# Patient Record
Sex: FEMALE | Race: WHITE | NOT HISPANIC OR LATINO | Employment: OTHER | URBAN - METROPOLITAN AREA
[De-identification: names, ages, dates, MRNs, and addresses within clinical notes are randomized per-mention and may not be internally consistent; named-entity substitution may affect disease eponyms.]

---

## 2017-02-06 ENCOUNTER — ALLSCRIPTS OFFICE VISIT (OUTPATIENT)
Dept: OTHER | Facility: OTHER | Age: 67
End: 2017-02-06

## 2017-02-06 DIAGNOSIS — D50.8 OTHER IRON DEFICIENCY ANEMIAS: ICD-10-CM

## 2017-02-09 ENCOUNTER — HOSPITAL ENCOUNTER (OUTPATIENT)
Dept: INFUSION CENTER | Facility: HOSPITAL | Age: 67
Discharge: HOME | End: 2017-02-09
Payer: MEDICARE

## 2017-02-13 RX ORDER — CYANOCOBALAMIN 1000 UG/ML
1000 INJECTION INTRAMUSCULAR; SUBCUTANEOUS ONCE
Status: COMPLETED | OUTPATIENT
Start: 2017-02-14 | End: 2017-02-14

## 2017-02-13 RX ORDER — SODIUM CHLORIDE 9 MG/ML
20 INJECTION, SOLUTION INTRAVENOUS ONCE
Status: COMPLETED | OUTPATIENT
Start: 2017-02-14 | End: 2017-02-14

## 2017-02-14 ENCOUNTER — HOSPITAL ENCOUNTER (OUTPATIENT)
Dept: INFUSION CENTER | Facility: HOSPITAL | Age: 67
Discharge: HOME | End: 2017-02-14
Payer: MEDICARE

## 2017-02-14 VITALS
SYSTOLIC BLOOD PRESSURE: 118 MMHG | TEMPERATURE: 98.1 F | DIASTOLIC BLOOD PRESSURE: 68 MMHG | WEIGHT: 256.17 LBS | HEIGHT: 65 IN | HEART RATE: 84 BPM | BODY MASS INDEX: 42.68 KG/M2 | RESPIRATION RATE: 20 BRPM

## 2017-02-14 PROCEDURE — 96365 THER/PROPH/DIAG IV INF INIT: CPT

## 2017-02-14 PROCEDURE — 96372 THER/PROPH/DIAG INJ SC/IM: CPT

## 2017-02-14 PROCEDURE — 96366 THER/PROPH/DIAG IV INF ADDON: CPT

## 2017-02-14 RX ORDER — WARFARIN SODIUM 7.5 MG/1
6 TABLET ORAL EVERY OTHER DAY
COMMUNITY
End: 2019-08-23

## 2017-02-14 RX ADMIN — SODIUM CHLORIDE 20 ML/HR: 0.9 INJECTION, SOLUTION INTRAVENOUS at 15:18

## 2017-02-14 RX ADMIN — CYANOCOBALAMIN 1000 MCG: 1000 INJECTION, SOLUTION INTRAMUSCULAR at 17:02

## 2017-02-14 RX ADMIN — IRON SUCROSE 300 MG: 20 INJECTION, SOLUTION INTRAVENOUS at 15:18

## 2017-02-14 NOTE — PLAN OF CARE
Problem: Potential for Falls  Goal: Patient will remain free of falls  INTERVENTIONS:  - Assess patient frequently for physical needs  - Identify cognitive and physical deficits and behaviors that affect risk of falls    - [place] fall precautions as indicated by assessment   - Educate patient/family on patient safety including physical limitations  - Instruct patient to call for assistance with activity based on assessment  - Modify environment to reduce risk of injury  - Consider OT/PT consult to assist with strengthening/mobility   Outcome: Progressing

## 2017-02-15 RX ORDER — CYANOCOBALAMIN 1000 UG/ML
1000 INJECTION INTRAMUSCULAR; SUBCUTANEOUS ONCE
Status: COMPLETED | OUTPATIENT
Start: 2017-02-16 | End: 2017-02-16

## 2017-02-15 RX ORDER — SODIUM CHLORIDE 9 MG/ML
20 INJECTION, SOLUTION INTRAVENOUS ONCE
Status: COMPLETED | OUTPATIENT
Start: 2017-02-16 | End: 2017-02-16

## 2017-02-16 ENCOUNTER — HOSPITAL ENCOUNTER (OUTPATIENT)
Dept: INFUSION CENTER | Facility: HOSPITAL | Age: 67
Discharge: HOME | End: 2017-02-16
Payer: MEDICARE

## 2017-02-16 VITALS
HEART RATE: 59 BPM | SYSTOLIC BLOOD PRESSURE: 143 MMHG | DIASTOLIC BLOOD PRESSURE: 63 MMHG | TEMPERATURE: 96.8 F | RESPIRATION RATE: 16 BRPM

## 2017-02-16 PROCEDURE — 96365 THER/PROPH/DIAG IV INF INIT: CPT

## 2017-02-16 PROCEDURE — 96366 THER/PROPH/DIAG IV INF ADDON: CPT

## 2017-02-16 PROCEDURE — 96372 THER/PROPH/DIAG INJ SC/IM: CPT

## 2017-02-16 RX ADMIN — SODIUM CHLORIDE 20 ML/HR: 0.9 INJECTION, SOLUTION INTRAVENOUS at 13:24

## 2017-02-16 RX ADMIN — CYANOCOBALAMIN 1000 MCG: 1000 INJECTION, SOLUTION INTRAMUSCULAR at 14:17

## 2017-02-16 RX ADMIN — IRON SUCROSE 300 MG: 20 INJECTION, SOLUTION INTRAVENOUS at 13:24

## 2017-02-16 NOTE — PLAN OF CARE
Problem: Potential for Falls  Goal: Patient will remain free of falls  INTERVENTIONS:  - Assess patient frequently for physical needs  - Identify cognitive and physical deficits and behaviors that affect risk of falls    - [place] fall precautions as indicated by assessment   - Educate patient/family on patient safety including physical limitations  - Instruct patient to call for assistance with activity based on assessment  - Modify environment to reduce risk of injury  - Consider OT/PT consult to assist with strengthening/mobility   Outcome: Progressing

## 2017-02-20 RX ORDER — SODIUM CHLORIDE 9 MG/ML
20 INJECTION, SOLUTION INTRAVENOUS ONCE
Status: COMPLETED | OUTPATIENT
Start: 2017-02-21 | End: 2017-02-21

## 2017-02-20 RX ORDER — CYANOCOBALAMIN 1000 UG/ML
1000 INJECTION INTRAMUSCULAR; SUBCUTANEOUS ONCE
Status: COMPLETED | OUTPATIENT
Start: 2017-02-21 | End: 2017-02-21

## 2017-02-21 ENCOUNTER — HOSPITAL ENCOUNTER (OUTPATIENT)
Dept: INFUSION CENTER | Facility: HOSPITAL | Age: 67
Discharge: HOME | End: 2017-02-21
Payer: MEDICARE

## 2017-02-21 VITALS
DIASTOLIC BLOOD PRESSURE: 64 MMHG | SYSTOLIC BLOOD PRESSURE: 132 MMHG | HEART RATE: 62 BPM | TEMPERATURE: 97 F | RESPIRATION RATE: 18 BRPM

## 2017-02-21 PROCEDURE — 96372 THER/PROPH/DIAG INJ SC/IM: CPT

## 2017-02-21 PROCEDURE — 96366 THER/PROPH/DIAG IV INF ADDON: CPT

## 2017-02-21 PROCEDURE — 96365 THER/PROPH/DIAG IV INF INIT: CPT

## 2017-02-21 RX ADMIN — IRON SUCROSE 300 MG: 20 INJECTION, SOLUTION INTRAVENOUS at 13:56

## 2017-02-21 RX ADMIN — CYANOCOBALAMIN 1000 MCG: 1000 INJECTION, SOLUTION INTRAMUSCULAR at 16:06

## 2017-02-21 RX ADMIN — SODIUM CHLORIDE 20 ML/HR: 0.9 INJECTION, SOLUTION INTRAVENOUS at 13:31

## 2017-02-21 NOTE — PLAN OF CARE
Problem: Potential for Falls  Goal: Patient will remain free of falls  INTERVENTIONS:  - Assess patient frequently for physical needs  - Identify cognitive and physical deficits and behaviors that affect risk of falls    - [place] fall precautions as indicated by assessment   - Educate patient/family on patient safety including physical limitations  - Instruct patient to call for assistance with activity based on assessment  - Modify environment to reduce risk of injury  - Consider OT/PT consult to assist with strengthening/mobility   Outcome: Progressing

## 2017-02-22 RX ORDER — SODIUM CHLORIDE 9 MG/ML
20 INJECTION, SOLUTION INTRAVENOUS ONCE
Status: COMPLETED | OUTPATIENT
Start: 2017-02-23 | End: 2017-02-23

## 2017-02-22 RX ORDER — CYANOCOBALAMIN 1000 UG/ML
1000 INJECTION INTRAMUSCULAR; SUBCUTANEOUS ONCE
Status: COMPLETED | OUTPATIENT
Start: 2017-02-23 | End: 2017-02-23

## 2017-02-23 ENCOUNTER — HOSPITAL ENCOUNTER (OUTPATIENT)
Dept: INFUSION CENTER | Facility: HOSPITAL | Age: 67
Discharge: HOME | End: 2017-02-23
Payer: MEDICARE

## 2017-02-23 VITALS
SYSTOLIC BLOOD PRESSURE: 155 MMHG | RESPIRATION RATE: 20 BRPM | TEMPERATURE: 98.3 F | DIASTOLIC BLOOD PRESSURE: 68 MMHG | HEART RATE: 86 BPM

## 2017-02-23 PROCEDURE — 96366 THER/PROPH/DIAG IV INF ADDON: CPT

## 2017-02-23 PROCEDURE — 96372 THER/PROPH/DIAG INJ SC/IM: CPT

## 2017-02-23 PROCEDURE — 96365 THER/PROPH/DIAG IV INF INIT: CPT

## 2017-02-23 RX ADMIN — SODIUM CHLORIDE 20 ML/HR: 0.9 INJECTION, SOLUTION INTRAVENOUS at 14:04

## 2017-02-23 RX ADMIN — IRON SUCROSE 300 MG: 20 INJECTION, SOLUTION INTRAVENOUS at 14:04

## 2017-02-23 RX ADMIN — CYANOCOBALAMIN 1000 MCG: 1000 INJECTION, SOLUTION INTRAMUSCULAR at 14:04

## 2017-02-23 NOTE — PLAN OF CARE
Problem: Potential for Falls  Goal: Patient will remain free of falls  INTERVENTIONS:  - Assess patient frequently for physical needs  - Identify cognitive and physical deficits and behaviors that affect risk of falls    - [place] fall precautions as indicated by assessment   - Educate patient/family on patient safety including physical limitations  - Instruct patient to call for assistance with activity based on assessment  - Modify environment to reduce risk of injury  - Consider OT/PT consult to assist with strengthening/mobility   Outcome: Progressing

## 2017-02-27 ENCOUNTER — HOSPITAL ENCOUNTER (OUTPATIENT)
Dept: INFUSION CENTER | Facility: HOSPITAL | Age: 67
Discharge: HOME | End: 2017-02-27
Payer: MEDICARE

## 2017-02-27 VITALS
RESPIRATION RATE: 20 BRPM | HEART RATE: 65 BPM | SYSTOLIC BLOOD PRESSURE: 140 MMHG | DIASTOLIC BLOOD PRESSURE: 70 MMHG | TEMPERATURE: 97.7 F

## 2017-02-27 PROCEDURE — 96365 THER/PROPH/DIAG IV INF INIT: CPT

## 2017-02-27 PROCEDURE — 96366 THER/PROPH/DIAG IV INF ADDON: CPT

## 2017-02-27 PROCEDURE — 96372 THER/PROPH/DIAG INJ SC/IM: CPT

## 2017-02-27 RX ORDER — SODIUM CHLORIDE 9 MG/ML
20 INJECTION, SOLUTION INTRAVENOUS ONCE
Status: COMPLETED | OUTPATIENT
Start: 2017-02-27 | End: 2017-02-27

## 2017-02-27 RX ORDER — CYANOCOBALAMIN 1000 UG/ML
1000 INJECTION INTRAMUSCULAR; SUBCUTANEOUS ONCE
Status: COMPLETED | OUTPATIENT
Start: 2017-02-27 | End: 2017-02-27

## 2017-02-27 RX ADMIN — CYANOCOBALAMIN 1000 MCG: 1000 INJECTION, SOLUTION INTRAMUSCULAR at 13:07

## 2017-02-27 RX ADMIN — SODIUM CHLORIDE 20 ML/HR: 0.9 INJECTION, SOLUTION INTRAVENOUS at 13:03

## 2017-02-27 RX ADMIN — IRON SUCROSE 300 MG: 20 INJECTION, SOLUTION INTRAVENOUS at 13:25

## 2017-02-27 NOTE — PLAN OF CARE
Problem: Potential for Falls  Goal: Patient will remain free of falls  INTERVENTIONS:  - Assess patient frequently for physical needs  - Identify cognitive and physical deficits and behaviors that affect risk of falls    - [place] fall precautions as indicated by assessment   - Educate patient/family on patient safety including physical limitations  - Instruct patient to call for assistance with activity based on assessment  - Modify environment to reduce risk of injury  - Consider OT/PT consult to assist with strengthening/mobility   Outcome: Progressing

## 2017-02-28 ENCOUNTER — HOSPITAL ENCOUNTER (OUTPATIENT)
Dept: INFUSION CENTER | Facility: HOSPITAL | Age: 67
Discharge: HOME | End: 2017-02-28
Payer: MEDICARE

## 2017-06-06 ENCOUNTER — ALLSCRIPTS OFFICE VISIT (OUTPATIENT)
Dept: OTHER | Facility: OTHER | Age: 67
End: 2017-06-06

## 2017-06-07 ENCOUNTER — HOSPITAL ENCOUNTER (OUTPATIENT)
Dept: CT IMAGING | Facility: HOSPITAL | Age: 67
Discharge: HOME | End: 2017-06-07
Attending: UROLOGY
Payer: MEDICARE

## 2017-06-07 DIAGNOSIS — C64.9 MALIGNANT NEOPLASM OF KIDNEY EXCLUDING RENAL PELVIS (HCC): ICD-10-CM

## 2017-06-07 PROCEDURE — 74178 CT ABD&PLV WO CNTR FLWD CNTR: CPT

## 2017-06-07 RX ADMIN — IOHEXOL 100 ML: 350 INJECTION, SOLUTION INTRAVENOUS at 15:13

## 2017-06-16 ENCOUNTER — ALLSCRIPTS OFFICE VISIT (OUTPATIENT)
Dept: OTHER | Facility: OTHER | Age: 67
End: 2017-06-16

## 2017-06-16 ENCOUNTER — TRANSCRIBE ORDERS (OUTPATIENT)
Dept: ADMINISTRATIVE | Facility: HOSPITAL | Age: 67
End: 2017-06-16

## 2017-06-16 DIAGNOSIS — C64.9 ADENOCARCINOMA, RENAL CELL, UNSPECIFIED LATERALITY (HCC): Primary | ICD-10-CM

## 2017-12-11 ENCOUNTER — HOSPITAL ENCOUNTER (OUTPATIENT)
Dept: CT IMAGING | Facility: HOSPITAL | Age: 67
Discharge: HOME | End: 2017-12-11
Payer: MEDICARE

## 2017-12-11 DIAGNOSIS — C64.9 MALIGNANT NEOPLASM OF KIDNEY EXCLUDING RENAL PELVIS (HCC): ICD-10-CM

## 2017-12-11 PROCEDURE — 74178 CT ABD&PLV WO CNTR FLWD CNTR: CPT

## 2017-12-11 RX ADMIN — IOHEXOL 100 ML: 350 INJECTION, SOLUTION INTRAVENOUS at 14:53

## 2017-12-16 DIAGNOSIS — C64.9 MALIGNANT NEOPLASM OF KIDNEY EXCLUDING RENAL PELVIS (HCC): ICD-10-CM

## 2018-01-13 VITALS
SYSTOLIC BLOOD PRESSURE: 150 MMHG | WEIGHT: 260.31 LBS | RESPIRATION RATE: 20 BRPM | BODY MASS INDEX: 43.37 KG/M2 | HEIGHT: 65 IN | HEART RATE: 78 BPM | DIASTOLIC BLOOD PRESSURE: 90 MMHG | TEMPERATURE: 97.1 F

## 2018-01-13 VITALS
DIASTOLIC BLOOD PRESSURE: 82 MMHG | SYSTOLIC BLOOD PRESSURE: 158 MMHG | WEIGHT: 263.38 LBS | RESPIRATION RATE: 16 BRPM | TEMPERATURE: 95.9 F | BODY MASS INDEX: 43.88 KG/M2 | OXYGEN SATURATION: 83 % | HEIGHT: 65 IN | HEART RATE: 70 BPM

## 2018-01-13 VITALS
HEIGHT: 65 IN | SYSTOLIC BLOOD PRESSURE: 134 MMHG | BODY MASS INDEX: 43.88 KG/M2 | DIASTOLIC BLOOD PRESSURE: 82 MMHG | HEART RATE: 84 BPM | WEIGHT: 263.38 LBS

## 2018-03-09 ENCOUNTER — TELEPHONE (OUTPATIENT)
Dept: UROLOGY | Facility: AMBULATORY SURGERY CENTER | Age: 68
End: 2018-03-09

## 2018-03-09 DIAGNOSIS — C64.1: Primary | ICD-10-CM

## 2018-03-09 NOTE — TELEPHONE ENCOUNTER
Patient is managed by Dr Yanira Park and has history of renal cell carcinoma  She is due for a f/u CT scan in June, and is calling to have orders placed for it so she can schedule it with central scheduling  Advised patient will confirm with provider first that CT Abdomen Pelvis with and without contrast should be ordered, and will call patient back to inform

## 2018-03-12 NOTE — TELEPHONE ENCOUNTER
Left message for patient stating order in chart for CT scan abd/pelvis with and without IV contrast, no oral contrast   Central scheduling phone number provided for patient  Call with any questions

## 2018-03-12 NOTE — TELEPHONE ENCOUNTER
Patient should have CT abd/pelvis with and without IV contrast, no oral contrast  Orders placed in EPIC

## 2018-03-13 DIAGNOSIS — C64.9 RENAL CELL CARCINOMA, UNSPECIFIED LATERALITY (HCC): Primary | ICD-10-CM

## 2018-03-13 NOTE — TELEPHONE ENCOUNTER
Ordered Patient Bun and [facility] and called and spoke to her, asked her to have done 5 days prior to ct scan   Sending out today 03/13/2018

## 2018-05-17 ENCOUNTER — APPOINTMENT (OUTPATIENT)
Dept: LAB | Facility: HOSPITAL | Age: 68
End: 2018-05-17
Attending: UROLOGY
Payer: MEDICARE

## 2018-05-17 DIAGNOSIS — C64.9 RENAL CELL CARCINOMA, UNSPECIFIED LATERALITY (HCC): ICD-10-CM

## 2018-05-17 DIAGNOSIS — C64.9 MALIGNANT NEOPLASM OF KIDNEY EXCLUDING RENAL PELVIS (HCC): ICD-10-CM

## 2018-05-17 LAB
25(OH)D3 SERPL-MCNC: 42.5 NG/ML (ref 30–100)
ALBUMIN SERPL BCP-MCNC: 3.2 G/DL (ref 3.5–5)
ALP SERPL-CCNC: 171 U/L (ref 46–116)
ALT SERPL W P-5'-P-CCNC: 22 U/L (ref 12–78)
ANION GAP SERPL CALCULATED.3IONS-SCNC: 4 MMOL/L (ref 4–13)
AST SERPL W P-5'-P-CCNC: 20 U/L (ref 5–45)
BASOPHILS # BLD AUTO: 0.02 THOUSANDS/ΜL (ref 0–0.1)
BASOPHILS NFR BLD AUTO: 0 % (ref 0–1)
BILIRUB SERPL-MCNC: 0.46 MG/DL (ref 0.2–1)
BUN SERPL-MCNC: 19 MG/DL (ref 5–25)
CALCIUM SERPL-MCNC: 8.8 MG/DL (ref 8.3–10.1)
CHLORIDE SERPL-SCNC: 107 MMOL/L (ref 100–108)
CHOLEST SERPL-MCNC: 185 MG/DL (ref 50–200)
CO2 SERPL-SCNC: 30 MMOL/L (ref 21–32)
CREAT SERPL-MCNC: 0.93 MG/DL (ref 0.6–1.3)
EOSINOPHIL # BLD AUTO: 0.06 THOUSAND/ΜL (ref 0–0.61)
EOSINOPHIL NFR BLD AUTO: 1 % (ref 0–6)
ERYTHROCYTE [DISTWIDTH] IN BLOOD BY AUTOMATED COUNT: 13.5 % (ref 11.6–15.1)
GFR SERPL CREATININE-BSD FRML MDRD: 63 ML/MIN/1.73SQ M
GLUCOSE P FAST SERPL-MCNC: 96 MG/DL (ref 65–99)
HCT VFR BLD AUTO: 47.2 % (ref 34.8–46.1)
HDLC SERPL-MCNC: 70 MG/DL (ref 40–60)
HGB BLD-MCNC: 14.7 G/DL (ref 11.5–15.4)
IMM GRANULOCYTES # BLD AUTO: 0.07 THOUSAND/UL (ref 0–0.2)
IMM GRANULOCYTES NFR BLD AUTO: 1 % (ref 0–2)
LDLC SERPL CALC-MCNC: 100 MG/DL (ref 0–100)
LYMPHOCYTES # BLD AUTO: 1.21 THOUSANDS/ΜL (ref 0.6–4.47)
LYMPHOCYTES NFR BLD AUTO: 21 % (ref 14–44)
MCH RBC QN AUTO: 30.1 PG (ref 26.8–34.3)
MCHC RBC AUTO-ENTMCNC: 31.1 G/DL (ref 31.4–37.4)
MCV RBC AUTO: 97 FL (ref 82–98)
MONOCYTES # BLD AUTO: 0.5 THOUSAND/ΜL (ref 0.17–1.22)
MONOCYTES NFR BLD AUTO: 9 % (ref 4–12)
NEUTROPHILS # BLD AUTO: 3.86 THOUSANDS/ΜL (ref 1.85–7.62)
NEUTS SEG NFR BLD AUTO: 68 % (ref 43–75)
NRBC BLD AUTO-RTO: 0 /100 WBCS
PLATELET # BLD AUTO: 257 THOUSANDS/UL (ref 149–390)
PMV BLD AUTO: 9.9 FL (ref 8.9–12.7)
POTASSIUM SERPL-SCNC: 4.3 MMOL/L (ref 3.5–5.3)
PROT SERPL-MCNC: 6.3 G/DL (ref 6.4–8.2)
RBC # BLD AUTO: 4.88 MILLION/UL (ref 3.81–5.12)
SODIUM SERPL-SCNC: 141 MMOL/L (ref 136–145)
TRIGL SERPL-MCNC: 76 MG/DL
WBC # BLD AUTO: 5.72 THOUSAND/UL (ref 4.31–10.16)

## 2018-05-17 PROCEDURE — 82306 VITAMIN D 25 HYDROXY: CPT

## 2018-05-17 PROCEDURE — 80061 LIPID PANEL: CPT

## 2018-05-17 PROCEDURE — 85025 COMPLETE CBC W/AUTO DIFF WBC: CPT

## 2018-05-17 PROCEDURE — 80053 COMPREHEN METABOLIC PANEL: CPT

## 2018-05-17 PROCEDURE — 36415 COLL VENOUS BLD VENIPUNCTURE: CPT

## 2018-05-24 ENCOUNTER — HOSPITAL ENCOUNTER (OUTPATIENT)
Dept: CT IMAGING | Facility: HOSPITAL | Age: 68
Discharge: HOME | End: 2018-05-24
Payer: MEDICARE

## 2018-05-24 DIAGNOSIS — C64.1: ICD-10-CM

## 2018-05-24 PROCEDURE — 74178 CT ABD&PLV WO CNTR FLWD CNTR: CPT

## 2018-05-24 RX ADMIN — IOHEXOL 100 ML: 350 INJECTION, SOLUTION INTRAVENOUS at 15:20

## 2018-05-31 ENCOUNTER — TELEPHONE (OUTPATIENT)
Dept: UROLOGY | Facility: AMBULATORY SURGERY CENTER | Age: 68
End: 2018-05-31

## 2018-05-31 NOTE — TELEPHONE ENCOUNTER
Please inform patient that no evidence of recurrence of RCC or metastases noted  Will review in detail at patient's next OV on 6/14/18

## 2018-05-31 NOTE — TELEPHONE ENCOUNTER
Called and informed patient of CT results, no evidence of RCC or metastasis  Advised will be discussed in detail at follow up appt on 6/14/18

## 2018-05-31 NOTE — TELEPHONE ENCOUNTER
Patient managed by Dr Renay Joseph and seen at the [place] office has an upcoming appt with Renay Joseph, [address] on 6/14/18, however is calling today for results of her recent CT scan  Patient has history of renal cancer and is too anxious to wait until her upcoming appt to learn of results  Please advise

## 2018-06-13 ENCOUNTER — HOSPITAL ENCOUNTER (EMERGENCY)
Facility: HOSPITAL | Age: 68
Discharge: LEFT BEFORE DISCHARGE | End: 2018-06-14
Attending: EMERGENCY MEDICINE | Admitting: EMERGENCY MEDICINE
Payer: MEDICARE
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
## 2018-06-14 VITALS
OXYGEN SATURATION: 96 % | WEIGHT: 252 LBS | HEART RATE: 74 BPM | DIASTOLIC BLOOD PRESSURE: 106 MMHG | BODY MASS INDEX: 41.93 KG/M2 | TEMPERATURE: 98.2 F | SYSTOLIC BLOOD PRESSURE: 193 MMHG | RESPIRATION RATE: 20 BRPM

## 2018-06-14 PROCEDURE — 99283 EMERGENCY DEPT VISIT LOW MDM: CPT

## 2018-06-16 NOTE — ED PROVIDER NOTES
History  Chief Complaint   Patient presents with   Dileep John     pt states "i fell twice today, i have arthritis in my knees and i have a hard time getting up, my legs are weak "     Patient is a 75 yr old female who presents for a fall  Patient says that her L knee gave out on her and she landed on her knees  Patient is on coumadin  She says she has a history of osteoarthritis  She denies lightheadedness, dizziness, or chest pain prior to the fall  She is here because she is concerned about being on coumadin  Prior to Admission Medications   Prescriptions Last Dose Informant Patient Reported? Taking? FLUoxetine (PROzac) 20 mg capsule   Yes No   Sig: Take 20 mg by mouth daily  LORazepam (ATIVAN) 1 mg tablet   Yes No   Sig: Take 1 mg by mouth daily at bedtime as needed for anxiety  diltiazem (CARDIZEM) 120 MG tablet   Yes No   Sig: Take 120 mg by mouth 2 (two) times a day  warfarin (COUMADIN) 5 mg tablet  Self Yes No   Sig: Take by mouth daily     warfarin (COUMADIN) 7 5 mg tablet   Yes No   Sig: Take 7 5 mg by mouth 2 (two) times a week Mondays and Thursdays      Facility-Administered Medications: None       Past Medical History:   Diagnosis Date    [facility] [address] [facility]     Arthritis     History of transfusion     35 years ago    mass right kidney     Renal cell adenocarcinoma ([facility] [address] [facility]     RIGHT       Past Surgical History:   Procedure Laterality Date    CHOLECYSTECTOMY      COLOSTOMY      CRYOABLATION Right     RT KIDNEY    GASTRIC BYPASS      HYSTERECTOMY      [place] OPEN RX DISTAL RADIUS FX, EXTRA-ARTICULAR Right 4/22/2016    Procedure: OPEN REDUCTION INTERNAL FIXATION RIGHT DISTAL RADIUS;  Surgeon: Jae Zuniga MD;  [facility];  Service: Orthopedics    REVISION COLOSTOMY      TUBAL LIGATION         History reviewed  No pertinent family history  I have reviewed and agree with the history as documented      Social History   Substance Use Topics    Smoking status: Never Smoker    Smokeless tobacco: Never Used    Alcohol use No        Review of Systems   Constitutional: Negative for chills, diaphoresis and fever  HENT: Negative for congestion, sinus pressure, sore throat and trouble swallowing  Eyes: Negative for pain, discharge and itching  Respiratory: Negative for cough, chest tightness, shortness of breath and wheezing  Cardiovascular: Negative for chest pain, palpitations and leg swelling  Gastrointestinal: Negative for abdominal distention, abdominal pain, blood in stool, diarrhea, nausea and vomiting  Endocrine: Negative for polyphagia and polyuria  Genitourinary: Negative for difficulty urinating, dysuria, flank pain, hematuria, pelvic pain and vaginal bleeding  Musculoskeletal: Positive for arthralgias (b/l knees)  Negative for back pain  Skin: Negative for color change and rash  Neurological: Negative for dizziness, syncope, weakness, light-headedness and headaches  Physical Exam  ED Triage Vitals [06/13/18 2158]   Temperature Pulse Respirations Blood Pressure SpO2   98 2 °F (36 8 °C) 85 18 156/92 97 %      Temp Source Heart Rate Source Patient Position - Orthostatic VS BP Location FiO2 (%)   Oral Monitor Sitting Right arm --      Pain Score       Worst Possible Pain           Orthostatic Vital Signs  Vitals:    06/13/18 2158 06/14/18 0005 06/14/18 0015   BP: 156/92 (!) 193/106 (!) 193/106   Pulse: 85 79 74   Patient Position - Orthostatic VS: Sitting Lying        Physical Exam   Constitutional: She is oriented to person, place, and time  She appears well-developed and well-nourished  No distress  HENT:   Head: Normocephalic and atraumatic  Eyes: Conjunctivae are normal  Pupils are equal, round, and reactive to light  Neck: Neck supple  Cardiovascular: Normal rate, regular rhythm and normal heart sounds  Exam reveals no gallop and no friction rub  No murmur heard    Pulmonary/Chest: Effort normal and breath sounds normal  No respiratory distress  Abdominal: Soft  She exhibits no distension  There is no tenderness  There is no guarding  Musculoskeletal: Normal range of motion  She exhibits edema (mild swelling to R knee w/ bruising) and tenderness (Mild, right knee)  She exhibits no deformity  Lymphadenopathy:     She has no cervical adenopathy  Neurological: She is alert and oriented to person, place, and time  No cranial nerve deficit or sensory deficit  She exhibits normal muscle tone  Skin: Skin is warm and dry  Psychiatric: She has a normal mood and affect  Nursing note and vitals reviewed  ED Medications  Medications - No data to display    Diagnostic Studies  Results Reviewed     None                 No orders to display         Procedures  Procedures      Phone Consults  ED Phone Contact    ED Course  ED Course as of Jun 16 0811   Thu Jun 14, 2018   0018 Temperature: 98 2 °F (36 8 °C)                               MDM  Number of Diagnoses or Management Options  Diagnosis management comments: 74 yo F who presents for knee pain after fall  Patient on coumadin  No lightheadedness, dizzines, sob or chest pain  Patient has minor knee pain  Minor bruising on L knee  Upon speaking with patient and daughters they say that they came because they were concerned about her being on coumadin  They say that the swelling has gone down and that they would like to leave  I informed them that I needed to speak with my attending before I could discharge them  CritCare Time    Disposition  Final diagnoses:   Accident due to mechanical fall without injury, initial encounter     Time reflects when diagnosis was documented in both MDM as applicable and the Disposition within this note     Time User Action Codes Description Comment    6/16/2018  8:11 AM Mesfin Watkins Add [Y86  XXXA] Accident due to mechanical fall without injury, initial encounter       ED Disposition     ED Disposition Condition Comment    Eloped        Follow-up Information    None         Discharge Medication List as of 6/14/2018 12:46 AM      CONTINUE these medications which have NOT CHANGED    Details   diltiazem (CARDIZEM) 120 MG tablet Take 120 mg by mouth 2 (two) times a day , Until Discontinued, Historical Med      FLUoxetine (PROzac) 20 mg capsule Take 20 mg by mouth daily  , Until Discontinued, Historical Med      LORazepam (ATIVAN) 1 mg tablet Take 1 mg by mouth daily at bedtime as needed for anxiety  , Until Discontinued, Historical Med      !! warfarin (COUMADIN) 5 mg tablet Take by mouth daily  , Until Discontinued, Historical Med      !! warfarin (COUMADIN) 7 5 mg tablet Take 7 5 mg by mouth 2 (two) times a week Mondays and Thursdays, Until Discontinued, Historical Med       !! - Potential duplicate medications found  Please discuss with provider  No discharge procedures on file  ED Provider  Attending physically available and evaluated [facility]  I managed the patient along with the ED Attending      Electronically Signed by         Michela Weiner DO  06/16/18 2396

## 2018-07-13 ENCOUNTER — OFFICE VISIT (OUTPATIENT)
Dept: UROLOGY | Facility: CLINIC | Age: 68
End: 2018-07-13
Payer: MEDICARE

## 2018-07-13 VITALS
HEIGHT: 66 IN | DIASTOLIC BLOOD PRESSURE: 84 MMHG | WEIGHT: 267.2 LBS | SYSTOLIC BLOOD PRESSURE: 160 MMHG | BODY MASS INDEX: 42.94 KG/M2 | HEART RATE: 74 BPM

## 2018-07-13 DIAGNOSIS — C64.1: Primary | ICD-10-CM

## 2018-07-13 DIAGNOSIS — N39.41 URGE INCONTINENCE OF URINE: ICD-10-CM

## 2018-07-13 PROCEDURE — 99213 OFFICE O/P EST LOW 20 MIN: CPT | Performed by: NURSE PRACTITIONER

## 2018-07-13 RX ORDER — OXYBUTYNIN CHLORIDE 10 MG/1
10 TABLET, EXTENDED RELEASE ORAL
Qty: 30 TABLET | Refills: 11 | Status: SHIPPED | OUTPATIENT
Start: 2018-07-13 | End: 2018-07-16

## 2018-07-13 RX ORDER — MELATONIN
50000 WEEKLY
COMMUNITY
End: 2019-08-23

## 2018-07-13 NOTE — PROGRESS NOTES
7/13/2018    Carlyle Garza  1950  [id number]        Assessment  Right RCC s/p right cryotherapy (5/11/2016)  Incontinence    Discussion  Melia Stovall is a 75 y o  female being managed by Dr Pathak  Her CT scan was reviewed and there was no evidence of disease recurrence  We discussed medical management for her urinary symptoms and she wishes to try an anticholinergic  She was prescribed Oxybutynin 10mg daily  Use and side effects reviewed  She was instructed if she does not notice any efficacy in 6 weeks  Pending she is doing well she will then return in 1 year with a renal ultrasound and BMP  She was instructed to call with any issues  All questions answered  History of Present Illness  75 y o  female with a history of right RCC s/p right cryotherapy (5/11/2016) presents today for 6 month follow up  She denies any gross hematuria  She does complain of urinary frequency, urgency and incontinence  She uses 1 to 2 sanitary pads per day  She does find this bothersome  She denies any changes in her overall health except for arthritis in her knees  Review of Systems  Review of Systems   Constitutional: Negative  HENT: Negative  Respiratory: Negative  Cardiovascular: Negative  Gastrointestinal: Negative  Genitourinary:        As per HPI   Musculoskeletal: Negative  Skin: Negative  Neurological: Negative  Hematological: Negative            Past Medical History  Past Medical History:   Diagnosis Date    [facility] [address]     Arthritis     History of transfusion     35 years ago    mass right kidney     Renal cell adenocarcinoma ([facility] [address] [facility]     RIGHT       Past Surgical History  Past Surgical History:   Procedure Laterality Date    CHOLECYSTECTOMY      COLOSTOMY      CRYOABLATION Right     RT KIDNEY    GASTRIC BYPASS      HYSTERECTOMY      [place] OPEN RX DISTAL RADIUS FX, EXTRA-ARTICULAR Right 4/22/2016    Procedure: OPEN REDUCTION INTERNAL FIXATION RIGHT DISTAL RADIUS;  Surgeon: Bekah Luis MD;  [facility];  Service: Orthopedics    REVISION COLOSTOMY      TUBAL LIGATION          Past Family History  History reviewed  No pertinent family history  Past Social history  Social History     Social History    Marital status:      Spouse name: N/A    Number of children: N/A    Years of education: N/A     Occupational History    Not on file  Social History Main Topics    Smoking status: Never Smoker    Smokeless tobacco: Never Used    Alcohol use No    Drug use: No    Sexual activity: Not on file     Other Topics Concern    Not on file     Social History Narrative    No narrative on file       Current Medications  Current Outpatient Prescriptions   Medication Sig Dispense Refill    cholecalciferol (VITAMIN D3) 1,000 units tablet Take 50,000 Units by mouth once a week      diltiazem (CARDIZEM) 120 MG tablet Take 120 mg by mouth 2 (two) times a day   FLUoxetine (PROzac) 20 mg capsule Take 20 mg by mouth daily   LORazepam (ATIVAN) 1 mg tablet Take 1 mg by mouth daily at bedtime as needed for anxiety   warfarin (COUMADIN) 5 mg tablet Take by mouth daily        warfarin (COUMADIN) 7 5 mg tablet Take 7 5 mg by mouth 2 (two) times a week Mondays and Thursdays       No current facility-administered medications for this visit  Allergies  No Known Allergies    Past Medical History, Social History, Family History, medications and allergies were reviewed and updated as appropriate  Vitals  Vitals:    07/13/18 1332   BP: 160/84   BP Location: Left arm   Patient Position: Sitting   Cuff Size: Adult   Pulse: 74   Weight: 121 kg (267 lb 3 2 oz)   Height: 5' 6" (1 676 m)       Physical Exam  Skin: warm, dry, intact  Pulmonary: Non-labored breathing  Abdomen: Soft, non-tender, non-distended  Musculoskeletal: AROM with no joint deformity or tenderness    Neurology: Alert and oriented          Results    Lab Results   Component Value Date    GLUCOSE 106 04/18/2016 CALCIUM 8 8 05/17/2018     05/17/2018    K 4 3 05/17/2018    CO2 30 05/17/2018     05/17/2018    BUN 19 05/17/2018    CREATININE 0 93 05/17/2018     Lab Results   Component Value Date    WBC 5 72 05/17/2018    HGB 14 7 05/17/2018    HCT 47 2 (H) 05/17/2018    MCV 97 05/17/2018     05/17/2018           FINDINGS:     ABDOMEN     RIGHT KIDNEY AND URETER:  Stable appearance of parenchymal scar at the site of cryoablation  Several additional cortical scars in the upper pole  No evidence of local recurrence of tumor  No new right renal mass  No right ureteral mass  No hydronephrosis or hydroureter  No urinary tract calculi  No perinephric collection      LEFT KIDNEY AND URETER:  Cortical scarring, particularly in the upper pole  Upper pole cortical cyst, unchanged  No suspicious left renal mass  No left ureteral mass  No hydronephrosis or hydroureter  No urinary tract calculi  No perinephric collection      URINARY BLADDER:  No bladder wall mass  No calculi         LOWER CHEST:  Lung bases clear  Small hiatal hernia  Distal esophagus unremarkable      LIVER/BILIARY TREE:  Liver unremarkable  Stable extrahepatic bile duct dilatation with a CBD diameter of 1 2 cm, probably physiologic in this postcholecystectomy patient      GALLBLADDER:  Postcholecystectomy      SPLEEN:  Unremarkable      PANCREAS:  Unremarkable      ADRENAL GLANDS:  Unremarkable      STOMACH AND BOWEL:  Prior gastric bypass surgery  Loops of small intestine within a right infraumbilical anterior abdominal wall hernia  Small bowel otherwise unremarkable  Colon unremarkable      ABDOMINOPELVIC CAVITY: No lymphadenopathy or mass  No ascites  No extraluminal gas      VESSELS:  Unremarkable for patient's age      PELVIS     REPRODUCTIVE ORGANS:  Post hysterectomy      APPENDIX: No findings to suggest appendicitis      ABDOMINAL WALL/INGUINAL REGIONS:  Chronic rectus abdominis diastases with abdominal wall eventration  Infraumbilical hernia to the right of midline, containing several loops of small intestine without evidence of incarceration, unchanged in appearance   since the earlier CTs      OSSEOUS STRUCTURES:  No acute fracture or destructive osseous lesion      IMPRESSION:     1  Stable appearance of the right kidney, post cryoablation, with no evidence of local recurrence of renal cell carcinoma  2   No evidence of metastases in the abdomen or pelvis  3   Stable right infraumbilical anterior abdominal wall hernia, containing a loop of small intestine without evidence of incarceration

## 2018-07-16 DIAGNOSIS — R39.15 URINARY URGENCY: Primary | ICD-10-CM

## 2018-07-16 RX ORDER — OXYBUTYNIN CHLORIDE 5 MG/1
5 TABLET ORAL 2 TIMES DAILY
Qty: 180 TABLET | Refills: 3 | Status: SHIPPED | OUTPATIENT
Start: 2018-07-16 | End: 2019-01-08

## 2018-07-16 NOTE — TELEPHONE ENCOUNTER
Patient called office to see if she could have the oxybutynin script resent for oxybutynin 5 mg and take 1 tab in the morning and 1 tab in the evening  Per patient, it will be cheaper this way, and would like it sent to [facility] mail order if OK

## 2018-07-19 ENCOUNTER — OFFICE VISIT (OUTPATIENT)
Dept: OBGYN CLINIC | Facility: HOSPITAL | Age: 68
End: 2018-07-19
Payer: MEDICARE

## 2018-07-19 ENCOUNTER — HOSPITAL ENCOUNTER (OUTPATIENT)
Dept: RADIOLOGY | Facility: HOSPITAL | Age: 68
Discharge: HOME | End: 2018-07-19
Attending: ORTHOPAEDIC SURGERY
Payer: MEDICARE

## 2018-07-19 VITALS
BODY MASS INDEX: 42.87 KG/M2 | HEART RATE: 65 BPM | HEIGHT: 66 IN | SYSTOLIC BLOOD PRESSURE: 154 MMHG | DIASTOLIC BLOOD PRESSURE: 87 MMHG | WEIGHT: 266.76 LBS

## 2018-07-19 DIAGNOSIS — M25.561 PAIN IN BOTH KNEES, UNSPECIFIED CHRONICITY: ICD-10-CM

## 2018-07-19 DIAGNOSIS — M25.561 PAIN IN BOTH KNEES, UNSPECIFIED CHRONICITY: Primary | ICD-10-CM

## 2018-07-19 DIAGNOSIS — M25.561 CHRONIC PAIN OF RIGHT KNEE: ICD-10-CM

## 2018-07-19 DIAGNOSIS — M25.562 CHRONIC PAIN OF LEFT KNEE: ICD-10-CM

## 2018-07-19 DIAGNOSIS — M25.562 PAIN IN BOTH KNEES, UNSPECIFIED CHRONICITY: Primary | ICD-10-CM

## 2018-07-19 DIAGNOSIS — G89.29 CHRONIC PAIN OF RIGHT KNEE: ICD-10-CM

## 2018-07-19 DIAGNOSIS — M25.562 PAIN IN BOTH KNEES, UNSPECIFIED CHRONICITY: ICD-10-CM

## 2018-07-19 DIAGNOSIS — M17.12 PRIMARY OSTEOARTHRITIS OF LEFT KNEE: ICD-10-CM

## 2018-07-19 DIAGNOSIS — M17.11 PRIMARY OSTEOARTHRITIS OF RIGHT KNEE: ICD-10-CM

## 2018-07-19 DIAGNOSIS — G89.29 CHRONIC PAIN OF LEFT KNEE: ICD-10-CM

## 2018-07-19 PROCEDURE — 20610 DRAIN/INJ JOINT/BURSA W/O US: CPT | Performed by: ORTHOPAEDIC SURGERY

## 2018-07-19 PROCEDURE — 99214 OFFICE O/P EST MOD 30 MIN: CPT | Performed by: ORTHOPAEDIC SURGERY

## 2018-07-19 PROCEDURE — 73562 X-RAY EXAM OF KNEE 3: CPT

## 2018-07-19 RX ORDER — BUPIVACAINE HYDROCHLORIDE 2.5 MG/ML
2 INJECTION, SOLUTION INFILTRATION; PERINEURAL
Status: COMPLETED | OUTPATIENT
Start: 2018-07-19 | End: 2018-07-19

## 2018-07-19 RX ORDER — LIDOCAINE HYDROCHLORIDE 10 MG/ML
2 INJECTION, SOLUTION INFILTRATION; PERINEURAL
Status: COMPLETED | OUTPATIENT
Start: 2018-07-19 | End: 2018-07-19

## 2018-07-19 RX ORDER — BETAMETHASONE SODIUM PHOSPHATE AND BETAMETHASONE ACETATE 3; 3 MG/ML; MG/ML
12 INJECTION, SUSPENSION INTRA-ARTICULAR; INTRALESIONAL; INTRAMUSCULAR; SOFT TISSUE
Status: COMPLETED | OUTPATIENT
Start: 2018-07-19 | End: 2018-07-19

## 2018-07-19 RX ADMIN — BUPIVACAINE HYDROCHLORIDE 2 ML: 2.5 INJECTION, SOLUTION INFILTRATION; PERINEURAL at 15:35

## 2018-07-19 RX ADMIN — LIDOCAINE HYDROCHLORIDE 2 ML: 10 INJECTION, SOLUTION INFILTRATION; PERINEURAL at 15:36

## 2018-07-19 RX ADMIN — BUPIVACAINE HYDROCHLORIDE 2 ML: 2.5 INJECTION, SOLUTION INFILTRATION; PERINEURAL at 15:36

## 2018-07-19 RX ADMIN — LIDOCAINE HYDROCHLORIDE 2 ML: 10 INJECTION, SOLUTION INFILTRATION; PERINEURAL at 15:35

## 2018-07-19 RX ADMIN — BETAMETHASONE SODIUM PHOSPHATE AND BETAMETHASONE ACETATE 12 MG: 3; 3 INJECTION, SUSPENSION INTRA-ARTICULAR; INTRALESIONAL; INTRAMUSCULAR; SOFT TISSUE at 15:35

## 2018-07-19 RX ADMIN — BETAMETHASONE SODIUM PHOSPHATE AND BETAMETHASONE ACETATE 12 MG: 3; 3 INJECTION, SUSPENSION INTRA-ARTICULAR; INTRALESIONAL; INTRAMUSCULAR; SOFT TISSUE at 15:36

## 2018-07-19 NOTE — PROGRESS NOTES
75 y o female presents for evaluation of chronic bilateral knee pain and dysfunction  She describes pain level both knee joints, the pain is made worse bearing weight, the pain increases with increased activities  When she gets in a deep knee bending posture, she can arise from that position  She has had treatments in the past that are consistent with injections, but no physical therapy  She denies back pain, she denies bilateral hip pain, she denies bilateral ankle pain  Review of Systems  Review of systems negative unless otherwise specified in HPI    Past Medical History  Past Medical History:   Diagnosis Date    [facility] [address]     Arthritis     History of transfusion     35 years ago    mass right kidney     Renal cell adenocarcinoma ([facility] [address] [facility]     RIGHT       Past Surgical History  Past Surgical History:   Procedure Laterality Date    CHOLECYSTECTOMY      COLOSTOMY      CRYOABLATION Right     RT KIDNEY    GASTRIC BYPASS      HYSTERECTOMY      [place] OPEN RX DISTAL RADIUS FX, EXTRA-ARTICULAR Right 4/22/2016    Procedure: OPEN REDUCTION INTERNAL FIXATION RIGHT DISTAL RADIUS;  Surgeon: Doron Cohen MD;  [facility];  Service: Orthopedics    REVISION COLOSTOMY      TUBAL LIGATION         Current Medications  Current Outpatient Prescriptions on File Prior to Visit   Medication Sig Dispense Refill    cholecalciferol (VITAMIN D3) 1,000 units tablet Take 50,000 Units by mouth once a week      diltiazem (CARDIZEM) 120 MG tablet Take 120 mg by mouth 2 (two) times a day   FLUoxetine (PROzac) 20 mg capsule Take 20 mg by mouth daily   LORazepam (ATIVAN) 1 mg tablet Take 1 mg by mouth daily at bedtime as needed for anxiety        oxybutynin (DITROPAN) 5 mg tablet Take 1 tablet (5 mg total) by mouth 2 (two) times a day 180 tablet 3    warfarin (COUMADIN) 5 mg tablet Take by mouth daily        warfarin (COUMADIN) 7 5 mg tablet Take 7 5 mg by mouth 2 (two) times a week Mondays and Thursdays No current facility-administered medications on file prior to visit  Recent Labs [facility])    0  Lab Value Date/Time   HCT 47 2 (H) 05/17/2018 0653   HGB 14 7 05/17/2018 0653   WBC 5 72 05/17/2018 0653   INR 1 02 05/11/2016 0742   GLUCOSE 106 04/18/2016 0955         Physical exam  · General: Awake, Alert, Oriented  · Eyes: Pupils equal, round and reactive to light  · Heart: regular rate and rhythm  · Lungs: No audible wheezing  · Abdomen: soft  Gait pattern has moderate antalgia  Breathing is nonlabored  Both hips move well  Both eyes have atrophy  Both knees are in varus  There are no effusions  There is bony enlargement tenderness medially laterally  There is crepitation flexion extension bilaterally  There is no palpable warmth the synovium bilaterally  Calf compartments are soft and supple  Toes are warm, sensate, mobile bilaterally    Imaging  I have personally reviewed x-rays of both knees and my interpretation is as follows:  Standing films of both knee show arthritic changes in both knees    Procedure  Injections corticosteroid to provided for bilateral knee joints today    There documented below    Large joint arthrocentesis  Date/Time: 7/19/2018 3:35 PM  Consent given by: patient  Supporting Documentation  Indications: pain   Procedure Details  Location: knee - R knee  Needle size: 22 G  Ultrasound guidance: no  Medications administered: 2 mL bupivacaine 0 25 %; 2 mL lidocaine 1 %; 12 mg betamethasone acetate-betamethasone sodium phosphate 6 (3-3) mg/mL    Patient tolerance: patient tolerated the procedure well with no immediate complications  Dressing:  Sterile dressing applied  Large joint arthrocentesis  Date/Time: 7/19/2018 3:36 PM  Consent given by: patient  Supporting Documentation  Indications: pain   Procedure Details  Location: knee - L knee  Needle size: 22 G  Medications administered: 2 mL bupivacaine 0 25 %; 2 mL lidocaine 1 %; 12 mg betamethasone acetate-betamethasone sodium phosphate 6 (3-3) mg/mL    Patient tolerance: patient tolerated the procedure well with no immediate complications  Dressing:  Sterile dressing applied          Assessment/Plan:   75 y  o female who has bilateral knee pain and dysfunction stemming from osteoarthritis  Injections corticosteroid followed by course of physical therapy would be beneficial   Injections are advised, except, administers outlined above  I would welcome the opportunity see this patient back in the office in 3 months time for follow-up I did discuss exercises the primary means to reduce her BMI, which would make her much more attractive surgical candidate

## 2018-07-23 NOTE — PROGRESS NOTES
PT Evaluation     Today's date: 2018  Patient name: Mirella Kirkpatrick  : 1950  MRN: [id number]  Referring provider: Cachorro Puentes MD  Dx:   Encounter Diagnosis     ICD-10-CM    1  Primary osteoarthritis of left knee M17 12 Ambulatory referral to Physical Therapy   2  Primary osteoarthritis of right knee M17 11 Ambulatory referral to Physical Therapy                  Assessment  Impairments: abnormal gait, activity intolerance, impaired balance, impaired physical strength, lacks appropriate home exercise program, pain with function, poor posture  and poor body mechanics  Functional limitations: unable to independently get out of chairs, unable to squat, unable to ambulate long distances  Assessment details: Mirella Kirkpatrick is a 75 y o  female complains of bilateral knee pain for the past 4 years  There is not a significant history of knee problems  The pain is in the medial and posterior knee  There is also pain in the back  The pain is sharp and throbbing and intermittent and rated at currently a 4 out of 10  The symptoms are now getting worse  Activities that aggravate the pain include  walking, standing and rising from a chair  There is significant weakness  The knee will lock  PT eval revealed the impairments listed above, which have lead to activity limitations in standing up and walking which have ultimately lead to participation restrictions in her social life due to requiring assistance for standing  HEP printout was administered and demonstrated to the patient, and she verbalized and demonstrated understanding  Pt would benefit from skilled outpatient PT to address strength, balance, muscular endurance, posture, and pain in order to maximize her level of function  Barriers to therapy: Obesity  Understanding of Dx/Px/POC: fair   Prognosis: fair    Goals  ST  Independent with HEP in 2 weeks  2  Pt will have a verbal report of improvement in symptoms >/= 25% in 3 weeks   3   Pt will improve functional strength to complete full MMT in BLE in 3 weeks       LT  Pt will improve FOTO score by >/= 6 points in 6 weeks   2  Pt will be able to complete usual housework and work with little to no difficulty in 8 weeks   3  Pt will have no difficulty lifting an object, like a bag of groceries from the floor in 8 weeks  4  Pt will be able to get into/out of a car with little to no difficulty in 8 weeks   5  Pt will be able to independently rise out of a chair with little to no difficulty in 8 weeks   6  Pt will improve functional strength to complete 5TSTS within standardized parameters in 8 weeks     Plan  Patient would benefit from: skilled physical therapy  Planned modality interventions: cryotherapy  Planned therapy interventions: neuromuscular re-education, manual therapy, patient education, postural training, strengthening, stretching, therapeutic exercise, home exercise program, flexibility, transfer training, balance, therapeutic activities and gait training  Frequency: 2x week  Duration in weeks: 10  Plan of Care beginning date: 2018  Treatment plan discussed with: patient  Plan details: Plan will consist of decreasing pain, strengthening of proximal hip/thigh musculature, improving balance, and flexibility         Subjective Evaluation    History of Present Illness  Mechanism of injury: Pt has had a history of 4 years of knee pain  She has injections every 3 months which does relieve her pain  She reports that she has fallen about 5-6 weeks ago because she has trouble getting off of low surfaces  The pain is intermittent  Her pain is brought on by rising from a chair, standing, walking  Her pain is relieved by rest, injections and reclining  She reports that her problems getting out of a chair have been contributing to her falling which have additionally lead to wrist pain secondary to additional weight bearing through wrists with attempting to stand   Pt states that she does not have a fear of falling  Pain  Current pain ratin  At best pain ratin  At worst pain rating: 10  Location: bilateral knees   Quality: throbbing and sharp  Relieving factors: ice  Aggravating factors: walking and standing  Progression: worsening    Social Support  Steps to enter house: yes  1  Stairs in house: no   Lives in: [place]  Lives with: sister  Employment status: working  Exercise history: none       Diagnostic Tests  X-ray: abnormal (Standing films of both knee show arthritic changes in both knees)  Treatments  Previous treatment: medication and injection treatment  Current treatment: injection treatment  Patient Goals  Patient goals for therapy: decreased pain, increased strength, improved balance and independence with ADLs/IADLs  Patient goal: be able to get out of chairs independently         Objective     Static Posture     Head  Forward  Shoulders  Rounded  Observations   Left Knee   Negative for abrasion, adhesive scar, edema and incision  Right Knee   Negative for abrasion, adhesive scar, edema and incision  Palpation   Left   No palpable tenderness to the distal biceps femoris, distal semimembranosus, distal semitendinosus, rectus femoris, vastus lateralis and vastus medialis  Right   No palpable tenderness to the rectus femoris, vastus lateralis and vastus medialis  Tenderness of the distal biceps femoris, distal semimembranosus and distal semitendinosus  Tenderness   Left Knee   No tenderness in the lateral patella, medial patella, popliteal fossa, quadriceps tendon, superior patella and tibial tubercle  Right Knee   Tenderness in the popliteal fossa  No tenderness in the lateral patella, medial patella, quadriceps tendon, superior patella and tibial tubercle       Active Range of Motion   Left Hip   Flexion: WFL  Extension: Left hip active extension: mod loss of motion      Right Hip   Flexion: WFL  Extension: Right hip active extension: mod loss of motion    Left Knee   Flexion: 138 degrees   Extension: 0 degrees     Right Knee   Flexion: 136 degrees   Extension: 0 degrees   Left Ankle/Foot   Dorsiflexion (kf): WFL  Plantar flexion: WFL  Inversion: WFL  Eversion: WFL  Great toe extension: WFL    Right Ankle/Foot   Dorsiflexion (kf): WFL  Plantar flexion: WFL  Inversion: WFL  Eversion: WFL  Great toe extension: WFL    Strength/Myotome Testing     Left Hip   Planes of Motion   Flexion: 4-  Extension: 3  Abduction: 2+    Right Hip   Planes of Motion   Flexion: 4-  Extension: 3  Abduction: 2+    Left Knee   Flexion: 4  Extension: 4+  Quadriceps contraction: fair    Right Knee   Flexion: 4  Extension: 4+  Quadriceps contraction: poor    Left Ankle/Foot   Dorsiflexion: 5  Plantar flexion: 5  Inversion: 5  Eversion: 5  Great toe extension: 5    Right Ankle/Foot   Dorsiflexion: 5  Plantar flexion: 5  Inversion: 5  Eversion: 5  Great toe extension: 5    Tests     Left Knee   Negative anterior drawer, bounce home, patellar apprehension, posterior drawer, valgus stress test at 0 degrees, valgus stress test at 30 degrees, varus stress test at 0 degrees and varus stress test at 30 degrees  Right Knee   Negative anterior drawer, bounce home, patellar apprehension, posterior drawer, valgus stress test at 0 degrees, valgus stress test at 30 degrees, varus stress test at 0 degrees and varus stress test at 30 degrees  Ambulation   Weight-Bearing Status   Weight-Bearing Status (Left): full weight bearing   Weight-Bearing Status (Right): full weight-bearing    Assistive device used: none    Ambulation: Level Surfaces   Ambulation without assistive device: independent    Ambulation: Stairs     Additional Stairs Ambulation Details  Not assessed patient states "I do not walk stairs"    Observational Gait   Gait: antalgic   Increased left stance time and right stance time   Decreased walking speed, stride length, left swing time, right swing time, left step length and right step length  Left foot contact pattern: foot flat  Right foot contact pattern: foot flat    Quality of Movement During Gait     Knee    Knee (Left): Positive valgus  Knee (Right): Positive valgus       General Comments     Knee Comments  5TSTS- completed on high/low mat set at 57 cm with use of B/L UE = 26 SECONDS           Precautions: Blood thinners, Afib    Daily Treatment Diary     Manuals 7/25                    Ham/quad stretch                                                                                       Exercise Diary                         LAQ R/L 2X10 6sec hold                      Glute Set x30                     Seated TB clams x20                     Standing B/L HR                      Standing ham curl                       Standing hip abd/ext                       Quad set                        STS high/low mat                       Clams R/L                                                                                                                                                                       Modalities                                                                                             X= performed

## 2018-07-24 ENCOUNTER — EVALUATION (OUTPATIENT)
Dept: PHYSICAL THERAPY | Facility: REHABILITATION | Age: 68
End: 2018-07-24
Payer: MEDICARE

## 2018-07-24 DIAGNOSIS — M17.11 PRIMARY OSTEOARTHRITIS OF RIGHT KNEE: ICD-10-CM

## 2018-07-24 DIAGNOSIS — M17.12 PRIMARY OSTEOARTHRITIS OF LEFT KNEE: ICD-10-CM

## 2018-07-24 PROCEDURE — 97112 NEUROMUSCULAR REEDUCATION: CPT | Performed by: PHYSICAL THERAPIST

## 2018-07-24 PROCEDURE — 97162 PT EVAL MOD COMPLEX 30 MIN: CPT | Performed by: PHYSICAL THERAPIST

## 2018-07-24 PROCEDURE — G8978 MOBILITY CURRENT STATUS: HCPCS | Performed by: PHYSICAL THERAPIST

## 2018-07-24 PROCEDURE — G8979 MOBILITY GOAL STATUS: HCPCS | Performed by: PHYSICAL THERAPIST

## 2018-07-31 ENCOUNTER — OFFICE VISIT (OUTPATIENT)
Dept: PHYSICAL THERAPY | Facility: REHABILITATION | Age: 68
End: 2018-07-31
Payer: MEDICARE

## 2018-07-31 DIAGNOSIS — M17.12 PRIMARY OSTEOARTHRITIS OF LEFT KNEE: Primary | ICD-10-CM

## 2018-07-31 DIAGNOSIS — M17.11 PRIMARY OSTEOARTHRITIS OF RIGHT KNEE: ICD-10-CM

## 2018-07-31 PROCEDURE — 97112 NEUROMUSCULAR REEDUCATION: CPT

## 2018-07-31 NOTE — PROGRESS NOTES
Daily Note     Today's date: 2018  Patient name: Ashley Lozano  : 1950  MRN: [id number]  Referring provider: Juarez Garcia MD  Dx:   Encounter Diagnosis     ICD-10-CM    1  Primary osteoarthritis of left knee M17 12    2  Primary osteoarthritis of right knee M17 11                   Subjective: Had some bruising from TB, she is on coumadin      Objective: See treatment diary below    Manuals                    Ham/quad stretch                                                                                       Exercise Diary                         LAQ R/L 2X10 6sec hold  x                    Glute Set x30  x                   Seated TB clams x20  gr 10"x10                   Standing B/L HR   20x                   Standing ham curl    10x                   Standing hip abd/ext    10x                   Quad set                        STS high/low mat    low mat w airex 20x                   Clams R/L                        step up    4"x10                                                                                                                                           Modalities                                                                                             X= performed      Assessment: Tolerated treatment well  Patient would benefit from continued PT   Pulling in B knees with stand abd & ext  Trial of KT tape R knee for support  Used TB higher on thigh & less reps, will see how she reacts  Plan: Continue per plan of care

## 2018-08-02 ENCOUNTER — OFFICE VISIT (OUTPATIENT)
Dept: PHYSICAL THERAPY | Facility: REHABILITATION | Age: 68
End: 2018-08-02
Payer: MEDICARE

## 2018-08-02 DIAGNOSIS — M17.11 PRIMARY OSTEOARTHRITIS OF RIGHT KNEE: ICD-10-CM

## 2018-08-02 DIAGNOSIS — M17.12 PRIMARY OSTEOARTHRITIS OF LEFT KNEE: Primary | ICD-10-CM

## 2018-08-02 PROCEDURE — 97110 THERAPEUTIC EXERCISES: CPT | Performed by: PHYSICAL THERAPIST

## 2018-08-02 PROCEDURE — 97112 NEUROMUSCULAR REEDUCATION: CPT | Performed by: PHYSICAL THERAPIST

## 2018-08-02 NOTE — PROGRESS NOTES
Daily Note     Today's date: 2018  Patient name: Carla Foley  : 1950  MRN: [id number]  Referring provider: Margy Suarez MD  Dx:   Encounter Diagnosis     ICD-10-CM    1  Primary osteoarthritis of left knee M17 12    2  Primary osteoarthritis of right knee M17 11                   Subjective: Pt reports that the tape that she had worked great and that she would like it again  She reports that she wasn't too sore from last session and she felt pretty good  Objective: See treatment diary below    Assessment: Pt trailed knee taping with rock tape on B/L LE's  She showed good progress with sit to stands with less UE assistance  She was able to progress her standing exercises with minimal complaint of pain and "pulling" in the knees  S/L clamshells were more successful than supine clamshells, at this time supine clamshells are to be d/c due to bruising with bands  Pt would benefit from skilled outpatient PT to address her strength, flexibility, pain and muscular endurance in order to maximize her level of function  Plan: Continue per plan of care     Precautions: Blood thinner, afib    Manuals -                 Ham/quad stretch    ham 3x30s R/L                                                                                   Exercise Diary                        Bike    x10min           LAQ R/L 2X10 6sec hold  x  x                  Glute Set x30  x  x                 Seated TB clams x20  gr 10"x10  hold                 Standing B/L HR   20x  x                 Standing ham curl    10x  x20                 Standing hip abd/ext    10x  x20                 Quad set                        STS high/low mat    low mat w airex 20x  x                 Clams R/L      x20                  step up    4"x10  x                                                                                                                                         Modalities                       Tape B/L knees                                                                    X= performed

## 2018-08-07 ENCOUNTER — OFFICE VISIT (OUTPATIENT)
Dept: PHYSICAL THERAPY | Facility: REHABILITATION | Age: 68
End: 2018-08-07
Payer: MEDICARE

## 2018-08-07 DIAGNOSIS — M17.11 PRIMARY OSTEOARTHRITIS OF RIGHT KNEE: ICD-10-CM

## 2018-08-07 DIAGNOSIS — M17.12 PRIMARY OSTEOARTHRITIS OF LEFT KNEE: Primary | ICD-10-CM

## 2018-08-07 PROCEDURE — G8978 MOBILITY CURRENT STATUS: HCPCS

## 2018-08-07 PROCEDURE — 97112 NEUROMUSCULAR REEDUCATION: CPT

## 2018-08-07 PROCEDURE — 97110 THERAPEUTIC EXERCISES: CPT

## 2018-08-07 PROCEDURE — G8979 MOBILITY GOAL STATUS: HCPCS

## 2018-08-07 NOTE — PROGRESS NOTES
Daily Note     Today's date: 2018  Patient name: Yaneth Dan  : 1950  MRN: [id number]  Referring provider: Viviana Jiang MD  Dx:   Encounter Diagnosis     ICD-10-CM    1  Primary osteoarthritis of left knee M17 12    2  Primary osteoarthritis of right knee M17 11                   Subjective: Pt reports knee pain is decreased      Objective: See treatment diary below    Assessment:  Able to do STS w/o airex now  Declined tape      Plan: Continue per plan of care     Precautions: Blood thinner, afib    Manuals   8-7               Ham/quad stretch    ham 3x30s R/L  x & quad                                                                                 Exercise Diary                        Bike    x10min x          LAQ R/L 2X10 6sec hold  x  x  30x                Glute Set x30  x  x  x               Seated TB clams x20  gr 10"x10  hold                 Standing B/L HR   20x  x  30x               Standing ham curl    10x  x20  x               Standing hip abd/ext    10x  x20  x               Quad set                        STS high/low mat    low mat w airex 20x  x  20x no airex               Clams R/L  SL      x20  20                step up    4"x10  x  2x10                side step        7' x 3 laps                                                                                                               Modalities                       Tape B/L knees      NT                                                                X= performed

## 2018-08-09 ENCOUNTER — APPOINTMENT (OUTPATIENT)
Dept: PHYSICAL THERAPY | Facility: REHABILITATION | Age: 68
End: 2018-08-09
Payer: MEDICARE

## 2018-08-13 NOTE — PROGRESS NOTES
PT Discharge     Today's date: 2018  Patient name: Anselmo Horta  : 1950  MRN: [id number]  Referring provider: Uriel Cerrato MD  Dx:   Encounter Diagnosis     ICD-10-CM    1  Primary osteoarthritis of left knee M17 12    2  Primary osteoarthritis of right knee M17 11        Start Time: 1355  Stop Time: 1435  Total time in clinic (min): 40 minutes    Pt has self d/c to home to continue exercises at home

## 2018-08-14 ENCOUNTER — APPOINTMENT (OUTPATIENT)
Dept: PHYSICAL THERAPY | Facility: REHABILITATION | Age: 68
End: 2018-08-14
Payer: MEDICARE

## 2018-08-16 ENCOUNTER — APPOINTMENT (OUTPATIENT)
Dept: PHYSICAL THERAPY | Facility: REHABILITATION | Age: 68
End: 2018-08-16
Payer: MEDICARE

## 2018-08-20 ENCOUNTER — APPOINTMENT (OUTPATIENT)
Dept: PHYSICAL THERAPY | Facility: REHABILITATION | Age: 68
End: 2018-08-20
Payer: MEDICARE

## 2018-08-21 ENCOUNTER — APPOINTMENT (OUTPATIENT)
Dept: PHYSICAL THERAPY | Facility: REHABILITATION | Age: 68
End: 2018-08-21
Payer: MEDICARE

## 2018-08-23 ENCOUNTER — APPOINTMENT (OUTPATIENT)
Dept: PHYSICAL THERAPY | Facility: REHABILITATION | Age: 68
End: 2018-08-23
Payer: MEDICARE

## 2018-08-28 ENCOUNTER — APPOINTMENT (OUTPATIENT)
Dept: PHYSICAL THERAPY | Facility: REHABILITATION | Age: 68
End: 2018-08-28
Payer: MEDICARE

## 2018-08-29 ENCOUNTER — HOSPITAL ENCOUNTER (EMERGENCY)
Facility: HOSPITAL | Age: 68
Discharge: HOME | End: 2018-08-29
Attending: EMERGENCY MEDICINE
Payer: MEDICARE

## 2018-08-29 ENCOUNTER — APPOINTMENT (EMERGENCY)
Dept: RADIOLOGY | Facility: HOSPITAL | Age: 68
End: 2018-08-29
Payer: MEDICARE

## 2018-08-29 VITALS
HEART RATE: 73 BPM | TEMPERATURE: 98.3 F | BODY MASS INDEX: 42.31 KG/M2 | SYSTOLIC BLOOD PRESSURE: 138 MMHG | RESPIRATION RATE: 20 BRPM | WEIGHT: 262 LBS | OXYGEN SATURATION: 95 % | DIASTOLIC BLOOD PRESSURE: 75 MMHG

## 2018-08-29 DIAGNOSIS — R55 POSTURAL DIZZINESS WITH PRESYNCOPE: ICD-10-CM

## 2018-08-29 DIAGNOSIS — I48.91 ATRIAL FIBRILLATION (HCC): Primary | ICD-10-CM

## 2018-08-29 DIAGNOSIS — R00.2 PALPITATIONS: ICD-10-CM

## 2018-08-29 DIAGNOSIS — R42 POSTURAL DIZZINESS WITH PRESYNCOPE: ICD-10-CM

## 2018-08-29 LAB
ALBUMIN SERPL BCP-MCNC: 3 G/DL (ref 3.5–5)
ALP SERPL-CCNC: 208 U/L (ref 46–116)
ALT SERPL W P-5'-P-CCNC: 19 U/L (ref 12–78)
ANION GAP SERPL CALCULATED.3IONS-SCNC: 6 MMOL/L (ref 4–13)
APTT PPP: 31 SECONDS (ref 24–36)
AST SERPL W P-5'-P-CCNC: 23 U/L (ref 5–45)
ATRIAL RATE: 220 BPM
BASOPHILS # BLD AUTO: 0.03 THOUSANDS/ΜL (ref 0–0.1)
BASOPHILS NFR BLD AUTO: 1 % (ref 0–1)
BILIRUB SERPL-MCNC: 0.36 MG/DL (ref 0.2–1)
BUN SERPL-MCNC: 18 MG/DL (ref 5–25)
CALCIUM SERPL-MCNC: 8.9 MG/DL (ref 8.3–10.1)
CHLORIDE SERPL-SCNC: 106 MMOL/L (ref 100–108)
CO2 SERPL-SCNC: 28 MMOL/L (ref 21–32)
CREAT SERPL-MCNC: 0.81 MG/DL (ref 0.6–1.3)
EOSINOPHIL # BLD AUTO: 0.02 THOUSAND/ΜL (ref 0–0.61)
EOSINOPHIL NFR BLD AUTO: 0 % (ref 0–6)
ERYTHROCYTE [DISTWIDTH] IN BLOOD BY AUTOMATED COUNT: 13.1 % (ref 11.6–15.1)
GFR SERPL CREATININE-BSD FRML MDRD: 75 ML/MIN/1.73SQ M
GLUCOSE SERPL-MCNC: 114 MG/DL (ref 65–140)
HCT VFR BLD AUTO: 46.7 % (ref 34.8–46.1)
HGB BLD-MCNC: 14.8 G/DL (ref 11.5–15.4)
IMM GRANULOCYTES # BLD AUTO: 0.05 THOUSAND/UL (ref 0–0.2)
IMM GRANULOCYTES NFR BLD AUTO: 1 % (ref 0–2)
INR PPP: 1.79 (ref 0.86–1.17)
LYMPHOCYTES # BLD AUTO: 0.96 THOUSANDS/ΜL (ref 0.6–4.47)
LYMPHOCYTES NFR BLD AUTO: 17 % (ref 14–44)
MCH RBC QN AUTO: 29.7 PG (ref 26.8–34.3)
MCHC RBC AUTO-ENTMCNC: 31.7 G/DL (ref 31.4–37.4)
MCV RBC AUTO: 94 FL (ref 82–98)
MONOCYTES # BLD AUTO: 0.41 THOUSAND/ΜL (ref 0.17–1.22)
MONOCYTES NFR BLD AUTO: 7 % (ref 4–12)
NEUTROPHILS # BLD AUTO: 4.12 THOUSANDS/ΜL (ref 1.85–7.62)
NEUTS SEG NFR BLD AUTO: 74 % (ref 43–75)
NRBC BLD AUTO-RTO: 0 /100 WBCS
PLATELET # BLD AUTO: 277 THOUSANDS/UL (ref 149–390)
PMV BLD AUTO: 9.7 FL (ref 8.9–12.7)
POTASSIUM SERPL-SCNC: 4.1 MMOL/L (ref 3.5–5.3)
PROT SERPL-MCNC: 6.4 G/DL (ref 6.4–8.2)
PROTHROMBIN TIME: 20.9 SECONDS (ref 11.8–14.2)
QRS AXIS: -56 DEGREES
QRSD INTERVAL: 76 MS
QT INTERVAL: 354 MS
QTC INTERVAL: 403 MS
RBC # BLD AUTO: 4.99 MILLION/UL (ref 3.81–5.12)
SODIUM SERPL-SCNC: 140 MMOL/L (ref 136–145)
T WAVE AXIS: 53 DEGREES
TROPONIN I SERPL-MCNC: <0.02 NG/ML
VENTRICULAR RATE: 78 BPM
WBC # BLD AUTO: 5.59 THOUSAND/UL (ref 4.31–10.16)

## 2018-08-29 PROCEDURE — 93010 ELECTROCARDIOGRAM REPORT: CPT | Performed by: INTERNAL MEDICINE

## 2018-08-29 PROCEDURE — 36415 COLL VENOUS BLD VENIPUNCTURE: CPT

## 2018-08-29 PROCEDURE — 93005 ELECTROCARDIOGRAM TRACING: CPT

## 2018-08-29 PROCEDURE — 80053 COMPREHEN METABOLIC PANEL: CPT | Performed by: EMERGENCY MEDICINE

## 2018-08-29 PROCEDURE — 84484 ASSAY OF TROPONIN QUANT: CPT | Performed by: EMERGENCY MEDICINE

## 2018-08-29 PROCEDURE — 96360 HYDRATION IV INFUSION INIT: CPT

## 2018-08-29 PROCEDURE — 85730 THROMBOPLASTIN TIME PARTIAL: CPT | Performed by: EMERGENCY MEDICINE

## 2018-08-29 PROCEDURE — 99284 EMERGENCY DEPT VISIT MOD MDM: CPT

## 2018-08-29 PROCEDURE — 85025 COMPLETE CBC W/AUTO DIFF WBC: CPT | Performed by: EMERGENCY MEDICINE

## 2018-08-29 PROCEDURE — 71046 X-RAY EXAM CHEST 2 VIEWS: CPT

## 2018-08-29 PROCEDURE — 85610 PROTHROMBIN TIME: CPT | Performed by: EMERGENCY MEDICINE

## 2018-08-29 RX ADMIN — SODIUM CHLORIDE 1000 ML: 0.9 INJECTION, SOLUTION INTRAVENOUS at 10:51

## 2018-08-29 NOTE — ED ATTENDING ATTESTATION
Emergency Department Note- Nieves Moya 75 y o  female MRN: [id number]    Unit/Bed#: ED 01 Encounter: [id number]    Nieves Moya is a 75 y o  female who presents with   Chief Complaint   Patient presents with    Syncope     Pt had near syncope this morning, felt dizzy and lightheaded  Pt has had episodes in the past d/t afib   denies CP/SOB at this time  Symptoms relieved         History of Present Illness   HPI:  Nieves Moya is a 75 y o  female who presents with generalized weakness and near syncope  Patient states this morning she went up to use the bathroom and after having a bowel movement stood up was walking to her bed when she felt like she might pass out and patient was able to get back to her bed where she sat down  Did not have a syncopal event or lose consciousness  She is on Coumadin for atrial fibrillation  No other new medications or recent illness or injury  Patient states he feels better now  She is currently asymptomatic  ROS is otherwise unremarkable      Historical Information   Past Medical History:   Diagnosis Date    A-fib ([facility] [address] )     Arthritis     History of transfusion     35 years ago    mass right kidney     Renal cell adenocarcinoma ([facility] [address] )     RIGHT     Past Surgical History:   Procedure Laterality Date    CHOLECYSTECTOMY      COLOSTOMY      CRYOABLATION Right     RT KIDNEY    GASTRIC BYPASS      HYSTERECTOMY      WI OPEN RX DISTAL RADIUS FX, EXTRA-ARTICULAR Right 4/22/2016    Procedure: OPEN REDUCTION INTERNAL FIXATION RIGHT DISTAL RADIUS;  Surgeon: Teresa Mcnulty MD;  [facility];  Service: Orthopedics    REVISION COLOSTOMY      TUBAL LIGATION       Social History   History   Alcohol Use No     History   Drug Use No     History   Smoking Status    Never Smoker   Smokeless Tobacco    Never Used       Family History:   Meds/Allergies     No Known Allergies    Objective   First Vitals:   Blood Pressure: 134/77 (08/29/18 0949)  Pulse: 99 (08/29/18 8109)  Temperature: 98 3 °F (36 8 °C) (18)  Temp Source: Oral (18)  Respirations: 20 (18)  Weight - Scale: 119 kg (262 lb) (18)  SpO2: 92 % (18)    Current Vitals:   Blood Pressure: 138/75 (18 1141)  Pulse: 73 (18 1141)  Temperature: 98 3 °F (36 8 °C) (18)  Temp Source: Oral (18)  Respirations: 20 (18 114)  Weight - Scale: 119 kg (262 lb) (18)  SpO2: 95 % (18 1141)      Intake/Output Summary (Last 24 hours) at 18 1224  Last data filed at 18 1210   Gross per 24 hour   Intake              300 ml   Output                0 ml   Net              300 ml       Invasive Devices     Peripheral Intravenous Line            Peripheral IV 18 Right Antecubital less than 1 day                      Medical Decision Makin  Labs reviewed, patient feels better after IV fluids  Patient ambulated in the emergency department without difficulty  Patient family comfortable with being discharged home at this time  Will call her physician regarding mildly subtherapeutic INR      Recent Results (from the past 36 hour(s))   ECG 12 lead    Collection Time: 18  9:54 AM   Result Value Ref Range    Ventricular Rate 78 BPM    Atrial Rate 220 BPM    MS Interval  ms    QRSD Interval 76 ms    QT Interval 354 ms    QTC Interval 403 ms    P Axis  degrees    QRS Axis -56 degrees    T Wave Axis 53 degrees   Comprehensive metabolic panel    Collection Time: 18 10:01 AM   Result Value Ref Range    Sodium 140 136 - 145 mmol/L    Potassium 4 1 3 5 - 5 3 mmol/L    Chloride 106 100 - 108 mmol/L    CO2 28 21 - 32 mmol/L    ANION GAP 6 4 - 13 mmol/L    BUN 18 5 - 25 mg/dL    Creatinine 0 81 0 60 - 1 30 mg/dL    Glucose 114 65 - 140 mg/dL    Calcium 8 9 8 3 - 10 1 mg/dL    AST 23 5 - 45 U/L    ALT 19 12 - 78 U/L    Alkaline Phosphatase 208 (H) 46 - 116 U/L    Total Protein 6 4 6 4 - 8 2 g/dL    Albumin 3 0 (L) 3 5 - 5 0 g/dL    Total Bilirubin 0 36 0 20 - 1 00 mg/dL    eGFR 75 ml/min/1 73sq m   CBC and differential    Collection Time: 08/29/18 10:01 AM   Result Value Ref Range    WBC 5 59 4 31 - 10 16 Thousand/uL    RBC 4 99 3 81 - 5 12 Million/uL    Hemoglobin 14 8 11 5 - 15 4 g/dL    Hematocrit 46 7 (H) 34 8 - 46 1 %    MCV 94 82 - 98 fL    MCH 29 7 26 8 - 34 3 pg    MCHC 31 7 31 4 - 37 4 g/dL    RDW 13 1 11 6 - 15 1 %    MPV 9 7 8 9 - 12 7 fL    Platelets 445 917 - 796 Thousands/uL    nRBC 0 /100 WBCs    Neutrophils Relative 74 43 - 75 %    Immat GRANS % 1 0 - 2 %    Lymphocytes Relative 17 14 - 44 %    Monocytes Relative 7 4 - 12 %    Eosinophils Relative 0 0 - 6 %    Basophils Relative 1 0 - 1 %    Neutrophils Absolute 4 12 1 85 - 7 62 Thousands/µL    Immature Grans Absolute 0 05 0 00 - 0 20 Thousand/uL    Lymphocytes Absolute 0 96 0 60 - 4 47 Thousands/µL    Monocytes Absolute 0 41 0 17 - 1 22 Thousand/µL    Eosinophils Absolute 0 02 0 00 - 0 61 Thousand/µL    Basophils Absolute 0 03 0 00 - 0 10 Thousands/µL   Troponin I    Collection Time: 08/29/18 10:01 AM   Result Value Ref Range    Troponin I <0 02 <=0 04 ng/mL   Protime-INR    Collection Time: 08/29/18 10:01 AM   Result Value Ref Range    Protime 20 9 (H) 11 8 - 14 2 seconds    INR 1 79 (H) 0 86 - 1 17   APTT    Collection Time: 08/29/18 10:01 AM   Result Value Ref Range    PTT 31 24 - 36 seconds     X-ray chest 2 views   Final Result      No acute cardiopulmonary disease  Workstation performed: [id number]               Portions of the record may have been created with voice recognition software  Occasional wrong word or "sound a like" substitutions may have occurred due to the inherent limitations of voice recognition software  Bruce King DO, saw and evaluated the patient   I have discussed the patient with the resident/non-physician practitioner and agree with the resident's/non-physician practitioner's findings, Plan of Care, and MDM as documented in the resident's/non-physician practitioner's note, except where noted  All available labs and Radiology studies were reviewed  At this point I agree with the current assessment done in the Emergency Department    I have conducted an independent evaluation of this patient a history and physical is as follows:      Critical Care Time  CritCare Time    Procedures

## 2018-08-29 NOTE — ED PROVIDER NOTES
History  Chief Complaint   Patient presents with    Syncope     Pt had near syncope this morning, felt dizzy and lightheaded  Pt has had episodes in the past d/t afib   denies CP/SOB at this time  Symptoms relieved     Patient is a 58-year-old female with a past medical history significant for atrial fibrillation on Coumadin, chronic anemia having required blood transfusions in the past who presents with episode of lightheadedness and presyncope  Patient reports that earlier this morning she went to use the bathroom, urinated and then and noted that when she got up she became lightheaded  Went to sit down for a while and had the urge to have a bowel movement  Ran over to the bathroom, had a normal bowel movement, but afterwards became extremely lightheaded and developed tunnel vision as well as palpitations and shortness of breath  Laid down on the bed  Daughter decided to bring patient in for evaluation  Family reports that the patient is not eating or drinking as much as baseline over the last week and has been more fatigued  Currently, aside from fatigue the patient reports that she is back to her baseline  Of note, the patient reports that previously when she had episodes of AFib with RVR, she has felt similar symptoms  Denies chest pain, dizziness, headache, nausea, vomiting, diarrhea, blood in the stools  Assessment and plan:  Lightheadedness, palpitations, episode of presyncope  Atrial fibrillation with rapid ventricular response versus vasovagal   Will check labs to evaluate for anemia versus electrolyte abnormalities  PT/INR as the patient takes Coumadin  EKG to assess for arrhythmia  Chest x-ray to rule out widened mediastinum/pneumonia  Normal saline for hydration  Reassess  Prior to Admission Medications   Prescriptions Last Dose Informant Patient Reported? Taking?    FLUoxetine (PROzac) 20 mg capsule 8/29/2018 at Unknown time  Yes Yes   Sig: Take 20 mg by mouth 2 (two) times a day     LORazepam (ATIVAN) 1 mg tablet   Yes Yes   Sig: Take 1 mg by mouth daily at bedtime as needed for anxiety  cholecalciferol (VITAMIN D3) 1,000 units tablet   Yes Yes   Sig: Take 50,000 Units by mouth once a week   diltiazem (CARDIZEM) 120 MG tablet 8/29/2018 at Unknown time  Yes Yes   Sig: Take 120 mg by mouth 2 (two) times a day  oxybutynin (DITROPAN) 5 mg tablet 8/29/2018 at Unknown time  No Yes   Sig: Take 1 tablet (5 mg total) by mouth 2 (two) times a day   warfarin (COUMADIN) 5 mg tablet  Self Yes Yes   Sig: Take 5 mg by mouth every other day     warfarin (COUMADIN) 7 5 mg tablet 8/28/2018 at Unknown time  Yes Yes   Sig: Take 6 mg by mouth every other day Mondays and Thursdays        Facility-Administered Medications: None       Past Medical History:   Diagnosis Date    [facility]fib [facility] [facility]     Arthritis     History of transfusion     35 years ago    mass right kidney     [facility]     RIGHT       Past Surgical History:   Procedure Laterality Date    CHOLECYSTECTOMY      COLOSTOMY      CRYOABLATION Right     RT KIDNEY    GASTRIC BYPASS      HYSTERECTOMY      OH OPEN RX DISTAL RADIUS FX, EXTRA-ARTICULAR Right 4/22/2016    Procedure: OPEN REDUCTION INTERNAL FIXATION RIGHT DISTAL RADIUS;  Surgeon: Yvette Ledezma MD;  Location: [facility];  Service: Orthopedics    REVISION COLOSTOMY      TUBAL LIGATION         History reviewed  No pertinent family history  I have reviewed and agree with the history as documented  Social History   Substance Use Topics    Smoking status: Never Smoker    Smokeless tobacco: Never Used    Alcohol use No        Review of Systems   Constitutional: Positive for fatigue  Negative for chills and fever  HENT: Negative for congestion and rhinorrhea  Eyes: Negative for photophobia and visual disturbance  Respiratory: Positive for shortness of breath  Negative for chest tightness  Cardiovascular: Positive for palpitations   Negative for chest pain and leg swelling  Gastrointestinal: Negative for abdominal pain, blood in stool, constipation, diarrhea, nausea and vomiting  Genitourinary: Negative for dysuria and hematuria  Musculoskeletal: Positive for neck pain (chronic)  Negative for back pain  Skin: Negative for pallor, rash and wound  Neurological: Positive for light-headedness  Negative for dizziness, syncope, weakness, numbness and headaches  Physical Exam  ED Triage Vitals [08/29/18 0949]   Temperature Pulse Respirations Blood Pressure SpO2   98 3 °F (36 8 °C) 99 20 134/77 92 %      Temp Source Heart Rate Source Patient Position - Orthostatic VS BP Location FiO2 (%)   Oral Monitor Sitting Left arm --      Pain Score       No Pain           Orthostatic Vital Signs  Vitals:    08/29/18 0949 08/29/18 1030 08/29/18 1141   BP: 134/77 153/86 138/75   Pulse: 99 72 73   Patient Position - Orthostatic VS: Sitting  Lying       Physical Exam   Constitutional: She is oriented to person, place, and time  She appears well-developed and well-nourished  No distress  HENT:   Head: Normocephalic and atraumatic  Right Ear: External ear normal    Left Ear: External ear normal    Nose: Nose normal    Mouth/Throat: Oropharynx is clear and moist    Eyes: Conjunctivae and EOM are normal  Pupils are equal, round, and reactive to light  Neck: Normal range of motion  Neck supple  Cardiovascular: Normal rate, normal heart sounds and intact distal pulses  Exam reveals no gallop and no friction rub  No murmur heard  Irregularly irregular   Pulmonary/Chest: Effort normal and breath sounds normal  No respiratory distress  She has no wheezes  She has no rales  She exhibits no tenderness  Abdominal: Soft  Bowel sounds are normal  She exhibits no distension  There is no tenderness  There is no rebound and no guarding  Musculoskeletal: Normal range of motion  She exhibits no edema     Neurological: She is alert and oriented to person, place, and time  She has normal strength and normal reflexes  No cranial nerve deficit or sensory deficit  She exhibits normal muscle tone  Coordination normal  GCS eye subscore is 4  GCS verbal subscore is 5  GCS motor subscore is 6  Skin: Skin is warm and dry  Capillary refill takes less than 2 seconds  No rash noted  She is not diaphoretic  No erythema  No pallor  Psychiatric: She has a normal mood and affect  Her behavior is normal    Nursing note and vitals reviewed  ED Medications  Medications   sodium chloride 0 9 % bolus 1,000 mL (0 mL Intravenous Stopped 8/29/18 1210)       Diagnostic Studies  Results Reviewed     Procedure Component Value Units Date/Time    Protime-INR [[id number]]  (Abnormal) Collected:  08/29/18 1001    Lab Status:  Final result Specimen:  Blood from Arm, Right Updated:  08/29/18 1045     Protime 20 9 (H) seconds      INR 1 79 (H)    APTT [[id number]]  (Normal) Collected:  08/29/18 1001    Lab Status:  Final result Specimen:  Blood from Arm, Right Updated:  08/29/18 1045     PTT 31 seconds     Comprehensive metabolic panel [[id number]]  (Abnormal) Collected:  08/29/18 1001    Lab Status:  Final result Specimen:  Blood from Arm, Right Updated:  08/29/18 1029     Sodium 140 mmol/L      Potassium 4 1 mmol/L      Chloride 106 mmol/L      CO2 28 mmol/L      ANION GAP 6 mmol/L      BUN 18 mg/dL      Creatinine 0 81 mg/dL      Glucose 114 mg/dL      Calcium 8 9 mg/dL      AST 23 U/L      ALT 19 U/L      Alkaline Phosphatase 208 (H) U/L      Total Protein 6 4 g/dL      Albumin 3 0 (L) g/dL      Total Bilirubin 0 36 mg/dL      eGFR 75 ml/min/1 73sq m     Narrative:         National Kidney Disease Education Program recommendations are as follows:  GFR calculation is accurate only with a steady state creatinine  Chronic Kidney disease less than 60 ml/min/1 73 sq  meters  Kidney failure less than 15 ml/min/1 73 sq  meters      Troponin I [[id number]]  (Normal) Collected:  08/29/18 1001    Lab Status:  Final result Specimen:  Blood from Arm, Right Updated:  08/29/18 1029     Troponin I <0 02 ng/mL     CBC and differential [[id number]]  (Abnormal) Collected:  08/29/18 1001    Lab Status:  Final result Specimen:  Blood from Arm, Right Updated:  08/29/18 1009     WBC 5 59 Thousand/uL      RBC 4 99 Million/uL      Hemoglobin 14 8 g/dL      Hematocrit 46 7 (H) %      MCV 94 fL      MCH 29 7 pg      MCHC 31 7 g/dL      RDW 13 1 %      MPV 9 7 fL      Platelets 038 Thousands/uL      nRBC 0 /100 WBCs      Neutrophils Relative 74 %      Immat GRANS % 1 %      Lymphocytes Relative 17 %      Monocytes Relative 7 %      Eosinophils Relative 0 %      Basophils Relative 1 %      Neutrophils Absolute 4 12 Thousands/µL      Immature Grans Absolute 0 05 Thousand/uL      Lymphocytes Absolute 0 96 Thousands/µL      Monocytes Absolute 0 41 Thousand/µL      Eosinophils Absolute 0 02 Thousand/µL      Basophils Absolute 0 03 Thousands/µL                  X-ray chest 2 views   Final Result by Lindy Barfield DO (08/29 1055)      No acute cardiopulmonary disease              Workstation performed: [id number]               Procedures  ECG 12 Lead Documentation  Date/Time: 8/29/2018 10:02 AM  Performed by: Sergio Carrasquillo by: Sagar Trejo     Indications / Diagnosis:  Presyncope  ECG reviewed by me, the ED Provider: yes    Patient location:  ED  Previous ECG:     Previous ECG:  Compared to current    Comparison ECG info:  05/11/2016    Similarity:  Changes noted  Interpretation:     Interpretation: non-specific    Rate:     ECG rate:  78    ECG rate assessment: normal    Rhythm:     Rhythm: atrial fibrillation    Ectopy:     Ectopy: none    QRS:     QRS axis:  Normal  Conduction:     Conduction: abnormal      Abnormal conduction: LAFB    ST segments:     ST segments:  Normal  T waves:     T waves: inverted      Inverted:  V2          Phone Consults  ED Phone Contact    ED Course  ED Course as of Aug 29 1217   Wed Aug 29, 2018   1133 Patient reassessed  Feels at her baseline  Awaiting completion of IVF  Will ambulate and if no difficulties- will discharge with PCP follow up    1215 Patient's IV infiltrated  Patient feels back to her baseline  Patient was able to ambulate throughout the department without any difficulties  Will follow up with her primary care provider in 3-5 days for reassessment  Given return precautions/discharge instructions to patient and her family who verbalized understanding  Identification of Seniors at Risk      Most Recent Value   (ISAR) Identification of Seniors at Risk   Before the illness or injury that brought you to the Emergency, did you need someone to help you on a regular basis? 0 Filed at: 08/29/2018 0951   In the last 24 hours, have you needed more help than usual?  0 Filed at: 08/29/2018 4063   Have you been hospitalized for one or more nights during the past 6 months? 0 Filed at: 08/29/2018 0951   In general, do you see well?  0 Filed at: 08/29/2018 0951   In general, do you have serious problems with your memory? 0 Filed at: 08/29/2018 8717   Do you take more than three different medications every day? 1 Filed at: 08/29/2018 0951   ISAR Score  1 Filed at: 08/29/2018 8916                          MDM  CritCare Time    Disposition  Final diagnoses:   Atrial fibrillation (HCC)   Palpitations   Postural dizziness with presyncope     Time reflects when diagnosis was documented in both MDM as applicable and the Disposition within this note     Time User Action Codes Description Comment    8/29/2018 12:16 PM Karla Latham [I48 91] Atrial fibrillation ([address] )     8/29/2018 12:16 PM Catalina Jaramillo Add [R00 2] Palpitations     8/29/2018 12:16 PM Catalina Brazil Add [R42,  R55] Postural dizziness with presyncope       ED Disposition     ED Disposition Condition Comment    Discharge  Mirella Kirkpatrick discharge to home/self care      Condition at discharge: Good        Follow-up Information Follow up With Specialties Details Why [address], DO Family Medicine Schedule an appointment as soon as possible for a visit in 3 days for re-evaluation [address]  [employer] [facility] [id number] [address] [facility] Emergency Department Emergency Medicine Go to for re-evaluation, As needed, If symptoms worsen [address] [facility] [address] [facility] ED, [address] [place] [address], [id number]          Patient's Medications   Discharge Prescriptions    No medications on file     No discharge procedures on file  ED Provider  Attending physically available and evaluated [facility]  I managed the patient along with the ED Attending      Electronically Signed by         Julisa Arango DO  08/29/18 5285

## 2018-08-29 NOTE — DISCHARGE INSTRUCTIONS
A-fib (Atrial Fibrillation)   WHAT YOU NEED TO KNOW:   A-fib may come and go, or it may be a long-term condition  A-fib can cause blood clots, stroke, or heart failure  These conditions may become life-threatening  It is important to treat and manage a-fib to help prevent a blood clot, stroke, or heart failure  DISCHARGE INSTRUCTIONS:   Call 911 for any of the following:   · You have any of the following signs of a heart attack:      ¨ Squeezing, pressure, or pain in your chest that lasts longer than 5 minutes or returns    ¨ Discomfort or pain in your back, neck, jaw, stomach, or arm     ¨ Trouble breathing    ¨ Nausea or vomiting    ¨ Lightheadedness or a sudden cold sweat, especially with chest pain or trouble breathing    · You have any of the following signs of a stroke:      ¨ Numbness or drooping on one side of your face     ¨ Weakness in an arm or leg    ¨ Confusion or difficulty speaking    ¨ Dizziness, a severe headache, or vision loss  Return to the emergency department if:  You have any of the following signs of a blood clot:  · You feel lightheaded, are short of breath, and have chest pain  · You cough up blood  · You have swelling, redness, pain, or warmth in your arm or leg  Contact your cardiologist or healthcare provider if:   · Your target heart rate is not in the range it should be  · You have new or worsening swelling in your legs, feet, ankles, or abdomen  · You are short of breath, even at rest      · You have questions or concerns about your condition or care  Medicines: You may need any of the following:  · Heart medicines  help control your heart rate and rhythm  You may need more than one medicine to treat your symptoms  · Blood thinners    help prevent blood clots  Examples of blood thinners include heparin and warfarin  Clots can cause strokes, heart attacks, and death   The following are general safety guidelines to follow while you are taking a blood thinner:    ¨ Watch for bleeding and bruising while you take blood thinners  Watch for bleeding from your gums or nose  Watch for blood in your urine and bowel movements  Use a soft washcloth on your skin, and a soft toothbrush to brush your teeth  This can keep your skin and gums from bleeding  If you shave, use an electric shaver  Do not play contact sports  ¨ Tell your dentist and other healthcare providers that you take anticoagulants  Wear a bracelet or necklace that says you take this medicine  ¨ Do not start or stop any medicines unless your healthcare provider tells you to  Many medicines cannot be used with blood thinners  ¨ Tell your healthcare provider right away if you forget to take the medicine, or if you take too much  ¨ Warfarin  is a blood thinner that you may need to take  The following are things you should be aware of if you take warfarin  § Foods and medicines can affect the amount of warfarin in your blood  Do not make major changes to your diet while you take warfarin  Warfarin works best when you eat about the same amount of vitamin K every day  Vitamin K is found in green leafy vegetables and certain other foods  Ask for more information about what to eat when you are taking warfarin  § You will need to see your healthcare provider for follow-up visits when you are on warfarin  You will need regular blood tests  These tests are used to decide how much medicine you need  · Antiplatelets , such as aspirin, help prevent blood clots  Take your antiplatelet medicine exactly as directed  These medicines make it more likely for you to bleed or bruise  If you are told to take aspirin, do not take acetaminophen or ibuprofen instead  · Take your medicine as directed  Contact your healthcare provider if you think your medicine is not helping or if you have side effects  Tell him or her if you are allergic to any medicine   Keep a list of the medicines, vitamins, and herbs you take  Include the amounts, and when and why you take them  Bring the list or the pill bottles to follow-up visits  Carry your medicine list with you in case of an emergency  Follow up with your cardiologist as directed: You will need regular blood tests and monitoring  Write down your questions so you remember to ask them during your visits  Manage A-fib:   · Know your target heart rate  Learn how to take your pulse and monitor your heart rate  · Manage other health conditions  This includes high blood pressure, sleep apnea, thyroid disease, diabetes, and other heart conditions  Take medicine as directed and follow your treatment plan  · Limit or do not drink alcohol  Alcohol can make a-fib hard to manage  Ask your healthcare provider if it is safe for you to drink alcohol  A drink of alcohol is 12 ounces of beer, 5 ounces of wine, or 1½ ounces of liquor  · Do not smoke  Nicotine and other chemicals in cigarettes and cigars can cause heart and lung damage  Ask your healthcare provider for information if you currently smoke and need help to quit  E-cigarettes or smokeless tobacco still contain nicotine  Talk to your healthcare provider before you use these products  · Eat heart-healthy foods  Heart healthy foods will help keep your cholesterol low  These include fruits, vegetables, whole-grain breads, low-fat dairy products, beans, lean meats, and fish  Replace butter and margarine with heart-healthy oils such as olive oil and canola oil  · Maintain a healthy weight  Ask your healthcare provider how much you should weigh  Ask him to help you create a weight loss plan if you are overweight  · Exercise for 30 minutes  most days of the week  Ask your healthcare provider about the best exercise plan for you  © 2017 [employer] [employer] Information is for End User's use only and may not be sold, redistributed or otherwise used for commercial purposes   All illustrations and images included in CareNotes® are the copyrighted property of [employer]  or [employer]  The above information is an  only  It is not intended as medical advice for individual conditions or treatments  Talk to your doctor, nurse or pharmacist before following any medical regimen to see if it is safe and effective for you  Heart Palpitations   WHAT YOU NEED TO KNOW:   Heart palpitations are feelings that your heart races, jumps, throbs, or flutters  You may feel extra beats, no beats for a short time, or skipped beats  You may have these feelings in your chest, throat, or neck  They may happen when you are sitting, standing, or lying  Heart palpitations may be frightening, but are usually not caused by a serious problem  DISCHARGE INSTRUCTIONS:   Call 911 or have someone else call for any of the following:   · You have any of the following signs of a heart attack:      ¨ Squeezing, pressure, or pain in your chest that lasts longer than 5 minutes or returns    ¨ Discomfort or pain in your back, neck, jaw, stomach, or arm     ¨ Trouble breathing    ¨ Nausea or vomiting    ¨ Lightheadedness or a sudden cold sweat, especially with chest pain or trouble breathing    · You have any of the following signs of a stroke:      ¨ Numbness or drooping on one side of your face     ¨ Weakness in an arm or leg    ¨ Confusion or difficulty speaking    ¨ Dizziness, a severe headache, or vision loss    · You faint or lose consciousness  Return to the emergency department if:   · Your palpitations happen more often or get more intense  Contact your healthcare provider if:   · You have new or worsening swelling in your feet or ankles  · You have questions or concerns about your condition or care  Follow up with your healthcare provider as directed: You may need to follow up with a cardiologist  Cesar Emerson may need tests to check for heart problems that cause palpitations   Write down your questions so you remember to ask them during your visits  Keep a record:  Write down when your palpitations start and stop, what you were doing when they started, and your symptoms  Keep track of what you ate or drank within a few hours of your palpitations  Include anything that seemed to help your symptoms, such as lying down or holding your breath  This record will help you and your healthcare provider learn what triggers your palpitations  Bring this record with you to your follow up visits  Help prevent heart palpitations:   · Manage stress and anxiety  Find ways to relax such as listening to music, meditating, or doing yoga  Exercise can also help decrease stress and anxiety  Talk to someone you trust about your stress or anxiety  You can also talk to a therapist      · Get plenty of sleep every night  Ask your healthcare provider how much sleep you need each night  · Do not drink caffeine or alcohol  Caffeine and alcohol can make your palpitations worse  Caffeine is found in soda, coffee, tea, chocolate, and drinks that increase your energy  · Do not smoke  Nicotine and other chemicals in cigarettes and cigars may damage your heart and blood vessels  Ask your healthcare provider for information if you currently smoke and need help to quit  E-cigarettes or smokeless tobacco still contain nicotine  Talk to your healthcare provider before you use these products  · Do not use illegal drugs  Talk to your healthcare provider if you use illegal drugs and want help to quit  © 2017 [address] [employer] Information is for End User's use only and may not be sold, redistributed or otherwise used for commercial purposes  All illustrations and images included in CareNotes® are the copyrighted property of [employer]  or [employer]  The above information is an  only  It is not intended as medical advice for individual conditions or treatments   Talk to your doctor, nurse or pharmacist before following any medical regimen to see if it is safe and effective for you  Syncope   WHAT YOU NEED TO KNOW:   Syncope is also called fainting or passing out  Syncope is a sudden, temporary loss of consciousness, followed by a fall from a standing or sitting position  Syncope ranges from not serious to a sign of a more serious condition that needs to be treated  You can control some health conditions that cause syncope  Your healthcare providers can help you create a plan to manage syncope and prevent episodes  DISCHARGE INSTRUCTIONS:   Seek care immediately if:   · You are bleeding because you hit your head when you fainted  · You suddenly have double vision, difficulty speaking, numbness, and cannot move your arms or legs  · You have chest pain and trouble breathing  · You vomit blood or material that looks like coffee grounds  · You see blood in your bowel movement  Contact your healthcare provider if:   · You have new or worsening symptoms  · You have another syncope episode  · You have a headache, fast heartbeat, or feel too dizzy to stand up  · You have questions or concerns about your condition or care  Follow up with your healthcare provider as directed:  Write down your questions so you remember to ask them during your visits  Manage syncope:   · Keep a record of your syncope episodes  Include your symptoms and your activity before and after the episode  The record can help your healthcare provider find the cause of your syncope and help you manage episodes  · Sit or lie down when needed  This includes when you feel dizzy, your throat is getting tight, and your vision changes  Raise your legs above the level of your heart  · Take slow, deep breaths if you start to breathe faster with anxiety or fear  This can help decrease dizziness and the feeling that you might faint  · Check your blood pressure often    This is important if you take medicine to lower your blood pressure  Check your blood pressure when you are lying down and when you are standing  Ask how often to check during the day  Keep a record of your blood pressure numbers  Your healthcare provider may use the record to help plan your treatment  Prevent a syncope episode:   · Move slowly and let yourself get used to one position before you move to another position  This is very important when you change from a lying or sitting position to a standing position  Take some deep breaths before you stand up from a lying position  Stand up slowly  Sudden movements may cause a fainting spell  Sit on the side of the bed or couch for a few minutes before you stand up  If you are on bedrest, try to be upright for about 2 hours each day, or as directed  Do not lock your legs if you are standing for a long period of time  Move your legs and bend your knees to keep blood flowing  · Follow your healthcare provider's recommendations  Your provider may  recommend that you drink more liquids to prevent dehydration  You may also need to have more salt to keep your blood pressure from dropping too low and causing syncope  Your provider will tell you how much liquid and sodium to have each day  · Watch for signs of low blood sugar  These include hunger, nervousness, sweating, and fast or fluttery heartbeats  Talk with your healthcare provider about ways to keep your blood sugar level steady  · Do not strain if you are constipated  You may faint if you strain to have a bowel movement  Walking is the best way to get your bowels moving  Eat foods high in fiber to make it easier to have a bowel movement  Good examples are high-fiber cereals, beans, vegetables, and whole-grain breads  Prune juice may help make bowel movements softer  · Be careful in hot weather  Heat can cause a syncope episode  Limit activity done outside on hot days   Physical activity in hot weather can lead to dehydration  This can cause an episode  © 2017 [address] [employer] Information is for End User's use only and may not be sold, redistributed or otherwise used for commercial purposes  All illustrations and images included in CareNotes® are the copyrighted property of [employer]  or [employer]  The above information is an  only  It is not intended as medical advice for individual conditions or treatments  Talk to your doctor, nurse or pharmacist before following any medical regimen to see if it is safe and effective for you

## 2018-08-30 ENCOUNTER — APPOINTMENT (OUTPATIENT)
Dept: PHYSICAL THERAPY | Facility: REHABILITATION | Age: 68
End: 2018-08-30
Payer: MEDICARE

## 2018-10-18 RX ORDER — WARFARIN SODIUM 1 MG/1
TABLET ORAL
COMMUNITY
Start: 2018-07-26

## 2018-10-23 ENCOUNTER — OFFICE VISIT (OUTPATIENT)
Dept: OBGYN CLINIC | Facility: HOSPITAL | Age: 68
End: 2018-10-23
Payer: MEDICARE

## 2018-10-23 VITALS
DIASTOLIC BLOOD PRESSURE: 76 MMHG | WEIGHT: 267.2 LBS | SYSTOLIC BLOOD PRESSURE: 114 MMHG | BODY MASS INDEX: 42.94 KG/M2 | HEART RATE: 91 BPM | HEIGHT: 66 IN

## 2018-10-23 DIAGNOSIS — M17.12 PRIMARY OSTEOARTHRITIS OF LEFT KNEE: Primary | ICD-10-CM

## 2018-10-23 DIAGNOSIS — M25.561 CHRONIC PAIN OF RIGHT KNEE: ICD-10-CM

## 2018-10-23 DIAGNOSIS — G89.29 CHRONIC PAIN OF LEFT KNEE: ICD-10-CM

## 2018-10-23 DIAGNOSIS — M25.562 CHRONIC PAIN OF LEFT KNEE: ICD-10-CM

## 2018-10-23 DIAGNOSIS — M17.11 PRIMARY OSTEOARTHRITIS OF RIGHT KNEE: ICD-10-CM

## 2018-10-23 DIAGNOSIS — G89.29 CHRONIC PAIN OF RIGHT KNEE: ICD-10-CM

## 2018-10-23 PROCEDURE — 99213 OFFICE O/P EST LOW 20 MIN: CPT | Performed by: ORTHOPAEDIC SURGERY

## 2018-10-23 PROCEDURE — 20610 DRAIN/INJ JOINT/BURSA W/O US: CPT | Performed by: ORTHOPAEDIC SURGERY

## 2018-10-23 RX ORDER — BETAMETHASONE SODIUM PHOSPHATE AND BETAMETHASONE ACETATE 3; 3 MG/ML; MG/ML
12 INJECTION, SUSPENSION INTRA-ARTICULAR; INTRALESIONAL; INTRAMUSCULAR; SOFT TISSUE
Status: COMPLETED | OUTPATIENT
Start: 2018-10-23 | End: 2018-10-23

## 2018-10-23 RX ORDER — LIDOCAINE HYDROCHLORIDE 10 MG/ML
2 INJECTION, SOLUTION INFILTRATION; PERINEURAL
Status: COMPLETED | OUTPATIENT
Start: 2018-10-23 | End: 2018-10-23

## 2018-10-23 RX ORDER — BUPIVACAINE HYDROCHLORIDE 2.5 MG/ML
2 INJECTION, SOLUTION INFILTRATION; PERINEURAL
Status: COMPLETED | OUTPATIENT
Start: 2018-10-23 | End: 2018-10-23

## 2018-10-23 RX ORDER — PREDNISONE 10 MG/1
TABLET ORAL
Refills: 0 | COMMUNITY
Start: 2018-10-03 | End: 2019-01-08

## 2018-10-23 RX ORDER — CYCLOBENZAPRINE HCL 10 MG
10 TABLET ORAL
Refills: 0 | COMMUNITY
Start: 2018-10-03 | End: 2019-01-08

## 2018-10-23 RX ADMIN — BUPIVACAINE HYDROCHLORIDE 2 ML: 2.5 INJECTION, SOLUTION INFILTRATION; PERINEURAL at 13:23

## 2018-10-23 RX ADMIN — LIDOCAINE HYDROCHLORIDE 2 ML: 10 INJECTION, SOLUTION INFILTRATION; PERINEURAL at 13:22

## 2018-10-23 RX ADMIN — BETAMETHASONE SODIUM PHOSPHATE AND BETAMETHASONE ACETATE 12 MG: 3; 3 INJECTION, SUSPENSION INTRA-ARTICULAR; INTRALESIONAL; INTRAMUSCULAR; SOFT TISSUE at 13:22

## 2018-10-23 RX ADMIN — BETAMETHASONE SODIUM PHOSPHATE AND BETAMETHASONE ACETATE 12 MG: 3; 3 INJECTION, SUSPENSION INTRA-ARTICULAR; INTRALESIONAL; INTRAMUSCULAR; SOFT TISSUE at 13:23

## 2018-10-23 RX ADMIN — BUPIVACAINE HYDROCHLORIDE 2 ML: 2.5 INJECTION, SOLUTION INFILTRATION; PERINEURAL at 13:22

## 2018-10-23 RX ADMIN — LIDOCAINE HYDROCHLORIDE 2 ML: 10 INJECTION, SOLUTION INFILTRATION; PERINEURAL at 13:23

## 2018-10-23 NOTE — PROGRESS NOTES
Assessment:  1  Primary osteoarthritis of left knee     2  Primary osteoarthritis of right knee  Large joint arthrocentesis   3  Chronic pain of right knee  Large joint arthrocentesis   4  Chronic pain of left knee         Plan:  B/L knee CS injections given today  Ice today  Activities as tolerated  Recommend weight loss for the patient       To do next visit:  Return in about 3 months (around 1/23/2019)  Scribe Attestation    I,:   Taryn Anaya am acting as a scribe while in the presence of the attending physician :        I,:   Stu Spears MD personally performed the services described in this documentation    as scribed in my presence :              Subjective:   Galen Rodriguez is a 75 y o  female who presents today for follow up of B/L knee pain  Patient is treating conservatively for B/L knee OA  Patient received B/L knee injections at the last appointment on 7/19/18  Patient states the injections lasted until a few weeks ago  Patient states she had 2 falls in the last few weeks however no significant increase in her pain since the falls  Review of systems negative unless otherwise specified in HPI    Past Medical History:   Diagnosis Date    [facility] [address] [facility]     Arthritis     History of transfusion     35 years ago    mass right kidney     Renal cell adenocarcinoma ([facility] [address] )     RIGHT       Past Surgical History:   Procedure Laterality Date    CHOLECYSTECTOMY      COLOSTOMY      CRYOABLATION Right     RT KIDNEY    GASTRIC BYPASS      HYSTERECTOMY      AR OPEN RX DISTAL RADIUS FX, EXTRA-ARTICULAR Right 4/22/2016    Procedure: OPEN REDUCTION INTERNAL FIXATION RIGHT DISTAL RADIUS;  Surgeon: Treva Kwong MD;  Location: [facility];  Service: Orthopedics    REVISION COLOSTOMY      TUBAL LIGATION         History reviewed  No pertinent family history  Social History     Occupational History    Not on file       Social History Main Topics    Smoking status: Never Smoker    Smokeless tobacco: Never Used    Alcohol use No    Drug use: No    Sexual activity: Not on file         Current Outpatient Prescriptions:     cholecalciferol (VITAMIN D3) 1,000 units tablet, Take 50,000 Units by mouth once a week, Disp: , Rfl:     cyclobenzaprine (FLEXERIL) 10 mg tablet, Take 10 mg by mouth daily at bedtime, Disp: , Rfl: 0    diltiazem (CARDIZEM) 120 MG tablet, Take 120 mg by mouth 2 (two) times a day , Disp: , Rfl:     FLUoxetine (PROzac) 20 mg capsule, Take 20 mg by mouth 2 (two) times a day  , Disp: , Rfl:     LORazepam (ATIVAN) 1 mg tablet, Take 1 mg by mouth daily at bedtime as needed for anxiety  , Disp: , Rfl:     oxybutynin (DITROPAN) 5 mg tablet, Take 1 tablet (5 mg total) by mouth 2 (two) times a day, Disp: 180 tablet, Rfl: 3    predniSONE 10 mg tablet, TAKE 2 TABLETS TWICE A DAY AFTER MEALS , Disp: , Rfl: 0    warfarin (COUMADIN) 1 mg tablet, , Disp: , Rfl:     warfarin (COUMADIN) 5 mg tablet, Take 5 mg by mouth every other day  , Disp: , Rfl:     warfarin (COUMADIN) 7 5 mg tablet, Take 6 mg by mouth every other day Mondays and Thursdays  , Disp: , Rfl:     No Known Allergies         Vitals:    10/23/18 1257   BP: 114/76   Pulse: 91       Objective:  Physical exam  · General: Awake, Alert, Oriented  · Eyes: Pupils equal, round and reactive to light  · Heart: regular rate and rhythm  · Lungs: No audible wheezing  · Abdomen: soft                      Right Knee Exam     Tenderness   The patient is experiencing tenderness in the lateral joint line, medial joint line and pes anserinus  Range of Motion   Extension: 0   Flexion: 120       Left Knee Exam     Tenderness   The patient is experiencing tenderness in the medial joint line, lateral joint line and pes anserinus  Range of Motion   Extension: 0   Flexion: 120         Both knees are in varus  There are no effusions  There is bony enlargement tenderness medially laterally  There is crepitation flexion extension bilaterally  There is no palpable warmth bilaterally  Calf compartments are soft and supple    Toes are warm, sensate, mobile bilaterally    Diagnostics, reviewed and taken today if performed as documented:    None performed        Procedures, if performed today:  Large joint arthrocentesis  Date/Time: 10/23/2018 1:22 PM  Consent given by: patient  Site marked: site marked  Timeout: Immediately prior to procedure a time out was called to verify the correct patient, procedure, equipment, support staff and site/side marked as required   Supporting Documentation  Indications: pain   Procedure Details  Location: knee - R knee  Needle size: 22 G  Ultrasound guidance: no  Approach: lateral  Medications administered: 2 mL bupivacaine 0 25 %; 2 mL lidocaine 1 %; 12 mg betamethasone acetate-betamethasone sodium phosphate 6 (3-3) mg/mL    Patient tolerance: patient tolerated the procedure well with no immediate complications  Dressing:  Sterile dressing applied  Large joint arthrocentesis  Date/Time: 10/23/2018 1:23 PM  Consent given by: patient  Site marked: site marked  Timeout: Immediately prior to procedure a time out was called to verify the correct patient, procedure, equipment, support staff and site/side marked as required   Supporting Documentation  Indications: pain   Procedure Details  Location: knee - L knee  Preparation: Patient was prepped and draped in the usual sterile fashion  Needle size: 22 G  Ultrasound guidance: no  Approach: lateral  Medications administered: 2 mL bupivacaine 0 25 %; 2 mL lidocaine 1 %; 12 mg betamethasone acetate-betamethasone sodium phosphate 6 (3-3) mg/mL    Patient tolerance: patient tolerated the procedure well with no immediate complications  Dressing:  Sterile dressing applied            Portions of the record may have been created with voice recognition software   Occasional wrong word or "sound a like" substitutions may have occurred due to the inherent limitations of voice recognition software   Read the chart carefully and recognize, using context, where substitutions have occurred

## 2019-01-02 ENCOUNTER — NURSE TRIAGE (OUTPATIENT)
Dept: PHYSICAL THERAPY | Facility: OTHER | Age: 69
End: 2019-01-02

## 2019-01-02 ENCOUNTER — TELEPHONE (OUTPATIENT)
Dept: NEUROLOGY | Facility: CLINIC | Age: 69
End: 2019-01-02

## 2019-01-02 DIAGNOSIS — R42 VERTIGO: Primary | ICD-10-CM

## 2019-01-02 DIAGNOSIS — M54.2 NECK PAIN ON RIGHT SIDE: ICD-10-CM

## 2019-01-02 DIAGNOSIS — G89.29 CHRONIC NECK PAIN: ICD-10-CM

## 2019-01-02 DIAGNOSIS — M54.2 CHRONIC NECK PAIN: ICD-10-CM

## 2019-01-02 NOTE — TELEPHONE ENCOUNTER
Reason for Disposition   Is this a chronic condition? Additional Information   Negative: Has the patient experienced major trauma? (fall from height, high speed collision, direct blow to spine) and is also experiencing nausea, light-headedness, or loss of consciousness? Patient reports that she has always shuffled her feet as she walks  Background - Initial Assessment  Clinical complaint: R sided neck pain that travels up in to head; recent episode of vertigo  Date of onset: End of July neck pain started; vertigo started in September  Mechanism of injury: Patient through sleeping by air conditioner    Previous Treatment - Previous Treatment  Previous evaluation: PCP and two chiropractors per patient  Current provider: Dr Ashley Andrade: No imaging per patient  Previous treatment: Chiropractic care per patient, OTC tylenol    Protocols used:  AMB COMPREHENSIVE SPINE PROGRAM PROTOCOL    RN provided patient an overview of the Comprehensive Spine Program including: triage assessment, physical therapy, and additional specialist referral options based on symptoms and chronicity  RN explained that Comprehensive Spine program is physical therapy based  Further explained that we schedule patients for a consultation with one of our advanced spine physical therapists for evaluation which may include treatment  Patient reports symptoms worsened in October after going on a cruise  Patient reports falling in August and was cleared by ED  Patient feels like there is "more going on than just muscle issues" and would like more in depth diagnostic evaluations  Discussed options for physical therapy for rehabilitation of current neck pain and prevention of further pain  Discussed physiatry referral for chronic symptoms  RN explained that physiatry involves treating a wide variety of medical conditions affecting the brain, spinal cord, nerves, bones, joints, ligaments, muscles, and tendons   The PA-c would conduct a full exam and if needed, order additional tests / place referrals  Discussed referral to neurology for continuous vertigo  Patient agreeable to all referrals

## 2019-01-04 NOTE — PROGRESS NOTES
Assessment/Plan:      Diagnoses and all orders for this visit:    Osteoarthritis of multiple joints, unspecified osteoarthritis type  -     XR spine cervical complete 6+ vw flex/ext/obl; Future    Chronic neck pain  -     Ambulatory referral to Physical Medicine Rehab  -     XR spine cervical complete 6+ vw flex/ext/obl; Future  -     C-reactive protein; Future  -     Sedimentation rate, automated; Future  -     LD,Blood; Future  -     CK; Future  -     Aldolase; Future    Neck pain on right side  -     Ambulatory referral to Physical Medicine Rehab  -     XR spine cervical complete 6+ vw flex/ext/obl; Future    Vertigo  -     Ambulatory referral to Physical Medicine Rehab    Myalgia  -     C-reactive protein; Future  -     Sedimentation rate, automated; Future  -     LD,Blood; Future  -     CK; Future  -     Aldolase; Future    Other orders  -     oxybutynin (DITROPAN) 5 mg tablet; Take 5 mg by mouth      Discussion: 69 yo right hand dominant female with complaints of chronic right sided neck pain  Sx multifactorial in nature but consistent of cervical arthritis with muscular strain  Will order XR of cervical spine and inflammatory lab work for further evaluation as she does have a hx of known osteopenia with fracture, as well as multijoint osteoarthritis  Encourage to start physical therapy as scheduled  Discussed going forward possibility of referral to pain management for facet block injections if no resolution or improvement after therapy  Discussed continuing Tylenol arthritis as needed for pain relief as well as heat/ice, and topical OTC pain relievers  Patient agrees with above plan and will follow up after completion of maneuvers above  Subjective:     Patient ID: Jaron Miller is a 70 y o  female  HPI Referral from comprehensive spine program for right sided neck pain  Additional dizziness complaints for which she is scheduled for neurology consultation at the end of the month   Was referred to physical therapy but has yet to complete evaluation  Previous treatments include chiropractic but denies imaging  Currently on anticoagulant therapy for hx of A Fib  ; without any implantable devices  Known bilateral knee osteoarthritis for which she has seen Dr Gabriella Vicente and undergoes cortisone injections every three months  Hx of gastric bypass and therefore avoids NSAIDs  Hx of kidney cancer, with radiation but no chemotherapy  July felt slept on neck wrong in July and saw PCP  Was placed on muscle relaxer and prednisone taper without relief  Saw DC in November through January with no improvement with adjustments or TENS stimulation  Denies any N/T  Some weakness in bilateral hands due to hx or 2 broken wrists with metal plates, but has not progressed since July  Describes as a constant soreness with occasional shooting pain into lesser occipital region  Denies any visual changes, memory changes, N/V  Denies any headache  Quick movements aggravate neck pain; relief if neck remains midline  Had to take off of work since Christmas due to neck pain  Works part time with computers  Rates on average as a 7/10 on pain scale  Trouble sleeping on right side  Currently using heating pad and 2 Tylenol arthritis which gets her through her day  Alee Brooks RN    1/2/19 2:25 PM   Note            Reason for Disposition   Is this a chronic condition? Additional Information   Negative: Has the patient experienced major trauma? (fall from height, high speed collision, direct blow to spine) and is also experiencing nausea, light-headedness, or loss of consciousness? Patient reports that she has always shuffled her feet as she walks      Background - Initial Assessment  Clinical complaint: R sided neck pain that travels up in to head; recent episode of vertigo  Date of onset: End of July neck pain started; vertigo started in September  Mechanism of injury: Patient through sleeping by air conditioner    Previous Treatment - Previous Treatment  Previous evaluation: PCP and two chiropractors per patient  Current provider: Dr Colin Jiang: No imaging per patient  Previous treatment: Chiropractic care per patient, OTC tylenol    Protocols used: [facility] PROTOCOL     RN provided patient an overview of the [facility] including: triage assessment, physical therapy, and additional specialist referral options based on symptoms and chronicity  RN explained that [facility] program is physical therapy based  Further explained that we schedule patients for a consultation with one of our advanced spine physical therapists for evaluation which may include treatment       Patient reports symptoms worsened in October after going on a cruise  Patient reports falling in August and was cleared by ED  Patient feels like there is "more going on than just muscle issues" and would like more in depth diagnostic evaluations      Discussed options for physical therapy for rehabilitation of current neck pain and prevention of further pain  Discussed physiatry referral for chronic symptoms  RN explained that physiatry involves treating a wide variety of medical conditions affecting the brain, spinal cord, nerves, bones, joints, ligaments, muscles, and tendons  The PA-c would conduct a full exam and if needed, order additional tests / place referrals  Discussed referral to neurology for continuous vertigo  Patient agreeable to all referrals          PAST MEDICAL HISTORY  Past Medical History:   Diagnosis Date    A-fib [facility])     Arthritis     History of transfusion     35 years ago    mass right kidney     Renal cell adenocarcinoma ([facility] [address] )     RIGHT     PAST SURGICAL HISTORY  Past Surgical History:   Procedure Laterality Date    CHOLECYSTECTOMY      COLOSTOMY      CRYOABLATION Right     RT KIDNEY    GASTRIC BYPASS      HYSTERECTOMY      ND OPEN RX DISTAL RADIUS FX, EXTRA-ARTICULAR Right 4/22/2016    Procedure: OPEN REDUCTION INTERNAL FIXATION RIGHT DISTAL RADIUS;  Surgeon: Rosas Haas MD;  Location: [facility];  Service: Orthopedics    REVISION COLOSTOMY      TUBAL LIGATION         FAMILY HISTORY  No family history on file  HOME MEDICATIONS  Current Outpatient Prescriptions   Medication Sig Dispense Refill    cholecalciferol (VITAMIN D3) 1,000 units tablet Take 50,000 Units by mouth once a week      diltiazem (CARDIZEM) 120 MG tablet Take 120 mg by mouth 2 (two) times a day   FLUoxetine (PROzac) 20 mg capsule Take 20 mg by mouth 2 (two) times a day        LORazepam (ATIVAN) 1 mg tablet Take 1 mg by mouth daily at bedtime as needed for anxiety   oxybutynin (DITROPAN) 5 mg tablet Take 5 mg by mouth      warfarin (COUMADIN) 1 mg tablet       warfarin (COUMADIN) 5 mg tablet Take 5 mg by mouth every other day        warfarin (COUMADIN) 7 5 mg tablet Take 6 mg by mouth every other day Mondays and Thursdays         No current facility-administered medications for this visit  ALLERGIES  Patient has no known allergies  SOCIAL HISTORY  History   Alcohol Use No     History   Drug Use No     History   Smoking Status    Never Smoker   Smokeless Tobacco    Never Used       Review of Systems   Constitutional: Negative for chills, diaphoresis, fatigue, fever and unexpected weight change  Eyes: Negative for photophobia and visual disturbance  Respiratory: Negative for chest tightness and shortness of breath  Cardiovascular: Negative for chest pain  Gastrointestinal: Negative  Negative for constipation and diarrhea  Endocrine: Negative for cold intolerance and heat intolerance  Genitourinary: Negative  Negative for difficulty urinating  Musculoskeletal: Positive for arthralgias (bilateral knee pain), myalgias, neck pain and neck stiffness  Negative for back pain  Skin: Negative for color change and rash     Neurological: Negative for weakness and numbness  Psychiatric/Behavioral: Positive for sleep disturbance (GILDARDO)  Negative for confusion and decreased concentration  Objective:  Vitals:    01/08/19 1306   BP: 140/88   Pulse: 68       Labs:   Ref Range & Units 5/17/18  6:53 AM   Vit D, 25-Hydroxy 30 0 - 100 0 ng/mL 42 5           Physical Exam   Constitutional: She is oriented to person, place, and time  She appears well-developed and well-nourished  No distress  Eyes: Pupils are equal, round, and reactive to light  Conjunctivae and EOM are normal    Neck: Neck supple  Muscular tenderness present  No spinous process tenderness present  No neck rigidity  Decreased range of motion present  No edema and no erythema present  Musculoskeletal:        Cervical back: She exhibits decreased range of motion and tenderness  She exhibits no bony tenderness, no swelling, no edema, no deformity, no pain and no spasm  Mild kyphosis     TTP right SMC insertion right and scalenes     Limited Extension and right lateral flexion with pain, all other directions with discomfort  Neurological: She is alert and oriented to person, place, and time  She has normal strength  She displays no atrophy  No cranial nerve deficit or sensory deficit  She exhibits normal muscle tone  Coordination and gait normal    Reflex Scores:       Tricep reflexes are 2+ on the right side and 2+ on the left side  Bicep reflexes are 2+ on the right side and 2+ on the left side  Brachioradialis reflexes are 2+ on the right side and 2+ on the left side  Patellar reflexes are 2+ on the right side and 2+ on the left side  Achilles reflexes are 2+ on the right side and 2+ on the left side  - Bhakta's Bilaterally   - Spurling's maneuver bilaterally     No root tension signs   Skin: Skin is warm and dry  No rash noted  She is not diaphoretic  Psychiatric: She has a normal mood and affect   Her behavior is normal  Judgment and thought content normal  Vitals reviewed

## 2019-01-08 ENCOUNTER — APPOINTMENT (OUTPATIENT)
Dept: LAB | Facility: CLINIC | Age: 69
End: 2019-01-08
Payer: MEDICARE

## 2019-01-08 ENCOUNTER — TRANSCRIBE ORDERS (OUTPATIENT)
Dept: LAB | Facility: CLINIC | Age: 69
End: 2019-01-08

## 2019-01-08 ENCOUNTER — APPOINTMENT (OUTPATIENT)
Dept: RADIOLOGY | Facility: CLINIC | Age: 69
End: 2019-01-08
Payer: MEDICARE

## 2019-01-08 ENCOUNTER — OFFICE VISIT (OUTPATIENT)
Dept: PAIN MEDICINE | Facility: CLINIC | Age: 69
End: 2019-01-08
Payer: MEDICARE

## 2019-01-08 VITALS
HEIGHT: 66 IN | BODY MASS INDEX: 41.62 KG/M2 | SYSTOLIC BLOOD PRESSURE: 140 MMHG | DIASTOLIC BLOOD PRESSURE: 88 MMHG | HEART RATE: 68 BPM | WEIGHT: 259 LBS

## 2019-01-08 DIAGNOSIS — M54.2 CHRONIC NECK PAIN: ICD-10-CM

## 2019-01-08 DIAGNOSIS — M79.10 MYALGIA: ICD-10-CM

## 2019-01-08 DIAGNOSIS — G89.29 CHRONIC NECK PAIN: ICD-10-CM

## 2019-01-08 DIAGNOSIS — M15.9 OSTEOARTHRITIS OF MULTIPLE JOINTS, UNSPECIFIED OSTEOARTHRITIS TYPE: Primary | ICD-10-CM

## 2019-01-08 DIAGNOSIS — M54.2 NECK PAIN ON RIGHT SIDE: ICD-10-CM

## 2019-01-08 DIAGNOSIS — M15.9 OSTEOARTHRITIS OF MULTIPLE JOINTS, UNSPECIFIED OSTEOARTHRITIS TYPE: ICD-10-CM

## 2019-01-08 DIAGNOSIS — R42 VERTIGO: ICD-10-CM

## 2019-01-08 LAB
CK SERPL-CCNC: 76 U/L (ref 26–192)
CRP SERPL QL: 12.5 MG/L
ERYTHROCYTE [SEDIMENTATION RATE] IN BLOOD: 15 MM/HOUR (ref 0–20)
LDH SERPL-CCNC: 217 U/L (ref 81–234)

## 2019-01-08 PROCEDURE — 99204 OFFICE O/P NEW MOD 45 MIN: CPT | Performed by: PHYSICIAN ASSISTANT

## 2019-01-08 PROCEDURE — 86140 C-REACTIVE PROTEIN: CPT

## 2019-01-08 PROCEDURE — 72052 X-RAY EXAM NECK SPINE 6/>VWS: CPT

## 2019-01-08 PROCEDURE — 82550 ASSAY OF CK (CPK): CPT

## 2019-01-08 PROCEDURE — 83615 LACTATE (LD) (LDH) ENZYME: CPT

## 2019-01-08 PROCEDURE — 82085 ASSAY OF ALDOLASE: CPT

## 2019-01-08 PROCEDURE — 36415 COLL VENOUS BLD VENIPUNCTURE: CPT

## 2019-01-08 PROCEDURE — 85652 RBC SED RATE AUTOMATED: CPT

## 2019-01-08 RX ORDER — OXYBUTYNIN CHLORIDE 5 MG/1
5 TABLET ORAL
COMMUNITY
End: 2019-07-19

## 2019-01-08 NOTE — PATIENT INSTRUCTIONS
1  XR cervical spine   2  Lab work, need not be fasting  3  Follow up with physical therapy as scheduled    Cervical Facet Block   WHAT YOU NEED TO KNOW:   A cervical facet block is a procedure to inject medicine at the facet joints in your cervical (neck) spine  Facet joints are found at the back of each vertebrae (bones)  DISCHARGE INSTRUCTIONS:   Medicines:   · Medicines , such as pain medicine and antibiotics, may be given  Prescription pain medicine decreases pain  Do not wait until the pain is severe before you take this medicine  Antibiotics fight or prevent an infection  · Take your medicine as directed  Contact your healthcare provider if you think your medicine is not helping or if you have side effects  Tell him or her if you are allergic to any medicine  Keep a list of the medicines, vitamins, and herbs you take  Include the amounts, and when and why you take them  Bring the list or the pill bottles to follow-up visits  Carry your medicine list with you in case of an emergency  Follow up with your healthcare provider as directed:  Write down your questions so you remember to ask them during your visits  Physical therapy:  A physical therapist teaches you exercises to help improve movement and strength, and to decrease pain  Contact your healthcare provider if:   · You have a fever  · You have chills, a cough, or feel weak and achy  · Your skin is itchy, swollen, or has a rash  · You have a headache that does not go away, even after you take medicine  · You have nausea or vomiting  · You have questions or concerns about your condition or care  Seek care immediately or call 911 if:   · Parts of your body are numb, tingly, cool to the touch, or look blue or pale  · You have pain in your neck, shoulder, or arm that does not go away or gets worse  · You suddenly have trouble breathing or chest pain    © 2017 [employer] Information is for End User's use only and may not be sold, redistributed or otherwise used for commercial purposes  All illustrations and images included in CareNotes® are the copyrighted property of A D A M , Inc  or Beny Gurrola  The above information is an  only  It is not intended as medical advice for individual conditions or treatments  Talk to your doctor, nurse or pharmacist before following any medical regimen to see if it is safe and effective for you

## 2019-01-09 LAB — ALDOLASE SERPL-CCNC: 4.6 U/L (ref 3.3–10.3)

## 2019-01-22 ENCOUNTER — TELEPHONE (OUTPATIENT)
Dept: OBGYN CLINIC | Facility: HOSPITAL | Age: 69
End: 2019-01-22

## 2019-01-22 NOTE — TELEPHONE ENCOUNTER
Caller: patient  Call back number: [phone]    Patient is asking for her x-ray results  She is having a constant pain in her right shoulder and neck  She states it is swollen and stiff  She is asking what to do for this   Please advise

## 2019-01-22 NOTE — TELEPHONE ENCOUNTER
XR showing only mild arthritic changes  I recommend she start physical therapy ordered by [facility]  I can additionally try short term muscle relaxer until she can start therapy

## 2019-01-24 ENCOUNTER — APPOINTMENT (EMERGENCY)
Dept: RADIOLOGY | Facility: HOSPITAL | Age: 69
End: 2019-01-24
Payer: COMMERCIAL

## 2019-01-24 ENCOUNTER — HOSPITAL ENCOUNTER (EMERGENCY)
Facility: HOSPITAL | Age: 69
Discharge: HOME | End: 2019-01-24
Payer: COMMERCIAL

## 2019-01-24 VITALS
TEMPERATURE: 97.3 F | WEIGHT: 252 LBS | HEART RATE: 116 BPM | DIASTOLIC BLOOD PRESSURE: 80 MMHG | RESPIRATION RATE: 16 BRPM | OXYGEN SATURATION: 99 % | SYSTOLIC BLOOD PRESSURE: 176 MMHG

## 2019-01-24 DIAGNOSIS — R07.89 CHEST WALL TENDERNESS: Primary | ICD-10-CM

## 2019-01-24 PROBLEM — I50.9 CHRONIC HEART FAILURE (HCC): Status: ACTIVE | Noted: 2019-01-24

## 2019-01-24 PROBLEM — I48.20 CHRONIC ATRIAL FIBRILLATION (HCC): Status: ACTIVE | Noted: 2019-01-24

## 2019-01-24 LAB
ABO GROUP BLD: NORMAL
ANION GAP SERPL CALCULATED.3IONS-SCNC: 6 MMOL/L (ref 4–13)
APTT PPP: 31 SECONDS (ref 26–38)
BASE EXCESS BLDA CALC-SCNC: 4 MMOL/L (ref -2–3)
BASOPHILS # BLD AUTO: 0.04 THOUSANDS/ΜL (ref 0–0.1)
BASOPHILS NFR BLD AUTO: 1 % (ref 0–1)
BLD GP AB SCN SERPL QL: NEGATIVE
BUN SERPL-MCNC: 23 MG/DL (ref 5–25)
CA-I BLD-SCNC: 1.08 MMOL/L (ref 1.12–1.32)
CALCIUM SERPL-MCNC: 8.8 MG/DL (ref 8.3–10.1)
CHLORIDE SERPL-SCNC: 106 MMOL/L (ref 100–108)
CO2 SERPL-SCNC: 26 MMOL/L (ref 21–32)
CREAT SERPL-MCNC: 0.88 MG/DL (ref 0.6–1.3)
EOSINOPHIL # BLD AUTO: 0.06 THOUSAND/ΜL (ref 0–0.61)
EOSINOPHIL NFR BLD AUTO: 1 % (ref 0–6)
ERYTHROCYTE [DISTWIDTH] IN BLOOD BY AUTOMATED COUNT: 13.9 % (ref 11.6–15.1)
GFR SERPL CREATININE-BSD FRML MDRD: 67 ML/MIN/1.73SQ M
GLUCOSE SERPL-MCNC: 112 MG/DL (ref 65–140)
GLUCOSE SERPL-MCNC: 116 MG/DL (ref 65–140)
HCO3 BLDA-SCNC: 27.6 MMOL/L (ref 24–30)
HCT VFR BLD AUTO: 42.4 % (ref 34.8–46.1)
HCT VFR BLD CALC: 41 % (ref 34.8–46.1)
HGB BLD-MCNC: 13.3 G/DL (ref 11.5–15.4)
HGB BLDA-MCNC: 13.9 G/DL (ref 11.5–15.4)
IMM GRANULOCYTES # BLD AUTO: 0.03 THOUSAND/UL (ref 0–0.2)
IMM GRANULOCYTES NFR BLD AUTO: 1 % (ref 0–2)
INR PPP: 1.64 (ref 0.86–1.17)
LYMPHOCYTES # BLD AUTO: 1.3 THOUSANDS/ΜL (ref 0.6–4.47)
LYMPHOCYTES NFR BLD AUTO: 25 % (ref 14–44)
MCH RBC QN AUTO: 29.1 PG (ref 26.8–34.3)
MCHC RBC AUTO-ENTMCNC: 31.4 G/DL (ref 31.4–37.4)
MCV RBC AUTO: 93 FL (ref 82–98)
MONOCYTES # BLD AUTO: 0.4 THOUSAND/ΜL (ref 0.17–1.22)
MONOCYTES NFR BLD AUTO: 8 % (ref 4–12)
NEUTROPHILS # BLD AUTO: 3.29 THOUSANDS/ΜL (ref 1.85–7.62)
NEUTS SEG NFR BLD AUTO: 64 % (ref 43–75)
NRBC BLD AUTO-RTO: 0 /100 WBCS
PCO2 BLD: 29 MMOL/L (ref 21–32)
PCO2 BLD: 36.1 MM HG (ref 42–50)
PH BLD: 7.49 [PH] (ref 7.3–7.4)
PLATELET # BLD AUTO: 259 THOUSANDS/UL (ref 149–390)
PMV BLD AUTO: 9.7 FL (ref 8.9–12.7)
PO2 BLD: 29 MM HG (ref 35–45)
POTASSIUM BLD-SCNC: 4.4 MMOL/L (ref 3.5–5.3)
POTASSIUM SERPL-SCNC: 4.3 MMOL/L (ref 3.5–5.3)
PROTHROMBIN TIME: 19.5 SECONDS (ref 11.8–14.2)
RBC # BLD AUTO: 4.57 MILLION/UL (ref 3.81–5.12)
RH BLD: POSITIVE
SAO2 % BLD FROM PO2: 61 % (ref 95–98)
SODIUM BLD-SCNC: 141 MMOL/L (ref 136–145)
SODIUM SERPL-SCNC: 138 MMOL/L (ref 136–145)
SPECIMEN EXPIRATION DATE: NORMAL
SPECIMEN SOURCE: ABNORMAL
WBC # BLD AUTO: 5.12 THOUSAND/UL (ref 4.31–10.16)

## 2019-01-24 PROCEDURE — 84132 ASSAY OF SERUM POTASSIUM: CPT

## 2019-01-24 PROCEDURE — 36415 COLL VENOUS BLD VENIPUNCTURE: CPT | Performed by: EMERGENCY MEDICINE

## 2019-01-24 PROCEDURE — 84295 ASSAY OF SERUM SODIUM: CPT

## 2019-01-24 PROCEDURE — 85610 PROTHROMBIN TIME: CPT | Performed by: EMERGENCY MEDICINE

## 2019-01-24 PROCEDURE — 99285 EMERGENCY DEPT VISIT HI MDM: CPT

## 2019-01-24 PROCEDURE — 86900 BLOOD TYPING SEROLOGIC ABO: CPT | Performed by: EMERGENCY MEDICINE

## 2019-01-24 PROCEDURE — 99284 EMERGENCY DEPT VISIT MOD MDM: CPT | Performed by: SURGERY

## 2019-01-24 PROCEDURE — 82947 ASSAY GLUCOSE BLOOD QUANT: CPT

## 2019-01-24 PROCEDURE — 85014 HEMATOCRIT: CPT

## 2019-01-24 PROCEDURE — 71260 CT THORAX DX C+: CPT

## 2019-01-24 PROCEDURE — 82330 ASSAY OF CALCIUM: CPT

## 2019-01-24 PROCEDURE — 85025 COMPLETE CBC W/AUTO DIFF WBC: CPT | Performed by: EMERGENCY MEDICINE

## 2019-01-24 PROCEDURE — 85730 THROMBOPLASTIN TIME PARTIAL: CPT | Performed by: EMERGENCY MEDICINE

## 2019-01-24 PROCEDURE — 70450 CT HEAD/BRAIN W/O DYE: CPT

## 2019-01-24 PROCEDURE — 82803 BLOOD GASES ANY COMBINATION: CPT

## 2019-01-24 PROCEDURE — 86901 BLOOD TYPING SEROLOGIC RH(D): CPT | Performed by: EMERGENCY MEDICINE

## 2019-01-24 PROCEDURE — 74177 CT ABD & PELVIS W/CONTRAST: CPT

## 2019-01-24 PROCEDURE — 80048 BASIC METABOLIC PNL TOTAL CA: CPT | Performed by: EMERGENCY MEDICINE

## 2019-01-24 PROCEDURE — 86850 RBC ANTIBODY SCREEN: CPT | Performed by: EMERGENCY MEDICINE

## 2019-01-24 PROCEDURE — 72125 CT NECK SPINE W/O DYE: CPT

## 2019-01-24 RX ORDER — METHOCARBAMOL 500 MG/1
500 TABLET, FILM COATED ORAL EVERY 8 HOURS
Status: DISCONTINUED | OUTPATIENT
Start: 2019-01-24 | End: 2019-01-24 | Stop reason: HOSPADM

## 2019-01-24 RX ORDER — OXYBUTYNIN CHLORIDE 5 MG/1
5 TABLET, EXTENDED RELEASE ORAL 2 TIMES DAILY
COMMUNITY
End: 2020-06-03 | Stop reason: DRUGHIGH

## 2019-01-24 RX ORDER — DILTIAZEM HYDROCHLORIDE 120 MG/1
120 TABLET, FILM COATED ORAL EVERY 6 HOURS SCHEDULED
COMMUNITY
End: 2019-03-06

## 2019-01-24 RX ORDER — DILTIAZEM HYDROCHLORIDE 60 MG/1
120 TABLET, FILM COATED ORAL ONCE
Status: DISCONTINUED | OUTPATIENT
Start: 2019-01-24 | End: 2019-01-24

## 2019-01-24 RX ORDER — FLUOXETINE HYDROCHLORIDE 20 MG/1
20 CAPSULE ORAL DAILY
COMMUNITY
End: 2019-03-06

## 2019-01-24 RX ORDER — METHOCARBAMOL 500 MG/1
500 TABLET, FILM COATED ORAL EVERY 8 HOURS
Qty: 30 TABLET | Refills: 0 | Status: SHIPPED | OUTPATIENT
Start: 2019-01-24 | End: 2019-08-23

## 2019-01-24 RX ORDER — LORAZEPAM 1 MG/1
0.5 TABLET ORAL EVERY 8 HOURS PRN
COMMUNITY
End: 2019-03-06 | Stop reason: SDUPTHER

## 2019-01-24 RX ORDER — METHOCARBAMOL 500 MG/1
TABLET, FILM COATED ORAL
Status: COMPLETED
Start: 2019-01-24 | End: 2019-01-24

## 2019-01-24 RX ADMIN — METHOCARBAMOL 500 MG: 500 TABLET, FILM COATED ORAL at 09:48

## 2019-01-24 RX ADMIN — IOHEXOL 100 ML: 350 INJECTION, SOLUTION INTRAVENOUS at 08:16
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
## 2019-01-24 NOTE — DISCHARGE INSTRUCTIONS
Rest for a few days  No work for a few days or driving  Can take Tylenol 2 tablets of 650 every 8 hours as needed, also Robaxin the muscle relaxant as prescribed
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
## 2019-01-24 NOTE — INCIDENTAL FINDINGS
The following findings require follow up:  Radiographic finding   Finding:  No evidence of acute traumatic injury in the chest, abdomen or pelvis  2   Enlarged main pulmonary artery suspicious for pulmonary arterial hypertension  Follow-up with nonemergent echocardiogram may be performed if clinically indicated  3   Diverticulosis coli  4   Partially calcified heterogeneous nodule in the left thyroid lobe for which outpatient thyroid ultrasound is recommended      Follow up required: Primary Care Physician   Follow up should be done within 2 weeks    Please notify the following clinician to assist with the follow up:   Dr Aydin Nolan
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
## 2019-01-24 NOTE — ED NOTES
Pt states she did not take her morning dose of 120 mg caridizem, Dr Harshad Jordan aware     Fernando Anderson, GINO  01/24/19 1948
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
## 2019-01-29 ENCOUNTER — OFFICE VISIT (OUTPATIENT)
Dept: OBGYN CLINIC | Facility: HOSPITAL | Age: 69
End: 2019-01-29
Payer: MEDICARE

## 2019-01-29 ENCOUNTER — TRANSCRIBE ORDERS (OUTPATIENT)
Dept: ADMINISTRATIVE | Facility: HOSPITAL | Age: 69
End: 2019-01-29

## 2019-01-29 VITALS
HEART RATE: 139 BPM | DIASTOLIC BLOOD PRESSURE: 81 MMHG | BODY MASS INDEX: 40.5 KG/M2 | WEIGHT: 252 LBS | SYSTOLIC BLOOD PRESSURE: 135 MMHG | HEIGHT: 66 IN

## 2019-01-29 DIAGNOSIS — M17.12 PRIMARY OSTEOARTHRITIS OF LEFT KNEE: Primary | ICD-10-CM

## 2019-01-29 DIAGNOSIS — G89.29 CHRONIC PAIN OF LEFT KNEE: ICD-10-CM

## 2019-01-29 DIAGNOSIS — M17.11 PRIMARY OSTEOARTHRITIS OF RIGHT KNEE: ICD-10-CM

## 2019-01-29 DIAGNOSIS — I48.0 PAROXYSMAL ATRIAL FIBRILLATION (HCC): ICD-10-CM

## 2019-01-29 DIAGNOSIS — E04.2 NONTOXIC MULTINODULAR GOITER: ICD-10-CM

## 2019-01-29 DIAGNOSIS — M25.561 CHRONIC PAIN OF RIGHT KNEE: ICD-10-CM

## 2019-01-29 DIAGNOSIS — E04.1 NONTOXIC UNINODULAR GOITER: Primary | ICD-10-CM

## 2019-01-29 DIAGNOSIS — G89.29 CHRONIC PAIN OF RIGHT KNEE: ICD-10-CM

## 2019-01-29 DIAGNOSIS — M25.562 CHRONIC PAIN OF LEFT KNEE: ICD-10-CM

## 2019-01-29 PROCEDURE — 20610 DRAIN/INJ JOINT/BURSA W/O US: CPT | Performed by: ORTHOPAEDIC SURGERY

## 2019-01-29 PROCEDURE — 99213 OFFICE O/P EST LOW 20 MIN: CPT | Performed by: ORTHOPAEDIC SURGERY

## 2019-01-29 RX ORDER — BETAMETHASONE SODIUM PHOSPHATE AND BETAMETHASONE ACETATE 3; 3 MG/ML; MG/ML
12 INJECTION, SUSPENSION INTRA-ARTICULAR; INTRALESIONAL; INTRAMUSCULAR; SOFT TISSUE
Status: COMPLETED | OUTPATIENT
Start: 2019-01-29 | End: 2019-01-29

## 2019-01-29 RX ORDER — LIDOCAINE HYDROCHLORIDE 20 MG/ML
2 INJECTION, SOLUTION EPIDURAL; INFILTRATION; INTRACAUDAL; PERINEURAL
Status: COMPLETED | OUTPATIENT
Start: 2019-01-29 | End: 2019-01-29

## 2019-01-29 RX ORDER — BUPIVACAINE HYDROCHLORIDE 2.5 MG/ML
2 INJECTION, SOLUTION INFILTRATION; PERINEURAL
Status: COMPLETED | OUTPATIENT
Start: 2019-01-29 | End: 2019-01-29

## 2019-01-29 RX ADMIN — BETAMETHASONE SODIUM PHOSPHATE AND BETAMETHASONE ACETATE 12 MG: 3; 3 INJECTION, SUSPENSION INTRA-ARTICULAR; INTRALESIONAL; INTRAMUSCULAR; SOFT TISSUE at 11:10

## 2019-01-29 RX ADMIN — BUPIVACAINE HYDROCHLORIDE 2 ML: 2.5 INJECTION, SOLUTION INFILTRATION; PERINEURAL at 11:10

## 2019-01-29 RX ADMIN — LIDOCAINE HYDROCHLORIDE 2 ML: 20 INJECTION, SOLUTION EPIDURAL; INFILTRATION; INTRACAUDAL; PERINEURAL at 11:10

## 2019-01-29 NOTE — PROGRESS NOTES
Assessment:  1  Primary osteoarthritis of left knee     2  Primary osteoarthritis of right knee     3  Chronic pain of right knee     4  Chronic pain of left knee         Plan:  The patient is here for bilateral knee pain  She had significant relief from last bilateral knee steroid injections about 3 months ago  She had return of pain one week ago with no injury  She is tender to the medial and lateral joint line in both knees  She is provided with bilateral knee steroid injection  She should follow up in 3 months  To do next visit:  No Follow-up on file  The above stated was discussed in layman's terms and the patient expressed understanding  All questions were answered to the patient's satisfaction  Scribe Attestation    I,:   Rj Harris am acting as a scribe while in the presence of the attending physician :        I,:   Israel Oliveira MD personally performed the services described in this documentation    as scribed in my presence :              Subjective:   Lambert Fall is a 70 y o  female who presents bilateral knee pain  She is s/p bilateral knee steroid injection, 10/23/18, with lasting benefit up until one week ago  Today she complains of bilateral generalized knee pain  Getting up from a soft surface aggravates  She can hear cracking  Resting and walking alleviates  She does use a cane for ambulation while out  She denies medications for this issue  She denies past surgery on her knees          Review of systems negative unless otherwise specified in HPI    Past Medical History:   Diagnosis Date    [facility]fib [facility] )     Arthritis     Atrial fibrillation ([facility] )     History of transfusion     35 years ago    Hypertension     mass right kidney     Renal cell adenocarcinoma ([facility] )     RIGHT       Past Surgical History:   Procedure Laterality Date    CHOLECYSTECTOMY      COLOSTOMY      CRYOABLATION Right     RT KIDNEY    GASTRIC BYPASS      HYSTERECTOMY      VT OPEN RX DISTAL RADIUS FX, EXTRA-ARTICULAR Right 4/22/2016    Procedure: OPEN REDUCTION INTERNAL FIXATION RIGHT DISTAL RADIUS;  Surgeon: Viridiana Srinivasan MD;  [facility];  Service: Orthopedics    REVISION COLOSTOMY      TUBAL LIGATION         No family history on file  Social History     Occupational History    Not on file  Social History Main Topics    Smoking status: Never Smoker    Smokeless tobacco: Never Used    Alcohol use No    Drug use: No    Sexual activity: Not on file         Current Outpatient Prescriptions:     cholecalciferol (VITAMIN D3) 1,000 units tablet, Take 50,000 Units by mouth once a week, Disp: , Rfl:     Cholecalciferol 1000 units tablet, Take 1,000 Units by mouth daily, Disp: , Rfl:     diltiazem (CARDIZEM) 120 MG tablet, Take 120 mg by mouth 2 (two) times a day , Disp: , Rfl:     diltiazem (CARDIZEM) 120 MG tablet, Take 120 mg by mouth every 6 (six) hours, Disp: , Rfl:     FLUoxetine (PROzac) 20 mg capsule, Take 20 mg by mouth 2 (two) times a day  , Disp: , Rfl:     FLUoxetine (PROzac) 20 mg capsule, Take 20 mg by mouth daily, Disp: , Rfl:     LORazepam (ATIVAN) 1 mg tablet, Take 1 mg by mouth daily at bedtime as needed for anxiety  , Disp: , Rfl:     LORazepam (ATIVAN) 1 mg tablet, Take 0 5 mg by mouth every 8 (eight) hours as needed for anxiety, Disp: , Rfl:     methocarbamol (ROBAXIN) 500 mg tablet, Take 1 tablet (500 mg total) by mouth every 8 (eight) hours for 20 doses, Disp: 30 tablet, Rfl: 0    oxybutynin (DITROPAN) 5 mg tablet, Take 5 mg by mouth, Disp: , Rfl:     oxybutynin (DITROPAN-XL) 5 mg 24 hr tablet, Take 5 mg by mouth daily, Disp: , Rfl:     warfarin (COUMADIN) 1 mg tablet, , Disp: , Rfl:     warfarin (COUMADIN) 5 mg tablet, Take 5 mg by mouth every other day  , Disp: , Rfl:     warfarin (COUMADIN) 7 5 mg tablet, Take 6 mg by mouth every other day Mondays and Thursdays  , Disp: , Rfl:     Warfarin Sodium (COUMADIN PO), Take by mouth Dose varies as per MD order, Disp: , Rfl:     No Known Allergies         Vitals:    01/29/19 1048   BP: 135/81   Pulse: (!) 139       Objective:  Physical exam  · General: Awake, Alert, Oriented  · Eyes: Pupils equal, round and reactive to light  · Heart: regular rate and rhythm  · Lungs: No audible wheezing  · Abdomen: soft                    Ortho Exam  Bilateral knees:  TTP medial and lateral JL  Full ROM  Sensation intact  Calf warm and supple      Diagnostics, reviewed and taken today if performed as documented:    None performed      The attending physician has personally reviewed the pertinent films in PACS and interpretation is as follows:      Procedures, if performed today:    Large joint arthrocentesis  Date/Time: 1/29/2019 11:10 AM  Consent given by: patient  Site marked: site marked  Supporting Documentation  Indications: pain   Procedure Details  Location: knee - R knee  Preparation: Patient was prepped and draped in the usual sterile fashion  Needle size: 22 G  Ultrasound guidance: no  Approach: anterolateral  Medications administered: 12 mg betamethasone acetate-betamethasone sodium phosphate 6 (3-3) mg/mL; 2 mL bupivacaine 0 25 %; 2 mL lidocaine (PF) 2 % (2ml 2% lidocaine)    Patient tolerance: patient tolerated the procedure well with no immediate complications  Dressing:  Sterile dressing applied  Large joint arthrocentesis  Date/Time: 1/29/2019 11:10 AM  Consent given by: patient  Site marked: site marked  Supporting Documentation  Indications: pain   Procedure Details  Location: knee - L knee  Preparation: Patient was prepped and draped in the usual sterile fashion  Needle size: 22 G  Ultrasound guidance: no  Approach: anterolateral  Medications administered: 12 mg betamethasone acetate-betamethasone sodium phosphate 6 (3-3) mg/mL; 2 mL bupivacaine 0 25 %; 2 mL lidocaine (PF) 2 % (2ml 2% lidocaine)    Patient tolerance: patient tolerated the procedure well with no immediate complications  Dressing:  Sterile dressing applied           Portions of the record may have been created with voice recognition software   Occasional wrong word or "sound a like" substitutions may have occurred due to the inherent limitations of voice recognition software   Read the chart carefully and recognize, using context, where substitutions have occurred

## 2019-02-06 ENCOUNTER — HOSPITAL ENCOUNTER (OUTPATIENT)
Dept: RADIOLOGY | Facility: HOSPITAL | Age: 69
Discharge: HOME | End: 2019-02-06
Attending: FAMILY MEDICINE
Payer: MEDICARE

## 2019-02-06 DIAGNOSIS — E04.2 NONTOXIC MULTINODULAR GOITER: ICD-10-CM

## 2019-02-06 PROCEDURE — 76536 US EXAM OF HEAD AND NECK: CPT

## 2019-02-21 ENCOUNTER — HOSPITAL ENCOUNTER (OUTPATIENT)
Dept: NON INVASIVE DIAGNOSTICS | Facility: CLINIC | Age: 69
Discharge: HOME | End: 2019-02-21
Payer: MEDICARE

## 2019-02-21 DIAGNOSIS — I48.0 PAROXYSMAL ATRIAL FIBRILLATION (HCC): ICD-10-CM

## 2019-02-21 PROCEDURE — 93306 TTE W/DOPPLER COMPLETE: CPT | Performed by: INTERNAL MEDICINE

## 2019-02-21 PROCEDURE — 93306 TTE W/DOPPLER COMPLETE: CPT

## 2019-02-21 NOTE — DISCHARGE INSTRUCTIONS
Fine Needle Aspiration Biopsy   WHAT YOU NEED TO KNOW:   You may be sore or have bruising or swelling for a few days  Do not breastfeed for 24 to 48 hours if you received contrast liquid  The contrast liquid may harm your baby  DISCHARGE INSTRUCTIONS:   Seek care immediately if:   · Blood soaks through your bandage  · Your bruise suddenly gets larger and feels hard  · You have severe pain where the biopsy was taken  Contact your healthcare provider if:   · You have a fever or chills  · Your biopsy site is red, swollen, or draining pus  · You have nausea or are vomiting  · Your skin is itchy, swollen, or you have a rash  · Your pain does not get better after you take pain medicine  · You have questions or concerns about your condition or care  Medicines: You may need any of the following:  · NSAIDs , such as ibuprofen, help decrease swelling, pain, and fever  This medicine is available with or without a doctor's order  NSAIDs can cause stomach bleeding or kidney problems in certain people  If you take blood thinner medicine, always ask your healthcare provider if NSAIDs are safe for you  Always read the medicine label and follow directions  · Acetaminophen  decreases pain and fever  It is available without a doctor's order  Ask how much to take and how often to take it  Follow directions  Read the labels of all other medicines you are using to see if they also contain acetaminophen, or ask your doctor or pharmacist  Acetaminophen can cause liver damage if not taken correctly  Do not use more than 4 grams (4,000 milligrams) total of acetaminophen in one day  · Prescription pain medicine  may be given  Ask your healthcare provider how to take this medicine safely  Some prescription pain medicines contain acetaminophen  Do not take other medicines that contain acetaminophen without talking to your healthcare provider  Too much acetaminophen may cause liver damage   Prescription pain medicine may cause constipation  Ask your healthcare provider how to prevent or treat constipation  · Take your medicine as directed  Contact your healthcare provider if you think your medicine is not helping or if you have side effects  Tell him or her if you are allergic to any medicine  Keep a list of the medicines, vitamins, and herbs you take  Include the amounts, and when and why you take them  Bring the list or the pill bottles to follow-up visits  Carry your medicine list with you in case of an emergency  Care for your biopsy site as directed: If you have a tight-fitting bandage, you can remove it in 24 to 48 hours, or as directed  Ask your healthcare provider when your biopsy site can get wet  Carefully wash around the site with soap and water  It is okay to let soap and water gently run over your biopsy site  Do not  scrub the site  Dry the area and put on new, clean bandages as directed  Change your bandages when they get wet or dirty  Check your biopsy site every day for signs of infection such as redness, swelling, or pus  Do not put powders or lotions on your biopsy site  Self-care:   · Apply ice  on your biopsy site for 15 to 20 minutes every hour or as directed  Use an ice pack, or put crushed ice in a plastic bag  Cover it with a towel before you apply it to your skin  Ice helps prevent tissue damage and decreases swelling and pain  · Rest  as directed  Do not lift anything heavier than 5 pounds, play sports, or exercise  These activities may cause bleeding  Short walks around the house are okay  Ask your healthcare provider when you can return to your normal activities  · Drink plenty of liquids  as directed  Liquids help flush contrast liquid from your body  Ask how much liquid to drink each day and which liquids are best for you  Follow up with your healthcare provider as directed:  Write down your questions so you remember to ask them during your visits     © 2017 [employer] [facility] is for End User's use only and may not be sold, redistributed or otherwise used for commercial purposes  All illustrations and images included in CareNotes® are the copyrighted property of [employer]  or [employer]  The above information is an  only  It is not intended as medical advice for individual conditions or treatments  Talk to your doctor, nurse or pharmacist before following any medical regimen to see if it is safe and effective for you  Thyroid Fine-Needle Aspiration Biopsy   WHAT YOU NEED TO KNOW:   Thyroid fine-needle aspiration biopsy is a procedure to remove tissue and fluid from a nodule (lump) in the thyroid gland  This test helps your healthcare provider diagnose thyroid cancer or identify thyroid nodules that are not cancer  Your healthcare provider uses the results to decide whether you need additional treatment  DISCHARGE INSTRUCTIONS:   Self-care:   · Place a cold compress or ice bag on your neck to ease swelling and discomfort  Ask your healthcare provider how long and how often to do this  Any swelling and bruising should go away after several days  · Ask your healthcare provider when you can return to your normal activities  Follow up with your endocrinologist as directed:  Write down your questions so you remember to ask them during your visits  Keep all appointments for other tests or procedures ordered for you  Contact your healthcare provider or endocrinologist if:   · You have discomfort or pain at the site of the biopsy  · You have a bruise or a purple, swollen lump at the site of the biopsy  · You have questions or concerns about your condition or care  Seek care immediately or call 911 if:   · You have sudden trouble breathing or shortness of breath  · You have more pain, swelling, and bruising in your neck along with a fast heart rate and pain when you speak or swallow       · You have sudden swelling at the biopsy site  If this happens, apply pressure and seek care immediately  · You have sudden, severe arm pain with weakness, numbness, or tingling in your arm or fingers  · You have a pulsing lump in your neck  © 2017 [address] [employer] Information is for End User's use only and may not be sold, redistributed or otherwise used for commercial purposes  All illustrations and images included in CareNotes® are the copyrighted property of [employer]  or [employer]  The above information is an  only  It is not intended as medical advice for individual conditions or treatments  Talk to your doctor, nurse or pharmacist before following any medical regimen to see if it is safe and effective for you

## 2019-02-22 ENCOUNTER — HOSPITAL ENCOUNTER (OUTPATIENT)
Dept: RADIOLOGY | Facility: HOSPITAL | Age: 69
Discharge: HOME | End: 2019-02-22
Attending: FAMILY MEDICINE

## 2019-02-27 ENCOUNTER — HOSPITAL ENCOUNTER (OUTPATIENT)
Dept: RADIOLOGY | Facility: HOSPITAL | Age: 69
Discharge: HOME | End: 2019-02-27
Attending: FAMILY MEDICINE
Payer: MEDICARE

## 2019-02-27 DIAGNOSIS — E04.1 NONTOXIC UNINODULAR GOITER: ICD-10-CM

## 2019-02-27 PROCEDURE — 88173 CYTOPATH EVAL FNA REPORT: CPT | Performed by: PATHOLOGY

## 2019-02-27 PROCEDURE — 10005 FNA BX W/US GDN 1ST LES: CPT

## 2019-02-27 RX ORDER — LIDOCAINE HYDROCHLORIDE 10 MG/ML
1 INJECTION, SOLUTION INFILTRATION; PERINEURAL ONCE
Status: COMPLETED | OUTPATIENT
Start: 2019-02-27 | End: 2019-02-27

## 2019-02-27 RX ADMIN — LIDOCAINE HYDROCHLORIDE 2 ML: 10 INJECTION, SOLUTION INFILTRATION; PERINEURAL at 10:31

## 2019-03-06 ENCOUNTER — LAB (OUTPATIENT)
Dept: LAB | Facility: CLINIC | Age: 69
End: 2019-03-06
Payer: MEDICARE

## 2019-03-06 ENCOUNTER — CONSULT (OUTPATIENT)
Dept: ENDOCRINOLOGY | Facility: CLINIC | Age: 69
End: 2019-03-06
Payer: MEDICARE

## 2019-03-06 VITALS
HEART RATE: 54 BPM | WEIGHT: 265 LBS | HEIGHT: 66 IN | BODY MASS INDEX: 42.59 KG/M2 | SYSTOLIC BLOOD PRESSURE: 136 MMHG | DIASTOLIC BLOOD PRESSURE: 74 MMHG

## 2019-03-06 DIAGNOSIS — E04.1 THYROID NODULE: Primary | ICD-10-CM

## 2019-03-06 DIAGNOSIS — E04.1 THYROID NODULE: ICD-10-CM

## 2019-03-06 LAB
T4 FREE SERPL-MCNC: 1.05 NG/DL (ref 0.76–1.46)
TSH SERPL DL<=0.05 MIU/L-ACNC: 1.28 UIU/ML (ref 0.36–3.74)

## 2019-03-06 PROCEDURE — 36415 COLL VENOUS BLD VENIPUNCTURE: CPT

## 2019-03-06 PROCEDURE — 99204 OFFICE O/P NEW MOD 45 MIN: CPT | Performed by: INTERNAL MEDICINE

## 2019-03-06 PROCEDURE — 84443 ASSAY THYROID STIM HORMONE: CPT

## 2019-03-06 PROCEDURE — 84439 ASSAY OF FREE THYROXINE: CPT

## 2019-03-06 NOTE — ASSESSMENT & PLAN NOTE
Thyroid ultrasound and biopsy report reviewed with the patient and her family-biopsy showed atypia of undetermined significance-10-30% risk of malignancy  For now I have advised repeat fine-needle aspiration biopsy in about 3 months-with option of Afirma- further management will depend on the results    I have also advised her to have thyroid function tests done
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
## 2019-03-11 ENCOUNTER — TELEPHONE (OUTPATIENT)
Dept: ENDOCRINOLOGY | Facility: CLINIC | Age: 69
End: 2019-03-11

## 2019-03-11 NOTE — TELEPHONE ENCOUNTER
----- Message from Mode Lee MD sent at 3/10/2019 10:58 PM EDT -----  Please call the patient regarding labs -thyroid function tests ok

## 2019-05-08 ENCOUNTER — OFFICE VISIT (OUTPATIENT)
Dept: OBGYN CLINIC | Facility: HOSPITAL | Age: 69
End: 2019-05-08
Payer: MEDICARE

## 2019-05-08 VITALS
HEART RATE: 92 BPM | HEIGHT: 66 IN | SYSTOLIC BLOOD PRESSURE: 148 MMHG | BODY MASS INDEX: 42.77 KG/M2 | DIASTOLIC BLOOD PRESSURE: 81 MMHG

## 2019-05-08 DIAGNOSIS — M54.2 CHRONIC NECK PAIN: ICD-10-CM

## 2019-05-08 DIAGNOSIS — M47.812 SPONDYLOSIS OF CERVICAL REGION WITHOUT MYELOPATHY OR RADICULOPATHY: Primary | ICD-10-CM

## 2019-05-08 DIAGNOSIS — G89.29 CHRONIC NECK PAIN: ICD-10-CM

## 2019-05-08 PROCEDURE — 99214 OFFICE O/P EST MOD 30 MIN: CPT | Performed by: ORTHOPAEDIC SURGERY

## 2019-05-13 ENCOUNTER — EVALUATION (OUTPATIENT)
Dept: PHYSICAL THERAPY | Facility: REHABILITATION | Age: 69
End: 2019-05-13
Payer: MEDICARE

## 2019-05-13 DIAGNOSIS — M47.812 SPONDYLOSIS OF CERVICAL REGION WITHOUT MYELOPATHY OR RADICULOPATHY: Primary | ICD-10-CM

## 2019-05-13 PROCEDURE — 97162 PT EVAL MOD COMPLEX 30 MIN: CPT | Performed by: PHYSICAL THERAPIST

## 2019-05-13 PROCEDURE — 97110 THERAPEUTIC EXERCISES: CPT | Performed by: PHYSICAL THERAPIST

## 2019-05-15 ENCOUNTER — OFFICE VISIT (OUTPATIENT)
Dept: PHYSICAL THERAPY | Facility: REHABILITATION | Age: 69
End: 2019-05-15
Payer: MEDICARE

## 2019-05-15 DIAGNOSIS — M47.812 SPONDYLOSIS OF CERVICAL REGION WITHOUT MYELOPATHY OR RADICULOPATHY: Primary | ICD-10-CM

## 2019-05-15 PROCEDURE — 97112 NEUROMUSCULAR REEDUCATION: CPT

## 2019-05-15 PROCEDURE — 97140 MANUAL THERAPY 1/> REGIONS: CPT

## 2019-05-16 ENCOUNTER — HOSPITAL ENCOUNTER (OUTPATIENT)
Dept: RADIOLOGY | Facility: HOSPITAL | Age: 69
Discharge: HOME | End: 2019-05-16
Attending: ORTHOPAEDIC SURGERY
Payer: MEDICARE

## 2019-05-16 DIAGNOSIS — G89.29 CHRONIC NECK PAIN: ICD-10-CM

## 2019-05-16 DIAGNOSIS — M54.2 CHRONIC NECK PAIN: ICD-10-CM

## 2019-05-16 PROCEDURE — 72141 MRI NECK SPINE W/O DYE: CPT

## 2019-05-17 RX ORDER — PREDNISONE 10 MG/1
TABLET ORAL
Refills: 0 | COMMUNITY
Start: 2019-04-23 | End: 2019-08-23

## 2019-05-17 RX ORDER — METHOCARBAMOL 750 MG/1
750 TABLET, FILM COATED ORAL 3 TIMES DAILY PRN
Refills: 2 | COMMUNITY
Start: 2019-04-01 | End: 2019-08-23

## 2019-05-20 ENCOUNTER — APPOINTMENT (OUTPATIENT)
Dept: PHYSICAL THERAPY | Facility: REHABILITATION | Age: 69
End: 2019-05-20
Payer: MEDICARE

## 2019-05-21 ENCOUNTER — OFFICE VISIT (OUTPATIENT)
Dept: OBGYN CLINIC | Facility: OTHER | Age: 69
End: 2019-05-21
Payer: MEDICARE

## 2019-05-21 VITALS
DIASTOLIC BLOOD PRESSURE: 87 MMHG | SYSTOLIC BLOOD PRESSURE: 157 MMHG | WEIGHT: 266.6 LBS | HEIGHT: 66 IN | HEART RATE: 114 BPM | BODY MASS INDEX: 42.85 KG/M2

## 2019-05-21 DIAGNOSIS — M47.812 ARTHROPATHY OF CERVICAL FACET JOINT: ICD-10-CM

## 2019-05-21 DIAGNOSIS — M47.812 SPONDYLOSIS OF CERVICAL REGION WITHOUT MYELOPATHY OR RADICULOPATHY: Primary | ICD-10-CM

## 2019-05-21 PROCEDURE — 99213 OFFICE O/P EST LOW 20 MIN: CPT | Performed by: ORTHOPAEDIC SURGERY

## 2019-05-22 ENCOUNTER — OFFICE VISIT (OUTPATIENT)
Dept: OBGYN CLINIC | Facility: HOSPITAL | Age: 69
End: 2019-05-22
Payer: MEDICARE

## 2019-05-22 VITALS
SYSTOLIC BLOOD PRESSURE: 152 MMHG | HEART RATE: 106 BPM | DIASTOLIC BLOOD PRESSURE: 93 MMHG | BODY MASS INDEX: 42.87 KG/M2 | HEIGHT: 66 IN | WEIGHT: 266.76 LBS

## 2019-05-22 DIAGNOSIS — G89.29 CHRONIC PAIN OF RIGHT KNEE: ICD-10-CM

## 2019-05-22 DIAGNOSIS — M25.561 CHRONIC PAIN OF RIGHT KNEE: ICD-10-CM

## 2019-05-22 DIAGNOSIS — G89.29 CHRONIC PAIN OF LEFT KNEE: ICD-10-CM

## 2019-05-22 DIAGNOSIS — M17.11 PRIMARY OSTEOARTHRITIS OF RIGHT KNEE: ICD-10-CM

## 2019-05-22 DIAGNOSIS — M17.12 PRIMARY OSTEOARTHRITIS OF LEFT KNEE: Primary | ICD-10-CM

## 2019-05-22 DIAGNOSIS — M25.562 CHRONIC PAIN OF LEFT KNEE: ICD-10-CM

## 2019-05-22 PROCEDURE — 99213 OFFICE O/P EST LOW 20 MIN: CPT | Performed by: ORTHOPAEDIC SURGERY

## 2019-05-22 PROCEDURE — 20610 DRAIN/INJ JOINT/BURSA W/O US: CPT | Performed by: ORTHOPAEDIC SURGERY

## 2019-05-22 RX ORDER — BETAMETHASONE SODIUM PHOSPHATE AND BETAMETHASONE ACETATE 3; 3 MG/ML; MG/ML
12 INJECTION, SUSPENSION INTRA-ARTICULAR; INTRALESIONAL; INTRAMUSCULAR; SOFT TISSUE
Status: COMPLETED | OUTPATIENT
Start: 2019-05-22 | End: 2019-05-22

## 2019-05-22 RX ORDER — BUPIVACAINE HYDROCHLORIDE 2.5 MG/ML
2 INJECTION, SOLUTION INFILTRATION; PERINEURAL
Status: COMPLETED | OUTPATIENT
Start: 2019-05-22 | End: 2019-05-22

## 2019-05-22 RX ORDER — LIDOCAINE HYDROCHLORIDE 20 MG/ML
2 INJECTION, SOLUTION EPIDURAL; INFILTRATION; INTRACAUDAL; PERINEURAL
Status: COMPLETED | OUTPATIENT
Start: 2019-05-22 | End: 2019-05-22

## 2019-05-22 RX ADMIN — LIDOCAINE HYDROCHLORIDE 2 ML: 20 INJECTION, SOLUTION EPIDURAL; INFILTRATION; INTRACAUDAL; PERINEURAL at 15:39

## 2019-05-22 RX ADMIN — LIDOCAINE HYDROCHLORIDE 2 ML: 20 INJECTION, SOLUTION EPIDURAL; INFILTRATION; INTRACAUDAL; PERINEURAL at 15:37

## 2019-05-22 RX ADMIN — BETAMETHASONE SODIUM PHOSPHATE AND BETAMETHASONE ACETATE 12 MG: 3; 3 INJECTION, SUSPENSION INTRA-ARTICULAR; INTRALESIONAL; INTRAMUSCULAR; SOFT TISSUE at 15:37

## 2019-05-22 RX ADMIN — BUPIVACAINE HYDROCHLORIDE 2 ML: 2.5 INJECTION, SOLUTION INFILTRATION; PERINEURAL at 15:39

## 2019-05-22 RX ADMIN — BUPIVACAINE HYDROCHLORIDE 2 ML: 2.5 INJECTION, SOLUTION INFILTRATION; PERINEURAL at 15:37

## 2019-05-22 RX ADMIN — BETAMETHASONE SODIUM PHOSPHATE AND BETAMETHASONE ACETATE 12 MG: 3; 3 INJECTION, SUSPENSION INTRA-ARTICULAR; INTRALESIONAL; INTRAMUSCULAR; SOFT TISSUE at 15:39

## 2019-05-23 ENCOUNTER — OFFICE VISIT (OUTPATIENT)
Dept: PHYSICAL THERAPY | Facility: REHABILITATION | Age: 69
End: 2019-05-23
Payer: MEDICARE

## 2019-05-23 DIAGNOSIS — M47.812 SPONDYLOSIS OF CERVICAL REGION WITHOUT MYELOPATHY OR RADICULOPATHY: Primary | ICD-10-CM

## 2019-05-23 PROCEDURE — 97140 MANUAL THERAPY 1/> REGIONS: CPT

## 2019-05-23 PROCEDURE — 97112 NEUROMUSCULAR REEDUCATION: CPT

## 2019-05-28 ENCOUNTER — OFFICE VISIT (OUTPATIENT)
Dept: PHYSICAL THERAPY | Facility: REHABILITATION | Age: 69
End: 2019-05-28
Payer: MEDICARE

## 2019-05-28 DIAGNOSIS — M47.812 SPONDYLOSIS OF CERVICAL REGION WITHOUT MYELOPATHY OR RADICULOPATHY: Primary | ICD-10-CM

## 2019-05-28 PROCEDURE — 97112 NEUROMUSCULAR REEDUCATION: CPT

## 2019-05-28 PROCEDURE — 97140 MANUAL THERAPY 1/> REGIONS: CPT

## 2019-05-29 ENCOUNTER — HOSPITAL ENCOUNTER (OUTPATIENT)
Dept: RADIOLOGY | Facility: HOSPITAL | Age: 69
Discharge: HOME | End: 2019-05-29
Attending: INTERNAL MEDICINE
Payer: MEDICARE

## 2019-05-29 DIAGNOSIS — E04.1 THYROID NODULE: ICD-10-CM

## 2019-05-29 PROCEDURE — 88173 CYTOPATH EVAL FNA REPORT: CPT | Performed by: PATHOLOGY

## 2019-05-29 PROCEDURE — 10005 FNA BX W/US GDN 1ST LES: CPT

## 2019-05-29 RX ORDER — LIDOCAINE HYDROCHLORIDE 10 MG/ML
2 INJECTION, SOLUTION INFILTRATION; PERINEURAL ONCE
Status: COMPLETED | OUTPATIENT
Start: 2019-05-29 | End: 2019-05-29

## 2019-05-29 RX ADMIN — LIDOCAINE HYDROCHLORIDE 2 ML: 10 INJECTION, SOLUTION INFILTRATION; PERINEURAL at 09:45

## 2019-05-30 ENCOUNTER — APPOINTMENT (OUTPATIENT)
Dept: PHYSICAL THERAPY | Facility: REHABILITATION | Age: 69
End: 2019-05-30
Payer: MEDICARE

## 2019-06-03 ENCOUNTER — APPOINTMENT (OUTPATIENT)
Dept: PHYSICAL THERAPY | Facility: REHABILITATION | Age: 69
End: 2019-06-03
Payer: MEDICARE

## 2019-06-06 ENCOUNTER — OFFICE VISIT (OUTPATIENT)
Dept: PHYSICAL THERAPY | Facility: REHABILITATION | Age: 69
End: 2019-06-06
Payer: MEDICARE

## 2019-06-06 ENCOUNTER — TELEPHONE (OUTPATIENT)
Dept: ENDOCRINOLOGY | Facility: CLINIC | Age: 69
End: 2019-06-06

## 2019-06-06 DIAGNOSIS — M47.812 SPONDYLOSIS OF CERVICAL REGION WITHOUT MYELOPATHY OR RADICULOPATHY: Primary | ICD-10-CM

## 2019-06-06 PROCEDURE — 97112 NEUROMUSCULAR REEDUCATION: CPT

## 2019-06-06 PROCEDURE — 97140 MANUAL THERAPY 1/> REGIONS: CPT

## 2019-06-10 ENCOUNTER — OFFICE VISIT (OUTPATIENT)
Dept: PHYSICAL THERAPY | Facility: REHABILITATION | Age: 69
End: 2019-06-10
Payer: MEDICARE

## 2019-06-10 DIAGNOSIS — M47.812 SPONDYLOSIS OF CERVICAL REGION WITHOUT MYELOPATHY OR RADICULOPATHY: Primary | ICD-10-CM

## 2019-06-10 PROCEDURE — 97112 NEUROMUSCULAR REEDUCATION: CPT | Performed by: PHYSICAL THERAPIST

## 2019-06-10 PROCEDURE — 97140 MANUAL THERAPY 1/> REGIONS: CPT | Performed by: PHYSICAL THERAPIST

## 2019-06-13 ENCOUNTER — OFFICE VISIT (OUTPATIENT)
Dept: PHYSICAL THERAPY | Facility: REHABILITATION | Age: 69
End: 2019-06-13
Payer: MEDICARE

## 2019-06-13 DIAGNOSIS — M47.812 SPONDYLOSIS OF CERVICAL REGION WITHOUT MYELOPATHY OR RADICULOPATHY: Primary | ICD-10-CM

## 2019-06-13 PROCEDURE — 97112 NEUROMUSCULAR REEDUCATION: CPT

## 2019-06-13 PROCEDURE — 97140 MANUAL THERAPY 1/> REGIONS: CPT

## 2019-06-17 ENCOUNTER — APPOINTMENT (OUTPATIENT)
Dept: PHYSICAL THERAPY | Facility: REHABILITATION | Age: 69
End: 2019-06-17
Payer: MEDICARE

## 2019-06-20 ENCOUNTER — OFFICE VISIT (OUTPATIENT)
Dept: PHYSICAL THERAPY | Facility: REHABILITATION | Age: 69
End: 2019-06-20
Payer: MEDICARE

## 2019-06-20 DIAGNOSIS — M47.812 SPONDYLOSIS OF CERVICAL REGION WITHOUT MYELOPATHY OR RADICULOPATHY: Primary | ICD-10-CM

## 2019-06-20 PROCEDURE — 97140 MANUAL THERAPY 1/> REGIONS: CPT

## 2019-06-20 PROCEDURE — 97112 NEUROMUSCULAR REEDUCATION: CPT

## 2019-06-20 PROCEDURE — 97110 THERAPEUTIC EXERCISES: CPT

## 2019-06-21 ENCOUNTER — OFFICE VISIT (OUTPATIENT)
Dept: ENDOCRINOLOGY | Facility: CLINIC | Age: 69
End: 2019-06-21
Payer: MEDICARE

## 2019-06-21 ENCOUNTER — TELEPHONE (OUTPATIENT)
Dept: ENDOCRINOLOGY | Facility: CLINIC | Age: 69
End: 2019-06-21

## 2019-06-21 VITALS
HEART RATE: 75 BPM | DIASTOLIC BLOOD PRESSURE: 80 MMHG | WEIGHT: 270 LBS | BODY MASS INDEX: 43.39 KG/M2 | SYSTOLIC BLOOD PRESSURE: 140 MMHG | HEIGHT: 66 IN

## 2019-06-21 DIAGNOSIS — E04.1 THYROID NODULE: Primary | ICD-10-CM

## 2019-06-21 PROCEDURE — 99214 OFFICE O/P EST MOD 30 MIN: CPT | Performed by: INTERNAL MEDICINE

## 2019-06-24 ENCOUNTER — APPOINTMENT (OUTPATIENT)
Dept: PHYSICAL THERAPY | Facility: REHABILITATION | Age: 69
End: 2019-06-24
Payer: MEDICARE

## 2019-06-27 ENCOUNTER — EVALUATION (OUTPATIENT)
Dept: PHYSICAL THERAPY | Facility: REHABILITATION | Age: 69
End: 2019-06-27
Payer: MEDICARE

## 2019-06-27 DIAGNOSIS — M47.812 SPONDYLOSIS OF CERVICAL REGION WITHOUT MYELOPATHY OR RADICULOPATHY: Primary | ICD-10-CM

## 2019-06-27 PROCEDURE — 97110 THERAPEUTIC EXERCISES: CPT | Performed by: PHYSICAL THERAPIST

## 2019-06-28 ENCOUNTER — CLINICAL SUPPORT (OUTPATIENT)
Dept: PAIN MEDICINE | Facility: CLINIC | Age: 69
End: 2019-06-28
Payer: MEDICARE

## 2019-06-28 VITALS
TEMPERATURE: 98.2 F | BODY MASS INDEX: 43.99 KG/M2 | WEIGHT: 264 LBS | SYSTOLIC BLOOD PRESSURE: 128 MMHG | HEART RATE: 84 BPM | DIASTOLIC BLOOD PRESSURE: 88 MMHG | HEIGHT: 65 IN

## 2019-06-28 DIAGNOSIS — M54.81 OCCIPITAL NEURALGIA OF RIGHT SIDE: Primary | ICD-10-CM

## 2019-06-28 DIAGNOSIS — M47.812 ARTHROPATHY OF CERVICAL FACET JOINT: ICD-10-CM

## 2019-06-28 DIAGNOSIS — M47.812 SPONDYLOSIS OF CERVICAL REGION WITHOUT MYELOPATHY OR RADICULOPATHY: ICD-10-CM

## 2019-06-28 PROCEDURE — 99214 OFFICE O/P EST MOD 30 MIN: CPT | Performed by: ANESTHESIOLOGY

## 2019-07-01 ENCOUNTER — TELEPHONE (OUTPATIENT)
Dept: PAIN MEDICINE | Facility: CLINIC | Age: 69
End: 2019-07-01

## 2019-07-01 ENCOUNTER — TELEPHONE (OUTPATIENT)
Dept: ENDOCRINOLOGY | Facility: CLINIC | Age: 69
End: 2019-07-01

## 2019-07-01 NOTE — TELEPHONE ENCOUNTER
Pt is calling to say her endocrinologist doctor approved the cervical thoracic ablation dr Gregg Floyd offered pt  Please call patient to schedule   Ph# [phone]

## 2019-07-01 NOTE — TELEPHONE ENCOUNTER
Patient is going to be having thyroid surgery soon but she's been experiencing a lot of pain in her neck so she had a MRI done and it was discovered that she has arthritis in her spine  Her pain specialist wants to do a cervical thoracic ablation on the right side( no where near her thyroid)  He told her that she won't even have to stop her coumidin and there are no steroids involved but she just wanted to double check with Dr Adriana Bennett to make sure it's ok to have this done  She is out of the office so I'm sending this to Dr Suzette Rico for review  She is leaving for work shortly, so it's ok to leave a message on her machine  Her number is [phone]  Thank you

## 2019-07-05 ENCOUNTER — TELEPHONE (OUTPATIENT)
Dept: RADIOLOGY | Facility: CLINIC | Age: 69
End: 2019-07-05

## 2019-07-05 DIAGNOSIS — Z79.01 CHRONIC ANTICOAGULATION: Primary | ICD-10-CM

## 2019-07-05 NOTE — TELEPHONE ENCOUNTER
Patient called, she said Dr Carleen Howe will get her in quickly  Scheduled her for 07/10/19 arrival 1:00pm  Will go for PT/INR Wednesday am - need order    Went over pre procedure instructions, NPO 1 hr prior, if sick or on abx needs to call to rs, wear loose, comf clothing like Tshirt- no jewelry, needs   Went over MBB instructions as well  Pt verbalized understanding

## 2019-07-05 NOTE — TELEPHONE ENCOUNTER
Please transfer call to John procedure  line if you have pt on phone  Called pt, LMOM for pt to cb to schedule- gave direct line and hours        ORDER IN DEPO   **will need PT/INR on Coumadin

## 2019-07-05 NOTE — TELEPHONE ENCOUNTER
I need a PT/INR order for patient, she is on Coumadin, her procedure (RT C2, C3, C4, C5 MBB) is on 07/10/19    Thank you

## 2019-07-08 ENCOUNTER — TELEPHONE (OUTPATIENT)
Dept: UROLOGY | Facility: MEDICAL CENTER | Age: 69
End: 2019-07-08

## 2019-07-08 DIAGNOSIS — C64.1 RENAL CELL CARCINOMA OF RIGHT KIDNEY (HCC): Primary | ICD-10-CM

## 2019-07-08 NOTE — TELEPHONE ENCOUNTER
Called pt, LMOM telling her order is in for PT INR which should be done the day before the procedure which is 07/10/19  Advised that if it's a  facility that does blood work, they will be able to pull the order up, otherwise told her to call me back if she needs it faxed or printed to be picked up  Gave my direct line/ hrs

## 2019-07-08 NOTE — TELEPHONE ENCOUNTER
[facility] patient, managed by Dr Nitish Ray  Right RCC s/p right cryotherapy 5/11/16   Patient last seen 7/13/18 with Nitish Ray and scheduled for one year follow up with renal ultrasound and bmp on 7/19/19  Ultrasound scheduled for 7/12/19  Called and spoke to patient, reviewed instructions for ultrasound, drink 24 ounces of water one hour prior and do not void prior to ultrasound  Reviewed with patient she is also to have BMP done prior to follow up  Updated order entered and patient will go tomorrow AM for BMP, advised to be fasting

## 2019-07-08 NOTE — TELEPHONE ENCOUNTER
Patient of Dr Franky Keith seen in [place] calling with questions regarding her upcoming Ultrasound      She can be reached at [phone]

## 2019-07-09 ENCOUNTER — APPOINTMENT (OUTPATIENT)
Dept: LAB | Facility: HOSPITAL | Age: 69
End: 2019-07-09
Payer: MEDICARE

## 2019-07-09 ENCOUNTER — CONSULT (OUTPATIENT)
Dept: SURGICAL ONCOLOGY | Facility: CLINIC | Age: 69
End: 2019-07-09
Payer: MEDICARE

## 2019-07-09 VITALS
RESPIRATION RATE: 16 BRPM | HEIGHT: 65 IN | SYSTOLIC BLOOD PRESSURE: 142 MMHG | HEART RATE: 89 BPM | TEMPERATURE: 98.6 F | BODY MASS INDEX: 43.99 KG/M2 | WEIGHT: 264 LBS | DIASTOLIC BLOOD PRESSURE: 80 MMHG

## 2019-07-09 DIAGNOSIS — C64.1: ICD-10-CM

## 2019-07-09 DIAGNOSIS — C64.1 RENAL CELL CARCINOMA OF RIGHT KIDNEY (HCC): ICD-10-CM

## 2019-07-09 DIAGNOSIS — E04.1 THYROID NODULE: Primary | ICD-10-CM

## 2019-07-09 DIAGNOSIS — Z79.01 CHRONIC ANTICOAGULATION: ICD-10-CM

## 2019-07-09 LAB
ANION GAP SERPL CALCULATED.3IONS-SCNC: 4 MMOL/L (ref 4–13)
BUN SERPL-MCNC: 19 MG/DL (ref 5–25)
CALCIUM SERPL-MCNC: 9 MG/DL (ref 8.3–10.1)
CHLORIDE SERPL-SCNC: 106 MMOL/L (ref 100–108)
CO2 SERPL-SCNC: 29 MMOL/L (ref 21–32)
CREAT SERPL-MCNC: 0.88 MG/DL (ref 0.6–1.3)
GFR SERPL CREATININE-BSD FRML MDRD: 67 ML/MIN/1.73SQ M
GLUCOSE P FAST SERPL-MCNC: 108 MG/DL (ref 65–99)
INR PPP: 1.47 (ref 0.84–1.19)
POTASSIUM SERPL-SCNC: 3.8 MMOL/L (ref 3.5–5.3)
PROTHROMBIN TIME: 17.4 SECONDS (ref 11.6–14.5)
SODIUM SERPL-SCNC: 139 MMOL/L (ref 136–145)

## 2019-07-09 PROCEDURE — 80048 BASIC METABOLIC PNL TOTAL CA: CPT

## 2019-07-09 PROCEDURE — 1124F ACP DISCUSS-NO DSCNMKR DOCD: CPT | Performed by: SURGERY

## 2019-07-09 PROCEDURE — 36415 COLL VENOUS BLD VENIPUNCTURE: CPT

## 2019-07-09 PROCEDURE — 85610 PROTHROMBIN TIME: CPT

## 2019-07-09 PROCEDURE — 99204 OFFICE O/P NEW MOD 45 MIN: CPT | Performed by: SURGERY

## 2019-07-09 NOTE — PATIENT INSTRUCTIONS
Pre-Surgery Instructions:   Medication Instructions    acetaminophen (TYLENOL) 325 mg tablet Stop taking 1 days prior to surgery    diltiazem (CARDIZEM) 120 MG tablet Take morning of surgery    FLUoxetine (PROzac) 20 mg capsule Stop taking 1 days prior to surgery    LORazepam (ATIVAN) 1 mg tablet Stop taking 1 days prior to surgery    oxybutynin (DITROPAN) 5 mg tablet Stop taking 1 days prior to surgery    Warfarin Sodium (COUMADIN PO) Verify with Dr Delfina Jin     Partial Thyroidectomy   Camilo Pool:   A partial thyroidectomy is the removal of part of your thyroid  Your thyroid is a gland in the front lower part of your neck  The thyroid makes hormones that regulate your metabolism, body temperature, and heart rate  Smaller glands called parathyroids regulate the level of calcium in your blood  You have 4 parathyroids, located on the sides of your thyroid gland  Your parathyroids will not be removed during this surgery  DISCHARGE INSTRUCTIONS:   Medicines: The following medicines may  be ordered by your healthcare provider:  · Pain medicine  can help take away or decrease pain  Do not wait until the pain is severe before you take your medicine  · Thyroid medicine  may be given if your thyroid hormone level is too low  · Take your medicine as directed  Contact your healthcare provider if you think your medicine is not helping or if you have side effects  Tell him or her if you are allergic to any medicine  Keep a list of the medicines, vitamins, and herbs you take  Include the amounts, and when and why you take them  Bring the list or the pill bottles to follow-up visits  Carry your medicine list with you in case of an emergency  Follow up with your endocrinologist or surgeon as directed: You will need to return to have your wound checked and stitches removed  You may also need blood tests to monitor your calcium, parathyroid, and thyroid hormone levels   Write down your questions so you remember to ask them during your visits  Self-care:   · Rest when you need to while you heal after surgery  Slowly start to do more each day  Return to your daily activities as directed  · Wound care:  Carefully wash your skin near the incision wound area with soap and water  Dry the area and put on new, clean bandages as directed  Change your bandages when they get wet or dirty  You can use a mild body lotion to improve the scar  · Swallowing and voice changes: You may have a sore throat, hoarse voice, or difficulty swallowing after surgery  These symptoms should go away after a few days  · Supplements:  Ask your endocrinologist if you need to take calcium or vitamin D  Ask how much to take and how often to take it  Contact your endocrinologist or surgeon if:   · You have a fever or chills  · You feel very tired and cold, gain weight for no reason, and your skin is very dry  · You vomit several times in a row  · Your incision is red, swollen, or draining pus  · You have new voice weakness or hoarseness, or it is getting worse  · You have questions or concerns about your condition or care  Seek care immediately or call 911 if:   · You have sudden tingling or muscle cramps in your face, arm, or leg  · You have muscle spasms in your legs and feet that do not go away  · Blood soaks through your bandage  · Your stitches come apart  · You have sudden swelling in your neck or difficulty swallowing  · You have trouble breathing  · You have a seizure  © 2017 [address] [employer] [employer] Information is for End User's use only and may not be sold, redistributed or otherwise used for commercial purposes  All illustrations and images included in CareNotes® are the copyrighted property of [employer] , As Seen on TV  or Beny Gurrola  The above information is an  only  It is not intended as medical advice for individual conditions or treatments   Talk to your doctor, nurse or pharmacist before following any medical regimen to see if it is safe and effective for you
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
## 2019-07-10 ENCOUNTER — HOSPITAL ENCOUNTER (OUTPATIENT)
Dept: RADIOLOGY | Facility: CLINIC | Age: 69
Discharge: HOME | End: 2019-07-10
Admitting: ANESTHESIOLOGY
Payer: MEDICARE

## 2019-07-10 VITALS
TEMPERATURE: 98.1 F | SYSTOLIC BLOOD PRESSURE: 140 MMHG | RESPIRATION RATE: 20 BRPM | OXYGEN SATURATION: 98 % | DIASTOLIC BLOOD PRESSURE: 85 MMHG | HEART RATE: 69 BPM

## 2019-07-10 DIAGNOSIS — M47.812 CERVICAL SPONDYLOSIS WITHOUT MYELOPATHY: ICD-10-CM

## 2019-07-10 PROCEDURE — 64492 INJ PARAVERT F JNT C/T 3 LEV: CPT | Performed by: ANESTHESIOLOGY

## 2019-07-10 PROCEDURE — 64490 INJ PARAVERT F JNT C/T 1 LEV: CPT | Performed by: ANESTHESIOLOGY

## 2019-07-10 PROCEDURE — 64491 INJ PARAVERT F JNT C/T 2 LEV: CPT | Performed by: ANESTHESIOLOGY

## 2019-07-10 RX ORDER — LIDOCAINE HYDROCHLORIDE 10 MG/ML
5 INJECTION, SOLUTION EPIDURAL; INFILTRATION; INTRACAUDAL; PERINEURAL ONCE
Status: COMPLETED | OUTPATIENT
Start: 2019-07-10 | End: 2019-07-10

## 2019-07-10 RX ORDER — LIDOCAINE HYDROCHLORIDE 10 MG/ML
5 INJECTION, SOLUTION EPIDURAL; INFILTRATION; INTRACAUDAL; PERINEURAL ONCE
Status: DISCONTINUED | OUTPATIENT
Start: 2019-07-10 | End: 2019-07-14 | Stop reason: HOSPADM

## 2019-07-10 RX ADMIN — LIDOCAINE HYDROCHLORIDE 2 ML: 20 INJECTION, SOLUTION EPIDURAL; INFILTRATION; INTRACAUDAL; PERINEURAL at 13:40

## 2019-07-10 RX ADMIN — LIDOCAINE HYDROCHLORIDE 5 ML: 10 INJECTION, SOLUTION EPIDURAL; INFILTRATION; INTRACAUDAL; PERINEURAL at 13:37

## 2019-07-10 NOTE — DISCHARGE INSTR - LAB

## 2019-07-10 NOTE — H&P
History of Present Illness: The patient is a 70 y o  female who presents with complaints of neck pain  Patient Active Problem List   Diagnosis    Closed fracture of right distal radius and ulna    Malignant tumor of right kidney parenchyma ([facility] [address]    Primary osteoarthritis of left knee    Primary osteoarthritis of right knee    Chronic pain of left knee    Myalgia    Chest wall tenderness    Chronic heart failure (HCC)    Chronic atrial fibrillation (HCC)    Thyroid nodule    Spondylosis of cervical region without myelopathy or radiculopathy    Arthropathy of cervical facet joint    Occipital neuralgia of right side       Past Medical History:   Diagnosis Date    A-fib ([facility] )     Arthritis     Atrial fibrillation ([facility] [facility]     History of transfusion     35 years ago    Hypertension     mass right kidney     Renal cell adenocarcinoma (HCC)     RIGHT       Past Surgical History:   Procedure Laterality Date    CHOLECYSTECTOMY      COLOSTOMY      CRYOABLATION Right     RT KIDNEY    GASTRIC BYPASS      HYSTERECTOMY      ND OPEN RX DISTAL RADIUS FX, EXTRA-ARTICULAR Right 4/22/2016    Procedure: OPEN REDUCTION INTERNAL FIXATION RIGHT DISTAL RADIUS;  Surgeon: Elisabeth Lockhart MD;  [facility];  Service: Orthopedics    REVISION COLOSTOMY      TUBAL LIGATION      US GUIDED THYROID BIOPSY  2/27/2019    US GUIDED THYROID BIOPSY  5/29/2019         Current Outpatient Medications:     acetaminophen (TYLENOL) 325 mg tablet, Take 975 mg by mouth every 6 (six) hours as needed, Disp: , Rfl:     cholecalciferol (VITAMIN D3) 1,000 units tablet, Take 50,000 Units by mouth once a week, Disp: , Rfl:     diltiazem (CARDIZEM) 120 MG tablet, Take 120 mg by mouth 2 (two) times a day , Disp: , Rfl:     FLUoxetine (PROzac) 20 mg capsule, Take 20 mg by mouth 2 (two) times a day  , Disp: , Rfl:     LORazepam (ATIVAN) 1 mg tablet, Take 1 mg by mouth daily at bedtime as needed for anxiety  , Disp: , Rfl:    methocarbamol (ROBAXIN) 500 mg tablet, Take 1 tablet (500 mg total) by mouth every 8 (eight) hours for 20 doses (Patient not taking: Reported on 6/21/2019), Disp: 30 tablet, Rfl: 0    methocarbamol (ROBAXIN) 750 mg tablet, Take 750 mg by mouth 3 (three) times a day as needed, Disp: , Rfl: 2    oxybutynin (DITROPAN) 5 mg tablet, Take 5 mg by mouth, Disp: , Rfl:     oxybutynin (DITROPAN-XL) 5 mg 24 hr tablet, Take 5 mg by mouth daily, Disp: , Rfl:     predniSONE 10 mg tablet, TAKE 2 TABLETS BY MOUTH TWICE A DAY, Disp: , Rfl: 0    warfarin (COUMADIN) 1 mg tablet, , Disp: , Rfl:     warfarin (COUMADIN) 5 mg tablet, Take 5 mg by mouth every other day  , Disp: , Rfl:     warfarin (COUMADIN) 7 5 mg tablet, Take 6 mg by mouth every other day Mondays and Thursdays  , Disp: , Rfl:     Warfarin Sodium (COUMADIN PO), Take by mouth Dose varies as per MD order, Disp: , Rfl:     No Known Allergies    Physical Exam:   Vitals:    07/10/19 1316   BP: 144/80   Pulse: 72   Resp: 20   Temp: 98 1 °F (36 7 °C)   SpO2: 96%     General: Awake, Alert, Oriented x 3, Mood and affect appropriate  Respiratory: Respirations even and unlabored  Cardiovascular: Peripheral pulses intact; no edema  Musculoskeletal Exam:  Right cervical paraspinals tender to palpation  ASA Score: 3    Patient/Chart Verification  Patient ID Verified: Verbal  ID Band Applied: No  Consents Confirmed: Procedural  H&P( within 30 days) Verified: To be obtained in the Pre-Procedure area  Interval H&P(within 24 hr) Complete (required for Outpatients and Surgery Admit only): To be obtained in the Pre-Procedure area  Allergies Reviewed: Yes  Anticoag/NSAID held?: No  Currently on antibiotics?: No    Assessment:   1   Cervical spondylosis without myelopathy        Plan: RT C2, C3, C4, C5 MBB

## 2019-07-11 ENCOUNTER — TELEPHONE (OUTPATIENT)
Dept: RADIOLOGY | Facility: CLINIC | Age: 69
End: 2019-07-11

## 2019-07-11 NOTE — TELEPHONE ENCOUNTER
The patient had a favorable result to right C2, C3, C4, C5 medial branch block 1  Please call the patient to schedule medial branch block 2

## 2019-07-12 ENCOUNTER — HOSPITAL ENCOUNTER (OUTPATIENT)
Dept: ULTRASOUND IMAGING | Facility: HOSPITAL | Age: 69
Discharge: HOME | End: 2019-07-12
Payer: MEDICARE

## 2019-07-12 DIAGNOSIS — C64.1: ICD-10-CM

## 2019-07-12 PROCEDURE — 76770 US EXAM ABDO BACK WALL COMP: CPT

## 2019-07-12 NOTE — TELEPHONE ENCOUNTER
Spoke with patient, requesting to schedule an injection as discussed w/Dr Willy Sol for next week, attempted to transfer call to procedure  (128pm),  unavailable       Please follow up with patient cb # [phone]

## 2019-07-15 NOTE — TELEPHONE ENCOUNTER
Called pt, scheduled RT C2, C3, C4, C5 MBB#1 on Tues 07/30/19 arr at 13:45  Went over pre procedure instructions, NPO 1 hr prior, if sick or on abx needs to call to rs, wear loose, comf clothing like Tshirt- no jewelry, needs   Pt verbalized understanding      *pt is on cancellation list

## 2019-07-16 NOTE — PROGRESS NOTES
There are no diagnoses linked to this encounter  Assessment and plan:       1  Renal cell carcinoma status post right cryotherapy (05/11/2016) - Dr Mayito Anderson  - Renal ultrasound remains benign, creatinine remains stable at 0 88  - Patient continues to feel well  2  Urinary frequency and urgency  - She continues to take oxybutynin 5 mg twice daily with significant symptom improvement  - Patient will continue this medication  She reports that she gets refills from her primary care provider  Blu Saavedra PA-C      Chief Complaint     Chief Complaint   Patient presents with    renal cell carcinoma         History of Present Illness     Luke Zaman is a 70 y o  female patient with a history of renal cell carcinoma status post right cryotherapy (5/11/2016) presenting today for 1 year follow-up  She denies any gross hematuria  She does continue to suffer from urinary frequency, urgency, and incontinence  This requires 1-2 sanitary pads per day  She is not overly bothered by this  She continues to take oxybutynin 5 mg b i d  She continues to have bilateral knee pain from osteoarthritis  CT scan from 1 year ago revealed no evidence of disease recurrence  Renal ultrasound completed 07/12/2019 was negative for any concerning findings      Basic metabolic panel was completed on 07/09/2019 with a creatinine of 0 88 and and eGFR of 67       Laboratory     Lab Results   Component Value Date    CREATININE 0 88 07/09/2019       No results found for: PSA    Recent Results (from the past 1 hour(s))   POCT urine dip    Collection Time: 07/19/19  1:25 PM   Result Value Ref Range    LEUKOCYTE ESTERASE,UA 2     NITRITE,UA POSITIVE     SL AMB POCT UROBILINOGEN -     POCT URINE PROTEIN TRACE      PH,UA 6 5     BLOOD,UA 3     SPECIFIC GRAVITY,UA 1 015     KETONES,UA -     BILIRUBIN,UA -     GLUCOSE, UA -      COLOR,UA YELLOW     CLARITY,UA CLOUDY    POCT Measure PVR    Collection Time: 07/19/19 1:30 PM   Result Value Ref Range    POST-VOID RESIDUAL VOLUME, ML POC 87 mL         Review of Systems     Review of Systems   Constitutional: Negative for chills and fever  Respiratory: Negative for shortness of breath  Cardiovascular: Negative for chest pain  Gastrointestinal: Negative for constipation, diarrhea, nausea and vomiting  Genitourinary: Negative for difficulty urinating, dysuria, enuresis, flank pain, frequency, hematuria and urgency  Allergies     No Known Allergies    Physical Exam     Physical Exam   Constitutional: She is oriented to person, place, and time  She appears well-developed and well-nourished  No distress  Obese   HENT:   Head: Normocephalic and atraumatic  Right Ear: External ear normal    Left Ear: External ear normal    Nose: Nose normal    Eyes: Right eye exhibits no discharge  Left eye exhibits no discharge  No scleral icterus  Cardiovascular: Normal rate and regular rhythm  Pulmonary/Chest: Effort normal    Genitourinary:   Genitourinary Comments: Negative for CVA tenderness bilaterally   Musculoskeletal:   Ambulates independently with difficulty   Neurological: She is alert and oriented to person, place, and time  Skin: Skin is warm and dry  She is not diaphoretic  Psychiatric: She has a normal mood and affect  Her behavior is normal  Judgment and thought content normal    Nursing note and vitals reviewed          Vital Signs     Vitals:    07/19/19 1321   BP: 130/88   BP Location: Left arm   Patient Position: Sitting   Cuff Size: Extra-Large   Pulse: 83   Weight: 122 kg (270 lb)   Height: 5' 5" (1 651 m)         Current Medications       Current Outpatient Medications:     acetaminophen (TYLENOL) 325 mg tablet, Take 975 mg by mouth every 6 (six) hours as needed, Disp: , Rfl:     cholecalciferol (VITAMIN D3) 1,000 units tablet, Take 50,000 Units by mouth once a week, Disp: , Rfl:     diltiazem (CARDIZEM) 120 MG tablet, Take 120 mg by mouth 2 (two) times a day , Disp: , Rfl:     FLUoxetine (PROzac) 20 mg capsule, Take 20 mg by mouth 2 (two) times a day  , Disp: , Rfl:     LORazepam (ATIVAN) 1 mg tablet, Take 1 mg by mouth daily at bedtime as needed for anxiety  , Disp: , Rfl:     oxybutynin (DITROPAN) 5 mg tablet, Take 5 mg by mouth, Disp: , Rfl:     oxybutynin (DITROPAN-XL) 5 mg 24 hr tablet, Take 5 mg by mouth daily, Disp: , Rfl:     warfarin (COUMADIN) 1 mg tablet, , Disp: , Rfl:     warfarin (COUMADIN) 5 mg tablet, Take 5 mg by mouth every other day  , Disp: , Rfl:     warfarin (COUMADIN) 7 5 mg tablet, Take 6 mg by mouth every other day Mondays and Thursdays  , Disp: , Rfl:     Warfarin Sodium (COUMADIN PO), Take by mouth Dose varies as per MD order, Disp: , Rfl:     methocarbamol (ROBAXIN) 500 mg tablet, Take 1 tablet (500 mg total) by mouth every 8 (eight) hours for 20 doses (Patient not taking: Reported on 6/21/2019), Disp: 30 tablet, Rfl: 0    methocarbamol (ROBAXIN) 750 mg tablet, Take 750 mg by mouth 3 (three) times a day as needed, Disp: , Rfl: 2    predniSONE 10 mg tablet, TAKE 2 TABLETS BY MOUTH TWICE A DAY, Disp: , Rfl: 0      Active Problems     Patient Active Problem List   Diagnosis    Closed fracture of right distal radius and ulna    Malignant tumor of right kidney parenchyma (HCC)    Primary osteoarthritis of left knee    Primary osteoarthritis of right knee    Chronic pain of left knee    Myalgia    Chest wall tenderness    Chronic heart failure (HCC)    Chronic atrial fibrillation (HCC)    Thyroid nodule    Cervical spondylosis without myelopathy    Arthropathy of cervical facet joint    Occipital neuralgia of right side         Past Medical History     Past Medical History:   Diagnosis Date    [facility]     Arthritis     [facility]     History of transfusion     35 years ago    Hypertension     mass right kidney     Renal cell adenocarcinoma ([facility] )     RIGHT         Surgical History Past Surgical History:   Procedure Laterality Date    CHOLECYSTECTOMY      COLOSTOMY      CRYOABLATION Right     RT KIDNEY    GASTRIC BYPASS      HYSTERECTOMY      MI OPEN RX DISTAL RADIUS FX, EXTRA-ARTICULAR Right 4/22/2016    Procedure: OPEN REDUCTION INTERNAL FIXATION RIGHT DISTAL RADIUS;  Surgeon: Leonardo Riggs MD;  [facility];  Service: Orthopedics    REVISION COLOSTOMY      TUBAL LIGATION      US GUIDED THYROID BIOPSY  2/27/2019    US GUIDED THYROID BIOPSY  5/29/2019         Family History     Family History   Problem Relation Age of Onset    Heart attack Mother     Lung cancer Father 76    Stroke Sister     Lung cancer Sister 64    Prostate cancer Brother 61         Social History     Social History       Radiology     RENAL ULTRASOUND     INDICATION:   C64 1: Malignant neoplasm of right kidney, except renal pelvis  History of right-sided renal cell carcinoma, status post cryoablation      COMPARISON: CT chest abdomen pelvis January 24, 2019     TECHNIQUE:   Ultrasound of the retroperitoneum was performed with a curvilinear transducer utilizing volumetric sweeps and still imaging techniques       FINDINGS:     KIDNEYS:  Symmetric and normal size  Right kidney:  9 4 x 5 0 cm  Left kidney:  10 3 x 4 5 cm      Right kidney  Normal echogenicity and contour  No suspicious masses detected  Stable midpole scarring  No hydronephrosis  No shadowing calculi  No perinephric fluid collections      Left kidney  Normal echogenicity and contour  No suspicious masses detected  Stable upper pole scarring  No hydronephrosis  No shadowing calculi  No perinephric fluid collections      URETERS:  Nonvisualized      BLADDER:   Normally distended  No focal thickening or mass lesions  Bilateral ureteral jets detected         IMPRESSION:  Stable sonographic appearance of the kidneys

## 2019-07-19 ENCOUNTER — OFFICE VISIT (OUTPATIENT)
Dept: UROLOGY | Facility: CLINIC | Age: 69
End: 2019-07-19
Payer: MEDICARE

## 2019-07-19 VITALS
SYSTOLIC BLOOD PRESSURE: 130 MMHG | WEIGHT: 270 LBS | HEART RATE: 83 BPM | BODY MASS INDEX: 44.98 KG/M2 | HEIGHT: 65 IN | DIASTOLIC BLOOD PRESSURE: 88 MMHG

## 2019-07-19 DIAGNOSIS — C64.1 RENAL CELL CARCINOMA OF RIGHT KIDNEY (HCC): Primary | ICD-10-CM

## 2019-07-19 LAB
POST-VOID RESIDUAL VOLUME, ML POC: 87 ML
SL AMB  POCT GLUCOSE, UA: ABNORMAL
SL AMB LEUKOCYTE ESTERASE,UA: 2
SL AMB POCT BILIRUBIN,UA: ABNORMAL
SL AMB POCT BLOOD,UA: 3
SL AMB POCT CLARITY,UA: ABNORMAL
SL AMB POCT COLOR,UA: YELLOW
SL AMB POCT KETONES,UA: ABNORMAL
SL AMB POCT NITRITE,UA: POSITIVE
SL AMB POCT PH,UA: 6.5
SL AMB POCT SPECIFIC GRAVITY,UA: 1.01
SL AMB POCT URINE PROTEIN: ABNORMAL
SL AMB POCT UROBILINOGEN: ABNORMAL

## 2019-07-19 PROCEDURE — 81002 URINALYSIS NONAUTO W/O SCOPE: CPT | Performed by: PHYSICIAN ASSISTANT

## 2019-07-19 PROCEDURE — 99213 OFFICE O/P EST LOW 20 MIN: CPT | Performed by: PHYSICIAN ASSISTANT

## 2019-07-19 PROCEDURE — 51798 US URINE CAPACITY MEASURE: CPT | Performed by: PHYSICIAN ASSISTANT

## 2019-07-30 ENCOUNTER — HOSPITAL ENCOUNTER (OUTPATIENT)
Dept: RADIOLOGY | Facility: CLINIC | Age: 69
Discharge: HOME | End: 2019-07-30
Admitting: ANESTHESIOLOGY
Payer: MEDICARE

## 2019-07-30 VITALS
TEMPERATURE: 98.5 F | OXYGEN SATURATION: 93 % | HEART RATE: 83 BPM | DIASTOLIC BLOOD PRESSURE: 73 MMHG | RESPIRATION RATE: 20 BRPM | SYSTOLIC BLOOD PRESSURE: 157 MMHG

## 2019-07-30 DIAGNOSIS — M47.812 CERVICAL SPONDYLOSIS WITHOUT MYELOPATHY: ICD-10-CM

## 2019-07-30 PROCEDURE — 64492 INJ PARAVERT F JNT C/T 3 LEV: CPT | Performed by: ANESTHESIOLOGY

## 2019-07-30 PROCEDURE — 64490 INJ PARAVERT F JNT C/T 1 LEV: CPT | Performed by: ANESTHESIOLOGY

## 2019-07-30 PROCEDURE — 64491 INJ PARAVERT F JNT C/T 2 LEV: CPT | Performed by: ANESTHESIOLOGY

## 2019-07-30 RX ORDER — LIDOCAINE HYDROCHLORIDE 10 MG/ML
5 INJECTION, SOLUTION EPIDURAL; INFILTRATION; INTRACAUDAL; PERINEURAL ONCE
Status: COMPLETED | OUTPATIENT
Start: 2019-07-30 | End: 2019-07-30

## 2019-07-30 RX ORDER — BUPIVACAINE HYDROCHLORIDE 5 MG/ML
30 INJECTION, SOLUTION EPIDURAL; INTRACAUDAL ONCE
Status: COMPLETED | OUTPATIENT
Start: 2019-07-30 | End: 2019-07-30

## 2019-07-30 RX ADMIN — LIDOCAINE HYDROCHLORIDE 5 ML: 10 INJECTION, SOLUTION EPIDURAL; INFILTRATION; INTRACAUDAL; PERINEURAL at 14:09

## 2019-07-30 RX ADMIN — BUPIVACAINE HYDROCHLORIDE 2 ML: 5 INJECTION, SOLUTION EPIDURAL; INTRACAUDAL at 14:12

## 2019-07-30 NOTE — H&P
History of Present Illness: The patient is a 70 y o  female who presents with complaints of right-sided neck pain  Patient Active Problem List   Diagnosis    Closed fracture of right distal radius and ulna    Malignant tumor of right [facility] parenchyma ([facility]    Primary osteoarthritis of left knee    Primary osteoarthritis of right knee    Chronic pain of left knee    Myalgia    Chest wall tenderness    Chronic heart failure (HCC)    Chronic atrial fibrillation (HCC)    Thyroid nodule    Cervical spondylosis without myelopathy    Arthropathy of cervical facet joint    Occipital neuralgia of right side       Past Medical History:   Diagnosis Date    A-fib ([facility]     Arthritis     Atrial fibrillation ([facility]     History of transfusion     35 years ago    Hypertension     mass right kidney     Renal cell adenocarcinoma (HCC)     RIGHT       Past Surgical History:   Procedure Laterality Date    CHOLECYSTECTOMY      COLOSTOMY      CRYOABLATION Right     RT KIDNEY    GASTRIC BYPASS      HYSTERECTOMY      [place] OPEN RX DISTAL RADIUS FX, EXTRA-ARTICULAR Right 4/22/2016    Procedure: OPEN REDUCTION INTERNAL FIXATION RIGHT DISTAL RADIUS;  Surgeon: Jean-Pierre Mcclain MD;  [facility];  Service: Orthopedics    REVISION COLOSTOMY      TUBAL LIGATION      US GUIDED THYROID BIOPSY  2/27/2019    US GUIDED THYROID BIOPSY  5/29/2019         Current Outpatient Medications:     acetaminophen (TYLENOL) 325 mg tablet, Take 975 mg by mouth every 6 (six) hours as needed, Disp: , Rfl:     cholecalciferol (VITAMIN D3) 1,000 units tablet, Take 50,000 Units by mouth once a week, Disp: , Rfl:     diltiazem (CARDIZEM) 120 MG tablet, Take 120 mg by mouth 2 (two) times a day , Disp: , Rfl:     FLUoxetine (PROzac) 20 mg capsule, Take 20 mg by mouth 2 (two) times a day  , Disp: , Rfl:     LORazepam (ATIVAN) 1 mg tablet, Take 1 mg by mouth daily at bedtime as needed for anxiety  , Disp: , Rfl:     methocarbamol (ROBAXIN) 500 mg tablet, Take 1 tablet (500 mg total) by mouth every 8 (eight) hours for 20 doses (Patient not taking: Reported on 6/21/2019), Disp: 30 tablet, Rfl: 0    methocarbamol (ROBAXIN) 750 mg tablet, Take 750 mg by mouth 3 (three) times a day as needed, Disp: , Rfl: 2    oxybutynin (DITROPAN-XL) 5 mg 24 hr tablet, Take 5 mg by mouth daily, Disp: , Rfl:     predniSONE 10 mg tablet, TAKE 2 TABLETS BY MOUTH TWICE A DAY, Disp: , Rfl: 0    warfarin (COUMADIN) 1 mg tablet, , Disp: , Rfl:     warfarin (COUMADIN) 5 mg tablet, Take 5 mg by mouth every other day  , Disp: , Rfl:     warfarin (COUMADIN) 7 5 mg tablet, Take 6 mg by mouth every other day Mondays and Thursdays  , Disp: , Rfl:     Warfarin Sodium (COUMADIN PO), Take by mouth Dose varies as per MD order, Disp: , Rfl:     No Known Allergies    Physical Exam:   Vitals:    07/30/19 1339   BP: 153/88   Pulse: 76   Resp: 20   Temp: 98 5 °F (36 9 °C)   SpO2: 92%     General: Awake, Alert, Oriented x 3, Mood and affect appropriate  Respiratory: Respirations even and unlabored  Cardiovascular: Peripheral pulses intact; no edema  Musculoskeletal Exam:  Right cervical paraspinals tender to palpation  ASA Score: 3    Patient/Chart Verification  Patient ID Verified: Verbal  ID Band Applied: No  Consents Confirmed: Procedural  H&P( within 30 days) Verified: To be obtained in the Pre-Procedure area  Interval H&P(within 24 hr) Complete (required for Outpatients and Surgery Admit only): To be obtained in the Pre-Procedure area  Allergies Reviewed: Yes  Anticoag/NSAID held?: No  Currently on antibiotics?: No  Pre-op Lab/Test Results Available: N/A  Pregnancy Lab Collected: N/A comment    Assessment:   1   Cervical spondylosis without myelopathy        Plan: RT C2, C3, C4, C5 MBB #2

## 2019-07-30 NOTE — DISCHARGE INSTRUCTIONS

## 2019-07-31 ENCOUNTER — OFFICE VISIT (OUTPATIENT)
Dept: LAB | Facility: HOSPITAL | Age: 69
End: 2019-07-31
Attending: SURGERY
Payer: MEDICARE

## 2019-07-31 ENCOUNTER — APPOINTMENT (OUTPATIENT)
Dept: LAB | Facility: HOSPITAL | Age: 69
End: 2019-07-31
Attending: SURGERY
Payer: MEDICARE

## 2019-07-31 ENCOUNTER — TRANSCRIBE ORDERS (OUTPATIENT)
Dept: RADIOLOGY | Facility: HOSPITAL | Age: 69
End: 2019-07-31

## 2019-07-31 ENCOUNTER — HOSPITAL ENCOUNTER (OUTPATIENT)
Dept: RADIOLOGY | Facility: HOSPITAL | Age: 69
Discharge: HOME | End: 2019-07-31
Attending: SURGERY
Payer: MEDICARE

## 2019-07-31 DIAGNOSIS — E04.1 THYROID NODULE: ICD-10-CM

## 2019-07-31 LAB
ALBUMIN SERPL BCP-MCNC: 3.2 G/DL (ref 3.5–5)
ALP SERPL-CCNC: 227 U/L (ref 46–116)
ALT SERPL W P-5'-P-CCNC: 18 U/L (ref 12–78)
ANION GAP SERPL CALCULATED.3IONS-SCNC: 5 MMOL/L (ref 4–13)
AST SERPL W P-5'-P-CCNC: 18 U/L (ref 5–45)
ATRIAL RATE: 101 BPM
BASOPHILS # BLD AUTO: 0.04 THOUSANDS/ΜL (ref 0–0.1)
BASOPHILS NFR BLD AUTO: 1 % (ref 0–1)
BILIRUB SERPL-MCNC: 0.44 MG/DL (ref 0.2–1)
BUN SERPL-MCNC: 15 MG/DL (ref 5–25)
CALCIUM SERPL-MCNC: 8.6 MG/DL (ref 8.3–10.1)
CHLORIDE SERPL-SCNC: 107 MMOL/L (ref 100–108)
CO2 SERPL-SCNC: 28 MMOL/L (ref 21–32)
CREAT SERPL-MCNC: 0.88 MG/DL (ref 0.6–1.3)
EOSINOPHIL # BLD AUTO: 0.06 THOUSAND/ΜL (ref 0–0.61)
EOSINOPHIL NFR BLD AUTO: 1 % (ref 0–6)
ERYTHROCYTE [DISTWIDTH] IN BLOOD BY AUTOMATED COUNT: 13.5 % (ref 11.6–15.1)
GFR SERPL CREATININE-BSD FRML MDRD: 67 ML/MIN/1.73SQ M
GLUCOSE P FAST SERPL-MCNC: 103 MG/DL (ref 65–99)
HCT VFR BLD AUTO: 42.9 % (ref 34.8–46.1)
HGB BLD-MCNC: 13.2 G/DL (ref 11.5–15.4)
IMM GRANULOCYTES # BLD AUTO: 0.05 THOUSAND/UL (ref 0–0.2)
IMM GRANULOCYTES NFR BLD AUTO: 1 % (ref 0–2)
LYMPHOCYTES # BLD AUTO: 1.09 THOUSANDS/ΜL (ref 0.6–4.47)
LYMPHOCYTES NFR BLD AUTO: 21 % (ref 14–44)
MCH RBC QN AUTO: 28.6 PG (ref 26.8–34.3)
MCHC RBC AUTO-ENTMCNC: 30.8 G/DL (ref 31.4–37.4)
MCV RBC AUTO: 93 FL (ref 82–98)
MONOCYTES # BLD AUTO: 0.42 THOUSAND/ΜL (ref 0.17–1.22)
MONOCYTES NFR BLD AUTO: 8 % (ref 4–12)
NEUTROPHILS # BLD AUTO: 3.43 THOUSANDS/ΜL (ref 1.85–7.62)
NEUTS SEG NFR BLD AUTO: 68 % (ref 43–75)
NRBC BLD AUTO-RTO: 0 /100 WBCS
PLATELET # BLD AUTO: 288 THOUSANDS/UL (ref 149–390)
PMV BLD AUTO: 9.7 FL (ref 8.9–12.7)
POTASSIUM SERPL-SCNC: 4 MMOL/L (ref 3.5–5.3)
PROT SERPL-MCNC: 6.5 G/DL (ref 6.4–8.2)
QRS AXIS: 94 DEGREES
QRSD INTERVAL: 70 MS
QT INTERVAL: 294 MS
QTC INTERVAL: 406 MS
RBC # BLD AUTO: 4.61 MILLION/UL (ref 3.81–5.12)
SODIUM SERPL-SCNC: 140 MMOL/L (ref 136–145)
T WAVE AXIS: 32 DEGREES
VENTRICULAR RATE: 115 BPM
WBC # BLD AUTO: 5.09 THOUSAND/UL (ref 4.31–10.16)

## 2019-07-31 PROCEDURE — 93010 ELECTROCARDIOGRAM REPORT: CPT | Performed by: INTERNAL MEDICINE

## 2019-07-31 PROCEDURE — 85025 COMPLETE CBC W/AUTO DIFF WBC: CPT

## 2019-07-31 PROCEDURE — 93005 ELECTROCARDIOGRAM TRACING: CPT

## 2019-07-31 PROCEDURE — 71046 X-RAY EXAM CHEST 2 VIEWS: CPT

## 2019-07-31 PROCEDURE — 36415 COLL VENOUS BLD VENIPUNCTURE: CPT

## 2019-07-31 PROCEDURE — 80053 COMPREHEN METABOLIC PANEL: CPT

## 2019-08-01 ENCOUNTER — TELEPHONE (OUTPATIENT)
Dept: RADIOLOGY | Facility: CLINIC | Age: 69
End: 2019-08-01

## 2019-08-01 DIAGNOSIS — Z92.29 HX OF LONG TERM USE OF BLOOD THINNERS: Primary | ICD-10-CM

## 2019-08-01 NOTE — TELEPHONE ENCOUNTER
Pt had RT C2, C3, C4, C5 MBB #2 on 07/30/19, calling to see if pain diary has been reviewed (dropped off on 07/31/19)  *saving appt spot on Tues 08/06/19

## 2019-08-02 NOTE — TELEPHONE ENCOUNTER
I do not see this patient's pain diary  How much relief did the patient received?   And how long the last?

## 2019-08-06 ENCOUNTER — HOSPITAL ENCOUNTER (OUTPATIENT)
Dept: RADIOLOGY | Facility: CLINIC | Age: 69
Discharge: HOME | End: 2019-08-06
Admitting: ANESTHESIOLOGY
Payer: MEDICARE

## 2019-08-06 ENCOUNTER — TELEPHONE (OUTPATIENT)
Dept: RADIOLOGY | Facility: CLINIC | Age: 69
End: 2019-08-06

## 2019-08-06 VITALS
DIASTOLIC BLOOD PRESSURE: 71 MMHG | HEART RATE: 61 BPM | RESPIRATION RATE: 20 BRPM | SYSTOLIC BLOOD PRESSURE: 148 MMHG | OXYGEN SATURATION: 98 %

## 2019-08-06 DIAGNOSIS — M47.812 CERVICAL SPONDYLOSIS WITHOUT MYELOPATHY: ICD-10-CM

## 2019-08-06 PROCEDURE — 64634 DESTROY C/TH FACET JNT ADDL: CPT | Performed by: ANESTHESIOLOGY

## 2019-08-06 PROCEDURE — 64633 DESTROY CERV/THOR FACET JNT: CPT | Performed by: ANESTHESIOLOGY

## 2019-08-06 RX ORDER — BUPIVACAINE HYDROCHLORIDE 5 MG/ML
30 INJECTION, SOLUTION EPIDURAL; INTRACAUDAL ONCE
Status: COMPLETED | OUTPATIENT
Start: 2019-08-06 | End: 2019-08-06

## 2019-08-06 RX ORDER — LIDOCAINE HYDROCHLORIDE 10 MG/ML
5 INJECTION, SOLUTION EPIDURAL; INFILTRATION; INTRACAUDAL; PERINEURAL ONCE
Status: COMPLETED | OUTPATIENT
Start: 2019-08-06 | End: 2019-08-06

## 2019-08-06 RX ADMIN — BUPIVACAINE HYDROCHLORIDE 4 ML: 5 INJECTION, SOLUTION EPIDURAL; INTRACAUDAL at 08:18

## 2019-08-06 RX ADMIN — LIDOCAINE HYDROCHLORIDE 5 ML: 10 INJECTION, SOLUTION EPIDURAL; INFILTRATION; INTRACAUDAL; PERINEURAL at 08:14

## 2019-08-06 RX ADMIN — LIDOCAINE HYDROCHLORIDE 4 ML: 20 INJECTION, SOLUTION EPIDURAL; INFILTRATION; INTRACAUDAL; PERINEURAL at 08:24

## 2019-08-06 NOTE — TELEPHONE ENCOUNTER
To be called on 8/7/19    S/P Right C2,C3,C4,C5 RFA with JW on 8/6/19  Patient is scheduled on 8/30/19 for office visit with Ofelia Parnell

## 2019-08-06 NOTE — H&P
History of Present Illness: The patient is a 70 y o  female who presents with complaints of neck pain  Patient Active Problem List   Diagnosis    Closed fracture of right distal radius and ulna    Malignant tumor of right [facility]yma [facility]    Primary osteoarthritis of left knee    Primary osteoarthritis of right knee    Chronic pain of left knee    Myalgia    Chest wall tenderness    Chronic heart failure (HCC)    Chronic atrial fibrillation (HCC)    Thyroid nodule    Cervical spondylosis without myelopathy    Arthropathy of cervical facet joint    Occipital neuralgia of right side       Past Medical History:   Diagnosis Date    [facility]     Arthritis     Atrial fibrillation ([facility]     History of transfusion     35 years ago    Hypertension     mass right kidney     Renal cell adenocarcinoma (HCC)     RIGHT       Past Surgical History:   Procedure Laterality Date    CHOLECYSTECTOMY      COLOSTOMY      CRYOABLATION Right     RT KIDNEY    GASTRIC BYPASS      HYSTERECTOMY      [place] OPEN RX DISTAL RADIUS FX, EXTRA-ARTICULAR Right 4/22/2016    Procedure: OPEN REDUCTION INTERNAL FIXATION RIGHT DISTAL RADIUS;  Surgeon: Erlin Blake MD;  [facility];  Service: Orthopedics    REVISION COLOSTOMY      TUBAL LIGATION      US GUIDED THYROID BIOPSY  2/27/2019    US GUIDED THYROID BIOPSY  5/29/2019         Current Outpatient Medications:     acetaminophen (TYLENOL) 325 mg tablet, Take 975 mg by mouth every 6 (six) hours as needed, Disp: , Rfl:     cholecalciferol (VITAMIN D3) 1,000 units tablet, Take 50,000 Units by mouth once a week, Disp: , Rfl:     diltiazem (CARDIZEM) 120 MG tablet, Take 120 mg by mouth 2 (two) times a day , Disp: , Rfl:     FLUoxetine (PROzac) 20 mg capsule, Take 20 mg by mouth 2 (two) times a day  , Disp: , Rfl:     LORazepam (ATIVAN) 1 mg tablet, Take 1 mg by mouth daily at bedtime as needed for anxiety  , Disp: , Rfl:     methocarbamol (ROBAXIN) 500 mg tablet, Take 1 tablet (500 mg total) by mouth every 8 (eight) hours for 20 doses (Patient not taking: Reported on 6/21/2019), Disp: 30 tablet, Rfl: 0    methocarbamol (ROBAXIN) 750 mg tablet, Take 750 mg by mouth 3 (three) times a day as needed, Disp: , Rfl: 2    oxybutynin (DITROPAN-XL) 5 mg 24 hr tablet, Take 5 mg by mouth daily, Disp: , Rfl:     predniSONE 10 mg tablet, TAKE 2 TABLETS BY MOUTH TWICE A DAY, Disp: , Rfl: 0    warfarin (COUMADIN) 1 mg tablet, , Disp: , Rfl:     warfarin (COUMADIN) 5 mg tablet, Take 5 mg by mouth every other day  , Disp: , Rfl:     warfarin (COUMADIN) 7 5 mg tablet, Take 6 mg by mouth every other day Mondays and Thursdays  , Disp: , Rfl:     Warfarin Sodium (COUMADIN PO), Take by mouth Dose varies as per MD order, Disp: , Rfl:     No Known Allergies    Physical Exam:   Vitals:    08/06/19 0757   BP: 141/77   Pulse: 70   Resp: 20   SpO2: 96%     General: Awake, Alert, Oriented x 3, Mood and affect appropriate  Respiratory: Respirations even and unlabored  Cardiovascular: Peripheral pulses intact; no edema  Musculoskeletal Exam:  Right cervical paraspinals tender to palpation  ASA Score: 3    Patient/Chart Verification  Patient ID Verified: Verbal  ID Band Applied: No  Consents Confirmed: Procedural  H&P( within 30 days) Verified: To be obtained in the Pre-Procedure area  Interval H&P(within 24 hr) Complete (required for Outpatients and Surgery Admit only): To be obtained in the Pre-Procedure area  Allergies Reviewed: Yes  Anticoag/NSAID held?: No(took coumadin last night)  Currently on antibiotics?: No  Pre-op Lab/Test Results Available: N/A  Pregnancy Lab Collected: N/A comment    Assessment:   1   Cervical spondylosis without myelopathy        Plan: RT C2, C3, C4, C5 RFA

## 2019-08-06 NOTE — DISCHARGE INSTR - LAB

## 2019-08-07 NOTE — TELEPHONE ENCOUNTER
S/w the patient and she stated she feels pretty good after her procedure from yesterday  The band aid's are off and the area is C/D/I  Reviewed the postop instructions and verbalized understanding

## 2019-08-15 ENCOUNTER — TELEPHONE (OUTPATIENT)
Dept: SURGICAL ONCOLOGY | Facility: CLINIC | Age: 69
End: 2019-08-15

## 2019-08-23 RX ORDER — ERGOCALCIFEROL 1.25 MG/1
50000 CAPSULE ORAL WEEKLY
COMMUNITY

## 2019-08-23 NOTE — PRE-PROCEDURE INSTRUCTIONS
Pre-Surgery Instructions:   Medication Instructions    acetaminophen (TYLENOL) 325 mg tablet Instructed patient per Anesthesia Guidelines   diltiazem (CARDIZEM) 120 MG tablet Instructed patient per Anesthesia Guidelines   enoxaparin (LOVENOX) 40 mg/0 4 mL Patient was instructed by Physician and understands   ergocalciferol (VITAMIN D2) 50,000 units Instructed patient per Anesthesia Guidelines   FLUoxetine (PROzac) 20 mg capsule Instructed patient per Anesthesia Guidelines   LORazepam (ATIVAN) 1 mg tablet Instructed patient per Anesthesia Guidelines   oxybutynin (DITROPAN-XL) 5 mg 24 hr tablet Instructed patient per Anesthesia Guidelines   warfarin (COUMADIN) 1 mg tablet Patient was instructed by Physician and understands   warfarin (COUMADIN) 5 mg tablet Patient was instructed by Physician and understands

## 2019-08-23 NOTE — PRE-PROCEDURE INSTRUCTIONS
Pre-Surgery Instructions:   Medication Instructions    acetaminophen (TYLENOL) 325 mg tablet Instructed patient per Anesthesia Guidelines   diltiazem (CARDIZEM) 120 MG tablet Instructed patient per Anesthesia Guidelines   enoxaparin (LOVENOX) 40 mg/0 4 mL Patient was instructed by Physician and understands   ergocalciferol (VITAMIN D2) 50,000 units Instructed patient per Anesthesia Guidelines   FLUoxetine (PROzac) 20 mg capsule Instructed patient per Anesthesia Guidelines   LORazepam (ATIVAN) 1 mg tablet Instructed patient per Anesthesia Guidelines   oxybutynin (DITROPAN-XL) 5 mg 24 hr tablet Instructed patient per Anesthesia Guidelines   warfarin (COUMADIN) 1 mg tablet Patient was instructed by Physician and understands   warfarin (COUMADIN) 5 mg tablet Patient was instructed by Physician and understands  Per pt last dose of coumadin was 8/21  Pt started daily injections of lovenox    Last lovenox injection is on 8/26 per pt

## 2019-08-26 ENCOUNTER — ANESTHESIA EVENT (OUTPATIENT)
Dept: PERIOP | Facility: HOSPITAL | Age: 69
End: 2019-08-26
Payer: MEDICARE

## 2019-08-26 NOTE — ANESTHESIA PREPROCEDURE EVALUATION
Review of Systems/Medical History  Patient summary reviewed        Cardiovascular  Exercise tolerance (METS): poor,  Hypertension , Dysrhythmias (hx AFib  diagnosed 3 weeks ago; now in NSR) , atrial fibrillation,   Comment: Afib, on coumadin    2/2019 - normal biventricular systolic function, mild MR,  Pulmonary  Sleep apnea (compliant with CPAP) CPAP,        GI/Hepatic    Bariatric surgery,             Endo/Other  History of thyroid disease ,   Comment: Morbid obesity    GYN       Hematology   Musculoskeletal    Comment: Cervical neck disease Arthritis     Neurology   Psychology   Psychiatric history (anxiety),              Physical Exam    Airway       Dental       Cardiovascular      Pulmonary  Breath sounds clear to auscultation,     Other Findings        Anesthesia Plan  ASA Score- 3     Anesthesia Type- general with ASA Monitors  Additional Monitors:   Airway Plan: ETT  Comment: General anesthesia, endotracheal tube; standard ASA monitors  Risks and benefits discussed with patient; patient consented and agrees to proceed  I saw and evaluated the patient  If seen with CRNA, we have discussed the anesthetic plan and I am in agreement that the plan is appropriate for the patient        Plan Factors-    Induction- intravenous  Postoperative Plan- Plan for postoperative opioid use  Planned trial extubation    Informed Consent-            Anesthesia History        History Comments History Comments     A-fib  Arthritis      mass right kidney  History of transfusion 35 years ago       Obstetric History      The patient has not been asked about pregnancy        Surgical History        GASTRIC BYPASS HYSTERECTOMY     TUBAL LIGATION CHOLECYSTECTOMY     COLOSTOMY REVISION COLOSTOMY     [place] OPEN RX DISTAL RADIUS FX, EXTRA-ARTICULAR        Substance History        Smoking Status: Never Smoker     Smokeless Tobacco Status: Never Used     Alcohol use: No     Drug use:  No       Problem List        Closed fracture of right distal radius and ulna         Anesthesia History        History Comments History Comments     A-fib  Arthritis      mass right kidney  History of transfusion 35 years ago       Obstetric History      The patient has not been asked about pregnancy        Surgical History        GASTRIC BYPASS HYSTERECTOMY     TUBAL LIGATION CHOLECYSTECTOMY     COLOSTOMY REVISION COLOSTOMY     [place] OPEN RX DISTAL RADIUS FX, EXTRA-ARTICULAR        Substance History        Smoking Status: Never Smoker     Smokeless Tobacco Status: Never Used     Alcohol use: No     Drug use:  No       Problem List        Closed fracture of right distal radius and ulna         ekg sr

## 2019-08-27 ENCOUNTER — HOSPITAL ENCOUNTER (OUTPATIENT)
Facility: HOSPITAL | Age: 69
Setting detail: OUTPATIENT SURGERY
Discharge: HOME | End: 2019-08-28
Attending: SURGERY | Admitting: SURGERY
Payer: MEDICARE

## 2019-08-27 ENCOUNTER — ANESTHESIA (OUTPATIENT)
Dept: PERIOP | Facility: HOSPITAL | Age: 69
End: 2019-08-27
Payer: MEDICARE

## 2019-08-27 DIAGNOSIS — E04.1 THYROID NODULE: ICD-10-CM

## 2019-08-27 DIAGNOSIS — I48.20 CHRONIC ATRIAL FIBRILLATION (HCC): Primary | ICD-10-CM

## 2019-08-27 LAB
APTT PPP: 26 SECONDS (ref 23–37)
INR PPP: 1.21 (ref 0.84–1.19)
PROTHROMBIN TIME: 14.9 SECONDS (ref 11.6–14.5)

## 2019-08-27 PROCEDURE — 88307 TISSUE EXAM BY PATHOLOGIST: CPT | Performed by: PATHOLOGY

## 2019-08-27 PROCEDURE — 88342 IMHCHEM/IMCYTCHM 1ST ANTB: CPT | Performed by: PATHOLOGY

## 2019-08-27 PROCEDURE — C1765 ADHESION BARRIER: HCPCS | Performed by: SURGERY

## 2019-08-27 PROCEDURE — 60220 PARTIAL REMOVAL OF THYROID: CPT | Performed by: SURGERY

## 2019-08-27 PROCEDURE — 85730 THROMBOPLASTIN TIME PARTIAL: CPT | Performed by: SURGERY

## 2019-08-27 PROCEDURE — 85610 PROTHROMBIN TIME: CPT | Performed by: SURGERY

## 2019-08-27 RX ORDER — MAGNESIUM HYDROXIDE 1200 MG/15ML
LIQUID ORAL AS NEEDED
Status: DISCONTINUED | OUTPATIENT
Start: 2019-08-27 | End: 2019-08-27 | Stop reason: HOSPADM

## 2019-08-27 RX ORDER — SODIUM CHLORIDE, SODIUM LACTATE, POTASSIUM CHLORIDE, CALCIUM CHLORIDE 600; 310; 30; 20 MG/100ML; MG/100ML; MG/100ML; MG/100ML
INJECTION, SOLUTION INTRAVENOUS CONTINUOUS PRN
Status: DISCONTINUED | OUTPATIENT
Start: 2019-08-27 | End: 2019-08-27 | Stop reason: SURG

## 2019-08-27 RX ORDER — GLYCOPYRROLATE 0.2 MG/ML
INJECTION INTRAMUSCULAR; INTRAVENOUS AS NEEDED
Status: DISCONTINUED | OUTPATIENT
Start: 2019-08-27 | End: 2019-08-27 | Stop reason: SURG

## 2019-08-27 RX ORDER — OXYCODONE HYDROCHLORIDE 5 MG/1
2.5 TABLET ORAL EVERY 4 HOURS PRN
Status: DISCONTINUED | OUTPATIENT
Start: 2019-08-27 | End: 2019-08-28 | Stop reason: HOSPADM

## 2019-08-27 RX ORDER — ACETAMINOPHEN 325 MG/1
650 TABLET ORAL EVERY 6 HOURS PRN
Status: DISCONTINUED | OUTPATIENT
Start: 2019-08-27 | End: 2019-08-28 | Stop reason: HOSPADM

## 2019-08-27 RX ORDER — FLUOXETINE HYDROCHLORIDE 20 MG/1
20 CAPSULE ORAL 2 TIMES DAILY
Status: DISCONTINUED | OUTPATIENT
Start: 2019-08-27 | End: 2019-08-28 | Stop reason: HOSPADM

## 2019-08-27 RX ORDER — NEOSTIGMINE METHYLSULFATE 1 MG/ML
INJECTION INTRAVENOUS AS NEEDED
Status: DISCONTINUED | OUTPATIENT
Start: 2019-08-27 | End: 2019-08-27 | Stop reason: SURG

## 2019-08-27 RX ORDER — DEXAMETHASONE SODIUM PHOSPHATE 10 MG/ML
INJECTION, SOLUTION INTRAMUSCULAR; INTRAVENOUS AS NEEDED
Status: DISCONTINUED | OUTPATIENT
Start: 2019-08-27 | End: 2019-08-27 | Stop reason: SURG

## 2019-08-27 RX ORDER — MIDAZOLAM HYDROCHLORIDE 1 MG/ML
INJECTION INTRAMUSCULAR; INTRAVENOUS AS NEEDED
Status: DISCONTINUED | OUTPATIENT
Start: 2019-08-27 | End: 2019-08-27 | Stop reason: SURG

## 2019-08-27 RX ORDER — ONDANSETRON 2 MG/ML
4 INJECTION INTRAMUSCULAR; INTRAVENOUS EVERY 6 HOURS PRN
Status: DISCONTINUED | OUTPATIENT
Start: 2019-08-27 | End: 2019-08-28 | Stop reason: HOSPADM

## 2019-08-27 RX ORDER — OXYCODONE HYDROCHLORIDE 5 MG/1
5 TABLET ORAL EVERY 4 HOURS PRN
Status: DISCONTINUED | OUTPATIENT
Start: 2019-08-27 | End: 2019-08-28 | Stop reason: HOSPADM

## 2019-08-27 RX ORDER — DILTIAZEM HYDROCHLORIDE 60 MG/1
120 TABLET, FILM COATED ORAL 2 TIMES DAILY
Status: DISCONTINUED | OUTPATIENT
Start: 2019-08-27 | End: 2019-08-28 | Stop reason: HOSPADM

## 2019-08-27 RX ORDER — LIDOCAINE HYDROCHLORIDE 10 MG/ML
INJECTION, SOLUTION INFILTRATION; PERINEURAL AS NEEDED
Status: DISCONTINUED | OUTPATIENT
Start: 2019-08-27 | End: 2019-08-27 | Stop reason: SURG

## 2019-08-27 RX ORDER — FENTANYL CITRATE/PF 50 MCG/ML
25 SYRINGE (ML) INJECTION
Status: DISCONTINUED | OUTPATIENT
Start: 2019-08-27 | End: 2019-08-27 | Stop reason: HOSPADM

## 2019-08-27 RX ORDER — BUPIVACAINE HYDROCHLORIDE 2.5 MG/ML
INJECTION, SOLUTION INFILTRATION; PERINEURAL AS NEEDED
Status: DISCONTINUED | OUTPATIENT
Start: 2019-08-27 | End: 2019-08-27 | Stop reason: HOSPADM

## 2019-08-27 RX ORDER — DIPHENHYDRAMINE HYDROCHLORIDE 50 MG/ML
12.5 INJECTION INTRAMUSCULAR; INTRAVENOUS ONCE AS NEEDED
Status: DISCONTINUED | OUTPATIENT
Start: 2019-08-27 | End: 2019-08-27 | Stop reason: HOSPADM

## 2019-08-27 RX ORDER — FENTANYL CITRATE 50 UG/ML
INJECTION, SOLUTION INTRAMUSCULAR; INTRAVENOUS AS NEEDED
Status: DISCONTINUED | OUTPATIENT
Start: 2019-08-27 | End: 2019-08-27 | Stop reason: SURG

## 2019-08-27 RX ORDER — PROPOFOL 10 MG/ML
INJECTION, EMULSION INTRAVENOUS AS NEEDED
Status: DISCONTINUED | OUTPATIENT
Start: 2019-08-27 | End: 2019-08-27 | Stop reason: SURG

## 2019-08-27 RX ORDER — ONDANSETRON 2 MG/ML
INJECTION INTRAMUSCULAR; INTRAVENOUS AS NEEDED
Status: DISCONTINUED | OUTPATIENT
Start: 2019-08-27 | End: 2019-08-27 | Stop reason: SURG

## 2019-08-27 RX ORDER — ONDANSETRON 2 MG/ML
4 INJECTION INTRAMUSCULAR; INTRAVENOUS ONCE AS NEEDED
Status: DISCONTINUED | OUTPATIENT
Start: 2019-08-27 | End: 2019-08-27 | Stop reason: HOSPADM

## 2019-08-27 RX ORDER — EPHEDRINE SULFATE 50 MG/ML
INJECTION INTRAVENOUS AS NEEDED
Status: DISCONTINUED | OUTPATIENT
Start: 2019-08-27 | End: 2019-08-27 | Stop reason: SURG

## 2019-08-27 RX ORDER — ROCURONIUM BROMIDE 10 MG/ML
INJECTION, SOLUTION INTRAVENOUS AS NEEDED
Status: DISCONTINUED | OUTPATIENT
Start: 2019-08-27 | End: 2019-08-27 | Stop reason: SURG

## 2019-08-27 RX ORDER — HYDROMORPHONE HCL/PF 1 MG/ML
0.5 SYRINGE (ML) INJECTION
Status: DISCONTINUED | OUTPATIENT
Start: 2019-08-27 | End: 2019-08-27 | Stop reason: HOSPADM

## 2019-08-27 RX ORDER — SODIUM CHLORIDE, SODIUM LACTATE, POTASSIUM CHLORIDE, CALCIUM CHLORIDE 600; 310; 30; 20 MG/100ML; MG/100ML; MG/100ML; MG/100ML
125 INJECTION, SOLUTION INTRAVENOUS CONTINUOUS
Status: DISCONTINUED | OUTPATIENT
Start: 2019-08-27 | End: 2019-08-27

## 2019-08-27 RX ORDER — METOCLOPRAMIDE HYDROCHLORIDE 5 MG/ML
10 INJECTION INTRAMUSCULAR; INTRAVENOUS ONCE AS NEEDED
Status: DISCONTINUED | OUTPATIENT
Start: 2019-08-27 | End: 2019-08-27 | Stop reason: HOSPADM

## 2019-08-27 RX ORDER — OXYBUTYNIN CHLORIDE 5 MG/1
5 TABLET, EXTENDED RELEASE ORAL 2 TIMES DAILY
Status: DISCONTINUED | OUTPATIENT
Start: 2019-08-27 | End: 2019-08-28 | Stop reason: HOSPADM

## 2019-08-27 RX ADMIN — FLUOXETINE 20 MG: 20 CAPSULE ORAL at 21:07

## 2019-08-27 RX ADMIN — FENTANYL CITRATE 25 MCG: 50 INJECTION, SOLUTION INTRAMUSCULAR; INTRAVENOUS at 10:28

## 2019-08-27 RX ADMIN — FLUOXETINE 20 MG: 20 CAPSULE ORAL at 12:41

## 2019-08-27 RX ADMIN — PROPOFOL 200 MG: 10 INJECTION, EMULSION INTRAVENOUS at 07:49

## 2019-08-27 RX ADMIN — EPHEDRINE SULFATE 10 MG: 50 INJECTION, SOLUTION INTRAVENOUS at 08:04

## 2019-08-27 RX ADMIN — SODIUM CHLORIDE, SODIUM LACTATE, POTASSIUM CHLORIDE, AND CALCIUM CHLORIDE: .6; .31; .03; .02 INJECTION, SOLUTION INTRAVENOUS at 09:28

## 2019-08-27 RX ADMIN — ONDANSETRON 4 MG: 2 INJECTION INTRAMUSCULAR; INTRAVENOUS at 09:28

## 2019-08-27 RX ADMIN — FENTANYL CITRATE 50 MCG: 50 INJECTION, SOLUTION INTRAMUSCULAR; INTRAVENOUS at 09:10

## 2019-08-27 RX ADMIN — OXYBUTYNIN CHLORIDE 5 MG: 5 TABLET, EXTENDED RELEASE ORAL at 21:06

## 2019-08-27 RX ADMIN — FENTANYL CITRATE 25 MCG: 50 INJECTION, SOLUTION INTRAMUSCULAR; INTRAVENOUS at 10:09

## 2019-08-27 RX ADMIN — GLYCOPYRROLATE 0.4 MG: 0.2 INJECTION, SOLUTION INTRAMUSCULAR; INTRAVENOUS at 09:31

## 2019-08-27 RX ADMIN — SODIUM CHLORIDE, SODIUM LACTATE, POTASSIUM CHLORIDE, AND CALCIUM CHLORIDE: .6; .31; .03; .02 INJECTION, SOLUTION INTRAVENOUS at 07:53

## 2019-08-27 RX ADMIN — FENTANYL CITRATE 50 MCG: 50 INJECTION, SOLUTION INTRAMUSCULAR; INTRAVENOUS at 07:49

## 2019-08-27 RX ADMIN — FENTANYL CITRATE 25 MCG: 50 INJECTION, SOLUTION INTRAMUSCULAR; INTRAVENOUS at 10:04

## 2019-08-27 RX ADMIN — SODIUM CHLORIDE, SODIUM LACTATE, POTASSIUM CHLORIDE, AND CALCIUM CHLORIDE: .6; .31; .03; .02 INJECTION, SOLUTION INTRAVENOUS at 07:37

## 2019-08-27 RX ADMIN — EPHEDRINE SULFATE 15 MG: 50 INJECTION, SOLUTION INTRAVENOUS at 08:10

## 2019-08-27 RX ADMIN — ENOXAPARIN SODIUM 40 MG: 40 INJECTION SUBCUTANEOUS at 13:48

## 2019-08-27 RX ADMIN — LIDOCAINE HYDROCHLORIDE 50 MG: 10 INJECTION, SOLUTION INFILTRATION; PERINEURAL at 07:49

## 2019-08-27 RX ADMIN — ROCURONIUM BROMIDE 50 MG: 50 INJECTION, SOLUTION INTRAVENOUS at 07:49

## 2019-08-27 RX ADMIN — NEOSTIGMINE METHYLSULFATE 3 MG: 1 INJECTION, SOLUTION INTRAVENOUS at 09:31

## 2019-08-27 RX ADMIN — DEXAMETHASONE SODIUM PHOSPHATE 10 MG: 10 INJECTION, SOLUTION INTRAMUSCULAR; INTRAVENOUS at 07:54

## 2019-08-27 RX ADMIN — EPHEDRINE SULFATE 10 MG: 50 INJECTION, SOLUTION INTRAVENOUS at 08:07

## 2019-08-27 RX ADMIN — MIDAZOLAM 2 MG: 1 INJECTION INTRAMUSCULAR; INTRAVENOUS at 07:38

## 2019-08-27 RX ADMIN — DILTIAZEM HYDROCHLORIDE 120 MG: 60 TABLET, FILM COATED ORAL at 21:06

## 2019-08-27 RX ADMIN — PHENYLEPHRINE HYDROCHLORIDE 100 MCG: 10 INJECTION INTRAVENOUS at 08:07

## 2019-08-27 RX ADMIN — PHENYLEPHRINE HYDROCHLORIDE 200 MCG: 10 INJECTION INTRAVENOUS at 08:10

## 2019-08-27 RX ADMIN — ACETAMINOPHEN 650 MG: 325 TABLET ORAL at 23:56

## 2019-08-27 RX ADMIN — OXYBUTYNIN CHLORIDE 5 MG: 5 TABLET, EXTENDED RELEASE ORAL at 12:41

## 2019-08-27 NOTE — PLAN OF CARE
Problem: Potential for Falls  Goal: Patient will remain free of falls  Description  INTERVENTIONS:  - Assess patient frequently for physical needs  -  Identify cognitive and physical deficits and behaviors that affect risk of falls    -  [place] fall precautions as indicated by assessment   - Educate patient/family on patient safety including physical limitations  - Instruct patient to call for assistance with activity based on assessment  - Modify environment to reduce risk of injury  - Consider OT/PT consult to assist with strengthening/mobility  Outcome: Progressing

## 2019-08-27 NOTE — ANESTHESIA POSTPROCEDURE EVALUATION
Post-Op Assessment Note    CV Status:  Stable  Pain Score: 0    Pain management: adequate     Mental Status:  Awake   Hydration Status:  Stable   PONV Controlled:  None   Airway Patency:  Patent   Post Op Vitals Reviewed: Yes      Staff: Anesthesiologist, CRNA           BP   142/77   Temp   97 9   Pulse  91   Resp   16   SpO2   99
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
## 2019-08-27 NOTE — RESPIRATORY THERAPY NOTE
RT Protocol Note  Nickie Evans 70 y o  female MRN: [id number]  [facility] En[facility]: [id number]    Assessment    Principal Problem:    Thyroid nodule      Home Pulmonary Medications:  na  Home Devices/Therapy: (P) BiPAP/CPAP    Past Medical History:   Diagnosis Date    A-fib ([facility] )     Arthritis     Atrial fibrillation ([facility] )     History of transfusion     35 years ago    Hypertension     mass right kidney     Renal cell adenocarcinoma ([place] [id number] )     RIGHT    Sleep apnea      Social History     Socioeconomic History    Marital status:       Spouse name: None    Number of children: None    Years of education: None    Highest education level: None   Occupational History    None   Social Needs    Financial resource strain: None    Food insecurity:     Worry: None     Inability: None    Transportation needs:     Medical: None     Non-medical: None   Tobacco Use    Smoking status: Never Smoker    Smokeless tobacco: Never Used   Substance and Sexual Activity    Alcohol use: Not Currently     Alcohol/week: 0 0 standard drinks     Frequency: Never     Drinks per session: Patient refused     Binge frequency: Never     Comment: 0    Drug use: No    Sexual activity: None   Lifestyle    Physical activity:     Days per week: None     Minutes per session: None    Stress: None   Relationships    Social connections:     Talks on phone: None     Gets together: None     Attends Nondenominational service: None     Active member of club or organization: None     Attends meetings of clubs or organizations: None     Relationship status: None    Intimate partner violence:     Fear of current or ex partner: None     Emotionally abused: None     Physically abused: None     Forced sexual activity: None   Other Topics Concern    None   Social History Narrative    ** Merged History Encounter **            Subjective         Objective    Physical Exam:   Assessment Type: (P) Assess only  General Appearance: (P) Alert, Awake  Respiratory Pattern: (P) Normal  Chest Assessment: (P) Chest expansion symmetrical  Bilateral Breath Sounds: (P) Clear  Cough: (P) None  O2 Device: (P) na    Vitals:  Blood pressure 145/78, pulse 82, temperature 98 1 °F (36 7 °C), temperature source Oral, resp  rate 18, height 5' 5" (1 651 m), weight 121 kg (266 lb 5 1 oz), SpO2 95 %  Imaging and other studies: I have personally reviewed pertinent reports        O2 Device: (P) na     Plan    Respiratory Plan: (P) No distress/Pulmonary history, Discontinue Protocol        Resp Comments: (P) per pt, no smoking hx, no resp meds at home, pmhx of GILDARDO, pt wears cpap at hs, breath sounds clear, no indication for udn/mdi therapy, cxr (7/31/19) shows clear lungs, d/c'd resp protocol

## 2019-08-27 NOTE — RESTORATIVE TECHNICIAN NOTE
Restorative Specialist Mobility Note       Activity: Ambulate in [place], Ambulate in room, Bathroom privileges, Chair, Dangle, Stand at bedside(Educated/encouraged pt to ambulate with assistance 3-4 x's/day   Pt callbell, phone/tray within reach )     Assistive Device: None    Stalin MAURICIO, Restorative Technician, [employer]

## 2019-08-27 NOTE — PROGRESS NOTES
Post OP Check - Reyna Goodman 1950, 70 y o  female MRN: [id number]    Unit/Bed#: [facility] 917-01 Encounter: [id number]    Primary Care Provider: Regine Allison DO   Date and time admitted to hospital: 8/27/2019  5:22 AM        * Thyroid nodule  Assessment & Plan  S/p L hemithyroidectomy    Regular diet  Lovenox - will restart coumadin tomorrow  Prn pain control  Ice to neck  OOB/ambulate            Subjective/Objective   Chief Complaint:     Subjective: DOUG  Feels well after surgery  Pain controlled  No changes in her voice  Objective:     Blood pressure 141/86, pulse 87, temperature 98 1 °F (36 7 °C), temperature source Oral, resp  rate 18, height 5' 5" (1 651 m), weight 121 kg (266 lb 5 1 oz), SpO2 95 %  ,Body mass index is 44 32 kg/m²  Intake/Output Summary (Last 24 hours) at 8/27/2019 1521  Last data filed at 8/27/2019 1429  Gross per 24 hour   Intake 2080 ml   Output 550 ml   Net 1530 ml       Invasive Devices     Peripheral Intravenous Line            Peripheral IV 08/27/19 Left Hand less than 1 day    Peripheral IV 08/27/19 Right Arm less than 1 day                Physical Exam: NAD  Atraumatic/normocephalic  AAOX3  Normal mood and affect  Normal respiratory effort  Soft, NT, ND  Skin warm/dry  Cap refil <2 sec  Neck incision c/d/i    Lab, Imaging and other studies:I have personally reviewed pertinent lab results    , CBC: No results found for: WBC, HGB, HCT, MCV, PLT, ADJUSTEDWBC, MCH, MCHC, RDW, MPV, NRBC, CMP: No results found for: SODIUM, K, CL, CO2, ANIONGAP, BUN, CREATININE, GLUCOSE, CALCIUM, AST, ALT, ALKPHOS, PROT, BILITOT, EGFR  VTE Pharmacologic Prophylaxis: Enoxaparin (Lovenox)  VTE Mechanical Prophylaxis: sequential compression device

## 2019-08-27 NOTE — ASSESSMENT & PLAN NOTE
S/p L hemithyroidectomy    Regular diet  Lovenox - will restart coumadin tomorrow  Prn pain control  Ice to neck  OOB/ambulate

## 2019-08-28 VITALS
HEART RATE: 77 BPM | DIASTOLIC BLOOD PRESSURE: 78 MMHG | BODY MASS INDEX: 44.47 KG/M2 | RESPIRATION RATE: 20 BRPM | HEIGHT: 65 IN | SYSTOLIC BLOOD PRESSURE: 165 MMHG | WEIGHT: 266.89 LBS | OXYGEN SATURATION: 96 % | TEMPERATURE: 97.9 F

## 2019-08-28 LAB
ANION GAP SERPL CALCULATED.3IONS-SCNC: 6 MMOL/L (ref 4–13)
BASOPHILS # BLD AUTO: 0.01 THOUSANDS/ΜL (ref 0–0.1)
BASOPHILS NFR BLD AUTO: 0 % (ref 0–1)
BUN SERPL-MCNC: 16 MG/DL (ref 5–25)
CALCIUM SERPL-MCNC: 8.6 MG/DL (ref 8.3–10.1)
CHLORIDE SERPL-SCNC: 107 MMOL/L (ref 100–108)
CO2 SERPL-SCNC: 29 MMOL/L (ref 21–32)
CREAT SERPL-MCNC: 0.77 MG/DL (ref 0.6–1.3)
EOSINOPHIL # BLD AUTO: 0 THOUSAND/ΜL (ref 0–0.61)
EOSINOPHIL NFR BLD AUTO: 0 % (ref 0–6)
ERYTHROCYTE [DISTWIDTH] IN BLOOD BY AUTOMATED COUNT: 14.1 % (ref 11.6–15.1)
GFR SERPL CREATININE-BSD FRML MDRD: 79 ML/MIN/1.73SQ M
GLUCOSE SERPL-MCNC: 134 MG/DL (ref 65–140)
HCT VFR BLD AUTO: 39.7 % (ref 34.8–46.1)
HGB BLD-MCNC: 12.3 G/DL (ref 11.5–15.4)
IMM GRANULOCYTES # BLD AUTO: 0.06 THOUSAND/UL (ref 0–0.2)
IMM GRANULOCYTES NFR BLD AUTO: 1 % (ref 0–2)
INR PPP: 1.07 (ref 0.84–1.19)
LYMPHOCYTES # BLD AUTO: 0.54 THOUSANDS/ΜL (ref 0.6–4.47)
LYMPHOCYTES NFR BLD AUTO: 5 % (ref 14–44)
MCH RBC QN AUTO: 28.7 PG (ref 26.8–34.3)
MCHC RBC AUTO-ENTMCNC: 31 G/DL (ref 31.4–37.4)
MCV RBC AUTO: 93 FL (ref 82–98)
MONOCYTES # BLD AUTO: 0.71 THOUSAND/ΜL (ref 0.17–1.22)
MONOCYTES NFR BLD AUTO: 7 % (ref 4–12)
NEUTROPHILS # BLD AUTO: 9.08 THOUSANDS/ΜL (ref 1.85–7.62)
NEUTS SEG NFR BLD AUTO: 87 % (ref 43–75)
NRBC BLD AUTO-RTO: 0 /100 WBCS
PLATELET # BLD AUTO: 252 THOUSANDS/UL (ref 149–390)
PMV BLD AUTO: 10 FL (ref 8.9–12.7)
POTASSIUM SERPL-SCNC: 4.3 MMOL/L (ref 3.5–5.3)
PROTHROMBIN TIME: 13.5 SECONDS (ref 11.6–14.5)
RBC # BLD AUTO: 4.29 MILLION/UL (ref 3.81–5.12)
SODIUM SERPL-SCNC: 142 MMOL/L (ref 136–145)
WBC # BLD AUTO: 10.4 THOUSAND/UL (ref 4.31–10.16)

## 2019-08-28 PROCEDURE — 99024 POSTOP FOLLOW-UP VISIT: CPT | Performed by: SURGERY

## 2019-08-28 PROCEDURE — 85025 COMPLETE CBC W/AUTO DIFF WBC: CPT | Performed by: STUDENT IN AN ORGANIZED HEALTH CARE EDUCATION/TRAINING PROGRAM

## 2019-08-28 PROCEDURE — NC001 PR NO CHARGE: Performed by: SURGERY

## 2019-08-28 PROCEDURE — 80048 BASIC METABOLIC PNL TOTAL CA: CPT | Performed by: STUDENT IN AN ORGANIZED HEALTH CARE EDUCATION/TRAINING PROGRAM

## 2019-08-28 PROCEDURE — 85610 PROTHROMBIN TIME: CPT | Performed by: STUDENT IN AN ORGANIZED HEALTH CARE EDUCATION/TRAINING PROGRAM

## 2019-08-28 RX ORDER — TRAMADOL HYDROCHLORIDE 50 MG/1
50 TABLET ORAL EVERY 6 HOURS PRN
Qty: 20 TABLET | Refills: 0 | Status: SHIPPED | OUTPATIENT
Start: 2019-08-28 | End: 2019-09-02

## 2019-08-28 RX ADMIN — FLUOXETINE 20 MG: 20 CAPSULE ORAL at 08:33

## 2019-08-28 RX ADMIN — ENOXAPARIN SODIUM 40 MG: 40 INJECTION SUBCUTANEOUS at 08:33

## 2019-08-28 RX ADMIN — DILTIAZEM HYDROCHLORIDE 120 MG: 60 TABLET, FILM COATED ORAL at 08:33

## 2019-08-28 RX ADMIN — OXYBUTYNIN CHLORIDE 5 MG: 5 TABLET, EXTENDED RELEASE ORAL at 08:33
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
## 2019-08-28 NOTE — ASSESSMENT & PLAN NOTE
S/p L hemithyroidectomy    Regular diet  Lovenox - will restart coumadin today  Prn pain control  OOB/ambulate  D/c home today

## 2019-08-28 NOTE — DISCHARGE INSTRUCTIONS
Ok to shower normally, no baths/swimming  Do not drive while on narcotic pain medication  Ok to restart your coumadin today and continue Lovenox until your INR is therapeutic   PT/INR Monday 9/3/19 results to family MD      Partial Thyroidectomy   WHAT YOU NEED TO KNOW:   A partial thyroidectomy is the removal of part of your thyroid  Your thyroid is a gland in the front lower part of your neck  The thyroid makes hormones that regulate your metabolism, body temperature, and heart rate  Smaller glands called parathyroids regulate the level of calcium in your blood  You have 4 parathyroids, located on the sides of your thyroid gland  Your parathyroids will not be removed during this surgery  DISCHARGE INSTRUCTIONS:   Medicines: The following medicines may  be ordered by your healthcare provider:  · Pain medicine  can help take away or decrease pain  Do not wait until the pain is severe before you take your medicine  · Thyroid medicine  may be given if your thyroid hormone level is too low  · Take your medicine as directed  Contact your healthcare provider if you think your medicine is not helping or if you have side effects  Tell him or her if you are allergic to any medicine  Keep a list of the medicines, vitamins, and herbs you take  Include the amounts, and when and why you take them  Bring the list or the pill bottles to follow-up visits  Carry your medicine list with you in case of an emergency  Follow up with your endocrinologist or surgeon as directed: You will need to return to have your wound checked and stitches removed  You may also need blood tests to monitor your calcium, parathyroid, and thyroid hormone levels  Write down your questions so you remember to ask them during your visits  Self-care:   · Rest when you need to while you heal after surgery  Slowly start to do more each day  Return to your daily activities as directed       · Wound care:  Carefully wash your skin near the incision wound area with soap and water  Dry the area and put on new, clean bandages as directed  Change your bandages when they get wet or dirty  You can use a mild body lotion to improve the scar  · Swallowing and voice changes: You may have a sore throat, hoarse voice, or difficulty swallowing after surgery  These symptoms should go away after a few days  · Supplements:  Ask your endocrinologist if you need to take calcium or vitamin D  Ask how much to take and how often to take it  Contact your endocrinologist or surgeon if:   · You have a fever or chills  · You feel very tired and cold, gain weight for no reason, and your skin is very dry  · You vomit several times in a row  · Your incision is red, swollen, or draining pus  · You have new voice weakness or hoarseness, or it is getting worse  · You have questions or concerns about your condition or care  Seek care immediately or call 911 if:   · You have sudden tingling or muscle cramps in your face, arm, or leg  · You have muscle spasms in your legs and feet that do not go away  · Blood soaks through your bandage  · Your stitches come apart  · You have sudden swelling in your neck or difficulty swallowing  · You have trouble breathing  · You have a seizure  © 2017 [employer]  Information is for End User's use only and may not be sold, redistributed or otherwise used for commercial purposes  All illustrations and images included in CareNotes® are the copyrighted property of [employer]  or [employer]rol[employer]  The above information is an  only  It is not intended as medical advice for individual conditions or treatments  Talk to your doctor, nurse or pharmacist before following any medical regimen to see if it is safe and effective for you

## 2019-08-28 NOTE — NURSING NOTE
D/c instructions reviewed  Pt received Tramadol prescription & work excuse  Pt already has Lovenox at home and knows how to use it  No questions at this time  Pt ambulating off unit with daughter for discharge

## 2019-08-28 NOTE — PROGRESS NOTES
Progress Note - Tiana Calabrese 1950, 70 y o  female MRN: [id number]    Unit/Bed#: [facility] 917-01 Encounter: [id number]    Primary Care Provider: Azeem Cooney DO   Date and time admitted to hospital: 8/27/2019  5:22 AM        * Thyroid nodule  Assessment & Plan  S/p L hemithyroidectomy    Regular diet  Lovenox - will restart coumadin today  Prn pain control  OOB/ambulate  D/c home today             Subjective/Objective   Chief Complaint:     Subjective: DOUG  Feels well, no N/V/F/C  Tolerating PO  No changes in her voice  Ambulating  Objective:     Blood pressure 155/89, pulse 75, temperature 98 1 °F (36 7 °C), temperature source Oral, resp  rate 20, height 5' 5" (1 651 m), weight 121 kg (266 lb 5 1 oz), SpO2 98 %  ,Body mass index is 44 32 kg/m²  Intake/Output Summary (Last 24 hours) at 8/28/2019 7982  Last data filed at 8/28/2019 0300  Gross per 24 hour   Intake 2690 ml   Output 2850 ml   Net -160 ml       Invasive Devices     Peripheral Intravenous Line            Peripheral IV 08/27/19 Left Hand less than 1 day    Peripheral IV 08/27/19 Right Arm less than 1 day                Physical Exam: NAD  Atraumatic/normocephalic  AAOX3  Normal mood and affect  Normal respiratory effort  Soft, NT, ND  Skin warm/dry  Cap refil <2 sec  Neck incision c/d/i    Lab, Imaging and other studies:I have personally reviewed pertinent lab results    , CBC: No results found for: WBC, HGB, HCT, MCV, PLT, ADJUSTEDWBC, MCH, MCHC, RDW, MPV, NRBC, CMP: No results found for: SODIUM, K, CL, CO2, ANIONGAP, BUN, CREATININE, GLUCOSE, CALCIUM, AST, ALT, ALKPHOS, PROT, BILITOT, EGFR  VTE Pharmacologic Prophylaxis: Enoxaparin (Lovenox)  VTE Mechanical Prophylaxis: sequential compression device
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
## 2019-08-28 NOTE — PLAN OF CARE
Problem: Potential for Falls  Goal: Patient will remain free of falls  Description  INTERVENTIONS:  - Assess patient frequently for physical needs  -  Identify cognitive and physical deficits and behaviors that affect risk of falls    -  [place] fall precautions as indicated by assessment   - Educate patient/family on patient safety including physical limitations  - Instruct patient to call for assistance with activity based on assessment  - Modify environment to reduce risk of injury  - Consider OT/PT consult to assist with strengthening/mobility  Outcome: Progressing     Problem: PAIN - ADULT  Goal: Verbalizes/displays adequate comfort level or baseline comfort level  Description  Interventions:  - Encourage patient to monitor pain and request assistance  - Assess pain using appropriate pain scale  - Administer analgesics based on type and severity of pain and evaluate response  - Implement non-pharmacological measures as appropriate and evaluate response  - Consider cultural and social influences on pain and pain management  - Notify physician/advanced practitioner if interventions unsuccessful or patient reports new pain  Outcome: Progressing     Problem: DISCHARGE PLANNING  Goal: Discharge to home or other facility with appropriate resources  Description  INTERVENTIONS:  - Identify barriers to discharge w/patient and caregiver  - Arrange for needed discharge resources and transportation as appropriate  - Identify discharge learning needs (meds, wound care, etc )  - Arrange for interpretive services to assist at discharge as needed  - Refer to Case Management Department for coordinating discharge planning if the patient needs post-hospital services based on physician/advanced practitioner order or complex needs related to functional status, cognitive ability, or social support system  Outcome: Progressing

## 2019-08-30 ENCOUNTER — CLINICAL SUPPORT (OUTPATIENT)
Dept: PAIN MEDICINE | Facility: CLINIC | Age: 69
End: 2019-08-30
Payer: MEDICARE

## 2019-08-30 VITALS
WEIGHT: 268 LBS | HEIGHT: 65 IN | HEART RATE: 87 BPM | BODY MASS INDEX: 44.65 KG/M2 | SYSTOLIC BLOOD PRESSURE: 153 MMHG | DIASTOLIC BLOOD PRESSURE: 89 MMHG

## 2019-08-30 DIAGNOSIS — M47.812 CERVICAL SPONDYLOSIS WITHOUT MYELOPATHY: Primary | ICD-10-CM

## 2019-08-30 PROCEDURE — 99213 OFFICE O/P EST LOW 20 MIN: CPT | Performed by: ANESTHESIOLOGY

## 2019-08-30 RX ORDER — OXYBUTYNIN CHLORIDE 5 MG/1
TABLET ORAL
COMMUNITY
Start: 2019-08-19 | End: 2019-08-30

## 2019-08-30 NOTE — PROGRESS NOTES
Assessment  1  Cervical spondylosis without myelopathy        Plan  21-year-old female returning for follow-up of chronic right-sided neck pain secondary to cervical spondylosis  She is status post right C2-3, C3-4, and C4-5 facet joint RFA with essentially 90% relief of her neck pain  She is quite satisfied with the amount of relief she is getting  She is taking Tylenol on a very sparing basis at this point  She is status post left lobe thyroidectomy completed August 27, 2019  The patient is doing quite well from this  I have explained to the patient that if the patient's neck pain returns we could always repeat the procedure every 6 months as needed  The patient verbalized understanding  At this point she will follow up on a p r n  Basis  My impressions and treatment recommendations were discussed in detail with the patient who verbalized understanding and had no further questions  Discharge instructions were provided  I personally saw and examined the patient and I agree with the above discussed plan of care  No orders of the defined types were placed in this encounter  No orders of the defined types were placed in this encounter  History of Present Illness    Norma Kumari is a 70 y o  female returning for follow-up of right-sided neck pain  She denies any symptoms radiating into her upper extremities  The patient is status post right C2-3, C3-4, and C4-5 facet joint RFA with approximately 90% relief of her right-sided neck pain  She does still have a little bit of stiffness, however this is quite tolerable  She is extremely satisfied with the results of RFA  The patient rates her pain as 0/10 and the pain is worse at night  The pain is occasional and described as shooting  The pain is worse with bending and twisting her neck  The pain is alleviated with relaxation  She does take Tylenol p r n  On a very sparing basis with mild to moderate relief      I have personally reviewed and/or updated the patient's past medical history, past surgical history, family history, social history, current medications, allergies, and vital signs today  Other than as stated above, the patient denies any interval changes in medications, medical condition, mental condition, symptoms, or allergies since the last office visit  Review of Systems   Constitutional: Negative for fever and unexpected weight change  HENT: Negative for trouble swallowing  Eyes: Negative for visual disturbance  Respiratory: Negative for shortness of breath and wheezing  Cardiovascular: Negative for chest pain and palpitations  Gastrointestinal: Negative for constipation, diarrhea, nausea and vomiting  Endocrine: Negative for cold intolerance, heat intolerance and polydipsia  Genitourinary: Negative for difficulty urinating and frequency  Musculoskeletal: Positive for gait problem and joint swelling  Negative for arthralgias and myalgias  Skin: Negative for rash  Neurological: Negative for dizziness, seizures, syncope, weakness and headaches  Hematological: Does not bruise/bleed easily  Psychiatric/Behavioral: Negative for dysphoric mood  All other systems reviewed and are negative        Patient Active Problem List   Diagnosis    Closed fracture of right distal radius and ulna    Malignant tumor of right kidney parenchyma (HCC)    Primary osteoarthritis of left knee    Primary osteoarthritis of right knee    Chronic pain of left knee    Myalgia    Chest wall tenderness    Chronic heart failure (HCC)    Chronic atrial fibrillation (HCC)    Thyroid nodule    Cervical spondylosis without myelopathy    Arthropathy of cervical facet joint    Occipital neuralgia of right side       Past Medical History:   Diagnosis Date    [facility]     Arthritis     [facility]     History of transfusion     35 years ago    Hypertension     mass right kidney     Renal cell adenocarcinoma ([address] )     RIGHT    Sleep apnea        Past Surgical History:   Procedure Laterality Date    CHOLECYSTECTOMY      COLOSTOMY      CRYOABLATION Right     RT KIDNEY    GASTRIC BYPASS      HYSTERECTOMY      NM OPEN RX DISTAL RADIUS FX, EXTRA-ARTICULAR Right 4/22/2016    Procedure: OPEN REDUCTION INTERNAL FIXATION RIGHT DISTAL RADIUS;  Surgeon: Laura Cook MD;  [facility];  Service: Orthopedics    REVISION COLOSTOMY      THYROID LOBECTOMY Left 8/27/2019    Procedure: LOBECTOMY THYROID, left;  Surgeon: Barbie Padilla MD;  Location[facility];  Service: Surgical Oncology    TUBAL LIGATION      US GUIDED THYROID BIOPSY  2/27/2019    US GUIDED THYROID BIOPSY  5/29/2019       Family History   Problem Relation Age of Onset    Heart attack Mother     Lung cancer Father 76    Stroke Sister     Lung cancer Sister 64    Prostate cancer Brother 61       Social History     Occupational History    Not on file   Tobacco Use    Smoking status: Never Smoker    Smokeless tobacco: Never Used   Substance and Sexual Activity    Alcohol use: Not Currently     Alcohol/week: 0 0 standard drinks     Frequency: Never     Drinks per session: Patient refused     Binge frequency: Never     Comment: 0    Drug use: No    Sexual activity: Not on file       Current Outpatient Medications on File Prior to Visit   Medication Sig    acetaminophen (TYLENOL) 325 mg tablet Take 975 mg by mouth every 6 (six) hours as needed    diltiazem (CARDIZEM) 120 MG tablet Take 120 mg by mouth 2 (two) times a day   enoxaparin (LOVENOX) 60 mg/0 6 mL Inject 0 6 mL (60 mg total) under the skin daily for 7 doses    ergocalciferol (VITAMIN D2) 50,000 units Take 50,000 Units by mouth once a week    FLUoxetine (PROzac) 20 mg capsule Take 20 mg by mouth 2 (two) times a day      LORazepam (ATIVAN) 1 mg tablet Take 1 mg by mouth daily at bedtime as needed for anxiety      oxybutynin (DITROPAN-XL) 5 mg 24 hr tablet Take 5 mg by mouth 2 (two) times a day     traMADol (ULTRAM) 50 mg tablet Take 1 tablet (50 mg total) by mouth every 6 (six) hours as needed for moderate pain for up to 5 days    warfarin (COUMADIN) 1 mg tablet Per pt sometimes she takes 6mg daily sometimes--per pt she does her own bw at home every 2 weeks   warfarin (COUMADIN) 5 mg tablet Take 5 mg by mouth daily     [DISCONTINUED] oxybutynin (DITROPAN) 5 mg tablet      No current facility-administered medications on file prior to visit  No Known Allergies    Physical Exam    /89   Pulse 87   Ht 5' 5" (1 651 m)   Wt 122 kg (268 lb)   BMI 44 60 kg/m²     Constitutional: obese  Eyes: anicteric  HEENT: grossly intact  Neck: supple, symmetric, trachea midline and no masses   Pulmonary:even and unlabored  Cardiovascular:No edema or pitting edema present  Skin:Normal without rashes or lesions and well hydrated  Healing thyroid lobectomy incision noted  No signs of drainage or bleeding  Psychiatric:Mood and affect appropriate  Neurologic:Cranial Nerves II-XII grossly intact  Musculoskeletal:normal gait  Minimal tenderness to palpation of the right cervical paraspinal musculature  Bilateral upper extremity strength 5/5 in all muscle groups  Sensation intact to light touch in C5 through T1 dermatomes bilaterally  Negative Spurling's bilaterally      Imaging  Imaging reviewed

## 2019-09-12 PROBLEM — E89.0 STATUS POST PARTIAL THYROIDECTOMY: Status: ACTIVE | Noted: 2019-09-12

## 2019-09-16 ENCOUNTER — OFFICE VISIT (OUTPATIENT)
Dept: SURGICAL ONCOLOGY | Facility: CLINIC | Age: 69
End: 2019-09-16

## 2019-09-16 VITALS
DIASTOLIC BLOOD PRESSURE: 78 MMHG | TEMPERATURE: 98.5 F | BODY MASS INDEX: 45.15 KG/M2 | WEIGHT: 271 LBS | HEIGHT: 65 IN | HEART RATE: 71 BPM | RESPIRATION RATE: 16 BRPM | SYSTOLIC BLOOD PRESSURE: 132 MMHG

## 2019-09-16 DIAGNOSIS — E89.0 STATUS POST PARTIAL THYROIDECTOMY: Primary | ICD-10-CM

## 2019-09-16 PROCEDURE — 99024 POSTOP FOLLOW-UP VISIT: CPT | Performed by: SURGERY

## 2019-09-16 NOTE — LETTER
September 16, 2019     [place] [address]    Patient: Nickie Evans   YOB: 1950   Date of Visit: 9/16/2019       Dear Dr Maurisio Fuentes:    Thank you for referring Shaina Crum to me for evaluation  Below are my notes for this consultation  If you have questions, please do not hesitate to call me  I look forward to following your patient along with you  Sincerely,        Diaz Myers MD        CC: MD Cassandra Bauer MD Juluis Sprinkle, DO Junior Curtis, CRNP Elroy Frank, MD  9/16/2019  8:27 AM  Sign at close encounter     Surgical Oncology Follow Up       [address] [facility] [address] [address]  [facility]  1950  [id number]  [facility]  [address] [address]  [phone]    Chief Complaint   Patient presents with    Post-op     Post-op left ethan-thyroidectomy  Assessment/Plan:    No problem-specific Assessment & Plan notes found for this encounter  Diagnoses and all orders for this visit:    Status post partial thyroidectomy  -     Thyroid Panel With TSH; Future        Advance Care Planning/Advance Directives:  Discussed disease status, cancer treatment plans and/or cancer treatment goals with the patient  No history exists  History of Present Illness:  Patient is a 65-year-old woman here for a postop check status post left hemithyroidectomy  She is doing well has no complaints to report  She did well after her surgery   -Interval History:  She feels a little sore towards the back of her neck and shoulders, but otherwise feels well  Review of Systems:  Review of Systems   Constitutional: Negative  HENT: Negative  Negative for sore throat, trouble swallowing and voice change      Musculoskeletal: Positive for arthralgias and myalgias  Patient Active Problem List   Diagnosis    Closed fracture of right distal radius and ulna    Malignant tumor of right kidney parenchyma (HCC)    Primary osteoarthritis of left knee    Primary osteoarthritis of right knee    Chronic pain of left knee    Myalgia    Chest wall tenderness    Chronic heart failure (HCC)    Chronic atrial fibrillation (HCC)    Thyroid nodule    Cervical spondylosis without myelopathy    Arthropathy of cervical facet joint    Occipital neuralgia of right side    Status post partial thyroidectomy     Past Medical History:   Diagnosis Date    [facility]fib [facility]     Arthritis     Atrial fibrillation ([facility]     History of transfusion     35 years ago    Hypertension     mass right kidney     Renal cell adenocarcinoma ([facility] )     RIGHT    Sleep apnea      Past Surgical History:   Procedure Laterality Date    CHOLECYSTECTOMY      COLOSTOMY      CRYOABLATION Right     RT KIDNEY    GASTRIC BYPASS      HYSTERECTOMY      CO OPEN RX DISTAL RADIUS FX, EXTRA-ARTICULAR Right 4/22/2016    Procedure: OPEN REDUCTION INTERNAL FIXATION RIGHT DISTAL RADIUS;  Surgeon: Vicente Villagran MD;  [facility];  Service: Orthopedics    REVISION COLOSTOMY      THYROID LOBECTOMY Left 8/27/2019    Procedure: LOBECTOMY THYROID, left;  Surgeon: Todd Ria MD;  [facility];  Service: Surgical Oncology    TUBAL LIGATION      US GUIDED THYROID BIOPSY  2/27/2019    US GUIDED THYROID BIOPSY  5/29/2019     Family History   Problem Relation Age of Onset    Heart attack Mother     Lung cancer Father 76    Stroke Sister     Lung cancer Sister 64    Prostate cancer Brother 61     Social History     Socioeconomic History    Marital status:       Spouse name: Not on file    Number of children: Not on file    Years of education: Not on file    Highest education level: Not on file   Occupational History    Not on file   Social Needs    Financial resource strain: Not on file    Food insecurity:     Worry: Not on file     Inability: Not on file    Transportation needs:     Medical: Not on file     Non-medical: Not on file   Tobacco Use    Smoking status: Never Smoker    Smokeless tobacco: Never Used   Substance and Sexual Activity    Alcohol use: Not Currently     Alcohol/week: 0 0 standard drinks     Frequency: Never     Drinks per session: Patient refused     Binge frequency: Never     Comment: 0    Drug use: No    Sexual activity: Not on file   Lifestyle    Physical activity:     Days per week: Not on file     Minutes per session: Not on file    Stress: Not on file   Relationships    Social connections:     Talks on phone: Not on file     Gets together: Not on file     Attends [employer] service: Not on file     Active member of club or organization: Not on file     Attends meetings of clubs or organizations: Not on file     Relationship status: Not on file    Intimate partner violence:     Fear of current or ex partner: Not on file     Emotionally abused: Not on file     Physically abused: Not on file     Forced sexual activity: Not on file   Other Topics Concern    Not on file   Social History Narrative    ** Merged History Encounter **            Current Outpatient Medications:     acetaminophen (TYLENOL) 325 mg tablet, Take 975 mg by mouth every 6 (six) hours as needed, Disp: , Rfl:     diltiazem (CARDIZEM) 120 MG tablet, Take 120 mg by mouth 2 (two) times a day , Disp: , Rfl:     ergocalciferol (VITAMIN D2) 50,000 units, Take 50,000 Units by mouth once a week, Disp: , Rfl:     LORazepam (ATIVAN) 1 mg tablet, Take 1 mg by mouth daily at bedtime as needed for anxiety  , Disp: , Rfl:     oxybutynin (DITROPAN-XL) 5 mg 24 hr tablet, Take 5 mg by mouth 2 (two) times a day , Disp: , Rfl:     warfarin (COUMADIN) 1 mg tablet, Per pt sometimes she takes 6mg daily sometimes--per pt she does her own bw at home every 2 weeks  , Disp: , Rfl:     warfarin (COUMADIN) 5 mg tablet, Take 5 mg by mouth daily , Disp: , Rfl:     enoxaparin (LOVENOX) 60 mg/0 6 mL, Inject 0 6 mL (60 mg total) under the skin daily for 7 doses, Disp: 7 Syringe, Rfl: 0    FLUoxetine (PROzac) 20 mg capsule, Take 20 mg by mouth 2 (two) times a day  , Disp: , Rfl:   No Known Allergies  Vitals:    09/16/19 0803   BP: 132/78   Pulse: 71   Resp: 16   Temp: 98 5 °F (36 9 °C)       Physical Exam   Neck:   Incision clean dry intact         Results:  Labs:  Case Report   Surgical Pathology Report                         Case: [id number]                                    Authorizing Provider: Alin Myrick MD        Collected:           08/27/2019 0914               Ordering Location:     [facility]      Received:            08/27/2019 [facility]                                      Hospital Operating Room                                                       Pathologist:           Alyson Culver MD                                                                  Specimen:    Thyroid, Left, left thyroid lobe                                                           Final Diagnosis   A  Left thyroid, lobectomy:  - 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  - Parathyroid tissue  - Prior biopsy site changes  - One lymph node, negative for malignancy (0/1)     Comment: Immunohistochemistry for PTH is performed on tissue block A11 to help in the assessment of this case          Electronically signed by Alyson Culver MD on 9/4/2019 at  3:48 PM         Imaging  No results found  I reviewed the above laboratory and imaging data  Discussion/Summary:  18-year-old woman with left thyroid nodule, status post left thyroid lobectomy  No evidence of malignancy  Plan on follow-up in 6 months to obtain TSH panel to assess function remaining gland  All questions answered  Copy of pathology report given to patient for her records

## 2019-09-16 NOTE — PROGRESS NOTES
Surgical Oncology Follow Up       [address]  [address] [facility] [address]  [facility] [address]  [phone]    Rayaji Nita  1950  [id number]  [address] [facility] CAN[facility] [address] [id number] [phone]    Chief Complaint   Patient presents with    Post-op     Post-op left ethan-thyroidectomy  Assessment/Plan:    No problem-specific Assessment & Plan notes found for this encounter  Diagnoses and all orders for this visit:    Status post partial thyroidectomy  -     Thyroid Panel With TSH; Future        Advance Care Planning/Advance Directives:  Discussed disease status, cancer treatment plans and/or cancer treatment goals with the patient  No history exists  History of Present Illness:  Patient is a 41-year-old woman here for a postop check status post left hemithyroidectomy  She is doing well has no complaints to report  She did well after her surgery   -Interval History:  She feels a little sore towards the back of her neck and shoulders, but otherwise feels well  Review of Systems:  Review of Systems   Constitutional: Negative  HENT: Negative  Negative for sore throat, trouble swallowing and voice change  Musculoskeletal: Positive for arthralgias and myalgias         Patient Active Problem List   Diagnosis    Closed fracture of right distal radius and ulna    Malignant tumor of right kidney parenchyma (HCC)    Primary osteoarthritis of left knee    Primary osteoarthritis of right knee    Chronic pain of left knee    Myalgia    Chest wall tenderness    Chronic heart failure (HCC)    Chronic atrial fibrillation (HCC)    Thyroid nodule    Cervical spondylosis without myelopathy    Arthropathy of cervical facet joint    Occipital neuralgia of right side    Status post partial thyroidectomy     Past Medical History: Diagnosis Date    A-fib [facility])     Arthritis     Atrial fibrillation ([address]     History of transfusion     35 years ago    Hypertension     mass right kidney     Renal cell adenocarcinoma (HCC)     RIGHT    Sleep apnea      Past Surgical History:   Procedure Laterality Date    CHOLECYSTECTOMY      COLOSTOMY      CRYOABLATION Right     RT KIDNEY    GASTRIC BYPASS      HYSTERECTOMY      TX OPEN RX DISTAL RADIUS FX, EXTRA-ARTICULAR Right 4/22/2016    Procedure: OPEN REDUCTION INTERNAL FIXATION RIGHT DISTAL RADIUS;  Surgeon: Gerson Meléndez MD;  [facility];  Service: Orthopedics    REVISION COLOSTOMY      THYROID LOBECTOMY Left 8/27/2019    Procedure: LOBECTOMY THYROID, left;  Surgeon: Lucy Moreno MD;  [facility];  Service: Surgical Oncology    TUBAL LIGATION      US GUIDED THYROID BIOPSY  2/27/2019    US GUIDED THYROID BIOPSY  5/29/2019     Family History   Problem Relation Age of Onset    Heart attack Mother     Lung cancer Father 76    Stroke Sister     Lung cancer Sister 64    Prostate cancer Brother 61     Social History     Socioeconomic History    Marital status:       Spouse name: Not on file    Number of children: Not on file    Years of education: Not on file    Highest education level: Not on file   Occupational History    Not on file   Social Needs    Financial resource strain: Not on file    Food insecurity:     Worry: Not on file     Inability: Not on file    Transportation needs:     Medical: Not on file     Non-medical: Not on file   Tobacco Use    Smoking status: Never Smoker    Smokeless tobacco: Never Used   Substance and Sexual Activity    Alcohol use: Not Currently     Alcohol/week: 0 0 standard drinks     Frequency: Never     Drinks per session: Patient refused     Binge frequency: Never     Comment: 0    Drug use: No    Sexual activity: Not on file   Lifestyle    Physical activity:     Days per week: Not on file     Minutes per session: Not on file    Stress: Not on file   Relationships    Social connections:     Talks on phone: Not on file     Gets together: Not on file     Attends Sabian[employer] service: Not on file     Active member of club or organization: Not on file     Attends meetings of clubs or organizations: Not on file     Relationship status: Not on file    Intimate partner violence:     Fear of current or ex partner: Not on file     Emotionally abused: Not on file     Physically abused: Not on file     Forced sexual activity: Not on file   Other Topics Concern    Not on file   Social History Narrative    ** Merged History Encounter **            Current Outpatient Medications:     acetaminophen (TYLENOL) 325 mg tablet, Take 975 mg by mouth every 6 (six) hours as needed, Disp: , Rfl:     diltiazem (CARDIZEM) 120 MG tablet, Take 120 mg by mouth 2 (two) times a day , Disp: , Rfl:     ergocalciferol (VITAMIN D2) 50,000 units, Take 50,000 Units by mouth once a week, Disp: , Rfl:     LORazepam (ATIVAN) 1 mg tablet, Take 1 mg by mouth daily at bedtime as needed for anxiety  , Disp: , Rfl:     oxybutynin (DITROPAN-XL) 5 mg 24 hr tablet, Take 5 mg by mouth 2 (two) times a day , Disp: , Rfl:     warfarin (COUMADIN) 1 mg tablet, Per pt sometimes she takes 6mg daily sometimes--per pt she does her own bw at home every 2 weeks  , Disp: , Rfl:     warfarin (COUMADIN) 5 mg tablet, Take 5 mg by mouth daily , Disp: , Rfl:     enoxaparin (LOVENOX) 60 mg/0 6 mL, Inject 0 6 mL (60 mg total) under the skin daily for 7 doses, Disp: 7 Syringe, Rfl: 0    FLUoxetine (PROzac) 20 mg capsule, Take 20 mg by mouth 2 (two) times a day  , Disp: , Rfl:   No Known Allergies  Vitals:    09/16/19 0803   BP: 132/78   Pulse: 71   Resp: 16   Temp: 98 5 °F (36 9 °C)       Physical Exam   Neck:   Incision clean dry intact         Results:  Labs:  Case Report   Surgical Pathology Report                         Case: [id number]                                 Authorizing Provider: Antonino Monreal MD        Collected:           08/27/2019 0914               Ordering Location:     [facility]      Received:            08/27/2019 1102                                      Hospital Operating Room                                                       Pathologist:           Melvi Holder MD                                                                  Specimen:    Thyroid, Left, left thyroid lobe                                                           Final Diagnosis   A  Left thyroid, lobectomy:  - 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  - Parathyroid tissue  - Prior biopsy site changes  - One lymph node, negative for malignancy (0/1)     Comment: Immunohistochemistry for PTH is performed on tissue block A11 to help in the assessment of this case          Electronically signed by Melvi Holder MD on 9/4/2019 at  3:48 PM         Imaging  No results found  I reviewed the above laboratory and imaging data  Discussion/Summary:  69-year-old woman with left thyroid nodule, status post left thyroid lobectomy  No evidence of malignancy  Plan on follow-up in 6 months to obtain TSH panel to assess function remaining gland  All questions answered  Copy of pathology report given to patient for her records

## 2019-09-22 ENCOUNTER — OFFICE VISIT (OUTPATIENT)
Dept: URGENT CARE | Age: 69
End: 2019-09-22
Payer: MEDICARE

## 2019-09-22 ENCOUNTER — TELEPHONE (OUTPATIENT)
Dept: URGENT CARE | Age: 69
End: 2019-09-22

## 2019-09-22 VITALS
DIASTOLIC BLOOD PRESSURE: 81 MMHG | TEMPERATURE: 97.4 F | HEART RATE: 84 BPM | SYSTOLIC BLOOD PRESSURE: 169 MMHG | OXYGEN SATURATION: 96 % | RESPIRATION RATE: 18 BRPM

## 2019-09-22 DIAGNOSIS — M54.2 NECK PAIN: Primary | ICD-10-CM

## 2019-09-22 PROCEDURE — G0463 HOSPITAL OUTPT CLINIC VISIT: HCPCS | Performed by: PHYSICIAN ASSISTANT

## 2019-09-22 PROCEDURE — 99203 OFFICE O/P NEW LOW 30 MIN: CPT | Performed by: PHYSICIAN ASSISTANT

## 2019-09-22 RX ORDER — METHOCARBAMOL 750 MG/1
750 TABLET, FILM COATED ORAL EVERY 6 HOURS PRN
Qty: 15 TABLET | Refills: 0 | Status: SHIPPED | OUTPATIENT
Start: 2019-09-22 | End: 2020-03-17

## 2019-09-22 RX ORDER — METAXALONE 800 MG/1
800 TABLET ORAL 3 TIMES DAILY
Qty: 20 TABLET | Refills: 0 | Status: SHIPPED | OUTPATIENT
Start: 2019-09-22 | End: 2020-03-17

## 2019-09-22 NOTE — TELEPHONE ENCOUNTER
Patient reports that insurance is not covering medication  Was asking for alternatives  Will switch medication from Skelaxin to Robaxin

## 2019-09-22 NOTE — PROGRESS NOTES
[facility]        NAME: Ashanti Castillo is a 70 y o  female  : 1950    MRN: [id number]  DATE: 2019  TIME: 1:00 PM    Assessment and Plan   Neck pain [M54 2]  1  Neck pain  metaxalone (SKELAXIN) 800 mg tablet         Patient Instructions     Take muscle relaxer's as directed  Continue use home medications as directed  Use Tylenol 1st line for pain control  If still having pain May use additional Advil  Follow up with PCP in 3-5 days  Proceed to  ER if symptoms worsen  Chief Complaint     Chief Complaint   Patient presents with    Neck Pain     neck pain and headache off and on for a week; recent surgery thinks it is caused by position of neck; History of Present Illness       67-year-old female presents with neck pain and intermittent headaches  Patient reports that ever since her surgery she has been having more issues with her neck  Patient does have a past medical history of some neck problems that she sees pain management for  However since surgery she has been having more neck discomfort and intermittent headaches  Denies any fevers or chills  No blurry vision double vision  Denies any ringing of the ears  No numbness or tingling into extremities  Neck Pain    This is a new problem  The current episode started 1 to 4 weeks ago  The problem occurs intermittently  The problem has been waxing and waning  Associated with: Laying an awkward position for a couple hours during surgery  Pain location: Posterior lateral neck  The quality of the pain is described as aching  The pain is moderate  The symptoms are aggravated by position  The pain is worse during the night  Stiffness is present in the morning  Pertinent negatives include no chest pain, fever, numbness, tingling, trouble swallowing, visual change or weakness  She has tried nothing for the symptoms  The treatment provided no relief  Review of Systems   Review of Systems   Constitutional: Negative  Negative for fever  HENT: Negative  Negative for trouble swallowing  Eyes: Negative  Respiratory: Negative  Cardiovascular: Negative  Negative for chest pain  Gastrointestinal: Negative  Musculoskeletal: Positive for neck pain  Skin: Negative  Neurological: Negative  Negative for tingling, weakness and numbness  Current Medications       Current Outpatient Medications:     acetaminophen (TYLENOL) 325 mg tablet, Take 975 mg by mouth every 6 (six) hours as needed, Disp: , Rfl:     diltiazem (CARDIZEM) 120 MG tablet, Take 120 mg by mouth 2 (two) times a day , Disp: , Rfl:     enoxaparin (LOVENOX) 60 mg/0 6 mL, Inject 0 6 mL (60 mg total) under the skin daily for 7 doses, Disp: 7 Syringe, Rfl: 0    ergocalciferol (VITAMIN D2) 50,000 units, Take 50,000 Units by mouth once a week, Disp: , Rfl:     FLUoxetine (PROzac) 20 mg capsule, Take 20 mg by mouth 2 (two) times a day  , Disp: , Rfl:     LORazepam (ATIVAN) 1 mg tablet, Take 1 mg by mouth daily at bedtime as needed for anxiety  , Disp: , Rfl:     metaxalone (SKELAXIN) 800 mg tablet, Take 1 tablet (800 mg total) by mouth 3 (three) times a day, Disp: 20 tablet, Rfl: 0    oxybutynin (DITROPAN-XL) 5 mg 24 hr tablet, Take 5 mg by mouth 2 (two) times a day , Disp: , Rfl:     warfarin (COUMADIN) 1 mg tablet, Per pt sometimes she takes 6mg daily sometimes--per pt she does her own bw at home every 2 weeks  , Disp: , Rfl:     warfarin (COUMADIN) 5 mg tablet, Take 5 mg by mouth daily , Disp: , Rfl:     Current Allergies     Allergies as of 09/22/2019    (No Known Allergies)            The following portions of the patient's history were reviewed and updated as appropriate: allergies, current medications, past family history, past medical history, past social history, past surgical history and problem list      Past Medical History:   Diagnosis Date    [facility] ([facility] )     Arthritis     Atrial fibrillation ([facility] )     History of transfusion 35 years ago    Hypertension     mass right kidney     Renal cell adenocarcinoma ([address] )     RIGHT    Sleep apnea        Past Surgical History:   Procedure Laterality Date    CHOLECYSTECTOMY      COLOSTOMY      CRYOABLATION Right     RT KIDNEY    GASTRIC BYPASS      HYSTERECTOMY      VA OPEN RX DISTAL RADIUS FX, EXTRA-ARTICULAR Right 4/22/2016    Procedure: OPEN REDUCTION INTERNAL FIXATION RIGHT DISTAL RADIUS;  Surgeon: Ariela Haywood MD;  [facility];  Service: Orthopedics    REVISION COLOSTOMY      THYROID LOBECTOMY Left 8/27/2019    Procedure: LOBECTOMY THYROID, left;  Surgeon: Bhaskar Cordero MD;  [facility];  Service: Surgical Oncology    TUBAL LIGATION      US GUIDED THYROID BIOPSY  2/27/2019    US GUIDED THYROID BIOPSY  5/29/2019       Family History   Problem Relation Age of Onset    Heart attack Mother     Lung cancer Father 76    Stroke Sister     Lung cancer Sister 64    Prostate cancer Brother 61         Medications have been verified  Objective   /81   Pulse 84   Temp (!) 97 4 °F (36 3 °C)   Resp 18   SpO2 96%        Physical Exam     Physical Exam   Constitutional: She is oriented to person, place, and time  She appears well-developed and well-nourished  No distress  HENT:   Head: Normocephalic and atraumatic  Right Ear: External ear normal    Left Ear: External ear normal    Nose: Nose normal    Mouth/Throat: Oropharynx is clear and moist  No oropharyngeal exudate  Eyes: Conjunctivae and EOM are normal  Right eye exhibits no discharge  Left eye exhibits no discharge  Neck: Trachea normal, normal range of motion and phonation normal  Neck supple  Muscular tenderness present  No spinous process tenderness present  No neck rigidity  No edema and normal range of motion present  No thyroid mass present  Cardiovascular: Normal rate, regular rhythm, normal heart sounds and intact distal pulses  No murmur heard    Pulmonary/Chest: Effort normal and breath sounds normal  No respiratory distress  She has no wheezes  She has no rales  Abdominal: Soft  Bowel sounds are normal  There is no tenderness  Musculoskeletal: Normal range of motion  Lymphadenopathy:     She has no cervical adenopathy  Neurological: She is alert and oriented to person, place, and time  Skin: Skin is warm and dry  Psychiatric: She has a normal mood and affect  Nursing note and vitals reviewed

## 2019-09-22 NOTE — PATIENT INSTRUCTIONS
Take muscle relaxer's as directed  Continue use home medications as directed  Use Tylenol 1st line for pain control  If still having pain May use additional Advil  Follow up with PCP in 3-5 days  Proceed to  ER if symptoms worsen  Neck Pain   AMBULATORY CARE:   Neck pain  may be sudden and increase quickly  You may instead feel pain build slowly over time  Neck pain may go away in a few days or weeks, or it may continue for months  The pain may come and go, or be worse with certain movements  The pain may be only in your neck, or it may move to your arms, back, or shoulders  You may also have pain that starts in another body area and moves to your neck  Some types of neck pain are permanent  Seek care immediately if:   · You have an injury that causes neck pain and shooting pain down your arms or legs  · Your neck pain suddenly becomes severe  · You have neck pain along with numbness, tingling, or weakness in your arms or legs  · You have a stiff neck, a headache, and a fever  Contact your healthcare provider if:   · You have new or worsening symptoms  · Your symptoms continue even after treatment  · You have questions or concerns about your condition or care  Treatment  may include any of the following, depending on what is causing your pain:  · Medicines  may be prescribed or recommended by your healthcare provider for pain  You may need medicine to treat nerve pain or to stop muscle spasms  Medicines may also be given to reduce inflammation  Your healthcare provider may inject medicine into a nerve to block pain  Over-the-counter NSAID medicine or acetaminophen may be recommended to help treat minor pain or inflammation  · Traction  is used to relieve pressure from nerves  Your head is gently pulled up and away from your neck  This stretches muscles and ligaments and makes more room for the spine   Your healthcare provider will tell you the kind of traction that will help your neck pain  Do not use traction devices at home unless directed by your healthcare provider  · Surgery  may be needed if the pain is severe or other treatments do not work  Surgery will not help every kind of neck pain  You may need surgery to stabilize a fractured bone or to remove a tumor  Surgery may also be used to widen a narrowed spinal column or to remove a disc from between neck bones  Manage or prevent neck pain:   · Rest your neck as directed  Do not make sudden movements, such as turning your head quickly  Your healthcare provider may recommend you wear a cervical collar for a short time  The collar will prevent you from moving your head  This will give your neck time to heal if an injury is causing your neck pain  Ask your healthcare provider when you can return to sports or other normal daily activities  · Apply heat as directed  Heat helps relieve pain and swelling  Use a heat wrap, or soak a small towel in warm water  Wring out the extra water  Apply the heat wrap or towel for 20 minutes every hour, or as directed  · Apply ice as directed  Ice helps relieve pain and swelling, and can help prevent tissue damage  Use an ice pack, or put ice in a bag  Cover the ice pack or back with a towel before you apply it to your neck  Apply the ice pack or ice for 15 minutes every hour, or as directed  Your healthcare provider can tell you how often to apply ice  · Do neck exercises as directed  Neck exercises help strengthen the muscles and increase range of motion  Your healthcare provider will tell you which exercises are right for you  He may give you instructions, or he may recommend that you work with a physical therapist  Your healthcare provider or therapist can make sure you are doing the exercises correctly  · Maintain good posture  Try to keep your head and shoulders lifted when you sit  If you work in front of a computer, make sure the monitor is at the right level   You should not need to look up down to see the screen  You should also not have to lean forward to be able to read what is on the screen  Make sure your keyboard, mouse, and other computer items are placed where you do not have to extend your shoulder to reach them  Get up often if you work in front of a computer or sit for long periods of time  Stretch or walk around to keep your neck muscles loose  Follow up with your healthcare provider as directed: Your healthcare provider may refer you to a specialist if your pain does not get better with treatment  Write down your questions so you remember to ask them during your visits  © 2017 [employer] Information is for End User's use only and may not be sold, redistributed or otherwise used for commercial purposes  All illustrations and images included in CareNotes® are the copyrighted property of [employer]  or [employer]  The above information is an  only  It is not intended as medical advice for individual conditions or treatments  Talk to your doctor, nurse or pharmacist before following any medical regimen to see if it is safe and effective for you

## 2019-10-21 ENCOUNTER — CLINICAL SUPPORT (OUTPATIENT)
Dept: PAIN MEDICINE | Facility: CLINIC | Age: 69
End: 2019-10-21
Payer: MEDICARE

## 2019-10-21 VITALS
HEIGHT: 65 IN | DIASTOLIC BLOOD PRESSURE: 80 MMHG | SYSTOLIC BLOOD PRESSURE: 153 MMHG | HEART RATE: 72 BPM | TEMPERATURE: 98.1 F | WEIGHT: 271 LBS | BODY MASS INDEX: 45.15 KG/M2

## 2019-10-21 DIAGNOSIS — M47.812 CERVICAL SPONDYLOSIS WITHOUT MYELOPATHY: Primary | ICD-10-CM

## 2019-10-21 DIAGNOSIS — M79.18 MYOFASCIAL PAIN: ICD-10-CM

## 2019-10-21 PROCEDURE — 99214 OFFICE O/P EST MOD 30 MIN: CPT | Performed by: ANESTHESIOLOGY

## 2019-10-21 NOTE — PROGRESS NOTES
Assessment  1  Cervical spondylosis without myelopathy    2  Myofascial pain        Plan  76-year-old female returning for follow-up of chronic right-sided neck pain secondary to cervical spondylosis and myofascial pain  The patient is status post right C2-3, C3-4, and C4-5 facet joint RFA August 6, 2019 with essentially 90% relief her for neck pain  Now the patient is experiencing some neck pain in the lower cervical region that radiates into the trapezius  She denies any radicular symptoms into her upper extremities or any recent trauma  The patient's pain seems to be mostly secondary to myofascial pain as she does have significant cervical paraspinal muscular spasm and trapezius spasm on physical exam   There may be a facet mediated component originating from the lower facet joints as well  The patient takes Tylenol p r n  With mild relief  She did has tried muscle relaxants in the past with minimal relief  She does find some relief with massage  1  I will schedule the patient for right-sided cervical paraspinal and trapezius trigger point injections to help with her myofascial pain  2  The patient will continue with Tylenol p r n  Should not exceed more than 3000 mg in 24 hours  3  The patient will continue with her home exercise program  4  I will follow up the patient pending results of trigger point injections      Complete risks and benefits including bleeding, infection, tissue reaction, nerve injury and allergic reaction were discussed  The approach was demonstrated using models and literature was provided  Verbal and written consent was obtained  My impressions and treatment recommendations were discussed in detail with the patient who verbalized understanding and had no further questions  Discharge instructions were provided  I personally saw and examined the patient and I agree with the above discussed plan of care  No orders of the defined types were placed in this encounter      No orders of the defined types were placed in this encounter  History of Present Illness    Angelique London is a 70 y o  female returning for follow-up of chronic right-sided neck pain secondary to cervical spondylosis and myofascial pain  The patient is status post right C2-3, C3-4, and C4-5 facet joint RFA August 6, 2019 with essentially 90% relief her for neck pain  Now the patient is experiencing some neck pain in the lower cervical region that radiates into the trapezius  She denies any radicular symptoms into her upper extremities or any recent trauma  She denies any bladder or bowel incontinence or balance issues  The patient takes Tylenol p r n  With mild relief  She did has tried muscle relaxants in the past with minimal relief  She does find some relief with massage  The patient rates her pain a 5/10 on the pain does not follow any particular pattern throughout the day  The pain is intermittent and described sharp  The pain is worse with bending and twisting her neck, coughing, and sneezing  The pain is alleviated with relaxation  I have personally reviewed and/or updated the patient's past medical history, past surgical history, family history, social history, current medications, allergies, and vital signs today  Other than as stated above, the patient denies any interval changes in medications, medical condition, mental condition, symptoms, or allergies since the last office visit  Review of Systems   Constitutional: Negative for fever and unexpected weight change  HENT: Negative for trouble swallowing  Eyes: Negative for visual disturbance  Respiratory: Negative for shortness of breath and wheezing  Cardiovascular: Negative for chest pain and palpitations  Gastrointestinal: Negative for constipation, diarrhea, nausea and vomiting  Endocrine: Negative for cold intolerance, heat intolerance and polydipsia     Genitourinary: Negative for difficulty urinating and frequency  Musculoskeletal: Negative for arthralgias, gait problem, joint swelling and myalgias  Decreased ROM, pain in extremity   Skin: Negative for rash  Neurological: Negative for dizziness, seizures, syncope, weakness and headaches  Hematological: Does not bruise/bleed easily  Psychiatric/Behavioral: Negative for dysphoric mood  All other systems reviewed and are negative  Patient Active Problem List   Diagnosis    Closed fracture of right distal radius and ulna    Malignant tumor of right kidney parenchyma (HCC)    Primary osteoarthritis of left knee    Primary osteoarthritis of right knee    Chronic pain of left knee    Myalgia    Chest wall tenderness    Chronic heart failure (HCC)    Chronic atrial fibrillation    Thyroid nodule    Cervical spondylosis without myelopathy    Arthropathy of cervical facet joint    Occipital neuralgia of right side    Status post partial thyroidectomy       Past Medical History:   Diagnosis Date    [facility]fib [facility] [address] [facility]     Arthritis     Atrial fibrillation ([facility] [address] [facility]     Gastric bypass status for obesity     History of transfusion     35 years ago    Hypertension     mass right kidney     Renal cell adenocarcinoma ([facility] [address] [facility]     RIGHT    Sleep apnea        Past Surgical History:   Procedure Laterality Date    CHOLECYSTECTOMY      COLOSTOMY      CRYOABLATION Right     RT KIDNEY    CYSTORRHAPHY      Bladder   Last assessed 4/6/2016     GASTRIC BYPASS      HERNIA REPAIR      Last assessed 4/6/2016     HYSTERECTOMY      [facility] OPEN RX DISTAL RADIUS FX, EXTRA-ARTICULAR Right 4/22/2016    Procedure: OPEN REDUCTION INTERNAL FIXATION RIGHT DISTAL RADIUS;  Surgeon: Kevin Bennett MD;  [facility];  Service: Orthopedics    REVISION COLOSTOMY      THYROID LOBECTOMY Left 8/27/2019    Procedure: LOBECTOMY THYROID, left;  Surgeon: Reji Campos MD;  [facility]: [facility];  Service: Surgical Oncology    TUBAL LIGATION      US GUIDED THYROID BIOPSY 2/27/2019    US GUIDED THYROID BIOPSY  5/29/2019       Family History   Problem Relation Age of Onset    Heart attack Mother     Lung cancer Father 76    Heart disease Father     Stroke Sister     Lung cancer Sister 64    Prostate cancer Brother 61       Social History     Occupational History    Not on file   Tobacco Use    Smoking status: Never Smoker    Smokeless tobacco: Never Used   Substance and Sexual Activity    Alcohol use: Not Currently     Alcohol/week: 0 0 standard drinks     Frequency: Never     Drinks per session: Patient refused     Binge frequency: Never     Comment: 0    Drug use: No    Sexual activity: Not on file       Current Outpatient Medications on File Prior to Visit   Medication Sig    acetaminophen (TYLENOL) 325 mg tablet Take 975 mg by mouth every 6 (six) hours as needed    diltiazem (CARDIZEM) 120 MG tablet Take 120 mg by mouth 2 (two) times a day   ergocalciferol (VITAMIN D2) 50,000 units Take 50,000 Units by mouth once a week    FLUoxetine (PROzac) 20 mg capsule Take 20 mg by mouth 2 (two) times a day      LORazepam (ATIVAN) 1 mg tablet Take 1 mg by mouth daily at bedtime as needed for anxiety   metaxalone (SKELAXIN) 800 mg tablet Take 1 tablet (800 mg total) by mouth 3 (three) times a day    methocarbamol (ROBAXIN) 750 mg tablet Take 1 tablet (750 mg total) by mouth every 6 (six) hours as needed for muscle spasms    oxybutynin (DITROPAN-XL) 5 mg 24 hr tablet Take 5 mg by mouth 2 (two) times a day     warfarin (COUMADIN) 1 mg tablet Per pt sometimes she takes 6mg daily sometimes--per pt she does her own bw at home every 2 weeks   warfarin (COUMADIN) 5 mg tablet Take 5 mg by mouth daily     enoxaparin (LOVENOX) 60 mg/0 6 mL Inject 0 6 mL (60 mg total) under the skin daily for 7 doses     No current facility-administered medications on file prior to visit          No Known Allergies    Physical Exam    /80   Pulse 72   Temp 98 1 °F (36 7 °C) (Oral) Ht 5' 5" (1 651 m)   Wt 123 kg (271 lb)   BMI 45 10 kg/m²     Constitutional: overweight  Eyes: anicteric  HEENT: grossly intact  Neck: supple, symmetric, trachea midline and no masses   Pulmonary:even and unlabored  Cardiovascular:No edema or pitting edema present  Skin:Normal without rashes or lesions and well hydrated  Psychiatric:Mood and affect appropriate  Neurologic:Cranial Nerves II-XII grossly intact  Musculoskeletal:normal gait  Right cervical paraspinals and trapezius tender to palpation ropy in texture  Full range of motion of cervical spine in all planes  Bilateral upper extremity strength 5/5 in all muscle groups  Sensation intact to light touch in C5 through T1 dermatomes bilaterally  Negative Spurling's bilaterally      Imaging  Imaging reviewed

## 2019-10-24 ENCOUNTER — CLINICAL SUPPORT (OUTPATIENT)
Dept: PAIN MEDICINE | Facility: CLINIC | Age: 69
End: 2019-10-24
Payer: MEDICARE

## 2019-10-24 VITALS
HEIGHT: 65 IN | SYSTOLIC BLOOD PRESSURE: 144 MMHG | DIASTOLIC BLOOD PRESSURE: 72 MMHG | TEMPERATURE: 97.7 F | HEART RATE: 64 BPM | BODY MASS INDEX: 45.15 KG/M2 | WEIGHT: 271 LBS

## 2019-10-24 DIAGNOSIS — M79.18 MYOFASCIAL PAIN SYNDROME: Primary | ICD-10-CM

## 2019-10-24 PROCEDURE — 20552 NJX 1/MLT TRIGGER POINT 1/2: CPT | Performed by: ANESTHESIOLOGY

## 2019-10-24 RX ORDER — METHYLPREDNISOLONE ACETATE 40 MG/ML
40 INJECTION, SUSPENSION INTRA-ARTICULAR; INTRALESIONAL; INTRAMUSCULAR; SOFT TISSUE ONCE
Status: COMPLETED | OUTPATIENT
Start: 2019-10-24 | End: 2019-10-24

## 2019-10-24 RX ORDER — BUPIVACAINE HYDROCHLORIDE 2.5 MG/ML
10 INJECTION, SOLUTION INFILTRATION; PERINEURAL ONCE
Status: COMPLETED | OUTPATIENT
Start: 2019-10-24 | End: 2019-10-24

## 2019-10-24 RX ADMIN — METHYLPREDNISOLONE ACETATE 40 MG: 40 INJECTION, SUSPENSION INTRA-ARTICULAR; INTRALESIONAL; INTRAMUSCULAR; SOFT TISSUE at 13:14

## 2019-10-24 RX ADMIN — BUPIVACAINE HYDROCHLORIDE 10 ML: 2.5 INJECTION, SOLUTION INFILTRATION; PERINEURAL at 13:15

## 2019-10-24 NOTE — PROGRESS NOTES
56-year-old female with myofascial pain presenting for trigger point injections into the right cervical paraspinal musculature and trapezius  After discussing the risks, benefits, and alternatives to the procedure, the patient expressed understanding and wished to proceed  Procedural pause conducted to verify:  correct patient identity, procedure to be performed and as applicable, correct side and site, correct patient position, and availability of implants, special equipment and special requirements  The appropriate hyper irritable musculoskeletal tender points were marked with a sterile pen, prepped with alcohol and sterilely draped  After appropriate reproduction of pain at each of the tender points marked, a total of 6 mL of 0 25% bupivacaine and 40 mg of Depo-Medrol was injected after negative aspiration of air, CSF, heme, or other bodily fluids with a 1 5 in 25-gauge needle  A total number of 2 muscle groups were injected  Vital signs were monitored before, during, and after the procedure and remained stable at all points  The patient was discharged with no apparent complications on their own power after an appropriate observation

## 2019-10-31 ENCOUNTER — TELEPHONE (OUTPATIENT)
Dept: PAIN MEDICINE | Facility: CLINIC | Age: 69
End: 2019-10-31

## 2019-10-31 DIAGNOSIS — M17.11 PRIMARY OSTEOARTHRITIS OF RIGHT KNEE: ICD-10-CM

## 2019-10-31 DIAGNOSIS — M17.12 PRIMARY OSTEOARTHRITIS OF LEFT KNEE: Primary | ICD-10-CM

## 2019-10-31 NOTE — TELEPHONE ENCOUNTER
**BTH pt    S/w pt, she said she had a TPI in her neck last week and today she woke up in severe pain on both sides of her neck without being able to move her neck  Pt said that she has been using heat and Tylenol  Pt said her muscles are tightened up severely  RN asked pt if she takes any muscle relaxers, pt said they do not work for this ever  Pt said her co-workers mentioned massage therapy or acupuncture  Told pt I will have to ask JW that, who will be back on Monday  Pt asked if on call doctor can send her in a steroid pack to hold her over?

## 2019-10-31 NOTE — TELEPHONE ENCOUNTER
Patient has evidence of osteopenia and steroid Dosepak would be contraindicated due to worsening of diffuse bone disease/demineralization     I will send diclofenac gel for Patient to rub on her

## 2019-10-31 NOTE — TELEPHONE ENCOUNTER
Spoke with PT she states that her Pian level is a 10 with no improvement  She would like to know what she should do next   Good call back number is [phone]

## 2019-11-04 NOTE — TELEPHONE ENCOUNTER
S/w pt, she would like to try massage therapy first  Pt would like to [facility] to [facility] in [address]

## 2019-11-19 ENCOUNTER — TELEPHONE (OUTPATIENT)
Dept: PAIN MEDICINE | Facility: CLINIC | Age: 69
End: 2019-11-19

## 2019-11-19 NOTE — TELEPHONE ENCOUNTER
Koko Mason [facility]  [phone]  Dr Shaista Godfrey is calling requesting a call back  She said that she received a script for massage therapy on 11/4/19  However she is missing notes on this patient please follow with her so that she can schedule that patient

## 2019-11-20 NOTE — TELEPHONE ENCOUNTER
Can you please find out what notes the therapist is referring to?   If she just needs our office notes, please fax over last 2 office notes

## 2019-11-20 NOTE — TELEPHONE ENCOUNTER
Attempted to reach [place] with [facility]  LMOM with CB#, OH provided       +++Please obtain fax # [phone]

## 2019-11-21 NOTE — TELEPHONE ENCOUNTER
Per SOPHIE, left detailed message on machine for Hyla Shown to call Alma Wood at [facility]  Phone number below provided  LMOM for Alma Wood informing her of the same

## 2019-11-21 NOTE — TELEPHONE ENCOUNTER
Gerson Burns needs to know if you can reach out to patient and have her call [facility] for an appt  She doesn't have the patient's information to reach out to her  Shilpa Tirado is stating that she does not need notes on the patient  She said that she needs to know her insurance information and her phone number   Please follow up with Shilpa Tirado at [facility]

## 2020-03-02 ENCOUNTER — TELEPHONE (OUTPATIENT)
Dept: SURGICAL ONCOLOGY | Facility: CLINIC | Age: 70
End: 2020-03-02

## 2020-03-02 NOTE — TELEPHONE ENCOUNTER
Left message for patient to have the thyroid panel labs done before f/u visit with Dr Sandy Sharif sched 3/9

## 2020-03-11 ENCOUNTER — APPOINTMENT (OUTPATIENT)
Dept: LAB | Facility: HOSPITAL | Age: 70
End: 2020-03-11
Attending: SURGERY
Payer: MEDICARE

## 2020-03-11 ENCOUNTER — TRANSCRIBE ORDERS (OUTPATIENT)
Dept: LAB | Facility: HOSPITAL | Age: 70
End: 2020-03-11

## 2020-03-11 DIAGNOSIS — I10 ESSENTIAL HYPERTENSION, MALIGNANT: ICD-10-CM

## 2020-03-11 DIAGNOSIS — Z98.84 BARIATRIC SURGERY STATUS: ICD-10-CM

## 2020-03-11 DIAGNOSIS — E55.9 VITAMIN D DEFICIENCY: ICD-10-CM

## 2020-03-11 DIAGNOSIS — Z98.84 BARIATRIC SURGERY STATUS: Primary | ICD-10-CM

## 2020-03-11 DIAGNOSIS — E89.0 STATUS POST PARTIAL THYROIDECTOMY: ICD-10-CM

## 2020-03-11 LAB
25(OH)D3 SERPL-MCNC: 51.1 NG/ML (ref 30–100)
ALBUMIN SERPL BCP-MCNC: 3.3 G/DL (ref 3.5–5)
ALP SERPL-CCNC: 220 U/L (ref 46–116)
ALT SERPL W P-5'-P-CCNC: 21 U/L (ref 12–78)
ANION GAP SERPL CALCULATED.3IONS-SCNC: 6 MMOL/L (ref 4–13)
AST SERPL W P-5'-P-CCNC: 22 U/L (ref 5–45)
BASOPHILS # BLD AUTO: 0.05 THOUSANDS/ΜL (ref 0–0.1)
BASOPHILS NFR BLD AUTO: 1 % (ref 0–1)
BILIRUB SERPL-MCNC: 0.5 MG/DL (ref 0.2–1)
BUN SERPL-MCNC: 21 MG/DL (ref 5–25)
CALCIUM SERPL-MCNC: 8.7 MG/DL (ref 8.3–10.1)
CHLORIDE SERPL-SCNC: 107 MMOL/L (ref 100–108)
CO2 SERPL-SCNC: 28 MMOL/L (ref 21–32)
CREAT SERPL-MCNC: 0.91 MG/DL (ref 0.6–1.3)
EOSINOPHIL # BLD AUTO: 0.08 THOUSAND/ΜL (ref 0–0.61)
EOSINOPHIL NFR BLD AUTO: 2 % (ref 0–6)
ERYTHROCYTE [DISTWIDTH] IN BLOOD BY AUTOMATED COUNT: 13.9 % (ref 11.6–15.1)
FERRITIN SERPL-MCNC: 15 NG/ML (ref 8–388)
GFR SERPL CREATININE-BSD FRML MDRD: 64 ML/MIN/1.73SQ M
GLUCOSE P FAST SERPL-MCNC: 102 MG/DL (ref 65–99)
HCT VFR BLD AUTO: 40.9 % (ref 34.8–46.1)
HGB BLD-MCNC: 12.8 G/DL (ref 11.5–15.4)
IMM GRANULOCYTES # BLD AUTO: 0.04 THOUSAND/UL (ref 0–0.2)
IMM GRANULOCYTES NFR BLD AUTO: 1 % (ref 0–2)
IRON SATN MFR SERPL: 23 %
IRON SERPL-MCNC: 87 UG/DL (ref 50–170)
LYMPHOCYTES # BLD AUTO: 1.03 THOUSANDS/ΜL (ref 0.6–4.47)
LYMPHOCYTES NFR BLD AUTO: 22 % (ref 14–44)
MCH RBC QN AUTO: 29.2 PG (ref 26.8–34.3)
MCHC RBC AUTO-ENTMCNC: 31.3 G/DL (ref 31.4–37.4)
MCV RBC AUTO: 93 FL (ref 82–98)
MONOCYTES # BLD AUTO: 0.4 THOUSAND/ΜL (ref 0.17–1.22)
MONOCYTES NFR BLD AUTO: 8 % (ref 4–12)
NEUTROPHILS # BLD AUTO: 3.17 THOUSANDS/ΜL (ref 1.85–7.62)
NEUTS SEG NFR BLD AUTO: 66 % (ref 43–75)
NRBC BLD AUTO-RTO: 0 /100 WBCS
PLATELET # BLD AUTO: 292 THOUSANDS/UL (ref 149–390)
PMV BLD AUTO: 10.2 FL (ref 8.9–12.7)
POTASSIUM SERPL-SCNC: 4 MMOL/L (ref 3.5–5.3)
PROT SERPL-MCNC: 6.7 G/DL (ref 6.4–8.2)
RBC # BLD AUTO: 4.39 MILLION/UL (ref 3.81–5.12)
SODIUM SERPL-SCNC: 141 MMOL/L (ref 136–145)
TIBC SERPL-MCNC: 376 UG/DL (ref 250–450)
TSH SERPL DL<=0.05 MIU/L-ACNC: 5.75 UIU/ML (ref 0.36–3.74)
WBC # BLD AUTO: 4.77 THOUSAND/UL (ref 4.31–10.16)

## 2020-03-11 PROCEDURE — 86800 THYROGLOBULIN ANTIBODY: CPT

## 2020-03-11 PROCEDURE — 36415 COLL VENOUS BLD VENIPUNCTURE: CPT

## 2020-03-11 PROCEDURE — 80053 COMPREHEN METABOLIC PANEL: CPT

## 2020-03-11 PROCEDURE — 83540 ASSAY OF IRON: CPT

## 2020-03-11 PROCEDURE — 82728 ASSAY OF FERRITIN: CPT

## 2020-03-11 PROCEDURE — 86376 MICROSOMAL ANTIBODY EACH: CPT

## 2020-03-11 PROCEDURE — 85025 COMPLETE CBC W/AUTO DIFF WBC: CPT

## 2020-03-11 PROCEDURE — 84443 ASSAY THYROID STIM HORMONE: CPT

## 2020-03-11 PROCEDURE — 83550 IRON BINDING TEST: CPT

## 2020-03-11 PROCEDURE — 82306 VITAMIN D 25 HYDROXY: CPT

## 2020-03-12 LAB
THYROGLOB AB SERPL-ACNC: 1.7 IU/ML (ref 0–0.9)
THYROPEROXIDASE AB SERPL-ACNC: 21 IU/ML (ref 0–34)

## 2020-03-16 ENCOUNTER — OFFICE VISIT (OUTPATIENT)
Dept: SURGICAL ONCOLOGY | Facility: CLINIC | Age: 70
End: 2020-03-16
Payer: MEDICARE

## 2020-03-16 VITALS
TEMPERATURE: 97.6 F | SYSTOLIC BLOOD PRESSURE: 140 MMHG | BODY MASS INDEX: 45.32 KG/M2 | WEIGHT: 272 LBS | DIASTOLIC BLOOD PRESSURE: 80 MMHG | RESPIRATION RATE: 16 BRPM | HEIGHT: 65 IN | HEART RATE: 75 BPM

## 2020-03-16 DIAGNOSIS — E89.0 STATUS POST PARTIAL THYROIDECTOMY: Primary | ICD-10-CM

## 2020-03-16 DIAGNOSIS — E04.1 THYROID NODULE: ICD-10-CM

## 2020-03-16 PROCEDURE — 99213 OFFICE O/P EST LOW 20 MIN: CPT | Performed by: SURGERY

## 2020-03-16 RX ORDER — LEVOTHYROXINE SODIUM 112 UG/1
112 TABLET ORAL DAILY
Qty: 30 TABLET | Refills: 3 | Status: SHIPPED | OUTPATIENT
Start: 2020-03-16 | End: 2020-03-17

## 2020-03-16 RX ORDER — GABAPENTIN 300 MG/1
600 CAPSULE ORAL
COMMUNITY
Start: 2020-02-19

## 2020-03-16 NOTE — LETTER
March 16, 2020     [address] [address]    Patient: Ally Parish   YOB: 1950   Date of Visit: 3/16/2020       Dear Dr Mariely Martinez:    Thank you for referring Alison Whitlock to me for evaluation  Below are my notes for this consultation  If you have questions, please do not hesitate to call me  I look forward to following your patient along with you  Sincerely,        Bonnie Denton MD        CC: MD Pastora Cueva MD Sharyn Fuse, MOLLY Locke MD  3/16/2020  8:31 AM  Sign at close encounter     Surgical Oncology Follow Up       [address] [facility] [address] [address]  60  [facility]  1950  [id number]  [address] [facility]  [address]  [phone]    Chief Complaint   Patient presents with    Follow-up     6 month follow up  Assessment/Plan:    No problem-specific Assessment & Plan notes found for this encounter  Diagnoses and all orders for this visit:    Status post partial thyroidectomy  -     Ambulatory referral to Endocrinology; Future  -     levothyroxine 112 mcg tablet; Take 1 tablet (112 mcg total) by mouth daily    Thyroid nodule    Other orders  -     gabapentin (NEURONTIN) 300 mg capsule; Take 600 mg by mouth daily at bedtime         Advance Care Planning/Advance Directives:  Discussed disease status, cancer treatment plans and/or cancer treatment goals with the patient  No history exists  History of Present Illness:  Patient is a 69-year-old woman here for 6 month follow-up status post hemithyroidectomy for turned out to be benign nodule  -Interval History:  Since surgery, she has felt somewhat fatigued and sluggish  She has also reported some degree of cold intolerance  Otherwise no constipation  She had blood work done in anticipation of today's visit  Review of Systems:  Review of Systems   Constitutional: Positive for fatigue  Cold intolerance   HENT: Negative  Eyes: Negative  Respiratory: Negative  Cardiovascular: Negative  Gastrointestinal: Negative  Endocrine: Positive for cold intolerance  Genitourinary: Negative  Musculoskeletal: Negative  Skin: Negative  Allergic/Immunologic: Negative  Neurological: Negative  Hematological: Negative  Psychiatric/Behavioral: Negative  Patient Active Problem List   Diagnosis    Closed fracture of right distal radius and ulna    Malignant tumor of right kidney parenchyma (HCC)    Primary osteoarthritis of left knee    Primary osteoarthritis of right knee    Chronic pain of left knee    Myalgia    Chest wall tenderness    Chronic heart failure (HCC)    Chronic atrial fibrillation    Thyroid nodule    Cervical spondylosis without myelopathy    Arthropathy of cervical facet joint    Occipital neuralgia of right side    Status post partial thyroidectomy    Myofascial pain syndrome     Past Medical History:   Diagnosis Date    A-fib ([facility] [address] [facility]     Arthritis     Atrial fibrillation (HCC)     Cervical spondylosis with myelopathy     Gastric bypass status for obesity     History of transfusion     35 years ago    Hypertension     mass right kidney     Renal cell adenocarcinoma ([facility] [address] )     RIGHT    Sleep apnea      Past Surgical History:   Procedure Laterality Date    CHOLECYSTECTOMY      COLOSTOMY      CRYOABLATION Right     RT KIDNEY    CYSTORRHAPHY      Bladder   Last assessed 4/6/2016     GASTRIC BYPASS      HERNIA REPAIR      Last assessed 4/6/2016     HYSTERECTOMY      RI OPEN RX DISTAL RADIUS FX, EXTRA-ARTICULAR Right 4/22/2016    Procedure: OPEN REDUCTION INTERNAL FIXATION RIGHT DISTAL RADIUS;  Surgeon: Airam Cerda MD;  [facility];  Service: Orthopedics  REVISION COLOSTOMY      THYROID LOBECTOMY Left 8/27/2019    Procedure: LOBECTOMY THYROID, left;  Surgeon: Hermes Lew MD;  [facility];  Service: Surgical Oncology    TUBAL LIGATION      US GUIDED THYROID BIOPSY  2/27/2019    US GUIDED THYROID BIOPSY  5/29/2019     Family History   Problem Relation Age of Onset    Heart attack Mother     Lung cancer Father 76    Heart disease Father     Stroke Sister     Lung cancer Sister 64    Prostate cancer Brother 61     Social History     Socioeconomic History    Marital status:       Spouse name: Not on file    Number of children: Not on file    Years of education: Not on file    Highest education level: Not on file   Occupational History    Not on file   Social Needs    Financial resource strain: Not on file    Food insecurity:     Worry: Not on file     Inability: Not on file    Transportation needs:     Medical: Not on file     Non-medical: Not on file   Tobacco Use    Smoking status: Never Smoker    Smokeless tobacco: Never Used   Substance and Sexual Activity    Alcohol use: Not Currently     Alcohol/week: 0 0 standard drinks     Frequency: Never     Drinks per session: Patient refused     Binge frequency: Never     Comment: 0    Drug use: No    Sexual activity: Not on file   Lifestyle    Physical activity:     Days per week: Not on file     Minutes per session: Not on file    Stress: Not on file   Relationships    Social connections:     Talks on phone: Not on file     Gets together: Not on file     Attends Zoroastrianism service: Not on file     Active member of club or organization: Not on file     Attends meetings of clubs or organizations: Not on file     Relationship status: Not on file    Intimate partner violence:     Fear of current or ex partner: Not on file     Emotionally abused: Not on file     Physically abused: Not on file     Forced sexual activity: Not on file   Other Topics Concern    Not on file   Social History Narrative    ** Merged History Encounter **            Current Outpatient Medications:     diltiazem (CARDIZEM) 120 MG tablet, Take 120 mg by mouth 2 (two) times a day , Disp: , Rfl:     ergocalciferol (VITAMIN D2) 50,000 units, Take 50,000 Units by mouth once a week, Disp: , Rfl:     FLUoxetine (PROzac) 20 mg capsule, Take 20 mg by mouth 2 (two) times a day  , Disp: , Rfl:     gabapentin (NEURONTIN) 300 mg capsule, Take 600 mg by mouth daily at bedtime , Disp: , Rfl:     LORazepam (ATIVAN) 1 mg tablet, Take 1 mg by mouth daily at bedtime as needed for anxiety  , Disp: , Rfl:     oxybutynin (DITROPAN-XL) 5 mg 24 hr tablet, Take 5 mg by mouth 2 (two) times a day , Disp: , Rfl:     warfarin (COUMADIN) 1 mg tablet, Per pt sometimes she takes 6mg daily sometimes--per pt she does her own bw at home every 2 weeks  , Disp: , Rfl:     warfarin (COUMADIN) 5 mg tablet, Take 5 mg by mouth daily , Disp: , Rfl:     acetaminophen (TYLENOL) 325 mg tablet, Take 975 mg by mouth every 6 (six) hours as needed, Disp: , Rfl:     diclofenac sodium (VOLTAREN) 1 %, Apply 2 g topically 4 (four) times a day (Patient not taking: Reported on 3/16/2020), Disp: 240 g, Rfl: 1    enoxaparin (LOVENOX) 60 mg/0 6 mL, Inject 0 6 mL (60 mg total) under the skin daily for 7 doses (Patient not taking: Reported on 3/16/2020), Disp: 7 Syringe, Rfl: 0    levothyroxine 112 mcg tablet, Take 1 tablet (112 mcg total) by mouth daily, Disp: 30 tablet, Rfl: 3    metaxalone (SKELAXIN) 800 mg tablet, Take 1 tablet (800 mg total) by mouth 3 (three) times a day (Patient not taking: Reported on 3/16/2020), Disp: 20 tablet, Rfl: 0    methocarbamol (ROBAXIN) 750 mg tablet, Take 1 tablet (750 mg total) by mouth every 6 (six) hours as needed for muscle spasms (Patient not taking: Reported on 3/16/2020), Disp: 15 tablet, Rfl: 0  No Known Allergies  Vitals:    03/16/20 0800   BP: 140/80   Pulse: 75   Resp: 16   Temp: 97 6 °F (36 4 °C)       Physical Exam Constitutional: She is oriented to person, place, and time  She appears well-developed and well-nourished  HENT:   Head: Normocephalic and atraumatic  Right Ear: External ear normal    Left Ear: External ear normal    Eyes: Pupils are equal, round, and reactive to light  EOM are normal    Neck: Normal range of motion  Neck supple  Cardiovascular: Normal rate and regular rhythm  Pulmonary/Chest: Effort normal and breath sounds normal    Abdominal: Soft  Bowel sounds are normal    Musculoskeletal: Normal range of motion  Neurological: She is alert and oriented to person, place, and time  Skin: Skin is warm and dry  Psychiatric: She has a normal mood and affect  Her behavior is normal  Thought content normal          Results:  Labs:  Lab Results   Component Value Date    [id number] 5 750 (H) 03/11/2020          Imaging  No results found  I reviewed the above laboratory and imaging data  Discussion/Summary:  Benign thyroid nodule, with what appears to be postop hypothyroidism  Will start on Synthroid, and will rerefer to endocrinology team for further management  Follow up on a p r n  Basis

## 2020-03-16 NOTE — LETTER
March 16, 2020     [place] [id number]    Patient: Pako Coffman   YOB: 1950   Date of Visit: 3/16/2020       Dear Dr Cathryn Cano Recipients: Thank you for referring Sohan Moses to me for evaluation  Below are my notes for this consultation  If you have questions, please do not hesitate to call me  I look forward to following your patient along with you  Sincerely,        Vargas Nunn MD        CC: MD Hugo West MD Librada Cumber, DO Lyndee Lust, CRNP Inocencio Nails, MD  3/16/2020  8:31 AM  Sign at close encounter     Surgical Oncology Follow Up       [address] [facility] Roxy Callaway  [address] [place] [phone]  [address] [facility]  1950  [id number]  [address] [facility] SURG[facility] [place] [id number] [address]  [phone]    Chief Complaint   Patient presents with    Follow-up     6 month follow up  Assessment/Plan:    No problem-specific Assessment & Plan notes found for this encounter  Diagnoses and all orders for this visit:    Status post partial thyroidectomy  -     Ambulatory referral to Endocrinology; Future  -     levothyroxine 112 mcg tablet; Take 1 tablet (112 mcg total) by mouth daily    Thyroid nodule    Other orders  -     gabapentin (NEURONTIN) 300 mg capsule; Take 600 mg by mouth daily at bedtime         Advance Care Planning/Advance Directives:  Discussed disease status, cancer treatment plans and/or cancer treatment goals with the patient  No history exists  History of Present Illness:  Patient is a 34-year-old woman here for 6 month follow-up status post hemithyroidectomy for turned out to be benign nodule  -Interval History:  Since surgery, she has felt somewhat fatigued and sluggish  She has also reported some degree of cold intolerance  Otherwise no constipation  She had blood work done in anticipation of today's visit  Review of Systems:  Review of Systems   Constitutional: Positive for fatigue  Cold intolerance   HENT: Negative  Eyes: Negative  Respiratory: Negative  Cardiovascular: Negative  Gastrointestinal: Negative  Endocrine: Positive for cold intolerance  Genitourinary: Negative  Musculoskeletal: Negative  Skin: Negative  Allergic/Immunologic: Negative  Neurological: Negative  Hematological: Negative  Psychiatric/Behavioral: Negative  Patient Active Problem List   Diagnosis    Closed fracture of right distal radius and ulna    Malignant tumor of right kidney parenchyma (HCC)    Primary osteoarthritis of left knee    Primary osteoarthritis of right knee    Chronic pain of left knee    Myalgia    Chest wall tenderness    Chronic heart failure (HCC)    Chronic atrial fibrillation    Thyroid nodule    Cervical spondylosis without myelopathy    Arthropathy of cervical facet joint    Occipital neuralgia of right side    Status post partial thyroidectomy    Myofascial pain syndrome     Past Medical History:   Diagnosis Date    [facility] [address] [facility]     Arthritis     Atrial fibrillation (HCC)     Cervical spondylosis with myelopathy     Gastric bypass status for obesity     History of transfusion     35 years ago    Hypertension     mass right kidney     Renal cell adenocarcinoma ([facility] [address] )     RIGHT    Sleep apnea      Past Surgical History:   Procedure Laterality Date    CHOLECYSTECTOMY      COLOSTOMY      CRYOABLATION Right     RT KIDNEY    CYSTORRHAPHY      Bladder   Last assessed 4/6/2016     GASTRIC BYPASS      HERNIA REPAIR      Last assessed 4/6/2016     HYSTERECTOMY      HI OPEN RX DISTAL RADIUS FX, EXTRA-ARTICULAR Right 4/22/2016    Procedure: OPEN REDUCTION INTERNAL FIXATION RIGHT DISTAL RADIUS;  Surgeon: Letty Burris MD;  [facility];  Service: Orthopedics  REVISION COLOSTOMY      THYROID LOBECTOMY Left 8/27/2019    Procedure: LOBECTOMY THYROID, left;  Surgeon: Gil Coto MD;  [facility];  Service: Surgical Oncology    TUBAL LIGATION      US GUIDED THYROID BIOPSY  2/27/2019    US GUIDED THYROID BIOPSY  5/29/2019     Family History   Problem Relation Age of Onset    Heart attack Mother     Lung cancer Father 76    Heart disease Father     Stroke Sister     Lung cancer Sister 64    Prostate cancer Brother 61     Social History     Socioeconomic History    Marital status:       Spouse name: Not on file    Number of children: Not on file    Years of education: Not on file    Highest education level: Not on file   Occupational History    Not on file   Social Needs    Financial resource strain: Not on file    Food insecurity:     Worry: Not on file     Inability: Not on file    Transportation needs:     Medical: Not on file     Non-medical: Not on file   Tobacco Use    Smoking status: Never Smoker    Smokeless tobacco: Never Used   Substance and Sexual Activity    Alcohol use: Not Currently     Alcohol/week: 0 0 standard drinks     Frequency: Never     Drinks per session: Patient refused     Binge frequency: Never     Comment: 0    Drug use: No    Sexual activity: Not on file   Lifestyle    Physical activity:     Days per week: Not on file     Minutes per session: Not on file    Stress: Not on file   Relationships    Social connections:     Talks on phone: Not on file     Gets together: Not on file     Attends Bahai service: Not on file     Active member of club or organization: Not on file     Attends meetings of clubs or organizations: Not on file     Relationship status: Not on file    Intimate partner violence:     Fear of current or ex partner: Not on file     Emotionally abused: Not on file     Physically abused: Not on file     Forced sexual activity: Not on file   Other Topics Concern    Not on file   Social History Narrative    ** Merged History Encounter **            Current Outpatient Medications:     diltiazem (CARDIZEM) 120 MG tablet, Take 120 mg by mouth 2 (two) times a day , Disp: , Rfl:     ergocalciferol (VITAMIN D2) 50,000 units, Take 50,000 Units by mouth once a week, Disp: , Rfl:     FLUoxetine (PROzac) 20 mg capsule, Take 20 mg by mouth 2 (two) times a day  , Disp: , Rfl:     gabapentin (NEURONTIN) 300 mg capsule, Take 600 mg by mouth daily at bedtime , Disp: , Rfl:     LORazepam (ATIVAN) 1 mg tablet, Take 1 mg by mouth daily at bedtime as needed for anxiety  , Disp: , Rfl:     oxybutynin (DITROPAN-XL) 5 mg 24 hr tablet, Take 5 mg by mouth 2 (two) times a day , Disp: , Rfl:     warfarin (COUMADIN) 1 mg tablet, Per pt sometimes she takes 6mg daily sometimes--per pt she does her own bw at home every 2 weeks  , Disp: , Rfl:     warfarin (COUMADIN) 5 mg tablet, Take 5 mg by mouth daily , Disp: , Rfl:     acetaminophen (TYLENOL) 325 mg tablet, Take 975 mg by mouth every 6 (six) hours as needed, Disp: , Rfl:     diclofenac sodium (VOLTAREN) 1 %, Apply 2 g topically 4 (four) times a day (Patient not taking: Reported on 3/16/2020), Disp: 240 g, Rfl: 1    enoxaparin (LOVENOX) 60 mg/0 6 mL, Inject 0 6 mL (60 mg total) under the skin daily for 7 doses (Patient not taking: Reported on 3/16/2020), Disp: 7 Syringe, Rfl: 0    levothyroxine 112 mcg tablet, Take 1 tablet (112 mcg total) by mouth daily, Disp: 30 tablet, Rfl: 3    metaxalone (SKELAXIN) 800 mg tablet, Take 1 tablet (800 mg total) by mouth 3 (three) times a day (Patient not taking: Reported on 3/16/2020), Disp: 20 tablet, Rfl: 0    methocarbamol (ROBAXIN) 750 mg tablet, Take 1 tablet (750 mg total) by mouth every 6 (six) hours as needed for muscle spasms (Patient not taking: Reported on 3/16/2020), Disp: 15 tablet, Rfl: 0  No Known Allergies  Vitals:    03/16/20 0800   BP: 140/80   Pulse: 75   Resp: 16   Temp: 97 6 °F (36 4 °C)       Physical Exam Constitutional: She is oriented to person, place, and time  She appears well-developed and well-nourished  HENT:   Head: Normocephalic and atraumatic  Right Ear: External ear normal    Left Ear: External ear normal    Eyes: Pupils are equal, round, and reactive to light  EOM are normal    Neck: Normal range of motion  Neck supple  Cardiovascular: Normal rate and regular rhythm  Pulmonary/Chest: Effort normal and breath sounds normal    Abdominal: Soft  Bowel sounds are normal    Musculoskeletal: Normal range of motion  Neurological: She is alert and oriented to person, place, and time  Skin: Skin is warm and dry  Psychiatric: She has a normal mood and affect  Her behavior is normal  Thought content normal          Results:  Labs:  Lab Results   Component Value Date    [id number] 5 750 (H) 03/11/2020          Imaging  No results found  I reviewed the above laboratory and imaging data  Discussion/Summary:  Benign thyroid nodule, with what appears to be postop hypothyroidism  Will start on Synthroid, and will rerefer to endocrinology team for further management  Follow up on a p r n  Basis

## 2020-03-16 NOTE — PROGRESS NOTES
Surgical Oncology Follow Up       [address] [facility] CANCER [facility] Teressa Askew  [address] [facility] Yoel Askew [place] [address]  [phone]    [facility] Number  [id number]  [id number]  [address] [facility] CANCER [facility] Teressa Askew  [address] [facility] [place] [id number] [phone]    Chief Complaint   Patient presents with    Follow-up     6 month follow up  Assessment/Plan:    No problem-specific Assessment & Plan notes found for this encounter  Diagnoses and all orders for this visit:    Status post partial thyroidectomy  -     Ambulatory referral to Endocrinology; Future  -     levothyroxine 112 mcg tablet; Take 1 tablet (112 mcg total) by mouth daily    Thyroid nodule    Other orders  -     gabapentin (NEURONTIN) 300 mg capsule; Take 600 mg by mouth daily at bedtime         Advance Care Planning/Advance Directives:  Discussed disease status, cancer treatment plans and/or cancer treatment goals with the patient  No history exists  History of Present Illness:  Patient is a 67-year-old woman here for 6 month follow-up status post hemithyroidectomy for turned out to be benign nodule  -Interval History:  Since surgery, she has felt somewhat fatigued and sluggish  She has also reported some degree of cold intolerance  Otherwise no constipation  She had blood work done in anticipation of today's visit  Review of Systems:  Review of Systems   Constitutional: Positive for fatigue  Cold intolerance   HENT: Negative  Eyes: Negative  Respiratory: Negative  Cardiovascular: Negative  Gastrointestinal: Negative  Endocrine: Positive for cold intolerance  Genitourinary: Negative  Musculoskeletal: Negative  Skin: Negative  Allergic/Immunologic: Negative  Neurological: Negative  Hematological: Negative  Psychiatric/Behavioral: Negative          Patient Active Problem List   Diagnosis    Closed fracture of right distal radius and ulna    Malignant tumor of right kidney parenchyma (HCC)    Primary osteoarthritis of left knee    Primary osteoarthritis of right knee    Chronic pain of left knee    Myalgia    Chest wall tenderness    Chronic heart failure (HCC)    Chronic atrial fibrillation    Thyroid nodule    Cervical spondylosis without myelopathy    Arthropathy of cervical facet joint    Occipital neuralgia of right side    Status post partial thyroidectomy    Myofascial pain syndrome     Past Medical History:   Diagnosis Date    A-fib [facility] [address]     Arthritis     Atrial fibrillation (HCC)     Cervical spondylosis with myelopathy     Gastric bypass status for obesity     History of transfusion     35 years ago    Hypertension     mass right kidney     Renal cell adenocarcinoma ([facility] [address] )     RIGHT    Sleep apnea      Past Surgical History:   Procedure Laterality Date    CHOLECYSTECTOMY      COLOSTOMY      CRYOABLATION Right     RT KIDNEY    CYSTORRHAPHY      Bladder  Last assessed 4/6/2016     GASTRIC BYPASS      HERNIA REPAIR      Last assessed 4/6/2016     HYSTERECTOMY      [place] OPEN RX DISTAL RADIUS FX, EXTRA-ARTICULAR Right 4/22/2016    Procedure: OPEN REDUCTION INTERNAL FIXATION RIGHT DISTAL RADIUS;  Surgeon: Abram Tucker MD;  [facility];  Service: Orthopedics    REVISION COLOSTOMY      THYROID LOBECTOMY Left 8/27/2019    Procedure: LOBECTOMY THYROID, left;  Surgeon: Judson Collazo MD;  [facility];  Service: Surgical Oncology    TUBAL LIGATION      US GUIDED THYROID BIOPSY  2/27/2019    US GUIDED THYROID BIOPSY  5/29/2019     Family History   Problem Relation Age of Onset    Heart attack Mother     Lung cancer Father 76    Heart disease Father     Stroke Sister     Lung cancer Sister 64    Prostate cancer Brother 61     Social History     Socioeconomic History    Marital status:       Spouse name: Not on file    Number of children: Not on file    Years of education: Not on file    Highest education level: Not on file   Occupational History    Not on file   Social Needs    Financial resource strain: Not on file    Food insecurity:     Worry: Not on file     Inability: Not on file    Transportation needs:     Medical: Not on file     Non-medical: Not on file   Tobacco Use    Smoking status: Never Smoker    Smokeless tobacco: Never Used   Substance and Sexual Activity    Alcohol use: Not Currently     Alcohol/week: 0 0 standard drinks     Frequency: Never     Drinks per session: Patient refused     Binge frequency: Never     Comment: 0    Drug use: No    Sexual activity: Not on file   Lifestyle    Physical activity:     Days per week: Not on file     Minutes per session: Not on file    Stress: Not on file   Relationships    Social connections:     Talks on phone: Not on file     Gets together: Not on file     Attends Scientologist service: Not on file     Active member of club or organization: Not on file     Attends meetings of clubs or organizations: Not on file     Relationship status: Not on file    Intimate partner violence:     Fear of current or ex partner: Not on file     Emotionally abused: Not on file     Physically abused: Not on file     Forced sexual activity: Not on file   Other Topics Concern    Not on file   Social History Narrative    ** Merged History Encounter **            Current Outpatient Medications:     diltiazem (CARDIZEM) 120 MG tablet, Take 120 mg by mouth 2 (two) times a day , Disp: , Rfl:     ergocalciferol (VITAMIN D2) 50,000 units, Take 50,000 Units by mouth once a week, Disp: , Rfl:     FLUoxetine (PROzac) 20 mg capsule, Take 20 mg by mouth 2 (two) times a day  , Disp: , Rfl:     gabapentin (NEURONTIN) 300 mg capsule, Take 600 mg by mouth daily at bedtime , Disp: , Rfl:     LORazepam (ATIVAN) 1 mg tablet, Take 1 mg by mouth daily at bedtime as needed for anxiety  , Disp: , Rfl:     oxybutynin (DITROPAN-XL) 5 mg 24 hr tablet, Take 5 mg by mouth 2 (two) times a day , Disp: , Rfl:     warfarin (COUMADIN) 1 mg tablet, Per pt sometimes she takes 6mg daily sometimes--per pt she does her own bw at home every 2 weeks  , Disp: , Rfl:     warfarin (COUMADIN) 5 mg tablet, Take 5 mg by mouth daily , Disp: , Rfl:     acetaminophen (TYLENOL) 325 mg tablet, Take 975 mg by mouth every 6 (six) hours as needed, Disp: , Rfl:     diclofenac sodium (VOLTAREN) 1 %, Apply 2 g topically 4 (four) times a day (Patient not taking: Reported on 3/16/2020), Disp: 240 g, Rfl: 1    enoxaparin (LOVENOX) 60 mg/0 6 mL, Inject 0 6 mL (60 mg total) under the skin daily for 7 doses (Patient not taking: Reported on 3/16/2020), Disp: 7 Syringe, Rfl: 0    levothyroxine 112 mcg tablet, Take 1 tablet (112 mcg total) by mouth daily, Disp: 30 tablet, Rfl: 3    metaxalone (SKELAXIN) 800 mg tablet, Take 1 tablet (800 mg total) by mouth 3 (three) times a day (Patient not taking: Reported on 3/16/2020), Disp: 20 tablet, Rfl: 0    methocarbamol (ROBAXIN) 750 mg tablet, Take 1 tablet (750 mg total) by mouth every 6 (six) hours as needed for muscle spasms (Patient not taking: Reported on 3/16/2020), Disp: 15 tablet, Rfl: 0  No Known Allergies  Vitals:    03/16/20 0800   BP: 140/80   Pulse: 75   Resp: 16   Temp: 97 6 °F (36 4 °C)       Physical Exam   Constitutional: She is oriented to person, place, and time  She appears well-developed and well-nourished  HENT:   Head: Normocephalic and atraumatic  Right Ear: External ear normal    Left Ear: External ear normal    Eyes: Pupils are equal, round, and reactive to light  EOM are normal    Neck: Normal range of motion  Neck supple  Cardiovascular: Normal rate and regular rhythm  Pulmonary/Chest: Effort normal and breath sounds normal    Abdominal: Soft  Bowel sounds are normal    Musculoskeletal: Normal range of motion  Neurological: She is alert and oriented to person, place, and time  Skin: Skin is warm and dry  Psychiatric: She has a normal mood and affect  Her behavior is normal  Thought content normal          Results:  Labs:  Lab Results   Component Value Date    BTX9FDOPMRMH 5 750 (H) 03/11/2020          Imaging  No results found  I reviewed the above laboratory and imaging data  Discussion/Summary:  Benign thyroid nodule, with what appears to be postop hypothyroidism  Will start on Synthroid, and will rerefer to endocrinology team for further management  Follow up on a p r n  Basis

## 2020-03-17 ENCOUNTER — OFFICE VISIT (OUTPATIENT)
Dept: ENDOCRINOLOGY | Facility: CLINIC | Age: 70
End: 2020-03-17
Payer: MEDICARE

## 2020-03-17 VITALS
HEIGHT: 65 IN | SYSTOLIC BLOOD PRESSURE: 140 MMHG | WEIGHT: 272.2 LBS | DIASTOLIC BLOOD PRESSURE: 80 MMHG | HEART RATE: 58 BPM | BODY MASS INDEX: 45.35 KG/M2

## 2020-03-17 DIAGNOSIS — R73.01 IMPAIRED FASTING GLUCOSE: ICD-10-CM

## 2020-03-17 DIAGNOSIS — R74.8 ELEVATED ALKALINE PHOSPHATASE LEVEL: ICD-10-CM

## 2020-03-17 DIAGNOSIS — E06.3 HASHIMOTO'S THYROIDITIS: Primary | ICD-10-CM

## 2020-03-17 DIAGNOSIS — R79.89 ELEVATED TSH: ICD-10-CM

## 2020-03-17 DIAGNOSIS — Z98.84 S/P BARIATRIC SURGERY: ICD-10-CM

## 2020-03-17 DIAGNOSIS — E28.319 EARLY MENOPAUSE OCCURRING IN PATIENT AGE YOUNGER THAN 45 YEARS: ICD-10-CM

## 2020-03-17 PROCEDURE — 99214 OFFICE O/P EST MOD 30 MIN: CPT | Performed by: INTERNAL MEDICINE

## 2020-03-17 NOTE — PROGRESS NOTES
Annie Roberts 78 y o  female MRN: [id number]    Encounter: [id number]      Assessment/Plan     Problem List Items Addressed This Visit        Endocrine    Hashimoto's thyroiditis - Primary     TSH is mildly elevated-she underwent left hemithyroidectomy for thyroid nodule last year  For now I am going to discontinue levothyroxine and repeat TSH and free T4 in 4 weeks  Symptoms are likely multifactorial and not related to mild thyroid dysfunction  She has been started on gabapentin in the recent past which could explain the worsening fatigue  If TSH is consistently elevated will consider low-dose levothyroxine         Relevant Orders    TSH, 3rd generation Lab Collect    T4, free Lab Collect    Impaired fasting glucose     Repeat fasting glucose as well as check hemoglobin A1c         Relevant Orders    Comprehensive metabolic panel Lab Collect    HEMOGLOBIN A1C W/ EAG ESTIMATION Lab Collect       Other    Early menopause occurring in patient age younger than 39 years    Relevant Orders    DXA bone density spine hip and pelvis    Elevated alkaline phosphatase level     Will check bone specific alkaline phosphatase-if elevated she may need workup for Paget's disease         Relevant Orders    Comprehensive metabolic panel Lab Collect    PTH, intact Lab Collect Lab Collect    Alkaline phosphatase, bone specific    Gamma GT    DXA bone density spine hip and pelvis    Phosphorus Lab Collect    Elevated TSH    Relevant Orders    TSH, 3rd generation Lab Collect    T4, free Lab Collect    DXA bone density spine hip and pelvis    S/P bariatric surgery     She has multiple risk factors for osteoporosis and fractures including premature menopause, bariatric surgery, prior history of fractures-will get a DEXA scan-also advised to take calcium  mg daily and continue vitamin-D supplementations           Relevant Orders    DXA bone density spine hip and pelvis        CC:   Elevated TSH    History of Present Illness HPI:  71-year-old female who underwent left hemithyroidectomy for a thyroid nodule last year-final pathology showed a benign nodule  She had recent labs done which showed an elevated TSH and thus she is here for follow-up  She was started on levothyroxine 112 mcg and has been on it for the past 2 days  C/o fatigue and cold intolerance for the past couple of months   Weight gain 2 lbs since last visit in June   No dysphagia   No palpitations   Has sleep apnea and uses CPAP  Has sleep apnea - uses CPAP    Started on Gabapentin 2 months back- for headaches -     History of A fib - on coumadin     She has never had a DEXA scan-has had  Ankle fracture - 15 years back requiring  surgery   Wrist fracture  Right - fell off curb - 12 years back  Left wrist fracture- 5 years back - fall at home    uses a cane at home - needs to use  chairs with arms to  be able to get out of chair - difficulty climbing stairs -complains of arthralgias an myalgias  Gastric bypass - 12 years back-  Lost 190 lbs - gained about a 100 back  hysterectomy total around age 26 for ovarian tumors ? ?-has never been on estrogen replacement        Review of Systems   Constitutional: Positive for fatigue  Eyes: Negative for visual disturbance  Respiratory: Negative for cough and shortness of breath  Cardiovascular: Negative for palpitations and leg swelling  Gastrointestinal: Negative for constipation, diarrhea, nausea and vomiting  Endocrine: Positive for cold intolerance  Musculoskeletal: Positive for arthralgias, myalgias and neck pain  Negative for gait problem  Skin: Negative for rash and wound  Psychiatric/Behavioral: Negative for confusion and sleep disturbance  The patient is not nervous/anxious  All other systems reviewed and are negative        Historical Information   Past Medical History:   Diagnosis Date    [facility]fib [facility] [address] [facility]     Arthritis     Atrial fibrillation (HCC)     Cervical spondylosis with myelopathy     Gastric bypass status for obesity     History of transfusion     35 years ago    Hypertension     mass right kidney     Renal cell adenocarcinoma ([address] )     RIGHT    Sleep apnea      Past Surgical History:   Procedure Laterality Date    CHOLECYSTECTOMY      COLOSTOMY      CRYOABLATION Right     RT KIDNEY    CYSTORRHAPHY      Bladder  Last assessed 4/6/2016     GASTRIC BYPASS      HERNIA REPAIR      Last assessed 4/6/2016     HYSTERECTOMY      [place] OPEN RX DISTAL RADIUS FX, EXTRA-ARTICULAR Right 4/22/2016    Procedure: OPEN REDUCTION INTERNAL FIXATION RIGHT DISTAL RADIUS;  Surgeon: Elton Jacob MD;  [facility];  Service: Orthopedics    REVISION COLOSTOMY      THYROID LOBECTOMY Left 8/27/2019    Procedure: LOBECTOMY THYROID, left;  Surgeon: Bautista Jiang MD;  [facility];  Service: Surgical Oncology    TUBAL LIGATION      US GUIDED THYROID BIOPSY  2/27/2019    US GUIDED THYROID BIOPSY  5/29/2019     Social History   Social History     Substance and Sexual Activity   Alcohol Use Not Currently    Alcohol/week: 0 0 standard drinks    Frequency: Never    Drinks per session: Patient refused    Binge frequency: Never    Comment: 0     Social History     Substance and Sexual Activity   Drug Use No     Social History     Tobacco Use   Smoking Status Never Smoker   Smokeless Tobacco Never Used     Family History:   Family History   Problem Relation Age of Onset    Heart attack Mother     Lung cancer Father 76    Heart disease Father     Stroke Sister     Lung cancer Sister 64    Prostate cancer Brother 61       Meds/Allergies   Current Outpatient Medications   Medication Sig Dispense Refill    acetaminophen (TYLENOL) 325 mg tablet Take 975 mg by mouth every 6 (six) hours as needed      diltiazem (CARDIZEM) 120 MG tablet Take 120 mg by mouth 2 (two) times a day        ergocalciferol (VITAMIN D2) 50,000 units Take 50,000 Units by mouth once a week      FLUoxetine (PROzac) 20 mg capsule Take 20 mg by mouth 2 (two) times a day        gabapentin (NEURONTIN) 300 mg capsule Take 600 mg by mouth daily at bedtime       LORazepam (ATIVAN) 1 mg tablet Take 1 mg by mouth daily at bedtime as needed for anxiety   oxybutynin (DITROPAN-XL) 5 mg 24 hr tablet Take 5 mg by mouth 2 (two) times a day       warfarin (COUMADIN) 1 mg tablet Per pt sometimes she takes 6mg daily sometimes--per pt she does her own bw at home every 2 weeks   warfarin (COUMADIN) 5 mg tablet Take 5 mg by mouth daily       diclofenac sodium (VOLTAREN) 1 % Apply 2 g topically 4 (four) times a day (Patient not taking: Reported on 3/16/2020) 240 g 1     No current facility-administered medications for this visit  No Known Allergies    Objective   Vitals: Blood pressure 140/80, pulse 58, height 5' 5" (1 651 m), weight 123 kg (272 lb 3 2 oz)  Physical Exam   Constitutional: She is oriented to person, place, and time  She appears well-developed and well-nourished  No distress  HENT:   Head: Normocephalic and atraumatic  Eyes: EOM are normal  No scleral icterus  Neck: Normal range of motion  Neck supple  No thyromegaly present  Cardiovascular: Normal rate, regular rhythm and normal heart sounds  No murmur heard  Pulmonary/Chest: Effort normal and breath sounds normal  No respiratory distress  She has no wheezes  She has no rales  Abdominal: Soft  Bowel sounds are normal  She exhibits no distension  There is no tenderness  There is no guarding  Musculoskeletal: Normal range of motion  She exhibits no edema or deformity  Lymphadenopathy:     She has no cervical adenopathy  Neurological: She is alert and oriented to person, place, and time  Skin: Skin is warm and dry  Psychiatric: She has a normal mood and affect  Her behavior is normal  Judgment and thought content normal    Vitals reviewed  The history was obtained from the review of the chart, patient      Lab Results:   Lab Results Component Value Date/Time    TSH 3RD GENERATON 5 750 (H) 03/11/2020 06:42 AM     Specimens  A Thyroid, Left, left thyroid lobe    Jareth Daley Final Diagnosis  A  Left thyroid, lobectomy:  - 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  - Parathyroid tissue  - Prior biopsy site changes  - One lymph node, negative for malignancy (0/1)  Comment: Immunohistochemistry for PTH is performed on tissue block A11 to help in the assessment of this case  Electronically signed by Sasha Mendoza MD on 9/4/2019 at [phone]  Imaging Studies:   Results for orders placed during the hospital encounter of 02/06/19   US thyroid    Impression Diffusely heterogeneous thyroid gland with a solitary left midpole nodule which meets current ACR criteria for recommending ultrasound guided biopsy:     The 2 1 x 2 1 x 1 5 cm left midpole nodule  (Image number [id number] / [id number] ) (CRITERIA: TR 5, Highly suspicious  FNA if > 1 cm  Reference: ACR Thyroid Imaging, Reporting and Data System (TI-RADS): White Paper of the [employer]  J AM Stu Radiol 0690;16:641-296  (additional recommendations based on American Thyroid Association 2015 guidelines )          SCRIBE ATTESTATION    I, Holli Peraza PA-C am acting as a scribe while in the presence of the attending physician  RON Galan  personally reviewed and interpreted the study in this report as scribed in my presence  Workstation performed: [id number]         I have personally reviewed pertinent reports  Portions of the record may have been created with voice recognition software  Occasional wrong word or "sound a like" substitutions may have occurred due to the inherent limitations of voice recognition software  Read the chart carefully and recognize, using context, where substitutions have occurred

## 2020-03-17 NOTE — ASSESSMENT & PLAN NOTE
TSH is mildly elevated-she underwent left hemithyroidectomy for thyroid nodule last year  For now I am going to discontinue levothyroxine and repeat TSH and free T4 in 4 weeks  Symptoms are likely multifactorial and not related to mild thyroid dysfunction  She has been started on gabapentin in the recent past which could explain the worsening fatigue    If TSH is consistently elevated will consider low-dose levothyroxine

## 2020-03-17 NOTE — ASSESSMENT & PLAN NOTE
She has multiple risk factors for osteoporosis and fractures including premature menopause, bariatric surgery, prior history of fractures-will get a DEXA scan-also advised to take calcium  mg daily and continue vitamin-D supplementations

## 2020-03-17 NOTE — LETTER
March 17, 2020     Abad Eastman[address] [address]    Patient: Deandra Craft   YOB: 1950   Date of Visit: 3/17/2020       Dear Dr Mary Jo Meredith:    Thank you for referring Charlyn Baumgarten to me for evaluation  Below are my notes for this consultation  If you have questions, please do not hesitate to call me  I look forward to following your patient along with you  Sincerely,        Felipe Negrete MD        CC: MD Felipe Nelson MD  3/17/2020  1:01 PM  Sign at close encounter   Deandra Craft 78 y o  female MRN: [id number]    Encounter: [id number]      Assessment/Plan     Problem List Items Addressed This Visit        Endocrine    Hashimoto's thyroiditis - Primary     TSH is mildly elevated-she underwent left hemithyroidectomy for thyroid nodule last year  For now I am going to discontinue levothyroxine and repeat TSH and free T4 in 4 weeks  Symptoms are likely multifactorial and not related to mild thyroid dysfunction  She has been started on gabapentin in the recent past which could explain the worsening fatigue    If TSH is consistently elevated will consider low-dose levothyroxine         Relevant Orders    TSH, 3rd generation Lab Collect    T4, free Lab Collect    Impaired fasting glucose     Repeat fasting glucose as well as check hemoglobin A1c         Relevant Orders    Comprehensive metabolic panel Lab Collect    HEMOGLOBIN A1C W/ EAG ESTIMATION Lab Collect       Other    Early menopause occurring in patient age younger than 39 years    Relevant Orders    DXA bone density spine hip and pelvis    Elevated alkaline phosphatase level     Will check bone specific alkaline phosphatase-if elevated she may need workup for Paget's disease         Relevant Orders    Comprehensive metabolic panel Lab Collect    PTH, intact Lab Collect Lab Collect    Alkaline phosphatase, bone specific    Gamma GT    DXA bone density spine hip and pelvis    Phosphorus Lab Collect    Elevated TSH    Relevant Orders    TSH, 3rd generation Lab Collect    T4, free Lab Collect    DXA bone density spine hip and pelvis    S/P bariatric surgery     She has multiple risk factors for osteoporosis and fractures including premature menopause, bariatric surgery, prior history of fractures-will get a DEXA scan-also advised to take calcium  mg daily and continue vitamin-D supplementations  Relevant Orders    DXA bone density spine hip and pelvis        CC:   Elevated TSH    History of Present Illness     HPI:  26-year-old female who underwent left hemithyroidectomy for a thyroid nodule last year-final pathology showed a benign nodule  She had recent labs done which showed an elevated TSH and thus she is here for follow-up  She was started on levothyroxine 112 mcg and has been on it for the past 2 days  C/o fatigue and cold intolerance for the past couple of months   Weight gain 2 lbs since last visit in June   No dysphagia   No palpitations   Has sleep apnea and uses CPAP  Has sleep apnea - uses CPAP    Started on Gabapentin 2 months back- for headaches -     History of A fib - on coumadin     She has never had a DEXA scan-has had  Ankle fracture - 15 years back requiring  surgery   Wrist fracture  Right - fell off curb - 12 years back  Left wrist fracture- 5 years back - fall at home    uses a cane at home - needs to use  chairs with arms to  be able to get out of chair - difficulty climbing stairs -complains of arthralgias an myalgias  Gastric bypass - 12 years back-  Lost 190 lbs - gained about a 100 back  hysterectomy total around age 26 for ovarian tumors ? ?-has never been on estrogen replacement        Review of Systems   Constitutional: Positive for fatigue  Eyes: Negative for visual disturbance  Respiratory: Negative for cough and shortness of breath  Cardiovascular: Negative for palpitations and leg swelling     Gastrointestinal: Negative for constipation, diarrhea, nausea and vomiting  Endocrine: Positive for cold intolerance  Musculoskeletal: Positive for arthralgias, myalgias and neck pain  Negative for gait problem  Skin: Negative for rash and wound  Psychiatric/Behavioral: Negative for confusion and sleep disturbance  The patient is not nervous/anxious  All other systems reviewed and are negative  Historical Information   Past Medical History:   Diagnosis Date    [facility]fib ([facility] )     Arthritis     Atrial fibrillation ([facility] )     Cervical spondylosis with myelopathy     Gastric bypass status for obesity     History of transfusion     35 years ago    Hypertension     mass right kidney     Renal cell adenocarcinoma ([facility] )     RIGHT    Sleep apnea      Past Surgical History:   Procedure Laterality Date    CHOLECYSTECTOMY      COLOSTOMY      CRYOABLATION Right     RT KIDNEY    CYSTORRHAPHY      Bladder   Last assessed 4/6/2016     GASTRIC BYPASS      HERNIA REPAIR      Last assessed 4/6/2016     HYSTERECTOMY      [place] OPEN RX DISTAL RADIUS FX, EXTRA-ARTICULAR Right 4/22/2016    Procedure: OPEN REDUCTION INTERNAL FIXATION RIGHT DISTAL RADIUS;  Surgeon: Fernando Nance MD;  [facility];  Service: Orthopedics    REVISION COLOSTOMY      THYROID LOBECTOMY Left 8/27/2019    Procedure: LOBECTOMY THYROID, left;  Surgeon: Gil Coto MD;  [facility];  Service: Surgical Oncology    TUBAL LIGATION      US GUIDED THYROID BIOPSY  2/27/2019    US GUIDED THYROID BIOPSY  5/29/2019     Social History   Social History     Substance and Sexual Activity   Alcohol Use Not Currently    Alcohol/week: 0 0 standard drinks    Frequency: Never    Drinks per session: Patient refused    Binge frequency: Never    Comment: 0     Social History     Substance and Sexual Activity   Drug Use No     Social History     Tobacco Use   Smoking Status Never Smoker   Smokeless Tobacco Never Used     Family History:   Family History   Problem Relation Age of Onset    Heart attack Mother     Lung cancer Father 76    Heart disease Father     Stroke Sister     Lung cancer Sister 64    Prostate cancer Brother 61       Meds/Allergies   Current Outpatient Medications   Medication Sig Dispense Refill    acetaminophen (TYLENOL) 325 mg tablet Take 975 mg by mouth every 6 (six) hours as needed      diltiazem (CARDIZEM) 120 MG tablet Take 120 mg by mouth 2 (two) times a day   ergocalciferol (VITAMIN D2) 50,000 units Take 50,000 Units by mouth once a week      FLUoxetine (PROzac) 20 mg capsule Take 20 mg by mouth 2 (two) times a day        gabapentin (NEURONTIN) 300 mg capsule Take 600 mg by mouth daily at bedtime       LORazepam (ATIVAN) 1 mg tablet Take 1 mg by mouth daily at bedtime as needed for anxiety   oxybutynin (DITROPAN-XL) 5 mg 24 hr tablet Take 5 mg by mouth 2 (two) times a day       warfarin (COUMADIN) 1 mg tablet Per pt sometimes she takes 6mg daily sometimes--per pt she does her own bw at home every 2 weeks   warfarin (COUMADIN) 5 mg tablet Take 5 mg by mouth daily       diclofenac sodium (VOLTAREN) 1 % Apply 2 g topically 4 (four) times a day (Patient not taking: Reported on 3/16/2020) 240 g 1     No current facility-administered medications for this visit  No Known Allergies    Objective   Vitals: Blood pressure 140/80, pulse 58, height 5' 5" (1 651 m), weight 123 kg (272 lb 3 2 oz)  Physical Exam   Constitutional: She is oriented to person, place, and time  She appears well-developed and well-nourished  No distress  HENT:   Head: Normocephalic and atraumatic  Eyes: EOM are normal  No scleral icterus  Neck: Normal range of motion  Neck supple  No thyromegaly present  Cardiovascular: Normal rate, regular rhythm and normal heart sounds  No murmur heard  Pulmonary/Chest: Effort normal and breath sounds normal  No respiratory distress  She has no wheezes  She has no rales  Abdominal: Soft   Bowel sounds are normal  She exhibits no distension  There is no tenderness  There is no guarding  Musculoskeletal: Normal range of motion  She exhibits no edema or deformity  Lymphadenopathy:     She has no cervical adenopathy  Neurological: She is alert and oriented to person, place, and time  Skin: Skin is warm and dry  Psychiatric: She has a normal mood and affect  Her behavior is normal  Judgment and thought content normal    Vitals reviewed  The history was obtained from the review of the chart, patient  Lab Results:   Lab Results   Component Value Date/Time    TSH 3RD GENERATON 5 750 (H) 03/11/2020 06:42 AM     Specimens  A Thyroid, Left, left thyroid lobe    Alexandro Coffey Final Diagnosis  A  Left thyroid, lobectomy:  - 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  - Parathyroid tissue  - Prior biopsy site changes  - One lymph node, negative for malignancy (0/1)  Comment: Immunohistochemistry for PTH is performed on tissue block A11 to help in the assessment of this case  Electronically signed by Mela Yost MD on 9/4/2019 at [phone]  Imaging Studies:   Results for orders placed during the hospital encounter of 02/06/19   US thyroid    Impression Diffusely heterogeneous thyroid gland with a solitary left midpole nodule which meets current ACR criteria for recommending ultrasound guided biopsy:     The 2 1 x 2 1 x 1 5 cm left midpole nodule  (Image number 10,[id number] / [id number] ) (CRITERIA: TR 5, Highly suspicious  FNA if > 1 cm  Reference: ACR Thyroid Imaging, Reporting and Data System (TI-RADS): White Paper of the Open CS  J AM Stu Radiol 7992;07:232-939  (additional recommendations based on American Thyroid Association 2015 guidelines )          SCRIBE ATTESTATION    I, Holli Peraza PA-C am acting as a scribe while in the presence of the attending physician  RON Cheema  personally reviewed and interpreted the study in this report as scribed in my presence       Workstation performed: [id number]         I have personally reviewed pertinent reports  Portions of the record may have been created with voice recognition software  Occasional wrong word or "sound a like" substitutions may have occurred due to the inherent limitations of voice recognition software  Read the chart carefully and recognize, using context, where substitutions have occurred

## 2020-05-29 ENCOUNTER — APPOINTMENT (OUTPATIENT)
Dept: LAB | Age: 70
End: 2020-05-29
Payer: MEDICARE

## 2020-05-29 ENCOUNTER — HOSPITAL ENCOUNTER (OUTPATIENT)
Dept: RADIOLOGY | Age: 70
Discharge: HOME | End: 2020-05-29
Payer: MEDICARE

## 2020-05-29 DIAGNOSIS — R73.01 IMPAIRED FASTING GLUCOSE: ICD-10-CM

## 2020-05-29 DIAGNOSIS — E28.319 EARLY MENOPAUSE OCCURRING IN PATIENT AGE YOUNGER THAN 45 YEARS: ICD-10-CM

## 2020-05-29 DIAGNOSIS — E06.3 HASHIMOTO'S THYROIDITIS: ICD-10-CM

## 2020-05-29 DIAGNOSIS — R74.8 ELEVATED ALKALINE PHOSPHATASE LEVEL: ICD-10-CM

## 2020-05-29 DIAGNOSIS — R79.89 ELEVATED TSH: ICD-10-CM

## 2020-05-29 DIAGNOSIS — Z98.84 S/P BARIATRIC SURGERY: ICD-10-CM

## 2020-05-29 LAB
ALBUMIN SERPL BCP-MCNC: 3.5 G/DL (ref 3.5–5)
ALP SERPL-CCNC: 199 U/L (ref 46–116)
ALT SERPL W P-5'-P-CCNC: 17 U/L (ref 12–78)
ANION GAP SERPL CALCULATED.3IONS-SCNC: 6 MMOL/L (ref 4–13)
AST SERPL W P-5'-P-CCNC: 21 U/L (ref 5–45)
BILIRUB SERPL-MCNC: 0.35 MG/DL (ref 0.2–1)
BUN SERPL-MCNC: 19 MG/DL (ref 5–25)
CALCIUM SERPL-MCNC: 8.9 MG/DL (ref 8.3–10.1)
CHLORIDE SERPL-SCNC: 108 MMOL/L (ref 100–108)
CO2 SERPL-SCNC: 27 MMOL/L (ref 21–32)
CREAT SERPL-MCNC: 0.97 MG/DL (ref 0.6–1.3)
GFR SERPL CREATININE-BSD FRML MDRD: 59 ML/MIN/1.73SQ M
GGT SERPL-CCNC: 17 U/L (ref 5–85)
GLUCOSE SERPL-MCNC: 98 MG/DL (ref 65–140)
PHOSPHATE SERPL-MCNC: 3.6 MG/DL (ref 2.3–4.1)
POTASSIUM SERPL-SCNC: 4.5 MMOL/L (ref 3.5–5.3)
PROT SERPL-MCNC: 6.9 G/DL (ref 6.4–8.2)
PTH-INTACT SERPL-MCNC: 109.2 PG/ML (ref 18.4–80.1)
SODIUM SERPL-SCNC: 141 MMOL/L (ref 136–145)
T4 FREE SERPL-MCNC: 0.91 NG/DL (ref 0.76–1.46)
TSH SERPL DL<=0.05 MIU/L-ACNC: 4.66 UIU/ML (ref 0.36–3.74)

## 2020-05-29 PROCEDURE — 80053 COMPREHEN METABOLIC PANEL: CPT

## 2020-05-29 PROCEDURE — 84100 ASSAY OF PHOSPHORUS: CPT

## 2020-05-29 PROCEDURE — 83970 ASSAY OF PARATHORMONE: CPT

## 2020-05-29 PROCEDURE — 36415 COLL VENOUS BLD VENIPUNCTURE: CPT

## 2020-05-29 PROCEDURE — 84439 ASSAY OF FREE THYROXINE: CPT

## 2020-05-29 PROCEDURE — 82977 ASSAY OF GGT: CPT

## 2020-05-29 PROCEDURE — 84080 ASSAY ALKALINE PHOSPHATASES: CPT

## 2020-05-29 PROCEDURE — 83036 HEMOGLOBIN GLYCOSYLATED A1C: CPT

## 2020-05-29 PROCEDURE — 77080 DXA BONE DENSITY AXIAL: CPT

## 2020-05-29 PROCEDURE — 84443 ASSAY THYROID STIM HORMONE: CPT

## 2020-05-30 LAB
EST. AVERAGE GLUCOSE BLD GHB EST-MCNC: 120 MG/DL
HBA1C MFR BLD: 5.8 %

## 2020-06-02 LAB — ALP BONE SERPL-MCNC: 48.2 UG/L

## 2020-06-03 ENCOUNTER — TELEPHONE (OUTPATIENT)
Dept: ENDOCRINOLOGY | Facility: CLINIC | Age: 70
End: 2020-06-03

## 2020-06-03 RX ORDER — OXYBUTYNIN CHLORIDE 5 MG/1
10 TABLET ORAL DAILY
COMMUNITY
Start: 2020-05-23 | End: 2020-12-30 | Stop reason: ALTCHOICE

## 2020-06-04 ENCOUNTER — OFFICE VISIT (OUTPATIENT)
Dept: ENDOCRINOLOGY | Facility: CLINIC | Age: 70
End: 2020-06-04
Payer: MEDICARE

## 2020-06-04 VITALS
SYSTOLIC BLOOD PRESSURE: 140 MMHG | WEIGHT: 281.4 LBS | DIASTOLIC BLOOD PRESSURE: 80 MMHG | TEMPERATURE: 96.5 F | HEART RATE: 75 BPM | BODY MASS INDEX: 46.88 KG/M2 | HEIGHT: 65 IN

## 2020-06-04 DIAGNOSIS — Z98.84 S/P BARIATRIC SURGERY: ICD-10-CM

## 2020-06-04 DIAGNOSIS — R73.01 IMPAIRED FASTING GLUCOSE: Primary | ICD-10-CM

## 2020-06-04 DIAGNOSIS — E21.3 HYPERPARATHYROIDISM (HCC): ICD-10-CM

## 2020-06-04 DIAGNOSIS — E04.1 THYROID NODULE: ICD-10-CM

## 2020-06-04 DIAGNOSIS — R74.8 ELEVATED ALKALINE PHOSPHATASE LEVEL: ICD-10-CM

## 2020-06-04 DIAGNOSIS — E89.0 STATUS POST PARTIAL THYROIDECTOMY: ICD-10-CM

## 2020-06-04 DIAGNOSIS — M81.0 OSTEOPOROSIS, UNSPECIFIED OSTEOPOROSIS TYPE, UNSPECIFIED PATHOLOGICAL FRACTURE PRESENCE: ICD-10-CM

## 2020-06-04 DIAGNOSIS — R79.89 ELEVATED TSH: ICD-10-CM

## 2020-06-04 PROCEDURE — 99214 OFFICE O/P EST MOD 30 MIN: CPT | Performed by: INTERNAL MEDICINE

## 2020-06-08 ENCOUNTER — TELEPHONE (OUTPATIENT)
Dept: ENDOCRINOLOGY | Facility: CLINIC | Age: 70
End: 2020-06-08

## 2020-06-25 ENCOUNTER — CLINICAL SUPPORT (OUTPATIENT)
Dept: ENDOCRINOLOGY | Facility: CLINIC | Age: 70
End: 2020-06-25
Payer: MEDICARE

## 2020-06-25 ENCOUNTER — TELEPHONE (OUTPATIENT)
Dept: ENDOCRINOLOGY | Facility: CLINIC | Age: 70
End: 2020-06-25

## 2020-06-25 DIAGNOSIS — M81.0 OSTEOPOROSIS, UNSPECIFIED OSTEOPOROSIS TYPE, UNSPECIFIED PATHOLOGICAL FRACTURE PRESENCE: ICD-10-CM

## 2020-06-25 DIAGNOSIS — M81.0 OSTEOPOROSIS, UNSPECIFIED OSTEOPOROSIS TYPE, UNSPECIFIED PATHOLOGICAL FRACTURE PRESENCE: Primary | ICD-10-CM

## 2020-06-25 PROCEDURE — 96372 THER/PROPH/DIAG INJ SC/IM: CPT | Performed by: INTERNAL MEDICINE

## 2020-07-13 ENCOUNTER — TELEPHONE (OUTPATIENT)
Dept: UROLOGY | Facility: AMBULATORY SURGERY CENTER | Age: 70
End: 2020-07-13

## 2020-07-13 NOTE — TELEPHONE ENCOUNTER
Patient managed by Beryle [place] is calling to say she will be getting her testing done but will be out of town for her appointment on 7/31/20  She will call to get her results over the phone and schedule at a later time if needed   ekaterina

## 2020-07-17 ENCOUNTER — HOSPITAL ENCOUNTER (OUTPATIENT)
Dept: CT IMAGING | Facility: HOSPITAL | Age: 70
Discharge: HOME | End: 2020-07-17
Payer: MEDICARE

## 2020-07-17 DIAGNOSIS — C64.1 RENAL CELL CARCINOMA OF RIGHT KIDNEY (HCC): ICD-10-CM

## 2020-07-17 PROCEDURE — 74178 CT ABD&PLV WO CNTR FLWD CNTR: CPT

## 2020-07-17 RX ADMIN — IOHEXOL 100 ML: 350 INJECTION, SOLUTION INTRAVENOUS at 07:21

## 2020-07-21 ENCOUNTER — TELEPHONE (OUTPATIENT)
Dept: UROLOGY | Facility: CLINIC | Age: 70
End: 2020-07-21

## 2020-07-21 NOTE — TELEPHONE ENCOUNTER
Result Notes for CT abdomen pelvis w wo contrast     Notes recorded by Gisella Robles PA-C on 7/21/2020 at 2:48 PM EDT  Patient does not currently have follow-up   Please help arrange to discuss imaging   No concerning findings  Appt scheduled for 7/23/20 with Sanjay Hernandez in [facility]  Offered appointment in August but unable to make due to having cataract surgery

## 2020-07-21 NOTE — TELEPHONE ENCOUNTER
Patient returned call  Confirmed appointment for 7/23  Would like a nurse to call her ASAP tomorrow       She can be reached at [phone]

## 2020-07-22 NOTE — TELEPHONE ENCOUNTER
Called and spoke to patient at this time  Patient wanting to know if there is something she should be worried about with her imaging  Advised per provider there was no concerning findings on the imaging      Patient happy to hear and confirmed appt for zackery

## 2020-07-23 ENCOUNTER — OFFICE VISIT (OUTPATIENT)
Dept: UROLOGY | Facility: CLINIC | Age: 70
End: 2020-07-23
Payer: MEDICARE

## 2020-07-23 VITALS
TEMPERATURE: 97.4 F | SYSTOLIC BLOOD PRESSURE: 142 MMHG | DIASTOLIC BLOOD PRESSURE: 72 MMHG | BODY MASS INDEX: 49.85 KG/M2 | HEIGHT: 63 IN | HEART RATE: 72 BPM

## 2020-07-23 DIAGNOSIS — C64.1 RENAL CELL CARCINOMA OF RIGHT KIDNEY (HCC): Primary | ICD-10-CM

## 2020-07-23 PROCEDURE — 99214 OFFICE O/P EST MOD 30 MIN: CPT | Performed by: PHYSICIAN ASSISTANT

## 2020-07-23 NOTE — PROGRESS NOTES
Assessment and plan:       1  Renal cell carcinoma status post right cryotherapy (05/11/2016) - Dr Katelin Kendall  - Renal ultrasound remains benign, creatinine remains stable at 0 88  - Patient continues to feel well      2  Urinary frequency and urgency  - She continues to take oxybutynin 5 mg twice daily with significant symptom improvement  -  We discussed that her urinary issues may actually be more from stress urinary incontinence and patient has been given a referral to pelvic floor physical therapy  - She will actually try discontinuing the oxybutynin and seeing if her symptoms get worse  If they do, she is welcome to restart this medication   - I also encouraged timed voiding throughout the afternoon  She will return in one year with CT of abdomen and pelvis and chest x-ray  That will ashutosh 5 years from her procedure and further follow up can be discussed  Vesta White PA-C      Chief Complaint     Renal cell carcinoma s/p ablation and urinary incontinence      History of Present Illness     Kaitlyn Schmidt is a 78 y o  female patient with a history of renal cell carcinoma status post right cryotherapy (5/11/2016) presenting today for 1 year follow-up      She denies any gross hematuria  She does continue to suffer from urinary frequency, urgency, and incontinence  she has been taking oxybutynin 5 mg b i d  She reports that her primary complaint at this time is urinary leakage with moving from sitting to standing after an extended period of time  She reports that she urinates every hour in the morning after drinking coffee but then only once or twice throughout the rest of the day  She also has significant leakage when getting up in the morning      She continues to have bilateral knee pain from osteoarthritis    Patient is not interested in arthroplasties      CT scan from from 7/17/2020 revealed no evidence of disease recurrence      Renal ultrasound completed 07/12/2019 was negative for any concerning findings      Basic metabolic panel was completed on 07/09/2020 with a creatinine of 0 90 and and eGFR of 65  Laboratory     Lab Results   Component Value Date    CREATININE 0 97 05/29/2020       No results found for: PSA    No results found for this or any previous visit (from the past 1 hour(s))  Review of Systems     Review of Systems   Constitutional: Negative for chills and fever  HENT: Negative  Eyes: Negative  Respiratory: Negative for cough and shortness of breath  Cardiovascular: Negative for chest pain  Gastrointestinal: Negative for constipation, diarrhea, nausea and vomiting  Genitourinary: Negative for difficulty urinating, dyspareunia, dysuria, enuresis, flank pain, frequency, hematuria and urgency  Musculoskeletal: Positive for arthralgias  Skin: Negative  Allergies     No Known Allergies    Physical Exam     Physical Exam   Constitutional: She is oriented to person, place, and time  She appears well-developed and well-nourished  No distress  HENT:   Head: Normocephalic and atraumatic  Right Ear: External ear normal    Left Ear: External ear normal    Nose: Nose normal    Eyes: Right eye exhibits no discharge  Left eye exhibits no discharge  No scleral icterus  Cardiovascular: Normal rate  Pulmonary/Chest: Effort normal    Musculoskeletal:   Ambulates with a cane  Neurological: She is alert and oriented to person, place, and time  Skin: Skin is warm and dry  She is not diaphoretic  Psychiatric: She has a normal mood and affect  Her behavior is normal  Judgment and thought content normal    Nursing note and vitals reviewed          Vital Signs     Vitals:    07/23/20 0821   BP: 142/72   Pulse: 72   Temp: (!) 97 4 °F (36 3 °C)   Height: 5' 3" (1 6 m)         Current Medications       Current Outpatient Medications:     acetaminophen (TYLENOL) 325 mg tablet, Take 975 mg by mouth every 6 (six) hours as needed, Disp: , Rfl:    diltiazem (CARDIZEM) 120 MG tablet, Take 120 mg by mouth 2 (two) times a day , Disp: , Rfl:     ergocalciferol (VITAMIN D2) 50,000 units, Take 50,000 Units by mouth once a week, Disp: , Rfl:     FLUoxetine (PROzac) 20 mg capsule, Take 20 mg by mouth 2 (two) times a day  , Disp: , Rfl:     gabapentin (NEURONTIN) 300 mg capsule, Take 600 mg by mouth daily at bedtime , Disp: , Rfl:     LORazepam (ATIVAN) 1 mg tablet, Take 1 mg by mouth daily at bedtime as needed for anxiety  , Disp: , Rfl:     oxybutynin (DITROPAN) 5 mg tablet, Take 10 mg by mouth daily, Disp: , Rfl:     warfarin (COUMADIN) 1 mg tablet, Per pt sometimes she takes 6mg daily sometimes--per pt she does her own bw at home every 2 weeks  , Disp: , Rfl:     warfarin (COUMADIN) 5 mg tablet, Take 5 mg by mouth daily , Disp: , Rfl:     diclofenac sodium (VOLTAREN) 1 %, Apply 2 g topically 4 (four) times a day (Patient not taking: Reported on 3/16/2020), Disp: 240 g, Rfl: 1    Current Facility-Administered Medications:     denosumab (PROLIA) subcutaneous injection 60 mg, 60 mg, Subcutaneous, Q6 Months, Ann-Marie Neville MD, 60 mg at 06/25/20 1009      Active Problems     Patient Active Problem List   Diagnosis    Closed fracture of right distal radius and ulna    [facility]ignant tumor of [facility] parenchyma [facility]    Primary osteoarthritis of left knee    Primary osteoarthritis of right knee    Chronic pain of left knee    Myalgia    Chest wall tenderness    Chronic heart failure (HCC)    Chronic atrial fibrillation (HCC)    Thyroid nodule    Cervical spondylosis without myelopathy    Arthropathy of cervical facet joint    Occipital neuralgia of right side    Status post partial thyroidectomy    Myofascial pain syndrome    Hashimoto's thyroiditis    Impaired fasting glucose    Elevated TSH    Elevated alkaline phosphatase level    Early menopause occurring in patient age younger than 39 years   Ori Arteaga S/P bariatric surgery    Osteoporosis    Hyperparathyroidism [facility])         Past Medical History     Past Medical History:   Diagnosis Date    [facility]fib [facility]     Arthritis     Atrial fibrillation ([facility]     Cervical spondylosis with myelopathy     Gastric bypass status for obesity     History of transfusion     35 years ago    Hypertension     mass right kidney     Renal cell adenocarcinoma ([facility]     RIGHT    Sleep apnea          Surgical History     Past Surgical History:   Procedure Laterality Date    CHOLECYSTECTOMY      COLOSTOMY      CRYOABLATION Right     RT KIDNEY    CYSTORRHAPHY      Bladder   Last assessed 4/6/2016     GASTRIC BYPASS      HERNIA REPAIR      Last assessed 4/6/2016     HYSTERECTOMY      [place] OPEN RX DISTAL RADIUS FX, EXTRA-ARTICULAR Right 4/22/2016    Procedure: OPEN REDUCTION INTERNAL FIXATION RIGHT DISTAL RADIUS;  Surgeon: Tanner Hess MD;  [facility];  Service: Orthopedics    REVISION COLOSTOMY      THYROID LOBECTOMY Left 8/27/2019    Procedure: LOBECTOMY THYROID, left;  Surgeon: Amira Caldera MD;  [facility];  Service: Surgical Oncology    TUBAL LIGATION      US GUIDED THYROID BIOPSY  2/27/2019    US GUIDED THYROID BIOPSY  5/29/2019         Family History     Family History   Problem Relation Age of Onset    Heart attack Mother     Lung cancer Father 76    Heart disease Father     Stroke Sister     Lung cancer Sister 64    Prostate cancer Brother 61         Social History     Social History       Radiology     CT ABDOMEN AND PELVIS WITH AND WITHOUT IV CONTRAST     INDICATION:   C64 1: Malignant neoplasm of right kidney, except renal pelvis      COMPARISON:  1/24/2019     TECHNIQUE: Initial CT of the abdomen and pelvis was performed without intravenous contrast   Subsequent dynamic CT evaluation of the abdomen and pelvis was performed after the administration of intravenous contrast in both nephrographic and delayed   phases after the administration of intravenous contrast   Axial, sagittal, and coronal 2D reformatted images were created from the source data and submitted for interpretation       Radiation dose length product (DLP) for this visit:  4019 mGy-cm   This examination, like all CT scans performed in the [facility], was performed utilizing techniques to minimize radiation dose exposure, including the use of iterative   reconstruction and automated exposure control      IV Contrast:  100 mL of iohexol (OMNIPAQUE)  Enteric Contrast:  Enteric contrast was not administered      FINDINGS:     ABDOMEN     RIGHT KIDNEY AND URETER:  No solid renal mass  No detectable urothelial mass  Right kidney has a lobulated contour with areas of cortical thinning which appears stable from prior study  No hydronephrosis or hydroureter  No urinary tract calculi  No perinephric collection      LEFT KIDNEY AND URETER:  No solid renal mass  No detectable urothelial mass  A partially exophytic cystic-appearing lesion is present at the upper pole measures 1 4 cm diameter and is stable from prior study  No hydronephrosis or hydroureter  No urinary tract calculi  No perinephric collection      URINARY BLADDER:  No bladder wall mass  No calculi         LOWER CHEST:  No clinically significant abnormality identified in the visualized lower chest      LIVER/BILIARY TREE:  Mild biliary prominence likely secondary to cholecystectomy  No masses are seen      GALLBLADDER:  Gallbladder is surgically absent      SPLEEN:  Unremarkable      PANCREAS:  Unremarkable      ADRENAL GLANDS:  Unremarkable      STOMACH AND BOWEL:  Patient is status post gastric bypass  No complications appreciated  No bowel obstruction  Minimal colonic diverticulosis      ABDOMINOPELVIC CAVITY:  No ascites  No free intraperitoneal air   No lymphadenopathy      VESSELS:  Unremarkable for patient's age      PELVIS     REPRODUCTIVE ORGANS:  Patient is status post hysterectomy      APPENDIX: No findings to suggest appendicitis      ABDOMINAL WALL/INGUINAL REGIONS:  There is anterior abdominal wall hernia near the midline in the lower abdomen  The neck of the hernia sac is 4 4 cm diameter  Bowel and fat are visible within the hernia sac  Inguinal regions intact      OSSEOUS STRUCTURES:  Osseous structures appear stable from prior exam      IMPRESSION:     Cystic-appearing lesion at the upper pole of the left kidney is stable  Areas of renal cortical scarring on the right are stable  No suspicious renal masses seen      No evidence of metastatic disease      Cholecystectomy and hysterectomy and gastric bypass are noted      Anterior abdominal wall hernia containing fat and bowel, but no evidence of obstruction of the bowel  Minimal colonic diverticulosis noted

## 2020-09-18 ENCOUNTER — TELEPHONE (OUTPATIENT)
Dept: PAIN MEDICINE | Facility: CLINIC | Age: 70
End: 2020-09-18

## 2020-12-29 ENCOUNTER — TELEPHONE (OUTPATIENT)
Dept: ENDOCRINOLOGY | Facility: CLINIC | Age: 70
End: 2020-12-29

## 2020-12-29 ENCOUNTER — LAB (OUTPATIENT)
Dept: LAB | Age: 70
End: 2020-12-29
Payer: MEDICARE

## 2020-12-29 DIAGNOSIS — R74.8 ELEVATED ALKALINE PHOSPHATASE LEVEL: ICD-10-CM

## 2020-12-29 DIAGNOSIS — R73.01 IMPAIRED FASTING GLUCOSE: ICD-10-CM

## 2020-12-29 DIAGNOSIS — E04.1 THYROID NODULE: ICD-10-CM

## 2020-12-29 DIAGNOSIS — E21.3 HYPERPARATHYROIDISM (HCC): ICD-10-CM

## 2020-12-29 DIAGNOSIS — M81.0 OSTEOPOROSIS, UNSPECIFIED OSTEOPOROSIS TYPE, UNSPECIFIED PATHOLOGICAL FRACTURE PRESENCE: ICD-10-CM

## 2020-12-29 LAB
25(OH)D3 SERPL-MCNC: 41.9 NG/ML (ref 30–100)
ALBUMIN SERPL BCP-MCNC: 3.3 G/DL (ref 3.5–5)
ALP SERPL-CCNC: 152 U/L (ref 46–116)
ALT SERPL W P-5'-P-CCNC: 22 U/L (ref 12–78)
ANION GAP SERPL CALCULATED.3IONS-SCNC: 5 MMOL/L (ref 4–13)
AST SERPL W P-5'-P-CCNC: 21 U/L (ref 5–45)
BILIRUB SERPL-MCNC: 0.24 MG/DL (ref 0.2–1)
BUN SERPL-MCNC: 15 MG/DL (ref 5–25)
CALCIUM ALBUM COR SERPL-MCNC: 9.7 MG/DL (ref 8.3–10.1)
CALCIUM SERPL-MCNC: 9.1 MG/DL (ref 8.3–10.1)
CHLORIDE SERPL-SCNC: 108 MMOL/L (ref 100–108)
CO2 SERPL-SCNC: 30 MMOL/L (ref 21–32)
CREAT SERPL-MCNC: 0.92 MG/DL (ref 0.6–1.3)
EST. AVERAGE GLUCOSE BLD GHB EST-MCNC: 120 MG/DL
GFR SERPL CREATININE-BSD FRML MDRD: 63 ML/MIN/1.73SQ M
GLUCOSE SERPL-MCNC: 53 MG/DL (ref 65–140)
HBA1C MFR BLD: 5.8 %
PHOSPHATE SERPL-MCNC: 2.7 MG/DL (ref 2.3–4.1)
POTASSIUM SERPL-SCNC: 4 MMOL/L (ref 3.5–5.3)
PROT SERPL-MCNC: 6.8 G/DL (ref 6.4–8.2)
PTH-INTACT SERPL-MCNC: 64.3 PG/ML (ref 18.4–80.1)
SODIUM SERPL-SCNC: 143 MMOL/L (ref 136–145)
T4 FREE SERPL-MCNC: 0.78 NG/DL (ref 0.76–1.46)
TSH SERPL DL<=0.05 MIU/L-ACNC: 4.11 UIU/ML (ref 0.36–3.74)

## 2020-12-29 PROCEDURE — 36415 COLL VENOUS BLD VENIPUNCTURE: CPT

## 2020-12-29 PROCEDURE — 83970 ASSAY OF PARATHORMONE: CPT

## 2020-12-29 PROCEDURE — 80053 COMPREHEN METABOLIC PANEL: CPT

## 2020-12-29 PROCEDURE — 84439 ASSAY OF FREE THYROXINE: CPT

## 2020-12-29 PROCEDURE — 84443 ASSAY THYROID STIM HORMONE: CPT

## 2020-12-29 PROCEDURE — 84100 ASSAY OF PHOSPHORUS: CPT

## 2020-12-29 PROCEDURE — 83036 HEMOGLOBIN GLYCOSYLATED A1C: CPT

## 2020-12-29 PROCEDURE — 82306 VITAMIN D 25 HYDROXY: CPT

## 2020-12-30 ENCOUNTER — TELEMEDICINE (OUTPATIENT)
Dept: ENDOCRINOLOGY | Facility: CLINIC | Age: 70
End: 2020-12-30
Payer: MEDICARE

## 2020-12-30 ENCOUNTER — TELEPHONE (OUTPATIENT)
Dept: ENDOCRINOLOGY | Facility: CLINIC | Age: 70
End: 2020-12-30

## 2020-12-30 DIAGNOSIS — E21.3 HYPERPARATHYROIDISM (HCC): ICD-10-CM

## 2020-12-30 DIAGNOSIS — E06.3 HASHIMOTO'S THYROIDITIS: ICD-10-CM

## 2020-12-30 DIAGNOSIS — M81.0 OSTEOPOROSIS, UNSPECIFIED OSTEOPOROSIS TYPE, UNSPECIFIED PATHOLOGICAL FRACTURE PRESENCE: ICD-10-CM

## 2020-12-30 DIAGNOSIS — R73.01 IMPAIRED FASTING GLUCOSE: Primary | ICD-10-CM

## 2020-12-30 DIAGNOSIS — E16.1 REACTIVE HYPOGLYCEMIA: ICD-10-CM

## 2020-12-30 PROCEDURE — 99213 OFFICE O/P EST LOW 20 MIN: CPT | Performed by: PHYSICIAN ASSISTANT

## 2020-12-31 PROBLEM — E04.1 THYROID NODULE: Status: RESOLVED | Noted: 2019-03-06 | Resolved: 2020-12-31

## 2021-01-04 ENCOUNTER — TELEPHONE (OUTPATIENT)
Dept: ENDOCRINOLOGY | Facility: CLINIC | Age: 71
End: 2021-01-04

## 2021-01-04 ENCOUNTER — CLINICAL SUPPORT (OUTPATIENT)
Dept: ENDOCRINOLOGY | Facility: CLINIC | Age: 71
End: 2021-01-04
Payer: MEDICARE

## 2021-01-04 DIAGNOSIS — M81.0 OSTEOPOROSIS, UNSPECIFIED OSTEOPOROSIS TYPE, UNSPECIFIED PATHOLOGICAL FRACTURE PRESENCE: ICD-10-CM

## 2021-01-04 PROCEDURE — 96372 THER/PROPH/DIAG INJ SC/IM: CPT

## 2021-01-04 NOTE — PROGRESS NOTES
Patient had Prolia SQ injection , given in right arm  Patient otlerated injection well, consent and ABN  signed and scan

## 2021-03-01 ENCOUNTER — TELEPHONE (OUTPATIENT)
Dept: OBGYN CLINIC | Facility: HOSPITAL | Age: 71
End: 2021-03-01

## 2021-03-01 NOTE — TELEPHONE ENCOUNTER
Dr Navarro    [phone]    Patient is requesting an order for a lift chair and a scooter  Please advise

## 2021-03-02 DIAGNOSIS — M17.10 PRIMARY OSTEOARTHRITIS OF KNEE, UNSPECIFIED LATERALITY: Primary | ICD-10-CM

## 2021-03-02 NOTE — TELEPHONE ENCOUNTER
This will need discussed with Mr Jorge Coon,       patient has limited  Agility  From her osteoarthritis,   However I need to review the product range, given her significant BMI  in the past I have successfully completed scooters,  For patient's that supply the ,  or company they desire  discussion will occur between Mr Sandie Muñiz and myself

## 2021-03-10 DIAGNOSIS — Z23 ENCOUNTER FOR IMMUNIZATION: ICD-10-CM

## 2021-04-26 NOTE — OP NOTE
OPERATIVE REPORT  PATIENT NAME: Norma Kumari    :  1950  MRN: [id number]  Pt Location: [facility] OR ROOM 06    SURGERY DATE: 2019    Surgeon(s) and Role:     * Dwain Deluca MD - Primary     * Shelby Martino MD - Assisting    Preop Diagnosis:  Thyroid nodule [E04 1]    Post-Op Diagnosis Codes: * Thyroid nodule [E04 1]    Procedure(s) (LRB):  LOBECTOMY THYROID, left (Left)    Specimen(s):  ID Type Source Tests Collected by Time Destination   1 : left thyroid lobe Tissue Thyroid, Left TISSUE EXAM Dwain Deluca MD 2019 1705        Estimated Blood Loss:   Minimal    Drains:  * No LDAs found *    Anesthesia Type:   General    Operative Indications:  Thyroid nodule [E04 1]  left    Operative Findings:  Calcified left thyroid nodule    Complications:   None    Procedure and Technique:  The patient was brought into the operating room and identified by a proper timeout  Following this she was intubated by the anesthesia team   She was then positioned with a shoulder roll behind the scapulae and the head in the sniffing position  The neck was then prepped and draped in the usual fashion  Following this, the skin at and around the planned cervical incision site was anesthetized with lidocaine  Next, a 4 cm incision was made 2 fingerbreadths above the sternal notch  Cautery was used to dissect through the dermis and subcutaneous fat  The platysma was transected with cautery  We then created flaps superiorly and inferiorly underneath the platysma  Gelpi retractors were then placed for exposure  We then proceeded to look for the strap muscles  We were able to visualize strap muscles and mobilized them off the anterolateral aspect of the left thyroid lobe  Using traction on the superior pole we were able to take down the superior pole vessels with Harmonic Scalpel  We rotated the gland anteromedially    We saw what appeared to be the parathyroid glands which were visualized and preserved by means of close capsular dissection using bipolar device  We visualized recurrent laryngeal nerve as we rolled the small lobe forward  The ligament of berry was then clamped and tied off with a 2-0 vicryl suture  The lobe was then freed from anterior surface of the trachea with cautery  The isthmus was clamped off, divided, and oversewn with a 2-0 vicryl suture in running fashion  The lobe was then sent to pathology for processing  Valsalva was applied in the operative field inspected for bleeding  None was seen  Surgicel then Seprafilm was placed in the operative field  Once we were sure of hemostasis, closure was performed using 3-0 Vicryl to close the strap muscles in the midline, followed by running 3-0 Vicryl to close the platysma, followed by a 4-0 vicryl to close the dermis in interrupted fashion, followed by 5-0 Monocryl placed in running subcuticular fashion to close the skin  Benzoin and steristrips were applied to the incision, followed by gauze and a blue towel  The patient tolerated the procedure well without any difficulties     I was present for the entire procedure    Patient Disposition:  PACU     SIGNATURE: Payal Peterson MD  DATE: August 27, 2019  TIME: 9:54 AM Coronary artery disease of native artery of native heart with stable angina pectoris Coronary artery disease of native artery of native heart with stable angina pectoris Coronary artery disease of native artery of native heart with stable angina pectoris Coronary artery disease of native artery of native heart with stable angina pectoris

## 2021-06-29 ENCOUNTER — APPOINTMENT (OUTPATIENT)
Dept: LAB | Age: 71
End: 2021-06-29
Payer: MEDICARE

## 2021-06-29 DIAGNOSIS — Z98.84 BARIATRIC SURGERY STATUS: ICD-10-CM

## 2021-06-29 DIAGNOSIS — E55.9 VITAMIN D DEFICIENCY, UNSPECIFIED: ICD-10-CM

## 2021-06-29 DIAGNOSIS — E06.3 HASHIMOTO'S THYROIDITIS: ICD-10-CM

## 2021-06-29 DIAGNOSIS — R73.01 IMPAIRED FASTING GLUCOSE: ICD-10-CM

## 2021-06-29 DIAGNOSIS — I10 ESSENTIAL HYPERTENSION, MALIGNANT: ICD-10-CM

## 2021-06-29 LAB
25(OH)D3 SERPL-MCNC: 56.4 NG/ML (ref 30–100)
ALBUMIN SERPL BCP-MCNC: 3.3 G/DL (ref 3.5–5)
ALP SERPL-CCNC: 146 U/L (ref 46–116)
ALT SERPL W P-5'-P-CCNC: 22 U/L (ref 12–78)
ANION GAP SERPL CALCULATED.3IONS-SCNC: 6 MMOL/L (ref 4–13)
AST SERPL W P-5'-P-CCNC: 24 U/L (ref 5–45)
BASOPHILS # BLD AUTO: 0.06 THOUSANDS/ΜL (ref 0–0.1)
BASOPHILS NFR BLD AUTO: 1 % (ref 0–1)
BILIRUB SERPL-MCNC: 0.5 MG/DL (ref 0.2–1)
BUN SERPL-MCNC: 22 MG/DL (ref 5–25)
CALCIUM ALBUM COR SERPL-MCNC: 9.6 MG/DL (ref 8.3–10.1)
CALCIUM SERPL-MCNC: 9 MG/DL (ref 8.3–10.1)
CHLORIDE SERPL-SCNC: 104 MMOL/L (ref 100–108)
CHOLEST SERPL-MCNC: 187 MG/DL (ref 50–200)
CO2 SERPL-SCNC: 30 MMOL/L (ref 21–32)
CREAT SERPL-MCNC: 1 MG/DL (ref 0.6–1.3)
EOSINOPHIL # BLD AUTO: 0.12 THOUSAND/ΜL (ref 0–0.61)
EOSINOPHIL NFR BLD AUTO: 2 % (ref 0–6)
ERYTHROCYTE [DISTWIDTH] IN BLOOD BY AUTOMATED COUNT: 15.6 % (ref 11.6–15.1)
EST. AVERAGE GLUCOSE BLD GHB EST-MCNC: 126 MG/DL
FOLATE SERPL-MCNC: >20 NG/ML (ref 3.1–17.5)
GFR SERPL CREATININE-BSD FRML MDRD: 57 ML/MIN/1.73SQ M
GLUCOSE P FAST SERPL-MCNC: 98 MG/DL (ref 65–99)
HBA1C MFR BLD: 6 %
HCT VFR BLD AUTO: 43 % (ref 34.8–46.1)
HDLC SERPL-MCNC: 67 MG/DL
HGB BLD-MCNC: 13.1 G/DL (ref 11.5–15.4)
IMM GRANULOCYTES # BLD AUTO: 0.05 THOUSAND/UL (ref 0–0.2)
IMM GRANULOCYTES NFR BLD AUTO: 1 % (ref 0–2)
IRON SERPL-MCNC: 47 UG/DL (ref 50–170)
LDLC SERPL CALC-MCNC: 100 MG/DL (ref 0–100)
LYMPHOCYTES # BLD AUTO: 1.39 THOUSANDS/ΜL (ref 0.6–4.47)
LYMPHOCYTES NFR BLD AUTO: 23 % (ref 14–44)
MCH RBC QN AUTO: 27.3 PG (ref 26.8–34.3)
MCHC RBC AUTO-ENTMCNC: 30.5 G/DL (ref 31.4–37.4)
MCV RBC AUTO: 90 FL (ref 82–98)
MONOCYTES # BLD AUTO: 0.42 THOUSAND/ΜL (ref 0.17–1.22)
MONOCYTES NFR BLD AUTO: 7 % (ref 4–12)
NEUTROPHILS # BLD AUTO: 4.04 THOUSANDS/ΜL (ref 1.85–7.62)
NEUTS SEG NFR BLD AUTO: 66 % (ref 43–75)
NONHDLC SERPL-MCNC: 120 MG/DL
NRBC BLD AUTO-RTO: 0 /100 WBCS
PLATELET # BLD AUTO: 330 THOUSANDS/UL (ref 149–390)
PMV BLD AUTO: 10.5 FL (ref 8.9–12.7)
POTASSIUM SERPL-SCNC: 3.8 MMOL/L (ref 3.5–5.3)
PROT SERPL-MCNC: 6.8 G/DL (ref 6.4–8.2)
RBC # BLD AUTO: 4.8 MILLION/UL (ref 3.81–5.12)
SODIUM SERPL-SCNC: 140 MMOL/L (ref 136–145)
T4 FREE SERPL-MCNC: 0.79 NG/DL (ref 0.76–1.46)
TRIGL SERPL-MCNC: 99 MG/DL
TSH SERPL DL<=0.05 MIU/L-ACNC: 5.49 UIU/ML (ref 0.36–3.74)
VIT B12 SERPL-MCNC: 168 PG/ML (ref 100–900)
WBC # BLD AUTO: 6.08 THOUSAND/UL (ref 4.31–10.16)

## 2021-06-29 PROCEDURE — 84443 ASSAY THYROID STIM HORMONE: CPT

## 2021-06-29 PROCEDURE — 85025 COMPLETE CBC W/AUTO DIFF WBC: CPT

## 2021-06-29 PROCEDURE — 80061 LIPID PANEL: CPT

## 2021-06-29 PROCEDURE — 82607 VITAMIN B-12: CPT

## 2021-06-29 PROCEDURE — 80053 COMPREHEN METABOLIC PANEL: CPT

## 2021-06-29 PROCEDURE — 82306 VITAMIN D 25 HYDROXY: CPT

## 2021-06-29 PROCEDURE — 83036 HEMOGLOBIN GLYCOSYLATED A1C: CPT

## 2021-06-29 PROCEDURE — 36415 COLL VENOUS BLD VENIPUNCTURE: CPT

## 2021-06-29 PROCEDURE — 82746 ASSAY OF FOLIC ACID SERUM: CPT

## 2021-06-29 PROCEDURE — 83540 ASSAY OF IRON: CPT

## 2021-06-29 PROCEDURE — 84439 ASSAY OF FREE THYROXINE: CPT

## 2021-07-06 ENCOUNTER — OFFICE VISIT (OUTPATIENT)
Dept: ENDOCRINOLOGY | Facility: CLINIC | Age: 71
End: 2021-07-06
Payer: MEDICARE

## 2021-07-06 VITALS
SYSTOLIC BLOOD PRESSURE: 126 MMHG | TEMPERATURE: 97.8 F | HEIGHT: 63 IN | DIASTOLIC BLOOD PRESSURE: 72 MMHG | BODY MASS INDEX: 51.03 KG/M2 | WEIGHT: 288 LBS | HEART RATE: 84 BPM

## 2021-07-06 DIAGNOSIS — R73.01 IMPAIRED FASTING GLUCOSE: ICD-10-CM

## 2021-07-06 DIAGNOSIS — E89.0 STATUS POST PARTIAL THYROIDECTOMY: ICD-10-CM

## 2021-07-06 DIAGNOSIS — R79.89 ELEVATED TSH: ICD-10-CM

## 2021-07-06 DIAGNOSIS — E55.9 VITAMIN D DEFICIENCY: ICD-10-CM

## 2021-07-06 DIAGNOSIS — E16.1 REACTIVE HYPOGLYCEMIA: ICD-10-CM

## 2021-07-06 DIAGNOSIS — M81.0 OSTEOPOROSIS, UNSPECIFIED OSTEOPOROSIS TYPE, UNSPECIFIED PATHOLOGICAL FRACTURE PRESENCE: Primary | ICD-10-CM

## 2021-07-06 PROCEDURE — 99214 OFFICE O/P EST MOD 30 MIN: CPT | Performed by: PHYSICIAN ASSISTANT

## 2021-07-06 PROCEDURE — 96372 THER/PROPH/DIAG INJ SC/IM: CPT | Performed by: PHYSICIAN ASSISTANT

## 2021-07-06 RX ORDER — HYDROCHLOROTHIAZIDE 25 MG/1
25 TABLET ORAL DAILY
COMMUNITY

## 2021-07-06 NOTE — PROGRESS NOTES
Patient Progress Note    CC: osteoporosis, reactive hypoglycemia, hemithyroidectomy   Patient is here with her daughter  Referring Provider  Andre Hall, Do  [address],  [address]     History of Present Illness:     Patient is a 69year old female here for follow-up of osteoporosis, reactive hypoglycemia, hemithyroidectomy  She is due today for a Prolia injection  She is taking vitamin D 62708 units weekly  Does not take calcium supplementation  She had an accidental fall a couple weeks ago  No recent fractures  DEXA scan done in May 2020 was consistent with osteoporosis  She does have a history of reactive hypoglycemia  Typically low BG occurs after high carb meals/foods  She has been making dietary changes which has led to infrequent episodes of hypoglycemia  A1C recently is 6 0%  She could improve on eating healthier  No routine exercise  She has a history of a thyroid nodules and underwent a left hemithyroidectomy  Per patient pathology was benign  She is not currently on thyroid medication  For the past 1-2 years, her TSH fluctuates between the 4-5 range   Her free T4 remains normal      Patient Active Problem List   Diagnosis    Closed fracture of right distal radius and ulna    Malignant tumor of right kidney parenchyma (HCC)    Primary osteoarthritis of left knee    Primary osteoarthritis of right knee    Chronic pain of left knee    Myalgia    Chest wall tenderness    Chronic heart failure (HCC)    Chronic atrial fibrillation (HCC)    Cervical spondylosis without myelopathy    Arthropathy of cervical facet joint    Occipital neuralgia of right side    Status post partial thyroidectomy    Myofascial pain syndrome    Hashimoto's thyroiditis    Impaired fasting glucose    Elevated TSH    Elevated alkaline phosphatase level    Early menopause occurring in patient age younger than 39 years   Nadya Gutierrez S/P bariatric surgery    Osteoporosis    [facility]parathyroidism ([facility] [address] [facility]    Reactive hypoglycemia     Past Medical History:   Diagnosis Date    A-fib ([facility] [address] [facility]     Arthritis     Atrial fibrillation ([facility] [address] [facility]     Cervical spondylosis with myelopathy     Gastric bypass status for obesity     History of transfusion     35 years ago    Hypertension     mass right kidney     Renal cell adenocarcinoma ([facility] [address] )     RIGHT    Sleep apnea       Past Surgical History:   Procedure Laterality Date    CHOLECYSTECTOMY      COLOSTOMY      CRYOABLATION Right     RT KIDNEY    CYSTORRHAPHY      Bladder  Last assessed 4/6/2016     GASTRIC BYPASS      HERNIA REPAIR      Last assessed 4/6/2016     HYSTERECTOMY      IA OPEN RX DISTAL RADIUS FX, EXTRA-ARTICULAR Right 4/22/2016    Procedure: OPEN REDUCTION INTERNAL FIXATION RIGHT DISTAL RADIUS;  Surgeon: Jenny Downey MD;  Location: [facility];  Service: Orthopedics    REVISION COLOSTOMY      THYROID LOBECTOMY Left 8/27/2019    Procedure: LOBECTOMY THYROID, left;  Surgeon: Viky Lepe MD;  Location: [facility];  Service: Surgical Oncology    TUBAL LIGATION      US GUIDED THYROID BIOPSY  2/27/2019    US GUIDED THYROID BIOPSY  5/29/2019      Family History   Problem Relation Age of Onset    Heart attack Mother     Lung cancer Father 76    Heart disease Father     Stroke Sister     Lung cancer Sister 64    Prostate cancer Brother 61     Social History     Tobacco Use    Smoking status: Never Smoker    Smokeless tobacco: Never Used   Substance Use Topics    Alcohol use: Not Currently     Alcohol/week: 0 0 standard drinks     Comment: 0     No Known Allergies  Current Outpatient Medications   Medication Sig Dispense Refill    acetaminophen (TYLENOL) 325 mg tablet Take 975 mg by mouth every 6 (six) hours as needed      diltiazem (CARDIZEM) 120 MG tablet Take 120 mg by mouth 2 (two) times a day        ergocalciferol (VITAMIN D2) 50,000 units Take 50,000 Units by mouth once a week      FLUoxetine (PROzac) 20 mg capsule Take 20 mg by mouth 2 (two) times a day        gabapentin (NEURONTIN) 300 mg capsule Take 600 mg by mouth daily at bedtime       hydrochlorothiazide (HYDRODIURIL) 25 mg tablet Take 25 mg by mouth daily      LORazepam (ATIVAN) 1 mg tablet Take 1 mg by mouth daily at bedtime as needed for anxiety   warfarin (COUMADIN) 1 mg tablet Per pt sometimes she takes 6mg daily sometimes--per pt she does her own bw at home every 2 weeks   warfarin (COUMADIN) 5 mg tablet Take 5 mg by mouth daily        Current Facility-Administered Medications   Medication Dose Route Frequency Provider Last Rate Last Admin    denosumab (PROLIA) subcutaneous injection 60 mg  60 mg Subcutaneous Q6 Months Patrice Ramirez MD   60 mg at 01/04/21 1157         Review of Systems   Constitutional: Negative for activity change, appetite change, fatigue and unexpected weight change  HENT: Negative for trouble swallowing  Eyes: Negative for visual disturbance  Respiratory: Negative for shortness of breath  Cardiovascular: Negative for chest pain and palpitations  Gastrointestinal: Negative for constipation and diarrhea  Endocrine: Negative for cold intolerance and heat intolerance  Musculoskeletal: Positive for arthralgias  Skin: Negative  Neurological: Negative for numbness  Psychiatric/Behavioral: Negative  Physical Exam:  Body mass index is 51 02 kg/m²  /72   Pulse 84   Temp 97 8 °F (36 6 °C) (Tympanic)   Ht 5' 3" (1 6 m)   Wt 131 kg (288 lb)   BMI 51 02 kg/m²    Wt Readings from Last 3 Encounters:   07/06/21 131 kg (288 lb)   06/04/20 128 kg (281 lb 6 4 oz)   03/17/20 123 kg (272 lb 3 2 oz)       Physical Exam  Vitals and nursing note reviewed  Constitutional:       Appearance: She is well-developed  HENT:      Head: Normocephalic  Eyes:      General: No scleral icterus  Pupils: Pupils are equal, round, and reactive to light  Neck:      Thyroid: No thyromegaly     Cardiovascular:      Rate and Rhythm: Normal rate and regular rhythm  Pulses:           Radial pulses are 2+ on the right side and 2+ on the left side  Heart sounds: No murmur heard  Pulmonary:      Effort: Pulmonary effort is normal  No respiratory distress  Breath sounds: Normal breath sounds  No wheezing  Musculoskeletal:      Cervical back: Neck supple  Skin:     General: Skin is warm and dry  Neurological:      Mental Status: She is alert  Patient's shoes and socks were not removed  Labs:   Component      Latest Ref Rng & Units 12/29/2020 6/29/2021   Sodium      136 - 145 mmol/L 143 140   Potassium      3 5 - 5 3 mmol/L 4 0 3 8   Chloride      100 - 108 mmol/L 108 104   CO2      21 - 32 mmol/L 30 30   Anion Gap      4 - 13 mmol/L 5 6   BUN      5 - 25 mg/dL 15 22   Creatinine      0 60 - 1 30 mg/dL 0 92 1 00   Glucose, Random      65 - 140 mg/dL 53 (L)    Calcium      8 3 - 10 1 mg/dL 9 1 9 0   CORRECTED CALCIUM      8 3 - 10 1 mg/dL 9 7 9 6   AST      5 - 45 U/L 21 24   ALT      12 - 78 U/L 22 22   Alkaline Phosphatase      46 - 116 U/L 152 (H) 146 (H)   Total Protein      6 4 - 8 2 g/dL 6 8 6 8   Albumin      3 5 - 5 0 g/dL 3 3 (L) 3 3 (L)   TOTAL BILIRUBIN      0 20 - 1 00 mg/dL 0 24 0 50   eGFR      ml/min/1 73sq m 63 57   GLUCOSE FASTING      65 - 99 mg/dL  98   Cholesterol      50 - 200 mg/dL  187   Triglycerides      <=150 mg/dL  99   HDL      >=40 mg/dL  67   LDL Calculated      0 - 100 mg/dL  100   Non-HDL Cholesterol      mg/dl  120   Hemoglobin A1C      Normal 3 8-5 6%; PreDiabetic 5 7-6 4%;  Diabetic >=6 5%; Glycemic control for adults with diabetes <7 0% % 5 8 (H) 6 0 (H)   eAG, EST AVG Glucose      mg/dl 120 126   TSH 3RD GENERATON      0 358 - 3 740 uIU/mL 4 110 (H) 5 490 (H)   Free T4      0 76 - 1 46 ng/dL 0 78 0 79   Vit D, 25-Hydroxy      30 0 - 100 0 ng/mL 41 9 56 4   PARATHYROID HORMONE      18 4 - 80 1 pg/mL 64 3    Phosphorus      2 3 - 4 1 mg/dL 2 7      Plan:     Diagnoses and all orders for this visit:    Osteoporosis  Continue with Prolia every 6 months  Continue vitamin D supplementation  Calcium level is normal at 9 6  Take precautions to avoid falls  Recommended weight bearing exercises as tolerated  -     denosumab (PROLIA) subcutaneous injection 60 mg  -     Vitamin D 25 hydroxy; Future  -     Comprehensive metabolic panel; Future    Reactive hypoglycemia / Impaired fasting glucose  Infrequent episodes of hypoglycemia with making dietary changes  Avoid high sugar foods  Limit carbs and add protein to all meals  A1C 6 0%  Recommended healthy diet and routine exercise / activity as tolerated  -     Hemoglobin A1C; Future  -     Comprehensive metabolic panel; Future    Elevated TSH / Status post partial thyroidectomy  TSH 5 490 and free T4 0 79  For the past 1-2 years the TSH fluctuates between 4-5 range  Not on levothyroxine  Will monitor labs for now  She has a history of atrial fibrillation  Will consider starting levothyroxine if TSH continues to trend up or free T4 also is low  -     T4, free; Future  -     TSH, 3rd generation; Future    Vitamin D deficiency  Vitamin D normal at 59 3  Continue current supplementation of vitamin D  -     Vitamin D 25 hydroxy; Future      Discussed with the patient and all questions fully answered  She will call me if any problems arise      Counseled patient on diagnostic results, prognosis, risk and benefit of treatment options, instruction for management, importance of treatment compliance, risk  factor reduction and impressions      Verna Jean-Baptiste PA-C

## 2021-07-23 ENCOUNTER — HOSPITAL ENCOUNTER (OUTPATIENT)
Dept: CT IMAGING | Facility: HOSPITAL | Age: 71
Discharge: HOME | End: 2021-07-23
Payer: MEDICARE

## 2021-07-23 ENCOUNTER — HOSPITAL ENCOUNTER (OUTPATIENT)
Dept: RADIOLOGY | Facility: HOSPITAL | Age: 71
Discharge: HOME | End: 2021-07-23
Payer: MEDICARE

## 2021-07-23 DIAGNOSIS — C64.1 RENAL CELL CARCINOMA OF RIGHT KIDNEY (HCC): ICD-10-CM

## 2021-07-23 PROCEDURE — 71046 X-RAY EXAM CHEST 2 VIEWS: CPT

## 2021-07-23 PROCEDURE — 74178 CT ABD&PLV WO CNTR FLWD CNTR: CPT

## 2021-07-23 RX ADMIN — IODIXANOL 100 ML: 320 INJECTION, SOLUTION INTRAVASCULAR at 09:18

## 2021-07-23 NOTE — NURSING NOTE
Patient assessed after extravasation of contrast into right antecubital space during CT scan  Swelling and slight redness present at the site  Patient denies any pain  No blistering, hardness, changes in sensation or increase/decrease temperature of skin at the extravasation site  Patient instructed to elevate and apply ice to the site for 20-60 minutes three times a day until swelling resolves  Patient verbalizes understanding  Radiology RN's name and contact number provided in case patient has any further questions, concerns or symptoms worsen  Radiology RN will follow up with patient

## 2021-08-03 ENCOUNTER — OFFICE VISIT (OUTPATIENT)
Dept: UROLOGY | Facility: CLINIC | Age: 71
End: 2021-08-03
Payer: MEDICARE

## 2021-08-03 VITALS
HEART RATE: 81 BPM | DIASTOLIC BLOOD PRESSURE: 70 MMHG | BODY MASS INDEX: 51.63 KG/M2 | HEIGHT: 63 IN | SYSTOLIC BLOOD PRESSURE: 132 MMHG | WEIGHT: 291.4 LBS

## 2021-08-03 DIAGNOSIS — N39.41 URGE INCONTINENCE OF URINE: Primary | ICD-10-CM

## 2021-08-03 DIAGNOSIS — C64.1 MALIGNANT NEOPLASM OF RIGHT KIDNEY (HCC): ICD-10-CM

## 2021-08-03 PROCEDURE — 99213 OFFICE O/P EST LOW 20 MIN: CPT | Performed by: PHYSICIAN ASSISTANT

## 2021-08-03 RX ORDER — OXYBUTYNIN CHLORIDE 5 MG/1
5 TABLET ORAL 3 TIMES DAILY
Qty: 180 TABLET | Refills: 3 | Status: SHIPPED | OUTPATIENT
Start: 2021-08-03 | End: 2021-11-22 | Stop reason: SINTOL

## 2021-08-03 NOTE — PROGRESS NOTES
UROLOGY PROGRESS NOTE   Patient Identifiers: Joyce Hernandez (MRN [id number])  Date of Service: 8/3/2021    Subjective:      69-year-old female history of renal cell carcinoma  She had right-sided cryotherapy in May of 2016 with Dr Joni Keith  Creatinine is 1 0  Follow-up CT scan showing involuted treatment scar with no evidence of recurrence  She also has a history of urinary frequency and urgency and has been on oxybutynin 5 mg twice a day with good results  She had briefly stopped oxybutynin but now will restart it  Reason for visit:  Renal cancer and voiding dysfunction follow-up    Objective:     VITALS:    Vitals:    08/03/21 0857   BP: 132/70   Pulse: 81           LABS:  Lab Results   Component Value Date    HGB 13 1 06/29/2021    HCT 43 0 06/29/2021    WBC 6 08 06/29/2021     06/29/2021   ]    Lab Results   Component Value Date    K 3 8 06/29/2021     06/29/2021    CO2 30 06/29/2021    BUN 22 06/29/2021    CREATININE 1 00 06/29/2021    CALCIUM 9 0 06/29/2021    GLUCOSE 116 01/24/2019   ]        INPATIENT MEDS:    Current Outpatient Medications:     acetaminophen (TYLENOL) 325 mg tablet, Take 975 mg by mouth every 6 (six) hours as needed, Disp: , Rfl:     diltiazem (CARDIZEM) 120 MG tablet, Take 120 mg by mouth 2 (two) times a day , Disp: , Rfl:     ergocalciferol (VITAMIN D2) 50,000 units, Take 50,000 Units by mouth once a week, Disp: , Rfl:     FLUoxetine (PROzac) 20 mg capsule, Take 20 mg by mouth 2 (two) times a day  , Disp: , Rfl:     gabapentin (NEURONTIN) 300 mg capsule, Take 600 mg by mouth daily at bedtime , Disp: , Rfl:     hydrochlorothiazide (HYDRODIURIL) 25 mg tablet, Take 25 mg by mouth daily, Disp: , Rfl:     LORazepam (ATIVAN) 1 mg tablet, Take 1 mg by mouth daily at bedtime as needed for anxiety  , Disp: , Rfl:     warfarin (COUMADIN) 1 mg tablet, Per pt sometimes she takes 6mg daily sometimes--per pt she does her own bw at home every 2 weeks  , Disp: , Rfl:    warfarin (COUMADIN) 5 mg tablet, Take 5 mg by mouth daily , Disp: , Rfl:     Current Facility-Administered Medications:     denosumab (PROLIA) subcutaneous injection 60 mg, 60 mg, Subcutaneous, Q6 Months, Jack Jett MD, 60 mg at 01/04/21 1157      Physical Exam:   /70 (BP Location: Left arm, Patient Position: Sitting, Cuff Size: Adult)   Pulse 81   Ht 5' 3" (1 6 m)   Wt 132 kg (291 lb 6 4 oz)   BMI 51 62 kg/m²   GEN: no acute distress    RESP: breathing comfortably with no accessory muscle use    ABD: soft, non-tender, non-distended   INCISION:    EXT: no significant peripheral edema     RADIOLOGY:   CT ABDOMEN AND PELVIS WITH AND WITHOUT IV CONTRAST   ADDENDUM:     Upon further review, with additional comparisons made to the CT studies 5/24/18, and 1/24/19, there has been steady consistent progressive involution of the scarring within the treatment bed  The area of enhancement described in the initial report most   likely represents adjacent residual renal parenchyma as opposed to treatment scar  No evidence of recurrence  If further evaluation is desired consider MRI  Assessment:    1  Renal cell carcinoma   2   Voiding dysfunction     Plan:   - recommend follow-up in 1 year with MRI prior to visit  - continue oxybutynin 5 mg b i d   -  -

## 2021-11-22 DIAGNOSIS — N39.41 URGE INCONTINENCE OF URINE: Primary | ICD-10-CM

## 2021-11-22 RX ORDER — TROSPIUM CHLORIDE 20 MG/1
20 TABLET, FILM COATED ORAL 2 TIMES DAILY
Qty: 60 TABLET | Refills: 2 | Status: SHIPPED | OUTPATIENT
Start: 2021-11-22 | End: 2021-12-03 | Stop reason: ALTCHOICE

## 2021-12-03 DIAGNOSIS — N39.41 URGE INCONTINENCE OF URINE: Primary | ICD-10-CM

## 2021-12-03 DIAGNOSIS — N39.41 URGE INCONTINENCE OF URINE: ICD-10-CM

## 2021-12-03 RX ORDER — SOLIFENACIN SUCCINATE 10 MG/1
10 TABLET, FILM COATED ORAL DAILY
Qty: 30 TABLET | Refills: 2 | Status: SHIPPED | OUTPATIENT
Start: 2021-12-03 | End: 2022-08-08

## 2021-12-03 RX ORDER — TROSPIUM CHLORIDE 20 MG/1
20 TABLET, FILM COATED ORAL 2 TIMES DAILY
Qty: 60 TABLET | Refills: 0 | Status: CANCELLED | OUTPATIENT
Start: 2021-12-03 | End: 2022-03-03

## 2022-01-06 ENCOUNTER — LAB (OUTPATIENT)
Dept: LAB | Facility: IMAGING CENTER | Age: 72
End: 2022-01-06
Payer: MEDICARE

## 2022-01-06 DIAGNOSIS — E55.9 VITAMIN D DEFICIENCY: ICD-10-CM

## 2022-01-06 DIAGNOSIS — R79.89 ELEVATED TSH: ICD-10-CM

## 2022-01-06 DIAGNOSIS — R73.01 IMPAIRED FASTING GLUCOSE: ICD-10-CM

## 2022-01-06 DIAGNOSIS — E89.0 STATUS POST PARTIAL THYROIDECTOMY: ICD-10-CM

## 2022-01-06 DIAGNOSIS — C64.1 MALIGNANT NEOPLASM OF RIGHT KIDNEY (HCC): ICD-10-CM

## 2022-01-06 DIAGNOSIS — M81.0 OSTEOPOROSIS, UNSPECIFIED OSTEOPOROSIS TYPE, UNSPECIFIED PATHOLOGICAL FRACTURE PRESENCE: ICD-10-CM

## 2022-01-06 DIAGNOSIS — E16.1 REACTIVE HYPOGLYCEMIA: ICD-10-CM

## 2022-01-06 LAB
25(OH)D3 SERPL-MCNC: 62.3 NG/ML (ref 30–100)
ALBUMIN SERPL BCP-MCNC: 3.5 G/DL (ref 3.5–5)
ALP SERPL-CCNC: 148 U/L (ref 46–116)
ALT SERPL W P-5'-P-CCNC: 22 U/L (ref 12–78)
ANION GAP SERPL CALCULATED.3IONS-SCNC: 6 MMOL/L (ref 4–13)
AST SERPL W P-5'-P-CCNC: 28 U/L (ref 5–45)
BILIRUB SERPL-MCNC: 1.16 MG/DL (ref 0.2–1)
BUN SERPL-MCNC: 27 MG/DL (ref 5–25)
CALCIUM SERPL-MCNC: 9.4 MG/DL (ref 8.3–10.1)
CHLORIDE SERPL-SCNC: 104 MMOL/L (ref 100–108)
CO2 SERPL-SCNC: 30 MMOL/L (ref 21–32)
CREAT SERPL-MCNC: 1.02 MG/DL (ref 0.6–1.3)
GFR SERPL CREATININE-BSD FRML MDRD: 55 ML/MIN/1.73SQ M
GLUCOSE P FAST SERPL-MCNC: 107 MG/DL (ref 65–99)
POTASSIUM SERPL-SCNC: 3.8 MMOL/L (ref 3.5–5.3)
PROT SERPL-MCNC: 7.4 G/DL (ref 6.4–8.2)
SODIUM SERPL-SCNC: 140 MMOL/L (ref 136–145)
T4 FREE SERPL-MCNC: 1.01 NG/DL (ref 0.76–1.46)
TSH SERPL DL<=0.05 MIU/L-ACNC: 4.89 UIU/ML (ref 0.36–3.74)

## 2022-01-06 PROCEDURE — 84439 ASSAY OF FREE THYROXINE: CPT

## 2022-01-06 PROCEDURE — 36415 COLL VENOUS BLD VENIPUNCTURE: CPT

## 2022-01-06 PROCEDURE — 83036 HEMOGLOBIN GLYCOSYLATED A1C: CPT

## 2022-01-06 PROCEDURE — 80053 COMPREHEN METABOLIC PANEL: CPT

## 2022-01-06 PROCEDURE — 82306 VITAMIN D 25 HYDROXY: CPT

## 2022-01-06 PROCEDURE — 84443 ASSAY THYROID STIM HORMONE: CPT

## 2022-01-07 ENCOUNTER — TELEPHONE (OUTPATIENT)
Dept: ENDOCRINOLOGY | Facility: CLINIC | Age: 72
End: 2022-01-07

## 2022-01-07 LAB
EST. AVERAGE GLUCOSE BLD GHB EST-MCNC: 131 MG/DL
HBA1C MFR BLD: 6.2 %

## 2022-01-10 ENCOUNTER — OFFICE VISIT (OUTPATIENT)
Dept: ENDOCRINOLOGY | Facility: CLINIC | Age: 72
End: 2022-01-10
Payer: MEDICARE

## 2022-01-10 VITALS
BODY MASS INDEX: 51.91 KG/M2 | SYSTOLIC BLOOD PRESSURE: 130 MMHG | HEIGHT: 63 IN | DIASTOLIC BLOOD PRESSURE: 90 MMHG | WEIGHT: 293 LBS | HEART RATE: 72 BPM | TEMPERATURE: 97 F

## 2022-01-10 DIAGNOSIS — R73.01 IMPAIRED FASTING GLUCOSE: ICD-10-CM

## 2022-01-10 DIAGNOSIS — E55.9 VITAMIN D DEFICIENCY: ICD-10-CM

## 2022-01-10 DIAGNOSIS — M81.0 OSTEOPOROSIS, UNSPECIFIED OSTEOPOROSIS TYPE, UNSPECIFIED PATHOLOGICAL FRACTURE PRESENCE: Primary | ICD-10-CM

## 2022-01-10 DIAGNOSIS — R79.89 ELEVATED TSH: ICD-10-CM

## 2022-01-10 DIAGNOSIS — E89.0 STATUS POST PARTIAL THYROIDECTOMY: ICD-10-CM

## 2022-01-10 DIAGNOSIS — E16.1 REACTIVE HYPOGLYCEMIA: ICD-10-CM

## 2022-01-10 PROCEDURE — 99214 OFFICE O/P EST MOD 30 MIN: CPT | Performed by: PHYSICIAN ASSISTANT

## 2022-01-10 PROCEDURE — 96372 THER/PROPH/DIAG INJ SC/IM: CPT | Performed by: PHYSICIAN ASSISTANT

## 2022-01-10 NOTE — PROGRESS NOTES
Patient Progress Note    CC: osteoporosis, reactive hypoglycemia, hemithyroidectomy   Patient is here with her daughter  Referring Provider  Hemal James, Do  [address] [place] [address],  [address]     History of Present Illness:     Patient is a 66year old female here for follow-up of osteoporosis, reactive hypoglycemia, hemithyroidectomy  She is due today for a Prolia injection  She is taking vitamin D 37738 units weekly  Does not take calcium supplementation  Denies recent fractures or falls  DEXA scan done in May 2020 was consistent with osteoporosis  She does not do weight bearing exercises  She does have a history of reactive hypoglycemia  Typically low BG occurs after high carb meals/foods  She has been making dietary changes which has led to infrequent episodes of hypoglycemia  A1C recently is 6 2%  She does try to eat healthy  No routine exercise  She has a history of a thyroid nodules and underwent a left hemithyroidectomy  Per patient pathology was benign  She is not currently on thyroid medication  For the past 1-2 years, her TSH fluctuates between the 4-5 range   Her free T4 remains normal       Patient Active Problem List   Diagnosis    Closed fracture of right distal radius and ulna    Malignant tumor of right kidney parenchyma (HCC)    Primary osteoarthritis of left knee    Primary osteoarthritis of right knee    Chronic pain of left knee    Myalgia    Chest wall tenderness    Chronic heart failure (HCC)    Chronic atrial fibrillation (HCC)    Cervical spondylosis without myelopathy    Arthropathy of cervical facet joint    Occipital neuralgia of right side    Status post partial thyroidectomy    Myofascial pain syndrome    Hashimoto's thyroiditis    Impaired fasting glucose    Elevated TSH    Elevated alkaline phosphatase level    Early menopause occurring in patient age younger than 39 years   Renate Casiano S/P bariatric surgery    Osteoporosis    [facility]    Reactive hypoglycemia    Vitamin D deficiency     Past Medical History:   Diagnosis Date    [facility]fib [facility]     Arthritis     Atrial fibrillation (HCC)     Cervical spondylosis with myelopathy     Gastric bypass status for obesity     History of transfusion     35 years ago    Hypertension     mass right kidney     Renal cell adenocarcinoma ([facility] )     RIGHT    Sleep apnea       Past Surgical History:   Procedure Laterality Date    CHOLECYSTECTOMY      COLOSTOMY      CRYOABLATION Right     RT KIDNEY    CYSTORRHAPHY      Bladder  Last assessed 4/6/2016     GASTRIC BYPASS      HERNIA REPAIR      Last assessed 4/6/2016     HYSTERECTOMY      AL OPEN RX DISTAL RADIUS FX, EXTRA-ARTICULAR Right 4/22/2016    Procedure: OPEN REDUCTION INTERNAL FIXATION RIGHT DISTAL RADIUS;  Surgeon: Nila Hirsch MD;  [facility];  Service: Orthopedics    REVISION COLOSTOMY      THYROID LOBECTOMY Left 8/27/2019    Procedure: LOBECTOMY THYROID, left;  Surgeon: Jose Amezquita MD;  [facility];  Service: Surgical Oncology    TUBAL LIGATION      US GUIDED THYROID BIOPSY  2/27/2019    US GUIDED THYROID BIOPSY  5/29/2019      Family History   Problem Relation Age of Onset    Heart attack Mother     Lung cancer Father 76    Heart disease Father     Stroke Sister     Lung cancer Sister 64    Prostate cancer Brother 61     Social History     Tobacco Use    Smoking status: Never Smoker    Smokeless tobacco: Never Used   Substance Use Topics    Alcohol use: Not Currently     Alcohol/week: 0 0 standard drinks     Comment: 0     No Known Allergies  Current Outpatient Medications   Medication Sig Dispense Refill    acetaminophen (TYLENOL) 325 mg tablet Take 975 mg by mouth every 6 (six) hours as needed      diltiazem (CARDIZEM) 120 MG tablet Take 120 mg by mouth 2 (two) times a day        ergocalciferol (VITAMIN D2) 50,000 units Take 50,000 Units by mouth once a week      FLUoxetine (PROzac) 20 mg capsule Take 20 mg by mouth 2 (two) times a day        gabapentin (NEURONTIN) 300 mg capsule Take 600 mg by mouth daily at bedtime       hydrochlorothiazide (HYDRODIURIL) 25 mg tablet Take 25 mg by mouth daily      LORazepam (ATIVAN) 1 mg tablet Take 1 mg by mouth daily at bedtime as needed for anxiety   warfarin (COUMADIN) 1 mg tablet Per pt sometimes she takes 6mg daily sometimes--per pt she does her own bw at home every 2 weeks   warfarin (COUMADIN) 5 mg tablet Take 5 mg by mouth daily       solifenacin (VESICARE) 10 MG tablet Take 1 tablet (10 mg total) by mouth daily   Start with 0 5 tablet and can titrate up to 1 tab daily if no side effects  30 tablet 2     Current Facility-Administered Medications   Medication Dose Route Frequency Provider Last Rate Last Admin    denosumab (PROLIA) subcutaneous injection 60 mg  60 mg Subcutaneous Q6 Months Manish Marcelino MD   60 mg at 01/04/21 1157    denosumab (PROLIA) subcutaneous injection 60 mg  60 mg Subcutaneous Once Verna Jean-Baptiste PA-C             Review of Systems   Constitutional: Positive for fatigue  Negative for activity change, appetite change and unexpected weight change  HENT: Negative for trouble swallowing  Eyes: Negative for visual disturbance  Respiratory: Negative for shortness of breath  Cardiovascular: Positive for palpitations (occasional, cardiologist is aware per patient)  Negative for chest pain  Gastrointestinal: Negative for constipation and diarrhea  Endocrine: Positive for cold intolerance  Negative for heat intolerance  Musculoskeletal: Positive for arthralgias (improved)  Skin: Negative  Neurological: Negative for tremors and numbness  Psychiatric/Behavioral: Negative  Physical Exam:  Body mass index is 52 26 kg/m²    /90   Pulse 72   Temp (!) 97 °F (36 1 °C) (Tympanic)   Ht 5' 3" (1 6 m)   Wt 134 kg (295 lb)   BMI 52 26 kg/m²    Wt Readings from Last 3 Encounters:   01/10/22 134 kg (295 lb) 08/03/21 132 kg (291 lb 6 4 oz)   07/06/21 131 kg (288 lb)       Physical Exam  Vitals and nursing note reviewed  Constitutional:       Appearance: She is well-developed  HENT:      Head: Normocephalic  Eyes:      General: No scleral icterus  Pupils: Pupils are equal, round, and reactive to light  Neck:      Thyroid: No thyromegaly  Cardiovascular:      Rate and Rhythm: Normal rate and regular rhythm  Pulses:           Radial pulses are 2+ on the right side and 2+ on the left side  Heart sounds: No murmur heard  Pulmonary:      Effort: Pulmonary effort is normal  No respiratory distress  Breath sounds: Normal breath sounds  No wheezing  Musculoskeletal:      Cervical back: Neck supple  Skin:     General: Skin is warm and dry  Neurological:      Mental Status: She is alert  Patient's shoes and socks were not removed  Labs:   Component      Latest Ref Rng & Units 6/29/2021 1/6/2022   Sodium      136 - 145 mmol/L 140 140   Potassium      3 5 - 5 3 mmol/L 3 8 3 8   Chloride      100 - 108 mmol/L 104 104   CO2      21 - 32 mmol/L 30 30   Anion Gap      4 - 13 mmol/L 6 6   BUN      5 - 25 mg/dL 22 27 (H)   Creatinine      0 60 - 1 30 mg/dL 1 00 1 02   GLUCOSE FASTING      65 - 99 mg/dL 98 107 (H)   Calcium      8 3 - 10 1 mg/dL 9 0 9 4   CORRECTED CALCIUM      8 3 - 10 1 mg/dL 9 6    AST      5 - 45 U/L 24 28   ALT      12 - 78 U/L 22 22   Alkaline Phosphatase      46 - 116 U/L 146 (H) 148 (H)   Total Protein      6 4 - 8 2 g/dL 6 8 7 4   Albumin      3 5 - 5 0 g/dL 3 3 (L) 3 5   TOTAL BILIRUBIN      0 20 - 1 00 mg/dL 0 50 1 16 (H)   eGFR      ml/min/1 73sq m 57 55   Hemoglobin A1C      Normal 3 8-5 6%; PreDiabetic 5 7-6 4%;  Diabetic >=6 5%; Glycemic control for adults with diabetes <7 0% % 6 0 (H) 6 2 (H)   eAG, EST AVG Glucose      mg/dl 126 131   Free T4      0 76 - 1 46 ng/dL 0 79 1 01   TSH 3RD GENERATON      0 358 - 3 740 uIU/mL 5 490 (H) 4 890 (H)   Vit D, 25-Hydroxy      30 0 - 100 0 ng/mL 56 4 62 3     Plan:    Diagnoses and all orders for this visit:    Osteoporosis, unspecified osteoporosis type, unspecified pathological fracture presence  Received Prolia injection today; continue every 6 months  Continue vitamin D supplementation  Take precautions to avoid falls  Recommended weight bearing exercises as tolerated  DEXA scan due May 2022  -     Basic metabolic panel; Future  -     Vitamin D 25 hydroxy; Future  -     DXA bone density spine hip and pelvis; Future  -     denosumab (PROLIA) subcutaneous injection 60 mg    Vitamin D deficiency  Vitamin D normal at 62 3  Continue current supplementation  -     Vitamin D 25 hydroxy; Future    Elevated TSH / Status post partial thyroidectomy  TSH fluctuates between 4-5  Most recent TSH 4 890 and free T4 normal at 1 01  Not on thyroid medication  Continue to monitor labs  -     T4, free; Future  -     TSH, 3rd generation; Future    Reactive hypoglycemia / Impaired fasting glucose  Infrequent episodes of hypoglycemia with making dietary changes  Avoid high sugar foods  Limit carbs and add protein to all meals  A1C 6 2%  Recommended healthy diet and routine exercise / activity as tolerated  -     Hemoglobin A1C; Future  -     Basic metabolic panel; Future         Discussed with the patient and all questions fully answered  She will call me if any problems arise      Counseled patient on diagnostic results, prognosis, risk and benefit of treatment options, instruction for management, importance of treatment compliance, risk  factor reduction and impressions      Verna Jean-Baptiste PA-C

## 2022-05-03 ENCOUNTER — OFFICE VISIT (OUTPATIENT)
Dept: CARDIOLOGY CLINIC | Facility: CLINIC | Age: 72
End: 2022-05-03
Payer: MEDICARE

## 2022-05-03 VITALS
BODY MASS INDEX: 51.91 KG/M2 | DIASTOLIC BLOOD PRESSURE: 74 MMHG | HEART RATE: 97 BPM | WEIGHT: 293 LBS | SYSTOLIC BLOOD PRESSURE: 128 MMHG | HEIGHT: 63 IN

## 2022-05-03 DIAGNOSIS — I34.0 NONRHEUMATIC MITRAL VALVE REGURGITATION: ICD-10-CM

## 2022-05-03 DIAGNOSIS — E66.01 MORBID OBESITY (HCC): ICD-10-CM

## 2022-05-03 DIAGNOSIS — I48.21 PERMANENT ATRIAL FIBRILLATION (HCC): Primary | ICD-10-CM

## 2022-05-03 PROCEDURE — 99204 OFFICE O/P NEW MOD 45 MIN: CPT | Performed by: INTERNAL MEDICINE

## 2022-05-03 PROCEDURE — 93000 ELECTROCARDIOGRAM COMPLETE: CPT | Performed by: INTERNAL MEDICINE

## 2022-05-03 RX ORDER — DILTIAZEM HYDROCHLORIDE 360 MG/1
360 CAPSULE, EXTENDED RELEASE ORAL DAILY
Qty: 90 CAPSULE | Refills: 3 | Status: SHIPPED | OUTPATIENT
Start: 2022-05-03

## 2022-05-03 NOTE — PROGRESS NOTES
[address] Cardiology  Office Consultation  Amanda Durand 66 y o  female MRN: [id number]        Chief Complaint    Chief Complaint   Patient presents with    Cardiac Valve Problem     switching from [facility] patient       Referring Provider: Self, Referral    Impression & Plan:    1  Permanent atrial fibrillation ([facility] )  She is in adequately rate controlled due to being on short-acting diltiazem every 12 hours, likely subtherapeutic between doses  We will switch her to diltiazem CD, which is listed as the same formulary tear as short-acting diltiazem on her current plan  Hopefully coverage is better, years later since last attempt, no cost flag on EMR today  Will prescribe diltiazem CD 3 160 mg daily, as she likely needed slightly increase in overall dosages well  Continue warfarin for stroke prophylaxis, managed by PCP  - diltiazem (CARDIZEM CD) 360 MG 24 hr capsule; Take 1 capsule (360 mg total) by mouth daily  Dispense: 90 capsule; Refill: 3    2  Nonrheumatic mitral valve regurgitation  No signs or symptoms of valvular congestive heart failure  No significant murmur on exam   Will monitor clinically  Can recheck echo in 1-2 years for routine surveillance  - POCT ECG    3  Morbid obesity ([facility] )        We will see Amanda Durand back in 12 months for routine follow-up  HPI: Amanda Durand is a 66y o  year old female with permanent atrial fibrillation, sleep apnea, mitral regurgitation, and morbid obesity who presents today to establish care with Cardiology  She is transferring her care from [facility], where she was previously seen by Dr Imani Greenwood  She states that she is switching over to consolidate all of her specialist care and into one network  Her last echocardiogram on 02/21/2019 showed normal LV size and function, LVEF 55%, mild concentric LV hypertrophy, normal RV size and function, mild left atrial dilation , mild mitral regurgitation      She has been on short-acting diltiazem 120 mg b i d  for rate control due to lack of coverage of the controlled release formulation in the past   She is on warfarin for stroke prophylaxis and does homeINR monitoring managed by her PCP  She does feel like her atrial fibrillation is poorly controlled several hours after her morning dose of short-acting diltiazem, and by mid day does feel like she needs an additional dose but has to wait until the evening before her evening dose  Review of Systems   Constitutional: Negative for activity change and fatigue  HENT: Negative for facial swelling  Eyes: Negative for visual disturbance  Respiratory: Negative for chest tightness and shortness of breath  Cardiovascular: Negative for chest pain, palpitations and leg swelling  Gastrointestinal: Negative for nausea and vomiting  Musculoskeletal: Negative for myalgias  Skin: Negative for pallor  Allergic/Immunologic: Negative for immunocompromised state  Neurological: Negative for dizziness, syncope and light-headedness  Psychiatric/Behavioral: Negative for agitation and confusion  The patient is not nervous/anxious  Past Medical History:   Diagnosis Date    [facility] ([facility] )     Arthritis     Atrial fibrillation ([facility] )     Cervical spondylosis with myelopathy     Gastric bypass status for obesity     History of transfusion     35 years ago    Hypertension     mass right kidney     [facility]     RIGHT    Sleep apnea      Past Surgical History:   Procedure Laterality Date    CHOLECYSTECTOMY      COLOSTOMY      CRYOABLATION Right     RT KIDNEY    CYSTORRHAPHY      Bladder   Last assessed 4/6/2016     GASTRIC BYPASS      HERNIA REPAIR      Last assessed 4/6/2016     HYSTERECTOMY      MA OPEN RX DISTAL RADIUS FX, EXTRA-ARTICULAR Right 4/22/2016    Procedure: OPEN REDUCTION INTERNAL FIXATION RIGHT DISTAL RADIUS;  Surgeon: Letty Burris MD;  [facility];  Service: Orthopedics  REVISION COLOSTOMY      THYROID LOBECTOMY Left 8/27/2019    Procedure: LOBECTOMY THYROID, left;  Surgeon: Lalo Jimenez MD;  [facility];  Service: Surgical Oncology    TUBAL LIGATION      US GUIDED THYROID BIOPSY  2/27/2019    US GUIDED THYROID BIOPSY  5/29/2019     Social History     Substance and Sexual Activity   Alcohol Use Not Currently    Alcohol/week: 0 0 standard drinks    Comment: 0     Social History     Substance and Sexual Activity   Drug Use No     Social History     Tobacco Use   Smoking Status Never Smoker   Smokeless Tobacco Never Used     Family History   Problem Relation Age of Onset    Heart attack Mother     Lung cancer Father 76    Heart disease Father     Stroke Sister     Lung cancer Sister 64    Prostate cancer Brother 61       Allergies:  No Known Allergies    Medications (as of START of this encounter): Outpatient Medications Prior to Visit   Medication Sig Dispense Refill    acetaminophen (TYLENOL) 325 mg tablet Take 975 mg by mouth every 6 (six) hours as needed      diltiazem (CARDIZEM) 120 MG tablet Take 120 mg by mouth 2 (two) times a day   ergocalciferol (VITAMIN D2) 50,000 units Take 50,000 Units by mouth once a week      FLUoxetine (PROzac) 20 mg capsule Take 20 mg by mouth 2 (two) times a day        gabapentin (NEURONTIN) 300 mg capsule Take 600 mg by mouth daily at bedtime       hydrochlorothiazide (HYDRODIURIL) 25 mg tablet Take 25 mg by mouth daily      LORazepam (ATIVAN) 1 mg tablet Take 1 mg by mouth daily at bedtime as needed for anxiety   solifenacin (VESICARE) 10 MG tablet Take 1 tablet (10 mg total) by mouth daily   Start with 0 5 tablet and can titrate up to 1 tab daily if no side effects  30 tablet 2    warfarin (COUMADIN) 1 mg tablet Per pt sometimes she takes 6mg daily sometimes--per pt she does her own bw at home every 2 weeks        warfarin (COUMADIN) 5 mg tablet Take 5 mg by mouth daily        Facility-Administered Medications Prior to Visit   Medication Dose Route Frequency Provider Last Rate Last Admin    denosumab (PROLIA) subcutaneous injection 60 mg  60 mg Subcutaneous Q6 Months Yuliya Tristan MD   60 mg at 01/04/21 1157          There were no vitals filed for this visit  Weight (last 2 days)     None          General: Leonard Paniagua is a morbidly obese elderly  female, in no acute distress, sitting comfortably  HEENT: moist mucous membranes, EOMI  Neck:  No JVD, supple, trachea midline   Cardiovascular: unremarkable S1/S2, irregularly irregular, normal rate no murmurs, rubs or gallops   Pulmonary: normal respiratory effort, CTAB   Abdomen: soft and nondistended  Extremities:  Trace lower extremity edema  Warm and well perfused extremities  Neuro: no focal motor deficits, AAOx3 (person, place, time)  Psych: Normal mood and affect, cooperative      Laboratory Studies:    Laboratory studies personally reviewed    Cardiac testing:     EKG reviewed personally:   EKG in the office today shows atrial fibrillation, ventricular rate 97 bpm, left axis deviation, normal QRS and QTC intervals  Abnormal study  Time Spent:  Total time (face-to-face and non-face-to-face) spent on today's visit was 40 minutes  This includes preparation for the visits (i e  reviewing test results), performance of a medically appropriate history and examination, and orders for medications, tests or other procedures  This time is exclusive of procedures performed and time spent teaching  Jossy Leon MD    This note was completed in part utilizing [employer] direct voice recognition software  Grammatical errors, random word insertion, spelling mistakes, occasional wrong word or "sound-alike" substitutions and incomplete sentences may be an occasional consequence of the system secondary to software limitations, ambient noise and hardware issues   At the time of dictation, efforts were made to edit, clarify and /or correct errors  Please read the chart carefully and recognize, using context, where substitutions have occurred  If you have any questions or concerns about the context, text or information contained within the body of this dictation, please contact myself, the provider, for further clarification

## 2022-06-16 ENCOUNTER — TELEPHONE (OUTPATIENT)
Dept: ENDOCRINOLOGY | Facility: CLINIC | Age: 72
End: 2022-06-16

## 2022-07-05 ENCOUNTER — HOSPITAL ENCOUNTER (OUTPATIENT)
Dept: RADIOLOGY | Age: 72
Discharge: HOME | End: 2022-07-05
Payer: MEDICARE

## 2022-07-05 DIAGNOSIS — M81.0 OSTEOPOROSIS, UNSPECIFIED OSTEOPOROSIS TYPE, UNSPECIFIED PATHOLOGICAL FRACTURE PRESENCE: ICD-10-CM

## 2022-07-05 PROCEDURE — 77080 DXA BONE DENSITY AXIAL: CPT

## 2022-07-21 ENCOUNTER — OFFICE VISIT (OUTPATIENT)
Dept: ENDOCRINOLOGY | Facility: CLINIC | Age: 72
End: 2022-07-21
Payer: MEDICARE

## 2022-07-21 DIAGNOSIS — I50.9 CHRONIC HEART FAILURE, UNSPECIFIED HEART FAILURE TYPE (HCC): ICD-10-CM

## 2022-07-21 DIAGNOSIS — M81.0 OSTEOPOROSIS, UNSPECIFIED OSTEOPOROSIS TYPE, UNSPECIFIED PATHOLOGICAL FRACTURE PRESENCE: Primary | ICD-10-CM

## 2022-07-21 DIAGNOSIS — N18.31 STAGE 3A CHRONIC KIDNEY DISEASE (HCC): ICD-10-CM

## 2022-07-21 PROCEDURE — 96372 THER/PROPH/DIAG INJ SC/IM: CPT | Performed by: INTERNAL MEDICINE

## 2022-07-21 PROCEDURE — 99211 OFF/OP EST MAY X REQ PHY/QHP: CPT | Performed by: INTERNAL MEDICINE

## 2022-08-03 ENCOUNTER — TELEPHONE (OUTPATIENT)
Dept: OTHER | Facility: OTHER | Age: 72
End: 2022-08-03

## 2022-08-03 DIAGNOSIS — C64.1: Primary | ICD-10-CM

## 2022-08-03 NOTE — TELEPHONE ENCOUNTER
PT  Called in requesting a call back to let her know if she needs to get blood work done before her up coming MRI appointment on 08/08

## 2022-08-03 NOTE — TELEPHONE ENCOUNTER
Spoke with patient and informed her that yes she will need BMP prior to getting MRI  Order placed in chart and patient agrees to go for lab tomorrow

## 2022-08-04 ENCOUNTER — APPOINTMENT (OUTPATIENT)
Dept: LAB | Facility: IMAGING CENTER | Age: 72
End: 2022-08-04
Payer: MEDICARE

## 2022-08-04 DIAGNOSIS — C64.1: ICD-10-CM

## 2022-08-04 LAB
ANION GAP SERPL CALCULATED.3IONS-SCNC: 6 MMOL/L (ref 4–13)
BUN SERPL-MCNC: 25 MG/DL (ref 5–25)
CALCIUM SERPL-MCNC: 8.8 MG/DL (ref 8.3–10.1)
CHLORIDE SERPL-SCNC: 103 MMOL/L (ref 96–108)
CO2 SERPL-SCNC: 30 MMOL/L (ref 21–32)
CREAT SERPL-MCNC: 1.01 MG/DL (ref 0.6–1.3)
GFR SERPL CREATININE-BSD FRML MDRD: 55 ML/MIN/1.73SQ M
GLUCOSE P FAST SERPL-MCNC: 119 MG/DL (ref 65–99)
POTASSIUM SERPL-SCNC: 3.7 MMOL/L (ref 3.5–5.3)
SODIUM SERPL-SCNC: 139 MMOL/L (ref 135–147)

## 2022-08-04 PROCEDURE — 80048 BASIC METABOLIC PNL TOTAL CA: CPT

## 2022-08-04 PROCEDURE — 36415 COLL VENOUS BLD VENIPUNCTURE: CPT

## 2022-08-08 ENCOUNTER — HOSPITAL ENCOUNTER (OUTPATIENT)
Dept: MRI IMAGING | Facility: HOSPITAL | Age: 72
Discharge: HOME | End: 2022-08-08
Payer: MEDICARE

## 2022-08-08 DIAGNOSIS — C64.1 MALIGNANT NEOPLASM OF RIGHT KIDNEY (HCC): ICD-10-CM

## 2022-08-08 PROCEDURE — 74183 MRI ABD W/O CNTR FLWD CNTR: CPT

## 2022-08-08 PROCEDURE — A9585 GADOBUTROL INJECTION: HCPCS | Performed by: PHYSICIAN ASSISTANT

## 2022-08-08 RX ORDER — OXYBUTYNIN CHLORIDE 5 MG/1
TABLET ORAL 2 TIMES DAILY
Status: ON HOLD | COMMUNITY
Start: 2022-05-28

## 2022-08-08 RX ORDER — AMOXICILLIN AND CLAVULANATE POTASSIUM 875; 125 MG/1; MG/1
1 TABLET, FILM COATED ORAL 2 TIMES DAILY
Status: ON HOLD | COMMUNITY
Start: 2022-06-22

## 2022-08-08 RX ADMIN — GADOBUTROL 13 ML: 604.72 INJECTION INTRAVENOUS at 11:38

## 2022-08-09 ENCOUNTER — TELEPHONE (OUTPATIENT)
Dept: OBGYN CLINIC | Facility: HOSPITAL | Age: 72
End: 2022-08-09

## 2022-08-09 ENCOUNTER — OFFICE VISIT (OUTPATIENT)
Dept: OBGYN CLINIC | Facility: HOSPITAL | Age: 72
End: 2022-08-09
Payer: MEDICARE

## 2022-08-09 ENCOUNTER — TELEPHONE (OUTPATIENT)
Dept: BARIATRICS | Facility: CLINIC | Age: 72
End: 2022-08-09

## 2022-08-09 VITALS
DIASTOLIC BLOOD PRESSURE: 67 MMHG | BODY MASS INDEX: 51.91 KG/M2 | HEIGHT: 63 IN | SYSTOLIC BLOOD PRESSURE: 121 MMHG | WEIGHT: 293 LBS | HEART RATE: 57 BPM

## 2022-08-09 DIAGNOSIS — M25.562 CHRONIC PAIN OF LEFT KNEE: Primary | ICD-10-CM

## 2022-08-09 DIAGNOSIS — R29.898 MUSCULAR DECONDITIONING: ICD-10-CM

## 2022-08-09 DIAGNOSIS — G89.29 CHRONIC PAIN OF RIGHT KNEE: ICD-10-CM

## 2022-08-09 DIAGNOSIS — M17.0 PRIMARY OSTEOARTHRITIS OF BOTH KNEES: ICD-10-CM

## 2022-08-09 DIAGNOSIS — M25.561 CHRONIC PAIN OF RIGHT KNEE: ICD-10-CM

## 2022-08-09 DIAGNOSIS — G89.29 CHRONIC PAIN OF LEFT KNEE: Primary | ICD-10-CM

## 2022-08-09 PROCEDURE — 99213 OFFICE O/P EST LOW 20 MIN: CPT | Performed by: ORTHOPAEDIC SURGERY

## 2022-08-09 NOTE — TELEPHONE ENCOUNTER
Pt called to make a [facility]WM appt  Pt has a hx of bariatric sx  Pt had the bypass performed in 2007, @ [facility]  Pt's PCP is Dr Melecio Subramanian, which is an independent provider  Pt will have 2 yrs worth of records faxed over to our office  Will give paperwork to ROZINA/  when records are received  Paperwork placed in cabinet under "L"

## 2022-08-09 NOTE — PROGRESS NOTES
Assessment:   Diagnosis ICD-10-CM Associated Orders   1  Chronic pain of left knee  M25 562 Ambulatory Referral to Physical Therapy    G89 29    2  Primary osteoarthritis of both knees  M17 0 Ambulatory Referral to Physical Therapy   3  Chronic pain of right knee  M25 561 Ambulatory Referral to Physical Therapy    G89 29    4  Muscular deconditioning  R29 898 Ambulatory Referral to Physical Therapy       Plan:  Diagnosis, treatment options and associated risks were discussed with the patient including no treatment, nonsurgical treatment and potential for surgical intervention  The patient was given the opportunity to ask questions regarding each  It was explained to the patient that based upon her elevated BMI, at this point in time, this is not a surgical problem  Treatment for the above diagnoses and associated symptoms of this nature is generally conservative including a physician directed physical therapy program, improved fitness/weight loss, anti-inflammatories, and potentially various injections  Patient was referred to physical therapy to work on general knee and hip strengthening  She was also educated on maintaining a healthy lifestyle with proper weight management  To do next visit:  Return in about 3 months (around 11/9/2022) for Recheck  The above stated was discussed in layman's terms and the patient expressed understanding  All questions were answered to the patient's satisfaction  Scribe Attestation    I,:  Enrrique Bautista am acting as a scribe while in the presence of the attending physician :       I,:  Indigo Reynolds MD personally performed the services described in this documentation    as scribed in my presence :             Subjective:   Bria  is a 66 y o  female who presents today for a repeat evaluation of Her bilateral knee due to chronic pain, known osteoarthritis   Patients presents today using a Walker for ambulatory assistance but uses a single-point cane intermittently  Patient states that her bilateral knee pain has improved but complains of pain with weight-bearing activities and stiffness after prolonged sedentary positions  Secondary she reports lack of mobility in her hip that is limiting her ability to perform ADL  She has trouble getting in and out of bed and expresses her interest in a hospital bed  With lack of mobility, she reports stiffness and pain in her lower back  She denies numbness, tingling, fevers, chills or shortness of breath  Review of systems negative unless otherwise specified in HPI  Review of Systems   Constitutional: Negative for chills and fever  HENT: Negative for ear pain and sore throat  Eyes: Negative for pain and visual disturbance  Respiratory: Negative for cough and shortness of breath  Cardiovascular: Negative for chest pain and palpitations  Gastrointestinal: Negative for abdominal pain and vomiting  Genitourinary: Negative for dysuria and hematuria  Musculoskeletal: Positive for back pain, gait problem, neck pain and neck stiffness  Negative for arthralgias  Skin: Negative for color change and rash  Neurological: Negative for seizures and syncope  All other systems reviewed and are negative  Past Medical History:   Diagnosis Date    [facility]     Arthritis     [facility]     Cervical spondylosis with myelopathy     Gastric bypass status for obesity     History of transfusion     35 years ago    Hypertension     mass right kidney     Renal cell adenocarcinoma ([facility]     RIGHT    Sleep apnea        Past Surgical History:   Procedure Laterality Date    CHOLECYSTECTOMY      COLOSTOMY      CRYOABLATION Right     RT KIDNEY    CYSTORRHAPHY      Bladder   Last assessed 4/6/2016     GASTRIC BYPASS      HERNIA REPAIR      Last assessed 4/6/2016     HYSTERECTOMY      UT OPEN RX DISTAL RADIUS FX, EXTRA-ARTICULAR Right 4/22/2016    Procedure: OPEN REDUCTION INTERNAL FIXATION RIGHT DISTAL RADIUS;  Surgeon: Nila Hirsch MD;  [facility];  Service: Orthopedics    REVISION COLOSTOMY      THYROID LOBECTOMY Left 8/27/2019    Procedure: LOBECTOMY THYROID, left;  Surgeon: Jose Amezquita MD;  [facility]: [facility];  Service: Surgical Oncology    TUBAL LIGATION      US GUIDED THYROID BIOPSY  2/27/2019    US GUIDED THYROID BIOPSY  5/29/2019       Family History   Problem Relation Age of Onset    Heart attack Mother     Lung cancer Father 76    Heart disease Father     Stroke Sister     Lung cancer Sister 64    Prostate cancer Brother 61       Social History     Occupational History    Not on file   Tobacco Use    Smoking status: Never Smoker    Smokeless tobacco: Never Used   Vaping Use    Vaping Use: Never used   Substance and Sexual Activity    Alcohol use: Not Currently     Alcohol/week: 0 0 standard drinks     Comment: 0    Drug use: No    Sexual activity: Not on file         Current Outpatient Medications:     acetaminophen (TYLENOL) 325 mg tablet, Take 975 mg by mouth every 6 (six) hours as needed, Disp: , Rfl:     amoxicillin-clavulanate (AUGMENTIN) 875-125 mg per tablet, Take 1 tablet by mouth 2 (two) times a day, Disp: , Rfl:     diltiazem (CARDIZEM CD) 360 MG 24 hr capsule, Take 1 capsule (360 mg total) by mouth daily, Disp: 90 capsule, Rfl: 3    ergocalciferol (VITAMIN D2) 50,000 units, Take 50,000 Units by mouth once a week Takes D3 , Disp: , Rfl:     FLUoxetine (PROzac) 20 mg capsule, Take 20 mg by mouth 2 (two) times a day  , Disp: , Rfl:     gabapentin (NEURONTIN) 300 mg capsule, Take 600 mg by mouth daily at bedtime , Disp: , Rfl:     hydrochlorothiazide (HYDRODIURIL) 25 mg tablet, Take 25 mg by mouth daily, Disp: , Rfl:     LORazepam (ATIVAN) 1 mg tablet, Take 1 mg by mouth daily at bedtime as needed for anxiety  , Disp: , Rfl:     oxybutynin (DITROPAN) 5 mg tablet, , Disp: , Rfl:     warfarin (COUMADIN) 1 mg tablet, Per pt sometimes she takes 6mg daily sometimes--per pt she does her own bw at home every 2 weeks  , Disp: , Rfl:     warfarin (COUMADIN) 5 mg tablet, Take 5 mg by mouth daily , Disp: , Rfl:     Current Facility-Administered Medications:     denosumab (PROLIA) subcutaneous injection 60 mg, 60 mg, Subcutaneous, Q6 Months, Calixto Puga MD, 60 mg at 01/04/21 1157    No Known Allergies       Vitals:    08/09/22 0846   BP: 121/67   Pulse: 57       Objective:                    Right Knee Exam     Tenderness   The patient is experiencing tenderness in the medial joint line  Range of Motion   Extension: 5   Flexion: 110     Tests   Varus: negative Valgus: negative    Other   Erythema: absent  Sensation: normal  Swelling: mild  Effusion: no effusion present    Comments:  Varus alignment with bony enlargement      Left Knee Exam     Tenderness   The patient is experiencing tenderness in the medial joint line  Range of Motion   Extension: 5   Flexion: 110     Tests   Varus: negative Valgus: negative    Other   Erythema: absent  Swelling: mild  Effusion: no effusion present    Comments:  Varus alignment with bony enlargement            Diagnostics, reviewed and taken today if performed as documented:    None performed      Procedures, if performed today:    None performed    Portions of the record may have been created with voice recognition software  Occasional wrong word or "sound a like" substitutions may have occurred due to the inherent limitations of voice recognition software  Read the chart carefully and recognize, using context, where substitutions have occurred

## 2022-08-10 ENCOUNTER — TELEPHONE (OUTPATIENT)
Dept: UROLOGY | Facility: AMBULATORY SURGERY CENTER | Age: 72
End: 2022-08-10

## 2022-08-10 ENCOUNTER — EVALUATION (OUTPATIENT)
Dept: PHYSICAL THERAPY | Facility: REHABILITATION | Age: 72
End: 2022-08-10
Payer: MEDICARE

## 2022-08-10 DIAGNOSIS — G89.29 CHRONIC PAIN OF RIGHT KNEE: ICD-10-CM

## 2022-08-10 DIAGNOSIS — G89.29 CHRONIC PAIN OF LEFT KNEE: ICD-10-CM

## 2022-08-10 DIAGNOSIS — M25.561 CHRONIC PAIN OF RIGHT KNEE: ICD-10-CM

## 2022-08-10 DIAGNOSIS — R29.898 MUSCULAR DECONDITIONING: ICD-10-CM

## 2022-08-10 DIAGNOSIS — M25.562 CHRONIC PAIN OF LEFT KNEE: ICD-10-CM

## 2022-08-10 DIAGNOSIS — M17.0 PRIMARY OSTEOARTHRITIS OF BOTH KNEES: ICD-10-CM

## 2022-08-10 PROCEDURE — 97162 PT EVAL MOD COMPLEX 30 MIN: CPT

## 2022-08-10 NOTE — PROGRESS NOTES
PT Evaluation     Today's date: 8/10/2022  Patient name: Staci Tomlinson  : 1950  MRN: [id number]  Referring provider: Edwar Gonzalez  Dx:   Encounter Diagnosis     ICD-10-CM    1  Chronic pain of left knee  M25 562 Ambulatory Referral to Physical Therapy    G89 29    2  Chronic pain of right knee  M25 561 Ambulatory Referral to Physical Therapy    G89 29    3  Muscular deconditioning  R29 898 Ambulatory Referral to Physical Therapy   4  Primary osteoarthritis of both knees  M17 0 Ambulatory Referral to Physical Therapy                  Assessment  Assessment details: Pt, Staci Tomlinson, is a 66 y o  female presenting to physical therapy on this date for an initial evaluation  Upon eval on this date, pt presented with decreased LE and core strength, impaired balance, fall risk based on her 5xSTS score and overall impaired tolerance to functional activities  At this time, pt would benefit from skilled OP PT to work on deficits listed above and improve overall functional mobility with decreased reports of pain and compensation patterns and decrease risk for falls   Thank you for this referral    Impairments: abnormal gait, abnormal or restricted ROM, abnormal movement, activity intolerance, impaired balance, impaired physical strength, lacks appropriate home exercise program, pain with function, weight-bearing intolerance, poor posture  and poor body mechanics    Goals  STG:   Pt will tolerate 30 minutes of exercise by week 2 to demonstrate improved tolerance to functional mobility    Pt will decrease 5xSTS score by 10 seconds by week 4 to demonstrate improved safety with transfers and mobility   Pt will be I with initial HEP to progress towards independence with exercise     LTG:   Pt will be able to complete STS with 1 UE support by d/c    Pt will decrease overall TUG score by additional 10 seconds to demonstrate improved safety with mobility and decreased risk for falls   Pt will improve LE strength to 4+/5 or greater by d/c for increased ease and safety with functional mobility and activities   Pt will be I with modified/updated HEP and demonstrate ability to complete with confidence and proper mechanics/form to safely be d/c from skilled OP PT and continue with exercises on their own     Plan  Patient would benefit from: PT eval and skilled physical therapy  Planned modality interventions: cryotherapy and thermotherapy: hydrocollator packs  Planned therapy interventions: abdominal trunk stabilization, balance/weight bearing training, balance, body mechanics training, functional ROM exercises, gait training, home exercise program, therapeutic exercise, therapeutic activities, stretching and strengthening  Frequency: 2x week  Duration in visits: 12  Duration in weeks: 6  Treatment plan discussed with: patient and family        Subjective Evaluation    History of Present Illness  Mechanism of injury: Pt reports that her knees do not hurt but her doctor sent her for core strengthening  She reports that she has trouble getting up from a chair or seat without using her arms  She reports that she also cannot get in and out of bed  Pt reports that her doctor told her to work on core, back, and hip strengthening  She reports that she permanently switched to using the rollator but when she is at home she does not use it  Pt reports that if she falls she is not getting up and we will have to "call the squad " to get her up  Pt reports that her last fall was May 7th where she fell in the middle of the night trying to get to the bathroom  Pt reports that she does not go down curbs and utilizes a ramp to get into her home and will need assistance with arm support if she does  Pt reports that toilets are also terrible for her to get off up from  Pt reports that her wrists are also weak from pushing up from chairs so often     Pain  Current pain ratin  Quality: dull ache  Aggravating factors: standing and walking    Social Support  Steps to enter house: yes  Stairs in house: no     Patient Goals  Patient goals for therapy: decreased pain, improved balance, increased motion, increased strength, independence with ADLs/IADLs and return to sport/leisure activities  Patient goal: get out of a chair easier         Objective     Strength/Myotome Testing     Left Hip   Planes of Motion   Flexion: 4-  Abduction: 4  Adduction: 4    Right Hip   Planes of Motion   Flexion: 4  Abduction: 4  Adduction: 4    Left Knee   Flexion: 4  Extension: 4    Right Knee   Flexion: 4  Extension: 4    Ambulation     Ambulation: Level Surfaces   Ambulation with assistive device: independent  Ambulation without assistive device: contact guard assist    Observational Gait   Gait: antalgic   Decreased walking speed and stride length  Quality of Movement During Gait   Trunk  Forward lean  Pelvis    Pelvis (Left): Positive Trendelenburg  Pelvis (Right): Positive Trendelenburg       Functional Assessment        Comments  5xSTS: BUE push off from chair, 57 2 seconds                Precautions: Chronic heart failure, permanent A-fib, OA b/l knees, myofascial pain syndrome, osteoporosis, reactive hypoglycemia, hashimoto's thyroiditis, hyperparathyroidism     8/10            FOTO X            IE/RE IE              Manuals                                                                 Exercises             LAQ  x5 ea            Standing HR  x10            Standing hip abd taps x10 ea            Standing march x5 ea            Side stepping              Step taps              STS from chair              Standing TB Rows             Seated dead bug              Rhomberg stance on foam EO                                                                                           Modalities

## 2022-08-10 NOTE — TELEPHONE ENCOUNTER
Discussed with GP, ok for virtual visit  Also MRI is not yet read, appt r/s to 8/18/22 as virtual  She was thankful for call back

## 2022-08-10 NOTE — TELEPHONE ENCOUNTER
Pt under care of Ron Priest called and is asking if her appt 8/12/22 could be changed to a VV     PT call [facility]-[phone]

## 2022-08-15 ENCOUNTER — OFFICE VISIT (OUTPATIENT)
Dept: PHYSICAL THERAPY | Facility: REHABILITATION | Age: 72
End: 2022-08-15
Payer: MEDICARE

## 2022-08-15 DIAGNOSIS — G89.29 CHRONIC PAIN OF RIGHT KNEE: ICD-10-CM

## 2022-08-15 DIAGNOSIS — M17.0 PRIMARY OSTEOARTHRITIS OF BOTH KNEES: ICD-10-CM

## 2022-08-15 DIAGNOSIS — R29.898 MUSCULAR DECONDITIONING: ICD-10-CM

## 2022-08-15 DIAGNOSIS — M25.562 CHRONIC PAIN OF LEFT KNEE: Primary | ICD-10-CM

## 2022-08-15 DIAGNOSIS — G89.29 CHRONIC PAIN OF LEFT KNEE: Primary | ICD-10-CM

## 2022-08-15 DIAGNOSIS — M25.561 CHRONIC PAIN OF RIGHT KNEE: ICD-10-CM

## 2022-08-15 PROCEDURE — 97112 NEUROMUSCULAR REEDUCATION: CPT

## 2022-08-15 PROCEDURE — 97110 THERAPEUTIC EXERCISES: CPT

## 2022-08-15 NOTE — PROGRESS NOTES
Daily Note     Today's date: 8/15/2022  Patient name: Almita Powell  : 1950  MRN: [id number]  Referring provider: Jake Baptiste  Dx:   Encounter Diagnosis     ICD-10-CM    1  Chronic pain of left knee  M25 562     G89 29    2  Chronic pain of right knee  M25 561     G89 29    3  Muscular deconditioning  R29 898    4  Primary osteoarthritis of both knees  M17 0                   Subjective: Pt reports that her exercises are going well for her  She reports that she did not bring her walker with her today because she wants to go without  Objective: See treatment diary below      Assessment: Pt tolerated treatment well  Pt was appropriately challenged throughout all exercises on this date with no reports of increased pain noted  Pt was provided with updated HEP to include side stepping  Pt was educated on potential for post-treatment soreness due to completing new exercises and importance of letting therapist know at next visit how she felt so that [address] can be adjusted as needed  Pt Patient demonstrated fatigue post treatment, exhibited good technique with therapeutic exercises and would benefit from continued PT  Plan: Continue per plan of care  Progress treatment as tolerated         Precautions: Chronic heart failure, permanent A-fib, OA b/l knees, myofascial pain syndrome, osteoporosis, reactive hypoglycemia, hashimoto's thyroiditis, hyperparathyroidism     8/10 8/15           FOTO X            IE/RE IE              Manuals                                                                 Exercises             LAQ  x5 ea 5"x10 ea           Standing HR  x10 x10           Standing hip abd taps x10 ea x10 ea           Standing march x5 ea x10 ea           Side stepping   BUE 8' x4 laps           Step taps              STS from chair   BUE arm chair x5           Standing TB Rows   Blue TB 5"x10           Seated dead bug              Rhomberg stance on foam EO  3x30"              Millie step overs yellow x3 laps                                                                            Modalities

## 2022-08-16 NOTE — TELEPHONE ENCOUNTER
Called and spoke with patient at this time  Advised we can r/s her VV  Relayed that her MRI is normal, and this will be relayed to her again at appt  She was relieved to hear this  Appt r/s for tomorrow

## 2022-08-16 NOTE — TELEPHONE ENCOUNTER
Patient called stating she has a conflict for the virtual call on 08/18/22 with Ren Trotter  She has another appointment which she cannot cancel at the same time  She wants to know when can she have another virtual visit with Ren Trotter   She is anxious to know results of MRI    Patient can be reached at [phone]

## 2022-08-17 ENCOUNTER — TELEMEDICINE (OUTPATIENT)
Dept: UROLOGY | Facility: CLINIC | Age: 72
End: 2022-08-17
Payer: MEDICARE

## 2022-08-17 ENCOUNTER — OFFICE VISIT (OUTPATIENT)
Dept: PHYSICAL THERAPY | Facility: REHABILITATION | Age: 72
End: 2022-08-17
Payer: MEDICARE

## 2022-08-17 DIAGNOSIS — M25.562 CHRONIC PAIN OF LEFT KNEE: Primary | ICD-10-CM

## 2022-08-17 DIAGNOSIS — R29.898 MUSCULAR DECONDITIONING: ICD-10-CM

## 2022-08-17 DIAGNOSIS — G89.29 CHRONIC PAIN OF LEFT KNEE: Primary | ICD-10-CM

## 2022-08-17 DIAGNOSIS — C64.1 CANCER OF RIGHT KIDNEY (HCC): Primary | ICD-10-CM

## 2022-08-17 DIAGNOSIS — M17.0 PRIMARY OSTEOARTHRITIS OF BOTH KNEES: ICD-10-CM

## 2022-08-17 DIAGNOSIS — M25.561 CHRONIC PAIN OF RIGHT KNEE: ICD-10-CM

## 2022-08-17 DIAGNOSIS — G89.29 CHRONIC PAIN OF RIGHT KNEE: ICD-10-CM

## 2022-08-17 PROCEDURE — 97110 THERAPEUTIC EXERCISES: CPT

## 2022-08-17 PROCEDURE — 99442 PR PHYS/QHP TELEPHONE EVALUATION 11-20 MIN: CPT | Performed by: PHYSICIAN ASSISTANT

## 2022-08-17 PROCEDURE — 97112 NEUROMUSCULAR REEDUCATION: CPT

## 2022-08-17 NOTE — PROGRESS NOTES
Virtual Brief Visit    Patient is located in the following state in which I hold an active license [place]    Assessment:    1  Renal cell carcinoma  2  Voiding dysfunction     Plan:  -he follow-up in 1 year with ultrasound prior to visit  -continue oxybutynin 5 mg b i d     39-year-old female with history of renal cell cancer  She had a right cryotherapy in May of 2016 with Dr Harpreet Norris  Her creatinine was 1 0  Follow-up MRI showed involuted treatment scar with no evidence of metastatic disease or recurrence  She uses oxybutynin 5 mg b i d  for voiding dysfunction  No new urologic complaints  Problem List Items Addressed This Visit    None     Visit Diagnoses     Cancer of right kidney [facility])    -  Primary    Relevant Orders    US bladder with post void residual          Recent Visits  No visits were found meeting these conditions  Showing recent visits within past 7 days and meeting all other requirements  Today's Visits  Date Type Provider Dept   08/17/22 Telemedicine Antonio Posadas PA-C [facility] today's visits and meeting all other requirements  Future Appointments  No visits were found meeting these conditions    Showing future appointments within next 150 days and meeting all other requirements         I spent 15 minutes directly with the patient during this visit

## 2022-08-17 NOTE — PROGRESS NOTES
Daily Note     Today's date: 2022  Patient name: George Anderson  : 1950  MRN: [id number]  Referring provider: Shawn Minaya  Dx:   Encounter Diagnosis     ICD-10-CM    1  Chronic pain of left knee  M25 562     G89 29    2  Chronic pain of right knee  M25 561     G89 29    3  Muscular deconditioning  R29 898    4  Primary osteoarthritis of both knees  M17 0                   Subjective: Pt reports that she was sore after her visit on Monday and took a pill that made her very groggy into yesterday so today she is starting to get back to her normal        Objective: See treatment diary below      Assessment: Pt tolerated treatment well  Pt demonstrated increased fatigue throughout exercises on this date but was able to complete all to her tolerance with appropriate rest breaks throughout  Pt tolerated standing row with free weights better on this date compared to bands at last visit  Patient demonstrated fatigue post treatment, exhibited good technique with therapeutic exercises and would benefit from continued PT  Plan: Continue per plan of care  Progress treatment as tolerated         Precautions: Chronic heart failure, permanent A-fib, OA b/l knees, myofascial pain syndrome, osteoporosis, reactive hypoglycemia, hashimoto's thyroiditis, hyperparathyroidism     8/10 8/15 8/17          FOTO X            IE/RE IE              Manuals                                                                 Exercises             LAQ  x5 ea 5"x10 ea 5"x10 ea          Standing HR  x10 x10 x10          Standing hip abd taps x10 ea x10 ea x10 ea          Standing march x5 ea x10 ea x10 ea          Side stepping   BUE 8' x4 laps BUE 8' x4 laps          Step taps              STS from chair   BUE arm chair x5 BUE arm chair x5          Standing TB Rows   Blue TB 5"x10 2# Row standing          Seated dead bug              Rhomberg stance on foam EO  3x30"  3x30" EO   2x30" EC 1UE            Millie step overs yellow x3 laps Millie step overs yellow x3 laps                                                                           Modalities

## 2022-08-22 ENCOUNTER — APPOINTMENT (OUTPATIENT)
Dept: PHYSICAL THERAPY | Facility: REHABILITATION | Age: 72
End: 2022-08-22
Payer: MEDICARE

## 2022-08-23 ENCOUNTER — ESTABLISHED COMPREHENSIVE EXAM (OUTPATIENT)
Dept: URBAN - METROPOLITAN AREA CLINIC 6 | Facility: CLINIC | Age: 72
End: 2022-08-23

## 2022-08-23 DIAGNOSIS — Z96.1: ICD-10-CM

## 2022-08-23 DIAGNOSIS — H43.393: ICD-10-CM

## 2022-08-23 DIAGNOSIS — H35.3131: ICD-10-CM

## 2022-08-23 DIAGNOSIS — D31.32: ICD-10-CM

## 2022-08-23 PROCEDURE — 92014 COMPRE OPH EXAM EST PT 1/>: CPT

## 2022-08-23 ASSESSMENT — VISUAL ACUITY
OD_SC: 20/25-2
OU_SC: J1
OS_SC: 20/40-2

## 2022-08-23 ASSESSMENT — TONOMETRY
OD_IOP_MMHG: 15
OS_IOP_MMHG: 14

## 2022-08-25 ENCOUNTER — OFFICE VISIT (OUTPATIENT)
Dept: PHYSICAL THERAPY | Facility: REHABILITATION | Age: 72
End: 2022-08-25
Payer: MEDICARE

## 2022-08-25 DIAGNOSIS — G89.29 CHRONIC PAIN OF RIGHT KNEE: ICD-10-CM

## 2022-08-25 DIAGNOSIS — G89.29 CHRONIC PAIN OF LEFT KNEE: Primary | ICD-10-CM

## 2022-08-25 DIAGNOSIS — R29.898 MUSCULAR DECONDITIONING: ICD-10-CM

## 2022-08-25 DIAGNOSIS — M25.561 CHRONIC PAIN OF RIGHT KNEE: ICD-10-CM

## 2022-08-25 DIAGNOSIS — M17.0 PRIMARY OSTEOARTHRITIS OF BOTH KNEES: ICD-10-CM

## 2022-08-25 DIAGNOSIS — M25.562 CHRONIC PAIN OF LEFT KNEE: Primary | ICD-10-CM

## 2022-08-25 PROCEDURE — 97535 SELF CARE MNGMENT TRAINING: CPT

## 2022-08-25 NOTE — PROGRESS NOTES
Daily Note     Today's date: 2022  Patient name: Jaron Miller  : 1950  MRN: [id number]  Referring provider: Margaux Minor  Dx:   Encounter Diagnosis     ICD-10-CM    1  Chronic pain of left knee  M25 562     G89 29    2  Chronic pain of right knee  M25 561     G89 29    3  Muscular deconditioning  R29 898    4  Primary osteoarthritis of both knees  M17 0                   Subjective:  Patient presents to PT reporting a poor response to her current exercise program   Patient states that she doesn't want to give up on therapy but she has pain walking out after her sessions and ongoing daily pain  Objective:   Provided education this visit regarding exercise and appropriate response as well as alternative options for this patient to participate in therapy to attain her functional goals  Discussed benefits of aquatic physical therapy  No exercises were performed  Assessment: Patient reported understanding of the education provided and is agreeable to transition to aquatic physical therapy if an order can be obtained from her physician  A request was sent to Dr Mitch Bhatia for aquatic PT and the patient was given contact information for the appropriate site  Plan:  Patient is discharged from land based PT at this time        Precautions: Chronic heart failure, permanent A-fib, OA b/l knees, myofascial pain syndrome, osteoporosis, reactive hypoglycemia, hashimoto's thyroiditis, hyperparathyroidism     8/10 8/15 8/17          FOTO X            IE/RE IE              Manuals                                                                 Exercises             LAQ  x5 ea 5"x10 ea 5"x10 ea          Standing HR  x10 x10 x10          Standing hip abd taps x10 ea x10 ea x10 ea          Standing march x5 ea x10 ea x10 ea          Side stepping   BUE 8' x4 laps BUE 8' x4 laps          Step taps              STS from chair   BUE arm chair x5 BUE arm chair x5          Standing TB Rows Blue TB 5"x10 2# Row standing          Seated dead bug              Rhomberg stance on foam EO  3x30"  3x30" EO   2x30" EC 1UE            Millie step overs yellow x3 laps Millie step overs yellow x3 laps                                                                           Modalities

## 2022-08-26 DIAGNOSIS — M25.50 ARTHRALGIA, UNSPECIFIED JOINT: Primary | ICD-10-CM

## 2022-08-29 ENCOUNTER — APPOINTMENT (OUTPATIENT)
Dept: PHYSICAL THERAPY | Facility: REHABILITATION | Age: 72
End: 2022-08-29
Payer: MEDICARE

## 2022-08-31 ENCOUNTER — APPOINTMENT (OUTPATIENT)
Dept: PHYSICAL THERAPY | Facility: REHABILITATION | Age: 72
End: 2022-08-31
Payer: MEDICARE

## 2022-09-06 ENCOUNTER — APPOINTMENT (OUTPATIENT)
Dept: PHYSICAL THERAPY | Facility: REHABILITATION | Age: 72
End: 2022-09-06
Payer: MEDICARE

## 2022-09-07 ENCOUNTER — EVALUATION (OUTPATIENT)
Dept: PHYSICAL THERAPY | Age: 72
End: 2022-09-07
Payer: MEDICARE

## 2022-09-07 DIAGNOSIS — M25.50 ARTHRALGIA, UNSPECIFIED JOINT: Primary | ICD-10-CM

## 2022-09-07 PROCEDURE — 97530 THERAPEUTIC ACTIVITIES: CPT | Performed by: PHYSICAL THERAPIST

## 2022-09-08 ENCOUNTER — TELEPHONE (OUTPATIENT)
Dept: OBGYN CLINIC | Facility: HOSPITAL | Age: 72
End: 2022-09-08

## 2022-09-08 ENCOUNTER — APPOINTMENT (OUTPATIENT)
Dept: PHYSICAL THERAPY | Facility: REHABILITATION | Age: 72
End: 2022-09-08
Payer: MEDICARE

## 2022-09-08 DIAGNOSIS — M25.50 ARTHRALGIA, UNSPECIFIED JOINT: Primary | ICD-10-CM

## 2022-09-08 DIAGNOSIS — R29.898 MUSCULAR DECONDITIONING: ICD-10-CM

## 2022-09-08 NOTE — PROGRESS NOTES
Patient unable to demonstrate adequate safety with navigating flight of three steps independently, which is required to access pool at this facility  In addition, patient's goals of improving ability to stand from low toilet, get out of her bed, and improve endurance to vacation with her family would not be adequately achieved with aquatic therapy  Rather, it was agreed upon by PT, patient, and her son to return to previous PT clinic that is closer to her home with a modified program  Facility director at that clinic was contacted and also agreed upon new plan of care  Billing for this session includes patient education regarding stair strategies/safety at home and gait training with RW

## 2022-09-08 NOTE — TELEPHONE ENCOUNTER
Dr Carrion [facility] # [phone]    Patient called she would like core strengthening exercises  added to the PT orders

## 2022-09-12 ENCOUNTER — APPOINTMENT (OUTPATIENT)
Dept: PHYSICAL THERAPY | Facility: REHABILITATION | Age: 72
End: 2022-09-12
Payer: MEDICARE

## 2022-09-14 ENCOUNTER — TELEPHONE (OUTPATIENT)
Dept: OBGYN CLINIC | Facility: HOSPITAL | Age: 72
End: 2022-09-14

## 2022-09-14 ENCOUNTER — APPOINTMENT (OUTPATIENT)
Dept: PHYSICAL THERAPY | Facility: REHABILITATION | Age: 72
End: 2022-09-14
Payer: MEDICARE

## 2022-09-14 NOTE — TELEPHONE ENCOUNTER
C/o weakness, ? Etiology  Hx of Cervical Spondylosis, (Dr Rony Cross)  Arthralgia  Has PT professionals, ( Why would PT be held , or stopped)    Recommend Patient be seen by PT personnel, or Physiatrist, or PCP, to establish cause / origin of this weakness  Additional physical aids "held" until this is done      JOSEPH

## 2022-09-14 NOTE — TELEPHONE ENCOUNTER
Dr Anay Odom,  Patient called she has a hard time getting out of a chair  She has no strength she can not lift her leg up and can not get in and out of bed  She is asking for something to get her out of chair, something for her toilet and a scooter  This morning she said they cancelled her therapy all week       # [phone]

## 2022-09-14 NOTE — TELEPHONE ENCOUNTER
Patient made aware of above information  She has Physical Therapy evaluation on Tuesday  Patient will discuss with Therapist what is going on and she will have Physical Therapist call office with findings and recommendations

## 2022-09-20 ENCOUNTER — EVALUATION (OUTPATIENT)
Dept: PHYSICAL THERAPY | Facility: REHABILITATION | Age: 72
End: 2022-09-20
Payer: MEDICARE

## 2022-09-20 DIAGNOSIS — M25.50 ARTHRALGIA, UNSPECIFIED JOINT: ICD-10-CM

## 2022-09-20 DIAGNOSIS — M25.561 CHRONIC PAIN OF RIGHT KNEE: ICD-10-CM

## 2022-09-20 DIAGNOSIS — G89.29 CHRONIC PAIN OF RIGHT KNEE: ICD-10-CM

## 2022-09-20 DIAGNOSIS — M25.562 CHRONIC PAIN OF LEFT KNEE: Primary | ICD-10-CM

## 2022-09-20 DIAGNOSIS — R29.898 MUSCULAR DECONDITIONING: ICD-10-CM

## 2022-09-20 DIAGNOSIS — M17.0 PRIMARY OSTEOARTHRITIS OF BOTH KNEES: ICD-10-CM

## 2022-09-20 DIAGNOSIS — G89.29 CHRONIC PAIN OF LEFT KNEE: Primary | ICD-10-CM

## 2022-09-20 PROCEDURE — 97535 SELF CARE MNGMENT TRAINING: CPT | Performed by: PHYSICAL THERAPIST

## 2022-09-20 PROCEDURE — 97164 PT RE-EVAL EST PLAN CARE: CPT | Performed by: PHYSICAL THERAPIST

## 2022-09-21 NOTE — PROGRESS NOTES
PT Evaluation     Today's date: 2022  Patient name: Bria   : 1950  MRN: [id number]  Referring provider: Davon Adler  Dx:   Encounter Diagnosis     ICD-10-CM    1  Arthralgia, unspecified joint  M25 50 Ambulatory Referral to Physical Therapy   2  Muscular deconditioning  R29 898 Ambulatory Referral to Physical Therapy                  Assessment  Assessment details: Pt, Bria , is a 66 y o  female presenting to physical therapy on this date for an initial evaluation  Upon eval on this date, pt presented with decreased LE and core strength, impaired balance, fall risk based on her 5xSTS score and overall impaired tolerance to functional activities  At this time, pt would benefit from skilled OP PT to work on deficits listed above and improve overall functional mobility with decreased reports of pain and compensation patterns and decrease risk for falls   Thank you for this referral    Impairments: abnormal gait, abnormal or restricted ROM, abnormal movement, activity intolerance, impaired balance, impaired physical strength, lacks appropriate home exercise program, pain with function, weight-bearing intolerance, poor posture  and poor body mechanics    Goals  STG:   Pt will tolerate 30 minutes of exercise by week 2 to demonstrate improved tolerance to functional mobility    Pt will decrease 5xSTS score by 10 seconds by week 4 to demonstrate improved safety with transfers and mobility   Pt will be I with initial HEP to progress towards independence with exercise     LTG:   Pt will be able to complete STS with 1 UE support by d/c    Pt will decrease overall TUG score by additional 10 seconds to demonstrate improved safety with mobility and decreased risk for falls   Pt will improve LE strength to 4+/5 or greater by d/c for increased ease and safety with functional mobility and activities   Pt will be I with modified/updated HEP and demonstrate ability to complete with confidence and proper mechanics/form to safely be d/c from skilled OP PT and continue with exercises on their own     Plan  Patient would benefit from: PT eval and skilled physical therapy  Planned modality interventions: cryotherapy and thermotherapy: hydrocollator packs  Planned therapy interventions: abdominal trunk stabilization, balance/weight bearing training, balance, body mechanics training, functional ROM exercises, gait training, home exercise program, therapeutic exercise, therapeutic activities, stretching and strengthening  Frequency: 2x week  Duration in visits: 12  Duration in weeks: 6  Treatment plan discussed with: patient and family        Subjective Evaluation    History of Present Illness  Mechanism of injury: Pt is a 66year old female presenting to PT with diagnoses of bilateral knee OA, bilateral chronic knee pain and physical deconditioning  Pt reports that her knees do not hurt but her doctor sent her for core strengthening  She reports that she has trouble getting up from a chair or seat without using her arms  She reports that she also cannot get in and out of bed  Pt reports that her doctor told her to work on core, back, and hip strengthening  She reports that she permanently switched to using the rollator but when she is at home she does not use it  Pt reports that if she falls she is not getting up and we will have to "call the squad " to get her up  Pt reports that her last fall was May 7th where she fell in the middle of the night trying to get to the bathroom  Pt reports that she does not go down curbs and utilizes a ramp to get into her home and will need assistance with arm support if she does  Pt reports that toilets are also terrible for her to get off up from  Pt reports that her wrists are also weak from pushing up from chairs so often     Pain  Current pain ratin  Quality: dull ache  Aggravating factors: standing and walking    Social Support  Steps to enter house: yes  Stairs in house: no     Patient Goals  Patient goals for therapy: decreased pain, improved balance, increased motion, increased strength, independence with ADLs/IADLs and return to sport/leisure activities  Patient goal: get out of a chair easier         Objective     Strength/Myotome Testing     Left Hip   Planes of Motion   Flexion: 4-  Abduction: 4  Adduction: 4    Right Hip   Planes of Motion   Flexion: 4  Abduction: 4  Adduction: 4    Left Knee   Flexion: 4  Extension: 4    Right Knee   Flexion: 4  Extension: 4    Ambulation     Ambulation: Level Surfaces   Ambulation with assistive device: independent  Ambulation without assistive device: contact guard assist    Observational Gait   Gait: antalgic   Decreased walking speed and stride length  Quality of Movement During Gait   Trunk  Forward lean  Pelvis    Pelvis (Left): Positive Trendelenburg  Pelvis (Right): Positive Trendelenburg       Functional Assessment        Comments  5xSTS: BUE push off from chair, 57 2 seconds                Precautions: Chronic heart failure, permanent A-fib, OA b/l knees, myofascial pain syndrome, osteoporosis, reactive hypoglycemia, hashimoto's thyroiditis, hyperparathyroidism     8/10            FOTO X            IE/RE IE              Manuals                                                                 Exercises             LAQ  x5 ea            Standing HR  x10            Standing hip abd taps x10 ea            Standing march x5 ea            Side stepping              Step taps              STS from chair              Standing TB Rows             Seated dead bug              Rhomberg stance on foam EO                                                                                           Modalities

## 2022-09-22 ENCOUNTER — OFFICE VISIT (OUTPATIENT)
Dept: PHYSICAL THERAPY | Facility: REHABILITATION | Age: 72
End: 2022-09-22
Payer: MEDICARE

## 2022-09-22 DIAGNOSIS — M25.50 ARTHRALGIA, UNSPECIFIED JOINT: Primary | ICD-10-CM

## 2022-09-22 DIAGNOSIS — G89.29 CHRONIC PAIN OF LEFT KNEE: ICD-10-CM

## 2022-09-22 DIAGNOSIS — M25.562 CHRONIC PAIN OF LEFT KNEE: ICD-10-CM

## 2022-09-22 DIAGNOSIS — M25.561 CHRONIC PAIN OF RIGHT KNEE: ICD-10-CM

## 2022-09-22 DIAGNOSIS — M17.0 PRIMARY OSTEOARTHRITIS OF BOTH KNEES: ICD-10-CM

## 2022-09-22 DIAGNOSIS — G89.29 CHRONIC PAIN OF RIGHT KNEE: ICD-10-CM

## 2022-09-22 DIAGNOSIS — R29.898 MUSCULAR DECONDITIONING: ICD-10-CM

## 2022-09-22 PROCEDURE — 97530 THERAPEUTIC ACTIVITIES: CPT | Performed by: PHYSICAL THERAPIST

## 2022-09-22 NOTE — PROGRESS NOTES
Daily Note     Today's date: 22  Patient name: Belinda Montanez  : 1950  MRN: [id number]  Referring provider: Stephani Nunez  Dx:   Encounter Diagnosis     ICD-10-CM    1  Arthralgia, unspecified joint  M25 50    2  Muscular deconditioning  R29 898    3  Chronic pain of left knee  M25 562     G89 29    4  Primary osteoarthritis of both knees  M17 0    5  Chronic pain of right knee  M25 561     G89 29                   Subjective: Pt reports she has been keeping a log of her water intake and daily walking, has been using her rollator outside and feels much more steady when using it  Objective: See treatment diary below  Assessment: Pt with good tolerance to initiation of therapeutic exercise program in sitting, with appropriate challenge and fatigue, no increase in knee pain or adverse response  Pt required rest breaks during and between each exercise series, as well as verbal cues for proper breathing techniques and posture  Pt provided education on normal response to strengthening exercises, with expected increased in fatigue with mild muscle soreness - pt and her daughter verbalized understanding to all education provided  Will continue to gradually progress her program as able  Plan: Continue per plan of care  Progress treatment as tolerated           Precautions: Chronic heart failure, permanent A-fib, OA b/l knees, myofascial pain syndrome, osteoporosis, reactive hypoglycemia, hashimoto's thyroiditis, hyperparathyroidism     Daily Treatment Diary      Assessment                     Alex/Reval  MD                     FOTO         **         **   Manuals                                                     Exercise Diary     Opp UE Press Down/LE Press Up Isometric with Ball    3"  1x5 ea                    Iso Hip ADD    3"  2x5                    TB Hip ABD + Shoulder ABD    Orange  3"  2x5                    TB Rows  Orange  3"  2x5            B Bicep Curls - Wrist Cuff Weights    3#  2x5                    Iso Hip Extension with Ball Under Foot    3#  1x5 ea                    TB Shoulder H  ABD    NV                                                                                                                                                                                                                                                                   Modalities

## 2022-09-23 ENCOUNTER — TELEPHONE (OUTPATIENT)
Dept: OBGYN CLINIC | Facility: HOSPITAL | Age: 72
End: 2022-09-23

## 2022-09-23 NOTE — TELEPHONE ENCOUNTER
Patient calls in today stating that her physical therapist recommends getting her bilateral knee cortisone injections  First available appointment was not until 11/8  Patient wants to know if she could see someone else for her cortisone injections  Please advise       cb # [phone]

## 2022-09-26 ENCOUNTER — OFFICE VISIT (OUTPATIENT)
Dept: PHYSICAL THERAPY | Facility: REHABILITATION | Age: 72
End: 2022-09-26
Payer: MEDICARE

## 2022-09-26 DIAGNOSIS — G89.29 CHRONIC PAIN OF LEFT KNEE: ICD-10-CM

## 2022-09-26 DIAGNOSIS — M25.50 ARTHRALGIA, UNSPECIFIED JOINT: Primary | ICD-10-CM

## 2022-09-26 DIAGNOSIS — R29.898 MUSCULAR DECONDITIONING: ICD-10-CM

## 2022-09-26 DIAGNOSIS — M25.562 CHRONIC PAIN OF LEFT KNEE: ICD-10-CM

## 2022-09-26 PROCEDURE — 97530 THERAPEUTIC ACTIVITIES: CPT

## 2022-09-26 NOTE — PROGRESS NOTES
Daily Note     Today's date: 2022  Patient name: Lieutenant Cordero  : 1950  MRN: [id number]  Referring provider: Magnolia Spencer  Dx:   Encounter Diagnosis     ICD-10-CM    1  Arthralgia, unspecified joint  M25 50    2  Muscular deconditioning  R29 898    3  Chronic pain of left knee  M25 562     G89 29                   Subjective:  Patient reports that she did well after her last visit  She has been walking several times including 3 walks outside from her house across her lawn to her garage  Objective: See treatment diary below      Assessment: Patient tolerated treatment well  Patient performed ex as noted with direct supervision  Provided cues to ensure good ex technique and assistance with counting reps  Patient was encouraged to complete ex within her tolerance  Patient responded well to treatment and would benefit from continued PT intervention to address deficits and attain set goals  Plan: Continue per plan of care          Precautions: Chronic heart failure, permanent A-fib, OA b/l knees, myofascial pain syndrome, osteoporosis, reactive hypoglycemia, hashimoto's thyroiditis, hyperparathyroidism     Daily Treatment Diary      Assessment                   Alex/Reval  MD                     FOTO         **         **   Manuals                                                     Exercise Diary     Opp UE Press Down/LE Press Up Isometric with Ball    3"  1x5 ea  3"  1x6 ea                  Iso Hip ADD    3"  2x5  3"  2x6                  TB Hip ABD + Shoulder ABD    Orange  3"  2x5  orange  3" 2x6                  TB Rows  Orange  3"  2x5 Orange  3" 1x6             B Bicep Curls - Wrist Cuff Weights    3#  2x5  3#  2x6                  Iso Hip Extension with Ball Under Foot    3#  1x5 ea  2x5 ea  0#                  TB Shoulder H  ABD    NV  orange  2x5 Modalities

## 2022-09-29 ENCOUNTER — OFFICE VISIT (OUTPATIENT)
Dept: PHYSICAL THERAPY | Facility: REHABILITATION | Age: 72
End: 2022-09-29
Payer: MEDICARE

## 2022-09-29 DIAGNOSIS — G89.29 CHRONIC PAIN OF RIGHT KNEE: ICD-10-CM

## 2022-09-29 DIAGNOSIS — M25.561 CHRONIC PAIN OF RIGHT KNEE: ICD-10-CM

## 2022-09-29 DIAGNOSIS — M25.50 ARTHRALGIA, UNSPECIFIED JOINT: Primary | ICD-10-CM

## 2022-09-29 DIAGNOSIS — M17.0 PRIMARY OSTEOARTHRITIS OF BOTH KNEES: ICD-10-CM

## 2022-09-29 DIAGNOSIS — M25.562 CHRONIC PAIN OF LEFT KNEE: ICD-10-CM

## 2022-09-29 DIAGNOSIS — R29.898 MUSCULAR DECONDITIONING: ICD-10-CM

## 2022-09-29 DIAGNOSIS — G89.29 CHRONIC PAIN OF LEFT KNEE: ICD-10-CM

## 2022-09-29 PROCEDURE — 97530 THERAPEUTIC ACTIVITIES: CPT

## 2022-09-29 NOTE — PROGRESS NOTES
Daily Note     Today's date: 2022  Patient name: Colette Norwood  : 1950  MRN: [id number]  Referring provider: Felisa Nelson  Dx:   Encounter Diagnosis     ICD-10-CM    1  Arthralgia, unspecified joint  M25 50    2  Muscular deconditioning  R29 898    3  Chronic pain of left knee  M25 562     G89 29    4  Primary osteoarthritis of both knees  M17 0    5  Chronic pain of right knee  M25 561     G89 29                   Subjective: Pt reports that she is feeling good  She reports that she has been drinking more water and going for short walks 5x/day  Objective: See treatment diary below      Assessment: Pt tolerated treatment well  Pt encouraged throughout to complete all exercises to her tolerance  Pt was also encouraged to continue drinking water and tracking her exercises and walks at home  Pt did well with all exercises and reported no pain or discomfort throughout  Patient demonstrated fatigue post treatment, exhibited good technique with therapeutic exercises and would benefit from continued PT  Plan: Continue per plan of care          Precautions: Chronic heart failure, permanent A-fib, OA b/l knees, myofascial pain syndrome, osteoporosis, reactive hypoglycemia, hashimoto's thyroiditis, hyperparathyroidism     Daily Treatment Diary      Assessment                 Alex/Reval  MD                     FOTO         **         **   Manuals                                                     Exercise Diary     Opp UE Press Down/LE Press Up Isometric with Ball    3"  1x5 ea  3"  1x6 ea  3" 1x6 ea                Iso Hip ADD    3"  2x5  3"  2x6  3" 2x8                TB Hip ABD + Shoulder ABD    Orange  3"  2x5  orange  3" 2x6  orange  3" 2x6                TB Rows  Orange  3"  2x5 Orange  3" 1x6   Orange 3' 2x6          B Bicep Curls - Wrist Cuff Weights    3#  2x5  3#  2x6  3" 2x7                Iso Hip Extension with Ball Under Foot    3#  1x5 ea  2x5 ea  0# 2x5 es                TB Shoulder H  ABD    NV  orange  2x5  orange 2x5                                                                                                                                                                                                                                                               Modalities

## 2022-10-03 ENCOUNTER — NURSE TRIAGE (OUTPATIENT)
Dept: OTHER | Facility: OTHER | Age: 72
End: 2022-10-03

## 2022-10-03 ENCOUNTER — OFFICE VISIT (OUTPATIENT)
Dept: PHYSICAL THERAPY | Facility: REHABILITATION | Age: 72
End: 2022-10-03
Payer: MEDICARE

## 2022-10-03 DIAGNOSIS — M25.50 ARTHRALGIA, UNSPECIFIED JOINT: Primary | ICD-10-CM

## 2022-10-03 DIAGNOSIS — M25.561 CHRONIC PAIN OF RIGHT KNEE: ICD-10-CM

## 2022-10-03 DIAGNOSIS — R29.898 MUSCULAR DECONDITIONING: ICD-10-CM

## 2022-10-03 DIAGNOSIS — G89.29 CHRONIC PAIN OF RIGHT KNEE: ICD-10-CM

## 2022-10-03 DIAGNOSIS — R32 URINARY INCONTINENCE, UNSPECIFIED TYPE: Primary | ICD-10-CM

## 2022-10-03 DIAGNOSIS — M25.562 CHRONIC PAIN OF LEFT KNEE: ICD-10-CM

## 2022-10-03 DIAGNOSIS — G89.29 CHRONIC PAIN OF LEFT KNEE: ICD-10-CM

## 2022-10-03 PROCEDURE — 97530 THERAPEUTIC ACTIVITIES: CPT

## 2022-10-03 PROCEDURE — 97116 GAIT TRAINING THERAPY: CPT

## 2022-10-03 NOTE — TELEPHONE ENCOUNTER
Patient's visit was virtual - would recommend either an RN visit for PVR versus a f/u in office visit for PVR assessment and medication alteration  Urine testing in the meantime

## 2022-10-03 NOTE — PROGRESS NOTES
Daily Note     Today's date: 10/3/2022  Patient name: Jaron Miller  : 1950  MRN: [id number]  Referring provider: Joaquin Guzmán  Dx:   Encounter Diagnosis     ICD-10-CM    1  Arthralgia, unspecified joint  M25 50    2  Muscular deconditioning  R29 898    3  Chronic pain of left knee  M25 562     G89 29    4  Chronic pain of right knee  M25 561     G89 29                   Subjective:   Patient reports that she is doing better  She notes increased ease getting up from chairs and getting into bed although she still jumps into bed and sleeps where she lands because she can't manage her R leg onto the bed normally  Objective: See treatment diary below      Assessment: Patient tolerated treatment well  Patient performed ex as noted with direct supervision throughout  Performed bed transfers sit to supine with use of assistance strap for elevating R leg  Patient was able to perform with cues only X2  Patient will consider purchasing a stretch out strap to assist with performing bed transfers  Progressed strengthening ex as noted with appropriate patient challenge  Patient would benefit from continued PT intervention to address deficits and attain set goals  Plan: Continue per plan of care          Precautions: Chronic heart failure, permanent A-fib, OA b/l knees, myofascial pain syndrome, osteoporosis, reactive hypoglycemia, hashimoto's thyroiditis, hyperparathyroidism     Daily Treatment Diary      Assessment  9/20  9/22  9/26  9/29  10/3             Alex/Reval  MD                     FOTO         **  *       **   Manuals                                                     Exercise Diary     Opp UE Press Down/LE Press Up Isometric with Ball    3"  1x5 ea  3"  1x6 ea  3" 1x6 ea  3" 1x8 ea              Iso Hip ADD    3"  2x5  3"  2x6  3" 2x8  3" 2x10              TB Hip ABD + Shoulder ABD    Orange  3"  2x5  orange  3" 2x6  orange  3" 2x6  green  5" 2x5              TB Rows Orange  3"  2x5 Orange  3" 1x6   Orange 3' 2x6 Green  5" 2x5         B Bicep Curls - Wrist Cuff Weights    3#  2x5  3#  2x6  3" 2x7  4#  2x5              Iso Hip Extension with Ball Under Foot    3#  1x5 ea  2x5 ea  0#  2x5 es  2x6 ea              TB Shoulder H  ABD    NV  orange  2x5  orange 2x5  orange  2x6                                                                                                                                                                                                                                                             Modalities

## 2022-10-03 NOTE — TELEPHONE ENCOUNTER
Reason for Disposition   Can't control passage of urine (i e , urinary incontinence) and new-onset (< 2 weeks) or worsening    Answer Assessment - Initial Assessment Questions  1  SYMPTOM: "What's the main symptom you're concerned about?" (e g , frequency, incontinence)      Incontinence   2  ONSET: "When did the incontinence start?"      2 weeks ago   3  PAIN: "Is there any pain?" If Yes, ask: "How bad is it?" (Scale: 1-10; mild, moderate, severe)      No   4  CAUSE: "What do you think is causing the symptoms?"     Chronic, patient stated that she developed incontinence at night time for the past 2 weeks  5  OTHER SYMPTOMS: "Do you have any other symptoms?" (e g , fever, flank pain, blood in urine, pain with urination)      No other symptoms  6   PREGNANCY: "Is there any chance you are pregnant?" "When was your last menstrual period?"      N/A    Protocols used: [facility]

## 2022-10-03 NOTE — TELEPHONE ENCOUNTER
Left message for patient informing her urine testing was placed in chart  Also requested patient call office as she needs to schedule an appt  Office number left in message

## 2022-10-03 NOTE — TELEPHONE ENCOUNTER
Patient had VV with Emma Cole in August, has not been seen in office in over a year  Per not from VV-    Assessment:     1  Renal cell carcinoma  2  Voiding dysfunction      Plan:  -he follow-up in 1 year with ultrasound prior to visit  -continue oxybutynin 5 mg b i d    69year-old female with history of renal cell cancer  She had a right cryotherapy in May of 2016 with Dr Duarte Arambula  Her creatinine was 1 0  Follow-up MRI showed involuted treatment scar with no evidence of metastatic disease or recurrence  She uses oxybutynin 5 mg b i d  for voiding dysfunction  No new urologic complaints

## 2022-10-03 NOTE — TELEPHONE ENCOUNTER
Regarding: Urinary accidents  ----- Message from Mignon Maldonado sent at 10/3/2022 11:00 AM EDT -----  "I have had accidents in the evenings for about two weeks  I don't know if my medication is helping me    I have been on Wellbutrin for years "

## 2022-10-05 NOTE — TELEPHONE ENCOUNTER
Urine testing not collected  Attempted to contact patient, no answer left detailed message for patient to return call to office to review recommendations and schedule an appt

## 2022-10-06 ENCOUNTER — APPOINTMENT (OUTPATIENT)
Dept: PHYSICAL THERAPY | Facility: REHABILITATION | Age: 72
End: 2022-10-06

## 2022-10-06 ENCOUNTER — TELEPHONE (OUTPATIENT)
Dept: OTHER | Facility: OTHER | Age: 72
End: 2022-10-06

## 2022-10-06 NOTE — TELEPHONE ENCOUNTER
SPOKE WITH PT AND SHE HAS A RIDE FOR HER US TOMORROW AND WILL HAVE UA DONE AT THE SAME TIME  WILL AWAIT RESULTS

## 2022-10-06 NOTE — TELEPHONE ENCOUNTER
Patient stated that she needs a urine culture and an ultrasound  Patient stated she does not drive and needs transportation  Please call back to arrange

## 2022-10-06 NOTE — TELEPHONE ENCOUNTER
Anh Mendoza 4 hours ago (8:04 AM)        I called and spoke to patient  She will schedule the US with CS and get the urine test done at the same place  I will watch to make sure US gets scheduled and then call her to schedule an OV with us for review  I can arrange for transportation after she has the [address] scheduled

## 2022-10-06 NOTE — TELEPHONE ENCOUNTER
I called and spoke to patient  She will schedule the US with CS and get the urine test done at the same place  I will watch to make sure US gets scheduled and then call her to schedule an OV with us for review  I can arrange for transportation after she has the [address] scheduled

## 2022-10-06 NOTE — TELEPHONE ENCOUNTER
I called and spoke to patient and she has an appointment scheduled for tomorrow at 1 PM for an [address]  She needs a urine culture too and would like to get it done the same day  She is wondering which she should do first tomorrow

## 2022-10-06 NOTE — TELEPHONE ENCOUNTER
Call from patient advising she is confused on scheduling the testing because she was under the impression all of this could be done on the same day  She is requesting call back today as she told me she has been putting messages in since Tuesday and has not heard from the office and would like some help

## 2022-10-07 ENCOUNTER — HOSPITAL ENCOUNTER (OUTPATIENT)
Dept: RADIOLOGY | Facility: IMAGING CENTER | Age: 72
End: 2022-10-07
Payer: MEDICARE

## 2022-10-07 ENCOUNTER — APPOINTMENT (OUTPATIENT)
Dept: LAB | Facility: IMAGING CENTER | Age: 72
End: 2022-10-07
Payer: MEDICARE

## 2022-10-07 DIAGNOSIS — R79.89 ELEVATED TSH: ICD-10-CM

## 2022-10-07 DIAGNOSIS — E55.9 VITAMIN D DEFICIENCY: ICD-10-CM

## 2022-10-07 DIAGNOSIS — C64.1 CANCER OF RIGHT KIDNEY (HCC): ICD-10-CM

## 2022-10-07 DIAGNOSIS — M81.0 OSTEOPOROSIS, UNSPECIFIED OSTEOPOROSIS TYPE, UNSPECIFIED PATHOLOGICAL FRACTURE PRESENCE: ICD-10-CM

## 2022-10-07 DIAGNOSIS — R73.01 IMPAIRED FASTING GLUCOSE: ICD-10-CM

## 2022-10-07 DIAGNOSIS — R32 URINARY INCONTINENCE, UNSPECIFIED TYPE: ICD-10-CM

## 2022-10-07 DIAGNOSIS — E16.1 REACTIVE HYPOGLYCEMIA: ICD-10-CM

## 2022-10-07 DIAGNOSIS — E89.0 STATUS POST PARTIAL THYROIDECTOMY: ICD-10-CM

## 2022-10-07 LAB
BACTERIA UR QL AUTO: ABNORMAL /HPF
BILIRUB UR QL STRIP: NEGATIVE
CLARITY UR: ABNORMAL
COLOR UR: YELLOW
GLUCOSE UR STRIP-MCNC: NEGATIVE MG/DL
HGB UR QL STRIP.AUTO: ABNORMAL
KETONES UR STRIP-MCNC: NEGATIVE MG/DL
LEUKOCYTE ESTERASE UR QL STRIP: ABNORMAL
NITRITE UR QL STRIP: POSITIVE
NON-SQ EPI CELLS URNS QL MICRO: ABNORMAL /HPF
PH UR STRIP.AUTO: 6 [PH]
PROT UR STRIP-MCNC: NEGATIVE MG/DL
RBC #/AREA URNS AUTO: ABNORMAL /HPF
SP GR UR STRIP.AUTO: 1.02 (ref 1–1.03)
UROBILINOGEN UR STRIP-ACNC: <2 MG/DL
WBC #/AREA URNS AUTO: ABNORMAL /HPF

## 2022-10-07 PROCEDURE — 87086 URINE CULTURE/COLONY COUNT: CPT

## 2022-10-07 PROCEDURE — 76770 US EXAM ABDO BACK WALL COMP: CPT

## 2022-10-07 PROCEDURE — 81001 URINALYSIS AUTO W/SCOPE: CPT

## 2022-10-07 PROCEDURE — 87077 CULTURE AEROBIC IDENTIFY: CPT

## 2022-10-07 PROCEDURE — 87186 SC STD MICRODIL/AGAR DIL: CPT

## 2022-10-09 LAB — BACTERIA UR CULT: ABNORMAL

## 2022-10-10 ENCOUNTER — APPOINTMENT (OUTPATIENT)
Dept: PHYSICAL THERAPY | Facility: REHABILITATION | Age: 72
End: 2022-10-10

## 2022-10-10 DIAGNOSIS — N39.41 URGE INCONTINENCE OF URINE: Primary | ICD-10-CM

## 2022-10-10 RX ORDER — AMOXICILLIN AND CLAVULANATE POTASSIUM 500; 125 MG/1; MG/1
1 TABLET, FILM COATED ORAL EVERY 12 HOURS SCHEDULED
Qty: 10 TABLET | Refills: 0 | Status: SHIPPED | OUTPATIENT
Start: 2022-10-10 | End: 2022-10-15

## 2022-10-10 NOTE — TELEPHONE ENCOUNTER
Aniket Salazar PA-C  [facility] Urology [facility] Clinical  Augmentin sent to pharmacy

## 2022-10-10 NOTE — TELEPHONE ENCOUNTER
Called and spoke with patient at this time  Reviewed urine culture positive and augmentin sent into pharmacy  Advised we would like to get an in person appt scheduled to review US results and perform PVR  This was scheduled for 10/20/22  She states she wants to check with her daughters first as one of them may want to accompany her, otherwise she will need transportation  She will send a [employer] message alerting us of whether she needs it arranged or not

## 2022-10-10 NOTE — TELEPHONE ENCOUNTER
Patient returned call along with her daughter on the line  Call transferred to [facility]  Appt r/s to 10/20 at 2pm, daughter can make this appt and patient will not need transportation

## 2022-10-13 ENCOUNTER — APPOINTMENT (OUTPATIENT)
Dept: PHYSICAL THERAPY | Facility: REHABILITATION | Age: 72
End: 2022-10-13

## 2022-10-17 ENCOUNTER — TELEPHONE (OUTPATIENT)
Dept: UROLOGY | Facility: AMBULATORY SURGERY CENTER | Age: 72
End: 2022-10-17

## 2022-10-17 ENCOUNTER — APPOINTMENT (OUTPATIENT)
Dept: PHYSICAL THERAPY | Facility: REHABILITATION | Age: 72
End: 2022-10-17

## 2022-10-17 DIAGNOSIS — C64.1 CANCER OF RIGHT KIDNEY (HCC): Primary | ICD-10-CM

## 2022-10-17 NOTE — TELEPHONE ENCOUNTER
Pt is under the care of garima     Pt was last seen on 08/03/21 and has upcoming appointment on 10/20/22    Pt went and had an US done as well as labs and she sates that she got a call from the lab stating that she did not have her labs done       Please review and call the patient at [phone]

## 2022-10-17 NOTE — TELEPHONE ENCOUNTER
Ultrasound shows complex cyst that requires ongoing surveillance with a repeat ultrasound in 6 months  Her upcoming appointment can be canceled and she can be rescheduled for 6 months with repeat ultrasound

## 2022-10-17 NOTE — TELEPHONE ENCOUNTER
Spoke with patient and relayed provider's message  She verbalized understanding and confirms appt to be canceled for 10/20/22  New US order will be placed in chart for patient and she will schedule in 6 months

## 2022-10-20 ENCOUNTER — APPOINTMENT (OUTPATIENT)
Dept: PHYSICAL THERAPY | Facility: REHABILITATION | Age: 72
End: 2022-10-20

## 2022-10-24 ENCOUNTER — APPOINTMENT (OUTPATIENT)
Dept: PHYSICAL THERAPY | Facility: REHABILITATION | Age: 72
End: 2022-10-24

## 2022-10-27 ENCOUNTER — APPOINTMENT (OUTPATIENT)
Dept: PHYSICAL THERAPY | Facility: REHABILITATION | Age: 72
End: 2022-10-27

## 2022-10-28 ENCOUNTER — TELEPHONE (OUTPATIENT)
Dept: UROLOGY | Facility: AMBULATORY SURGERY CENTER | Age: 72
End: 2022-10-28

## 2022-10-28 NOTE — TELEPHONE ENCOUNTER
Spoke to [place] regarding her questions and concerns of urinary incontinence follow up care  I explained to her that our office clinical team has reached out to her through phone and mychart communication to assist her needs  She was informed by me, that per Nino Pierre PA-C last note, that we are more than willing to see her sooner in the office to address her incontinence and check urine and PVR in office at that time  She is willing to go to [place] or [place] location for her appointments  She stated she will wait over the weekend and see how she feels  If she is not happy with the weekend urinary events she will contact our office to schedule an appointment  She was thankful for the call

## 2022-10-30 ENCOUNTER — NURSE TRIAGE (OUTPATIENT)
Dept: OTHER | Facility: OTHER | Age: 72
End: 2022-10-30

## 2022-10-30 ENCOUNTER — HOSPITAL ENCOUNTER (INPATIENT)
Facility: HOSPITAL | Age: 72
LOS: 1 days | Discharge: NURSING, REHAB OR LONG-TERM CARE | End: 2022-11-02
Attending: EMERGENCY MEDICINE | Admitting: INTERNAL MEDICINE

## 2022-10-30 ENCOUNTER — APPOINTMENT (OUTPATIENT)
Dept: RADIOLOGY | Facility: HOSPITAL | Age: 72
End: 2022-10-30

## 2022-10-30 ENCOUNTER — APPOINTMENT (EMERGENCY)
Dept: RADIOLOGY | Facility: HOSPITAL | Age: 72
End: 2022-10-30

## 2022-10-30 DIAGNOSIS — R50.9 FEBRILE: ICD-10-CM

## 2022-10-30 DIAGNOSIS — C64.1: ICD-10-CM

## 2022-10-30 DIAGNOSIS — Z16.12 UTI DUE TO EXTENDED-SPECTRUM BETA LACTAMASE (ESBL) PRODUCING ESCHERICHIA COLI: ICD-10-CM

## 2022-10-30 DIAGNOSIS — R06.09 DOE (DYSPNEA ON EXERTION): Primary | ICD-10-CM

## 2022-10-30 DIAGNOSIS — R32 INCONTINENCE: ICD-10-CM

## 2022-10-30 DIAGNOSIS — B96.29 UTI DUE TO EXTENDED-SPECTRUM BETA LACTAMASE (ESBL) PRODUCING ESCHERICHIA COLI: ICD-10-CM

## 2022-10-30 DIAGNOSIS — N39.0 UTI DUE TO EXTENDED-SPECTRUM BETA LACTAMASE (ESBL) PRODUCING ESCHERICHIA COLI: ICD-10-CM

## 2022-10-30 DIAGNOSIS — K59.00 CONSTIPATION: ICD-10-CM

## 2022-10-30 DIAGNOSIS — R09.02 HYPOXIA: ICD-10-CM

## 2022-10-30 DIAGNOSIS — R79.1 INR (INTERNATIONAL NORMAL RATIO) ABNORMAL: ICD-10-CM

## 2022-10-30 PROBLEM — G47.33 OSA (OBSTRUCTIVE SLEEP APNEA): Status: ACTIVE | Noted: 2022-10-30

## 2022-10-30 PROBLEM — R26.81 UNSTEADY GAIT: Status: ACTIVE | Noted: 2022-10-30

## 2022-10-30 PROBLEM — R26.2 AMBULATORY DYSFUNCTION: Status: RESOLVED | Noted: 2022-10-30 | Resolved: 2022-10-30

## 2022-10-30 PROBLEM — R65.10 SIRS (SYSTEMIC INFLAMMATORY RESPONSE SYNDROME) (HCC): Status: ACTIVE | Noted: 2022-10-30

## 2022-10-30 PROBLEM — R26.2 AMBULATORY DYSFUNCTION: Status: ACTIVE | Noted: 2022-10-30

## 2022-10-30 PROBLEM — N18.31 STAGE 3A CHRONIC KIDNEY DISEASE (HCC): Status: RESOLVED | Noted: 2022-07-21 | Resolved: 2022-10-30

## 2022-10-30 LAB
2HR DELTA HS TROPONIN: 1 NG/L
ALBUMIN SERPL BCP-MCNC: 2.5 G/DL (ref 3.5–5)
ALP SERPL-CCNC: 118 U/L (ref 46–116)
ALT SERPL W P-5'-P-CCNC: 21 U/L (ref 12–78)
ANION GAP SERPL CALCULATED.3IONS-SCNC: 6 MMOL/L (ref 4–13)
APTT PPP: 30 SECONDS (ref 23–37)
AST SERPL W P-5'-P-CCNC: 22 U/L (ref 5–45)
BASOPHILS # BLD AUTO: 0.02 THOUSANDS/ÂΜL (ref 0–0.1)
BASOPHILS NFR BLD AUTO: 0 % (ref 0–1)
BILIRUB SERPL-MCNC: 0.46 MG/DL (ref 0.2–1)
BUN SERPL-MCNC: 20 MG/DL (ref 5–25)
CALCIUM ALBUM COR SERPL-MCNC: 9.4 MG/DL (ref 8.3–10.1)
CALCIUM SERPL-MCNC: 8.2 MG/DL (ref 8.3–10.1)
CARDIAC TROPONIN I PNL SERPL HS: 10 NG/L
CARDIAC TROPONIN I PNL SERPL HS: 11 NG/L
CHLORIDE SERPL-SCNC: 102 MMOL/L (ref 96–108)
CO2 SERPL-SCNC: 30 MMOL/L (ref 21–32)
CREAT SERPL-MCNC: 0.91 MG/DL (ref 0.6–1.3)
EOSINOPHIL # BLD AUTO: 0.02 THOUSAND/ÂΜL (ref 0–0.61)
EOSINOPHIL NFR BLD AUTO: 0 % (ref 0–6)
ERYTHROCYTE [DISTWIDTH] IN BLOOD BY AUTOMATED COUNT: 16.1 % (ref 11.6–15.1)
EST. AVERAGE GLUCOSE BLD GHB EST-MCNC: 143 MG/DL
FLUAV RNA RESP QL NAA+PROBE: NEGATIVE
FLUBV RNA RESP QL NAA+PROBE: NEGATIVE
GFR SERPL CREATININE-BSD FRML MDRD: 63 ML/MIN/1.73SQ M
GLUCOSE SERPL-MCNC: 121 MG/DL (ref 65–140)
HBA1C MFR BLD: 6.6 %
HCT VFR BLD AUTO: 36.6 % (ref 34.8–46.1)
HGB BLD-MCNC: 11.6 G/DL (ref 11.5–15.4)
IMM GRANULOCYTES # BLD AUTO: 0.05 THOUSAND/UL (ref 0–0.2)
IMM GRANULOCYTES NFR BLD AUTO: 1 % (ref 0–2)
INR PPP: 1.43 (ref 0.84–1.19)
LYMPHOCYTES # BLD AUTO: 0.65 THOUSANDS/ÂΜL (ref 0.6–4.47)
LYMPHOCYTES NFR BLD AUTO: 9 % (ref 14–44)
MCH RBC QN AUTO: 25.9 PG (ref 26.8–34.3)
MCHC RBC AUTO-ENTMCNC: 31.7 G/DL (ref 31.4–37.4)
MCV RBC AUTO: 82 FL (ref 82–98)
MONOCYTES # BLD AUTO: 0.79 THOUSAND/ÂΜL (ref 0.17–1.22)
MONOCYTES NFR BLD AUTO: 11 % (ref 4–12)
NEUTROPHILS # BLD AUTO: 5.94 THOUSANDS/ÂΜL (ref 1.85–7.62)
NEUTS SEG NFR BLD AUTO: 79 % (ref 43–75)
NRBC BLD AUTO-RTO: 0 /100 WBCS
PLATELET # BLD AUTO: 270 THOUSANDS/UL (ref 149–390)
PMV BLD AUTO: 10 FL (ref 8.9–12.7)
POTASSIUM SERPL-SCNC: 3.3 MMOL/L (ref 3.5–5.3)
PROT SERPL-MCNC: 7 G/DL (ref 6.4–8.4)
PROTHROMBIN TIME: 17.7 SECONDS (ref 11.6–14.5)
RBC # BLD AUTO: 4.48 MILLION/UL (ref 3.81–5.12)
RSV RNA RESP QL NAA+PROBE: NEGATIVE
SARS-COV-2 RNA RESP QL NAA+PROBE: NEGATIVE
SODIUM SERPL-SCNC: 138 MMOL/L (ref 135–147)
TSH SERPL DL<=0.05 MIU/L-ACNC: 1.82 UIU/ML (ref 0.45–4.5)
WBC # BLD AUTO: 7.47 THOUSAND/UL (ref 4.31–10.16)

## 2022-10-30 RX ORDER — WARFARIN SODIUM 5 MG/1
5 TABLET ORAL
Status: DISCONTINUED | OUTPATIENT
Start: 2022-10-30 | End: 2022-10-30

## 2022-10-30 RX ORDER — OXYBUTYNIN CHLORIDE 5 MG/1
5 TABLET ORAL 3 TIMES DAILY
Status: DISCONTINUED | OUTPATIENT
Start: 2022-10-30 | End: 2022-11-02 | Stop reason: HOSPADM

## 2022-10-30 RX ORDER — POTASSIUM CHLORIDE 20 MEQ/1
40 TABLET, EXTENDED RELEASE ORAL ONCE
Status: COMPLETED | OUTPATIENT
Start: 2022-10-30 | End: 2022-10-30

## 2022-10-30 RX ORDER — GABAPENTIN 300 MG/1
600 CAPSULE ORAL
Status: DISCONTINUED | OUTPATIENT
Start: 2022-10-30 | End: 2022-11-02 | Stop reason: HOSPADM

## 2022-10-30 RX ORDER — ACETAMINOPHEN 325 MG/1
975 TABLET ORAL EVERY 6 HOURS PRN
Status: DISCONTINUED | OUTPATIENT
Start: 2022-10-30 | End: 2022-11-02 | Stop reason: HOSPADM

## 2022-10-30 RX ORDER — DILTIAZEM HYDROCHLORIDE 180 MG/1
360 CAPSULE, COATED, EXTENDED RELEASE ORAL DAILY
Status: DISCONTINUED | OUTPATIENT
Start: 2022-10-31 | End: 2022-11-02 | Stop reason: HOSPADM

## 2022-10-30 RX ORDER — HYDROCHLOROTHIAZIDE 25 MG/1
25 TABLET ORAL DAILY
Status: DISCONTINUED | OUTPATIENT
Start: 2022-10-31 | End: 2022-11-02 | Stop reason: HOSPADM

## 2022-10-30 RX ORDER — FLUOXETINE HYDROCHLORIDE 20 MG/1
20 CAPSULE ORAL 2 TIMES DAILY
Status: DISCONTINUED | OUTPATIENT
Start: 2022-10-30 | End: 2022-11-02 | Stop reason: HOSPADM

## 2022-10-30 RX ORDER — POLYETHYLENE GLYCOL 3350 17 G/17G
17 POWDER, FOR SOLUTION ORAL DAILY PRN
Status: DISCONTINUED | OUTPATIENT
Start: 2022-10-30 | End: 2022-11-02 | Stop reason: HOSPADM

## 2022-10-30 RX ORDER — HEPARIN SODIUM 1000 [USP'U]/ML
4000 INJECTION, SOLUTION INTRAVENOUS; SUBCUTANEOUS ONCE
Status: COMPLETED | OUTPATIENT
Start: 2022-10-30 | End: 2022-10-30

## 2022-10-30 RX ORDER — HEPARIN SODIUM 10000 [USP'U]/100ML
3-20 INJECTION, SOLUTION INTRAVENOUS
Status: DISCONTINUED | OUTPATIENT
Start: 2022-10-30 | End: 2022-11-02

## 2022-10-30 RX ORDER — HEPARIN SODIUM 1000 [USP'U]/ML
4000 INJECTION, SOLUTION INTRAVENOUS; SUBCUTANEOUS
Status: DISCONTINUED | OUTPATIENT
Start: 2022-10-30 | End: 2022-11-02

## 2022-10-30 RX ORDER — LORAZEPAM 1 MG/1
1 TABLET ORAL
Status: DISCONTINUED | OUTPATIENT
Start: 2022-10-30 | End: 2022-11-02 | Stop reason: HOSPADM

## 2022-10-30 RX ORDER — WARFARIN SODIUM 7.5 MG/1
7.5 TABLET ORAL
Status: DISCONTINUED | OUTPATIENT
Start: 2022-10-30 | End: 2022-11-02

## 2022-10-30 RX ORDER — LABETALOL HYDROCHLORIDE 5 MG/ML
10 INJECTION, SOLUTION INTRAVENOUS EVERY 6 HOURS PRN
Status: DISCONTINUED | OUTPATIENT
Start: 2022-10-30 | End: 2022-11-02 | Stop reason: HOSPADM

## 2022-10-30 RX ORDER — HEPARIN SODIUM 1000 [USP'U]/ML
2000 INJECTION, SOLUTION INTRAVENOUS; SUBCUTANEOUS
Status: DISCONTINUED | OUTPATIENT
Start: 2022-10-30 | End: 2022-11-02

## 2022-10-30 RX ADMIN — HEPARIN SODIUM 11.1 UNITS/KG/HR: 10000 INJECTION, SOLUTION INTRAVENOUS at 19:53

## 2022-10-30 RX ADMIN — GABAPENTIN 600 MG: 300 CAPSULE ORAL at 22:01

## 2022-10-30 RX ADMIN — HEPARIN SODIUM 4000 UNITS: 1000 INJECTION INTRAVENOUS; SUBCUTANEOUS at 19:54

## 2022-10-30 RX ADMIN — WARFARIN SODIUM 7.5 MG: 7.5 TABLET ORAL at 22:01

## 2022-10-30 RX ADMIN — SODIUM CHLORIDE 500 ML: 0.9 INJECTION, SOLUTION INTRAVENOUS at 13:51

## 2022-10-30 RX ADMIN — OXYBUTYNIN CHLORIDE 5 MG: 5 TABLET ORAL at 22:01

## 2022-10-30 RX ADMIN — IOHEXOL 85 ML: 350 INJECTION, SOLUTION INTRAVENOUS at 20:36

## 2022-10-30 RX ADMIN — FLUOXETINE 20 MG: 20 CAPSULE ORAL at 22:53

## 2022-10-30 RX ADMIN — POTASSIUM CHLORIDE 40 MEQ: 1500 TABLET, EXTENDED RELEASE ORAL at 16:12

## 2022-10-30 NOTE — ASSESSMENT & PLAN NOTE
Isolated elevation of alk-phos but normal T bili  Likely secondary to patient's bone disease osteoporosis    Plan:  Continue outpatient Prolia

## 2022-10-30 NOTE — TELEPHONE ENCOUNTER
Reason for Disposition  • [1] SEVERE pain (e g , excruciating, unable to do any normal activities) AND [2] not improved after 2 hours of pain medicine    Answer Assessment - Initial Assessment Questions  1  ONSET: "When did the pain start?"       2-3 days ago    2  LOCATION: "Where is the pain located?"       Bilateral legs    3  PAIN: "How bad is the pain?"    (Scale 1-10; or mild, moderate, severe)    -  MILD (1-3): doesn't interfere with normal activities     -  MODERATE (4-7): interferes with normal activities (e g , work or school) or awakens from sleep, limping     -  SEVERE (8-10): excruciating pain, unable to do any normal activities, unable to walk     8/10    4  WORK OR EXERCISE: "Has there been any recent work or exercise that involved this part of the body?"   Denies    5  CAUSE: "What do you think is causing the leg pain?"    Unknown    6  OTHER SYMPTOMS: "Do you have any other symptoms?" (e g , chest pain, back pain, breathing difficulty, swelling, rash, fever, numbness, weakness)   sob with movement and unsteady on feet- falling against wall    7   PREGNANCY: "Is there any chance you are pregnant?" "When was your last menstrual period?"  No    Protocols used: LEG PAIN-ADULT-

## 2022-10-30 NOTE — TELEPHONE ENCOUNTER
Regarding: Pain in Legs/Falling  ----- Message from Patience Lyons sent at 10/30/2022 11:11 AM EDT -----  "I am having terrible pains in my legs and I am very unbalanced   I am falling a lot and very wobbly "

## 2022-10-30 NOTE — ASSESSMENT & PLAN NOTE
History of permanent AFib on warfarin 5mg daily with subtherapeutic INR 1 43 on arrival  Not on DOAC because of price  Rate controlled with diltiazem at home    Plan:  · Warfarin increased to 7 5 and bridge with heparin drip on admission  · Most recent INR 1 3 on Warfarin 7 5; remains subtherapeutic  · Await repeat INR from this AM  · Consider increasing warfarin dose today, repeat INR tomorrow AM; goal INR 2-2 5  · Continue diltiazem 360mg daily  · Consider oral anticoagulant at discharge  · Patient was bridged with heparin with INR earlier today at 1 79, placed on warfarin regimen with daily INR checks for the next 7 days and to be bridged for the next 2 days on Lovenox

## 2022-10-30 NOTE — ASSESSMENT & PLAN NOTE
Recent A1c of 6 2  Blood sugars in goal for inpatient  Patient is not on home diabetes medication    Plan:    For history moderate blood sugars and follow up outpatient

## 2022-10-30 NOTE — ASSESSMENT & PLAN NOTE
Has history of incontinence managed on oxybutynin 5 mg  Recently treated for UTI with Augmentin 5 day course 2-3 weeks ago  Also has has history of RCC on the right treated cryotherapy about 6 years  Patient reported worsening incontinence for the past 2-3 weeks with the dysuria or frequency  Etiology likely due to persistent UTI secondary to inadequate treatment with Augmentin  Plan:  · Order UA and consider antibiotic therapy based on results  · UA significant for turbid urine, pyuria, bacteriuria  · Recently completed 5 day course of Augmentin to treat Klebsiella UTI   Suspect patient likely has persistent UTI though clinical presentation complicated by chronic urinary incontinence poorly managed by oxybutynin  · Continue empiric treatment with cefazolin 2g for three days; currently on day 2/3; will adjust antibiotic to pending urine culture/sensitivities  · Continue home oxybutynin  · Urology consulted for persistent incontinent, appreciate recs  · See plan above  · Consider a beta 3 agonist in conjunction with oxybutynin if incontinence does not improve

## 2022-10-30 NOTE — ED PROVIDER NOTES
History  Chief Complaint   Patient presents with   • Shortness of Breath     Pt c/o SOB      Pt is a 66yo F who presents for ambulatory dysfunction  Pt states that about 10 days ago she began to have generalized weakness  Patient states she typically walks with a walker outside the house but does not need anything around the house, however over the past 10 days she has become increasingly weak and has required assistance to ambulate around her house  She states today she attempted to stand up twice and was unable to do so and this is why she called EMS  Patient states that she has also been having shortness of breath with exertion over this time time frame  Patient states 2 days ago she had chills and subjective fevers, but states she has not had them since  Patient denies any chest pain but does report intermittent nonproductive cough  Patient states she has not been around anyone who has been sick and has no known COVID contacts  Patient does report that she has had nocturnal enuresis starting at the same time and last week had an ultrasound as well as a UA and was diagnosed with urinary tract infection  Patient reports she was given ampicillin and took it to completion but is no longer on it  Patient states last week she was also having swelling in her lower extremities, however began taking a “water pill” and had improvement  She states no swelling in her lower extremities now and has not been taking the water pill for approximately 5 days  Patient is currently on Coumadin and states her levels have all been within normal limits as she gets them checked every 2 weeks  Patient denies any recent dosage changes  Patient denies any blood in stool or urine  Patient states that she lives at home with her daughter  Prior to Admission Medications   Prescriptions Last Dose Informant Patient Reported? Taking?    FLUoxetine (PROzac) 20 mg capsule  Self Yes No   Sig: Take 20 mg by mouth 2 (two) times a day     LORazepam (ATIVAN) 1 mg tablet  Self Yes No   Sig: Take 1 mg by mouth daily at bedtime as needed for anxiety  acetaminophen (TYLENOL) 325 mg tablet  Self Yes No   Sig: Take 975 mg by mouth every 6 (six) hours as needed   amoxicillin-clavulanate (AUGMENTIN) 875-125 mg per tablet   Yes Yes   Sig: Take 1 tablet by mouth 2 (two) times a day   diltiazem (CARDIZEM CD) 360 MG 24 hr capsule   No No   Sig: Take 1 capsule (360 mg total) by mouth daily   ergocalciferol (VITAMIN D2) 50,000 units  Self Yes No   Sig: Take 50,000 Units by mouth once a week Takes D3    gabapentin (NEURONTIN) 300 mg capsule  Self Yes No   Sig: Take 600 mg by mouth daily at bedtime    hydrochlorothiazide (HYDRODIURIL) 25 mg tablet  Self Yes No   Sig: Take 25 mg by mouth daily   oxybutynin (DITROPAN) 5 mg tablet   Yes No   Si (two) times a day   warfarin (COUMADIN) 5 mg tablet  Self Yes No   Sig: Take 5 mg by mouth daily       Facility-Administered Medications Last Administration Doses Remaining   denosumab (PROLIA) subcutaneous injection 60 mg 2021 11:57 AM           Past Medical History:   Diagnosis Date   • A-fib (HCC)    • Arthritis    • Atrial fibrillation (HCC)    • Cervical spondylosis with myelopathy    • Gastric bypass status for obesity    • History of transfusion     35 years ago   • Hypertension    • mass right kidney    • Renal cell adenocarcinoma (HCC)     RIGHT   • Sleep apnea        Past Surgical History:   Procedure Laterality Date   • CHOLECYSTECTOMY     • COLOSTOMY     • CRYOABLATION Right     RT KIDNEY   • CYSTORRHAPHY      Bladder   Last assessed 2016    • GASTRIC BYPASS     • HERNIA REPAIR      Last assessed 2016    • HYSTERECTOMY     • [place] OPEN RX DISTAL RADIUS FX, EXTRA-ARTICULAR Right 2016    Procedure: OPEN REDUCTION INTERNAL FIXATION RIGHT DISTAL RADIUS;  Surgeon: Atul Gardner MD;  Location: [facility];  Service: Orthopedics   • REVISION COLOSTOMY     • THYROID LOBECTOMY Left 2019 Procedure: LOBECTOMY THYROID, left;  Surgeon: Gladys Avendano MD;  Location: [facility];  Service: Surgical Oncology   • TUBAL LIGATION     • US GUIDED THYROID BIOPSY  2/27/2019   • US GUIDED THYROID BIOPSY  5/29/2019       Family History   Problem Relation Age of Onset   • Heart attack Mother    • Lung cancer Father 76   • Heart disease Father    • Stroke Sister    • Lung cancer Sister 64   • Prostate cancer Brother 61     I have reviewed and agree with the history as documented  E-Cigarette/Vaping   • E-Cigarette Use Never User      E-Cigarette/Vaping Substances     Social History     Tobacco Use   • Smoking status: Never Smoker   • Smokeless tobacco: Never Used   Vaping Use   • Vaping Use: Never used   Substance Use Topics   • Alcohol use: Not Currently     Alcohol/week: 0 0 standard drinks     Comment: 0   • Drug use: No        Review of Systems   Constitutional: Positive for appetite change (decreased), chills, fatigue and fever (subjective)  Respiratory: Positive for cough (non-productive) and shortness of breath  Genitourinary: Positive for enuresis  Neurological: Positive for weakness (generalized)  Physical Exam  ED Triage Vitals   Temperature Pulse Respirations Blood Pressure SpO2   10/30/22 1304 10/30/22 1304 10/30/22 1304 10/30/22 1304 10/30/22 1304   100 4 °F (38 °C) 103 20 145/66 94 %      Temp Source Heart Rate Source Patient Position - Orthostatic VS BP Location FiO2 (%)   10/30/22 1304 10/30/22 1304 10/30/22 1304 10/30/22 1304 --   Oral Monitor Lying Left arm       Pain Score       10/30/22 1830       5             Orthostatic Vital Signs  Vitals:    10/30/22 2147 10/31/22 0246 10/31/22 0246 10/31/22 0719   BP: 146/87 136/82 136/82 130/76   Pulse: (!) 117 87 83 82   Patient Position - Orthostatic VS:           Physical Exam  Vitals reviewed  Constitutional:       General: She is not in acute distress  Appearance: She is well-developed   She is not toxic-appearing or diaphoretic  HENT:      Head: Normocephalic and atraumatic  Right Ear: External ear normal       Left Ear: External ear normal       Nose: Nose normal       Mouth/Throat:      Mouth: Mucous membranes are dry  Pharynx: Oropharynx is clear  Eyes:      Conjunctiva/sclera: Conjunctivae normal       Pupils: Pupils are equal, round, and reactive to light  Cardiovascular:      Rate and Rhythm: Normal rate and regular rhythm  Heart sounds: Normal heart sounds  No murmur heard  Pulmonary:      Effort: Pulmonary effort is normal  No respiratory distress  Breath sounds: Normal breath sounds  No wheezing or rhonchi  Abdominal:      General: There is no distension  Palpations: Abdomen is soft  Tenderness: There is no abdominal tenderness  There is no guarding or rebound  Musculoskeletal:         General: Normal range of motion  Cervical back: Normal range of motion and neck supple  Right lower leg: No edema  Left lower leg: No edema  Skin:     General: Skin is warm and dry  Capillary Refill: Capillary refill takes less than 2 seconds  Coloration: Skin is not pale  Findings: No erythema or rash  Neurological:      General: No focal deficit present  Mental Status: She is alert and oriented to person, place, and time  Psychiatric:         Speech: Speech normal          Behavior: Behavior is cooperative           ED Medications  Medications   acetaminophen (TYLENOL) tablet 975 mg (has no administration in time range)   diltiazem (CARDIZEM CD) 24 hr capsule 360 mg (360 mg Oral Given 10/31/22 0815)   FLUoxetine (PROzac) capsule 20 mg (20 mg Oral Given 10/31/22 0815)   gabapentin (NEURONTIN) capsule 600 mg (600 mg Oral Given 10/30/22 2201)   hydrochlorothiazide (HYDRODIURIL) tablet 25 mg (25 mg Oral Given 10/31/22 0815)   LORazepam (ATIVAN) tablet 1 mg (has no administration in time range)   oxybutynin (DITROPAN) tablet 5 mg (5 mg Oral Given 10/31/22 0815) polyethylene glycol (MIRALAX) packet 17 g (has no administration in time range)   labetalol (NORMODYNE) injection 10 mg (has no administration in time range)   heparin (porcine) 25,000 units in 0 45% NaCl 250 mL infusion (premix) (15 1 Units/kg/hr × 90 kg (Order-Specific) Intravenous Rate/Dose Change 10/31/22 0757)   heparin (porcine) injection 4,000 Units (has no administration in time range)   heparin (porcine) injection 2,000 Units (2,000 Units Intravenous Given 10/31/22 0814)   warfarin (COUMADIN) tablet 7 5 mg (7 5 mg Oral Given 10/30/22 2201)   potassium chloride (K-DUR,KLOR-CON) CR tablet 40 mEq (has no administration in time range)   sodium chloride 0 9 % bolus 500 mL (500 mL Intravenous New Bag 10/30/22 1351)   potassium chloride (K-DUR,KLOR-CON) CR tablet 40 mEq (40 mEq Oral Given 10/30/22 1612)   heparin (porcine) injection 4,000 Units (4,000 Units Intravenous Given 10/30/22 1954)   iohexol (OMNIPAQUE) 350 MG/ML injection (SINGLE-DOSE) 85 mL (85 mL Intravenous Given 10/30/22 2036)       Diagnostic Studies  Results Reviewed     Procedure Component Value Units Date/Time    Comprehensive metabolic panel [[id number]]  (Abnormal) Collected: 10/31/22 0728    Lab Status: Final result Specimen: Blood from Arm, Left Updated: 10/31/22 0805     Sodium 139 mmol/L      Potassium 3 1 mmol/L      Chloride 106 mmol/L      CO2 28 mmol/L      ANION GAP 5 mmol/L      BUN 18 mg/dL      Creatinine 0 84 mg/dL      Glucose 128 mg/dL      Calcium 7 9 mg/dL      Corrected Calcium 9 3 mg/dL      AST 22 U/L      ALT 21 U/L      Alkaline Phosphatase 106 U/L      Total Protein 6 3 g/dL      Albumin 2 3 g/dL      Total Bilirubin 0 47 mg/dL      eGFR 69 ml/min/1 73sq m     Narrative:      [place] guidelines for Chronic Kidney Disease (CKD):   •  Stage 1 with normal or high GFR (GFR > 90 mL/min/1 73 square meters)  •  Stage 2 Mild CKD (GFR = 60-89 mL/min/1 73 square meters)  •  Stage 3A Moderate CKD (GFR = 45-59 mL/min/1 73 square meters)  •  Stage 3B Moderate CKD (GFR = 30-44 mL/min/1 73 square meters)  •  Stage 4 Severe CKD (GFR = 15-29 mL/min/1 73 square meters)  •  Stage 5 End Stage CKD (GFR <15 mL/min/1 73 square meters)  Note: GFR calculation is accurate only with a steady state creatinine    Phosphorus [[id number]]  (Normal) Collected: 10/31/22 0728    Lab Status: Final result Specimen: Blood from Arm, Left Updated: 10/31/22 0805     Phosphorus 2 7 mg/dL     Magnesium [[id number]]  (Normal) Collected: 10/31/22 0728    Lab Status: Final result Specimen: Blood from Arm, Left Updated: 10/31/22 0805     Magnesium 2 2 mg/dL     Blood culture [[id number]] Collected: 10/31/22 0008    Lab Status: Preliminary result Specimen: Blood from Arm, Left Updated: 10/31/22 0803     Blood Culture Received in Microbiology Lab  Culture in Progress  Blood culture [[id number]] Collected: 10/31/22 0008    Lab Status: Preliminary result Specimen: Blood from Arm, Right Updated: 10/31/22 0803     Blood Culture Received in Microbiology Lab  Culture in Progress      Protime-INR [[id number]]  (Abnormal) Collected: 10/31/22 0537    Lab Status: Final result Specimen: Blood Updated: 10/31/22 0752     Protime 16 7 seconds      INR 1 33    CBC and differential [[id number]]  (Abnormal) Collected: 10/31/22 0728    Lab Status: Final result Specimen: Blood from Arm, Left Updated: 10/31/22 0740     WBC 5 71 Thousand/uL      RBC 4 38 Million/uL      Hemoglobin 11 2 g/dL      Hematocrit 36 2 %      MCV 83 fL      MCH 25 6 pg      MCHC 30 9 g/dL      RDW 16 2 %      MPV 9 8 fL      Platelets 843 Thousands/uL      nRBC 0 /100 WBCs      Neutrophils Relative 66 %      Immat GRANS % 1 %      Lymphocytes Relative 17 %      Monocytes Relative 15 %      Eosinophils Relative 1 %      Basophils Relative 0 %      Neutrophils Absolute 3 73 Thousands/µL      Immature Grans Absolute 0 06 Thousand/uL      Lymphocytes Absolute 0 99 Thousands/µL      Monocytes Absolute 0 84 Thousand/µL      Eosinophils Absolute 0 07 Thousand/µL      Basophils Absolute 0 02 Thousands/µL     HS Troponin I 4hr [[id number]]  (Normal) Collected: 10/31/22 0008    Lab Status: Final result Specimen: Blood from Arm, Left Updated: 10/31/22 0042     hs TnI 4hr 9 ng/L      Delta 4hr hsTnI -1 ng/L     APTT six (6) hours after Heparin bolus/drip initiation or dosing change [[id number]]  (Abnormal) Collected: 10/31/22 0008    Lab Status: Final result Specimen: Blood from Arm, Left Updated: 10/31/22 0036     PTT 43 seconds     NT-BNP PRO [[id number]]  (Abnormal) Collected: 10/31/22 0008    Lab Status: Final result Specimen: Blood from Arm, Left Updated: 10/31/22 0034     NT-proBNP 847 pg/mL     TSH, 3rd generation [[id number]]  (Normal) Collected: 10/30/22 1347    Lab Status: Final result Specimen: Blood from Arm, Left Updated: 10/30/22 2032     TSH 3RD GENERATON 1 820 uIU/mL     Narrative:      Patients undergoing fluorescein dye angiography may retain small amounts of fluorescein in the body for 48-72 hours post procedure  Samples containing fluorescein can produce falsely depressed TSH values  If the patient had this procedure,a specimen should be resubmitted post fluorescein clearance        Hemoglobin A1C [[id number]]  (Abnormal) Collected: 10/30/22 1347    Lab Status: Final result Specimen: Blood from Arm, Left Updated: 10/30/22 2009     Hemoglobin A1C 6 6 %       mg/dl     HS Troponin I 2hr [[id number]]  (Normal) Collected: 10/30/22 1756    Lab Status: Final result Specimen: Blood from Arm, Right Updated: 10/30/22 1830     hs TnI 2hr 11 ng/L      Delta 2hr hsTnI 1 ng/L     FLU/RSV/COVID - if FLU/RSV clinically relevant [[id number]]  (Normal) Collected: 10/30/22 1436    Lab Status: Final result Specimen: Nares from Nose Updated: 10/30/22 1604     SARS-CoV-2 Negative     INFLUENZA A PCR Negative     INFLUENZA B PCR Negative     RSV PCR Negative    Narrative:      FOR PEDIATRIC PATIENTS - copy/paste COVID Guidelines URL to browser: [email or web address]    SARS-CoV-2 assay is a Nucleic Acid Amplification assay intended for the  qualitative detection of nucleic acid from SARS-CoV-2 in nasopharyngeal  swabs  Results are for the presumptive identification of SARS-CoV-2 RNA  Positive results are indicative of infection with SARS-CoV-2, the virus  causing COVID-19, but do not rule out bacterial infection or co-infection  with other viruses  Laboratories within the [place] and its  territories are required to report all positive results to the appropriate  public health authorities  Negative results do not preclude SARS-CoV-2  infection and should not be used as the sole basis for treatment or other  patient management decisions  Negative results must be combined with  clinical observations, patient history, and epidemiological information  This test has not been FDA cleared or approved  This test has been authorized by FDA under an Emergency Use Authorization  (EUA)  This test is only authorized for the duration of time the  declaration that circumstances exist justifying the authorization of the  emergency use of an in vitro diagnostic tests for detection of SARS-CoV-2  virus and/or diagnosis of COVID-19 infection under section 564(b)(1) of  the Act, 21 U  S C  055QGZ-4(Q)(4), unless the authorization is terminated  or revoked sooner  The test has been validated but independent review by FDA  and CLIA is pending  Test performed using [employer] Gene[employer]pert: This RT-PCR assay targets N2,  a region unique to SARS-CoV-2  A conserved region in the E-gene was chosen  for pan-Sarbecovirus detection which includes SARS-CoV-2  According to CMS-2020-01-R, this platform meets the definition of high-throughput technology      HS Troponin 0hr (reflex protocol) [[id number]]  (Normal) Collected: 10/30/22 3100    Lab Status: Final result Specimen: Blood from Arm, Left Updated: 10/30/22 1438     hs TnI 0hr 10 ng/L     Protime-INR [[id number]]  (Abnormal) Collected: 10/30/22 1347    Lab Status: Final result Specimen: Blood from Arm, Left Updated: 10/30/22 1427     Protime 17 7 seconds      INR 1 43    APTT [[id number]]  (Normal) Collected: 10/30/22 1347    Lab Status: Final result Specimen: Blood from Arm, Left Updated: 10/30/22 1427     PTT 30 seconds     Comprehensive metabolic panel [[id number]]  (Abnormal) Collected: 10/30/22 1347    Lab Status: Final result Specimen: Blood from Arm, Left Updated: 10/30/22 1421     Sodium 138 mmol/L      Potassium 3 3 mmol/L      Chloride 102 mmol/L      CO2 30 mmol/L      ANION GAP 6 mmol/L      BUN 20 mg/dL      Creatinine 0 91 mg/dL      Glucose 121 mg/dL      Calcium 8 2 mg/dL      Corrected Calcium 9 4 mg/dL      AST 22 U/L      ALT 21 U/L      Alkaline Phosphatase 118 U/L      Total Protein 7 0 g/dL      Albumin 2 5 g/dL      Total Bilirubin 0 46 mg/dL      eGFR 63 ml/min/1 73sq m     Narrative:      [place] guidelines for Chronic Kidney Disease (CKD):   •  Stage 1 with normal or high GFR (GFR > 90 mL/min/1 73 square meters)  •  Stage 2 Mild CKD (GFR = 60-89 mL/min/1 73 square meters)  •  Stage 3A Moderate CKD (GFR = 45-59 mL/min/1 73 square meters)  •  Stage 3B Moderate CKD (GFR = 30-44 mL/min/1 73 square meters)  •  Stage 4 Severe CKD (GFR = 15-29 mL/min/1 73 square meters)  •  Stage 5 End Stage CKD (GFR <15 mL/min/1 73 square meters)  Note: GFR calculation is accurate only with a steady state creatinine    CBC and differential [[id number]]  (Abnormal) Collected: 10/30/22 1347    Lab Status: Final result Specimen: Blood from Arm, Left Updated: 10/30/22 1405     WBC 7 47 Thousand/uL      RBC 4 48 Million/uL      Hemoglobin 11 6 g/dL      Hematocrit 36 6 %      MCV 82 fL      MCH 25 9 pg      MCHC 31 7 g/dL      RDW 16 1 %      MPV 10 0 fL      Platelets 283 Thousands/uL      nRBC 0 /100 WBCs      Neutrophils Relative 79 %      Immat GRANS % 1 %      Lymphocytes Relative 9 %      Monocytes Relative 11 %      Eosinophils Relative 0 %      Basophils Relative 0 %      Neutrophils Absolute 5 94 Thousands/µL      Immature Grans Absolute 0 05 Thousand/uL      Lymphocytes Absolute 0 65 Thousands/µL      Monocytes Absolute 0 79 Thousand/µL      Eosinophils Absolute 0 02 Thousand/µL      Basophils Absolute 0 02 Thousands/µL                  VAS lower limb venous duplex study, complete bilateral   Final Result by Vita Barrett MD (10/31 1215)      CT head wo contrast   Final Result by Jae Villavicencio DO (10/30 2104)      No acute intracranial abnormality  Workstation performed: [id number]         CTA chest pe study   Final Result by Daryle Islam, MD (10/30 2217)      No emboli in the main, right, or left pulmonary arteries  While there are no definite segmental or subsegmental emboli, they can be missed due to suboptimal opacification of the pulmonary arteries  No acute pulmonary disease  Pulmonary artery enlargement which can be seen with pulmonary hypertension  Workstation performed: [id number]         XR chest 2 views   Final Result by Joseph Dhaliwal MD (10/31 0588)      No radiographic evidence of acute intrathoracic process  Workstation performed: [id number]               Procedures  Procedures      ED Course  ED Course as of 10/31/22 1245   Sun Oct 30, 2022   1325 SpO2(!): 87 %  Placed on 2L NC     1345 XR chest 2 views  Small effusion on wet read without obvious consolidation  1409 CBC and differential(!)  Reviewed and without actionable derangement  1409 Hemoglobin: 11 6  Decreased from prior but WNL  1439 POCT INR(!): 1 43  Subtherapeutic  1440 hs TnI 0hr: 10  Will require delta  1440 Potassium(!): 3 3  Will replete  1607 FLU/RSV/COVID - if FLU/RSV clinically relevant  Negative      80 Pt and daughter updated on results and continued plan for admission  Both agreeable  MDM  Number of Diagnoses or Management Options  KENDRICK (dyspnea on exertion)  Febrile  Hypoxia  Diagnosis management comments: Pt is a 64yo F who presents with generalized weakness and exertional shortness of breath  Exam pertinent for dry mucous membranes  Differential diagnosis to include but not limited to viral syndrome COVID versus other, anemia, ACS, UTI  Will plan for cardiac workup including troponin, EKG, and chest x-ray  Will get COVID swab  Will get UA to rule out UTI  Will give small bolus of fluids due to dry mucous membranes  Patient became hypoxic and was placed on 2L nasal cannula  Patient had improvement of sats  Workup largely unremarkable  See ED course for details  Due to weakness and ambulatory dysfunction, will still plan for admission  Patient and daughter agreeable to plan  Plan to admit pt to [facility]  Pt discussed with admitting team and admission orders placed  Pt admitted without incident              Amount and/or Complexity of Data Reviewed  Clinical lab tests: ordered and reviewed  Tests in the radiology section of CPT®: ordered and reviewed        Disposition  Final diagnoses:   KENDRICK (dyspnea on exertion)   Febrile   Hypoxia     Time reflects when diagnosis was documented in both MDM as applicable and the Disposition within this note     Time User Action Codes Description Comment    10/30/2022  1:25 PM Marilyn Jon Add [R06 09] KENDRICK (dyspnea on exertion)     10/30/2022  1:25 PM Gale Bark [R50 9] Febrile     10/30/2022  1:25 PM Gale Bark [R09 02] Hypoxia     10/30/2022  6:49 PM Miah Michel Add [C64 1] Malignant tumor of right kidney parenchyma ([facility] [address] )     10/31/2022 12:07 PM Elidia Arauz 13 Incontinence       ED Disposition     ED Disposition   Admit    Condition   Stable    Date/Time   Sun Oct 30, 2022  5:27 PM    Comment   Case was discussed with [facility] and the patient's admission status was agreed to be Admission Status: observation status to the service of Dr Razia Montes  Follow-up Information    None         Current Discharge Medication List      CONTINUE these medications which have NOT CHANGED    Details   amoxicillin-clavulanate (AUGMENTIN) 875-125 mg per tablet Take 1 tablet by mouth 2 (two) times a day      acetaminophen (TYLENOL) 325 mg tablet Take 975 mg by mouth every 6 (six) hours as needed      diltiazem (CARDIZEM CD) 360 MG 24 hr capsule Take 1 capsule (360 mg total) by mouth daily  Qty: 90 capsule, Refills: 3    Associated Diagnoses: Permanent atrial fibrillation (HCC)      ergocalciferol (VITAMIN D2) 50,000 units Take 50,000 Units by mouth once a week Takes D3       FLUoxetine (PROzac) 20 mg capsule Take 20 mg by mouth 2 (two) times a day        gabapentin (NEURONTIN) 300 mg capsule Take 600 mg by mouth daily at bedtime       hydrochlorothiazide (HYDRODIURIL) 25 mg tablet Take 25 mg by mouth daily      LORazepam (ATIVAN) 1 mg tablet Take 1 mg by mouth daily at bedtime as needed for anxiety  oxybutynin (DITROPAN) 5 mg tablet 2 (two) times a day      warfarin (COUMADIN) 5 mg tablet Take 5 mg by mouth daily            No discharge procedures on file  PDMP Review     None           ED Provider  Attending physically available and evaluated Boyd Hagen I managed the patient along with the ED Attending      Electronically Signed by         Erick Kayser, MD  10/31/22 [phone]

## 2022-10-30 NOTE — ASSESSMENT & PLAN NOTE
· Met SIRS criteria with fever 100 4 and denies any tachycardia 103  · Recently treated for UTI with Augmentin and finished 5 day course  · COVID/RSV/flu negative  · No signs or symptoms of upper respiratory infection besides shortness of breath     Plan:  · Order UA and consider antibiotic therapy based on results  · UA significant for turbid urine, pyuria, bacteriuria  · Recently completed 5 day course of Augmentin to treat Klebsiella UTI  Suspect patient likely has persistent UTI though clinical presentation complicated by chronic urinary incontinence poorly managed by oxybutynin  Blood cultures and urine cultures noted  Patient's urine cultures were positive for ESBL E coli with susceptibilities to Augmentin  Patient was subsequently placed on Augmentin PO regimen and cefazolin was discontinued because of resistance  As result, patient was placed on 7 day regimen of PO Augmentin  While Augmentin does cause changes in warfarin levels, it may be less likely to affected overall as compared to other antibiotics on the susceptibility list because of which patient was finally placed on Augmentin  Patient's family agreeable to this

## 2022-10-30 NOTE — Clinical Note
Case was discussed with [facility] and the patient's admission status was agreed to be Admission Status: observation status to the service of Dr Roro Ardon

## 2022-10-30 NOTE — ED ATTENDING ATTESTATION
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)    5  Incontinence           I, Ritika Osborn MD, saw and evaluated the patient  All available labs and X-rays were ordered by me or the resident and have been reviewed by myself  I discussed the patient with the resident / non-physician and agree with the resident's / non-physician practitioner's findings and plan as documented in the resident's / non-physician practicitioner's note, except where noted  At this point, I agree with the current assessment done in the ED  I was present during key portions of all procedures performed unless otherwise stated  Chief Complaint   Patient presents with   • Shortness of Breath     Pt c/o SOB      This is a 66 y o  female presenting for evaluation of dyspnea  For 1 5 weeks, increased fatigue, generalized weakness  Usually only had to use walker outside  Lately using it in the house and that's not enough  Tried to get up and couldn't walk so came in by EMS today  Increased SOBOE ? no CP   +cough (dry)  Subjective fever/chills  No sick contacts  Recent UTI (tx-ed with ampicillin) ? has incontinence while sleeping  +swelling of LE last week but that resolved after a water pill   +coumadin for AF  No falls/HS  PMH:   has a past medical history of A-fib ([address] ), Arthritis, Atrial fibrillation ([address] ), Cervical spondylosis with myelopathy, Gastric bypass status for obesity, History of transfusion, Hypertension, mass right kidney, Renal cell adenocarcinoma ([address] ), and Sleep apnea  PSH:   has a past surgical history that includes Gastric bypass; Hysterectomy; Tubal ligation; Cholecystectomy; Colostomy; Revision Colostomy; pr open rx distal radius fx, extra-articular (Right, 4/22/2016); Cryoablation (Right); US guided thyroid biopsy (2/27/2019); US guided thyroid biopsy (5/29/2019); Thyroid lobectomy (Left, 8/27/2019);  Hernia repair; and Cystorrhaphy  Social:  Social History     Substance and Sexual Activity   Alcohol Use Not Currently   • Alcohol/week: 0 0 standard drinks    Comment: 0     Social History     Tobacco Use   Smoking Status Never Smoker   Smokeless Tobacco Never Used     Social History     Substance and Sexual Activity   Drug Use No     PE:  Vitals:    10/31/22 0246 10/31/22 0719 10/31/22 0741 10/31/22 1230   BP: 136/82 130/76  130/76   Pulse: 83 82  82   Resp:  18     Temp: 98 1 °F (36 7 °C) 98 °F (36 7 °C)     TempSrc:  Oral     SpO2: 92% (!) 88%     Weight:   (!) 137 kg (302 lb 0 5 oz) (!) 137 kg (302 lb)   Height:   5' 3" (1 6 m) 5' 3" (1 6 m)   General: VSS, NAD, awake, alert  Well-nourished, well-developed  Appears stated age  Head: Normocephalic, atraumatic, nontender  Eyes: PERRL, EOM-I  No diplopia  No hyphema  No subconjunctival hemorrhages  Symmetrical lids  ENTAtraumatic external nose and ears  MMM  No stridor  Normal phonation  No drooling  Base of mouth is soft  No mastoid tenderness  Neck: Symmetric, trachea midline  No JVD  CV: Peripheral pulses +2 throughout  No chest wall tenderness  Lungs:   Unlabored   No retractions  No crepitus  No tachypnea  No paradoxical motion  Abd: +BS, soft, NT/ND    MSK:   FROM   No lower extremity edema  Back:   No CVAT  Skin: Dry, intact  Neuro: AAOx3, GCS 15, CN II-XII grossly intact  Motor grossly intact  Psychiatric/Behavioral: Appropriate mood and affect   Exam: deferred  A:  - Febrile  - Weakness  P:  - Cardiac workup  - Urine for infection  - CXR    - 13 point ROS was performed and all are normal unless stated in the history above  - Nursing note reviewed  Vitals reviewed  - Orders placed by myself and/or advanced practitioner / resident     - Previous chart was reviewed  - No language barrier    - History obtained from patient  - There are no limitations to the history obtained     - Critical care time: 33 minutes  - Critical care time was exclusive of seperately bilable procedures and treating other patients as well as teaching time     - Critical care was necessary to treat or prevent imminent or life-threatening deterioration of the following condition: hypoxia  - Critical care time was spent personally by me on the following activities as well as the above as per the ED course and rest of chart: blood draw for specimens, obtaining history from patient / surrogate, developement of a treatment plan, discussions with consultants, evaluation of patient's response to the treatment, examination of the patient, ordering/performing treatements and interventions, re-evaluation of the patient's condition, review of old charts, ordering/reviewing laboratory studies, ordering/reviewing of radiographic studies    Code Status: Level 1 - Full Code  Advance Directive and Living Will:      Power of :    POLST:      Medications   acetaminophen (TYLENOL) tablet 975 mg (has no administration in time range)   diltiazem (CARDIZEM CD) 24 hr capsule 360 mg (360 mg Oral Given 10/31/22 0815)   FLUoxetine (PROzac) capsule 20 mg (20 mg Oral Given 10/31/22 0815)   gabapentin (NEURONTIN) capsule 600 mg (600 mg Oral Given 10/30/22 2201)   hydrochlorothiazide (HYDRODIURIL) tablet 25 mg (25 mg Oral Given 10/31/22 0815)   LORazepam (ATIVAN) tablet 1 mg (has no administration in time range)   oxybutynin (DITROPAN) tablet 5 mg (5 mg Oral Given 10/31/22 0815)   polyethylene glycol (MIRALAX) packet 17 g (has no administration in time range)   labetalol (NORMODYNE) injection 10 mg (has no administration in time range)   heparin (porcine) 25,000 units in 0 45% NaCl 250 mL infusion (premix) (15 1 Units/kg/hr × 90 kg (Order-Specific) Intravenous Rate/Dose Change 10/31/22 0757)   heparin (porcine) injection 4,000 Units (has no administration in time range)   heparin (porcine) injection 2,000 Units (2,000 Units Intravenous Given 10/31/22 0814)   warfarin (COUMADIN) tablet 7 5 mg (7 5 mg Oral Given 10/30/22 2201)   potassium chloride (K-DUR,KLOR-CON) CR tablet 40 mEq (has no administration in time range)   sodium chloride 0 9 % bolus 500 mL (500 mL Intravenous New Bag 10/30/22 1351)   potassium chloride (K-DUR,KLOR-CON) CR tablet 40 mEq (40 mEq Oral Given 10/30/22 1612)   heparin (porcine) injection 4,000 Units (4,000 Units Intravenous Given 10/30/22 1954)   iohexol (OMNIPAQUE) 350 MG/ML injection (SINGLE-DOSE) 85 mL (85 mL Intravenous Given 10/30/22 2036)     VAS lower limb venous duplex study, complete bilateral   Final Result      CT head wo contrast   Final Result      No acute intracranial abnormality  Workstation performed: [id number]         CTA chest pe study   Final Result      No emboli in the main, right, or left pulmonary arteries  While there are no definite segmental or subsegmental emboli, they can be missed due to suboptimal opacification of the pulmonary arteries  No acute pulmonary disease  Pulmonary artery enlargement which can be seen with pulmonary hypertension  Workstation performed: [id number]         XR chest 2 views   Final Result      No radiographic evidence of acute intrathoracic process                    Workstation performed: [id number]           Orders Placed This Encounter   Procedures   • FLU/RSV/COVID - if FLU/RSV clinically relevant   • Blood culture   • Blood culture   • Urine culture   • XR chest 2 views   • CT head wo contrast   • CTA chest pe study   • CBC and differential   • Comprehensive metabolic panel   • Protime-INR   • APTT   • HS Troponin 0hr (reflex protocol)   • HS Troponin I 2hr   • HS Troponin I 4hr   • TSH, 3rd generation   • Comprehensive metabolic panel   • Magnesium   • Phosphorus   • CBC and differential   • Protime-INR   • Hemoglobin A1C   • NT-BNP PRO   • APTT six (6) hours after Heparin bolus/drip initiation or dosing change   • APTT   • UA w Reflex to Microscopic w Reflex to Culture   • APTT   • Urine Microscopic   • Basic metabolic panel   • Diet Regular; Regular House   • Insert peripheral IV   • Urine dip analyzer   • Nursing communication Continue IV as ordered     • Notify admitting physician   • Notify admitting physician on arrival   • 48 Hour Telemetry Monitoring   • Vital Signs per unit routine   • Up with assistance   • Daily weights   • I/O   • Insert peripheral IV   • Maintain IV access   • Apply SCD or Foot pumps   • Heparin: ACS (low) or Straight Infusion Protocol Administration Instructions   • Heparin: ACS (Low) or Straight  Infusion Protocol Notify Physician If:   • Heparin Infusion and Platelet Monitoring Per Protocol   • Level 1-Full Code: all life saving measures are indicated   • Inpatient consult to Case Management   • Inpatient consult to Urology   • OT eval and treat   • PT eval and treat   • Cpap   • ECG 12 lead   • ECG 12 lead   • Echo complete w/ contrast if indicated   • Place in Observation     Labs Reviewed   CBC AND DIFFERENTIAL - Abnormal       Result Value Ref Range Status    WBC 7 47  4 31 - 10 16 Thousand/uL Final    RBC 4 48  3 81 - 5 12 Million/uL Final    Hemoglobin 11 6  11 5 - 15 4 g/dL Final    Hematocrit 36 6  34 8 - 46 1 % Final    MCV 82  82 - 98 fL Final    MCH 25 9 (*) 26 8 - 34 3 pg Final    MCHC 31 7  31 4 - 37 4 g/dL Final    RDW 16 1 (*) 11 6 - 15 1 % Final    MPV 10 0  8 9 - 12 7 fL Final    Platelets 046  996 - 390 Thousands/uL Final    nRBC 0  /100 WBCs Final    Neutrophils Relative 79 (*) 43 - 75 % Final    Immat GRANS % 1  0 - 2 % Final    Lymphocytes Relative 9 (*) 14 - 44 % Final    Monocytes Relative 11  4 - 12 % Final    Eosinophils Relative 0  0 - 6 % Final    Basophils Relative 0  0 - 1 % Final    Neutrophils Absolute 5 94  1 85 - 7 62 Thousands/µL Final    Immature Grans Absolute 0 05  0 00 - 0 20 Thousand/uL Final    Lymphocytes Absolute 0 65  0 60 - 4 47 Thousands/µL Final    Monocytes Absolute 0 79  0 17 - 1 22 Thousand/µL Final Eosinophils Absolute 0 02  0 00 - 0 61 Thousand/µL Final    Basophils Absolute 0 02  0 00 - 0 10 Thousands/µL Final   COMPREHENSIVE METABOLIC PANEL - Abnormal    Sodium 138  135 - 147 mmol/L Final    Potassium 3 3 (*) 3 5 - 5 3 mmol/L Final    Chloride 102  96 - 108 mmol/L Final    CO2 30  21 - 32 mmol/L Final    ANION GAP 6  4 - 13 mmol/L Final    BUN 20  5 - 25 mg/dL Final    Creatinine 0 91  0 60 - 1 30 mg/dL Final    Comment: Standardized to IDMS reference method    Glucose 121  65 - 140 mg/dL Final    Comment: If the patient is fasting, the ADA then defines impaired fasting glucose as > 100 mg/dL and diabetes as > or equal to 123 mg/dL  Specimen collection should occur prior to Sulfasalazine administration due to the potential for falsely depressed results  Specimen collection should occur prior to Sulfapyridine administration due to the potential for falsely elevated results  Calcium 8 2 (*) 8 3 - 10 1 mg/dL Final    Corrected Calcium 9 4  8 3 - 10 1 mg/dL Final    AST 22  5 - 45 U/L Final    Comment: Specimen collection should occur prior to Sulfasalazine administration due to the potential for falsely depressed results  ALT 21  12 - 78 U/L Final    Comment: Specimen collection should occur prior to Sulfasalazine and/or Sulfapyridine administration due to the potential for falsely depressed results  Alkaline Phosphatase 118 (*) 46 - 116 U/L Final    Total Protein 7 0  6 4 - 8 4 g/dL Final    Albumin 2 5 (*) 3 5 - 5 0 g/dL Final    Total Bilirubin 0 46  0 20 - 1 00 mg/dL Final    Comment: Use of this assay is not recommended for patients undergoing treatment with eltrombopag due to the potential for falsely elevated results      eGFR 63  ml/min/1 73sq m Final    Narrative:     [place] guidelines for Chronic Kidney Disease (CKD):   •  Stage 1 with normal or high GFR (GFR > 90 mL/min/1 73 square meters)  •  Stage 2 Mild CKD (GFR = 60-89 mL/min/1 73 square meters)  •  Stage 3A Moderate CKD (GFR = 45-59 mL/min/1 73 square meters)  •  Stage 3B Moderate CKD (GFR = 30-44 mL/min/1 73 square meters)  •  Stage 4 Severe CKD (GFR = 15-29 mL/min/1 73 square meters)  •  Stage 5 End Stage CKD (GFR <15 mL/min/1 73 square meters)  Note: GFR calculation is accurate only with a steady state creatinine   PROTIME-INR - Abnormal    Protime 17 7 (*) 11 6 - 14 5 seconds Final    INR 1 43 (*) 0 84 - 1 19 Final   HEMOGLOBIN A1C - Abnormal    Hemoglobin A1C 6 6 (*) Normal 3 8-5 6%; PreDiabetic 5 7-6 4%; Diabetic >=6 5%; Glycemic control for adults with diabetes <7 0% % Final      mg/dl Final   COVID19, INFLUENZA A/B, RSV PCR, [facility] - Normal    SARS-CoV-2 Negative  Negative Final    Comment:      INFLUENZA A PCR Negative  Negative Final    Comment:      INFLUENZA B PCR Negative  Negative Final    Comment:      RSV PCR Negative  Negative Final    Comment:      Narrative:     FOR PEDIATRIC PATIENTS - copy/paste COVID Guidelines URL to browser: [email or web address]  [email or web address]    SARS-CoV-2 assay is a Nucleic Acid Amplification assay intended for the  qualitative detection of nucleic acid from SARS-CoV-2 in nasopharyngeal  swabs  Results are for the presumptive identification of SARS-CoV-2 RNA  Positive results are indicative of infection with SARS-CoV-2, the virus  causing COVID-19, but do not rule out bacterial infection or co-infection  with other viruses  Laboratories within the [place] and its  territories are required to report all positive results to the appropriate  public health authorities  Negative results do not preclude SARS-CoV-2  infection and should not be used as the sole basis for treatment or other  patient management decisions  Negative results must be combined with  clinical observations, patient history, and epidemiological information  This test has not been FDA cleared or approved      This test has been authorized by FDA under an Emergency Use Authorization  (EUA)  This test is only authorized for the duration of time the  declaration that circumstances exist justifying the authorization of the  emergency use of an in vitro diagnostic tests for detection of SARS-CoV-2  virus and/or diagnosis of COVID-19 infection under section 564(b)(1) of  the Act, 21 U  S C  458LWU-7(W)(7), unless the authorization is terminated  or revoked sooner  The test has been validated but independent review by FDA  and CLIA is pending  Test performed using [employer] Gene[employer]pert: This RT-PCR assay targets N2,  a region unique to SARS-CoV-2  A conserved region in the E-gene was chosen  for pan-Sarbecovirus detection which includes SARS-CoV-2  According to CMS-2020-01-R, this platform meets the definition of high-throughput technology  APTT - Normal    PTT 30  23 - 37 seconds Final    Comment: Therapeutic Heparin Range =  60-90 seconds   HS TROPONIN I 0HR - Normal    hs TnI 0hr 10  "Refer to ACS Flowchart"- see link ng/L Final    Comment:                                              Initial (time 0) result  If >=50 ng/L, Myocardial injury suggested ;  Type of myocardial injury and treatment strategy  to be determined  If 5-49 ng/L, a delta result at 2 hours and or 4 hours will be needed to further evaluate  If <4 ng/L, and chest pain has been >3 hours since onset, patient may qualify for discharge based on the HEART score in the ED  If <5 ng/L and <3hours since onset of chest pain, a delta result at 2 hours will be needed to further evaluate  HS Troponin 99th Percentile URL of a Health Population=12 ng/L with a 95% Confidence Interval of 8-18 ng/L  Second Troponin (time 2 hours)  If calculated delta >= 20 ng/L,  Myocardial injury suggested ; Type of myocardial injury and treatment strategy to be determined  If 5-49 ng/L and the calculated delta is 5-19 ng/L, consult medical service for evaluation    Continue evaluation for ischemia on ecg and other possible etiology and repeat hs troponin at 4 hours  If delta is <5 ng/L at 2 hours, consider discharge based on risk stratification via the HEART score (if in ED), or ALFONZO risk score in IP/Observation  HS Troponin 99th Percentile URL of a Health Population=12 ng/L with a 95% Confidence Interval of 8-18 ng/L    HS TROPONIN I 2HR - Normal    hs TnI 2hr 11  "Refer to ACS Flowchart"- see link ng/L Final    Comment:                                              Initial (time 0) result  If >=50 ng/L, Myocardial injury suggested ;  Type of myocardial injury and treatment strategy  to be determined  If 5-49 ng/L, a delta result at 2 hours and or 4 hours will be needed to further evaluate  If <4 ng/L, and chest pain has been >3 hours since onset, patient may qualify for discharge based on the HEART score in the ED  If <5 ng/L and <3hours since onset of chest pain, a delta result at 2 hours will be needed to further evaluate  HS Troponin 99th Percentile URL of a Health Population=12 ng/L with a 95% Confidence Interval of 8-18 ng/L  Second Troponin (time 2 hours)  If calculated delta >= 20 ng/L,  Myocardial injury suggested ; Type of myocardial injury and treatment strategy to be determined  If 5-49 ng/L and the calculated delta is 5-19 ng/L, consult medical service for evaluation  Continue evaluation for ischemia on ecg and other possible etiology and repeat hs troponin at 4 hours  If delta is <5 ng/L at 2 hours, consider discharge based on risk stratification via the HEART score (if in ED), or ALFONZO risk score in IP/Observation  HS Troponin 99th Percentile URL of a Health Population=12 ng/L with a 95% Confidence Interval of 8-18 ng/L      Delta 2hr hsTnI 1  <20 ng/L Final   TSH, 3RD GENERATION - Normal    TSH 3RD GENERATON 1 820  0 450 - 4 500 uIU/mL Final    Comment: The recommended reference ranges for TSH during pregnancy are as follows:   First trimester 0 1 to 2 5 uIU/mL   Second trimester  0 2 to 3 0 uIU/mL   Third trimester 0 3 to 3 0 uIU/m    Note: Normal ranges may not apply to patients who are transgender, non-binary, or whose legal sex, sex at birth, and gender identity differ  Adult TSH (3rd generation) reference range follows the recommended guidelines of the American Thy[employer] Association, January, 2020  Narrative:     Patients undergoing fluorescein dye angiography may retain small amounts of fluorescein in the body for 48-72 hours post procedure  Samples containing fluorescein can produce falsely depressed TSH values  If the patient had this procedure,a specimen should be resubmitted post fluorescein clearance  Time reflects when diagnosis was documented in both MDM as applicable and the Disposition within this note     Time User Action Codes Description Comment    10/30/2022  1:25 PM Kimber Limes [R06 09] KENDRICK (dyspnea on exertion)     10/30/2022  1:25 PM Johan Messina Add [R50 9] Febrile     10/30/2022  1:25 PM Johan Messina Add [R09 02] Hypoxia     10/30/2022  6:49 PM Kamryn Skinner Add [C64 1] Malignant tumor of right kidney parenchyma ([address] )     10/31/2022 12:07 PM Felix Gist Add [R32] Incontinence       ED Disposition     ED Disposition   Admit    Condition   Stable    Date/Time   Sun Oct 30, 2022  5:27 PM    Comment   Case was discussed with SOD and the patient's admission status was agreed to be Admission Status: observation status to the service of Dr Shonda Arzola             Follow-up Information    None       Current Discharge Medication List      CONTINUE these medications which have NOT CHANGED    Details   amoxicillin-clavulanate (AUGMENTIN) 875-125 mg per tablet Take 1 tablet by mouth 2 (two) times a day      acetaminophen (TYLENOL) 325 mg tablet Take 975 mg by mouth every 6 (six) hours as needed      diltiazem (CARDIZEM CD) 360 MG 24 hr capsule Take 1 capsule (360 mg total) by mouth daily  Qty: 90 capsule, Refills: 3    Associated Diagnoses: Permanent atrial fibrillation ([address] )      ergocalciferol (VITAMIN D2) 50,000 units Take 50,000 Units by mouth once a week Takes D3       FLUoxetine (PROzac) 20 mg capsule Take 20 mg by mouth 2 (two) times a day        gabapentin (NEURONTIN) 300 mg capsule Take 600 mg by mouth daily at bedtime       hydrochlorothiazide (HYDRODIURIL) 25 mg tablet Take 25 mg by mouth daily      LORazepam (ATIVAN) 1 mg tablet Take 1 mg by mouth daily at bedtime as needed for anxiety  oxybutynin (DITROPAN) 5 mg tablet 2 (two) times a day      warfarin (COUMADIN) 5 mg tablet Take 5 mg by mouth daily            No discharge procedures on file  Prior to Admission Medications   Prescriptions Last Dose Informant Patient Reported? Taking? FLUoxetine (PROzac) 20 mg capsule  Self Yes No   Sig: Take 20 mg by mouth 2 (two) times a day     LORazepam (ATIVAN) 1 mg tablet  Self Yes No   Sig: Take 1 mg by mouth daily at bedtime as needed for anxiety  acetaminophen (TYLENOL) 325 mg tablet  Self Yes No   Sig: Take 975 mg by mouth every 6 (six) hours as needed   amoxicillin-clavulanate (AUGMENTIN) 875-125 mg per tablet   Yes Yes   Sig: Take 1 tablet by mouth 2 (two) times a day   diltiazem (CARDIZEM CD) 360 MG 24 hr capsule   No No   Sig: Take 1 capsule (360 mg total) by mouth daily   ergocalciferol (VITAMIN D2) 50,000 units  Self Yes No   Sig: Take 50,000 Units by mouth once a week Takes D3    gabapentin (NEURONTIN) 300 mg capsule  Self Yes No   Sig: Take 600 mg by mouth daily at bedtime    hydrochlorothiazide (HYDRODIURIL) 25 mg tablet  Self Yes No   Sig: Take 25 mg by mouth daily   oxybutynin (DITROPAN) 5 mg tablet   Yes No   Si (two) times a day   warfarin (COUMADIN) 5 mg tablet  Self Yes No   Sig: Take 5 mg by mouth daily       Facility-Administered Medications Last Administration Doses Remaining   denosumab (PROLIA) subcutaneous injection 60 mg 2021 11:57 AM           Portions of the record may have been created with voice recognition software  Occasional wrong word or "sound a like" substitutions may have occurred due to the inherent limitations of voice recognition software  Read the chart carefully and recognize, using context, where substitutions have occurred      Electronically signed by:  Eber Dupree

## 2022-10-30 NOTE — PROGRESS NOTES
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)    Permanent atrial fibrillation (HCC)    Impaired fasting glucose    Elevated TSH    Elevated alkaline phosphatase level    Stage 3a chronic kidney disease (HCC)    Unsteady gait    Ambulatory dysfunction    SIRS (systemic inflammatory response syndrome) ([facility] [address] )    Incontinence    Patient is a 66 y o  F with a PMHx of renal cancer, Afib on warfarin and cardizem, osteoporosis, OA, HTN, and GILDARDO on CPAP that presented to the ED on 10/30/22 for KENDRICK, unsteady gait, and trouble ambulating  Patient states that has been happening for a few weeks now  Has had subtherapeutic INRs  No history of CVA in the past   Also just finished a course of Augmentin for UTI about 2-3 weeks ago  Patient deals with incontinence at baseline, just recently had her 1 year follow-up with Urology for renal cancer  Patient is largely sedentary at baseline  Denies any recent travel or surgical history  No sick contacts  Patient is not up-to-date with cancer screening  On admission, the patient was tachycardic and slightly febrile to 100 4  Physical exam remarkable for obesity, irregularly irregular heartbeat, decreased breath sounds at the right base, some left calf tenderness  Lab significant for potassium 3 3, glucose 121, alkaline phosphatase 118, albumin 2 5 with a gamma gap 4 5, troponin 10, INR 1 43, negative COVID/flu/RSV  Chest x-ray significant for right effusion without consolidation      Will initiate heparin drip given the patient's CHADS2 Vasc score of 3  Increase warfarin dosing to 7 5 mg daily  Price check Eliquis and Xarelto tomorrow  Will obtain CTA PE study protocol given the patient's persistent shortness of breath, recently uncontrolled tachycardia (per daughter's her heart rate is normally controlled on her current regimen), and sedentary lifestyle  Perc score of 3  Wells score around 9  Will also order CT head given unstable gait for weeks, history of cancer, and outdated cancer screenings  Will obtain a UA with reflex to culture given urinary symptoms, continue oxybutynin  Hold off on empiric antibiotics at this time, low threshold to start  Will also obtain blood cultures given the patient meeting SIRS criteria, but again will hold off on empiric antibiotics given the patient's clinical picture  Check thyroid function and A1c  Can consider SPEP and UPEP in the future  The patient is confirmed level 1 full code        Code Status: Prior  Admission Status: OBSERVATION  Disposition: Patient requires Med/Surg with Telemetry  Expected Length of Stay: <2 Cassie Lockhart 28, DO, [place] [address] 86  PGY-3, Internal Medicine  [facility]

## 2022-10-30 NOTE — ASSESSMENT & PLAN NOTE
Patient reports 1-2 weeks of unsteady gait secondary shortness of breath in the setting of advanced osteoarthritis of the knee   No focal neurological focal deficits on exam      Plan:  · Given patient's history of Afib and history of anticoagulation, unsteady gait may be secondary to CVA  · Order CT head for further evaluation   · Negative for acute intracranial pathology

## 2022-10-30 NOTE — ASSESSMENT & PLAN NOTE
Patient reports 1-2 weeks of shortness of breath in the setting of obesity and permanent AFib  Low clinical suspicion for pneumonia at this time  PE also possible given clinical presentation  PERC score 3  Wells score for PE 9    Plan:  · High sensitivity troponins  · 10/11/9 at 0h/2h/4h  · Obtain CXR  · Negative for active cardiopulmonary disease  · Order CTA to rule out PE  · No emboli in the main, right, or left pulmonary arteries  · While there are no definite segmental or subsegmental emboli, they can be missed due to suboptimal opacification of the pulmonary arteries  · Pulmonary artery enlargement which can be seen with pulmonary hypertension    · Obtain TTE to assess for pulmonary HTN  · TTE without any signs of pulmonary hypertension including right ventricular strain  · Improving, patient able to tolerate PT and OT

## 2022-10-31 ENCOUNTER — APPOINTMENT (OUTPATIENT)
Dept: PHYSICAL THERAPY | Facility: REHABILITATION | Age: 72
End: 2022-10-31

## 2022-10-31 ENCOUNTER — APPOINTMENT (OUTPATIENT)
Dept: NON INVASIVE DIAGNOSTICS | Facility: HOSPITAL | Age: 72
End: 2022-10-31

## 2022-10-31 PROBLEM — R79.89 ELEVATED TSH: Status: RESOLVED | Noted: 2020-03-17 | Resolved: 2022-10-31

## 2022-10-31 LAB
4HR DELTA HS TROPONIN: -1 NG/L
ALBUMIN SERPL BCP-MCNC: 2.3 G/DL (ref 3.5–5)
ALP SERPL-CCNC: 106 U/L (ref 46–116)
ALT SERPL W P-5'-P-CCNC: 21 U/L (ref 12–78)
ANION GAP SERPL CALCULATED.3IONS-SCNC: 5 MMOL/L (ref 4–13)
AORTIC ROOT: 3.5 CM
APICAL FOUR CHAMBER EJECTION FRACTION: 54 %
APTT PPP: 43 SECONDS (ref 23–37)
APTT PPP: 44 SECONDS (ref 23–37)
APTT PPP: 49 SECONDS (ref 23–37)
APTT PPP: 50 SECONDS (ref 23–37)
ASCENDING AORTA: 3.4 CM
AST SERPL W P-5'-P-CCNC: 22 U/L (ref 5–45)
ATRIAL RATE: 166 BPM
BACTERIA UR QL AUTO: ABNORMAL /HPF
BASOPHILS # BLD AUTO: 0.02 THOUSANDS/ÂΜL (ref 0–0.1)
BASOPHILS NFR BLD AUTO: 0 % (ref 0–1)
BILIRUB SERPL-MCNC: 0.47 MG/DL (ref 0.2–1)
BILIRUB UR QL STRIP: NEGATIVE
BUN SERPL-MCNC: 18 MG/DL (ref 5–25)
CALCIUM ALBUM COR SERPL-MCNC: 9.3 MG/DL (ref 8.3–10.1)
CALCIUM SERPL-MCNC: 7.9 MG/DL (ref 8.3–10.1)
CARDIAC TROPONIN I PNL SERPL HS: 9 NG/L
CHLORIDE SERPL-SCNC: 106 MMOL/L (ref 96–108)
CLARITY UR: ABNORMAL
CO2 SERPL-SCNC: 28 MMOL/L (ref 21–32)
COLOR UR: YELLOW
CREAT SERPL-MCNC: 0.84 MG/DL (ref 0.6–1.3)
E WAVE DECELERATION TIME: 191 MS
EOSINOPHIL # BLD AUTO: 0.07 THOUSAND/ÂΜL (ref 0–0.61)
EOSINOPHIL NFR BLD AUTO: 1 % (ref 0–6)
ERYTHROCYTE [DISTWIDTH] IN BLOOD BY AUTOMATED COUNT: 16.2 % (ref 11.6–15.1)
FRACTIONAL SHORTENING: 27 % (ref 28–44)
GFR SERPL CREATININE-BSD FRML MDRD: 69 ML/MIN/1.73SQ M
GLUCOSE SERPL-MCNC: 128 MG/DL (ref 65–140)
GLUCOSE UR STRIP-MCNC: NEGATIVE MG/DL
HCT VFR BLD AUTO: 36.2 % (ref 34.8–46.1)
HGB BLD-MCNC: 11.2 G/DL (ref 11.5–15.4)
HGB UR QL STRIP.AUTO: ABNORMAL
IMM GRANULOCYTES # BLD AUTO: 0.06 THOUSAND/UL (ref 0–0.2)
IMM GRANULOCYTES NFR BLD AUTO: 1 % (ref 0–2)
INR PPP: 1.33 (ref 0.84–1.19)
INTERVENTRICULAR SEPTUM IN DIASTOLE (PARASTERNAL SHORT AXIS VIEW): 1.3 CM
INTERVENTRICULAR SEPTUM: 1.3 CM (ref 0.6–1.1)
KETONES UR STRIP-MCNC: NEGATIVE MG/DL
LAAS-AP2: 30.8 CM2
LAAS-AP4: 26.4 CM2
LEFT ATRIUM SIZE: 4.8 CM
LEFT INTERNAL DIMENSION IN SYSTOLE: 3.2 CM (ref 2.1–4)
LEFT VENTRICULAR INTERNAL DIMENSION IN DIASTOLE: 4.4 CM (ref 3.5–6)
LEFT VENTRICULAR POSTERIOR WALL IN END DIASTOLE: 1.3 CM
LEFT VENTRICULAR STROKE VOLUME: 49 ML
LEUKOCYTE ESTERASE UR QL STRIP: ABNORMAL
LVSV (TEICH): 49 ML
LYMPHOCYTES # BLD AUTO: 0.99 THOUSANDS/ÂΜL (ref 0.6–4.47)
LYMPHOCYTES NFR BLD AUTO: 17 % (ref 14–44)
MAGNESIUM SERPL-MCNC: 2.2 MG/DL (ref 1.6–2.6)
MCH RBC QN AUTO: 25.6 PG (ref 26.8–34.3)
MCHC RBC AUTO-ENTMCNC: 30.9 G/DL (ref 31.4–37.4)
MCV RBC AUTO: 83 FL (ref 82–98)
MONOCYTES # BLD AUTO: 0.84 THOUSAND/ÂΜL (ref 0.17–1.22)
MONOCYTES NFR BLD AUTO: 15 % (ref 4–12)
MUCOUS THREADS UR QL AUTO: ABNORMAL
MV E'TISSUE VEL-SEP: 10 CM/S
MV PEAK E VEL: 96 CM/S
MV STENOSIS PRESSURE HALF TIME: 55 MS
MV VALVE AREA P 1/2 METHOD: 4 CM2
NEUTROPHILS # BLD AUTO: 3.73 THOUSANDS/ÂΜL (ref 1.85–7.62)
NEUTS SEG NFR BLD AUTO: 66 % (ref 43–75)
NITRITE UR QL STRIP: NEGATIVE
NON-SQ EPI CELLS URNS QL MICRO: ABNORMAL /HPF
NRBC BLD AUTO-RTO: 0 /100 WBCS
NT-PROBNP SERPL-MCNC: 847 PG/ML
PH UR STRIP.AUTO: 6.5 [PH]
PHOSPHATE SERPL-MCNC: 2.7 MG/DL (ref 2.3–4.1)
PLATELET # BLD AUTO: 263 THOUSANDS/UL (ref 149–390)
PMV BLD AUTO: 9.8 FL (ref 8.9–12.7)
POTASSIUM SERPL-SCNC: 3.1 MMOL/L (ref 3.5–5.3)
PROT SERPL-MCNC: 6.3 G/DL (ref 6.4–8.4)
PROT UR STRIP-MCNC: ABNORMAL MG/DL
PROTHROMBIN TIME: 16.7 SECONDS (ref 11.6–14.5)
QRS AXIS: -57 DEGREES
QRSD INTERVAL: 86 MS
QT INTERVAL: 346 MS
QTC INTERVAL: 420 MS
RA PRESSURE ESTIMATED: 8 MMHG
RBC # BLD AUTO: 4.38 MILLION/UL (ref 3.81–5.12)
RBC #/AREA URNS AUTO: ABNORMAL /HPF
RIGHT ATRIUM AREA SYSTOLE A4C: 19.8 CM2
RIGHT VENTRICLE ID DIMENSION: 4.1 CM
SL CV LEFT ATRIUM LENGTH A2C: 7 CM
SL CV LV EF: 55
SL CV PED ECHO LEFT VENTRICLE DIASTOLIC VOLUME (MOD BIPLANE) 2D: 90 ML
SL CV PED ECHO LEFT VENTRICLE SYSTOLIC VOLUME (MOD BIPLANE) 2D: 40 ML
SODIUM SERPL-SCNC: 139 MMOL/L (ref 135–147)
SP GR UR STRIP.AUTO: 1.03 (ref 1–1.03)
T WAVE AXIS: 48 DEGREES
UROBILINOGEN UR STRIP-ACNC: 3 MG/DL
VENTRICULAR RATE: 89 BPM
WBC # BLD AUTO: 5.71 THOUSAND/UL (ref 4.31–10.16)
WBC #/AREA URNS AUTO: ABNORMAL /HPF
WBC CLUMPS # UR AUTO: PRESENT /UL

## 2022-10-31 RX ORDER — CEFAZOLIN SODIUM 2 G/50ML
2000 SOLUTION INTRAVENOUS EVERY 8 HOURS
Status: DISCONTINUED | OUTPATIENT
Start: 2022-10-31 | End: 2022-11-02

## 2022-10-31 RX ORDER — POTASSIUM CHLORIDE 20 MEQ/1
40 TABLET, EXTENDED RELEASE ORAL 2 TIMES DAILY
Status: COMPLETED | OUTPATIENT
Start: 2022-10-31 | End: 2022-11-01

## 2022-10-31 RX ADMIN — OXYBUTYNIN CHLORIDE 5 MG: 5 TABLET ORAL at 08:15

## 2022-10-31 RX ADMIN — WARFARIN SODIUM 7.5 MG: 7.5 TABLET ORAL at 17:56

## 2022-10-31 RX ADMIN — POTASSIUM CHLORIDE 40 MEQ: 1500 TABLET, EXTENDED RELEASE ORAL at 17:55

## 2022-10-31 RX ADMIN — FLUOXETINE 20 MG: 20 CAPSULE ORAL at 08:15

## 2022-10-31 RX ADMIN — FLUOXETINE 20 MG: 20 CAPSULE ORAL at 17:56

## 2022-10-31 RX ADMIN — GABAPENTIN 600 MG: 300 CAPSULE ORAL at 21:47

## 2022-10-31 RX ADMIN — HEPARIN SODIUM 2000 UNITS: 1000 INJECTION INTRAVENOUS; SUBCUTANEOUS at 22:22

## 2022-10-31 RX ADMIN — HEPARIN SODIUM 13.1 UNITS/KG/HR: 10000 INJECTION, SOLUTION INTRAVENOUS at 00:43

## 2022-10-31 RX ADMIN — CEFAZOLIN SODIUM 2000 MG: 2 SOLUTION INTRAVENOUS at 17:56

## 2022-10-31 RX ADMIN — POTASSIUM CHLORIDE 40 MEQ: 1500 TABLET, EXTENDED RELEASE ORAL at 13:52

## 2022-10-31 RX ADMIN — OXYBUTYNIN CHLORIDE 5 MG: 5 TABLET ORAL at 21:47

## 2022-10-31 RX ADMIN — HEPARIN SODIUM 2000 UNITS: 1000 INJECTION INTRAVENOUS; SUBCUTANEOUS at 15:34

## 2022-10-31 RX ADMIN — HYDROCHLOROTHIAZIDE 25 MG: 25 TABLET ORAL at 08:15

## 2022-10-31 RX ADMIN — LORAZEPAM 1 MG: 1 TABLET ORAL at 21:49

## 2022-10-31 RX ADMIN — HEPARIN SODIUM 2000 UNITS: 1000 INJECTION INTRAVENOUS; SUBCUTANEOUS at 08:14

## 2022-10-31 RX ADMIN — HEPARIN SODIUM 2000 UNITS: 1000 INJECTION INTRAVENOUS; SUBCUTANEOUS at 00:42

## 2022-10-31 RX ADMIN — DILTIAZEM HYDROCHLORIDE 360 MG: 180 CAPSULE, COATED, EXTENDED RELEASE ORAL at 08:15

## 2022-10-31 RX ADMIN — OXYBUTYNIN CHLORIDE 5 MG: 5 TABLET ORAL at 15:34

## 2022-10-31 NOTE — PROGRESS NOTES
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)    Permanent atrial fibrillation (HCC)    Impaired fasting glucose    Elevated TSH    Elevated alkaline phosphatase level    Unsteady gait    SIRS (systemic inflammatory response syndrome) (HCC)    Incontinence    GILDARDO (obstructive sleep apnea)      GILDARDO (obstructive sleep apnea)  Assessment & Plan  History of GILDARDO compliant on CPAP    Plan: Will continue CPAP and patient    Incontinence  Assessment & Plan  Recently treated for UTI with Augmentin 5 day course 2-3 weeks ago  Patient has history of RCC on the right treated cryotherapy about 6 years,  Patient reported worsening incontinence for the past 2-3 weeks with the dysuria or frequency; patient is incontinent of baseline  Patient reports 2 days history of fevers and chills    Plan:   Will repeat UA and consider antibiotic therapy based on results  Continue home oxybutynin    SIRS (systemic inflammatory response syndrome) ([facility])  Assessment & Plan  Met sirs criteria with fever 100 4 and denies any tachycardia 103  Recently treated for UTI with Augmentin and finished 5 day course  COVID/RSV/flu negative  Patient does not report any symptoms of upper respiratory infection besides for shortness of breath    Plan:  Blood cultures times x2  Will hold off on antibiotics at this time given patient's clinical appearance and recent course of antibiotics  Low threshold to start antibiotic therapy    Unsteady gait  Assessment & Plan  Patient reports 1-2 weeks of unsteady gait secondary shortness of breath in the setting of advanced osteoarthritis of the knee  Patient does not report or appears to have any neurological focal deficits    Plan:  Given patient's history of Afib and history of anticoagulation unsteady gait may be secondary to CVA and will order CTH  Symptoms likely appears secondary to shortness of breath in the setting of advanced OA      Elevated alkaline phosphatase level  Assessment & Plan  Isolated elevation of alk-phos but normal T bili  Likely secondary to patient's bone disease osteoporosis    Plan:  Continue outpatient Prolia        Elevated TSH  Assessment & Plan  Previous TSH elevated with normal T4    Plan: Will repeat TSH and consider therapy if warranted    Impaired fasting glucose  Assessment & Plan  Recent A1c of 6 2  Blood sugars in goal for inpatient  Patient is not on home diabetes medication    Plan: For history moderate blood sugars and follow up outpatient    Permanent atrial fibrillation [facility])  Assessment & Plan  History of permanent AFib on warfarin 5mg daily with subtherapeutic INR 1 43  Not on Eliquis because of price  Rate controlled with diltiazem    Plan: Will increase warfarin to 7 5 and bridge with heparin drip  Will continue diltiazem 360mg daily  Will consider oral anticoagulant at discharge    Malignant tumor of right kidney parenchyma [facility])  Assessment & Plan  History of RCC on the right status post treatment cryotherapy about 6 years ago  Stable and continue monitoring  Patient follows up with Urology regularly    Plan:    follow-up with Urology outpatient    * Dyspnea on exertion  Assessment & Plan  Patient reports 1-2 weeks of shortness of breath in the setting of obesity and permanent AFib  Low clinical suspicion for pneumonia at this time  Perc score of 3  Wells score around 9    Plan:  Follow-up with results of chest x-ray  Will order CTA to rule out PE  Follow-up with genaro            Disposition: ***     SUBJECTIVE     Patient seen and examined   No acute events overnight  She states this morning she is feeling good with no acute complaints  She denies any shortness of breath but has not been ambulating much  She was able to get up to go to the bathroom yesterday evening with help but she states that when she was on the toilet she was not able to stand up and needed staff to lift her  This morning she said she was able to ambulate down the [place] and back without difficulty or SOB  She was on 2L of O2 in the ED but is currently on RA and has not had any difficulty breathing and was satting at 95%  She admits frequency but denies dysuria or urgency  Her main complaint this morning is that she wants to figure out what is causing her nocturnal incontinence  She states that over the weekend she has woken up and found that she was incontinent and did not even feel the urge to urinate while sleeping  She denies chest pain, cough, shortness of breath, fevers, chills, nausea vomiting, abdominal pain, lower extremity pain or swelling  OBJECTIVE     Vitals:    10/31/22 0246 10/31/22 0719 10/31/22 0741 10/31/22 1230   BP: 136/82 130/76  130/76   Pulse: 83 82  82   Resp:  18     Temp: 98 1 °F (36 7 °C) 98 °F (36 7 °C)     TempSrc:  Oral     SpO2: 92% (!) 88%     Weight:   (!) 137 kg (302 lb 0 5 oz) (!) 137 kg (302 lb)   Height:   5' 3" (1 6 m) 5' 3" (1 6 m)      Temperature:   Temp (24hrs), Av 4 °F (36 9 °C), Min:98 °F (36 7 °C), Max:99 3 °F (37 4 °C)    Temperature: 98 °F (36 7 °C)  Intake & Output:  I/O       10/29 0701  10/30 0700 10/30 0701  10/31 0700 10/31 0701  11/01 07    P  O    1180    I V  (mL/kg)  22     Total Intake(mL/kg)  22 1180 (8 6)    Net  +22 +1180               Weights:        Body mass index is 53 5 kg/m²  Weight (last 2 days)     Date/Time Weight    10/31/22 1230 137 (302)    10/31/22 0741 137 (302 03)        Physical Exam  Constitutional:       General: She is not in acute distress  Appearance: Normal appearance  She is obese   She is not toxic-appearing  HENT:      Head: Normocephalic and atraumatic  Nose: No congestion or rhinorrhea  Cardiovascular:      Rate and Rhythm: Normal rate  Rhythm irregular  Pulses: Normal pulses  Heart sounds: Normal heart sounds  No murmur heard  No friction rub  No gallop  Pulmonary:      Effort: Pulmonary effort is normal       Breath sounds: Normal breath sounds  No stridor  No wheezing, rhonchi or rales  Abdominal:      General: Abdomen is flat  Bowel sounds are normal  There is no distension  Palpations: Abdomen is soft  Tenderness: There is no abdominal tenderness  There is no guarding  Musculoskeletal:         General: No tenderness (denies tenderness to BL LE)  Right lower leg: No edema  Left lower leg: No edema  Skin:     General: Skin is warm and dry  Neurological:      General: No focal deficit present  Mental Status: She is alert  Psychiatric:         Mood and Affect: Mood normal          Behavior: Behavior normal        LABORATORY DATA     Labs: I have personally reviewed pertinent reports    Results from last 7 days   Lab Units 10/31/22  0728 10/30/22  1347   WBC Thousand/uL 5 71 7 47   HEMOGLOBIN g/dL 11 2* 11 6   HEMATOCRIT % 36 2 36 6   PLATELETS Thousands/uL 263 270   NEUTROS PCT % 66 79*   MONOS PCT % 15* 11      Results from last 7 days   Lab Units 10/31/22  0728 10/30/22  1347   POTASSIUM mmol/L 3 1* 3 3*   CHLORIDE mmol/L 106 102   CO2 mmol/L 28 30   BUN mg/dL 18 20   CREATININE mg/dL 0 84 0 91   CALCIUM mg/dL 7 9* 8 2*   ALK PHOS U/L 106 118*   ALT U/L 21 21   AST U/L 22 22     Results from last 7 days   Lab Units 10/31/22  0728   MAGNESIUM mg/dL 2 2     Results from last 7 days   Lab Units 10/31/22  0728   PHOSPHORUS mg/dL 2 7      Results from last 7 days   Lab Units 10/31/22  0728 10/31/22  0537 10/31/22  0008 10/30/22  1347   INR   --  1 33*  --  1 43*   PTT seconds 49*  --  43* 30               IMAGING & DIAGNOSTIC TESTING Radiology Results: I have personally reviewed pertinent reports  XR chest 2 views    Result Date: 10/31/2022  Impression: No radiographic evidence of acute intrathoracic process  Workstation performed: [id number]     CT head wo contrast    Result Date: 10/30/2022  Impression: No acute intracranial abnormality  Workstation performed: [id number]     CTA chest pe study    Result Date: 10/30/2022  Impression: No emboli in the main, right, or left pulmonary arteries  While there are no definite segmental or subsegmental emboli, they can be missed due to suboptimal opacification of the pulmonary arteries  No acute pulmonary disease  Pulmonary artery enlargement which can be seen with pulmonary hypertension  Workstation performed: [id number]     Other Diagnostic Testing: I have personally reviewed pertinent reports      ACTIVE MEDICATIONS     Current Facility-Administered Medications   Medication Dose Route Frequency   • acetaminophen (TYLENOL) tablet 975 mg  975 mg Oral Q6H PRN   • diltiazem (CARDIZEM CD) 24 hr capsule 360 mg  360 mg Oral Daily   • FLUoxetine (PROzac) capsule 20 mg  20 mg Oral BID   • gabapentin (NEURONTIN) capsule 600 mg  600 mg Oral HS   • heparin (porcine) 25,000 units in 0 45% NaCl 250 mL infusion (premix)  3-20 Units/kg/hr (Order-Specific) Intravenous Titrated   • heparin (porcine) injection 2,000 Units  2,000 Units Intravenous Q1H PRN   • heparin (porcine) injection 4,000 Units  4,000 Units Intravenous Q1H PRN   • hydrochlorothiazide (HYDRODIURIL) tablet 25 mg  25 mg Oral Daily   • labetalol (NORMODYNE) injection 10 mg  10 mg Intravenous Q6H PRN   • LORazepam (ATIVAN) tablet 1 mg  1 mg Oral HS PRN   • oxybutynin (DITROPAN) tablet 5 mg  5 mg Oral TID   • polyethylene glycol (MIRALAX) packet 17 g  17 g Oral Daily PRN   • potassium chloride (K-DUR,KLOR-CON) CR tablet 40 mEq  40 mEq Oral BID   • warfarin (COUMADIN) tablet 7 5 mg  7 5 mg Oral Daily (warfarin)       VTE Pharmacologic Prophylaxis: {Pharmacologic VTE Prophylaxis:798995256}  VTE Mechanical Prophylaxis: {Mechanical VTE Prophylaxis:53755}    Portions of the record may have been created with voice recognition software  Occasional wrong word or "sound a like" substitutions may have occurred due to the inherent limitations of voice recognition software    Read the chart carefully and recognize, using context, where substitutions have occurred   ==  Dylon Avila 79  Internal Medicine Residency PGY-***

## 2022-10-31 NOTE — ASSESSMENT & PLAN NOTE
History of RCC on the right status post treatment cryotherapy about 6 years ago  Stable and continue monitoring  Patient follows up with Urology regularly    Plan:    follow-up with Urology outpatient

## 2022-10-31 NOTE — PLAN OF CARE
Problem: MOBILITY - ADULT  Goal: Maintain or return to baseline ADL function  Description: INTERVENTIONS:  -  Assess patient's ability to carry out ADLs; assess patient's baseline for ADL function and identify physical deficits which impact ability to perform ADLs (bathing, care of mouth/teeth, toileting, grooming, dressing, etc )  - Assess/evaluate cause of self-care deficits   - Assess range of motion  - Assess patient's mobility; develop plan if impaired  - Assess patient's need for assistive devices and provide as appropriate  - Encourage maximum independence but intervene and supervise when necessary  - Involve family in performance of ADLs  - Assess for home care needs following discharge   - Consider OT consult to assist with ADL evaluation and planning for discharge  - Provide patient education as appropriate  Outcome: Progressing  Goal: Maintains/Returns to pre admission functional level  Description: INTERVENTIONS:  - Perform BMAT or MOVE assessment daily    - Set and communicate daily mobility goal to care team and patient/family/caregiver  - Collaborate with rehabilitation services on mobility goals if consulted  - Perform Range of Motion  times a day  - Reposition patient every  hours    - Dangle patient  times a day  - Stand patient times a day  - Ambulate patient  times a day  - Out of bed to chair  times a day   - Out of bed for meals times a day  - Out of bed for toileting  - Record patient progress and toleration of activity level   Outcome: Progressing     Problem: Potential for Falls  Goal: Patient will remain free of falls  Description: INTERVENTIONS:  - Educate patient/family on patient safety including physical limitations  - Instruct patient to call for assistance with activity   - Consult OT/PT to assist with strengthening/mobility   - Keep Call bell within reach  - Keep bed low and locked with side rails adjusted as appropriate  - Keep care items and personal belongings within reach  - Initiate and maintain comfort rounds  - Make Fall Risk Sign visible to staff  - Offer Toileting every  Hours, in advance of need  - Initiate/Maintainalarm  - Obtain necessary fall risk management equipment:  - Apply yellow socks and bracelet for high fall risk patients  - Consider moving patient to room near nurses station  Outcome: Progressing

## 2022-10-31 NOTE — OCCUPATIONAL THERAPY NOTE
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)    Permanent atrial fibrillation (HCC)    Impaired fasting glucose    Elevated TSH    Elevated alkaline phosphatase level    Unsteady gait    SIRS (systemic inflammatory response syndrome) (HCC)    Incontinence    GILDARDO (obstructive sleep apnea)    Past Medical History  Past Medical History:   Diagnosis Date    A-fib ([address] )     Arthritis     Atrial fibrillation (HCC)     Cervical spondylosis with myelopathy     Gastric bypass status for obesity     History of transfusion     35 years ago    Hypertension     mass right kidney     Renal cell adenocarcinoma (HCC)     RIGHT    Sleep apnea      Past Surgical History  Past Surgical History:   Procedure Laterality Date    CHOLECYSTECTOMY      COLOSTOMY      CRYOABLATION Right     RT KIDNEY    CYSTORRHAPHY      Bladder  Last assessed 4/6/2016     GASTRIC BYPASS      HERNIA REPAIR      Last assessed 4/6/2016     HYSTERECTOMY      DE OPEN RX DISTAL RADIUS FX, EXTRA-ARTICULAR Right 4/22/2016    Procedure: OPEN REDUCTION INTERNAL FIXATION RIGHT DISTAL RADIUS;  Surgeon: Alyse Shabazz MD;  Location: [facility];  Service: Orthopedics    REVISION COLOSTOMY      THYROID LOBECTOMY Left 8/27/2019    Procedure: LOBECTOMY THYROID, left;  Surgeon: Royal Leydi MD;  Location: [facility];  Service: Surgical Oncology    TUBAL LIGATION      US GUIDED THYROID BIOPSY  2/27/2019    US GUIDED THYROID BIOPSY  5/29/2019         10/31/22 0838   OT Last Visit   OT Visit Date 10/31/22   Note Type   Note type Evaluation   Pain Assessment   Pain Assessment Tool 0-10   Pain Score No Pain   Restrictions/Precautions   Weight Bearing Precautions Per Order No   Other Precautions Chair Alarm;Multiple lines;Telemetry; Fall Risk;Hard of hearing  (SCD)   Home Living   Type of Home House  ([facility])   Home Layout One level;Performs ADLs on one level; Able to live on main level with bedroom/bathroom; Ramped entrance   Bathroom Shower/Tub Walk-in shower   Bathroom Toilet Raised   Bathroom Equipment Grab bars in shower;Grab bars around toilet;Commode; Shower chair   Bathroom Accessibility Accessible   Home Equipment Walker  (pt reports use of walker in the community, no AE inside the home)   Prior Function   Level of [place] Independent with ADLs; Needs assistance with IADLS; Independent with functional mobility  (RW use for community distances)   Lives With Daughter  (Pt reports that her daughter is "handicapped")   Receives Help From Family  (as needed)   IADLs Family/Friend/Other provides transportation; Family/Friend/Other provides meals; Independent with medication management   Falls in the last 6 months 1 to 4  (1)   Vocational Retired   Lifestyle   Autonomy Pta, pt was independent w/ADLs + fnxl mobility, requiring assistance w/IADLs  Use of RW in the community, no AE use in the home  -   Reciprocal Relationships Pt has social supports including daughter  Service to Others Pt is retired     Intrinsic Gratification To maintain independence   Subjective   Subjective "when I get up, that is when I have trouble"   ADL   Where Assessed Edge of bed   Eating Assistance 7  Independent   Grooming Assistance 5  Supervision/Setup   UB Bathing Assistance 4  Minimal Assistance   LB Bathing Assistance 3  Moderate Assistance   [address] 4  Minimal [address] 3  Moderate [address]  1  Total Assistance  ([place])   Functional Assistance 3  Moderate Assistance   Bed Mobility   Rolling L 5  Supervision   Additional items Increased time required;Verbal cues   Supine to Sit 5  Supervision   Additional items Increased time required;Verbal cues   Additional Comments Pt was found supine in bed and left in bedside chair w/chair alarm intact + call bell in reach  Transfers   Sit to Stand 3  Moderate assistance   Additional items Assist x 1; Increased time required;Verbal cues   Stand to Sit 3  Moderate assistance   Additional items Assist x 1; Increased time required;Verbal cues   Additional Comments Pt required use of the RW for support during transitions  Pt demonstrates F safety awareness and insight into condition  Functional Mobility   Functional Mobility 4  Minimal assistance   Additional Comments MIN A x1 w/RW for fnxl mobility in hallway  Pt able to ambulate household distance, noted w/LOB requiring min A + redirection of RW to maintain steady when ambulating  Pt required min vc for safety and hand placement on RW  Additional items Rolling walker   Balance   Static Sitting Fair   Dynamic Sitting Fair -   Static Standing Poor   Dynamic Standing Poor +   Ambulatory Poor +   Activity Tolerance   Activity Tolerance Patient limited by fatigue   Medical Staff Made Aware DPT Christine Marcus, SPT Amada Savage OT Leopold Bell   Nurse Made Aware RN aware   RUE Assessment   RUE Assessment WFL   LUE Assessment   LUE Assessment WFL   Hand Function   Gross Motor Coordination Functional   Fine Motor Coordination Functional   Psychosocial   Psychosocial (WDL) WDL   Cognition   Overall Cognitive Status WFL   Arousal/Participation Alert; Responsive; Cooperative   Attention Attends with cues to redirect   Orientation Level Oriented X4   Memory Decreased recall of precautions   Following Commands Follows one step commands with increased time or repetition   Comments Pt is able to follow conversation and with redirection does attend to min vc for safety and hand placement w/RW during transitions + fnxl mobility  Pt reports that she feels unsafe and unable to care for self if she were to return home at this moment in time - pt reports needing help in the home  Pt demonstrates F safety awareness and insight into condition  Assessment   Limitation Decreased ADL status; Decreased UE ROM;Decreased UE strength;Decreased Safe judgement during ADL;Decreased endurance;Decreased self-care trans;Decreased high-level ADLs   Prognosis Fair   Assessment Pt is a 76-year-old female admitted to [facility] on 10/30/22 due to 2-3 weeks of shortness of breath, unsteady gait, and incontinence  Pt recently finished course of Augmentin to 3 weeks ago for UTI, however, reports urinary incontinence has been much more pronounced  Chest x-ray + CTA pending  OT eval orders active  Pta, pt was independent w/ADLs + fnxl mobility, requiring assistance w/IADLs  Pt is retired  Pt does not drive, reports her local son is able to drive her if needed  AE includes RW - pt reports no use of AE in the home, but use of RW for community distances  Pt lives in a ranch-level home w/daughter and reports the home is handicap accessible  Bathroom set-up includes walk-in shower + raised toilet  DME includes shower chair, commode, gb in shower + near toilet  Presently, pt is Independent for eating, S for grooming, MIN A for UB bathing/dressing, MOD A for LB bathing/dressing, Total A for toileting assistance, MOD A for functional assistance, S for bed mobility, MOD A x1 for sit>stand +stand>sit (w/increased time, min vc + RW), and MIN Ax1 for functional mobility (w/increased time, min vc + RW)  Current limitations include decreased ADL status, decreased UE ROM/strength, decreased safe judgment during ADL, decreased endurance, decreased self-care trans, and decreased high-level ADLs  These limitations can impact safe engagement in areas of occupation including ADLs, IADLs, community and functional mobility, and health and home maintenance  The patient's raw score on the AM-PAC Daily Activity inpatient short form is 16, standardized score is 35 96, less than 39 4  Patients at this level are likely to benefit from discharge to post-acute rehabilitation services   Please refer to the recommendation of the Occupational Therapist for safe discharge planning  Pt reports that she is worried about return to home as she deems that she is unable to care for self if d/c home  Pt reports daughter has been able to be with pt in the home (daughter is also handicapped) however, pt's daughter will be returning to work next week and pt will be alone for most of the day  Pt demonstrates F safety awareness and insight into condition  Recommendation at this time is STR vs home health rehabilitation pending pt progress + adequate familial support upon d/c  Presently, recommendations are for pt to continue w/rehab for 2-3x a week for 10-14 days to meet goals  Goals   Patient Goals To feel steady when walking   LTG Time Frame 10-14   Plan   Treatment Interventions ADL retraining;Functional transfer training;UE strengthening/ROM; Endurance training;Patient/family training;Equipment evaluation/education; Energy conservation; Activityengagement   Goal Expiration Date 11/14/22   OT Frequency 2-3x/wk   Recommendation   OT Discharge Recommendation Post acute rehabilitation services  (vs home health rehabilitation pending pt progress + adequate familial support upon d/c)   AM-PAC Daily Activity Inpatient   Lower Body Dressing 2   Bathing 2   Toileting 2   Upper Body Dressing 3   Grooming 3   Eating 4   Daily Activity Raw Score 16   Daily Activity Standardized Score (Calc for Raw Score >=11) 35 96   AM-PAC Applied Cognition Inpatient   Following a Speech/Presentation 4   Understanding Ordinary Conversation 4   Taking Medications 4   Remembering Where Things Are Placed or Put Away 4   Remembering List of 4-5 Errands 3   Taking Care of Complicated Tasks 3   Applied Cognition Raw Score 22   Applied Cognition Standardized Score 47 83   Modified [place] Scale   Modified [place] Scale 4   End of Consult   Education Provided Yes   Patient Position at End of Consult Bedside chair;Bed/Chair alarm activated; All needs within reach   Nurse Communication Nurse aware of consult     Goals   Pt will complete grooming tasks independently using appropriate DME as needed   Pt will complete UB ADLs  independently using appropriate DME as needed   Pt will complete LB ADLs  independently using appropriate DME as needed   Pt will complete toileting activity with independently using appropriate DME as needed   Pt will increase activity tolerance to 30 minutes using appropriate DME as needed   Pt will complete transfers with Mod I using appropriate DME as needed   Pt will complete fx mobility task with Mod I using appropriate DME as needed   Pt will complete +1MMT for UB ROM/strengthening using donte Jiménez, OTS

## 2022-10-31 NOTE — H&P
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)    Permanent atrial fibrillation (HCC)    Impaired fasting glucose    Elevated TSH    Elevated alkaline phosphatase level    Unsteady gait    Incontinence    GILDARDO (obstructive sleep apnea)      SIRS (systemic inflammatory response syndrome) (HCC)  Assessment & Plan  Met sirs criteria with fever 100 4 and denies any tachycardia 103  Recently treated for UTI with Augmentin and finished 5 day course  COVID/RSV/flu negative  Patient does not report any symptoms of upper respiratory infection besides for shortness of breath    Plan:  Blood cultures times x2  Will hold off on antibiotics at this time given patient's clinical appearance and recent course of antibiotics  Low threshold to start antibiotic therapy    GILDARDO (obstructive sleep apnea)  Assessment & Plan  History of GILDARDO compliant on CPAP    Plan: Will continue CPAP and patient    Incontinence  Assessment & Plan  Recently treated for UTI with Augmentin 5 day course 2-3 weeks ago  Patient has history of RCC on the right treated cryotherapy about 6 years,  Patient reported worsening incontinence for the past 2-3 weeks with the dysuria or frequency; patient is incontinent of baseline  Patient reports 2 days history of fevers and chills    Plan:   Will repeat UA and consider antibiotic therapy based on results  Continue home oxybutynin    Unsteady gait  Assessment & Plan  Patient reports 1-2 weeks of unsteady gait secondary shortness of breath in the setting of advanced osteoarthritis of the knee  Patient does not report or appears to have any neurological focal deficits    Plan:  Given patient's history of Afib and history of anticoagulation unsteady gait may be secondary to CVA and will order CTH  Symptoms likely appears secondary to shortness of breath in the setting of advanced OA      Elevated alkaline phosphatase level  Assessment & Plan  Isolated elevation of alk-phos but normal T bili  Likely secondary to patient's bone disease osteoporosis    Plan:  Continue outpatient Prolia        Elevated TSH  Assessment & Plan  Previous TSH elevated with normal T4    Plan: Will repeat TSH and consider therapy if warranted    Impaired fasting glucose  Assessment & Plan  Recent A1c of 6 2  Blood sugars in goal for inpatient  Patient is not on home diabetes medication    Plan: For history moderate blood sugars and follow up outpatient    Permanent atrial fibrillation [facility])  Assessment & Plan  History of permanent AFib on warfarin 5mg daily with subtherapeutic INR 1 43  Not on Eliquis because of price  Rate controlled with diltiazem    Plan: Will increase warfarin to 7 5 and bridge with heparin drip  Will continue diltiazem 360mg daily  Will consider oral anticoagulant at discharge    Malignant tumor of right kidney parenchyma [facility])  Assessment & Plan  History of RCC on the right status post treatment cryotherapy about 6 years ago  Stable and continue monitoring  Patient follows up with Urology regularly    Plan:    follow-up with Urology outpatient    * Dyspnea on exertion  Assessment & Plan  Patient reports 1-2 weeks of shortness of breath in the setting of obesity and permanent AFib  Low clinical suspicion for pneumonia at this time  Perc score of 3   Wells score around 9    Plan:  Follow-up with results of chest x-ray  Will order CTA to rule out PE  Follow-up with genaro          VTE Pharmacologic Prophylaxis: Heparin  VTE Mechanical Prophylaxis: sequential compression device    CHIEF COMPLAINT     Chief Complaint   Patient presents with   • Shortness of Breath     Pt c/o SOB       HISTORY OF PRESENT ILLNESS     The patient is a 26-year-old female past medical of RCC, AFib on warfarin diltiazem, osteoporosis, osteoarthritis, and GILDARDO on CPAP  Presenting for 2-3 weeks of shortness of breath, unsteady gait, and incontinence  He recently finished a course of Augmentin to 3 weeks ago for UTI  She has issues baseline urinary incontinence however recently has been much more pronounced and has been wearing pads  The patient takes oxybutynin for incontinence  She also has history of RCC treated with cryotherapy 6 years ago  Patient reports with 2 day history of fevers and chills but no cough or rhinorrhea  Patient denies any sick contacts or recent travel history  Patient also complained of unsteady gait in the setting of advanced osteoarthritis of the knees bilaterally  She states for the past 1-2 weeks she feels short of breath causing difficulty with ambulation  She states at baseline she has difficulty ambulating because of osteoarthritis has recently been feeling more unsteady    Patient also complaining of shortness of breath with no associated chest pain or chest tightness  She denies any cough or rhinorrhea at this time however reports fevers and chills for the past 2 days  She also states she has also felt palpitations and uncontrolled tachycardia which she attributes to her permanent Afib  Patient reports no history of smoking  Patient reports full code  He states having taken all her medications this morning  Patient was grossly sedentary lifestyle  Patient is not up-to-date with cancer screening    REVIEW OF SYSTEMS     Review of Systems   Constitutional: Positive for chills, fatigue and fever  Respiratory: Positive for shortness of breath  Negative for cough and chest tightness  Cardiovascular: Positive for palpitations   Negative for chest pain and leg swelling  Gastrointestinal: Negative for abdominal distention, abdominal pain, constipation, diarrhea and vomiting  Genitourinary: Negative for difficulty urinating and urgency  Incontinence   Neurological: Negative for dizziness, weakness and light-headedness  OBJECTIVE     Vitals:    10/30/22 1325 10/30/22 1500 10/30/22 1700 10/30/22 1830   BP:  140/69 152/80 142/65   BP Location:    Left arm   Pulse:  88 98 102   Resp:   20 20   Temp:       TempSrc:       SpO2: 94% 95% 94% 95%      Temperature:   Temp (24hrs), Av 4 °F (38 °C), Min:100 4 °F (38 °C), Max:100 4 °F (38 °C)    Temperature: 100 4 °F (38 °C)  Intake & Output:  I/O     None        Weights: There is no height or weight on file to calculate BMI  Weight (last 2 days)     None        Physical Exam  Constitutional:       General: She is not in acute distress  Appearance: Normal appearance  She is obese  She is not ill-appearing or toxic-appearing  HENT:      Head: Normocephalic and atraumatic  Nose: No rhinorrhea  Cardiovascular:      Rate and Rhythm: Tachycardia present  Rhythm irregular  Heart sounds: No murmur heard  No friction rub  No gallop  Pulmonary:      Effort: Pulmonary effort is normal  No respiratory distress  Breath sounds: No wheezing or rales  Abdominal:      General: Abdomen is flat  There is no distension  Tenderness: There is no abdominal tenderness  There is no guarding  Musculoskeletal:      Right lower leg: No edema  Left lower leg: No edema  Comments:  decreased flexion of hips bilaterally at baseline   Neurological:      Mental Status: She is alert and oriented to person, place, and time     Psychiatric:         Mood and Affect: Mood normal          Behavior: Behavior normal        PAST MEDICAL HISTORY     Past Medical History:   Diagnosis Date   • A-fib [facility])    • Arthritis    • Atrial fibrillation (HCC)    • Cervical spondylosis with myelopathy • Gastric bypass status for obesity    • History of transfusion     35 years ago   • Hypertension    • mass right kidney    • Renal cell adenocarcinoma (HCC)     RIGHT   • Sleep apnea      PAST SURGICAL HISTORY     Past Surgical History:   Procedure Laterality Date   • CHOLECYSTECTOMY     • COLOSTOMY     • CRYOABLATION Right     RT KIDNEY   • CYSTORRHAPHY      Bladder  Last assessed 4/6/2016    • GASTRIC BYPASS     • HERNIA REPAIR      Last assessed 4/6/2016    • HYSTERECTOMY     • PA OPEN RX DISTAL RADIUS FX, EXTRA-ARTICULAR Right 4/22/2016    Procedure: OPEN REDUCTION INTERNAL FIXATION RIGHT DISTAL RADIUS;  Surgeon: Elton Jacob MD;  Location: [facility];  Service: Orthopedics   • REVISION COLOSTOMY     • THYROID LOBECTOMY Left 8/27/2019    Procedure: LOBECTOMY THYROID, left;  Surgeon: Bautista Jiang MD;  Location: [facility];  Service: Surgical Oncology   • TUBAL LIGATION     • US GUIDED THYROID BIOPSY  2/27/2019   • US GUIDED THYROID BIOPSY  5/29/2019     SOCIAL & FAMILY HISTORY     Social History     Substance and Sexual Activity   Alcohol Use Not Currently   • Alcohol/week: 0 0 standard drinks    Comment: 0     Substance and Sexual Activity   Alcohol Use Not Currently   • Alcohol/week: 0 0 standard drinks    Comment: 0        Substance and Sexual Activity   Drug Use No     Social History     Tobacco Use   Smoking Status Never Smoker   Smokeless Tobacco Never Used     Family History   Problem Relation Age of Onset   • Heart attack Mother    • Lung cancer Father 76   • Heart disease Father    • Stroke Sister    • Lung cancer Sister 64   • Prostate cancer Brother 61     LABORATORY DATA     Labs: I have personally reviewed pertinent reports      Results from last 7 days   Lab Units 10/30/22  1347   WBC Thousand/uL 7 47   HEMOGLOBIN g/dL 11 6   HEMATOCRIT % 36 6   PLATELETS Thousands/uL 270   NEUTROS PCT % 79*   MONOS PCT % 11      Results from last 7 days   Lab Units 10/30/22  1347   POTASSIUM mmol/L 3 3* CHLORIDE mmol/L 102   CO2 mmol/L 30   BUN mg/dL 20   CREATININE mg/dL 0 91   CALCIUM mg/dL 8 2*   ALK PHOS U/L 118*   ALT U/L 21   AST U/L 22              Results from last 7 days   Lab Units 10/30/22  1347   INR  1 43*   PTT seconds 30             Micro:  Lab Results   Component Value Date    URINECX >100,000 cfu/ml Klebsiella pneumoniae (A) 10/07/2022    URINECX >100,000 cfu/ml Escherichia coli 04/06/2016    URINECX 80,000-89,000 cfu/ml Mixed Contaminants X3 04/06/2016     IMAGING & DIAGNOSTIC TESTS     Imaging: I have personally reviewed pertinent reports  No results found  EKG, Pathology, and Other Studies: I have personally reviewed pertinent reports  ALLERGIES   No Known Allergies  MEDICATIONS PRIOR TO ARRIVAL     Prior to Admission medications    Medication Sig Start Date End Date Taking? Authorizing Provider   acetaminophen (TYLENOL) 325 mg tablet Take 975 mg by mouth every 6 (six) hours as needed    Historical Provider, MD   amoxicillin-clavulanate (AUGMENTIN) 875-125 mg per tablet Take 1 tablet by mouth 2 (two) times a day 6/22/22   Historical Provider, MD   diltiazem (CARDIZEM CD) 360 MG 24 hr capsule Take 1 capsule (360 mg total) by mouth daily 5/3/22   Yelena Harden MD   ergocalciferol (VITAMIN D2) 50,000 units Take 50,000 Units by mouth once a week Takes D3     Historical Provider, MD   FLUoxetine (PROzac) 20 mg capsule Take 20 mg by mouth 2 (two) times a day      Historical Provider, MD   gabapentin (NEURONTIN) 300 mg capsule Take 600 mg by mouth daily at bedtime  2/19/20   Historical Provider, MD   hydrochlorothiazide (HYDRODIURIL) 25 mg tablet Take 25 mg by mouth daily    Historical Provider, MD   LORazepam (ATIVAN) 1 mg tablet Take 1 mg by mouth daily at bedtime as needed for anxiety      Historical Provider, MD   oxybutynin (DITROPAN) 5 mg tablet  5/28/22   Historical Provider, MD   warfarin (COUMADIN) 5 mg tablet Take 5 mg by mouth daily     Historical Provider, MD     MEDICATIONS ADMINISTERED IN LAST 24 HOURS     Medication Administration - last 24 hours from 10/29/2022 2026 to 10/30/2022 2026       Date/Time Order Dose Route Action Action by     10/30/2022 1351 sodium chloride 0 9 % bolus 500 mL 500 mL Intravenous New Bag Ashlyn Castro     10/30/2022 1612 potassium chloride (K-DUR,KLOR-CON) CR tablet 40 mEq 40 mEq Oral Given Tammy Yuan RN     10/30/2022 1924 heparin (ACS LOW)   Intravenous Not Given Jatinder Vera RN     10/30/2022 1954 heparin (porcine) injection 4,000 Units 4,000 Units Intravenous Given Genita Jay     10/30/2022 1953 heparin (porcine) 25,000 units in 0 45% NaCl 250 mL infusion (premix) 11 1 Units/kg/hr Intravenous New Bag Lorene Marques        CURRENT MEDICATIONS     Current Facility-Administered Medications   Medication Dose Route Frequency Provider Last Rate   • acetaminophen  975 mg Oral Q6H PRN Alberto Prudent, DO     • denosumab  60 mg Subcutaneous Q6 Months Flaca Rowe MD     • [START ON 10/31/2022] diltiazem  360 mg Oral Daily Alberto Prudent, DO     • FLUoxetine  20 mg Oral BID Alberto Prudent, DO     • gabapentin  600 mg Oral HS Alberto Prudent, DO     • heparin (porcine)  3-20 Units/kg/hr (Order-Specific) Intravenous Titrated Alberto Prudent, DO 11 1 Units/kg/hr (10/30/22 1953)   • heparin (porcine)  2,000 Units Intravenous Q1H PRN Alberto Prudent, DO     • heparin (porcine)  4,000 Units Intravenous Q1H PRN Alberto Prudent, DO     • [START ON 10/31/2022] hydrochlorothiazide  25 mg Oral Daily Alberto Prudent, DO     • labetalol  10 mg Intravenous Q6H PRN Alberto Prudent, DO     • LORazepam  1 mg Oral HS PRN Alberto Prudent, DO     • oxybutynin  5 mg Oral TID Alberto Prudent, DO     • polyethylene glycol  17 g Oral Daily PRN Alberto Prudent, DO     • warfarin  7 5 mg Oral Daily (warfarin) Radha Garcia,        heparin (porcine), 3-20 Units/kg/hr (Order-Specific), Last Rate: 11 1 Units/kg/hr (10/30/22 1953)      acetaminophen, 975 mg, Q6H PRN  heparin (porcine), 2,000 Units, Q1H PRN  heparin (porcine), 4,000 Units, Q1H PRN  labetalol, 10 mg, Q6H PRN  LORazepam, 1 mg, HS PRN  polyethylene glycol, 17 g, Daily PRN        Admission Time  I spent 45 minutes admitting the patient  This involved direct patient contact where I performed a full history and physical, reviewing previous records, and reviewing laboratory and other diagnostic studies  Portions of the record may have been created with voice recognition software  Occasional wrong word or "sound a like" substitutions may have occurred due to the inherent limitations of voice recognition software    Read the chart carefully and recognize, using context, where substitutions have occurred     ==  [address]  Internal Medicine Residency PGY-1

## 2022-10-31 NOTE — PROGRESS NOTES
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)    GILDARDO (obstructive sleep apnea)      * SIRS (systemic inflammatory response syndrome) ([facility])  Assessment & Plan  · Met SIRS criteria with fever 100 4 and denies any tachycardia 103  · Recently treated for UTI with Augmentin and finished 5 day course  · COVID/RSV/flu negative  · No signs or symptoms of upper respiratory infection besides shortness of breath     Plan:  · Order UA and consider antibiotic therapy based on results  · UA significant for turbid urine, pyuria, bacteriuria  · Recently completed 5 day course of Augmentin to treat Klebsiella UTI   Suspect patient likely has persistent UTI though clinical presentation complicated by chronic urinary incontinence poorly managed by oxybutynin  · Will empirically treat with cefazolin 2g for three days;  adjust antibiotic to pending urine culture/sensitivities  · Await blood cultures times x2      Incontinence  Assessment & Plan  Recently treated for UTI with Augmentin 5 day course 2-3 weeks ago  Patient has history of RCC on the right treated cryotherapy about 6 years,  Patient reported worsening incontinence for the past 2-3 weeks with the dysuria or frequency; patient is incontinent of baseline  Patient reports 2 days history of fevers and chills    Plan:  · Order UA and consider antibiotic therapy based on results  · UA significant for turbid urine, pyuria, bacteriuria  · Recently completed 5 day course of Augmentin to treat Klebsiella UTI  Suspect patient likely has persistent UTI though clinical presentation complicated by chronic urinary incontinence poorly managed by oxybutynin  · Cefazolin 2g for three days;  adjust antibiotic to pending urine culture/sensitivities  · Continue home oxybutynin  · Urology consulted for persistent incontinent, appreciate recs    Dyspnea on exertion  Assessment & Plan  Patient reports 1-2 weeks of shortness of breath in the setting of obesity and permanent AFib  Low clinical suspicion for pneumonia at this time  PE also possible given clinical presentation  PERC score 3  Wells score for PE 9    Plan:  · High sensitivity troponins  · 10/11/9 at 0h/2h/4h  · Obtain CXR  · Negative for active cardiopulmonary disease  · Order CTA to rule out PE  · No emboli in the main, right, or left pulmonary arteries  · While there are no definite segmental or subsegmental emboli, they can be missed due to suboptimal opacification of the pulmonary arteries  · Pulmonary artery enlargement which can be seen with pulmonary hypertension  · Obtain TTE to assess for pulmonary HTN          Permanent atrial fibrillation [facility])  Assessment & Plan  History of permanent AFib on warfarin 5mg daily with subtherapeutic INR 1 43  Not on DOAC because of price  Rate controlled with diltiazem at home    Plan:  · Warfarin increased to 7 5 and bridge with heparin drip on admission  · Continue Warfarin 7 5, repeat INR tomorrow AM; goal INR 2-2 5  · Continue diltiazem 360mg daily  · Consider oral anticoagulant at discharge    Unsteady gait  Assessment & Plan  Patient reports 1-2 weeks of unsteady gait secondary shortness of breath in the setting of advanced osteoarthritis of the knee   No focal neurological focal deficits on exam      Plan:  · Given patient's history of Afib and history of anticoagulation, unsteady gait may be secondary to CVA  · Order CT head for further evaluation   · Negative for acute intracranial pathology      Malignant tumor of right kidney parenchyma [facility])  Assessment & Plan  History of RCC on the right status post treatment cryotherapy about 6 years ago  Stable and continue monitoring  Patient follows up with Urology regularly    Plan:    follow-up with Urology outpatient    GILDARDO (obstructive sleep apnea)  Assessment & Plan  History of GILDARDO compliant on CPAP    Plan: Will continue CPAP inpatient    Elevated TSH-resolved as of 10/31/2022  Assessment & Plan  Previous TSH elevated with normal T4    Plan: Will repeat TSH and consider therapy if warranted      Disposition:  Requiring inpatient admission for further evaluation urinary incontinence as well as dyspnea    SUBJECTIVE     Patient seen and examined  No acute events overnight  Without any acute concerns  She denies any shortness of breath but has not been ambulating much  Able to ambulate to the bathroom yesterday evening but required assistance with lifting up self from toilet  Ambulated in the halls this a m  without difficulty or SOB  Was on 2L O2 in the ED but is currently on RA, satting at 95%  Admits to worsening of chronic urinary incontinence poorly managed on oxybutynin  Denies dysuria  Denies chest pain, cough, shortness of breath, fevers/chills  OBJECTIVE     Vitals:    10/31/22 0719 10/31/22 0741 10/31/22 1230 10/31/22 1532   BP: 130/76  130/76 130/76   Pulse: 82  82 104   Resp: 18      Temp: 98 °F (36 7 °C)   98 9 °F (37 2 °C)   TempSrc: Oral      SpO2: (!) 88%   91%   Weight:  (!) 137 kg (302 lb 0 5 oz) (!) 137 kg (302 lb)    Height:  5' 3" (1 6 m) 5' 3" (1 6 m)       Temperature:   Temp (24hrs), Av 5 °F (36 9 °C), Min:98 °F (36 7 °C), Max:99 3 °F (37 4 °C)    Temperature: 98 9 °F (37 2 °C)  Intake & Output:  I/O       10/29 0701  10/30 0700 10/30 0701  10/31 0700 10/31 0701  11/01 0700    P  O    1180    I V  (mL/kg)  22     Total Intake(mL/kg)  22 1180 (8 6)    Net  +22 +1180               Weights:        Body mass index is 53 5 kg/m²  Weight (last 2 days)     Date/Time Weight    10/31/22 1230 137 (302)    10/31/22 0741 137 (302 03)        Physical Exam  Constitutional:       General: She is not in acute distress  Appearance: Normal appearance  She is obese  She is not toxic-appearing  HENT:      Head: Normocephalic and atraumatic  Nose: No congestion or rhinorrhea  Cardiovascular:      Rate and Rhythm: Normal rate  Rhythm irregular  Pulses: Normal pulses  Heart sounds: Normal heart sounds  No murmur heard  No friction rub  No gallop  Pulmonary:      Effort: Pulmonary effort is normal       Breath sounds: Normal breath sounds  No stridor  No wheezing, rhonchi or rales  Abdominal:      General: Abdomen is flat  Bowel sounds are normal  There is no distension  Palpations: Abdomen is soft  Tenderness: There is no abdominal tenderness  There is no guarding  Musculoskeletal:         General: No tenderness (denies tenderness to BL LE)  Right lower leg: No edema  Left lower leg: No edema  Skin:     General: Skin is warm and dry  Neurological:      General: No focal deficit present  Mental Status: She is alert  Psychiatric:         Mood and Affect: Mood normal          Behavior: Behavior normal        LABORATORY DATA     Labs: I have personally reviewed pertinent reports    Results from last 7 days   Lab Units 10/31/22  0728 10/30/22  1347   WBC Thousand/uL 5 71 7 47   HEMOGLOBIN g/dL 11 2* 11 6   HEMATOCRIT % 36 2 36 6   PLATELETS Thousands/uL 263 270   NEUTROS PCT % 66 79*   MONOS PCT % 15* 11      Results from last 7 days   Lab Units 10/31/22  0728 10/30/22  1347   POTASSIUM mmol/L 3 1* 3 3*   CHLORIDE mmol/L 106 102   CO2 mmol/L 28 30   BUN mg/dL 18 20   CREATININE mg/dL 0 84 0 91   CALCIUM mg/dL 7 9* 8 2*   ALK PHOS U/L 106 118*   ALT U/L 21 21   AST U/L 22 22     Results from last 7 days   Lab Units 10/31/22  0728   MAGNESIUM mg/dL 2 2     Results from last 7 days   Lab Units 10/31/22  0728   PHOSPHORUS mg/dL 2 7      Results from last 7 days   Lab Units 10/31/22  1435 10/31/22  0728 10/31/22  0537 10/31/22  0008 10/30/22  1347   INR   --   --  1 33*  --  1 43*   PTT seconds 44* 49*  --  43* 30               IMAGING & DIAGNOSTIC TESTING     Radiology Results: I have personally reviewed pertinent reports  XR chest 2 views    Result Date: 10/31/2022  Impression: No radiographic evidence of acute intrathoracic process  Workstation performed: [id number]     CT head wo contrast    Result Date: 10/30/2022  Impression: No acute intracranial abnormality  Workstation performed: [id number]     CTA chest pe study    Result Date: 10/30/2022  Impression: No emboli in the main, right, or left pulmonary arteries  While there are no definite segmental or subsegmental emboli, they can be missed due to suboptimal opacification of the pulmonary arteries  No acute pulmonary disease  Pulmonary artery enlargement which can be seen with pulmonary hypertension  Workstation performed: [id number]     Other Diagnostic Testing: I have personally reviewed pertinent reports      ACTIVE MEDICATIONS     Current Facility-Administered Medications   Medication Dose Route Frequency   • acetaminophen (TYLENOL) tablet 975 mg  975 mg Oral Q6H PRN   • ceFAZolin (ANCEF) IVPB (premix in dextrose) 2,000 mg 50 mL  2,000 mg Intravenous Q8H   • diltiazem (CARDIZEM CD) 24 hr capsule 360 mg  360 mg Oral Daily   • FLUoxetine (PROzac) capsule 20 mg  20 mg Oral BID   • gabapentin (NEURONTIN) capsule 600 mg  600 mg Oral HS   • heparin (porcine) 25,000 units in 0 45% NaCl 250 mL infusion (premix)  3-20 Units/kg/hr (Order-Specific) Intravenous Titrated   • heparin (porcine) injection 2,000 Units  2,000 Units Intravenous Q1H PRN   • heparin (porcine) injection 4,000 Units  4,000 Units Intravenous Q1H PRN   • hydrochlorothiazide (HYDRODIURIL) tablet 25 mg  25 mg Oral Daily   • labetalol (NORMODYNE) injection 10 mg  10 mg Intravenous Q6H PRN   • LORazepam (ATIVAN) tablet 1 mg  1 mg Oral HS PRN   • oxybutynin (DITROPAN) tablet 5 mg  5 mg Oral TID   • polyethylene glycol (MIRALAX) packet 17 g  17 g Oral Daily PRN   • potassium chloride (K-DUR,KLOR-CON) CR tablet 40 mEq  40 mEq Oral BID   • warfarin (COUMADIN) tablet 7 5 mg  7 5 mg Oral Daily (warfarin)       VTE Pharmacologic Prophylaxis: Heparin  VTE Mechanical Prophylaxis: sequential compression device    Portions of the record may have been created with voice recognition software  Occasional wrong word or "sound a like" substitutions may have occurred due to the inherent limitations of voice recognition software    Read the chart carefully and recognize, using context, where substitutions have occurred   ==  Munir Beebe, [facility]  Internal Medicine Residency PGY-1

## 2022-10-31 NOTE — PLAN OF CARE
Problem: MOBILITY - ADULT  Goal: Maintain or return to baseline ADL function  Description: INTERVENTIONS:  -  Assess patient's ability to carry out ADLs; assess patient's baseline for ADL function and identify physical deficits which impact ability to perform ADLs (bathing, care of mouth/teeth, toileting, grooming, dressing, etc )  - Assess/evaluate cause of self-care deficits   - Assess range of motion  - Assess patient's mobility; develop plan if impaired  - Assess patient's need for assistive devices and provide as appropriate  - Encourage maximum independence but intervene and supervise when necessary  - Involve family in performance of ADLs  - Assess for home care needs following discharge   - Consider OT consult to assist with ADL evaluation and planning for discharge  - Provide patient education as appropriate  Outcome: Progressing     Problem: CARDIOVASCULAR - ADULT  Goal: Maintains optimal cardiac output and hemodynamic stability  Description: INTERVENTIONS:  - Monitor I/O, vital signs and rhythm  - Monitor for S/S and trends of decreased cardiac output  - Administer and titrate ordered vasoactive medications to optimize hemodynamic stability  - Assess quality of pulses, skin color and temperature  - Assess for signs of decreased coronary artery perfusion  - Instruct patient to report change in severity of symptoms  Outcome: Progressing  Goal: Absence of cardiac dysrhythmias or at baseline rhythm  Description: INTERVENTIONS:  - Continuous cardiac monitoring, vital signs, obtain 12 lead EKG if ordered  - Administer antiarrhythmic and heart rate control medications as ordered  - Monitor electrolytes and administer replacement therapy as ordered  Outcome: Progressing     Problem: RESPIRATORY - ADULT  Goal: Achieves optimal ventilation and oxygenation  Description: INTERVENTIONS:  - Assess for changes in respiratory status  - Assess for changes in mentation and behavior  - Position to facilitate oxygenation and minimize respiratory effort  - Oxygen administered by appropriate delivery if ordered  - Initiate smoking cessation education as indicated  - Encourage broncho-pulmonary hygiene including cough, deep breathe, Incentive Spirometry  - Assess the need for suctioning and aspirate as needed  - Assess and instruct to report SOB or any respiratory difficulty  - Respiratory Therapy support as indicated  Outcome: Progressing

## 2022-10-31 NOTE — CASE MANAGEMENT
Case Management Discharge Planning Note    Patient name Zoanne Number  Location [id number] MRN [id number]  : 1950 Date 10/31/2022       Current Admission Date: 10/30/2022  Current Admission Diagnosis:Dyspnea on exertion   Patient Active Problem List    Diagnosis Date Noted   • Unsteady gait 10/30/2022   • Dyspnea on exertion 10/30/2022   • SIRS (systemic inflammatory response syndrome) ([address] ) 10/30/2022   • Incontinence 10/30/2022   • GILDARDO (obstructive sleep apnea) 10/30/2022   • Vitamin D deficiency 2021   • Reactive hypoglycemia 2020   • Osteoporosis 2020   • Hyperparathyroidism ([address] ) 2020   • Hashimoto's thyroiditis 2020   • Impaired fasting glucose 2020   • Elevated TSH 2020   • Elevated alkaline phosphatase level 2020   • Early menopause occurring in patient age younger than 39 years 2020   • S/P bariatric surgery 2020   • Myofascial pain syndrome 10/21/2019   • Status post partial thyroidectomy 2019   • Cervical spondylosis without myelopathy 2019   • Arthropathy of cervical facet joint 2019   • Occipital neuralgia of right side 2019   • Chest wall tenderness 2019   • Chronic heart failure ([address] ) 2019   • Permanent atrial fibrillation ([facility] ) 2019   • Myalgia 2019   • Primary osteoarthritis of left knee 10/23/2018   • Primary osteoarthritis of right knee 10/23/2018   • Chronic pain of left knee 10/23/2018   • Malignant tumor of right kidney parenchyma ([facility] ) 2016   • Closed fracture of right distal radius and ulna 2016      LOS (days): 0  Geometric Mean LOS (GMLOS) (days):   Days to GMLOS:     OBJECTIVE:            Current admission status: Observation   Preferred Pharmacy:   [facility] [address] MARIAH REDDY [address] [place] [address] 32  [facility] 32  [address] [address]  Phone: [phone] Fax: [phone]    Primary Care Provider: Pierce Olivarez DO    Primary Insurance: MEDICARE  Secondary Insurance: AETNA    DISCHARGE DETAILS:                                          Other Referral/Resources/Interventions Provided:  Interventions: Short Term Rehab  Referral Comments: Patient recommended rehab by therapy  [place] STR referral placed via [address]

## 2022-10-31 NOTE — CONSULTS
Consult - Urology   Leni Pham 1950, 66 y o  female MRN: [id number]    Unit/Bed#: -01 Encounter: [id number]      Assessment & Plan  UROLOGY    1  Renal cell carcinoma  Treatment: cryoablation 2016  Current: no evidence of disease  Plan: f/u US 1 year    2  OAB with mixed stress, urge, and postural urinary incontinence  Treatment: oxybutynin 5mg bid for several years  Current: worsening incontinence and frequency x 1 month  Plan: Treat empirically for persistent UTI as below, if no change/improvement in baseline OAB/incontinence would suggest switching to alternate OAB medication, preferably B3 agonist in the future    3  Klebsiella UTI  Treatment: augmentin bid x 5 days (10/7/22- 10/12/22)  Current: suspect flare of above OAB/incontinence symptoms due to persistent UTI  Repeat UA today with large leukocytes, small blood, pyuria, nitrites now negative  Plan: Suspect persistent UTI- begin empiric antibiotics (ancef begun) and tailor to final culture/sensitivity sent today    Urology will sign off but remain available for any further inpatient needs  Please feel free to contact the provider currently covering the Urology [facility] role for this campus with questions or concerns  Subjective: admitted with shortness of breath general weakness and malaise, difficulty walking from toilet back to bed few days, improving  Known to urology for renal cell carcinoma and urinary incontinence, recent UTI, consulted for same  Reports nocturia incontinence general fatigue and malaise for one month getting worse  Felt OK for few days while on abx early October, never quite back to normal, but now worse just generally fatigued  Had chills at home over the weekend  No fevers  No hematuria or dysuria  Review of Systems   Constitutional: Positive for activity change, chills and fatigue  Negative for appetite change, fever and unexpected weight change  HENT: Negative      Respiratory: Positive for shortness of breath  Negative for cough  Cardiovascular: Positive for leg swelling  Negative for chest pain  Gastrointestinal: Negative for abdominal distention, abdominal pain, constipation, diarrhea, nausea and vomiting  Endocrine: Negative  Genitourinary: Positive for frequency and urgency  Negative for decreased urine volume, difficulty urinating, dysuria, flank pain, hematuria, pelvic pain and vaginal pain  Musculoskeletal: Positive for gait problem  Negative for back pain  Skin: Negative  Allergic/Immunologic: Negative  Hematological: Negative for adenopathy  Does not bruise/bleed easily  Objective:  Vitals: Blood pressure 130/76, pulse 104, temperature 98 9 °F (37 2 °C), resp  rate 18, height 5' 3" (1 6 m), weight (!) 137 kg (302 lb), SpO2 91 %  ,Body mass index is 53 5 kg/m²  Physical Exam  Vitals and nursing note reviewed  Constitutional:       General: She is not in acute distress  Appearance: Normal appearance  She is well-developed  She is obese  She is not ill-appearing or diaphoretic  HENT:      Head: Normocephalic and atraumatic  Cardiovascular:      Rate and Rhythm: Normal rate and regular rhythm  Pulmonary:      Effort: Pulmonary effort is normal       Breath sounds: Normal breath sounds  No wheezing or rhonchi  Abdominal:      General: Bowel sounds are normal       Palpations: Abdomen is soft  Tenderness: There is no abdominal tenderness  There is no right CVA tenderness or left CVA tenderness  Hernia: No hernia is present  Musculoskeletal:      Right lower leg: No edema  Left lower leg: No edema  Skin:     General: Skin is warm  Capillary Refill: Capillary refill takes less than 2 seconds  Coloration: Skin is not pale  Findings: No rash  Neurological:      Mental Status: She is alert and oriented to person, place, and time  Cranial Nerves: No cranial nerve deficit  Sensory: No sensory deficit     Psychiatric: Speech: Speech normal          Behavior: Behavior normal          Labs:  Recent Labs     10/30/22  1347 10/31/22  0728   WBC 7 47 5 71     Recent Labs     10/30/22  1347 10/31/22  0728   HGB 11 6 11 2*       Recent Labs     10/30/22  1347 10/31/22  0728   CREATININE 0 91 0 84       MICRO  Urine Culture >100,000 cfu/ml Klebsiella pneumoniae Abnormal     This organism has been edited  The previous result was Gram Negative Marc Enteric Like on 10/8/2022 at 1643 EDT  Susceptibility     Klebsiella pneumoniae     CHERRY     Amoxicillin + Clavulanate <=8/4 ug/ml Susceptible     Ampicillin ($$) >16 00 ug/ml Resistant     Ampicillin + Sulbactam ($) 8/4 ug/ml Susceptible     Aztreonam ($$$)  <=4 ug/ml Susceptible     Cefazolin ($) <=2 00 ug/ml Susceptible     Ciprofloxacin ($)  <=0 25 ug/ml Susceptible     Gentamicin ($$) <=2 ug/ml Susceptible     Levofloxacin ($) <=0 50 ug/ml Susceptible     Nitrofurantoin 64 ug/ml Intermediate     Tetracycline <=4 ug/ml Susceptible     Tobramycin ($) <=2 ug/ml Susceptible     Trimethoprim + Sulfamethoxazole ($$$) <=0 5/9 5 u  Susceptible                 History:  Social History     Socioeconomic History   • Marital status:       Spouse name: None   • Number of children: None   • Years of education: None   • Highest education level: None   Occupational History   • None   Tobacco Use   • Smoking status: Never Smoker   • Smokeless tobacco: Never Used   Vaping Use   • Vaping Use: Never used   Substance and Sexual Activity   • Alcohol use: Not Currently     Alcohol/week: 0 0 standard drinks     Comment: 0   • Drug use: No   • Sexual activity: None   Other Topics Concern   • None   Social History Narrative    ** Merged History Encounter **          Social Determinants of Health     Financial Resource Strain: Not on file   Food Insecurity: No Food Insecurity   • Worried About Running Out of Food in the Last Year: Never true   • Ran Out of Food in the Last Year: Never true Transportation Needs: No Transportation Needs   • Lack of Transportation (Medical): No   • Lack of Transportation (Non-Medical): No   Physical Activity: Not on file   Stress: Not on file   Social Connections: Not on file   Intimate Partner Violence: Not on file   Housing Stability: Low Risk    • Unable to Pay for Housing in the Last Year: No   • Number of Places Lived in the Last Year: 1   • Unstable Housing in the Last Year: No     Financial Resource Strain: Not on file   Food Insecurity: No Food Insecurity   • Worried About [facility] in the Last Year: Never true   • Ran Out of Food in the Last Year: Never true   Transportation Needs: No Transportation Needs   • Lack of Transportation (Medical): No   • Lack of Transportation (Non-Medical): No   Physical Activity: Not on file   Stress: Not on file   Social Connections: Not on file   Intimate Partner Violence: Not on file   Housing Stability: Low Risk    • Unable to Pay for Housing in the Last Year: No   • Number of Places Lived in the Last Year: 1   • Unstable Housing in the Last Year: No      Diagnosis Date   • A-fib [facility])    • Arthritis    • Atrial fibrillation ([facility] )    • Cervical spondylosis with myelopathy    • Gastric bypass status for obesity    • History of transfusion     35 years ago   • Hypertension    • mass right kidney    • Renal cell adenocarcinoma ([facility] )     RIGHT   • Sleep apnea      Past Surgical History:   Procedure Laterality Date   • CHOLECYSTECTOMY     • COLOSTOMY     • CRYOABLATION Right     RT KIDNEY   • CYSTORRHAPHY      Bladder   Last assessed 4/6/2016    • GASTRIC BYPASS     • HERNIA REPAIR      Last assessed 4/6/2016    • HYSTERECTOMY     • IN OPEN RX DISTAL RADIUS FX, EXTRA-ARTICULAR Right 4/22/2016    Procedure: OPEN REDUCTION INTERNAL FIXATION RIGHT DISTAL RADIUS;  Surgeon: Darrell Thacker MD;  Location: [facility];  Service: Orthopedics   • REVISION COLOSTOMY     • THYROID LOBECTOMY Left 8/27/2019    Procedure: Florecita Coburn THYROID, left;  Surgeon: Nat Bosch MD;  Location: [facility];  Service: Surgical Oncology   • TUBAL LIGATION     • US GUIDED THYROID BIOPSY  2/27/2019   • US GUIDED THYROID BIOPSY  5/29/2019     Family History   Problem Relation Age of Onset   • Heart attack Mother    • Lung cancer Father 76   • Heart disease Father    • Stroke Sister    • Lung cancer Sister 64   • Prostate cancer Brother 61       Shanel Counts  Date: 10/31/2022 Time: 5:00 PM

## 2022-10-31 NOTE — QUICK NOTE
Spoke to patient's daughter over the phone  Daughter was informed of the necessary updates in regards to patient's inpatient care and management  All questions and concerns were addressed appropriately       --------------------------------------------------------  Bibi Spence DO, PGY-1  Internal Medicine Resident  [address] [place] [address]

## 2022-10-31 NOTE — CASE MANAGEMENT
Case Management Assessment & Discharge Planning Note    Patient name Doyle Ohms  Location [id number] MRN [id number]  : 1950 Date 10/31/2022       Current Admission Date: 10/30/2022  Current Admission Diagnosis:Dyspnea on exertion   Patient Active Problem List    Diagnosis Date Noted   • Unsteady gait 10/30/2022   • Dyspnea on exertion 10/30/2022   • SIRS (systemic inflammatory response syndrome) ([address] ) 10/30/2022   • Incontinence 10/30/2022   • GILDARDO (obstructive sleep apnea) 10/30/2022   • Vitamin D deficiency 2021   • Reactive hypoglycemia 2020   • Osteoporosis 2020   • Hyperparathyroidism ([address] ) 2020   • Hashimoto's thyroiditis 2020   • Impaired fasting glucose 2020   • Elevated TSH 2020   • Elevated alkaline phosphatase level 2020   • Early menopause occurring in patient age younger than 39 years 2020   • S/P bariatric surgery 2020   • Myofascial pain syndrome 10/21/2019   • Status post partial thyroidectomy 2019   • Cervical spondylosis without myelopathy 2019   • Arthropathy of cervical facet joint 2019   • Occipital neuralgia of right side 2019   • Chest wall tenderness 2019   • Chronic heart failure ([address] ) 2019   • Permanent atrial fibrillation ([facility] ) 2019   • Myalgia 2019   • Primary osteoarthritis of left knee 10/23/2018   • Primary osteoarthritis of right knee 10/23/2018   • Chronic pain of left knee 10/23/2018   • Malignant tumor of right kidney parenchyma ([address] ) 2016   • Closed fracture of right distal radius and ulna 2016      LOS (days): 0  Geometric Mean LOS (GMLOS) (days):   Days to GMLOS:     OBJECTIVE:              Current admission status: Observation  Referral Reason: Other (Dispo planning)    Preferred Pharmacy:   [facility] [address] REDDY [address] [facility] [address]  [address]  [facility] [address]  Phone: [phone] Fax: [phone]    Primary Care Provider: Dinorah Barba DO    Primary Insurance: MEDICARE  Secondary Insurance: [employer]    ASSESSMENT:  Active Health Care Proxies    There are no active Health Care Proxies on file  Advance Directives  Does patient have a Health Care POA?: Yes  Does patient have Advance Directives?: Yes  Advance Directives: Power of  for health care  Primary Contact: Jesús Mitchell (son)              Patient Information  Admitted from[de-identified] Home  Mental Status: Alert  During Assessment patient was accompanied by: Daughter  Assessment information provided by[de-identified] Patient, Daughter  Primary Caregiver: Self  Support Systems: Children, Family members, Daughter, [address] [facility] of Residence: [address] [facility] do you live in?: [address] entry access options   Select all that apply : Ramp  Type of Current Residence: Ranch  In the last 12 months, was there a time when you were not able to pay the mortgage or rent on time?: No  In the last 12 months, how many places have you lived?: 1  In the last 12 months, was there a time when you did not have a steady place to sleep or slept in a shelter (including now)?: No  Homeless/housing insecurity resource given?: N/A  Living Arrangements: Lives w/ Daughter  Is patient a ?: No    Activities of Daily Living Prior to Admission  Functional Status: Independent  Completes ADLs independently?: Yes  Ambulates independently?: Yes  Does patient use assisted devices?: Yes  Assisted Devices (DME) used: Walker, Shower Chair, Other (Comment), Bedside Commode (lift chair, grab bars)  Does patient currently own DME?: Yes  What DME does the patient currently own?: Bedside Commode, Walker, Other (Comment), Shower Chair (lift chair, grab bars)  Does patient have a history of Outpatient Therapy (PT/OT)?: No  Does the patient have a history of Short-Term Rehab?: No  Does patient have a history of HHC?: Yes (Patient doesn't remember which agency was used in the past)  Does patient currently have [facility]?: No         Patient Information Continued  Income Source: Pension/USP  Does patient have prescription coverage?: Yes  Within the past 12 months, you worried that your food would run out before you got the money to buy more : Never true  Within the past 12 months, the food you bought just didn't last and you didn't have money to get more : Never true  Food insecurity resource given?: N/A  Does patient receive dialysis treatments?: No  Does patient have a history of substance abuse?: No  Does patient have a history of Mental Health Diagnosis?: No         Means of Transportation  Means of Transport to Appts[de-identified] Family transport  In the past 12 months, has lack of transportation kept you from medical appointments or from getting medications?: No  In the past 12 months, has lack of transportation kept you from meetings, work, or from getting things needed for daily living?: No  Was application for public transport provided?: N/A        DISCHARGE DETAILS:    Discharge planning discussed with[de-identified] Patient, daughter-in-law Julius Ryan  Freedom of Choice: Yes     CM contacted family/caregiver?: Yes  Were Treatment Team discharge recommendations reviewed with patient/caregiver?: No (therapy recs TBD)          Contacts  Patient Contacts: Nichole Burkett  Relationship to Patient[de-identified] Family  Contact Method: In Person  Reason/Outcome: Continuity of Care, Emergency Contact, Discharge Planning              Patient lives with her daughter Aditya Zelaya in a ranch home, ramp to enter  Patient is independent with Hailee [employer] assists with cooking/cleaning  Patient reports having RW that is too wide to use in the house, [facility], lift chair, handles for toilet, grab bars, and shower chair  Patient stated that she now is having difficulty standing from a seated positive  Patient stated her daughter, Aditya Zelaya, returns to the office on Tuesday so she will be home alone most of the day  Patient has no STR history   Patient has used [facility] in the past but does not remember which agency was used  Therapy recs are pending at this time, patient stated she is agreeable to STR if recommended  Patient requesting CM reach out to daughter-in-law Sridevi Anderson with accepting facilities so Sridevi Anderson can research facilities to make decision  If HHC is recommended, patient stated she would like a hosptial bed and other equipment to manage day to day tasks around the house  Patient not longer drives, family provide transportation  Patient is vaccinated for covid-19 x1 booster  CM reviewed d/c planning process including the following: identifying help at home, patient preference for d/c planning needs, Discharge Lounge, [facility] Meds to Bed program, availability of treatment team to discuss questions or concerns patient and/or family may have regarding understanding medications and recognizing signs and symptoms once discharged  CM also encouraged patient to follow up with all recommended appointments after discharge  Patient advised of importance for patient and family to participate in managing patient’s medical well being

## 2022-10-31 NOTE — TELEPHONE ENCOUNTER
Returned call to patient   She is currently admitted to [address] [place] [address] Drive  Advised patient that we will follow her admission and contact her with recommendations upon discharge  Patient thanked us for the fu call

## 2022-10-31 NOTE — PLAN OF CARE
Problem: PHYSICAL THERAPY ADULT  Goal: Performs mobility at highest level of function for planned discharge setting  See evaluation for individualized goals  Description: Treatment/Interventions: ADL retraining, Functional transfer training, LE strengthening/ROM, Elevations, Therapeutic exercise, Endurance training, Patient/family training, Equipment eval/education, Bed mobility, Gait training, Spoke to nursing  Equipment Recommended: Александр Puri       See flowsheet documentation for full assessment, interventions and recommendations  Note: Prognosis: Fair  Problem List: Decreased strength, Decreased endurance, Impaired balance, Decreased mobility, Obesity  Assessment: Pt is a 67 y/o female admitted to [facility] on 10/20/22 with a primary diagnosis of Dyspnea on exertion  Patient has a pmhx of A-fib, arthritis, cervical spondylosis with myelopathy, gastric status bypass for obesity, HTN, mass right kidney, and renal cell carcinoma all of which affect PT treatment  PT was consulted to evaluate pt's functional mobility and discharge needs  Upon evaluation, pt presents as supervision for bed mobility, ModAx1 for transfers, and Minx1 for ambulation  Pt presents with the following impairments: decreased strength, decreased endurance, impaired balance, decreased mobility, and obesity  The patient is considered a high complexity case d/t Ongoing medical management for primary dx, Increased reliance on more restrictive AD compared to baseline, Decreased activity tolerance compared to baseline, Fall risk, Increased assistance needed from caregiver at current time, Ongoing telemetry monitoring, Trending lab values, Diagnostic imaging pending, Continuous pulse oximetry monitoring   The patient's AM-PAC Basic Mobility Inpatient Short Form Raw Score is 16  A Raw score of less than or equal to 16 suggests the patient may benefit from discharge to rehab   Please also refer to the recommendation of the Physical Therapist for safe discharge  Patient would benefit from skilled PT services in order to address the aforementioned impairments  At the end of tx, the patient was left seated on bedside chair with chair alarm on, call bell and all other personal needs within functional reach  D/c recommendations rehab pending progress  Barriers to Discharge: Decreased caregiver support     PT Discharge Recommendation: Post acute rehabilitation services (Rehab pending progress)    See flowsheet documentation for full assessment

## 2022-10-31 NOTE — PHYSICAL THERAPY NOTE
Physical Therapy Evaluation  Patient Name: Vianey Gaytan    [id number] Date: 10/31/2022     Problem List  Principal Problem:    Dyspnea on exertion  Active Problems:    Malignant tumor of right kidney parenchyma (HCC)    Permanent atrial fibrillation (HCC)    Impaired fasting glucose    Elevated TSH    Elevated alkaline phosphatase level    Unsteady gait    SIRS (systemic inflammatory response syndrome) (HCC)    Incontinence    GILDARDO (obstructive sleep apnea)       Past Medical History  Past Medical History:   Diagnosis Date    A-fib ([address] )     Arthritis     Atrial fibrillation (HCC)     Cervical spondylosis with myelopathy     Gastric bypass status for obesity     History of transfusion     35 years ago    Hypertension     mass right kidney     Renal cell adenocarcinoma (HCC)     RIGHT    Sleep apnea         Past Surgical History  Past Surgical History:   Procedure Laterality Date    CHOLECYSTECTOMY      COLOSTOMY      CRYOABLATION Right     RT KIDNEY    CYSTORRHAPHY      Bladder  Last assessed 4/6/2016     GASTRIC BYPASS      HERNIA REPAIR      Last assessed 4/6/2016     HYSTERECTOMY      HI OPEN RX DISTAL RADIUS FX, EXTRA-ARTICULAR Right 4/22/2016    Procedure: OPEN REDUCTION INTERNAL FIXATION RIGHT DISTAL RADIUS;  Surgeon: Xiao Lemos MD;  [facility]: [facility];  Service: Orthopedics    REVISION COLOSTOMY      THYROID LOBECTOMY Left 8/27/2019    Procedure: LOBECTOMY THYROID, left;  Surgeon: Marta Alston MD;  Location: [facility];  Service: Surgical Oncology    TUBAL LIGATION      US GUIDED THYROID BIOPSY  2/27/2019    US GUIDED THYROID BIOPSY  5/29/2019         10/31/22 0839   PT Last Visit   PT Visit Date 10/31/22   Note Type   Note type Evaluation   Pain Assessment   Pain Assessment Tool 0-10   Pain Score No Pain   Restrictions/Precautions   Weight Bearing Precautions Per Order No   Other Precautions Chair Alarm; Bed Alarm;Telemetry;Multiple lines; Fall Risk   Home Living   Type of Home House  [facility] style home)   Home Layout One level;Performs ADLs on one level;Ramped entrance  (Pt reports home has handicapped setup)   Bathroom Shower/Tub Walk-in shower   Bathroom Toilet Raised   Bathroom Equipment Grab bars in shower;Commode;Grab bars around toilet; Shower chair   [address] Walker   Prior Function   Level of [place] Independent with functional mobility; Independent with ADLs   Lives With Daughter  (Pt reports living with daughter but daughter is handicapped and returning to work the following week)   [facility] From Family  (local children)   IADLs Independent with meal prep; Family/Friend/Other provides transportation   Falls in the last 6 months 1 to 4  (1 fall- pt states LOB upon getting up)   Vocational Retired   Comments Pt reports using walker around community but not around home   General   Family/Caregiver Present No   Cognition   Overall Cognitive Status WFL   Arousal/Participation Cooperative   Attention Attends with cues to redirect   Orientation Level Oriented X4   Memory Within functional limits   Following Commands Follows one step commands with increased time or repetition   Subjective   Subjective Pt pleasant to work with and agreeable to participate in PT   RLE Assessment   RLE Assessment X  (4-/5 overall functionally)   LLE Assessment   LLE Assessment X  (4/5 overall functionally)   Bed Mobility   Rolling R Unable to assess   Rolling L 5  Supervision   Additional items HOB elevated; Bedrails; Increased time required   Supine to Sit 5  Supervision   Additional items Increased time required;HOB elevated; Bedrails   Sit to Supine Unable to assess   Additional Comments Pt left on bedside chair with chair alarm on, call bell and all personal needs within functional reach   Transfers   Sit to Stand 3  Moderate assistance   Additional items Assist x 1; Increased time required;Verbal cues   Stand to Sit 3 Moderate assistance   Additional items Assist x 1; Increased time required;Verbal cues   Additional Comments at [facility]; vitals stable during and at end of ambulation   Ambulation/Elevation   Gait pattern Improper Weight shift;Narrow TACO; Forward Flexion;Decreased foot clearance; Inconsistent pam; Short stride; Step to;Excessively slow;Decreased heel strike;Decreased toe off   Gait Assistance 4  Minimal assist   Additional items Assist x 1   Assistive Device Rolling walker   Distance 30'x2   Balance   Static Sitting Fair   Dynamic Sitting Fair -   Static Standing Poor +   Dynamic Standing Poor   Ambulatory Poor   Endurance Deficit   Endurance Deficit Yes   Endurance Deficit Description Fatigue   Activity Tolerance   Activity Tolerance Patient limited by fatigue   Medical Staff Made Nandini Cason 57, OT; Janett Bhatt, DPT   Nurse Made Aware Yes, cleared to see   Assessment   Prognosis Fair   Problem List Decreased strength;Decreased endurance; Impaired balance;Decreased mobility;Obesity   Assessment Pt is a 67 y/o female admitted to [facility] on 10/20/22 with a primary diagnosis of Dyspnea on exertion  Patient has a pmhx of A-fib, arthritis, cervical spondylosis with myelopathy, gastric status bypass for obesity, HTN, mass right kidney, and renal cell carcinoma all of which affect PT treatment  PT was consulted to evaluate pt's functional mobility and discharge needs  Upon evaluation, pt presents as supervision for bed mobility, ModAx1 for transfers, and Minx1 for ambulation  Pt presents with the following impairments: decreased strength, decreased endurance, impaired balance, decreased mobility, and obesity   The patient is considered a high complexity case d/t Ongoing medical management for primary dx, Increased reliance on more restrictive AD compared to baseline, Decreased activity tolerance compared to baseline, Fall risk, Increased assistance needed from caregiver at current time, Ongoing telemetry monitoring, Trending lab values, Diagnostic imaging pending, Continuous pulse oximetry monitoring   The patient's AM-PAC Basic Mobility Inpatient Short Form Raw Score is 16  A Raw score of less than or equal to 16 suggests the patient may benefit from discharge to rehab  Please also refer to the recommendation of the Physical Therapist for safe discharge  Patient would benefit from skilled PT services in order to address the aforementioned impairments  At the end of tx, the patient was left seated on bedside chair with chair alarm on, call bell and all other personal needs within functional reach  D/c recommendations rehab pending progress  Barriers to Discharge Decreased caregiver support   Goals   Patient Goals To improve independence in mobility overall   STG Expiration Date 11/14/22   Short Term Goal #1 In 14 days, patient will be able to 1) independently perform bed mobility assistance in order to promote functional independence 2) perform transfers with S assistance in order to decrease caregiver burden 3) ambulate 100 ft with S assistance and LRAD in order to increase functional mobility 4) Pt will improve overall standing balance by 1 grade to improve safety in mobility   PT Treatment Day 0   Plan   Treatment/Interventions ADL retraining;Functional transfer training;LE strengthening/ROM; Elevations; Therapeutic exercise; Endurance training;Patient/family training;Equipment eval/education; Bed mobility;Gait training;Spoke to nursing   PT Frequency 3-5x/wk   Recommendation   PT Discharge Recommendation Post acute rehabilitation services  (Rehab pending progress)   Equipment Recommended [place] walker   AM-PAC Basic Mobility Inpatient   Turning in Bed Without Bedrails 3   Lying on Back to Sitting on Edge of Flat Bed 3   Moving Bed to Chair 3   Standing Up From Chair 2   Walk in Room 3   Climb 3-5 Stairs 2   Basic Mobility Inpatient Raw Score 16   Basic Mobility Standardized Score 38 32   Highest Level Of Mobility   JH-HLM Goal 5: Stand one or more mins   JH-HLM Achieved 7: Walk 25 feet or more   Modified [place] Scale   Modified [place] Scale 4   Brenda Franklin, SPT

## 2022-10-31 NOTE — PLAN OF CARE
Problem: OCCUPATIONAL THERAPY ADULT  Goal: Performs self-care activities at highest level of function for planned discharge setting  See evaluation for individualized goals  Description: Treatment Interventions: ADL retraining, Functional transfer training, UE strengthening/ROM, Endurance training, Patient/family training, Equipment evaluation/education, Energy conservation, Activityengagement     See flowsheet documentation for full assessment, interventions and recommendations  Note: Limitation: Decreased ADL status, Decreased UE ROM, Decreased UE strength, Decreased Safe judgement during ADL, Decreased endurance, Decreased self-care trans, Decreased high-level ADLs  Prognosis: Fair  Assessment: Pt is a 69-year-old female admitted to [facility] on 10/30/22 due to 2-3 weeks of shortness of breath, unsteady gait, and incontinence  Pt recently finished course of Augmentin to 3 weeks ago for UTI, however, reports urinary incontinence has been much more pronounced  Chest x-ray + CTA pending  OT eval orders active  Pta, pt was independent w/ADLs + fnxl mobility, requiring assistance w/IADLs  Pt is retired  Pt does not drive, reports her local son is able to drive her if needed  AE includes RW - pt reports no use of AE in the home, but use of RW for community distances  Pt lives in a ranch-level home w/daughter and reports the home is handicap accessible  Bathroom set-up includes walk-in shower + raised toilet  DME includes shower chair, commode, gb in shower + near toilet  Presently, pt is Independent for eating, S for grooming, MIN A for UB bathing/dressing, MOD A for LB bathing/dressing, Total A for toileting assistance, MOD A for functional assistance, S for bed mobility, MOD A x1 for sit>stand +stand>sit (w/increased time, min vc + RW), and MIN Ax1 for functional mobility (w/increased time, min vc + RW)   Current limitations include decreased ADL status, decreased UE ROM/strength, decreased safe judgment during ADL, decreased endurance, decreased self-care trans, and decreased high-level ADLs  These limitations can impact safe engagement in areas of occupation including ADLs, IADLs, community and functional mobility, and health and home maintenance  The patient's raw score on the AM-PAC Daily Activity inpatient short form is 16, standardized score is 35 96, less than 39 4  Patients at this level are likely to benefit from discharge to post-acute rehabilitation services  Please refer to the recommendation of the Occupational Therapist for safe discharge planning  Pt reports that she is worried about return to home as she deems that she is unable to care for self if d/c home  Pt reports daughter has been able to be with pt in the home (daughter is also handicapped) however, pt's daughter will be returning to work next week and pt will be alone for most of the day  Pt demonstrates F safety awareness and insight into condition  Recommendation at this time is STR vs home health rehabilitation pending pt progress + adequate familial support upon d/c  Presently, recommendations are for pt to continue w/rehab for 2-3x a week for 10-14 days to meet goals       OT Discharge Recommendation: Post acute rehabilitation services (vs home health rehabilitation pending pt progress + adequate familial support upon d/c)

## 2022-11-01 DIAGNOSIS — I48.21 PERMANENT ATRIAL FIBRILLATION (HCC): Primary | ICD-10-CM

## 2022-11-01 PROBLEM — E66.01 MORBID OBESITY WITH BMI OF 50.0-59.9, ADULT (HCC): Status: ACTIVE | Noted: 2022-11-01

## 2022-11-01 LAB
ANION GAP SERPL CALCULATED.3IONS-SCNC: 5 MMOL/L (ref 4–13)
APTT PPP: 75 SECONDS (ref 23–37)
APTT PPP: 80 SECONDS (ref 23–37)
BUN SERPL-MCNC: 19 MG/DL (ref 5–25)
CALCIUM SERPL-MCNC: 8.2 MG/DL (ref 8.3–10.1)
CHLORIDE SERPL-SCNC: 106 MMOL/L (ref 96–108)
CO2 SERPL-SCNC: 28 MMOL/L (ref 21–32)
CREAT SERPL-MCNC: 0.96 MG/DL (ref 0.6–1.3)
ERYTHROCYTE [DISTWIDTH] IN BLOOD BY AUTOMATED COUNT: 16.3 % (ref 11.6–15.1)
GFR SERPL CREATININE-BSD FRML MDRD: 59 ML/MIN/1.73SQ M
GLUCOSE P FAST SERPL-MCNC: 120 MG/DL (ref 65–99)
GLUCOSE SERPL-MCNC: 120 MG/DL (ref 65–140)
HCT VFR BLD AUTO: 36.2 % (ref 34.8–46.1)
HGB BLD-MCNC: 11.1 G/DL (ref 11.5–15.4)
INR PPP: 1.7 (ref 0.84–1.19)
MCH RBC QN AUTO: 25.4 PG (ref 26.8–34.3)
MCHC RBC AUTO-ENTMCNC: 30.7 G/DL (ref 31.4–37.4)
MCV RBC AUTO: 83 FL (ref 82–98)
PLATELET # BLD AUTO: 313 THOUSANDS/UL (ref 149–390)
PMV BLD AUTO: 10.1 FL (ref 8.9–12.7)
POTASSIUM SERPL-SCNC: 3.4 MMOL/L (ref 3.5–5.3)
PROTHROMBIN TIME: 20.2 SECONDS (ref 11.6–14.5)
RBC # BLD AUTO: 4.37 MILLION/UL (ref 3.81–5.12)
SODIUM SERPL-SCNC: 139 MMOL/L (ref 135–147)
WBC # BLD AUTO: 5.67 THOUSAND/UL (ref 4.31–10.16)

## 2022-11-01 RX ADMIN — OXYBUTYNIN CHLORIDE 5 MG: 5 TABLET ORAL at 08:15

## 2022-11-01 RX ADMIN — WARFARIN SODIUM 7.5 MG: 7.5 TABLET ORAL at 17:49

## 2022-11-01 RX ADMIN — CEFAZOLIN SODIUM 2000 MG: 2 SOLUTION INTRAVENOUS at 17:44

## 2022-11-01 RX ADMIN — CEFAZOLIN SODIUM 2000 MG: 2 SOLUTION INTRAVENOUS at 08:15

## 2022-11-01 RX ADMIN — OXYBUTYNIN CHLORIDE 5 MG: 5 TABLET ORAL at 17:49

## 2022-11-01 RX ADMIN — GABAPENTIN 600 MG: 300 CAPSULE ORAL at 21:33

## 2022-11-01 RX ADMIN — FLUOXETINE 20 MG: 20 CAPSULE ORAL at 17:50

## 2022-11-01 RX ADMIN — LORAZEPAM 1 MG: 1 TABLET ORAL at 21:34

## 2022-11-01 RX ADMIN — DILTIAZEM HYDROCHLORIDE 360 MG: 180 CAPSULE, COATED, EXTENDED RELEASE ORAL at 08:15

## 2022-11-01 RX ADMIN — CEFAZOLIN SODIUM 2000 MG: 2 SOLUTION INTRAVENOUS at 01:09

## 2022-11-01 RX ADMIN — OXYBUTYNIN CHLORIDE 5 MG: 5 TABLET ORAL at 21:33

## 2022-11-01 RX ADMIN — FLUOXETINE 20 MG: 20 CAPSULE ORAL at 08:15

## 2022-11-01 RX ADMIN — POTASSIUM CHLORIDE 40 MEQ: 1500 TABLET, EXTENDED RELEASE ORAL at 08:16

## 2022-11-01 RX ADMIN — HYDROCHLOROTHIAZIDE 25 MG: 25 TABLET ORAL at 08:15

## 2022-11-01 RX ADMIN — HEPARIN SODIUM 19.1 UNITS/KG/HR: 10000 INJECTION, SOLUTION INTRAVENOUS at 08:01

## 2022-11-01 NOTE — CASE MANAGEMENT
Case Management Progress Note    Patient name Manuel [place]  Location /-33 MRN [id number]  : 1950 Date 2022       LOS (days): 0  Geometric Mean LOS (GMLOS) (days): 2 70  Days to GMLOS:2 4        OBJECTIVE:        Current admission status: Inpatient  Preferred Pharmacy:   [address] [place] [address]  [address] [place] [address]  Phone: [phone] Fax: [phone]    Primary Care Provider: Laury Pa DO    Primary Insurance: MEDICARE  Secondary Insurance: [employer]    PROGRESS NOTE:  CM consulted for price check for eliquis and xarelto  CM called [address], was informed that eliquis is non-formulary so it will cost $598 80, and since xarelto was written for 2 tabs of 10 mg it is an "excessive amount" and not covered so it will cost about $1200  Dr Catarino Scott made aware

## 2022-11-01 NOTE — PROGRESS NOTES
INTERNAL MEDICINE RESIDENCY PROGRESS NOTE     Name: Boyd Hagen   Age & Sex: 66 y o  female   MRN: [id number]  Unit/Bed#: -01   Encounter: [id number]  Team: SOD Team C     PATIENT INFORMATION     Name: Boyd Hagen   Age & Sex: 66 y o  female   MRN: [id number]  Hospital Stay Days: 0    ASSESSMENT/PLAN     Principal Problem:    SIRS (systemic inflammatory response syndrome) ([facility] [address] )  Active Problems:    Incontinence    Dyspnea on exertion    Permanent atrial fibrillation (HCC)    Unsteady gait    Malignant tumor of right kidney parenchyma (HCC)    GILDARDO (obstructive sleep apnea)    Morbid obesity with BMI of 50 0-59 9, adult ([facility])      * SIRS (systemic inflammatory response syndrome) ([facility])  Assessment & Plan  · Met SIRS criteria with fever 100 4 and denies any tachycardia 103  · Recently treated for UTI with Augmentin and finished 5 day course  · COVID/RSV/flu negative  · No signs or symptoms of upper respiratory infection besides shortness of breath     Plan:  · Order UA and consider antibiotic therapy based on results  · UA significant for turbid urine, pyuria, bacteriuria  · Recently completed 5 day course of Augmentin to treat Klebsiella UTI   Suspect patient likely has persistent UTI though clinical presentation complicated by chronic urinary incontinence poorly managed by oxybutynin  · Continue empiric treatment with cefazolin 2g, on day 2/3; will adjust antibiotic to pending urine culture/sensitivities  · Plan for transitioning to p o  antibiotic if deemed clinically stable for discharge  · Await blood cultures times x2      Incontinence  Assessment & Plan  Has history of incontinence managed on oxybutynin 5 mg  Recently treated for UTI with Augmentin 5 day course 2-3 weeks ago  Also has has history of RCC on the right treated cryotherapy about 6 years  Patient reported worsening incontinence for the past 2-3 weeks with the dysuria or frequency  Etiology likely due to persistent UTI secondary to inadequate treatment with Augmentin  Plan:  · Order UA and consider antibiotic therapy based on results  · UA significant for turbid urine, pyuria, bacteriuria  · Recently completed 5 day course of Augmentin to treat Klebsiella UTI  Suspect patient likely has persistent UTI though clinical presentation complicated by chronic urinary incontinence poorly managed by oxybutynin  · Continue empiric treatment with cefazolin 2g for three days; currently on day 2/3; will adjust antibiotic to pending urine culture/sensitivities  · Continue home oxybutynin  · Urology consulted for persistent incontinent, appreciate recs  · Continue IV cefazolin to treat likely persistent UTI as noted above  · Consider a beta 3 agonist in conjunction with oxybutynin if incontinence does not improve    Dyspnea on exertion  Assessment & Plan  Patient reports 1-2 weeks of shortness of breath in the setting of obesity and permanent AFib  Low clinical suspicion for pneumonia at this time  PE also possible given clinical presentation  PERC score 3  Wells score for PE 9    Plan:  · High sensitivity troponins  · 10/11/9 at 0h/2h/4h  · Obtain CXR  · Negative for active cardiopulmonary disease  · Order CTA to rule out PE  · No emboli in the main, right, or left pulmonary arteries  · While there are no definite segmental or subsegmental emboli, they can be missed due to suboptimal opacification of the pulmonary arteries  · Pulmonary artery enlargement which can be seen with pulmonary hypertension    · Obtain TTE to assess for pulmonary HTN  · TTE without any signs of pulmonary hypertension including right ventricular strain          Permanent atrial fibrillation [facility])  Assessment & Plan  History of permanent AFib on warfarin 5mg daily with subtherapeutic INR 1 43 on arrival  Not on DOAC because of price  Rate controlled with diltiazem at home    Plan:  · Warfarin increased to 7 5 and bridge with heparin drip on admission  · Most recent INR 1 3 on Warfarin 7 5; remains subtherapeutic  · Await repeat INR from this AM  · Consider increasing warfarin dose today, repeat INR tomorrow AM; goal INR 2-2 5  · Continue diltiazem 360mg daily  · Consider oral anticoagulant at discharge    Unsteady gait  Assessment & Plan  Patient reports 1-2 weeks of unsteady gait secondary shortness of breath in the setting of advanced osteoarthritis of the knee  No focal neurological focal deficits on exam      Plan:  · Given patient's history of Afib and history of anticoagulation, unsteady gait may be secondary to CVA  · Order CT head for further evaluation   · Negative for acute intracranial pathology      Malignant tumor of right kidney parenchyma [facility])  Assessment & Plan  History of RCC on the right status post treatment cryotherapy about 6 years ago  Stable and continue monitoring  Patient follows up with Urology regularly    Plan:    follow-up with Urology outpatient    GILDARDO (obstructive sleep apnea)  Assessment & Plan  History of GILDARDO compliant on CPAP    Plan: Will continue CPAP inpatient    Morbid obesity with BMI of 50 0-59 9, adult [facility])  Assessment & Plan  Patient with morbid obesity, BMI at 48   - Consider outpatient discussion for lifestyle and weight loss modifications      Elevated TSH-resolved as of 10/31/2022  Assessment & Plan  Previous TSH elevated with normal T4    Plan: Will repeat TSH and consider therapy if warranted      Disposition:  Requiring IV antibiotic to treat UTI     SUBJECTIVE     Patient seen and examined  No acute events overnight  She currently has no acute complaints  She expressed concern that she will not be able to adequately take care of herself on discharge due to ambulatory difficulty  Patient inform that case management is pursuing adequate placement on discharge  She states that she has been able to ambulate with help from nursing staff to bathroom and to chair without using a walker and without experiencing shortness of breath   She continues to saturate well on room air  She has been urinating without difficulty and denies dysuria, urgency, or frequency  She denies chest pain, shortness of breath, cough, fevers, chills, abdominal pain, nausea, or vomiting  OBJECTIVE     Vitals:    22 0454 22 0540 22 0701 22 1130   BP:   120/72 138/65   BP Location:   Right arm    Pulse:   92 79   Resp:   18    Temp:   97 9 °F (36 6 °C) 97 7 °F (36 5 °C)   TempSrc:   Oral    SpO2:   91% 94%   Weight: (!) 137 kg (303 lb) (!) 137 kg (303 lb)     Height:          Temperature:   Temp (24hrs), Av 3 °F (36 8 °C), Min:97 7 °F (36 5 °C), Max:98 9 °F (37 2 °C)    Temperature: 97 7 °F (36 5 °C)  Intake & Output:  I/O       10/30 0701  10/31 0700 10/31 0701   0700  0701   0700    P  O   1360     I V  (mL/kg) 22      Total Intake(mL/kg) 22 1360 (9 9)     Urine (mL/kg/hr)  300 (0 1)     Total Output  300     Net +22 +1060            Unmeasured Urine Occurrence  1 x         Weights:        Body mass index is 53 67 kg/m²  Weight (last 2 days)     Date/Time Weight    22 0540 137 (303)    22 0454 137 (303)    10/31/22 1230 137 (302)    10/31/22 0741 137 (302 03)        Physical Exam  Vitals and nursing note reviewed  Constitutional:       General: She is not in acute distress  Appearance: Normal appearance  She is well-developed  She is obese  She is not toxic-appearing  HENT:      Head: Normocephalic and atraumatic  Eyes:      Conjunctiva/sclera: Conjunctivae normal    Cardiovascular:      Rate and Rhythm: Normal rate  Rhythm irregular  Heart sounds: Normal heart sounds  No murmur heard  No gallop  Pulmonary:      Effort: Pulmonary effort is normal  No respiratory distress  Breath sounds: Normal breath sounds  No wheezing or rales  Abdominal:      General: Abdomen is flat  Bowel sounds are normal       Palpations: Abdomen is soft  Tenderness: There is no abdominal tenderness   There is no guarding  Musculoskeletal:         General: No tenderness  Cervical back: Neck supple  Right lower leg: No edema  Left lower leg: No edema  Skin:     General: Skin is warm and dry  Neurological:      General: No focal deficit present  Mental Status: She is alert and oriented to person, place, and time  Motor: No weakness  Gait: Gait normal    Psychiatric:         Mood and Affect: Mood normal          Behavior: Behavior normal        LABORATORY DATA     Labs: I have personally reviewed pertinent reports  Results from last 7 days   Lab Units 11/01/22  1121 10/31/22  0728 10/30/22  1347   WBC Thousand/uL 5 67 5 71 7 47   HEMOGLOBIN g/dL 11 1* 11 2* 11 6   HEMATOCRIT % 36 2 36 2 36 6   PLATELETS Thousands/uL 313 263 270   NEUTROS PCT %  --  66 79*   MONOS PCT %  --  15* 11      Results from last 7 days   Lab Units 11/01/22  0453 10/31/22  0728 10/30/22  1347   POTASSIUM mmol/L 3 4* 3 1* 3 3*   CHLORIDE mmol/L 106 106 102   CO2 mmol/L 28 28 30   BUN mg/dL 19 18 20   CREATININE mg/dL 0 96 0 84 0 91   CALCIUM mg/dL 8 2* 7 9* 8 2*   ALK PHOS U/L  --  106 118*   ALT U/L  --  21 21   AST U/L  --  22 22     Results from last 7 days   Lab Units 10/31/22  0728   MAGNESIUM mg/dL 2 2     Results from last 7 days   Lab Units 10/31/22  0728   PHOSPHORUS mg/dL 2 7      Results from last 7 days   Lab Units 11/01/22  1121 11/01/22  0453 10/31/22  2138 10/31/22  0728 10/31/22  0537 10/31/22  0008 10/30/22  1347   INR   --   --   --   --  1 33*  --  1 43*   PTT seconds 80* 75* 50*   < >  --    < > 30    < > = values in this interval not displayed  IMAGING & DIAGNOSTIC TESTING     Radiology Results: I have personally reviewed pertinent reports  XR chest 2 views    Result Date: 10/31/2022  Impression: No radiographic evidence of acute intrathoracic process  Workstation performed: [id number]     CT head wo contrast    Result Date: 10/30/2022  Impression: No acute intracranial abnormality  Workstation performed: [id number]     CTA chest pe study    Result Date: 10/30/2022  Impression: No emboli in the main, right, or left pulmonary arteries  While there are no definite segmental or subsegmental emboli, they can be missed due to suboptimal opacification of the pulmonary arteries  No acute pulmonary disease  Pulmonary artery enlargement which can be seen with pulmonary hypertension  Workstation performed: [id number]     Other Diagnostic Testing: I have personally reviewed pertinent reports  ACTIVE MEDICATIONS     Current Facility-Administered Medications   Medication Dose Route Frequency   • acetaminophen (TYLENOL) tablet 975 mg  975 mg Oral Q6H PRN   • ceFAZolin (ANCEF) IVPB (premix in dextrose) 2,000 mg 50 mL  2,000 mg Intravenous Q8H   • diltiazem (CARDIZEM CD) 24 hr capsule 360 mg  360 mg Oral Daily   • FLUoxetine (PROzac) capsule 20 mg  20 mg Oral BID   • gabapentin (NEURONTIN) capsule 600 mg  600 mg Oral HS   • heparin (porcine) 25,000 units in 0 45% NaCl 250 mL infusion (premix)  3-20 Units/kg/hr (Order-Specific) Intravenous Titrated   • heparin (porcine) injection 2,000 Units  2,000 Units Intravenous Q1H PRN   • heparin (porcine) injection 4,000 Units  4,000 Units Intravenous Q1H PRN   • hydrochlorothiazide (HYDRODIURIL) tablet 25 mg  25 mg Oral Daily   • labetalol (NORMODYNE) injection 10 mg  10 mg Intravenous Q6H PRN   • LORazepam (ATIVAN) tablet 1 mg  1 mg Oral HS PRN   • oxybutynin (DITROPAN) tablet 5 mg  5 mg Oral TID   • polyethylene glycol (MIRALAX) packet 17 g  17 g Oral Daily PRN   • warfarin (COUMADIN) tablet 7 5 mg  7 5 mg Oral Daily (warfarin)       VTE Pharmacologic Prophylaxis: Heparin  VTE Mechanical Prophylaxis: sequential compression device    Portions of the record may have been created with voice recognition software  Occasional wrong word or "sound a like" substitutions may have occurred due to the inherent limitations of voice recognition software    Read the chart carefully and recognize, using context, where substitutions have occurred   ==  Munir Jorge, [facility]  Internal Medicine Residency PGY-1

## 2022-11-01 NOTE — CASE MANAGEMENT
Case Management Discharge Planning Note    Patient name Mayank Martin  Location [id number] MRN [id number]  : 1950 Date 2022       Current Admission Date: 10/30/2022  Current Admission Diagnosis:SIRS (systemic inflammatory response syndrome) [facility])   Patient Active Problem List    Diagnosis Date Noted   • Morbid obesity with BMI of 50 0-59 9, adult ([address] ) 2022   • Unsteady gait 10/30/2022   • Dyspnea on exertion 10/30/2022   • SIRS (systemic inflammatory response syndrome) ([address] ) 10/30/2022   • Incontinence 10/30/2022   • GILDARDO (obstructive sleep apnea) 10/30/2022   • Vitamin D deficiency 2021   • Reactive hypoglycemia 2020   • Osteoporosis 2020   • Hyperparathyroidism ([address] ) 2020   • Hashimoto's thyroiditis 2020   • Early menopause occurring in patient age younger than 39 years 2020   • S/P bariatric surgery 2020   • Myofascial pain syndrome 10/21/2019   • Status post partial thyroidectomy 2019   • Cervical spondylosis without myelopathy 2019   • Arthropathy of cervical facet joint 2019   • Occipital neuralgia of right side 2019   • Chest wall tenderness 2019   • Chronic heart failure ([address] ) 2019   • Permanent atrial fibrillation ([facility] [address] ) 2019   • Myalgia 2019   • Primary osteoarthritis of left knee 10/23/2018   • Primary osteoarthritis of right knee 10/23/2018   • Chronic pain of left knee 10/23/2018   • Malignant tumor of right kidney parenchyma ([address] ) 2016   • Closed fracture of right distal radius and ulna 2016      LOS (days): 0  Geometric Mean LOS (GMLOS) (days): 2 70  Days to GMLOS:2 5     OBJECTIVE:            Current admission status: Inpatient   Preferred Pharmacy:   [facility] [address] MAR[address] REDDY [address] [place] [address] 32  [facility] 32  [address] [address]  Phone: [phone] Fax: [phone]    Primary Care Provider: Adin Sacnhez DO    Primary Insurance: MEDICARE  Secondary Insurance: AETNA    DISCHARGE DETAILS:                                          Other Referral/Resources/Interventions Provided:  Referral Comments: Patient's daughter-in-law, Dwayne Chun, spoke with CM to inform that patient liked [address] and requested referral to be sent there  CM placed referral to [address] via [address]  Patient remains on heparin gtt, pending medical clearance

## 2022-11-01 NOTE — ASSESSMENT & PLAN NOTE
Patient with morbid obesity, BMI at 48   - Consider outpatient discussion for lifestyle and weight loss modifications

## 2022-11-01 NOTE — CASE MANAGEMENT
Case Management Discharge Planning Note    Patient name Zoanne Number  Location [id number] MRN [id number]  : 1950 Date 2022       Current Admission Date: 10/30/2022  Current Admission Diagnosis:SIRS (systemic inflammatory response syndrome) [facility])   Patient Active Problem List    Diagnosis Date Noted   • Morbid obesity with BMI of 50 0-59 9, adult ([address] ) 2022   • Unsteady gait 10/30/2022   • Dyspnea on exertion 10/30/2022   • SIRS (systemic inflammatory response syndrome) ([address] ) 10/30/2022   • Incontinence 10/30/2022   • GILDARDO (obstructive sleep apnea) 10/30/2022   • Vitamin D deficiency 2021   • Reactive hypoglycemia 2020   • Osteoporosis 2020   • Hyperparathyroidism ([address] ) 2020   • Hashimoto's thyroiditis 2020   • Early menopause occurring in patient age younger than 39 years 2020   • S/P bariatric surgery 2020   • Myofascial pain syndrome 10/21/2019   • Status post partial thyroidectomy 2019   • Cervical spondylosis without myelopathy 2019   • Arthropathy of cervical facet joint 2019   • Occipital neuralgia of right side 2019   • Chest wall tenderness 2019   • Chronic heart failure ([address] ) 2019   • Permanent atrial fibrillation ([facility] [address] ) 2019   • Myalgia 2019   • Primary osteoarthritis of left knee 10/23/2018   • Primary osteoarthritis of right knee 10/23/2018   • Chronic pain of left knee 10/23/2018   • Malignant tumor of right kidney parenchyma ([address] ) 2016   • Closed fracture of right distal radius and ulna 2016      LOS (days): 0  Geometric Mean LOS (GMLOS) (days): 2 70  Days to GMLOS:2 4     OBJECTIVE:  Risk of Unplanned Readmission Score: 17 09         Current admission status: Inpatient   Preferred Pharmacy:   [facility] [address] [place] [address] - [facility] [address]  [address]  [address] [address]  Phone: [phone] Fax: [phone]    Primary Care Provider: Pierce Olivarez, DO    Primary Insurance: MEDICARE  Secondary Insurance: AETNA    DISCHARGE DETAILS:    Discharge planning discussed with[de-identified] Patient and son Luc Holley of Choice: Yes     CM contacted family/caregiver?: Yes  Were Treatment Team discharge recommendations reviewed with patient/caregiver?: Yes  Did patient/caregiver verbalize understanding of patient care needs?: N/A- going to facility  Were patient/caregiver advised of the risks associated with not following Treatment Team discharge recommendations?: Yes    Contacts  Patient Contacts: George Shelton  Relationship to Patient[de-identified] Family  Contact Method: In [facility]         Is the patient interested in [address] at discharge?: No    DME Referral Provided  Referral made for DME?: No    Other Referral/Resources/Interventions Provided:  Interventions: Short Term Rehab  Referral Comments: [facility] able to accept  CM spoke with patient and son Val [place] at bedside and patient would like to reserve bed at [address]  Per Dr Ton Barba with SOD C, patient to be ready for discharge tomorrow, aware [address] accepted  CM requested [facility] transport via roundtrip as patient stated she is able to tolerate wheelchair transport and does not need BLS  CM received notification from [facility] that [address] able to pick patient up at 1300 on 11/2/2022  Patient, Dr Ton Barba with [facility], and Keara with [facility] made aware  Per Keara with [facility], covid test not needed  CM confirmed with patient that she will be bringing her CPAP from home CMN on folder, will be provided to tomorrow's RN and advise of transport time  Treatment Team Recommendation: Short Term Rehab  Discharge Destination Plan[de-identified] Short Term Rehab  Transport at Discharge : Wheelchair van  Dispatcher Contacted: Yes  Number/Name of Dispatcher: Sonali Silver by Alia and Unit #):  [facility]  ETA of Transport (Date): 11/02/22  ETA of Transport (Time): 1300     Transfer Mode: Stretcher        IMM Given (Date):: 11/01/22  IMM Given to[de-identified] Patient   IMM reviewed with patient, patient agrees with discharge determination              Accepting Facility Name, [facility] [address]   Receiving Facility/Agency Phone Number: [phone]  Facility/Agency Fax Number: [phone]  [facility] [address] [place] [address] Number: N/A

## 2022-11-02 VITALS
HEIGHT: 63 IN | BODY MASS INDEX: 51.91 KG/M2 | WEIGHT: 293 LBS | SYSTOLIC BLOOD PRESSURE: 126 MMHG | DIASTOLIC BLOOD PRESSURE: 78 MMHG | TEMPERATURE: 98 F | HEART RATE: 89 BPM | OXYGEN SATURATION: 94 % | RESPIRATION RATE: 18 BRPM

## 2022-11-02 DIAGNOSIS — I48.21 PERMANENT ATRIAL FIBRILLATION (HCC): Primary | ICD-10-CM

## 2022-11-02 LAB
ANION GAP SERPL CALCULATED.3IONS-SCNC: 3 MMOL/L (ref 4–13)
BUN SERPL-MCNC: 20 MG/DL (ref 5–25)
CALCIUM SERPL-MCNC: 8.3 MG/DL (ref 8.3–10.1)
CHLORIDE SERPL-SCNC: 108 MMOL/L (ref 96–108)
CO2 SERPL-SCNC: 29 MMOL/L (ref 21–32)
CREAT SERPL-MCNC: 1.01 MG/DL (ref 0.6–1.3)
ERYTHROCYTE [DISTWIDTH] IN BLOOD BY AUTOMATED COUNT: 16.3 % (ref 11.6–15.1)
GFR SERPL CREATININE-BSD FRML MDRD: 55 ML/MIN/1.73SQ M
GLUCOSE SERPL-MCNC: 116 MG/DL (ref 65–140)
HCT VFR BLD AUTO: 36.5 % (ref 34.8–46.1)
HGB BLD-MCNC: 11.2 G/DL (ref 11.5–15.4)
INR PPP: 1.79 (ref 0.84–1.19)
MCH RBC QN AUTO: 25.9 PG (ref 26.8–34.3)
MCHC RBC AUTO-ENTMCNC: 30.7 G/DL (ref 31.4–37.4)
MCV RBC AUTO: 84 FL (ref 82–98)
PLATELET # BLD AUTO: 310 THOUSANDS/UL (ref 149–390)
PMV BLD AUTO: 9.9 FL (ref 8.9–12.7)
POTASSIUM SERPL-SCNC: 3.6 MMOL/L (ref 3.5–5.3)
PROTHROMBIN TIME: 21 SECONDS (ref 11.6–14.5)
RBC # BLD AUTO: 4.33 MILLION/UL (ref 3.81–5.12)
SODIUM SERPL-SCNC: 140 MMOL/L (ref 135–147)
WBC # BLD AUTO: 6.28 THOUSAND/UL (ref 4.31–10.16)

## 2022-11-02 RX ORDER — WARFARIN SODIUM 7.5 MG/1
7.5 TABLET ORAL
Qty: 1 TABLET | Refills: 0
Start: 2022-11-03 | End: 2022-11-16 | Stop reason: CLARIF

## 2022-11-02 RX ORDER — WARFARIN SODIUM 7.5 MG/1
7.5 TABLET ORAL
Status: COMPLETED | OUTPATIENT
Start: 2022-11-02 | End: 2022-11-02

## 2022-11-02 RX ORDER — WARFARIN SODIUM 5 MG/1
TABLET ORAL
Refills: 0
Start: 2022-11-04 | End: 2022-11-16 | Stop reason: CLARIF

## 2022-11-02 RX ORDER — AMOXICILLIN AND CLAVULANATE POTASSIUM 875; 125 MG/1; MG/1
1 TABLET, FILM COATED ORAL EVERY 12 HOURS SCHEDULED
Qty: 13 TABLET | Refills: 0
Start: 2022-11-02 | End: 2022-11-09

## 2022-11-02 RX ORDER — AMOXICILLIN AND CLAVULANATE POTASSIUM 875; 125 MG/1; MG/1
1 TABLET, FILM COATED ORAL EVERY 12 HOURS SCHEDULED
Status: DISCONTINUED | OUTPATIENT
Start: 2022-11-02 | End: 2022-11-02 | Stop reason: HOSPADM

## 2022-11-02 RX ORDER — ENOXAPARIN SODIUM 300 MG/3ML
1 INJECTION INTRAVENOUS; SUBCUTANEOUS EVERY 12 HOURS
Qty: 5.6 ML | Refills: 0
Start: 2022-11-02 | End: 2022-11-16

## 2022-11-02 RX ORDER — POLYETHYLENE GLYCOL 3350 17 G/17G
17 POWDER, FOR SOLUTION ORAL DAILY
Qty: 255 G | Refills: 0
Start: 2022-11-02 | End: 2022-11-16

## 2022-11-02 RX ORDER — SULFAMETHOXAZOLE AND TRIMETHOPRIM 800; 160 MG/1; MG/1
1 TABLET ORAL EVERY 12 HOURS SCHEDULED
Qty: 6 TABLET | Refills: 0 | Status: CANCELLED
Start: 2022-11-02 | End: 2022-11-05

## 2022-11-02 RX ADMIN — AMOXICILLIN AND CLAVULANATE POTASSIUM 1 TABLET: 875; 125 TABLET, FILM COATED ORAL at 13:51

## 2022-11-02 RX ADMIN — CEFAZOLIN SODIUM 2000 MG: 2 SOLUTION INTRAVENOUS at 00:24

## 2022-11-02 RX ADMIN — CEFAZOLIN SODIUM 2000 MG: 2 SOLUTION INTRAVENOUS at 09:04

## 2022-11-02 RX ADMIN — HYDROCHLOROTHIAZIDE 25 MG: 25 TABLET ORAL at 09:03

## 2022-11-02 RX ADMIN — OXYBUTYNIN CHLORIDE 5 MG: 5 TABLET ORAL at 08:21

## 2022-11-02 RX ADMIN — HEPARIN SODIUM 19.1 UNITS/KG/HR: 10000 INJECTION, SOLUTION INTRAVENOUS at 01:20

## 2022-11-02 RX ADMIN — DILTIAZEM HYDROCHLORIDE 360 MG: 180 CAPSULE, COATED, EXTENDED RELEASE ORAL at 08:22

## 2022-11-02 RX ADMIN — FLUOXETINE 20 MG: 20 CAPSULE ORAL at 08:22

## 2022-11-02 RX ADMIN — WARFARIN SODIUM 7.5 MG: 2.5 TABLET ORAL at 13:51

## 2022-11-02 NOTE — QUICK NOTE
Spoke to patient's daughter Haley Wadsworth) over the phone  Daughter was informed of the necessary updates in regards to patient's inpatient care and management  All questions and concerns were addressed appropriately       --------------------------------------------------------------------  Yesenia Garay DO, PGY-1  Internal Medicine Resident   [address] [place] [address]

## 2022-11-02 NOTE — CASE MANAGEMENT
Case Management Discharge Planning Note    Patient name Boyd Guevara [address] [phone] MRN [id number]  : 1950 Date 2022       Current Admission Date: 10/30/2022  Current Admission Diagnosis:SIRS (systemic inflammatory response syndrome) [facility])   Patient Active Problem List    Diagnosis Date Noted   • Morbid obesity with BMI of 50 0-59 9, adult ([facility] [address] ) 2022   • Unsteady gait 10/30/2022   • Dyspnea on exertion 10/30/2022   • SIRS (systemic inflammatory response syndrome) ([address] ) 10/30/2022   • Incontinence 10/30/2022   • GILDARDO (obstructive sleep apnea) 10/30/2022   • Vitamin D deficiency 2021   • Reactive hypoglycemia 2020   • Osteoporosis 2020   • Hyperparathyroidism ([address] ) 2020   • Hashimoto's thyroiditis 2020   • Early menopause occurring in patient age younger than 39 years 2020   • S/P bariatric surgery 2020   • Myofascial pain syndrome 10/21/2019   • Status post partial thyroidectomy 2019   • Cervical spondylosis without myelopathy 2019   • Arthropathy of cervical facet joint 2019   • Occipital neuralgia of right side 2019   • Chest wall tenderness 2019   • Chronic heart failure ([facility] [address] ) 2019   • Permanent atrial fibrillation ([facility] [address] ) 2019   • Myalgia 2019   • Primary osteoarthritis of left knee 10/23/2018   • Primary osteoarthritis of right knee 10/23/2018   • Chronic pain of left knee 10/23/2018   • Malignant tumor of right kidney parenchyma ([facility] [address] ) 2016   • Closed fracture of right distal radius and ulna 2016      LOS (days): 1  Geometric Mean LOS (GMLOS) (days): 2 10  Days to GMLOS:0 9     OBJECTIVE:  Risk of Unplanned Readmission Score: 15 24         Current admission status: Inpatient   Preferred Pharmacy:   [facility] [address] [place], [place] - [address]  [address]  [facility] [address]  Phone: [phone] Fax: [phone]    Primary Care Provider: Lucas Larsen, DO    Primary Insurance: MEDICARE  Secondary Insurance: AETNA    DISCHARGE DETAILS:                                          Other Referral/Resources/Interventions Provided:  Referral Comments: Patient's urine culture came back positive for ESBL and MDRO  Keara with [address] aware and stated they can still accept patient today  Patient with transportation scheduled for 1pm today  [facility] resident Dr Haider Vera aware  CMN on transport folder, given to [employer] who is aware of transport time  Patient and daughter made aware that discharge is still set for 1pm today

## 2022-11-02 NOTE — PHYSICAL THERAPY NOTE
Physical Therapy Treatment Note       11/02/22 1035   PT Last Visit   PT Visit Date 11/02/22   Note Type   Note Type Treatment   Pain Assessment   Pain Assessment Tool 0-10   Pain Score No Pain   Restrictions/Precautions   Weight Bearing Precautions Per Order No   Other Precautions Pain;Telemetry;Multiple lines; Fall Risk   General   Chart Reviewed Yes   Family/Caregiver Present Yes   Cognition   Overall Cognitive Status WFL   Arousal/Participation Responsive   Attention Attends with cues to redirect   Orientation Level Oriented X4   Memory Unable to assess   Following Commands Follows one step commands without difficulty   Subjective   Subjective cooperative w session, wants to walk   Transfers   Sit to Stand 4  Minimal assistance   Additional items Assist x 1   Stand to Sit 4  Minimal assistance   Additional items Assist x 1   Additional Comments time spent for setup, repositioning to comfort   Ambulation/Elevation   Gait pattern   (slow, forward flexion, short step length)   Gait Assistance 4  Minimal assist   Additional items Assist x 1   Assistive Device Rolling walker   Distance 15'x1, then pt greeted by doctor and requested to return to room  sat in chait, then after doctor visit, ambulated addiitonal 90'x2 w/ 1-2 min standing rest   Balance   Static Sitting Good   Dynamic Sitting Fair -   Static Standing Fair -   Dynamic Standing Fair -   Ambulatory Poor +   Endurance Deficit   Endurance Deficit Yes   Endurance Deficit Description fatigue, weakness,   Activity Tolerance   Activity Tolerance Patient limited by fatigue;Treatment limited secondary to medical complications (Comment)   Nurse Made Aware yes   Assessment   Prognosis Fair   Problem List Decreased strength;Decreased endurance; Impaired balance;Decreased mobility;Pain   Assessment Pt seen for session for setup, transfers, gait training, repositioning  Pt cooperative and notes she continues to feel stronger    Needing a bit less assist w/ mobility tasks  states she only anticipates needed to staty at rehab a short time, then hopes to go home  Do note increased stability, independence but strength and endurance remain a bit decreased  continue to recommend rehab at d/c   Goals   Patient Goals to get stronger and go home   STG Expiration Date 11/14/22   PT Treatment Day 1   Plan   Treatment/Interventions Functional transfer training;LE strengthening/ROM; Therapeutic exercise; Endurance training;Patient/family training;Bed mobility;Gait training;Equipment eval/education;Elevations   Progress Progressing toward goals   PT Frequency 3-5x/wk   Recommendation   PT Discharge Recommendation Post acute rehabilitation services   AM-PAC Basic Mobility Inpatient   Turning in Bed Without Bedrails 3   Lying on Back to Sitting on Edge of Flat Bed 3   Moving Bed to Chair 3   Standing Up From Chair 3   Walk in Room 3   Climb 3-5 Stairs 3   Basic Mobility Inpatient Raw Score 18   Basic Mobility Standardized Score 41 05   Highest Level Of Mobility   JH-HLM Goal 6: Walk 10 steps or more   JH-HLM Achieved 7: Walk 25 feet or more   Rhona Frias PT, DPT CSRS

## 2022-11-02 NOTE — PLAN OF CARE
Problem: MOBILITY - ADULT  Goal: Maintain or return to baseline ADL function  Description: INTERVENTIONS:  -  Assess patient's ability to carry out ADLs; assess patient's baseline for ADL function and identify physical deficits which impact ability to perform ADLs (bathing, care of mouth/teeth, toileting, grooming, dressing, etc )  - Assess/evaluate cause of self-care deficits   - Assess range of motion  - Assess patient's mobility; develop plan if impaired  - Assess patient's need for assistive devices and provide as appropriate  - Encourage maximum independence but intervene and supervise when necessary  - Involve family in performance of ADLs  - Assess for home care needs following discharge   - Consider OT consult to assist with ADL evaluation and planning for discharge  - Provide patient education as appropriate  11/2/2022 1101 by Laura Acosta RN  Outcome: Progressing  11/2/2022 1059 by Laura Acosta RN  Outcome: Progressing  Goal: Maintains/Returns to pre admission functional level  Description: INTERVENTIONS:  - Perform BMAT or MOVE assessment daily    - Set and communicate daily mobility goal to care team and patient/family/caregiver  - Collaborate with rehabilitation services on mobility goals if consulted  - Perform Range of Motion  times a day  - Reposition patient every  hours    - Dangle patient  times a day  - Stand patient  times a day  - Ambulate patient  times a day  - Out of bed to chair  times a day   - Out of bed for meals  times a day  - Out of bed for toileting  - Record patient progress and toleration of activity level   11/2/2022 1101 by Laura Acosta RN  Outcome: Progressing  11/2/2022 1059 by Laura Acosta RN  Outcome: Progressing     Problem: Potential for Falls  Goal: Patient will remain free of falls  Description: INTERVENTIONS:  - Educate patient/family on patient safety including physical limitations  - Instruct patient to call for assistance with activity   - Consult OT/PT to assist with strengthening/mobility   - Keep Call bell within reach  - Keep bed low and locked with side rails adjusted as appropriate  - Keep care items and personal belongings within reach  - Initiate and maintain comfort rounds  - Make Fall Risk Sign visible to staff  - Offer Toileting every  Hours, in advance of need  - Initiate/Maintain alarm  - Obtain necessary fall risk management equipment:  - Apply yellow socks and bracelet for high fall risk patients  - Consider moving patient to room near nurses station  11/2/2022 1101 by Alannah Barone RN  Outcome: Progressing  11/2/2022 1059 by Alannah Barone RN  Outcome: Progressing     Problem: CARDIOVASCULAR - ADULT  Goal: Maintains optimal cardiac output and hemodynamic stability  Description: INTERVENTIONS:  - Monitor I/O, vital signs and rhythm  - Monitor for S/S and trends of decreased cardiac output  - Administer and titrate ordered vasoactive medications to optimize hemodynamic stability  - Assess quality of pulses, skin color and temperature  - Assess for signs of decreased coronary artery perfusion  - Instruct patient to report change in severity of symptoms  11/2/2022 1101 by Alannah Barone RN  Outcome: Progressing  11/2/2022 1059 by Alannah Barone RN  Outcome: Progressing  Goal: Absence of cardiac dysrhythmias or at baseline rhythm  Description: INTERVENTIONS:  - Continuous cardiac monitoring, vital signs, obtain 12 lead EKG if ordered  - Administer antiarrhythmic and heart rate control medications as ordered  - Monitor electrolytes and administer replacement therapy as ordered  11/2/2022 1101 by Alannah Barone RN  Outcome: Progressing  11/2/2022 1059 by Alannah Barone RN  Outcome: Progressing     Problem: RESPIRATORY - ADULT  Goal: Achieves optimal ventilation and oxygenation  Description: INTERVENTIONS:  - Assess for changes in respiratory status  - Assess for changes in mentation and behavior  - Position to facilitate oxygenation and minimize respiratory effort  - Oxygen administered by appropriate delivery if ordered  - Initiate smoking cessation education as indicated  - Encourage broncho-pulmonary hygiene including cough, deep breathe, Incentive Spirometry  - Assess the need for suctioning and aspirate as needed  - Assess and instruct to report SOB or any respiratory difficulty  - Respiratory Therapy support as indicated  11/2/2022 1101 by Gwyn Rodriguez RN  Outcome: Progressing  11/2/2022 1059 by Gwyn Rodriguez RN  Outcome: Progressing

## 2022-11-02 NOTE — PLAN OF CARE
Problem: MOBILITY - ADULT  Goal: Maintain or return to baseline ADL function  Description: INTERVENTIONS:  -  Assess patient's ability to carry out ADLs; assess patient's baseline for ADL function and identify physical deficits which impact ability to perform ADLs (bathing, care of mouth/teeth, toileting, grooming, dressing, etc )  - Assess/evaluate cause of self-care deficits   - Assess range of motion  - Assess patient's mobility; develop plan if impaired  - Assess patient's need for assistive devices and provide as appropriate  - Encourage maximum independence but intervene and supervise when necessary  - Involve family in performance of ADLs  - Assess for home care needs following discharge   - Consider OT consult to assist with ADL evaluation and planning for discharge  - Provide patient education as appropriate  Outcome: Progressing  Goal: Maintains/Returns to pre admission functional level  Description: INTERVENTIONS:  - Perform BMAT or MOVE assessment daily    - Set and communicate daily mobility goal to care team and patient/family/caregiver  - Collaborate with rehabilitation services on mobility goals if consulted  - Perform Range of Motion  times a day  - Reposition patient every  hours    - Dangle patient  times a day  - Stand patient  times a day  - Ambulate patient  times a day  - Out of bed to chair  times a day   - Out of bed for meals  times a day  - Out of bed for toileting  - Record patient progress and toleration of activity level   Outcome: Progressing     Problem: Potential for Falls  Goal: Patient will remain free of falls  Description: INTERVENTIONS:  - Educate patient/family on patient safety including physical limitations  - Instruct patient to call for assistance with activity   - Consult OT/PT to assist with strengthening/mobility   - Keep Call bell within reach  - Keep bed low and locked with side rails adjusted as appropriate  - Keep care items and personal belongings within reach  - Initiate and maintain comfort rounds  - Make Fall Risk Sign visible to staff  - Offer Toileting every  Hours, in advance of need  - Initiate/Maintain alarm  - Obtain necessary fall risk management equipment:  - Apply yellow socks and bracelet for high fall risk patients  - Consider moving patient to room near nurses station  Outcome: Progressing     Problem: CARDIOVASCULAR - ADULT  Goal: Maintains optimal cardiac output and hemodynamic stability  Description: INTERVENTIONS:  - Monitor I/O, vital signs and rhythm  - Monitor for S/S and trends of decreased cardiac output  - Administer and titrate ordered vasoactive medications to optimize hemodynamic stability  - Assess quality of pulses, skin color and temperature  - Assess for signs of decreased coronary artery perfusion  - Instruct patient to report change in severity of symptoms  Outcome: Progressing  Goal: Absence of cardiac dysrhythmias or at baseline rhythm  Description: INTERVENTIONS:  - Continuous cardiac monitoring, vital signs, obtain 12 lead EKG if ordered  - Administer antiarrhythmic and heart rate control medications as ordered  - Monitor electrolytes and administer replacement therapy as ordered  Outcome: Progressing     Problem: RESPIRATORY - ADULT  Goal: Achieves optimal ventilation and oxygenation  Description: INTERVENTIONS:  - Assess for changes in respiratory status  - Assess for changes in mentation and behavior  - Position to facilitate oxygenation and minimize respiratory effort  - Oxygen administered by appropriate delivery if ordered  - Initiate smoking cessation education as indicated  - Encourage broncho-pulmonary hygiene including cough, deep breathe, Incentive Spirometry  - Assess the need for suctioning and aspirate as needed  - Assess and instruct to report SOB or any respiratory difficulty  - Respiratory Therapy support as indicated  Outcome: Progressing None

## 2022-11-03 ENCOUNTER — NURSING HOME VISIT (OUTPATIENT)
Dept: GERIATRICS | Facility: OTHER | Age: 72
End: 2022-11-03

## 2022-11-03 DIAGNOSIS — R06.09 DYSPNEA ON EXERTION: ICD-10-CM

## 2022-11-03 DIAGNOSIS — G47.33 OSA (OBSTRUCTIVE SLEEP APNEA): ICD-10-CM

## 2022-11-03 DIAGNOSIS — E66.01 MORBID OBESITY WITH BMI OF 50.0-59.9, ADULT (HCC): ICD-10-CM

## 2022-11-03 DIAGNOSIS — I48.21 PERMANENT ATRIAL FIBRILLATION (HCC): ICD-10-CM

## 2022-11-03 DIAGNOSIS — R26.81 UNSTEADY GAIT: Primary | ICD-10-CM

## 2022-11-03 DIAGNOSIS — N39.46 MIXED STRESS AND URGE URINARY INCONTINENCE: ICD-10-CM

## 2022-11-03 LAB
BACTERIA UR CULT: ABNORMAL
BACTERIA UR CULT: ABNORMAL

## 2022-11-03 NOTE — PROGRESS NOTES
[facility]  [address], [address] [address]   [place] [facility] [address]  History and Physical    NAME: Solo Barnes  AGE: 66 y o  SEX: female [id number]    DATE OF ENCOUNTER: 11/4/2022    Code status:  CPR    Assessment and Plan     1  Unsteady gait  - PT/OT ordered  - Fall precautions in place    2  Dyspnea on exertion  - improved  - incentive spirometry encouraged    3  Mixed stress and urge urinary incontinence/UTI  - cont Oxybutynin as ordered  - scheduled voiding  - s/p antibiotics    4  Morbid obesity with BMI of 50 0-59 9, adult ([facility] [address] )  - nutrition follow up    5  GILDARDO (obstructive sleep apnea)  - cont with CPAP    6  Permanent atrial fibrillation (HCC)/HTN  - cont coumadin 5 mg alternating with 7 5 mg  - monitor PT/INR  - cont HCTZ 25 mg po qd  - cont Diltiazem 360 mg po qd      All medications and routine orders were reviewed and updated as needed  Plan discussed with: patient and staff    Chief Complaint     Seen for admission at [facility] [address]    History of Present Illness     José Miguel Skelton, a 67 y/o female with past medical history of HTN, GILDARDO, A Fib, Morbid obesity, malignant tumor of right kidney got admitted to the hospital with worsening shortness breath, unsteady gait and incontinence  PE was ruled out  Her Ucx was positive with ESBL E  Coli, treated with Augmentin based on susceptibility  Her coumadin dose got adjusted  Her overall condition improved, got discharged to  for subacute rehab  She was seen and examined at bedside, stable  Her children are at bedside  She is able to give good history  She lives at home with her daughter  She is independent of ADLs and daughter helps with IADLs  Says she is feeling well  Staff have no concerns at this time       HISTORY:  Past Medical History:   Diagnosis Date   • A-fib [facility])    • Arthritis    • Atrial fibrillation (HCC)    • Cervical spondylosis with myelopathy    • Gastric bypass status for obesity    • History of transfusion     35 years ago   • Hypertension    • mass right kidney    • Renal cell adenocarcinoma (HCC)     RIGHT   • Sleep apnea      Family History   Problem Relation Age of Onset   • Heart attack Mother    • Lung cancer Father 76   • Heart disease Father    • Stroke Sister    • Lung cancer Sister 64   • Prostate cancer Brother 61     Social History     Socioeconomic History   • Marital status:      Spouse name: None   • Number of children: None   • Years of education: None   • Highest education level: None   Occupational History   • None   Tobacco Use   • Smoking status: Never Smoker   • Smokeless tobacco: Never Used   Vaping Use   • Vaping Use: Never used   Substance and Sexual Activity   • Alcohol use: Not Currently     Alcohol/week: 0 0 standard drinks     Comment: 0   • Drug use: No   • Sexual activity: None   Other Topics Concern   • None   Social History Narrative    ** Merged History Encounter **          Social Determinants of Health     Financial Resource Strain: Not on file   Food Insecurity: No Food Insecurity   • Worried About Running Out of Food in the Last Year: Never true   • Ran Out of Food in the Last Year: Never true   Transportation Needs: No Transportation Needs   • Lack of Transportation (Medical): No   • Lack of Transportation (Non-Medical): No   Physical Activity: Not on file   Stress: Not on file   Social Connections: Not on file   Intimate Partner Violence: Not on file   Housing Stability: Low Risk    • Unable to Pay for Housing in the Last Year: No   • Number of Places Lived in the Last Year: 1   • Unstable Housing in the Last Year: No       Allergies:  No Known Allergies    Review of Systems     Review of Systems   Constitutional: Positive for activity change  Negative for fatigue and fever  HENT: Positive for hearing loss  Negative for dental problem and trouble swallowing  Eyes: Negative for photophobia and visual disturbance     Respiratory: Negative for cough and shortness of breath  Cardiovascular: Negative for chest pain, palpitations and leg swelling  Gastrointestinal: Negative for abdominal pain, constipation, diarrhea, nausea and vomiting  Genitourinary: Negative for difficulty urinating and dysuria  Musculoskeletal: Positive for gait problem  Negative for arthralgias  Neurological: Positive for weakness  Negative for dizziness and headaches  All other systems reviewed and are negative  As in HPI  Medications and orders     All medications reviewed and updated in Nursing Home EMR  Objective     Vitals: T: 97 9, P: 87, R: 16, BP: 142/87, 93% on RA, Wt: 288 lbs    Physical Exam  Vitals and nursing note reviewed  Constitutional:       General: She is not in acute distress  Appearance: She is well-developed  She is obese  She is not diaphoretic  HENT:      Head: Normocephalic and atraumatic  Nose: Nose normal       Mouth/Throat:      Mouth: Mucous membranes are moist       Pharynx: Oropharynx is clear  No oropharyngeal exudate  Eyes:      General: No scleral icterus  Right eye: No discharge  Left eye: No discharge  Extraocular Movements: Extraocular movements intact  Conjunctiva/sclera: Conjunctivae normal    Cardiovascular:      Rate and Rhythm: Normal rate and regular rhythm  Heart sounds: Normal heart sounds  No murmur heard  Pulmonary:      Effort: Pulmonary effort is normal  No respiratory distress  Breath sounds: Normal breath sounds  No wheezing  Chest:      Chest wall: No tenderness  Abdominal:      General: Bowel sounds are normal       Palpations: Abdomen is soft  Tenderness: There is no abdominal tenderness  There is no guarding or rebound  Musculoskeletal:         General: No tenderness or deformity  Cervical back: Normal range of motion and neck supple  Right lower leg: No edema  Left lower leg: No edema  Comments: Impaired ROM due to body habitus     Skin: General: Skin is warm and dry  Neurological:      Mental Status: She is alert and oriented to person, place, and time  Cranial Nerves: No cranial nerve deficit  Psychiatric:         Mood and Affect: Mood normal          Behavior: Behavior normal          Pertinent Laboratory/Diagnostic Studies: The following labs/studies were reviewed please see chart or hospital paperwork for details    Ref Range & Units 11/2/22 0421 11/1/22 1121 10/31/22 0728 10/30/22 1347 6/29/21 0716 3/11/20 0642 8/28/19 0527    WBC 4 31 - 10 16 Thousand/uL 6 28  5 67  5 71  7 47  6 08  4 77  10 40 High     RBC 3 81 - 5 12 Million/uL 4 33  4 37  4 38  4 48  4 80  4 39  4 29    Hemoglobin 11 5 - 15 4 g/dL 11 2 Low   11 1 Low   11 2 Low   11 6  13 1  12 8  12 3    Hematocrit 34 8 - 46 1 % 36 5  36 2  36 2  36 6  43 0  40 9  39 7    MCV 82 - 98 fL 84  83  83  82  90  93  93    MCH 26 8 - 34 3 pg 25 9 Low   25 4 Low   25 6 Low   25 9 Low   27 3  29 2  28 7    MCHC 31 4 - 37 4 g/dL 30 7 Low   30 7 Low   30 9 Low   31 7  30 5 Low   31 3 Low   31 0 Low     RDW 11 6 - 15 1 % 16 3 High   16 3 High   16 2 High   16 1 High   15 6 High   13 9  14 1    Platelets 195 - 469 Thousands/uL 310  313  263  270  330  292  252    MPV 8 9 - 12 7 fL 9 9  10 1  9 8          Ref Range & Units 11/2/22 0407 11/1/22 0453 10/31/22 0728 10/30/22 1347 8/4/22 0707 1/6/22 1107 6/29/21 0716    Sodium 135 - 147 mmol/L 140  139  139  138  139  140 R  140 R    Potassium 3 5 - 5 3 mmol/L 3 6  3 4 Low   3 1 Low   3 3 Low   3 7  3 8  3 8    Chloride 96 - 108 mmol/L 108  106  106  102  103  104 R  104 R    CO2 21 - 32 mmol/L 29  28  28  30  30  30  30    ANION GAP 4 - 13 mmol/L 3 Low   5  5  6  6  6  6    BUN 5 - 25 mg/dL 20  19  18  20  25  27 High   22    Creatinine 0 60 - 1 30 mg/dL 1 01  0 96 CM  0 84 CM  0 91 CM  1 01 CM  1 02 CM  1 00 CM    Comment: Standardized to IDMS reference method   Glucose 65 - 140 mg/dL 116  120 CM  128 CM  121 CM          Calcium 8 3 - 10 1 mg/dL 8 3  8 2 Low   7 9 Low   8 2 Low   8 8  9 4  9 0    eGFR ml/min/1 73sq m 55  59  69            - Counseling Documentation: patient was counseled regarding: risk factor reductions

## 2022-11-04 NOTE — TELEPHONE ENCOUNTER
Patient calling with questions for Roxane Lind in [place]  She is requesting to speak to her directly  She stated it will only take a couple minutes, that she wants to inform her of everything going on   She also is wanting to know if she wants her to wait to follow up until she is done with her new antibiotic    Patient requesting a call back from [facility] at [phone]

## 2022-11-05 LAB
BACTERIA BLD CULT: NORMAL
BACTERIA BLD CULT: NORMAL

## 2022-11-11 ENCOUNTER — TELEPHONE (OUTPATIENT)
Dept: OBGYN CLINIC | Facility: HOSPITAL | Age: 72
End: 2022-11-11

## 2022-11-11 NOTE — TELEPHONE ENCOUNTER
Caller: Ash Jo    Doctor: Chas Pink    Reason for call: patient is calling for urgently needed bilat knee steroid injections and would like to know if there is an alternate provider who could do the injections for her    Please advise    Call back#: [phone]

## 2022-11-16 ENCOUNTER — OFFICE VISIT (OUTPATIENT)
Dept: CARDIOLOGY CLINIC | Facility: CLINIC | Age: 72
End: 2022-11-16

## 2022-11-16 VITALS
WEIGHT: 293 LBS | BODY MASS INDEX: 51.91 KG/M2 | HEIGHT: 63 IN | DIASTOLIC BLOOD PRESSURE: 72 MMHG | HEART RATE: 89 BPM | SYSTOLIC BLOOD PRESSURE: 142 MMHG | OXYGEN SATURATION: 96 %

## 2022-11-16 DIAGNOSIS — Z99.89 OSA ON CPAP: ICD-10-CM

## 2022-11-16 DIAGNOSIS — E66.01 MORBID OBESITY (HCC): ICD-10-CM

## 2022-11-16 DIAGNOSIS — G47.33 OSA ON CPAP: ICD-10-CM

## 2022-11-16 DIAGNOSIS — I48.91 ATRIAL FIBRILLATION, UNSPECIFIED TYPE (HCC): Primary | ICD-10-CM

## 2022-11-16 NOTE — PROGRESS NOTES
Cardiology Follow Up    Mohsen Murphy  1950  [id number]  [address] [facility] [place]  [facility]  [place] [address] [phone]    1  Atrial fibrillation, unspecified type (HCC)  apixaban (Eliquis) 5 mg    DISCONTINUED: apixaban (Eliquis) 5 mg      2  GILDARDO on CPAP        3  Morbid obesity [facility])            Interval History:   Ms Paty Feliciano was admitted to [facility] on 10/30 - 11/02/22 with SIRS  She complained of a 2-3 week history of shortness of breath with exertion,   Unsteady gait and incontinence  She had recently finished a course of Augmentin for UTI  UA significant for turbid urine pyuria bacteriuria  She was treated with cefazolin 2 g for 3 days and continue oxybutynin  CTA  No PE pulmonary artery enlargement  10/31/22 TTE:  Left ventricle cavity size normal wall thickness mildly increased mild concentric hypertrophy  LVEF 55%  RV mildly dilated systolic function normal   Left atrium mildly dilated  Right atrium mildly dilated, trace MR, Trace TR  Gwyn Green was discharged home on Augmentin  Q12 hours x 13 doses  INR 1 70    11/02/22 INR 1 79  11/15/22 INR 2 0     Ms Paty Feliciano presents to our office to change her AC from Coumadin to Eliquis  Gwyn Green is accompanied by her daughter  She is enrolled in the PACE program and will be able to afford Eliquis  Gwyn Green   Denies chest pain palpitations lightheadedness dizziness dyspnea with minimal exertion  She is using a chair walker to ambulate    Gwyn Green is compliant with CPAP at night    Medical History   Primary Cardiologist Dr Teresita Spence fibrillation on Warfarin managed by her PCP   MV Regurgitation  Morbid Obesity   GILDARDO on  CPAP   OA maria dolores Knees   Denies Hx of tobacco use   Sedentary lifestyle     Patient Active Problem List   Diagnosis   • Closed fracture of right distal radius and ulna   • Malignant tumor of right kidney parenchyma ([address] )   • Primary osteoarthritis of left knee   • Primary osteoarthritis of right knee   • Chronic pain of left knee   • Myalgia   • Chest wall tenderness   • Chronic heart failure (HCC)   • Permanent atrial fibrillation (HCC)   • Cervical spondylosis without myelopathy   • Arthropathy of cervical facet joint   • Occipital neuralgia of right side   • Status post partial thyroidectomy   • Myofascial pain syndrome   • Hashimoto's thyroiditis   • Early menopause occurring in patient age younger than 39 years   • S/P bariatric surgery   • Osteoporosis   • Hyperparathyroidism ([address] )   • Reactive hypoglycemia   • Vitamin D deficiency   • Unsteady gait   • Dyspnea on exertion   • SIRS (systemic inflammatory response syndrome) (HCC)   • Incontinence   • GILDARDO (obstructive sleep apnea)   • Morbid obesity with BMI of 50 0-59 9, adult ([facility])     Past Medical History:   Diagnosis Date   • A-fib (HCC)    • Arthritis    • Atrial fibrillation (HCC)    • Cervical spondylosis with myelopathy    • Gastric bypass status for obesity    • History of transfusion     35 years ago   • Hypertension    • mass right kidney    • Renal cell adenocarcinoma (HCC)     RIGHT   • Sleep apnea      Social History     Socioeconomic History   • Marital status:       Spouse name: Not on file   • Number of children: Not on file   • Years of education: Not on file   • Highest education level: Not on file   Occupational History   • Not on file   Tobacco Use   • Smoking status: Never   • Smokeless tobacco: Never   Vaping Use   • Vaping Use: Never used   Substance and Sexual Activity   • Alcohol use: Not Currently     Alcohol/week: 0 0 standard drinks     Comment: 0   • Drug use: No   • Sexual activity: Not on file   Other Topics Concern   • Not on file   Social History Narrative    ** Merged History Encounter **          Social Determinants of Health     Financial Resource Strain: Not on file   Food Insecurity: No Food Insecurity   • Worried About Running Out of Food in the Last Year: Never true   • Ran Out of Food in the Last Year: Never true   Transportation Needs: No Transportation Needs   • Lack of Transportation (Medical): No   • Lack of Transportation (Non-Medical): No   Physical Activity: Not on file   Stress: Not on file   Social Connections: Not on file   Intimate Partner Violence: Not on file   Housing Stability: Low Risk    • Unable to Pay for Housing in the Last Year: No   • Number of Places Lived in the Last Year: 1   • Unstable Housing in the Last Year: No      Family History   Problem Relation Age of Onset   • Heart attack Mother    • Lung cancer Father 76   • Heart disease Father    • Stroke Sister    • Lung cancer Sister 64   • Prostate cancer Brother 61     Past Surgical History:   Procedure Laterality Date   • CHOLECYSTECTOMY     • COLOSTOMY     • CRYOABLATION Right     RT KIDNEY   • CYSTORRHAPHY      Bladder   Last assessed 4/6/2016    • GASTRIC BYPASS     • HERNIA REPAIR      Last assessed 4/6/2016    • HYSTERECTOMY     • MO OPEN RX DISTAL RADIUS FX, EXTRA-ARTICULAR Right 4/22/2016    Procedure: OPEN REDUCTION INTERNAL FIXATION RIGHT DISTAL RADIUS;  Surgeon: Andrew Ta MD;  Location: [facility];  Service: Orthopedics   • REVISION COLOSTOMY     • THYROID LOBECTOMY Left 8/27/2019    Procedure: LOBECTOMY THYROID, left;  Surgeon: Alok Parsons MD;  Location: [facility];  Service: Surgical Oncology   • TUBAL LIGATION     • US GUIDED THYROID BIOPSY  2/27/2019   • US GUIDED THYROID BIOPSY  5/29/2019       Current Outpatient Medications:   •  acetaminophen (TYLENOL) 325 mg tablet, Take 975 mg by mouth every 6 (six) hours as needed, Disp: , Rfl:   •  diltiazem (CARDIZEM CD) 360 MG 24 hr capsule, Take 1 capsule (360 mg total) by mouth daily, Disp: 90 capsule, Rfl: 3  •  enoxaparin (LOVENOX) 300 mg/3 mL, Inject 1 4 mL (140 mg total) under the skin every 12 (twelve) hours for 2 days, Disp: 5 6 mL, Rfl: 0  •  ergocalciferol (VITAMIN D2) 50,000 units, Take 50,000 Units by mouth once a week Takes D3 , Disp: , Rfl:   •  FLUoxetine (PROzac) 20 mg capsule, Take 20 mg by mouth 2 (two) times a day  , Disp: , Rfl:   •  gabapentin (NEURONTIN) 300 mg capsule, Take 600 mg by mouth daily at bedtime , Disp: , Rfl:   •  hydrochlorothiazide (HYDRODIURIL) 25 mg tablet, Take 25 mg by mouth daily, Disp: , Rfl:   •  LORazepam (ATIVAN) 1 mg tablet, Take 1 mg by mouth daily at bedtime as needed for anxiety  , Disp: , Rfl:   •  oxybutynin (DITROPAN) 5 mg tablet, 2 (two) times a day, Disp: , Rfl:   •  polyethylene glycol (MIRALAX) 17 g packet, Take 17 g by mouth daily for 15 days, Disp: 255 g, Rfl: 0  •  warfarin (COUMADIN) 5 mg tablet, 5mg on Monday, Wednesday, Friday, Sunday 7 5mg on Tuesday, Thursday, and Saturday Do not start before November 4, 2022 , Disp: , Rfl: 0  •  warfarin (Coumadin) 7 5 mg tablet, Take 1 tablet (7 5 mg total) by mouth daily for 1 day Do not start before November 3, 2022 , Disp: 1 tablet, Rfl: 0    Current Facility-Administered Medications:   •  denosumab (PROLIA) subcutaneous injection 60 mg, 60 mg, Subcutaneous, Q6 Months, Madhu Lane MD, 60 mg at 01/04/21 1157  No Known Allergies    Labs:  No results displayed because visit has over 200 results        Appointment on 10/07/2022   Component Date Value   • Urine Culture 10/07/2022 >100,000 cfu/ml Klebsiella pneumoniae (A)    Nurse Triage on 10/03/2022   Component Date Value   • Color, UA 10/07/2022 Yellow    • Clarity, UA 10/07/2022 Turbid    • Specific Gravity, UA 10/07/2022 1 019    • pH, UA 10/07/2022 6 0    • Leukocytes, UA 10/07/2022 Large (A)    • Nitrite, UA 10/07/2022 Positive (A)    • Protein, UA 10/07/2022 Negative    • Glucose, UA 10/07/2022 Negative    • Ketones, UA 10/07/2022 Negative    • Urobilinogen, UA 10/07/2022 <2 0    • Bilirubin, UA 10/07/2022 Negative    • Occult Blood, UA 10/07/2022 Trace (A)    • RBC, UA 10/07/2022 1-2    • WBC, UA 10/07/2022 Innumerable (A)    • Epithelial Cells 10/07/2022 Occasional    • Bacteria, UA 10/07/2022 Innumerable (A)      Imaging: XR chest 2 views    Result Date: 10/31/2022  Narrative: CHEST INDICATION:   SOB  COMPARISON:  Two-view chest 7/23/2021 EXAM PERFORMED/VIEWS:  XR CHEST PA & LATERAL FINDINGS: Normal cardiac silhouette  Aortic calcification is present  No airspace consolidation, pneumothorax, pulmonary edema, or pleural effusion  Osseous structures appear within normal limits for patient age  Impression: No radiographic evidence of acute intrathoracic process  Workstation performed: [id number]     CT head wo contrast    Result Date: 10/30/2022  Narrative: CT BRAIN - WITHOUT CONTRAST INDICATION:   Kidney cancer, monitor Weeks of unsteady gait, history of renal cancer  1/24/2019 COMPARISON:  None  TECHNIQUE:  CT examination of the brain was performed  In addition to axial images, sagittal and coronal 2D reformatted images were created and submitted for interpretation  Radiation dose length product (DLP) for this visit:  882 48 mGy-cm   This examination, like all CT scans performed in the [facility], was performed utilizing techniques to minimize radiation dose exposure, including the use of iterative  reconstruction and automated exposure control  IMAGE QUALITY:  Diagnostic  FINDINGS: PARENCHYMA:  No intracranial mass, mass effect or midline shift  No CT signs of acute infarction  No acute parenchymal hemorrhage  VENTRICLES AND EXTRA-AXIAL SPACES:  Normal for the patient's age  VISUALIZED ORBITS AND PARANASAL SINUSES:  Unremarkable  CALVARIUM AND EXTRACRANIAL SOFT TISSUES:  Normal      Impression: No acute intracranial abnormality  Workstation performed: [id number]     CTA chest pe study    Result Date: 10/30/2022  Narrative: CTA - CHEST WITH IV CONTRAST - PULMONARY ANGIOGRAM INDICATION:   Hypoxia, tachycardia, KENDRICK  History of right renal tumor with cryoablation  COMPARISON: CXR 10/30/2022 and chest CT 1/24/2019    Abdomen CT 1/17/2022  TECHNIQUE: CT angiogram timed for optimal opacification of the pulmonary arteries  Axial, sagittal, and coronal 2D reformats created from source data  Coronal 3D MIP postprocessing on the acquisition scanner  Radiation dose length product (DLP):  1816 7 mGy-cm   Radiation dose exposure minimized using iterative reconstruction and automated exposure control  IV Contrast:  85 mL of iohexol (OMNIPAQUE)  FINDINGS: PULMONARY ARTERIES:  No emboli in the main, right, and left pulmonary arteries  While there are no definite segmental or subsegmental emboli, they can be missed due to suboptimal opacification of the pulmonary arteries  LUNGS:  Lungs clear  AIRWAYS: No significant filling defects  PLEURA:  Unremarkable  HEART/GREAT VESSELS:  Normal heart size  Moderate pulmonary artery enlargement  Coronary artery calcification indicating atherosclerotic heart disease  Lipomatous hypertrophy of the interatrial septum  MEDIASTINUM AND POOJA:  Small hiatal hernia  Gastric bypass  Cholecystectomy  Bilateral renal cysts and scars  1 2 cm low-attenuation lesion in the spleen, increased from 3 mm in 2020, likely benign  CHEST WALL AND LOWER NECK: Left thyroid lobectomy  UPPER ABDOMEN:  Unremarkable  Lipid rich right adrenal adenoma  OSSEOUS STRUCTURES:  Moderate degenerative disease in the spine  Impression: No emboli in the main, right, or left pulmonary arteries  While there are no definite segmental or subsegmental emboli, they can be missed due to suboptimal opacification of the pulmonary arteries  No acute pulmonary disease  Pulmonary artery enlargement which can be seen with pulmonary hypertension  Workstation performed: [id number]     VAS lower limb venous duplex study, complete bilateral    Result Date: 10/31/2022  Narrative:  [facility] REPORT CLINICAL: Indications: Patient presents with bilateral lower extremity pain   Operative History: No Cardiovascular Surgeries Risk Factors The patient has no pertinent history  Height: 5'3"  Weight: 302 LBS   CONCLUSION: Impression: RIGHT LOWER LIMB: No evidence of acute or chronic deep vein thrombosis  No evidence of superficial thrombophlebitis noted  Doppler evaluation shows a normal response to augmentation maneuvers  Popliteal, posterior tibial and anterior tibial arterial Doppler waveforms are triphasic/biphasic  LEFT LOWER LIMB: No evidence of acute or chronic deep vein thrombosis  No evidence of superficial thrombophlebitis noted  Doppler evaluation shows a normal response to augmentation maneuvers  Popliteal, posterior tibial and anterior tibial arterial Doppler waveforms are triphasic/biphasic  Technical findings posted in [address] [place] [address] Rd  SIGNATURE: Electronically Signed by: Kina Wu MD, [address] on 2022-10-31 12:15:21 PM    Echo complete w/ contrast if indicated    Result Date: 10/31/2022  Narrative: •  Left Ventricle: Left ventricular cavity size is normal  Wall thickness is mildly increased  There is mild concentric hypertrophy  The left ventricular ejection fraction is 55%  Systolic function is normal  Wall motion is normal  •  Right Ventricle: Right ventricular cavity size is mildly dilated  Systolic function is normal  •  Left Atrium: The atrium is mildly dilated (35-41 mL/m2)  •  Right Atrium: The atrium is mildly dilated  Review of Systems:  Review of Systems   Musculoskeletal: Positive for arthralgias, gait problem and myalgias  All other systems reviewed and are negative  Physical Exam:  Physical Exam  Vitals reviewed  Constitutional:       Appearance: Normal appearance  Cardiovascular:      Rate and Rhythm: Normal rate  Rhythm irregular  Pulses: Normal pulses  Heart sounds: Normal heart sounds  Pulmonary:      Effort: Pulmonary effort is normal       Breath sounds: Normal breath sounds  Abdominal:      General: Bowel sounds are normal       Palpations: Abdomen is soft     Musculoskeletal:         General: Normal range of motion  Cervical back: Normal range of motion and neck supple  Right lower leg: No edema  Left lower leg: No edema  Skin:     General: Skin is warm and dry  Capillary Refill: Capillary refill takes less than 2 seconds  Neurological:      General: No focal deficit present  Mental Status: She is alert and oriented to person, place, and time  Psychiatric:         Mood and Affect: Mood normal          Behavior: Behavior normal          Discussion/Summary:  1  Persistent Atrial fibrillation [id number]= 3 ( age, female, CHF) on Warfarin managed by her PCP, was on Coumadin for affordability  Now enrolled in PACE program and would like to come off Coumadin and start Eliquis  Last INR on 11/15/22 2 0, took Coumadin 7 5mg last night  Instructed to stop Coumadin, INR on 11/19/22 if less than 2 0 start Eliquis 5mg BID, if INR continues 2 0 or greater continue to test until INR less than 2 0  I have reinforced monitoring for signs and symptoms of bleeding when to seek medical attention  Continue on diltiazem CD 3 60 mg daily  I have instructed Rob Lozano to contact her PCP to inform him of stopping Coumadin and starting Eliquis  30 day sample provided to Rob Lozano at this office visit  I have ordered a 90 day supply at [facility] DR MARIAH REDDY  2  GILDARDO compliant with CPAP at night   3   Morbid Obesity BMI 53 04

## 2022-11-17 ENCOUNTER — EVALUATION (OUTPATIENT)
Dept: PHYSICAL THERAPY | Facility: REHABILITATION | Age: 72
End: 2022-11-17

## 2022-11-17 DIAGNOSIS — G89.29 CHRONIC PAIN OF RIGHT KNEE: ICD-10-CM

## 2022-11-17 DIAGNOSIS — E66.01 MORBID OBESITY WITH BMI OF 50.0-59.9, ADULT (HCC): Primary | ICD-10-CM

## 2022-11-17 DIAGNOSIS — M25.562 CHRONIC PAIN OF LEFT KNEE: ICD-10-CM

## 2022-11-17 DIAGNOSIS — M25.561 CHRONIC PAIN OF RIGHT KNEE: ICD-10-CM

## 2022-11-17 DIAGNOSIS — R26.81 UNSTEADY GAIT: ICD-10-CM

## 2022-11-17 DIAGNOSIS — R06.09 DYSPNEA ON EXERTION: ICD-10-CM

## 2022-11-17 DIAGNOSIS — G89.29 CHRONIC PAIN OF LEFT KNEE: ICD-10-CM

## 2022-11-17 NOTE — PROGRESS NOTES
PT Evaluation     Today's date: 2022  Patient name: Selene Chan  : 1950  MRN: [id number]  Referring provider: Yohannes Varner DO  Dx:   Encounter Diagnosis     ICD-10-CM    1  Morbid obesity with BMI of 50 0-59 9, adult ([address] )  E66 01     Z68 43       2  Unsteady gait  R26 81       3  Dyspnea on exertion  R06 09       4  Chronic pain of left knee  M25 562     G89 29       5  Chronic pain of right knee  M25 561     G89 29                      Assessment  Assessment details: Pt is a pleasant 66 y o  female presenting to outpatient physical therapy with unsteady gait, dyspnea on exertion, chronic pain of left knee and chronic pain of right knee  Pt presents with pain, decreased range of motion, decreased strength, and decreased tolerance to activity  Pt is a good candidate for outpatient physical therapy and would benefit from skilled physical therapy to address limitations and to achieve goals   Thank you for this referral    Impairments: abnormal coordination, abnormal gait, abnormal or restricted ROM, abnormal movement, activity intolerance, impaired balance, impaired physical strength, lacks appropriate home exercise program, pain with function, weight-bearing intolerance, poor posture  and poor body mechanics  Understanding of Dx/Px/POC: good   Prognosis: good    Goals  STG:   Pt will tolerate 30 minutes of exercise by week 2 to demonstrate improved tolerance to functional mobility    Pt will decrease 5xSTS score by 10 seconds by week 4 to demonstrate improved safety with transfers and mobility   Pt will be I with initial HEP to progress towards independence with exercise     LTG:   Pt will be able to complete STS with 1 UE support by d/c    Pt will decrease overall TUG score by additional 10 seconds to demonstrate improved safety with mobility and decreased risk for falls   Pt will improve LE strength to 4+/5 or greater by d/c for increased ease and safety with functional mobility and activities Pt will be I with modified/updated HEP and demonstrate ability to complete with confidence and proper mechanics/form to safely be d/c from skilled OP PT and continue with exercises on their own     Plan  Patient would benefit from: PT eval and skilled physical therapy  Planned modality interventions: cryotherapy and thermotherapy: hydrocollator packs  Planned therapy interventions: abdominal trunk stabilization, balance/weight bearing training, balance, body mechanics training, functional ROM exercises, gait training, home exercise program, therapeutic exercise, therapeutic activities, stretching and strengthening  Frequency: 2x week  Duration in visits: 12  Duration in weeks: 6  Treatment plan discussed with: patient and family        Subjective Evaluation    History of Present Illness  Mechanism of injury: Pt is a 66year old female presenting to PT with diagnoses of bilateral knee OA, bilateral chronic knee pain and physical deconditioning  Pt was previously attending PT in August, however, developed a severe UTI and was hospitalized  Pt reports she was supposed to get acute inpatient rehab, however, she wasn't receiving care so her family removed her from the facility and brought her home  She was supposed to receive home health PT/OT services, however, she didn't receive a call back so then decided to return to [facility] to start  She reports she has been using a regular walker sometimes at home, using her rollator when outside  Pt reports she has not been as active or walking as much at home as she was prior to admission  She reports her knees have been bothering her a lot recently, is scheduled to see Dr Shilpa Young on Monday for bilateral CSIs  Pain Location: bilateral knees    Pain Type: aching, throbbing    Pain Intensity:  Current: 6  Best: 6  Worst: 8    Pt reports increased pain and/or difficulty with: sit-stand transfers, standing, walking      Pt reports decreased pain with: medication, ice, rest            Recurrent probem    Quality of life: fair    Social Support  Steps to enter house: yes  Stairs in house: no       Diagnostic Tests  X-ray: abnormal  Patient Goals  Patient goals for therapy: decreased pain, improved balance, increased motion, increased strength, independence with ADLs/IADLs and return to sport/leisure activities  Patient goal: "To get up from chair easier and be more active "        Objective     Active Range of Motion   Left Shoulder   Normal active range of motion    Right Shoulder   Normal active range of motion    Strength/Myotome Testing     Left Shoulder     Planes of Motion   Flexion: 3+   Abduction: 3+   External rotation at 0°: 3+   Internal rotation at 0°: 4     Isolated Muscles   Biceps: 3+   Triceps: 3+     Right Shoulder     Planes of Motion   Flexion: 3+   Abduction: 3+   External rotation at 0°: 3+   Internal rotation at 0°: 4     Isolated Muscles   Biceps: 3+   Triceps: 3+     Left Hip   Planes of Motion   Flexion: 4-  Extension: 3-  Abduction: 4  Adduction: 4    Right Hip   Planes of Motion   Flexion: 4  Extension: 3-  Abduction: 4  Adduction: 4    Left Knee   Flexion: 4-  Extension: 4-    Right Knee   Flexion: 4-  Extension: 4-    Ambulation     Ambulation: Level Surfaces   Ambulation with assistive device: independent  Ambulation without assistive device: contact guard assist    Observational Gait   Gait: antalgic   Decreased walking speed and stride length  Quality of Movement During Gait   Trunk  Forward lean  Pelvis    Pelvis (Left): Positive Trendelenburg  Pelvis (Right): Positive Trendelenburg       Functional Assessment        Comments                 Precautions: Chronic heart failure, permanent A-fib, OA b/l knees, myofascial pain syndrome, osteoporosis, reactive hypoglycemia, hashimoto's thyroiditis, hyperparathyroidism     Daily Treatment Diary      Assessment  11/17                 Alex/Reval  MD                 FOTO        *       **   Manuals Exercise Diary     Opp UE Press Down/LE Press Up Isometric with Ball                    Iso Hip ADD                    TB Hip ABD + Shoulder ABD                    TB Rows              B Bicep Curls - Wrist Cuff Weights                    Iso Hip Extension with Ball Under Foot                    TB Shoulder H  ABD                    Standing Hip ABD @ Railing                    Sidestepping @ Railing                                                                                                                                                                                   Modalities

## 2022-11-21 ENCOUNTER — OFFICE VISIT (OUTPATIENT)
Dept: PHYSICAL THERAPY | Facility: REHABILITATION | Age: 72
End: 2022-11-21

## 2022-11-21 ENCOUNTER — OFFICE VISIT (OUTPATIENT)
Dept: OBGYN CLINIC | Facility: CLINIC | Age: 72
End: 2022-11-21

## 2022-11-21 VITALS
HEIGHT: 63 IN | DIASTOLIC BLOOD PRESSURE: 72 MMHG | WEIGHT: 293 LBS | BODY MASS INDEX: 51.91 KG/M2 | SYSTOLIC BLOOD PRESSURE: 122 MMHG

## 2022-11-21 DIAGNOSIS — G89.29 CHRONIC PAIN OF RIGHT KNEE: ICD-10-CM

## 2022-11-21 DIAGNOSIS — M17.0 PRIMARY OSTEOARTHRITIS OF BOTH KNEES: Primary | ICD-10-CM

## 2022-11-21 DIAGNOSIS — E66.01 MORBID OBESITY WITH BMI OF 50.0-59.9, ADULT (HCC): ICD-10-CM

## 2022-11-21 DIAGNOSIS — G89.29 CHRONIC PAIN OF LEFT KNEE: ICD-10-CM

## 2022-11-21 DIAGNOSIS — M25.562 CHRONIC PAIN OF LEFT KNEE: ICD-10-CM

## 2022-11-21 DIAGNOSIS — R06.09 DYSPNEA ON EXERTION: ICD-10-CM

## 2022-11-21 DIAGNOSIS — R26.81 UNSTEADY GAIT: Primary | ICD-10-CM

## 2022-11-21 DIAGNOSIS — M25.561 CHRONIC PAIN OF RIGHT KNEE: ICD-10-CM

## 2022-11-21 RX ORDER — LIDOCAINE HYDROCHLORIDE 10 MG/ML
2 INJECTION, SOLUTION INFILTRATION; PERINEURAL
Status: COMPLETED | OUTPATIENT
Start: 2022-11-21 | End: 2022-11-21

## 2022-11-21 RX ORDER — METHYLPREDNISOLONE ACETATE 40 MG/ML
1 INJECTION, SUSPENSION INTRA-ARTICULAR; INTRALESIONAL; INTRAMUSCULAR; SOFT TISSUE
Status: COMPLETED | OUTPATIENT
Start: 2022-11-21 | End: 2022-11-21

## 2022-11-21 RX ADMIN — LIDOCAINE HYDROCHLORIDE 2 ML: 10 INJECTION, SOLUTION INFILTRATION; PERINEURAL at 13:42

## 2022-11-21 RX ADMIN — METHYLPREDNISOLONE ACETATE 1 ML: 40 INJECTION, SUSPENSION INTRA-ARTICULAR; INTRALESIONAL; INTRAMUSCULAR; SOFT TISSUE at 13:42

## 2022-11-21 NOTE — PROGRESS NOTES
Patient Name:  Elvira Gomes  MRN:  [id number]    Assessment & Plan     Bilateral knee DJD  1  Corticosteroid injection performed today into the bilateral knees  2  Activities as tolerated with modification to avoid pain  3  Continue Tylenol arthritis as needed  4  Advised corticosteroid injections may be performed every three months as needed  Patient may follow-up with me in three months for repeat corticosteroid injections at that time as indicated  Chief Complaint     Bilateral knee DJD    History of the Present Illness     Elvira Gomes is a 66 y o  female who reports to the office today for evaluation of her bilateral knees  She does have a known history of bilateral knee DJD  She sees Dr Milton Ramirez for this issue  She has had corticosteroid injections in the knees in the past which have provided adequate relief  She notes worsening pain over the past few weeks  She denies any injury or trauma  She notes generalized bilateral knee pain with associated swelling and stiffness  Pain is worse with prolonged standing and walking as well as transitioning from a seated to standing position  She is performing physical therapy for overall strengthening and conditioning with improvement  She takes Tylenol arthritis for pain with improvement as well  No numbness or tingling  No fevers or chills  Physical Exam     /72   Ht 5' 3" (1 6 m)   Wt 136 kg (299 lb)   BMI 52 97 kg/m²     Bilateral knees:  No gross deformity  Skin intact  No erythema ecchymosis or swelling  No effusion  No significant joint line test   Range of motion 0-120 bilaterally without significant pain  Stable to varus and valgus stress bilaterally  Stable Lachman test   Negative posterior drawer test   Negative Toni's test     Eyes: Anicteric sclerae  ENT: Trachea midline  Lungs: Normal respiratory effort  CV: Capillary refill is less than 2 seconds  Skin: Intact without erythema    Lymph: No palpable lymphadenopathy  Neuro: Sensation is grossly intact to light touch  Psych: Mood and affect are appropriate  Past Medical History:   Diagnosis Date   • A-fib [facility])    • Arthritis    • Atrial fibrillation (HCC)    • Cervical spondylosis with myelopathy    • Gastric bypass status for obesity    • History of transfusion     35 years ago   • Hypertension    • mass right kidney    • Renal cell adenocarcinoma (HCC)     RIGHT   • Sleep apnea        Past Surgical History:   Procedure Laterality Date   • CHOLECYSTECTOMY     • COLOSTOMY     • CRYOABLATION Right     RT KIDNEY   • CYSTORRHAPHY      Bladder   Last assessed 4/6/2016    • GASTRIC BYPASS     • HERNIA REPAIR      Last assessed 4/6/2016    • HYSTERECTOMY     • CT OPEN RX DISTAL RADIUS FX, EXTRA-ARTICULAR Right 4/22/2016    Procedure: OPEN REDUCTION INTERNAL FIXATION RIGHT DISTAL RADIUS;  Surgeon: Elton Jacob MD;  Location: [facility];  Service: Orthopedics   • REVISION COLOSTOMY     • THYROID LOBECTOMY Left 8/27/2019    Procedure: LOBECTOMY THYROID, left;  Surgeon: Flora Velazquez MD;  Location: [facility];  Service: Surgical Oncology   • TUBAL LIGATION     • US GUIDED THYROID BIOPSY  2/27/2019   • US GUIDED THYROID BIOPSY  5/29/2019       No Known Allergies    Current Outpatient Medications on File Prior to Visit   Medication Sig Dispense Refill   • acetaminophen (TYLENOL) 325 mg tablet Take 975 mg by mouth every 6 (six) hours as needed     • apixaban (Eliquis) 5 mg Take 1 tablet (5 mg total) by mouth 2 (two) times a day 180 tablet 3   • diltiazem (CARDIZEM CD) 360 MG 24 hr capsule Take 1 capsule (360 mg total) by mouth daily 90 capsule 3   • ergocalciferol (VITAMIN D2) 50,000 units Take 50,000 Units by mouth once a week Takes D3      • FLUoxetine (PROzac) 20 mg capsule Take 20 mg by mouth 2 (two) times a day       • gabapentin (NEURONTIN) 300 mg capsule Take 600 mg by mouth daily at bedtime      • hydrochlorothiazide (HYDRODIURIL) 25 mg tablet Take 25 mg by mouth daily     • LORazepam (ATIVAN) 1 mg tablet Take 1 mg by mouth daily at bedtime as needed for anxiety  • oxybutynin (DITROPAN) 5 mg tablet 2 (two) times a day       Current Facility-Administered Medications on File Prior to Visit   Medication Dose Route Frequency Provider Last Rate Last Admin   • denosumab (PROLIA) subcutaneous injection 60 mg  60 mg Subcutaneous Q6 Months Mercedez Aggarwal MD   60 mg at 01/04/21 1157       Social History     Tobacco Use   • Smoking status: Never   • Smokeless tobacco: Never   Vaping Use   • Vaping Use: Never used   Substance Use Topics   • Alcohol use: Not Currently     Alcohol/week: 0 0 standard drinks     Comment: 0   • Drug use: No       Family History   Problem Relation Age of Onset   • Heart attack Mother    • Lung cancer Father 76   • Heart disease Father    • Stroke Sister    • Lung cancer Sister 64   • Prostate cancer Brother 61       Review of Systems     As stated in the HPI  All other systems reviewed and are negative        Large joint arthrocentesis: bilateral knee  Procedure Details  Location: knee - bilateral knee  Needle size: 22 G  Ultrasound guidance: no  Approach: anterolateral    Medications (Right): 2 mL lidocaine 1 %; 1 mL methylPREDNISolone acetate 40 mg/mLMedications (Left): 2 mL lidocaine 1 %; 1 mL methylPREDNISolone acetate 40 mg/mL   Patient tolerance: patient tolerated the procedure well with no immediate complications  Dressing:  Sterile dressing applied

## 2022-11-21 NOTE — PROGRESS NOTES
Daily Note     Today's date: 2022  Patient name: Rosa Lomax  : 1950  MRN: [id number]  Referring provider: Susan Jessica DO  Dx:   Encounter Diagnosis     ICD-10-CM    1  Unsteady gait  R26 81       2  Morbid obesity with BMI of 50 0-59 9, adult ([facility] )  E66 01     Z68 43       3  Dyspnea on exertion  R06 09       4  Chronic pain of right knee  M25 561     G89 29       5  Chronic pain of left knee  M25 562     G89 29                      Subjective:  Hyla Shown reports that she is doing much better since she was in PT last        Objective: See treatment diary below      Assessment: Patient tolerated treatment well  Patient performed ex as noted with direct supervision  Patient was encouraged to perform ex within her tolerance in regard to intensity  Hyla Shown responded well to the exercises performed with minimal rest breaks needed and demonstrated improved ability to perform sit to stand transfers  Patient would benefit from continued PT intervention to address deficits and attain set goals  Plan: Continue per plan of care        Precautions: Chronic heart failure, permanent A-fib, OA b/l knees, myofascial pain syndrome, osteoporosis, reactive hypoglycemia, hashimoto's thyroiditis, hyperparathyroidism     Daily Treatment Diary      Assessment                  Alex/Reval  MD                 FOTO        *       **   Manuals                                                 Exercise Diary     Opp UE Press Down/LE Press Up Isometric with Ball   5"x10   b/l                 Iso Hip ADD   10"x10                 TB Hip ABD + Shoulder ABD   otb  5"x10                 TB Rows  otb 5"x10            B Bicep Curls - Wrist Cuff Weights   2#  2x10                 Iso Hip Extension with Ball Under Foot   10"x10                 TB Shoulder H  ABD   otb  5"x10                 Standing Hip ABD @ Railing   nv                 Sidestepping @ Railing   x3 laps Modalities

## 2022-11-23 ENCOUNTER — OFFICE VISIT (OUTPATIENT)
Dept: PHYSICAL THERAPY | Facility: REHABILITATION | Age: 72
End: 2022-11-23

## 2022-11-23 DIAGNOSIS — M25.562 CHRONIC PAIN OF LEFT KNEE: ICD-10-CM

## 2022-11-23 DIAGNOSIS — G89.29 CHRONIC PAIN OF LEFT KNEE: ICD-10-CM

## 2022-11-23 DIAGNOSIS — G89.29 CHRONIC PAIN OF RIGHT KNEE: ICD-10-CM

## 2022-11-23 DIAGNOSIS — R06.09 DYSPNEA ON EXERTION: ICD-10-CM

## 2022-11-23 DIAGNOSIS — M25.561 CHRONIC PAIN OF RIGHT KNEE: ICD-10-CM

## 2022-11-23 DIAGNOSIS — R26.81 UNSTEADY GAIT: Primary | ICD-10-CM

## 2022-11-23 DIAGNOSIS — E66.01 MORBID OBESITY WITH BMI OF 50.0-59.9, ADULT (HCC): ICD-10-CM

## 2022-11-23 NOTE — PROGRESS NOTES
Daily Note     Today's date: 2022  Patient name: Kassidy Reddy  : 1950  MRN: [id number]  Referring provider: Rebecca Julio DO  Dx:   Encounter Diagnosis     ICD-10-CM    1  Unsteady gait  R26 81       2  Morbid obesity with BMI of 50 0-59 9, adult ([facility] )  E66 01     Z68 43       3  Dyspnea on exertion  R06 09       4  Chronic pain of left knee  M25 562     G89 29       5  Chronic pain of right knee  M25 561     G89 29                      Subjective: Jill Clemens reports that she is doing well  She notes that she wasn't sore at all after her last visit  Objective: See treatment diary below      Assessment: Patient tolerated treatment well  Patient performed ex with progressions as noted with direct supervision  Provided cues as needed to ensure good ex technique and benefit  Patient requested to work on step ups secondary to needing to improve her function with navigating a step for an upcoming cruise and also for visiting family  Added step ups with low reps this visit with appropriate patient challenge  Provided education for exercise/activity progressions at home  Patient would benefit from continued PT intervention to address deficits and attain set goals  Plan: Continue per plan of care        Precautions: Chronic heart failure, permanent A-fib, OA b/l knees, myofascial pain syndrome, osteoporosis, reactive hypoglycemia, hashimoto's thyroiditis, hyperparathyroidism     Daily Treatment Diary      Assessment                 Alex/Reval  MD                 FOTO        *       **   Manuals                                                 Exercise Diary     Opp UE Press Down/LE Press Up Isometric with Ball   5"x10   b/l 5"x12 b/l                Iso Hip ADD   10"x10 10"x10                TB Hip ABD + Shoulder ABD   otb  5"x10 otb  5"x12                TB Rows  otb 5"x10 otb  5"x12           B Bicep Curls - Wrist Cuff Weights   2#  2x10 2#  2x10                Iso Hip Extension with Ball Under Foot   10"x10 10"x10                TB Shoulder H  ABD   otb  5"x10 otb  5"x12                Standing Hip ABD @ Railing   nv x5 b/l                Sidestepping @ Railing   x3 laps x3 laps                4 inch step up     x5 b/l                UBE    L1 2'/2'                                                                                                                                      Modalities

## 2022-11-28 ENCOUNTER — TELEPHONE (OUTPATIENT)
Dept: CARDIOLOGY CLINIC | Facility: CLINIC | Age: 72
End: 2022-11-28

## 2022-11-28 ENCOUNTER — OFFICE VISIT (OUTPATIENT)
Dept: PHYSICAL THERAPY | Facility: REHABILITATION | Age: 72
End: 2022-11-28

## 2022-11-28 DIAGNOSIS — E66.01 MORBID OBESITY WITH BMI OF 50.0-59.9, ADULT (HCC): ICD-10-CM

## 2022-11-28 DIAGNOSIS — G89.29 CHRONIC PAIN OF RIGHT KNEE: ICD-10-CM

## 2022-11-28 DIAGNOSIS — G89.29 CHRONIC PAIN OF LEFT KNEE: ICD-10-CM

## 2022-11-28 DIAGNOSIS — M25.562 CHRONIC PAIN OF LEFT KNEE: ICD-10-CM

## 2022-11-28 DIAGNOSIS — M25.561 CHRONIC PAIN OF RIGHT KNEE: ICD-10-CM

## 2022-11-28 DIAGNOSIS — R06.09 DYSPNEA ON EXERTION: ICD-10-CM

## 2022-11-28 DIAGNOSIS — R26.81 UNSTEADY GAIT: Primary | ICD-10-CM

## 2022-11-28 NOTE — TELEPHONE ENCOUNTER
P/c'd , checked INR because she is on Eliquis and is panic because it was in the 1 range  Advised that the [address] [place] [address] works differently so she does not have to check her INR anymore  Verbally understood

## 2022-11-28 NOTE — PROGRESS NOTES
Daily Note     Today's date: 2022  Patient name: Leonard Paniagua  : 1950  MRN: [id number]  Referring provider: Caryn Ley DO  Dx:   Encounter Diagnosis     ICD-10-CM    1  Unsteady gait  R26 81       2  Morbid obesity with BMI of 50 0-59 9, adult ([facility] )  E66 01     Z68 43       3  Dyspnea on exertion  R06 09       4  Chronic pain of left knee  M25 562     G89 29       5  Chronic pain of right knee  M25 561     G89 29                      Subjective: Pt reports that she went shopping with her sister this weekend who insisted that she didn't need her walker so she didn't take it with and it did not go well  Objective: See treatment diary below      Assessment: Pt tolerated treatment well  Pt was able to tolerate progression of exercises without issues on this date  Pt noted fatigue at the end of the session and stated that she felt like she did enough of a workout  Patient demonstrated fatigue post treatment, exhibited good technique with therapeutic exercises and would benefit from continued PT  Plan: Continue per plan of care  Progress treatment as tolerated         Precautions: Chronic heart failure, permanent A-fib, OA b/l knees, myofascial pain syndrome, osteoporosis, reactive hypoglycemia, hashimoto's thyroiditis, hyperparathyroidism     Daily Treatment Diary      Assessment                Alex/Reval  MD                 FOTO        *       **   Manuals                                                 Exercise Diary     Opp UE Press Down/LE Press Up Isometric with Ball   5"x10   b/l 5"x12 b/l 5"x12 b/l               Iso Hip ADD   10"x10 10"x10 10"x10               TB Hip ABD + Shoulder ABD   otb  5"x10 otb  5"x12 OTB 5"x15               TB Rows  otb 5"x10 otb  5"x12 OTB 5"x15          B Bicep Curls - Wrist Cuff Weights   2#  2x10 2#  2x10 2 5# 2x10               Iso Hip Extension with Ball Under Foot   10"x10 10"x10 10"x10               TB Shoulder H  ABD otb  5"x10 otb  5"x12 OTB 5"x15               Standing Hip ABD @ Railing   nv x5 b/l x10 b/l               Sidestepping @ Railing   x3 laps x3 laps x3 laps               4 inch step up     x5 b/l x5 b/l               UBE    L1 2'/2' Missed NV                                                                                                                                     Modalities

## 2022-12-01 ENCOUNTER — OFFICE VISIT (OUTPATIENT)
Dept: PHYSICAL THERAPY | Facility: REHABILITATION | Age: 72
End: 2022-12-01

## 2022-12-01 DIAGNOSIS — G89.29 CHRONIC PAIN OF RIGHT KNEE: ICD-10-CM

## 2022-12-01 DIAGNOSIS — E66.01 MORBID OBESITY WITH BMI OF 50.0-59.9, ADULT (HCC): ICD-10-CM

## 2022-12-01 DIAGNOSIS — G89.29 CHRONIC PAIN OF LEFT KNEE: ICD-10-CM

## 2022-12-01 DIAGNOSIS — M25.562 CHRONIC PAIN OF LEFT KNEE: ICD-10-CM

## 2022-12-01 DIAGNOSIS — R26.81 UNSTEADY GAIT: Primary | ICD-10-CM

## 2022-12-01 DIAGNOSIS — R06.09 DYSPNEA ON EXERTION: ICD-10-CM

## 2022-12-01 DIAGNOSIS — M25.561 CHRONIC PAIN OF RIGHT KNEE: ICD-10-CM

## 2022-12-05 ENCOUNTER — OFFICE VISIT (OUTPATIENT)
Dept: PHYSICAL THERAPY | Facility: REHABILITATION | Age: 72
End: 2022-12-05

## 2022-12-05 DIAGNOSIS — R26.81 UNSTEADY GAIT: Primary | ICD-10-CM

## 2022-12-05 DIAGNOSIS — G89.29 CHRONIC PAIN OF LEFT KNEE: ICD-10-CM

## 2022-12-05 DIAGNOSIS — M25.562 CHRONIC PAIN OF LEFT KNEE: ICD-10-CM

## 2022-12-05 DIAGNOSIS — G89.29 CHRONIC PAIN OF RIGHT KNEE: ICD-10-CM

## 2022-12-05 DIAGNOSIS — E66.01 MORBID OBESITY WITH BMI OF 50.0-59.9, ADULT (HCC): ICD-10-CM

## 2022-12-05 DIAGNOSIS — M25.561 CHRONIC PAIN OF RIGHT KNEE: ICD-10-CM

## 2022-12-05 DIAGNOSIS — R06.09 DYSPNEA ON EXERTION: ICD-10-CM

## 2022-12-05 NOTE — PROGRESS NOTES
Daily Note     Today's date: 2022  Patient name: Micheal Martin  : 1950  MRN: [id number]  Referring provider: Cesar Field DO  Dx:   Encounter Diagnosis     ICD-10-CM    1  Unsteady gait  R26 81       2  Morbid obesity with BMI of 50 0-59 9, adult ([facility] )  E66 01     Z68 43       3  Dyspnea on exertion  R06 09       4  Chronic pain of right knee  M25 561     G89 29       5  Chronic pain of left knee  M25 562     G89 29                      Subjective:  Kelvin Harmon reports that she is doing well  She continues to feel stronger and is getting around much better without an AD  She reports compliance with performing her HEP at home and with following her hydration and dietary goals  Objective: See treatment diary below      Assessment: Patient tolerated treatment well  Patient performed ex with progressions as noted  Patient demonstrates improving strength and endurance for activity as well as improved gait quality/steadiness  Patient would benefit from continued PT intervention to address deficits and attain set goals  Plan: Continue per plan of care        Precautions: Chronic heart failure, permanent A-fib, OA b/l knees, myofascial pain syndrome, osteoporosis, reactive hypoglycemia, hashimoto's thyroiditis, hyperparathyroidism     Daily Treatment Diary      Assessment             Alex/Reval  MD                 FOTO        *       **   Manuals                                                 Exercise Diary     Opp UE Press Down/LE Press Up Isometric with Ball   5"x10   b/l 5"x12 b/l 5"x12 b/l 5"x12 b/l  5"x15 b/l            Iso Hip ADD   10"x10 10"x10 10"x10 10"x15  10"x15            TB Hip ABD + Shoulder ABD   otb  5"x10 otb  5"x12 OTB 5"x15 OTB 5"x20  PTB  5"x15            TB Rows  otb 5"x10 otb  5"x12 OTB 5"x15 OTB 5"x20 PTB  5"x15        B Bicep Curls - Wrist Cuff Weights   2#  2x10 2#  2x10 2 5# 2x10 2 5# 2x10  2 5#  2x10            Iso Hip Extension with Ball Under Foot   10"x10 10"x10 10"x10 10"x10  10"x15            TB Shoulder H  ABD   otb  5"x10 otb  5"x12 OTB 5"x15 OTB 5"x20  PTB  5"x15            Standing Hip ABD @ Railing   nv x5 b/l x10 b/l x12 b/l  x15 b/l            Sidestepping @ Railing   x3 laps x3 laps x3 laps  x4 laps  x5 laps            4 inch step up     x5 b/l x5 b/l x5 b/l  2x5 b/l            UBE    L1 2'/2' Missed NV L1 2'/2' L1 3'/3'                                                                                                                                 Modalities

## 2022-12-08 ENCOUNTER — APPOINTMENT (OUTPATIENT)
Dept: PHYSICAL THERAPY | Facility: REHABILITATION | Age: 72
End: 2022-12-08

## 2022-12-12 ENCOUNTER — OFFICE VISIT (OUTPATIENT)
Dept: PHYSICAL THERAPY | Facility: REHABILITATION | Age: 72
End: 2022-12-12

## 2022-12-12 DIAGNOSIS — M25.561 CHRONIC PAIN OF RIGHT KNEE: ICD-10-CM

## 2022-12-12 DIAGNOSIS — R26.81 UNSTEADY GAIT: Primary | ICD-10-CM

## 2022-12-12 DIAGNOSIS — R06.09 DYSPNEA ON EXERTION: ICD-10-CM

## 2022-12-12 DIAGNOSIS — G89.29 CHRONIC PAIN OF RIGHT KNEE: ICD-10-CM

## 2022-12-12 DIAGNOSIS — M25.562 CHRONIC PAIN OF LEFT KNEE: ICD-10-CM

## 2022-12-12 DIAGNOSIS — G89.29 CHRONIC PAIN OF LEFT KNEE: ICD-10-CM

## 2022-12-12 DIAGNOSIS — E66.01 MORBID OBESITY WITH BMI OF 50.0-59.9, ADULT (HCC): ICD-10-CM

## 2022-12-12 NOTE — PROGRESS NOTES
Daily Note     Today's date: 2022  Patient name: Paulie Brown  : 1950  MRN: [id number]  Referring provider: To Ward DO  Dx:   Encounter Diagnosis     ICD-10-CM    1  Unsteady gait  R26 81       2  Morbid obesity with BMI of 50 0-59 9, adult ([facility] )  E66 01     Z68 43       3  Dyspnea on exertion  R06 09       4  Chronic pain of right knee  M25 561     G89 29       5  Chronic pain of left knee  M25 562     G89 29                      Subjective: Pt reports that she has been feeling good and wore better shoes to her appointment today  Objective: See treatment diary below      Assessment: Pt tolerated treatment well  Pt arrived to therapy on this date without her AD  Pt was able to complete all exercises without reports increased pain or issue  Pt was most challenged with standing based exercises on this date  Patient demonstrated fatigue post treatment, exhibited good technique with therapeutic exercises and would benefit from continued PT  Plan: Continue per plan of care  Progress treatment as tolerated         Precautions: Chronic heart failure, permanent A-fib, OA b/l knees, myofascial pain syndrome, osteoporosis, reactive hypoglycemia, hashimoto's thyroiditis, hyperparathyroidism     Daily Treatment Diary      Assessment           Alex/Reval  MD                 FOTO        *       **   Manuals                                                 Exercise Diary     Opp UE Press Down/LE Press Up Isometric with Ball   5"x10   b/l 5"x12 b/l 5"x12 b/l 5"x12 b/l  5"x15 b/l  5"x15 b/l          Iso Hip ADD   10"x10 10"x10 10"x10 10"x15  10"x15  10"x15          TB Hip ABD + Shoulder ABD   otb  5"x10 otb  5"x12 OTB 5"x15 OTB 5"x20  PTB  5"x15  PTB 5"x15          TB Rows  otb 5"x10 otb  5"x12 OTB 5"x15 OTB 5"x20 PTB  5"x15 PTB 5"x15       B Bicep Curls - Wrist Cuff Weights   2#  2x10 2#  2x10 2 5# 2x10 2 5# 2x10  2 5#  2x10  2 5#  2x10          Iso Hip Extension with Ball Under Foot   10"x10 10"x10 10"x10 10"x10  10"x15  10"x15          TB Shoulder H  ABD   otb  5"x10 otb  5"x12 OTB 5"x15 OTB 5"x20  PTB  5"x15 PTB 5"x15          Standing Hip ABD @ Railing   nv x5 b/l x10 b/l x12 b/l  x15 b/l  x20 b/l          Sidestepping @ Railing   x3 laps x3 laps x3 laps  x4 laps  x5 laps  x5 laps          4 inch step up     x5 b/l x5 b/l x5 b/l  2x5 b/l  x10 ea b/l          UBE    L1 2'/2' Missed NV L1 2'/2' L1 3'/3'  L1 3'/3'                                                                                                                               Modalities

## 2022-12-15 ENCOUNTER — APPOINTMENT (OUTPATIENT)
Dept: PHYSICAL THERAPY | Facility: REHABILITATION | Age: 72
End: 2022-12-15

## 2022-12-19 ENCOUNTER — TELEPHONE (OUTPATIENT)
Dept: OBGYN CLINIC | Facility: CLINIC | Age: 72
End: 2022-12-19

## 2022-12-19 ENCOUNTER — APPOINTMENT (OUTPATIENT)
Dept: PHYSICAL THERAPY | Facility: REHABILITATION | Age: 72
End: 2022-12-19

## 2022-12-19 NOTE — TELEPHONE ENCOUNTER
Caller: Self    Doctor: Pat Christopher    Reason for call: Patient states that today her knees gave out and she fell  Both of her knees feel very tight  She is having 3/10 while sitting  Please advise      Call back#: [phone]

## 2022-12-20 ENCOUNTER — TELEPHONE (OUTPATIENT)
Dept: OTHER | Facility: OTHER | Age: 72
End: 2022-12-20

## 2022-12-20 NOTE — TELEPHONE ENCOUNTER
Caller: Patient    Doctor: Fernando Cheng PA-c    Reason for call: Patient returned call    Relayed message

## 2022-12-20 NOTE — TELEPHONE ENCOUNTER
Patient made aware and verbalized understanding  Patient states that she is going on a cruise next week and she is wondering if she can get a shot?

## 2022-12-20 NOTE — TELEPHONE ENCOUNTER
Unfortunately it is too soon to undergo repeat corticosteroid injections at this time  She received corticosteroid injections on 11/21   3 months must elapse before undergoing repeat injections

## 2022-12-20 NOTE — TELEPHONE ENCOUNTER
Patient stated she had an appointment today and was supposed to be sent over an antibiotic incase her infection gets worse  Nothing seemed to have been sent over for her yet  She would like the medication sent to the [facility] on [address]

## 2022-12-20 NOTE — TELEPHONE ENCOUNTER
I recommend rest, ice, Tylenol  She can come in to see me if her pain gets worse or doesn't resolve    We can also send her to PT if she'd like to help strengthen the knees

## 2022-12-20 NOTE — TELEPHONE ENCOUNTER
Waiting for the results of today's urine culture so it is culture directed  Only supposed to start it if she becomes symptomatic

## 2022-12-21 ENCOUNTER — TELEPHONE (OUTPATIENT)
Dept: OTHER | Facility: OTHER | Age: 72
End: 2022-12-21

## 2022-12-21 NOTE — TELEPHONE ENCOUNTER
Patient stated that she was see in the office 12/20/2022  Patient stated she was advised to start taking denosumab (Donita Edwards), however medication was not at pharmacy  Patient would like it sent to [facility] [address] Please call back to address

## 2022-12-22 ENCOUNTER — TELEPHONE (OUTPATIENT)
Dept: UROLOGY | Facility: CLINIC | Age: 72
End: 2022-12-22

## 2022-12-22 ENCOUNTER — APPOINTMENT (OUTPATIENT)
Dept: PHYSICAL THERAPY | Facility: REHABILITATION | Age: 72
End: 2022-12-22

## 2022-12-22 NOTE — TELEPHONE ENCOUNTER
[facility] 1 hour ago (10:50 AM)     As previously stated, I am waiting for the final result of her culture to prescribe a culture directed antibiotic  This is only to be taken IF she demonstrates symptoms of infections  Note         Aquilino Sanford routed conversation to [facility] Provider 1 hour ago (10:22 AM)     Magdy Mcclellan 1 hour ago (10:22 AM)     LS  Patient called in stating that she was told by Dr Vinita Lerner that she would prescribe her a medication to have on hand in case she experiences another flare up  Patient says that she called her pharmacy and was told she does not have any new prescriptions available  Please contact patient and follow up

## 2022-12-22 NOTE — TELEPHONE ENCOUNTER
Patient is calling to review results of the urine culture  She is leaving on a cruise, and the doctor stated she would call in a medication before she goes

## 2022-12-22 NOTE — TELEPHONE ENCOUNTER
Patient called in stating that she was told by Dr Rachel Pastor that she would prescribe her a medication to have on hand in case she experiences another flare up  Patient says that she called her pharmacy and was told she does not have any new prescriptions available  Please contact patient and follow up

## 2022-12-22 NOTE — TELEPHONE ENCOUNTER
As previously stated, I am waiting for the final result of her culture to prescribe a culture directed antibiotic  This is only to be taken IF she demonstrates symptoms of infections

## 2022-12-23 DIAGNOSIS — N39.0 RECURRENT UTI: Primary | ICD-10-CM

## 2022-12-23 RX ORDER — SULFAMETHOXAZOLE AND TRIMETHOPRIM 800; 160 MG/1; MG/1
1 TABLET ORAL 2 TIMES DAILY
Qty: 10 TABLET | Refills: 0 | Status: SHIPPED | OUTPATIENT
Start: 2022-12-23 | End: 2022-12-28

## 2022-12-23 NOTE — TELEPHONE ENCOUNTER
Called patient - to inform her that she has a UTI and we sent in an Rx to the pharmacy for her  She will be on the antibiotic for 5 days  She is leaving on a 7 day cruise on 12/30/22 and would like additional antibiotic to take with her  Do you want her to repeat the culture before she leaves?   Please advise

## 2022-12-23 NOTE — TELEPHONE ENCOUNTER
Per patient's phone call, she received a message in red via [employer]  A prescription was sent to [facility] for her  She would like a call back to let her know what's going on  She would like to know why she received the message and why a script was sent over for her

## 2022-12-23 NOTE — TELEPHONE ENCOUNTER
Per patient's phone call, she wants to know why medication was sent to the pharmacy for her and she want to know what is it for

## 2022-12-26 ENCOUNTER — APPOINTMENT (OUTPATIENT)
Dept: PHYSICAL THERAPY | Facility: REHABILITATION | Age: 72
End: 2022-12-26

## 2022-12-27 ENCOUNTER — APPOINTMENT (OUTPATIENT)
Dept: PHYSICAL THERAPY | Facility: REHABILITATION | Age: 72
End: 2022-12-27

## 2022-12-27 NOTE — TELEPHONE ENCOUNTER
Spoke with pt and she is "feeling good, no burning, pain, or fevers " Told pt if she develops  any UTI symptoms to call our office back  Pt understands and is thankful for this call

## 2022-12-29 ENCOUNTER — APPOINTMENT (OUTPATIENT)
Dept: PHYSICAL THERAPY | Facility: REHABILITATION | Age: 72
End: 2022-12-29

## 2023-01-09 ENCOUNTER — OFFICE VISIT (OUTPATIENT)
Dept: PHYSICAL THERAPY | Facility: REHABILITATION | Age: 73
End: 2023-01-09

## 2023-01-09 ENCOUNTER — TELEPHONE (OUTPATIENT)
Dept: OBGYN CLINIC | Facility: HOSPITAL | Age: 73
End: 2023-01-09

## 2023-01-09 DIAGNOSIS — M25.561 CHRONIC PAIN OF RIGHT KNEE: ICD-10-CM

## 2023-01-09 DIAGNOSIS — R26.81 UNSTEADY GAIT: Primary | ICD-10-CM

## 2023-01-09 DIAGNOSIS — M17.0 PRIMARY OSTEOARTHRITIS OF BOTH KNEES: Primary | ICD-10-CM

## 2023-01-09 DIAGNOSIS — R06.09 DYSPNEA ON EXERTION: ICD-10-CM

## 2023-01-09 DIAGNOSIS — G89.29 CHRONIC PAIN OF LEFT KNEE: ICD-10-CM

## 2023-01-09 DIAGNOSIS — G89.29 CHRONIC PAIN OF RIGHT KNEE: ICD-10-CM

## 2023-01-09 DIAGNOSIS — E66.01 MORBID OBESITY WITH BMI OF 50.0-59.9, ADULT (HCC): ICD-10-CM

## 2023-01-09 DIAGNOSIS — M25.562 CHRONIC PAIN OF LEFT KNEE: ICD-10-CM

## 2023-01-09 NOTE — PROGRESS NOTES
Daily Note     Today's date: 2023  Patient name: Corinna Samuel  : 1950  MRN: [id number]  Referring provider: Liam Dick DO  Dx:   Encounter Diagnosis     ICD-10-CM    1  Unsteady gait  R26 81       2  Morbid obesity with BMI of 50 0-59 9, adult ([facility] [address] )  E66 01     Z68 43       3  Dyspnea on exertion  R06 09       4  Chronic pain of right knee  M25 561     G89 29       5  Chronic pain of left knee  M25 562     G89 29                      Subjective:   Patient reports that she has been awful  She reports that she went on a cruise and the water was rough so she was unable to walk  She ended up in a scooter the entire time and the shift on/off the scooter to the bed really hurt her knees  She returns to PT with the hope of getting back to where she was and improve her mobility overall  Objective: See treatment diary below      Assessment: Patient tolerated treatment well  Patient performed ex as noted within the patient's tolerance  Patient does demonstrate increased challenge with sit to stand transfers  Arpita overall tolerated ex performed well  Patient would benefit from continued PT intervention to address deficits and attain set goals  Patient would benefit from a PT re-evaluation to assess current status and establish new patient goals  Plan: Continue per plan of care         Precautions: Chronic heart failure, permanent A-fib, OA b/l knees, myofascial pain syndrome, osteoporosis, reactive hypoglycemia, hashimoto's thyroiditis, hyperparathyroidism     Daily Treatment Diary      Assessment         Alex/Reval  MD                 FOTO        *       **   Manuals                                                 Exercise Diary     Opp UE Press Down/LE Press Up Isometric with Ball   5"x10   b/l 5"x12 b/l 5"x12 b/l 5"x12 b/l  5"x15 b/l  5"x15 b/l  5"x15   b/l        Iso Hip ADD   10"x10 10"x10 10"x10 10"x15  10"x15  10"x15  5"x10        TB Hip ABD + Shoulder ABD   otb  5"x10 otb  5"x12 OTB 5"x15 OTB 5"x20  PTB  5"x15  PTB 5"x15  PTB  5"x20        TB Rows  otb 5"x10 otb  5"x12 OTB 5"x15 OTB 5"x20 PTB  5"x15 PTB 5"x15 PTB  5" 2x10      B Bicep Curls - Wrist Cuff Weights   2#  2x10 2#  2x10 2 5# 2x10 2 5# 2x10  2 5#  2x10  2 5#  2x10  2 5#  2x10        Iso Hip Extension with Ball Under Foot   10"x10 10"x10 10"x10 10"x10  10"x15  10"x15  5"x10        TB Shoulder H  ABD   otb  5"x10 otb  5"x12 OTB 5"x15 OTB 5"x20  PTB  5"x15 PTB 5"x15  PTB  5" x10        Standing Hip ABD @ Railing   nv x5 b/l x10 b/l x12 b/l  x15 b/l  x20 b/l  nv        Sidestepping @ Railing   x3 laps x3 laps x3 laps  x4 laps  x5 laps  x5 laps  x4 laps        4 inch step up     x5 b/l x5 b/l x5 b/l  2x5 b/l  x10 ea b/l  nv        UBE    L1 2'/2' Missed NV L1 2'/2' L1 3'/3'  L1 3'/3'  L1 3'/3'                                                                                                                             Modalities

## 2023-01-10 ENCOUNTER — TELEPHONE (OUTPATIENT)
Dept: ENDOCRINOLOGY | Facility: CLINIC | Age: 73
End: 2023-01-10

## 2023-01-12 ENCOUNTER — EVALUATION (OUTPATIENT)
Dept: PHYSICAL THERAPY | Facility: REHABILITATION | Age: 73
End: 2023-01-12

## 2023-01-12 DIAGNOSIS — E66.01 MORBID OBESITY WITH BMI OF 50.0-59.9, ADULT (HCC): ICD-10-CM

## 2023-01-12 DIAGNOSIS — G89.29 CHRONIC PAIN OF LEFT KNEE: ICD-10-CM

## 2023-01-12 DIAGNOSIS — M25.562 CHRONIC PAIN OF LEFT KNEE: ICD-10-CM

## 2023-01-12 DIAGNOSIS — G89.29 CHRONIC PAIN OF RIGHT KNEE: ICD-10-CM

## 2023-01-12 DIAGNOSIS — R26.81 UNSTEADY GAIT: Primary | ICD-10-CM

## 2023-01-12 DIAGNOSIS — M25.561 CHRONIC PAIN OF RIGHT KNEE: ICD-10-CM

## 2023-01-12 DIAGNOSIS — R06.09 DYSPNEA ON EXERTION: ICD-10-CM

## 2023-01-12 NOTE — PROGRESS NOTES
PT Re-Evaluation     Today's date: 2023  Patient name: Kaitlyn Schmidt  : 1950  MRN: [id number]  Referring provider: Lottie Tran DO  Dx:   Encounter Diagnosis     ICD-10-CM    1  Unsteady gait  R26 81       2  Morbid obesity with BMI of 50 0-59 9, adult ([facility] [address] )  E66 01     Z68 43       3  Dyspnea on exertion  R06 09       4  Chronic pain of right knee  M25 561     G89 29       5  Chronic pain of left knee  M25 562     G89 29                      Assessment  Assessment details: Pt is a pleasant 67 y o  female who has been attending physical therapy with unsteady gait, dyspnea on exertion, chronic pain of left knee and chronic pain of right knee  Pt reports overall decrease in pain and improvement in activity tolerance, as well as demonstrates improvement in B UE and LE strength, gait symmetry and functional mobility  Pt will benefit from continued skilled PT intervention in order to address her remaining limitations and to restore maximal function       Impairments: abnormal coordination, abnormal gait, abnormal or restricted ROM, abnormal movement, activity intolerance, impaired balance, impaired physical strength, lacks appropriate home exercise program, pain with function, weight-bearing intolerance, poor posture  and poor body mechanics  Understanding of Dx/Px/POC: good   Prognosis: good    Goals  STG:   Pt will tolerate 30 minutes of exercise by week 2 to demonstrate improved tolerance to functional mobility    Pt will decrease 5xSTS score by 10 seconds by week 4 to demonstrate improved safety with transfers and mobility   Pt will be I with initial HEP to progress towards independence with exercise     LTG:   Pt will be able to complete STS with 1 UE support by d/c    Pt will decrease overall TUG score by additional 10 seconds to demonstrate improved safety with mobility and decreased risk for falls   Pt will improve LE strength to 4+/5 or greater by d/c for increased ease and safety with functional mobility and activities   Pt will be I with modified/updated HEP and demonstrate ability to complete with confidence and proper mechanics/form to safely be d/c from skilled OP PT and continue with exercises on their own     Plan  Patient would benefit from: PT eval and skilled physical therapy  Planned modality interventions: cryotherapy and thermotherapy: hydrocollator packs  Planned therapy interventions: abdominal trunk stabilization, balance/weight bearing training, balance, body mechanics training, functional ROM exercises, gait training, home exercise program, therapeutic exercise, therapeutic activities, stretching and strengthening  Frequency: 2x week  Duration in visits: 12  Duration in weeks: 6  Treatment plan discussed with: patient and family        Subjective Evaluation    History of Present Illness  Mechanism of injury: 1/12/23 Re-Evaluation:    Pt presents to PT for re-evaluation after hiatus due to fall, illness and then family vacation  Pt was seen for 5 visits from 9/20 through 10/3, then 7 visits from 11/17 through 12/12  Pt resumed PT again on 1/9/23 and presents for re-evaluation today  Pt reports she overall feels much stronger and is able to be more active than she was when she first started PT  She is no longer using an assistive device  Pt is a 66year old female presenting to PT with diagnoses of bilateral knee OA, bilateral chronic knee pain and physical deconditioning  Pt was previously attending PT in August, however, developed a severe UTI and was hospitalized  Pt reports she was supposed to get acute inpatient rehab, however, she wasn't receiving care so her family removed her from the facility and brought her home  She was supposed to receive home health PT/OT services, however, she didn't receive a call back so then decided to return to OPPT to start  She reports she has been using a regular walker sometimes at home, using her rollator when outside   Pt reports she has not been as active or walking as much at home as she was prior to admission  She reports her knees have been bothering her a lot recently, is scheduled to see Dr Jane Jones on Monday for bilateral CSIs  Pain Location: bilateral knees    Pain Type: aching, throbbing    Pain Intensity:  Current: 6  Best: 6  Worst: 8    Pt reports increased pain and/or difficulty with: sit-stand transfers, standing, walking  Pt reports decreased pain with: medication, ice, rest            Recurrent probem    Quality of life: fair    Social Support  Steps to enter house: yes  Stairs in house: no       Diagnostic Tests  X-ray: abnormal  Patient Goals  Patient goals for therapy: decreased pain, improved balance, increased motion, increased strength, independence with ADLs/IADLs and return to sport/leisure activities  Patient goal: "To get up from chair easier and be more active "        Objective     Active Range of Motion   Left Shoulder   Normal active range of motion    Right Shoulder   Normal active range of motion    Strength/Myotome Testing     Left Shoulder     Planes of Motion   Flexion: 4-   Abduction: 4-   External rotation at 0°: 4-   Internal rotation at 0°: 4     Isolated Muscles   Biceps: 3+   Triceps: 3+     Right Shoulder     Planes of Motion   Flexion: 4-   Abduction: 4-   External rotation at 0°: 4-   Internal rotation at 0°: 4     Isolated Muscles   Biceps: 3+   Triceps: 3+     Left Hip   Planes of Motion   Flexion: 4  Extension: 3  Abduction: 4  Adduction: 4    Right Hip   Planes of Motion   Flexion: 4  Extension: 3  Abduction: 4  Adduction: 4    Left Knee   Flexion: 4  Extension: 4    Right Knee   Flexion: 4  Extension: 4    Ambulation     Ambulation: Level Surfaces   Ambulation with assistive device: independent  Ambulation without assistive device: contact guard assist    Observational Gait   Gait: antalgic   Decreased walking speed and stride length       Quality of Movement During Gait Trunk  Forward lean  Pelvis    Pelvis (Left): Positive Trendelenburg  Pelvis (Right): Positive Trendelenburg  Functional Assessment        Comments                 Precautions: Chronic heart failure, permanent A-fib, OA b/l knees, myofascial pain syndrome, osteoporosis, reactive hypoglycemia, hashimoto's thyroiditis, hyperparathyroidism        Daily Treatment Diary      Assessment  11/17 11/21 11/23 11/28 12/1 12/5 12/12 1/9 1/12     Eval/Reval  MD                     FOTO            *       **   Manuals                                                                           Exercise Diary     Opp UE Press Down/LE Press Up Isometric with Ball   5"x10   b/l 5"x12 b/l 5"x12 b/l 5"x12 b/l  5"x15 b/l  5"x15 b/l  5"x15   b/l  5"  2x10 ea      Iso Hip ADD   10"x10 10"x10 10"x10 10"x15  10"x15  10"x15  5"x10  5"  2x10      TB Hip ABD + Shoulder ABD   otb  5"x10 otb  5"x12 OTB 5"x15 OTB 5"x20  PTB  5"x15  PTB 5"x15  PTB  5"x20  PTB  5"x20      TB Rows   otb 5"x10 otb  5"x12 OTB 5"x15 OTB 5"x20 PTB  5"x15 PTB 5"x15 PTB  5" 2x10  PTB  5"x20      B Bicep Curls - Wrist Cuff Weights   2#  2x10 2#  2x10 2 5# 2x10 2 5# 2x10  2 5#  2x10  2 5#  2x10  2 5#  2x10  3#  2x10      Iso Hip Extension with Ball Under Foot   10"x10 10"x10 10"x10 10"x10  10"x15  10"x15  5"x10  5"  2x10      TB Shoulder H   ABD   otb  5"x10 otb  5"x12 OTB 5"x15 OTB 5"x20  PTB  5"x15 PTB 5"x15  PTB  5" x10  PTB  5"x20      Standing Hip ABD @ Railing   nv x5 b/l x10 b/l x12 b/l  x15 b/l  x20 b/l  nv  2x10 ea      Sidestepping @ Railing   x3 laps x3 laps x3 laps  x4 laps  x5 laps  x5 laps  x4 laps  x4 laps      4 inch step up      x5 b/l x5 b/l x5 b/l  2x5 b/l  x10 ea b/l  nv        UBE     L1 2'/2' Missed NV L1 2'/2' L1 3'/3'  L1 3'/3'  L1 3'/3'  L1  3'/3'                                                                                                                                                     Modalities

## 2023-01-16 ENCOUNTER — LAB (OUTPATIENT)
Dept: LAB | Age: 73
End: 2023-01-16

## 2023-01-16 ENCOUNTER — APPOINTMENT (OUTPATIENT)
Dept: PHYSICAL THERAPY | Facility: REHABILITATION | Age: 73
End: 2023-01-16

## 2023-01-16 DIAGNOSIS — R79.89 HYPOURICEMIA: ICD-10-CM

## 2023-01-16 DIAGNOSIS — R19.7 DIARRHEA, UNSPECIFIED TYPE: ICD-10-CM

## 2023-01-16 DIAGNOSIS — R79.1 INR (INTERNATIONAL NORMAL RATIO) ABNORMAL: ICD-10-CM

## 2023-01-16 LAB
25(OH)D3 SERPL-MCNC: 62.1 NG/ML (ref 30–100)
ANION GAP SERPL CALCULATED.3IONS-SCNC: 8 MMOL/L (ref 4–13)
BUN SERPL-MCNC: 20 MG/DL (ref 5–25)
CALCIUM SERPL-MCNC: 9.4 MG/DL (ref 8.3–10.1)
CHLORIDE SERPL-SCNC: 104 MMOL/L (ref 96–108)
CO2 SERPL-SCNC: 29 MMOL/L (ref 21–32)
CREAT SERPL-MCNC: 1.02 MG/DL (ref 0.6–1.3)
GFR SERPL CREATININE-BSD FRML MDRD: 54 ML/MIN/1.73SQ M
GLUCOSE P FAST SERPL-MCNC: 125 MG/DL (ref 65–99)
INR PPP: 1.17 (ref 0.84–1.19)
POTASSIUM SERPL-SCNC: 3.4 MMOL/L (ref 3.5–5.3)
PROTHROMBIN TIME: 15.1 SECONDS (ref 11.6–14.5)
SODIUM SERPL-SCNC: 141 MMOL/L (ref 135–147)
T4 FREE SERPL-MCNC: 1.04 NG/DL (ref 0.76–1.46)
TSH SERPL DL<=0.05 MIU/L-ACNC: 3.09 UIU/ML (ref 0.45–4.5)

## 2023-01-17 LAB
EST. AVERAGE GLUCOSE BLD GHB EST-MCNC: 128 MG/DL
HBA1C MFR BLD: 6.1 %

## 2023-01-19 ENCOUNTER — OFFICE VISIT (OUTPATIENT)
Dept: PHYSICAL THERAPY | Facility: REHABILITATION | Age: 73
End: 2023-01-19

## 2023-01-19 DIAGNOSIS — R06.09 DYSPNEA ON EXERTION: ICD-10-CM

## 2023-01-19 DIAGNOSIS — E66.01 MORBID OBESITY WITH BMI OF 50.0-59.9, ADULT (HCC): ICD-10-CM

## 2023-01-19 DIAGNOSIS — G89.29 CHRONIC PAIN OF LEFT KNEE: ICD-10-CM

## 2023-01-19 DIAGNOSIS — M25.561 CHRONIC PAIN OF RIGHT KNEE: ICD-10-CM

## 2023-01-19 DIAGNOSIS — R26.81 UNSTEADY GAIT: Primary | ICD-10-CM

## 2023-01-19 DIAGNOSIS — M25.562 CHRONIC PAIN OF LEFT KNEE: ICD-10-CM

## 2023-01-19 DIAGNOSIS — G89.29 CHRONIC PAIN OF RIGHT KNEE: ICD-10-CM

## 2023-01-19 NOTE — PROGRESS NOTES
Daily Note     Today's date: 2023  Patient name: Aislinn Zamudio  : 1950  MRN: [id number]  Referring provider: Estelle Wayne DO  Dx:   Encounter Diagnosis     ICD-10-CM    1  Unsteady gait  R26 81       2  Morbid obesity with BMI of 50 0-59 9, adult ([facility] [address] )  E66 01     Z68 43       3  Dyspnea on exertion  R06 09       4  Chronic pain of right knee  M25 561     G89 29       5  Chronic pain of left knee  M25 562     G89 29                      Subjective: Pt reports that her R arm is bothering her when she reaches out to the side but other than that she has no complaints  Objective: See treatment diary below      Assessment: Pt tolerated treatment well  Horizontal UE abduction was held on this date due to pt reports of RUE pain upon arrival, pt would benefit from re-incorporation of exercises NV if able  Pt would benefit from progress of overall exercises and program as able to promote increased tolerance to activities and functional mobility  Patient demonstrated fatigue post treatment, exhibited good technique with therapeutic exercises and would benefit from continued PT  Plan: Continue per plan of care  Progress treatment as tolerated         Precautions: Chronic heart failure, permanent A-fib, OA b/l knees, myofascial pain syndrome, osteoporosis, reactive hypoglycemia, hashimoto's thyroiditis, hyperparathyroidism        Daily Treatment Diary      Assessment     Alex/Reval  MD                     FOTO            *       **   Manuals                                                                           Exercise Diary     Opp UE Press Down/LE Press Up Isometric with Ball   5"x10   b/l 5"x12 b/l 5"x12 b/l 5"x12 b/l  5"x15 b/l  5"x15 b/l  5"x15   b/l  5"  2x10 ea  5" 2x10 ea    Iso Hip ADD   10"x10 10"x10 10"x10 10"x15  10"x15  10"x15  5"x10  5"  2x10  5" x25    TB Hip ABD + Shoulder ABD   otb  5"x10 otb  5"x12 OTB 5"x15 OTB 5"x20  PTB  5"x15  PTB 5"x15  PTB  5"x20  PTB  5"x20  Hold     TB Rows   otb 5"x10 otb  5"x12 OTB 5"x15 OTB 5"x20 PTB  5"x15 PTB 5"x15 PTB  5" 2x10  PTB  5"x20  PTB 5" x20    B Bicep Curls - Wrist Cuff Weights   2#  2x10 2#  2x10 2 5# 2x10 2 5# 2x10  2 5#  2x10  2 5#  2x10  2 5#  2x10  3#  2x10  3# 2x10    Iso Hip Extension with Ball Under Foot   10"x10 10"x10 10"x10 10"x10  10"x15  10"x15  5"x10  5"  2x10  5" x25    TB Shoulder H   ABD   otb  5"x10 otb  5"x12 OTB 5"x15 OTB 5"x20  PTB  5"x15 PTB 5"x15  PTB  5" x10  PTB  5"x20  Hold     Standing Hip ABD @ Railing   nv x5 b/l x10 b/l x12 b/l  x15 b/l  x20 b/l  nv  2x10 ea  x20 ea    Sidestepping @ Railing   x3 laps x3 laps x3 laps  x4 laps  x5 laps  x5 laps  x4 laps  x4 laps  x4 laps    4 inch step up      x5 b/l x5 b/l x5 b/l  2x5 b/l  x10 ea b/l  nv        UBE     L1 2'/2' Missed NV L1 2'/2' L1 3'/3'  L1 3'/3'  L1 3'/3'  L1  3'/3'  L1 3'/3'                                                                                                                                                   Modalities

## 2023-01-23 ENCOUNTER — OFFICE VISIT (OUTPATIENT)
Dept: PHYSICAL THERAPY | Facility: REHABILITATION | Age: 73
End: 2023-01-23

## 2023-01-23 DIAGNOSIS — G89.29 CHRONIC PAIN OF LEFT KNEE: ICD-10-CM

## 2023-01-23 DIAGNOSIS — R06.09 DYSPNEA ON EXERTION: ICD-10-CM

## 2023-01-23 DIAGNOSIS — E66.01 MORBID OBESITY WITH BMI OF 50.0-59.9, ADULT (HCC): ICD-10-CM

## 2023-01-23 DIAGNOSIS — M25.562 CHRONIC PAIN OF LEFT KNEE: ICD-10-CM

## 2023-01-23 DIAGNOSIS — G89.29 CHRONIC PAIN OF RIGHT KNEE: ICD-10-CM

## 2023-01-23 DIAGNOSIS — R26.81 UNSTEADY GAIT: Primary | ICD-10-CM

## 2023-01-23 DIAGNOSIS — M25.561 CHRONIC PAIN OF RIGHT KNEE: ICD-10-CM

## 2023-01-23 NOTE — PROGRESS NOTES
Daily Note     Today's date: 2023  Patient name: Himanshu Bedolla  : 1950  MRN: [id number]  Referring provider: Mari Esposito DO  Dx:   Encounter Diagnosis     ICD-10-CM    1  Unsteady gait  R26 81       2  Morbid obesity with BMI of 50 0-59 9, adult ([facility] [address] )  E66 01     Z68 43       3  Dyspnea on exertion  R06 09       4  Chronic pain of left knee  M25 562     G89 29       5  Chronic pain of right knee  M25 561     G89 29                      Subjective:  Elta Bound reports that she is doing ok, just a little unsteady  Objective: See treatment diary below      Assessment: Patient tolerated treatment well  Patient performed ex as noted  Reintroduced 4 inch step ups and trialed stationary bike  Patient did well with step ups however noted some pain after 2 minutes on the bike  Will hold bike for now  Patient demonstrated improved ease with transfers  Patient would benefit from continued PT intervention to address deficits and attain set goals  Plan: Continue per plan of care        Precautions: Chronic heart failure, permanent A-fib, OA b/l knees, myofascial pain syndrome, osteoporosis, reactive hypoglycemia, hashimoto's thyroiditis, hyperparathyroidism        Daily Treatment Diary      Assessment    Eval/Reval                      FOTO            *       **   Manuals                                                                           Exercise Diary     Opp UE Press Down/LE Press Up Isometric with Ball  5" 2x10  5"x12 b/l 5"x12 b/l 5"x12 b/l  5"x15 b/l  5"x15 b/l  5"x15   b/l  5"  2x10 ea  5" 2x10 ea    Iso Hip ADD  5"x25  10"x10 10"x10 10"x15  10"x15  10"x15  5"x10  5"  2x10  5" x25    TB Hip ABD + Shoulder ABD  hold  otb  5"x12 OTB 5"x15 OTB 5"x20  PTB  5"x15  PTB 5"x15  PTB  5"x20  PTB  5"x20  Hold     TB Rows  np  otb  5"x12 OTB 5"x15 OTB 5"x20 PTB  5"x15 PTB 5"x15 PTB  5" 2x10  PTB  5"x20  PTB 5" x20    B Bicep Curls - Wrist Cuff Weights 3#  3x10  2#  2x10 2 5# 2x10 2 5# 2x10  2 5#  2x10  2 5#  2x10  2 5#  2x10  3#  2x10  3# 2x10    Iso Hip Extension with Ball Under Foot  5"x25  10"x10 10"x10 10"x10  10"x15  10"x15  5"x10  5"  2x10  5" x25    TB Shoulder H   ABD  hold  otb  5"x12 OTB 5"x15 OTB 5"x20  PTB  5"x15 PTB 5"x15  PTB  5" x10  PTB  5"x20  Hold     Standing Hip ABD @ Railing  x20 ea  x5 b/l x10 b/l x12 b/l  x15 b/l  x20 b/l  nv  2x10 ea  x20 ea    Sidestepping @ Railing  x4 laps  x3 laps x3 laps  x4 laps  x5 laps  x5 laps  x4 laps  x4 laps  x4 laps    4 inch step up   4"  x10 b/l   x5 b/l x5 b/l x5 b/l  2x5 b/l  x10 ea b/l  nv        UBE  L1 3'/3'   L1 2'/2' Missed NV L1 2'/2' L1 3'/3'  L1 3'/3'  L1 3'/3'  L1  3'/3'  L1 3'/3'    trial bike  x2' hold                                                                                                                                           Modalities

## 2023-01-26 ENCOUNTER — OFFICE VISIT (OUTPATIENT)
Dept: PHYSICAL THERAPY | Facility: REHABILITATION | Age: 73
End: 2023-01-26

## 2023-01-26 ENCOUNTER — OFFICE VISIT (OUTPATIENT)
Dept: ENDOCRINOLOGY | Facility: CLINIC | Age: 73
End: 2023-01-26

## 2023-01-26 VITALS
HEART RATE: 64 BPM | BODY MASS INDEX: 51.56 KG/M2 | WEIGHT: 291 LBS | TEMPERATURE: 97 F | SYSTOLIC BLOOD PRESSURE: 140 MMHG | HEIGHT: 63 IN | DIASTOLIC BLOOD PRESSURE: 80 MMHG

## 2023-01-26 DIAGNOSIS — R73.01 IMPAIRED FASTING GLUCOSE: ICD-10-CM

## 2023-01-26 DIAGNOSIS — M25.561 CHRONIC PAIN OF RIGHT KNEE: ICD-10-CM

## 2023-01-26 DIAGNOSIS — E66.01 MORBID OBESITY WITH BMI OF 50.0-59.9, ADULT (HCC): ICD-10-CM

## 2023-01-26 DIAGNOSIS — R26.81 UNSTEADY GAIT: Primary | ICD-10-CM

## 2023-01-26 DIAGNOSIS — G89.29 CHRONIC PAIN OF LEFT KNEE: ICD-10-CM

## 2023-01-26 DIAGNOSIS — G89.29 CHRONIC PAIN OF RIGHT KNEE: ICD-10-CM

## 2023-01-26 DIAGNOSIS — M25.562 CHRONIC PAIN OF LEFT KNEE: ICD-10-CM

## 2023-01-26 DIAGNOSIS — E16.1 REACTIVE HYPOGLYCEMIA: ICD-10-CM

## 2023-01-26 DIAGNOSIS — M81.0 OSTEOPOROSIS, UNSPECIFIED OSTEOPOROSIS TYPE, UNSPECIFIED PATHOLOGICAL FRACTURE PRESENCE: Primary | ICD-10-CM

## 2023-01-26 DIAGNOSIS — R06.09 DYSPNEA ON EXERTION: ICD-10-CM

## 2023-01-26 DIAGNOSIS — E89.0 STATUS POST PARTIAL THYROIDECTOMY: ICD-10-CM

## 2023-01-26 DIAGNOSIS — E55.9 VITAMIN D DEFICIENCY: ICD-10-CM

## 2023-01-26 NOTE — PROGRESS NOTES
Patient Progress Note    CC: Osteoporosis, reactive hypoglycemia, hemithyroidectomy    Referring Provider  No referring provider defined for this encounter  History of Present Illness:     Patient is a 67year old female here for follow-up of osteoporosis, reactive hypoglycemia, hemithyroidectomy  She is due today for a Prolia injection  She is taking vitamin D 84962 units weekly  Does not take calcium supplementation  Denies recent fractures  A few weeks ago she did have a mechanical fall  DEXA scan done in July 2022 was consistent with osteoporosis based on the lumbar spine  There was significant increase in bone mineral density of the left total hip  She does do weight bearing exercises at PT twice week  She does have a history of reactive hypoglycemia  She tries to avoid sugary foods / junk foods  She has noted with dietary changes she rarely experiences hypoglycemic episodes  A1C recently was 6 1%  She does try to eat healthy  She has a history of a thyroid nodules and underwent a left hemithyroidectomy  Per patient pathology was benign  She is not currently on thyroid medication  For the past 1-2 years, her TSH fluctuates between the 4-5 range    Recent TSH normal at 3 090 and free T4 normal at 1 04      Patient Active Problem List   Diagnosis   • Closed fracture of right distal radius and ulna   • Malignant tumor of right kidney parenchyma (HCC)   • Primary osteoarthritis of left knee   • Primary osteoarthritis of right knee   • Chronic pain of left knee   • Myalgia   • Chest wall tenderness   • Chronic heart failure (HCC)   • Permanent atrial fibrillation (HCC)   • Cervical spondylosis without myelopathy   • Arthropathy of cervical facet joint   • Occipital neuralgia of right side   • Status post partial thyroidectomy   • Myofascial pain syndrome   • Hashimoto's thyroiditis   • Early menopause occurring in patient age younger than 39 years   • S/P bariatric surgery   • Osteoporosis   • Hyperparathyroidism ([facility] [address] )   • Reactive hypoglycemia   • Vitamin D deficiency   • Unsteady gait   • Dyspnea on exertion   • SIRS (systemic inflammatory response syndrome) (HCC)   • Incontinence   • GILDARDO (obstructive sleep apnea)   • Morbid obesity with BMI of 50 0-59 9, adult (HCC)     Past Medical History:   Diagnosis Date   • A-fib (HCC)    • Arthritis    • Atrial fibrillation (HCC)    • Cervical spondylosis with myelopathy    • Gastric bypass status for obesity    • History of transfusion     35 years ago   • Hypertension    • mass right kidney    • Renal cell adenocarcinoma (HCC)     RIGHT   • Sleep apnea       Past Surgical History:   Procedure Laterality Date   • CHOLECYSTECTOMY     • COLOSTOMY     • CRYOABLATION Right     RT KIDNEY   • CYSTORRHAPHY      Bladder   Last assessed 4/6/2016    • GASTRIC BYPASS     • HERNIA REPAIR      Last assessed 4/6/2016    • HYSTERECTOMY     • ID OPTX DSTL RADL X-ARTIC FX/EPIPHYSL SEP Right 4/22/2016    Procedure: OPEN REDUCTION INTERNAL FIXATION RIGHT DISTAL RADIUS;  Surgeon: Víctor Diaz MD;  Location: [facility];  Service: Orthopedics   • REVISION COLOSTOMY     • THYROID LOBECTOMY Left 8/27/2019    Procedure: LOBECTOMY THYROID, left;  Surgeon: Paul Mccurdy MD;  Location: [facility];  Service: Surgical Oncology   • TUBAL LIGATION     • US GUIDED THYROID BIOPSY  2/27/2019   • US GUIDED THYROID BIOPSY  5/29/2019      Family History   Problem Relation Age of Onset   • Heart attack Mother    • Lung cancer Father 76   • Heart disease Father    • Stroke Sister    • Lung cancer Sister 64   • Prostate cancer Brother 61     Social History     Tobacco Use   • Smoking status: Never   • Smokeless tobacco: Never   Substance Use Topics   • Alcohol use: Not Currently     Alcohol/week: 0 0 standard drinks     Comment: 0     No Known Allergies  Current Outpatient Medications   Medication Sig Dispense Refill   • acetaminophen (TYLENOL) 325 mg tablet Take 975 mg by mouth every 6 (six) hours as needed     • apixaban (Eliquis) 5 mg Take 1 tablet (5 mg total) by mouth 2 (two) times a day 180 tablet 3   • diltiazem (CARDIZEM CD) 360 MG 24 hr capsule Take 1 capsule (360 mg total) by mouth daily 90 capsule 3   • ergocalciferol (VITAMIN D2) 50,000 units Take 50,000 Units by mouth once a week Takes D3      • FLUoxetine (PROzac) 20 mg capsule Take 20 mg by mouth 2 (two) times a day       • gabapentin (NEURONTIN) 300 mg capsule Take 600 mg by mouth daily at bedtime      • hydrochlorothiazide (HYDRODIURIL) 25 mg tablet Take 25 mg by mouth daily     • LORazepam (ATIVAN) 1 mg tablet Take 1 mg by mouth daily at bedtime as needed for anxiety  • oxybutynin (DITROPAN) 5 mg tablet 2 (two) times a day       Current Facility-Administered Medications   Medication Dose Route Frequency Provider Last Rate Last Admin   • denosumab (PROLIA) subcutaneous injection 60 mg  60 mg Subcutaneous Q6 Months Bob Mcrae MD   60 mg at 01/04/21 1157   • denosumab (PROLIA) subcutaneous injection 60 mg  60 mg Subcutaneous Once Verna CHRISTEL Jean-Baptiste PA-C             Review of Systems   Constitutional: Negative for activity change, appetite change, fatigue and unexpected weight change  HENT: Negative for trouble swallowing  Eyes: Negative for visual disturbance  Respiratory: Negative for shortness of breath  Cardiovascular: Negative for chest pain and palpitations  Gastrointestinal: Negative for constipation and diarrhea  Endocrine: Negative for cold intolerance, heat intolerance, polydipsia and polyuria  Musculoskeletal: Positive for arthralgias (due OA)  Skin: Negative for wound  Neurological: Negative for tremors and numbness  Psychiatric/Behavioral: Negative  Physical Exam:  Body mass index is 51 55 kg/m²    /80   Pulse 64   Temp (!) 97 °F (36 1 °C) (Skin)   Ht 5' 3" (1 6 m)   Wt 132 kg (291 lb)   BMI 51 55 kg/m²    Wt Readings from Last 3 Encounters:   01/26/23 132 kg (291 lb)   12/20/22 136 kg (299 lb)   11/21/22 136 kg (299 lb)       Physical Exam  Vitals and nursing note reviewed  Constitutional:       Appearance: She is well-developed  HENT:      Head: Normocephalic  Eyes:      General: No scleral icterus  Pupils: Pupils are equal, round, and reactive to light  Neck:      Thyroid: No thyromegaly  Cardiovascular:      Rate and Rhythm: Normal rate and regular rhythm  Pulses:           Radial pulses are 2+ on the right side and 2+ on the left side  Heart sounds: No murmur heard  Pulmonary:      Effort: Pulmonary effort is normal  No respiratory distress  Breath sounds: Normal breath sounds  No wheezing  Musculoskeletal:      Cervical back: Neck supple  Skin:     General: Skin is warm and dry  Neurological:      Mental Status: She is alert  Patient's shoes and socks were not removed            Labs:   Lab Results   Component Value Date    HGBA1C 6 1 (H) 01/16/2023       Lab Results   Component Value Date    HDL 67 06/29/2021    TRIG 99 06/29/2021       Lab Results   Component Value Date    GLUCOSE 116 01/24/2019    CALCIUM 9 4 01/16/2023    K 3 4 (L) 01/16/2023    CO2 29 01/16/2023     01/16/2023    BUN 20 01/16/2023    CREATININE 1 02 01/16/2023        eGFR   Date Value Ref Range Status   01/16/2023 54 ml/min/1 73sq m Final       Lab Results   Component Value Date    ALT 21 10/31/2022    AST 22 10/31/2022    GGT 17 05/29/2020    ALKPHOS 106 10/31/2022       Lab Results   Component Value Date    ZFB5RPRUQYXE 3 090 01/16/2023         Plan:    Diagnoses and all orders for this visit:    Osteoporosis, unspecified osteoporosis type, unspecified pathological fracture presence  Received Prolia injection   No upcoming dental procedures  Continue Prolia every 6 months  Do weight bearing exercises as tolerated   Take precuations to avoid falls  DEXA scan due in July 2024 - recent DEXA from 2022 showed improvement in density  -     denosumab (PROLIA) subcutaneous injection 60 mg  -     Basic metabolic panel; Future  -     Vitamin D 25 hydroxy; Future    Vitamin D deficiency  Vitamin D remains stable at 62 1  Continue current supplementation  -     Vitamin D 25 hydroxy; Future    Status post partial thyroidectomy  TSH normal at 3 090 and free T4 normal at 1 04  Not on thyroid medication  Continue to monitor labs     Reactive hypoglycemia / Impaired fasting glucose  A1c improved to 6 1%  Continue with healthy diet and routine exercise as tolerated  Has not had a hypoglycemic episodes due to dietary changes  -     Basic metabolic panel; Future  -     Hemoglobin A1C; Future        Discussed with the patient and all questions fully answered  She will call me if any problems arise      Counseled patient on diagnostic results, prognosis, risk and benefit of treatment options, instruction for management, importance of treatment compliance, risk  factor reduction and impressions      Verna Jean-Baptiste PA-C

## 2023-01-26 NOTE — PROGRESS NOTES
Assessment/Plan:    Nora White came into the [facility]'s Endocrinology Office today 01/26/23 to receive prolia injection  Verbal consent obtained  Consent given by: patient    patient states patient has been medically healthy with no underlining concerns/complications  Nora White presents with no symptoms today  All insturctions were reviewed with the patient  If the patient should have any questions/concerns, advised patient to contacted [facility]  Subjective:     History provided by: patient    Patient ID: Nora White is a 67 y o  female      Objective:    Vitals:    01/26/23 1433   BP: 140/80   Pulse: 64   Temp: (!) 97 °F (36 1 °C)   TempSrc: Skin   Weight: 132 kg (291 lb)   Height: 5' 3" (1 6 m)       Patient tolerated the injection well without any complications  Injection site/s L arm  Medication was provided by us  Patient signed consent form yes   Patient signed Vandana Cyphers form yes (If no patient is not a medicare patient)  Patient waited 15 minutes after injection yes (This only applies to patient's receiving first time injection)         Last Visit: 1/10/2023  Next visit:Visit date not found

## 2023-01-26 NOTE — PROGRESS NOTES
Daily Note     Today's date: 2023  Patient name: Penny Arriola  : 1950  MRN: [id number]  Referring provider: Nataliya Messina DO  Dx:   Encounter Diagnosis     ICD-10-CM    1  Unsteady gait  R26 81       2  Morbid obesity with BMI of 50 0-59 9, adult ([facility] [address] )  E66 01     Z68 43       3  Dyspnea on exertion  R06 09       4  Chronic pain of left knee  M25 562     G89 29       5  Chronic pain of right knee  M25 561     G89 29                      Subjective:  Pt reports she is overall doing much better, "getting back on track"  Objective: See treatment diary below    Assessment: Pt with good tolerance to progression of therapeutic exercise program in sitting, with resumption of shoulder strengthening TE as listed, with appropriate challenge and fatigue, no c/o pain or adverse response  Will continue to gradually progress her program as able  Pt will benefit from continued skilled PT intervention in order to address her remaining limitations and to restore maximal function  Plan: Continue per plan of care        Precautions: Chronic heart failure, permanent A-fib, OA b/l knees, myofascial pain syndrome, osteoporosis, reactive hypoglycemia, hashimoto's thyroiditis, hyperparathyroidism        Daily Treatment Diary      Assessment    Eval/Reval                      FOTO            *       **   Manuals                                                                           Exercise Diary     Opp UE Press Down/LE Press Up Isometric with Ball  5" 2x10 ea 5"   2x10 ea 5"x12 b/l 5"x12 b/l 5"x12 b/l  5"x15 b/l  5"x15 b/l  5"x15   b/l  5"  2x10 ea  5" 2x10 ea    Iso Hip ADD  5"x25 5"x25 10"x10 10"x10 10"x15  10"x15  10"x15  5"x10  5"  2x10  5" x25    TB Hip ABD + Shoulder ABD  hold  PTB  5"  1x10 otb  5"x12 OTB 5"x15 OTB 5"x20  PTB  5"x15  PTB 5"x15  PTB  5"x20  PTB  5"x20  Hold     TB Rows  np PTB  5"  1x10 otb  5"x12 OTB 5"x15 OTB 5"x20 PTB  5"x15 PTB 5"x15 PTB  5" 2x10  PTB  5"x20  PTB 5" x20    B Bicep Curls - Wrist Cuff Weights 3#  3x10 3#  3x10 2#  2x10 2 5# 2x10 2 5# 2x10  2 5#  2x10  2 5#  2x10  2 5#  2x10  3#  2x10  3# 2x10    Iso Hip Extension with Ball Under Foot  5"x25 5"x25 ea 10"x10 10"x10 10"x10  10"x15  10"x15  5"x10  5"  2x10  5" x25    TB Shoulder H   ABD  hold PTB  5"  1x10 otb  5"x12 OTB 5"x15 OTB 5"x20  PTB  5"x15 PTB 5"x15  PTB  5" x10  PTB  5"x20  Hold     Standing Hip ABD @ Railing  x20 ea x20 ea x5 b/l x10 b/l x12 b/l  x15 b/l  x20 b/l  nv  2x10 ea  x20 ea    Sidestepping @ Railing  x4 laps x4 laps x3 laps x3 laps  x4 laps  x5 laps  x5 laps  x4 laps  x4 laps  x4 laps    4 inch step up   4"  x10 b/l  4"  x10 b/l x5 b/l x5 b/l x5 b/l  2x5 b/l  x10 ea b/l  nv        UBE  L1 3'/3'  L1 3'/3' L1 2'/2' Missed NV L1 2'/2' L1 3'/3'  L1 3'/3'  L1 3'/3'  L1  3'/3'  L1 3'/3'    trial bike  x2' hold                                                                                                                                           Modalities

## 2023-01-26 NOTE — PATIENT INSTRUCTIONS
Hypoglycemia instructions   Sharmila Richey  1/26/2023  [id number]    Low Blood Sugar    Steps to treat low blood sugar  1  Test blood sugar if you have symptoms of low blood sugar:   Low Blood Sugar Symptoms:  o Sweaty  o Dizzy  o Rapid heartbeat  o Shaky  o Bad mood  o Hungry      2  Treat blood sugar less than 70 with 15 grams of fast-acting carbohydrate:   Examples of 15 grams Fast-Acting Carbohydrate:  o 4 oz juice  o 4 oz regular soda  o 3-4 glucose tablets (chew)  o 3-4 hard candies (chew)          3  Wait 15 minutes and test your blood sugar again     4   If blood sugar is less than 100, repeat steps 2-3     5  When your blood sugar is 100 or more, eat a snack if it will be longer than one hour until your next meal  The snack should be 15 grams of carbohydrate and a protein:   Examples of snacks:  o ½ sandwich  o 6 crackers with cheese  o Piece of fruit with cheese or peanut butter  o 6 crackers with peanut butter

## 2023-01-28 DIAGNOSIS — R30.0 DYSURIA: Primary | ICD-10-CM

## 2023-01-28 RX ORDER — SULFAMETHOXAZOLE AND TRIMETHOPRIM 800; 160 MG/1; MG/1
1 TABLET ORAL EVERY 12 HOURS SCHEDULED
Qty: 14 TABLET | Refills: 0 | Status: SHIPPED | OUTPATIENT
Start: 2023-01-28 | End: 2023-02-04

## 2023-01-30 ENCOUNTER — OFFICE VISIT (OUTPATIENT)
Dept: PHYSICAL THERAPY | Facility: REHABILITATION | Age: 73
End: 2023-01-30

## 2023-01-30 DIAGNOSIS — G89.29 CHRONIC PAIN OF LEFT KNEE: ICD-10-CM

## 2023-01-30 DIAGNOSIS — M25.561 CHRONIC PAIN OF RIGHT KNEE: ICD-10-CM

## 2023-01-30 DIAGNOSIS — E66.01 MORBID OBESITY WITH BMI OF 50.0-59.9, ADULT (HCC): ICD-10-CM

## 2023-01-30 DIAGNOSIS — R26.81 UNSTEADY GAIT: Primary | ICD-10-CM

## 2023-01-30 DIAGNOSIS — R06.09 DYSPNEA ON EXERTION: ICD-10-CM

## 2023-01-30 DIAGNOSIS — M25.562 CHRONIC PAIN OF LEFT KNEE: ICD-10-CM

## 2023-01-30 DIAGNOSIS — G89.29 CHRONIC PAIN OF RIGHT KNEE: ICD-10-CM

## 2023-01-30 NOTE — PROGRESS NOTES
Daily Note     Today's date: 2023  Patient name: Joan Mckeon  : 1950  MRN: [id number]  Referring provider: Saravanan Vazquez DO  Dx:   Encounter Diagnosis     ICD-10-CM    1  Unsteady gait  R26 81       2  Morbid obesity with BMI of 50 0-59 9, adult ([facility] [address] )  E66 01     Z68 43       3  Dyspnea on exertion  R06 09       4  Chronic pain of right knee  M25 561     G89 29       5  Chronic pain of left knee  M25 562     G89 29                      Subjective:  Felicitas Oropeza reports that she is feeling pretty good  Objective: See treatment diary below      Assessment: Patient tolerated treatment well  Patient performed ex as noted with direct supervision  Patient was appropriately challenged with the exercises performed  Felicitas Oropeza demonstrated improved strength, control and endurance for step ups  Patient would benefit from continued PT intervention to address deficits and attain set goals  Plan: Continue per plan of care        Precautions: Chronic heart failure, permanent A-fib, OA b/l knees, myofascial pain syndrome, osteoporosis, reactive hypoglycemia, hashimoto's thyroiditis, hyperparathyroidism        Daily Treatment Diary      Assessment    Eval/Reval                      FOTO            *       **   Manuals                                                                           Exercise Diary     Opp UE Press Down/LE Press Up Isometric with Ball  5" 2x10 ea 5" 2x10 ea 5" 2x10 ea l 5"x12 b/l  5"x15 b/l  5"x15 b/l  5"x15   b/l  5"  2x10 ea  5" 2x10 ea    Iso Hip ADD  5"x25 5"x25 5" x25  10"x15  10"x15  10"x15  5"x10  5"  2x10  5" x25    TB Hip ABD + Shoulder ABD  hold  otb  5" 2x10  OTB 5"x20  PTB  5"x15  PTB 5"x15  PTB  5"x20  PTB  5"x20  Hold     TB Rows  np    OTB 5"x20 PTB  5"x15 PTB 5"x15 PTB  5" 2x10  PTB  5"x20  PTB 5" x20    B Bicep Curls - Wrist Cuff Weights 3#  3x10 3#  3x10 4#   2x10  2 5# 2x10  2 5#  2x10  2 5#  2x10  2 5#  2x10  3#  2x10  3# 2x10    Iso Hip Extension with Ball Under Foot  5"x25 5"x25 ea 5"x25 ea  10"x10  10"x15  10"x15  5"x10  5"  2x10  5" x25    TB Shoulder H   ABD  hold  otb  1x10  OTB 5"x20  PTB  5"x15 PTB 5"x15  PTB  5" x10  PTB  5"x20  Hold     Standing Hip ABD @ Railing  x20 ea  x20 ea  x12 b/l  x15 b/l  x20 b/l  nv  2x10 ea  x20 ea    Sidestepping @ Railing  x4 laps  x4 laps   x4 laps  x5 laps  x5 laps  x4 laps  x4 laps  x4 laps    4 inch step up   4"  x10 b/l   4"x12 b/l  x5 b/l  2x5 b/l  x10 ea b/l  nv        UBE  L1 3'/3'   L1 3'/3'  L1 2'/2' L1 3'/3'  L1 3'/3'  L1 3'/3'  L1  3'/3'  L1 3'/3'    trial bike  x2' hold  -                                                                                                                                         Modalities

## 2023-02-02 ENCOUNTER — APPOINTMENT (OUTPATIENT)
Dept: PHYSICAL THERAPY | Facility: REHABILITATION | Age: 73
End: 2023-02-02

## 2023-02-06 ENCOUNTER — OFFICE VISIT (OUTPATIENT)
Dept: PHYSICAL THERAPY | Facility: REHABILITATION | Age: 73
End: 2023-02-06

## 2023-02-06 DIAGNOSIS — E66.01 MORBID OBESITY WITH BMI OF 50.0-59.9, ADULT (HCC): ICD-10-CM

## 2023-02-06 DIAGNOSIS — M25.562 CHRONIC PAIN OF LEFT KNEE: ICD-10-CM

## 2023-02-06 DIAGNOSIS — R06.09 DYSPNEA ON EXERTION: ICD-10-CM

## 2023-02-06 DIAGNOSIS — R26.81 UNSTEADY GAIT: Primary | ICD-10-CM

## 2023-02-06 DIAGNOSIS — M25.561 CHRONIC PAIN OF RIGHT KNEE: ICD-10-CM

## 2023-02-06 DIAGNOSIS — G89.29 CHRONIC PAIN OF RIGHT KNEE: ICD-10-CM

## 2023-02-06 DIAGNOSIS — G89.29 CHRONIC PAIN OF LEFT KNEE: ICD-10-CM

## 2023-02-06 NOTE — PROGRESS NOTES
Daily Note     Today's date: 2023  Patient name: Renetta Kaufman  : 1950  MRN: [id number]  Referring provider: Melia Aviles DO  Dx:   Encounter Diagnosis     ICD-10-CM    1  Unsteady gait  R26 81       2  Morbid obesity with BMI of 50 0-59 9, adult ([facility] [address] )  E66 01     Z68 43       3  Dyspnea on exertion  R06 09       4  Chronic pain of left knee  M25 562     G89 29       5  Chronic pain of right knee  M25 561     G89 29                      Subjective:   Patient reports that she is doing well  She feels a little tired today for some reason though  Patient denies any recent falls  Objective: See treatment diary below      Assessment: Patient tolerated treatment well  Patient performed ex as noted with direct supervision throughout the session  Patient required short rest breaks between weight bearing exercises with a quick recovery noted  Hayden Love reported feeling less fatigued post treatment  Patient would benefit from continued PT intervention to address deficits and attain set goals  Plan: Continue per plan of care        Precautions: Chronic heart failure, permanent A-fib, OA b/l knees, myofascial pain syndrome, osteoporosis, reactive hypoglycemia, hashimoto's thyroiditis, hyperparathyroidism        Daily Treatment Diary      Assessment    Eval/Reval                      FOTO                  **   Manuals                                                                           Exercise Diary     Opp UE Press Down/LE Press Up Isometric with Ball  5" 2x10 ea 5" 2x10 ea 5" 2x10 ea   5"x25   ea  l  5"x15 b/l  5"x15   b/l  5"  2x10 ea  5" 2x10 ea    Iso Hip ADD  5"x25 5"x25 5" x25 5"x25    10"x15  5"x10  5"  2x10  5" x25    TB Hip ABD + Shoulder ABD  hold  otb  5" 2x10 otb  5"x25    PTB 5"x15  PTB  5"x20  PTB  5"x20  Hold     TB Rows  np   otb  5"  x10   PTB 5"x15 PTB  5" 2x10  PTB  5"x20  PTB 5" x20    B Bicep Curls - Wrist Cuff Weights 3#  3x10 3#  3x10 4#   2x10 4#  2x10    2 5#  2x10  2 5#  2x10  3#  2x10  3# 2x10    Iso Hip Extension with Ball Under Foot  5"x25 5"x25 ea 5"x25 ea 5"x25   ea    10"x15  5"x10  5"  2x10  5" x25    TB Shoulder H   ABD  hold  otb  1x10 otb  1x10  1x5   PTB 5"x15  PTB  5" x10  PTB  5"x20  Hold     Standing Hip ABD @ Railing  x20 ea  x20 ea x20 ea    x20 b/l  nv  2x10 ea  x20 ea    Sidestepping @ Railing  x4 laps  x4 laps x4 laps    x5 laps  x4 laps  x4 laps  x4 laps    4 inch step up   4"  x10 b/l   4"x12 b/l 4" x12 b/l    x10 ea b/l  nv        UBE  L1 3'/3'   L1 3'/3' L1 3'/3'    L1 3'/3'  L1 3'/3'  L1  3'/3'  L1 3'/3'    trial bike  x2' hold  -                                                                                                                                         Modalities

## 2023-02-09 ENCOUNTER — APPOINTMENT (OUTPATIENT)
Dept: PHYSICAL THERAPY | Facility: REHABILITATION | Age: 73
End: 2023-02-09

## 2023-02-13 ENCOUNTER — APPOINTMENT (OUTPATIENT)
Dept: PHYSICAL THERAPY | Facility: REHABILITATION | Age: 73
End: 2023-02-13

## 2023-02-16 ENCOUNTER — APPOINTMENT (OUTPATIENT)
Dept: PHYSICAL THERAPY | Facility: REHABILITATION | Age: 73
End: 2023-02-16

## 2023-02-20 ENCOUNTER — APPOINTMENT (OUTPATIENT)
Dept: PHYSICAL THERAPY | Facility: REHABILITATION | Age: 73
End: 2023-02-20

## 2023-02-23 ENCOUNTER — APPOINTMENT (OUTPATIENT)
Dept: PHYSICAL THERAPY | Facility: REHABILITATION | Age: 73
End: 2023-02-23

## 2023-02-27 ENCOUNTER — APPOINTMENT (OUTPATIENT)
Dept: PHYSICAL THERAPY | Facility: REHABILITATION | Age: 73
End: 2023-02-27

## 2023-03-01 ENCOUNTER — OFFICE VISIT (OUTPATIENT)
Dept: OBGYN CLINIC | Facility: CLINIC | Age: 73
End: 2023-03-01

## 2023-03-01 VITALS
BODY MASS INDEX: 51.56 KG/M2 | WEIGHT: 291 LBS | DIASTOLIC BLOOD PRESSURE: 76 MMHG | HEIGHT: 63 IN | SYSTOLIC BLOOD PRESSURE: 126 MMHG

## 2023-03-01 DIAGNOSIS — M17.0 PRIMARY OSTEOARTHRITIS OF BOTH KNEES: Primary | ICD-10-CM

## 2023-03-01 RX ORDER — METHYLPREDNISOLONE ACETATE 40 MG/ML
1 INJECTION, SUSPENSION INTRA-ARTICULAR; INTRALESIONAL; INTRAMUSCULAR; SOFT TISSUE
Status: COMPLETED | OUTPATIENT
Start: 2023-03-01 | End: 2023-03-01

## 2023-03-01 RX ORDER — LIDOCAINE HYDROCHLORIDE 10 MG/ML
2 INJECTION, SOLUTION INFILTRATION; PERINEURAL
Status: COMPLETED | OUTPATIENT
Start: 2023-03-01 | End: 2023-03-01

## 2023-03-01 RX ADMIN — LIDOCAINE HYDROCHLORIDE 2 ML: 10 INJECTION, SOLUTION INFILTRATION; PERINEURAL at 15:20

## 2023-03-01 RX ADMIN — METHYLPREDNISOLONE ACETATE 1 ML: 40 INJECTION, SUSPENSION INTRA-ARTICULAR; INTRALESIONAL; INTRAMUSCULAR; SOFT TISSUE at 15:20

## 2023-03-01 NOTE — PROGRESS NOTES
Patient Name:  Ronny Bryan  MRN:  [id number]    Assessment & Plan     Bilateral knee DJD  1  Corticosteroid injection performed today into the bilateral knees  2  Tylenol as needed for pain  Patient is unable to take NSAIDs due to taking Eliquis as well as a history of bariatric surgery  3  Activities as tolerated modification avoid pain  4  Follow-up in 3 months for repeat evaluation and repeat corticosteroid injections at that time  Chief Complaint     Bilateral knee pain    History of the Present Illness     Ronny Bryan is a 67 y o  female who returns to the office today for follow-up regarding her bilateral knee DJD  She was last seen on 11/21/2022  At that time she received intra-articular corticosteroid injections into the bilateral knees  She noted significant improvement after going injections but notes a return of her pain after recently being on a cruise which required a lot of walking  She notes generalized bilateral knee pain with associated swelling and stiffness  She states the pain limits her ability to ambulate long distances  She has been ambulating with a walker  She notes pain when transitioning from a seated to standing position and while taking her first couple steps  She denies any instability  No numbness or tingling  No fevers or chills  She does take Tylenol with relief  She has tried hyaluronic acid injections in the past without relief  Physical Exam     /76   Ht 5' 3" (1 6 m)   Wt 132 kg (291 lb)   BMI 51 55 kg/m²     Bilateral knees:  No gross deformity  Skin intact  No erythema ecchymosis or swelling  No effusion  No significant joint line test   Range of motion 0-110 bilaterally with pain  Stable to varus and valgus stress bilaterally  Stable Lachman test   Negative posterior drawer test   Negative Toni's test     Eyes: Anicteric sclerae  ENT: Trachea midline  Lungs: Normal respiratory effort    CV: Capillary refill is less than 2 seconds  Skin: Intact without erythema  Lymph: No palpable lymphadenopathy  Neuro: Sensation is grossly intact to light touch  Psych: Mood and affect are appropriate  Past Medical History:   Diagnosis Date   • A-fib [facility])    • Arthritis    • Atrial fibrillation (HCC)    • Cervical spondylosis with myelopathy    • Gastric bypass status for obesity    • History of transfusion     35 years ago   • Hypertension    • mass right kidney    • Renal cell adenocarcinoma (HCC)     RIGHT   • Sleep apnea        Past Surgical History:   Procedure Laterality Date   • CHOLECYSTECTOMY     • COLOSTOMY     • CRYOABLATION Right     RT KIDNEY   • CYSTORRHAPHY      Bladder   Last assessed 4/6/2016    • GASTRIC BYPASS     • HERNIA REPAIR      Last assessed 4/6/2016    • HYSTERECTOMY     • [place] OPTX DSTL RADL X-ARTIC FX/EPIPHYSL SEP Right 4/22/2016    Procedure: OPEN REDUCTION INTERNAL FIXATION RIGHT DISTAL RADIUS;  Surgeon: Swati Odonnell MD;  [facility];  Service: Orthopedics   • REVISION COLOSTOMY     • THYROID LOBECTOMY Left 8/27/2019    Procedure: LOBECTOMY THYROID, left;  Surgeon: Monica Hampton MD;  [facility];  Service: Surgical Oncology   • TUBAL LIGATION     • US GUIDED THYROID BIOPSY  2/27/2019   • US GUIDED THYROID BIOPSY  5/29/2019       No Known Allergies    Current Outpatient Medications on File Prior to Visit   Medication Sig Dispense Refill   • acetaminophen (TYLENOL) 325 mg tablet Take 975 mg by mouth every 6 (six) hours as needed     • apixaban (Eliquis) 5 mg Take 1 tablet (5 mg total) by mouth 2 (two) times a day 180 tablet 3   • diltiazem (CARDIZEM CD) 360 MG 24 hr capsule Take 1 capsule (360 mg total) by mouth daily 90 capsule 3   • ergocalciferol (VITAMIN D2) 50,000 units Take 50,000 Units by mouth once a week Takes D3      • FLUoxetine (PROzac) 20 mg capsule Take 20 mg by mouth 2 (two) times a day       • gabapentin (NEURONTIN) 300 mg capsule Take 600 mg by mouth daily at bedtime      • hydrochlorothiazide (HYDRODIURIL) 25 mg tablet Take 25 mg by mouth daily     • LORazepam (ATIVAN) 1 mg tablet Take 1 mg by mouth daily at bedtime as needed for anxiety  • oxybutynin (DITROPAN) 5 mg tablet 2 (two) times a day       Current Facility-Administered Medications on File Prior to Visit   Medication Dose Route Frequency Provider Last Rate Last Admin   • denosumab (PROLIA) subcutaneous injection 60 mg  60 mg Subcutaneous Q6 Months Nik Mccoy MD   60 mg at 01/04/21 1157       Social History     Tobacco Use   • Smoking status: Never   • Smokeless tobacco: Never   Vaping Use   • Vaping Use: Never used   Substance Use Topics   • Alcohol use: Not Currently     Alcohol/week: 0 0 standard drinks     Comment: 0   • Drug use: No       Family History   Problem Relation Age of Onset   • Heart attack Mother    • Lung cancer Father 76   • Heart disease Father    • Stroke Sister    • Lung cancer Sister 64   • Prostate cancer Brother 61       Review of Systems     As stated in the HPI  All other systems reviewed and are negative        Large joint arthrocentesis: bilateral knee  Procedure Details  Location: knee - bilateral knee  Needle size: 22 G  Ultrasound guidance: no  Approach: anterolateral    Medications (Right): 2 mL lidocaine 1 %; 1 mL methylPREDNISolone acetate 40 mg/mLMedications (Left): 2 mL lidocaine 1 %; 1 mL methylPREDNISolone acetate 40 mg/mL   Patient tolerance: patient tolerated the procedure well with no immediate complications  Dressing:  Sterile dressing applied

## 2023-04-11 DIAGNOSIS — N39.0 URINARY TRACT INFECTION WITHOUT HEMATURIA, SITE UNSPECIFIED: Primary | ICD-10-CM

## 2023-04-11 RX ORDER — SULFAMETHOXAZOLE AND TRIMETHOPRIM 800; 160 MG/1; MG/1
1 TABLET ORAL EVERY 12 HOURS SCHEDULED
Qty: 14 TABLET | Refills: 0 | Status: SHIPPED | OUTPATIENT
Start: 2023-04-11 | End: 2023-04-18

## 2023-05-31 ENCOUNTER — OFFICE VISIT (OUTPATIENT)
Dept: CARDIOLOGY CLINIC | Facility: CLINIC | Age: 73
End: 2023-05-31

## 2023-05-31 VITALS
HEIGHT: 63 IN | DIASTOLIC BLOOD PRESSURE: 62 MMHG | HEART RATE: 55 BPM | OXYGEN SATURATION: 96 % | BODY MASS INDEX: 51.91 KG/M2 | SYSTOLIC BLOOD PRESSURE: 116 MMHG | WEIGHT: 293 LBS

## 2023-05-31 DIAGNOSIS — I10 PRIMARY HYPERTENSION: ICD-10-CM

## 2023-05-31 DIAGNOSIS — N18.30 STAGE 3 CHRONIC KIDNEY DISEASE, UNSPECIFIED WHETHER STAGE 3A OR 3B CKD (HCC): ICD-10-CM

## 2023-05-31 DIAGNOSIS — I50.32 CHRONIC DIASTOLIC HEART FAILURE (HCC): ICD-10-CM

## 2023-05-31 DIAGNOSIS — I48.21 PERMANENT ATRIAL FIBRILLATION (HCC): Primary | ICD-10-CM

## 2023-05-31 DIAGNOSIS — I48.91 ATRIAL FIBRILLATION, UNSPECIFIED TYPE (HCC): ICD-10-CM

## 2023-05-31 RX ORDER — METOPROLOL SUCCINATE 25 MG/1
25 TABLET, EXTENDED RELEASE ORAL DAILY
Qty: 90 TABLET | Refills: 3 | Status: SHIPPED | OUTPATIENT
Start: 2023-05-31

## 2023-05-31 RX ORDER — METOPROLOL SUCCINATE 25 MG/1
25 TABLET, EXTENDED RELEASE ORAL DAILY
Qty: 90 TABLET | Refills: 3 | Status: SHIPPED | OUTPATIENT
Start: 2023-05-31 | End: 2023-05-31 | Stop reason: SDUPTHER

## 2023-05-31 RX ORDER — DILTIAZEM HYDROCHLORIDE 360 MG/1
360 CAPSULE, EXTENDED RELEASE ORAL DAILY
Qty: 90 CAPSULE | Refills: 3 | Status: SHIPPED | OUTPATIENT
Start: 2023-05-31 | End: 2023-05-31 | Stop reason: SDUPTHER

## 2023-05-31 RX ORDER — HYDROCHLOROTHIAZIDE 12.5 MG/1
12.5 TABLET ORAL DAILY
Qty: 90 TABLET | Refills: 3 | Status: SHIPPED | OUTPATIENT
Start: 2023-05-31

## 2023-05-31 RX ORDER — DILTIAZEM HYDROCHLORIDE 360 MG/1
360 CAPSULE, EXTENDED RELEASE ORAL DAILY
Qty: 90 CAPSULE | Refills: 3 | Status: SHIPPED | OUTPATIENT
Start: 2023-05-31

## 2023-05-31 RX ORDER — HYDROCHLOROTHIAZIDE 12.5 MG/1
12.5 TABLET ORAL DAILY
Qty: 90 TABLET | Refills: 3 | Status: SHIPPED | OUTPATIENT
Start: 2023-05-31 | End: 2023-05-31 | Stop reason: SDUPTHER

## 2023-05-31 NOTE — PROGRESS NOTES
"Cardiology Outpatient Follow-Up Note - Raiza Harris 67 y o  female MRN: [id number]      Assessment/Plan:    1  Permanent atrial fibrillation (HCC)  Her HR is a bit faster than pulse-ox suggests  By EKG today she is resting at 103  She is having symptoms of rapid atrial fibrillation  We will add metoprolol XL 25 mg daily to her rate control regimen  Continue diltiazem  mg daily  Continue Apixaban 5 mg BID for thromboembolic ppx  - POCT ECG  - diltiazem (CARDIZEM CD) 360 MG 24 hr capsule; Take 1 capsule (360 mg total) by mouth daily  Dispense: 90 capsule; Refill: 3    2  Primary hypertension  Well controlled today  We will change her HCTZ from 25 mg PRN for edema to 12 5 mg daily standing  Added metoprolol XL as above  - metoprolol succinate (TOPROL-XL) 25 mg 24 hr tablet; Take 1 tablet (25 mg total) by mouth daily  Dispense: 90 tablet; Refill: 3  - hydrochlorothiazide (HYDRODIURIL) 12 5 mg tablet; Take 1 tablet (12 5 mg total) by mouth daily  Dispense: 90 tablet; Refill: 3    3  Stage 3 chronic kidney disease, unspecified whether stage 3a or 3b CKD ([facility] [address] )      4  Chronic diastolic heart failure ([facility] [address] )  She does have occasional symptoms of fluid retention, primarily LE edema for which she takes HCTZ 25 mg intermittently  We will trial standing therapy of HCTZ 12 5 mg daily  - hydrochlorothiazide (HYDRODIURIL) 12 5 mg tablet; Take 1 tablet (12 5 mg total) by mouth daily  Dispense: 90 tablet; Refill: 3        We will see Raiza Harris back in 6 months for \ routine follow-up  Subjective:     HPI: Raiza Hraris is a 67y o  year old female with permanent atrial fibrillation, sleep apnea, mitral regurgitation, and morbid obesity  Presenting for follow-up  Switched to Eliquis last year  No issues  She does have some exertional dyspnea, particularly in the heat  \"I feel my Afib acts up\"  She feels short of breath making her bed and often cannot finish the entire task in one run   " "          Cardiac Testing:  echocardiogram on 02/21/2019 showed normal LV size and function, LVEF 55%, mild concentric LV hypertrophy, normal RV size and function, mild left atrial dilation , mild mitral regurgitation  EKGs, personally reviewed:  EKG in the office 5/31/23 shows atrial fibrillation, 103 bpm, low voltage, normal axis, otherwise normal intervals  Cannot rule out inferior MI  Poor anterior R wave progression  Abnormal study  Relevant Labs & Results: BMP 1/16/23 reviewed -- K 3 4, Cr 1 02  CBC 11/2/22 reviewed -- Hgb 11 2  HbA1c 10/30/22 -- 6 4%, 1/16/23 6 1%      ROS:  Review of Systems:  Review of Systems    Objective:     Vitals:   Vitals:    05/31/23 0745   BP: 116/62   BP Location: Other (Comment)   Patient Position: Sitting   Cuff Size: Large   Pulse: 55   SpO2: 96%   Weight: (!) 138 kg (305 lb 4 8 oz)   Height: 5' 3\" (1 6 m)    Body surface area is 2 31 meters squared  Wt Readings from Last 3 Encounters:   05/31/23 (!) 138 kg (305 lb 4 8 oz)   03/01/23 132 kg (291 lb)   01/26/23 132 kg (291 lb)       Physical Exam:  General: Luke Shelley is a morbidly obese, deconditioned elderly female, in no acute distress, sitting comfortably  HEENT: moist mucous membranes, EOMI  Neck:  No JVD, supple, trachea midline  Cardiovascular: unremarkable S1/S2, irregularly irregular, tachy, no murmurs  Pulmonary: normal respiratory effort, CTAB  Abdomen: soft and nondistended  Extremities: trace lower extremity edema  Warm and well perfused extremities  Neuro: no focal motor deficits, AAOx3 (person, place, time)  Psych: Normal mood and affect, cooperative      Medications (at the START of this encounter):   Outpatient Medications Prior to Visit   Medication Sig Dispense Refill   • acetaminophen (TYLENOL) 325 mg tablet Take 975 mg by mouth every 6 (six) hours as needed     • apixaban (Eliquis) 5 mg Take 1 tablet (5 mg total) by mouth 2 (two) times a day 180 tablet 3   • diltiazem (CARDIZEM CD) 360 MG " "24 hr capsule Take 1 capsule (360 mg total) by mouth daily 90 capsule 3   • ergocalciferol (VITAMIN D2) 50,000 units Take 50,000 Units by mouth once a week Takes D3      • FLUoxetine (PROzac) 20 mg capsule Take 20 mg by mouth 2 (two) times a day       • gabapentin (NEURONTIN) 300 mg capsule Take 600 mg by mouth daily at bedtime      • hydrochlorothiazide (HYDRODIURIL) 25 mg tablet Take 25 mg by mouth if needed     • LORazepam (ATIVAN) 1 mg tablet Take 1 mg by mouth daily at bedtime as needed for anxiety  • oxybutynin (DITROPAN) 5 mg tablet 2 (two) times a day     • Probiotic Product (PRO-BIOTIC BLEND PO) Take by mouth       Facility-Administered Medications Prior to Visit   Medication Dose Route Frequency Provider Last Rate Last Admin   • denosumab (PROLIA) subcutaneous injection 60 mg  60 mg Subcutaneous Q6 Months Broderick Smith MD   60 mg at 01/04/21 9669                  Time Spent:  Total time (face-to-face and non-face-to-face) spent on today's visit was 20 minutes  This includes preparation for the visits (i e  reviewing test results), performance of a medically appropriate history and examination, and orders for medications, tests or other procedures  This time is exclusive of procedures performed and time spent teaching  This note was completed in part utilizing [employer] [facility] [employer] voice recognition software  Grammatical errors, random word insertion, spelling mistakes, occasional wrong word or \"sound-alike\" substitutions and incomplete sentences may be an occasional consequence of the system secondary to software limitations, ambient noise and hardware issues  At the time of dictation, efforts were made to edit, clarify and /or correct errors  Please read the chart carefully and recognize, using context, where substitutions have occurred    If you have any questions or concerns about the context, text or information contained within the body of this dictation, please contact myself, the provider, " for further clarification

## 2023-06-07 ENCOUNTER — OFFICE VISIT (OUTPATIENT)
Dept: OBGYN CLINIC | Facility: CLINIC | Age: 73
End: 2023-06-07
Payer: MEDICARE

## 2023-06-07 ENCOUNTER — HOSPITAL ENCOUNTER (OUTPATIENT)
Dept: RADIOLOGY | Facility: IMAGING CENTER | Age: 73
Discharge: HOME | End: 2023-06-07
Payer: MEDICARE

## 2023-06-07 VITALS
WEIGHT: 293 LBS | BODY MASS INDEX: 51.91 KG/M2 | DIASTOLIC BLOOD PRESSURE: 74 MMHG | SYSTOLIC BLOOD PRESSURE: 110 MMHG | HEIGHT: 63 IN

## 2023-06-07 DIAGNOSIS — C64.1 CANCER OF RIGHT KIDNEY (HCC): ICD-10-CM

## 2023-06-07 DIAGNOSIS — M17.0 PRIMARY OSTEOARTHRITIS OF BOTH KNEES: Primary | ICD-10-CM

## 2023-06-07 PROCEDURE — 99213 OFFICE O/P EST LOW 20 MIN: CPT | Performed by: PHYSICIAN ASSISTANT

## 2023-06-07 PROCEDURE — 76770 US EXAM ABDO BACK WALL COMP: CPT

## 2023-06-07 PROCEDURE — 20610 DRAIN/INJ JOINT/BURSA W/O US: CPT | Performed by: PHYSICIAN ASSISTANT

## 2023-06-07 RX ORDER — LIDOCAINE HYDROCHLORIDE 10 MG/ML
2 INJECTION, SOLUTION INFILTRATION; PERINEURAL
Status: COMPLETED | OUTPATIENT
Start: 2023-06-07 | End: 2023-06-07

## 2023-06-07 RX ORDER — BUPIVACAINE HYDROCHLORIDE 2.5 MG/ML
2 INJECTION, SOLUTION INFILTRATION; PERINEURAL
Status: COMPLETED | OUTPATIENT
Start: 2023-06-07 | End: 2023-06-07

## 2023-06-07 RX ORDER — BETAMETHASONE SODIUM PHOSPHATE AND BETAMETHASONE ACETATE 3; 3 MG/ML; MG/ML
6 INJECTION, SUSPENSION INTRA-ARTICULAR; INTRALESIONAL; INTRAMUSCULAR; SOFT TISSUE
Status: COMPLETED | OUTPATIENT
Start: 2023-06-07 | End: 2023-06-07

## 2023-06-07 RX ADMIN — BUPIVACAINE HYDROCHLORIDE 2 ML: 2.5 INJECTION, SOLUTION INFILTRATION; PERINEURAL at 09:00

## 2023-06-07 RX ADMIN — LIDOCAINE HYDROCHLORIDE 2 ML: 10 INJECTION, SOLUTION INFILTRATION; PERINEURAL at 09:00

## 2023-06-07 RX ADMIN — BETAMETHASONE SODIUM PHOSPHATE AND BETAMETHASONE ACETATE 6 MG: 3; 3 INJECTION, SUSPENSION INTRA-ARTICULAR; INTRALESIONAL; INTRAMUSCULAR; SOFT TISSUE at 09:00

## 2023-06-07 NOTE — PROGRESS NOTES
"Patient Name:  Saintclair Gentleman  MRN:  [id number]    Assessment & Plan     Bilateral knee DJD  1  Corticosteroid injection performed today into the bilateral knees  2  Activities as tolerated modification avoid pain  3  Continue Tylenol as needed for pain  4  Patient may follow-up in 3 months for repeat evaluation and repeat injections at that time as indicated  Chief Complaint     Follow-up bilateral knee DJD  History of the Present Illness     Saintclair Gentleman is a 67 y o  female who returns to the office today for follow-up regarding her bilateral knees  She was last seen on 3/1/2023  At that time she received corticosteroid injections into the bilateral knees  She noted significant improvement for approximately 2 months and then noted a gradual return of her pain  She notes generalized pain in the bilateral knees with the right knee bothering her more than the left  She does note occasional swelling and stiffness  Pain is worse when transitioning from a seated to standing position and first thing in the morning  She denies any weakness or instability  She has been taking Tylenol with some improvement  She is unable to take NSAIDs due to history of gastric bypass and taking Eliquis  No numbness or tingling  No fevers or chills  She is interested in repeat corticosteroid injections today  Physical Exam     /74   Ht 5' 3\" (1 6 m)   Wt (!) 138 kg (305 lb)   BMI 54 03 kg/m²     Bilateral knees:  No gross deformity   Skin intact   No erythema ecchymosis or swelling   No effusion   No significant joint line test   Range of motion 0-110 bilaterally with pain   Stable to varus and valgus stress bilaterally   Stable Lachman test   Negative posterior drawer test   Negative Toni's test     Eyes: Anicteric sclerae  ENT: Trachea midline  Lungs: Normal respiratory effort  CV: Capillary refill is less than 2 seconds  Skin: Intact without erythema    Lymph: No palpable " lymphadenopathy  Neuro: Sensation is grossly intact to light touch  Psych: Mood and affect are appropriate  Past Medical History:   Diagnosis Date   • A-fib [facility])    • Arthritis    • Atrial fibrillation (HCC)    • Cervical spondylosis with myelopathy    • Gastric bypass status for obesity    • History of transfusion     35 years ago   • Hypertension    • mass right kidney    • Renal cell adenocarcinoma (HCC)     RIGHT   • Sleep apnea        Past Surgical History:   Procedure Laterality Date   • CHOLECYSTECTOMY     • COLOSTOMY     • CRYOABLATION Right     RT KIDNEY   • CYSTORRHAPHY      Bladder   Last assessed 4/6/2016    • GASTRIC BYPASS     • HERNIA REPAIR      Last assessed 4/6/2016    • HYSTERECTOMY     • [place] OPTX DSTL RADL X-ARTIC FX/EPIPHYSL SEP Right 4/22/2016    Procedure: OPEN REDUCTION INTERNAL FIXATION RIGHT DISTAL RADIUS;  Surgeon: Barry Haque MD;  [facility];  Service: Orthopedics   • REVISION COLOSTOMY     • THYROID LOBECTOMY Left 8/27/2019    Procedure: LOBECTOMY THYROID, left;  Surgeon: Carlitos Hsu MD;  [facility];  Service: Surgical Oncology   • TUBAL LIGATION     • US GUIDED THYROID BIOPSY  2/27/2019   • US GUIDED THYROID BIOPSY  5/29/2019       No Known Allergies    Current Outpatient Medications on File Prior to Visit   Medication Sig Dispense Refill   • acetaminophen (TYLENOL) 325 mg tablet Take 975 mg by mouth every 6 (six) hours as needed     • apixaban (Eliquis) 5 mg Take 1 tablet (5 mg total) by mouth 2 (two) times a day 60 tablet 11   • diltiazem (CARDIZEM CD) 360 MG 24 hr capsule Take 1 capsule (360 mg total) by mouth daily 90 capsule 3   • FLUoxetine (PROzac) 20 mg capsule Take 20 mg by mouth 2 (two) times a day       • gabapentin (NEURONTIN) 300 mg capsule Take 600 mg by mouth daily at bedtime      • hydrochlorothiazide (HYDRODIURIL) 12 5 mg tablet Take 1 tablet (12 5 mg total) by mouth daily 90 tablet 3   • LORazepam (ATIVAN) 1 mg tablet Take 1 mg by mouth daily at bedtime as needed for anxiety  • metoprolol succinate (TOPROL-XL) 25 mg 24 hr tablet Take 1 tablet (25 mg total) by mouth daily 90 tablet 3   • oxybutynin (DITROPAN) 5 mg tablet 2 (two) times a day     • Probiotic Product (PRO-BIOTIC BLEND PO) Take by mouth     • ergocalciferol (VITAMIN D2) 50,000 units Take 50,000 Units by mouth once a week Takes D3        Current Facility-Administered Medications on File Prior to Visit   Medication Dose Route Frequency Provider Last Rate Last Admin   • denosumab (PROLIA) subcutaneous injection 60 mg  60 mg Subcutaneous Q6 Months Kamaljit Parr MD   60 mg at 01/04/21 1157       Social History     Tobacco Use   • Smoking status: Never   • Smokeless tobacco: Never   Vaping Use   • Vaping Use: Never used   Substance Use Topics   • Alcohol use: Not Currently     Alcohol/week: 0 0 standard drinks of alcohol     Comment: 0   • Drug use: No       Family History   Problem Relation Age of Onset   • Heart attack Mother    • Lung cancer Father 76   • Heart disease Father    • Stroke Sister    • Lung cancer Sister 64   • Prostate cancer Brother 61       Review of Systems     As stated in the HPI  All other systems reviewed and are negative        Large joint arthrocentesis: bilateral knee  Procedure Details  Location: knee - bilateral knee  Needle size: 22 G  Ultrasound guidance: no  Approach: anterolateral    Medications (Right): 2 mL bupivacaine 0 25 %; 2 mL lidocaine 1 %; 6 mg betamethasone acetate-betamethasone sodium phosphate 6 (3-3) mg/mLMedications (Left): 2 mL bupivacaine 0 25 %; 2 mL lidocaine 1 %; 6 mg betamethasone acetate-betamethasone sodium phosphate 6 (3-3) mg/mL   Patient tolerance: patient tolerated the procedure well with no immediate complications  Dressing:  Sterile dressing applied

## 2023-06-08 ENCOUNTER — TELEPHONE (OUTPATIENT)
Dept: ENDOCRINOLOGY | Facility: CLINIC | Age: 73
End: 2023-06-08

## 2023-06-08 ENCOUNTER — TELEPHONE (OUTPATIENT)
Dept: OTHER | Facility: OTHER | Age: 73
End: 2023-06-08

## 2023-06-08 DIAGNOSIS — E16.1 REACTIVE HYPOGLYCEMIA: ICD-10-CM

## 2023-06-08 DIAGNOSIS — E21.3 HYPERPARATHYROIDISM (HCC): Primary | ICD-10-CM

## 2023-06-08 DIAGNOSIS — R73.01 IMPAIRED FASTING GLUCOSE: ICD-10-CM

## 2023-06-08 DIAGNOSIS — M81.0 OSTEOPOROSIS, UNSPECIFIED OSTEOPOROSIS TYPE, UNSPECIFIED PATHOLOGICAL FRACTURE PRESENCE: ICD-10-CM

## 2023-06-08 DIAGNOSIS — E55.9 VITAMIN D DEFICIENCY: ICD-10-CM

## 2023-06-08 NOTE — TELEPHONE ENCOUNTER
Pt wants the labs changed to 6/12 so she can get her labs drawn also for the cancer dr Shannon Blake we change her date?

## 2023-06-08 NOTE — TELEPHONE ENCOUNTER
Spoke with patient and informed her she only needed US prior to visit, no labs necessary    She verbalized understanding and was thankful for call

## 2023-06-18 ENCOUNTER — HOSPITAL ENCOUNTER (INPATIENT)
Facility: HOSPITAL | Age: 73
LOS: 16 days | Discharge: NURSING, REHAB OR LONG-TERM CARE | DRG: 853 | End: 2023-07-04
Attending: EMERGENCY MEDICINE | Admitting: SURGERY
Payer: MEDICARE

## 2023-06-18 ENCOUNTER — APPOINTMENT (INPATIENT)
Dept: RADIOLOGY | Facility: HOSPITAL | Age: 73
DRG: 853 | End: 2023-06-18
Payer: MEDICARE

## 2023-06-18 ENCOUNTER — APPOINTMENT (EMERGENCY)
Dept: RADIOLOGY | Facility: HOSPITAL | Age: 73
DRG: 853 | End: 2023-06-18
Payer: MEDICARE

## 2023-06-18 DIAGNOSIS — K85.00 IDIOPATHIC ACUTE PANCREATITIS WITHOUT INFECTION OR NECROSIS: ICD-10-CM

## 2023-06-18 DIAGNOSIS — K85.80 OTHER ACUTE PANCREATITIS, UNSPECIFIED COMPLICATION STATUS: ICD-10-CM

## 2023-06-18 DIAGNOSIS — R19.8 PERFORATED VISCUS: Primary | ICD-10-CM

## 2023-06-18 DIAGNOSIS — R10.9 ABDOMINAL PAIN: ICD-10-CM

## 2023-06-18 LAB
ALBUMIN SERPL BCP-MCNC: 3.5 G/DL (ref 3.5–5)
ALP SERPL-CCNC: 141 U/L (ref 46–116)
ALT SERPL W P-5'-P-CCNC: 28 U/L (ref 12–78)
ANION GAP SERPL CALCULATED.3IONS-SCNC: 5 MMOL/L (ref 4–13)
AST SERPL W P-5'-P-CCNC: 26 U/L (ref 5–45)
BASOPHILS # BLD AUTO: 0.08 THOUSANDS/ÂΜL (ref 0–0.1)
BASOPHILS NFR BLD AUTO: 0 % (ref 0–1)
BILIRUB SERPL-MCNC: 0.26 MG/DL (ref 0.2–1)
BUN SERPL-MCNC: 27 MG/DL (ref 5–25)
CALCIUM SERPL-MCNC: 9.1 MG/DL (ref 8.3–10.1)
CARDIAC TROPONIN I PNL SERPL HS: 7 NG/L
CHLORIDE SERPL-SCNC: 105 MMOL/L (ref 96–108)
CO2 SERPL-SCNC: 28 MMOL/L (ref 21–32)
CREAT SERPL-MCNC: 1.06 MG/DL (ref 0.6–1.3)
EOSINOPHIL # BLD AUTO: 0.1 THOUSAND/ÂΜL (ref 0–0.61)
EOSINOPHIL NFR BLD AUTO: 1 % (ref 0–6)
ERYTHROCYTE [DISTWIDTH] IN BLOOD BY AUTOMATED COUNT: 16.3 % (ref 11.6–15.1)
GFR SERPL CREATININE-BSD FRML MDRD: 52 ML/MIN/1.73SQ M
GLUCOSE SERPL-MCNC: 188 MG/DL (ref 65–140)
HCT VFR BLD AUTO: 39.8 % (ref 34.8–46.1)
HGB BLD-MCNC: 12.3 G/DL (ref 11.5–15.4)
IMM GRANULOCYTES # BLD AUTO: 0.23 THOUSAND/UL (ref 0–0.2)
IMM GRANULOCYTES NFR BLD AUTO: 1 % (ref 0–2)
LIPASE SERPL-CCNC: 1840 U/L (ref 73–393)
LYMPHOCYTES # BLD AUTO: 1.02 THOUSANDS/ÂΜL (ref 0.6–4.47)
LYMPHOCYTES NFR BLD AUTO: 6 % (ref 14–44)
MCH RBC QN AUTO: 25.9 PG (ref 26.8–34.3)
MCHC RBC AUTO-ENTMCNC: 30.9 G/DL (ref 31.4–37.4)
MCV RBC AUTO: 84 FL (ref 82–98)
MONOCYTES # BLD AUTO: 0.69 THOUSAND/ÂΜL (ref 0.17–1.22)
MONOCYTES NFR BLD AUTO: 4 % (ref 4–12)
NEUTROPHILS # BLD AUTO: 15.97 THOUSANDS/ÂΜL (ref 1.85–7.62)
NEUTS SEG NFR BLD AUTO: 88 % (ref 43–75)
NRBC BLD AUTO-RTO: 0 /100 WBCS
PLATELET # BLD AUTO: 360 THOUSANDS/UL (ref 149–390)
PMV BLD AUTO: 10.7 FL (ref 8.9–12.7)
POTASSIUM SERPL-SCNC: 3.8 MMOL/L (ref 3.5–5.3)
PROT SERPL-MCNC: 7.4 G/DL (ref 6.4–8.4)
RBC # BLD AUTO: 4.75 MILLION/UL (ref 3.81–5.12)
SODIUM SERPL-SCNC: 138 MMOL/L (ref 135–147)
WBC # BLD AUTO: 18.09 THOUSAND/UL (ref 4.31–10.16)

## 2023-06-18 PROCEDURE — 96375 TX/PRO/DX INJ NEW DRUG ADDON: CPT

## 2023-06-18 PROCEDURE — 74177 CT ABD & PELVIS W/CONTRAST: CPT

## 2023-06-18 PROCEDURE — 99285 EMERGENCY DEPT VISIT HI MDM: CPT

## 2023-06-18 PROCEDURE — 80053 COMPREHEN METABOLIC PANEL: CPT | Performed by: EMERGENCY MEDICINE

## 2023-06-18 PROCEDURE — G1004 CDSM NDSC: HCPCS

## 2023-06-18 PROCEDURE — 85025 COMPLETE CBC W/AUTO DIFF WBC: CPT | Performed by: EMERGENCY MEDICINE

## 2023-06-18 PROCEDURE — 96376 TX/PRO/DX INJ SAME DRUG ADON: CPT

## 2023-06-18 PROCEDURE — 83605 ASSAY OF LACTIC ACID: CPT

## 2023-06-18 PROCEDURE — 99285 EMERGENCY DEPT VISIT HI MDM: CPT | Performed by: EMERGENCY MEDICINE

## 2023-06-18 PROCEDURE — 83690 ASSAY OF LIPASE: CPT | Performed by: EMERGENCY MEDICINE

## 2023-06-18 PROCEDURE — 84484 ASSAY OF TROPONIN QUANT: CPT | Performed by: EMERGENCY MEDICINE

## 2023-06-18 PROCEDURE — 96374 THER/PROPH/DIAG INJ IV PUSH: CPT

## 2023-06-18 PROCEDURE — 93005 ELECTROCARDIOGRAM TRACING: CPT

## 2023-06-18 PROCEDURE — 71045 X-RAY EXAM CHEST 1 VIEW: CPT

## 2023-06-18 PROCEDURE — 71260 CT THORAX DX C+: CPT

## 2023-06-18 PROCEDURE — 36415 COLL VENOUS BLD VENIPUNCTURE: CPT | Performed by: EMERGENCY MEDICINE

## 2023-06-18 RX ORDER — HYDROMORPHONE HCL/PF 1 MG/ML
0.6 SYRINGE (ML) INJECTION ONCE
Status: COMPLETED | OUTPATIENT
Start: 2023-06-18 | End: 2023-06-18

## 2023-06-18 RX ORDER — METOCLOPRAMIDE HYDROCHLORIDE 5 MG/ML
10 INJECTION INTRAMUSCULAR; INTRAVENOUS ONCE
Status: COMPLETED | OUTPATIENT
Start: 2023-06-18 | End: 2023-06-18

## 2023-06-18 RX ORDER — PANTOPRAZOLE SODIUM 40 MG/10ML
40 INJECTION, POWDER, LYOPHILIZED, FOR SOLUTION INTRAVENOUS EVERY 12 HOURS SCHEDULED
Status: DISCONTINUED | OUTPATIENT
Start: 2023-06-18 | End: 2023-06-28

## 2023-06-18 RX ORDER — METRONIDAZOLE 500 MG/1
500 TABLET ORAL ONCE
Status: DISCONTINUED | OUTPATIENT
Start: 2023-06-18 | End: 2023-06-19

## 2023-06-18 RX ORDER — SODIUM CHLORIDE, SODIUM GLUCONATE, SODIUM ACETATE, POTASSIUM CHLORIDE, MAGNESIUM CHLORIDE, SODIUM PHOSPHATE, DIBASIC, AND POTASSIUM PHOSPHATE .53; .5; .37; .037; .03; .012; .00082 G/100ML; G/100ML; G/100ML; G/100ML; G/100ML; G/100ML; G/100ML
500 INJECTION, SOLUTION INTRAVENOUS ONCE
Status: COMPLETED | OUTPATIENT
Start: 2023-06-18 | End: 2023-06-19

## 2023-06-18 RX ORDER — HYDROMORPHONE HCL/PF 1 MG/ML
0.5 SYRINGE (ML) INJECTION ONCE
Status: COMPLETED | OUTPATIENT
Start: 2023-06-18 | End: 2023-06-18

## 2023-06-18 RX ADMIN — IOHEXOL 100 ML: 350 INJECTION, SOLUTION INTRAVENOUS at 22:28

## 2023-06-18 RX ADMIN — HYDROMORPHONE HYDROCHLORIDE 0.6 MG: 1 INJECTION, SOLUTION INTRAMUSCULAR; INTRAVENOUS; SUBCUTANEOUS at 21:08

## 2023-06-18 RX ADMIN — SODIUM CHLORIDE, SODIUM GLUCONATE, SODIUM ACETATE, POTASSIUM CHLORIDE, MAGNESIUM CHLORIDE, SODIUM PHOSPHATE, DIBASIC, AND POTASSIUM PHOSPHATE 500 ML: .53; .5; .37; .037; .03; .012; .00082 INJECTION, SOLUTION INTRAVENOUS at 23:43

## 2023-06-18 RX ADMIN — HYDROMORPHONE HYDROCHLORIDE 0.5 MG: 1 INJECTION, SOLUTION INTRAMUSCULAR; INTRAVENOUS; SUBCUTANEOUS at 20:36

## 2023-06-18 RX ADMIN — METOCLOPRAMIDE HYDROCHLORIDE 10 MG: 5 INJECTION INTRAMUSCULAR; INTRAVENOUS at 20:36

## 2023-06-18 RX ADMIN — IOHEXOL 50 ML: 240 INJECTION, SOLUTION INTRATHECAL; INTRAVASCULAR; INTRAVENOUS; ORAL at 21:11

## 2023-06-19 ENCOUNTER — APPOINTMENT (INPATIENT)
Dept: RADIOLOGY | Facility: HOSPITAL | Age: 73
DRG: 853 | End: 2023-06-19
Payer: MEDICARE

## 2023-06-19 ENCOUNTER — ANESTHESIA EVENT (INPATIENT)
Dept: PERIOP | Facility: HOSPITAL | Age: 73
End: 2023-06-19
Payer: MEDICARE

## 2023-06-19 ENCOUNTER — ANESTHESIA (INPATIENT)
Dept: PERIOP | Facility: HOSPITAL | Age: 73
End: 2023-06-19
Payer: MEDICARE

## 2023-06-19 ENCOUNTER — APPOINTMENT (INPATIENT)
Dept: NON INVASIVE DIAGNOSTICS | Facility: HOSPITAL | Age: 73
DRG: 853 | End: 2023-06-19
Payer: MEDICARE

## 2023-06-19 LAB
ABO GROUP BLD BPU: NORMAL
ABO GROUP BLD: NORMAL
ALBUMIN SERPL BCP-MCNC: 2.3 G/DL (ref 3.5–5)
ALBUMIN SERPL BCP-MCNC: 2.5 G/DL (ref 3.5–5)
ALP SERPL-CCNC: 61 U/L (ref 46–116)
ALP SERPL-CCNC: 95 U/L (ref 46–116)
ALT SERPL W P-5'-P-CCNC: 36 U/L (ref 12–78)
ALT SERPL W P-5'-P-CCNC: 58 U/L (ref 12–78)
ANION GAP SERPL CALCULATED.3IONS-SCNC: 12 MMOL/L (ref 4–13)
ANION GAP SERPL CALCULATED.3IONS-SCNC: 13 MMOL/L (ref 4–13)
ANION GAP SERPL CALCULATED.3IONS-SCNC: 13 MMOL/L (ref 4–13)
ANION GAP SERPL CALCULATED.3IONS-SCNC: 8 MMOL/L (ref 4–13)
AORTIC ROOT: 3.6 CM
AORTIC VALVE MEAN VELOCITY: 7 M/S
APICAL FOUR CHAMBER EJECTION FRACTION: 68 %
APTT PPP: 24 SECONDS (ref 23–37)
APTT PPP: 25 SECONDS (ref 23–37)
ARTERIAL PATENCY WRIST A: YES
ASCENDING AORTA: 3.3 CM
AST SERPL W P-5'-P-CCNC: 128 U/L (ref 5–45)
AST SERPL W P-5'-P-CCNC: 45 U/L (ref 5–45)
ATRIAL RATE: 122 BPM
ATRIAL RATE: 126 BPM
ATRIAL RATE: 132 BPM
ATRIAL RATE: 241 BPM
AV AREA BY CONTINUOUS VTI: 1.9 CM2
AV AREA PEAK VELOCITY: 2.1 CM2
AV LVOT MEAN GRADIENT: 2 MMHG
AV LVOT PEAK GRADIENT: 4 MMHG
AV MEAN GRADIENT: 2 MMHG
AV PEAK GRADIENT: 4 MMHG
AV VALVE AREA: 1.88 CM2
AV VELOCITY RATIO: 0.93
BACTERIA UR QL AUTO: ABNORMAL /HPF
BASE EX.OXY STD BLDV CALC-SCNC: 67.5 % (ref 60–80)
BASE EXCESS BLDA CALC-SCNC: -10.2 MMOL/L
BASE EXCESS BLDA CALC-SCNC: -12 MMOL/L
BASE EXCESS BLDA CALC-SCNC: -4 MMOL/L (ref -2–3)
BASE EXCESS BLDA CALC-SCNC: -5.8 MMOL/L
BASE EXCESS BLDA CALC-SCNC: -6 MMOL/L (ref -2–3)
BASE EXCESS BLDA CALC-SCNC: -7.5 MMOL/L
BASE EXCESS BLDA CALC-SCNC: -7.7 MMOL/L
BASE EXCESS BLDA CALC-SCNC: -8.9 MMOL/L
BASE EXCESS BLDV CALC-SCNC: -11.6 MMOL/L
BASOPHILS # BLD AUTO: 0.06 THOUSANDS/ÂΜL (ref 0–0.1)
BASOPHILS # BLD MANUAL: 0 THOUSAND/UL (ref 0–0.1)
BASOPHILS NFR BLD AUTO: 0 % (ref 0–1)
BASOPHILS NFR MAR MANUAL: 0 % (ref 0–1)
BILIRUB DIRECT SERPL-MCNC: 0.4 MG/DL (ref 0–0.2)
BILIRUB SERPL-MCNC: 0.67 MG/DL (ref 0.2–1)
BILIRUB SERPL-MCNC: 0.88 MG/DL (ref 0.2–1)
BILIRUB UR QL STRIP: NEGATIVE
BLD GP AB SCN SERPL QL: NEGATIVE
BODY TEMPERATURE: 96.6 DEGREES FEHRENHEIT
BPU ID: NORMAL
BUN SERPL-MCNC: 25 MG/DL (ref 5–25)
BUN SERPL-MCNC: 29 MG/DL (ref 5–25)
BUN SERPL-MCNC: 29 MG/DL (ref 5–25)
BUN SERPL-MCNC: 30 MG/DL (ref 5–25)
CA-I BLD-SCNC: 0.94 MMOL/L (ref 1.12–1.32)
CA-I BLD-SCNC: 0.99 MMOL/L (ref 1.12–1.32)
CA-I BLD-SCNC: 1.06 MMOL/L (ref 1.12–1.32)
CA-I BLD-SCNC: 1.14 MMOL/L (ref 1.12–1.32)
CALCIUM ALBUM COR SERPL-MCNC: 9.9 MG/DL (ref 8.3–10.1)
CALCIUM SERPL-MCNC: 7.5 MG/DL (ref 8.3–10.1)
CALCIUM SERPL-MCNC: 7.9 MG/DL (ref 8.3–10.1)
CALCIUM SERPL-MCNC: 7.9 MG/DL (ref 8.3–10.1)
CALCIUM SERPL-MCNC: 8.7 MG/DL (ref 8.3–10.1)
CFFFLEV: 695.3 MG/DL (ref 278–581)
CFFMA (FUNCTIONAL FIBRINOGEN MAX AMPLITUDE): 21.7 MM (ref 15–32)
CFFMA (FUNCTIONAL FIBRINOGEN MAX AMPLITUDE): 24.2 MM (ref 15–32)
CFFMA (FUNCTIONAL FIBRINOGEN MAX AMPLITUDE): 38.1 MM (ref 15–32)
CHLORIDE SERPL-SCNC: 112 MMOL/L (ref 96–108)
CHLORIDE SERPL-SCNC: 113 MMOL/L (ref 96–108)
CKA(ANGLE): 78.3 DEG (ref 63–78)
CKHR(HEPARINASE REACTION TIME): 5.2 MIN (ref 4.3–8.3)
CKK(CLOT KINETICS): 0.8 MIN (ref 0.8–2.1)
CKLY30: 0 % (ref 0–2.6)
CKLY30: 0 % (ref 0–2.6)
CKMA(MAX AMPLITUDE): 68.1 MM (ref 52–69)
CKR(REACTION TIME): 4.7 MIN (ref 4.6–9.1)
CKR(REACTION TIME): 5.2 MIN (ref 4.6–9.1)
CKR(REACTION TIME): 5.2 MIN (ref 4.6–9.1)
CLARITY UR: CLEAR
CO2 SERPL-SCNC: 16 MMOL/L (ref 21–32)
CO2 SERPL-SCNC: 16 MMOL/L (ref 21–32)
CO2 SERPL-SCNC: 17 MMOL/L (ref 21–32)
CO2 SERPL-SCNC: 19 MMOL/L (ref 21–32)
COLOR UR: YELLOW
CREAT SERPL-MCNC: 1.13 MG/DL (ref 0.6–1.3)
CREAT SERPL-MCNC: 1.24 MG/DL (ref 0.6–1.3)
CREAT SERPL-MCNC: 1.28 MG/DL (ref 0.6–1.3)
CREAT SERPL-MCNC: 1.37 MG/DL (ref 0.6–1.3)
CROSSMATCH: NORMAL
CRTMA(RAPIDTEG MAX AMPLITUDE): 63.9 MM (ref 52–70)
CRTMA(RAPIDTEG MAX AMPLITUDE): 65.7 MM (ref 52–70)
CRTMA(RAPIDTEG MAX AMPLITUDE): 68.9 MM (ref 52–70)
DOP CALC AO PEAK VEL: 1.04 M/S
DOP CALC AO VTI: 14.46 CM
DOP CALC LVOT AREA: 2.27 CM2
DOP CALC LVOT DIAMETER: 1.7 CM
DOP CALC LVOT PEAK VEL VTI: 12.01 CM
DOP CALC LVOT PEAK VEL: 0.97 M/S
DOP CALC LVOT STROKE INDEX: 11.9 ML/M2
DOP CALC LVOT STROKE VOLUME: 27.25
EOSINOPHIL # BLD AUTO: 0.4 THOUSAND/ÂΜL (ref 0–0.61)
EOSINOPHIL # BLD MANUAL: 0 THOUSAND/UL (ref 0–0.4)
EOSINOPHIL NFR BLD AUTO: 2 % (ref 0–6)
EOSINOPHIL NFR BLD MANUAL: 0 % (ref 0–6)
ERYTHROCYTE [DISTWIDTH] IN BLOOD BY AUTOMATED COUNT: 15.3 % (ref 11.6–15.1)
ERYTHROCYTE [DISTWIDTH] IN BLOOD BY AUTOMATED COUNT: 15.7 % (ref 11.6–15.1)
ERYTHROCYTE [DISTWIDTH] IN BLOOD BY AUTOMATED COUNT: 15.9 % (ref 11.6–15.1)
ERYTHROCYTE [DISTWIDTH] IN BLOOD BY AUTOMATED COUNT: 16 % (ref 11.6–15.1)
FRACTIONAL SHORTENING: 13 (ref 28–44)
GFR SERPL CREATININE-BSD FRML MDRD: 38 ML/MIN/1.73SQ M
GFR SERPL CREATININE-BSD FRML MDRD: 41 ML/MIN/1.73SQ M
GFR SERPL CREATININE-BSD FRML MDRD: 43 ML/MIN/1.73SQ M
GFR SERPL CREATININE-BSD FRML MDRD: 48 ML/MIN/1.73SQ M
GLUCOSE SERPL-MCNC: 163 MG/DL (ref 65–140)
GLUCOSE SERPL-MCNC: 170 MG/DL (ref 65–140)
GLUCOSE SERPL-MCNC: 183 MG/DL (ref 65–140)
GLUCOSE SERPL-MCNC: 205 MG/DL (ref 65–140)
GLUCOSE SERPL-MCNC: 217 MG/DL (ref 65–140)
GLUCOSE SERPL-MCNC: 218 MG/DL (ref 65–140)
GLUCOSE SERPL-MCNC: 221 MG/DL (ref 65–140)
GLUCOSE SERPL-MCNC: 233 MG/DL (ref 65–140)
GLUCOSE SERPL-MCNC: 246 MG/DL (ref 65–140)
GLUCOSE UR STRIP-MCNC: NEGATIVE MG/DL
HCO3 BLDA-SCNC: 15.4 MMOL/L (ref 22–28)
HCO3 BLDA-SCNC: 16.2 MMOL/L (ref 22–28)
HCO3 BLDA-SCNC: 17.2 MMOL/L (ref 22–28)
HCO3 BLDA-SCNC: 17.3 MMOL/L (ref 22–28)
HCO3 BLDA-SCNC: 17.4 MMOL/L (ref 22–28)
HCO3 BLDA-SCNC: 17.7 MMOL/L (ref 22–28)
HCO3 BLDA-SCNC: 20.7 MMOL/L (ref 22–28)
HCO3 BLDA-SCNC: 23 MMOL/L (ref 22–28)
HCO3 BLDV-SCNC: 17.2 MMOL/L (ref 24–30)
HCT VFR BLD AUTO: 36.4 % (ref 34.8–46.1)
HCT VFR BLD AUTO: 38.7 % (ref 34.8–46.1)
HCT VFR BLD AUTO: 40.6 % (ref 34.8–46.1)
HCT VFR BLD AUTO: 43.7 % (ref 34.8–46.1)
HCT VFR BLD CALC: 33 % (ref 34.8–46.1)
HCT VFR BLD CALC: 34 % (ref 34.8–46.1)
HFNC FLOW LPM: 50
HGB BLD-MCNC: 11.7 G/DL (ref 11.5–15.4)
HGB BLD-MCNC: 12.7 G/DL (ref 11.5–15.4)
HGB BLD-MCNC: 12.7 G/DL (ref 11.5–15.4)
HGB BLD-MCNC: 13.5 G/DL (ref 11.5–15.4)
HGB BLD-MCNC: 13.6 G/DL (ref 11.5–15.4)
HGB BLDA-MCNC: 11.2 G/DL (ref 11.5–15.4)
HGB BLDA-MCNC: 11.6 G/DL (ref 11.5–15.4)
HGB UR QL STRIP.AUTO: NEGATIVE
HOROWITZ INDEX BLDA+IHG-RTO: 40 MM[HG]
HOROWITZ INDEX BLDA+IHG-RTO: 60 MM[HG]
HYALINE CASTS #/AREA URNS LPF: ABNORMAL /LPF
IMM GRANULOCYTES # BLD AUTO: 0.07 THOUSAND/UL (ref 0–0.2)
IMM GRANULOCYTES NFR BLD AUTO: 0 % (ref 0–2)
INR PPP: 1.02 (ref 0.84–1.19)
INR PPP: 1.1 (ref 0.84–1.19)
INR PPP: 1.16 (ref 0.84–1.19)
INTERVENTRICULAR SEPTUM IN DIASTOLE (PARASTERNAL SHORT AXIS VIEW): 1.3 CM
INTERVENTRICULAR SEPTUM: 1.3 CM (ref 0.6–1.1)
KETONES UR STRIP-MCNC: NEGATIVE MG/DL
LAAS-AP2: 32.4 CM2
LAAS-AP4: 22.7 CM2
LACTATE SERPL-SCNC: 2.3 MMOL/L (ref 0.5–2)
LACTATE SERPL-SCNC: 6.7 MMOL/L (ref 0.5–2)
LACTATE SERPL-SCNC: 7 MMOL/L (ref 0.5–2)
LACTATE SERPL-SCNC: 7.6 MMOL/L (ref 0.5–2)
LACTATE SERPL-SCNC: 7.9 MMOL/L (ref 0.5–2)
LACTATE SERPL-SCNC: 9.3 MMOL/L (ref 0.5–2)
LEFT ATRIUM SIZE: 2.9 CM
LEFT INTERNAL DIMENSION IN SYSTOLE: 4 CM (ref 2.1–4)
LEFT VENTRICLE DIASTOLIC VOLUME (MOD BIPLANE): 70 ML
LEFT VENTRICLE SYSTOLIC VOLUME (MOD BIPLANE): 27 ML
LEFT VENTRICULAR INTERNAL DIMENSION IN DIASTOLE: 4.6 CM (ref 3.5–6)
LEFT VENTRICULAR POSTERIOR WALL IN END DIASTOLE: 1.3 CM
LEFT VENTRICULAR STROKE VOLUME: 24 ML
LEUKOCYTE ESTERASE UR QL STRIP: ABNORMAL
LIPASE SERPL-CCNC: 1791 U/L (ref 73–393)
LV EF: 61 %
LVSV (TEICH): 24 ML
LYMPHOCYTES # BLD AUTO: 0.64 THOUSANDS/ÂΜL (ref 0.6–4.47)
LYMPHOCYTES # BLD AUTO: 0.67 THOUSAND/UL (ref 0.6–4.47)
LYMPHOCYTES # BLD AUTO: 4 % (ref 14–44)
LYMPHOCYTES NFR BLD AUTO: 3 % (ref 14–44)
MAGNESIUM SERPL-MCNC: 1.6 MG/DL (ref 1.6–2.6)
MAGNESIUM SERPL-MCNC: 1.8 MG/DL (ref 1.6–2.6)
MCH RBC QN AUTO: 26.9 PG (ref 26.8–34.3)
MCH RBC QN AUTO: 27.1 PG (ref 26.8–34.3)
MCH RBC QN AUTO: 27.3 PG (ref 26.8–34.3)
MCH RBC QN AUTO: 28 PG (ref 26.8–34.3)
MCHC RBC AUTO-ENTMCNC: 31.1 G/DL (ref 31.4–37.4)
MCHC RBC AUTO-ENTMCNC: 31.3 G/DL (ref 31.4–37.4)
MCHC RBC AUTO-ENTMCNC: 32.1 G/DL (ref 31.4–37.4)
MCHC RBC AUTO-ENTMCNC: 32.8 G/DL (ref 31.4–37.4)
MCV RBC AUTO: 84 FL (ref 82–98)
MCV RBC AUTO: 85 FL (ref 82–98)
MCV RBC AUTO: 87 FL (ref 82–98)
MCV RBC AUTO: 87 FL (ref 82–98)
METAMYELOCYTES NFR BLD MANUAL: 1 % (ref 0–1)
MONOCYTES # BLD AUTO: 0.5 THOUSAND/UL (ref 0–1.22)
MONOCYTES # BLD AUTO: 0.96 THOUSAND/ÂΜL (ref 0.17–1.22)
MONOCYTES NFR BLD AUTO: 5 % (ref 4–12)
MONOCYTES NFR BLD: 3 % (ref 4–12)
MUCOUS THREADS UR QL AUTO: ABNORMAL
NEUTROPHILS # BLD AUTO: 18.91 THOUSANDS/ÂΜL (ref 1.85–7.62)
NEUTROPHILS # BLD MANUAL: 15.46 THOUSAND/UL (ref 1.85–7.62)
NEUTS BAND NFR BLD MANUAL: 10 % (ref 0–8)
NEUTS SEG NFR BLD AUTO: 82 % (ref 43–75)
NEUTS SEG NFR BLD AUTO: 90 % (ref 43–75)
NITRITE UR QL STRIP: NEGATIVE
NON VENT HFNC FIO2: 50
NON VENT TYPE HFNC: ABNORMAL
NON-SQ EPI CELLS URNS QL MICRO: ABNORMAL /HPF
NRBC BLD AUTO-RTO: 0 /100 WBCS
O2 CT BLDA-SCNC: 16.7 ML/DL (ref 16–23)
O2 CT BLDA-SCNC: 17.2 ML/DL (ref 16–23)
O2 CT BLDA-SCNC: 17.3 ML/DL (ref 16–23)
O2 CT BLDA-SCNC: 17.8 ML/DL (ref 16–23)
O2 CT BLDA-SCNC: 18.9 ML/DL (ref 16–23)
O2 CT BLDA-SCNC: 19.7 ML/DL (ref 16–23)
O2 CT BLDV-SCNC: 12.4 ML/DL
OXYHGB MFR BLDA: 96.1 % (ref 94–97)
OXYHGB MFR BLDA: 96.5 % (ref 94–97)
OXYHGB MFR BLDA: 96.7 % (ref 94–97)
OXYHGB MFR BLDA: 97.1 % (ref 94–97)
OXYHGB MFR BLDA: 97.2 % (ref 94–97)
OXYHGB MFR BLDA: 97.8 % (ref 94–97)
PCO2 BLD: 22 MMOL/L (ref 21–32)
PCO2 BLD: 24 MMOL/L (ref 21–32)
PCO2 BLD: 46.2 MM HG (ref 36–44)
PCO2 BLD: 50.2 MM HG (ref 36–44)
PCO2 BLDA: 27.5 MM HG (ref 36–44)
PCO2 BLDA: 28.1 MM HG (ref 36–44)
PCO2 BLDA: 33.8 MM HG (ref 36–44)
PCO2 BLDA: 40.6 MM HG (ref 36–44)
PCO2 BLDA: 40.8 MM HG (ref 36–44)
PCO2 BLDA: 43.9 MM HG (ref 36–44)
PCO2 BLDV: 50.2 MM HG (ref 42–50)
PCO2 TEMP ADJ BLDA: 41.8 MM HG (ref 36–44)
PEEP RESPIRATORY: 6 CM[H2O]
PEEP RESPIRATORY: 6 CM[H2O]
PH BLD: 7.23 [PH] (ref 7.35–7.45)
PH BLD: 7.26 [PH] (ref 7.35–7.45)
PH BLD: 7.27 [PH] (ref 7.35–7.45)
PH BLDA: 7.2 [PH] (ref 7.35–7.45)
PH BLDA: 7.21 [PH] (ref 7.35–7.45)
PH BLDA: 7.25 [PH] (ref 7.35–7.45)
PH BLDA: 7.33 [PH] (ref 7.35–7.45)
PH BLDA: 7.38 [PH] (ref 7.35–7.45)
PH BLDA: 7.42 [PH] (ref 7.35–7.45)
PH BLDV: 7.15 [PH] (ref 7.3–7.4)
PH UR STRIP.AUTO: 5 [PH]
PHOSPHATE SERPL-MCNC: 4.9 MG/DL (ref 2.3–4.1)
PHOSPHATE SERPL-MCNC: 5.3 MG/DL (ref 2.3–4.1)
PLATELET # BLD AUTO: 249 THOUSANDS/UL (ref 149–390)
PLATELET # BLD AUTO: 251 THOUSANDS/UL (ref 149–390)
PLATELET # BLD AUTO: 272 THOUSANDS/UL (ref 149–390)
PLATELET # BLD AUTO: 279 THOUSANDS/UL (ref 149–390)
PLATELET BLD QL SMEAR: ADEQUATE
PMV BLD AUTO: 10.4 FL (ref 8.9–12.7)
PMV BLD AUTO: 9.4 FL (ref 8.9–12.7)
PMV BLD AUTO: 9.8 FL (ref 8.9–12.7)
PMV BLD AUTO: 9.9 FL (ref 8.9–12.7)
PO2 BLD: 156.6 MM HG (ref 75–129)
PO2 BLD: 198 MM HG (ref 75–129)
PO2 BLD: 285 MM HG (ref 75–129)
PO2 BLDA: 103.6 MM HG (ref 75–129)
PO2 BLDA: 105 MM HG (ref 75–129)
PO2 BLDA: 106.2 MM HG (ref 75–129)
PO2 BLDA: 112.8 MM HG (ref 75–129)
PO2 BLDA: 125.4 MM HG (ref 75–129)
PO2 BLDA: 162.8 MM HG (ref 75–129)
PO2 BLDV: 39.2 MM HG (ref 35–45)
POIKILOCYTOSIS BLD QL SMEAR: PRESENT
POTASSIUM BLD-SCNC: 3.6 MMOL/L (ref 3.5–5.3)
POTASSIUM BLD-SCNC: 4.1 MMOL/L (ref 3.5–5.3)
POTASSIUM SERPL-SCNC: 3.8 MMOL/L (ref 3.5–5.3)
POTASSIUM SERPL-SCNC: 3.9 MMOL/L (ref 3.5–5.3)
POTASSIUM SERPL-SCNC: 4.3 MMOL/L (ref 3.5–5.3)
POTASSIUM SERPL-SCNC: 4.4 MMOL/L (ref 3.5–5.3)
PR INTERVAL: 0 MS
PROT SERPL-MCNC: 4.5 G/DL (ref 6.4–8.4)
PROT SERPL-MCNC: 5.1 G/DL (ref 6.4–8.4)
PROT UR STRIP-MCNC: ABNORMAL MG/DL
PROTHROMBIN TIME: 13.7 SECONDS (ref 11.6–14.5)
PROTHROMBIN TIME: 14.4 SECONDS (ref 11.6–14.5)
PROTHROMBIN TIME: 15 SECONDS (ref 11.6–14.5)
QRS AXIS: -16 DEGREES
QRS AXIS: 40 DEGREES
QRS AXIS: 43 DEGREES
QRS AXIS: 43 DEGREES
QRSD INTERVAL: 63 MS
QRSD INTERVAL: 79 MS
QRSD INTERVAL: 82 MS
QRSD INTERVAL: 83 MS
QT INTERVAL: 317 MS
QT INTERVAL: 334 MS
QT INTERVAL: 342 MS
QT INTERVAL: 346 MS
QTC INTERVAL: 390 MS
QTC INTERVAL: 435 MS
QTC INTERVAL: 488 MS
QTC INTERVAL: 493 MS
RA PRESSURE ESTIMATED: 3 MMHG
RBC # BLD AUTO: 4.32 MILLION/UL (ref 3.81–5.12)
RBC # BLD AUTO: 4.53 MILLION/UL (ref 3.81–5.12)
RBC # BLD AUTO: 4.65 MILLION/UL (ref 3.81–5.12)
RBC # BLD AUTO: 5.05 MILLION/UL (ref 3.81–5.12)
RBC #/AREA URNS AUTO: ABNORMAL /HPF
RBC MORPH BLD: PRESENT
RH BLD: POSITIVE
RIGHT ATRIAL 2D VOLUME: 72 ML
RIGHT ATRIUM AREA SYSTOLE A4C: 25.3 CM2
RIGHT VENTRICLE ID DIMENSION: 4.5 CM
RV PSP: 25 MMHG
SAO2 % BLD FROM PO2: 100 % (ref 60–85)
SAO2 % BLD FROM PO2: 100 % (ref 60–85)
SL CV LEFT ATRIUM LENGTH A2C: 7.3 CM
SL CV LV EF: 60
SL CV PED ECHO LEFT VENTRICLE DIASTOLIC VOLUME (MOD BIPLANE) 2D: 95 ML
SL CV PED ECHO LEFT VENTRICLE SYSTOLIC VOLUME (MOD BIPLANE) 2D: 72 ML
SODIUM BLD-SCNC: 138 MMOL/L (ref 136–145)
SODIUM BLD-SCNC: 139 MMOL/L (ref 136–145)
SODIUM SERPL-SCNC: 140 MMOL/L (ref 135–147)
SODIUM SERPL-SCNC: 141 MMOL/L (ref 135–147)
SODIUM SERPL-SCNC: 142 MMOL/L (ref 135–147)
SODIUM SERPL-SCNC: 142 MMOL/L (ref 135–147)
SP GR UR STRIP.AUTO: 1.04 (ref 1–1.03)
SPECIMEN EXPIRATION DATE: NORMAL
SPECIMEN SOURCE: ABNORMAL
T WAVE AXIS: 38 DEGREES
T WAVE AXIS: 67 DEGREES
T WAVE AXIS: 71 DEGREES
T WAVE AXIS: 72 DEGREES
TR MAX PG: 22 MMHG
TR PEAK VELOCITY: 2.3 M/S
TRICUSPID ANNULAR PLANE SYSTOLIC EXCURSION: 1.4 CM
TRICUSPID VALVE PEAK REGURGITATION VELOCITY: 2.33 M/S
UNIT DISPENSE STATUS: NORMAL
UNIT PRODUCT CODE: NORMAL
UNIT PRODUCT VOLUME: 280 ML
UNIT PRODUCT VOLUME: 280 ML
UNIT PRODUCT VOLUME: 350 ML
UNIT RH: NORMAL
UROBILINOGEN UR STRIP-ACNC: <2 MG/DL
VENT AC: 16
VENT AC: 26
VENT- AC: AC
VENT- AC: AC
VENTRICULAR RATE: 113 BPM
VENTRICULAR RATE: 122 BPM
VENTRICULAR RATE: 122 BPM
VENTRICULAR RATE: 82 BPM
VT SETTING VENT: 500 ML
VT SETTING VENT: 500 ML
WBC # BLD AUTO: 15.3 THOUSAND/UL (ref 4.31–10.16)
WBC # BLD AUTO: 16.8 THOUSAND/UL (ref 4.31–10.16)
WBC # BLD AUTO: 21.04 THOUSAND/UL (ref 4.31–10.16)
WBC # BLD AUTO: 23.01 THOUSAND/UL (ref 4.31–10.16)
WBC #/AREA URNS AUTO: ABNORMAL /HPF
WBC CLUMPS # UR AUTO: PRESENT /UL

## 2023-06-19 PROCEDURE — 30233K1 TRANSFUSION OF NONAUTOLOGOUS FROZEN PLASMA INTO PERIPHERAL VEIN, PERCUTANEOUS APPROACH: ICD-10-PCS | Performed by: SURGERY

## 2023-06-19 PROCEDURE — 83605 ASSAY OF LACTIC ACID: CPT | Performed by: STUDENT IN AN ORGANIZED HEALTH CARE EDUCATION/TRAINING PROGRAM

## 2023-06-19 PROCEDURE — 0DTJ0ZZ RESECTION OF APPENDIX, OPEN APPROACH: ICD-10-PCS | Performed by: SURGERY

## 2023-06-19 PROCEDURE — 85025 COMPLETE CBC W/AUTO DIFF WBC: CPT | Performed by: STUDENT IN AN ORGANIZED HEALTH CARE EDUCATION/TRAINING PROGRAM

## 2023-06-19 PROCEDURE — 85347 COAGULATION TIME ACTIVATED: CPT | Performed by: EMERGENCY MEDICINE

## 2023-06-19 PROCEDURE — 85384 FIBRINOGEN ACTIVITY: CPT

## 2023-06-19 PROCEDURE — 80076 HEPATIC FUNCTION PANEL: CPT | Performed by: STUDENT IN AN ORGANIZED HEALTH CARE EDUCATION/TRAINING PROGRAM

## 2023-06-19 PROCEDURE — 93306 TTE W/DOPPLER COMPLETE: CPT | Performed by: INTERNAL MEDICINE

## 2023-06-19 PROCEDURE — 93005 ELECTROCARDIOGRAM TRACING: CPT

## 2023-06-19 PROCEDURE — P9017 PLASMA 1 DONOR FRZ W/IN 8 HR: HCPCS

## 2023-06-19 PROCEDURE — 2W13X6Z COMPRESSION OF ABDOMINAL WALL USING PRESSURE DRESSING: ICD-10-PCS | Performed by: SURGERY

## 2023-06-19 PROCEDURE — 86850 RBC ANTIBODY SCREEN: CPT | Performed by: SURGERY

## 2023-06-19 PROCEDURE — 36620 INSERTION CATHETER ARTERY: CPT | Performed by: PHYSICIAN ASSISTANT

## 2023-06-19 PROCEDURE — 85018 HEMOGLOBIN: CPT

## 2023-06-19 PROCEDURE — 81001 URINALYSIS AUTO W/SCOPE: CPT | Performed by: NURSE PRACTITIONER

## 2023-06-19 PROCEDURE — 87086 URINE CULTURE/COLONY COUNT: CPT | Performed by: PHYSICIAN ASSISTANT

## 2023-06-19 PROCEDURE — 85397 CLOTTING FUNCT ACTIVITY: CPT | Performed by: EMERGENCY MEDICINE

## 2023-06-19 PROCEDURE — 82330 ASSAY OF CALCIUM: CPT | Performed by: STUDENT IN AN ORGANIZED HEALTH CARE EDUCATION/TRAINING PROGRAM

## 2023-06-19 PROCEDURE — 85027 COMPLETE CBC AUTOMATED: CPT | Performed by: SURGERY

## 2023-06-19 PROCEDURE — 86901 BLOOD TYPING SEROLOGIC RH(D): CPT | Performed by: SURGERY

## 2023-06-19 PROCEDURE — 82805 BLOOD GASES W/O2 SATURATION: CPT | Performed by: EMERGENCY MEDICINE

## 2023-06-19 PROCEDURE — 85384 FIBRINOGEN ACTIVITY: CPT | Performed by: EMERGENCY MEDICINE

## 2023-06-19 PROCEDURE — 36430 TRANSFUSION BLD/BLD COMPNT: CPT

## 2023-06-19 PROCEDURE — NC001 PR NO CHARGE: Performed by: SURGERY

## 2023-06-19 PROCEDURE — 85610 PROTHROMBIN TIME: CPT | Performed by: NURSE PRACTITIONER

## 2023-06-19 PROCEDURE — 80048 BASIC METABOLIC PNL TOTAL CA: CPT | Performed by: NURSE PRACTITIONER

## 2023-06-19 PROCEDURE — 93306 TTE W/DOPPLER COMPLETE: CPT

## 2023-06-19 PROCEDURE — 83735 ASSAY OF MAGNESIUM: CPT | Performed by: NURSE PRACTITIONER

## 2023-06-19 PROCEDURE — 83605 ASSAY OF LACTIC ACID: CPT | Performed by: NURSE PRACTITIONER

## 2023-06-19 PROCEDURE — 44120 REMOVAL OF SMALL INTESTINE: CPT | Performed by: SURGERY

## 2023-06-19 PROCEDURE — 82330 ASSAY OF CALCIUM: CPT

## 2023-06-19 PROCEDURE — 84100 ASSAY OF PHOSPHORUS: CPT | Performed by: NURSE PRACTITIONER

## 2023-06-19 PROCEDURE — 82805 BLOOD GASES W/O2 SATURATION: CPT | Performed by: STUDENT IN AN ORGANIZED HEALTH CARE EDUCATION/TRAINING PROGRAM

## 2023-06-19 PROCEDURE — 85576 BLOOD PLATELET AGGREGATION: CPT

## 2023-06-19 PROCEDURE — 99291 CRITICAL CARE FIRST HOUR: CPT | Performed by: STUDENT IN AN ORGANIZED HEALTH CARE EDUCATION/TRAINING PROGRAM

## 2023-06-19 PROCEDURE — 83735 ASSAY OF MAGNESIUM: CPT | Performed by: STUDENT IN AN ORGANIZED HEALTH CARE EDUCATION/TRAINING PROGRAM

## 2023-06-19 PROCEDURE — 82805 BLOOD GASES W/O2 SATURATION: CPT | Performed by: SURGERY

## 2023-06-19 PROCEDURE — 82948 REAGENT STRIP/BLOOD GLUCOSE: CPT

## 2023-06-19 PROCEDURE — C9113 INJ PANTOPRAZOLE SODIUM, VIA: HCPCS | Performed by: INTERNAL MEDICINE

## 2023-06-19 PROCEDURE — 85397 CLOTTING FUNCT ACTIVITY: CPT | Performed by: NURSE ANESTHETIST, CERTIFIED REGISTERED

## 2023-06-19 PROCEDURE — 88302 TISSUE EXAM BY PATHOLOGIST: CPT | Performed by: PATHOLOGY

## 2023-06-19 PROCEDURE — P9016 RBC LEUKOCYTES REDUCED: HCPCS

## 2023-06-19 PROCEDURE — 80053 COMPREHEN METABOLIC PANEL: CPT | Performed by: NURSE PRACTITIONER

## 2023-06-19 PROCEDURE — 71045 X-RAY EXAM CHEST 1 VIEW: CPT

## 2023-06-19 PROCEDURE — 84100 ASSAY OF PHOSPHORUS: CPT | Performed by: STUDENT IN AN ORGANIZED HEALTH CARE EDUCATION/TRAINING PROGRAM

## 2023-06-19 PROCEDURE — 85610 PROTHROMBIN TIME: CPT

## 2023-06-19 PROCEDURE — 03HY32Z INSERTION OF MONITORING DEVICE INTO UPPER ARTERY, PERCUTANEOUS APPROACH: ICD-10-PCS | Performed by: STUDENT IN AN ORGANIZED HEALTH CARE EDUCATION/TRAINING PROGRAM

## 2023-06-19 PROCEDURE — 83690 ASSAY OF LIPASE: CPT | Performed by: NURSE PRACTITIONER

## 2023-06-19 PROCEDURE — 85384 FIBRINOGEN ACTIVITY: CPT | Performed by: NURSE ANESTHETIST, CERTIFIED REGISTERED

## 2023-06-19 PROCEDURE — 30233N1 TRANSFUSION OF NONAUTOLOGOUS RED BLOOD CELLS INTO PERIPHERAL VEIN, PERCUTANEOUS APPROACH: ICD-10-PCS | Performed by: SURGERY

## 2023-06-19 PROCEDURE — 0DBV0ZZ EXCISION OF MESENTERY, OPEN APPROACH: ICD-10-PCS | Performed by: SURGERY

## 2023-06-19 PROCEDURE — 4A133B1 MONITORING OF ARTERIAL PRESSURE, PERIPHERAL, PERCUTANEOUS APPROACH: ICD-10-PCS | Performed by: SURGERY

## 2023-06-19 PROCEDURE — 93010 ELECTROCARDIOGRAM REPORT: CPT | Performed by: INTERNAL MEDICINE

## 2023-06-19 PROCEDURE — 85576 BLOOD PLATELET AGGREGATION: CPT | Performed by: NURSE ANESTHETIST, CERTIFIED REGISTERED

## 2023-06-19 PROCEDURE — 4A133J1 MONITORING OF ARTERIAL PULSE, PERIPHERAL, PERCUTANEOUS APPROACH: ICD-10-PCS | Performed by: SURGERY

## 2023-06-19 PROCEDURE — C9113 INJ PANTOPRAZOLE SODIUM, VIA: HCPCS | Performed by: STUDENT IN AN ORGANIZED HEALTH CARE EDUCATION/TRAINING PROGRAM

## 2023-06-19 PROCEDURE — 82803 BLOOD GASES ANY COMBINATION: CPT

## 2023-06-19 PROCEDURE — 82330 ASSAY OF CALCIUM: CPT | Performed by: NURSE PRACTITIONER

## 2023-06-19 PROCEDURE — 83605 ASSAY OF LACTIC ACID: CPT | Performed by: SURGERY

## 2023-06-19 PROCEDURE — 86920 COMPATIBILITY TEST SPIN: CPT

## 2023-06-19 PROCEDURE — 0DBB0ZZ EXCISION OF ILEUM, OPEN APPROACH: ICD-10-PCS | Performed by: SURGERY

## 2023-06-19 PROCEDURE — 82947 ASSAY GLUCOSE BLOOD QUANT: CPT

## 2023-06-19 PROCEDURE — 4A133J1 MONITORING OF ARTERIAL PULSE, PERIPHERAL, PERCUTANEOUS APPROACH: ICD-10-PCS | Performed by: STUDENT IN AN ORGANIZED HEALTH CARE EDUCATION/TRAINING PROGRAM

## 2023-06-19 PROCEDURE — 85397 CLOTTING FUNCT ACTIVITY: CPT

## 2023-06-19 PROCEDURE — 85576 BLOOD PLATELET AGGREGATION: CPT | Performed by: EMERGENCY MEDICINE

## 2023-06-19 PROCEDURE — 85610 PROTHROMBIN TIME: CPT | Performed by: NURSE ANESTHETIST, CERTIFIED REGISTERED

## 2023-06-19 PROCEDURE — 84295 ASSAY OF SERUM SODIUM: CPT

## 2023-06-19 PROCEDURE — 85027 COMPLETE CBC AUTOMATED: CPT | Performed by: PHYSICIAN ASSISTANT

## 2023-06-19 PROCEDURE — 97605 NEG PRS WND THER DME<=50SQCM: CPT | Performed by: SURGERY

## 2023-06-19 PROCEDURE — 85730 THROMBOPLASTIN TIME PARTIAL: CPT | Performed by: NURSE ANESTHETIST, CERTIFIED REGISTERED

## 2023-06-19 PROCEDURE — 94002 VENT MGMT INPAT INIT DAY: CPT

## 2023-06-19 PROCEDURE — 44955 APPENDECTOMY ADD-ON: CPT | Performed by: SURGERY

## 2023-06-19 PROCEDURE — 88307 TISSUE EXAM BY PATHOLOGIST: CPT | Performed by: PATHOLOGY

## 2023-06-19 PROCEDURE — 84132 ASSAY OF SERUM POTASSIUM: CPT

## 2023-06-19 PROCEDURE — 80048 BASIC METABOLIC PNL TOTAL CA: CPT | Performed by: PHYSICIAN ASSISTANT

## 2023-06-19 PROCEDURE — 82805 BLOOD GASES W/O2 SATURATION: CPT | Performed by: PHYSICIAN ASSISTANT

## 2023-06-19 PROCEDURE — 94760 N-INVAS EAR/PLS OXIMETRY 1: CPT

## 2023-06-19 PROCEDURE — 82805 BLOOD GASES W/O2 SATURATION: CPT

## 2023-06-19 PROCEDURE — 85007 BL SMEAR W/DIFF WBC COUNT: CPT | Performed by: SURGERY

## 2023-06-19 PROCEDURE — 80048 BASIC METABOLIC PNL TOTAL CA: CPT | Performed by: STUDENT IN AN ORGANIZED HEALTH CARE EDUCATION/TRAINING PROGRAM

## 2023-06-19 PROCEDURE — 0DN80ZZ RELEASE SMALL INTESTINE, OPEN APPROACH: ICD-10-PCS | Performed by: SURGERY

## 2023-06-19 PROCEDURE — 85730 THROMBOPLASTIN TIME PARTIAL: CPT

## 2023-06-19 PROCEDURE — 86900 BLOOD TYPING SEROLOGIC ABO: CPT | Performed by: SURGERY

## 2023-06-19 PROCEDURE — 85347 COAGULATION TIME ACTIVATED: CPT

## 2023-06-19 PROCEDURE — 85027 COMPLETE CBC AUTOMATED: CPT

## 2023-06-19 PROCEDURE — 4A133B1 MONITORING OF ARTERIAL PRESSURE, PERIPHERAL, PERCUTANEOUS APPROACH: ICD-10-PCS | Performed by: STUDENT IN AN ORGANIZED HEALTH CARE EDUCATION/TRAINING PROGRAM

## 2023-06-19 PROCEDURE — 03HY32Z INSERTION OF MONITORING DEVICE INTO UPPER ARTERY, PERCUTANEOUS APPROACH: ICD-10-PCS | Performed by: SURGERY

## 2023-06-19 PROCEDURE — 85014 HEMATOCRIT: CPT

## 2023-06-19 PROCEDURE — 87040 BLOOD CULTURE FOR BACTERIA: CPT

## 2023-06-19 PROCEDURE — 85347 COAGULATION TIME ACTIVATED: CPT | Performed by: NURSE ANESTHETIST, CERTIFIED REGISTERED

## 2023-06-19 RX ORDER — CEFAZOLIN SODIUM 2 G/50ML
2000 SOLUTION INTRAVENOUS
Status: COMPLETED | OUTPATIENT
Start: 2023-06-19 | End: 2023-06-19

## 2023-06-19 RX ORDER — SODIUM CHLORIDE, SODIUM GLUCONATE, SODIUM ACETATE, POTASSIUM CHLORIDE, MAGNESIUM CHLORIDE, SODIUM PHOSPHATE, DIBASIC, AND POTASSIUM PHOSPHATE .53; .5; .37; .037; .03; .012; .00082 G/100ML; G/100ML; G/100ML; G/100ML; G/100ML; G/100ML; G/100ML
1000 INJECTION, SOLUTION INTRAVENOUS ONCE
Status: COMPLETED | OUTPATIENT
Start: 2023-06-19 | End: 2023-06-19

## 2023-06-19 RX ORDER — PROPOFOL 10 MG/ML
5-50 INJECTION, EMULSION INTRAVENOUS
Status: DISCONTINUED | OUTPATIENT
Start: 2023-06-19 | End: 2023-06-20

## 2023-06-19 RX ORDER — ACETAMINOPHEN 325 MG/1
650 TABLET ORAL EVERY 6 HOURS PRN
Status: DISCONTINUED | OUTPATIENT
Start: 2023-06-19 | End: 2023-06-19

## 2023-06-19 RX ORDER — METRONIDAZOLE 500 MG/100ML
500 INJECTION, SOLUTION INTRAVENOUS
Status: DISCONTINUED | OUTPATIENT
Start: 2023-06-19 | End: 2023-06-19

## 2023-06-19 RX ORDER — SODIUM CHLORIDE 9 MG/ML
INJECTION, SOLUTION INTRAVENOUS CONTINUOUS PRN
Status: DISCONTINUED | OUTPATIENT
Start: 2023-06-19 | End: 2023-06-19

## 2023-06-19 RX ORDER — FENTANYL CITRATE 50 UG/ML
50 INJECTION, SOLUTION INTRAMUSCULAR; INTRAVENOUS
Status: DISCONTINUED | OUTPATIENT
Start: 2023-06-19 | End: 2023-06-22

## 2023-06-19 RX ORDER — FENTANYL CITRATE 50 UG/ML
INJECTION, SOLUTION INTRAMUSCULAR; INTRAVENOUS AS NEEDED
Status: DISCONTINUED | OUTPATIENT
Start: 2023-06-19 | End: 2023-06-19

## 2023-06-19 RX ORDER — CALCIUM CHLORIDE 100 MG/ML
INJECTION INTRAVENOUS; INTRAVENTRICULAR AS NEEDED
Status: DISCONTINUED | OUTPATIENT
Start: 2023-06-19 | End: 2023-06-19

## 2023-06-19 RX ORDER — GABAPENTIN 300 MG/1
600 CAPSULE ORAL
Status: DISCONTINUED | OUTPATIENT
Start: 2023-06-19 | End: 2023-06-19

## 2023-06-19 RX ORDER — PROPOFOL 10 MG/ML
INJECTION, EMULSION INTRAVENOUS AS NEEDED
Status: DISCONTINUED | OUTPATIENT
Start: 2023-06-19 | End: 2023-06-19

## 2023-06-19 RX ORDER — ALBUMIN, HUMAN INJ 5% 5 %
SOLUTION INTRAVENOUS CONTINUOUS PRN
Status: DISCONTINUED | OUTPATIENT
Start: 2023-06-19 | End: 2023-06-19

## 2023-06-19 RX ORDER — ONDANSETRON 2 MG/ML
4 INJECTION INTRAMUSCULAR; INTRAVENOUS EVERY 6 HOURS PRN
Status: DISCONTINUED | OUTPATIENT
Start: 2023-06-19 | End: 2023-07-04 | Stop reason: HOSPADM

## 2023-06-19 RX ORDER — CEFAZOLIN SODIUM 1 G/3ML
INJECTION, POWDER, FOR SOLUTION INTRAMUSCULAR; INTRAVENOUS AS NEEDED
Status: DISCONTINUED | OUTPATIENT
Start: 2023-06-19 | End: 2023-06-19

## 2023-06-19 RX ORDER — MIDAZOLAM HYDROCHLORIDE 2 MG/2ML
INJECTION, SOLUTION INTRAMUSCULAR; INTRAVENOUS AS NEEDED
Status: DISCONTINUED | OUTPATIENT
Start: 2023-06-19 | End: 2023-06-19

## 2023-06-19 RX ORDER — MAGNESIUM SULFATE HEPTAHYDRATE 40 MG/ML
2 INJECTION, SOLUTION INTRAVENOUS ONCE
Status: COMPLETED | OUTPATIENT
Start: 2023-06-19 | End: 2023-06-19

## 2023-06-19 RX ORDER — SUCCINYLCHOLINE/SOD CL,ISO/PF 100 MG/5ML
SYRINGE (ML) INTRAVENOUS AS NEEDED
Status: DISCONTINUED | OUTPATIENT
Start: 2023-06-19 | End: 2023-06-19

## 2023-06-19 RX ORDER — METRONIDAZOLE 500 MG/100ML
500 INJECTION, SOLUTION INTRAVENOUS EVERY 8 HOURS
Status: DISCONTINUED | OUTPATIENT
Start: 2023-06-19 | End: 2023-06-25

## 2023-06-19 RX ORDER — MAGNESIUM HYDROXIDE 1200 MG/15ML
LIQUID ORAL AS NEEDED
Status: DISCONTINUED | OUTPATIENT
Start: 2023-06-19 | End: 2023-06-19 | Stop reason: HOSPADM

## 2023-06-19 RX ORDER — ROCURONIUM BROMIDE 10 MG/ML
INJECTION, SOLUTION INTRAVENOUS AS NEEDED
Status: DISCONTINUED | OUTPATIENT
Start: 2023-06-19 | End: 2023-06-19

## 2023-06-19 RX ORDER — SODIUM CHLORIDE, SODIUM GLUCONATE, SODIUM ACETATE, POTASSIUM CHLORIDE, MAGNESIUM CHLORIDE, SODIUM PHOSPHATE, DIBASIC, AND POTASSIUM PHOSPHATE .53; .5; .37; .037; .03; .012; .00082 G/100ML; G/100ML; G/100ML; G/100ML; G/100ML; G/100ML; G/100ML
500 INJECTION, SOLUTION INTRAVENOUS ONCE
Status: COMPLETED | OUTPATIENT
Start: 2023-06-19 | End: 2023-06-19

## 2023-06-19 RX ORDER — LORAZEPAM 2 MG/ML
1 INJECTION INTRAMUSCULAR EVERY 4 HOURS PRN
Status: DISCONTINUED | OUTPATIENT
Start: 2023-06-19 | End: 2023-06-19

## 2023-06-19 RX ORDER — FLUDROCORTISONE ACETATE 0.1 MG/1
0.05 TABLET ORAL DAILY
Status: DISCONTINUED | OUTPATIENT
Start: 2023-06-19 | End: 2023-06-20

## 2023-06-19 RX ORDER — FLUOXETINE HYDROCHLORIDE 20 MG/1
20 CAPSULE ORAL 2 TIMES DAILY
Status: DISCONTINUED | OUTPATIENT
Start: 2023-06-19 | End: 2023-06-19

## 2023-06-19 RX ORDER — CHLORHEXIDINE GLUCONATE 0.12 MG/ML
15 RINSE ORAL EVERY 12 HOURS SCHEDULED
Status: DISCONTINUED | OUTPATIENT
Start: 2023-06-19 | End: 2023-07-04 | Stop reason: HOSPADM

## 2023-06-19 RX ORDER — METHYLPREDNISOLONE SODIUM SUCCINATE 40 MG/ML
20 INJECTION, POWDER, LYOPHILIZED, FOR SOLUTION INTRAMUSCULAR; INTRAVENOUS EVERY 12 HOURS SCHEDULED
Status: DISCONTINUED | OUTPATIENT
Start: 2023-06-19 | End: 2023-06-21

## 2023-06-19 RX ORDER — ONDANSETRON 2 MG/ML
INJECTION INTRAMUSCULAR; INTRAVENOUS AS NEEDED
Status: DISCONTINUED | OUTPATIENT
Start: 2023-06-19 | End: 2023-06-19

## 2023-06-19 RX ORDER — OXYBUTYNIN CHLORIDE 5 MG/1
5 TABLET ORAL 2 TIMES DAILY
Status: DISCONTINUED | OUTPATIENT
Start: 2023-06-19 | End: 2023-06-19

## 2023-06-19 RX ORDER — PROPOFOL 10 MG/ML
INJECTION, EMULSION INTRAVENOUS
Status: COMPLETED
Start: 2023-06-19 | End: 2023-06-19

## 2023-06-19 RX ORDER — METRONIDAZOLE 500 MG/100ML
500 INJECTION, SOLUTION INTRAVENOUS ONCE
Status: COMPLETED | OUTPATIENT
Start: 2023-06-19 | End: 2023-06-19

## 2023-06-19 RX ORDER — SODIUM CHLORIDE, SODIUM GLUCONATE, SODIUM ACETATE, POTASSIUM CHLORIDE, MAGNESIUM CHLORIDE, SODIUM PHOSPHATE, DIBASIC, AND POTASSIUM PHOSPHATE .53; .5; .37; .037; .03; .012; .00082 G/100ML; G/100ML; G/100ML; G/100ML; G/100ML; G/100ML; G/100ML
INJECTION, SOLUTION INTRAVENOUS AS NEEDED
Status: DISCONTINUED | OUTPATIENT
Start: 2023-06-19 | End: 2023-06-19

## 2023-06-19 RX ORDER — SODIUM CHLORIDE, SODIUM LACTATE, POTASSIUM CHLORIDE, CALCIUM CHLORIDE 600; 310; 30; 20 MG/100ML; MG/100ML; MG/100ML; MG/100ML
INJECTION, SOLUTION INTRAVENOUS CONTINUOUS PRN
Status: DISCONTINUED | OUTPATIENT
Start: 2023-06-19 | End: 2023-06-19

## 2023-06-19 RX ORDER — FENTANYL CITRATE-0.9 % NACL/PF 10 MCG/ML
75 PLASTIC BAG, INJECTION (ML) INTRAVENOUS CONTINUOUS
Status: DISCONTINUED | OUTPATIENT
Start: 2023-06-19 | End: 2023-06-22

## 2023-06-19 RX ORDER — SODIUM CHLORIDE, SODIUM GLUCONATE, SODIUM ACETATE, POTASSIUM CHLORIDE, MAGNESIUM CHLORIDE, SODIUM PHOSPHATE, DIBASIC, AND POTASSIUM PHOSPHATE .53; .5; .37; .037; .03; .012; .00082 G/100ML; G/100ML; G/100ML; G/100ML; G/100ML; G/100ML; G/100ML
75 INJECTION, SOLUTION INTRAVENOUS CONTINUOUS
Status: DISCONTINUED | OUTPATIENT
Start: 2023-06-19 | End: 2023-06-19

## 2023-06-19 RX ORDER — INSULIN LISPRO 100 [IU]/ML
2-12 INJECTION, SOLUTION INTRAVENOUS; SUBCUTANEOUS EVERY 6 HOURS SCHEDULED
Status: DISCONTINUED | OUTPATIENT
Start: 2023-06-19 | End: 2023-06-20

## 2023-06-19 RX ORDER — HYDROMORPHONE HCL/PF 1 MG/ML
0.5 SYRINGE (ML) INJECTION
Status: DISCONTINUED | OUTPATIENT
Start: 2023-06-19 | End: 2023-06-19

## 2023-06-19 RX ORDER — DILTIAZEM HYDROCHLORIDE 180 MG/1
360 CAPSULE, COATED, EXTENDED RELEASE ORAL DAILY
Status: DISCONTINUED | OUTPATIENT
Start: 2023-06-19 | End: 2023-06-19

## 2023-06-19 RX ORDER — METOPROLOL SUCCINATE 25 MG/1
25 TABLET, EXTENDED RELEASE ORAL DAILY
Status: DISCONTINUED | OUTPATIENT
Start: 2023-06-19 | End: 2023-06-19

## 2023-06-19 RX ORDER — SODIUM CHLORIDE, SODIUM GLUCONATE, SODIUM ACETATE, POTASSIUM CHLORIDE, MAGNESIUM CHLORIDE, SODIUM PHOSPHATE, DIBASIC, AND POTASSIUM PHOSPHATE .53; .5; .37; .037; .03; .012; .00082 G/100ML; G/100ML; G/100ML; G/100ML; G/100ML; G/100ML; G/100ML
125 INJECTION, SOLUTION INTRAVENOUS CONTINUOUS
Status: DISCONTINUED | OUTPATIENT
Start: 2023-06-19 | End: 2023-06-20

## 2023-06-19 RX ORDER — DEXAMETHASONE SODIUM PHOSPHATE 10 MG/ML
INJECTION, SOLUTION INTRAMUSCULAR; INTRAVENOUS AS NEEDED
Status: DISCONTINUED | OUTPATIENT
Start: 2023-06-19 | End: 2023-06-19

## 2023-06-19 RX ORDER — METOPROLOL TARTRATE 5 MG/5ML
5 INJECTION INTRAVENOUS EVERY 6 HOURS
Status: DISCONTINUED | OUTPATIENT
Start: 2023-06-19 | End: 2023-06-19

## 2023-06-19 RX ORDER — METRONIDAZOLE 500 MG/100ML
500 INJECTION, SOLUTION INTRAVENOUS EVERY 8 HOURS
Status: DISCONTINUED | OUTPATIENT
Start: 2023-06-19 | End: 2023-06-19

## 2023-06-19 RX ADMIN — PROPOFOL 10 MCG/KG/MIN: 10 INJECTION, EMULSION INTRAVENOUS at 14:53

## 2023-06-19 RX ADMIN — MAGNESIUM SULFATE HEPTAHYDRATE 2 G: 40 INJECTION, SOLUTION INTRAVENOUS at 08:05

## 2023-06-19 RX ADMIN — CEFAZOLIN SODIUM 2000 MG: 2 SOLUTION INTRAVENOUS at 07:09

## 2023-06-19 RX ADMIN — MIDAZOLAM 2 MG: 1 INJECTION INTRAMUSCULAR; INTRAVENOUS at 03:22

## 2023-06-19 RX ADMIN — ONDANSETRON 4 MG: 2 INJECTION INTRAMUSCULAR; INTRAVENOUS at 09:38

## 2023-06-19 RX ADMIN — PHENYLEPHRINE HYDROCHLORIDE 50 MCG/MIN: 10 INJECTION INTRAVENOUS at 01:03

## 2023-06-19 RX ADMIN — SODIUM BICARBONATE 100 MEQ: 84 INJECTION INTRAVENOUS at 14:55

## 2023-06-19 RX ADMIN — Medication 2000 UNITS: at 01:08

## 2023-06-19 RX ADMIN — MIDAZOLAM 2 MG: 1 INJECTION INTRAMUSCULAR; INTRAVENOUS at 00:28

## 2023-06-19 RX ADMIN — PHENYLEPHRINE HYDROCHLORIDE 140 MCG/MIN: 10 INJECTION INTRAVENOUS at 05:03

## 2023-06-19 RX ADMIN — PHENYLEPHRINE HYDROCHLORIDE 50 MCG/MIN: 10 INJECTION INTRAVENOUS at 09:49

## 2023-06-19 RX ADMIN — Medication 100 MCG/HR: at 23:50

## 2023-06-19 RX ADMIN — CEFTRIAXONE 1000 MG: 10 INJECTION, POWDER, FOR SOLUTION INTRAVENOUS at 00:04

## 2023-06-19 RX ADMIN — DEXAMETHASONE SODIUM PHOSPHATE 10 MG: 10 INJECTION, SOLUTION INTRAMUSCULAR; INTRAVENOUS at 09:38

## 2023-06-19 RX ADMIN — METHYLPREDNISOLONE SODIUM SUCCINATE 20 MG: 40 INJECTION, POWDER, FOR SOLUTION INTRAMUSCULAR; INTRAVENOUS at 20:06

## 2023-06-19 RX ADMIN — MAGNESIUM SULFATE HEPTAHYDRATE 2 G: 40 INJECTION, SOLUTION INTRAVENOUS at 15:47

## 2023-06-19 RX ADMIN — Medication 100 MCG/HR: at 14:20

## 2023-06-19 RX ADMIN — ALBUMIN (HUMAN): 12.5 INJECTION, SOLUTION INTRAVENOUS at 01:09

## 2023-06-19 RX ADMIN — ALBUMIN (HUMAN): 12.5 INJECTION, SOLUTION INTRAVENOUS at 10:04

## 2023-06-19 RX ADMIN — VASOPRESSIN 0.03 UNITS/MIN: 20 INJECTION, SOLUTION INTRAMUSCULAR; SUBCUTANEOUS at 09:11

## 2023-06-19 RX ADMIN — FENTANYL CITRATE 50 MCG: 50 INJECTION, SOLUTION INTRAMUSCULAR; INTRAVENOUS at 03:20

## 2023-06-19 RX ADMIN — NOREPINEPHRINE BITARTRATE 7 MCG/MIN: 1 INJECTION, SOLUTION, CONCENTRATE INTRAVENOUS at 05:03

## 2023-06-19 RX ADMIN — PROPOFOL 10 MCG/KG/MIN: 10 INJECTION, EMULSION INTRAVENOUS at 03:49

## 2023-06-19 RX ADMIN — METRONIDAZOLE 500 MG: 500 INJECTION, SOLUTION INTRAVENOUS at 15:54

## 2023-06-19 RX ADMIN — CEFEPIME 2000 MG: 2 INJECTION, POWDER, FOR SOLUTION INTRAVENOUS at 05:58

## 2023-06-19 RX ADMIN — INSULIN LISPRO 4 UNITS: 100 INJECTION, SOLUTION INTRAVENOUS; SUBCUTANEOUS at 14:57

## 2023-06-19 RX ADMIN — CALCIUM CHLORIDE 0.5 G: 100 INJECTION INTRAVENOUS; INTRAVENTRICULAR at 03:16

## 2023-06-19 RX ADMIN — CALCIUM CHLORIDE 1 G: 100 INJECTION, SOLUTION INTRAVENOUS at 16:09

## 2023-06-19 RX ADMIN — ROCURONIUM BROMIDE 20 MG: 10 INJECTION, SOLUTION INTRAVENOUS at 01:35

## 2023-06-19 RX ADMIN — SODIUM CHLORIDE, SODIUM GLUCONATE, SODIUM ACETATE, POTASSIUM CHLORIDE, MAGNESIUM CHLORIDE, SODIUM PHOSPHATE, DIBASIC, AND POTASSIUM PHOSPHATE 125 ML/HR: .53; .5; .37; .037; .03; .012; .00082 INJECTION, SOLUTION INTRAVENOUS at 23:59

## 2023-06-19 RX ADMIN — CHLORHEXIDINE GLUCONATE 15 ML: 1.2 SOLUTION ORAL at 14:48

## 2023-06-19 RX ADMIN — ALBUMIN (HUMAN): 12.5 INJECTION, SOLUTION INTRAVENOUS at 10:17

## 2023-06-19 RX ADMIN — CEFAZOLIN 3000 MG: 1 INJECTION, POWDER, FOR SOLUTION INTRAMUSCULAR; INTRAVENOUS at 00:56

## 2023-06-19 RX ADMIN — PROPOFOL 25 MCG/KG/MIN: 10 INJECTION, EMULSION INTRAVENOUS at 22:49

## 2023-06-19 RX ADMIN — NOREPINEPHRINE BITARTRATE 5 MCG/MIN: 1 INJECTION, SOLUTION, CONCENTRATE INTRAVENOUS at 04:06

## 2023-06-19 RX ADMIN — ALBUMIN (HUMAN): 12.5 INJECTION, SOLUTION INTRAVENOUS at 01:23

## 2023-06-19 RX ADMIN — SODIUM CHLORIDE, SODIUM GLUCONATE, SODIUM ACETATE, POTASSIUM CHLORIDE, MAGNESIUM CHLORIDE, SODIUM PHOSPHATE, DIBASIC, AND POTASSIUM PHOSPHATE 500 ML: .53; .5; .37; .037; .03; .012; .00082 INJECTION, SOLUTION INTRAVENOUS at 04:40

## 2023-06-19 RX ADMIN — Medication 100 MG: at 00:34

## 2023-06-19 RX ADMIN — PHENYLEPHRINE HYDROCHLORIDE 100 MCG/MIN: 10 INJECTION INTRAVENOUS at 04:06

## 2023-06-19 RX ADMIN — FENTANYL CITRATE 50 MCG: 50 INJECTION, SOLUTION INTRAMUSCULAR; INTRAVENOUS at 10:47

## 2023-06-19 RX ADMIN — PANTOPRAZOLE SODIUM 40 MG: 40 INJECTION, POWDER, FOR SOLUTION INTRAVENOUS at 20:06

## 2023-06-19 RX ADMIN — METRONIDAZOLE 500 MG: 500 INJECTION, SOLUTION INTRAVENOUS at 18:30

## 2023-06-19 RX ADMIN — CALCIUM CHLORIDE 0.5 G: 100 INJECTION INTRAVENOUS; INTRAVENTRICULAR at 02:35

## 2023-06-19 RX ADMIN — SODIUM CHLORIDE, SODIUM GLUCONATE, SODIUM ACETATE, POTASSIUM CHLORIDE, MAGNESIUM CHLORIDE, SODIUM PHOSPHATE, DIBASIC, AND POTASSIUM PHOSPHATE 1000 ML: .53; .5; .37; .037; .03; .012; .00082 INJECTION, SOLUTION INTRAVENOUS at 22:51

## 2023-06-19 RX ADMIN — SODIUM CHLORIDE: 0.9 INJECTION, SOLUTION INTRAVENOUS at 09:37

## 2023-06-19 RX ADMIN — SODIUM CHLORIDE, SODIUM GLUCONATE, SODIUM ACETATE, POTASSIUM CHLORIDE, MAGNESIUM CHLORIDE, SODIUM PHOSPHATE, DIBASIC, AND POTASSIUM PHOSPHATE 125 ML/HR: .53; .5; .37; .037; .03; .012; .00082 INJECTION, SOLUTION INTRAVENOUS at 08:36

## 2023-06-19 RX ADMIN — SODIUM CHLORIDE, SODIUM LACTATE, POTASSIUM CHLORIDE, AND CALCIUM CHLORIDE: .6; .31; .03; .02 INJECTION, SOLUTION INTRAVENOUS at 01:16

## 2023-06-19 RX ADMIN — Medication 100 MCG/HR: at 04:16

## 2023-06-19 RX ADMIN — SODIUM CHLORIDE, SODIUM GLUCONATE, SODIUM ACETATE, POTASSIUM CHLORIDE, MAGNESIUM CHLORIDE, SODIUM PHOSPHATE, DIBASIC, AND POTASSIUM PHOSPHATE 500 ML: .53; .5; .37; .037; .03; .012; .00082 INJECTION, SOLUTION INTRAVENOUS at 02:44

## 2023-06-19 RX ADMIN — INSULIN LISPRO 4 UNITS: 100 INJECTION, SOLUTION INTRAVENOUS; SUBCUTANEOUS at 18:29

## 2023-06-19 RX ADMIN — ROCURONIUM BROMIDE 50 MG: 10 INJECTION, SOLUTION INTRAVENOUS at 09:38

## 2023-06-19 RX ADMIN — VASOPRESSIN 0.04 UNITS/MIN: 20 INJECTION, SOLUTION INTRAMUSCULAR; SUBCUTANEOUS at 23:39

## 2023-06-19 RX ADMIN — SUGAMMADEX 286 MG: 100 INJECTION, SOLUTION INTRAVENOUS at 12:26

## 2023-06-19 RX ADMIN — CEFEPIME 2000 MG: 2 INJECTION, POWDER, FOR SOLUTION INTRAVENOUS at 16:35

## 2023-06-19 RX ADMIN — PROPOFOL 200 MG: 10 INJECTION, EMULSION INTRAVENOUS at 00:34

## 2023-06-19 RX ADMIN — ROCURONIUM BROMIDE 50 MG: 10 INJECTION, SOLUTION INTRAVENOUS at 00:44

## 2023-06-19 RX ADMIN — FENTANYL CITRATE 50 MCG: 50 INJECTION, SOLUTION INTRAMUSCULAR; INTRAVENOUS at 00:34

## 2023-06-19 RX ADMIN — FLUDROCORTISONE ACETATE 0.05 MG: 0.1 TABLET ORAL at 14:48

## 2023-06-19 RX ADMIN — METHYLPREDNISOLONE SODIUM SUCCINATE 20 MG: 40 INJECTION, POWDER, FOR SOLUTION INTRAMUSCULAR; INTRAVENOUS at 14:48

## 2023-06-19 RX ADMIN — NOREPINEPHRINE BITARTRATE 3 MCG/MIN: 1 INJECTION INTRAVENOUS at 02:27

## 2023-06-19 RX ADMIN — NOREPINEPHRINE BITARTRATE 20 MCG/MIN: 1 INJECTION, SOLUTION, CONCENTRATE INTRAVENOUS at 14:18

## 2023-06-19 RX ADMIN — PANTOPRAZOLE SODIUM 40 MG: 40 INJECTION, POWDER, FOR SOLUTION INTRAVENOUS at 14:48

## 2023-06-19 RX ADMIN — SODIUM CHLORIDE, SODIUM GLUCONATE, SODIUM ACETATE, POTASSIUM CHLORIDE, MAGNESIUM CHLORIDE, SODIUM PHOSPHATE, DIBASIC, AND POTASSIUM PHOSPHATE 1000 ML: .53; .5; .37; .037; .03; .012; .00082 INJECTION, SOLUTION INTRAVENOUS at 14:54

## 2023-06-19 RX ADMIN — ROCURONIUM BROMIDE 30 MG: 10 INJECTION, SOLUTION INTRAVENOUS at 02:42

## 2023-06-19 RX ADMIN — SODIUM CHLORIDE, SODIUM GLUCONATE, SODIUM ACETATE, POTASSIUM CHLORIDE, MAGNESIUM CHLORIDE, SODIUM PHOSPHATE, DIBASIC, AND POTASSIUM PHOSPHATE 500 ML: .53; .5; .37; .037; .03; .012; .00082 INJECTION, SOLUTION INTRAVENOUS at 02:30

## 2023-06-19 RX ADMIN — CALCIUM CHLORIDE 0.5 G: 100 INJECTION INTRAVENOUS; INTRAVENTRICULAR at 01:36

## 2023-06-19 RX ADMIN — CHLORHEXIDINE GLUCONATE 15 ML: 1.2 SOLUTION ORAL at 20:05

## 2023-06-19 RX ADMIN — VASOPRESSIN 0.03 UNITS/MIN: 20 INJECTION, SOLUTION INTRAMUSCULAR; SUBCUTANEOUS at 09:30

## 2023-06-19 RX ADMIN — CALCIUM CHLORIDE 0.25 G: 100 INJECTION INTRAVENOUS; INTRAVENTRICULAR at 01:59

## 2023-06-19 RX ADMIN — SODIUM CHLORIDE, SODIUM GLUCONATE, SODIUM ACETATE, POTASSIUM CHLORIDE, MAGNESIUM CHLORIDE, SODIUM PHOSPHATE, DIBASIC, AND POTASSIUM PHOSPHATE 1000 ML: .53; .5; .37; .037; .03; .012; .00082 INJECTION, SOLUTION INTRAVENOUS at 08:57

## 2023-06-19 RX ADMIN — SODIUM CHLORIDE, SODIUM GLUCONATE, SODIUM ACETATE, POTASSIUM CHLORIDE, MAGNESIUM CHLORIDE, SODIUM PHOSPHATE, DIBASIC, AND POTASSIUM PHOSPHATE 125 ML/HR: .53; .5; .37; .037; .03; .012; .00082 INJECTION, SOLUTION INTRAVENOUS at 03:48

## 2023-06-19 RX ADMIN — NOREPINEPHRINE BITARTRATE 18 MCG/MIN: 1 INJECTION, SOLUTION, CONCENTRATE INTRAVENOUS at 21:33

## 2023-06-19 RX ADMIN — VASOPRESSIN 0.04 UNITS/MIN: 20 INJECTION, SOLUTION INTRAMUSCULAR; SUBCUTANEOUS at 15:27

## 2023-06-19 RX ADMIN — SODIUM CHLORIDE, SODIUM LACTATE, POTASSIUM CHLORIDE, AND CALCIUM CHLORIDE: .6; .31; .03; .02 INJECTION, SOLUTION INTRAVENOUS at 00:19

## 2023-06-19 RX ADMIN — SODIUM CHLORIDE: 0.9 INJECTION, SOLUTION INTRAVENOUS at 01:00

## 2023-06-19 RX ADMIN — METRONIDAZOLE: 500 INJECTION, SOLUTION INTRAVENOUS at 00:57

## 2023-06-19 NOTE — ED NOTES
Pt not tolerating Oral contrast. Dr. Scottie Cole made aware.       Marco A Boykin, RN  06/18/23 7688

## 2023-06-19 NOTE — H&P
H&P: General Surgery  Jason Dickens 67 y.o. female MRN: [id number]  Unit/Bed#: ED 03 Encounter: [id number]        Assessment/Plan     Assessment:  Patient is a 67 y.o. female with pmhx of multiple  abdominal surgery and A-fib on Eliquis who presented with perforated viscus, for which general surgery was consulted. Plan for OR for exploratory laparotomy, possible open abdomen, possible bowel resection, possible ostomy    Afebrile, VSS  WBC: 18; Hb.3  CTAP showed on unofficial read bowel ischemia and free air    Plan:  OR for exploratory laparotomy  2 units on hold  IV fluids and antibiotics  N.p.o.  Risks benefits alternatives discussed with patient and family. And she would like to proceed. Hold Eliquis-last dose was this morning    History of Present Illness     HPI:  Jason Dickens is a 67 y.o. female with pmhx of A-fib on Eliquis hold, gastric bypass, HTN, right RCC status post cryoablation, cholecystectomy, hysterectomy, colostomy, left thyroid lobectomy (2019) who presents with less than 1 day of generalized abdominal pain. Patient states that the pain began around 5 PM yesterday and was generalized around her mid abdomen. The pain was unrelenting for about an hour when patient realized that she may need some assistance, but she did not want to bother her children. When her daughter called her several hours later, patient was still in a significant amount of pain. She reports nausea and dry heaves. She reports diaphoresis. She continues to have bowel function. Her last bowel movement was around 5 PM.  Patient has had a number of abdominal surgeries in the past including Sury-en-Y bypass 2 years ago, Tobin's procedure, open cholecystectomy. Her last dose of Eliquis was this morning. Has any issues with urination. Review of Systems   All other systems reviewed and are negative. Except as noted in above HPI.     Consults    Historical Information   Past Medical History:   Diagnosis Date   • A-fib [facility])    • Arthritis    • Atrial fibrillation (HCC)    • Cervical spondylosis with myelopathy    • Gastric bypass status for obesity    • History of transfusion     35 years ago   • Hypertension    • mass right kidney    • Renal cell adenocarcinoma (HCC)     RIGHT   • Sleep apnea      Past Surgical History:   Procedure Laterality Date   • CHOLECYSTECTOMY     • COLOSTOMY     • CRYOABLATION Right     RT KIDNEY   • CYSTORRHAPHY      Bladder. Last assessed 4/6/2016    • GASTRIC BYPASS     • HERNIA REPAIR      Last assessed 4/6/2016    • HYSTERECTOMY     • SC OPTX DSTL RADL X-ARTIC FX/EPIPHYSL SEP Right 4/22/2016    Procedure: OPEN REDUCTION INTERNAL FIXATION RIGHT DISTAL RADIUS;  Surgeon: Librado Suárez MD;  Location: [facility];  Service: Orthopedics   • REVISION COLOSTOMY     • THYROID LOBECTOMY Left 8/27/2019    Procedure: LOBECTOMY THYROID, left;  Surgeon: Lalitha Romero MD;  Location: [facility];  Service: Surgical Oncology   • TUBAL LIGATION     • US GUIDED THYROID BIOPSY  2/27/2019   • US GUIDED THYROID BIOPSY  5/29/2019     Social History   Social History     Substance and Sexual Activity   Alcohol Use Not Currently   • Alcohol/week: 0.0 standard drinks of alcohol    Comment: 0     Social History     Substance and Sexual Activity   Drug Use No     Social History     Tobacco Use   Smoking Status Never   Smokeless Tobacco Never     Family History:   Family History   Problem Relation Age of Onset   • Heart attack Mother    • Lung cancer Father 76   • Heart disease Father    • Stroke Sister    • Lung cancer Sister 64   • Prostate cancer Brother 61       Meds/Allergies   PTA meds:   Prior to Admission Medications   Prescriptions Last Dose Informant Patient Reported? Taking? FLUoxetine (PROzac) 20 mg capsule  Self Yes No   Sig: Take 20 mg by mouth 2 (two) times a day     LORazepam (ATIVAN) 1 mg tablet  Self Yes No   Sig: Take 1 mg by mouth daily at bedtime as needed for anxiety.    Probiotic Product (PRO-BIOTIC BLEND PO)  Self Yes No   Sig: Take by mouth   acetaminophen (TYLENOL) 325 mg tablet  Self Yes No   Sig: Take 975 mg by mouth every 6 (six) hours as needed   apixaban (Eliquis) 5 mg   No No   Sig: Take 1 tablet (5 mg total) by mouth 2 (two) times a day   diltiazem (CARDIZEM CD) 360 MG 24 hr capsule   No No   Sig: Take 1 capsule (360 mg total) by mouth daily   ergocalciferol (VITAMIN D2) 50,000 units  Self Yes No   Sig: Take 50,000 Units by mouth once a week Takes D3    gabapentin (NEURONTIN) 300 mg capsule  Self Yes No   Sig: Take 600 mg by mouth daily at bedtime    hydrochlorothiazide (HYDRODIURIL) 12.5 mg tablet   No No   Sig: Take 1 tablet (12.5 mg total) by mouth daily   metoprolol succinate (TOPROL-XL) 25 mg 24 hr tablet   No No   Sig: Take 1 tablet (25 mg total) by mouth daily   oxybutynin (DITROPAN) 5 mg tablet  Self Yes No   Si (two) times a day      Facility-Administered Medications Last Administration Doses Remaining   denosumab (PROLIA) subcutaneous injection 60 mg 2021 11:57 AM         No Known Allergies    Objective   First Vitals:   Blood Pressure: 160/83 (23)  Pulse: 85 (23)  Temperature: 97.6 °F (36.4 °C) (23)  Temp Source: Oral (23)  Respirations: 18 (23)  SpO2: 95 % (23)    Current Vitals:   Blood Pressure: 160/83 (23)  Pulse: 85 (23)  Temperature: 97.6 °F (36.4 °C) (23)  Temp Source: Oral (23)  Respirations: 18 (23)  SpO2: 95 % (23)    No intake or output data in the 24 hours ending 23 0006    Invasive Devices     Peripheral Intravenous Line  Duration           Peripheral IV 23 Right Antecubital <1 day                Physical Exam  Vitals reviewed. Constitutional:       General: She is not in acute distress. Appearance: She is not ill-appearing, toxic-appearing or diaphoretic. HENT:      Head: Normocephalic and atraumatic. Eyes:      Extraocular Movements: Extraocular movements intact. Cardiovascular:      Rate and Rhythm: Normal rate. Pulmonary:      Effort: Pulmonary effort is normal. No respiratory distress. Abdominal:      General: There is no distension. Palpations: Abdomen is soft. Tenderness: There is abdominal tenderness. There is no guarding or rebound. Hernia: A hernia is present. Comments: Multiple well healed scars over abdomen from previous surgeries. Musculoskeletal:      Right lower leg: No edema. Left lower leg: No edema. Skin:     General: Skin is warm and dry. Neurological:      Mental Status: She is alert and oriented to person, place, and time. Psychiatric:         Mood and Affect: Mood normal.         Behavior: Behavior normal.         Lab Results:   CBC:   Lab Results   Component Value Date    WBC 18.09 (H) 06/18/2023    HGB 12.3 06/18/2023    HCT 39.8 06/18/2023    MCV 84 06/18/2023     06/18/2023    RBC 4.75 06/18/2023    MCH 25.9 (L) 06/18/2023    MCHC 30.9 (L) 06/18/2023    RDW 16.3 (H) 06/18/2023    MPV 10.7 06/18/2023    NRBC 0 06/18/2023   , CMP:   Lab Results   Component Value Date    SODIUM 138 06/18/2023    K 3.8 06/18/2023     06/18/2023    CO2 28 06/18/2023    BUN 27 (H) 06/18/2023    CREATININE 1.06 06/18/2023    CALCIUM 9.1 06/18/2023    AST 26 06/18/2023    ALT 28 06/18/2023    ALKPHOS 141 (H) 06/18/2023    EGFR 52 06/18/2023   , Coagulation: No results found for: "PT", "INR", "APTT"  Imaging: I have personally reviewed pertinent reports. EKG, Pathology, and Other Studies: I have personally reviewed pertinent reports.       Code Status: Prior  Advance Directive and Living Will:      Power of :    POLST:

## 2023-06-19 NOTE — RESPIRATORY THERAPY NOTE
RT Ventilator Management Note  Tanya Lemons 67 y.o. female MRN: [id number]  Unit/Bed#: [facility] 04 Encounter: [id number]      Daily Screen    No data found in the last 10 encounters. Physical Exam:   Assessment Type: Assess only  General Appearance: Sedated  Respiratory Pattern: Normal  Chest Assessment: Chest expansion symmetrical  Bilateral Breath Sounds: Diminished  Cough: Unable to assess  Suction: ET Tube  O2 Device: vent      Resp Comments: Pt recieved from OR, stable placed on vent commercial tube allen placed. Pt tolerating vent well.

## 2023-06-19 NOTE — RESPIRATORY THERAPY NOTE
RT Ventilator Management Note  Aury Barrera 67 y.o. female MRN: [id number]  Unit/Bed#: [facility] 04 Encounter: [id number]      Daily Screen         6/19/2023  3101             Patient safety screen outcome[de-identified] Passed    Spont breathing trial % for 30 min: No              Physical Exam:   Assessment Type: (P) Assess only  General Appearance: (P) Sedated  Respiratory Pattern: (P) Assisted  Chest Assessment: (P) Chest expansion symmetrical  Bilateral Breath Sounds: (P) Clear, Diminished  Cough: (P) Unable to assess  Suction: (P) ET Tube  O2 Device: (P) vent      Resp Comments: (P) Pt tolerating vent mode well pt has clear bilateral breath sounds no complications noted

## 2023-06-19 NOTE — ASSESSMENT & PLAN NOTE
Continue metoprolol succinate 25 mg daily. Continue diltiazem CD3 160 mg daily. On apixaban 5 mg twice daily.

## 2023-06-19 NOTE — OP NOTE
OPERATIVE REPORT  PATIENT NAME: Tanya Lemons    :  1950  MRN: [id number]  Pt Location: [facility] OR ROOM 06    SURGERY DATE: 2023    Surgeon(s) and Role:     * Beata Valentine DO - Primary     * Sobeida Armstrong DO - Assisting    Preop Diagnosis:  Perforated viscus [R19.8]    Post-Op Diagnosis Codes:     * Perforated viscus [R19.8]    Procedure(s):  LAPAROTOMY EXPLORATORY. washout. vac change  LYSIS ADHESIONS  RESECTION SMALL BOWEL  APPENDECTOMY    Specimen(s):  ID Type Source Tests Collected by Time Destination   1 : small bowel ileum Tissue Other TISSUE EXAM Beata Valentine,  2023 1000    2 :  Tissue Appendix TISSUE EXAM Beata Valentine,  2023 1007        Estimated Blood Loss:   Minimal    Drains:  Urethral Catheter Latex 14 Fr. (Active)   Output (mL) 175 mL 23 0721   Number of days: 0       Anesthesia Type:   General    Operative Indications:  Perforated viscus [R19.8]  Rising vasopressor requirements concerning for additional areas of necrotic bowel vs hemorrhage. Operative Findings:  No evidence of hemorrhage. Dense adhesions requiring additional 1.5 hours of adhesiolysis to run bowel and identify surgical anatomy. BP limb staple line intact  J-J anastomosis resection staple line intact. Ileum resection staple line intact. Small portion of distal jejunum/ileum at the edge of prior resection which was questionably viable - this was resected ~ 5 cm. All 7 laparotomy pads were removed from the abdomen. Vasopressor requirements at conclusion of case: 20 levophed, 0.04 vasopressin. Abthera placed     EBL 50 cc    Complications:   None    Procedure and Technique:  The patient was brought to the operating room and identified via wristband. She was transferred to the operating room table and positioned supine. Patient presented from the ICU already intubated.  General anesthesia was induced successfully and the outer adhesive + two blue sponges of the abthera vac dressing were removed. The abdomen was prepped with betadine and draped in the usual sterile fashion. Pre-operative antibiotics were administered. A time out was performed confirming the patient, procedure, and site. All parties were in agreement. Attention was turned to the abdomen where the inner octopus vac dressing was removed. Minimal serosanguinous ascites was encountered. The bowel was examined and noted to be frozen. There were dense adhesions - interloop, small bowel to transverse colon, and small bowel to abdominal side wall making it difficult to delineate the surgical anatomy. We therefore proceeded with approximately 1.5 hours of adhesiolysis. This was performed sharply with [facility] scissors with occasional use of the Enseal device, taking care to preserve the bowel and avoid serosal injury. Multiple interloop adhesions were taken down sharply with [facility] scissors, further reducing the internal hernia. Dense adhesions were appreciated in the RLQ, including the appendix, and contributing to the internal hernia. The appendix was ligated with 3-0 silk suture and transected using the Enseal device. The mesoappendix was also transected with the Enseal device. There was a focus of surgicel in the RUQ transverse colon mesentery that was removed. A small bridging vein was present in this area and clipped. This area was hemostatic at time of evaluation. An additional sheet of surgicel was placed in this area. Seven total laps were removed from the abdomen throughout this process. The BP limb was traced proximally and posteriorly to evaluate its full course and noted to be viable with an intact staple line at the most distal point. The G-J anastomosis was visualized beneath colonic mesentery and noted to be viable. The Y limb was evaluated throughout its course and noted to be viable with an intact staple line which was located at approximately 50 cm distally.  The prior segmental ileal resection staple lines were evaluated and also noted to be intact. There was an area of distal jejunum/proximal ileum at the edge of the resection line that appeared dusky with disruption in the mesentery. A segmental resection was performed of this area using the JERMAINE 75 mm stapler. The mesentery was transected with the Enseal device. Approximately 90 cm of small bowel remains in the abdomen. At this point, the bowel was able to the completely and adequately evaluated. The cecum was slightly dusky, but viable. Given that the patient will remain with a temporary abdominal closure device and this area was not frankly necrotic, the decision was made to leave the cecum in place. The remainder of the colon was evaluated and noted to be viable. No additional areas of necrotic small bowel or large bowel were appreciated. Hemostasis was felt to be adequate. The abdomen was then irrigated with 1 L of warmed normal saline. An abthera vac dressing was placed with the inner octopus dressing, two blue sponges and then secured with vac tape. The wound vac was set to 125 mmHg continuous. The patient remained intubated and was transported to the MICU in guarded condition on levophed of 20 and vasopressin at 0.04. All instrument, needle, and sponge counts were correct at the end of the case. Radiofrequency detection was negative.     Dr. Carlos Alberto Raza was present for the entire procedure      Patient Disposition:  Critical Care Unit    This procedure was not performed to treat colon cancer through resection      SIGNATURE: Eliceo Mohamud DO  DATE: June 19, 2023  TIME: 11:34 AM

## 2023-06-19 NOTE — ASSESSMENT & PLAN NOTE
Continue Cardizem and metoprolol; also on hydrochlorothiazide which is currently on hold due to n.p.o. status. Patient needs fluids due to acute pancreatitis and currently NPO.

## 2023-06-19 NOTE — ANESTHESIA POSTPROCEDURE EVALUATION
Post-Op Assessment Note    CV Status:  Stable  Pain Score: 0    Pain management: adequate     Mental Status:  Somnolent   PONV Controlled:  None   Airway Patency:  Patent  Airway: intubated      Post Op Vitals Reviewed: Yes      Staff: CRNA         No notable events documented      BP   126/85   Temp   97 6   Pulse 110   Resp   14   SpO2 100 % (06/19/23 1224)

## 2023-06-19 NOTE — PROCEDURES
Arterial Line Insertion    Date/Time: 6/19/2023 5:41 PM    Performed by: Fay Solo PA-C  Authorized by: Fay Solo PA-C    Patient location:  ICU  Consent:     Consent obtained:  Emergent situation  Indications:     Indications: hemodynamic monitoring    Pre-procedure details:     Skin preparation:  Chlorhexidine  Procedure details:     Location / Tip of Catheter:  Radial    Laterality:  Right    Needle gauge:  20 G    Number of attempts:  2    Successful placement: no    Post-procedure details:     CMS:  Unchanged  Comments:      Attempted right radial arterial line x 2 under ultrasound guidance without success. Accessed artery once without brisk blood return. Attempted again but was unable to access artery.

## 2023-06-19 NOTE — RESPIRATORY THERAPY NOTE
RT Protocol Note  Dwayne Guo 67 y.o. female MRN: [id number]  Unit/Bed#: [facility] 04 Encounter: [id number]    Assessment    Principal Problem:    Idiopathic acute pancreatitis without infection or necrosis  Active Problems:    Permanent atrial fibrillation (HCC)    GILDARDO (obstructive sleep apnea)    Hypertension    Stage 3 chronic kidney disease, unspecified whether stage 3a or 3b CKD (HCC)      Home Pulmonary Medications:  N/A       Past Medical History:   Diagnosis Date    A-fib ([address])     Arthritis     Atrial fibrillation (HCC)     Cervical spondylosis with myelopathy     Gastric bypass status for obesity     History of transfusion     35 years ago    Hypertension     mass right kidney     Renal cell adenocarcinoma (HCC)     RIGHT    Sleep apnea      Social History     Socioeconomic History    Marital status:      Spouse name: None    Number of children: None    Years of education: None    Highest education level: None   Occupational History    None   Tobacco Use    Smoking status: Never    Smokeless tobacco: Never   Vaping Use    Vaping Use: Never used   Substance and Sexual Activity    Alcohol use: Not Currently     Alcohol/week: 0.0 standard drinks of alcohol     Comment: 0    Drug use: No    Sexual activity: None   Other Topics Concern    None   Social History Narrative    ** Merged History Encounter **          Social Determinants of Health     Financial Resource Strain: Not on file   Food Insecurity: No Food Insecurity (10/31/2022)    Hunger Vital Sign     Worried About Running Out of Food in the Last Year: Never true     Ran Out of Food in the Last Year: Never true   Transportation Needs: No Transportation Needs (10/31/2022)    PRAPARE - Transportation     Lack of Transportation (Medical): No     Lack of Transportation (Non-Medical):  No   Physical Activity: Not on file   Stress: Not on file   Social Connections: Not on file   Intimate Partner Violence: Not on file   Housing Stability: Low Risk (10/31/2022)    Housing Stability Vital Sign     Unable to Pay for Housing in the Last Year: No     Number of Places Lived in the Last Year: 1     Unstable Housing in the Last Year: No       Subjective         Objective    Physical Exam:   Assessment Type: Assess only  General Appearance: Sedated  Respiratory Pattern: Normal  Chest Assessment: Chest expansion symmetrical  Bilateral Breath Sounds: Diminished  Cough: Unable to assess  Suction: ET Tube  O2 Device: vent    Vitals:  Blood pressure 160/83, pulse (!) 126, temperature (!) 96.4 °F (35.8 °C), resp. rate 16, SpO2 100 %. Imaging and other studies: I have personally reviewed pertinent reports. O2 Device: vent     Plan    Respiratory Plan: (P) Vent/NIV/HFNC        Resp Comments: Pt recieved from OR, stable placed on vent commercial tube allen placed. Pt tolerating vent well.

## 2023-06-19 NOTE — ANESTHESIA POSTPROCEDURE EVALUATION
Post-Op Assessment Note    CV Status:  Stable       Mental Status:  Unresponsive   Hydration Status:  Unstable   Airway Patency:  Patent  Airway: intubated      Post Op Vitals Reviewed: Yes      Staff: Anesthesiologist, CRNA   Comments: remains intubated on levophed andr phenylephrine-report to critical care team-all questions answered        No notable events documented      BP   103/55   Temp      Pulse  110   Resp   14/14   SpO2  99

## 2023-06-19 NOTE — CONSULTS
[id number]  67 y.o. female MRN: [id number]  Unit/Bed#: MICU 04 Encounter: [id number]      -------------------------------------------------------------------------------------------------------------  Chief Complaint: Bowel resection     History of Present Illness   HX and PE limited by: Intubated and sedated   Aury Barrera is a 67 y.o. female with a history of afib on Eliquis and arlene-en-y bypass who presented with a perforated viscus. Patient had abdominal pain for 2 days when she presented tot he ED. CT showed a bowel obstruction with free air so she was taken for an emergent ex-lap where they found an internal hernia of ileum and necrosis of J-J anastomosis. The anastomosis was resected as well as a small amount of ileum. EBL 2L. She received 4u pRBCs, 2 u FFP, 4L crystalloid, and 500 of albumin. She was on 5 of Levo and 100 of Von. She remained intubated after the OR with abthera placed and plan to return to OR tomorrow. History obtained from chart review.   -------------------------------------------------------------------------------------------------------------  Assessment and Plan:    Neuro:   o Diagnosis: Analgesia/Sedation   - Tylenol 650mg Q6H PRN   - Fentanyl 100mcg/hr   - Dilaudid 0.5mg IV Q3H PRN   - Propofol 60      CV:   • Diagnosis: Afib   o Plan:   - Hold Eliquis in setting of surgery   - Hold home Cardizem and Toprol   • Diagnosis: Hypotension   o Plan:   - Likely 2/2 acute blood loss during surgery   - S/p 4u pRBCs, 2FFP, 4 crystalloid, 500 albumin in OR  - 1u pRBCs and 1u FFP ordered   - C/w Levophed and von to maintain MAP >65mmHg   - C/w crystalloid resuscitation   - Continue to monitor BP   - Maintain A-line for vital signs       Pulm:  o Diagnosis: Ventilated   - Settings: 500/16/6/60  - ABG 7.212/43.9/162.8/17.2  - Maintain ventilation due to OR tomorrow  - Maintain SpO2 >90%      GI:   o Diagnosis: Perforated viscus   - S/p bowel resection of J-J anastomosis and ileum (40 inches)  - Did not re-anastomose   - Plan to go back to OR tomorrow  - 7 lap pads left inside   - Continue to monitor output of Abthera vac   - Surgery is following   o Ulcer ppx: Protonix 40mg Q12H       :   · Bolanos catheter in place   · Monitor I's/O's     F/E/N:   • Fluids: Isolyte 125 mL/hr   • Electrolytes: Monitor and replace as needed   • Nutrition: NPO      Heme/Onc:   o Diagnosis: Acute blood loss   - EBL 2L   - S/p 5u pRBCs, 3 FFP  - Continue to trend Hgb   - Monitor Abthera vac output       Endo:   o No acute issues       ID:   o Diagnosis: Perforated viscus   - C/w Ancef, cefepime, flagyl   - Monitor WBC   - Monitor fever curve       MSK/Skin:   o Frequent turning/repositioning   o Monitor for signs of pressure wounds     Disposition: Admit to Critical Care   Code Status: Level 1 - Full Code  --------------------------------------------------------------------------------------------------------------  Review of Systems    Review of systems was unable to be performed secondary to intubated and sedated    Physical Exam  Vitals and nursing note reviewed. Constitutional:       Comments: Intubated and sedated   HENT:      Head: Normocephalic and atraumatic. Mouth/Throat:      Mouth: Mucous membranes are moist.   Eyes:      Conjunctiva/sclera: Conjunctivae normal.      Pupils: Pupils are equal, round, and reactive to light. Cardiovascular:      Rate and Rhythm: Tachycardia present. Rhythm irregular. Pulmonary:      Breath sounds: No wheezing. Comments: Ventilated. Bilateral breath sounds present. Abdominal:      Palpations: Abdomen is soft. Comments: Druscilla Homans in place. Draining serosanguinous fluid   Musculoskeletal:      Right lower leg: No edema. Left lower leg: No edema. Skin:     General: Skin is warm and dry. Capillary Refill: Capillary refill takes less than 2 seconds. Neurological:      Comments:  Follows commands, moves all 4 extremities --------------------------------------------------------------------------------------------------------------  Vitals:   Vitals:    06/19/23 0530 06/19/23 0545 06/19/23 0600 06/19/23 0602   BP:    132/62   BP Location:       Pulse: (!) 126 (!) 120  (!) 130   Resp: 20 16  16   Temp: (!) 96.8 °F (36 °C) (!) 97.2 °F (36.2 °C)  97.5 °F (36.4 °C)   TempSrc:       SpO2: 100% 100%     Weight:   (!) 143 kg (315 lb 11.2 oz)      Temp  Min: 81.3 °F (27.4 °C)  Max: 97.6 °F (36.4 °C)        Body mass index is 55.92 kg/m².   N/A    Laboratory and Diagnostics:  Results from last 7 days   Lab Units 06/19/23 0403 06/19/23 0314 06/19/23 0111 06/18/23 2031   WBC Thousand/uL 16.80*  --   --  18.09*   HEMOGLOBIN g/dL 12.7  --   --  12.3   I STAT HEMOGLOBIN g/dl  --  11.2* 11.6  --    HEMATOCRIT % 40.6  --   --  39.8   HEMATOCRIT, ISTAT %  --  33* 34*  --    PLATELETS Thousands/uL 272  --   --  360   NEUTROS PCT %  --   --   --  88*   BANDS PCT % 10*  --   --   --    MONOS PCT %  --   --   --  4   MONO PCT % 3*  --   --   --    EOS PCT % 0  --   --  1     Results from last 7 days   Lab Units 06/19/23 0405 06/19/23 0314 06/19/23 0111 06/18/23 2031   SODIUM mmol/L 140  --   --  138   POTASSIUM mmol/L 4.4  --   --  3.8   CHLORIDE mmol/L 113*  --   --  105   CO2 mmol/L 19*  --   --  28   CO2, I-STAT mmol/L  --  22 24  --    ANION GAP mmol/L 8  --   --  5   BUN mg/dL 25  --   --  27*   CREATININE mg/dL 1.13  --   --  1.06   CALCIUM mg/dL 8.7  --   --  9.1   GLUCOSE RANDOM mg/dL 163*  --   --  188*   ALT U/L 58  --   --  28   AST U/L 128*  --   --  26   ALK PHOS U/L 95  --   --  141*   ALBUMIN g/dL 2.5*  --   --  3.5   TOTAL BILIRUBIN mg/dL 0.67  --   --  0.26     Results from last 7 days   Lab Units 06/19/23  0405   MAGNESIUM mg/dL 1.6   PHOSPHORUS mg/dL 4.9*      Results from last 7 days   Lab Units 06/19/23  0405 06/19/23  0239   INR  1.16 1.02   PTT seconds  --  24          Results from last 7 days   Lab Units 06/19/23  0403 06/18/23  2341   LACTIC ACID mmol/L 7.0* 2.3*     ABG:  Results from last 7 days   Lab Units 06/19/23  0519   PH ART  7.212*   PCO2 ART mm Hg 43.9   PO2 ART mm Hg 162.8*   HCO3 ART mmol/L 17.2*   BASE EXC ART mmol/L -10.2   ABG SOURCE  Line, Arterial     VBG:  Results from last 7 days   Lab Units 06/19/23  0519   ABG SOURCE  Line, Arterial           Micro:        EKG:   Imaging: I have personally reviewed pertinent reports. Historical Information   Past Medical History:   Diagnosis Date   • A-fib [facility])    • Arthritis    • Atrial fibrillation (HCC)    • Cervical spondylosis with myelopathy    • Gastric bypass status for obesity    • History of transfusion     35 years ago   • Hypertension    • mass right kidney    • Renal cell adenocarcinoma (HCC)     RIGHT   • Sleep apnea      Past Surgical History:   Procedure Laterality Date   • CHOLECYSTECTOMY     • COLOSTOMY     • CRYOABLATION Right     RT KIDNEY   • CYSTORRHAPHY      Bladder.  Last assessed 4/6/2016    • GASTRIC BYPASS     • HERNIA REPAIR      Last assessed 4/6/2016    • HYSTERECTOMY     • OH OPTX DSTL RADL X-ARTIC FX/EPIPHYSL SEP Right 4/22/2016    Procedure: OPEN REDUCTION INTERNAL FIXATION RIGHT DISTAL RADIUS;  Surgeon: Bronwyn Lagunas MD;  Location: [facility];  Service: Orthopedics   • REVISION COLOSTOMY     • THYROID LOBECTOMY Left 8/27/2019    Procedure: LOBECTOMY THYROID, left;  Surgeon: Macario Story MD;  Location: [facility];  Service: Surgical Oncology   • TUBAL LIGATION     • US GUIDED THYROID BIOPSY  2/27/2019   • US GUIDED THYROID BIOPSY  5/29/2019     Social History   Social History     Substance and Sexual Activity   Alcohol Use Not Currently   • Alcohol/week: 0.0 standard drinks of alcohol    Comment: 0     Social History     Substance and Sexual Activity   Drug Use No     Social History     Tobacco Use   Smoking Status Never   Smokeless Tobacco Never     Exercise History:  Family History:   Family History   Problem Relation Age of Onset   • Heart attack Mother    • Lung cancer Father 76   • Heart disease Father    • Stroke Sister    • Lung cancer Sister 64   • Prostate cancer Brother 61     Family history unknown and I have reviewed this patient's family history and commented on sigificant items within the HPI      Medications:  Current Facility-Administered Medications   Medication Dose Route Frequency   • ceFAZolin (ANCEF) IVPB (premix in dextrose) 2,000 mg 50 mL  2,000 mg Intravenous On Call To OR   • cefepime (MAXIPIME) 2 g/50 mL dextrose IVPB  2,000 mg Intravenous Q12H   • chlorhexidine (PERIDEX) 0.12 % oral rinse 15 mL  15 mL Mouth/Throat Q12H [address]   • fentaNYL 1000 mcg in sodium chloride 0.9% 100mL infusion  100 mcg/hr Intravenous Continuous   • fentanyl citrate (PF) 100 MCG/2ML 50 mcg  50 mcg Intravenous Q1H PRN   • metroNIDAZOLE (FLAGYL) IVPB (premix) 500 mg 100 mL  500 mg Intravenous Q8H   • multi-electrolyte (PLASMALYTE-A/ISOLYTE-S PH 7.4) IV solution  125 mL/hr Intravenous Continuous   • norepinephrine (LEVOPHED) 4 mg (STANDARD CONCENTRATION) IV in sodium chloride 0.9% 250 mL  1-30 mcg/min Intravenous Titrated   • ondansetron (ZOFRAN) injection 4 mg  4 mg Intravenous Q6H PRN   • pantoprazole (PROTONIX) injection 40 mg  40 mg Intravenous Q12H [address]   • phenylephrine (MICHELL-SYNEPHRINE) 50 mg (STANDARD CONCENTRATION) in sodium chloride 0.9% 250 mL   mcg/min Intravenous Titrated   • propofol (DIPRIVAN) 1000 mg in 100 mL infusion (premix)  5-50 mcg/kg/min Intravenous Titrated     Home medications:  Prior to Admission Medications   Prescriptions Last Dose Informant Patient Reported? Taking? FLUoxetine (PROzac) 20 mg capsule  Self Yes No   Sig: Take 20 mg by mouth 2 (two) times a day     LORazepam (ATIVAN) 1 mg tablet  Self Yes No   Sig: Take 1 mg by mouth daily at bedtime as needed for anxiety.    Probiotic Product (PRO-BIOTIC BLEND PO)  Self Yes No   Sig: Take by mouth   acetaminophen (TYLENOL) 325 mg tablet  Self Yes No Sig: Take 975 mg by mouth every 6 (six) hours as needed   apixaban (Eliquis) 5 mg   No No   Sig: Take 1 tablet (5 mg total) by mouth 2 (two) times a day   diltiazem (CARDIZEM CD) 360 MG 24 hr capsule   No No   Sig: Take 1 capsule (360 mg total) by mouth daily   ergocalciferol (VITAMIN D2) 50,000 units  Self Yes No   Sig: Take 50,000 Units by mouth once a week Takes D3    gabapentin (NEURONTIN) 300 mg capsule  Self Yes No   Sig: Take 600 mg by mouth daily at bedtime    hydrochlorothiazide (HYDRODIURIL) 12.5 mg tablet   No No   Sig: Take 1 tablet (12.5 mg total) by mouth daily   metoprolol succinate (TOPROL-XL) 25 mg 24 hr tablet   No No   Sig: Take 1 tablet (25 mg total) by mouth daily   oxybutynin (DITROPAN) 5 mg tablet  Self Yes No   Si (two) times a day      Facility-Administered Medications Last Administration Doses Remaining   denosumab (PROLIA) subcutaneous injection 60 mg 2021 11:57 AM         Allergies:  No Known Allergies  ------------------------------------------------------------------------------------------------------------  Advance Directive and Living Will:      Power of :    POLST:    ------------------------------------------------------------------------------------------------------------  Anticipated Length of Stay is > 2 midnights    Care Time Delivered:   Upon my evaluation, this patient had a high probability of imminent or life-threatening deterioration due to bowel perforation, which required my direct attention, intervention, and personal management. I have personally provided 30 minutes (0400 to 0430) of critical care time, exclusive of procedures, teaching, family meetings, and any prior time recorded by providers other than myself. Maury Armendariz MD        Portions of the record may have been created with voice recognition software.   Occasional wrong word or "sound a like" substitutions may have occurred due to the inherent limitations of voice recognition software.   Read the chart carefully and recognize, using context, where substitutions have occurred

## 2023-06-19 NOTE — RESPIRATORY THERAPY NOTE
RT Ventilator Management Note  Lala Kaufman 67 y.o. female MRN: [id number]  Unit/Bed#: [facility] 04 Encounter: [id number]      Daily Screen         6/19/2023  8140             Patient safety screen outcome[de-identified] Passed (P)     Spont breathing trial % for 30 min: No (P)               Physical Exam:   Assessment Type: (P) Assess only  General Appearance: Sedated  Respiratory Pattern: (P) Normal  Chest Assessment: (P) Chest expansion symmetrical  Bilateral Breath Sounds: (P) Clear  Cough: Unable to assess  Suction: ET Tube  O2 Device: (P) vent      Resp Comments: (P) pt on cmv settings, fio2 titrated to 40% based on 0500 ABG. Pt going back to OR today, no wean attempted.

## 2023-06-19 NOTE — ANESTHESIA PROCEDURE NOTES
Arterial Line Insertion    Performed by: Mehrdad Strange CRNA  Authorized by: Flo Alvarez MD  Consent: The procedure was performed in an emergent situation  Patient understanding: patient states understanding of the procedure being performed  Patient consent: the patient's understanding of the procedure matches consent given  Procedure consent: procedure consent matches procedure scheduled  Relevant documents: relevant documents present and verified  Orientation:  Left  Location: radial artery  Sedation:  Patient sedated: geta      Procedure Details:  Nilo's test normal: yes  Needle gauge: 20  Seldinger technique: Seldinger technique used (us guided)  Number of attempts: 1    Post-procedure:  Post-procedure: dressing applied  Waveform: good waveform  Post-procedure CNS: normal

## 2023-06-19 NOTE — ED PROVIDER NOTES
History  Chief Complaint   Patient presents with   • Abdominal Pain     Pt c/o epigastric abd pain, some nausea and no vomiting. Sudden onset and radiates to the lower back      HPI  Patient is a 43-year-old female with history of chronic A-fib on Eliquis, bariatric surgery, renal tumor status post resection, hysterectomy, cholecystectomy, appendectomy, GERD, presenting with abdominal pain. Patient states that the pain started since 5 PM.  Pain is located in the epigastric region and prior to the pain patient ate some Swedish fish as well as some cookies. Patient states that the pain has been constant with no alleviation. Had 1 bowel movement that was normal-appearing with no blood and no mucus. Patient felt nauseous but had no episodes of emesis. Denies any fever, chills. No urinary symptoms. Patient denies any prior history of kidney stones. Patient denies any chest pain or shortness of breath. States that when the pain first started she felt very sweaty. Denies any radiating abdominal pain. Patient with no weakness or numbness. Did have similar abdominal pain when she ate junk food in the past.      Prior to Admission Medications   Prescriptions Last Dose Informant Patient Reported? Taking? FLUoxetine (PROzac) 20 mg capsule  Self Yes No   Sig: Take 20 mg by mouth 2 (two) times a day     LORazepam (ATIVAN) 1 mg tablet  Self Yes No   Sig: Take 1 mg by mouth daily at bedtime as needed for anxiety.    Probiotic Product (PRO-BIOTIC BLEND PO)  Self Yes No   Sig: Take by mouth   acetaminophen (TYLENOL) 325 mg tablet  Self Yes No   Sig: Take 975 mg by mouth every 6 (six) hours as needed   apixaban (Eliquis) 5 mg   No No   Sig: Take 1 tablet (5 mg total) by mouth 2 (two) times a day   diltiazem (CARDIZEM CD) 360 MG 24 hr capsule   No No   Sig: Take 1 capsule (360 mg total) by mouth daily   ergocalciferol (VITAMIN D2) 50,000 units  Self Yes No   Sig: Take 50,000 Units by mouth once a week Takes D3 gabapentin (NEURONTIN) 300 mg capsule  Self Yes No   Sig: Take 600 mg by mouth daily at bedtime    hydrochlorothiazide (HYDRODIURIL) 12.5 mg tablet   No No   Sig: Take 1 tablet (12.5 mg total) by mouth daily   metoprolol succinate (TOPROL-XL) 25 mg 24 hr tablet   No No   Sig: Take 1 tablet (25 mg total) by mouth daily   oxybutynin (DITROPAN) 5 mg tablet  Self Yes No   Si (two) times a day      Facility-Administered Medications Last Administration Doses Remaining   denosumab (PROLIA) subcutaneous injection 60 mg 2021 11:57 AM           Past Medical History:   Diagnosis Date   • A-fib [facility])    • Arthritis    • Atrial fibrillation ([address])    • Cervical spondylosis with myelopathy    • Gastric bypass status for obesity    • History of transfusion     35 years ago   • Hypertension    • mass right kidney    • Renal cell adenocarcinoma ([address])     RIGHT   • Sleep apnea        Past Surgical History:   Procedure Laterality Date   • ABDOMINAL ADHESION SURGERY N/A 2023    Procedure: LYSIS ADHESIONS;  Surgeon: Tessa Cao DO;  [facility];  Service: General   • APPENDECTOMY N/A 2023    Procedure: APPENDECTOMY;  Surgeon: Tessa Cao DO;  [facility];  Service: General   • CHOLECYSTECTOMY     • COLOSTOMY     • CRYOABLATION Right     RT KIDNEY   • CYSTORRHAPHY      Bladder.  Last assessed 2016    • EXPLORATORY LAPAROTOMY W/ BOWEL RESECTION N/A 2023    Procedure: LAPAROTOMY EXPLORATORY W/ BOWEL RESECTION;  Surgeon: Apple Perez MD;  [facility];  Service: General   • GASTRIC BYPASS     • HERNIA REPAIR      Last assessed 2016    • HYSTERECTOMY     • LAPAROTOMY N/A 2023    Procedure: LAPAROTOMY EXPLORATORY, washout, vac change;  Surgeon: Tessa Cao DO;  [facility];  Service: General   • TX OPTX DSTL RADL X-ARTIC FX/EPIPHYSL SEP Right 2016    Procedure: OPEN REDUCTION INTERNAL FIXATION RIGHT DISTAL RADIUS; Surgeon: Bronwyn Lagunas MD;  [facility];  Service: Orthopedics   • REVISION COLOSTOMY     • SMALL INTESTINE SURGERY N/A 6/19/2023    Procedure: RESECTION SMALL BOWEL;  Surgeon: Ann Pisano DO;  [facility];  Service: General   • THYROID LOBECTOMY Left 8/27/2019    Procedure: LOBECTOMY THYROID, left;  Surgeon: Macario Story MD;  [facility];  Service: Surgical Oncology   • TUBAL LIGATION     • US GUIDED THYROID BIOPSY  2/27/2019   • US GUIDED THYROID BIOPSY  5/29/2019       Family History   Problem Relation Age of Onset   • Heart attack Mother    • Lung cancer Father 76   • Heart disease Father    • Stroke Sister    • Lung cancer Sister 64   • Prostate cancer Brother 61     I have reviewed and agree with the history as documented. E-Cigarette/Vaping   • E-Cigarette Use Never User      E-Cigarette/Vaping Substances     Social History     Tobacco Use   • Smoking status: Never   • Smokeless tobacco: Never   Vaping Use   • Vaping Use: Never used   Substance Use Topics   • Alcohol use: Not Currently     Alcohol/week: 0.0 standard drinks of alcohol     Comment: 0   • Drug use: No        Review of Systems   Constitutional: Negative for chills, diaphoresis, fever and unexpected weight change. HENT: Negative for ear pain and sore throat. Eyes: Negative for visual disturbance. Respiratory: Negative for cough, chest tightness and shortness of breath. Cardiovascular: Negative for chest pain and leg swelling. Gastrointestinal: Positive for abdominal pain and nausea. Negative for abdominal distention, constipation, diarrhea and vomiting. Endocrine: Negative. Genitourinary: Negative for difficulty urinating and dysuria. Musculoskeletal: Negative. Skin: Negative. Allergic/Immunologic: Negative. Neurological: Negative. Hematological: Negative. Psychiatric/Behavioral: Negative. All other systems reviewed and are negative.       Physical Exam  ED Triage Vitals [06/18/23 2005]   Temperature Pulse Respirations Blood Pressure SpO2   97.6 °F (36.4 °C) 85 18 160/83 95 %      Temp Source Heart Rate Source Patient Position - Orthostatic VS BP Location FiO2 (%)   Oral Monitor Lying Left arm --      Pain Score       10 - Worst Possible Pain             Orthostatic Vital Signs  Vitals:    06/21/23 0300 06/21/23 0400 06/21/23 0500 06/21/23 0600   BP:       Pulse: 78 72 76 74   Patient Position - Orthostatic VS:           Physical Exam  Vitals and nursing note reviewed. Constitutional:       General: She is not in acute distress. Appearance: Normal appearance. She is obese. She is not ill-appearing. HENT:      Head: Normocephalic and atraumatic. Right Ear: External ear normal.      Left Ear: External ear normal.      Nose: Nose normal.      Mouth/Throat:      Mouth: Mucous membranes are moist.      Pharynx: Oropharynx is clear. Eyes:      General: No scleral icterus. Right eye: No discharge. Left eye: No discharge. Extraocular Movements: Extraocular movements intact. Conjunctiva/sclera: Conjunctivae normal.      Pupils: Pupils are equal, round, and reactive to light. Cardiovascular:      Rate and Rhythm: Normal rate and regular rhythm. Pulses: Normal pulses. Heart sounds: Normal heart sounds. Pulmonary:      Effort: Pulmonary effort is normal.      Breath sounds: Normal breath sounds. Abdominal:      General: Abdomen is flat. Bowel sounds are normal. There is no distension. Palpations: Abdomen is soft. Tenderness: There is abdominal tenderness in the epigastric area. There is no guarding or rebound. Musculoskeletal:         General: Normal range of motion. Cervical back: Normal range of motion and neck supple. Skin:     General: Skin is warm and dry. Capillary Refill: Capillary refill takes less than 2 seconds. Neurological:      General: No focal deficit present.       Mental Status: She is alert and oriented to person, place, and time. Mental status is at baseline. Psychiatric:         Mood and Affect: Mood normal.         Behavior: Behavior normal.         Thought Content:  Thought content normal.         Judgment: Judgment normal.         ED Medications  Medications   ondansetron (ZOFRAN) injection 4 mg (has no administration in time range)   pantoprazole (PROTONIX) injection 40 mg (40 mg Intravenous Given 6/21/23 0831)   cefepime (MAXIPIME) 2 g/50 mL dextrose IVPB (0 mg Intravenous Stopped 6/21/23 0600)   fentaNYL 1000 mcg in sodium chloride 0.9% 100mL infusion (75 mcg/hr Intravenous New Bag 6/21/23 1222)   chlorhexidine (PERIDEX) 0.12 % oral rinse 15 mL (15 mL Mouth/Throat Given 6/21/23 0832)   fentanyl citrate (PF) 100 MCG/2ML 50 mcg (100 mcg Intravenous Given 6/21/23 0945)   metroNIDAZOLE (FLAGYL) IVPB (premix) 500 mg 100 mL (500 mg Intravenous New Bag 6/21/23 0832)   insulin regular (HumuLIN R,NovoLIN R) 1 Units/mL in sodium chloride 0.9 % 100 mL infusion (1 Units/hr Intravenous New Bag 6/21/23 1001)   dexmedeTOMIDine (Precedex) 400 mcg in sodium chloride 0.9% 100 mL (0.7 mcg/kg/hr × 150 kg Intravenous New Bag 6/21/23 1302)   multi-electrolyte (PLASMALYTE-A/ISOLYTE-S PH 7.4) IV solution (125 mL/hr Intravenous New Bag 6/21/23 1054)   phenylephrine (MICHELL-SYNEPHRINE) 50 mg (STANDARD CONCENTRATION) in sodium chloride 0.9% 250 mL (25 mcg/min Intravenous New Bag 6/21/23 1128)   HYDROmorphone HCl (DILAUDID) injection 0.2 mg (has no administration in time range)   LORazepam (ATIVAN) injection 0.5 mg (0.5 mg Intravenous Given 6/21/23 1256)   albumin human (FLEXBUMIN) 5 % injection **ADS Override Pull** (has no administration in time range)   enoxaparin (LOVENOX) subcutaneous injection 60 mg (60 mg Subcutaneous Given 6/21/23 1448)   metoclopramide (REGLAN) injection 10 mg (10 mg Intravenous Given 6/18/23 2036)   HYDROmorphone (DILAUDID) injection 0.5 mg (0.5 mg Intravenous Given 6/18/23 2036)   iohexol (OMNIPAQUE) 240 MG/ML solution 50 mL (50 mL Intravenous Given 6/18/23 2111)   HYDROmorphone (DILAUDID) injection 0.6 mg (0.6 mg Intravenous Given 6/18/23 2108)   multi-electrolyte (ISOLYTE-S PH 7.4) bolus 500 mL (0 mL Intravenous Stopped 6/19/23 0700)   iohexol (OMNIPAQUE) 350 MG/ML injection (MULTI-DOSE) 100 mL (100 mL Intravenous Given 6/18/23 2228)   cefTRIAXone (ROCEPHIN) 1,000 mg in dextrose 5 % 50 mL IVPB (0 mg Intravenous Stopped 6/19/23 0034)   ceFAZolin (ANCEF) IVPB (premix in dextrose) 2,000 mg 50 mL (0 mg Intravenous Stopped 6/19/23 0745)   metroNIDAZOLE (FLAGYL) IVPB (premix) 500 mg 100 mL ( Intravenous Stopped 6/19/23 0112)   prothrombin complex concentrate (human) (Kcentra) 2,000 Units ( Intravenous MAR Unhold 6/19/23 0235)   multi-electrolyte (ISOLYTE-S PH 7.4) bolus 500 mL (0 mL Intravenous Stopped 6/19/23 0515)   magnesium sulfate 2 g/50 mL IVPB (premix) 2 g (0 g Intravenous Stopped 6/19/23 1345)   multi-electrolyte (ISOLYTE-S PH 7.4) bolus 1,000 mL (0 mL Intravenous Stopped 6/19/23 1345)   calcium chloride 1 g in sodium chloride 0.9 % 100 mL IVPB (0 g Intravenous Stopped 6/19/23 1710)   sodium bicarbonate 8.4 % injection 100 mEq ( Intravenous Canceled Entry 6/19/23 1633)   multi-electrolyte (ISOLYTE-S PH 7.4) bolus 1,000 mL (0 mL Intravenous Stopped 6/19/23 1608)   magnesium sulfate 2 g/50 mL IVPB (premix) 2 g (0 g Intravenous Stopped 6/19/23 1828)   multi-electrolyte (ISOLYTE-S PH 7.4) bolus 1,000 mL (0 mL Intravenous Stopped 6/19/23 2330)   calcium gluconate 2 g in sodium chloride 0.9% 100 mL (premix) (0 g Intravenous Stopped 6/20/23 0855)   potassium chloride 40 mEq IVPB (premix) (0 mEq Intravenous Stopped 6/20/23 2000)   amiodarone 150 mg/3 mL injection **ADS Override Pull** (  Given 6/21/23 0944)   phenylephrine HCl (VAZCULEP) 10 MG/ML solution **ADS Override Pull** (10 mg  Given 6/21/23 0943)   amiodarone 150 mg in dextrose 5 % 100 mL IV bolus (0 mg Intravenous Stopped 6/21/23 1010) magnesium sulfate 2 g/50 mL IVPB (premix) 2 g (2 g Intravenous New Bag 6/21/23 0952)   fentanyl citrate (PF) 100 MCG/2ML 100 mcg (100 mcg Intravenous Given 6/21/23 0938)   potassium chloride oral solution 10 mEq (10 mEq Oral Given 6/21/23 1123)   albumin human (FLEXBUMIN) 5 % injection 25 g (0 g Intravenous Stopped 6/21/23 1432)       Diagnostic Studies  Results Reviewed     Procedure Component Value Units Date/Time    Lactic acid, plasma (w/reflex if result > 2.0) [[id number]]  (Abnormal) Collected: 06/18/23 2341    Lab Status: Final result Specimen: Blood from Arm, Right Updated: 06/19/23 0126     LACTIC ACID 2.3 mmol/L     Narrative:      Result may be elevated if tourniquet was used during collection.     HS Troponin 0hr (reflex protocol) [[id number]]  (Normal) Collected: 06/18/23 2031    Lab Status: Final result Specimen: Blood from Arm, Right Updated: 06/18/23 2110     hs TnI 0hr 7 ng/L     Lipase [[id number]]  (Abnormal) Collected: 06/18/23 2031    Lab Status: Final result Specimen: Blood from Arm, Right Updated: 06/18/23 2102     Lipase 1,840 u/L     Comprehensive metabolic panel [[id number]]  (Abnormal) Collected: 06/18/23 2031    Lab Status: Final result Specimen: Blood from Arm, Right Updated: 06/18/23 2057     Sodium 138 mmol/L      Potassium 3.8 mmol/L      Chloride 105 mmol/L      CO2 28 mmol/L      ANION GAP 5 mmol/L      BUN 27 mg/dL      Creatinine 1.06 mg/dL      Glucose 188 mg/dL      Calcium 9.1 mg/dL      AST 26 U/L      ALT 28 U/L      Alkaline Phosphatase 141 U/L      Total Protein 7.4 g/dL      Albumin 3.5 g/dL      Total Bilirubin 0.26 mg/dL      eGFR 52 ml/min/1.73sq m     Narrative:      [place] guidelines for Chronic Kidney Disease (CKD):   •  Stage 1 with normal or high GFR (GFR > 90 mL/min/1.73 square meters)  •  Stage 2 Mild CKD (GFR = 60-89 mL/min/1.73 square meters)  •  Stage 3A Moderate CKD (GFR = 45-59 mL/min/1.73 square meters)  •  Stage 3B Moderate CKD (GFR = 30-44 mL/min/1.73 square meters)  •  Stage 4 Severe CKD (GFR = 15-29 mL/min/1.73 square meters)  •  Stage 5 End Stage CKD (GFR <15 mL/min/1.73 square meters)  Note: GFR calculation is accurate only with a steady state creatinine    CBC and differential [[id number]]  (Abnormal) Collected: 06/18/23 2031    Lab Status: Final result Specimen: Blood from Arm, Right Updated: 06/18/23 2039     WBC 18.09 Thousand/uL      RBC 4.75 Million/uL      Hemoglobin 12.3 g/dL      Hematocrit 39.8 %      MCV 84 fL      MCH 25.9 pg      MCHC 30.9 g/dL      RDW 16.3 %      MPV 10.7 fL      Platelets 092 Thousands/uL      nRBC 0 /100 WBCs      Neutrophils Relative 88 %      Immat GRANS % 1 %      Lymphocytes Relative 6 %      Monocytes Relative 4 %      Eosinophils Relative 1 %      Basophils Relative 0 %      Neutrophils Absolute 15.97 Thousands/µL      Immature Grans Absolute 0.23 Thousand/uL      Lymphocytes Absolute 1.02 Thousands/µL      Monocytes Absolute 0.69 Thousand/µL      Eosinophils Absolute 0.10 Thousand/µL      Basophils Absolute 0.08 Thousands/µL                  XR abdomen 1 view kub   Final Result by Pam William DO (06/20 1522)   Expected intraoperative/postoperative changes. No retained surgical instruments or surgical pad/pad markers. I personally discussed this study with [employer] on 6/20/2023 3:15 PM.               Workstation performed: [id number]         XR chest portable ICU   Final Result by Joyce Griffin MD (06/19 1636)   No acute cardiopulmonary findings      Support tubes and lines as above. .                  Workstation performed: [id number]         X-ray chest 1 view   Final Result by Noelle Yao MD (06/19 1200)      Endotracheal tube tip 5 cm above the nadeem                  Workstation performed: [id number]         CT chest abdomen pelvis w contrast   Final Result by Flor Galo DO (06/18 8506)      Large amount of pneumoperitoneum with portal venous gas. Findings represent ischemic bowel with perforation. The area of perforation is questionable within the loop of small bowel on image 157 of series 2. I personally discussed this study with Jamin Reddy on 6/18/2023 11:22 PM.                  Workstation performed: [id number]         XR chest 1 view portable   Final Result by Joel Hemphill MD (06/19 0902)      No active pulmonary disease on examination which is somewhat limited secondary to low lung volumes. Workstation performed: [id number]               Procedures  Procedures      ED Course               Identification of Seniors at Risk    [employer] Most Recent Value   (ISAR) Identification of Seniors at Risk    Before the illness or injury that brought you to the Emergency, did you need someone to help you on a regular basis? 0 Filed at: 06/18/2023 2006   In the last 24 hours, have you needed more help than usual? 0 Filed at: 06/18/2023 2006   Have you been hospitalized for one or more nights during the past 6 months? 0 Filed at: 06/18/2023 2006   In general, do you see well? 1 Filed at: 06/18/2023 2006   In general, do you have serious problems with your memory? 0 Filed at: 06/18/2023 2006   Do you take more than three different medications every day? 1 Filed at: 06/18/2023 2006   ISAR Score 2 Filed at: 06/18/2023 2006                              Medical Decision Making  67year old female with acute abdominal pain and nausea   Patient with prior bariatric surgery   Will obtain abdominal labs and CT AP to rule out gastritis, pancreatitis  Initial labs concerning for pancreatitis. Further fluids given   Analgesia and antiemetic given  CT AP showed signs of bowel ischemia with portal gas   Patient admitted to surgery for further operative management     Abdominal pain: acute illness or injury  Perforated viscus: acute illness or injury  Amount and/or Complexity of Data Reviewed  Labs: ordered.   Radiology: ordered. Risk  Prescription drug management. Decision regarding hospitalization.             Disposition  Final diagnoses:   Perforated viscus   Abdominal pain     Time reflects when diagnosis was documented in both MDM as applicable and the Disposition within this note     Time User Action Codes Description Comment    6/18/2023 10:02 PM Sanjuana Candelaria Add [K85.80] Other acute pancreatitis, unspecified complication status     4/11/3756 11:26 PM Chito Larsen Add [I71.30] Perforation of abdominal aorta due to abdominal aortic aneurysm (AAA) ([address])     6/18/2023 11:36 PM Derek Candelarioia Add [R19.8] Perforated viscus     6/19/2023  2:47 AM Trevor Pencil J Modify [R19.8] Perforated viscus     6/19/2023  5:13 PM Gabby Violeta Add [R10.9] Abdominal pain     6/19/2023  5:13 PM Gabby Violeta Modify [R19.8] Perforated viscus     6/19/2023  5:13 PM Gabby Violeta Remove [I71.30] Perforation of abdominal aorta due to abdominal aortic aneurysm (AAA) ([address])     6/20/2023  1:23 PM Nba Cuff Modify [R19.8] Perforated viscus       ED Disposition     ED Disposition   Admit    Condition   --    Date/Time   Mon Jun 19, 2023 12:06 AM    Comment   Level of Care: Critical Care [15]           Follow-up Information    None         Current Discharge Medication List      CONTINUE these medications which have NOT CHANGED    Details   acetaminophen (TYLENOL) 325 mg tablet Take 975 mg by mouth every 6 (six) hours as needed      apixaban (Eliquis) 5 mg Take 1 tablet (5 mg total) by mouth 2 (two) times a day  Qty: 60 tablet, Refills: 11    Associated Diagnoses: Permanent atrial fibrillation (HCC)      diltiazem (CARDIZEM CD) 360 MG 24 hr capsule Take 1 capsule (360 mg total) by mouth daily  Qty: 90 capsule, Refills: 3    Associated Diagnoses: Permanent atrial fibrillation (HCC)      ergocalciferol (VITAMIN D2) 50,000 units Take 50,000 Units by mouth once a week Takes D3       FLUoxetine (PROzac) 20 mg capsule Take 20 mg by mouth 2 (two) times a day        gabapentin (NEURONTIN) 300 mg capsule Take 600 mg by mouth daily at bedtime       hydrochlorothiazide (HYDRODIURIL) 12.5 mg tablet Take 1 tablet (12.5 mg total) by mouth daily  Qty: 90 tablet, Refills: 3    Associated Diagnoses: Primary hypertension; Chronic diastolic heart failure (HCC)      LORazepam (ATIVAN) 1 mg tablet Take 1 mg by mouth daily at bedtime as needed for anxiety. metoprolol succinate (TOPROL-XL) 25 mg 24 hr tablet Take 1 tablet (25 mg total) by mouth daily  Qty: 90 tablet, Refills: 3    Associated Diagnoses: Primary hypertension; Permanent atrial fibrillation (HCC)      oxybutynin (DITROPAN) 5 mg tablet 2 (two) times a day      Probiotic Product (PRO-BIOTIC BLEND PO) Take by mouth           No discharge procedures on file. PDMP Review     None           ED Provider  Attending physically available and evaluated Roxanne Blake. I managed the patient along with the ED Attending.     Electronically Signed by         Saul Zhang MD  06/21/23 6594

## 2023-06-19 NOTE — ANESTHESIA PREPROCEDURE EVALUATION
Procedure:  LAPAROTOMY EXPLORATORY, washout, vac change (Abdomen)    Relevant Problems   CARDIO   (+) Dyspnea on exertion   (+) Hypertension   (+) Permanent atrial fibrillation (HCC)      ENDO   (+) Hyperparathyroidism (HCC)      GI/HEPATIC   (+) Idiopathic acute pancreatitis without infection or necrosis   (+) Reactive hypoglycemia      /RENAL   (+) Malignant tumor of right kidney parenchyma (HCC)   (+) Stage 3 chronic kidney disease, unspecified whether stage 3a or 3b CKD (HCC)      MUSCULOSKELETAL   (+) Cervical spondylosis without myelopathy   (+) Myofascial pain syndrome   (+) Primary osteoarthritis of both knees   (+) Primary osteoarthritis of left knee   (+) Primary osteoarthritis of right knee      NEURO/PSYCH   (+) Myofascial pain syndrome      PULMONARY   (+) Dyspnea on exertion   (+) GILDARDO (obstructive sleep apnea)        Physical Exam    Airway  Comment: Intubated           Dental       Cardiovascular  Cardiovascular exam normal    Pulmonary  Pulmonary exam normal     Other Findings        Anesthesia Plan  ASA Score- 4     Anesthesia Type- general with ASA Monitors  Additional Monitors: arterial line  Airway Plan: ETT  Plan Factors-Exercise tolerance (METS): <4 METS  Chart reviewed  EKG reviewed  Patient summary reviewed  Induction- inhalational     Postoperative Plan- Plan for postoperative opioid use  Informed Consent- Anesthetic plan and risks discussed with patient  I personally reviewed this patient with the CRNA  Discussed and agreed on the Anesthesia Plan with the CRNA  Kristen Díaz

## 2023-06-19 NOTE — ED ATTENDING ATTESTATION
6/18/2023  I, Dinh Rivas MD, saw and evaluated the patient. I have discussed the patient with the resident/non-physician practitioner and agree with the resident's/non-physician practitioner's findings, Plan of Care, and MDM as documented in the resident's/non-physician practitioner's note, except where noted. All available labs and Radiology studies were reviewed. I was present for key portions of any procedure(s) performed by the resident/non-physician practitioner and I was immediately available to provide assistance. At this point I agree with the current assessment done in the Emergency Department. I have conducted an independent evaluation of this patient a history and physical is as follows:    Patient is a 76-year-old female with a past medical history remarkable for A-fib on Eliquis, bariatric surgery renal tumor status post resection hysterectomy cholecystectomy appendectomy gastroesophageal reflux disease who presents with acute abdominal pain. States pain started approximate 5 PM tonight after eating candy and cookies. Patient states pain has been constant with no relief. Patient had 1 normal bowel movement earlier with no blood or mucus in the present. Patient feels nauseous but denies any history of vomiting. Denies fevers or chills. Vital signs reviewed. Patient appears acutely ill uncomfortable secondary to pain localizes pain to the epigastrium. Heart tachycardic without murmurs, lungs clear to auscultation bilaterally. Abdomen soft with moderate point tenderness over epigastrium, Bowel sounds present. Extremities no edema    Impression: Epigastric abdominal pain nausea  Differential diagnosis: ACS, MI, arrhythmia, gastritis, peptic ulcer disease, pancreatitis, perforated viscus, diverticulitis, colitis. Given history of Sury-en-Y surgery of also considered possibility of internal hernia or complication related to gastric bypass surgery.     Plan to treat patient's pain with parenteral Dilaudid. IV antiemetics for nausea, IV fluids. Plan to check ECG, serial troponins, lipase, CMP, CBC,    We will obtain CT chest and pelvis with IV and oral contrast as per bariatric protocol. Patient reassessed still with pain. Additional Dilaudid ordered. ECG independently interpreted by me: Atrial fibrillation with rapid ventricular response rate of 113. Labs reviewed: CBC with differential reviewed with leukocytosis and shift. CMP remarkable for elevated BUN and elevated glucose. Elevated alk phos. Initial troponin 7 will delta. Lipase elevated at 1840 suggestive of possible pancreatitis. Given symptoms and presentation, concern for perforated viscus internal hernia or other acute abdominal process. Despite several attempts, patient was unable to tolerate oral contrast due to illness we will proceed with CT chest and pelvis with IV contrast.    Patient still with persistent abdominal pain after CT imaging call placed to radiology to expedite stat read on CAT scan. Anticipate patient will require surgical consult. Case was signed out to night team pending formal reading of of CT imaging. ED Course         Critical Care Time  CriticalCare Time    Date/Time: 6/18/2023 11:00 PM    Performed by: Radha Geiger MD  Authorized by: Radha Geiger MD    Critical care provider statement:     Critical care time (minutes):  32    Critical care time was exclusive of:  Separately billable procedures and treating other patients and teaching time    Critical care was necessary to treat or prevent imminent or life-threatening deterioration of the following conditions: Acute abdominal pain, perforated viscus.     Critical care was time spent personally by me on the following activities:  Obtaining history from patient or surrogate, development of treatment plan with patient or surrogate, evaluation of patient's response to treatment, examination of patient, ordering and performing treatments and interventions, ordering and review of laboratory studies, discussions with consultants, ordering and review of radiographic studies and re-evaluation of patient's condition    I assumed direction of critical care for this patient from another provider in my specialty: no

## 2023-06-19 NOTE — PROGRESS NOTES
Progress Note - General Surgery   Alex Guo 67 y.o. female MRN: [id number]  [facility]: [id number]    Assessment:  67 y.o. F with multiple prior abdominal surgeries including open arlene-en-y gastric bypass, presents with perforated ileum and necrotic jejunum, s/p exploratory laparotomy, resection of J-J anastomosis, segmental ileum resection and abthera placement. She remained on pressors postoperatively and has recently had increasing pressor requirements (Levo @ 11 now from 7 and laura at 25)    Acute blood loss anemia from intraoperative losses and has received a total of 5uPRBCs, 2 FFP. Postop oliguria (UO 200cc since 3 AM)    Lactic 6.7 from 7    Hgb 13.6 from 13.6 from 12.7    Plan:  Return to OR emergently to evaluate for bowel viability and hemorrhage. Remain intubated  Maintain NPO/NGT  Continue IVF resuscitation  Continue balanced resuscitation as needed  Continue to trend end points  Monitor urine output closely   Supportive care per ICU  Called both daughters and son. Son answered and provided with an update     Subjective/Objective     Subjective: Events as stated above. Patient is intubated and sedated. Objective:     Blood pressure 132/62, pulse (!) 118, temperature 97.9 °F (36.6 °C), resp. rate 16, weight (!) 143 kg (315 lb 11.2 oz), SpO2 100 %. ,Body mass index is 55.92 kg/m². Intake/Output Summary (Last 24 hours) at 6/19/2023 0729  Last data filed at 6/19/2023 0721  Gross per 24 hour   Intake 8207.41 ml   Output 3525 ml   Net 4682.41 ml       Invasive Devices     Peripheral Intravenous Line  Duration           Peripheral IV 06/18/23 Right Antecubital <1 day    Peripheral IV 06/19/23 Left Antecubital <1 day    Peripheral IV 06/19/23 Left;Proximal;Upper;Ventral (anterior) Arm <1 day    Peripheral IV 06/19/23 Right Hand <1 day          Drain  Duration           Urethral Catheter Latex 14 Fr. <1 day          Airway  Duration           ETT  Hi-Lo; Cuffed;Oral 7 mm <1 day                Physical Exam:   NAD, sedated  Normocephalic, atraumatic  Moist mucous membranes   NGT in place with minimal bilious drainage   ETT in place   Regular rate  Abd obese, soft, nondistended, abthera in place with thin sanguinous drainage   Bolanos in place with minimal clear yellow urine   No calf tenderness or peripheral edema  CN grossly intact   Skin is warm and dry      Lab, Imaging and other studies:  CBC:   Lab Results   Component Value Date    WBC 15.30 (H) 06/19/2023    HGB 13.6 06/19/2023    HCT 43.7 06/19/2023    MCV 87 06/19/2023     06/19/2023    RBC 5.05 06/19/2023    MCH 26.9 06/19/2023    MCHC 31.1 (L) 06/19/2023    RDW 15.7 (H) 06/19/2023    MPV 9.8 06/19/2023    NRBC 0 06/18/2023   , CMP:   Lab Results   Component Value Date    SODIUM 140 06/19/2023    K 4.4 06/19/2023     (H) 06/19/2023    CO2 19 (L) 06/19/2023    CO2 22 06/19/2023    BUN 25 06/19/2023    CREATININE 1.13 06/19/2023    GLUCOSE 170 (H) 06/19/2023    CALCIUM 8.7 06/19/2023     (H) 06/19/2023    ALT 58 06/19/2023    ALKPHOS 95 06/19/2023    EGFR 48 06/19/2023   , Coagulation:   Lab Results   Component Value Date    INR 1.16 06/19/2023     VTE Pharmacologic Prophylaxis: Sequential compression device (Venodyne)   VTE Mechanical Prophylaxis: sequential compression device

## 2023-06-19 NOTE — OP NOTE
OPERATIVE REPORT  PATIENT NAME: Jason Dickens    :  1950  MRN: [id number]  Pt Location: [facility] OR ROOM 07    SURGERY DATE: 2023    Surgeon(s) and Role:     * Leonor Del Real MD - Primary     * Chico Zuniga MD - Assisting     * Raenelle Oppenheim, MD - Assisting    Preop Diagnosis:  Perforated viscus [R19.8]    Post-Op Diagnosis Codes:     * Perforated viscus [R19.8]    Procedure(s):  LAPAROTOMY EXPLORATORY W/ BOWEL RESECTION    Specimen(s):  ID Type Source Tests Collected by Time Destination   1 : resection of nonviable small bowel  Tissue Small Bowel, NOS TISSUE EXAM Leonor Del Real MD 2023 [id number]        Estimated Blood Loss:   1800 mL    Drains:  Urethral Catheter Latex 14 Fr. (Active)   Number of days: 0       Anesthesia Type:   General    Operative Indications:  Perforated viscus [R19.8]    Operative Findings:  Internal hernia of ileum, causing closed loop obstruction and necrosis of the J-J anastomosis. Anastomosis resected. Small portion of ileum resected. Left in discontinuity. Injury to transverse mesenteric vein with > 1 L blood loss. Pt received 4 u prbc, 2 u ffp. 7 lap pads left inside. One superiorly at transverese mesentery root, 3 in left and 3 in right gutters. abthera vac placed. Complications:   None    Procedure and Technique:  Patient was identified in the preoperative holding area taken to the operating room. Upon arrival to the operating room identified verbally and via wristband laid supine on the operating table patient was then induced with general anesthesia airway was secured with endotracheal intubation per anesthesia colleagues. Bolanos catheter was placed, arterial line was placed, pressure points were padded, abdomen was prepped and draped in the regular sterile fashion, timeout was called and all were in agreement. Generous midline incision was made with a 10 blade scalpel.   Subcutaneous tissues were dissected bluntly and with Bovie electrocautery to the level of the fascia, and fascia was opened from xiphoid to pubis. There were dense amount of adhesions along the midline and multiple throughout the abdomen which were taken down sharply with Metzenbaum scissors and Bovie electrocautery. After approximately 1 hour of lysis of adhesions the retrocolic Sury-en-Y anatomy was appreciated. We started laterally along the paracolic gutter and freed adhesions from the terminal ileum to the JJ anastomosis. Upon lysis of adhesions freeing up the transverse colon, a transverse colon mesenteric vein had avulsed, and there was significant greater than 1 L blood loss. Hemostasis was achieved with 2-0 silk ties, and multiple medium size clips. Surgicel was placed in this transverse colon mesentery. Next we directed our attention towards the internal hernia. There was a loop of ileum which was internally herniated, causing closed-loop obstruction involving the JJ anastomosis. After reducing this loop of necrotic bowel there was significant amount of contamination and foodstuffs which spilled into the right paracolic gutter. Approximately 35 cm of ischemic and necrotic bowel including the JJ anastomosis was resected with JERMAINE 75 blue load staplers. Mesentery was transected with super jaw ligature. The herniated segment of ileum was approximately 5 cm and this was also transected with 2 JERMAINE 75 blue load staplers and mesentery was also transected with super jaw ligature. We then proceeded to run the small bowel from the terminal ileum to ligament of Treitz. Small enterotomy of the mid jejunum was repaired with 3 o silk Lembert pops. All remaining small bowel and colon was pink and viable however without peristalsis. At this point the patient was oozing from reactive surfaces. Had received 4 units of packed red blood cells 2 units of FFP, and decision was made to resuscitate the patient further in the ICU and leave the patient discontinuity.   Abdomen was washed with copious amounts of warm saline solution proximately 4 L. Tension was then redirected towards the transverse colon mesentery, Surgicel was in place and there is excellent hemostasis. 1 lap pad was placed at the transverse colon mesentery, and 3 lap pads in each paracolic gutter for hemostasis of reactive surfaces. 1 Octopus and 2 blue ABThera sponges were placed into the abdomen, and ABThera VAC tape in addition to [place] was used as a seal.  ABThera was then connected to suction and there was a good seal there is no leak, patient remained intubated and transferred to surgical intensive care unit in critical condition. At the end of the case all sponge and instrument counts were correct. Dr. Annia Manzanares was present for the entire procedure. I was present for the entire procedure.     Patient Disposition:  Critical Care Unit    This procedure was not performed to treat colon cancer through resection      SIGNATURE: Kp Moreno MD  DATE: June 19, 2023  TIME: 3:52 AM

## 2023-06-19 NOTE — ASSESSMENT & PLAN NOTE
Lab Results   Component Value Date    EGFR 52 06/18/2023    EGFR 54 01/16/2023    EGFR 55 11/02/2022    CREATININE 1.06 06/18/2023    CREATININE 1.02 01/16/2023    CREATININE 1.01 11/02/2022   Will continue to watch GFR.

## 2023-06-19 NOTE — ANESTHESIA PROCEDURE NOTES
Central Line Insertion    Performed by: Nathalia Brennan CRNA  Authorized by: Kiet Whitaker MD    Date/Time: 6/19/2023 11:55 AM  Catheter Type:  triple lumen  Consent: The procedure was performed in an emergent situation  Risks and benefits: risks, benefits and alternatives were discussed  Patient understanding: patient states understanding of the procedure being performed  Patient consent: the patient's understanding of the procedure matches consent given  Procedure consent: procedure consent matches procedure scheduled  Patient identity confirmed: arm band and hospital-assigned identification number  Indications: vascular access  Catheter size: 7 Fr  Patient position: Trendelenburg    Sedation:  Patient sedated: geta  Assessment: blood return through all ports and free fluid flow  Preparation: skin prepped with ChloraPrep  Skin prep agent dried: skin prep agent completely dried prior to procedure  Sterile barriers: all five maximum sterile barriers used - cap, mask, sterile gown, sterile gloves, and large sterile sheet  Hand hygiene: hand hygiene performed prior to central venous catheter insertion  sterile gel and probe cover used in ultrasound-guided central venous catheter insertionReason All Sterile Barriers Not Used:   All elements of maximal sterile barrier technique not followed for medical reasons  Vessel of Catheter Tip End: wire visualized under US  Number of attempts: 1  Successful placement: yes  Post-procedure: chlorhexidine patch applied and line sutured  Patient tolerance: patient tolerated the procedure well with no immediate complications

## 2023-06-19 NOTE — QUICK NOTE
Post-Op Check Note    S: Patient is s/p 2nd look ex lap, SBR, appendectomy, abthera placement. She remained intubated postoperatively and was transported to the ICU on levophed @ 20 and vaso. Postoperatively she remains on these pressors at this rate. She is intubated and sedated. O:   Vitals:    06/19/23 1224   BP:    Pulse:    Resp:    Temp:    SpO2: 100%       I/O last 3 completed shifts: In: 8207.4 [I.V.:5049.1; Blood:2540; IV Piggyback:618.3]  Out: 3350 [Urine:400; Drains:1150; Blood:1800]  I/O this shift:  In: 2610.8 [I.V.:2060.8; IV Piggyback:550]  Out: 175 [Urine:175]    PE:  NAD, sedated  Normocephalic, atraumatic  Moist mucous membranes   NGT in place   ETT in place   Regular rate  Abd soft, obese, abthera in place with serosanguinous drainage  Sweeney in place with clear yellow urine   No calf tenderness or peripheral edema  Skin is warm and dry        Lab Results   Component Value Date    WBC 21.04 (H) 06/19/2023    HGB 12.7 06/19/2023    HCT 38.7 06/19/2023    MCV 85 06/19/2023     06/19/2023     Lab Results   Component Value Date    GLUCOSE 170 (H) 06/19/2023    CALCIUM 8.7 06/19/2023    K 4.4 06/19/2023    CO2 19 (L) 06/19/2023     (H) 06/19/2023    BUN 25 06/19/2023    CREATININE 1.13 06/19/2023         A/P: 67 y.o.  F with multiple prior abdominal surgeries including open arlene-en-y gastric bypass, presents with perforated ileum and necrotic jejunum, s/p exploratory laparotomy, resection of J-J anastomosis, segmental ileum resection and abthera placement 6/19 now s/p second look ex lap, SBR, appendectomy, abthera placement 6/19    - ETT/vent management per ICU  - NPO/NGT  - IVF resuscitation  - trend endpoints   - maintain sweeney, monitor output   - continue stress dose steroids   - sedation/analgesia per ICU  - DVT ppx     Terie Oneida, DO

## 2023-06-20 ENCOUNTER — ANESTHESIA (INPATIENT)
Dept: PERIOP | Facility: HOSPITAL | Age: 73
End: 2023-06-20
Payer: MEDICARE

## 2023-06-20 ENCOUNTER — APPOINTMENT (INPATIENT)
Dept: RADIOLOGY | Facility: HOSPITAL | Age: 73
DRG: 853 | End: 2023-06-20
Payer: MEDICARE

## 2023-06-20 ENCOUNTER — ANESTHESIA EVENT (INPATIENT)
Dept: PERIOP | Facility: HOSPITAL | Age: 73
End: 2023-06-20
Payer: MEDICARE

## 2023-06-20 LAB
ABO GROUP BLD BPU: NORMAL
ALBUMIN SERPL BCP-MCNC: 2.6 G/DL (ref 3.5–5)
ALP SERPL-CCNC: 51 U/L (ref 46–116)
ALT SERPL W P-5'-P-CCNC: 19 U/L (ref 12–78)
ANION GAP SERPL CALCULATED.3IONS-SCNC: 10 MMOL/L
ANION GAP SERPL CALCULATED.3IONS-SCNC: 4 MMOL/L
ANION GAP SERPL CALCULATED.3IONS-SCNC: 7 MMOL/L
ANION GAP SERPL CALCULATED.3IONS-SCNC: 9 MMOL/L (ref 4–13)
AST SERPL W P-5'-P-CCNC: 25 U/L (ref 5–45)
ATRIAL RATE: 134 BPM
BACTERIA UR CULT: NORMAL
BASE EXCESS BLDA CALC-SCNC: -2.8 MMOL/L
BASE EXCESS BLDA CALC-SCNC: -3.6 MMOL/L
BASE EXCESS BLDA CALC-SCNC: -3.9 MMOL/L
BASE EXCESS BLDA CALC-SCNC: -5.7 MMOL/L
BASE EXCESS BLDA CALC-SCNC: -6 MMOL/L (ref -2–3)
BASOPHILS # BLD AUTO: 0.04 THOUSANDS/ÂΜL (ref 0–0.1)
BASOPHILS NFR BLD AUTO: 0 % (ref 0–1)
BILIRUB DIRECT SERPL-MCNC: 0.28 MG/DL (ref 0–0.2)
BILIRUB SERPL-MCNC: 0.73 MG/DL (ref 0.2–1)
BPU ID: NORMAL
BUN SERPL-MCNC: 33 MG/DL (ref 5–25)
BUN SERPL-MCNC: 34 MG/DL (ref 5–25)
BUN SERPL-MCNC: 34 MG/DL (ref 5–25)
BUN SERPL-MCNC: 35 MG/DL (ref 5–25)
CA-I BLD-SCNC: 0.94 MMOL/L (ref 1.12–1.32)
CA-I BLD-SCNC: 0.98 MMOL/L (ref 1.12–1.32)
CA-I BLD-SCNC: 1 MMOL/L (ref 1.12–1.32)
CA-I BLD-SCNC: 1 MMOL/L (ref 1.12–1.32)
CALCIUM SERPL-MCNC: 7.1 MG/DL (ref 8.3–10.1)
CALCIUM SERPL-MCNC: 7.3 MG/DL (ref 8.3–10.1)
CALCIUM SERPL-MCNC: 7.3 MG/DL (ref 8.3–10.1)
CALCIUM SERPL-MCNC: 7.5 MG/DL (ref 8.3–10.1)
CHLORIDE SERPL-SCNC: 112 MMOL/L (ref 96–108)
CHLORIDE SERPL-SCNC: 113 MMOL/L (ref 96–108)
CHLORIDE SERPL-SCNC: 114 MMOL/L (ref 96–108)
CHLORIDE SERPL-SCNC: 116 MMOL/L (ref 96–108)
CO2 SERPL-SCNC: 20 MMOL/L (ref 21–32)
CO2 SERPL-SCNC: 20 MMOL/L (ref 21–32)
CO2 SERPL-SCNC: 22 MMOL/L (ref 21–32)
CO2 SERPL-SCNC: 24 MMOL/L (ref 21–32)
CREAT SERPL-MCNC: 1.19 MG/DL (ref 0.6–1.3)
CREAT SERPL-MCNC: 1.29 MG/DL (ref 0.6–1.3)
CREAT SERPL-MCNC: 1.36 MG/DL (ref 0.6–1.3)
CREAT SERPL-MCNC: 1.64 MG/DL (ref 0.6–1.3)
CROSSMATCH: NORMAL
EOSINOPHIL # BLD AUTO: 0.23 THOUSAND/ÂΜL (ref 0–0.61)
EOSINOPHIL NFR BLD AUTO: 1 % (ref 0–6)
ERYTHROCYTE [DISTWIDTH] IN BLOOD BY AUTOMATED COUNT: 16.5 % (ref 11.6–15.1)
ERYTHROCYTE [DISTWIDTH] IN BLOOD BY AUTOMATED COUNT: 16.7 % (ref 11.6–15.1)
GFR SERPL CREATININE-BSD FRML MDRD: 30 ML/MIN/1.73SQ M
GFR SERPL CREATININE-BSD FRML MDRD: 38 ML/MIN/1.73SQ M
GFR SERPL CREATININE-BSD FRML MDRD: 41 ML/MIN/1.73SQ M
GFR SERPL CREATININE-BSD FRML MDRD: 45 ML/MIN/1.73SQ M
GLUCOSE SERPL-MCNC: 111 MG/DL (ref 65–140)
GLUCOSE SERPL-MCNC: 111 MG/DL (ref 65–140)
GLUCOSE SERPL-MCNC: 115 MG/DL (ref 65–140)
GLUCOSE SERPL-MCNC: 121 MG/DL (ref 65–140)
GLUCOSE SERPL-MCNC: 137 MG/DL (ref 65–140)
GLUCOSE SERPL-MCNC: 142 MG/DL (ref 65–140)
GLUCOSE SERPL-MCNC: 148 MG/DL (ref 65–140)
GLUCOSE SERPL-MCNC: 153 MG/DL (ref 65–140)
GLUCOSE SERPL-MCNC: 167 MG/DL (ref 65–140)
GLUCOSE SERPL-MCNC: 185 MG/DL (ref 65–140)
GLUCOSE SERPL-MCNC: 225 MG/DL (ref 65–140)
GLUCOSE SERPL-MCNC: 252 MG/DL (ref 65–140)
GLUCOSE SERPL-MCNC: 265 MG/DL (ref 65–140)
GLUCOSE SERPL-MCNC: 94 MG/DL (ref 65–140)
HCO3 BLDA-SCNC: 19.1 MMOL/L (ref 22–28)
HCO3 BLDA-SCNC: 19.2 MMOL/L (ref 22–28)
HCO3 BLDA-SCNC: 19.3 MMOL/L (ref 22–28)
HCO3 BLDA-SCNC: 19.7 MMOL/L (ref 22–28)
HCO3 BLDA-SCNC: 20.1 MMOL/L (ref 22–28)
HCO3 BLDA-SCNC: 20.7 MMOL/L (ref 22–28)
HCO3 BLDA-SCNC: 21.1 MMOL/L (ref 22–28)
HCT VFR BLD AUTO: 26.1 % (ref 34.8–46.1)
HCT VFR BLD AUTO: 28.4 % (ref 34.8–46.1)
HCT VFR BLD AUTO: 31.8 % (ref 34.8–46.1)
HCT VFR BLD CALC: 23 % (ref 34.8–46.1)
HCT VFR BLD CALC: 25 % (ref 34.8–46.1)
HCT VFR BLD CALC: 25 % (ref 34.8–46.1)
HGB BLD-MCNC: 10.7 G/DL (ref 11.5–15.4)
HGB BLD-MCNC: 8.5 G/DL (ref 11.5–15.4)
HGB BLD-MCNC: 9.3 G/DL (ref 11.5–15.4)
HGB BLDA-MCNC: 7.8 G/DL (ref 11.5–15.4)
HGB BLDA-MCNC: 8.5 G/DL (ref 11.5–15.4)
HGB BLDA-MCNC: 8.5 G/DL (ref 11.5–15.4)
HOROWITZ INDEX BLDA+IHG-RTO: 40 MM[HG]
HOROWITZ INDEX BLDA+IHG-RTO: 40 MM[HG]
IMM GRANULOCYTES # BLD AUTO: 0.11 THOUSAND/UL (ref 0–0.2)
IMM GRANULOCYTES NFR BLD AUTO: 1 % (ref 0–2)
LACTATE SERPL-SCNC: 1.7 MMOL/L (ref 0.5–2)
LACTATE SERPL-SCNC: 1.9 MMOL/L (ref 0.5–2)
LACTATE SERPL-SCNC: 2.6 MMOL/L (ref 0.5–2)
LACTATE SERPL-SCNC: 5.2 MMOL/L (ref 0.5–2)
LACTATE SERPL-SCNC: 5.7 MMOL/L (ref 0.5–2)
LACTATE SERPL-SCNC: 5.9 MMOL/L (ref 0.5–2)
LYMPHOCYTES # BLD AUTO: 0.72 THOUSANDS/ÂΜL (ref 0.6–4.47)
LYMPHOCYTES NFR BLD AUTO: 4 % (ref 14–44)
MAGNESIUM SERPL-MCNC: 2.6 MG/DL (ref 1.6–2.6)
MAGNESIUM SERPL-MCNC: 2.8 MG/DL (ref 1.6–2.6)
MCH RBC QN AUTO: 27.5 PG (ref 26.8–34.3)
MCH RBC QN AUTO: 27.6 PG (ref 26.8–34.3)
MCHC RBC AUTO-ENTMCNC: 32.7 G/DL (ref 31.4–37.4)
MCHC RBC AUTO-ENTMCNC: 33.6 G/DL (ref 31.4–37.4)
MCV RBC AUTO: 82 FL (ref 82–98)
MCV RBC AUTO: 84 FL (ref 82–98)
MONOCYTES # BLD AUTO: 0.9 THOUSAND/ÂΜL (ref 0.17–1.22)
MONOCYTES NFR BLD AUTO: 5 % (ref 4–12)
NEUTROPHILS # BLD AUTO: 16.13 THOUSANDS/ÂΜL (ref 1.85–7.62)
NEUTS SEG NFR BLD AUTO: 89 % (ref 43–75)
NRBC BLD AUTO-RTO: 0 /100 WBCS
O2 CT BLDA-SCNC: 12.3 ML/DL (ref 16–23)
O2 CT BLDA-SCNC: 13.7 ML/DL (ref 16–23)
O2 CT BLDA-SCNC: 14 ML/DL (ref 16–23)
O2 CT BLDA-SCNC: 15.8 ML/DL (ref 16–23)
OXYHGB MFR BLDA: 95.2 % (ref 94–97)
OXYHGB MFR BLDA: 95.5 % (ref 94–97)
OXYHGB MFR BLDA: 97 % (ref 94–97)
OXYHGB MFR BLDA: 97.2 % (ref 94–97)
PCO2 BLD: 20 MMOL/L (ref 21–32)
PCO2 BLD: 21 MMOL/L (ref 21–32)
PCO2 BLD: 21 MMOL/L (ref 21–32)
PCO2 BLD: 35.1 MM HG (ref 36–44)
PCO2 BLD: 37.4 MM HG (ref 36–44)
PCO2 BLD: 40.3 MM HG (ref 36–44)
PCO2 BLDA: 29.3 MM HG (ref 36–44)
PCO2 BLDA: 32.9 MM HG (ref 36–44)
PCO2 BLDA: 34.7 MM HG (ref 36–44)
PCO2 BLDA: 35.4 MM HG (ref 36–44)
PEEP RESPIRATORY: 6 CM[H2O]
PEEP RESPIRATORY: 8 CM[H2O]
PH BLD: 7.31 [PH] (ref 7.35–7.45)
PH BLD: 7.33 [PH] (ref 7.35–7.45)
PH BLD: 7.34 [PH] (ref 7.35–7.45)
PH BLDA: 7.35 [PH] (ref 7.35–7.45)
PH BLDA: 7.39 [PH] (ref 7.35–7.45)
PH BLDA: 7.42 [PH] (ref 7.35–7.45)
PH BLDA: 7.44 [PH] (ref 7.35–7.45)
PHOSPHATE SERPL-MCNC: 3.8 MG/DL (ref 2.3–4.1)
PHOSPHATE SERPL-MCNC: 4.2 MG/DL (ref 2.3–4.1)
PLATELET # BLD AUTO: 158 THOUSANDS/UL (ref 149–390)
PLATELET # BLD AUTO: 223 THOUSANDS/UL (ref 149–390)
PMV BLD AUTO: 10.3 FL (ref 8.9–12.7)
PMV BLD AUTO: 10.9 FL (ref 8.9–12.7)
PO2 BLD: 111 MM HG (ref 75–129)
PO2 BLD: 125 MM HG (ref 75–129)
PO2 BLD: 149 MM HG (ref 75–129)
PO2 BLDA: 106.2 MM HG (ref 75–129)
PO2 BLDA: 111.3 MM HG (ref 75–129)
PO2 BLDA: 83.2 MM HG (ref 75–129)
PO2 BLDA: 86.5 MM HG (ref 75–129)
POTASSIUM BLD-SCNC: 3.4 MMOL/L (ref 3.5–5.3)
POTASSIUM SERPL-SCNC: 3.5 MMOL/L (ref 3.5–5.3)
POTASSIUM SERPL-SCNC: 3.7 MMOL/L (ref 3.5–5.3)
POTASSIUM SERPL-SCNC: 3.8 MMOL/L (ref 3.5–5.3)
POTASSIUM SERPL-SCNC: 4 MMOL/L (ref 3.5–5.3)
PR INTERVAL: 0 MS
PROT SERPL-MCNC: 4.8 G/DL (ref 6.4–8.4)
QRS AXIS: -6 DEGREES
QRSD INTERVAL: 79 MS
QT INTERVAL: 346 MS
QTC INTERVAL: 517 MS
RBC # BLD AUTO: 3.38 MILLION/UL (ref 3.81–5.12)
RBC # BLD AUTO: 3.88 MILLION/UL (ref 3.81–5.12)
SAO2 % BLD FROM PO2: 98 % (ref 60–85)
SAO2 % BLD FROM PO2: 98 % (ref 60–85)
SAO2 % BLD FROM PO2: 99 % (ref 60–85)
SIMV VENT: ABNORMAL
SODIUM BLD-SCNC: 142 MMOL/L (ref 136–145)
SODIUM BLD-SCNC: 142 MMOL/L (ref 136–145)
SODIUM BLD-SCNC: 143 MMOL/L (ref 136–145)
SODIUM SERPL-SCNC: 142 MMOL/L (ref 135–147)
SODIUM SERPL-SCNC: 142 MMOL/L (ref 135–147)
SODIUM SERPL-SCNC: 143 MMOL/L (ref 135–147)
SODIUM SERPL-SCNC: 144 MMOL/L (ref 135–147)
SPECIMEN SOURCE: ABNORMAL
T WAVE AXIS: 84 DEGREES
UNIT DISPENSE STATUS: NORMAL
UNIT PRODUCT CODE: NORMAL
UNIT PRODUCT VOLUME: 280 ML
UNIT PRODUCT VOLUME: 314 ML
UNIT PRODUCT VOLUME: 350 ML
UNIT RH: NORMAL
VENT AC: 18
VENT AC: 22
VENT- AC: AC
VENT- AC: AC
VENTRICULAR RATE: 134 BPM
VT SETTING VENT: 420 ML
VT SETTING VENT: 500 ML
WBC # BLD AUTO: 11.25 THOUSAND/UL (ref 4.31–10.16)
WBC # BLD AUTO: 18.13 THOUSAND/UL (ref 4.31–10.16)

## 2023-06-20 PROCEDURE — 93010 ELECTROCARDIOGRAM REPORT: CPT | Performed by: INTERNAL MEDICINE

## 2023-06-20 PROCEDURE — 83735 ASSAY OF MAGNESIUM: CPT | Performed by: STUDENT IN AN ORGANIZED HEALTH CARE EDUCATION/TRAINING PROGRAM

## 2023-06-20 PROCEDURE — 99291 CRITICAL CARE FIRST HOUR: CPT | Performed by: STUDENT IN AN ORGANIZED HEALTH CARE EDUCATION/TRAINING PROGRAM

## 2023-06-20 PROCEDURE — 85014 HEMATOCRIT: CPT | Performed by: NURSE ANESTHETIST, CERTIFIED REGISTERED

## 2023-06-20 PROCEDURE — 85014 HEMATOCRIT: CPT

## 2023-06-20 PROCEDURE — 80048 BASIC METABOLIC PNL TOTAL CA: CPT | Performed by: NURSE PRACTITIONER

## 2023-06-20 PROCEDURE — 84100 ASSAY OF PHOSPHORUS: CPT

## 2023-06-20 PROCEDURE — 82330 ASSAY OF CALCIUM: CPT

## 2023-06-20 PROCEDURE — 82805 BLOOD GASES W/O2 SATURATION: CPT | Performed by: NURSE PRACTITIONER

## 2023-06-20 PROCEDURE — 94760 N-INVAS EAR/PLS OXIMETRY 1: CPT | Performed by: SOCIAL WORKER

## 2023-06-20 PROCEDURE — 0WUF0JZ SUPPLEMENT ABDOMINAL WALL WITH SYNTHETIC SUBSTITUTE, OPEN APPROACH: ICD-10-PCS | Performed by: SURGERY

## 2023-06-20 PROCEDURE — 88307 TISSUE EXAM BY PATHOLOGIST: CPT | Performed by: PATHOLOGY

## 2023-06-20 PROCEDURE — C9113 INJ PANTOPRAZOLE SODIUM, VIA: HCPCS | Performed by: STUDENT IN AN ORGANIZED HEALTH CARE EDUCATION/TRAINING PROGRAM

## 2023-06-20 PROCEDURE — 82803 BLOOD GASES ANY COMBINATION: CPT

## 2023-06-20 PROCEDURE — 93005 ELECTROCARDIOGRAM TRACING: CPT

## 2023-06-20 PROCEDURE — P9016 RBC LEUKOCYTES REDUCED: HCPCS

## 2023-06-20 PROCEDURE — 94760 N-INVAS EAR/PLS OXIMETRY 1: CPT

## 2023-06-20 PROCEDURE — 83605 ASSAY OF LACTIC ACID: CPT

## 2023-06-20 PROCEDURE — 84132 ASSAY OF SERUM POTASSIUM: CPT

## 2023-06-20 PROCEDURE — 84100 ASSAY OF PHOSPHORUS: CPT | Performed by: STUDENT IN AN ORGANIZED HEALTH CARE EDUCATION/TRAINING PROGRAM

## 2023-06-20 PROCEDURE — C1781 MESH (IMPLANTABLE): HCPCS | Performed by: SURGERY

## 2023-06-20 PROCEDURE — 85025 COMPLETE CBC W/AUTO DIFF WBC: CPT | Performed by: NURSE PRACTITIONER

## 2023-06-20 PROCEDURE — 83605 ASSAY OF LACTIC ACID: CPT | Performed by: NURSE PRACTITIONER

## 2023-06-20 PROCEDURE — 84295 ASSAY OF SERUM SODIUM: CPT

## 2023-06-20 PROCEDURE — 74018 RADEX ABDOMEN 1 VIEW: CPT

## 2023-06-20 PROCEDURE — 82805 BLOOD GASES W/O2 SATURATION: CPT

## 2023-06-20 PROCEDURE — 82947 ASSAY GLUCOSE BLOOD QUANT: CPT

## 2023-06-20 PROCEDURE — 15778 IMPL ABSRB MSH/PRSTH DLY CLS: CPT | Performed by: SURGERY

## 2023-06-20 PROCEDURE — 85027 COMPLETE CBC AUTOMATED: CPT

## 2023-06-20 PROCEDURE — 99024 POSTOP FOLLOW-UP VISIT: CPT | Performed by: SURGERY

## 2023-06-20 PROCEDURE — 97605 NEG PRS WND THER DME<=50SQCM: CPT | Performed by: SURGERY

## 2023-06-20 PROCEDURE — 82330 ASSAY OF CALCIUM: CPT | Performed by: STUDENT IN AN ORGANIZED HEALTH CARE EDUCATION/TRAINING PROGRAM

## 2023-06-20 PROCEDURE — 83605 ASSAY OF LACTIC ACID: CPT | Performed by: SURGERY

## 2023-06-20 PROCEDURE — 82948 REAGENT STRIP/BLOOD GLUCOSE: CPT

## 2023-06-20 PROCEDURE — 83735 ASSAY OF MAGNESIUM: CPT

## 2023-06-20 PROCEDURE — 94003 VENT MGMT INPAT SUBQ DAY: CPT

## 2023-06-20 PROCEDURE — 85018 HEMOGLOBIN: CPT | Performed by: NURSE ANESTHETIST, CERTIFIED REGISTERED

## 2023-06-20 PROCEDURE — 44120 REMOVAL OF SMALL INTESTINE: CPT | Performed by: SURGERY

## 2023-06-20 PROCEDURE — 80076 HEPATIC FUNCTION PANEL: CPT

## 2023-06-20 DEVICE — UNDYED KNITTED (POLYGLACTIN 910), SYNTHETIC ABSORBABLE MESH
Type: IMPLANTABLE DEVICE | Site: ABDOMEN | Status: FUNCTIONAL
Brand: VICRYL

## 2023-06-20 RX ORDER — ROCURONIUM BROMIDE 10 MG/ML
INJECTION, SOLUTION INTRAVENOUS AS NEEDED
Status: DISCONTINUED | OUTPATIENT
Start: 2023-06-20 | End: 2023-06-20

## 2023-06-20 RX ORDER — CALCIUM GLUCONATE 20 MG/ML
2 INJECTION, SOLUTION INTRAVENOUS ONCE
Status: COMPLETED | OUTPATIENT
Start: 2023-06-20 | End: 2023-06-20

## 2023-06-20 RX ORDER — POTASSIUM CHLORIDE 29.8 MG/ML
40 INJECTION INTRAVENOUS ONCE
Status: COMPLETED | OUTPATIENT
Start: 2023-06-20 | End: 2023-06-20

## 2023-06-20 RX ORDER — DEXMEDETOMIDINE HYDROCHLORIDE 4 UG/ML
.1-.7 INJECTION, SOLUTION INTRAVENOUS
Status: DISCONTINUED | OUTPATIENT
Start: 2023-06-20 | End: 2023-06-22

## 2023-06-20 RX ORDER — INSULIN LISPRO 100 [IU]/ML
4-20 INJECTION, SOLUTION INTRAVENOUS; SUBCUTANEOUS EVERY 6 HOURS SCHEDULED
Status: DISCONTINUED | OUTPATIENT
Start: 2023-06-20 | End: 2023-06-20

## 2023-06-20 RX ORDER — ALBUMIN, HUMAN INJ 5% 5 %
25 SOLUTION INTRAVENOUS AS NEEDED
Status: DISCONTINUED | OUTPATIENT
Start: 2023-06-20 | End: 2023-06-20

## 2023-06-20 RX ORDER — MIDAZOLAM HYDROCHLORIDE 2 MG/2ML
INJECTION, SOLUTION INTRAMUSCULAR; INTRAVENOUS AS NEEDED
Status: DISCONTINUED | OUTPATIENT
Start: 2023-06-20 | End: 2023-06-20

## 2023-06-20 RX ORDER — SODIUM CHLORIDE 9 MG/ML
INJECTION, SOLUTION INTRAVENOUS CONTINUOUS PRN
Status: DISCONTINUED | OUTPATIENT
Start: 2023-06-20 | End: 2023-06-20

## 2023-06-20 RX ORDER — ALBUMIN, HUMAN INJ 5% 5 %
SOLUTION INTRAVENOUS CONTINUOUS PRN
Status: DISCONTINUED | OUTPATIENT
Start: 2023-06-20 | End: 2023-06-20

## 2023-06-20 RX ORDER — HEPARIN SODIUM 5000 [USP'U]/ML
7500 INJECTION, SOLUTION INTRAVENOUS; SUBCUTANEOUS EVERY 8 HOURS SCHEDULED
Status: DISCONTINUED | OUTPATIENT
Start: 2023-06-20 | End: 2023-06-21

## 2023-06-20 RX ORDER — FENTANYL CITRATE 50 UG/ML
INJECTION, SOLUTION INTRAMUSCULAR; INTRAVENOUS AS NEEDED
Status: DISCONTINUED | OUTPATIENT
Start: 2023-06-20 | End: 2023-06-20

## 2023-06-20 RX ADMIN — HEPARIN SODIUM 7500 UNITS: 5000 INJECTION INTRAVENOUS; SUBCUTANEOUS at 23:15

## 2023-06-20 RX ADMIN — METRONIDAZOLE 500 MG: 500 INJECTION, SOLUTION INTRAVENOUS at 01:16

## 2023-06-20 RX ADMIN — VASOPRESSIN 0.04 UNITS/MIN: 20 INJECTION, SOLUTION INTRAMUSCULAR; SUBCUTANEOUS at 07:26

## 2023-06-20 RX ADMIN — CEFEPIME 2000 MG: 2 INJECTION, POWDER, FOR SOLUTION INTRAVENOUS at 04:08

## 2023-06-20 RX ADMIN — CEFEPIME 2000 MG: 2 INJECTION, POWDER, FOR SOLUTION INTRAVENOUS at 16:52

## 2023-06-20 RX ADMIN — ALBUMIN (HUMAN): 12.5 INJECTION, SOLUTION INTRAVENOUS at 12:45

## 2023-06-20 RX ADMIN — ROCURONIUM BROMIDE 70 MG: 10 INJECTION, SOLUTION INTRAVENOUS at 12:06

## 2023-06-20 RX ADMIN — CHLORHEXIDINE GLUCONATE 15 ML: 1.2 SOLUTION ORAL at 08:01

## 2023-06-20 RX ADMIN — METRONIDAZOLE 500 MG: 500 INJECTION, SOLUTION INTRAVENOUS at 09:46

## 2023-06-20 RX ADMIN — Medication 75 MCG/HR: at 09:56

## 2023-06-20 RX ADMIN — INSULIN LISPRO 8 UNITS: 100 INJECTION, SOLUTION INTRAVENOUS; SUBCUTANEOUS at 06:06

## 2023-06-20 RX ADMIN — Medication 0.5 MCG/KG/HR: at 23:31

## 2023-06-20 RX ADMIN — FLUDROCORTISONE ACETATE 0.05 MG: 0.1 TABLET ORAL at 08:01

## 2023-06-20 RX ADMIN — VASOPRESSIN 0.04 UNITS/MIN: 20 INJECTION, SOLUTION INTRAMUSCULAR; SUBCUTANEOUS at 16:48

## 2023-06-20 RX ADMIN — SUGAMMADEX 300 MG: 100 INJECTION, SOLUTION INTRAVENOUS at 15:39

## 2023-06-20 RX ADMIN — FENTANYL CITRATE 50 MCG: 50 INJECTION INTRAMUSCULAR; INTRAVENOUS at 03:22

## 2023-06-20 RX ADMIN — FENTANYL CITRATE 50 MCG: 50 INJECTION INTRAMUSCULAR; INTRAVENOUS at 20:38

## 2023-06-20 RX ADMIN — PROPOFOL 10 MCG/KG/MIN: 10 INJECTION, EMULSION INTRAVENOUS at 05:39

## 2023-06-20 RX ADMIN — PANTOPRAZOLE SODIUM 40 MG: 40 INJECTION, POWDER, FOR SOLUTION INTRAVENOUS at 23:15

## 2023-06-20 RX ADMIN — Medication 75 MCG/HR: at 23:01

## 2023-06-20 RX ADMIN — CALCIUM GLUCONATE 2 G: 20 INJECTION, SOLUTION INTRAVENOUS at 07:53

## 2023-06-20 RX ADMIN — SODIUM CHLORIDE 5 UNITS/HR: 9 INJECTION, SOLUTION INTRAVENOUS at 08:46

## 2023-06-20 RX ADMIN — SODIUM CHLORIDE: 0.9 INJECTION, SOLUTION INTRAVENOUS at 12:00

## 2023-06-20 RX ADMIN — FENTANYL CITRATE 100 MCG: 50 INJECTION, SOLUTION INTRAMUSCULAR; INTRAVENOUS at 12:18

## 2023-06-20 RX ADMIN — HEPARIN SODIUM 7500 UNITS: 5000 INJECTION INTRAVENOUS; SUBCUTANEOUS at 07:56

## 2023-06-20 RX ADMIN — ALBUMIN (HUMAN): 12.5 INJECTION, SOLUTION INTRAVENOUS at 13:14

## 2023-06-20 RX ADMIN — POTASSIUM CHLORIDE 40 MEQ: 29.8 INJECTION, SOLUTION INTRAVENOUS at 18:10

## 2023-06-20 RX ADMIN — SODIUM CHLORIDE, SODIUM GLUCONATE, SODIUM ACETATE, POTASSIUM CHLORIDE, MAGNESIUM CHLORIDE, SODIUM PHOSPHATE, DIBASIC, AND POTASSIUM PHOSPHATE 125 ML/HR: .53; .5; .37; .037; .03; .012; .00082 INJECTION, SOLUTION INTRAVENOUS at 16:32

## 2023-06-20 RX ADMIN — FENTANYL CITRATE 50 MCG: 50 INJECTION INTRAMUSCULAR; INTRAVENOUS at 22:58

## 2023-06-20 RX ADMIN — PANTOPRAZOLE SODIUM 40 MG: 40 INJECTION, POWDER, FOR SOLUTION INTRAVENOUS at 08:01

## 2023-06-20 RX ADMIN — ALBUMIN (HUMAN) 25 G: 12.5 INJECTION, SOLUTION INTRAVENOUS at 09:38

## 2023-06-20 RX ADMIN — Medication 0.3 MCG/KG/HR: at 16:48

## 2023-06-20 RX ADMIN — METRONIDAZOLE 500 MG: 500 INJECTION, SOLUTION INTRAVENOUS at 18:09

## 2023-06-20 RX ADMIN — Medication 0.3 MCG/KG/HR: at 10:01

## 2023-06-20 RX ADMIN — METHYLPREDNISOLONE SODIUM SUCCINATE 20 MG: 40 INJECTION, POWDER, FOR SOLUTION INTRAMUSCULAR; INTRAVENOUS at 23:15

## 2023-06-20 RX ADMIN — MIDAZOLAM 2 MG: 1 INJECTION INTRAMUSCULAR; INTRAVENOUS at 11:45

## 2023-06-20 RX ADMIN — FENTANYL CITRATE 50 MCG: 50 INJECTION INTRAMUSCULAR; INTRAVENOUS at 10:04

## 2023-06-20 RX ADMIN — INSULIN LISPRO 8 UNITS: 100 INJECTION, SOLUTION INTRAVENOUS; SUBCUTANEOUS at 01:10

## 2023-06-20 RX ADMIN — METHYLPREDNISOLONE SODIUM SUCCINATE 20 MG: 40 INJECTION, POWDER, FOR SOLUTION INTRAMUSCULAR; INTRAVENOUS at 08:01

## 2023-06-20 RX ADMIN — CHLORHEXIDINE GLUCONATE 15 ML: 1.2 SOLUTION ORAL at 23:15

## 2023-06-20 NOTE — MALNUTRITION/BMI
This medical record reflects one or more clinical indicators suggestive of morbid obesity. BMI Findings:  Adult BMI Classifications: Morbid Obesity 50-59.9        Body mass index is 58.62 kg/m². See Nutrition note dated 6/20/23 for additional details. Completed nutrition assessment is viewable in the nutrition documentation. Will follow clinical course for nutrition plan and provide recs prn.

## 2023-06-20 NOTE — QUICK NOTE
Post Operative Assessment    Subjective:  Patient examined in MICU after return from OR. Patient had bowel anastomosis, drain placement, and abdominal closure. She received 1U RBC and albumin intra-op, and reached levo 7, vaso . 04. Objective:  General: intubated and sedated  Heart: regular rate and rhythm  Lungs: clear to auscultation bilaterally  Abdomen: soft, abd binders in place. 3x LUCY drains with serosanguinous drainage  MSK: trace LE edema bilaterally  Skin: warm and dry    Vitals:    06/20/23 1607   BP:    Pulse:    Resp:    Temp:    SpO2: 99%     Levo 4  Vaso . 04    Fentanyl gtt  Precedex gtt  Insulin gtt    Plan:  Post operative labs  Trend abd exam  Monitor UOP  Continuous hemodynamic monitoring  Wean pressors  Keep intubated overnight, wean ventilator tomorrow  NPO

## 2023-06-20 NOTE — PROCEDURES
Arterial Line Insertion    Date/Time: 6/19/2023 9:05 PM    Performed by: Mauricio Romero MD  Authorized by: Mauricio Romero MD    Patient location:  ICU  Procedure performed by consultant: Libby Amezcua. Consent:     Consent obtained:  Emergent situation  Universal protocol:     Patient identity confirmed:  Arm band  Indications:     Indications: hemodynamic monitoring, multiple ABGs and frequent labs / infusion    Pre-procedure details:     Skin preparation:  Chlorhexidine  Procedure details:     Location / Tip of Catheter:  Femoral    Laterality:  Right    Needle gauge:  18 G    Placement technique:  Percutaneous    Number of attempts:  2    Successful placement: yes      Transducer: waveform confirmed    Post-procedure details:     Post-procedure:  Biopatch applied    Patient tolerance of procedure:   Tolerated well, no immediate complications

## 2023-06-20 NOTE — RESPIRATORY THERAPY NOTE
RT Ventilator Management Note  Katelynn Villa 67 y.o. female MRN: [id number]  [facility]: [id number]      Daily Screen         6/19/2023 0722 6/20/2023 0724          Patient safety screen outcome[de-identified] Passed Failed (P)       Not Ready for Weaning due to[de-identified] -- Underline problem not resolved (P)       Spont breathing trial % for 30 min: No --                Physical Exam:   Assessment Type: (P) Assess only  General Appearance: (P) Sedated  Respiratory Pattern: (P) Assisted  Chest Assessment: (P) Chest expansion symmetrical  Bilateral Breath Sounds: (P) Clear, Diminished  Cough: Unable to assess  Suction: ET Tube  O2 Device: vent      Resp Comments: (P) Pt scheduled for OR again today. SBT not started at this time.

## 2023-06-20 NOTE — PROGRESS NOTES
Progress Note - General Surgery   Vesta Su 67 y.o. female MRN: [id number]  Unit/Bed#: [facility] 04 Encounter: [id number]    Assessment:  67 y.o. F with multiple prior abdominal surgeries including open arlene-en-y gastric bypass, presents with perforated ileum and necrotic jejunum 2/2 internal hernia, s/p exploratory laparotomy, resection of J-J anastomosis, segmental ileum resection and abthera placement 6/19, followed by second loop laparotomy, YAMIL, SBR + appendectomy, abthera placement 6/19 for rising pressor requirements. Pressor requirements are down-trending ([email or web address] from peak 20, vasopressin 0.04)    Acute blood loss anemia from intraoperative losses and has received a total of 5uPRBCs, 2 FFP this admission (Hgb 11.7 from 12.7 - dilutional)    UO 1 L in last 24 hrs -> 120 cc since midnight for 24 cc per hour over the last 5 hours. Wound vac with 2.4 L thin serosanguinous drainage     Lactic 5.6 from 7.9       Plan:  Return to OR today for laparotomy and consideration to re-establish continuity if pressor requirements are acceptable. Remain intubated  Maintain NPO/NGT  Continue IVF resuscitation/stress dose steroids   Continue to trend end points  Continue IV abx - cefepime/flagyl   Monitor urine output closely   Supportive care per ICU  Recommend initiation of pharmacologic DVT ppx       Subjective/Objective     Subjective: No acute events overnight. Patient is intubated, sedated and resting comfortably in bed. Objective:     Blood pressure (!) 71/59, pulse (!) 130, temperature 98.6 °F (37 °C), resp. rate (!) 25, height 5' 3" (1.6 m), weight (!) 143 kg (315 lb), SpO2 99 %. ,Body mass index is 55.8 kg/m².       Intake/Output Summary (Last 24 hours) at 6/20/2023 0546  Last data filed at 6/20/2023 0400  Gross per 24 hour   Intake 06441.59 ml   Output 3765 ml   Net 6260.59 ml       Invasive Devices     Central Venous Catheter Line  Duration           CVC Central Lines 06/19/23 Triple <1 day Peripheral Intravenous Line  Duration           Peripheral IV 06/18/23 Right Antecubital 1 day    Peripheral IV 06/19/23 Left Antecubital 1 day    Peripheral IV 06/19/23 Right Hand 1 day    Peripheral IV 06/19/23 Left;Proximal;Upper;Ventral (anterior) Arm <1 day          Arterial Line  Duration           Arterial Line 06/19/23 Femoral <1 day          Drain  Duration           Urethral Catheter Latex 14 Fr. 1 day          Airway  Duration           ETT  Hi-Lo; Cuffed;Oral 7 mm 1 day                Physical Exam:   NAD, sedated  Normocephalic, atraumatic  Moist mucous membranes   NGT in place, clamped   ETT in place   Tachycardia   Abd obese, soft, nondistended, abthera in place with thin serosanguinous drainage   Bolanos in place with clear yellow urine   No calf tenderness or peripheral edema  Skin is warm and dry        Lab, Imaging and other studies:  CBC:   Lab Results   Component Value Date    WBC 23.01 (H) 06/19/2023    HGB 11.7 06/19/2023    HCT 36.4 06/19/2023    MCV 84 06/19/2023     06/19/2023    RBC 4.32 06/19/2023    MCH 27.1 06/19/2023    MCHC 32.1 06/19/2023    RDW 16.0 (H) 06/19/2023    MPV 10.4 06/19/2023    NRBC 0 06/19/2023   , CMP:   Lab Results   Component Value Date    SODIUM 142 06/20/2023    K 3.8 06/20/2023     (H) 06/20/2023    CO2 20 (L) 06/20/2023    BUN 33 (H) 06/20/2023    CREATININE 1.36 (H) 06/20/2023    CALCIUM 7.3 (L) 06/20/2023    AST 45 06/19/2023    ALT 36 06/19/2023    ALKPHOS 61 06/19/2023    EGFR 38 06/20/2023   , Coagulation:   Lab Results   Component Value Date    INR 1.10 06/19/2023     VTE Pharmacologic Prophylaxis: Sequential compression device (Venodyne)   VTE Mechanical Prophylaxis: sequential compression device

## 2023-06-20 NOTE — OP NOTE
OPERATIVE REPORT  PATIENT NAME: Roxanne Blake    :  1950  MRN: [id number]  Pt Location: BE OR ROOM 06    SURGERY DATE: 2023    Surgeon(s) and Role:     * Laura Valencia DO - Primary     * Amandeep Quinn DO - Assisting    Preop Diagnosis:  Perforated viscus [R19.8]    Post-Op Diagnosis Codes:     * Perforated viscus [R19.8]    Procedure(s):  LAPAROTOMY. REMOVAL OF WOUND VAC AND PACKING. SMALL BOWEL RESECTION . BOWEL ANASTOMOSIS X 3 . VICRYL MESH BRIDGING - APPLICATION WOUND VAC    Specimen(s):  ID Type Source Tests Collected by Time Destination   1 : portioin of ileum  Tissue Small Bowel, NOS TISSUE EXAM Laura Valencia DO 2023 1322        Estimated Blood Loss:   200 mL    Drains:  Closed/Suction Drain Lateral;Right RUQ Bulb 19 Fr. (Active)   Number of days: 0       Closed/Suction Drain Lateral;Left LUQ 19 Fr. (Active)   Number of days: 0       Urethral Catheter Latex 14 Fr. (Active)   Reasons to continue Urinary Catheter  Accurate I&O assessment in critically ill patients (48 hr. max) 23 0750   Goal for Removal Remove after 48 hrs of I/O monitoring 23 0750   Site Assessment Clean;Skin intact 23 0750   [place] Care Done 23 0900   Collection Container Standard drainage bag 23 0750   Securement Method Securing device (Describe) 23 0750   Output (mL) 60 mL 23 1000   Number of days: 1       Anesthesia Type:   General    Operative Indications:  Perforated viscus [R19.8]    Operative Findings:  Minimal serosanguinous ascites encountered. BP limb staple line intact. J-J anastomosis resection staple line intact. Ileum resection staple line intact. Small portion of distal jejunum/ileum at the edge of prior resection which was questionably viable - this was resected ~ 3 cm. Complications:   None    Procedure and Technique:  The patient was brought to the operating room intubated and identified via wristband.  She was transferred to the operating room table and positioned supine. General anesthesia was induced successfully. The patient's abthera wound vac adhesive dressing was removed which included two blue sponges. The abdomen was prepped and draped in the usual sterile fashion. Pre-operative antibiotics were administered. A time out was performed confirming the patient, procedure, and site. All parties were in agreement. Attention was turned to the abdomen where the octopus dressing was removed. Moderate amount of serosanguinous ascites was encountered upon entry. This was suctioned. The bowel was run from the G-J anastomosis to the Sury limb staple line, common channel staple line and the distal ileum staple line. All staple lines were intact. There was a small area of dusky bowel at the edge of the distal ileum resection staple line which was resected with JERMAINE 75 mm stapler. The mesentery was clamped, cut and ligated with 2-0 silk ties. Attention was then turned to the BP limb which was noted to be distended, but viable and with an intact staple line. We then proceeded with creation of three anastomoses. We started with the Sury limb to the common channel anastomosis. This was created in a side to side functional end to end fashion with a JERMAINE 75 mm stapler x two loads, taking care to not twist the mesentery with the creation. The staple line was imbricated with 3-0 silk suture in a lembert fashion. The mesenteric defect was closed with 2-0 vicryl suture in a running fashion. We then created our BP to Sury limb anastomosis using a JERMAINE 75 mm stapler for the common enterotomy, taking care to orient both limbs in proper positioning for a tension free anastomosis. The common enterotomy was then closed with 2-0 PDS suture in a running fashion with a canal stitch at each corner. This was imbricated with 3-0 silk suture in a lembert fashion. Attention was then turned to the distal jejunum and proximal ileum.  We created our jejunal-ileal anastomosis with a JERMAINE 75 mm stapler x 2 loads. The staple line was imbricated with 3-0 silk suture in a lembert fashion. The mesenteric defect was closed with 2-0 vicryl suture in a running fashion. The abdomen was then irrigated with 3 L warmed normal saline. The abdomen was explored and noted to be hemostatic with the bowel in proper orientation. A 19 Fr LUCY drain was placed in the RUQ and oriented adjacent to the BP to Sury limb anastomosis. An additional 19 Fr LUCY drain was placed in the LUQ adjacent to the common channel anastomosis tracking into the R paracolic gutter adjacent to the jejunal-ileal anastomosis. There was a large fascial defect, measuring 37 cm x 37 cm. The decision was made to place a bridging vicryl mesh, which was fastened to the surrounding fascia circumferentially with 1 PDS suture in a U-stitch fashion. A 10 Fr LUCY drain was then placed above the vicryl mesh at the most inferior portion of the inciion in the subcutaneous tissue. And abdominal Xray was obtained prior to closure to confirm that there were no retained laparotomy pads. The skin was then closed with 2-0 nylon suture in a horizontal mattress fashion. The patient remained intubated postoperatively and transported to the ICU having tolerated the procedure well. All instrument, needle, and sponge counts were correct at the end of the case. Radiofrequency detection was negative.     Dr. Andrew Dove was present for the entire procedure      Patient Disposition:  Critical Care Unit    This procedure was not performed to treat colon cancer through resection      SIGNATURE: Montserrat Carranza DO  DATE: June 20, 2023  TIME: 3:35 PM

## 2023-06-20 NOTE — PLAN OF CARE
Problem: SAFETY,RESTRAINT: NV/NON-SELF DESTRUCTIVE BEHAVIOR  Goal: Remains free of harm/injury (restraint for non violent/non self-detsructive behavior)  Description: INTERVENTIONS:  - Instruct patient/family regarding restraint use   - Assess and monitor physiologic and psychological status   - Provide interventions and comfort measures to meet assessed patient needs   - Identify and implement measures to help patient regain control  - Assess readiness for release of restraint   Outcome: Progressing  Goal: Returns to optimal restraint-free functioning  Description: INTERVENTIONS:  - Assess the patient's behavior and symptoms that indicate continued need for restraint  - Identify and implement measures to help patient regain control  - Assess readiness for release of restraint   Outcome: Progressing     Problem: Prexisting or High Potential for Compromised Skin Integrity  Goal: Skin integrity is maintained or improved  Description: INTERVENTIONS:  - Identify patients at risk for skin breakdown  - Assess and monitor skin integrity  - Assess and monitor nutrition and hydration status  - Monitor labs   - Assess for incontinence   - Turn and reposition patient  - Assist with mobility/ambulation  - Relieve pressure over bony prominences  - Avoid friction and shearing  - Provide appropriate hygiene as needed including keeping skin clean and dry  - Evaluate need for skin moisturizer/barrier cream  - Collaborate with interdisciplinary team   - Patient/family teaching  - Consider wound care consult   Outcome: Progressing

## 2023-06-20 NOTE — ANESTHESIA PREPROCEDURE EVALUATION
Procedure:  LAPAROTOMY EXPLORATORY possible small bowel resection, possible abdominal closure, possible ostomy (Abdomen)    Relevant Problems   CARDIO   (+) Dyspnea on exertion   (+) Hypertension   (+) Permanent atrial fibrillation (HCC)      ENDO   (+) Hyperparathyroidism (HCC)      GI/HEPATIC   (+) Idiopathic acute pancreatitis without infection or necrosis   (+) Reactive hypoglycemia      /RENAL   (+) Malignant tumor of right kidney parenchyma (HCC)   (+) Stage 3 chronic kidney disease, unspecified whether stage 3a or 3b CKD (HCC)      MUSCULOSKELETAL   (+) Cervical spondylosis without myelopathy   (+) Myofascial pain syndrome   (+) Primary osteoarthritis of both knees   (+) Primary osteoarthritis of left knee   (+) Primary osteoarthritis of right knee      NEURO/PSYCH   (+) Myofascial pain syndrome      PULMONARY   (+) Dyspnea on exertion   (+) GILDARDO (obstructive sleep apnea)        Physical Exam    Airway       Dental       Cardiovascular  Cardiovascular exam normal    Pulmonary  Pulmonary exam normal     Other Findings        Anesthesia Plan  ASA Score- 4     Anesthesia Type- general with ASA Monitors  Additional Monitors: arterial line  Airway Plan: ETT  Comment: Last exploratory lap 6/19; now for another exploration with possible closure  Still intubated  Lower pressor requirement (on Levo/Vaso)  Phone consent from daughter in chart  Plan Factors-    Chart reviewed  EKG reviewed  Imaging results reviewed  Existing labs reviewed  Patient summary reviewed  Induction- intravenous  Postoperative Plan-     Informed Consent- Anesthetic plan and risks discussed with daughter and healthcare power of   I personally reviewed this patient with the CRNA  Discussed and agreed on the Anesthesia Plan with the CRNA  Elie Rajput

## 2023-06-20 NOTE — ANESTHESIA POSTPROCEDURE EVALUATION
Post-Op Assessment Note    CV Status:  Stable  Pain Score: 0    Pain management: adequate     Mental Status:  Unresponsive   Hydration Status:  Stable   PONV Controlled:  None  Airway: intubated      Post Op Vitals Reviewed: Yes      Staff: Anesthesiologist, CRNA         No notable events documented      BP   110/59   Temp   96 1   Pulse 95 afib   Resp   vent settings per MICU   SpO2   99% - (60% FIO2)

## 2023-06-20 NOTE — PROGRESS NOTES
[address] [facility], [place]  Progress Note: Critical Care  Name: Cristela Carmichael 67 y.o. female I MRN: [id number]  Unit/Bed#: MICU 04 I Date of Admission: 6/18/2023   Date of Service: 6/20/2023 I Hospital Day: 2    Assessment/Plan   Neuro:   · Pain Edgar Meyers  · fent gtt, prn fentanyl  · Propofol gtt  · Anxiety/Depression  · Holding home prozac, ativan     CV:   · Septic Shock  · Vasopressor support  · Levo, vaso, stress dose steroids  · Trend endpoints  · Afib on eliquis  · Holding AC  · Holding home cardizem,metoprolol  · HTN  · Holding HCTZ, metoprolol cardizem    Pulm:  · Post Op vent dependence  · Vent AC// 22/60%/6  · Follow up ABGs    GI:   · Internal Hernia with Closed Loop Obstruction c/b J-J anastomosis site necrosis, hemoperitoneum and portal venous gas  · S/p ex lap w/ SBR [place] Traore Kaycee Bring ileum resection) abthera 6/19   · S/p ex lap w/ SBR (distal jejunem/ileum resection), appendectomy abthera 6/19  · Monitor Abthera output  · Return to OR if worsening hemodynamics  · GERD  · PPI    :   · Trend I/Os  · Monitor renal function    F/E/N:   · Isolyte 125  · Replete PRN K>4.0, Phos>3.0, Mg>2.0  · NPO    Heme/Onc:   · ABLA  · S/p 5 u prbc 3 FFP on 6/19  · Monitor hgb    Endo:   · On florinef and methylprednisone  · Hyperglycemia- SSIP algorithm 5    ID:   · Septic shock  · On cefepime/flagyl  · Follow up cultures-pending    MSK/Skin:   · Frequent turning , repositioning    Disposition: Critical care    ICU Core Measures     Vented Patient  VAP Bundle  VAP bundle ordered     A: Assess, Prevent, and Manage Pain · Has pain been assessed? Yes  · Need for changes to pain regimen? No   B: Both Spontaneous Awakening Trials (SATs) and Spontaneous Breathing Trials (SBTs) · Plan to perform spontaneous awakening trial today? Yes   · Plan to perform spontaneous breathing trial today? Yes   · Obvious barriers to extubation?  Yes   C: Choice of Sedation · RASS Goal: -2 Light Sedation or 0 Alert and Calm  · Need for changes to sedation or analgesia regimen? No   D: Delirium · CAM-ICU: Negative   E: Early Mobility  · Plan for early mobility? Yes   F: Family Engagement · Plan for family engagement today? Yes       Antibiotic Review: Awaiting culture results. Review of Invasive Devices: [place] Plan: Continue for accurate I/O monitoring for 48 hours  Central access plan: Medications requiring central line  [place] Plan: Keep arterial line for hemodynamic monitoring, frequent ABGs and frequent labs    Prophylaxis:  VTE    Stress Ulcer  covered bypantoprazole (PROTONIX) injection 40 mg [[id number]]          Subjective   HPI/24hr events: OR x2 yesteday. Janette placed. Hemodynamics improved. Pressors decreased. ROS: intubated     Objective                            Vitals I/O      Most Recent Min/Max in 24hrs   Temp 98.6 °F (37 °C) Temp  Min: 81.3 °F (27.4 °C)  Max: 99.3 °F (37.4 °C)   Pulse (!) 122 Pulse  Min: 104  Max: 130   Resp (!) 25 Resp  Min: 15  Max: 26   BP 94/53 BP  Min: 93/65  Max: 138/70   O2 Sat 98 % SpO2  Min: 79 %  Max: 100 %      Intake/Output Summary (Last 24 hours) at 6/20/2023 0205  Last data filed at 6/20/2023 0000  Gross per 24 hour   Intake 26099.33 ml   Output 5845 ml   Net 6827.33 ml         Diet NPO     Invasive Monitoring Physical exam   Arterial Line  [place] /56  Arterial Line BP  Min: 74/66  Max: 154/62   MAP 72 mmHg  Arterial Line MAP (mmHg)  Min: 58 mmHg  Max: 108 mmHg         Diagnostic Studies      EKG: reviewed  Imaging:  I have personally reviewed pertinent reports.        Medications:  Scheduled PRN   cefepime, 2,000 mg, Q12H  chlorhexidine, 15 mL, Q12H MONCHO  fludrocortisone, 0.05 mg, Daily  insulin lispro, 4-20 Units, Q6H MONCHO  methylPREDNISolone sodium succinate, 20 mg, Q12H MONCHO  metroNIDAZOLE, 500 mg, Q8H  pantoprazole, 40 mg, Q12H MONCHO      fentanyl citrate (PF), 50 mcg, Q1H PRN  ondansetron, 4 mg, Q6H PRN       Continuous    fentaNYL, 100 mcg/hr, Last Rate: 100 mcg/hr (06/19/23 2350)  multi-electrolyte, 125 mL/hr, Last Rate: 125 mL/hr (06/19/23 2359)  norepinephrine, 1-30 mcg/min, Last Rate: 4 mcg/min (06/19/23 2333)  propofol, 5-50 mcg/kg/min, Last Rate: 10 mcg/kg/min (06/19/23 2318)  vasopressin (PITRESSIN) 20 Units in sodium chloride 0.9 % 100 mL infusion, 0.04 Units/min, Last Rate: 0.04 Units/min (06/19/23 2339)         Labs:    CBC    Recent Labs     06/19/23  0403 06/19/23  0710 06/19/23  1403 06/19/23  1808   WBC 16.80*   < > 21.04* 23.01*   HGB 12.7   < > 12.7 11.7   HCT 40.6   < > 38.7 36.4      < > 251 249   BANDSPCT 10*  --   --   --     < > = values in this interval not displayed. BMP    Recent Labs     06/19/23  1808 06/19/23  2152   SODIUM 142 142   K 3.9 3.8   * 112*   CO2 16* 17*   AGAP 13 13   BUN 30* 29*   CREATININE 1.28 1.37*   CALCIUM 7.9* 7.9*       Coags    Recent Labs     06/19/23  0239 06/19/23  0405 06/19/23  0807   INR 1.02 1.16 1.10   PTT 24  --  25        Additional Electrolytes  Recent Labs     06/19/23  0405 06/19/23  1403   MG 1.6 1.8   PHOS 4.9* 5.3*   CAIONIZED 1.14 0.99*          Blood Gas    Recent Labs     06/19/23  2100   PHART 7.419   CEM7FLG 27.5*   PO2ART 112.8   PVM8KSH 17.4*   BEART -5.8   SOURCE Femoral, Right     Recent Labs     06/19/23  1407 06/19/23  1641 06/19/23  2100   PHVEN 7.152*  --   --    [id number] 50.2*  --   --    PO2VEN 39.2  --   --    DIT3FUL 17.2*  --   --    BEVEN -11.6  --   --    SOURCE  --    < > Femoral, Right    < > = values in this interval not displayed.     LFTs  Recent Labs     06/19/23  0405 06/19/23  1916   ALT 58 36   * 45   ALKPHOS 95 61   ALB 2.5* 2.3*   TBILI 0.67 0.88       Infectious  No recent results  Glucose  Recent Labs     06/19/23  0405 06/19/23  1403 06/19/23  1808 06/19/23  2152   GLUC 163* 217* 246* 233*               Critical Care Time Delivered: Upon my evaluation, this patient had a high probability of imminent or life-threatening deterioration due to necrotic bowel multiple OR trips, which required my direct attention, intervention, and personal management. I have personally provided 30 minutes of critical care time, exclusive of procedures, teaching, family meetings, and any prior time recorded by providers other than myself.      Sohail Ham MD

## 2023-06-21 LAB
ABO GROUP BLD BPU: NORMAL
ANION GAP SERPL CALCULATED.3IONS-SCNC: 3 MMOL/L
ATRIAL RATE: 130 BPM
BASE EXCESS BLDA CALC-SCNC: -1 MMOL/L
BPU ID: NORMAL
BUN SERPL-MCNC: 34 MG/DL (ref 5–25)
CALCIUM SERPL-MCNC: 7.2 MG/DL (ref 8.3–10.1)
CHLORIDE SERPL-SCNC: 117 MMOL/L (ref 96–108)
CO2 SERPL-SCNC: 24 MMOL/L (ref 21–32)
CREAT SERPL-MCNC: 1.04 MG/DL (ref 0.6–1.3)
CROSSMATCH: NORMAL
ERYTHROCYTE [DISTWIDTH] IN BLOOD BY AUTOMATED COUNT: 16.9 % (ref 11.6–15.1)
GFR SERPL CREATININE-BSD FRML MDRD: 53 ML/MIN/1.73SQ M
GLUCOSE SERPL-MCNC: 132 MG/DL (ref 65–140)
GLUCOSE SERPL-MCNC: 134 MG/DL (ref 65–140)
GLUCOSE SERPL-MCNC: 140 MG/DL (ref 65–140)
GLUCOSE SERPL-MCNC: 146 MG/DL (ref 65–140)
GLUCOSE SERPL-MCNC: 150 MG/DL (ref 65–140)
GLUCOSE SERPL-MCNC: 153 MG/DL (ref 65–140)
GLUCOSE SERPL-MCNC: 154 MG/DL (ref 65–140)
GLUCOSE SERPL-MCNC: 156 MG/DL (ref 65–140)
GLUCOSE SERPL-MCNC: 158 MG/DL (ref 65–140)
GLUCOSE SERPL-MCNC: 162 MG/DL (ref 65–140)
GLUCOSE SERPL-MCNC: 164 MG/DL (ref 65–140)
GLUCOSE SERPL-MCNC: 166 MG/DL (ref 65–140)
GLUCOSE SERPL-MCNC: 167 MG/DL (ref 65–140)
HCO3 BLDA-SCNC: 23.6 MMOL/L (ref 22–28)
HCT VFR BLD AUTO: 26.1 % (ref 34.8–46.1)
HGB BLD-MCNC: 8.6 G/DL (ref 11.5–15.4)
HOROWITZ INDEX BLDA+IHG-RTO: 40 MM[HG]
MAGNESIUM SERPL-MCNC: 2.6 MG/DL (ref 1.6–2.6)
MCH RBC QN AUTO: 27.6 PG (ref 26.8–34.3)
MCHC RBC AUTO-ENTMCNC: 33 G/DL (ref 31.4–37.4)
MCV RBC AUTO: 84 FL (ref 82–98)
O2 CT BLDA-SCNC: 12.6 ML/DL (ref 16–23)
OXYHGB MFR BLDA: 95.9 % (ref 94–97)
PCO2 BLDA: 38.9 MM HG (ref 36–44)
PEEP RESPIRATORY: 6 CM[H2O]
PH BLDA: 7.4 [PH] (ref 7.35–7.45)
PHOSPHATE SERPL-MCNC: 2.6 MG/DL (ref 2.3–4.1)
PLATELET # BLD AUTO: 133 THOUSANDS/UL (ref 149–390)
PMV BLD AUTO: 11 FL (ref 8.9–12.7)
PO2 BLDA: 91.7 MM HG (ref 75–129)
POTASSIUM SERPL-SCNC: 3.9 MMOL/L (ref 3.5–5.3)
PR INTERVAL: 495 MS
QRS AXIS: 4 DEGREES
QRSD INTERVAL: 75 MS
QT INTERVAL: 283 MS
QTC INTERVAL: 417 MS
RBC # BLD AUTO: 3.12 MILLION/UL (ref 3.81–5.12)
SODIUM SERPL-SCNC: 144 MMOL/L (ref 135–147)
SPECIMEN SOURCE: ABNORMAL
T WAVE AXIS: 45 DEGREES
UNIT DISPENSE STATUS: NORMAL
UNIT PRODUCT CODE: NORMAL
UNIT PRODUCT VOLUME: 350 ML
UNIT RH: NORMAL
VENT AC: 14
VENT- AC: AC
VENTRICULAR RATE: 130 BPM
VT SETTING VENT: 420 ML
WBC # BLD AUTO: 8.99 THOUSAND/UL (ref 4.31–10.16)

## 2023-06-21 PROCEDURE — 83735 ASSAY OF MAGNESIUM: CPT | Performed by: NURSE PRACTITIONER

## 2023-06-21 PROCEDURE — 93010 ELECTROCARDIOGRAM REPORT: CPT | Performed by: INTERNAL MEDICINE

## 2023-06-21 PROCEDURE — 82948 REAGENT STRIP/BLOOD GLUCOSE: CPT

## 2023-06-21 PROCEDURE — 80048 BASIC METABOLIC PNL TOTAL CA: CPT | Performed by: NURSE PRACTITIONER

## 2023-06-21 PROCEDURE — 93005 ELECTROCARDIOGRAM TRACING: CPT

## 2023-06-21 PROCEDURE — 94003 VENT MGMT INPAT SUBQ DAY: CPT

## 2023-06-21 PROCEDURE — 99024 POSTOP FOLLOW-UP VISIT: CPT | Performed by: SURGERY

## 2023-06-21 PROCEDURE — C9113 INJ PANTOPRAZOLE SODIUM, VIA: HCPCS | Performed by: STUDENT IN AN ORGANIZED HEALTH CARE EDUCATION/TRAINING PROGRAM

## 2023-06-21 PROCEDURE — 94760 N-INVAS EAR/PLS OXIMETRY 1: CPT

## 2023-06-21 PROCEDURE — 85027 COMPLETE CBC AUTOMATED: CPT | Performed by: NURSE PRACTITIONER

## 2023-06-21 PROCEDURE — 82805 BLOOD GASES W/O2 SATURATION: CPT | Performed by: NURSE PRACTITIONER

## 2023-06-21 PROCEDURE — 84100 ASSAY OF PHOSPHORUS: CPT | Performed by: NURSE PRACTITIONER

## 2023-06-21 RX ORDER — SODIUM CHLORIDE, SODIUM GLUCONATE, SODIUM ACETATE, POTASSIUM CHLORIDE, MAGNESIUM CHLORIDE, SODIUM PHOSPHATE, DIBASIC, AND POTASSIUM PHOSPHATE .53; .5; .37; .037; .03; .012; .00082 G/100ML; G/100ML; G/100ML; G/100ML; G/100ML; G/100ML; G/100ML
125 INJECTION, SOLUTION INTRAVENOUS CONTINUOUS
Status: DISCONTINUED | OUTPATIENT
Start: 2023-06-21 | End: 2023-06-22

## 2023-06-21 RX ORDER — ALBUMIN, HUMAN INJ 5% 5 %
25 SOLUTION INTRAVENOUS ONCE
Status: COMPLETED | OUTPATIENT
Start: 2023-06-21 | End: 2023-06-21

## 2023-06-21 RX ORDER — LORAZEPAM 2 MG/ML
0.5 INJECTION INTRAMUSCULAR EVERY 4 HOURS PRN
Status: DISCONTINUED | OUTPATIENT
Start: 2023-06-21 | End: 2023-06-26

## 2023-06-21 RX ORDER — LORAZEPAM 2 MG/ML
1 INJECTION INTRAMUSCULAR EVERY 4 HOURS PRN
Status: DISCONTINUED | OUTPATIENT
Start: 2023-06-21 | End: 2023-06-21

## 2023-06-21 RX ORDER — MAGNESIUM SULFATE HEPTAHYDRATE 40 MG/ML
2 INJECTION, SOLUTION INTRAVENOUS ONCE
Status: COMPLETED | OUTPATIENT
Start: 2023-06-21 | End: 2023-06-21

## 2023-06-21 RX ORDER — AMIODARONE HYDROCHLORIDE 50 MG/ML
INJECTION, SOLUTION INTRAVENOUS
Status: COMPLETED
Start: 2023-06-21 | End: 2023-06-21

## 2023-06-21 RX ORDER — ALBUMIN, HUMAN INJ 5% 5 %
SOLUTION INTRAVENOUS
Status: DISPENSED
Start: 2023-06-21 | End: 2023-06-22

## 2023-06-21 RX ORDER — FENTANYL CITRATE 50 UG/ML
100 INJECTION, SOLUTION INTRAMUSCULAR; INTRAVENOUS ONCE
Status: COMPLETED | OUTPATIENT
Start: 2023-06-21 | End: 2023-06-21

## 2023-06-21 RX ORDER — HYDROMORPHONE HCL IN WATER/PF 6 MG/30 ML
0.2 PATIENT CONTROLLED ANALGESIA SYRINGE INTRAVENOUS EVERY 4 HOURS PRN
Status: DISCONTINUED | OUTPATIENT
Start: 2023-06-21 | End: 2023-06-22

## 2023-06-21 RX ORDER — POTASSIUM CHLORIDE 750 MG/1
10 TABLET, EXTENDED RELEASE ORAL ONCE
Status: DISCONTINUED | OUTPATIENT
Start: 2023-06-21 | End: 2023-06-21

## 2023-06-21 RX ORDER — ENOXAPARIN SODIUM 100 MG/ML
60 INJECTION SUBCUTANEOUS EVERY 12 HOURS SCHEDULED
Status: DISCONTINUED | OUTPATIENT
Start: 2023-06-21 | End: 2023-06-24 | Stop reason: SDUPTHER

## 2023-06-21 RX ORDER — POTASSIUM CHLORIDE 20MEQ/15ML
10 LIQUID (ML) ORAL ONCE
Status: COMPLETED | OUTPATIENT
Start: 2023-06-21 | End: 2023-06-21

## 2023-06-21 RX ORDER — ENOXAPARIN SODIUM 100 MG/ML
60 INJECTION SUBCUTANEOUS EVERY 12 HOURS SCHEDULED
Status: DISCONTINUED | OUTPATIENT
Start: 2023-06-21 | End: 2023-06-21

## 2023-06-21 RX ORDER — PHENYLEPHRINE HYDROCHLORIDE 10 MG/ML
INJECTION INTRAVENOUS
Status: COMPLETED
Start: 2023-06-21 | End: 2023-06-21

## 2023-06-21 RX ADMIN — PANTOPRAZOLE SODIUM 40 MG: 40 INJECTION, POWDER, FOR SOLUTION INTRAVENOUS at 20:04

## 2023-06-21 RX ADMIN — ALBUMIN (HUMAN) 25 G: 12.5 INJECTION, SOLUTION INTRAVENOUS at 12:59

## 2023-06-21 RX ADMIN — METRONIDAZOLE 500 MG: 500 INJECTION, SOLUTION INTRAVENOUS at 17:30

## 2023-06-21 RX ADMIN — METHYLPREDNISOLONE SODIUM SUCCINATE 20 MG: 40 INJECTION, POWDER, FOR SOLUTION INTRAMUSCULAR; INTRAVENOUS at 08:31

## 2023-06-21 RX ADMIN — PHENYLEPHRINE HYDROCHLORIDE 10 MG: 10 INJECTION INTRAVENOUS at 09:43

## 2023-06-21 RX ADMIN — VASOPRESSIN 0.04 UNITS/MIN: 20 INJECTION, SOLUTION INTRAMUSCULAR; SUBCUTANEOUS at 00:59

## 2023-06-21 RX ADMIN — LORAZEPAM 0.5 MG: 2 INJECTION INTRAMUSCULAR; INTRAVENOUS at 12:56

## 2023-06-21 RX ADMIN — LORAZEPAM 0.5 MG: 2 INJECTION INTRAMUSCULAR; INTRAVENOUS at 23:01

## 2023-06-21 RX ADMIN — Medication 1 MCG/KG/HR: at 09:56

## 2023-06-21 RX ADMIN — VASOPRESSIN 0.04 UNITS/MIN: 20 INJECTION, SOLUTION INTRAMUSCULAR; SUBCUTANEOUS at 09:56

## 2023-06-21 RX ADMIN — CEFEPIME 2000 MG: 2 INJECTION, POWDER, FOR SOLUTION INTRAVENOUS at 16:29

## 2023-06-21 RX ADMIN — FENTANYL CITRATE 50 MCG: 50 INJECTION INTRAMUSCULAR; INTRAVENOUS at 04:42

## 2023-06-21 RX ADMIN — Medication 0.7 MCG/KG/HR: at 20:58

## 2023-06-21 RX ADMIN — Medication 0.7 MCG/KG/HR: at 03:51

## 2023-06-21 RX ADMIN — CEFEPIME 2000 MG: 2 INJECTION, POWDER, FOR SOLUTION INTRAVENOUS at 04:04

## 2023-06-21 RX ADMIN — SODIUM CHLORIDE 1 UNITS/HR: 9 INJECTION, SOLUTION INTRAVENOUS at 10:01

## 2023-06-21 RX ADMIN — CHLORHEXIDINE GLUCONATE 15 ML: 1.2 SOLUTION ORAL at 20:04

## 2023-06-21 RX ADMIN — Medication 0.7 MCG/KG/HR: at 17:03

## 2023-06-21 RX ADMIN — Medication 0.7 MCG/KG/HR: at 13:02

## 2023-06-21 RX ADMIN — PANTOPRAZOLE SODIUM 40 MG: 40 INJECTION, POWDER, FOR SOLUTION INTRAVENOUS at 08:31

## 2023-06-21 RX ADMIN — PHENYLEPHRINE HYDROCHLORIDE 25 MCG/MIN: 10 INJECTION INTRAVENOUS at 11:28

## 2023-06-21 RX ADMIN — Medication 75 MCG/HR: at 12:22

## 2023-06-21 RX ADMIN — FENTANYL CITRATE 100 MCG: 50 INJECTION, SOLUTION INTRAMUSCULAR; INTRAVENOUS at 09:38

## 2023-06-21 RX ADMIN — FENTANYL CITRATE 100 MCG: 50 INJECTION INTRAMUSCULAR; INTRAVENOUS at 09:45

## 2023-06-21 RX ADMIN — FENTANYL CITRATE 50 MCG: 50 INJECTION INTRAMUSCULAR; INTRAVENOUS at 00:19

## 2023-06-21 RX ADMIN — SODIUM CHLORIDE, SODIUM GLUCONATE, SODIUM ACETATE, POTASSIUM CHLORIDE, MAGNESIUM CHLORIDE, SODIUM PHOSPHATE, DIBASIC, AND POTASSIUM PHOSPHATE 125 ML/HR: .53; .5; .37; .037; .03; .012; .00082 INJECTION, SOLUTION INTRAVENOUS at 18:44

## 2023-06-21 RX ADMIN — METRONIDAZOLE 500 MG: 500 INJECTION, SOLUTION INTRAVENOUS at 08:32

## 2023-06-21 RX ADMIN — METRONIDAZOLE 500 MG: 500 INJECTION, SOLUTION INTRAVENOUS at 00:16

## 2023-06-21 RX ADMIN — MAGNESIUM SULFATE HEPTAHYDRATE 2 G: 40 INJECTION, SOLUTION INTRAVENOUS at 09:52

## 2023-06-21 RX ADMIN — FENTANYL CITRATE 50 MCG: 50 INJECTION INTRAMUSCULAR; INTRAVENOUS at 00:15

## 2023-06-21 RX ADMIN — POTASSIUM CHLORIDE 10 MEQ: 1.5 SOLUTION ORAL at 11:23

## 2023-06-21 RX ADMIN — HEPARIN SODIUM 7500 UNITS: 5000 INJECTION INTRAVENOUS; SUBCUTANEOUS at 05:07

## 2023-06-21 RX ADMIN — AMIODARONE HYDROCHLORIDE: 50 INJECTION, SOLUTION INTRAVENOUS at 09:44

## 2023-06-21 RX ADMIN — DEXTROSE MONOHYDRATE 150 MG: 5 INJECTION INTRAVENOUS at 09:45

## 2023-06-21 RX ADMIN — ENOXAPARIN SODIUM 60 MG: 60 INJECTION SUBCUTANEOUS at 20:04

## 2023-06-21 RX ADMIN — SODIUM CHLORIDE, SODIUM GLUCONATE, SODIUM ACETATE, POTASSIUM CHLORIDE, MAGNESIUM CHLORIDE, SODIUM PHOSPHATE, DIBASIC, AND POTASSIUM PHOSPHATE 125 ML/HR: .53; .5; .37; .037; .03; .012; .00082 INJECTION, SOLUTION INTRAVENOUS at 10:54

## 2023-06-21 RX ADMIN — ENOXAPARIN SODIUM 60 MG: 60 INJECTION SUBCUTANEOUS at 14:48

## 2023-06-21 RX ADMIN — CHLORHEXIDINE GLUCONATE 15 ML: 1.2 SOLUTION ORAL at 08:32

## 2023-06-21 NOTE — RESPIRATORY THERAPY NOTE
RT Ventilator Management Note  Metta Slight 67 y.o. female MRN: [id number]  Unit/Bed#: [facility] 14 Encounter: [id number]      Daily Screen         6/19/2023 0722 6/20/2023  0724          Patient safety screen outcome[de-identified] Passed Failed      Not Ready for Weaning due to[de-identified] -- Underline problem not resolved      Spont breathing trial % for 30 min: No --                Physical Exam:   Assessment Type: (P) Assess only  General Appearance: (P) Sedated  Respiratory Pattern: (P) Assisted  Chest Assessment: (P) Chest expansion symmetrical  Bilateral Breath Sounds: (P) Clear, Diminished  Suction: (P) ET Tube  O2 Device: (P) vent      Resp Comments: (P) pt remained on s-cmv settings through the night with minimal changes per order, pt had no distress and maintained sat above 92. Will continue to monitor. 06/21/23 0316   Respiratory Assessment   Assessment Type Assess only   General Appearance Sedated   Respiratory Pattern Assisted   Chest Assessment Chest expansion symmetrical   Bilateral Breath Sounds Clear;Diminished   Suction ET Tube   Resp Comments pt remained on s-cmv settings through the night with minimal changes per order, pt had no distress and maintained sat above 92. Will continue to monitor.    O2 Device vent   Vent Information   Vent ID [id number]   Vent type [place] C3   [place] C3/G5 Vent Mode (S)CMV   $ Pulse Oximetry Spot Check Charge Completed   SpO2 96 %   (S)CMV Settings   Resp Rate (BPM) 14 BPM   VT (mL) 420 mL   FIO2 (%) 40 %   PEEP (cmH2O) 6 cmH2O   I:E Ratio 1/4.4   Insp Time (%) 0.8 %   Flow Trigger (LPM) 5   Humidification Heater   Heater Temperature (Set) 95 °F (35 °C)   (S)CMV Actuals   Resp Rate (BPM) 14 BPM   VT (mL) 438   MV 6   MAP (cmH2O) 9.2 cmH2O   Peak Pressure (cmH2O) 28 cmH2O   I:E Ratio (Obs) 1/4.4   Insp Resistance 24   Heater Temperature (Obs) 95 °F (35 °C)   Static Compliance (mL/cmH20) 47 mL/cmH2O   (S)CMV Alarms   High Peak Pressure (cmH2O) 45   Low Pressure (cmH2O) 5 cm H2O High Resp Rate (BPM) 35 BPM   Low Resp Rate (BPM) 8 BPM   High MV (L/min) 20 L/min   Low MV (L/min) 4 L/min   High VT (mL) 1000 mL   Low VT (mL) 400 mL   Maintenance   Alarm (pink) cable attached No   Resuscitation bag with peep valve at bedside Yes   Water bag changed No   Circuit changed No   IHI Ventilator Associated Pneumonia Bundle   Head of Bed Elevated HOB 30   ETT  Hi-Lo; Cuffed;Oral 7 mm   Placement Date/Time: 06/19/23 0035   Mask Ventilation: Mask ventilation not attempted (0)  Preoxygenated: Yes  Technique: Direct laryngoscopy;Rapid sequence  Type: Hi-Lo; Cuffed;Oral  Tube Size: 7 mm  Laryngoscope: Mac  Blade Size: 3  Location: Oral  G. ..    Secured at (cm) 25   Measured from Lips   Secured by Commercial tube allen   Site Condition Dry   HI-LO Suction  Intermittent suction   HI-LO Secretions Scant   HI-LO Intervention Patent

## 2023-06-21 NOTE — PROGRESS NOTES
Daily Progress Note - Critical Care   Eleuterio Lucas 67 y.o. female MRN: [id number]  Unit/Bed#: [facility] 14 Encounter: [id number]        ----------------------------------------------------------------------------------------  HPI/24hr events: Pt is a 66 yo female with relevant PMH s/p multiple abdominal surgeries with [place] en [place] anatomy who presented with an internal small bowel hernia. Pt taken for ex-lap with small bowel resection, blood loss requiring transfusion. S/p 2 second second-look surgeries with small SBRs and closure.    ---------------------------------------------------------------------------------------  SUBJECTIVE  Pt sedated. Resting comfortably. ---------------------------------------------------------------------------------------  Assessment and Plan:    Neuro:   • Pain /Sedation  ? Fentanyl drip  ? Precedex drip  • Anxiety/Depression  ? Holding home prozac, ativan      CV:   • Septic Shock  ? Vasopressor support  - Vaso, stress dose steroids  - Trend endpoints   • Afib on eliquis  ? Heparin  ? Holding home cardizem,metoprolol, eliquis  • HTN  ? Holding HCTZ, metoprolol cardizem     Pulm:  • Post Op vent dependence  ? Vent VC/RR 14//40%/PEEP 6     GI:   • Internal Hernia with Closed Loop Obstruction c/b J-J anastomosis site necrosis, hemoperitoneum and portal venous gas  ? S/p ex lap w/ SBR Jimenez Valentine Pepper ileum resection) abthera 6/19   ? S/p ex lap w/ SBR (distal jejunem/ileum resection), appendectomy abthera 6/19  ? S/p ex lap w/ SBR 6/20, closure  • GERD   ? PPI     :   • Trend I/Os  • Renal function improving per labs  • Low urine output, .3 cc/kg/hrs     F/E/N:   • Off IVF  • Replete electrolytes as needed  • NPO     Heme/Onc:   • ABLA  ? S/p 5 u prbc 3 FFP on 6/19  ? Monitor hgb     Endo:   • On methylprednisone  • Hyperglycemia- continuous insulin drip, 1 unit/hr currently     ID:   • Septic shock  ? On cefepime/flagyl  ? BC x2 neg at 24 hours  ?  Urine culture negative     MSK/Skin: • Frequent turning , repositioning    Patient appropriate for transfer out of the ICU today?: No  Disposition: Continue Critical Care   Code Status: Level 1 - Full Code  ---------------------------------------------------------------------------------------  ICU CORE MEASURES    Prophylaxis   VTE Pharmacologic Prophylaxis: Heparin  VTE Mechanical Prophylaxis: sequential compression device  Stress Ulcer Prophylaxis: Pantoprazole IV     Invasive Devices Review  Invasive Devices     Central Venous Catheter Line  Duration           CVC Central Lines 23 Triple 1 day          Peripheral Intravenous Line  Duration           Peripheral IV 23 Right Hand 2 days    Peripheral IV 23 Right;Ventral (anterior) Forearm <1 day          Arterial Line  Duration           Arterial Line 23 Femoral 1 day          Drain  Duration           Urethral Catheter Latex 14 Fr. 2 days    Closed/Suction Drain Lateral;Left LUQ 19 Fr. <1 day    Closed/Suction Drain Lateral;Right RUQ Bulb 19 Fr. <1 day    Closed/Suction Drain Left LLQ Bulb 10 Fr. <1 day          Airway  Duration           ETT  Hi-Lo; Cuffed;Oral 7 mm 2 days              ---------------------------------------------------------------------------------------  OBJECTIVE    Vitals   Vitals:    23 0316 23 0400 23 0500 23 0600   BP:       BP Location:       Pulse:  72 76 74   Resp:   13   Temp:  99 °F (37.2 °C) 99 °F (37.2 °C) 99.3 °F (37.4 °C)   TempSrc:       SpO2: 96% 95% 95% 96%   Weight:       Height:         Temp (24hrs), Av.2 °F (36.8 °C), Min:96.1 °F (35.6 °C), Max:99.3 °F (37.4 °C)  Current: Temperature: 99.3 °F (37.4 °C)  HR: 74  BP: 132/54  RR: 13  SpO2: 96%    Respiratory:  SpO2: SpO2: 96 %       Invasive/non-invasive ventilation settings   Respiratory    Lab Data (Last 4 hours)       0420            pH, Arterial       7.401             pCO2, Arterial       38.9             pO2, Arterial       91.7             HCO3, Arterial       23.6             Base Excess, Arterial       -1.0                  O2/Vent Data     None                Physical Exam  Constitutional:       Appearance: She is obese. Comments: Sedated   HENT:      Head: Normocephalic and atraumatic. Right Ear: External ear normal.      Left Ear: External ear normal.   Eyes:      General: No scleral icterus. Right eye: No discharge. Left eye: No discharge. Cardiovascular:      Rate and Rhythm: Normal rate. Heart sounds: Normal heart sounds. Pulmonary:      Breath sounds: Normal breath sounds. Comments: ET tube in place, mechanically ventilated  Abdominal:      General: Abdomen is flat. There is no distension. Palpations: Abdomen is soft. Tenderness: There is no abdominal tenderness. There is no guarding or rebound. Comments: Incision covered under clean dressing without strikethrough. Drain x3 producing serosanguinous output. Skin:     General: Skin is warm and dry. Coloration: Skin is not jaundiced.              Laboratory and Diagnostics:  Results from last 7 days   Lab Units 06/21/23  0426 06/20/23  1641 06/20/23  1531 06/20/23  1345 06/20/23  1329 06/20/23  1228 06/20/23  0609 06/19/23  1808 06/19/23  1403 06/19/23  0807 06/19/23  0710 06/19/23  0403 06/19/23  0111 06/18/23  2031   WBC Thousand/uL 8.99 11.25*  --   --   --   --  18.13* 23.01* 21.04* 15.30*  --  16.80*  --  18.09*   HEMOGLOBIN g/dL 8.6* 9.3*  --  8.5*  --   --  10.7* 11.7 12.7 13.6   < > 12.7  --  12.3   I STAT HEMOGLOBIN g/dl  --   --  8.5*  --  7.8* 8.5*  --   --   --   --   --   --    < >  --    HEMATOCRIT % 26.1* 28.4*  --  26.1*  --   --  31.8* 36.4 38.7 43.7  --  40.6  --  39.8   HEMATOCRIT, ISTAT %  --   --  25*  --  23* 25*  --   --   --   --   --   --    < >  --    PLATELETS Thousands/uL 133* 158  --   --   --   --  223 249 251 279  --  272  --  360   NEUTROS PCT %  --   --   --   --   --   --  89*  --  90*  --   --   --   --  88* BANDS PCT %  --   --   --   --   --   --   --   --   --   --   --  10*  --   --    MONOS PCT %  --   --   --   --   --   --  5  --  5  --   --   --   --  4   MONO PCT %  --   --   --   --   --   --   --   --   --   --   --  3*  --   --    EOS PCT %  --   --   --   --   --   --  1  --  2  --   --  0  --  1    < > = values in this interval not displayed.      Results from last 7 days   Lab Units 06/21/23  0430 06/20/23  2305 06/20/23  1641 06/20/23  1531 06/20/23  1329 06/20/23  1228 06/20/23  1018 06/20/23  0402 06/19/23  2152 06/19/23  1916 06/19/23  1808 06/19/23  1403 06/19/23  0405 06/19/23  0111 06/18/23  2031   SODIUM mmol/L 144 144 143  --   --   --  142 142 142  --  142   < > 140  --  138   POTASSIUM mmol/L 3.9 4.0 3.5  --   --   --  3.7 3.8 3.8  --  3.9   < > 4.4  --  3.8   CHLORIDE mmol/L 117* 116* 114*  --   --   --  112* 113* 112*  --  113*   < > 113*  --  105   CO2 mmol/L 24 24 22  --   --   --  20* 20* 17*  --  16*   < > 19*  --  28   CO2, I-STAT mmol/L  --   --   --  21 21 20*  --   --   --   --   --   --   --    < >  --    ANION GAP mmol/L 3 4 7  --   --   --  10 9 13  --  13   < > 8  --  5   BUN mg/dL 34* 34* 35*  --   --   --  34* 33* 29*  --  30*   < > 25  --  27*   CREATININE mg/dL 1.04 1.19 1.29  --   --   --  1.64* 1.36* 1.37*  --  1.28   < > 1.13  --  1.06   CALCIUM mg/dL 7.2* 7.1* 7.3*  --   --   --  7.5* 7.3* 7.9*  --  7.9*   < > 8.7  --  9.1   GLUCOSE RANDOM mg/dL 154* 153* 121  --   --   --  185* 252* 233*  --  246*   < > 163*  --  188*   ALT U/L  --   --  19  --   --   --   --   --   --  36  --   --  58  --  28   AST U/L  --   --  25  --   --   --   --   --   --  45  --   --  128*  --  26   ALK PHOS U/L  --   --  51  --   --   --   --   --   --  61  --   --  95  --  141*   ALBUMIN g/dL  --   --  2.6*  --   --   --   --   --   --  2.3*  --   --  2.5*  --  3.5   TOTAL BILIRUBIN mg/dL  --   --  0.73  --   --   --   --   --   --  0.88  --   --  0.67  --  0.26    < > = values in this interval not displayed. Results from last 7 days   Lab Units 06/21/23  0426 06/20/23  1641 06/20/23  0649 06/19/23  1403 06/19/23  0405   MAGNESIUM mg/dL 2.6 2.8* 2.6 1.8 1.6   PHOSPHORUS mg/dL 2.6 3.8 4.2* 5.3* 4.9*      Results from last 7 days   Lab Units 06/19/23  0807 06/19/23  0405 06/19/23  0239   INR  1.10 1.16 1.02   PTT seconds 25  --  24          Results from last 7 days   Lab Units 06/20/23  2305 06/20/23  2003 06/20/23  1641 06/20/23  0908 06/20/23  0700 06/20/23  0106 06/19/23  2152   LACTIC ACID mmol/L 1.9 1.7 2.6* 5.7* 5.2* 5.9* 7.9*     ABG:  Results from last 7 days   Lab Units 06/21/23  0420   PH ART  7.401   PCO2 ART mm Hg 38.9   PO2 ART mm Hg 91.7   HCO3 ART mmol/L 23.6   BASE EXC ART mmol/L -1.0   ABG SOURCE  Line, Arterial     VBG:  Results from last 7 days   Lab Units 06/21/23  0420 06/19/23  1641 06/19/23  1407   PH SHAKIRA   --   --  7.152*   PCO2 SHAKIRA mm Hg  --   --  50.2*   PO2 SHAKIRA mm Hg  --   --  39.2   HCO3 SHAKIRA mmol/L  --   --  17.2*   BASE EXC SHAKIRA mmol/L  --   --  -11.6   ABG SOURCE  Line, Arterial   < >  --     < > = values in this interval not displayed. Micro  Results from last 7 days   Lab Units 06/19/23  1353 06/19/23  0405   BLOOD CULTURE  No Growth at 24 hrs. No Growth at 24 hrs.  --    URINE CULTURE   --  No Growth <1000 cfu/mL       EKG: Afib with RVR  Imaging:  I have personally reviewed pertinent reports. Intake and Output  I/O       06/19 0701  06/20 0700 06/20 0701 06/21 0700    I.V. (mL/kg) 7505.5 (50) 5328 (35.5)    Blood  350    NG/GT  0    IV Piggyback 1181.7 1550    Total Intake(mL/kg) 8687.2 (57.9) 7228 (48.2)    Urine (mL/kg/hr) 1075 (0.3) 1160 (0.3)    Drains 2900 1475    Blood  200    Total Output 3975 2835    Net +4712.2 +4393              UOP: .3 ml/hr     Height and Weights   Height: 5' 3" (160 cm)  IBW (Ideal Body Weight): 52.4 kg  Body mass index is 58.62 kg/m².   Weight (last 2 days)     Date/Time Weight    06/20/23 0600 150 (330.91)    06/19/23 0815 143 (315)    06/19/23 0600 143 (315.7)            Nutrition       Diet Orders   (From admission, onward)             Start     Ordered    06/19/23 0342  Diet NPO  (Order Panel)  Diet effective midnight        References:    Adult Nutrition Support Algorithm    RD Therapeutic Diet Order Protocol   Question Answer Comment   Diet Type NPO    RD to adjust diet per protocol?  Yes        06/19/23 0341                Active Medications  Scheduled Meds:  Current Facility-Administered Medications   Medication Dose Route Frequency Provider Last Rate   • cefepime  2,000 mg Intravenous Q12H Charanjit Cardona DO Stopped (06/21/23 0600)   • chlorhexidine  15 mL Mouth/Throat Q12H [address] [facility], DO     • dexmedetomidine  0.1-0.7 mcg/kg/hr Intravenous Titrated Marika Schilling MD 0.7 mcg/kg/hr (06/21/23 0351)   • fentaNYL  75 mcg/hr Intravenous Continuous Marika Schilling MD 75 mcg/hr (06/20/23 2301)   • fentanyl citrate (PF)  50 mcg Intravenous Q1H PRN Francia Clemens DO     • heparin (porcine)  7,500 Units Subcutaneous [facility] MOLLY Esparza     • insulin regular (HumuLIN R,NovoLIN R) 1 Units/mL in sodium chloride 0.9 % 100 mL infusion  0.3-21 Units/hr Intravenous Titrated Tyrone Lion PA-C 1 Units/hr (06/20/23 2302)   • methylPREDNISolone sodium succinate  20 mg Intravenous Q12H [address] [facility], DO     • metroNIDAZOLE  500 mg Intravenous Q8H Charanjit Cardona DO Stopped (06/21/23 0200)   • norepinephrine  1-30 mcg/min Intravenous Titrated Francia Clemens DO Stopped (06/21/23 0243)   • ondansetron  4 mg Intravenous Q6H PRN Francia Clemens DO     • pantoprazole  40 mg Intravenous Q12H [address] [facility], DO     • vasopressin (PITRESSIN) 20 Units in sodium chloride 0.9 % 100 mL infusion  0.04 Units/min Intravenous Continuous Charanjit Cardona DO 0.04 Units/min (06/21/23 0434)     Continuous Infusions:  dexmedetomidine, 0.1-0.7 mcg/kg/hr, Last Rate: 0.7 mcg/kg/hr (06/21/23 6407)  fentaNYL, 75 mcg/hr, Last Rate: 75 mcg/hr (06/20/23 2301)  insulin regular (HumuLIN R,NovoLIN R) 1 Units/mL in sodium chloride 0.9 % 100 mL infusion, 0.3-21 Units/hr, Last Rate: 1 Units/hr (06/20/23 2302)  norepinephrine, 1-30 mcg/min, Last Rate: Stopped (06/21/23 0243)  vasopressin (PITRESSIN) 20 Units in sodium chloride 0.9 % 100 mL infusion, 0.04 Units/min, Last Rate: 0.04 Units/min (06/21/23 0434)      PRN Meds:   fentanyl citrate (PF), 50 mcg, Q1H PRN  ondansetron, 4 mg, Q6H PRN        Allergies   No Known Allergies      Silvia Pedersen      Portions of the record may have been created with voice recognition software. Occasional wrong word or "sound a like" substitutions may have occurred due to the inherent limitations of voice recognition software.   Read the chart carefully and recognize, using context, where substitutions have occurred

## 2023-06-21 NOTE — RESPIRATORY THERAPY NOTE
RT Ventilator Management Note  Katelynn Villa 67 y.o. female MRN: [id number]  Unit/Bed#: [facility] 14 Encounter: [id number]      Daily Screen         6/20/2023 0724 6/21/2023  0717          Patient safety screen outcome[de-identified] Failed Passed (P)       Not Ready for Weaning due to[de-identified] Underline problem not resolved --      Spont breathing trial % for 30 min: -- Yes (P)       RSBI: -- 27 (P)                 Physical Exam:   Assessment Type: (P) Assess only  General Appearance: (P) Sedated  Respiratory Pattern: (P) Assisted  Chest Assessment: (P) Chest expansion symmetrical  Bilateral Breath Sounds: (P) Clear, Diminished  Suction: ET Tube  O2 Device: (P) vent      Resp Comments: (P) Pt placed on 6/6 40%, vss stable, rsbi 27.

## 2023-06-21 NOTE — ANESTHESIA PREPROCEDURE EVALUATION
Procedure:  LAPAROTOMY EXPLORATORY W/ BOWEL RESECTION (Abdomen)    Relevant Problems   CARDIO   (+) Dyspnea on exertion   (+) Hypertension   (+) Permanent atrial fibrillation (HCC)      ENDO   (+) Hyperparathyroidism (HCC)      GI/HEPATIC   (+) Idiopathic acute pancreatitis without infection or necrosis   (+) Reactive hypoglycemia      /RENAL   (+) Malignant tumor of right kidney parenchyma (HCC)   (+) Stage 3 chronic kidney disease, unspecified whether stage 3a or 3b CKD (HCC)      MUSCULOSKELETAL   (+) Cervical spondylosis without myelopathy   (+) Myofascial pain syndrome   (+) Primary osteoarthritis of both knees   (+) Primary osteoarthritis of left knee   (+) Primary osteoarthritis of right knee      NEURO/PSYCH   (+) Myofascial pain syndrome      PULMONARY   (+) Dyspnea on exertion   (+) GILDARDO (obstructive sleep apnea)        Physical Exam    Airway    Mallampati score: III  TM Distance: >3 FB  Neck ROM: full     Dental   No notable dental hx     Cardiovascular      Pulmonary      Other Findings        Anesthesia Plan  ASA Score- 5 Emergent    Anesthesia Type- general with ASA Monitors  Additional Monitors: arterial line and central venous line  Airway Plan: ETT  Plan Factors-    Chart reviewed  Existing labs reviewed  Patient summary reviewed  Induction- intravenous and rapid sequence induction  Postoperative Plan-     Informed Consent- Anesthetic plan and risks discussed with patient  I personally reviewed this patient with the CRNA  Discussed and agreed on the Anesthesia Plan with the CRNA  Vijay Anderson

## 2023-06-21 NOTE — PROGRESS NOTES
Progress Note - General Surgery   Dwayne Guo 67 y.o. female MRN: [id number]  Unit/Bed#: [facility] 14 Encounter: [id number]    Assessment:  67 y.o. F with multiple prior abdominal surgeries including open arlene-en-y gastric bypass, presents with perforated ileum and necrotic jejunum 2/2 internal hernia, s/p exploratory laparotomy, resection of J-J anastomosis, segmental ileum resection and abthera placement 6/19, followed by second loop laparotomy, YAMIL, SBR + appendectomy, abthera placement 6/19 for rising pressor requirements. Now s/p takeback ex lap, SBR, creation of anastomosis x 3 with abdominal closure 6/20    Acute blood loss anemia from intraoperative losses and received a total of 5uPRBCs, 2 FFP 6/19. Hgb today 8.6 from 9.3     Levophed weaned overnight. Vasopressin remains on    Plan:  Wean to extubate   Maintain NPO/NGT on extubation - NGT to low continuous suction   Continue IVF resuscitation/stress dose steroids   Continue to trend end points  Continue IV abx - cefepime/flagyl    Maintain abdominal binder x 2  Maintain LUCY drains, monitor output   Maintain sweeney, monitor output   Supportive care per ICU  DVT ppx       Subjective/Objective     Subjective: No acute events overnight. She is intubated, sedated and resting comfortably in bed. Objective:     Blood pressure 90/76, pulse 74, temperature 99.3 °F (37.4 °C), resp. rate 13, height 5' 3" (1.6 m), weight (!) 150 kg (330 lb 14.6 oz), SpO2 96 %. ,Body mass index is 58.62 kg/m².       Intake/Output Summary (Last 24 hours) at 6/21/2023 0658  Last data filed at 6/21/2023 0600  Gross per 24 hour   Intake 7277.98 ml   Output 2835 ml   Net 4442.98 ml       Invasive Devices     Central Venous Catheter Line  Duration           CVC Central Lines 06/19/23 Triple 1 day          Peripheral Intravenous Line  Duration           Peripheral IV 06/19/23 Right Hand 2 days    Peripheral IV 06/21/23 Right;Ventral (anterior) Forearm <1 day          Arterial Line  Duration Arterial Line 06/19/23 Femoral 1 day          Drain  Duration           Urethral Catheter Latex 14 Fr. 2 days    Closed/Suction Drain Lateral;Left LUQ 19 Fr. <1 day    Closed/Suction Drain Lateral;Right RUQ Bulb 19 Fr. <1 day    Closed/Suction Drain Left LLQ Bulb 10 Fr. <1 day          Airway  Duration           ETT  Hi-Lo; Cuffed;Oral 7 mm 2 days                Physical Exam:   NAD, sedated  Normocephalic, atraumatic  Moist mucous membranes   NGT in place with bilious drainage   ETT in place  Regular rate  Abd obese, soft, nondistended, Midline incision with clean and dry dressings in place, LUCY drains with serosanguinous drainage  Bolanos in place with clear yellow urine   No calf tenderness or peripheral edema  Skin is warm and dry          Lab, Imaging and other studies:  CBC:   Lab Results   Component Value Date    WBC 8.99 06/21/2023    HGB 8.6 (L) 06/21/2023    HCT 26.1 (L) 06/21/2023    MCV 84 06/21/2023     (L) 06/21/2023    RBC 3.12 (L) 06/21/2023    MCH 27.6 06/21/2023    MCHC 33.0 06/21/2023    RDW 16.9 (H) 06/21/2023    MPV 11.0 06/21/2023   , CMP:   Lab Results   Component Value Date    SODIUM 144 06/21/2023    K 3.9 06/21/2023     (H) 06/21/2023    CO2 24 06/21/2023    CO2 21 06/20/2023    BUN 34 (H) 06/21/2023    CREATININE 1.04 06/21/2023    GLUCOSE 94 06/20/2023    CALCIUM 7.2 (L) 06/21/2023    AST 25 06/20/2023    ALT 19 06/20/2023    ALKPHOS 51 06/20/2023    EGFR 53 06/21/2023   , Coagulation:   No results found for: "PT", "INR", "APTT"  VTE Pharmacologic Prophylaxis: [facility]  VTE Mechanical Prophylaxis: sequential compression device

## 2023-06-21 NOTE — ANESTHESIA POSTPROCEDURE EVALUATION
Post-Op Assessment Note        Airway: intubated       Reason for prolonged intubation > 24 hours:  Open abdomenReason for prolonged intubation > 48 hours:  Open abdomen      No notable events documented      BP      Temp      Pulse     Resp      SpO2

## 2023-06-21 NOTE — PLAN OF CARE
Problem: Prexisting or High Potential for Compromised Skin Integrity  Goal: Skin integrity is maintained or improved  Description: INTERVENTIONS:  - Identify patients at risk for skin breakdown  - Assess and monitor skin integrity  - Assess and monitor nutrition and hydration status  - Monitor labs   - Assess for incontinence   - Turn and reposition patient  - Assist with mobility/ambulation  - Relieve pressure over bony prominences  - Avoid friction and shearing  - Provide appropriate hygiene as needed including keeping skin clean and dry  - Evaluate need for skin moisturizer/barrier cream  - Collaborate with interdisciplinary team   - Patient/family teaching  - Consider wound care consult   Outcome: Progressing    Problem: MOBILITY - ADULT  Goal: Maintain or return to baseline ADL function  Description: INTERVENTIONS:  -  Assess patient's ability to carry out ADLs; assess patient's baseline for ADL function and identify physical deficits which impact ability to perform ADLs (bathing, care of mouth/teeth, toileting, grooming, dressing, etc.)  - Assess/evaluate cause of self-care deficits   - Assess range of motion  - Assess patient's mobility; develop plan if impaired  - Assess patient's need for assistive devices and provide as appropriate  - Encourage maximum independence but intervene and supervise when necessary  - Involve family in performance of ADLs  - Assess for home care needs following discharge   - Consider OT consult to assist with ADL evaluation and planning for discharge  - Provide patient education as appropriate  Outcome: Progressing  Goal: Maintains/Returns to pre admission functional level  Description: INTERVENTIONS:  - Perform BMAT or MOVE assessment daily.   - Set and communicate daily mobility goal to care team and patient/family/caregiver.    - Collaborate with rehabilitation services on mobility goals if consulted  - Out of bed for toileting  - Record patient progress and toleration of activity level   Outcome: Progressing    Problem: Nutrition/Hydration-ADULT  Goal: Nutrient/Hydration intake appropriate for improving, restoring or maintaining nutritional needs  Description: Monitor and assess patient's nutrition/hydration status for malnutrition. Collaborate with interdisciplinary team and initiate plan and interventions as ordered. Monitor patient's weight and dietary intake as ordered or per policy. Utilize nutrition screening tool and intervene as necessary. Determine patient's food preferences and provide high-protein, high-caloric foods as appropriate.      INTERVENTIONS:  - Monitor oral intake, urinary output, labs, and treatment plans  - Assess nutrition and hydration status and recommend course of action  - Evaluate amount of meals eaten  - Assist patient with eating if necessary   - Allow adequate time for meals  - Recommend/ encourage appropriate diets, oral nutritional supplements, and vitamin/mineral supplements  - Order, calculate, and assess calorie counts as needed  - Recommend, monitor, and adjust tube feedings and TPN/PPN based on assessed needs  - Assess need for intravenous fluids  - Provide specific nutrition/hydration education as appropriate  - Include patient/family/caregiver in decisions related to nutrition  Outcome: Progressing

## 2023-06-22 ENCOUNTER — APPOINTMENT (INPATIENT)
Dept: RADIOLOGY | Facility: HOSPITAL | Age: 73
DRG: 853 | End: 2023-06-22
Payer: MEDICARE

## 2023-06-22 LAB
ALBUMIN SERPL BCP-MCNC: 2.3 G/DL (ref 3.5–5)
ALP SERPL-CCNC: 55 U/L (ref 46–116)
ALT SERPL W P-5'-P-CCNC: 16 U/L (ref 12–78)
ANION GAP SERPL CALCULATED.3IONS-SCNC: 0 MMOL/L
ANION GAP SERPL CALCULATED.3IONS-SCNC: 5 MMOL/L
AST SERPL W P-5'-P-CCNC: 22 U/L (ref 5–45)
ATRIAL RATE: 138 BPM
ATRIAL RATE: 141 BPM
BILIRUB DIRECT SERPL-MCNC: 0.14 MG/DL (ref 0–0.2)
BILIRUB SERPL-MCNC: 0.51 MG/DL (ref 0.2–1)
BUN SERPL-MCNC: 20 MG/DL (ref 5–25)
BUN SERPL-MCNC: 24 MG/DL (ref 5–25)
CA-I BLD-SCNC: 0.94 MMOL/L (ref 1.12–1.32)
CALCIUM SERPL-MCNC: 7 MG/DL (ref 8.3–10.1)
CALCIUM SERPL-MCNC: 7.5 MG/DL (ref 8.3–10.1)
CHLORIDE SERPL-SCNC: 114 MMOL/L (ref 96–108)
CHLORIDE SERPL-SCNC: 118 MMOL/L (ref 96–108)
CO2 SERPL-SCNC: 25 MMOL/L (ref 21–32)
CO2 SERPL-SCNC: 26 MMOL/L (ref 21–32)
CREAT SERPL-MCNC: 0.76 MG/DL (ref 0.6–1.3)
CREAT SERPL-MCNC: 0.79 MG/DL (ref 0.6–1.3)
ERYTHROCYTE [DISTWIDTH] IN BLOOD BY AUTOMATED COUNT: 16.8 % (ref 11.6–15.1)
GFR SERPL CREATININE-BSD FRML MDRD: 74 ML/MIN/1.73SQ M
GFR SERPL CREATININE-BSD FRML MDRD: 78 ML/MIN/1.73SQ M
GLUCOSE SERPL-MCNC: 105 MG/DL (ref 65–140)
GLUCOSE SERPL-MCNC: 109 MG/DL (ref 65–140)
GLUCOSE SERPL-MCNC: 112 MG/DL (ref 65–140)
GLUCOSE SERPL-MCNC: 122 MG/DL (ref 65–140)
GLUCOSE SERPL-MCNC: 123 MG/DL (ref 65–140)
GLUCOSE SERPL-MCNC: 130 MG/DL (ref 65–140)
GLUCOSE SERPL-MCNC: 132 MG/DL (ref 65–140)
HCT VFR BLD AUTO: 23.9 % (ref 34.8–46.1)
HGB BLD-MCNC: 7.6 G/DL (ref 11.5–15.4)
MAGNESIUM SERPL-MCNC: 2.6 MG/DL (ref 1.6–2.6)
MAGNESIUM SERPL-MCNC: 3 MG/DL (ref 1.6–2.6)
MCH RBC QN AUTO: 27.3 PG (ref 26.8–34.3)
MCHC RBC AUTO-ENTMCNC: 31.8 G/DL (ref 31.4–37.4)
MCV RBC AUTO: 86 FL (ref 82–98)
PHOSPHATE SERPL-MCNC: 1.5 MG/DL (ref 2.3–4.1)
PLATELET # BLD AUTO: 116 THOUSANDS/UL (ref 149–390)
PMV BLD AUTO: 10.6 FL (ref 8.9–12.7)
POTASSIUM SERPL-SCNC: 3.6 MMOL/L (ref 3.5–5.3)
POTASSIUM SERPL-SCNC: 3.6 MMOL/L (ref 3.5–5.3)
PR INTERVAL: 0 MS
PR INTERVAL: 0 MS
PROT SERPL-MCNC: 4.9 G/DL (ref 6.4–8.4)
QRS AXIS: -6 DEGREES
QRS AXIS: 12 DEGREES
QRSD INTERVAL: 71 MS
QRSD INTERVAL: 75 MS
QT INTERVAL: 279 MS
QT INTERVAL: 304 MS
QTC INTERVAL: 428 MS
QTC INTERVAL: 435 MS
RBC # BLD AUTO: 2.78 MILLION/UL (ref 3.81–5.12)
SODIUM SERPL-SCNC: 144 MMOL/L (ref 135–147)
SODIUM SERPL-SCNC: 144 MMOL/L (ref 135–147)
T WAVE AXIS: -45 DEGREES
T WAVE AXIS: 123 DEGREES
TRIGL SERPL-MCNC: 92 MG/DL
VENTRICULAR RATE: 123 BPM
VENTRICULAR RATE: 141 BPM
WBC # BLD AUTO: 8.29 THOUSAND/UL (ref 4.31–10.16)

## 2023-06-22 PROCEDURE — 94003 VENT MGMT INPAT SUBQ DAY: CPT

## 2023-06-22 PROCEDURE — 94760 N-INVAS EAR/PLS OXIMETRY 1: CPT

## 2023-06-22 PROCEDURE — 85027 COMPLETE CBC AUTOMATED: CPT

## 2023-06-22 PROCEDURE — 84100 ASSAY OF PHOSPHORUS: CPT

## 2023-06-22 PROCEDURE — 84478 ASSAY OF TRIGLYCERIDES: CPT

## 2023-06-22 PROCEDURE — 82948 REAGENT STRIP/BLOOD GLUCOSE: CPT

## 2023-06-22 PROCEDURE — 80048 BASIC METABOLIC PNL TOTAL CA: CPT

## 2023-06-22 PROCEDURE — 93010 ELECTROCARDIOGRAM REPORT: CPT | Performed by: INTERNAL MEDICINE

## 2023-06-22 PROCEDURE — 99024 POSTOP FOLLOW-UP VISIT: CPT | Performed by: SURGERY

## 2023-06-22 PROCEDURE — 82330 ASSAY OF CALCIUM: CPT

## 2023-06-22 PROCEDURE — 80048 BASIC METABOLIC PNL TOTAL CA: CPT | Performed by: EMERGENCY MEDICINE

## 2023-06-22 PROCEDURE — C9113 INJ PANTOPRAZOLE SODIUM, VIA: HCPCS | Performed by: STUDENT IN AN ORGANIZED HEALTH CARE EDUCATION/TRAINING PROGRAM

## 2023-06-22 PROCEDURE — 71045 X-RAY EXAM CHEST 1 VIEW: CPT

## 2023-06-22 PROCEDURE — 83735 ASSAY OF MAGNESIUM: CPT

## 2023-06-22 PROCEDURE — 88307 TISSUE EXAM BY PATHOLOGIST: CPT | Performed by: PATHOLOGY

## 2023-06-22 PROCEDURE — 83735 ASSAY OF MAGNESIUM: CPT | Performed by: EMERGENCY MEDICINE

## 2023-06-22 PROCEDURE — 93005 ELECTROCARDIOGRAM TRACING: CPT

## 2023-06-22 PROCEDURE — 80076 HEPATIC FUNCTION PANEL: CPT

## 2023-06-22 RX ORDER — INSULIN LISPRO 100 [IU]/ML
1-5 INJECTION, SOLUTION INTRAVENOUS; SUBCUTANEOUS EVERY 6 HOURS SCHEDULED
Status: DISCONTINUED | OUTPATIENT
Start: 2023-06-22 | End: 2023-06-22

## 2023-06-22 RX ORDER — MAGNESIUM SULFATE HEPTAHYDRATE 40 MG/ML
2 INJECTION, SOLUTION INTRAVENOUS ONCE
Status: COMPLETED | OUTPATIENT
Start: 2023-06-22 | End: 2023-06-22

## 2023-06-22 RX ORDER — INSULIN LISPRO 100 [IU]/ML
4-20 INJECTION, SOLUTION INTRAVENOUS; SUBCUTANEOUS EVERY 6 HOURS SCHEDULED
Status: DISCONTINUED | OUTPATIENT
Start: 2023-06-22 | End: 2023-06-29

## 2023-06-22 RX ORDER — POTASSIUM CHLORIDE 29.8 MG/ML
40 INJECTION INTRAVENOUS ONCE
Status: COMPLETED | OUTPATIENT
Start: 2023-06-22 | End: 2023-06-22

## 2023-06-22 RX ORDER — CALCIUM GLUCONATE 20 MG/ML
2 INJECTION, SOLUTION INTRAVENOUS ONCE
Status: COMPLETED | OUTPATIENT
Start: 2023-06-22 | End: 2023-06-22

## 2023-06-22 RX ORDER — METOPROLOL TARTRATE 5 MG/5ML
5 INJECTION INTRAVENOUS EVERY 6 HOURS
Status: DISCONTINUED | OUTPATIENT
Start: 2023-06-22 | End: 2023-06-26

## 2023-06-22 RX ORDER — ACETYLCYSTEINE 200 MG/ML
3 SOLUTION ORAL; RESPIRATORY (INHALATION) 2 TIMES DAILY PRN
Status: DISCONTINUED | OUTPATIENT
Start: 2023-06-22 | End: 2023-06-23

## 2023-06-22 RX ORDER — HYDROMORPHONE HCL/PF 1 MG/ML
0.3 SYRINGE (ML) INJECTION
Status: DISCONTINUED | OUTPATIENT
Start: 2023-06-22 | End: 2023-06-25

## 2023-06-22 RX ORDER — DEXMEDETOMIDINE HYDROCHLORIDE 4 UG/ML
.1-.7 INJECTION, SOLUTION INTRAVENOUS
Status: DISCONTINUED | OUTPATIENT
Start: 2023-06-22 | End: 2023-06-24

## 2023-06-22 RX ORDER — FUROSEMIDE 10 MG/ML
40 INJECTION INTRAMUSCULAR; INTRAVENOUS ONCE
Status: COMPLETED | OUTPATIENT
Start: 2023-06-22 | End: 2023-06-22

## 2023-06-22 RX ADMIN — Medication 0.7 MCG/KG/HR: at 05:14

## 2023-06-22 RX ADMIN — DEXMEDETOMIDINE HYDROCHLORIDE 0.2 MCG/KG/HR: 400 INJECTION INTRAVENOUS at 20:38

## 2023-06-22 RX ADMIN — AMIODARONE HYDROCHLORIDE 1 MG/MIN: 50 INJECTION, SOLUTION INTRAVENOUS at 21:08

## 2023-06-22 RX ADMIN — CEFEPIME 2000 MG: 2 INJECTION, POWDER, FOR SOLUTION INTRAVENOUS at 04:01

## 2023-06-22 RX ADMIN — MAGNESIUM SULFATE HEPTAHYDRATE 2 G: 40 INJECTION, SOLUTION INTRAVENOUS at 16:21

## 2023-06-22 RX ADMIN — PANTOPRAZOLE SODIUM 40 MG: 40 INJECTION, POWDER, FOR SOLUTION INTRAVENOUS at 09:00

## 2023-06-22 RX ADMIN — Medication 75 MCG/HR: at 00:45

## 2023-06-22 RX ADMIN — ENOXAPARIN SODIUM 60 MG: 60 INJECTION SUBCUTANEOUS at 09:02

## 2023-06-22 RX ADMIN — AMIODARONE HYDROCHLORIDE 150 MG: 50 INJECTION, SOLUTION INTRAVENOUS at 06:09

## 2023-06-22 RX ADMIN — METRONIDAZOLE 500 MG: 500 INJECTION, SOLUTION INTRAVENOUS at 00:09

## 2023-06-22 RX ADMIN — AMIODARONE HYDROCHLORIDE 150 MG: 50 INJECTION, SOLUTION INTRAVENOUS at 20:45

## 2023-06-22 RX ADMIN — PANTOPRAZOLE SODIUM 40 MG: 40 INJECTION, POWDER, FOR SOLUTION INTRAVENOUS at 20:05

## 2023-06-22 RX ADMIN — METOROPROLOL TARTRATE 5 MG: 5 INJECTION, SOLUTION INTRAVENOUS at 12:58

## 2023-06-22 RX ADMIN — Medication 0.7 MCG/KG/HR: at 01:46

## 2023-06-22 RX ADMIN — SODIUM CHLORIDE, SODIUM GLUCONATE, SODIUM ACETATE, POTASSIUM CHLORIDE, MAGNESIUM CHLORIDE, SODIUM PHOSPHATE, DIBASIC, AND POTASSIUM PHOSPHATE 125 ML/HR: .53; .5; .37; .037; .03; .012; .00082 INJECTION, SOLUTION INTRAVENOUS at 02:46

## 2023-06-22 RX ADMIN — Medication 0.7 MCG/KG/HR: at 09:41

## 2023-06-22 RX ADMIN — CHLORHEXIDINE GLUCONATE 15 ML: 1.2 SOLUTION ORAL at 09:02

## 2023-06-22 RX ADMIN — LORAZEPAM 0.5 MG: 2 INJECTION INTRAMUSCULAR; INTRAVENOUS at 03:59

## 2023-06-22 RX ADMIN — POTASSIUM PHOSPHATE, MONOBASIC AND POTASSIUM PHOSPHATE, DIBASIC 30 MMOL: 224; 236 INJECTION, SOLUTION, CONCENTRATE INTRAVENOUS at 06:11

## 2023-06-22 RX ADMIN — FUROSEMIDE 40 MG: 10 INJECTION, SOLUTION INTRAMUSCULAR; INTRAVENOUS at 13:00

## 2023-06-22 RX ADMIN — CEFEPIME 2000 MG: 2 INJECTION, POWDER, FOR SOLUTION INTRAVENOUS at 16:56

## 2023-06-22 RX ADMIN — LORAZEPAM 0.5 MG: 2 INJECTION INTRAMUSCULAR; INTRAVENOUS at 20:03

## 2023-06-22 RX ADMIN — CALCIUM GLUCONATE 2 G: 20 INJECTION, SOLUTION INTRAVENOUS at 05:14

## 2023-06-22 RX ADMIN — POTASSIUM CHLORIDE 40 MEQ: 29.8 INJECTION, SOLUTION INTRAVENOUS at 18:58

## 2023-06-22 RX ADMIN — METOROPROLOL TARTRATE 5 MG: 5 INJECTION, SOLUTION INTRAVENOUS at 16:18

## 2023-06-22 RX ADMIN — METRONIDAZOLE 500 MG: 500 INJECTION, SOLUTION INTRAVENOUS at 09:00

## 2023-06-22 RX ADMIN — ENOXAPARIN SODIUM 60 MG: 60 INJECTION SUBCUTANEOUS at 20:05

## 2023-06-22 RX ADMIN — METRONIDAZOLE 500 MG: 500 INJECTION, SOLUTION INTRAVENOUS at 18:30

## 2023-06-22 RX ADMIN — LORAZEPAM 0.5 MG: 2 INJECTION INTRAMUSCULAR; INTRAVENOUS at 11:00

## 2023-06-22 NOTE — PROGRESS NOTES
Nutrition Recommendations:    1.) Recommend to recheck phosphorus level s/p repletion prior to starting TPN      2.) Agree with standard TPN bag for day 1 (500mL 30% dextrose, 333mL 15% amino acids, 100mL 20% lipids). 3.) If electrolytes WNL and blood sugars <180, on day 2, increase to 875mL 15% amino acids (131g, 524 kcals) and continue 500mL 30% dextrose (150g, 510 kcals) and 100mL 20% lipids (20g, 200 kcals) for a total of 1234 kcals and 1475mL total volume from macronutrients. 4.) Above recommendations utilize [facility] for a vented pt with BMI >30. If pt is able to be extubated and continues on TPN, will update recommendations accordingly.

## 2023-06-22 NOTE — PROGRESS NOTES
Daily Progress Note - Critical Care   Joan Mckoy 67 y.o. female MRN: [id number]  Unit/Bed#: [facility] 14 Encounter: [id number]        ----------------------------------------------------------------------------------------  HPI/24hr events: Pt is a 66 yo female with relevant PMH s/p multiple abdominal surgeries with S[place] en [employer] anatomy who presented with an internal small bowel hernia. Pt taken for ex-lap with small bowel resection, blood loss requiring transfusion. S/p 2 second second-look surgeries with small SBRs and closure. O/N, two episodes of afib with RVR and hypotension at approximately 10pm and 4am. Appear to be in response to agitation. Given ativan during both occurrences. amiodarone. Pressures briefly required laura.  ---------------------------------------------------------------------------------------  SUBJECTIVE  Pt sedated, RASS 0. Resting comfortably. Has not had BM per nursing. Unable to be obtained due to patient factors. ---------------------------------------------------------------------------------------  Assessment and Plan:    Neuro:   • Pain /Sedation  ? Currently Precedex and Fentanyl gtt prn for RASS goal 0 to -1  ? Fentanyl 75, Precedex . 7 this AM  ? D/C Fentalyl gtt  ? Initiate Dilaudid . 3 q 3 PRN  ? Multimodal analgesia  ? Delirium precautions  ? Ativan . 5 mg prn  • Anxiety/Depression  ? Holding home prozac     CV:   • Hypotension  ? Phenylephrine if pressor support is needed  ? MAP goal >65  ? Continuous cardiac monitoring   • Afib on eliquis  ? Heparin  ? Holding home cardizem, metoprolol, eliquis  • HTN  ? Holding HCTZ, metoprolol cardizem  ? Initiate metoprolol 5 mg IV q 6 hrs     Pulm:  • Post Op vent dependence  ? Vent VC/RR 14//40%/PEEP 6  ? Ween to extubation  ? Maintain O2 sat >92%     GI:   • Internal Hernia with Closed Loop Obstruction c/b J-J anastomosis site necrosis, hemoperitoneum and portal venous gas  ?  S/p ex lap w/ SBR Tika Atkinson ileum resection) odin 6/19   ? S/p ex lap w/ SBR (distal jejunem/ileum resection), appendectomy abthera 6/19  ? S/p ex lap w/ SBR 6/20, closure  • GERD   ? PPI and Famotidine     :   • Trend I/Os  • Renal function improving per labs  • Low urine output, .4 cc/kg/hrs  • Trial Lasix 40     F/E/N:   • Isolyte 125 cc/hr  • Replete electrolytes as needed  • Give Phos and Ca today  • NPO     Heme/Onc:   • ABLA  ? S/p 5 u prbc 3 FFP on 6/19  ? Monitor hgb  ? 7.6 (8.6) (10.7)     Endo:   • Hyperglycemia- d/c insulin drip, initiate ISS  • Maintain -180     ID:   • Sepsis  ? On cefepime/flagyl day 4  ? BC x2 neg at 48 hours  ? Urine culture negative     MSK/Skin:   • Frequent turning , repositioning    Patient appropriate for transfer out of the ICU today?: No  Disposition: Continue Critical Care   Code Status: Level 1 - Full Code  ---------------------------------------------------------------------------------------  ICU CORE MEASURES    Prophylaxis   VTE Pharmacologic Prophylaxis: Enoxaparin (Lovenox)  VTE Mechanical Prophylaxis: sequential compression device  Stress Ulcer Prophylaxis: Pantoprazole IV       Invasive Devices Review  Invasive Devices     Central Venous Catheter Line  Duration           CVC Central Lines 06/19/23 Triple 2 days          Peripheral Intravenous Line  Duration           Peripheral IV 06/19/23 Right Hand 3 days    Peripheral IV 06/21/23 Right;Ventral (anterior) Forearm 1 day          Arterial Line  Duration           Arterial Line 06/19/23 Femoral 2 days          Drain  Duration           Urethral Catheter Latex 14 Fr. 3 days    Closed/Suction Drain Lateral;Left LUQ 19 Fr. 1 day    Closed/Suction Drain Lateral;Right RUQ Bulb 19 Fr. 1 day    Closed/Suction Drain Left LLQ Bulb 10 Fr. 1 day          Airway  Duration           ETT  Hi-Lo; Cuffed;Oral 7 mm 3 days              ---------------------------------------------------------------------------------------  OBJECTIVE    Vitals   Vitals:    06/22/23 0100 23 0200 23 0300 23 0400   BP:       BP Location:       Pulse: 70 72  (!) 134   Resp: 13 13  15   Temp: 99.3 °F (37.4 °C) 99 °F (37.2 °C)  99 °F (37.2 °C)   TempSrc:       SpO2: 98% 98% 96% 98%   Weight:       Height:         Temp (24hrs), Av.4 °F (37.4 °C), Min:99 °F (37.2 °C), Max:99.7 °F (37.6 °C)  Current: Temperature: 99 °F (37.2 °C)  HR: 134  BP: 108/56  RR: 15  SpO2: 98%      Invasive/non-invasive ventilation settings   Respiratory    Lab Data (Last 4 hours)    None         O2/Vent Data (Last 4 hours)    None                Physical Exam  Constitutional:       Appearance: She is obese. Comments: Sedated   HENT:      Head: Normocephalic and atraumatic. Right Ear: External ear normal.      Left Ear: External ear normal.   Eyes:      General: No scleral icterus. Right eye: No discharge. Left eye: No discharge. Cardiovascular:      Rate and Rhythm: Normal rate. Heart sounds: Normal heart sounds. Pulmonary:      Breath sounds: Normal breath sounds. Comments: ET tube in place, mechanically ventilated  Abdominal:      General: Abdomen is flat. There is no distension. Palpations: Abdomen is soft. Tenderness: There is no abdominal tenderness. There is no guarding or rebound. Comments: Incision covered under clean dressing without strikethrough. Drain x3 producing serosanguinous output. Skin:     General: Skin is warm and dry. Coloration: Skin is not jaundiced.          Laboratory and Diagnostics:  Results from last 7 days   Lab Units 23  0435 23  0426 23  1641 23  1531 23  1345 23  1329 23  1228 23  0609 23  1808 23  1403 23  0807 23  0710 23  0403 23  0111 23  2031   WBC Thousand/uL 8.29 8.99 11.25*  --   --   --   --  18.13* 23.01* 21.04* 15.30*  --  16.80*  --  18.09*   HEMOGLOBIN g/dL 7.6* 8.6* 9.3*  --  8.5*  --   --  10.7* 11.7 12.7 13.6   < > 12. 7  --  12.3   I STAT HEMOGLOBIN g/dl  --   --   --  8.5*  --  7.8* 8.5*  --   --   --   --   --   --    < >  --    HEMATOCRIT % 23.9* 26.1* 28.4*  --  26.1*  --   --  31.8* 36.4 38.7 43.7  --  40.6  --  39.8   HEMATOCRIT, ISTAT %  --   --   --  25*  --  23* 25*  --   --   --   --   --   --    < >  --    PLATELETS Thousands/uL 116* 133* 158  --   --   --   --  223 249 251 279  --  272  --  360   NEUTROS PCT %  --   --   --   --   --   --   --  89*  --  90*  --   --   --   --  88*   BANDS PCT %  --   --   --   --   --   --   --   --   --   --   --   --  10*  --   --    MONOS PCT %  --   --   --   --   --   --   --  5  --  5  --   --   --   --  4   MONO PCT %  --   --   --   --   --   --   --   --   --   --   --   --  3*  --   --    EOS PCT %  --   --   --   --   --   --   --  1  --  2  --   --  0  --  1    < > = values in this interval not displayed.      Results from last 7 days   Lab Units 06/22/23  0435 06/21/23  0430 06/20/23  2305 06/20/23  1641 06/20/23  1531 06/20/23  1329 06/20/23  1228 06/20/23  1018 06/20/23  0402 06/19/23  2152 06/19/23  1916 06/19/23  1403 06/19/23  0405 06/19/23  0111 06/18/23  2031   SODIUM mmol/L 144 144 144 143  --   --   --  142 142 142  --    < > 140  --  138   POTASSIUM mmol/L 3.6 3.9 4.0 3.5  --   --   --  3.7 3.8 3.8  --    < > 4.4  --  3.8   CHLORIDE mmol/L 118* 117* 116* 114*  --   --   --  112* 113* 112*  --    < > 113*  --  105   CO2 mmol/L 26 24 24 22  --   --   --  20* 20* 17*  --    < > 19*  --  28   CO2, I-STAT mmol/L  --   --   --   --  21 21 20*  --   --   --   --   --   --    < >  --    ANION GAP mmol/L 0 3 4 7  --   --   --  10 9 13  --    < > 8  --  5   BUN mg/dL 24 34* 34* 35*  --   --   --  34* 33* 29*  --    < > 25  --  27*   CREATININE mg/dL 0.79 1.04 1.19 1.29  --   --   --  1.64* 1.36* 1.37*  --    < > 1.13  --  1.06   CALCIUM mg/dL 7.0* 7.2* 7.1* 7.3*  --   --   --  7.5* 7.3* 7.9*  --    < > 8.7  --  9.1   GLUCOSE RANDOM mg/dL 130 154* 153* 121  --   --   -- 185* 252* 233*  --    < > 163*  --  188*   ALT U/L  --   --   --  19  --   --   --   --   --   --  36  --  58  --  28   AST U/L  --   --   --  25  --   --   --   --   --   --  45  --  128*  --  26   ALK PHOS U/L  --   --   --  51  --   --   --   --   --   --  61  --  95  --  141*   ALBUMIN g/dL  --   --   --  2.6*  --   --   --   --   --   --  2.3*  --  2.5*  --  3.5   TOTAL BILIRUBIN mg/dL  --   --   --  0.73  --   --   --   --   --   --  0.88  --  0.67  --  0.26    < > = values in this interval not displayed. Results from last 7 days   Lab Units 06/22/23  0435 06/21/23  0426 06/20/23  1641 06/20/23  0649 06/19/23  1403 06/19/23  0405   MAGNESIUM mg/dL 3.0* 2.6 2.8* 2.6 1.8 1.6   PHOSPHORUS mg/dL 1.5* 2.6 3.8 4.2* 5.3* 4.9*      Results from last 7 days   Lab Units 06/19/23  0807 06/19/23  0405 06/19/23  0239   INR  1.10 1.16 1.02   PTT seconds 25  --  24          Results from last 7 days   Lab Units 06/20/23  2305 06/20/23 2003 06/20/23  1641 06/20/23  0908 06/20/23  0700 06/20/23  0106 06/19/23  2152   LACTIC ACID mmol/L 1.9 1.7 2.6* 5.7* 5.2* 5.9* 7.9*     ABG:  Results from last 7 days   Lab Units 06/21/23  0420   PH ART  7.401   PCO2 ART mm Hg 38.9   PO2 ART mm Hg 91.7   HCO3 ART mmol/L 23.6   BASE EXC ART mmol/L -1.0   ABG SOURCE  Line, Arterial     VBG:  Results from last 7 days   Lab Units 06/21/23  0420 06/19/23  1641 06/19/23  1407   PH SHAKIRA   --   --  7.152*   PCO2 SHAKIRA mm Hg  --   --  50.2*   PO2 SHAKIRA mm Hg  --   --  39.2   HCO3 SHAKIRA mmol/L  --   --  17.2*   BASE EXC SHAKIRA mmol/L  --   --  -11.6   ABG SOURCE  Line, Arterial   < >  --     < > = values in this interval not displayed. Micro  Results from last 7 days   Lab Units 06/19/23  1353 06/19/23  0405   BLOOD CULTURE  No Growth at 48 hrs. No Growth at 48 hrs. --    URINE CULTURE   --  No Growth <1000 cfu/mL       EKG: Afib with RVR 6/22  Imaging: I have personally reviewed pertinent reports.       Intake and Output  I/O       06/20 0701  06/21 0700 06/21 0701  06/22 0700    I.V. (mL/kg) 5328 (35.5) 2957.3 (19.7)    Blood 350     NG/GT 0 0    IV Piggyback 1550 1011.3    Total Intake(mL/kg) 7228 (48.2) 3968.6 (26.5)    Urine (mL/kg/hr) 1160 (0.3) 1310 (0.4)    Drains 1475 780    Blood 200     Total Output 2835 2090    Net +4393 +1878.6              UOP: .4 ml/hr     Height and Weights   Height: 5' 3" (160 cm)  IBW (Ideal Body Weight): 52.4 kg  Body mass index is 58.62 kg/m². Weight (last 2 days)     Date/Time Weight    06/20/23 0600 150 (330.91)            Nutrition       Diet Orders   (From admission, onward)             Start     Ordered    06/19/23 0342  Diet NPO  (Order Panel)  Diet effective midnight        References:    Adult Nutrition Support Algorithm    RD Therapeutic Diet Order Protocol   Question Answer Comment   Diet Type NPO    RD to adjust diet per protocol?  Yes        06/19/23 0341                Active Medications  Scheduled Meds:  Current Facility-Administered Medications   Medication Dose Route Frequency Provider Last Rate   • amiodarone 150 mg in dextrose 5 % 100 mL IV bolus  150 mg Intravenous Once Noemi Boyer MD     • calcium gluconate  2 g Intravenous Once Noemi Boyer MD 2 g (06/22/23 0514)   • cefepime  2,000 mg Intravenous Q12H Katie Cardona DO 2,000 mg (06/22/23 0401)   • chlorhexidine  15 mL Mouth/Throat Q12H [address]  Shannon Prosper William, DO     • dexmedetomidine  0.1-0.7 mcg/kg/hr Intravenous Titrated Anjali Casas MD 0.7 mcg/kg/hr (06/22/23 0514)   • enoxaparin  60 mg Subcutaneous Q12H [address] MOLLY Gabriel     • fentaNYL  75 mcg/hr Intravenous Continuous Anjali Casas MD 75 mcg/hr (06/22/23 0401)   • fentanyl citrate (PF)  50 mcg Intravenous Q1H PRN Rosalinda Romero DO     • HYDROmorphone  0.2 mg Intravenous Q4H PRN MOLYL Lerma     • insulin regular (HumuLIN R,NovoLIN R) 1 Units/mL in sodium chloride 0.9 % 100 mL infusion  0.3-21 Units/hr Intravenous Titrated Tejas Hernandez PA-C 0.5 Units/hr (06/22/23 0401)   • LORazepam  0.5 mg Intravenous Q4H PRN Karen Nguyen PA-C     • metroNIDAZOLE  500 mg Intravenous Q8H Marino Cardona  mL/hr at 06/22/23 0401   • multi-electrolyte  125 mL/hr Intravenous Continuous Andry Garcia  mL/hr (06/22/23 0401)   • ondansetron  4 mg Intravenous Q6H PRN Renee Moreno DO     • pantoprazole  40 mg Intravenous Q12H [address] Shannon Jay Sylvie, DO     • phenylephine   mcg/min Intravenous Titrated MOLLY Acosta Stopped (06/21/23 1744)     Continuous Infusions:  dexmedetomidine, 0.1-0.7 mcg/kg/hr, Last Rate: 0.7 mcg/kg/hr (06/22/23 0514)  fentaNYL, 75 mcg/hr, Last Rate: 75 mcg/hr (06/22/23 0401)  insulin regular (HumuLIN R,NovoLIN R) 1 Units/mL in sodium chloride 0.9 % 100 mL infusion, 0.3-21 Units/hr, Last Rate: 0.5 Units/hr (06/22/23 0401)  multi-electrolyte, 125 mL/hr, Last Rate: 125 mL/hr (06/22/23 0401)  phenylephine,  mcg/min, Last Rate: Stopped (06/21/23 1744)      PRN Meds:   fentanyl citrate (PF), 50 mcg, Q1H PRN  HYDROmorphone, 0.2 mg, Q4H PRN  LORazepam, 0.5 mg, Q4H PRN  ondansetron, 4 mg, Q6H PRN        Allergies   No Known Allergies  ---------------------------------------------------------------------------------------  Care Time Delivered:   Upon my evaluation, this patient had a high probability of imminent or life-threatening deterioration due to potential for CV/Pulm collapse, which required my direct attention, intervention, and personal management. I have personally provided 30 minutes of critical care time, exclusive of procedures, teaching, family meetings, and any prior time recorded by providers other than myself. Ender Finn      Portions of the record may have been created with voice recognition software. Occasional wrong word or "sound a like" substitutions may have occurred due to the inherent limitations of voice recognition software.   Read the chart carefully and recognize, using context, where substitutions have occurred

## 2023-06-22 NOTE — RESPIRATORY THERAPY NOTE
RT Ventilator Management Note  Lala Kaufman 67 y.o. female MRN: [id number]  [facility] [id number]      Daily Screen         6/20/2023  0724 6/21/2023  0717          Patient safety screen outcome[de-identified] Failed Passed      Not Ready for Weaning due to[de-identified] Underline problem not resolved --      Spont breathing trial % for 30 min: -- Yes      RSBI: -- 27                Physical Exam:   Assessment Type: Assess only  General Appearance: Sedated  Respiratory Pattern: Assisted  Chest Assessment: Chest expansion symmetrical  Bilateral Breath Sounds: Clear, Diminished  Suction: ET Tube  O2 Device: vent      Resp Comments: pt remained on listed settings through the night with no changes. Pt sat remained above 96% through the night. Alarms are active and working. water bag was changed Will contionue to monitor. 06/22/23 0300   Respiratory Assessment   Assessment Type Assess only   General Appearance Sedated   Respiratory Pattern Assisted   Chest Assessment Chest expansion symmetrical   Bilateral Breath Sounds Clear;Diminished   Suction ET Tube   Resp Comments pt remained on listed settings through the night with no changes. Pt sat remained above 96% through the night. Alarms are active and working. water bag was changed Will contionue to monitor.    O2 Device vent   Vent Information   Vent ID [id number]   Vent type Mario G5   Mario C3/G5 Vent Mode (S)CMV   $ Vent Daily Charge-Subsequent Yes   $ Pulse Oximetry Spot Check Charge Completed   SpO2 96 %   (S)CMV Settings   Resp Rate (BPM) 14 BPM   VT (mL) 420 mL   FIO2 (%) 40 %   PEEP (cmH2O) 6 cmH2O   Insp Time (%) 0.8 %   Flow Trigger (LPM) 5   Humidification Heater   Heater Temperature (Set) 95 °F (35 °C)   (S)CMV Actuals   Resp Rate (BPM) 31 BPM   VT (mL) 519   MV 13.2   MAP (cmH2O) 8.7 cmH2O   Peak Pressure (cmH2O) 19 cmH2O   I:E Ratio (Obs) 1/1.7   Insp Resistance 17   Heater Temperature (Obs) 95 °F (35 °C)   (S)CMV Alarms   High Peak Pressure (cmH2O) 40   Low Pressure (cmH2O) 5 cm H2O   High Resp Rate (BPM) 35 BPM   Low Resp Rate (BPM) 8 BPM   High MV (L/min) 18 L/min   Low MV (L/min) 4 L/min   High VT (mL) 1000 mL   Low VT (mL) 350 mL   Apnea Time (s) 20 S   Maintenance   Alarm (pink) cable attached No   Resuscitation bag with peep valve at bedside Yes   Water bag changed Yes   IHI Ventilator Associated Pneumonia Bundle   Head of Bed Elevated HOB 30   ETT  Hi-Lo; Cuffed;Oral 7 mm   Placement Date/Time: 06/19/23 0035   Mask Ventilation: Mask ventilation not attempted (0)  Preoxygenated: Yes  Technique: Direct laryngoscopy;Rapid sequence  Type: Hi-Lo; Cuffed;Oral  Tube Size: 7 mm  Laryngoscope: Mac  Blade Size: 3  Location: Oral  G. ..    Secured at (cm) 24   Measured from Lips   Secured Location Right   Secured by Commercial tube allen   Site Condition Dry   Cuff Pressure (cm H2O)   (green)   HI-LO Suction  Intermittent suction   HI-LO Secretions Scant

## 2023-06-22 NOTE — PROGRESS NOTES
Progress Note - General Surgery   Chester Posadas 67 y.o. female MRN: [id number]  Unit/Bed#: [facility] 14 Encounter: [id number]    Assessment:  67 y.o. F with multiple prior abdominal surgeries including open arlene-en-y gastric bypass, presents with perforated ileum and necrotic jejunum 2/2 internal hernia, s/p exploratory laparotomy, resection of J-J anastomosis, segmental ileum resection and abthera placement 6/19, followed by second loop laparotomy, YAMIL, SBR + appendectomy, abthera placement 6/19 for rising pressor requirements. Now s/p takeback ex lap, SBR, creation of anastomosis x 3 with abdominal closure 6/20    Acute blood loss anemia from intraoperative losses and received a total of 5uPRBCs, 2 FFP 6/19. Hgb today 7.6 from 8.6 however this appears dilutional at this time. Patient now in Afib, RVR overnight requiring initiation of amiodarone     UO 1.3 L   LUQ LUCY 55 cc serosanguinous drainage  RUQ LUCY 75 cc serosanguinous drainage  LLQ LUCY 650 cc serosanguinous drainage     Plan:  Wean to extubate per ICU  Maintain NPO/NGT on extubation - NGT to low continuous suction   Continue IVFs  Continue IV abx - cefepime/flagyl    Maintain abdominal binder x 2  Maintain LUCY drains, monitor output   Maintain sweeney, monitor output   Amiodarone for Afib with RVR   Sedation/analgesia per ICU  Therapeutic Lovenox       Subjective/Objective     Subjective: No acute events overnight. She is intubated, sedated and resting comfortably in bed. Objective:     Blood pressure 90/76, pulse 70, temperature 99 °F (37.2 °C), resp. rate 13, height 5' 3" (1.6 m), weight (!) 156 kg (343 lb 7.6 oz), SpO2 99 %. ,Body mass index is 60.84 kg/m².       Intake/Output Summary (Last 24 hours) at 6/22/2023 0629  Last data filed at 6/22/2023 2827  Gross per 24 hour   Intake 4468.71 ml   Output 2090 ml   Net 2378.71 ml       Invasive Devices     Central Venous Catheter Line  Duration           CVC Central Lines 06/19/23 Triple 2 days          Peripheral Intravenous Line  Duration           Peripheral IV 06/19/23 Right Hand 3 days    Peripheral IV 06/21/23 Right;Ventral (anterior) Forearm 1 day          Arterial Line  Duration           Arterial Line 06/19/23 Femoral 2 days          Drain  Duration           Urethral Catheter Latex 14 Fr. 3 days    Closed/Suction Drain Lateral;Left LUQ 19 Fr. 1 day    Closed/Suction Drain Lateral;Right RUQ Bulb 19 Fr. 1 day    Closed/Suction Drain Left LLQ Bulb 10 Fr. 1 day          Airway  Duration           ETT  Hi-Lo; Cuffed;Oral 7 mm 3 days                Physical Exam:   NAD, sedated  Normocephalic, atraumatic  Moist mucous membranes   ETT in place with bilious drainage   Tachycardia   Abd soft, nondistended, LUCY drains x 3 with serosanguinous drainage   Bolanos in place with clear yellow urine   No calf tenderness or peripheral edema  Skin is warm and dry          Lab, Imaging and other studies:  CBC:   Lab Results   Component Value Date    WBC 8.29 06/22/2023    HGB 7.6 (L) 06/22/2023    HCT 23.9 (L) 06/22/2023    MCV 86 06/22/2023     (L) 06/22/2023    RBC 2.78 (L) 06/22/2023    MCH 27.3 06/22/2023    MCHC 31.8 06/22/2023    RDW 16.8 (H) 06/22/2023    MPV 10.6 06/22/2023   , CMP:   Lab Results   Component Value Date    SODIUM 144 06/22/2023    K 3.6 06/22/2023     (H) 06/22/2023    CO2 26 06/22/2023    BUN 24 06/22/2023    CREATININE 0.79 06/22/2023    CALCIUM 7.0 (L) 06/22/2023    EGFR 74 06/22/2023   , Coagulation:   No results found for: "PT", "INR", "APTT"  VTE Pharmacologic Prophylaxis: Lovenox   VTE Mechanical Prophylaxis: sequential compression device

## 2023-06-22 NOTE — RESPIRATORY THERAPY NOTE
RT Ventilator Management Note  Altagracia Lamas 67 y.o. female MRN: [id number]  Unit/Bed#: [facility] 14 Encounter: [id number]      Daily Screen         6/21/2023  0717 6/22/2023  0842          Patient safety screen outcome[de-identified] Passed Passed (P)       Spont breathing trial % for 30 min: Yes --      RSBI: 27 --                Physical Exam:   Assessment Type: (P) Assess only  General Appearance: (P) Drowsy, Awake  Respiratory Pattern: (P) Assisted, Spontaneous  Chest Assessment: (P) Chest expansion symmetrical  Bilateral Breath Sounds: (P) Clear, Diminished (SCATTERED COARSE)  Suction: ET Tube  O2 Device: g5      Resp Comments: (P) Pt plaed on PS settings at this time. Pt slightly drowsy, no acute resp distress noted, SpO2 WNL. Will cont to monitor per resp protocol.

## 2023-06-23 LAB
ANION GAP SERPL CALCULATED.3IONS-SCNC: 2 MMOL/L
ANION GAP SERPL CALCULATED.3IONS-SCNC: 2 MMOL/L
ATRIAL RATE: 148 BPM
ATRIAL RATE: 161 BPM
BUN SERPL-MCNC: 15 MG/DL (ref 5–25)
BUN SERPL-MCNC: 19 MG/DL (ref 5–25)
CA-I BLD-SCNC: 0.98 MMOL/L (ref 1.12–1.32)
CALCIUM SERPL-MCNC: 7.3 MG/DL (ref 8.3–10.1)
CALCIUM SERPL-MCNC: 7.6 MG/DL (ref 8.3–10.1)
CHLORIDE SERPL-SCNC: 113 MMOL/L (ref 96–108)
CHLORIDE SERPL-SCNC: 116 MMOL/L (ref 96–108)
CO2 SERPL-SCNC: 27 MMOL/L (ref 21–32)
CO2 SERPL-SCNC: 30 MMOL/L (ref 21–32)
CREAT SERPL-MCNC: 0.72 MG/DL (ref 0.6–1.3)
CREAT SERPL-MCNC: 0.74 MG/DL (ref 0.6–1.3)
ERYTHROCYTE [DISTWIDTH] IN BLOOD BY AUTOMATED COUNT: 16.6 % (ref 11.6–15.1)
GFR SERPL CREATININE-BSD FRML MDRD: 80 ML/MIN/1.73SQ M
GFR SERPL CREATININE-BSD FRML MDRD: 83 ML/MIN/1.73SQ M
GLUCOSE SERPL-MCNC: 110 MG/DL (ref 65–140)
GLUCOSE SERPL-MCNC: 121 MG/DL (ref 65–140)
GLUCOSE SERPL-MCNC: 162 MG/DL (ref 65–140)
GLUCOSE SERPL-MCNC: 162 MG/DL (ref 65–140)
GLUCOSE SERPL-MCNC: 98 MG/DL (ref 65–140)
HCT VFR BLD AUTO: 25.5 % (ref 34.8–46.1)
HGB BLD-MCNC: 8.1 G/DL (ref 11.5–15.4)
MAGNESIUM SERPL-MCNC: 2.1 MG/DL (ref 1.6–2.6)
MAGNESIUM SERPL-MCNC: 2.6 MG/DL (ref 1.6–2.6)
MCH RBC QN AUTO: 27.5 PG (ref 26.8–34.3)
MCHC RBC AUTO-ENTMCNC: 31.8 G/DL (ref 31.4–37.4)
MCV RBC AUTO: 86 FL (ref 82–98)
PHOSPHATE SERPL-MCNC: 1.6 MG/DL (ref 2.3–4.1)
PHOSPHATE SERPL-MCNC: 2.5 MG/DL (ref 2.3–4.1)
PLATELET # BLD AUTO: 148 THOUSANDS/UL (ref 149–390)
PMV BLD AUTO: 10.3 FL (ref 8.9–12.7)
POTASSIUM SERPL-SCNC: 3.5 MMOL/L (ref 3.5–5.3)
POTASSIUM SERPL-SCNC: 3.5 MMOL/L (ref 3.5–5.3)
PR INTERVAL: 0 MS
PR INTERVAL: 0 MS
QRS AXIS: 18 DEGREES
QRS AXIS: 25 DEGREES
QRSD INTERVAL: 67 MS
QRSD INTERVAL: 71 MS
QT INTERVAL: 267 MS
QT INTERVAL: 283 MS
QTC INTERVAL: 413 MS
QTC INTERVAL: 422 MS
RBC # BLD AUTO: 2.95 MILLION/UL (ref 3.81–5.12)
SODIUM SERPL-SCNC: 145 MMOL/L (ref 135–147)
SODIUM SERPL-SCNC: 145 MMOL/L (ref 135–147)
T WAVE AXIS: 61 DEGREES
T WAVE AXIS: 64 DEGREES
VENTRICULAR RATE: 128 BPM
VENTRICULAR RATE: 150 BPM
WBC # BLD AUTO: 9.37 THOUSAND/UL (ref 4.31–10.16)

## 2023-06-23 PROCEDURE — 82948 REAGENT STRIP/BLOOD GLUCOSE: CPT

## 2023-06-23 PROCEDURE — 88307 TISSUE EXAM BY PATHOLOGIST: CPT | Performed by: PATHOLOGY

## 2023-06-23 PROCEDURE — C9113 INJ PANTOPRAZOLE SODIUM, VIA: HCPCS | Performed by: STUDENT IN AN ORGANIZED HEALTH CARE EDUCATION/TRAINING PROGRAM

## 2023-06-23 PROCEDURE — 36569 INSJ PICC 5 YR+ W/O IMAGING: CPT

## 2023-06-23 PROCEDURE — 80048 BASIC METABOLIC PNL TOTAL CA: CPT

## 2023-06-23 PROCEDURE — 93010 ELECTROCARDIOGRAM REPORT: CPT | Performed by: INTERNAL MEDICINE

## 2023-06-23 PROCEDURE — 99024 POSTOP FOLLOW-UP VISIT: CPT | Performed by: SURGERY

## 2023-06-23 PROCEDURE — 84100 ASSAY OF PHOSPHORUS: CPT

## 2023-06-23 PROCEDURE — 83735 ASSAY OF MAGNESIUM: CPT | Performed by: SURGERY

## 2023-06-23 PROCEDURE — 88302 TISSUE EXAM BY PATHOLOGIST: CPT | Performed by: PATHOLOGY

## 2023-06-23 PROCEDURE — 80048 BASIC METABOLIC PNL TOTAL CA: CPT | Performed by: PHYSICIAN ASSISTANT

## 2023-06-23 PROCEDURE — 84100 ASSAY OF PHOSPHORUS: CPT | Performed by: PHYSICIAN ASSISTANT

## 2023-06-23 PROCEDURE — C1751 CATH, INF, PER/CENT/MIDLINE: HCPCS

## 2023-06-23 PROCEDURE — 82330 ASSAY OF CALCIUM: CPT

## 2023-06-23 PROCEDURE — 85027 COMPLETE CBC AUTOMATED: CPT

## 2023-06-23 PROCEDURE — 83735 ASSAY OF MAGNESIUM: CPT

## 2023-06-23 PROCEDURE — 94760 N-INVAS EAR/PLS OXIMETRY 1: CPT

## 2023-06-23 RX ORDER — CALCIUM GLUCONATE 20 MG/ML
2 INJECTION, SOLUTION INTRAVENOUS ONCE
Status: COMPLETED | OUTPATIENT
Start: 2023-06-23 | End: 2023-06-23

## 2023-06-23 RX ORDER — FUROSEMIDE 10 MG/ML
20 INJECTION INTRAMUSCULAR; INTRAVENOUS ONCE
Status: COMPLETED | OUTPATIENT
Start: 2023-06-23 | End: 2023-06-23

## 2023-06-23 RX ORDER — ALBUMIN (HUMAN) 12.5 G/50ML
25 SOLUTION INTRAVENOUS ONCE
Status: COMPLETED | OUTPATIENT
Start: 2023-06-23 | End: 2023-06-23

## 2023-06-23 RX ADMIN — ENOXAPARIN SODIUM 60 MG: 60 INJECTION SUBCUTANEOUS at 21:16

## 2023-06-23 RX ADMIN — DEXMEDETOMIDINE HYDROCHLORIDE 0.2 MCG/KG/HR: 400 INJECTION INTRAVENOUS at 18:01

## 2023-06-23 RX ADMIN — METOROPROLOL TARTRATE 5 MG: 5 INJECTION, SOLUTION INTRAVENOUS at 21:45

## 2023-06-23 RX ADMIN — PANTOPRAZOLE SODIUM 40 MG: 40 INJECTION, POWDER, FOR SOLUTION INTRAVENOUS at 08:05

## 2023-06-23 RX ADMIN — CHLORHEXIDINE GLUCONATE 15 ML: 1.2 SOLUTION ORAL at 21:16

## 2023-06-23 RX ADMIN — LORAZEPAM 0.5 MG: 2 INJECTION INTRAMUSCULAR; INTRAVENOUS at 02:58

## 2023-06-23 RX ADMIN — METRONIDAZOLE 500 MG: 500 INJECTION, SOLUTION INTRAVENOUS at 01:04

## 2023-06-23 RX ADMIN — HYDROMORPHONE HYDROCHLORIDE 0.3 MG: 1 INJECTION, SOLUTION INTRAMUSCULAR; INTRAVENOUS; SUBCUTANEOUS at 02:53

## 2023-06-23 RX ADMIN — AMIODARONE HYDROCHLORIDE 0.5 MG/MIN: 50 INJECTION, SOLUTION INTRAVENOUS at 21:20

## 2023-06-23 RX ADMIN — Medication: at 21:28

## 2023-06-23 RX ADMIN — METRONIDAZOLE 500 MG: 500 INJECTION, SOLUTION INTRAVENOUS at 17:27

## 2023-06-23 RX ADMIN — AMIODARONE HYDROCHLORIDE 0.5 MG/MIN: 50 INJECTION, SOLUTION INTRAVENOUS at 03:03

## 2023-06-23 RX ADMIN — PANTOPRAZOLE SODIUM 40 MG: 40 INJECTION, POWDER, FOR SOLUTION INTRAVENOUS at 21:16

## 2023-06-23 RX ADMIN — INSULIN LISPRO 4 UNITS: 100 INJECTION, SOLUTION INTRAVENOUS; SUBCUTANEOUS at 18:02

## 2023-06-23 RX ADMIN — METOROPROLOL TARTRATE 5 MG: 5 INJECTION, SOLUTION INTRAVENOUS at 16:05

## 2023-06-23 RX ADMIN — CALCIUM GLUCONATE 2 G: 20 INJECTION, SOLUTION INTRAVENOUS at 11:55

## 2023-06-23 RX ADMIN — Medication: at 10:38

## 2023-06-23 RX ADMIN — DEXMEDETOMIDINE HYDROCHLORIDE 0.2 MCG/KG/HR: 400 INJECTION INTRAVENOUS at 04:08

## 2023-06-23 RX ADMIN — METRONIDAZOLE 500 MG: 500 INJECTION, SOLUTION INTRAVENOUS at 08:05

## 2023-06-23 RX ADMIN — FUROSEMIDE 20 MG: 10 INJECTION, SOLUTION INTRAMUSCULAR; INTRAVENOUS at 11:55

## 2023-06-23 RX ADMIN — CEFEPIME 2000 MG: 2 INJECTION, POWDER, FOR SOLUTION INTRAVENOUS at 16:05

## 2023-06-23 RX ADMIN — CEFEPIME 2000 MG: 2 INJECTION, POWDER, FOR SOLUTION INTRAVENOUS at 04:08

## 2023-06-23 RX ADMIN — ALBUMIN (HUMAN) 25 G: 0.25 INJECTION, SOLUTION INTRAVENOUS at 11:55

## 2023-06-23 RX ADMIN — CHLORHEXIDINE GLUCONATE 15 ML: 1.2 SOLUTION ORAL at 08:05

## 2023-06-23 RX ADMIN — ENOXAPARIN SODIUM 60 MG: 60 INJECTION SUBCUTANEOUS at 08:05

## 2023-06-23 RX ADMIN — POTASSIUM PHOSPHATE, MONOBASIC AND POTASSIUM PHOSPHATE, DIBASIC 30 MMOL: 224; 236 INJECTION, SOLUTION, CONCENTRATE INTRAVENOUS at 13:03

## 2023-06-23 NOTE — PROGRESS NOTES
Nutrition Follow-Up - updated TPN recommendations:    1.) Recommend to recheck phosphorus level s/p repletion prior to starting TPN     2.) Agree with standard TPN bag for day 1 (500mL 30% dextrose, 333mL 15% amino acids, 100mL 20% lipids). 3.) If electrolytes WNL and blood sugars <180, on day 2, increase to 700mL 15% amino acids (105g, 420 kcals), 600mL 30% dextrose (180g, 612 kcals) and 150mL 20% lipids (30g, 300 kcals). 4.) If electrolytes WNL and blood sugars <180, on day 3, increase to 750mL 30% dextrose (225g, 765kcals), 200mL 20% lipids (40g, 400 kcals), and continue 700mL 15% amino acids (105g, 420 kcals) for a total of 1585 kcals and 1650mL volume from macronutrients.

## 2023-06-23 NOTE — PROGRESS NOTES
Daily Progress Note - Critical Care   Eleuterio Lucas 67 y.o. female MRN: [id number]  Unit/Bed#: MICU 14 Encounter: [id number]        ----------------------------------------------------------------------------------------  HPI/24hr events: Pt is a 68 yo female with relevant PMH s/p multiple abdominal surgeries with [place] en [place] anatomy who presented with an internal small bowel hernia. Pt taken for ex-lap with small bowel resection, blood loss requiring transfusion. S/p 2 second second-look surgeries with small SBRs and closure. Extubated 6/22. Overnight, had a similar episode of afib with RVR and agitation. Amiodarone bolus was transitioned to amiodarone gtt and ativan PRN was administered. Pt persistently delirious and agitated, Precedex gtt initiated with improvement.  ---------------------------------------------------------------------------------------  SUBJECTIVE  Pt is awake and alert, but groggy and mildly confused. Reports that she has no pain. Denies all symptoms. Review of systems was reviewed and negative unless stated above in HPI/24-hour events   ---------------------------------------------------------------------------------------  Assessment and Plan:    Neuro:   • Pain /Sedation  ? Currently Precedex . 2, RASS goal 0  ? Dilaudid . 3 q 3 PRN  ? Multimodal analgesia  ? Delirium precautions  ? Ativan . 5 mg prn  • Anxiety/Depression  ? Holding home prozac     CV:   • Episodic Hypotension  ? Phenylephrine if pressor support is needed  ? MAP goal >65  ? Continuous cardiac monitoring   • Afib with RVR  ? Heparin  ? Amiodarone gtt @ 20  ? Metoprolol IV 5 mg q 6  ? Consider initiating digoxin if rate persistently >130  ? Holding home cardizem, eliquis  • HTN  ? Holding HCTZ, cardizem     Pulm:  • No active issues  ? Extubated 6/22  ? Currently on 2L NC, ween as tolerated  ?  Maintain O2 sat >92%     GI:   • Internal Hernia with Closed Loop Obstruction c/b J-J anastomosis site necrosis, hemoperitoneum and portal venous gas  ? S/p ex lap w/ SBR Fairy Beau Buitrago Nixon ileum resection) abthera 6/19   ? S/p ex lap w/ SBR (distal jejunem/ileum resection), appendectomy abthera 6/19  ? S/p ex lap w/ SBR 6/20, closure  ? Abdominal exam benign  • GERD   ? PPI     :   • Trend I/Os  • Renal function improving per labs  • Lasix 40 one-time dose 6/22  • UOP . 6 cc/kg/hrs     F/E/N:   • Replete electrolytes as needed  • Replete with Phos & Ca today  • Initiate TPN following PICC insertion  • NPO     Heme/Onc:   • ABLA  ? S/p 5 u prbc 3 FFP on 6/19  ? Monitor hgb  - 8.1 (7.6) (8.6) (10.7)     Endo:   • ISS Algorithm 5  ? Maintain -180     ID:   • Sepsis  ? On cefepime/flagyl day 5, d/c for tomorrow  ? BC x2 neg at 72 hours  ?  Urine culture negative     MSK/Skin:   • Frequent turning , repositioning    Patient appropriate for transfer out of the ICU today?: No  Disposition: Continue Critical Care   Code Status: Level 1 - Full Code  ---------------------------------------------------------------------------------------  ICU CORE MEASURES    Prophylaxis   VTE Pharmacologic Prophylaxis: Enoxaparin (Lovenox)  VTE Mechanical Prophylaxis: sequential compression device  Stress Ulcer Prophylaxis: Pantoprazole IV     Invasive Devices Review  Invasive Devices     Central Venous Catheter Line  Duration           CVC Central Lines 06/19/23 Triple 3 days          Peripheral Intravenous Line  Duration           Peripheral IV 06/19/23 Right Hand 4 days    Peripheral IV 06/21/23 Right;Ventral (anterior) Forearm 2 days          Arterial Line  Duration           Arterial Line 06/19/23 Femoral 3 days          Drain  Duration           Urethral Catheter Latex 14 Fr. 4 days    Closed/Suction Drain Lateral;Left LUQ 19 Fr. 2 days    Closed/Suction Drain Lateral;Right RUQ Bulb 19 Fr. 2 days    Closed/Suction Drain Left LLQ Bulb 10 Fr. 2 days    NG/OG/Enteral Tube Nasogastric 12 Fr Right nare 1 day ---------------------------------------------------------------------------------------  OBJECTIVE    Vitals   Vitals:    23 0200 23 0300 23 0400 23 0500   BP:       BP Location:       Pulse: (!) 118 (!) 132 94 (!) 114   Resp:  18 16   Temp:  98.2 °F (36.8 °C)     TempSrc:       SpO2: 96% 97% 95% 95%   Weight:       Height:         Temp (24hrs), Av °F (37.2 °C), Min:98.2 °F (36.8 °C), Max:99.4 °F (37.4 °C)  Current: Temperature: 98.2 °F (36.8 °C)  HR: 114  BP: 104/52  RR: 16  SpO2: 95%    Respiratory:  SpO2: SpO2: 95 %  Nasal Cannula O2 Flow Rate (L/min): 2 L/min    Invasive/non-invasive ventilation settings   Respiratory    Lab Data (Last 4 hours)    None         O2/Vent Data (Last 4 hours)    None                Physical Exam  Constitutional:       Appearance: She is obese. Comments: Sedated   HENT:      Head: Normocephalic and atraumatic. Right Ear: External ear normal.      Left Ear: External ear normal.   Eyes:      General: No scleral icterus. Right eye: No discharge. Left eye: No discharge. Cardiovascular:      Rate and Rhythm: Normal rate. Heart sounds: Normal heart sounds. Pulmonary:      Breath sounds: Normal breath sounds. Abdominal:      General: Abdomen is flat. There is no distension. Palpations: Abdomen is soft. Tenderness: There is no abdominal tenderness. There is no guarding or rebound. Comments: Incision covered under clean gauze without strikethrough. C/D/I, no surrounding erythema or induration. Drain x3 producing serosanguinous output. Skin:     General: Skin is warm and dry. Coloration: Skin is not jaundiced.                Laboratory and Diagnostics:  Results from last 7 days   Lab Units 23  0422 23  0435 23  0426 23  1641 23  1531 23  1345 23  1329 23  1228 23  0609 23  1808 23  1403 23  0710 23  0403 23  0111 06/18/23  2031   WBC Thousand/uL 9.37 8.29 8.99 11.25*  --   --   --   --  18.13* 23.01* 21.04*   < > 16.80*  --  18.09*   HEMOGLOBIN g/dL 8.1* 7.6* 8.6* 9.3*  --  8.5*  --   --  10.7* 11.7 12.7   < > 12.7  --  12.3   I STAT HEMOGLOBIN g/dl  --   --   --   --  8.5*  --  7.8*   < >  --   --   --   --   --    < >  --    HEMATOCRIT % 25.5* 23.9* 26.1* 28.4*  --  26.1*  --   --  31.8* 36.4 38.7   < > 40.6  --  39.8   HEMATOCRIT, ISTAT %  --   --   --   --  25*  --  23*   < >  --   --   --   --   --    < >  --    PLATELETS Thousands/uL 148* 116* 133* 158  --   --   --   --  223 249 251   < > 272  --  360   NEUTROS PCT %  --   --   --   --   --   --   --   --  89*  --  90*  --   --   --  88*   BANDS PCT %  --   --   --   --   --   --   --   --   --   --   --   --  10*  --   --    MONOS PCT %  --   --   --   --   --   --   --   --  5  --  5  --   --   --  4   MONO PCT %  --   --   --   --   --   --   --   --   --   --   --   --  3*  --   --    EOS PCT %  --   --   --   --   --   --   --   --  1  --  2  --  0  --  1    < > = values in this interval not displayed.      Results from last 7 days   Lab Units 06/23/23  0422 06/22/23  1616 06/22/23  1324 06/22/23  0435 06/21/23  0430 06/20/23  2305 06/20/23  1641 06/20/23  1531 06/20/23  1228 06/20/23  1018 06/19/23  2152 06/19/23  1916 06/19/23  1403 06/19/23  0405 06/19/23  0111 06/18/23 2031   SODIUM mmol/L 145 144  --  144 144 144 143  --   --  142   < >  --    < > 140  --  138   POTASSIUM mmol/L 3.5 3.6  --  3.6 3.9 4.0 3.5  --   --  3.7   < >  --    < > 4.4  --  3.8   CHLORIDE mmol/L 116* 114*  --  118* 117* 116* 114*  --   --  112*   < >  --    < > 113*  --  105   CO2 mmol/L 27 25  --  26 24 24 22  --   --  20*   < >  --    < > 19*  --  28   CO2, I-STAT mmol/L  --   --   --   --   --   --   --  21   < >  --   --   --   --   --    < >  --    ANION GAP mmol/L 2 5  --  0 3 4 7  --   --  10   < >  --    < > 8  --  5   BUN mg/dL 19 20  --  24 34* 34* 35*  --   --  34*   < >  -- < > 25  --  27*   CREATININE mg/dL 0.74 0.76  --  0.79 1.04 1.19 1.29  --   --  1.64*   < >  --    < > 1.13  --  1.06   CALCIUM mg/dL 7.3* 7.5*  --  7.0* 7.2* 7.1* 7.3*  --   --  7.5*   < >  --    < > 8.7  --  9.1   GLUCOSE RANDOM mg/dL 110 112  --  130 154* 153* 121  --   --  185*   < >  --    < > 163*  --  188*   ALT U/L  --   --  16  --   --   --  19  --   --   --   --  36  --  58  --  28   AST U/L  --   --  22  --   --   --  25  --   --   --   --  45  --  128*  --  26   ALK PHOS U/L  --   --  55  --   --   --  51  --   --   --   --  61  --  95  --  141*   ALBUMIN g/dL  --   --  2.3*  --   --   --  2.6*  --   --   --   --  2.3*  --  2.5*  --  3.5   TOTAL BILIRUBIN mg/dL  --   --  0.51  --   --   --  0.73  --   --   --   --  0.88  --  0.67  --  0.26    < > = values in this interval not displayed.      Results from last 7 days   Lab Units 06/23/23  0422 06/22/23  1616 06/22/23  0435 06/21/23  0426 06/20/23  1641 06/20/23  0649 06/19/23  1403 06/19/23  0405   MAGNESIUM mg/dL 2.6 2.6 3.0* 2.6 2.8* 2.6 1.8 1.6   PHOSPHORUS mg/dL 1.6*  --  1.5* 2.6 3.8 4.2* 5.3* 4.9*      Results from last 7 days   Lab Units 06/19/23  0807 06/19/23  0405 06/19/23  0239   INR  1.10 1.16 1.02   PTT seconds 25  --  24          Results from last 7 days   Lab Units 06/20/23  2305 06/20/23 2003 06/20/23  1641 06/20/23  0908 06/20/23  0700 06/20/23  0106 06/19/23  2152   LACTIC ACID mmol/L 1.9 1.7 2.6* 5.7* 5.2* 5.9* 7.9*     ABG:  Results from last 7 days   Lab Units 06/21/23  0420   PH ART  7.401   PCO2 ART mm Hg 38.9   PO2 ART mm Hg 91.7   HCO3 ART mmol/L 23.6   BASE EXC ART mmol/L -1.0   ABG SOURCE  Line, Arterial     VBG:  Results from last 7 days   Lab Units 06/21/23  0420 06/19/23  1641 06/19/23  1407   PH SHAKIRA   --   --  7.152*   PCO2 SHAKIRA mm Hg  --   --  50.2*   PO2 SHAKIRA mm Hg  --   --  39.2   HCO3 SHAKIRA mmol/L  --   --  17.2*   BASE EXC SHAKIRA mmol/L  --   --  -11.6   ABG SOURCE  Line, Arterial   < >  --     < > = values in this interval not displayed. Micro  Results from last 7 days   Lab Units 06/19/23  1353 06/19/23  0405   BLOOD CULTURE  No Growth at 72 hrs. No Growth at 72 hrs.  --    URINE CULTURE   --  No Growth <1000 cfu/mL       EKG: Afib with RVR 6/22  Imaging: I have personally reviewed pertinent reports. Questionable mild pulmonary venous congestion. Left retrocardiac opacity may represent volume loss, infiltrate and/or effusion. CXR 6/22    Intake and Output  I/O       06/21 0701  06/22 0700 06/22 0701  06/23 0700    I.V. (mL/kg) 3302.5 (21.2) 1393.9 (8.9)    NG/GT 0     IV Piggyback 1296.3 851.3    Total Intake(mL/kg) 4598.7 (29.5) 2245.2 (14.4)    Urine (mL/kg/hr) 1460 (0.4) 4130 (1.1)    Drains 900 740    Total Output 2360 4870    Net +2238.7 -2624.8              UOP: 1.1 ml/hr     Height and Weights   Height: 5' 3" (160 cm)  IBW (Ideal Body Weight): 52.4 kg  Body mass index is 60.84 kg/m². Weight (last 2 days)     Date/Time Weight    06/22/23 0600 156 (343.48)            Nutrition       Diet Orders   (From admission, onward)             Start     Ordered    06/19/23 0342  Diet NPO  (Order Panel)  Diet effective midnight        References:    Adult Nutrition Support Algorithm    RD Therapeutic Diet Order Protocol   Question Answer Comment   Diet Type NPO    RD to adjust diet per protocol?  Yes        06/19/23 0341                  Active Medications  Scheduled Meds:  Current Facility-Administered Medications   Medication Dose Route Frequency Provider Last Rate   • acetylcysteine  3 mL Nebulization BID PRN Corrinne Rising, CRNP     • Adult 3-in-1 TPN (standard base / standard electrolytes)   Intravenous Continuous TPN Gina Verdugo MD Stopped (06/22/23 2109)   • amiodarone (CORDARONE) 900 mg in dextrose 5 % 500 mL infusion  0.5 mg/min Intravenous Continuous Billy ALESSANDRA Khan-RAUL 0.5 mg/min (06/23/23 0501)   • cefepime  2,000 mg Intravenous Q12H Mary Cardona  mL/hr at 06/23/23 0505   • chlorhexidine  15 mL Mouth/Throat Q12H [facility] Roslyn Artie Brunner, DO     • dexmedetomidine  0.1-0.7 mcg/kg/hr Intravenous Titrated Denae Kelley PA-C 0.2 mcg/kg/hr (06/23/23 0501)   • enoxaparin  60 mg Subcutaneous Q12H [facility] MOLLY Lerma     • HYDROmorphone  0.3 mg Intravenous Q3H PRN Arturo Mejia MD     • insulin lispro  4-20 Units Subcutaneous Q6H [facility] Clive Jaimes PA-C     • LORazepam  0.5 mg Intravenous Q4H PRN Clive Jaimes PA-C     • metoprolol  5 mg Intravenous Q6H MOLLY Lerma     • metroNIDAZOLE  500 mg Intravenous Q8H Shannon Cardona  mL/hr at 06/23/23 0501   • ondansetron  4 mg Intravenous Q6H PRN Tanner Perry DO     • pantoprazole  40 mg Intravenous Q12H [facility] Shannon Cardona DO       Continuous Infusions:  Adult 3-in-1 TPN (standard base / standard electrolytes), , Last Rate: Stopped (06/22/23 2109)  amiodarone (CORDARONE) 900 mg in dextrose 5 % 500 mL infusion, 0.5 mg/min, Last Rate: 0.5 mg/min (06/23/23 0501)  dexmedetomidine, 0.1-0.7 mcg/kg/hr, Last Rate: 0.2 mcg/kg/hr (06/23/23 0501)      PRN Meds:   acetylcysteine, 3 mL, BID PRN  HYDROmorphone, 0.3 mg, Q3H PRN  LORazepam, 0.5 mg, Q4H PRN  ondansetron, 4 mg, Q6H PRN        Allergies   No Known Allergies  ---------------------------------------------------------------------------------------  Care Time Delivered:   Upon my evaluation, this patient had a high probability of imminent or life-threatening deterioration due to hemodynamic instability, which required my direct attention, intervention, and personal management. I have personally provided 30 minutes of critical care time, exclusive of procedures, teaching, family meetings, and any prior time recorded by providers other than myself. Jenniferrachel Linder      Portions of the record may have been created with voice recognition software.   Occasional wrong word or "sound a like" substitutions may have occurred due to the inherent limitations of voice recognition software.   Read the chart carefully and recognize, using context, where substitutions have occurred

## 2023-06-23 NOTE — PROCEDURES
Insert Complex Venous Access Line    Date/Time: 6/23/2023 10:08 AM    Performed by: Ab Schwartz RN  Authorized by: Gina Verdugo MD    Patient location:  Bedside  Other Assisting Provider: Yes (comment) Hollis Gan RN)    Consent:     Consent obtained:  Written (provider obtained)    Consent given by:  Healthcare agent (Daughter)    Risks discussed:  Arterial puncture, bleeding, infection, incorrect placement, nerve damage and pneumothorax (Provider and [facility] RN discussed)    Alternatives discussed:  No treatment (Provider discussed)  Universal protocol:     Procedure explained and questions answered to patient or proxy's satisfaction: yes      Relevant documents present and verified: yes      Test results available and properly labeled: yes      Radiology Images displayed and confirmed. If images not available, report reviewed: yes      Required blood products, implants, devices, and special equipment available: yes      Site/side marked: yes      Immediately prior to procedure, a time out was called: yes      Patient identity confirmed:  Verbally with patient, arm band, provided demographic data and hospital-assigned identification number  Pre-procedure details:     Hand hygiene: Hand hygiene performed prior to insertion      Sterile barrier technique: All elements of maximal sterile technique followed      Skin preparation:  ChloraPrep    Skin preparation agent: Skin preparation agent completely dried prior to procedure    Indications:     PICC line indications: total parenteral nutrition    Sedation:     Sedation type: None. Anesthesia (see MAR for exact dosages):      Anesthesia method:  Local infiltration    Local anesthetic:  Lidocaine 1% w/o epi (3 mL administered)  Procedure details:     Location:  Basilic    Vessel type: vein      Laterality:  Left    Site selection rationale:  Largest, most patent vessel    Approach: percutaneous technique used      Patient position:  Flat    Procedural supplies:  Double lumen    Catheter size:  5 Fr    Landmarks identified: yes      Ultrasound guidance: yes      Ultrasound image availability:  Still images obtained (33% vessel occupancy)    Sterile ultrasound techniques: Sterile gel and sterile probe covers were used      Number of attempts:  1    Successful placement: yes      Vessel of catheter tip end:  Sherlock 3CG confirmed (OK to use. Sherlock 3CG confirmed placement. Results saved to chart.)    Total catheter length (cm):  48    Catheter out on skin (cm):  0    Max flow rate:  999 mL/hr    Arm circumference:  39.5  Post-procedure details:     Post-procedure:  Dressing applied    Assessment:  Blood return through all ports and free fluid flow    Patient tolerance of procedure:   Tolerated well, no immediate complications

## 2023-06-23 NOTE — PROGRESS NOTES
Progress Note - General Surgery   Cristela Carmichael 67 y.o. female MRN: [id number]  [id number]    Assessment:  67 y.o. F with multiple prior abdominal surgeries including open arlene-en-y gastric bypass, presents with perforated ileum and necrotic jejunum 2/2 internal hernia, s/p exploratory laparotomy, resection of J-J anastomosis, segmental ileum resection and abthera placement 6/19, followed by second loop laparotomy, YAMIL, SBR + appendectomy, abthera placement 6/19 for rising pressor requirements. Now s/p takeback ex lap, SBR, creation of anastomosis x 3 with abdominal closure 6/20    Acute blood loss anemia from intraoperative losses and received a total of 5uPRBCs, 2 FFP 6/19. Hgb stable at 8.1 today. UO 4.2 L   LUQ LUCY 25 cc serosanguinous drainage  RUQ LUCY 75 cc serosanguinous drainage  LLQ LUCY 715 cc serosanguinous drainage     Plan:  NPO/NGT  PICC placement today, initiate TPN today   D/c sweeney  Maintain LUCY drains, monitor output  Pulmonary toilet   Maintain abdominal binder  DVT ppx   Holding Eliquis (on for Afib), consider heparin gtt given hgb stability  Continue abx through 6/24 - Cefepime/flagyl         Subjective/Objective     Subjective: No acute events overnight. She is denying pain this morning. No fevers or chills     Objective:     Blood pressure 106/68, pulse (!) 124, temperature 98.2 °F (36.8 °C), resp. rate 17, height 5' 3" (1.6 m), weight (!) 156 kg (343 lb 7.6 oz), SpO2 96 %. ,Body mass index is 60.84 kg/m².       Intake/Output Summary (Last 24 hours) at 6/23/2023 0835  Last data filed at 6/23/2023 0810  Gross per 24 hour   Intake 2028.86 ml   Output 5135 ml   Net -3106.14 ml       Invasive Devices     Central Venous Catheter Line  Duration           CVC Central Lines 06/19/23 Triple 3 days          Peripheral Intravenous Line  Duration           Peripheral IV 06/19/23 Right Hand 4 days    Peripheral IV 06/21/23 Right;Ventral (anterior) Forearm 2 days Arterial Line  Duration           Arterial Line 06/19/23 Femoral 3 days          Drain  Duration           Urethral Catheter Latex 14 Fr. 4 days    Closed/Suction Drain Lateral;Left LUQ 19 Fr. 2 days    Closed/Suction Drain Lateral;Right RUQ Bulb 19 Fr. 2 days    Closed/Suction Drain Left LLQ Bulb 10 Fr. 2 days    NG/OG/Enteral Tube Nasogastric 12 Fr Right nare 1 day                Physical Exam:   NAD, alert and oriented x1  Normocephalic, atraumatic  Moist mucous membranes   NGT in place with bilious drainage  Norm resp effort on 2 L NC  Regular rate  Abd soft, nondistended, nontender, midline incision with serosanguinous drainage  Abdominal LUCY drains x 3 with serosanguinous drainage   Bolanos in place with clear yellow urine   1+ peripheral edema   Slight R sided facial droop  Skin is warm and dry          Lab, Imaging and other studies:  CBC:   Lab Results   Component Value Date    WBC 9.37 06/23/2023    HGB 8.1 (L) 06/23/2023    HCT 25.5 (L) 06/23/2023    MCV 86 06/23/2023     (L) 06/23/2023    RBC 2.95 (L) 06/23/2023    MCH 27.5 06/23/2023    MCHC 31.8 06/23/2023    RDW 16.6 (H) 06/23/2023    MPV 10.3 06/23/2023   , CMP:   Lab Results   Component Value Date    SODIUM 145 06/23/2023    K 3.5 06/23/2023     (H) 06/23/2023    CO2 27 06/23/2023    BUN 19 06/23/2023    CREATININE 0.74 06/23/2023    CALCIUM 7.3 (L) 06/23/2023    AST 22 06/22/2023    ALT 16 06/22/2023    ALKPHOS 55 06/22/2023    EGFR 80 06/23/2023   , Coagulation:   No results found for: "PT", "INR", "APTT"  VTE Pharmacologic Prophylaxis: Lovenox   VTE Mechanical Prophylaxis: sequential compression device

## 2023-06-23 NOTE — RESPIRATORY THERAPY NOTE
RT Protocol Note  Myriam Edwards 67 y.o. female MRN: [id number]  Unit/Bed#: MICU 14 Encounter: [id number]    Assessment    Principal Problem:    Idiopathic acute pancreatitis without infection or necrosis  Active Problems:    Permanent atrial fibrillation (HCC)    GILDARDO (obstructive sleep apnea)    Hypertension    Stage 3 chronic kidney disease, unspecified whether stage 3a or 3b CKD (HCC)      Home Pulmonary Medications:         Past Medical History:   Diagnosis Date    A-fib ([address])     Arthritis     Atrial fibrillation (HCC)     Cervical spondylosis with myelopathy     Gastric bypass status for obesity     History of transfusion     35 years ago    Hypertension     mass right kidney     Renal cell adenocarcinoma (HCC)     RIGHT    Sleep apnea      Social History     Socioeconomic History    Marital status:      Spouse name: None    Number of children: None    Years of education: None    Highest education level: None   Occupational History    None   Tobacco Use    Smoking status: Never    Smokeless tobacco: Never   Vaping Use    Vaping Use: Never used   Substance and Sexual Activity    Alcohol use: Not Currently     Alcohol/week: 0.0 standard drinks of alcohol     Comment: 0    Drug use: No    Sexual activity: None   Other Topics Concern    None   Social History Narrative    ** Merged History Encounter **          Social Determinants of Health     Financial Resource Strain: Not on file   Food Insecurity: No Food Insecurity (6/20/2023)    Hunger Vital Sign     Worried About Running Out of Food in the Last Year: Never true     Ran Out of Food in the Last Year: Never true   Transportation Needs: No Transportation Needs (6/20/2023)    PRAPARE - Transportation     Lack of Transportation (Medical): No     Lack of Transportation (Non-Medical):  No   Physical Activity: Not on file   Stress: Not on file   Social Connections: Not on file   Intimate Partner Violence: Not on file   Housing Stability: Low Risk  (6/20/2023) Housing Stability Vital Sign     Unable to Pay for Housing in the Last Year: No     Number of Places Lived in the Last Year: 1     Unstable Housing in the Last Year: No       Subjective         Objective    Physical Exam:   Assessment Type: Assess only  General Appearance: Awake  Respiratory Pattern: Normal  Chest Assessment: Chest expansion symmetrical  Bilateral Breath Sounds: Clear, Diminished    Vitals:  Blood pressure (!) 82/67, pulse (!) 128, temperature 98.2 °F (36.8 °C), resp. rate 14, height 5' 3" (1.6 m), weight (!) 156 kg (343 lb 7.6 oz), SpO2 96 %. Results from last 7 days   Lab Units 06/21/23  0420 06/19/23  0807 06/19/23  0519   PH ART  7.401   < > 7.212*   PCO2 ART mm Hg 38.9   < > 43.9   PO2 ART mm Hg 91.7   < > 162.8*   HCO3 ART mmol/L 23.6   < > 17.2*   BASE EXC ART mmol/L -1.0   < > -10.2   O2 CONTENT ART mL/dL 12.6*   < > 18.9   O2 HGB, ARTERIAL % 95.9   < > 97.8*   ABG SOURCE  Line, Arterial   < > Line, Arterial   JESSIE TEST   --   --  Yes    < > = values in this interval not displayed. Imaging and other studies: I have personally reviewed pertinent reports. O2 Device: g5     Plan    Respiratory Plan: Discontinue Protocol        Resp Comments: Pt in no acute resp distress. Pt extubated 8/99, no resp complications throughout night. SpO2 WNL on 2l NC. BS clear and decreased, no indication for mucomyst at this time. Resp protocol will be D/C.

## 2023-06-24 ENCOUNTER — APPOINTMENT (INPATIENT)
Dept: RADIOLOGY | Facility: HOSPITAL | Age: 73
DRG: 853 | End: 2023-06-24
Payer: MEDICARE

## 2023-06-24 LAB
ANION GAP SERPL CALCULATED.3IONS-SCNC: 2 MMOL/L
ANION GAP SERPL CALCULATED.3IONS-SCNC: 3 MMOL/L
APTT PPP: 31 SECONDS (ref 23–37)
APTT PPP: 44 SECONDS (ref 23–37)
BACTERIA BLD CULT: NORMAL
BACTERIA BLD CULT: NORMAL
BUN SERPL-MCNC: 15 MG/DL (ref 5–25)
BUN SERPL-MCNC: 16 MG/DL (ref 5–25)
CA-I BLD-SCNC: 0.99 MMOL/L (ref 1.12–1.32)
CALCIUM SERPL-MCNC: 7.7 MG/DL (ref 8.3–10.1)
CALCIUM SERPL-MCNC: 8 MG/DL (ref 8.3–10.1)
CHLORIDE SERPL-SCNC: 109 MMOL/L (ref 96–108)
CHLORIDE SERPL-SCNC: 112 MMOL/L (ref 96–108)
CO2 SERPL-SCNC: 30 MMOL/L (ref 21–32)
CO2 SERPL-SCNC: 32 MMOL/L (ref 21–32)
CREAT SERPL-MCNC: 0.7 MG/DL (ref 0.6–1.3)
CREAT SERPL-MCNC: 0.72 MG/DL (ref 0.6–1.3)
ERYTHROCYTE [DISTWIDTH] IN BLOOD BY AUTOMATED COUNT: 16.4 % (ref 11.6–15.1)
GFR SERPL CREATININE-BSD FRML MDRD: 83 ML/MIN/1.73SQ M
GFR SERPL CREATININE-BSD FRML MDRD: 86 ML/MIN/1.73SQ M
GLUCOSE SERPL-MCNC: 167 MG/DL (ref 65–140)
GLUCOSE SERPL-MCNC: 176 MG/DL (ref 65–140)
GLUCOSE SERPL-MCNC: 178 MG/DL (ref 65–140)
GLUCOSE SERPL-MCNC: 209 MG/DL (ref 65–140)
HCT VFR BLD AUTO: 26.7 % (ref 34.8–46.1)
HGB BLD-MCNC: 8.7 G/DL (ref 11.5–15.4)
INR PPP: 1.19 (ref 0.84–1.19)
MAGNESIUM SERPL-MCNC: 2 MG/DL (ref 1.6–2.6)
MCH RBC QN AUTO: 27.9 PG (ref 26.8–34.3)
MCHC RBC AUTO-ENTMCNC: 32.6 G/DL (ref 31.4–37.4)
MCV RBC AUTO: 86 FL (ref 82–98)
PHOSPHATE SERPL-MCNC: 1.7 MG/DL (ref 2.3–4.1)
PLATELET # BLD AUTO: 180 THOUSANDS/UL (ref 149–390)
PMV BLD AUTO: 10.8 FL (ref 8.9–12.7)
POTASSIUM SERPL-SCNC: 3.2 MMOL/L (ref 3.5–5.3)
POTASSIUM SERPL-SCNC: 3.4 MMOL/L (ref 3.5–5.3)
PROTHROMBIN TIME: 15.3 SECONDS (ref 11.6–14.5)
RBC # BLD AUTO: 3.12 MILLION/UL (ref 3.81–5.12)
SODIUM SERPL-SCNC: 144 MMOL/L (ref 135–147)
SODIUM SERPL-SCNC: 144 MMOL/L (ref 135–147)
WBC # BLD AUTO: 9.18 THOUSAND/UL (ref 4.31–10.16)

## 2023-06-24 PROCEDURE — 99291 CRITICAL CARE FIRST HOUR: CPT | Performed by: EMERGENCY MEDICINE

## 2023-06-24 PROCEDURE — 82948 REAGENT STRIP/BLOOD GLUCOSE: CPT

## 2023-06-24 PROCEDURE — 94002 VENT MGMT INPAT INIT DAY: CPT

## 2023-06-24 PROCEDURE — 94760 N-INVAS EAR/PLS OXIMETRY 1: CPT

## 2023-06-24 PROCEDURE — 83735 ASSAY OF MAGNESIUM: CPT | Performed by: PHYSICIAN ASSISTANT

## 2023-06-24 PROCEDURE — 82330 ASSAY OF CALCIUM: CPT | Performed by: PHYSICIAN ASSISTANT

## 2023-06-24 PROCEDURE — 80048 BASIC METABOLIC PNL TOTAL CA: CPT | Performed by: PHYSICIAN ASSISTANT

## 2023-06-24 PROCEDURE — 84100 ASSAY OF PHOSPHORUS: CPT | Performed by: PHYSICIAN ASSISTANT

## 2023-06-24 PROCEDURE — C9113 INJ PANTOPRAZOLE SODIUM, VIA: HCPCS | Performed by: STUDENT IN AN ORGANIZED HEALTH CARE EDUCATION/TRAINING PROGRAM

## 2023-06-24 PROCEDURE — 99024 POSTOP FOLLOW-UP VISIT: CPT | Performed by: SURGERY

## 2023-06-24 PROCEDURE — 85730 THROMBOPLASTIN TIME PARTIAL: CPT | Performed by: EMERGENCY MEDICINE

## 2023-06-24 PROCEDURE — 85610 PROTHROMBIN TIME: CPT | Performed by: PHYSICIAN ASSISTANT

## 2023-06-24 PROCEDURE — 85027 COMPLETE CBC AUTOMATED: CPT | Performed by: PHYSICIAN ASSISTANT

## 2023-06-24 PROCEDURE — 85730 THROMBOPLASTIN TIME PARTIAL: CPT | Performed by: PHYSICIAN ASSISTANT

## 2023-06-24 PROCEDURE — 74018 RADEX ABDOMEN 1 VIEW: CPT

## 2023-06-24 RX ORDER — POTASSIUM CHLORIDE 29.8 MG/ML
40 INJECTION INTRAVENOUS ONCE
Status: COMPLETED | OUTPATIENT
Start: 2023-06-24 | End: 2023-06-24

## 2023-06-24 RX ORDER — HEPARIN SODIUM 10000 [USP'U]/100ML
3-20 INJECTION, SOLUTION INTRAVENOUS
Status: DISCONTINUED | OUTPATIENT
Start: 2023-06-24 | End: 2023-06-26

## 2023-06-24 RX ORDER — FUROSEMIDE 10 MG/ML
20 INJECTION INTRAMUSCULAR; INTRAVENOUS ONCE
Status: COMPLETED | OUTPATIENT
Start: 2023-06-24 | End: 2023-06-24

## 2023-06-24 RX ORDER — CALCIUM GLUCONATE 20 MG/ML
2 INJECTION, SOLUTION INTRAVENOUS ONCE
Status: COMPLETED | OUTPATIENT
Start: 2023-06-24 | End: 2023-06-24

## 2023-06-24 RX ORDER — MAGNESIUM SULFATE HEPTAHYDRATE 40 MG/ML
2 INJECTION, SOLUTION INTRAVENOUS ONCE
Status: COMPLETED | OUTPATIENT
Start: 2023-06-24 | End: 2023-06-24

## 2023-06-24 RX ADMIN — CHLORHEXIDINE GLUCONATE 15 ML: 1.2 SOLUTION ORAL at 09:30

## 2023-06-24 RX ADMIN — HYDROMORPHONE HYDROCHLORIDE 0.3 MG: 1 INJECTION, SOLUTION INTRAMUSCULAR; INTRAVENOUS; SUBCUTANEOUS at 07:37

## 2023-06-24 RX ADMIN — METRONIDAZOLE 500 MG: 500 INJECTION, SOLUTION INTRAVENOUS at 01:26

## 2023-06-24 RX ADMIN — ENOXAPARIN SODIUM 60 MG: 60 INJECTION SUBCUTANEOUS at 09:30

## 2023-06-24 RX ADMIN — METRONIDAZOLE 500 MG: 500 INJECTION, SOLUTION INTRAVENOUS at 09:44

## 2023-06-24 RX ADMIN — ONDANSETRON 4 MG: 2 INJECTION INTRAMUSCULAR; INTRAVENOUS at 17:48

## 2023-06-24 RX ADMIN — POTASSIUM PHOSPHATE, MONOBASIC AND POTASSIUM PHOSPHATE, DIBASIC 30 MMOL: 224; 236 INJECTION, SOLUTION, CONCENTRATE INTRAVENOUS at 09:32

## 2023-06-24 RX ADMIN — INSULIN LISPRO 4 UNITS: 100 INJECTION, SOLUTION INTRAVENOUS; SUBCUTANEOUS at 06:59

## 2023-06-24 RX ADMIN — CEFEPIME 2000 MG: 2 INJECTION, POWDER, FOR SOLUTION INTRAVENOUS at 14:50

## 2023-06-24 RX ADMIN — DEXMEDETOMIDINE HYDROCHLORIDE 0.1 MCG/KG/HR: 400 INJECTION INTRAVENOUS at 04:02

## 2023-06-24 RX ADMIN — FUROSEMIDE 20 MG: 10 INJECTION, SOLUTION INTRAMUSCULAR; INTRAVENOUS at 11:03

## 2023-06-24 RX ADMIN — PANTOPRAZOLE SODIUM 40 MG: 40 INJECTION, POWDER, FOR SOLUTION INTRAVENOUS at 09:30

## 2023-06-24 RX ADMIN — HEPARIN SODIUM 11.1 UNITS/KG/HR: 10000 INJECTION, SOLUTION INTRAVENOUS at 11:27

## 2023-06-24 RX ADMIN — CEFEPIME 2000 MG: 2 INJECTION, POWDER, FOR SOLUTION INTRAVENOUS at 03:14

## 2023-06-24 RX ADMIN — INSULIN LISPRO 4 UNITS: 100 INJECTION, SOLUTION INTRAVENOUS; SUBCUTANEOUS at 18:25

## 2023-06-24 RX ADMIN — PANTOPRAZOLE SODIUM 40 MG: 40 INJECTION, POWDER, FOR SOLUTION INTRAVENOUS at 20:57

## 2023-06-24 RX ADMIN — POTASSIUM CHLORIDE 40 MEQ: 29.8 INJECTION, SOLUTION INTRAVENOUS at 14:51

## 2023-06-24 RX ADMIN — METOROPROLOL TARTRATE 5 MG: 5 INJECTION, SOLUTION INTRAVENOUS at 14:33

## 2023-06-24 RX ADMIN — METRONIDAZOLE 500 MG: 500 INJECTION, SOLUTION INTRAVENOUS at 16:35

## 2023-06-24 RX ADMIN — CHLORHEXIDINE GLUCONATE 15 ML: 1.2 SOLUTION ORAL at 21:01

## 2023-06-24 RX ADMIN — METOROPROLOL TARTRATE 5 MG: 5 INJECTION, SOLUTION INTRAVENOUS at 21:45

## 2023-06-24 RX ADMIN — CALCIUM GLUCONATE 2 G: 20 INJECTION, SOLUTION INTRAVENOUS at 07:37

## 2023-06-24 RX ADMIN — Medication: at 20:57

## 2023-06-24 RX ADMIN — MAGNESIUM SULFATE HEPTAHYDRATE 2 G: 40 INJECTION, SOLUTION INTRAVENOUS at 07:37

## 2023-06-24 RX ADMIN — LORAZEPAM 0.5 MG: 2 INJECTION INTRAMUSCULAR; INTRAVENOUS at 20:57

## 2023-06-24 RX ADMIN — METOROPROLOL TARTRATE 5 MG: 5 INJECTION, SOLUTION INTRAVENOUS at 04:02

## 2023-06-24 RX ADMIN — INSULIN LISPRO 4 UNITS: 100 INJECTION, SOLUTION INTRAVENOUS; SUBCUTANEOUS at 12:28

## 2023-06-24 NOTE — PROGRESS NOTES
Progress Note - General Surgery   Michell Gandhi 67 y.o. female MRN: [id number]  Unit/Bed#: [facility] 14 Encounter: [id number]    Assessment:  67 y.o. F with multiple prior abdominal surgeries including open arlene-en-y gastric bypass, presents with perforated ileum and necrotic jejunum 2/2 internal hernia, s/p exploratory laparotomy, resection of J-J anastomosis, segmental ileum resection and abthera placement , followed by second loop laparotomy, YAMIL, SBR + appendectomy, abthera placement  for rising pressor requirements. Now s/p takeback ex lap, SBR, creation of anastomosis x 3 with abdominal closure     Acute blood loss anemia from intraoperative losses and received a total of 5uPRBCs, 2 FFP . Hgb stable at 8.7 today from 8.1    N cc bilious  UOP: 3.6 L   LUQ LUCY 180 from 25 cc serosanguinous drainage  RUQ LUCY 40 from 75 cc serosanguinous drainage  LLQ LUCY  405 from required 715 cc serosanguinous drainage     Plan:  NPO/NGT  PICC placement today, initiate TPN today   Maintain LUCY drains x3, monitor output  Pulmonary toilet   Maintain abdominal binder  Delirium precautions  DVT ppx   Holding Eliquis (on for Afib), consider heparin gtt given hgb stability  Continue abx through  - Cefepime/flagyl         Subjective/Objective     Subjective: Denies complaints. Pulled NG out overnight which was replaced in good position  Objective:     Blood pressure 133/69, pulse (!) 108, temperature 98.2 °F (36.8 °C), temperature source Oral, resp. rate (!) 23, height 5' 3" (1.6 m), weight (!) 156 kg (343 lb 7.6 oz), SpO2 91 %. ,Body mass index is 60.84 kg/m².       Intake/Output Summary (Last 24 hours) at 2023 0716  Last data filed at 2023 0701  Gross per 24 hour   Intake 2403.83 ml   Output 5070 ml   Net -2666.17 ml       Invasive Devices     Peripherally Inserted Central Catheter Line  Duration           PICC Line 13/24/15 Left Basilic <1 day          Central Venous Catheter Line  Duration           CVC Central Lines 06/19/23 Triple 4 days          Peripheral Intravenous Line  Duration           Peripheral IV 06/21/23 Right;Ventral (anterior) Forearm 3 days          Arterial Line  Duration           Arterial Line 06/19/23 Femoral 4 days          Drain  Duration           Urethral Catheter Latex 14 Fr. 5 days    Closed/Suction Drain Lateral;Left LUQ 19 Fr. 3 days    Closed/Suction Drain Lateral;Right RUQ Bulb 19 Fr. 3 days    Closed/Suction Drain Left LLQ Bulb 10 Fr. 3 days    NG/OG/Enteral Tube Nasogastric 12 Fr Right nare 2 days                Physical Exam  Constitutional:       General: She is not in acute distress. Appearance: She is well-developed. She is not ill-appearing, toxic-appearing or diaphoretic. HENT:      Head: Normocephalic and atraumatic. Eyes:      Extraocular Movements: Extraocular movements intact. Cardiovascular:      Rate and Rhythm: Normal rate. Pulmonary:      Effort: Pulmonary effort is normal. No respiratory distress. Abdominal:      General: There is no distension. Palpations: Abdomen is soft. Tenderness: There is no abdominal tenderness. There is no guarding or rebound. Comments: Incisions clean, dry and intact  LUCY drains x3   Skin:     General: Skin is warm and dry. Neurological:      Mental Status: She is alert and oriented to person, place, and time.    Psychiatric:         Mood and Affect: Mood normal.         Behavior: Behavior normal.                 Lab, Imaging and other studies:  CBC:   Lab Results   Component Value Date    WBC 9.18 06/24/2023    HGB 8.7 (L) 06/24/2023    HCT 26.7 (L) 06/24/2023    MCV 86 06/24/2023     06/24/2023    RBC 3.12 (L) 06/24/2023    MCH 27.9 06/24/2023    MCHC 32.6 06/24/2023    RDW 16.4 (H) 06/24/2023    MPV 10.8 06/24/2023   , CMP:   Lab Results   Component Value Date    SODIUM 144 06/24/2023    K 3.2 (L) 06/24/2023     (H) 06/24/2023    CO2 30 06/24/2023    BUN 16 06/24/2023    CREATININE 0.70 06/24/2023 CALCIUM 7.7 (L) 06/24/2023    EGFR 86 06/24/2023   , Coagulation:   No results found for: "PT", "INR", "APTT"  VTE Pharmacologic Prophylaxis: Lovenox   VTE Mechanical Prophylaxis: sequential compression device

## 2023-06-24 NOTE — NURSING NOTE
Pt removed NGT with wrists restrained. NGT replaced and confirmed by auscultation. Wrist restraints replaced. Pt educated.

## 2023-06-24 NOTE — PROGRESS NOTES
[address] [place] [address]  Progress Note: Critical Care  Name: Karlos Stnaton 67 y.o. female I MRN: [id number]  Unit/Bed#: [facility] 15 I Date of Admission: 6/18/2023   Date of Service: 6/24/2023 I Hospital Day: 6    Assessment/Plan   Neuro:   · Acute encephalopathy   · Precedex 0.2 mcg/kg/hr - titrate to goal RASS 0  · Anxiety  · Ativan 0.5 mg IV q4h PRN (0 doses/24h)  · Holding home prozac  · Acute pain  · Dilaudid 0.3 mg IV q3h PRN (1 dose/24h)      CV:   · Diagnosis: Shock - resolved  · Plan: Goal MAP > 65 mmHg  · Afib with RVR  · Metoprolol 5 mg IV q6h  · Amiodarone 0.5 mg/min  · Start heparin drip for AC  · Consider digoxin if rates not well controlled      Pulm:  · Diagnosis: Acute hypoxic pulmonary insufficiency  · Plan: Currently on room air  · IS, OOB  · Diuresis as below  · Maintain SpO2 > 92%      GI:   · Diagnosis: Perforated ileum and necrotic jejunum 2/2 internal hernia s/p exploratory laparotomy, resection of J-J anastomosis, segmental ileum resection and abthera placement 6/19, followed by second loop laparotomy, YAMIL, SBR + appendectomy, abthera placement 6/19 for rising pressor requirements.  Now s/p takeback ex lap, SBR, creation of anastomosis x 3 with abdominal closure 6/20  · General surgery primary  · Monitor abdominal drain outputs  · Monitor NGT output  · GERD  · Protonix 40 mg IV q12h      :   · Dose lasix 20 mg IV for net negative fluid balance  · Consider discontinuing sweeney this afternoon       F/E/N:   · Diagnosis: Hypokalemia, hypophosphetemia  · Plan: Replete electrolytes  · NPO/NGT/TPN      Heme/Onc:   · Diagnosis: Acute blood loss anemia   · Plan: Hgb stable  · Trend daily  · Lovenox 60 mg q12h      Endo:   · Diagnosis: Hyperglycemia  · SSI coverage algorithm 5 with q6h accuchecks       ID:   · Diagnosis: Intra-abdominal sepsis  · Cefepime/Flagyl, D#6  · 6/19 blood cultures: NGTD  · Trend WBC/fever curve      MSK/Skin:   · Frequent turning/repositioning   · OOB to chair  · Consult PT/OT    D/C TLC/arterial line      Disposition: Critical care    ICU Core Measures     A: Assess, Prevent, and Manage Pain · Has pain been assessed? Yes  · Need for changes to pain regimen? No   B: Both SAT/SAT  · N/A   C: Choice of Sedation · RASS Goal: 0 Alert and Calm  · Need for changes to sedation or analgesia regimen? No   D: Delirium · CAM-ICU: Positive   E: Early Mobility  · Plan for early mobility? Yes   F: Family Engagement · Plan for family engagement today? Yes       Antibiotic Review: Antibiotics to be discontinued today    Review of Invasive Devices: Bolanos Plan: Bolanos to be removed. Order has been placed  Central access plan: TPN  Janette Plan: Discontinue arterial line    Prophylaxis:  VTE VTE covered by:  enoxaparin, Subcutaneous, 60 mg at 06/23/23 2116       Stress Ulcer  covered bypantoprazole (PROTONIX) injection 40 mg [[id number]]          Subjective   HPI/24hr events:   · Received lasix 20 mg IV  · Remains in afib but rates better controlled  · Remains on low dose precedex    Review of Systems   Respiratory: Negative for cough and shortness of breath. Cardiovascular: Negative for chest pain. Gastrointestinal: Negative for abdominal pain and nausea.           Objective                            Vitals I/O      Most Recent Min/Max in 24hrs   Temp 98.2 °F (36.8 °C) Temp  Min: 98 °F (36.7 °C)  Max: 98.5 °F (36.9 °C)   Pulse (!) 108 Pulse  Min: 78  Max: 132   Resp (!) 23 Resp  Min: 10  Max: 25   /69 BP  Min: 82/67  Max: 133/69   O2 Sat 91 % SpO2  Min: 91 %  Max: 98 %      Intake/Output Summary (Last 24 hours) at 6/24/2023 4075  Last data filed at 6/24/2023 0600  Gross per 24 hour   Intake 2403.83 ml   Output 4625 ml   Net -2221.17 ml         Diet NPO  Adult 3-in-1 TPN (standard base / standard electrolytes)  Adult 3-in-1 TPN (standard base / standard electrolytes)     Invasive Monitoring Physical exam   Arterial Line  Logan /52  Arterial Line BP  Min: 92/36  Max: 160/74   MAP 74 mmHg  Arterial Line MAP (mmHg)  Min: 52 mmHg  Max: 106 mmHg    Physical Exam  Constitutional:       General: She is not in acute distress. Appearance: She is obese. HENT:      Nose:      Comments: NGT     Mouth/Throat:      Mouth: Mucous membranes are dry. Cardiovascular:      Rate and Rhythm: Tachycardia present. Rhythm regularly irregular. Pulses:           Radial pulses are 2+ on the right side and 2+ on the left side. Dorsalis pedis pulses are 2+ on the right side and 2+ on the left side. Pulmonary:      Effort: No respiratory distress. Breath sounds: No wheezing, rhonchi or rales. Abdominal:      General: Bowel sounds are decreased. There is no distension. Palpations: Abdomen is soft. Tenderness: There is no abdominal tenderness. Musculoskeletal:      Cervical back: Neck supple. Right lower le+ Pitting Edema present. Left lower le+ Pitting Edema present. Skin:     General: Skin is warm and dry. Capillary Refill: Capillary refill takes 2 to 3 seconds. Neurological:      General: No focal deficit present. Comments: Oriented to person, place and situation, confused on year              Diagnostic Studies      EKG:  Afib  Imaging: PATRIA I have personally reviewed pertinent films in PACS     Medications:  Scheduled PRN   cefepime, 2,000 mg, Q12H  chlorhexidine, 15 mL, Q12H MONCHO  enoxaparin, 60 mg, Q12H [facility]  insulin lispro, 4-20 Units, Q6H MONCHO  metoprolol, 5 mg, Q6H  metroNIDAZOLE, 500 mg, Q8H  pantoprazole, 40 mg, Q12H MONCHO      HYDROmorphone, 0.3 mg, Q3H PRN  LORazepam, 0.5 mg, Q4H PRN  ondansetron, 4 mg, Q6H PRN       Continuous    Adult 3-in-1 TPN (standard base / standard electrolytes), , Last Rate: Stopped (23)  Adult 3-in-1 TPN (standard base / standard electrolytes), , Last Rate: 42 mL/hr at 23 0600  amiodarone (CORDARONE) 900 mg in dextrose 5 % 500 mL infusion, 0.5 mg/min, Last Rate: 0.5 mg/min (23 0600)  dexmedetomidine, 0.1-0.7 mcg/kg/hr, Last Rate: 0.2 mcg/kg/hr (06/24/23 0600)         Labs:    CBC    Recent Labs     06/23/23 0422 06/24/23  0515   WBC 9.37 9.18   HGB 8.1* 8.7*   HCT 25.5* 26.7*   * 180     BMP    Recent Labs     06/23/23 2035 06/24/23  0515   SODIUM 145 144   K 3.5 3.2*   * 112*   CO2 30 30   AGAP 2 2   BUN 15 16   CREATININE 0.72 0.70   CALCIUM 7.6* 7.7*       Coags    No recent results     Additional Electrolytes  Recent Labs     06/23/23 0422 06/23/23 2035 06/24/23  0515   MG 2.6 2.1 2.0   PHOS 1.6* 2.5 1.7*   CAIONIZED 0.98*  --  0.99*          Blood Gas    No recent results  No recent results LFTs  Recent Labs     06/22/23  1324   ALT 16   AST 22   ALKPHOS 55   ALB 2.3*   TBILI 0.51       Infectious  No recent results  Glucose  Recent Labs     06/22/23  1616 06/23/23  0422 06/23/23 2035 06/24/23  0515   GLUC 112 110 162* 209*                   Kendall Blackwell PA-C

## 2023-06-25 LAB
ANION GAP SERPL CALCULATED.3IONS-SCNC: 2 MMOL/L
APTT PPP: 46 SECONDS (ref 23–37)
APTT PPP: 46 SECONDS (ref 23–37)
APTT PPP: 48 SECONDS (ref 23–37)
BUN SERPL-MCNC: 22 MG/DL (ref 5–25)
CA-I BLD-SCNC: 1.02 MMOL/L (ref 1.12–1.32)
CALCIUM SERPL-MCNC: 7.7 MG/DL (ref 8.3–10.1)
CHLORIDE SERPL-SCNC: 111 MMOL/L (ref 96–108)
CO2 SERPL-SCNC: 31 MMOL/L (ref 21–32)
CREAT SERPL-MCNC: 0.63 MG/DL (ref 0.6–1.3)
ERYTHROCYTE [DISTWIDTH] IN BLOOD BY AUTOMATED COUNT: 16.4 % (ref 11.6–15.1)
GFR SERPL CREATININE-BSD FRML MDRD: 89 ML/MIN/1.73SQ M
GLUCOSE SERPL-MCNC: 189 MG/DL (ref 65–140)
GLUCOSE SERPL-MCNC: 191 MG/DL (ref 65–140)
GLUCOSE SERPL-MCNC: 193 MG/DL (ref 65–140)
GLUCOSE SERPL-MCNC: 224 MG/DL (ref 65–140)
GLUCOSE SERPL-MCNC: 226 MG/DL (ref 65–140)
GLUCOSE SERPL-MCNC: 230 MG/DL (ref 65–140)
HCT VFR BLD AUTO: 29 % (ref 34.8–46.1)
HGB BLD-MCNC: 8.6 G/DL (ref 11.5–15.4)
MAGNESIUM SERPL-MCNC: 2.1 MG/DL (ref 1.6–2.6)
MCH RBC QN AUTO: 26.6 PG (ref 26.8–34.3)
MCHC RBC AUTO-ENTMCNC: 29.7 G/DL (ref 31.4–37.4)
MCV RBC AUTO: 90 FL (ref 82–98)
PHOSPHATE SERPL-MCNC: 2 MG/DL (ref 2.3–4.1)
PLATELET # BLD AUTO: 213 THOUSANDS/UL (ref 149–390)
PMV BLD AUTO: 11 FL (ref 8.9–12.7)
POTASSIUM SERPL-SCNC: 3.5 MMOL/L (ref 3.5–5.3)
RBC # BLD AUTO: 3.23 MILLION/UL (ref 3.81–5.12)
SODIUM SERPL-SCNC: 144 MMOL/L (ref 135–147)
WBC # BLD AUTO: 10.49 THOUSAND/UL (ref 4.31–10.16)

## 2023-06-25 PROCEDURE — 85730 THROMBOPLASTIN TIME PARTIAL: CPT

## 2023-06-25 PROCEDURE — C9113 INJ PANTOPRAZOLE SODIUM, VIA: HCPCS | Performed by: STUDENT IN AN ORGANIZED HEALTH CARE EDUCATION/TRAINING PROGRAM

## 2023-06-25 PROCEDURE — 85730 THROMBOPLASTIN TIME PARTIAL: CPT | Performed by: PHYSICIAN ASSISTANT

## 2023-06-25 PROCEDURE — 84100 ASSAY OF PHOSPHORUS: CPT | Performed by: PHYSICIAN ASSISTANT

## 2023-06-25 PROCEDURE — 83735 ASSAY OF MAGNESIUM: CPT | Performed by: PHYSICIAN ASSISTANT

## 2023-06-25 PROCEDURE — 99024 POSTOP FOLLOW-UP VISIT: CPT | Performed by: SURGERY

## 2023-06-25 PROCEDURE — 82330 ASSAY OF CALCIUM: CPT | Performed by: PHYSICIAN ASSISTANT

## 2023-06-25 PROCEDURE — 85027 COMPLETE CBC AUTOMATED: CPT | Performed by: PHYSICIAN ASSISTANT

## 2023-06-25 PROCEDURE — 99291 CRITICAL CARE FIRST HOUR: CPT | Performed by: EMERGENCY MEDICINE

## 2023-06-25 PROCEDURE — 80048 BASIC METABOLIC PNL TOTAL CA: CPT | Performed by: PHYSICIAN ASSISTANT

## 2023-06-25 PROCEDURE — 82948 REAGENT STRIP/BLOOD GLUCOSE: CPT

## 2023-06-25 RX ORDER — CALCIUM GLUCONATE 20 MG/ML
2 INJECTION, SOLUTION INTRAVENOUS ONCE
Status: COMPLETED | OUTPATIENT
Start: 2023-06-25 | End: 2023-06-25

## 2023-06-25 RX ORDER — FUROSEMIDE 10 MG/ML
20 INJECTION INTRAMUSCULAR; INTRAVENOUS ONCE
Status: COMPLETED | OUTPATIENT
Start: 2023-06-25 | End: 2023-06-25

## 2023-06-25 RX ORDER — HYDROMORPHONE HCL/PF 1 MG/ML
0.3 SYRINGE (ML) INJECTION
Status: DISCONTINUED | OUTPATIENT
Start: 2023-06-25 | End: 2023-06-27

## 2023-06-25 RX ORDER — ACETAMINOPHEN 325 MG/1
650 TABLET ORAL EVERY 6 HOURS PRN
Status: DISCONTINUED | OUTPATIENT
Start: 2023-06-25 | End: 2023-07-04 | Stop reason: HOSPADM

## 2023-06-25 RX ORDER — OXYCODONE HYDROCHLORIDE 5 MG/1
5 TABLET ORAL EVERY 4 HOURS PRN
Status: DISCONTINUED | OUTPATIENT
Start: 2023-06-25 | End: 2023-06-27

## 2023-06-25 RX ADMIN — CHLORHEXIDINE GLUCONATE 15 ML: 1.2 SOLUTION ORAL at 08:26

## 2023-06-25 RX ADMIN — OXYCODONE HYDROCHLORIDE 5 MG: 5 TABLET ORAL at 17:40

## 2023-06-25 RX ADMIN — Medication: at 20:37

## 2023-06-25 RX ADMIN — METOROPROLOL TARTRATE 5 MG: 5 INJECTION, SOLUTION INTRAVENOUS at 17:40

## 2023-06-25 RX ADMIN — HEPARIN SODIUM 15.1 UNITS/KG/HR: 10000 INJECTION, SOLUTION INTRAVENOUS at 09:03

## 2023-06-25 RX ADMIN — METRONIDAZOLE 500 MG: 500 INJECTION, SOLUTION INTRAVENOUS at 02:00

## 2023-06-25 RX ADMIN — INSULIN LISPRO 8 UNITS: 100 INJECTION, SOLUTION INTRAVENOUS; SUBCUTANEOUS at 01:00

## 2023-06-25 RX ADMIN — POTASSIUM PHOSPHATE, MONOBASIC AND POTASSIUM PHOSPHATE, DIBASIC 30 MMOL: 224; 236 INJECTION, SOLUTION, CONCENTRATE INTRAVENOUS at 08:26

## 2023-06-25 RX ADMIN — INSULIN LISPRO 4 UNITS: 100 INJECTION, SOLUTION INTRAVENOUS; SUBCUTANEOUS at 12:27

## 2023-06-25 RX ADMIN — PANTOPRAZOLE SODIUM 40 MG: 40 INJECTION, POWDER, FOR SOLUTION INTRAVENOUS at 20:37

## 2023-06-25 RX ADMIN — AMIODARONE HYDROCHLORIDE 0.5 MG/MIN: 50 INJECTION, SOLUTION INTRAVENOUS at 06:02

## 2023-06-25 RX ADMIN — METOROPROLOL TARTRATE 5 MG: 5 INJECTION, SOLUTION INTRAVENOUS at 10:32

## 2023-06-25 RX ADMIN — FUROSEMIDE 20 MG: 10 INJECTION, SOLUTION INTRAMUSCULAR; INTRAVENOUS at 10:32

## 2023-06-25 RX ADMIN — METOROPROLOL TARTRATE 5 MG: 5 INJECTION, SOLUTION INTRAVENOUS at 22:10

## 2023-06-25 RX ADMIN — CALCIUM GLUCONATE 2 G: 20 INJECTION, SOLUTION INTRAVENOUS at 08:25

## 2023-06-25 RX ADMIN — METOROPROLOL TARTRATE 5 MG: 5 INJECTION, SOLUTION INTRAVENOUS at 03:50

## 2023-06-25 RX ADMIN — HEPARIN SODIUM 17.1 UNITS/KG/HR: 10000 INJECTION, SOLUTION INTRAVENOUS at 19:00

## 2023-06-25 RX ADMIN — CEFEPIME 2000 MG: 2 INJECTION, POWDER, FOR SOLUTION INTRAVENOUS at 03:50

## 2023-06-25 RX ADMIN — CHLORHEXIDINE GLUCONATE 15 ML: 1.2 SOLUTION ORAL at 20:37

## 2023-06-25 RX ADMIN — LORAZEPAM 0.5 MG: 2 INJECTION INTRAMUSCULAR; INTRAVENOUS at 22:08

## 2023-06-25 RX ADMIN — INSULIN LISPRO 8 UNITS: 100 INJECTION, SOLUTION INTRAVENOUS; SUBCUTANEOUS at 05:48

## 2023-06-25 RX ADMIN — PANTOPRAZOLE SODIUM 40 MG: 40 INJECTION, POWDER, FOR SOLUTION INTRAVENOUS at 08:26

## 2023-06-25 NOTE — CASE MANAGEMENT
Case Management Discharge Planning Note    Patient name Tanya Guevara MICU 14/[facility]U 14 MRN [id number]  : 1950 Date 2023       Current Admission Date: 2023  Current Admission Diagnosis:Idiopathic acute pancreatitis without infection or necrosis   Patient Active Problem List    Diagnosis Date Noted   • Idiopathic acute pancreatitis without infection or necrosis 2023   • Primary osteoarthritis of both knees 2023   • Stage 3 chronic kidney disease, unspecified whether stage 3a or 3b CKD ([address]) 2023   • Hypertension    • Morbid obesity with BMI of 50.0-59.9, adult ([address]) 2022   • Unsteady gait 10/30/2022   • Dyspnea on exertion 10/30/2022   • SIRS (systemic inflammatory response syndrome) ([address]) 10/30/2022   • Incontinence 10/30/2022   • GILDARDO (obstructive sleep apnea) 10/30/2022   • Vitamin D deficiency 2021   • Reactive hypoglycemia 2020   • Osteoporosis 2020   • Hyperparathyroidism ([address]) 2020   • Hashimoto's thyroiditis 2020   • Early menopause occurring in patient age younger than 39 years 2020   • S/P bariatric surgery 2020   • Myofascial pain syndrome 10/21/2019   • Status post partial thyroidectomy 2019   • Cervical spondylosis without myelopathy 2019   • Arthropathy of cervical facet joint 2019   • Occipital neuralgia of right side 2019   • Chest wall tenderness 2019   • Chronic heart failure ([address]) 2019   • Permanent atrial fibrillation ([address]) 2019   • Myalgia 2019   • Primary osteoarthritis of left knee 10/23/2018   • Primary osteoarthritis of right knee 10/23/2018   • Chronic pain of left knee 10/23/2018   • Malignant tumor of right kidney parenchyma ([address]) 2016   • Closed fracture of right distal radius and ulna 2016      LOS (days): 7  Geometric Mean LOS (GMLOS) (days): 9.60  Days to GMLOS:2.9     OBJECTIVE:  Risk of Unplanned Readmission Score: 20.96 Current admission status: Inpatient   Preferred Pharmacy:   [facility] [address] MARIAH REDDY [address], [address]  [place] [address] [address] [address]  Phone: [phone] Fax: [phone]    [address] [facility], [address] [place] [address]  Phone: [phone] Fax: [phone]    Primary Care Provider: Virgilio Gan DO    Primary Insurance: MEDICARE  Secondary Insurance: [employer]t DETAILS:    Pt continues to have three LUCY drains. Pt's NGT to be removed and pt to resume TPN. Likely will need therapy recs.  CM will continue to follow

## 2023-06-25 NOTE — PROGRESS NOTES
Progress Note - General Surgery   Chester Posadas 67 y.o. female MRN: [id number]  [facility] Encounter: [id number]    Assessment:  67 y.o. F with multiple prior abdominal surgeries including open arlene-en-y gastric bypass, presents with perforated ileum and necrotic jejunum 2/2 internal hernia, s/p exploratory laparotomy, resection of J-J anastomosis, segmental ileum resection and abthera placement , followed by second loop laparotomy, YAMIL, SBR + appendectomy, abthera placement  for rising pressor requirements. Now s/p takeback ex lap, SBR, creation of anastomosis x 3 with abdominal closure . Acute blood loss anemia from intraoperative losses and received a total of 5uPRBCs, 2 FFP . Hgb stable. N cc bilious  UOP:3.4 L   LUQ LUCY 200 paul342 cc serosanguinous drainage  RUQ LUCY 310 from 40 cc serosanguinous drainage  LLQ LUCY  90 from 405  cc serosanguinous drainage     Plan:  NPO/NGT-dc NGT  PICC placement, reorder TPN  Up to chair today  PT/OT  Maintain LUCY drains x3, monitor output  Pulmonary toilet   Maintain abdominal binder  Delirium precautions  DVT ppx   Holding Eliquis (on for Afib), consider heparin gtt given hgb stability  Completed abx course yesterday        Subjective/Objective     Subjective: Pain controlled. Denies n/v. Having not bowel function-passing flatus. Objective:     Blood pressure 140/74, pulse (!) 108, temperature 98.4 °F (36.9 °C), temperature source Oral, resp. rate 17, height 5' 3" (1.6 m), weight (!) 161 kg (355 lb 2.6 oz), SpO2 96 %. ,Body mass index is 62.91 kg/m².       Intake/Output Summary (Last 24 hours) at 2023  Last data filed at 2023  Gross per 24 hour   Intake 2798.95 ml   Output 4240 ml   Net -1441.05 ml       Invasive Devices     Peripherally Inserted Central Catheter Line  Duration           PICC Line  Left Basilic 1 day          Central Venous Catheter Line  Duration           CVC Central Lines 23 Triple 5 days          Peripheral Intravenous Line  Duration           Peripheral IV 06/21/23 Right;Ventral (anterior) Forearm 3 days          Drain  Duration           Urethral Catheter Latex 14 Fr. 5 days    Closed/Suction Drain Lateral;Left LUQ 19 Fr. 4 days    Closed/Suction Drain Lateral;Right RUQ Bulb 19 Fr. 4 days    Closed/Suction Drain Left LLQ Bulb 10 Fr. 4 days    NG/OG/Enteral Tube Nasogastric 12 Fr Right nare 2 days                Physical Exam  Constitutional:       General: She is not in acute distress. Appearance: She is well-developed. She is not ill-appearing, toxic-appearing or diaphoretic. HENT:      Head: Normocephalic and atraumatic. Nose:      Comments: NGT in place  Eyes:      Extraocular Movements: Extraocular movements intact. Cardiovascular:      Rate and Rhythm: Normal rate. Pulmonary:      Effort: Pulmonary effort is normal. No respiratory distress. Abdominal:      General: There is no distension. Palpations: Abdomen is soft. Tenderness: There is no abdominal tenderness. There is no guarding or rebound. Comments: Incisions clean, dry and intact  LUCY drains x3  Binder in place   Skin:     General: Skin is warm and dry. Neurological:      Mental Status: She is alert and oriented to person, place, and time.    Psychiatric:         Mood and Affect: Mood normal.         Behavior: Behavior normal.                 Lab, Imaging and other studies:  CBC:   Lab Results   Component Value Date    WBC 9.18 06/24/2023    HGB 8.7 (L) 06/24/2023    HCT 26.7 (L) 06/24/2023    MCV 86 06/24/2023     06/24/2023    RBC 3.12 (L) 06/24/2023    MCH 27.9 06/24/2023    MCHC 32.6 06/24/2023    RDW 16.4 (H) 06/24/2023    MPV 10.8 06/24/2023   , CMP:   Lab Results   Component Value Date    SODIUM 144 06/24/2023    K 3.4 (L) 06/24/2023     (H) 06/24/2023    CO2 32 06/24/2023    BUN 15 06/24/2023    CREATININE 0.72 06/24/2023    CALCIUM 8.0 (L) 06/24/2023    EGFR 83 06/24/2023   , Coagulation:   Lab Results   Component Value Date    INR 1.19 06/24/2023     VTE Pharmacologic Prophylaxis: Lovenox   VTE Mechanical Prophylaxis: sequential compression device

## 2023-06-25 NOTE — PROGRESS NOTES
Daily Progress Note - Critical Care   Shandra Mcclendon 67 y.o. female MRN: [id number]  Unit/Bed#: MICU 14 Encounter: [id number]        ----------------------------------------------------------------------------------------  HPI/24hr events:   72F PMH afib on Eliquis, Sury en Y, presented with perforated viscous, s/p multiple bowel resections and anastomosis. Overnight patient refused CPAP. Otherwise no acute events overnight.    ---------------------------------------------------------------------------------------  SUBJECTIVE  This morning patient reports feeling well. She states her breathing has felt fine on room air. She denies abdominal pain. Review of Systems  Review of systems was reviewed and negative unless stated above in HPI/24-hour events   ---------------------------------------------------------------------------------------  Assessment and Plan:    Neuro:   • Delirium   ? Off sedation  ? Improvement in mental status during day, mild confusion at night  ? Frequent re-orientation  ? Maintain sleep wake cycle  ? CAM-ICU  • Anxiety  ? Ativan 0.5 mg IV q4h PRN  ? Holding home prozac  ? Takes Ativan qHS  • Acute pain  ? Dilaudid 0.3 mg IV q3h PRN        CV:   • Diagnosis: Septic Shock - resolved  ? Plan:   ? Off pressors  ? Goal MAP > 65 mmHg  ? Continuous hemodynamic monitoring  • Afib with RVR  ? Metoprolol 5 mg IV q6h  ? Amiodarone gtt 0.5 mg/min  ? Still in afib but with better rate control  ? Heparin drip for AC  ? Consider digoxin if rates not well controlled        Pulm:  • Diagnosis: Acute hypoxic pulmonary insufficiency - improved  ? Plan:   ? Room air  ? IS, OOB  ? Diuresis as below  ? Maintain SpO2 > 92%  ? CPAP qHS for likely GILDARDO.  Has refused at night due to discomfort.        GI:   • Diagnosis: Perforated ileum and necrotic jejunum 2/2 internal hernia s/p exploratory laparotomy, resection of J-J anastomosis, segmental ileum resection and abthera placement 6/19, followed by second loop laparotomy, YAMIL, SBR + appendectomy, abthera placement 6/19 for rising pressor requirements. Now s/p takeback ex lap, SBR, creation of anastomosis x 3 with abdominal closure 6/20  ? General surgery recs  ? Monitor abdominal drain outputs  ? Monitor NGT output  ? NPO, TPN  ? Completed course of cefepime/flagyl  • Diagnosis: GERD  ? Protonix 40 mg IV q12h        :   • Diagnosis: Peripheral edema  • Lasix 20 mg IV daily for net negative fluid balance  • Monitor I/O, trend Cr/BUN  • Consider discontinuing sweeney         F/E/N:   ? F: None  ? E: trend and replete lytes as needed  ? N: NPO, TPN        Heme/Onc:   • Diagnosis: Acute blood loss anemia   ? Plan:   ? Trend daily Hgb, stable  • DVT ppx: Lovenox 60 mg q12h        Endo:   • Diagnosis: Hyperglycemia  ? SSI coverage algorithm 5 with q6h accuchecks         ID:   • Diagnosis: Intra-abdominal sepsis  ? Cefepime/Flagyl completed  ? 6/19 blood cultures: NGTD  ? Trend WBC/fever curve        MSK/Skin:   • Frequent turning/repositioning   • OOB to chair  • Consult PT/OT    Patient appropriate for transfer out of the ICU today?: No  Disposition: Continue Stepdown Level 1 level of care   Code Status: Level 1 - Full Code  ---------------------------------------------------------------------------------------  ICU CORE MEASURES    Prophylaxis   VTE Pharmacologic Prophylaxis: Enoxaparin (Lovenox)  VTE Mechanical Prophylaxis: sequential compression device  Stress Ulcer Prophylaxis: Pantoprazole IV     ABCDE Protocol (if indicated)  Plan to perform spontaneous awakening trial today? Not applicable  Plan to perform spontaneous breathing trial today? Not applicable  Obvious barriers to extubation?  Not applicable  CAM-ICU: Negative    Invasive Devices Review  Invasive Devices     Peripherally Inserted Central Catheter Line  Duration           PICC Line 89/82/44 Left Basilic 1 day          Central Venous Catheter Line  Duration           CVC Central Lines 06/19/23 Triple 5 days Peripheral Intravenous Line  Duration           Peripheral IV 23 Right;Ventral (anterior) Forearm 4 days          Drain  Duration           Urethral Catheter Latex 14 Fr. 6 days    Closed/Suction Drain Lateral;Left LUQ 19 Fr. 4 days    Closed/Suction Drain Lateral;Right RUQ Bulb 19 Fr. 4 days    Closed/Suction Drain Left LLQ Bulb 10 Fr. 4 days    NG/OG/Enteral Tube Nasogastric 12 Fr Right nare 3 days              Can any invasive devices be discontinued today? Yes  ---------------------------------------------------------------------------------------  OBJECTIVE    Vitals   Vitals:    23 0445 23 0545 23 0550 23 0551   BP: 146/89 116/65     Pulse: 96 94     Resp: 21 12     Temp: 98.6 °F (37 °C)      TempSrc: Oral      SpO2: 96% 96%     Weight:   (!) 146 kg (321 lb 14 oz) (!) 146 kg (321 lb 14 oz)   Height:         Temp (24hrs), Av.5 °F (36.9 °C), Min:98.1 °F (36.7 °C), Max:98.8 °F (37.1 °C)  Current: Temperature: 98.6 °F (37 °C)  HR: 96  BP: 116/65  RR: 15  SpO2: 95%    Respiratory:  SpO2: SpO2: 96 %, SpO2 Activity: SpO2 Activity: At Rest, SpO2 Device: O2 Device: None (Room air)  Nasal Cannula O2 Flow Rate (L/min): 2 L/min    Invasive/non-invasive ventilation settings   Respiratory    Lab Data (Last 4 hours)    None         O2/Vent Data (Last 4 hours)    None                Physical Exam  Vitals and nursing note reviewed. Constitutional:       General: She is not in acute distress. HENT:      Head: Normocephalic. Eyes:      Pupils: Pupils are equal, round, and reactive to light. Cardiovascular:      Rate and Rhythm: Normal rate. Rhythm irregular. Heart sounds: No murmur heard. Pulmonary:      Effort: No respiratory distress. Breath sounds: No wheezing or rhonchi. Abdominal:      General: There is no distension. Palpations: Abdomen is soft. Tenderness: There is no abdominal tenderness. There is no guarding or rebound.       Comments: LUCY serosanguinous   Musculoskeletal:      Right lower leg: Edema present. Left lower leg: Edema present. Skin:     General: Skin is warm and dry. Neurological:      Mental Status: She is alert. Laboratory and Diagnostics:  Results from last 7 days   Lab Units 06/25/23  0550 06/24/23  0515 06/23/23  0422 06/22/23  0435 06/21/23  0426 06/20/23  1641 06/20/23  1531 06/20/23  1228 06/20/23  0609 06/19/23  1808 06/19/23  1403 06/19/23  0710 06/19/23  0403 06/19/23  0111 06/18/23  2031   WBC Thousand/uL 10.49* 9.18 9.37 8.29 8.99 11.25*  --   --  18.13*   < > 21.04*   < > 16.80*  --  18.09*   HEMOGLOBIN g/dL 8.6* 8.7* 8.1* 7.6* 8.6* 9.3*  --    < > 10.7*   < > 12.7   < > 12.7  --  12.3   I STAT HEMOGLOBIN g/dl  --   --   --   --   --   --  8.5*   < >  --   --   --   --   --    < >  --    HEMATOCRIT % 29.0* 26.7* 25.5* 23.9* 26.1* 28.4*  --    < > 31.8*   < > 38.7   < > 40.6  --  39.8   HEMATOCRIT, ISTAT %  --   --   --   --   --   --  25*   < >  --   --   --   --   --    < >  --    PLATELETS Thousands/uL 213 180 148* 116* 133* 158  --   --  223   < > 251   < > 272  --  360   NEUTROS PCT %  --   --   --   --   --   --   --   --  89*  --  90*  --   --   --  88*   BANDS PCT %  --   --   --   --   --   --   --   --   --   --   --   --  10*  --   --    MONOS PCT %  --   --   --   --   --   --   --   --  5  --  5  --   --   --  4   MONO PCT %  --   --   --   --   --   --   --   --   --   --   --   --  3*  --   --    EOS PCT %  --   --   --   --   --   --   --   --  1  --  2  --  0  --  1    < > = values in this interval not displayed.      Results from last 7 days   Lab Units 06/25/23  0550 06/24/23  1413 06/24/23  0515 06/23/23  2035 06/23/23  0422 06/22/23  1616 06/22/23  1324 06/22/23  0435 06/20/23  2305 06/20/23  1641 06/19/23  2152 06/19/23  1916 06/19/23  1403 06/19/23  0405 06/19/23  0111 06/18/23  2031   SODIUM mmol/L 144 144 144 145 145 144  --  144   < > 143   < >  --    < > 140  --  138 POTASSIUM mmol/L 3.5 3.4* 3.2* 3.5 3.5 3.6  --  3.6   < > 3.5   < >  --    < > 4.4  --  3.8   CHLORIDE mmol/L 111* 109* 112* 113* 116* 114*  --  118*   < > 114*   < >  --    < > 113*  --  105   CO2 mmol/L 31 32 30 30 27 25  --  26   < > 22   < >  --    < > 19*  --  28   CO2, I-STAT   --   --   --   --   --   --   --   --   --   --    < >  --   --   --    < >  --    ANION GAP mmol/L 2 3 2 2 2 5  --  0   < > 7   < >  --    < > 8  --  5   BUN mg/dL 22 15 16 15 19 20  --  24   < > 35*   < >  --    < > 25  --  27*   CREATININE mg/dL 0.63 0.72 0.70 0.72 0.74 0.76  --  0.79   < > 1.29   < >  --    < > 1.13  --  1.06   CALCIUM mg/dL 7.7* 8.0* 7.7* 7.6* 7.3* 7.5*  --  7.0*   < > 7.3*   < >  --    < > 8.7  --  9.1   GLUCOSE RANDOM mg/dL 193* 167* 209* 162* 110 112  --  130   < > 121   < >  --    < > 163*  --  188*   ALT U/L  --   --   --   --   --   --  16  --   --  19  --  36  --  58  --  28   AST U/L  --   --   --   --   --   --  22  --   --  25  --  45  --  128*  --  26   ALK PHOS U/L  --   --   --   --   --   --  55  --   --  51  --  61  --  95  --  141*   ALBUMIN g/dL  --   --   --   --   --   --  2.3*  --   --  2.6*  --  2.3*  --  2.5*  --  3.5   TOTAL BILIRUBIN mg/dL  --   --   --   --   --   --  0.51  --   --  0.73  --  0.88  --  0.67  --  0.26    < > = values in this interval not displayed.      Results from last 7 days   Lab Units 06/25/23  0550 06/24/23  0515 06/23/23  2035 06/23/23  0422 06/22/23  1616 06/22/23  0435 06/21/23  0426 06/20/23  1641   MAGNESIUM mg/dL 2.1 2.0 2.1 2.6 2.6 3.0* 2.6 2.8*   PHOSPHORUS mg/dL 2.0* 1.7* 2.5 1.6*  --  1.5* 2.6 3.8      Results from last 7 days   Lab Units 06/25/23  0354 06/24/23  1740 06/24/23  1102 06/19/23  0807 06/19/23  0405 06/19/23  0239   INR   --   --  1.19 1.10 1.16 1.02   PTT seconds 46* 44* 31 25  --  24          Results from last 7 days   Lab Units 06/20/23  2305 06/20/23 2003 06/20/23  1641 06/20/23  0908 06/20/23  0700 06/20/23  0106 06/19/23  2152   LACTIC ACID mmol/L 1.9 1.7 2.6* 5.7* 5.2* 5.9* 7.9*     ABG:  Results from last 7 days   Lab Units 06/21/23  0420   PH ART  7.401   PCO2 ART mm Hg 38.9   PO2 ART mm Hg 91.7   HCO3 ART mmol/L 23.6   BASE EXC ART mmol/L -1.0   ABG SOURCE  Line, Arterial     VBG:  Results from last 7 days   Lab Units 06/21/23  0420 06/19/23  1641 06/19/23  1407   PH SHAKIRA   --   --  7.152*   PCO2 SHAKIRA mm Hg  --   --  50.2*   PO2 SHAKIRA mm Hg  --   --  39.2   HCO3 SHAKIRA mmol/L  --   --  17.2*   BASE EXC SHAKIRA mmol/L  --   --  -11.6   ABG SOURCE  Line, Arterial   < >  --     < > = values in this interval not displayed. Micro  Results from last 7 days   Lab Units 06/19/23  1353 06/19/23  0405   BLOOD CULTURE  No Growth After 5 Days. No Growth After 5 Days. --    URINE CULTURE   --  No Growth <1000 cfu/mL       Tele: afib, rates 90s-100s  Imaging:  I have personally reviewed pertinent reports. Intake and Output  I/O       06/23 0701  06/24 0700 06/24 0701 06/25 0700    I.V. (mL/kg) 677.8 (4.2) 530.1 (3.3)    IV Piggyback 863.3 650    .7 694.3    Total Intake(mL/kg) 2353.8 (14.6) 1874.4 (11.6)    Urine (mL/kg/hr) 3600 (0.9) 2950 (0.8)    Emesis/NG output 450 150    Drains 820 520    Total Output 4870 3620    Net -2516.2 -1745.6              UOP: 0.98 ml/kg/hr     Height and Weights   Height: 5' 3" (160 cm)  IBW (Ideal Body Weight): 52.4 kg  Body mass index is 57.02 kg/m². Weight (last 2 days)     Date/Time Weight    06/25/23 0551 146 (321.87)    06/25/23 0550 146 (321.87)    06/24/23 0600 161 (355.16)    06/23/23 0600 156 (343.48)            Nutrition       Diet Orders   (From admission, onward)             Start     Ordered    06/19/23 0342  Diet NPO  (Order Panel)  Diet effective midnight        References:    Adult Nutrition Support Algorithm    RD Therapeutic Diet Order Protocol   Question Answer Comment   Diet Type NPO    RD to adjust diet per protocol?  Yes        06/19/23 0341                    Active Medications  Scheduled Meds:  Current Facility-Administered Medications   Medication Dose Route Frequency Provider Last Rate   • Adult 3-in-1 TPN (custom base / custom electrolytes)   Intravenous Continuous TPN Maritza Huynh PA-C 63.9 mL/hr at 06/25/23 0600   • amiodarone (CORDARONE) 900 mg in dextrose 5 % 500 mL infusion  0.5 mg/min Intravenous Continuous Alondra Dejesus PA-C 0.5 mg/min (06/25/23 0602)   • cefepime  2,000 mg Intravenous Q12H Sobeida Armstrong DO Stopped (06/25/23 0420)   • chlorhexidine  15 mL Mouth/Throat Q12H [facility] Roslyn Merlyn Holstein      • heparin (porcine)  3-20 Units/kg/hr (Order-Specific) Intravenous Titrated Beata Valentine DO 15.1 Units/kg/hr (06/25/23 0600)   • HYDROmorphone  0.3 mg Intravenous Q3H PRN Martín Coker MD     • insulin lispro  4-20 Units Subcutaneous Q6H [facility] Marko Valentine PA-C     • LORazepam  0.5 mg Intravenous Q4H PRN Marko Valentine PA-C     • metoprolol  5 mg Intravenous Q6H MOLLY Lerma     • metroNIDAZOLE  500 mg Intravenous Q8H Nick Cardona DO Stopped (06/25/23 0230)   • ondansetron  4 mg Intravenous Q6H PRN Sobeida Armstrong DO     • pantoprazole  40 mg Intravenous Q12H [facility] Shannon Cardona DO       Continuous Infusions:  Adult 3-in-1 TPN (custom base / custom electrolytes), , Last Rate: 63.9 mL/hr at 06/25/23 0600  amiodarone (CORDARONE) 900 mg in dextrose 5 % 500 mL infusion, 0.5 mg/min, Last Rate: 0.5 mg/min (06/25/23 0602)  heparin (porcine), 3-20 Units/kg/hr (Order-Specific), Last Rate: 15.1 Units/kg/hr (06/25/23 0600)      PRN Meds:   HYDROmorphone, 0.3 mg, Q3H PRN  LORazepam, 0.5 mg, Q4H PRN  ondansetron, 4 mg, Q6H PRN        Allergies   No Known Allergies  ---------------------------------------------------------------------------------------  Advance Directive and Living Will:      Power of :    POLST:    ---------------------------------------------------------------------------------------  Care Time Delivered: Upon my evaluation, this patient had a high probability of imminent or life-threatening deterioration due to sepsis, which required my direct attention, intervention, and personal management. I have personally provided 30 minutes of critical care time, exclusive of procedures, teaching, family meetings, and any prior time recorded by providers other than myself. Gustavo Rock MD      Portions of the record may have been created with voice recognition software. Occasional wrong word or "sound a like" substitutions may have occurred due to the inherent limitations of voice recognition software.   Read the chart carefully and recognize, using context, where substitutions have occurred

## 2023-06-26 LAB
ANION GAP SERPL CALCULATED.3IONS-SCNC: 1 MMOL/L
APTT PPP: 45 SECONDS (ref 23–37)
BUN SERPL-MCNC: 23 MG/DL (ref 5–25)
CA-I BLD-SCNC: 1.03 MMOL/L (ref 1.12–1.32)
CALCIUM SERPL-MCNC: 7.7 MG/DL (ref 8.3–10.1)
CHLORIDE SERPL-SCNC: 108 MMOL/L (ref 96–108)
CO2 SERPL-SCNC: 30 MMOL/L (ref 21–32)
CREAT SERPL-MCNC: 0.66 MG/DL (ref 0.6–1.3)
ERYTHROCYTE [DISTWIDTH] IN BLOOD BY AUTOMATED COUNT: 16.9 % (ref 11.6–15.1)
GFR SERPL CREATININE-BSD FRML MDRD: 87 ML/MIN/1.73SQ M
GLUCOSE SERPL-MCNC: 149 MG/DL (ref 65–140)
GLUCOSE SERPL-MCNC: 167 MG/DL (ref 65–140)
GLUCOSE SERPL-MCNC: 174 MG/DL (ref 65–140)
GLUCOSE SERPL-MCNC: 176 MG/DL (ref 65–140)
GLUCOSE SERPL-MCNC: 193 MG/DL (ref 65–140)
GLUCOSE SERPL-MCNC: 202 MG/DL (ref 65–140)
HCT VFR BLD AUTO: 27.2 % (ref 34.8–46.1)
HGB BLD-MCNC: 8.6 G/DL (ref 11.5–15.4)
MAGNESIUM SERPL-MCNC: 2 MG/DL (ref 1.6–2.6)
MCH RBC QN AUTO: 27.9 PG (ref 26.8–34.3)
MCHC RBC AUTO-ENTMCNC: 31.6 G/DL (ref 31.4–37.4)
MCV RBC AUTO: 88 FL (ref 82–98)
PHOSPHATE SERPL-MCNC: 2.6 MG/DL (ref 2.3–4.1)
PLATELET # BLD AUTO: 254 THOUSANDS/UL (ref 149–390)
PMV BLD AUTO: 10.7 FL (ref 8.9–12.7)
POTASSIUM SERPL-SCNC: 3.8 MMOL/L (ref 3.5–5.3)
RBC # BLD AUTO: 3.08 MILLION/UL (ref 3.81–5.12)
SODIUM SERPL-SCNC: 139 MMOL/L (ref 135–147)
WBC # BLD AUTO: 10.64 THOUSAND/UL (ref 4.31–10.16)

## 2023-06-26 PROCEDURE — 82948 REAGENT STRIP/BLOOD GLUCOSE: CPT

## 2023-06-26 PROCEDURE — 85730 THROMBOPLASTIN TIME PARTIAL: CPT | Performed by: SURGERY

## 2023-06-26 PROCEDURE — C9113 INJ PANTOPRAZOLE SODIUM, VIA: HCPCS | Performed by: PHYSICIAN ASSISTANT

## 2023-06-26 PROCEDURE — 99232 SBSQ HOSP IP/OBS MODERATE 35: CPT | Performed by: SURGERY

## 2023-06-26 PROCEDURE — 99024 POSTOP FOLLOW-UP VISIT: CPT | Performed by: SURGERY

## 2023-06-26 PROCEDURE — 84100 ASSAY OF PHOSPHORUS: CPT | Performed by: PHYSICIAN ASSISTANT

## 2023-06-26 PROCEDURE — 85027 COMPLETE CBC AUTOMATED: CPT | Performed by: PHYSICIAN ASSISTANT

## 2023-06-26 PROCEDURE — 97167 OT EVAL HIGH COMPLEX 60 MIN: CPT

## 2023-06-26 PROCEDURE — 80048 BASIC METABOLIC PNL TOTAL CA: CPT | Performed by: PHYSICIAN ASSISTANT

## 2023-06-26 PROCEDURE — 82330 ASSAY OF CALCIUM: CPT | Performed by: PHYSICIAN ASSISTANT

## 2023-06-26 PROCEDURE — 97163 PT EVAL HIGH COMPLEX 45 MIN: CPT

## 2023-06-26 PROCEDURE — 83735 ASSAY OF MAGNESIUM: CPT | Performed by: PHYSICIAN ASSISTANT

## 2023-06-26 PROCEDURE — C9113 INJ PANTOPRAZOLE SODIUM, VIA: HCPCS | Performed by: STUDENT IN AN ORGANIZED HEALTH CARE EDUCATION/TRAINING PROGRAM

## 2023-06-26 RX ORDER — POTASSIUM CHLORIDE 14.9 MG/ML
20 INJECTION INTRAVENOUS ONCE
Status: COMPLETED | OUTPATIENT
Start: 2023-06-26 | End: 2023-06-26

## 2023-06-26 RX ORDER — LORAZEPAM 1 MG/1
1 TABLET ORAL
Status: DISCONTINUED | OUTPATIENT
Start: 2023-06-26 | End: 2023-07-04 | Stop reason: HOSPADM

## 2023-06-26 RX ORDER — AMIODARONE HYDROCHLORIDE 200 MG/1
200 TABLET ORAL
Status: DISCONTINUED | OUTPATIENT
Start: 2023-07-08 | End: 2023-07-04 | Stop reason: HOSPADM

## 2023-06-26 RX ORDER — INSULIN GLARGINE 100 [IU]/ML
10 INJECTION, SOLUTION SUBCUTANEOUS
Status: DISCONTINUED | OUTPATIENT
Start: 2023-06-26 | End: 2023-06-27

## 2023-06-26 RX ORDER — METOPROLOL TARTRATE 5 MG/5ML
5 INJECTION INTRAVENOUS ONCE
Status: COMPLETED | OUTPATIENT
Start: 2023-06-26 | End: 2023-06-26

## 2023-06-26 RX ORDER — AMIODARONE HYDROCHLORIDE 200 MG/1
200 TABLET ORAL
Status: DISCONTINUED | OUTPATIENT
Start: 2023-06-26 | End: 2023-06-26

## 2023-06-26 RX ORDER — MAGNESIUM SULFATE HEPTAHYDRATE 40 MG/ML
2 INJECTION, SOLUTION INTRAVENOUS ONCE
Status: COMPLETED | OUTPATIENT
Start: 2023-06-26 | End: 2023-06-26

## 2023-06-26 RX ORDER — AMIODARONE HYDROCHLORIDE 200 MG/1
200 TABLET ORAL
Status: DISCONTINUED | OUTPATIENT
Start: 2023-06-26 | End: 2023-07-04 | Stop reason: HOSPADM

## 2023-06-26 RX ORDER — FLUOXETINE HYDROCHLORIDE 20 MG/1
20 CAPSULE ORAL 2 TIMES DAILY
Status: DISCONTINUED | OUTPATIENT
Start: 2023-06-26 | End: 2023-07-04 | Stop reason: HOSPADM

## 2023-06-26 RX ADMIN — INSULIN GLARGINE 10 UNITS: 100 INJECTION, SOLUTION SUBCUTANEOUS at 21:08

## 2023-06-26 RX ADMIN — CHLORHEXIDINE GLUCONATE 15 ML: 1.2 SOLUTION ORAL at 08:40

## 2023-06-26 RX ADMIN — FLUOXETINE 20 MG: 20 CAPSULE ORAL at 21:06

## 2023-06-26 RX ADMIN — AMIODARONE HYDROCHLORIDE 200 MG: 200 TABLET ORAL at 17:26

## 2023-06-26 RX ADMIN — LORAZEPAM 1 MG: 1 TABLET ORAL at 21:06

## 2023-06-26 RX ADMIN — DILTIAZEM HYDROCHLORIDE 30 MG: 30 TABLET, FILM COATED ORAL at 15:05

## 2023-06-26 RX ADMIN — PANTOPRAZOLE SODIUM 40 MG: 40 INJECTION, POWDER, FOR SOLUTION INTRAVENOUS at 21:06

## 2023-06-26 RX ADMIN — Medication 2.5 MG: at 17:26

## 2023-06-26 RX ADMIN — Medication 12.5 MG: at 21:06

## 2023-06-26 RX ADMIN — INSULIN LISPRO 4 UNITS: 100 INJECTION, SOLUTION INTRAVENOUS; SUBCUTANEOUS at 08:38

## 2023-06-26 RX ADMIN — INSULIN LISPRO 4 UNITS: 100 INJECTION, SOLUTION INTRAVENOUS; SUBCUTANEOUS at 00:00

## 2023-06-26 RX ADMIN — CHLORHEXIDINE GLUCONATE 15 ML: 1.2 SOLUTION ORAL at 21:08

## 2023-06-26 RX ADMIN — ALTEPLASE 2 MG: 2.2 INJECTION, POWDER, LYOPHILIZED, FOR SOLUTION INTRAVENOUS at 11:55

## 2023-06-26 RX ADMIN — Medication: at 21:13

## 2023-06-26 RX ADMIN — POTASSIUM CHLORIDE 20 MEQ: 14.9 INJECTION, SOLUTION INTRAVENOUS at 20:10

## 2023-06-26 RX ADMIN — METOROPROLOL TARTRATE 5 MG: 5 INJECTION, SOLUTION INTRAVENOUS at 20:04

## 2023-06-26 RX ADMIN — DILTIAZEM HYDROCHLORIDE 30 MG: 30 TABLET, FILM COATED ORAL at 21:07

## 2023-06-26 RX ADMIN — OXYCODONE HYDROCHLORIDE 5 MG: 5 TABLET ORAL at 10:08

## 2023-06-26 RX ADMIN — APIXABAN 5 MG: 5 TABLET, FILM COATED ORAL at 17:26

## 2023-06-26 RX ADMIN — PANTOPRAZOLE SODIUM 40 MG: 40 INJECTION, POWDER, FOR SOLUTION INTRAVENOUS at 08:38

## 2023-06-26 RX ADMIN — AMIODARONE HYDROCHLORIDE 200 MG: 200 TABLET ORAL at 11:56

## 2023-06-26 RX ADMIN — INSULIN LISPRO 4 UNITS: 100 INJECTION, SOLUTION INTRAVENOUS; SUBCUTANEOUS at 17:26

## 2023-06-26 RX ADMIN — METOROPROLOL TARTRATE 5 MG: 5 INJECTION, SOLUTION INTRAVENOUS at 06:00

## 2023-06-26 RX ADMIN — FLUOXETINE 20 MG: 20 CAPSULE ORAL at 15:05

## 2023-06-26 RX ADMIN — APIXABAN 5 MG: 5 TABLET, FILM COATED ORAL at 10:09

## 2023-06-26 RX ADMIN — MAGNESIUM SULFATE HEPTAHYDRATE 2 G: 40 INJECTION, SOLUTION INTRAVENOUS at 20:11

## 2023-06-26 RX ADMIN — INSULIN LISPRO 4 UNITS: 100 INJECTION, SOLUTION INTRAVENOUS; SUBCUTANEOUS at 15:09

## 2023-06-26 NOTE — PROGRESS NOTES
Progress Note - General Surgery   Chester Posadas 67 y.o. female MRN: [id number]  Unit/Bed#: [facility] 14 Encounter: [id number]    Assessment:  67 y.o. F with multiple prior abdominal surgeries including open arlene-en-y gastric bypass, presents with perforated ileum and necrotic jejunum 2/2 internal hernia, s/p exploratory laparotomy, resection of J-J anastomosis, segmental ileum resection and abthera placement 6/19, followed by second loop laparotomy, YAMIL, SBR + appendectomy, abthera placement 6/19 for rising pressor requirements. Now s/p takeback ex lap, SBR, creation of anastomosis x 3 with abdominal closure 6/20. VS: Afebrile, HR80s, normotensive, 99% on room air   UOP: 1.845L   R LUCY: 50cc verging on serous   LUQ LUCY: 180cc light ss  LLQ LCUY 130cc light ss    AM Labs: pending       Plan:  -CLD, advance as tolerated  -Nutritional support, PICC/ TPN Daily  -maintain surgical drains, record output  -abdominal binder, recommend replacing   -OOB as able  -PT/OT  -heparin Gtt, consider oral AC resumption   -Amiodarone Gtt, transition to PO as able  -ICU care appreciated, update to stepdown level of care     Subjective/Objective     Subjective: two bowel movements overnight, afebrile, tolerating clear liquids     Objective:     Blood pressure 148/69, pulse 86, temperature 98.3 °F (36.8 °C), resp. rate 16, height 5' 3" (1.6 m), weight (!) 146 kg (321 lb 14 oz), SpO2 99 %. ,Body mass index is 57.02 kg/m².       Intake/Output Summary (Last 24 hours) at 6/26/2023 3908  Last data filed at 6/26/2023 0513  Gross per 24 hour   Intake 2195.64 ml   Output 2205 ml   Net -9.36 ml       Invasive Devices     Peripherally Inserted Central Catheter Line  Duration           PICC Line 63/81/83 Left Basilic 2 days          Peripheral Intravenous Line  Duration           Peripheral IV 06/25/23 Proximal;Right;Ventral (anterior) Forearm <1 day          Drain  Duration           Closed/Suction Drain Lateral;Left LUQ 19 Fr. 5 days    Closed/Suction Drain Lateral;Right RUQ Bulb 19 Fr. 5 days    Closed/Suction Drain Left LLQ Bulb 10 Fr. 5 days                Physical Exam:     General: NAD, morbidly obese  HEENT: normocephalic, atraumatic, MMM  Cardiovascular: Regular rate  Respiratory: no distress, room air   Abdomen: soft, minimally tender near incision and drain sites, non-distended, incision c/d/i   Extremities: No clubbing, no cyanosis  Neuro: AAOx3  Skin: warm,d ry       Lab, Imaging and other studies:pending   VTE Pharmacologic Prophylaxis: Heparin  VTE Mechanical Prophylaxis: sequential compression device

## 2023-06-26 NOTE — OCCUPATIONAL THERAPY NOTE
Occupational Therapy Evaluation     Patient Name: Chester Posadas  [id number] Date: 6/26/2023  Problem List  Principal Problem:    Idiopathic acute pancreatitis without infection or necrosis  Active Problems:    Permanent atrial fibrillation (HCC)    GILDARDO (obstructive sleep apnea)    Hypertension    Stage 3 chronic kidney disease, unspecified whether stage 3a or 3b CKD ([address])    Past Medical History  Past Medical History:   Diagnosis Date   • A-fib [facility])    • Arthritis    • Atrial fibrillation ([address])    • Cervical spondylosis with myelopathy    • Gastric bypass status for obesity    • History of transfusion     35 years ago   • Hypertension    • mass right kidney    • Renal cell adenocarcinoma ([address])     RIGHT   • Sleep apnea      Past Surgical History  Past Surgical History:   Procedure Laterality Date   • ABDOMINAL ADHESION SURGERY N/A 6/19/2023    Procedure: LYSIS ADHESIONS;  Surgeon: Blank Hassan DO;  [facility];  Service: General   • APPENDECTOMY N/A 6/19/2023    Procedure: APPENDECTOMY;  Surgeon: Blank Hassan DO;  [facility];  Service: General   • CHOLECYSTECTOMY     • COLOSTOMY     • CRYOABLATION Right     RT KIDNEY   • CYSTORRHAPHY      Bladder.  Last assessed 4/6/2016    • EXPLORATORY LAPAROTOMY W/ BOWEL RESECTION N/A 6/19/2023    Procedure: LAPAROTOMY EXPLORATORY W/ BOWEL RESECTION;  Surgeon: Payal Alfonso MD;  [facility];  Service: General   • GASTRIC BYPASS     • HERNIA REPAIR      Last assessed 4/6/2016    • HYSTERECTOMY     • LAPAROTOMY N/A 6/19/2023    Procedure: LAPAROTOMY EXPLORATORY, washout, vac change;  Surgeon: Blank Hassan DO;  [facility];  Service: General   • LAPAROTOMY N/A 6/20/2023    Procedure: LAPAROTOMY, REMOVAL OF WOUND VAC AND PACKING, SMALL BOWEL RESECTION , BOWEL ANASTOMOSIS X 3 , VICRYL MESH BRIDGING -;  Surgeon: Blank Hassan DO;  [facility];  Service: General   • VA OPTX DSTL RADL To f/u with Dr Katherine Sanon to discuss potential injections    Given worsening pain X-ARTIC FX/EPIPHYSL SEP Right 4/22/2016    Procedure: OPEN REDUCTION INTERNAL FIXATION RIGHT DISTAL RADIUS;  Surgeon: Darrel Resendiz MD;  [facility];  Service: Orthopedics   • REVISION COLOSTOMY     • SMALL INTESTINE SURGERY N/A 6/19/2023    Procedure: RESECTION SMALL BOWEL;  Surgeon: Mami Crowley DO;  [facility];  Service: General   • THYROID LOBECTOMY Left 8/27/2019    Procedure: LOBECTOMY THYROID, left;  Surgeon: Mendy Cooney MD;  [facility];  Service: Surgical Oncology   • TUBAL LIGATION     • US GUIDED THYROID BIOPSY  2/27/2019   • US GUIDED THYROID BIOPSY  5/29/2019 06/26/23 1153   OT Last Visit   OT Visit Date 06/26/23   Note Type   Note type Evaluation   Pain Assessment   Pain Assessment Tool 0-10   Pain Score 6   Pain Location/Orientation Orientation: Bilateral;Location: Abdomen   Hospital Pain Intervention(s) Ambulation/increased activity; Emotional support; Rest   Restrictions/Precautions   Weight Bearing Precautions Per Order No   Other Precautions Telemetry;Multiple lines; Fall Risk;Pain;Cognitive; Chair Alarm; Bed Alarm;Contact/isolation  (+2 LUCY Drains, +TNK, Abdominal binder )   Home Living   Type of Home House   Home Layout Ranch style home;Ramped entrance  (ramped entrance from back of home)   Bathroom Shower/Tub Tub/shower unit   [employer] Raised   [employer] Grab bars in shower; Shower chair;Grab bars around toilet   [address]; Wheelchair-manual   Prior Function   Level of [place] Independent with ADLs; Needs assistance with IADLS   Lives With Daughter   Receives Help From Family  (son, daughter)   IADLs Family/Friend/Other provides transportation; Family/Friend/Other provides meals; Family/Friend/Other provides medication management   Vocational Retired   Lifestyle   Autonomy I with ADL's, assistance IADL's, +RW with functional mobility with longer distances, (-) drives   Reciprocal Relationships son, daughter (works from home)   Service to Others retired   Intrinsic Gratification listening to "[employer] TV   General   Family/Caregiver Present No   ADL   Eating Assistance 1  Total Assistance  (+TNK)   Grooming Assistance 3  Moderate Assistance   UB Bathing Assistance 2  Maximal Assistance   LB Bathing Assistance 1  Total Assistance   UB Dressing Assistance 2  Maximal Assistance   LB Dressing Assistance 1  Total Assistance   Toileting Assistance  1  Total Assistance   Toileting Deficit (incontinent of bowel upon arrival)   Bed Mobility   Sit to Supine 2  Maximal assistance   Additional items Assist x 2;LE management; Increased time required;HOB elevated   Additional Comments pt remained in supine with all needs met and daughter arriving for a visit   Transfers   Sit to Stand 2  Maximal assistance   Additional items Assist x 2   Stand to Sit 2  Maximal assistance   Additional items Assist x 2   Additional Comments +B/l HHA   Functional Mobility   Functional Mobility 2  Maximal assistance   Additional Comments max a x 2 +2 steps from chair>bed with limited activity tolerance   Additional items (B/l HHA)   Balance   Static Sitting Fair -   Dynamic Sitting Poor +   Static Standing Poor   Dynamic Standing Poor -   Ambulatory Poor -   Activity Tolerance   Activity Tolerance Patient limited by fatigue   Medical Staff Made Aware PT CRYSTAL oh due to the patient's co-morbidities, clinically unstable presentation, and present impairments which are a regression from the patient's baseline.     Nurse Made Aware RN cleared pt for therapy   RUE Assessment   RUE Assessment X  (strength 2-/5 shoulder, 2/5 elbow, at least 3+/5  able to grasp therapist arm with transfers-generalized weakness throughout)   LUE Assessment   LUE Assessment X  (Strength -shoulder 2-/5, elbow 2/5,  at least 3+/5 generalized weakness throughout)   Hand Function   Gross Motor Coordination Functional  (able to grasp water cup with assistance to bring to mouth 2* to weakness)   Fine Motor Coordination (continue to assess)   Cognition   Overall Cognitive Status Impaired   Arousal/Participation Alert; Responsive; Cooperative   Attention Attends with cues to redirect   Orientation Level Oriented to person;Disoriented to situation;Disoriented to time;Disoriented to place   Memory Decreased recall of precautions;Decreased recall of recent events;Decreased short term memory   Following Commands Follows one step commands with increased time or repetition   Comments pt pleasant and coopertive, disoriented, slow processing, minimally verbally (pt appeared fatigued from sitting in bedside chair this am) able to provide social history, unable to report current medical events. Assessment   Limitation Decreased ADL status; Decreased Safe judgement during ADL;Decreased cognition;Decreased endurance;Decreased self-care trans;Decreased high-level ADLs   Prognosis Fair   Assessment Pt is a 67 y.o. female who was admitted to [address] [place] [address] on 6/18/2023 with abdominal pain, bowel perforation, and now here with  Idiopathic acute pancreatitis without infection or necrosis multiple prior abdominal surgeries including open arlene-en-y gastric bypass, presents with perforated ileum and necrotic jejunum 2/2 internal hernia, s/p exploratory laparotomy, resection of J-J anastomosis, segmental ileum resection and abthera placement 6/19, followed by second loop laparotomy, YAMIL, SBR + appendectomy, abthera placement 6/19 for rising pressor requirements. Now s/p takeback ex lap, SBR, creation of anastomosis x 3 with abdominal closure 6/20. Sepsis/spetic shock, prerenal azotmeia.  Pt's problem list also includes PMH of  has a past medical history of A-fib ([address]), Arthritis, Atrial fibrillation ([address]), Cervical spondylosis with myelopathy, Gastric bypass status for obesity, History of transfusion, Hypertension, mass right kidney, Renal cell adenocarcinoma [facility]), and Sleep apnea. . At baseline pt was completing I with ADL's, assistance from daughter with IADL's, +RW with functional mobility. Pt lives with daughter in a [address] with ramped entrance . Currently pt requires max/total assist for overall ADLS and max a x 2 with B/l HHA for functional mobility/transfers. Pt currently presents with impairments in the following categories -difficulty performing ADLS and difficulty performing IADLS  activity tolerance, endurance, standing balance/tolerance, sitting balance/tolerance, UE strength, UE ROM, GMC, memory, insight, safety , judgement , attention , sequencing  and task termination . These impairments, as well as pt's fatigue, pain, decreased caregiver support and risk for falls  limit pt's ability to safely engage in all baseline areas of occupation, includingeating, grooming, bathing, dressing, toileting, functional mobility/transfers, community mobility, laundry , house maintenance, medication management, meal prep, cleaning, social participation  and leisure activities    The patient's raw score on the -PAC Daily Activity Inpatient Short Form is 8. A raw score of less than 19 suggests the patient may benefit from discharge to post-acute rehabilitation services. Please refer to the recommendation of the Occupational Therapist for safe discharge planning. From OT standpoint, recommend STR upon D/C. OT will continue to follow to address the below stated goals. Goals   Patient Goals get back into bed (received seated in chair)   LT Time Frame 10-14   Long Term Goal #1 see goals below   Plan   Treatment Interventions ADL retraining;Functional transfer training;UE strengthening/ROM; Endurance training;Cognitive reorientation;Patient/family training;Equipment evaluation/education; Compensatory technique education;Continued evaluation; Energy conservation; Activityengagement   Goal Expiration Date 07/10/23   OT Frequency 2-3x/wk   Recommendation   OT Discharge Recommendation Post acute rehabilitation services   Equipment Recommended   (TBD)   AM-PAC Daily Activity Inpatient   Lower Body Dressing 1   Bathing 1   Toileting 1   Upper Body Dressing 2   Grooming 2   Eating 1   Daily Activity Raw Score 8   Turning Head Towards Sound 4   Follow Simple Instructions 3   Low Function Daily Activity Raw Score 15   Low Function Daily Activity Standardized Score  26.28   AM-PAC Applied Cognition Inpatient   Following a Speech/Presentation 2   Understanding Ordinary Conversation 3   Taking Medications 2   Remembering Where Things Are Placed or Put Away 2   Remembering List of 4-5 Errands 2   Taking Care of Complicated Tasks 1   Applied Cognition Raw Score 12   Applied Cognition Standardized Score 28.82       Occupational Therapy Goals:    *Mod I with bed mobility to engage in functional tasks. *Mod I Adl's after setup with use of AE PRN  *Mod I toileting and clothing management   *Mod I functional mobility and transfers to/from all surfaces with Fair + dynamic balance and safety for participation in dynamic adls and iadl tasks   *Demonstrate good carryover with safe use of RW during functional tasks   *Assess DME needs   *Increase activity tolerance to 25-30 minutes for participation in adls and enjoyable activities  *Pt to participate in further cognitive testing with good attention and participation to assist with safe d/c recommendations  *Demonstrate good carryover of pt/family education and training with good tolerance for increased safety and independence with ADL's/ADl's. *Pt will improve sitting balance to 8-10 minutes with functional tasks to increase I with toileting/transfers.   *Patient will demonstrate 100% carryover of energy conservation techniques t/o functional I/ADL/leisure tasks w/o cues s/p skilled education to increase endurance during functional tasks  *Pt will  increased attention and follow 100% simple one step verbal commands and be A/Ox4 consistently t/o use of external environmental cues w/ mod I  *Pt will participate in fine/gross motor coordination/strenthening/dexterity exercises to Good in order to increase participation in functional activities. *Pt will improve B/L UE ROM 1/2 MMT via AROM/AAROM/PROM in all planes as tolerated in order to participate in functional activities.   Althea Olmedo MOT, OTR/L

## 2023-06-26 NOTE — PROGRESS NOTES
Daily Progress Note - Critical Care   Joan Mckoy 67 y.o. female MRN: [id number]  Unit/Bed#: MICU 14 Encounter: [id number]        ----------------------------------------------------------------------------------------  HPI/24hr events: Pt is a 68 yo female with relevant PMH s/p multiple abdominal surgeries with [place] anatomy who presented with an internal small bowel hernia. Pt taken for ex-lap with small bowel resection, blood loss requiring transfusion. S/p 2 second second-look surgeries with small SBRs and closure. Extubated 6/22. No acute events overnight.  ---------------------------------------------------------------------------------------  SUBJECTIVE  Pt reports she is feeling well. Pain is 6/10, unchanged from yesterday. Urinating without difficulty. Has not ambulated since surgery, but is working with PT. Pt reports that she began having copious, loose stools yesterday. Has not had liquids by mouth yet despite having clear liquid diet available, will try today. Review of systems was reviewed and negative unless stated above in HPI/24-hour events   ---------------------------------------------------------------------------------------  Assessment and Plan:    Neuro:   • Pain /Sedation  ? Dilaudid Oxycodone PRN  ? Multimodal analgesia  ? Delirium precautions  ? Transition Ativan IV to Ativan PO home dose PRN  • Anxiety/Depression  ? Restart home Prozac     CV:   • Afib with RVR  ? D/C heparin gtt, initiate Eliquis  ? D/C amiodarone gtt, initiate Amiodarone  TID loading dose x11 days, 200 daily after  ? D/C Metoprolol IV, initiate metoprolol PO home dose 12.5 mg  ? Initiate home dose Cardizem 30 q 8  • HTN  ? Holding HCTZ     Pulm:  • No active issues  ? Extubated 6/22  ? Off NC O2  ? Maintain O2 sat >92%     GI:   • Internal Hernia with Closed Loop Obstruction c/b J-J anastomosis site necrosis, hemoperitoneum and portal venous gas  ?  S/p ex lap w/ SBR Tika Atkinson ileum resection) odin    ? S/p ex lap w/ SBR (distal jejunem/ileum resection), appendectomy abthera   ? S/p ex lap w/ SBR , closure  ? Abdominal exam benign  • GERD   ? PPI     :   • Trend I/Os  • ASTRID resolved  • UOP . 5 cc/kg/hrs     F/E/N:   • Replete electrolytes as needed  • Clear liquid diet  • TPN nutrition     Heme/Onc:   • ABLA  ? S/p 5 u prbc 3 FFP on   ? Monitor hgb     Endo:   • ISS Algorithm 5  • Initiate basal lantus 10 u PM  • BMP Glu 193 (193) (209)  ? Maintain -180     ID:   • No active issues  ? Antibiotics completed   ? BC x2 neg  ?  Urine culture negative     MSK/Skin:   • Frequent turning , repositioning    Patient appropriate for transfer out of the ICU today?: YES  Disposition: Transfer to Stepdown Level 2  Code Status: Level 1 - Full Code  ---------------------------------------------------------------------------------------  ICU CORE MEASURES    Prophylaxis   VTE Pharmacologic Prophylaxis: Heparin Drip  VTE Mechanical Prophylaxis: sequential compression device  Stress Ulcer Prophylaxis: Pantoprazole IV       Invasive Devices Review  Invasive Devices     Peripherally Inserted Central Catheter Line  Duration           PICC Line 97/81/88 Left Basilic 2 days          Peripheral Intravenous Line  Duration           Peripheral IV 23 Proximal;Right;Ventral (anterior) Forearm <1 day          Drain  Duration           Closed/Suction Drain Lateral;Left LUQ 19 Fr. 5 days    Closed/Suction Drain Lateral;Right RUQ Bulb 19 Fr. 5 days    Closed/Suction Drain Left LLQ Bulb 10 Fr. 5 days              ---------------------------------------------------------------------------------------  OBJECTIVE    Vitals   Vitals:    23 1845 23 2200 23 0200   BP: 107/67 131/62 (!) 149/131 148/69   Pulse: 82 94 (!) 112 86   Resp:  16 16   Temp: 98.3 °F (36.8 °C)      TempSrc:       SpO2: 96% 97% 98% 99%   Weight:       Height:         Temp (24hrs), Av.5 °F (36.9 °C), Min:98.3 °F (36.8 °C), Max:98.6 °F (37 °C)  Current: Temperature: 98.3 °F (36.8 °C)  HR: 86  BP: 148/69  RR: 16  SpO2: 99%    Respiratory:  Nasal Cannula O2 Flow Rate (L/min): 2 L/min    Invasive/non-invasive ventilation settings   Respiratory    Lab Data (Last 4 hours)    None         O2/Vent Data (Last 4 hours)    None                Physical Exam  Constitutional:       Appearance: She is obese. HENT:      Head: Normocephalic and atraumatic. Right Ear: External ear normal.      Left Ear: External ear normal.   Eyes:      General: No scleral icterus. Right eye: No discharge. Left eye: No discharge. Cardiovascular:      Rate and Rhythm: Normal rate. Heart sounds: Normal heart sounds. Pulmonary:      Effort: Pulmonary effort is normal.      Breath sounds: Normal breath sounds. Abdominal:      General: Abdomen is flat. There is no distension. Palpations: Abdomen is soft. Tenderness: There is no abdominal tenderness. There is no guarding or rebound. Comments: Incision covered under clean gauze without strikethrough. C/D/I, no surrounding erythema or induration. Drain x3 producing serosanguinous output. Skin:     General: Skin is warm and dry. Coloration: Skin is not jaundiced. Neurological:      Mental Status: She is oriented to person, place, and time.    Psychiatric:         Mood and Affect: Mood normal.         Behavior: Behavior normal.           Laboratory and Diagnostics:  Results from last 7 days   Lab Units 06/25/23  0550 06/24/23  0515 06/23/23  0422 06/22/23  0435 06/21/23  0426 06/20/23  1641 06/20/23  1531 06/20/23  1228 06/20/23  0609 06/19/23  1808 06/19/23  1403   WBC Thousand/uL 10.49* 9.18 9.37 8.29 8.99 11.25*  --   --  18.13*   < > 21.04*   HEMOGLOBIN g/dL 8.6* 8.7* 8.1* 7.6* 8.6* 9.3*  --    < > 10.7*   < > 12.7   I STAT HEMOGLOBIN g/dl  --   --   --   --   --   --  8.5*   < >  --   --   --    HEMATOCRIT % 29.0* 26.7* 25.5* 23.9* 26.1* 28.4* --    < > 31.8*   < > 38.7   HEMATOCRIT, ISTAT %  --   --   --   --   --   --  25*   < >  --   --   --    PLATELETS Thousands/uL 213 180 148* 116* 133* 158  --   --  223   < > 251   NEUTROS PCT %  --   --   --   --   --   --   --   --  89*  --  90*   MONOS PCT %  --   --   --   --   --   --   --   --  5  --  5   EOS PCT %  --   --   --   --   --   --   --   --  1  --  2    < > = values in this interval not displayed. Results from last 7 days   Lab Units 06/25/23  0550 06/24/23  1413 06/24/23  0515 06/23/23  2035 06/23/23  0422 06/22/23  1616 06/22/23  1324 06/22/23  0435 06/20/23  2305 06/20/23  1641 06/19/23  2152 06/19/23  1916   SODIUM mmol/L 144 144 144 145 145 144  --  144   < > 143   < >  --    POTASSIUM mmol/L 3.5 3.4* 3.2* 3.5 3.5 3.6  --  3.6   < > 3.5   < >  --    CHLORIDE mmol/L 111* 109* 112* 113* 116* 114*  --  118*   < > 114*   < >  --    CO2 mmol/L 31 32 30 30 27 25  --  26   < > 22   < >  --    CO2, I-STAT   --   --   --   --   --   --   --   --   --   --    < >  --    ANION GAP mmol/L 2 3 2 2 2 5  --  0   < > 7   < >  --    BUN mg/dL 22 15 16 15 19 20  --  24   < > 35*   < >  --    CREATININE mg/dL 0.63 0.72 0.70 0.72 0.74 0.76  --  0.79   < > 1.29   < >  --    CALCIUM mg/dL 7.7* 8.0* 7.7* 7.6* 7.3* 7.5*  --  7.0*   < > 7.3*   < >  --    GLUCOSE RANDOM mg/dL 193* 167* 209* 162* 110 112  --  130   < > 121   < >  --    ALT U/L  --   --   --   --   --   --  16  --   --  19  --  36   AST U/L  --   --   --   --   --   --  22  --   --  25  --  45   ALK PHOS U/L  --   --   --   --   --   --  55  --   --  51  --  61   ALBUMIN g/dL  --   --   --   --   --   --  2.3*  --   --  2.6*  --  2.3*   TOTAL BILIRUBIN mg/dL  --   --   --   --   --   --  0.51  --   --  0.73  --  0.88    < > = values in this interval not displayed.      Results from last 7 days   Lab Units 06/25/23  0550 06/24/23  0515 06/23/23  2035 06/23/23  0422 06/22/23  1616 06/22/23  0435 06/21/23  0426 06/20/23  1641   MAGNESIUM mg/dL 2.1 2.0 2.1 2.6 2.6 3.0* 2.6 2.8*   PHOSPHORUS mg/dL 2.0* 1.7* 2.5 1.6*  --  1.5* 2.6 3.8      Results from last 7 days   Lab Units 06/25/23  1950 06/25/23  1152 06/25/23  0354 06/24/23  1740 06/24/23  1102 06/19/23  0807   INR   --   --   --   --  1.19 1.10   PTT seconds 46* 48* 46* 44* 31 25          Results from last 7 days   Lab Units 06/20/23  2305 06/20/23 2003 06/20/23  1641 06/20/23  0908 06/20/23  0700 06/20/23  0106 06/19/23  2152   LACTIC ACID mmol/L 1.9 1.7 2.6* 5.7* 5.2* 5.9* 7.9*     ABG:  Results from last 7 days   Lab Units 06/21/23  0420   PH ART  7.401   PCO2 ART mm Hg 38.9   PO2 ART mm Hg 91.7   HCO3 ART mmol/L 23.6   BASE EXC ART mmol/L -1.0   ABG SOURCE  Line, Arterial     VBG:  Results from last 7 days   Lab Units 06/21/23  0420 06/19/23  1641 06/19/23  1407   PH SHAKIRA   --   --  7.152*   PCO2 SHAKIRA mm Hg  --   --  50.2*   PO2 SHAKIRA mm Hg  --   --  39.2   HCO3 SHAKIRA mmol/L  --   --  17.2*   BASE EXC SHAKIRA mmol/L  --   --  -11.6   ABG SOURCE  Line, Arterial   < >  --     < > = values in this interval not displayed. Micro  Results from last 7 days   Lab Units 06/19/23  1353   BLOOD CULTURE  No Growth After 5 Days. No Growth After 5 Days. Imaging:  I have personally reviewed pertinent reports. Intake and Output  I/O       06/24 0701 06/25 0700 06/25 0701 06/26 0700    I.V. (mL/kg) 759.9 (5.2) 774.7 (5.3)    IV Piggyback 800 250    TPN 1205.5 1171    Total Intake(mL/kg) 2765.4 (18.9) 2195.6 (15)    Urine (mL/kg/hr) 3425 (1) 1845 (0.5)    Emesis/NG output 150 0    Drains 600 360    Total Output 4175 2205    Net -1409.6 -9.4              UOP: .5 ml/kg/hr     Height and Weights   Height: 5' 3" (160 cm)  IBW (Ideal Body Weight): 52.4 kg  Body mass index is 57.02 kg/m².   Weight (last 2 days)     Date/Time Weight    06/25/23 0551 146 (321.87)    06/25/23 0550 146 (321.87)    06/24/23 0600 161 (355.16)            Nutrition       Diet Orders   (From admission, onward)             Start     Ordered 06/25/23 1014  Diet Clear Liquid  (Order Panel)  Diet effective midnight        References:    Adult Nutrition Support Algorithm    RD Therapeutic Diet Order Protocol   Question Answer Comment   Diet Type Clear Liquid    RD to adjust diet per protocol?  Yes        06/25/23 1013                Active Medications  Scheduled Meds:  Current Facility-Administered Medications   Medication Dose Route Frequency Provider Last Rate   • acetaminophen  650 mg Oral Q6H PRN Smiley Betancourt PA-C     • Adult 3-in-1 TPN (custom base / custom electrolytes)   Intravenous Continuous TPN AARON PratidaC 73 mL/hr at 06/26/23 9160   • amiodarone (CORDARONE) 900 mg in dextrose 5 % 500 mL infusion  0.5 mg/min Intravenous Continuous Karilyn Castleman, PA-C 0.5 mg/min (06/26/23 0513)   • chlorhexidine  15 mL Mouth/Throat Q12H [address] [facility] Julian, DO     • heparin (porcine)  3-20 Units/kg/hr (Order-Specific) Intravenous Titrated Constance Jones DO 19.1 Units/kg/hr (06/26/23 0513)   • HYDROmorphone  0.3 mg Intravenous Q3H PRN AARON PartidaC     • insulin lispro  4-20 Units Subcutaneous Q6H [address] [facility] Wendy Poole PA-C     • LORazepam  0.5 mg Intravenous Q4H PRN Sue Spicer PA-C     • metoprolol  5 mg Intravenous Q6H MOLLY Lerma     • ondansetron  4 mg Intravenous Q6H PRN Ritika Molina DO     • oxyCODONE  2.5 mg Oral Q4H PRN AARON PartidaC      Or   • oxyCODONE  5 mg Oral Q4H PRN Smiley Betancourt PA-C     • pantoprazole  40 mg Intravenous Q12H [address] [facility] DO Brendan       Continuous Infusions:  Adult 3-in-1 TPN (custom base / custom electrolytes), , Last Rate: 73 mL/hr at 06/26/23 0513  amiodarone (CORDARONE) 900 mg in dextrose 5 % 500 mL infusion, 0.5 mg/min, Last Rate: 0.5 mg/min (06/26/23 0513)  heparin (porcine), 3-20 Units/kg/hr (Order-Specific), Last Rate: 19.1 Units/kg/hr (06/26/23 0513)      PRN Meds:   acetaminophen, 650 mg, Q6H PRN  HYDROmorphone, 0.3 mg, Q3H PRN  LORazepam, 0.5 mg, Q4H PRN  ondansetron, 4 mg, Q6H PRN  oxyCODONE, 2.5 mg, Q4H PRN   Or  oxyCODONE, 5 mg, Q4H PRN        Allergies   No Known Allergies  ---------------------------------------------------------------------------------------  Care Time Delivered:   Upon my evaluation, this patient had a high probability of imminent or life-threatening deterioration due to CV deterioration, which required my direct attention, intervention, and personal management. I have personally provided 30 minutes of critical care time, exclusive of procedures, teaching, family meetings, and any prior time recorded by providers other than myself. Usha Santamaria      Portions of the record may have been created with voice recognition software. Occasional wrong word or "sound a like" substitutions may have occurred due to the inherent limitations of voice recognition software.   Read the chart carefully and recognize, using context, where substitutions have occurred

## 2023-06-26 NOTE — PHYSICAL THERAPY NOTE
Physical Therapy Evaluation    Patient Name: Mike HARRIS Date: 6/26/2023     Problem List  Principal Problem:    Idiopathic acute pancreatitis without infection or necrosis  Active Problems:    Permanent atrial fibrillation (HCC)    GILDARDO (obstructive sleep apnea)    Hypertension    Stage 3 chronic kidney disease, unspecified whether stage 3a or 3b CKD ([address])       Past Medical History  Past Medical History:   Diagnosis Date    A-fib ([address])     Arthritis     Atrial fibrillation (HCC)     Cervical spondylosis with myelopathy     Gastric bypass status for obesity     History of transfusion     35 years ago    Hypertension     mass right kidney     Renal cell adenocarcinoma ([address])     RIGHT    Sleep apnea         Past Surgical History  Past Surgical History:   Procedure Laterality Date    ABDOMINAL ADHESION SURGERY N/A 6/19/2023    Procedure: LYSIS ADHESIONS;  Surgeon: Carlos Burk DO;  [facility];  Service: General    APPENDECTOMY N/A 6/19/2023    Procedure: APPENDECTOMY;  Surgeon: Carlos Burk DO;  [facility];  Service: General    CHOLECYSTECTOMY      COLOSTOMY      CRYOABLATION Right     RT KIDNEY    CYSTORRHAPHY      Bladder.  Last assessed 4/6/2016     EXPLORATORY LAPAROTOMY W/ BOWEL RESECTION N/A 6/19/2023    Procedure: LAPAROTOMY EXPLORATORY W/ BOWEL RESECTION;  Surgeon: Jean Parr MD;  [facility];  Service: General    GASTRIC BYPASS      HERNIA REPAIR      Last assessed 4/6/2016     HYSTERECTOMY      LAPAROTOMY N/A 6/19/2023    Procedure: LAPAROTOMY EXPLORATORY, washout, vac change;  Surgeon: Carlos Burk DO;  [facility];  Service: General    LAPAROTOMY N/A 6/20/2023    Procedure: LAPAROTOMY, REMOVAL OF WOUND VAC AND PACKING, SMALL BOWEL RESECTION , BOWEL ANASTOMOSIS X 3 , VICRYL MESH BRIDGING -;  Surgeon: Carlos Burk DO;  [facility]; Service: General    MD OPTX DSTL RADL X-ARTIC FX/EPIPHYSL SEP Right 4/22/2016    Procedure: OPEN REDUCTION INTERNAL FIXATION RIGHT DISTAL RADIUS;  Surgeon: Yasir Navarrete MD;  [facility];  Service: Orthopedics    REVISION COLOSTOMY      SMALL INTESTINE SURGERY N/A 6/19/2023    Procedure: RESECTION SMALL BOWEL;  Surgeon: Akilah Oh DO;  [facility];  Service: General    THYROID LOBECTOMY Left 8/27/2019    Procedure: LOBECTOMY THYROID, left;  Surgeon: Fredi Morse MD;  [facility];  Service: Surgical Oncology    TUBAL LIGATION      US GUIDED THYROID BIOPSY  2/27/2019    US GUIDED THYROID BIOPSY  5/29/2019 06/26/23 1226   PT Last Visit   PT Visit Date 06/26/23   Note Type   Note type Evaluation   Pain Assessment   Pain Assessment Tool 0-10   Pain Score 6   Pain Location/Orientation Location: Abdomen   Hospital Pain Intervention(s) Repositioned; Ambulation/increased activity   Restrictions/Precautions   Weight Bearing Precautions Per Order No   Other Precautions Cognitive; Chair Alarm; Bed Alarm;Multiple lines;Telemetry; Fall Risk  (bariatric, LUCY drain x3, incontinent, TPN)   Home Living   Type of [address] [place] [address] Dr One level;Ramped entrance   Bathroom Shower/Tub Tub/shower unit   [address] bars in shower; Shower chair;Grab bars around toilet   [place]; Wheelchair-manual   Additional Comments RW use PTA   Prior Function   Level of [place] Independent with ADLs; Independent with functional mobility; Needs assistance with IADLS   Lives With Daughter  [facility])   [address] Help From Family   IADLs Family/Friend/Other provides transportation; Family/Friend/Other provides meals; Family/Friend/Other provides medication management   General   Family/Caregiver Present Yes  (daughter at conclusion)   Cognition   Overall Cognitive Status Impaired   Arousal/Participation Cooperative   Attention Attends with cues to redirect Orientation Level Oriented to person;Disoriented to place; Disoriented to time;Disoriented to situation   Memory Decreased recall of recent events   Following Commands Follows one step commands with increased time or repetition   Subjective   Subjective agreeable   RLE Assessment   RLE Assessment   (3-/5 functionally)   LLE Assessment   LLE Assessment   (3-/5 functionally)   Bed Mobility   Supine to Sit Unable to assess   Sit to Supine 2  Maximal assistance   Additional items Assist x 2; Increased time required;Verbal cues;LE management   Additional Comments presented seated in chair with alarm upon entry   Transfers   Sit to Stand 2  Maximal assistance   Additional items Assist x 2; Increased time required;Verbal cues   Stand to Sit 2  Maximal assistance   Additional items Assist x 2; Increased time required;Verbal cues   Additional Comments b/l HHA   Ambulation/Elevation   Gait pattern Improper Weight shift;Decreased foot clearance; Forward Flexion; Wide TACO; Short stride; Step to;Excessively slow   Gait Assistance 2  Maximal assist   Additional items Assist x 2;Verbal cues   Assistive Device   (b/l HHA)   Distance 2' to chair   Ambulation/Elevation Additional Comments VC For step sequencing   Balance   Static Sitting Fair -   Dynamic Sitting Poor +   Static Standing Poor -   Dynamic Standing Poor -   Ambulatory Poor -   Endurance Deficit   Endurance Deficit Yes   Endurance Deficit Description gross fatigue, weakness   Activity Tolerance   Activity Tolerance Patient limited by fatigue   Medical Staff Made Aware co-tx with MELBA CRUZ due to medical complexity; CRYSTAL Brito   Nurse Made Aware yes-cleared to mobilize   Assessment   Prognosis Fair   Problem List Decreased strength;Decreased endurance; Impaired balance;Decreased mobility; Decreased coordination; Impaired judgement;Decreased cognition;Decreased safety awareness; Obesity;Pain   Assessment Pt is a 67 y.o. female admitted to \[facility]\"" on 6/20/2023 with primary dx of idiopathic acute pancreatitis without infection or necrosis. Pt "with multiple prior abdominal surgeries including open arlene-en-y gastric bypass, presents with perforated ileum and necrotic jejunum 2/2 internal hernia, s/p exploratory laparotomy, resection of J-J anastomosis, segmental ileum resection and abthera placement 6/19, followed by second loop laparotomy, YAMIL, SBR + appendectomy, abthera placement 6/19 for rising pressor requirements. Now s/p takeback ex lap, SBR, creation of anastomosis x 3 with abdominal closure 6/20". Pt has the following comorbidities which affect their treatment:  has a past medical history of A-fib ([address]), Arthritis, Atrial fibrillation ([address]), Cervical spondylosis with myelopathy, Gastric bypass status for obesity, History of transfusion, Hypertension, mass right kidney, Renal cell adenocarcinoma ([address]), and Sleep apnea. Pt has a high complexity clinical presentation due to Ongoing medical management for primary dx, Increased reliance on more restrictive AD compared to baseline, Decreased activity tolerance compared to baseline, Fall risk, Increased assistance needed from caregiver at current time, Ongoing telemetry monitoring, Cog status, Trending lab values, Diagnostic imaging pending, Continuous pulse oximetry monitoring , LUCY drain in place at current time, s/p surgical intervention , and PMH. PT was consulted to evaluate pt's functional mobility and discharge needs. Upon evaluation, patient required maxAx2 for all mobility as outlined above. Pt's functional impairments include: impaired strength, balance, endurance, activity tolerance, and mobility. At conclusion of eval, pt remained supine in bed with bed alarm donned, phone, call bell, and all other personal needs within reach. Pt would benefit from skilled PT to address their functional mobility limitations. The patient's AM-PAC Basic Mobility Inpatient Short Form Raw Score is 6.  A Raw score of less than or equal to 16 suggests the patient may benefit from discharge to post-acute rehabilitation services. Please also refer to the recommendation of the Physical Therapist for safe discharge planning. D/C recommendations are rehab. Barriers to Discharge Decreased caregiver support; Inaccessible home environment   Goals   Patient Goals to lay down   STG Expiration Date 07/10/23   Short Term Goal #1 In 14 days, pt will: 1) perform bed mobility with minAx1 to promote functional independence and decrease caregiver burden. 2) perform transfers to<>from all surfaces with minAx1 to promote functional independence and decrease caregiver burden. 3) ambulate 48' with minAx1 and least restrictive device to promote safe return to home. 4) improve balance grades by 1/2 to promote safety with functional mobility. 5) improve strength grades by 1/2 to promote safety with functional mobility. PT Treatment Day 0   Plan   Treatment/Interventions LE strengthening/ROM; Functional transfer training; Therapeutic exercise; Endurance training;Cognitive reorientation;Patient/family training;Equipment eval/education; Bed mobility;Gait training;Spoke to nursing;Spoke to case management;OT   PT Frequency 3-5x/wk   Recommendation   PT Discharge Recommendation Post acute rehabilitation services   AM-PAC Basic Mobility Inpatient   Turning in Flat Bed Without Bedrails 1   Lying on Back to Sitting on Edge of Flat Bed Without Bedrails 1   Moving Bed to Chair 1   Standing Up From Chair Using Arms 1   Walk in Room 1   Climb 3-5 Stairs With Railing 1   Basic Mobility Inpatient Raw Score 6   Turning Head Towards Sound 4   Follow Simple Instructions 3   Low Function Basic Mobility Raw Score  13   Low Function Basic Mobility Standardized Score  20.14   Highest Level Of Mobility   JH-HLM Goal 2: Bed activities/Dependent transfer   JH-HLM Achieved 4: Move to chair/commode   Modified [place] Scale   Modified [place] Scale 4   Maximus Serna, PT, DPT

## 2023-06-26 NOTE — PLAN OF CARE
Problem: OCCUPATIONAL THERAPY ADULT  Goal: Performs self-care activities at highest level of function for planned discharge setting. See evaluation for individualized goals. Description: Treatment Interventions: ADL retraining, Functional transfer training, UE strengthening/ROM, Endurance training, Cognitive reorientation, Patient/family training, Equipment evaluation/education, Compensatory technique education, Continued evaluation, Energy conservation, Activityengagement  Equipment Recommended:  (TBD)       See flowsheet documentation for full assessment, interventions and recommendations. Note: Limitation: Decreased ADL status, Decreased Safe judgement during ADL, Decreased cognition, Decreased endurance, Decreased self-care trans, Decreased high-level ADLs  Prognosis: Fair  Assessment: Pt is a 67 y.o. female who was admitted to [address] [place] [address] on 6/18/2023 with abdominal pain, bowel perforation, and now here with  Idiopathic acute pancreatitis without infection or necrosis multiple prior abdominal surgeries including open arlene-en-y gastric bypass, presents with perforated ileum and necrotic jejunum 2/2 internal hernia, s/p exploratory laparotomy, resection of J-J anastomosis, segmental ileum resection and abthera placement 6/19, followed by second loop laparotomy, YAMIL, SBR + appendectomy, abthera placement 6/19 for rising pressor requirements. Now s/p takeback ex lap, SBR, creation of anastomosis x 3 with abdominal closure 6/20. Sepsis/spetic shock, prerenal azotmeia. Pt's problem list also includes PMH of  has a past medical history of A-fib ([address]), Arthritis, Atrial fibrillation ([address]), Cervical spondylosis with myelopathy, Gastric bypass status for obesity, History of transfusion, Hypertension, mass right kidney, Renal cell adenocarcinoma ([address]), and Sleep apnea. . At baseline pt was completing I with ADL's, assistance from daughter with IADL's, +RW with functional mobility.  Pt lives with daughter in a [facility] with ramped entrance . Currently pt requires max/total assist for overall ADLS and max a x 2 with B/l HHA for functional mobility/transfers. Pt currently presents with impairments in the following categories -difficulty performing ADLS and difficulty performing IADLS  activity tolerance, endurance, standing balance/tolerance, sitting balance/tolerance, UE strength, UE ROM, GMC, memory, insight, safety , judgement , attention , sequencing  and task termination . These impairments, as well as pt's fatigue, pain, decreased caregiver support and risk for falls  limit pt's ability to safely engage in all baseline areas of occupation, includingeating, grooming, bathing, dressing, toileting, functional mobility/transfers, community mobility, laundry , house maintenance, medication management, meal prep, cleaning, social participation  and leisure activities    The patient's raw score on the -PAC Daily Activity Inpatient Short Form is 8. A raw score of less than 19 suggests the patient may benefit from discharge to post-acute rehabilitation services. Please refer to the recommendation of the Occupational Therapist for safe discharge planning. From OT standpoint, recommend STR upon D/C. OT will continue to follow to address the below stated goals.      OT Discharge Recommendation: Post acute rehabilitation services

## 2023-06-26 NOTE — NUTRITION
Nutrition Follow-Up:       06/26/23 1228   Recommendations/Interventions   Recommendations to Provider Continue TPN as ordered for now, meeting 100% of estimated needs, until PO intake improves. Continue to monitor electrolytes and blood sugars.

## 2023-06-26 NOTE — PLAN OF CARE
Problem: PHYSICAL THERAPY ADULT  Goal: Performs mobility at highest level of function for planned discharge setting. See evaluation for individualized goals. Description: Treatment/Interventions: LE strengthening/ROM, Functional transfer training, Therapeutic exercise, Endurance training, Cognitive reorientation, Patient/family training, Equipment eval/education, Bed mobility, Gait training, Spoke to nursing, Spoke to case management, OT          See flowsheet documentation for full assessment, interventions and recommendations. Note: Prognosis: Fair  Problem List: Decreased strength, Decreased endurance, Impaired balance, Decreased mobility, Decreased coordination, Impaired judgement, Decreased cognition, Decreased safety awareness, Obesity, Pain  Assessment: Pt is a 67 y.o. female admitted to [facility] on 6/20/2023 with primary dx of idiopathic acute pancreatitis without infection or necrosis. Pt "with multiple prior abdominal surgeries including open arlene-en-y gastric bypass, presents with perforated ileum and necrotic jejunum 2/2 internal hernia, s/p exploratory laparotomy, resection of J-J anastomosis, segmental ileum resection and abthera placement 6/19, followed by second loop laparotomy, YAMIL, SBR + appendectomy, abthera placement 6/19 for rising pressor requirements. Now s/p takeback ex lap, SBR, creation of anastomosis x 3 with abdominal closure 6/20". Pt has the following comorbidities which affect their treatment:  has a past medical history of A-fib ([address]), Arthritis, Atrial fibrillation ([address]), Cervical spondylosis with myelopathy, Gastric bypass status for obesity, History of transfusion, Hypertension, mass right kidney, Renal cell adenocarcinoma ([address]), and Sleep apnea.  Pt has a high complexity clinical presentation due to Ongoing medical management for primary dx, Increased reliance on more restrictive AD compared to baseline, Decreased activity tolerance compared to baseline, Fall risk, Increased assistance needed from caregiver at current time, Ongoing telemetry monitoring, Cog status, Trending lab values, Diagnostic imaging pending, Continuous pulse oximetry monitoring , LUCY drain in place at current time, s/p surgical intervention , and PMH. PT was consulted to evaluate pt's functional mobility and discharge needs. Upon evaluation, patient required maxAx2 for all mobility as outlined above. Pt's functional impairments include: impaired strength, balance, endurance, activity tolerance, and mobility. At conclusion of eval, pt remained supine in bed with bed alarm donned, phone, call bell, and all other personal needs within reach. Pt would benefit from skilled PT to address their functional mobility limitations. The patient's AM-PAC Basic Mobility Inpatient Short Form Raw Score is 6. A Raw score of less than or equal to 16 suggests the patient may benefit from discharge to post-acute rehabilitation services. Please also refer to the recommendation of the Physical Therapist for safe discharge planning. D/C recommendations are rehab. Barriers to Discharge: Decreased caregiver support, Inaccessible home environment     PT Discharge Recommendation: Post acute rehabilitation services    See flowsheet documentation for full assessment.

## 2023-06-27 LAB
ANION GAP SERPL CALCULATED.3IONS-SCNC: 2 MMOL/L
ATRIAL RATE: 81 BPM
BUN SERPL-MCNC: 24 MG/DL (ref 5–25)
CA-I BLD-SCNC: 1.03 MMOL/L (ref 1.12–1.32)
CALCIUM SERPL-MCNC: 7.7 MG/DL (ref 8.3–10.1)
CHLORIDE SERPL-SCNC: 107 MMOL/L (ref 96–108)
CO2 SERPL-SCNC: 28 MMOL/L (ref 21–32)
CREAT SERPL-MCNC: 0.57 MG/DL (ref 0.6–1.3)
ERYTHROCYTE [DISTWIDTH] IN BLOOD BY AUTOMATED COUNT: 17.1 % (ref 11.6–15.1)
GFR SERPL CREATININE-BSD FRML MDRD: 92 ML/MIN/1.73SQ M
GLUCOSE SERPL-MCNC: 133 MG/DL (ref 65–140)
GLUCOSE SERPL-MCNC: 143 MG/DL (ref 65–140)
GLUCOSE SERPL-MCNC: 193 MG/DL (ref 65–140)
GLUCOSE SERPL-MCNC: 194 MG/DL (ref 65–140)
GLUCOSE SERPL-MCNC: 200 MG/DL (ref 65–140)
GLUCOSE SERPL-MCNC: 201 MG/DL (ref 65–140)
GLUCOSE SERPL-MCNC: 205 MG/DL (ref 65–140)
GLUCOSE SERPL-MCNC: 239 MG/DL (ref 65–140)
HCT VFR BLD AUTO: 26 % (ref 34.8–46.1)
HGB BLD-MCNC: 8.1 G/DL (ref 11.5–15.4)
INR PPP: 1.23 (ref 0.84–1.19)
MAGNESIUM SERPL-MCNC: 2.1 MG/DL (ref 1.6–2.6)
MCH RBC QN AUTO: 27.2 PG (ref 26.8–34.3)
MCHC RBC AUTO-ENTMCNC: 31.2 G/DL (ref 31.4–37.4)
MCV RBC AUTO: 87 FL (ref 82–98)
PHOSPHATE SERPL-MCNC: 2.5 MG/DL (ref 2.3–4.1)
PLATELET # BLD AUTO: 264 THOUSANDS/UL (ref 149–390)
PMV BLD AUTO: 10.3 FL (ref 8.9–12.7)
POTASSIUM SERPL-SCNC: 4.1 MMOL/L (ref 3.5–5.3)
PR INTERVAL: 0 MS
PROTHROMBIN TIME: 15.7 SECONDS (ref 11.6–14.5)
QRS AXIS: 1 DEGREES
QRSD INTERVAL: 79 MS
QT INTERVAL: 342 MS
QTC INTERVAL: 397 MS
RBC # BLD AUTO: 2.98 MILLION/UL (ref 3.81–5.12)
SODIUM SERPL-SCNC: 137 MMOL/L (ref 135–147)
T WAVE AXIS: 38 DEGREES
VENTRICULAR RATE: 81 BPM
WBC # BLD AUTO: 10.3 THOUSAND/UL (ref 4.31–10.16)

## 2023-06-27 PROCEDURE — 97530 THERAPEUTIC ACTIVITIES: CPT

## 2023-06-27 PROCEDURE — 82948 REAGENT STRIP/BLOOD GLUCOSE: CPT

## 2023-06-27 PROCEDURE — 82330 ASSAY OF CALCIUM: CPT

## 2023-06-27 PROCEDURE — 99024 POSTOP FOLLOW-UP VISIT: CPT | Performed by: SURGERY

## 2023-06-27 PROCEDURE — 93005 ELECTROCARDIOGRAM TRACING: CPT

## 2023-06-27 PROCEDURE — 97535 SELF CARE MNGMENT TRAINING: CPT

## 2023-06-27 PROCEDURE — 99291 CRITICAL CARE FIRST HOUR: CPT | Performed by: SURGERY

## 2023-06-27 PROCEDURE — 83735 ASSAY OF MAGNESIUM: CPT

## 2023-06-27 PROCEDURE — 80048 BASIC METABOLIC PNL TOTAL CA: CPT

## 2023-06-27 PROCEDURE — 97110 THERAPEUTIC EXERCISES: CPT

## 2023-06-27 PROCEDURE — 84100 ASSAY OF PHOSPHORUS: CPT

## 2023-06-27 PROCEDURE — 85027 COMPLETE CBC AUTOMATED: CPT

## 2023-06-27 PROCEDURE — C9113 INJ PANTOPRAZOLE SODIUM, VIA: HCPCS | Performed by: PHYSICIAN ASSISTANT

## 2023-06-27 PROCEDURE — 93010 ELECTROCARDIOGRAM REPORT: CPT | Performed by: INTERNAL MEDICINE

## 2023-06-27 PROCEDURE — 85610 PROTHROMBIN TIME: CPT

## 2023-06-27 RX ORDER — DILTIAZEM HYDROCHLORIDE 60 MG/1
60 TABLET, FILM COATED ORAL EVERY 8 HOURS SCHEDULED
Status: DISCONTINUED | OUTPATIENT
Start: 2023-06-27 | End: 2023-06-28

## 2023-06-27 RX ORDER — CALCIUM GLUCONATE 20 MG/ML
2 INJECTION, SOLUTION INTRAVENOUS ONCE
Status: COMPLETED | OUTPATIENT
Start: 2023-06-27 | End: 2023-06-27

## 2023-06-27 RX ORDER — LANOLIN ALCOHOL/MO/W.PET/CERES
6 CREAM (GRAM) TOPICAL
Status: DISCONTINUED | OUTPATIENT
Start: 2023-06-27 | End: 2023-07-04 | Stop reason: HOSPADM

## 2023-06-27 RX ORDER — INSULIN GLARGINE 100 [IU]/ML
15 INJECTION, SOLUTION SUBCUTANEOUS
Status: DISCONTINUED | OUTPATIENT
Start: 2023-06-27 | End: 2023-07-04 | Stop reason: HOSPADM

## 2023-06-27 RX ORDER — QUETIAPINE FUMARATE 25 MG/1
12.5 TABLET, FILM COATED ORAL
Status: DISCONTINUED | OUTPATIENT
Start: 2023-06-27 | End: 2023-07-04 | Stop reason: HOSPADM

## 2023-06-27 RX ADMIN — Medication: at 21:20

## 2023-06-27 RX ADMIN — ACETAMINOPHEN 650 MG: 325 TABLET ORAL at 08:58

## 2023-06-27 RX ADMIN — MELATONIN TAB 3 MG 6 MG: 3 TAB at 21:22

## 2023-06-27 RX ADMIN — INSULIN LISPRO 8 UNITS: 100 INJECTION, SOLUTION INTRAVENOUS; SUBCUTANEOUS at 12:05

## 2023-06-27 RX ADMIN — QUETIAPINE FUMARATE 12.5 MG: 25 TABLET ORAL at 21:22

## 2023-06-27 RX ADMIN — APIXABAN 5 MG: 5 TABLET, FILM COATED ORAL at 18:32

## 2023-06-27 RX ADMIN — AMIODARONE HYDROCHLORIDE 200 MG: 200 TABLET ORAL at 08:58

## 2023-06-27 RX ADMIN — POTASSIUM & SODIUM PHOSPHATES POWDER PACK 280-160-250 MG 1 PACKET: 280-160-250 PACK at 15:24

## 2023-06-27 RX ADMIN — ACETAMINOPHEN 650 MG: 325 TABLET ORAL at 18:32

## 2023-06-27 RX ADMIN — CHLORHEXIDINE GLUCONATE 15 ML: 1.2 SOLUTION ORAL at 21:25

## 2023-06-27 RX ADMIN — INSULIN LISPRO 8 UNITS: 100 INJECTION, SOLUTION INTRAVENOUS; SUBCUTANEOUS at 18:46

## 2023-06-27 RX ADMIN — Medication 2.5 MG: at 18:32

## 2023-06-27 RX ADMIN — AMIODARONE HYDROCHLORIDE 200 MG: 200 TABLET ORAL at 12:05

## 2023-06-27 RX ADMIN — INSULIN LISPRO 4 UNITS: 100 INJECTION, SOLUTION INTRAVENOUS; SUBCUTANEOUS at 05:45

## 2023-06-27 RX ADMIN — DILTIAZEM HYDROCHLORIDE 30 MG: 30 TABLET, FILM COATED ORAL at 05:42

## 2023-06-27 RX ADMIN — INSULIN GLARGINE 15 UNITS: 100 INJECTION, SOLUTION SUBCUTANEOUS at 21:25

## 2023-06-27 RX ADMIN — CHLORHEXIDINE GLUCONATE 15 ML: 1.2 SOLUTION ORAL at 08:57

## 2023-06-27 RX ADMIN — FLUOXETINE 20 MG: 20 CAPSULE ORAL at 08:58

## 2023-06-27 RX ADMIN — FLUOXETINE 20 MG: 20 CAPSULE ORAL at 21:22

## 2023-06-27 RX ADMIN — LORAZEPAM 1 MG: 1 TABLET ORAL at 21:22

## 2023-06-27 RX ADMIN — Medication 12.5 MG: at 08:58

## 2023-06-27 RX ADMIN — APIXABAN 5 MG: 5 TABLET, FILM COATED ORAL at 08:58

## 2023-06-27 RX ADMIN — DILTIAZEM HYDROCHLORIDE 60 MG: 60 TABLET ORAL at 15:23

## 2023-06-27 RX ADMIN — PANTOPRAZOLE SODIUM 40 MG: 40 INJECTION, POWDER, FOR SOLUTION INTRAVENOUS at 08:57

## 2023-06-27 RX ADMIN — POTASSIUM & SODIUM PHOSPHATES POWDER PACK 280-160-250 MG 1 PACKET: 280-160-250 PACK at 15:52

## 2023-06-27 RX ADMIN — DILTIAZEM HYDROCHLORIDE 60 MG: 60 TABLET ORAL at 21:22

## 2023-06-27 RX ADMIN — PANTOPRAZOLE SODIUM 40 MG: 40 INJECTION, POWDER, FOR SOLUTION INTRAVENOUS at 21:20

## 2023-06-27 RX ADMIN — CALCIUM GLUCONATE 2 G: 20 INJECTION, SOLUTION INTRAVENOUS at 12:04

## 2023-06-27 RX ADMIN — INSULIN LISPRO 4 UNITS: 100 INJECTION, SOLUTION INTRAVENOUS; SUBCUTANEOUS at 00:08

## 2023-06-27 RX ADMIN — AMIODARONE HYDROCHLORIDE 200 MG: 200 TABLET ORAL at 15:24

## 2023-06-27 NOTE — OCCUPATIONAL THERAPY NOTE
Occupational Therapy Progress Note     Patient Name: Katelynn Villa  SMDXA'C Date: 6/27/2023  Problem List  Principal Problem:    Idiopathic acute pancreatitis without infection or necrosis  Active Problems:    Permanent atrial fibrillation (HCC)    GILDARDO (obstructive sleep apnea)    Hypertension    Stage 3 chronic kidney disease, unspecified whether stage 3a or 3b CKD ([address] )          06/27/23 0920   OT Last Visit   OT Visit Date 06/27/23   Note Type   Note Type Treatment   Pain Assessment   Pain Assessment Tool 0-10   Pain Score No Pain   Restrictions/Precautions   Weight Bearing Precautions Per Order No   Other Precautions Cognitive; Chair Alarm; Bed Alarm   ADL   Grooming Assistance 3  Moderate Assistance   Grooming Deficit Increased time to complete;Brushing hair   Grooming Comments able to wash face, assist to comb hair d/t decreased UE AROM   LB Dressing Assistance 1  Total Assistance   LB Dressing Deficit Don/doff R sock; Don/doff L sock   Toileting Assistance  2  Maximal Assistance   Toileting Deficit Steadying; Increased time to complete;Perineal hygiene   Toileting Comments found incontinent of urine, MAX A for davonte hygiene with B/L HHA   Bed Mobility   Supine to Sit 2  Maximal assistance   Additional items Assist x 2; Increased time required;Verbal cues;LE management   Transfers   Sit to Stand 2  Maximal assistance   Additional items Assist x 2; Increased time required;Verbal cues   Stand to Sit 2  Maximal assistance   Additional items Assist x 2; Increased time required;Verbal cues   Additional Comments B/L HHA   Functional Mobility   Functional Mobility 2  Maximal assistance   Additional Comments Ax2, bed to bedside recliner, B/L HHA   Therapeutic Exercise - ROM   UE-ROM Yes   ROM- Right Upper Extremities   R Shoulder AAROM; Flexion   R Elbow AROM;Elbow flexion;Elbow extension   R Hand   (gross grasp with squeeze ball)   RUE ROM Comment 10 reps   ROM - Left Upper Extremities    L Shoulder AROM; Flexion   L Elbow AROM;Elbow flexion;Elbow extension   L Hand   (gross grasp with squeeze ball)   LUE ROM Comment 10 reps   Cognition   Overall Cognitive Status Impaired   Arousal/Participation Alert; Cooperative   Attention Attends with cues to redirect   Orientation Level Oriented to person;Oriented to place; Disoriented to time;Disoriented to situation   Memory Decreased recall of recent events   Following Commands Follows one step commands with increased time or repetition   Comments pleasant and cooperative, difficulty remembering details from the morning/night before. Pt able to recall some, but not all, names of her children - pt's dtr present to confirm   Activity Tolerance   Activity Tolerance Patient limited by fatigue   Medical Staff Made Aware PT, RN   Assessment   Assessment Patient participated in Skilled OT session this date with interventions consisting of UE ROM, cognitive re-orientation, ADL re-training, functional transfers. Patient agreeable to OT treatment session, upon arrival patient was found supine in bed. Pt oriented to self and place, disoriented to time. Poor recall of events earlier in the morning/last night. Pt participated in UE AROM/AAROM and grasp strength. Continues to be limited by decreased sitting/standing balance, ROM/strength, cognition. Patient continues to be functioning below baseline level, occupational performance remains limited secondary to factors listed above and increased risk for falls and injury. From OT standpoint, recommendation at time of d/c would be POST-ACUTE [facility] SERVICES. Patient to benefit from continued Occupational Therapy treatment while in the hospital to address deficits as defined above and maximize level of functional independence with ADLs and functional mobility. Plan   Treatment Interventions ADL retraining;Functional transfer training;UE strengthening/ROM; Cognitive reorientation;Patient/family training; Activityengagement   Goal Expiration Date 07/10/23 OT Treatment Day 1   OT Frequency 2-3x/wk   Recommendation   OT Discharge Recommendation Post acute rehabilitation services   [facility] Daily Activity Inpatient   Lower Body Dressing 1   Bathing 2   Toileting 1   Upper Body Dressing 2   Grooming 2   Eating 3   Daily Activity Raw Score 11   Daily Activity Standardized Score (Calc for Raw Score >=11) 29.04   AM-[facility] Applied Cognition Inpatient   Following a Speech/Presentation 2   Understanding Ordinary Conversation 3   Taking Medications 2   Remembering Where Things Are Placed or Put Away 2   Remembering List of 4-5 Errands 2   Taking Care of Complicated Tasks 1   Applied Cognition Raw Score 12   Applied Cognition Standardized Score 28.82          The patient's raw score on the AM-PAC Daily Activity Inpatient Short Form is 11. A raw score of less than 19 suggests the patient may benefit from discharge to post-acute rehabilitation services. Please refer to the recommendation of the Occupational Therapist for safe discharge planning.             Elda Mckinney, OTR/L

## 2023-06-27 NOTE — PROGRESS NOTES
Progress Note - General Surgery   Karlos Stanton 67 y.o. female MRN: [id number]  Unit/Bed#: [facility] 205-01 Encounter: [id number]    Assessment:  67 y.o. F with multiple prior abdominal surgeries including open arlene-en-y gastric bypass, presents with perforated ileum and necrotic jejunum 2/2 internal hernia, s/p exploratory laparotomy, resection of J-J anastomosis, segmental ileum resection and abthera placement 6/19, followed by second loop laparotomy, YAMIL, SBR + appendectomy, abthera placement 6/19 for rising pressor requirements. Now s/p takeback ex lap, SBR, creation of anastomosis x 3 with abdominal closure 6/20    Acute blood loss anemia from intraoperative losses and received a total of 5uPRBCs, 2 FFP 6/19. Hgb has remained stable since.  cc   LUQ LUCY 400 cc serosanguinous drainage  RUQ LUCY 60cc serosanguinous drainage  LLQ LUCY 250 cc serosanguinous drainage     Plan:  Continue bariatrics diet  Consider 1/2 TPN today given suppressed appetite  Maintain LUCY drains, monitor output  Pulmonary toilet   Maintain abdominal binder  Completed course of abx  Continue po amiodarone and eliquis   PT/OT  OOB/Ambulate  DVT ppx        Subjective/Objective     Subjective: No acute events overnight. Patient complains of abdominal soreness. States her appetite has not been great    Objective:     Blood pressure 115/75, pulse 92, temperature 98.3 °F (36.8 °C), temperature source Oral, resp. rate 16, height 5' 3" (1.6 m), weight (!) 146 kg (321 lb 14 oz), SpO2 96 %. ,Body mass index is 57.02 kg/m².       Intake/Output Summary (Last 24 hours) at 6/27/2023 0531  Last data filed at 6/27/2023 0240  Gross per 24 hour   Intake 1887.14 ml   Output 1393 ml   Net 494.14 ml       Invasive Devices     Peripherally Inserted Central Catheter Line  Duration           PICC Line 84/28/23 Left Basilic 3 days          Peripheral Intravenous Line  Duration           Peripheral IV 06/25/23 Proximal;Right;Ventral (anterior) Forearm 1 day Drain  Duration           Closed/Suction Drain Lateral;Left LUQ 19 Fr. 6 days    Closed/Suction Drain Lateral;Right RUQ Bulb 19 Fr. 6 days    Closed/Suction Drain Left LLQ Bulb 10 Fr. 6 days                Physical Exam:   NAD, alert and oriented x3  Normocephalic, atraumatic  Moist mucous membranes   Norm resp effort on room air   Regular rate  Abd soft, nondistended, appropriately tender, incision c/d/i.   RUQ LUCY with mostly serous drainage, LUQ LUCY/LLQ LUCY with serosanguinous drainage  + peripheral edema  CN grossly intact   Skin is warm and dry           Lab, Imaging and other studies:  CBC:   Lab Results   Component Value Date    WBC 10.64 (H) 06/26/2023    HGB 8.6 (L) 06/26/2023    HCT 27.2 (L) 06/26/2023    MCV 88 06/26/2023     06/26/2023    RBC 3.08 (L) 06/26/2023    MCH 27.9 06/26/2023    MCHC 31.6 06/26/2023    RDW 16.9 (H) 06/26/2023    MPV 10.7 06/26/2023   , CMP:   Lab Results   Component Value Date    SODIUM 139 06/26/2023    K 3.8 06/26/2023     06/26/2023    CO2 30 06/26/2023    BUN 23 06/26/2023    CREATININE 0.66 06/26/2023    CALCIUM 7.7 (L) 06/26/2023    EGFR 87 06/26/2023   , Coagulation:   No results found for: "PT", "INR", "APTT"  VTE Pharmacologic Prophylaxis: Lovenox   VTE Mechanical Prophylaxis: sequential compression device

## 2023-06-27 NOTE — PLAN OF CARE
Problem: SAFETY,RESTRAINT: NV/NON-SELF DESTRUCTIVE BEHAVIOR  Goal: Remains free of harm/injury (restraint for non violent/non self-detsructive behavior)  Description: INTERVENTIONS:  - Instruct patient/family regarding restraint use   - Assess and monitor physiologic and psychological status   - Provide interventions and comfort measures to meet assessed patient needs   - Identify and implement measures to help patient regain control  - Assess readiness for release of restraint   Outcome: Progressing  Goal: Returns to optimal restraint-free functioning  Description: INTERVENTIONS:  - Assess the patient's behavior and symptoms that indicate continued need for restraint  - Identify and implement measures to help patient regain control  - Assess readiness for release of restraint   Outcome: Progressing     Problem: Prexisting or High Potential for Compromised Skin Integrity  Goal: Skin integrity is maintained or improved  Description: INTERVENTIONS:  - Identify patients at risk for skin breakdown  - Assess and monitor skin integrity  - Assess and monitor nutrition and hydration status  - Monitor labs   - Assess for incontinence   - Turn and reposition patient  - Assist with mobility/ambulation  - Relieve pressure over bony prominences  - Avoid friction and shearing  - Provide appropriate hygiene as needed including keeping skin clean and dry  - Evaluate need for skin moisturizer/barrier cream  - Collaborate with interdisciplinary team   - Patient/family teaching  - Consider wound care consult   Outcome: Progressing     Problem: MOBILITY - ADULT  Goal: Maintain or return to baseline ADL function  Description: INTERVENTIONS:  -  Assess patient's ability to carry out ADLs; assess patient's baseline for ADL function and identify physical deficits which impact ability to perform ADLs (bathing, care of mouth/teeth, toileting, grooming, dressing, etc.)  - Assess/evaluate cause of self-care deficits   - Assess range of motion  - Assess patient's mobility; develop plan if impaired  - Assess patient's need for assistive devices and provide as appropriate  - Encourage maximum independence but intervene and supervise when necessary  - Involve family in performance of ADLs  - Assess for home care needs following discharge   - Consider OT consult to assist with ADL evaluation and planning for discharge  - Provide patient education as appropriate  Outcome: Progressing  Goal: Maintains/Returns to pre admission functional level  Description: INTERVENTIONS:  - Perform BMAT or MOVE assessment daily.   - Set and communicate daily mobility goal to care team and patient/family/caregiver. - Collaborate with rehabilitation services on mobility goals if consulted  - Perform Range of Motion  times a day. - Reposition patient every  hours.   - Dangle patient  times a day  - Stand patient  times a day  - Ambulate patient  times a day  - Out of bed to chair  times a day   - Out of bed for meals 3 times a day  - Out of bed for toileting  - Record patient progress and toleration of activity level   Outcome: Progressing

## 2023-06-27 NOTE — CASE MANAGEMENT
Case Management Discharge Planning Note    Patient name Chester Posadas  Location [facility] 205/[facility] 780-76 MRN [id number]  : 1950 Date 2023       Current Admission Date: 2023  Current Admission Diagnosis:Idiopathic acute pancreatitis without infection or necrosis   Patient Active Problem List    Diagnosis Date Noted   • Idiopathic acute pancreatitis without infection or necrosis 2023   • Primary osteoarthritis of both knees 2023   • Stage 3 chronic kidney disease, unspecified whether stage 3a or 3b CKD ([address]) 2023   • Hypertension    • Morbid obesity with BMI of 50.0-59.9, adult ([address]) 2022   • Unsteady gait 10/30/2022   • Dyspnea on exertion 10/30/2022   • SIRS (systemic inflammatory response syndrome) ([address]) 10/30/2022   • Incontinence 10/30/2022   • GILDARDO (obstructive sleep apnea) 10/30/2022   • Vitamin D deficiency 2021   • Reactive hypoglycemia 2020   • Osteoporosis 2020   • Hyperparathyroidism ([address]) 2020   • Hashimoto's thyroiditis 2020   • Early menopause occurring in patient age younger than 39 years 2020   • S/P bariatric surgery 2020   • Myofascial pain syndrome 10/21/2019   • Status post partial thyroidectomy 2019   • Cervical spondylosis without myelopathy 2019   • Arthropathy of cervical facet joint 2019   • Occipital neuralgia of right side 2019   • Chest wall tenderness 2019   • Chronic heart failure ([address]) 2019   • Permanent atrial fibrillation ([address]) 2019   • Myalgia 2019   • Primary osteoarthritis of left knee 10/23/2018   • Primary osteoarthritis of right knee 10/23/2018   • Chronic pain of left knee 10/23/2018   • Malignant tumor of right kidney parenchyma ([address]) 2016   • Closed fracture of right distal radius and ulna 2016      LOS (days): 9  Geometric Mean LOS (GMLOS) (days): 9.60  Days to GMLOS:1     OBJECTIVE:  Risk of Unplanned Readmission Score: 28.61 Current admission status: Inpatient   Preferred Pharmacy:   [facility] [address] MARIAH REDDY [address], [address]  [facility]  [address] [address]  Phone: [phone] Fax: [phone]    [facility], [address] [facility]  [address] [facility] [address]  Phone: [phone] Fax: [phone]    Primary Care Provider: Feng Elias DO    Primary Insurance: MEDICARE  Secondary Insurance: [employer]ET[employer]    DISCHARGE DETAILS:            Additional Comments: Met with patient, son [facility] and granddaughter at bedside. Discussed current recommendation for STR and different facilities in the area. Trevor asked if we could continue this conversation tomorrow when his sister (patient's daughter) is here as well. [facility] stated he knows they definitely do not wants referrals to [facility] or [address]. Provided CM contact information, they will call CM tomorrow when visiting to continue conversation.

## 2023-06-27 NOTE — PHYSICAL THERAPY NOTE
Physical Therapy Treatment Note       06/27/23 0945   PT Last Visit   PT Visit Date 06/27/23   Note Type   Note Type Treatment   Pain Assessment   Pain Assessment Tool 0-10   Pain Score No Pain   Restrictions/Precautions   Weight Bearing Precautions Per Order No   Other Precautions Multiple lines;Telemetry; Fall Risk;Pain;Cognitive; Chair Alarm; Bed Alarm   General   Chart Reviewed Yes   Family/Caregiver Present Yes  (dtr)   Cognition   Overall Cognitive Status Impaired   Arousal/Participation Lethargic   Attention Attends with cues to redirect   Orientation Level Oriented to person;Oriented to place   Memory Unable to assess   Following Commands Follows one step commands with increased time or repetition   Subjective   Subjective some confusion, generalized fatigue. states she feels tired. cooperative w/ encouragement. Bed Mobility   Supine to Sit 2  Maximal assistance   Additional items Assist x 2; Increased time required; Impulsive;Verbal cues   Additional Comments time spent for setup.  sat EOB x several minutes prior to initial transfers, as well as to reposiiton to comfort in chair post session   Transfers   Sit to Stand 3  Moderate assistance   Additional items Assist x 2;Verbal cues; Increased time required; Impulsive   Stand to Sit 3  Moderate assistance   Additional items Assist x 2; Increased time required; Impulsive;Verbal cues   Additional Comments stood at EOB x several minutes to assist w/ cleaning pt prior to transfer to chair   Ambulation/Elevation   Gait pattern   (slow, ataxia, short step length, shuffling)   Gait Assistance 3  Moderate assist   Additional items Assist x 2   Assistive Device   (B HHA- start w/ olivia RW)   Distance 3-4'x1 from bed to chair.   time spent to reposition post session   Balance   Static Sitting Fair   Dynamic Sitting Poor +   Static Standing Poor   Dynamic Standing Poor   Ambulatory Poor -   Endurance Deficit   Endurance Deficit Yes   Endurance Deficit Description fatigue, weakness, pain, cog   Activity Tolerance   Activity Tolerance Patient limited by fatigue;Patient limited by pain;Treatment limited secondary to medical complications (Comment)   Nurse Made Aware yes   Assessment   Prognosis Fair   Problem List Decreased strength;Decreased endurance; Impaired balance;Decreased mobility;Pain;Decreased cognition;Decreased safety awareness; Impaired judgement   Assessment Pt seen for session for setup, bed mob, time spent EOB, transfers/standing trial, gait training, reposiitoning. Pt confused but cooperative. Needs cues for hand placement safety. Needed a bit more assists w/ bed mob, but improved standing tolerance/WBing.  continue to recommend rehab at d/c   Goals   Patient Goals to rest   STG Expiration Date 07/10/23   PT Treatment Day 1   Plan   Treatment/Interventions Functional transfer training;LE strengthening/ROM; Therapeutic exercise; Endurance training;Patient/family training;Bed mobility;Gait training;Equipment eval/education   Progress Progressing toward goals   PT Frequency 3-5x/wk   Recommendation   PT Discharge Recommendation Post acute rehabilitation services   AM-PAC Basic Mobility Inpatient   Turning in Flat Bed Without Bedrails 2   Lying on Back to Sitting on Edge of Flat Bed Without Bedrails 1   Moving Bed to Chair 1   Standing Up From Chair Using Arms 1   Walk in Room 1   Climb 3-5 Stairs With Railing 1   Basic Mobility Inpatient Raw Score 7   Highest Level Of Mobility   JH-HLM Goal 2: Bed activities/Dependent transfer   JH-HLM Achieved 4: Move to chair/commode     Eric Epley PT, DPT CSRS

## 2023-06-27 NOTE — PROGRESS NOTES
Daily Progress Note - Critical Care   Vilma Delgado 67 y.o. female MRN: [id number]  Unit/Bed#: ICCU 205-01 Encounter: [id number]        ----------------------------------------------------------------------------------------  HPI/24hr events: Pt is a 66 yo female with relevant PMH s/p multiple abdominal surgeries with S[place] en [place] anatomy who presented with an internal small bowel hernia. Pt taken for ex-lap with small bowel resection, blood loss requiring transfusion. S/p 2 second second-look surgeries with small SBRs and closure. Extubated 6/22.     Overnight, pt had an episode of afib with RVR with HR in the 130s - 140s. Was not related to patient movement or clear anxiety triggers like previous episodes. Also, pt did not become hypotensive as in previous episodes. Given 5 mg metoprolol IV followed by regular med reg with resolution.  ---------------------------------------------------------------------------------------  SUBJECTIVE  Pt reports that she feels well today. 0/10 pain from 6/10 yesterday. Reports that she has been having diarrhea for the last 3 days; per nursing she had one small-volume loose stool overnight. Pt reports that she feels stiff and wants to walk. Difficult to redirect away from wanting to walk. Urinating without difficulty. Review of systems was reviewed and negative unless stated above in HPI/24-hour events   ---------------------------------------------------------------------------------------  Assessment and Plan:    Neuro:   • Pain /Sedation  ? Dilaudid Oxycodone PRN  ? Multimodal analgesia  ? Delirium precautions  ? Ativan 1 mg PO home dose PM  • Anxiety/Depression  ? Fluoxetine 20 mg BID home dose     CV:   • Afib with RVR  ? Eliquis 5 mg BID, INR 1.23, increase dose to goal INR 2-3  ? Amiodarone  TID loading dose x11 days (thru 7/6) 200 daily after  ? Metoprolol 12.5 mg q 12 PO home dose  ? Cardizem 30 mg q 8 home dose  • HTN  ?  Holding HCTZ     Pulm:  • No active issues  ? Extubated 6/22  ? Off NC O2  ? Maintain O2 sat >92%     GI:   • Internal Hernia with Closed Loop Obstruction c/b J-J anastomosis site necrosis, hemoperitoneum and portal venous gas  ? S/p ex lap w/ SBR Lonza Flies Trudee Daisy ileum resection) abthera 6/19   ? S/p ex lap w/ SBR (distal jejunem/ileum resection), appendectomy abthera 6/19  ? S/p ex lap w/ SBR 6/20, closure  ? Abdominal exam benign  ? Drain x3 760 ml serosang in 24 hrs  • GERD   ? PPI     :   • Trend I/Os  • ASTRID resolved  • UOP .2 cc/kg/hrs  • Encourage adequate oral fluid intake  • Initiate maintenance IVF if no improvement in UOP     F/E/N:   • Replete electrolytes as needed   • Give Ca today, ionized Ca 1.03  • Bariatric Diet  • D/C TPN     Heme/Onc:   • ABLA  ? S/p 5 u prbc 3 FFP on 6/19  ? Monitor hgb     Endo:   • Basal Lantus 10u PM, increase to 20u daily  • ISS Algorithm 5  ? BMP Glu 194 (193)  ? Goal BG < 140-180     ID:   • No active issues  ? Antibiotics completed 6/24  ? BC x2 neg  ? Urine culture negative    MSK/Skin:   ? PT/OT  ?  UOB as tolerated    Patient appropriate for transfer out of the ICU today?: YES  Disposition: Continue Stepdown Level 2 level of care   Code Status: Level 1 - Full Code  ---------------------------------------------------------------------------------------  ICU CORE MEASURES    Prophylaxis   VTE Pharmacologic Prophylaxis: Apixaban (Eliquis)  VTE Mechanical Prophylaxis: sequential compression device  Stress Ulcer Prophylaxis: Pantoprazole IV       Invasive Devices Review  Invasive Devices     Peripherally Inserted Central Catheter Line  Duration           PICC Line 00/38/76 Left Basilic 3 days          Peripheral Intravenous Line  Duration           Peripheral IV 06/25/23 Proximal;Right;Ventral (anterior) Forearm 1 day          Drain  Duration           Closed/Suction Drain Lateral;Left LUQ 19 Fr. 6 days    Closed/Suction Drain Lateral;Right RUQ Bulb 19 Fr. 6 days    Closed/Suction Drain Left LLQ Bulb 10 Fr. 6 days              ---------------------------------------------------------------------------------------  OBJECTIVE    Vitals   Vitals:    23 2255 23 2300 23 0240 23 0542   BP: 112/50  115/75 117/71   Pulse: 78 76 92    Resp:       Temp:  97.8 °F (36.6 °C) 98.3 °F (36.8 °C)    TempSrc:  Oral Oral    SpO2: 97% 96% 96%    Weight:       Height:         Temp (24hrs), Av °F (36.7 °C), Min:97.8 °F (36.6 °C), Max:98.3 °F (36.8 °C)  Current: Temperature: 98.3 °F (36.8 °C)  HR: 92  BP: 117/71  RR: 16  SpO2: 96%    Respiratory:  SpO2 Activity: At Rest    Invasive/non-invasive ventilation settings   Respiratory    Lab Data (Last 4 hours)    None         O2/Vent Data (Last 4 hours)    None                Physical Exam  Constitutional:       Appearance: She is obese. HENT:      Head: Normocephalic and atraumatic. Right Ear: External ear normal.      Left Ear: External ear normal.   Eyes:      General: No scleral icterus. Right eye: No discharge. Left eye: No discharge. Cardiovascular:      Rate and Rhythm: Normal rate. Heart sounds: Normal heart sounds. Pulmonary:      Effort: Pulmonary effort is normal.      Breath sounds: Normal breath sounds. Abdominal:      General: Abdomen is flat. There is no distension. Palpations: Abdomen is soft. Tenderness: There is no abdominal tenderness. There is no guarding or rebound. Comments: Incision covered under clean gauze without strikethrough. C/D/I, no surrounding erythema or induration. Drain x3 producing serosanguinous output. Skin:     General: Skin is warm and dry. Coloration: Skin is not jaundiced. Neurological:      Mental Status: She is oriented to person, place, and time.       Comments: Some confusion, asked to send her daughter-in-law into her room when the daughter-in-law isn't at the hospital   Psychiatric:         Mood and Affect: Mood normal.         Behavior: Behavior normal. Laboratory and Diagnostics:  Results from last 7 days   Lab Units 06/27/23  0550 06/26/23  1401 06/25/23  0550 06/24/23  0515 06/23/23  0422 06/22/23  0435 06/21/23  0426 06/20/23  1228 06/20/23  0609   WBC Thousand/uL 10.30* 10.64* 10.49* 9.18 9.37 8.29 8.99   < > 18.13*   HEMOGLOBIN g/dL 8.1* 8.6* 8.6* 8.7* 8.1* 7.6* 8.6*   < > 10.7*   I STAT HEMOGLOBIN   --   --   --   --   --   --   --    < >  --    HEMATOCRIT % 26.0* 27.2* 29.0* 26.7* 25.5* 23.9* 26.1*   < > 31.8*   HEMATOCRIT, ISTAT   --   --   --   --   --   --   --    < >  --    PLATELETS Thousands/uL 264 254 213 180 148* 116* 133*   < > 223   NEUTROS PCT %  --   --   --   --   --   --   --   --  89*   MONOS PCT %  --   --   --   --   --   --   --   --  5   EOS PCT %  --   --   --   --   --   --   --   --  1    < > = values in this interval not displayed. Results from last 7 days   Lab Units 06/26/23  0624 06/25/23  0550 06/24/23  1413 06/24/23  0515 06/23/23  2035 06/23/23  0422 06/22/23  1616 06/22/23  1324 06/20/23  2305 06/20/23  1641   SODIUM mmol/L 139 144 144 144 145 145 144  --    < > 143   POTASSIUM mmol/L 3.8 3.5 3.4* 3.2* 3.5 3.5 3.6  --    < > 3.5   CHLORIDE mmol/L 108 111* 109* 112* 113* 116* 114*  --    < > 114*   CO2 mmol/L 30 31 32 30 30 27 25  --    < > 22   ANION GAP mmol/L 1 2 3 2 2 2 5  --    < > 7   BUN mg/dL 23 22 15 16 15 19 20  --    < > 35*   CREATININE mg/dL 0.66 0.63 0.72 0.70 0.72 0.74 0.76  --    < > 1.29   CALCIUM mg/dL 7.7* 7.7* 8.0* 7.7* 7.6* 7.3* 7.5*  --    < > 7.3*   GLUCOSE RANDOM mg/dL 193* 193* 167* 209* 162* 110 112  --    < > 121   ALT U/L  --   --   --   --   --   --   --  16  --  19   AST U/L  --   --   --   --   --   --   --  22  --  25   ALK PHOS U/L  --   --   --   --   --   --   --  55  --  51   ALBUMIN g/dL  --   --   --   --   --   --   --  2.3*  --  2.6*   TOTAL BILIRUBIN mg/dL  --   --   --   --   --   --   --  0.51  --  0.73    < > = values in this interval not displayed.      Results from last 7 days   Lab Units 06/26/23  0624 06/25/23  0550 06/24/23  0515 06/23/23  2035 06/23/23  0422 06/22/23  1616 06/22/23  0435 06/21/23  0426   MAGNESIUM mg/dL 2.0 2.1 2.0 2.1 2.6 2.6 3.0* 2.6   PHOSPHORUS mg/dL 2.6 2.0* 1.7* 2.5 1.6*  --  1.5* 2.6      Results from last 7 days   Lab Units 06/26/23  0624 06/25/23  1950 06/25/23  1152 06/25/23  0354 06/24/23  1740 06/24/23  1102   INR   --   --   --   --   --  1.19   PTT seconds 45* 46* 48* 46* 44* 31          Results from last 7 days   Lab Units 06/20/23  2305 06/20/23 2003 06/20/23  1641 06/20/23  0908 06/20/23  0700   LACTIC ACID mmol/L 1.9 1.7 2.6* 5.7* 5.2*     ABG:  Results from last 7 days   Lab Units 06/21/23  0420   PH ART  7.401   PCO2 ART mm Hg 38.9   PO2 ART mm Hg 91.7   HCO3 ART mmol/L 23.6   BASE EXC ART mmol/L -1.0   ABG SOURCE  Line, Arterial     VBG:  Results from last 7 days   Lab Units 06/21/23  0420   ABG SOURCE  Line, Arterial           Micro        Imaging: I have personally reviewed pertinent reports. Intake and Output  I/O       06/25 0701 06/26 0700 06/26 0701 06/27 0700    P. O.  120    I.V. (mL/kg) 774.7 (5.3) 173 (1.2)    IV Piggyback 250 150    TPN 1171 1444.2    Total Intake(mL/kg) 2195.6 (15) 1887.1 (12.9)    Urine (mL/kg/hr) 1995 (0.6) 703 (0.2)    Emesis/NG output 0     Drains 360 690    Total Output 2355 1393    Net -159.4 +494.1              UOP: .2 ml/kg/hr, per nursing there have been multiple episodes of untracked urine    Height and Weights   Height: 5' 3" (160 cm)  IBW (Ideal Body Weight): 52.4 kg  Body mass index is 57.02 kg/m².   Weight (last 2 days)     Date/Time Weight    06/25/23 0551 146 (321.87)    06/25/23 0550 146 (321.87)            Nutrition       Diet Orders   (From admission, onward)             Start     Ordered    06/26/23 1626  Dietary nutrition supplements  Once        Question Answer Comment   Select Supplement: Kaykay Christiansen    Frequency Breakfast        06/26/23 1626    06/26/23 1626  Dietary nutrition supplements  Once        Question Answer Comment   Select Supplement: Magic Cup [place]    Frequency Dinner        06/26/23 1626    06/26/23 1507  Dietary nutrition supplements  Once        Question Answer Comment   Select Supplement: Ensure Max - Vanilla    Frequency Breakfast, Dinner        06/26/23 1507    06/26/23 1223  Diet Bariatric; Bariatric Maintenance; No Carbonation  (Order Panel)  Diet effective now        References:    Adult Nutrition Support Algorithm    RD Therapeutic Diet Order Protocol   Question Answer Comment   Diet Type Bariatric    Bariatric Bariatric Maintenance    Other Restriction(s): No Carbonation    Special Instructions Small portions    RD to adjust diet per protocol?  Yes        06/26/23 1222                Active Medications  Scheduled Meds:  Current Facility-Administered Medications   Medication Dose Route Frequency Provider Last Rate   • acetaminophen  650 mg Oral Q6H PRN Awilda Galeazzi, PA-C     • Adult 3-in-1 TPN (custom base / custom electrolytes)   Intravenous Continuous TPN Darci Anne PA-C 73 mL/hr at 06/26/23 2113   • amiodarone  200 mg Oral TID With Meals Darci Anne PA-C      Followed by   • [START ON 7/8/2023] amiodarone  200 mg Oral Daily With Breakfast Darci Anne PA-C     • apixaban  5 mg Oral BID Darci Anne PA-C     • chlorhexidine  15 mL Mouth/Throat Q12H [address] Awilda Galeazzi, PA-C     • diltiazem  30 mg Oral [place], [place]     • FLUoxetine  20 mg Oral BID Robert Rivera MD     • HYDROmorphone  0.3 mg Intravenous Q3H PRN Awilda Galeazzi, PA-C     • insulin glargine  10 Units Subcutaneous HS Darci Anne PA-C     • insulin lispro  4-20 Units Subcutaneous Q6H [address] Awilda Galeazzi, PA-C     • LORazepam  1 mg Oral HS Robert Rivera MD     • metoprolol tartrate  12.5 mg Oral Q12H [address] Robert Rivera MD     • ondansetron  4 mg Intravenous Q6H PRN Awilda Galeazzi, PA-C     • oxyCODONE  2.5 mg Oral Q4H PRN Dario Camargo Joshua Bautista PA-C      Or   • oxyCODONE  5 mg Oral Q4H PRN Kathy Jones PA-C     • pantoprazole  40 mg Intravenous Q12H [address] Kathy Jones PA-C       Continuous Infusions:  Adult 3-in-1 TPN (custom base / custom electrolytes), , Last Rate: 73 mL/hr at 06/26/23 2113      PRN Meds:   acetaminophen, 650 mg, Q6H PRN  HYDROmorphone, 0.3 mg, Q3H PRN  ondansetron, 4 mg, Q6H PRN  oxyCODONE, 2.5 mg, Q4H PRN   Or  oxyCODONE, 5 mg, Q4H PRN        Allergies   No Known Allergies  ---------------------------------------------------------------------------------------  Care Time Delivered:   Upon my evaluation, this patient had a high probability of imminent or life-threatening deterioration due to CV deterioration, which required my direct attention, intervention, and personal management. I have personally provided 30 minutes of critical care time, exclusive of procedures, teaching, family meetings, and any prior time recorded by providers other than myself. Mary Ann Cao      Portions of the record may have been created with voice recognition software. Occasional wrong word or "sound a like" substitutions may have occurred due to the inherent limitations of voice recognition software.   Read the chart carefully and recognize, using context, where substitutions have occurred

## 2023-06-27 NOTE — PLAN OF CARE
Problem: OCCUPATIONAL THERAPY ADULT  Goal: Performs self-care activities at highest level of function for planned discharge setting. See evaluation for individualized goals. Description: Treatment Interventions: ADL retraining, Functional transfer training, UE strengthening/ROM, Endurance training, Cognitive reorientation, Patient/family training, Equipment evaluation/education, Compensatory technique education, Continued evaluation, Energy conservation, Activityengagement  Equipment Recommended:  (TBD)       See flowsheet documentation for full assessment, interventions and recommendations. Outcome: Progressing  Note: Limitation: Decreased ADL status, Decreased Safe judgement during ADL, Decreased cognition, Decreased endurance, Decreased self-care trans, Decreased high-level ADLs  Prognosis: Fair  Assessment: Patient participated in Skilled OT session this date with interventions consisting of UE ROM, cognitive re-orientation, ADL re-training, functional transfers. Patient agreeable to OT treatment session, upon arrival patient was found supine in bed. Pt oriented to self and place, disoriented to time. Poor recall of events earlier in the morning/last night. Pt participated in UE AROM/AAROM and grasp strength. Continues to be limited by decreased sitting/standing balance, ROM/strength, cognition. Patient continues to be functioning below baseline level, occupational performance remains limited secondary to factors listed above and increased risk for falls and injury. From OT standpoint, recommendation at time of d/c would be POST-ACUTE [facility] SERVICES. Patient to benefit from continued Occupational Therapy treatment while in the hospital to address deficits as defined above and maximize level of functional independence with ADLs and functional mobility.      OT Discharge Recommendation: Post acute rehabilitation services

## 2023-06-27 NOTE — PLAN OF CARE
Problem: PHYSICAL THERAPY ADULT  Goal: Performs mobility at highest level of function for planned discharge setting. See evaluation for individualized goals. Description: Treatment/Interventions: LE strengthening/ROM, Functional transfer training, Therapeutic exercise, Endurance training, Cognitive reorientation, Patient/family training, Equipment eval/education, Bed mobility, Gait training, Spoke to nursing, Spoke to case management, OT          See flowsheet documentation for full assessment, interventions and recommendations. Outcome: Progressing  Note: Prognosis: Fair  Problem List: Decreased strength, Decreased endurance, Impaired balance, Decreased mobility, Pain, Decreased cognition, Decreased safety awareness, Impaired judgement  Assessment: Pt seen for session for setup, bed mob, time spent EOB, transfers/standing trial, gait training, reposiitoning. Pt confused but cooperative. Needs cues for hand placement safety. Needed a bit more assists w/ bed mob, but improved standing tolerance/WBing.  continue to recommend rehab at d/c  Barriers to Discharge: Decreased caregiver support, Inaccessible home environment     PT Discharge Recommendation: Post acute rehabilitation services    See flowsheet documentation for full assessment.

## 2023-06-28 LAB
ANION GAP SERPL CALCULATED.3IONS-SCNC: 3 MMOL/L
BUN SERPL-MCNC: 21 MG/DL (ref 5–25)
CA-I BLD-SCNC: 1.05 MMOL/L (ref 1.12–1.32)
CALCIUM SERPL-MCNC: 7.9 MG/DL (ref 8.3–10.1)
CHLORIDE SERPL-SCNC: 109 MMOL/L (ref 96–108)
CO2 SERPL-SCNC: 26 MMOL/L (ref 21–32)
CREAT SERPL-MCNC: 0.59 MG/DL (ref 0.6–1.3)
ERYTHROCYTE [DISTWIDTH] IN BLOOD BY AUTOMATED COUNT: 17.2 % (ref 11.6–15.1)
GFR SERPL CREATININE-BSD FRML MDRD: 91 ML/MIN/1.73SQ M
GLUCOSE SERPL-MCNC: 135 MG/DL (ref 65–140)
GLUCOSE SERPL-MCNC: 136 MG/DL (ref 65–140)
GLUCOSE SERPL-MCNC: 157 MG/DL (ref 65–140)
GLUCOSE SERPL-MCNC: 166 MG/DL (ref 65–140)
GLUCOSE SERPL-MCNC: 196 MG/DL (ref 65–140)
HCT VFR BLD AUTO: 26.2 % (ref 34.8–46.1)
HGB BLD-MCNC: 8 G/DL (ref 11.5–15.4)
MAGNESIUM SERPL-MCNC: 2 MG/DL (ref 1.6–2.6)
MCH RBC QN AUTO: 26.8 PG (ref 26.8–34.3)
MCHC RBC AUTO-ENTMCNC: 30.5 G/DL (ref 31.4–37.4)
MCV RBC AUTO: 88 FL (ref 82–98)
PHOSPHATE SERPL-MCNC: 3.4 MG/DL (ref 2.3–4.1)
PLATELET # BLD AUTO: 315 THOUSANDS/UL (ref 149–390)
PMV BLD AUTO: 10.2 FL (ref 8.9–12.7)
POTASSIUM SERPL-SCNC: 4.2 MMOL/L (ref 3.5–5.3)
RBC # BLD AUTO: 2.99 MILLION/UL (ref 3.81–5.12)
SODIUM SERPL-SCNC: 138 MMOL/L (ref 135–147)
WBC # BLD AUTO: 11.34 THOUSAND/UL (ref 4.31–10.16)

## 2023-06-28 PROCEDURE — 82948 REAGENT STRIP/BLOOD GLUCOSE: CPT

## 2023-06-28 PROCEDURE — 84100 ASSAY OF PHOSPHORUS: CPT | Performed by: PHYSICIAN ASSISTANT

## 2023-06-28 PROCEDURE — 83735 ASSAY OF MAGNESIUM: CPT | Performed by: PHYSICIAN ASSISTANT

## 2023-06-28 PROCEDURE — C9113 INJ PANTOPRAZOLE SODIUM, VIA: HCPCS | Performed by: PHYSICIAN ASSISTANT

## 2023-06-28 PROCEDURE — 99291 CRITICAL CARE FIRST HOUR: CPT | Performed by: SURGERY

## 2023-06-28 PROCEDURE — 85027 COMPLETE CBC AUTOMATED: CPT | Performed by: PHYSICIAN ASSISTANT

## 2023-06-28 PROCEDURE — 99024 POSTOP FOLLOW-UP VISIT: CPT | Performed by: SURGERY

## 2023-06-28 PROCEDURE — 82330 ASSAY OF CALCIUM: CPT | Performed by: PHYSICIAN ASSISTANT

## 2023-06-28 PROCEDURE — 80048 BASIC METABOLIC PNL TOTAL CA: CPT | Performed by: PHYSICIAN ASSISTANT

## 2023-06-28 RX ORDER — CALCIUM GLUCONATE 20 MG/ML
2 INJECTION, SOLUTION INTRAVENOUS ONCE
Status: COMPLETED | OUTPATIENT
Start: 2023-06-28 | End: 2023-06-28

## 2023-06-28 RX ORDER — PANTOPRAZOLE SODIUM 40 MG/1
40 TABLET, DELAYED RELEASE ORAL
Status: DISCONTINUED | OUTPATIENT
Start: 2023-06-28 | End: 2023-07-04 | Stop reason: HOSPADM

## 2023-06-28 RX ADMIN — AMIODARONE HYDROCHLORIDE 200 MG: 200 TABLET ORAL at 08:26

## 2023-06-28 RX ADMIN — APIXABAN 5 MG: 5 TABLET, FILM COATED ORAL at 17:08

## 2023-06-28 RX ADMIN — APIXABAN 5 MG: 5 TABLET, FILM COATED ORAL at 08:26

## 2023-06-28 RX ADMIN — Medication 12.5 MG: at 21:24

## 2023-06-28 RX ADMIN — ACETAMINOPHEN 650 MG: 325 TABLET ORAL at 12:35

## 2023-06-28 RX ADMIN — CHLORHEXIDINE GLUCONATE 15 ML: 1.2 SOLUTION ORAL at 21:25

## 2023-06-28 RX ADMIN — FLUOXETINE 20 MG: 20 CAPSULE ORAL at 21:24

## 2023-06-28 RX ADMIN — PANTOPRAZOLE SODIUM 40 MG: 40 INJECTION, POWDER, FOR SOLUTION INTRAVENOUS at 08:25

## 2023-06-28 RX ADMIN — Medication 2.5 MG: at 20:23

## 2023-06-28 RX ADMIN — INSULIN LISPRO 4 UNITS: 100 INJECTION, SOLUTION INTRAVENOUS; SUBCUTANEOUS at 07:01

## 2023-06-28 RX ADMIN — MELATONIN TAB 3 MG 6 MG: 3 TAB at 21:24

## 2023-06-28 RX ADMIN — AMIODARONE HYDROCHLORIDE 200 MG: 200 TABLET ORAL at 12:22

## 2023-06-28 RX ADMIN — INSULIN LISPRO 4 UNITS: 100 INJECTION, SOLUTION INTRAVENOUS; SUBCUTANEOUS at 12:23

## 2023-06-28 RX ADMIN — DILTIAZEM HYDROCHLORIDE 60 MG: 60 TABLET ORAL at 06:52

## 2023-06-28 RX ADMIN — DILTIAZEM HYDROCHLORIDE 90 MG: 60 TABLET ORAL at 17:06

## 2023-06-28 RX ADMIN — FLUOXETINE 20 MG: 20 CAPSULE ORAL at 08:26

## 2023-06-28 RX ADMIN — INSULIN GLARGINE 15 UNITS: 100 INJECTION, SOLUTION SUBCUTANEOUS at 23:51

## 2023-06-28 RX ADMIN — CALCIUM GLUCONATE 2 G: 20 INJECTION, SOLUTION INTRAVENOUS at 12:21

## 2023-06-28 RX ADMIN — Medication 12.5 MG: at 08:25

## 2023-06-28 RX ADMIN — AMIODARONE HYDROCHLORIDE 200 MG: 200 TABLET ORAL at 17:05

## 2023-06-28 RX ADMIN — QUETIAPINE FUMARATE 12.5 MG: 25 TABLET ORAL at 21:24

## 2023-06-28 RX ADMIN — LORAZEPAM 1 MG: 1 TABLET ORAL at 21:25

## 2023-06-28 RX ADMIN — DILTIAZEM HYDROCHLORIDE 90 MG: 60 TABLET ORAL at 23:51

## 2023-06-28 RX ADMIN — CHLORHEXIDINE GLUCONATE 15 ML: 1.2 SOLUTION ORAL at 08:25

## 2023-06-28 NOTE — CASE MANAGEMENT
Case Management Discharge Planning Note    Patient name Chester Posadas  Location [facility] 471-77 MRN [id number]  : 1950 Date 2023       Current Admission Date: 2023  Current Admission Diagnosis:Idiopathic acute pancreatitis without infection or necrosis   Patient Active Problem List    Diagnosis Date Noted   • Idiopathic acute pancreatitis without infection or necrosis 2023   • Primary osteoarthritis of both knees 2023   • Stage 3 chronic kidney disease, unspecified whether stage 3a or 3b CKD ([address]) 2023   • Hypertension    • Morbid obesity with BMI of 50.0-59.9, adult ([address]) 2022   • Unsteady gait 10/30/2022   • Dyspnea on exertion 10/30/2022   • SIRS (systemic inflammatory response syndrome) ([address]) 10/30/2022   • Incontinence 10/30/2022   • GILDARDO (obstructive sleep apnea) 10/30/2022   • Vitamin D deficiency 2021   • Reactive hypoglycemia 2020   • Osteoporosis 2020   • Hyperparathyroidism ([address]) 2020   • Hashimoto's thyroiditis 2020   • Early menopause occurring in patient age younger than 39 years 2020   • S/P bariatric surgery 2020   • Myofascial pain syndrome 10/21/2019   • Status post partial thyroidectomy 2019   • Cervical spondylosis without myelopathy 2019   • Arthropathy of cervical facet joint 2019   • Occipital neuralgia of right side 2019   • Chest wall tenderness 2019   • Chronic heart failure ([address]) 2019   • Permanent atrial fibrillation ([address]) 2019   • Myalgia 2019   • Primary osteoarthritis of left knee 10/23/2018   • Primary osteoarthritis of right knee 10/23/2018   • Chronic pain of left knee 10/23/2018   • Malignant tumor of right kidney parenchyma ([address]) 2016   • Closed fracture of right distal radius and ulna 2016      LOS (days): 10  Geometric Mean LOS (GMLOS) (days): 9.60  Days to GMLOS:-0.1     OBJECTIVE:  Risk of Unplanned Readmission Score: 28.45 Current admission status: Inpatient   Preferred Pharmacy:   [facility] [address] [facility], [address]  [facility]  [address] [address]  Phone: [phone] Fax: [phone]    [address] [facility], [address] [facility]  [address] [facility] [address]  Phone: [phone] Fax: [phone]    Primary Care Provider: Mateo Reynolds DO    Primary Insurance: MEDICARE  Secondary Insurance: Raúl Hoyos DETAILS:            Treatment Team Recommendation: Short Term Rehab  Discharge Destination Plan[de-identified] Short Term Rehab  Transport at Discharge : BLS Ambulance            Additional Comments: Met with son Naima Parikh and daughter Colleen Gallagher at bedside (patient sleeping) to discuss discharge planning. Provided them with list of local facilities (20 mile radius of [facility]), they intend to visit facilities and do research prior to [facility] making referrals. They do not want [facility] or [address]. Family asked about taking patient home with home health/PT/OT, discussed with them that while it ultimately their choice it will require their family's ability to meet all of patient's needs in the home. Assigned CM to follow.

## 2023-06-28 NOTE — QUICK NOTE
Evaluated patient at bedside, LUCY drain left lower quadrant with 25 cc serosanguineous output. Removed at bedside without complications, patient tolerated well. Replaced 4 x 4 gauze with paper tape over old drain site. Nursing may change with saturation.     Sudhir Castorena

## 2023-06-28 NOTE — PROGRESS NOTES
Pastoral Care Progress Note    2023  Patient: Karlos Stanton : 1950  Admission Date & Time: 2023  MRN: [id number] [facility]: [id number]      [facility] initiated visit to pt. Pt was asleep at time of encounter, family present bedside. CH explained spiritual care service. Family declined at this time. Family shared about the pt's extended family and their care and love for her. CH provided empathetic listening and support. Chaplains remain available. Chaplaincy Interventions Utilized:   Empowerment: Encouraged focus on present and Provided chaplaincy education    Relationship Building: Cultivated a relationship of care and support and Listened empathically      Chaplaincy Outcomes Achieved:   Identified meaningful connections    Spiritual Coping Strategies Utilized:   Connectedness     23 1500   Clinical Encounter Type   Visited With Patient and family together   Routine Visit Introduction

## 2023-06-28 NOTE — PROGRESS NOTES
Progress Note - General Surgery   Etelvina Hoang 67 y.o. female MRN: [id number]  Unit/Bed#: [facility] 205-01 Encounter: [id number]    Assessment:  67 y.o. F with multiple prior abdominal surgeries including open arlene-en-y gastric bypass, presents with perforated ileum and necrotic jejunum 2/2 internal hernia, s/p exploratory laparotomy, resection of J-J anastomosis, segmental ileum resection and abthera placement 6/19, followed by second loop laparotomy, YAMIL, SBR + appendectomy, abthera placement 6/19 for rising pressor requirements. Now s/p takeback ex lap, SBR, creation of anastomosis x 3 with abdominal closure 6/20    Acute blood loss anemia from intraoperative losses and received a total of 5uPRBCs, 2 FFP 6/19. Hgb has remained stable since.  cc + 1 unmeasured   LUQ LUCY 165 cc serous drainage  RUQ LUCY 35 cc serous drainage  LLQ LUCY 130 cc serous drainage     Plan:  Continue bariatrics diet + Glucerna supplements   D/c TPN today with hopes this will help her appetite  Maintain LUQ and RUQ drains. D/c LLQ drain today   Pulmonary toilet   Maintain abdominal binder  Completed course of abx  Continue po amiodarone and eliquis   PT/OT  OOB/Ambulate  DVT ppx        Subjective/Objective     Subjective: No acute events overnight. Patient denies any complaints this morning. She has not been eating much 2/2 suppressed appetite. Objective:     Blood pressure 150/71, pulse 84, temperature 97.6 °F (36.4 °C), temperature source Axillary, resp. rate 16, height 5' 3" (1.6 m), weight (!) 144 kg (316 lb 6.4 oz), SpO2 97 %. ,Body mass index is 56.05 kg/m².       Intake/Output Summary (Last 24 hours) at 6/28/2023 0625  Last data filed at 6/27/2023 2122  Gross per 24 hour   Intake 1739.22 ml   Output 730 ml   Net 1009.22 ml       Invasive Devices     Peripherally Inserted Central Catheter Line  Duration           PICC Line 11/28/98 Left Basilic 4 days          Drain  Duration           Closed/Suction Drain Lateral;Left LUQ 19 Fr. 7 days    Closed/Suction Drain Lateral;Right RUQ Bulb 19 Fr. 7 days    Closed/Suction Drain Left LLQ Bulb 10 Fr. 7 days                Physical Exam:   NAD, alert and oriented x2  Normocephalic, atraumatic  Moist mucous membranes   Norm resp effort on room air   Regular rate  Abd obese soft, nondistended, mild tenderness, incisions Is c/d/i. RUQ/LUQ/LLQ LUCY drains with serous drainage   + peripheral edema   CN grossly intact   Skin is warm and dry      Lab, Imaging and other studies:  CBC:   No results found for: "WBC", "HGB", "HCT", "MCV", "PLT", "ADJUSTEDWBC", "RBC", "MCH", "MCHC", "RDW", "MPV", "NRBC", CMP:   No results found for: "SODIUM", "K", "CL", "CO2", "ANIONGAP", "BUN", "CREATININE", "GLUCOSE", "CALCIUM", "AST", "ALT", "ALKPHOS", "PROT", "BILITOT", "EGFR", Coagulation:   No results found for: "PT", "INR", "APTT"  VTE Pharmacologic Prophylaxis: Lovenox   VTE Mechanical Prophylaxis: sequential compression device

## 2023-06-28 NOTE — PLAN OF CARE
Problem: Prexisting or High Potential for Compromised Skin Integrity  Goal: Skin integrity is maintained or improved  Description: INTERVENTIONS:  - Identify patients at risk for skin breakdown  - Assess and monitor skin integrity  - Assess and monitor nutrition and hydration status  - Monitor labs   - Assess for incontinence   - Turn and reposition patient  - Assist with mobility/ambulation  - Relieve pressure over bony prominences  - Avoid friction and shearing  - Provide appropriate hygiene as needed including keeping skin clean and dry  - Evaluate need for skin moisturizer/barrier cream  - Collaborate with interdisciplinary team   - Patient/family teaching  - Consider wound care consult   Outcome: Progressing     Problem: Nutrition/Hydration-ADULT  Goal: Nutrient/Hydration intake appropriate for improving, restoring or maintaining nutritional needs  Description: Monitor and assess patient's nutrition/hydration status for malnutrition. Collaborate with interdisciplinary team and initiate plan and interventions as ordered. Monitor patient's weight and dietary intake as ordered or per policy. Utilize nutrition screening tool and intervene as necessary. Determine patient's food preferences and provide high-protein, high-caloric foods as appropriate.      INTERVENTIONS:  - Monitor oral intake, urinary output, labs, and treatment plans  - Assess nutrition and hydration status and recommend course of action  - Evaluate amount of meals eaten  - Assist patient with eating if necessary   - Allow adequate time for meals  - Recommend/ encourage appropriate diets, oral nutritional supplements, and vitamin/mineral supplements  - Order, calculate, and assess calorie counts as needed  - Recommend, monitor, and adjust tube feedings and TPN/PPN based on assessed needs  - Assess need for intravenous fluids  - Provide specific nutrition/hydration education as appropriate  - Include patient/family/caregiver in decisions related to nutrition  Outcome: Progressing

## 2023-06-29 LAB
ANION GAP SERPL CALCULATED.3IONS-SCNC: 4 MMOL/L
BUN SERPL-MCNC: 20 MG/DL (ref 5–25)
CA-I BLD-SCNC: 1.06 MMOL/L (ref 1.12–1.32)
CALCIUM SERPL-MCNC: 8.1 MG/DL (ref 8.3–10.1)
CHLORIDE SERPL-SCNC: 106 MMOL/L (ref 96–108)
CO2 SERPL-SCNC: 28 MMOL/L (ref 21–32)
CREAT SERPL-MCNC: 0.75 MG/DL (ref 0.6–1.3)
ERYTHROCYTE [DISTWIDTH] IN BLOOD BY AUTOMATED COUNT: 17.3 % (ref 11.6–15.1)
GFR SERPL CREATININE-BSD FRML MDRD: 79 ML/MIN/1.73SQ M
GLUCOSE SERPL-MCNC: 123 MG/DL (ref 65–140)
GLUCOSE SERPL-MCNC: 136 MG/DL (ref 65–140)
GLUCOSE SERPL-MCNC: 138 MG/DL (ref 65–140)
GLUCOSE SERPL-MCNC: 144 MG/DL (ref 65–140)
GLUCOSE SERPL-MCNC: 163 MG/DL (ref 65–140)
HCT VFR BLD AUTO: 27.6 % (ref 34.8–46.1)
HGB BLD-MCNC: 8.3 G/DL (ref 11.5–15.4)
MCH RBC QN AUTO: 26.7 PG (ref 26.8–34.3)
MCHC RBC AUTO-ENTMCNC: 30.1 G/DL (ref 31.4–37.4)
MCV RBC AUTO: 89 FL (ref 82–98)
PLATELET # BLD AUTO: 399 THOUSANDS/UL (ref 149–390)
PMV BLD AUTO: 10.3 FL (ref 8.9–12.7)
POTASSIUM SERPL-SCNC: 4.1 MMOL/L (ref 3.5–5.3)
RBC # BLD AUTO: 3.11 MILLION/UL (ref 3.81–5.12)
SODIUM SERPL-SCNC: 138 MMOL/L (ref 135–147)
WBC # BLD AUTO: 13.63 THOUSAND/UL (ref 4.31–10.16)

## 2023-06-29 PROCEDURE — 80048 BASIC METABOLIC PNL TOTAL CA: CPT | Performed by: SURGERY

## 2023-06-29 PROCEDURE — 82948 REAGENT STRIP/BLOOD GLUCOSE: CPT

## 2023-06-29 PROCEDURE — 97110 THERAPEUTIC EXERCISES: CPT

## 2023-06-29 PROCEDURE — 97535 SELF CARE MNGMENT TRAINING: CPT

## 2023-06-29 PROCEDURE — 85027 COMPLETE CBC AUTOMATED: CPT | Performed by: SURGERY

## 2023-06-29 PROCEDURE — 97530 THERAPEUTIC ACTIVITIES: CPT

## 2023-06-29 PROCEDURE — 99024 POSTOP FOLLOW-UP VISIT: CPT | Performed by: SURGERY

## 2023-06-29 PROCEDURE — 82330 ASSAY OF CALCIUM: CPT | Performed by: SURGERY

## 2023-06-29 RX ORDER — INSULIN LISPRO 100 [IU]/ML
4-20 INJECTION, SOLUTION INTRAVENOUS; SUBCUTANEOUS 4 TIMES DAILY
Status: DISCONTINUED | OUTPATIENT
Start: 2023-06-30 | End: 2023-07-01

## 2023-06-29 RX ORDER — SODIUM CHLORIDE, SODIUM GLUCONATE, SODIUM ACETATE, POTASSIUM CHLORIDE, MAGNESIUM CHLORIDE, SODIUM PHOSPHATE, DIBASIC, AND POTASSIUM PHOSPHATE .53; .5; .37; .037; .03; .012; .00082 G/100ML; G/100ML; G/100ML; G/100ML; G/100ML; G/100ML; G/100ML
500 INJECTION, SOLUTION INTRAVENOUS ONCE
Status: COMPLETED | OUTPATIENT
Start: 2023-06-29 | End: 2023-06-29

## 2023-06-29 RX ADMIN — QUETIAPINE FUMARATE 12.5 MG: 25 TABLET ORAL at 21:23

## 2023-06-29 RX ADMIN — Medication 12.5 MG: at 21:23

## 2023-06-29 RX ADMIN — ACETAMINOPHEN 650 MG: 325 TABLET ORAL at 08:16

## 2023-06-29 RX ADMIN — AMIODARONE HYDROCHLORIDE 200 MG: 200 TABLET ORAL at 17:51

## 2023-06-29 RX ADMIN — APIXABAN 5 MG: 5 TABLET, FILM COATED ORAL at 08:14

## 2023-06-29 RX ADMIN — APIXABAN 5 MG: 5 TABLET, FILM COATED ORAL at 17:51

## 2023-06-29 RX ADMIN — FLUOXETINE 20 MG: 20 CAPSULE ORAL at 21:23

## 2023-06-29 RX ADMIN — CHLORHEXIDINE GLUCONATE 15 ML: 1.2 SOLUTION ORAL at 08:14

## 2023-06-29 RX ADMIN — INSULIN GLARGINE 15 UNITS: 100 INJECTION, SOLUTION SUBCUTANEOUS at 21:35

## 2023-06-29 RX ADMIN — DILTIAZEM HYDROCHLORIDE 90 MG: 60 TABLET ORAL at 14:16

## 2023-06-29 RX ADMIN — DILTIAZEM HYDROCHLORIDE 90 MG: 60 TABLET ORAL at 06:18

## 2023-06-29 RX ADMIN — AMIODARONE HYDROCHLORIDE 200 MG: 200 TABLET ORAL at 08:14

## 2023-06-29 RX ADMIN — CHLORHEXIDINE GLUCONATE 15 ML: 1.2 SOLUTION ORAL at 21:36

## 2023-06-29 RX ADMIN — PANTOPRAZOLE SODIUM 40 MG: 40 TABLET, DELAYED RELEASE ORAL at 06:18

## 2023-06-29 RX ADMIN — Medication 12.5 MG: at 08:14

## 2023-06-29 RX ADMIN — DILTIAZEM HYDROCHLORIDE 90 MG: 60 TABLET ORAL at 21:26

## 2023-06-29 RX ADMIN — SODIUM CHLORIDE, SODIUM GLUCONATE, SODIUM ACETATE, POTASSIUM CHLORIDE, MAGNESIUM CHLORIDE, SODIUM PHOSPHATE, DIBASIC, AND POTASSIUM PHOSPHATE 500 ML: .53; .5; .37; .037; .03; .012; .00082 INJECTION, SOLUTION INTRAVENOUS at 10:49

## 2023-06-29 RX ADMIN — MELATONIN TAB 3 MG 6 MG: 3 TAB at 21:23

## 2023-06-29 RX ADMIN — FLUOXETINE 20 MG: 20 CAPSULE ORAL at 08:14

## 2023-06-29 RX ADMIN — AMIODARONE HYDROCHLORIDE 200 MG: 200 TABLET ORAL at 12:40

## 2023-06-29 RX ADMIN — LORAZEPAM 1 MG: 1 TABLET ORAL at 21:23

## 2023-06-29 NOTE — PLAN OF CARE
Problem: SAFETY,RESTRAINT: NV/NON-SELF DESTRUCTIVE BEHAVIOR  Goal: Remains free of harm/injury (restraint for non violent/non self-detsructive behavior)  Description: INTERVENTIONS:  - Instruct patient/family regarding restraint use   - Assess and monitor physiologic and psychological status   - Provide interventions and comfort measures to meet assessed patient needs   - Identify and implement measures to help patient regain control  - Assess readiness for release of restraint   Outcome: Progressing  Goal: Returns to optimal restraint-free functioning  Description: INTERVENTIONS:  - Assess the patient's behavior and symptoms that indicate continued need for restraint  - Identify and implement measures to help patient regain control  - Assess readiness for release of restraint   Outcome: Progressing     Problem: Prexisting or High Potential for Compromised Skin Integrity  Goal: Skin integrity is maintained or improved  Description: INTERVENTIONS:  - Identify patients at risk for skin breakdown  - Assess and monitor skin integrity  - Assess and monitor nutrition and hydration status  - Monitor labs   - Assess for incontinence   - Turn and reposition patient  - Assist with mobility/ambulation  - Relieve pressure over bony prominences  - Avoid friction and shearing  - Provide appropriate hygiene as needed including keeping skin clean and dry  - Evaluate need for skin moisturizer/barrier cream  - Collaborate with interdisciplinary team   - Patient/family teaching  - Consider wound care consult   Outcome: Progressing     Problem: MOBILITY - ADULT  Goal: Maintain or return to baseline ADL function  Description: INTERVENTIONS:  -  Assess patient's ability to carry out ADLs; assess patient's baseline for ADL function and identify physical deficits which impact ability to perform ADLs (bathing, care of mouth/teeth, toileting, grooming, dressing, etc.)  - Assess/evaluate cause of self-care deficits   - Assess range of motion  - Assess patient's mobility; develop plan if impaired  - Assess patient's need for assistive devices and provide as appropriate  - Encourage maximum independence but intervene and supervise when necessary  - Involve family in performance of ADLs  - Assess for home care needs following discharge   - Consider OT consult to assist with ADL evaluation and planning for discharge  - Provide patient education as appropriate  Outcome: Progressing  Goal: Maintains/Returns to pre admission functional level  Description: INTERVENTIONS:  - Perform BMAT or MOVE assessment daily.   - Set and communicate daily mobility goal to care team and patient/family/caregiver. - Collaborate with rehabilitation services on mobility goals if consulted  - Out of bed for toileting  - Record patient progress and toleration of activity level   Outcome: Progressing     Problem: Nutrition/Hydration-ADULT  Goal: Nutrient/Hydration intake appropriate for improving, restoring or maintaining nutritional needs  Description: Monitor and assess patient's nutrition/hydration status for malnutrition. Collaborate with interdisciplinary team and initiate plan and interventions as ordered. Monitor patient's weight and dietary intake as ordered or per policy. Utilize nutrition screening tool and intervene as necessary. Determine patient's food preferences and provide high-protein, high-caloric foods as appropriate.      INTERVENTIONS:  - Monitor oral intake, urinary output, labs, and treatment plans  - Assess nutrition and hydration status and recommend course of action  - Evaluate amount of meals eaten  - Assist patient with eating if necessary   - Allow adequate time for meals  - Recommend/ encourage appropriate diets, oral nutritional supplements, and vitamin/mineral supplements  - Order, calculate, and assess calorie counts as needed  - Recommend, monitor, and adjust tube feedings and TPN/PPN based on assessed needs  - Assess need for intravenous fluids  - Provide specific nutrition/hydration education as appropriate  - Include patient/family/caregiver in decisions related to nutrition  Outcome: Progressing

## 2023-06-29 NOTE — NUTRITION
06/29/23 1827   Recommendations/Interventions   Summary Patient's appetite remains poor on current diet, 25% meal completions noted. Patient reports that she is consuming glucerna supplements. Calorie count posted 6/29-6/30. Encouraged patient to consume nutrient dense, high protein food choices and supplements in between meals to optimize calorie and protein intake. Interventions/Recommendations Continue current diet order;Supplement adjust   Intervention Comments Glucerna increased to BID and Magic cup once daily to continue. Recommendations to Provider Will f/u with calorie count results and recommendations. Monitor electrolytes.

## 2023-06-29 NOTE — CASE MANAGEMENT
Case Management Discharge Planning Note    Patient name [facility]  Location [facility] 930/[facility] 930-01 MRN [id number]  : 1950 Date 2023       Current Admission Date: 2023  Current Admission Diagnosis:Idiopathic acute pancreatitis without infection or necrosis   Patient Active Problem List    Diagnosis Date Noted   • Idiopathic acute pancreatitis without infection or necrosis 2023   • Primary osteoarthritis of both knees 2023   • Stage 3 chronic kidney disease, unspecified whether stage 3a or 3b CKD ([address]) 2023   • Hypertension    • Morbid obesity with BMI of 50.0-59.9, adult ([address]) 2022   • Unsteady gait 10/30/2022   • Dyspnea on exertion 10/30/2022   • SIRS (systemic inflammatory response syndrome) ([address]) 10/30/2022   • Incontinence 10/30/2022   • GILDARDO (obstructive sleep apnea) 10/30/2022   • Vitamin D deficiency 2021   • Reactive hypoglycemia 2020   • Osteoporosis 2020   • Hyperparathyroidism ([address]) 2020   • Hashimoto's thyroiditis 2020   • Early menopause occurring in patient age younger than 39 years 2020   • S/P bariatric surgery 2020   • Myofascial pain syndrome 10/21/2019   • Status post partial thyroidectomy 2019   • Cervical spondylosis without myelopathy 2019   • Arthropathy of cervical facet joint 2019   • Occipital neuralgia of right side 2019   • Chest wall tenderness 2019   • Chronic heart failure ([address]) 2019   • Permanent atrial fibrillation ([address]) 2019   • Myalgia 2019   • Primary osteoarthritis of left knee 10/23/2018   • Primary osteoarthritis of right knee 10/23/2018   • Chronic pain of left knee 10/23/2018   • Malignant tumor of right kidney parenchyma ([address]) 2016   • Closed fracture of right distal radius and ulna 2016      LOS (days): 11  Geometric Mean LOS (GMLOS) (days): 9.60  Days to GMLOS:-1     OBJECTIVE:  Risk of Unplanned Readmission Score: 28.3 Current admission status: Inpatient   Preferred Pharmacy:   [facility] DR MARIAH REDDY [address], [address]  [place] [address] [address] [address]  Phone: [phone] Fax: [phone]    [facility], [address] [place] [address] [address] [place] [address]  Phone: [phone] Fax: [phone]    Primary Care Provider: Rama Christine DO    Primary Insurance: MEDICARE  Secondary Insurance: Darya Montalvo    DISCHARGE DETAILS:       Additional Comments: Per provider, pt not yet medically clear for discharge. Per previous note, pt's son and daughter given a list of local facilities to look into/tour. CM to follow.

## 2023-06-29 NOTE — PHYSICAL THERAPY NOTE
PHYSICAL THERAPY NOTE          Patient Name: Altagracia Lamas  FFTOQ'Y Date: 6/29/2023 06/29/23 1100   PT Last Visit   PT Visit Date 06/29/23   Note Type   Note Type Treatment   Pain Assessment   Pain Assessment Tool 0-10   Pain Score No Pain   Restrictions/Precautions   Weight Bearing Precautions Per Order No   Other Precautions Cognitive; Chair Alarm; Bed Alarm;Multiple lines; Fall Risk  (LUCY drain x2)   General   Chart Reviewed Yes   Response to Previous Treatment Patient unable to report, no changes reported from family or staff   Family/Caregiver Present Yes   Cognition   Overall Cognitive Status Impaired   Arousal/Participation Responsive; Cooperative   Attention Attends with cues to redirect   Orientation Level Oriented to person;Oriented to place; Disoriented to time   Memory Decreased recall of recent events;Decreased short term memory   Following Commands Follows one step commands with increased time or repetition   Comments pt pleasant and cooperative, decreased attention noted. Increased time + repitition of commands required throughout session   Bed Mobility   Supine to Sit Unable to assess   Sit to Supine 2  Maximal assistance   Additional items Assist x 2; Increased time required;Verbal cues;LE management   Additional Comments pt sitting OOB in recliner upon arrival. Pt returned to supine in bed with all needs wihtin reach   Transfers   Sit to Stand 3  Moderate assistance   Additional items Assist x 2; Increased time required;Verbal cues   Stand to Sit 3  Moderate assistance   Additional items Assist x 2; Increased time required;Verbal cues   Toilet transfer 3  Moderate assistance   Additional items Assist x 2; Increased time required;Verbal cues; Commode   Additional Comments transfers with RW  (performed 3x STS throughout session)   Ambulation/Elevation   Gait pattern Excessively slow; Short stride; Foward flexed;Decreased foot clearance; Improper Weight shift; Shuffling   Gait Assistance 3  Moderate assist   Additional items Assist x 2; Tactile cues; Verbal cues   Assistive Device Rolling walker   Distance 4ft, 5ft   Balance   Static Sitting Fair   Dynamic Sitting Fair -   Static Standing Poor   Dynamic Standing Poor   Ambulatory Poor   Endurance Deficit   Endurance Deficit Yes   Endurance Deficit Description generalized weakness, fatigue, deconditioning   Activity Tolerance   Activity Tolerance Patient limited by fatigue   Medical Staff Made Aware ANTHONY Willis; co-session completed this date 2* increased medical complexity and multiple co-morbidities   Nurse Made Aware RN cleared pt to participate in therapy session   Exercises   Knee AROM Long Arc Quad Sitting;Bilateral;5 reps;AROM  (x2 sets)   Ankle Pumps Sitting;Bilateral;5 reps;AROM  (x2 sets)   Marching Sitting;Bilateral;5 reps;AROM  (x2 sets)   Balance training  sitting unsupported with occaional CGA to prevent posterior lean   Assessment   Prognosis Fair   Problem List Decreased strength;Decreased endurance; Impaired balance;Decreased mobility;Pain;Decreased cognition;Decreased safety awareness; Impaired judgement   Assessment Pt seen for PT treatment session this date. Therapy session focused on bed mobility, functional transfers, gait training, therex and endurance training in order to improve overall mobility and independence. Pt requires assist of 2 for all mobility performed this date. Pt able to perform multiple STS this date with use of RW as well as increase ambulation distance by 4-5' compared to previous session. Continues to be limited by body habitus as well as generalized weakness + deconditioning and abdominal pain. Pt requires multiple and extended rest breaks throughout session. Pt making slow progress toward goals. Pt was left supine in bed at the end of PT session with all needs in reach.  Pt would benefit from continued PT services while in hospital to address remaining limitations. PT to continue to follow pt and recommends rehab. The patient's AM-PAC Basic Mobility Inpatient Short Form Raw Score is 7. A Raw score of less than or equal to 16 suggests the patient may benefit from discharge to post-acute rehabilitation services. Please also refer to the recommendation of the Physical Therapist for safe discharge planning. Barriers to Discharge Decreased caregiver support; Inaccessible home environment   Goals   Patient Goals to lay down   STG Expiration Date 07/10/23   PT Treatment Day 2   Plan   Treatment/Interventions Functional transfer training;LE strengthening/ROM; Endurance training;Patient/family training;Equipment eval/education; Bed mobility;Gait training;Spoke to nursing;Spoke to case management;OT   Progress Slow progress, decreased activity tolerance   PT Frequency 3-5x/wk   Recommendation   PT Discharge Recommendation Post acute rehabilitation services   AM-PAC Basic Mobility Inpatient   Turning in Flat Bed Without Bedrails 2   Lying on Back to Sitting on Edge of Flat Bed Without Bedrails 1   Moving Bed to Chair 1   Standing Up From Chair Using Arms 1   Walk in Room 1   Climb 3-5 Stairs With Railing 1   Basic Mobility Inpatient Raw Score 7   Turning Head Towards Sound 4   Follow Simple Instructions 3   Low Function Basic Mobility Raw Score  14   Low Function Basic Mobility Standardized Score  22.01   Highest Level Of Mobility   JH-HLM Goal 2: Bed activities/Dependent transfer   JH-HLM Achieved 5: Stand (1 or more minutes)   Education   Education Provided Mobility training;Assistive device   Patient Reinforcement needed     Louis Love, PT, DPT

## 2023-06-29 NOTE — PLAN OF CARE
Problem: OCCUPATIONAL THERAPY ADULT  Goal: Performs self-care activities at highest level of function for planned discharge setting. See evaluation for individualized goals. Description: Treatment Interventions: ADL retraining, Functional transfer training, UE strengthening/ROM, Endurance training, Cognitive reorientation, Patient/family training, Equipment evaluation/education, Compensatory technique education, Continued evaluation, Energy conservation, Activityengagement  Equipment Recommended:  (TBD)       See flowsheet documentation for full assessment, interventions and recommendations. Outcome: Progressing  Note: Limitation: Decreased ADL status, Decreased Safe judgement during ADL, Decreased cognition, Decreased endurance, Decreased self-care trans, Decreased high-level ADLs  Prognosis: Fair  Assessment: Patient participated in Skilled OT session this date with interventions consisting of ADL re training with the use of correct body mechnaics, Energy Conservation techniques, safety awareness and fall prevention techniques,  therapeutic activities to: increase activity tolerance, increase dynamic sit/ stand balance during functional activity  and increase OOB/ sitting tolerance . Upon arrival patient was found seated OOB to Chair. Pt demonstrated the following tasks: MOD A x 2 STS, fnxl mobility with RW, BSC transfer, MAX A x 2 sit to supine. Pt requires MIN A for grooming tasks, MAX A for LBD and toileting. Patient continues to be functioning below baseline level, occupational performance remains limited secondary to factors listed above and increased risk for falls and injury. From OT standpoint, recommendation at time of d/c would be STR. Patient to benefit from continued Occupational Therapy treatment while in the hospital to address deficits as defined above and maximize level of functional independence with ADLs and functional mobility. Pt was left after session with all current needs met.    The patient's raw score on the AM-PAC Daily Activity Inpatient Short Form is 14. A raw score of less than 19 suggests the patient may benefit from discharge to post-acute rehabilitation services. Please refer to the recommendation of the Occupational Therapist for safe discharge planning.      OT Discharge Recommendation: Post acute rehabilitation services

## 2023-06-29 NOTE — PLAN OF CARE
Problem: PHYSICAL THERAPY ADULT  Goal: Performs mobility at highest level of function for planned discharge setting. See evaluation for individualized goals. Description: Treatment/Interventions: LE strengthening/ROM, Functional transfer training, Therapeutic exercise, Endurance training, Cognitive reorientation, Patient/family training, Equipment eval/education, Bed mobility, Gait training, Spoke to nursing, Spoke to case management, OT          See flowsheet documentation for full assessment, interventions and recommendations. Outcome: Progressing  Note: Prognosis: Fair  Problem List: Decreased strength, Decreased endurance, Impaired balance, Decreased mobility, Pain, Decreased cognition, Decreased safety awareness, Impaired judgement  Assessment: Pt seen for PT treatment session this date. Therapy session focused on bed mobility, functional transfers, gait training, therex and endurance training in order to improve overall mobility and independence. Pt requires assist of 2 for all mobility performed this date. Pt able to perform multiple STS this date with use of RW as well as increase ambulation distance by 4-5' compared to previous session. Continues to be limited by body habitus as well as generalized weakness + deconditioning and abdominal pain. Pt requires multiple and extended rest breaks throughout session. Pt making slow progress toward goals. Pt was left supine in bed at the end of PT session with all needs in reach. Pt would benefit from continued PT services while in hospital to address remaining limitations. PT to continue to follow pt and recommends rehab. The patient's AM-PAC Basic Mobility Inpatient Short Form Raw Score is 7. A Raw score of less than or equal to 16 suggests the patient may benefit from discharge to post-acute rehabilitation services. Please also refer to the recommendation of the Physical Therapist for safe discharge planning.   Barriers to Discharge: Decreased caregiver support, Inaccessible home environment     PT Discharge Recommendation: Post acute rehabilitation services    See flowsheet documentation for full assessment.

## 2023-06-29 NOTE — OCCUPATIONAL THERAPY NOTE
Occupational Therapy Progress Note     Patient Name: Maritza ABBASI Date: 6/29/2023  Problem List  Principal Problem:    Idiopathic acute pancreatitis without infection or necrosis  Active Problems:    Permanent atrial fibrillation (HCC)    GILDARDO (obstructive sleep apnea)    Hypertension    Stage 3 chronic kidney disease, unspecified whether stage 3a or 3b CKD ([address])          06/29/23 1059   OT Last Visit   OT Visit Date 06/29/23   Note Type   Note Type Treatment   Pain Assessment   Pain Assessment Tool 0-10   Pain Score No Pain   Restrictions/Precautions   Weight Bearing Precautions Per Order No   Other Precautions Cognitive; Chair Alarm; Bed Alarm;Multiple lines; Fall Risk;Pain  (LUCY x 2, abdominal binder)   Lifestyle   Autonomy I with ADL's, assistance IADL's, +RW with functional mobility, (-) drives   Reciprocal Relationships son, daughter   Service to Others retired   Intrinsic Gratification listening to music   ADL   Grooming Assistance 4  Minimal Assistance   Grooming Deficit Brushing hair   Grooming Comments + washing hair with shampoo cap   LB Dressing Assistance 2  Maximal Assistance   LB Dressing Deficit Don/doff R sock; Don/doff L sock   Toileting Assistance  2  Maximal Assistance   Toileting Deficit Perineal hygiene   Bed Mobility   Supine to Sit Unable to assess   Sit to Supine 2  Maximal assistance   Additional items Assist x 2;Bedrails; Increased time required;LE management   Transfers   Sit to Stand 3  Moderate assistance   Additional items Assist x 2; Increased time required;Verbal cues   Stand to Sit 3  Moderate assistance   Additional items Assist x 2; Increased time required;Verbal cues   Toilet transfer 3  Moderate assistance   Additional items Assist x 2; Increased time required;Commode   Additional Comments transfers with RW   Functional Mobility   Functional Mobility 3  Moderate assistance   Additional Comments Ax2   Additional items Rolling walker   Cognition   Overall Cognitive Status Impaired   Arousal/Participation Responsive; Cooperative   Attention Attends with cues to redirect   Orientation Level Oriented to person;Oriented to place; Disoriented to time  (states date is June 26, 1998)   Memory Decreased recall of recent events;Decreased short term memory   Following Commands Follows one step commands with increased time or repetition   Comments Pt pleasant and cooperative during session. Does c/o increased fatigue. Pt noted to have decreased attention and processing, requires frequent cueing and repetition of commands   Activity Tolerance   Activity Tolerance Patient limited by fatigue   Medical Staff Made Aware PT Marshal Nelson RN   Assessment   Assessment Patient participated in Skilled OT session this date with interventions consisting of ADL re training with the use of correct body mechnaics, Energy Conservation techniques, safety awareness and fall prevention techniques,  therapeutic activities to: increase activity tolerance, increase dynamic sit/ stand balance during functional activity  and increase OOB/ sitting tolerance . Upon arrival patient was found seated OOB to Chair. Pt demonstrated the following tasks: MOD A x 2 STS, fnxl mobility with RW, BSC transfer, MAX A x 2 sit to supine. Pt requires MIN A for grooming tasks, MAX A for LBD and toileting. Patient continues to be functioning below baseline level, occupational performance remains limited secondary to factors listed above and increased risk for falls and injury. From OT standpoint, recommendation at time of d/c would be STR. Patient to benefit from continued Occupational Therapy treatment while in the hospital to address deficits as defined above and maximize level of functional independence with ADLs and functional mobility. Pt was left after session with all current needs met. The patient's raw score on the AM-PAC Daily Activity Inpatient Short Form is 14.  A raw score of less than 19 suggests the patient may benefit from discharge to post-acute rehabilitation services. Please refer to the recommendation of the Occupational Therapist for safe discharge planning. Plan   Treatment Interventions ADL retraining;Functional transfer training;UE strengthening/ROM; Endurance training;Cognitive reorientation;Equipment evaluation/education;Patient/family training;Neuromuscular reeducation; Compensatory technique education;Continued evaluation; Energy conservation; Activityengagement   Goal Expiration Date 07/10/23   OT Treatment Day 2   OT Frequency 2-3x/wk   Recommendation   OT Discharge Recommendation Post acute rehabilitation services   AM-PAC Daily Activity Inpatient   Lower Body Dressing 2   Bathing 2   Toileting 2   Upper Body Dressing 2   Grooming 3   Eating 3   Daily Activity Raw Score 14   Daily Activity Standardized Score (Calc for Raw Score >=11) 33.39   AM-PAC Applied Cognition Inpatient   Following a Speech/Presentation 2   Understanding Ordinary Conversation 3   Taking Medications 3   Remembering Where Things Are Placed or Put Away 2   Remembering List of 4-5 Errands 2   Taking Care of Complicated Tasks 1   Applied Cognition Raw Score 13   Applied Cognition Standardized Score 30.46     Camilo El MS, OTR/L

## 2023-06-29 NOTE — PLAN OF CARE
Problem: SAFETY,RESTRAINT: NV/NON-SELF DESTRUCTIVE BEHAVIOR  Goal: Remains free of harm/injury (restraint for non violent/non self-detsructive behavior)  Description: INTERVENTIONS:  - Instruct patient/family regarding restraint use   - Assess and monitor physiologic and psychological status   - Provide interventions and comfort measures to meet assessed patient needs   - Identify and implement measures to help patient regain control  - Assess readiness for release of restraint   Outcome: Progressing  Goal: Returns to optimal restraint-free functioning  Description: INTERVENTIONS:  - Assess the patient's behavior and symptoms that indicate continued need for restraint  - Identify and implement measures to help patient regain control  - Assess readiness for release of restraint   Outcome: Progressing     Problem: Prexisting or High Potential for Compromised Skin Integrity  Goal: Skin integrity is maintained or improved  Description: INTERVENTIONS:  - Identify patients at risk for skin breakdown  - Assess and monitor skin integrity  - Assess and monitor nutrition and hydration status  - Monitor labs   - Assess for incontinence   - Turn and reposition patient  - Assist with mobility/ambulation  - Relieve pressure over bony prominences  - Avoid friction and shearing  - Provide appropriate hygiene as needed including keeping skin clean and dry  - Evaluate need for skin moisturizer/barrier cream  - Collaborate with interdisciplinary team   - Patient/family teaching  - Consider wound care consult   Outcome: Progressing     Problem: MOBILITY - ADULT  Goal: Maintain or return to baseline ADL function  Description: INTERVENTIONS:  -  Assess patient's ability to carry out ADLs; assess patient's baseline for ADL function and identify physical deficits which impact ability to perform ADLs (bathing, care of mouth/teeth, toileting, grooming, dressing, etc.)  - Assess/evaluate cause of self-care deficits   - Assess range of motion  - Assess patient's mobility; develop plan if impaired  - Assess patient's need for assistive devices and provide as appropriate  - Encourage maximum independence but intervene and supervise when necessary  - Involve family in performance of ADLs  - Assess for home care needs following discharge   - Consider OT consult to assist with ADL evaluation and planning for discharge  - Provide patient education as appropriate  Outcome: Progressing  Goal: Maintains/Returns to pre admission functional level  Description: INTERVENTIONS:  - Perform BMAT or MOVE assessment daily.   - Set and communicate daily mobility goal to care team and patient/family/caregiver. - Collaborate with rehabilitation services on mobility goals if consulted  - Perform Range of Motion  times a day. - Reposition patient every  hours. - Dangle patient  times a day  - Stand patient  times a day  - Ambulate patient  times a day  - Out of bed to chair  times a day   - Out of bed for meals times a day  - Out of bed for toileting  - Record patient progress and toleration of activity level   Outcome: Progressing     Problem: Nutrition/Hydration-ADULT  Goal: Nutrient/Hydration intake appropriate for improving, restoring or maintaining nutritional needs  Description: Monitor and assess patient's nutrition/hydration status for malnutrition. Collaborate with interdisciplinary team and initiate plan and interventions as ordered. Monitor patient's weight and dietary intake as ordered or per policy. Utilize nutrition screening tool and intervene as necessary. Determine patient's food preferences and provide high-protein, high-caloric foods as appropriate.      INTERVENTIONS:  - Monitor oral intake, urinary output, labs, and treatment plans  - Assess nutrition and hydration status and recommend course of action  - Evaluate amount of meals eaten  - Assist patient with eating if necessary   - Allow adequate time for meals  - Recommend/ encourage appropriate diets, oral nutritional supplements, and vitamin/mineral supplements  - Order, calculate, and assess calorie counts as needed  - Recommend, monitor, and adjust tube feedings and TPN/PPN based on assessed needs  - Assess need for intravenous fluids  - Provide specific nutrition/hydration education as appropriate  - Include patient/family/caregiver in decisions related to nutrition  Outcome: Progressing

## 2023-06-29 NOTE — PROGRESS NOTES
Progress Note - General Surgery   Pearle Roots 67 y.o. female MRN: [id number]  Unit/Bed#: [facility] 930-01 Encounter: [id number]    Assessment:  67 y.o. F with multiple prior abdominal surgeries including open arlene-en-y gastric bypass, presents with perforated ileum and necrotic jejunum 2/2 internal hernia, s/p exploratory laparotomy, resection of J-J anastomosis, segmental ileum resection and abthera placement 6/19, followed by second loop laparotomy, YAMIL, SBR + appendectomy, abthera placement 6/19 for rising pressor requirements. Now s/p takeback ex lap, SBR, creation of anastomosis x 3 with abdominal closure 6/20    Acute blood loss anemia from intraoperative losses and received a total of 5uPRBCs, 2 FFP 6/19. Hgb has remained stable since. UO 1.4L + 7 unmeasured   LUQ LUCY 150 cc serous drainage  RUQ LUCY 25 cc serous drainage      Plan:  Continue post gastrectomy diet + Glucerna supplements   Maintain LUQ and RUQ drains. Pulmonary toilet   Maintain abdominal binder  Continue po amiodarone and eliquis   PT/OT  OOB/Ambulate  DVT ppx        Subjective/Objective     Subjective: No acute events overnight. Wanting to get out of bed into chair this morning. Denies having pain. Says she has not been passing gas or having BMs. Tolerating diet, but eating very little. Objective:     Blood pressure 112/56, pulse 74, temperature 98 °F (36.7 °C), temperature source Oral, resp. rate 18, height 5' 3" (1.6 m), weight (!) 144 kg (316 lb 6.4 oz), SpO2 94 %. ,Body mass index is 56.05 kg/m².       Intake/Output Summary (Last 24 hours) at 6/29/2023 1527  Last data filed at 6/29/2023 0500  Gross per 24 hour   Intake --   Output 1665 ml   Net -1665 ml       Invasive Devices     Peripherally Inserted Central Catheter Line  Duration           PICC Line 32/92/48 Left Basilic 5 days          Drain  Duration           Closed/Suction Drain Lateral;Left LUQ 19 Fr. 8 days    Closed/Suction Drain Lateral;Right RUQ Bulb 19 Fr. 8 days External Urinary Catheter <1 day                Physical Exam:  General: No acute distress  Neuro: alert and oriented  HEENT: moist mucous membranes  CV: Well perfused, regular rate and rhythm  Lungs: Normal work of breathing, no increased respiratory effort  Abdomen: Soft, non-tender, non-distended. Incision clean, dry and intact.  LUCY drains with SS output  Extremities: No edema, clubbing or cyanosis  Skin: Warm, dry        Lab, Imaging and other studies:  CBC:   Lab Results   Component Value Date    WBC 11.34 (H) 06/28/2023    HGB 8.0 (L) 06/28/2023    HCT 26.2 (L) 06/28/2023    MCV 88 06/28/2023     06/28/2023    RBC 2.99 (L) 06/28/2023    MCH 26.8 06/28/2023    MCHC 30.5 (L) 06/28/2023    RDW 17.2 (H) 06/28/2023    MPV 10.2 06/28/2023   , CMP:   Lab Results   Component Value Date    SODIUM 138 06/28/2023    K 4.2 06/28/2023     (H) 06/28/2023    CO2 26 06/28/2023    BUN 21 06/28/2023    CREATININE 0.59 (L) 06/28/2023    CALCIUM 7.9 (L) 06/28/2023    EGFR 91 06/28/2023   , Coagulation:   No results found for: "PT", "INR", "APTT"  VTE Pharmacologic Prophylaxis: Lovenox   VTE Mechanical Prophylaxis: sequential compression device

## 2023-06-30 LAB
ANION GAP SERPL CALCULATED.3IONS-SCNC: 5 MMOL/L
BASOPHILS # BLD MANUAL: 0 THOUSAND/UL (ref 0–0.1)
BASOPHILS NFR MAR MANUAL: 0 % (ref 0–1)
BUN SERPL-MCNC: 17 MG/DL (ref 5–25)
CALCIUM SERPL-MCNC: 7.9 MG/DL (ref 8.3–10.1)
CHLORIDE SERPL-SCNC: 106 MMOL/L (ref 96–108)
CO2 SERPL-SCNC: 27 MMOL/L (ref 21–32)
CREAT SERPL-MCNC: 0.76 MG/DL (ref 0.6–1.3)
EOSINOPHIL # BLD MANUAL: 0 THOUSAND/UL (ref 0–0.4)
EOSINOPHIL NFR BLD MANUAL: 0 % (ref 0–6)
ERYTHROCYTE [DISTWIDTH] IN BLOOD BY AUTOMATED COUNT: 17.3 % (ref 11.6–15.1)
GFR SERPL CREATININE-BSD FRML MDRD: 78 ML/MIN/1.73SQ M
GLUCOSE SERPL-MCNC: 113 MG/DL (ref 65–140)
GLUCOSE SERPL-MCNC: 128 MG/DL (ref 65–140)
GLUCOSE SERPL-MCNC: 172 MG/DL (ref 65–140)
GLUCOSE SERPL-MCNC: 91 MG/DL (ref 65–140)
GLUCOSE SERPL-MCNC: 92 MG/DL (ref 65–140)
HCT VFR BLD AUTO: 27.6 % (ref 34.8–46.1)
HGB BLD-MCNC: 8.3 G/DL (ref 11.5–15.4)
LYMPHOCYTES # BLD AUTO: 0.36 THOUSAND/UL (ref 0.6–4.47)
LYMPHOCYTES # BLD AUTO: 3 % (ref 14–44)
MAGNESIUM SERPL-MCNC: 1.9 MG/DL (ref 1.6–2.6)
MCH RBC QN AUTO: 26.9 PG (ref 26.8–34.3)
MCHC RBC AUTO-ENTMCNC: 30.1 G/DL (ref 31.4–37.4)
MCV RBC AUTO: 90 FL (ref 82–98)
MONOCYTES # BLD AUTO: 0.73 THOUSAND/UL (ref 0–1.22)
MONOCYTES NFR BLD: 6 % (ref 4–12)
NEUTROPHILS # BLD MANUAL: 10.94 THOUSAND/UL (ref 1.85–7.62)
NEUTS BAND NFR BLD MANUAL: 1 % (ref 0–8)
NEUTS SEG NFR BLD AUTO: 89 % (ref 43–75)
PHOSPHATE SERPL-MCNC: 3.2 MG/DL (ref 2.3–4.1)
PLATELET # BLD AUTO: 496 THOUSANDS/UL (ref 149–390)
PLATELET BLD QL SMEAR: ABNORMAL
PMV BLD AUTO: 10.1 FL (ref 8.9–12.7)
POLYCHROMASIA BLD QL SMEAR: PRESENT
POTASSIUM SERPL-SCNC: 4.2 MMOL/L (ref 3.5–5.3)
RBC # BLD AUTO: 3.08 MILLION/UL (ref 3.81–5.12)
RBC MORPH BLD: PRESENT
SODIUM SERPL-SCNC: 138 MMOL/L (ref 135–147)
VARIANT LYMPHS # BLD AUTO: 1 %
WBC # BLD AUTO: 12.16 THOUSAND/UL (ref 4.31–10.16)

## 2023-06-30 PROCEDURE — 97530 THERAPEUTIC ACTIVITIES: CPT

## 2023-06-30 PROCEDURE — 85007 BL SMEAR W/DIFF WBC COUNT: CPT | Performed by: SURGERY

## 2023-06-30 PROCEDURE — 99024 POSTOP FOLLOW-UP VISIT: CPT | Performed by: SURGERY

## 2023-06-30 PROCEDURE — 82948 REAGENT STRIP/BLOOD GLUCOSE: CPT

## 2023-06-30 PROCEDURE — 84100 ASSAY OF PHOSPHORUS: CPT | Performed by: SURGERY

## 2023-06-30 PROCEDURE — 83735 ASSAY OF MAGNESIUM: CPT | Performed by: SURGERY

## 2023-06-30 PROCEDURE — 80048 BASIC METABOLIC PNL TOTAL CA: CPT | Performed by: SURGERY

## 2023-06-30 PROCEDURE — 85027 COMPLETE CBC AUTOMATED: CPT | Performed by: SURGERY

## 2023-06-30 RX ADMIN — MELATONIN TAB 3 MG 6 MG: 3 TAB at 21:13

## 2023-06-30 RX ADMIN — INSULIN LISPRO 4 UNITS: 100 INJECTION, SOLUTION INTRAVENOUS; SUBCUTANEOUS at 11:34

## 2023-06-30 RX ADMIN — QUETIAPINE FUMARATE 12.5 MG: 25 TABLET ORAL at 21:13

## 2023-06-30 RX ADMIN — AMIODARONE HYDROCHLORIDE 200 MG: 200 TABLET ORAL at 08:42

## 2023-06-30 RX ADMIN — PANTOPRAZOLE SODIUM 40 MG: 40 TABLET, DELAYED RELEASE ORAL at 06:29

## 2023-06-30 RX ADMIN — AMIODARONE HYDROCHLORIDE 200 MG: 200 TABLET ORAL at 17:33

## 2023-06-30 RX ADMIN — CHLORHEXIDINE GLUCONATE 15 ML: 1.2 SOLUTION ORAL at 21:13

## 2023-06-30 RX ADMIN — APIXABAN 5 MG: 5 TABLET, FILM COATED ORAL at 08:43

## 2023-06-30 RX ADMIN — LORAZEPAM 1 MG: 1 TABLET ORAL at 21:13

## 2023-06-30 RX ADMIN — AMIODARONE HYDROCHLORIDE 200 MG: 200 TABLET ORAL at 11:33

## 2023-06-30 RX ADMIN — FLUOXETINE 20 MG: 20 CAPSULE ORAL at 21:13

## 2023-06-30 RX ADMIN — DILTIAZEM HYDROCHLORIDE 90 MG: 60 TABLET ORAL at 06:27

## 2023-06-30 RX ADMIN — Medication 12.5 MG: at 21:12

## 2023-06-30 RX ADMIN — CHLORHEXIDINE GLUCONATE 15 ML: 1.2 SOLUTION ORAL at 08:42

## 2023-06-30 RX ADMIN — Medication 12.5 MG: at 08:43

## 2023-06-30 RX ADMIN — INSULIN GLARGINE 15 UNITS: 100 INJECTION, SOLUTION SUBCUTANEOUS at 21:45

## 2023-06-30 RX ADMIN — DILTIAZEM HYDROCHLORIDE 90 MG: 60 TABLET ORAL at 14:46

## 2023-06-30 RX ADMIN — FLUOXETINE 20 MG: 20 CAPSULE ORAL at 08:42

## 2023-06-30 RX ADMIN — DILTIAZEM HYDROCHLORIDE 90 MG: 60 TABLET ORAL at 21:14

## 2023-06-30 RX ADMIN — APIXABAN 5 MG: 5 TABLET, FILM COATED ORAL at 17:33

## 2023-06-30 NOTE — RESTORATIVE TECHNICIAN NOTE
Restorative Technician Note      Patient Name: Celine Yu     Restorative Tech Visit Date: 06/30/23  Note Type: Mobility  Patient Position Upon Consult: Bedside chair  Activity Performed: Stood  Assistive Device: Roller walker  Patient Position at [employer] of Consult: Bedside chair;  All needs within reach; Bed/Chair alarm activated        [employer]

## 2023-06-30 NOTE — CASE MANAGEMENT
Case Management Discharge Planning Note    Patient name Dwayne Guo  Location [facility] 930/[facility] 930-01 MRN [id number]  : 1950 Date 2023       Current Admission Date: 2023  Current Admission Diagnosis:Idiopathic acute pancreatitis without infection or necrosis   Patient Active Problem List    Diagnosis Date Noted   • Idiopathic acute pancreatitis without infection or necrosis 2023   • Primary osteoarthritis of both knees 2023   • Stage 3 chronic kidney disease, unspecified whether stage 3a or 3b CKD ([address]) 2023   • Hypertension    • Morbid obesity with BMI of 50.0-59.9, adult ([address]) 2022   • Unsteady gait 10/30/2022   • Dyspnea on exertion 10/30/2022   • SIRS (systemic inflammatory response syndrome) ([address]) 10/30/2022   • Incontinence 10/30/2022   • GILDARDO (obstructive sleep apnea) 10/30/2022   • Vitamin D deficiency 2021   • Reactive hypoglycemia 2020   • Osteoporosis 2020   • Hyperparathyroidism ([address]) 2020   • Hashimoto's thyroiditis 2020   • Early menopause occurring in patient age younger than 39 years 2020   • S/P bariatric surgery 2020   • Myofascial pain syndrome 10/21/2019   • Status post partial thyroidectomy 2019   • Cervical spondylosis without myelopathy 2019   • Arthropathy of cervical facet joint 2019   • Occipital neuralgia of right side 2019   • Chest wall tenderness 2019   • Chronic heart failure ([address]) 2019   • Permanent atrial fibrillation ([address]) 2019   • Myalgia 2019   • Primary osteoarthritis of left knee 10/23/2018   • Primary osteoarthritis of right knee 10/23/2018   • Chronic pain of left knee 10/23/2018   • Malignant tumor of right kidney parenchyma ([address]) 2016   • Closed fracture of right distal radius and ulna 2016      LOS (days): 12  Geometric Mean LOS (GMLOS) (days): 9.60  Days to GMLOS:-2     OBJECTIVE:  Risk of Unplanned Readmission Score: 25.5 Current admission status: Inpatient   Preferred Pharmacy:   [facility] [address] [address]  Phone: [phone] Fax: [phone]    [address] [place] [address], [address] [facility]  [address] [facility] [address]  Phone: [phone] Fax: [phone]    Primary Care Provider: Fatou Lama DO    Primary Insurance: MEDICARE  Secondary Insurance: Ame Bright DETAILS:        Additional Comments: CM met with pt, pt's daughter and son to review preferred facilities for STR referral. Pt's son and daughter agreeable to CM sending a referral to [facility], [facility], [address] [facility]. Per provider, pt possibly clear over the weekend. CM to follow.

## 2023-06-30 NOTE — QUICK NOTE
LUCY drain removal Note:  RUQ LUCY removed at bedside today. Patient tolerated well with no immediate complications. LUCY drain was taken off suction suture line was cut.  LUCY drain was removed at that time. Patient tolerated well with no immediate complications. Patient has only 1 remaining drain in the left upper quadrant.

## 2023-06-30 NOTE — RESTORATIVE TECHNICIAN NOTE
Restorative Technician Note      Patient Name: Nat Palmer     Restorative Tech Visit Date: 06/30/23  Note Type: Mobility  Patient Position Upon Consult: Bedside chair  Activity Performed: Stood  Assistive Device: Roller walker; Other (Comment) (Ax2 to stand)  Patient Position at End of Consult: Bedside chair;  All needs within [facility]

## 2023-06-30 NOTE — PLAN OF CARE
Problem: Prexisting or High Potential for Compromised Skin Integrity  Goal: Skin integrity is maintained or improved  Description: INTERVENTIONS:  - Identify patients at risk for skin breakdown  - Assess and monitor skin integrity  - Assess and monitor nutrition and hydration status  - Monitor labs   - Assess for incontinence   - Turn and reposition patient  - Assist with mobility/ambulation  - Relieve pressure over bony prominences  - Avoid friction and shearing  - Provide appropriate hygiene as needed including keeping skin clean and dry  - Evaluate need for skin moisturizer/barrier cream  - Collaborate with interdisciplinary team   - Patient/family teaching  - Consider wound care consult   Outcome: Progressing     Problem: MOBILITY - ADULT  Goal: Maintain or return to baseline ADL function  Description: INTERVENTIONS:  -  Assess patient's ability to carry out ADLs; assess patient's baseline for ADL function and identify physical deficits which impact ability to perform ADLs (bathing, care of mouth/teeth, toileting, grooming, dressing, etc.)  - Assess/evaluate cause of self-care deficits   - Assess range of motion  - Assess patient's mobility; develop plan if impaired  - Assess patient's need for assistive devices and provide as appropriate  - Encourage maximum independence but intervene and supervise when necessary  - Involve family in performance of ADLs  - Assess for home care needs following discharge   - Consider OT consult to assist with ADL evaluation and planning for discharge  - Provide patient education as appropriate  Outcome: Progressing  Goal: Maintains/Returns to pre admission functional level  Description: INTERVENTIONS:  - Perform BMAT or MOVE assessment daily.   - Set and communicate daily mobility goal to care team and patient/family/caregiver. - Collaborate with rehabilitation services on mobility goals if consulted  - Perform Range of Motion  times a day.   - Reposition patient every hours.  - Dangle patient  times a day  - Stand patient  times a day  - Ambulate patient times a day  - Out of bed to chair  times a day   - Out of bed for meals  times a day  - Out of bed for toileting  - Record patient progress and toleration of activity level   Outcome: Progressing

## 2023-06-30 NOTE — PLAN OF CARE
Problem: PHYSICAL THERAPY ADULT  Goal: Performs mobility at highest level of function for planned discharge setting. See evaluation for individualized goals. Description: Treatment/Interventions: LE strengthening/ROM, Functional transfer training, Therapeutic exercise, Endurance training, Cognitive reorientation, Patient/family training, Equipment eval/education, Bed mobility, Gait training, Spoke to nursing, Spoke to case management, OT          See flowsheet documentation for full assessment, interventions and recommendations. Outcome: Not Progressing  Note: Prognosis: Fair  Problem List: Decreased strength, Decreased endurance, Impaired balance, Decreased mobility, Pain, Decreased cognition, Decreased safety awareness, Impaired judgement  Assessment: Patient was received seated in bedside recliner . Patient was agreeable to therapy services today. PT session focused on functional mobility today in order to improve functional mobility and independence. Functional mobility was performed as described above. Pt required increased assist today to complete STS transfer. Pt was cleaned in standing then was able to take several steps to bed where she quickly sat down. Pt was educated about the safety/benefits of slow transfers to sitting vs fast transfers. Pt reports understanding. Pt was then led through several exercises in sitting then was assisted down to supine. Pt deferred further functional mobility today secondary to fatigue. Patient will benefit from continued PT services while in hospital in order to address remaining limitations. The patients AM-PAC Basic Mobility Inpatient Short From Raw Score is 7 . A Raw score of 7 suggests that the patient may benefit from discharge to post acute rehab. PT recommendation at this time is for post acute rehab. Please also refer to the recommendation of the Physical Therapist for safe discharge planning.   Barriers to Discharge: Decreased caregiver support, Inaccessible home environment     PT Discharge Recommendation: Post acute rehabilitation services    See flowsheet documentation for full assessment.

## 2023-06-30 NOTE — RESTORATIVE TECHNICIAN NOTE
Restorative Technician Note      Patient Name: Lalito Salazar     Restorative Tech Visit Date: 06/30/23  Note Type: Mobility  Patient Position Upon Consult: Supine  Activity Performed: Ambulated  Assistive Device: Roller walker  Patient Position at End of Consult: Bedside chair;  All needs within reach; Bed/Chair alarm activated      Juan Diego Emerson

## 2023-06-30 NOTE — PROGRESS NOTES
Calorie count sheets are hung in room but not completed, family provided specifics on what PT ate yesterday and today, 6/29 B: skipped L: 50% of turkey, 100% of mashed potatoes D: 100% of ice cream, 100% of lemon ice, provides estimated 500cal, 16g pro, today for B ate banana and glucerna shake provides 330cal, 11g pro, didn't want to eat L, will continue to monitor calorie count. Obtained food preferences for tonights dinner and tomorrow's meals and entered in [address], added additional glucerna shake will recieve TID, continue with magic cup for D. Family allowed to bring in food to help maximize pro, qi intake.

## 2023-06-30 NOTE — PHYSICAL THERAPY NOTE
PHYSICAL THERAPY NOTE          Patient Name: Elizabeth Mcgovern  ERTYQ'O Date: 6/30/2023 06/30/23 1348   PT Last Visit   PT Visit Date 06/30/23   Note Type   Note Type Treatment   Pain Assessment   Pain Assessment Tool 0-10   Pain Score No Pain   Restrictions/Precautions   Weight Bearing Precautions Per Order No   Other Precautions Cognitive; Chair Alarm; Bed Alarm; Fall Risk   General   Chart Reviewed Yes   Response to Previous Treatment Patient with no complaints from previous session. Family/Caregiver Present Yes  (daughter)   Cognition   Overall Cognitive Status Impaired   Arousal/Participation Responsive; Cooperative   Attention Attends with cues to redirect   Orientation Level Oriented to person;Oriented to place; Disoriented to situation;Disoriented to time   Memory Decreased recall of recent events;Decreased short term memory   Following Commands Follows one step commands with increased time or repetition   Subjective   Subjective pt pleasant and cooperative throughout therapy session. pt received seated in bedside recliner   Bed Mobility   Sit to Supine 2  Maximal assistance   Additional items Assist x 2; Increased time required;LE management;Verbal cues   Transfers   Sit to Stand 2  Maximal assistance   Additional items Assist x 2; Increased time required;Verbal cues   Stand to Sit 2  Maximal assistance   Additional items Assist x 2; Increased time required;Verbal cues   Additional Comments transfers w RW   Ambulation/Elevation   Gait pattern Excessively slow; Short stride; Step to; Foward flexed; Steppage; Wide TACO; Improper Weight shift   Gait Assistance 3  Moderate assist   Additional items Assist x 2;Verbal cues; Tactile cues   Assistive Device Rolling walker   Distance 2' to bed from chair   Stair Management Assistance Not tested   Balance   Static Sitting Fair   Dynamic Sitting Fair -   Static Standing Poor   Dynamic Standing Poor   Ambulatory Poor   Endurance Deficit   Endurance Deficit Yes   Endurance Deficit Description generalized weakness, decreased exercise tolerance   Activity Tolerance   Activity Tolerance Patient limited by fatigue   Medical Staff Made Aware Restorative   Nurse Made Aware RN cleared and updated   Exercises   Knee AROM Long Arc Quad Sitting;10 reps;AROM; Bilateral   Ankle Pumps Supine;10 reps;AROM; Bilateral   Assessment   Prognosis Fair   Problem List Decreased strength;Decreased endurance; Impaired balance;Decreased mobility;Pain;Decreased cognition;Decreased safety awareness; Impaired judgement   Assessment Patient was received seated in bedside recliner . Patient was agreeable to therapy services today. PT session focused on functional mobility today in order to improve functional mobility and independence. Functional mobility was performed as described above. Pt required increased assist today to complete STS transfer. Pt was cleaned in standing then was able to take several steps to bed where she quickly sat down. Pt was educated about the safety/benefits of slow transfers to sitting vs fast transfers. Pt reports understanding. Pt was then led through several exercises in sitting then was assisted down to supine. Pt deferred further functional mobility today secondary to fatigue. Patient will benefit from continued PT services while in hospital in order to address remaining limitations. The patients AM-PAC Basic Mobility Inpatient Short From Raw Score is 7 . A Raw score of 7 suggests that the patient may benefit from discharge to post acute rehab. PT recommendation at this time is for post acute rehab. Please also refer to the recommendation of the Physical Therapist for safe discharge planning. Barriers to Discharge Decreased caregiver support; Inaccessible home environment   Goals   Patient Goals to take a nap   PT Treatment Day 3   Plan   Treatment/Interventions ADL retraining;Functional transfer training;LE strengthening/ROM; Elevations; Therapeutic exercise; Endurance training;Bed mobility;Gait training   Progress Slow progress, decreased activity tolerance   PT Frequency 3-5x/wk   Recommendation   PT Discharge Recommendation Post acute rehabilitation services   AM-PAC Basic Mobility Inpatient   Turning in Flat Bed Without Bedrails 2   Lying on Back to Sitting on Edge of Flat Bed Without Bedrails 1   Moving Bed to Chair 1   Standing Up From Chair Using Arms 1   Walk in Room 1   Climb 3-5 Stairs With Railing 1   Basic Mobility Inpatient Raw Score 7   Highest Level Of Mobility   JH-HLM Goal 2: Bed activities/Dependent transfer   JH-HLM Achieved 4: Move to chair/commode   Education   Education Provided Mobility training;Assistive device   Patient Reinforcement needed   End of Consult   Patient Position at End of Consult Supine; All needs within reach       Jordon Becker PT, DPT

## 2023-06-30 NOTE — PROGRESS NOTES
Progress Note - General Surgery  : UBALDO Red Surgery Resident on [facility] 67 y.o. female MRN: [id number]  Unit/Bed#: [facility] 930-01 Encounter: [id number]      Assessment:  67 y.o. female with hx RnY GB who presented with bowel ischemia/perforation from internal hernia, required SBR and multiple operations culminating in anastomosis x3 (in continuity) / abdominal closure 6/20      Plan:  Post -gastrectomy diet + GLucerna  Monitor LUCY drains x2, possibly remove one today  PT/OT, pulm toilet  Await return of bowel function  Amiodarone/Eliquis for AF  Insulin        Subjective: No acute complaints, tolerating PO, + BM      Objective:     Physical Exam:  GEN: NAD   Ab: Soft, NT/ND, CDI, LUCY drains both SS  Lung: Normal effort   CV: RRR   Extrem: No CCE   Neuro: A+Ox3       I/O       06/28 0701  06/29 0700 06/29 0701  06/30 0700    Urine (mL/kg/hr) 1350 (0.4) 1400 (0.4)    Emesis/NG output  0    Drains 215 40    Stool  0    Total Output 1565 1440    Net -1565 -1440          Unmeasured Urine Occurrence 7 x 1 x    Unmeasured Stool Occurrence  3 x    Unmeasured Emesis Occurrence  0 x          Lab, Imaging and other studies: I have personally reviewed pertinent reports.   , CBC with diff:   Lab Results   Component Value Date    WBC 13.63 (H) 06/29/2023    HGB 8.3 (L) 06/29/2023    HCT 27.6 (L) 06/29/2023    MCV 89 06/29/2023     (H) 06/29/2023    RBC 3.11 (L) 06/29/2023    MCH 26.7 (L) 06/29/2023    MCHC 30.1 (L) 06/29/2023    RDW 17.3 (H) 06/29/2023    MPV 10.3 06/29/2023   , BMP/CMP:   Lab Results   Component Value Date    SODIUM 138 06/29/2023    K 4.1 06/29/2023     06/29/2023    CO2 28 06/29/2023    BUN 20 06/29/2023    CREATININE 0.75 06/29/2023    CALCIUM 8.1 (L) 06/29/2023    EGFR 79 06/29/2023         VTE Pharmacologic Prophylaxis: Cynthia Lundberg MD  6/30/2023 1:45 AM

## 2023-07-01 LAB
ANION GAP SERPL CALCULATED.3IONS-SCNC: 5 MMOL/L
BUN SERPL-MCNC: 19 MG/DL (ref 5–25)
CALCIUM SERPL-MCNC: 7.7 MG/DL (ref 8.3–10.1)
CHLORIDE SERPL-SCNC: 108 MMOL/L (ref 96–108)
CO2 SERPL-SCNC: 27 MMOL/L (ref 21–32)
CREAT SERPL-MCNC: 0.76 MG/DL (ref 0.6–1.3)
ERYTHROCYTE [DISTWIDTH] IN BLOOD BY AUTOMATED COUNT: 17.3 % (ref 11.6–15.1)
GFR SERPL CREATININE-BSD FRML MDRD: 78 ML/MIN/1.73SQ M
GLUCOSE SERPL-MCNC: 110 MG/DL (ref 65–140)
GLUCOSE SERPL-MCNC: 128 MG/DL (ref 65–140)
GLUCOSE SERPL-MCNC: 133 MG/DL (ref 65–140)
GLUCOSE SERPL-MCNC: 140 MG/DL (ref 65–140)
GLUCOSE SERPL-MCNC: 167 MG/DL (ref 65–140)
GLUCOSE SERPL-MCNC: 95 MG/DL (ref 65–140)
GLUCOSE SERPL-MCNC: 96 MG/DL (ref 65–140)
HCT VFR BLD AUTO: 25.6 % (ref 34.8–46.1)
HGB BLD-MCNC: 7.7 G/DL (ref 11.5–15.4)
MAGNESIUM SERPL-MCNC: 1.9 MG/DL (ref 1.6–2.6)
MCH RBC QN AUTO: 26.9 PG (ref 26.8–34.3)
MCHC RBC AUTO-ENTMCNC: 30.1 G/DL (ref 31.4–37.4)
MCV RBC AUTO: 90 FL (ref 82–98)
NRBC BLD AUTO-RTO: 0 /100 WBCS
PHOSPHATE SERPL-MCNC: 3.3 MG/DL (ref 2.3–4.1)
PLATELET # BLD AUTO: 515 THOUSANDS/UL (ref 149–390)
PMV BLD AUTO: 10.1 FL (ref 8.9–12.7)
POTASSIUM SERPL-SCNC: 3.7 MMOL/L (ref 3.5–5.3)
RBC # BLD AUTO: 2.86 MILLION/UL (ref 3.81–5.12)
SODIUM SERPL-SCNC: 140 MMOL/L (ref 135–147)
WBC # BLD AUTO: 10.27 THOUSAND/UL (ref 4.31–10.16)

## 2023-07-01 PROCEDURE — 84100 ASSAY OF PHOSPHORUS: CPT

## 2023-07-01 PROCEDURE — 99024 POSTOP FOLLOW-UP VISIT: CPT | Performed by: SURGERY

## 2023-07-01 PROCEDURE — 85027 COMPLETE CBC AUTOMATED: CPT

## 2023-07-01 PROCEDURE — 83735 ASSAY OF MAGNESIUM: CPT

## 2023-07-01 PROCEDURE — 82948 REAGENT STRIP/BLOOD GLUCOSE: CPT

## 2023-07-01 PROCEDURE — 80048 BASIC METABOLIC PNL TOTAL CA: CPT

## 2023-07-01 RX ORDER — INSULIN LISPRO 100 [IU]/ML
4-20 INJECTION, SOLUTION INTRAVENOUS; SUBCUTANEOUS
Status: DISCONTINUED | OUTPATIENT
Start: 2023-07-01 | End: 2023-07-04 | Stop reason: HOSPADM

## 2023-07-01 RX ADMIN — APIXABAN 5 MG: 5 TABLET, FILM COATED ORAL at 16:40

## 2023-07-01 RX ADMIN — DILTIAZEM HYDROCHLORIDE 90 MG: 60 TABLET ORAL at 05:57

## 2023-07-01 RX ADMIN — DILTIAZEM HYDROCHLORIDE 90 MG: 60 TABLET ORAL at 22:47

## 2023-07-01 RX ADMIN — CHLORHEXIDINE GLUCONATE 15 ML: 1.2 SOLUTION ORAL at 08:44

## 2023-07-01 RX ADMIN — CHLORHEXIDINE GLUCONATE 15 ML: 1.2 SOLUTION ORAL at 22:52

## 2023-07-01 RX ADMIN — Medication 12.5 MG: at 22:52

## 2023-07-01 RX ADMIN — QUETIAPINE FUMARATE 12.5 MG: 25 TABLET ORAL at 22:44

## 2023-07-01 RX ADMIN — INSULIN LISPRO 4 UNITS: 100 INJECTION, SOLUTION INTRAVENOUS; SUBCUTANEOUS at 08:45

## 2023-07-01 RX ADMIN — APIXABAN 5 MG: 5 TABLET, FILM COATED ORAL at 08:44

## 2023-07-01 RX ADMIN — Medication 12.5 MG: at 08:44

## 2023-07-01 RX ADMIN — Medication 2.5 MG: at 13:22

## 2023-07-01 RX ADMIN — LORAZEPAM 1 MG: 1 TABLET ORAL at 22:44

## 2023-07-01 RX ADMIN — MELATONIN TAB 3 MG 6 MG: 3 TAB at 22:44

## 2023-07-01 RX ADMIN — Medication 2.5 MG: at 22:44

## 2023-07-01 RX ADMIN — INSULIN GLARGINE 15 UNITS: 100 INJECTION, SOLUTION SUBCUTANEOUS at 22:45

## 2023-07-01 RX ADMIN — FLUOXETINE 20 MG: 20 CAPSULE ORAL at 08:44

## 2023-07-01 RX ADMIN — PANTOPRAZOLE SODIUM 40 MG: 40 TABLET, DELAYED RELEASE ORAL at 06:01

## 2023-07-01 RX ADMIN — FLUOXETINE 20 MG: 20 CAPSULE ORAL at 22:44

## 2023-07-01 RX ADMIN — DILTIAZEM HYDROCHLORIDE 90 MG: 60 TABLET ORAL at 13:24

## 2023-07-01 RX ADMIN — AMIODARONE HYDROCHLORIDE 200 MG: 200 TABLET ORAL at 16:40

## 2023-07-01 RX ADMIN — AMIODARONE HYDROCHLORIDE 200 MG: 200 TABLET ORAL at 08:43

## 2023-07-01 RX ADMIN — AMIODARONE HYDROCHLORIDE 200 MG: 200 TABLET ORAL at 12:29

## 2023-07-01 NOTE — PROGRESS NOTES
Progress Note - General Surgery  : UBALDO Red Surgery Resident on Alberta Thao 67 y.o. female MRN: [id number]  Unit/Bed#: [facility] 930-01 Encounter: [id number]      Assessment:  67 y.o. female with hx RnY GB who presented with bowel ischemia/perforation from internal hernia, required SBR and multiple operations culminating in anastomosis x3 (in continuity) / abdominal closure 6/20      G SC: 65 cc serosanguineous    WBC 10.2 from 12.6  Hemoglobin: 7.7 from 8.3    Plan:  Post -gastrectomy diet + Glucerna  Follow-up calorie count  Number quadrant LUCY; monitor output and character  PT/OT, pulm toilet  Await return of bowel function  Amiodarone/Eliquis for AF  Insulin        Subjective: No acute complaints, tolerating PO with improving intake, + bowel function, voiding with pure wick in place likely inaccurate and likely inaccurate measurements      Objective:     Physical Exam:  GEN: NAD   Ab: Soft, NT/ND, mild serous drainage from the inferior aspect of midline incision, GBS above  Lung: Normal effort   CV: A-fib, nontachycardic  Extrem: No CCE   Neuro: A+Ox3       I/O       06/28 0701  06/29 0700 06/29 0701 06/30 0700    Urine (mL/kg/hr) 1350 (0.4) 1400 (0.4)    Emesis/NG output  0    Drains 215 40    Stool  0    Total Output 1565 1440    Net -1565 -1440          Unmeasured Urine Occurrence 7 x 1 x    Unmeasured Stool Occurrence  3 x    Unmeasured Emesis Occurrence  0 x          Lab, Imaging and other studies: I have personally reviewed pertinent reports.   , CBC with diff:   Lab Results   Component Value Date    WBC 10.27 (H) 07/01/2023    HGB 7.7 (L) 07/01/2023    HCT 25.6 (L) 07/01/2023    MCV 90 07/01/2023     (H) 07/01/2023    RBC 2.86 (L) 07/01/2023    MCH 26.9 07/01/2023    MCHC 30.1 (L) 07/01/2023    RDW 17.3 (H) 07/01/2023    MPV 10.1 07/01/2023   , BMP/CMP:   Lab Results   Component Value Date    SODIUM 140 07/01/2023    K 3.7 07/01/2023     07/01/2023    CO2 27 07/01/2023    BUN 19 07/01/2023    CREATININE 0.76 07/01/2023    CALCIUM 7.7 (L) 07/01/2023    EGFR 78 07/01/2023         VTE Pharmacologic Prophylaxis: Cynthia Lira MD  7/1/2023 7:14 AM

## 2023-07-01 NOTE — PLAN OF CARE
Problem: Prexisting or High Potential for Compromised Skin Integrity  Goal: Skin integrity is maintained or improved  Description: INTERVENTIONS:  - Identify patients at risk for skin breakdown  - Assess and monitor skin integrity  - Assess and monitor nutrition and hydration status  - Monitor labs   - Assess for incontinence   - Turn and reposition patient  - Assist with mobility/ambulation  - Relieve pressure over bony prominences  - Avoid friction and shearing  - Provide appropriate hygiene as needed including keeping skin clean and dry  - Evaluate need for skin moisturizer/barrier cream  - Collaborate with interdisciplinary team   - Patient/family teaching  - Consider wound care consult   Outcome: Progressing     Problem: MOBILITY - ADULT  Goal: Maintain or return to baseline ADL function  Description: INTERVENTIONS:  -  Assess patient's ability to carry out ADLs; assess patient's baseline for ADL function and identify physical deficits which impact ability to perform ADLs (bathing, care of mouth/teeth, toileting, grooming, dressing, etc.)  - Assess/evaluate cause of self-care deficits   - Assess range of motion  - Assess patient's mobility; develop plan if impaired  - Assess patient's need for assistive devices and provide as appropriate  - Encourage maximum independence but intervene and supervise when necessary  - Involve family in performance of ADLs  - Assess for home care needs following discharge   - Consider OT consult to assist with ADL evaluation and planning for discharge  - Provide patient education as appropriate  Outcome: Progressing  Goal: Maintains/Returns to pre admission functional level    Problem: MOBILITY - ADULT  Goal: Maintain or return to baseline ADL function  Description: INTERVENTIONS:  -  Assess patient's ability to carry out ADLs; assess patient's baseline for ADL function and identify physical deficits which impact ability to perform ADLs (bathing, care of mouth/teeth, toileting, grooming, dressing, etc.)  - Assess/evaluate cause of self-care deficits   - Assess range of motion  - Assess patient's mobility; develop plan if impaired  - Assess patient's need for assistive devices and provide as appropriate  - Encourage maximum independence but intervene and supervise when necessary  - Involve family in performance of ADLs  - Assess for home care needs following discharge   - Consider OT consult to assist with ADL evaluation and planning for discharge  - Provide patient education as appropriate  Outcome: Progressing  Goal: Maintains/Returns to pre admission functional level  Description: INTERVENTIONS:  - Perform BMAT or MOVE assessment daily.   - Set and communicate daily mobility goal to care team and patient/family/caregiver.    - Collaborate with rehabilitation services on mobility goals if consulted  - Out of bed for toileting  - Record patient progress and toleration of activity level   Outcome: Progressing

## 2023-07-02 LAB
ANION GAP SERPL CALCULATED.3IONS-SCNC: 4 MMOL/L
BUN SERPL-MCNC: 20 MG/DL (ref 5–25)
CALCIUM SERPL-MCNC: 7.6 MG/DL (ref 8.3–10.1)
CHLORIDE SERPL-SCNC: 109 MMOL/L (ref 96–108)
CO2 SERPL-SCNC: 26 MMOL/L (ref 21–32)
CREAT SERPL-MCNC: 0.78 MG/DL (ref 0.6–1.3)
ERYTHROCYTE [DISTWIDTH] IN BLOOD BY AUTOMATED COUNT: 17.2 % (ref 11.6–15.1)
GFR SERPL CREATININE-BSD FRML MDRD: 75 ML/MIN/1.73SQ M
GLUCOSE SERPL-MCNC: 100 MG/DL (ref 65–140)
GLUCOSE SERPL-MCNC: 100 MG/DL (ref 65–140)
GLUCOSE SERPL-MCNC: 153 MG/DL (ref 65–140)
GLUCOSE SERPL-MCNC: 73 MG/DL (ref 65–140)
GLUCOSE SERPL-MCNC: 75 MG/DL (ref 65–140)
GLUCOSE SERPL-MCNC: 82 MG/DL (ref 65–140)
GLUCOSE SERPL-MCNC: 86 MG/DL (ref 65–140)
HCT VFR BLD AUTO: 25.3 % (ref 34.8–46.1)
HGB BLD-MCNC: 7.5 G/DL (ref 11.5–15.4)
MCH RBC QN AUTO: 26.6 PG (ref 26.8–34.3)
MCHC RBC AUTO-ENTMCNC: 29.6 G/DL (ref 31.4–37.4)
MCV RBC AUTO: 90 FL (ref 82–98)
PLATELET # BLD AUTO: 557 THOUSANDS/UL (ref 149–390)
PMV BLD AUTO: 9.9 FL (ref 8.9–12.7)
POTASSIUM SERPL-SCNC: 3.6 MMOL/L (ref 3.5–5.3)
RBC # BLD AUTO: 2.82 MILLION/UL (ref 3.81–5.12)
SODIUM SERPL-SCNC: 139 MMOL/L (ref 135–147)
WBC # BLD AUTO: 9.04 THOUSAND/UL (ref 4.31–10.16)

## 2023-07-02 PROCEDURE — 85027 COMPLETE CBC AUTOMATED: CPT

## 2023-07-02 PROCEDURE — 99024 POSTOP FOLLOW-UP VISIT: CPT | Performed by: SURGERY

## 2023-07-02 PROCEDURE — 80048 BASIC METABOLIC PNL TOTAL CA: CPT

## 2023-07-02 PROCEDURE — 82948 REAGENT STRIP/BLOOD GLUCOSE: CPT

## 2023-07-02 RX ORDER — POTASSIUM CHLORIDE 20MEQ/15ML
20 LIQUID (ML) ORAL ONCE
Status: COMPLETED | OUTPATIENT
Start: 2023-07-02 | End: 2023-07-02

## 2023-07-02 RX ADMIN — FLUOXETINE 20 MG: 20 CAPSULE ORAL at 08:19

## 2023-07-02 RX ADMIN — QUETIAPINE FUMARATE 12.5 MG: 25 TABLET ORAL at 21:08

## 2023-07-02 RX ADMIN — APIXABAN 5 MG: 5 TABLET, FILM COATED ORAL at 08:19

## 2023-07-02 RX ADMIN — DILTIAZEM HYDROCHLORIDE 90 MG: 60 TABLET ORAL at 14:12

## 2023-07-02 RX ADMIN — Medication 12.5 MG: at 08:19

## 2023-07-02 RX ADMIN — LORAZEPAM 1 MG: 1 TABLET ORAL at 21:08

## 2023-07-02 RX ADMIN — APIXABAN 5 MG: 5 TABLET, FILM COATED ORAL at 17:00

## 2023-07-02 RX ADMIN — INSULIN LISPRO 4 UNITS: 100 INJECTION, SOLUTION INTRAVENOUS; SUBCUTANEOUS at 11:35

## 2023-07-02 RX ADMIN — DILTIAZEM HYDROCHLORIDE 90 MG: 60 TABLET ORAL at 05:00

## 2023-07-02 RX ADMIN — AMIODARONE HYDROCHLORIDE 200 MG: 200 TABLET ORAL at 08:19

## 2023-07-02 RX ADMIN — AMIODARONE HYDROCHLORIDE 200 MG: 200 TABLET ORAL at 17:00

## 2023-07-02 RX ADMIN — INSULIN GLARGINE 15 UNITS: 100 INJECTION, SOLUTION SUBCUTANEOUS at 21:08

## 2023-07-02 RX ADMIN — ACETAMINOPHEN 650 MG: 325 TABLET ORAL at 21:07

## 2023-07-02 RX ADMIN — ACETAMINOPHEN 650 MG: 325 TABLET ORAL at 10:48

## 2023-07-02 RX ADMIN — FLUOXETINE 20 MG: 20 CAPSULE ORAL at 21:08

## 2023-07-02 RX ADMIN — AMIODARONE HYDROCHLORIDE 200 MG: 200 TABLET ORAL at 11:34

## 2023-07-02 RX ADMIN — DILTIAZEM HYDROCHLORIDE 90 MG: 60 TABLET ORAL at 21:04

## 2023-07-02 RX ADMIN — MELATONIN TAB 3 MG 6 MG: 3 TAB at 21:08

## 2023-07-02 RX ADMIN — PANTOPRAZOLE SODIUM 40 MG: 40 TABLET, DELAYED RELEASE ORAL at 05:03

## 2023-07-02 RX ADMIN — CHLORHEXIDINE GLUCONATE 15 ML: 1.2 SOLUTION ORAL at 08:25

## 2023-07-02 RX ADMIN — Medication 12.5 MG: at 21:07

## 2023-07-02 RX ADMIN — CHLORHEXIDINE GLUCONATE 15 ML: 1.2 SOLUTION ORAL at 21:07

## 2023-07-02 RX ADMIN — POTASSIUM CHLORIDE 20 MEQ: 1.5 SOLUTION ORAL at 12:38

## 2023-07-02 NOTE — PROGRESS NOTES
Progress Note - General Surgery  : UBALDO Red Surgery Resident on Soniya Po 67 y.o. female MRN: [id number]  Unit/Bed#: [facility] 930-01 Encounter: [id number]      Assessment:  67 y.o. female with hx RnY GB who presented with bowel ischemia/perforation from internal hernia, required SBR and multiple operations culminating in anastomosis x3 (in continuity) / abdominal closure 6/20    UOP 5 unmeasured voids  G MA: 70 cc serosanguineous    WBC 9 from 10.2  Hemoglobin: 7.5 from 7.7 from 8.3    Plan:  Post -gastrectomy diet + Glucerna  Follow-up calorie count  Left upper quadrant LUCY; monitor output and character  PT/OT, pulm toilet  Amiodarone/Eliquis for AF  Insulin  Case management for dispo planning        Subjective: No acute complaints, p.o. intake continues to improve, + bowel function, voiding with pure wick in place likely inaccurate and likely inaccurate measurements      Objective:     Physical Exam:  GEN: NAD   Ab: Soft, NT/ND, LUCY drain in place as above, anterior aspect of midline incision with mild serous drainage   Lung: Normal effort   CV: A-fib, nontachycardic  Extrem: No CCE   Neuro: A+Ox3       I/O       06/28 0701  06/29 0700 06/29 0701 06/30 0700    Urine (mL/kg/hr) 1350 (0.4) 1400 (0.4)    Emesis/NG output  0    Drains 215 40    Stool  0    Total Output 1565 1440    Net -1565 -1440          Unmeasured Urine Occurrence 7 x 1 x    Unmeasured Stool Occurrence  3 x    Unmeasured Emesis Occurrence  0 x          Lab, Imaging and other studies: I have personally reviewed pertinent reports.   , CBC with diff:   Lab Results   Component Value Date    WBC 9.04 07/02/2023    HGB 7.5 (L) 07/02/2023    HCT 25.3 (L) 07/02/2023    MCV 90 07/02/2023     (H) 07/02/2023    RBC 2.82 (L) 07/02/2023    MCH 26.6 (L) 07/02/2023    MCHC 29.6 (L) 07/02/2023    RDW 17.2 (H) 07/02/2023    MPV 9.9 07/02/2023   , BMP/CMP:   Lab Results   Component Value Date    SODIUM 139 07/02/2023    K 3.6 07/02/2023     (H) 07/02/2023    CO2 26 07/02/2023    BUN 20 07/02/2023    CREATININE 0.78 07/02/2023    CALCIUM 7.6 (L) 07/02/2023    EGFR 75 07/02/2023         VTE Pharmacologic Prophylaxis: Cynthia Yañez MD  7/2/2023 7:19 AM

## 2023-07-02 NOTE — PLAN OF CARE
Problem: MOBILITY - ADULT  Goal: Maintain or return to baseline ADL function  Description: INTERVENTIONS:  -  Assess patient's ability to carry out ADLs; assess patient's baseline for ADL function and identify physical deficits which impact ability to perform ADLs (bathing, care of mouth/teeth, toileting, grooming, dressing, etc.)  - Assess/evaluate cause of self-care deficits   - Assess range of motion  - Assess patient's mobility; develop plan if impaired  - Assess patient's need for assistive devices and provide as appropriate  - Encourage maximum independence but intervene and supervise when necessary  - Involve family in performance of ADLs  - Assess for home care needs following discharge   - Consider OT consult to assist with ADL evaluation and planning for discharge  - Provide patient education as appropriate  Outcome: Progressing  Goal: Maintains/Returns to pre admission functional level  Description: INTERVENTIONS:  - Perform BMAT or MOVE assessment daily.   - Set and communicate daily mobility goal to care team and patient/family/caregiver.    - Collaborate with rehabilitation services on mobility goals if consulted  Problem: Prexisting or High Potential for Compromised Skin Integrity  Goal: Skin integrity is maintained or improved  Description: INTERVENTIONS:  - Identify patients at risk for skin breakdown  - Assess and monitor skin integrity  - Assess and monitor nutrition and hydration status  - Monitor labs   - Assess for incontinence   - Turn and reposition patient  - Assist with mobility/ambulation  - Relieve pressure over bony prominences  - Avoid friction and shearing  - Provide appropriate hygiene as needed including keeping skin clean and dry  - Evaluate need for skin moisturizer/barrier cream  - Collaborate with interdisciplinary team   - Patient/family teaching  - Consider wound care consult   Outcome: Progressing     - Out of bed for toileting  - Record patient progress and toleration of activity level   Outcome: Progressing

## 2023-07-03 LAB
ANION GAP SERPL CALCULATED.3IONS-SCNC: 4 MMOL/L
BUN SERPL-MCNC: 19 MG/DL (ref 5–25)
CALCIUM SERPL-MCNC: 7.7 MG/DL (ref 8.3–10.1)
CHLORIDE SERPL-SCNC: 107 MMOL/L (ref 96–108)
CO2 SERPL-SCNC: 26 MMOL/L (ref 21–32)
CREAT SERPL-MCNC: 0.81 MG/DL (ref 0.6–1.3)
FLUAV RNA RESP QL NAA+PROBE: NEGATIVE
FLUBV RNA RESP QL NAA+PROBE: NEGATIVE
GFR SERPL CREATININE-BSD FRML MDRD: 72 ML/MIN/1.73SQ M
GLUCOSE SERPL-MCNC: 101 MG/DL (ref 65–140)
GLUCOSE SERPL-MCNC: 113 MG/DL (ref 65–140)
GLUCOSE SERPL-MCNC: 131 MG/DL (ref 65–140)
GLUCOSE SERPL-MCNC: 69 MG/DL (ref 65–140)
GLUCOSE SERPL-MCNC: 70 MG/DL (ref 65–140)
GLUCOSE SERPL-MCNC: 73 MG/DL (ref 65–140)
GLUCOSE SERPL-MCNC: 73 MG/DL (ref 65–140)
GLUCOSE SERPL-MCNC: 87 MG/DL (ref 65–140)
POTASSIUM SERPL-SCNC: 3.5 MMOL/L (ref 3.5–5.3)
RSV RNA RESP QL NAA+PROBE: NEGATIVE
SARS-COV-2 RNA RESP QL NAA+PROBE: NEGATIVE
SODIUM SERPL-SCNC: 137 MMOL/L (ref 135–147)

## 2023-07-03 PROCEDURE — 82948 REAGENT STRIP/BLOOD GLUCOSE: CPT

## 2023-07-03 PROCEDURE — 0241U HB NFCT DS VIR RESP RNA 4 TRGT: CPT

## 2023-07-03 PROCEDURE — 99024 POSTOP FOLLOW-UP VISIT: CPT | Performed by: SURGERY

## 2023-07-03 PROCEDURE — 80048 BASIC METABOLIC PNL TOTAL CA: CPT | Performed by: STUDENT IN AN ORGANIZED HEALTH CARE EDUCATION/TRAINING PROGRAM

## 2023-07-03 PROCEDURE — 97530 THERAPEUTIC ACTIVITIES: CPT

## 2023-07-03 PROCEDURE — 97110 THERAPEUTIC EXERCISES: CPT

## 2023-07-03 RX ORDER — POTASSIUM CHLORIDE 20MEQ/15ML
40 LIQUID (ML) ORAL ONCE
Status: COMPLETED | OUTPATIENT
Start: 2023-07-03 | End: 2023-07-03

## 2023-07-03 RX ADMIN — MELATONIN TAB 3 MG 6 MG: 3 TAB at 21:40

## 2023-07-03 RX ADMIN — FLUOXETINE 20 MG: 20 CAPSULE ORAL at 21:41

## 2023-07-03 RX ADMIN — Medication 12.5 MG: at 09:44

## 2023-07-03 RX ADMIN — SODIUM CHLORIDE, SODIUM LACTATE, POTASSIUM CHLORIDE, AND CALCIUM CHLORIDE 500 ML: .6; .31; .03; .02 INJECTION, SOLUTION INTRAVENOUS at 21:35

## 2023-07-03 RX ADMIN — APIXABAN 5 MG: 5 TABLET, FILM COATED ORAL at 09:45

## 2023-07-03 RX ADMIN — AMIODARONE HYDROCHLORIDE 200 MG: 200 TABLET ORAL at 17:08

## 2023-07-03 RX ADMIN — ACETAMINOPHEN 650 MG: 325 TABLET ORAL at 17:10

## 2023-07-03 RX ADMIN — CHLORHEXIDINE GLUCONATE 15 ML: 1.2 SOLUTION ORAL at 09:45

## 2023-07-03 RX ADMIN — PANTOPRAZOLE SODIUM 40 MG: 40 TABLET, DELAYED RELEASE ORAL at 05:50

## 2023-07-03 RX ADMIN — AMIODARONE HYDROCHLORIDE 200 MG: 200 TABLET ORAL at 12:22

## 2023-07-03 RX ADMIN — CHLORHEXIDINE GLUCONATE 15 ML: 1.2 SOLUTION ORAL at 21:42

## 2023-07-03 RX ADMIN — DILTIAZEM HYDROCHLORIDE 90 MG: 60 TABLET ORAL at 05:50

## 2023-07-03 RX ADMIN — AMIODARONE HYDROCHLORIDE 200 MG: 200 TABLET ORAL at 09:44

## 2023-07-03 RX ADMIN — QUETIAPINE FUMARATE 12.5 MG: 25 TABLET ORAL at 21:41

## 2023-07-03 RX ADMIN — APIXABAN 5 MG: 5 TABLET, FILM COATED ORAL at 17:09

## 2023-07-03 RX ADMIN — FLUOXETINE 20 MG: 20 CAPSULE ORAL at 09:44

## 2023-07-03 RX ADMIN — LORAZEPAM 1 MG: 1 TABLET ORAL at 21:41

## 2023-07-03 RX ADMIN — ACETAMINOPHEN 650 MG: 325 TABLET ORAL at 10:42

## 2023-07-03 RX ADMIN — POTASSIUM CHLORIDE 40 MEQ: 1.5 SOLUTION ORAL at 09:46

## 2023-07-03 RX ADMIN — DILTIAZEM HYDROCHLORIDE 90 MG: 60 TABLET ORAL at 14:51

## 2023-07-03 NOTE — PLAN OF CARE
Problem: Nutrition/Hydration-ADULT  Goal: Nutrient/Hydration intake appropriate for improving, restoring or maintaining nutritional needs  Description: Monitor and assess patient's nutrition/hydration status for malnutrition. Collaborate with interdisciplinary team and initiate plan and interventions as ordered. Monitor patient's weight and dietary intake as ordered or per policy. Utilize nutrition screening tool and intervene as necessary. Determine patient's food preferences and provide high-protein, high-caloric foods as appropriate.      INTERVENTIONS:  - Monitor oral intake, urinary output, labs, and treatment plans  - Assess nutrition and hydration status and recommend course of action  - Evaluate amount of meals eaten  - Assist patient with eating if necessary   - Allow adequate time for meals  - Recommend/ encourage appropriate diets, oral nutritional supplements, and vitamin/mineral supplements  - Order, calculate, and assess calorie counts as needed  - Recommend, monitor, and adjust tube feedings and TPN/PPN based on assessed needs  - Assess need for intravenous fluids  - Provide specific nutrition/hydration education as appropriate  - Include patient/family/caregiver in decisions related to nutrition  Note: Very slow progress

## 2023-07-03 NOTE — PLAN OF CARE
Problem: MOBILITY - ADULT  Goal: Maintain or return to baseline ADL function  Description: INTERVENTIONS:  -  Assess patient's ability to carry out ADLs; assess patient's baseline for ADL function and identify physical deficits which impact ability to perform ADLs (bathing, care of mouth/teeth, toileting, grooming, dressing, etc.)  - Assess/evaluate cause of self-care deficits   - Assess range of motion  - Assess patient's mobility; develop plan if impaired  - Assess patient's need for assistive devices and provide as appropriate  - Encourage maximum independence but intervene and supervise when necessary  - Involve family in performance of ADLs  - Assess for home care needs following discharge   - Consider OT consult to assist with ADL evaluation and planning for discharge  - Provide patient education as appropriate  Outcome: Progressing  Goal: Maintains/Returns to pre admission functional level  Description: INTERVENTIONS:  - Perform BMAT or MOVE assessment daily.   - Set and communicate daily mobility goal to care team and patient/family/caregiver. - Collaborate with rehabilitation services on mobility goals if consulted  - Out of bed for toileting  - Record patient progress and toleration of activity level   Outcome: Progressing     Problem: Nutrition/Hydration-ADULT  Goal: Nutrient/Hydration intake appropriate for improving, restoring or maintaining nutritional needs  Description: Monitor and assess patient's nutrition/hydration status for malnutrition. Collaborate with interdisciplinary team and initiate plan and interventions as ordered. Monitor patient's weight and dietary intake as ordered or per policy. Utilize nutrition screening tool and intervene as necessary. Determine patient's food preferences and provide high-protein, high-caloric foods as appropriate.      INTERVENTIONS:  - Monitor oral intake, urinary output, labs, and treatment plans  - Assess nutrition and hydration status and recommend course of action  - Evaluate amount of meals eaten  - Assist patient with eating if necessary   - Allow adequate time for meals  - Recommend/ encourage appropriate diets, oral nutritional supplements, and vitamin/mineral supplements  - Order, calculate, and assess calorie counts as needed  - Recommend, monitor, and adjust tube feedings and TPN/PPN based on assessed needs  - Assess need for intravenous fluids  - Provide specific nutrition/hydration education as appropriate  - Include patient/family/caregiver in decisions related to nutrition  Outcome: Progressing     Problem: INFECTION - ADULT  Goal: Absence or prevention of progression during hospitalization  Description: INTERVENTIONS:  - Assess and monitor for signs and symptoms of infection  - Monitor lab/diagnostic results  - Monitor all insertion sites, i.e. indwelling lines, tubes, and drains  - Monitor endotracheal if appropriate and nasal secretions for changes in amount and color  - [place] appropriate cooling/warming therapies per order  - Administer medications as ordered  - Instruct and encourage patient and family to use good hand hygiene technique  - Identify and instruct in appropriate isolation precautions for identified infection/condition  Outcome: Progressing

## 2023-07-03 NOTE — PROGRESS NOTES
-- Patient: Shandra Mcclendon  -- MRN: [id number]  -- Aidin Request ID: [id number]  -- Level of care reserved: [address]  -- Partner Reserved: [address] [facility], [address] [place] [address] [phone]  -- Clinical needs requested: occupational therapy, physical therapy  -- Geography searched: [place] miles around [facility]  -- Start of Service:  -- Request sent: 11:15am EDT on 6/30/2023 by Ariela Bedolla  -- Partner reserved: 10:21am EDT on 7/3/2023 by Ariela Bedolla  -- Choice list shared: 10:20am EDT on 7/3/2023 by Ariela Bedolla

## 2023-07-03 NOTE — CASE MANAGEMENT
Case Management Discharge Planning Note    Patient name Karlos Stnaton  Location [facility] 930/[facility] 930-01 MRN [id number]  : 1950 Date 7/3/2023       Current Admission Date: 2023  Current Admission Diagnosis:Idiopathic acute pancreatitis without infection or necrosis   Patient Active Problem List    Diagnosis Date Noted   • Idiopathic acute pancreatitis without infection or necrosis 2023   • Primary osteoarthritis of both knees 2023   • Stage 3 chronic kidney disease, unspecified whether stage 3a or 3b CKD ([address]) 2023   • Hypertension    • Morbid obesity with BMI of 50.0-59.9, adult ([address]) 2022   • Unsteady gait 10/30/2022   • Dyspnea on exertion 10/30/2022   • SIRS (systemic inflammatory response syndrome) ([address]) 10/30/2022   • Incontinence 10/30/2022   • GILDARDO (obstructive sleep apnea) 10/30/2022   • Vitamin D deficiency 2021   • Reactive hypoglycemia 2020   • Osteoporosis 2020   • Hyperparathyroidism ([address]) 2020   • Hashimoto's thyroiditis 2020   • Early menopause occurring in patient age younger than 39 years 2020   • S/P bariatric surgery 2020   • Myofascial pain syndrome 10/21/2019   • Status post partial thyroidectomy 2019   • Cervical spondylosis without myelopathy 2019   • Arthropathy of cervical facet joint 2019   • Occipital neuralgia of right side 2019   • Chest wall tenderness 2019   • Chronic heart failure ([address]) 2019   • Permanent atrial fibrillation ([address]) 2019   • Myalgia 2019   • Primary osteoarthritis of left knee 10/23/2018   • Primary osteoarthritis of right knee 10/23/2018   • Chronic pain of left knee 10/23/2018   • Malignant tumor of right kidney parenchyma ([address]) 2016   • Closed fracture of right distal radius and ulna 2016      LOS (days): 15  Geometric Mean LOS (GMLOS) (days): 9.60  Days to GMLOS:-5.1     OBJECTIVE:  Risk of Unplanned Readmission Score: 26.81 Current admission status: Inpatient   Preferred Pharmacy:   [facility] [address] MARIAH REDDY [address] [facility]  [address] [address]  Phone: [phone] Fax: [phone]    [address] [facility]  [address]  [facility] [address]  Phone: [phone] Fax: [phone]    Primary Care Provider: Feng Elias DO    Primary Insurance: MEDICARE  Secondary Insurance: Urbano Diez    DISCHARGE DETAILS:    Discharge planning discussed with[de-identified] Patient's son, Marlen Kang of Choice: Yes     CM contacted family/caregiver?: Yes  Were Treatment Team discharge recommendations reviewed with patient/caregiver?: Yes  Did patient/caregiver verbalize understanding of patient care needs?: Yes  Were patient/caregiver advised of the risks associated with not following Treatment Team discharge recommendations?: Yes    Contacts  Patient Contacts: Jeffry Lopez  Relationship to Patient[de-identified] Family  Contact Method: Phone  Phone Number: [phone]  Reason/Outcome: Continuity of Care, Emergency Contact, Discharge Planning, Referral    Requested [facility]         Is the patient interested in [facility] at discharge?: No    DME Referral Provided  Referral made for DME?: No    Other Referral/Resources/Interventions Provided:  Interventions: Short Term Rehab  Referral Comments: Baltazar BLOUNT         Treatment Team Recommendation: Short Term Rehab  Discharge Destination Plan[de-identified] Short Term Rehab           IMM Given (Date):: 07/03/23 (3:22pm)  IMM Given to[de-identified] Family  Family notified[de-identified] Jeffry Lopez - pt's son via phone       Additional Comments: Per provider, pt is medically clear for discharge and will need to follow-up OP for drain at 9:45am. CM messaged with [facility] via [employer] to see if facility can accommodate or if family would. [facility] reported that family can transport depending on the safety, but that that will be determined once pt is on the unit. CM notified provider of this and called pt's son regarding plan. Pt's son understanding of this and agreeable. CM requested transport for tomorrow at 11:00am - will follow for confirmation. COVID test ordered and in process.       Accepting Facility Name, [facility] : [place] [address]  Receiving Facility/Agency Phone Number: [phone]  Facility/Agency Fax Number: [phone]

## 2023-07-03 NOTE — PHYSICAL THERAPY NOTE
PHYSICAL THERAPY NOTE          Patient Name: Artem Almaraz  TZYRP'C Date: 7/3/2023       07/03/23 1400   PT Last Visit   PT Visit Date 07/03/23   Note Type   Note Type Treatment   Pain Assessment   Pain Assessment Tool 0-10   Pain Score No Pain   Restrictions/Precautions   Weight Bearing Precautions Per Order No   Other Precautions Chair Alarm; Bed Alarm; Fall Risk   General   Chart Reviewed Yes   Response to Previous Treatment Patient with no complaints from previous session. Family/Caregiver Present Yes  (daughter)   Cognition   Overall Cognitive Status Impaired   Arousal/Participation Responsive; Cooperative   Attention Attends with cues to redirect   Orientation Level Disoriented to situation   Memory Decreased recall of recent events;Decreased short term memory   Following Commands Follows one step commands with increased time or repetition   Subjective   Subjective pt pleasant and cooperative throughout therapy session. pt received supine in bed   Bed Mobility   Supine to Sit 2  Maximal assistance   Additional items Assist x 2; Increased time required   Sit to Supine 2  Maximal assistance   Additional items Assist x 2; Increased time required   Transfers   Sit to Stand Unable to assess   Ambulation/Elevation   Gait pattern Not tested   Balance   Static Sitting Fair   Dynamic Sitting Fair   Endurance Deficit   Endurance Deficit Yes   Endurance Deficit Description generalized weakness, decreased exercise tolerance   Activity Tolerance   Activity Tolerance Patient limited by fatigue   Medical Staff Made Aware Restorative   Nurse Made Aware RN cleared and updated   Exercises   Glute Sets Supine;10 reps;AROM; Bilateral   Hip Abduction Supine;10 reps;AROM; Bilateral   Hip Adduction Supine;10 reps;AROM; Bilateral   Ankle Pumps Supine;10 reps;AROM; Bilateral   Assessment   Prognosis Fair   Problem List Decreased strength;Decreased endurance; Impaired balance;Decreased mobility;Pain;Decreased cognition;Decreased safety awareness; Impaired judgement   Assessment Patient was received supine in bed . Patient was agreeable to therapy services today. PT session focused on transfer and therapeutic exercise today in order to improve functional mobility and independence. Functional mobility was performed as described above. Pt was only agreeable to long sit in bed. Pt deferred further mobility due to fatigue. Pt was then led through several therapeutic exercises. Pt denied pain with movements today. Pt was educated about HEP and how to safely complete exercises. Pt was provided print out of exercises to complete throughout the day. Pt repots understanding. Patient will benefit from continued PT services while in hospital in order to address remaining limitations. The patients AM-PAC Basic Mobility Inpatient Short From Raw Score is 7 . A Raw score of 7 suggests that the patient may benefit from discharge to post acute rehab. PT recommendation at this time is for post acute rehab. Please also refer to the recommendation of the Physical Therapist for safe discharge planning. Barriers to Discharge Decreased caregiver support; Inaccessible home environment   Goals   Patient Goals to lay down   PT Treatment Day 4   Plan   Treatment/Interventions ADL retraining;Functional transfer training;LE strengthening/ROM; Elevations; Therapeutic exercise; Endurance training;Bed mobility;Gait training   Progress Slow progress, decreased activity tolerance   PT Frequency 3-5x/wk   Recommendation   PT Discharge Recommendation Post acute rehabilitation services   AM-PAC Basic Mobility Inpatient   Turning in Flat Bed Without Bedrails 2   Lying on Back to Sitting on Edge of Flat Bed Without Bedrails 1   Moving Bed to Chair 1   Standing Up From Chair Using Arms 1   Walk in Room 1   Climb 3-5 Stairs With Railing 1   Basic Mobility Inpatient Raw Score 7   Highest Level Of Mobility FABRICE Goal 2: Bed activities/Dependent transfer   [address] Achieved 2: Bed activities/Dependent transfer   Education   Education Provided Mobility training;Assistive device   Patient Reinforcement needed   End of Consult   Patient Position at End of Consult Supine; All needs within reach     Rafal De La Rosa PT, DPT

## 2023-07-03 NOTE — PROGRESS NOTES
Progress Note - General Surgery  : UBALDO Red Surgery Resident on Kumar Kemp 67 y.o. female MRN: [id number]  Unit/Bed#: [facility] 930-01 Encounter: [id number]      Assessment:  67 y.o. female with hx RnY GB who presented with bowel ischemia/perforation from internal hernia, required SBR and multiple operations culminating in anastomosis x3 (in continuity) / abdominal closure 6/20        Plan:  Post -gastrectomy diet + Glucerna  Encourage PO intake  Left upper quadrant LUCY; monitor output and character  Amiodarone/Eliquis for AF  Continue local wound care to midline  Dispo planning likely rehab today        Subjective:   No acute events overnight doing well. Denies any nausea or vomiting. Tolerating diet w/o issue      Objective:     Physical Exam:  General: NAD  HENT: NCAT MMM  Neck: supple, no JVD  CV: nl rate  Lungs: nl wob. No resp distress  ABD: Soft, nontender, nondistended. LUCY w/ serous output. Incision is intact, serous drainage from lower aspect of wound  Extrem: No CCE  Neuro: AAOx3        I/O       06/28 0701  06/29 0700 06/29 0701  06/30 0700    Urine (mL/kg/hr) 1350 (0.4) 1400 (0.4)    Emesis/NG output  0    Drains 215 40    Stool  0    Total Output 1565 1440    Net -1565 -1440          Unmeasured Urine Occurrence 7 x 1 x    Unmeasured Stool Occurrence  3 x    Unmeasured Emesis Occurrence  0 x          Lab, Imaging and other studies: I have personally reviewed pertinent reports.   , CBC with diff:   No results found for: "WBC", "HGB", "HCT", "MCV", "PLT", "ADJUSTEDWBC", "RBC", "MCH", "MCHC", "RDW", "MPV", "NRBC", BMP/CMP:   Lab Results   Component Value Date    SODIUM 137 07/03/2023    K 3.5 07/03/2023     07/03/2023    CO2 26 07/03/2023    BUN 19 07/03/2023    CREATININE 0.81 07/03/2023    CALCIUM 7.7 (L) 07/03/2023    EGFR 72 07/03/2023         VTE Pharmacologic Prophylaxis: Adrianaquleoncio Briseno,   7/3/2023 1:46 PM

## 2023-07-03 NOTE — CASE MANAGEMENT
Case Management Discharge Planning Note    Patient name Roxanne Blake  Location [facility] 930/[facility] 930-01 MRN [id number]  : 1950 Date 7/3/2023       Current Admission Date: 2023  Current Admission Diagnosis:Idiopathic acute pancreatitis without infection or necrosis   Patient Active Problem List    Diagnosis Date Noted   • Idiopathic acute pancreatitis without infection or necrosis 2023   • Primary osteoarthritis of both knees 2023   • Stage 3 chronic kidney disease, unspecified whether stage 3a or 3b CKD ([address]) 2023   • Hypertension    • Morbid obesity with BMI of 50.0-59.9, adult ([address]) 2022   • Unsteady gait 10/30/2022   • Dyspnea on exertion 10/30/2022   • SIRS (systemic inflammatory response syndrome) ([address]) 10/30/2022   • Incontinence 10/30/2022   • GILDARDO (obstructive sleep apnea) 10/30/2022   • Vitamin D deficiency 2021   • Reactive hypoglycemia 2020   • Osteoporosis 2020   • Hyperparathyroidism ([address]) 2020   • Hashimoto's thyroiditis 2020   • Early menopause occurring in patient age younger than 39 years 2020   • S/P bariatric surgery 2020   • Myofascial pain syndrome 10/21/2019   • Status post partial thyroidectomy 2019   • Cervical spondylosis without myelopathy 2019   • Arthropathy of cervical facet joint 2019   • Occipital neuralgia of right side 2019   • Chest wall tenderness 2019   • Chronic heart failure ([address]) 2019   • Permanent atrial fibrillation ([address]) 2019   • Myalgia 2019   • Primary osteoarthritis of left knee 10/23/2018   • Primary osteoarthritis of right knee 10/23/2018   • Chronic pain of left knee 10/23/2018   • Malignant tumor of right kidney parenchyma ([address]) 2016   • Closed fracture of right distal radius and ulna 2016      LOS (days): 15  Geometric Mean LOS (GMLOS) (days): 9.60  Days to GMLOS:-5.1     OBJECTIVE:  Risk of Unplanned Readmission Score: 26.81 Current admission status: Inpatient   Preferred Pharmacy:   [facility] [address] MARIAH REDDY [address] [facility] [address]  Phone: [phone] Fax: [phone]    [address] [facility], [address] [facility]  [address] [facility] [address]  Phone: [phone] Fax: [phone]    Primary Care Provider: Kassy Joyner DO    Primary Insurance: MEDICARE  Secondary Insurance: Kayla Lama DETAILS:        Transport at Discharge : [facility]  Dispatcher Contacted: Yes  Number/Name of Dispatcher: [id number]  Transported by [employer] and Unit #): SLETS  ETA of Transport (Date): 07/04/23  ETA of Transport (Time): 1100          Additional Comments: Transport confirmed for 11:00am on 7/4. CM notified provider, nursing, facility & pt's son.

## 2023-07-03 NOTE — PROGRESS NOTES
7/2 calorie count: 729kcal/ 39g protein (3 meals; 1/2 serving Fleecs supplement, from home); meets ~50% minimum energy and protein needs; estimated minimum needs 1570kcal/ 78g protein; calorie count will conclude after today; will follow with results on 7/4; family bringing in food to maximize PO kcal and protein;  Rx for post-gastrectomy diet glucerna TID and magic cup once daily

## 2023-07-03 NOTE — PLAN OF CARE
Problem: Prexisting or High Potential for Compromised Skin Integrity  Goal: Skin integrity is maintained or improved  Description: INTERVENTIONS:  - Identify patients at risk for skin breakdown  - Assess and monitor skin integrity  - Assess and monitor nutrition and hydration status  - Monitor labs   - Assess for incontinence   - Turn and reposition patient  - Assist with mobility/ambulation  - Relieve pressure over bony prominences  - Avoid friction and shearing  - Provide appropriate hygiene as needed including keeping skin clean and dry  - Evaluate need for skin moisturizer/barrier cream  - Collaborate with interdisciplinary team   - Patient/family teaching  - Consider wound care consult   Outcome: Progressing     Problem: MOBILITY - ADULT  Goal: Maintain or return to baseline ADL function  Description: INTERVENTIONS:  -  Assess patient's ability to carry out ADLs; assess patient's baseline for ADL function and identify physical deficits which impact ability to perform ADLs (bathing, care of mouth/teeth, toileting, grooming, dressing, etc.)  - Assess/evaluate cause of self-care deficits   - Assess range of motion  - Assess patient's mobility; develop plan if impaired  - Assess patient's need for assistive devices and provide as appropriate  - Encourage maximum independence but intervene and supervise when necessary  - Involve family in performance of ADLs  - Assess for home care needs following discharge   - Consider OT consult to assist with ADL evaluation and planning for discharge  - Provide patient education as appropriate  Outcome: Progressing  Goal: Maintains/Returns to pre admission functional level  Description: INTERVENTIONS:  - Perform BMAT or MOVE assessment daily.   - Set and communicate daily mobility goal to care team and patient/family/caregiver.    - Collaborate with rehabilitation services on mobility goals if consulted  -OOB to chair  Outcome: Progressing     Problem: Nutrition/Hydration-ADULT  Goal: Nutrient/Hydration intake appropriate for improving, restoring or maintaining nutritional needs  Description: Monitor and assess patient's nutrition/hydration status for malnutrition. Collaborate with interdisciplinary team and initiate plan and interventions as ordered. Monitor patient's weight and dietary intake as ordered or per policy. Utilize nutrition screening tool and intervene as necessary. Determine patient's food preferences and provide high-protein, high-caloric foods as appropriate.      INTERVENTIONS:  - Monitor oral intake, urinary output, labs, and treatment plans  - Assess nutrition and hydration status and recommend course of action  - Evaluate amount of meals eaten  - Assist patient with eating if necessary   - Allow adequate time for meals  - Recommend/ encourage appropriate diets, oral nutritional supplements, and vitamin/mineral supplements  - Order, calculate, and assess calorie counts as needed  - Recommend, monitor, and adjust tube feedings and TPN/PPN based on assessed needs  - Assess need for intravenous fluids  - Provide specific nutrition/hydration education as appropriate  - Include patient/family/caregiver in decisions related to nutrition  Outcome: Progressing     Problem: METABOLIC, FLUID AND ELECTROLYTES - ADULT  Goal: Electrolytes maintained within normal limits  Description: INTERVENTIONS:  - Monitor labs and assess patient for signs and symptoms of electrolyte imbalances  - Administer electrolyte replacement as ordered  - Monitor response to electrolyte replacements, including repeat lab results as appropriate  - Instruct patient on fluid and nutrition as appropriate  Outcome: Progressing  Goal: Fluid balance maintained  Description: INTERVENTIONS:  - Monitor labs   - Monitor I/O and WT  - Instruct patient on fluid and nutrition as appropriate  - Assess for signs & symptoms of volume excess or deficit  Outcome: Progressing  Goal: Glucose maintained within target range  Description: INTERVENTIONS:  - Monitor Blood Glucose as ordered  - Assess for signs and symptoms of hyperglycemia and hypoglycemia  - Administer ordered medications to maintain glucose within target range  - Assess nutritional intake and initiate nutrition service referral as needed  Outcome: Progressing     Problem: PAIN - ADULT  Goal: Verbalizes/displays adequate comfort level or baseline comfort level  Description: Interventions:  - Encourage patient to monitor pain and request assistance  - Assess pain using appropriate pain scale  - Administer analgesics based on type and severity of pain and evaluate response  - Implement non-pharmacological measures as appropriate and evaluate response  - Consider cultural and social influences on pain and pain management  - Notify physician/advanced practitioner if interventions unsuccessful or patient reports new pain  Outcome: Progressing     Problem: INFECTION - ADULT  Goal: Absence or prevention of progression during hospitalization  Description: INTERVENTIONS:  - Assess and monitor for signs and symptoms of infection  - Monitor lab/diagnostic results  - Monitor all insertion sites, i.e. indwelling lines, tubes, and drains  - Monitor endotracheal if appropriate and nasal secretions for changes in amount and color  - [place] appropriate cooling/warming therapies per order  - Administer medications as ordered  - Instruct and encourage patient and family to use good hand hygiene technique  - Identify and instruct in appropriate isolation precautions for identified infection/condition  Outcome: Progressing  Goal: Absence of fever/infection during neutropenic period  Description: INTERVENTIONS:  - Monitor WBC    Outcome: Progressing     Problem: SAFETY ADULT  Goal: Maintain or return to baseline ADL function  Description: INTERVENTIONS:  -  Assess patient's ability to carry out ADLs; assess patient's baseline for ADL function and identify physical deficits which impact ability to perform ADLs (bathing, care of mouth/teeth, toileting, grooming, dressing, etc.)  - Assess/evaluate cause of self-care deficits   - Assess range of motion  - Assess patient's mobility; develop plan if impaired  - Assess patient's need for assistive devices and provide as appropriate  - Encourage maximum independence but intervene and supervise when necessary  - Involve family in performance of ADLs  - Assess for home care needs following discharge   - Consider OT consult to assist with ADL evaluation and planning for discharge  - Provide patient education as appropriate  Outcome: Progressing  Goal: Maintains/Returns to pre admission functional level  Description: INTERVENTIONS:  - Perform BMAT or MOVE assessment daily.   - Set and communicate daily mobility goal to care team and patient/family/caregiver.    - Collaborate with rehabilitation services on mobility goals if consulted  -OOB to chair  Outcome: Progressing  Goal: Patient will remain free of falls  Description: INTERVENTIONS:  - Educate patient/family on patient safety including physical limitations  - Instruct patient to call for assistance with activity   - Consult OT/PT to assist with strengthening/mobility   - Keep Call bell within reach  - Keep bed low and locked with side rails adjusted as appropriate  - Keep care items and personal belongings within reach  - Initiate and maintain comfort rounds  - Make Fall Risk Sign visible to staff  - Offer Toileting every 2 Hours, in advance of need  - Initiate/Maintain alarm  - Obtain necessary fall risk management equipment:   - Apply yellow socks and bracelet for high fall risk patients  - Consider moving patient to room near nurses station  Outcome: Progressing     Problem: DISCHARGE PLANNING  Goal: Discharge to home or other facility with appropriate resources  Description: INTERVENTIONS:  - Identify barriers to discharge w/patient and caregiver  - Arrange for needed discharge resources and transportation as appropriate  - Identify discharge learning needs (meds, wound care, etc.)  - Arrange for interpretive services to assist at discharge as needed  - Refer to Case Management Department for coordinating discharge planning if the patient needs post-hospital services based on physician/advanced practitioner order or complex needs related to functional status, cognitive ability, or social support system  Outcome: Progressing     Problem: Knowledge Deficit  Goal: Patient/family/caregiver demonstrates understanding of disease process, treatment plan, medications, and discharge instructions  Description: Complete learning assessment and assess knowledge base.   Interventions:  - Provide teaching at level of understanding  - Provide teaching via preferred learning methods  Outcome: Progressing

## 2023-07-04 VITALS
TEMPERATURE: 98.5 F | HEART RATE: 107 BPM | WEIGHT: 293 LBS | SYSTOLIC BLOOD PRESSURE: 116 MMHG | BODY MASS INDEX: 51.91 KG/M2 | DIASTOLIC BLOOD PRESSURE: 83 MMHG | RESPIRATION RATE: 18 BRPM | OXYGEN SATURATION: 99 % | HEIGHT: 63 IN

## 2023-07-04 LAB
ANION GAP SERPL CALCULATED.3IONS-SCNC: 3 MMOL/L
BASOPHILS # BLD AUTO: 0.06 THOUSANDS/ÂΜL (ref 0–0.1)
BASOPHILS NFR BLD AUTO: 1 % (ref 0–1)
BUN SERPL-MCNC: 18 MG/DL (ref 5–25)
CALCIUM SERPL-MCNC: 7.5 MG/DL (ref 8.3–10.1)
CHLORIDE SERPL-SCNC: 107 MMOL/L (ref 96–108)
CO2 SERPL-SCNC: 27 MMOL/L (ref 21–32)
CREAT SERPL-MCNC: 0.7 MG/DL (ref 0.6–1.3)
EOSINOPHIL # BLD AUTO: 0.08 THOUSAND/ÂΜL (ref 0–0.61)
EOSINOPHIL NFR BLD AUTO: 1 % (ref 0–6)
ERYTHROCYTE [DISTWIDTH] IN BLOOD BY AUTOMATED COUNT: 17.2 % (ref 11.6–15.1)
GFR SERPL CREATININE-BSD FRML MDRD: 86 ML/MIN/1.73SQ M
GLUCOSE SERPL-MCNC: 82 MG/DL (ref 65–140)
GLUCOSE SERPL-MCNC: 84 MG/DL (ref 65–140)
HCT VFR BLD AUTO: 26 % (ref 34.8–46.1)
HGB BLD-MCNC: 8 G/DL (ref 11.5–15.4)
IMM GRANULOCYTES # BLD AUTO: 0.2 THOUSAND/UL (ref 0–0.2)
IMM GRANULOCYTES NFR BLD AUTO: 2 % (ref 0–2)
LYMPHOCYTES # BLD AUTO: 1.01 THOUSANDS/ÂΜL (ref 0.6–4.47)
LYMPHOCYTES NFR BLD AUTO: 9 % (ref 14–44)
MCH RBC QN AUTO: 27.3 PG (ref 26.8–34.3)
MCHC RBC AUTO-ENTMCNC: 30.8 G/DL (ref 31.4–37.4)
MCV RBC AUTO: 89 FL (ref 82–98)
MONOCYTES # BLD AUTO: 0.92 THOUSAND/ÂΜL (ref 0.17–1.22)
MONOCYTES NFR BLD AUTO: 8 % (ref 4–12)
NEUTROPHILS # BLD AUTO: 8.71 THOUSANDS/ÂΜL (ref 1.85–7.62)
NEUTS SEG NFR BLD AUTO: 79 % (ref 43–75)
NRBC BLD AUTO-RTO: 0 /100 WBCS
PLATELET # BLD AUTO: 574 THOUSANDS/UL (ref 149–390)
PMV BLD AUTO: 9.3 FL (ref 8.9–12.7)
POTASSIUM SERPL-SCNC: 3.7 MMOL/L (ref 3.5–5.3)
RBC # BLD AUTO: 2.93 MILLION/UL (ref 3.81–5.12)
SODIUM SERPL-SCNC: 137 MMOL/L (ref 135–147)
WBC # BLD AUTO: 10.98 THOUSAND/UL (ref 4.31–10.16)

## 2023-07-04 PROCEDURE — 80048 BASIC METABOLIC PNL TOTAL CA: CPT | Performed by: SURGERY

## 2023-07-04 PROCEDURE — 82948 REAGENT STRIP/BLOOD GLUCOSE: CPT

## 2023-07-04 PROCEDURE — 99024 POSTOP FOLLOW-UP VISIT: CPT | Performed by: SURGERY

## 2023-07-04 PROCEDURE — 85025 COMPLETE CBC W/AUTO DIFF WBC: CPT | Performed by: SURGERY

## 2023-07-04 RX ORDER — LOPERAMIDE HYDROCHLORIDE 2 MG/1
2 CAPSULE ORAL 2 TIMES DAILY
Qty: 30 CAPSULE | Refills: 0 | Status: ON HOLD
Start: 2023-07-04

## 2023-07-04 RX ORDER — QUETIAPINE FUMARATE 25 MG/1
12.5 TABLET, FILM COATED ORAL
Refills: 0 | Status: ON HOLD
Start: 2023-07-04

## 2023-07-04 RX ORDER — LORAZEPAM 1 MG/1
1 TABLET ORAL
Qty: 7 TABLET | Refills: 0 | Status: SHIPPED | OUTPATIENT
Start: 2023-07-04 | End: 2023-07-04

## 2023-07-04 RX ORDER — LOPERAMIDE HYDROCHLORIDE 2 MG/1
2 CAPSULE ORAL 2 TIMES DAILY
Status: DISCONTINUED | OUTPATIENT
Start: 2023-07-04 | End: 2023-07-04 | Stop reason: HOSPADM

## 2023-07-04 RX ORDER — ACETAMINOPHEN 325 MG/1
650 TABLET ORAL EVERY 6 HOURS PRN
Refills: 0 | Status: ON HOLD
Start: 2023-07-04

## 2023-07-04 RX ORDER — AMIODARONE HYDROCHLORIDE 200 MG/1
200 TABLET ORAL
Qty: 10 TABLET | Refills: 0 | Status: ON HOLD
Start: 2023-07-04 | End: 2023-07-08

## 2023-07-04 RX ORDER — LORAZEPAM 1 MG/1
1 TABLET ORAL
Qty: 7 TABLET | Refills: 0 | Status: SHIPPED | OUTPATIENT
Start: 2023-07-04 | End: 2023-07-11

## 2023-07-04 RX ORDER — LANOLIN ALCOHOL/MO/W.PET/CERES
6 CREAM (GRAM) TOPICAL
Refills: 0 | Status: ON HOLD
Start: 2023-07-04

## 2023-07-04 RX ORDER — AMIODARONE HYDROCHLORIDE 200 MG/1
200 TABLET ORAL
Qty: 11 TABLET | Refills: 0 | Status: ON HOLD
Start: 2023-07-08 | End: 2023-07-19

## 2023-07-04 RX ADMIN — AMIODARONE HYDROCHLORIDE 200 MG: 200 TABLET ORAL at 09:07

## 2023-07-04 RX ADMIN — DILTIAZEM HYDROCHLORIDE 90 MG: 60 TABLET ORAL at 05:40

## 2023-07-04 RX ADMIN — PANTOPRAZOLE SODIUM 40 MG: 40 TABLET, DELAYED RELEASE ORAL at 05:40

## 2023-07-04 RX ADMIN — CHLORHEXIDINE GLUCONATE 15 ML: 1.2 SOLUTION ORAL at 09:07

## 2023-07-04 RX ADMIN — FLUOXETINE 20 MG: 20 CAPSULE ORAL at 09:07

## 2023-07-04 RX ADMIN — Medication 12.5 MG: at 09:07

## 2023-07-04 RX ADMIN — APIXABAN 5 MG: 5 TABLET, FILM COATED ORAL at 09:07

## 2023-07-04 RX ADMIN — LOPERAMIDE HYDROCHLORIDE 2 MG: 2 CAPSULE ORAL at 09:07

## 2023-07-04 RX ADMIN — ACETAMINOPHEN 650 MG: 325 TABLET ORAL at 09:07

## 2023-07-04 NOTE — PROGRESS NOTES
Requested paper script/escript to [facility] in [facility] for prescribed ativan and oxycodone from Selvin Chun on general surgery team.

## 2023-07-04 NOTE — CASE MANAGEMENT
Case Management Discharge Planning Note    Patient name Shandra Mcclendon  Location [facility] 930/[facility] 930-01 MRN [id number]  : 1950 Date 2023       Current Admission Date: 2023  Current Admission Diagnosis:Idiopathic acute pancreatitis without infection or necrosis   Patient Active Problem List    Diagnosis Date Noted   • Idiopathic acute pancreatitis without infection or necrosis 2023   • Primary osteoarthritis of both knees 2023   • Stage 3 chronic kidney disease, unspecified whether stage 3a or 3b CKD ([address]) 2023   • Hypertension    • Morbid obesity with BMI of 50.0-59.9, adult ([address]) 2022   • Unsteady gait 10/30/2022   • Dyspnea on exertion 10/30/2022   • SIRS (systemic inflammatory response syndrome) ([address]) 10/30/2022   • Incontinence 10/30/2022   • GILDARDO (obstructive sleep apnea) 10/30/2022   • Vitamin D deficiency 2021   • Reactive hypoglycemia 2020   • Osteoporosis 2020   • Hyperparathyroidism ([address]) 2020   • Hashimoto's thyroiditis 2020   • Early menopause occurring in patient age younger than 39 years 2020   • S/P bariatric surgery 2020   • Myofascial pain syndrome 10/21/2019   • Status post partial thyroidectomy 2019   • Cervical spondylosis without myelopathy 2019   • Arthropathy of cervical facet joint 2019   • Occipital neuralgia of right side 2019   • Chest wall tenderness 2019   • Chronic heart failure ([address]) 2019   • Permanent atrial fibrillation ([address]) 2019   • Myalgia 2019   • Primary osteoarthritis of left knee 10/23/2018   • Primary osteoarthritis of right knee 10/23/2018   • Chronic pain of left knee 10/23/2018   • Malignant tumor of right kidney parenchyma ([address]) 2016   • Closed fracture of right distal radius and ulna 2016      LOS (days): 16  Geometric Mean LOS (GMLOS) (days): 9.60  Days to GMLOS:-5.8     OBJECTIVE:  Risk of Unplanned Readmission Score: 25.25 Current admission status: Inpatient   Preferred Pharmacy:   [facility] [address] MARIAH REDDY [address] [address]  Phone: [phone] Fax: [phone]    [address] [facility], [address] [facility]  [address] [facility] [address]  Phone: [phone] Fax: [phone]    Primary Care Provider: Chepe White DO    Primary Insurance: MEDICARE  Secondary Insurance: [employer] DETAILS:          Additional Comments: Pt to discharge today, CM notified facility via Aidin of negative COVID test. Pt to be transported to [address] [place] [address] at 11:00ak, via [facility].

## 2023-07-04 NOTE — INCIDENTAL FINDINGS
The following findings require follow up:  Radiographic finding   Finding: small hypodensity in the spleen of unclear etiology   Follow up required: PCP   Follow up should be done within 1 month(s)    Please notify the following clinician to assist with the follow up:   Dr. Tyrell Pineda

## 2023-07-04 NOTE — DISCHARGE SUMMARY
Discharge Summary    Angel Parson 67 y.o. female MRN: [id number]    Unit/Bed#: [facility] 930-01 Encounter: [id number]    Physical exam:  General: NAD  HENT: NCAT MMM  Neck: supple, no JVD  CV: nl rate  Lungs: nl wob. No resp distress  ABD: Soft, nontender, nondistended. Dressing with mild serous drainage. LUCY with serous output  Extrem: No CCE  Neuro: AAOx3      Admission Date: 6/18/2023     Discharge Date:/7/4/2023    Attending: Dr. Zia Osuna    Consultants: Critical care    Admitting Diagnosis: Abdominal pain [R10.9]  Perforated viscus [R19.8]  Other acute pancreatitis, unspecified complication status [S14.84]  Perforation of abdominal aorta due to abdominal aortic aneurysm (AAA) ([address]) [I71.30]    Principle Diagnosis: Perforated viscus    Secondary Diagnosis:  Past Medical History:   Diagnosis Date   • A-fib [facility])    • Arthritis    • Atrial fibrillation ([address])    • Cervical spondylosis with myelopathy    • Gastric bypass status for obesity    • History of transfusion     35 years ago   • Hypertension    • mass right kidney    • Renal cell adenocarcinoma ([address])     RIGHT   • Sleep apnea      Past Surgical History:   Procedure Laterality Date   • ABDOMINAL ADHESION SURGERY N/A 6/19/2023    Procedure: LYSIS ADHESIONS;  Surgeon: Richie Gonzalez DO;  [facility];  Service: General   • APPENDECTOMY N/A 6/19/2023    Procedure: APPENDECTOMY;  Surgeon: Richie Gonzalez DO;  [facility];  Service: General   • CHOLECYSTECTOMY     • COLOSTOMY     • CRYOABLATION Right     RT KIDNEY   • CYSTORRHAPHY      Bladder.  Last assessed 4/6/2016    • EXPLORATORY LAPAROTOMY W/ BOWEL RESECTION N/A 6/19/2023    Procedure: LAPAROTOMY EXPLORATORY W/ BOWEL RESECTION;  Surgeon: Cely Martinez MD;  [facility];  Service: General   • GASTRIC BYPASS     • HERNIA REPAIR      Last assessed 4/6/2016    • HYSTERECTOMY     • LAPAROTOMY N/A 6/19/2023    Procedure: LAPAROTOMY EXPLORATORY, washout, vac change; Surgeon: Randal Wheeler DO;  [facility];  Service: General   • LAPAROTOMY N/A 6/20/2023    Procedure: LAPAROTOMY, REMOVAL OF WOUND VAC AND PACKING, SMALL BOWEL RESECTION , BOWEL ANASTOMOSIS X 3 , VICRYL MESH BRIDGING -;  Surgeon: Randal Wheeler DO;  [facility];  Service: General   • SC OPTX DSTL RADL X-ARTIC FX/EPIPHYSL SEP Right 4/22/2016    Procedure: OPEN REDUCTION INTERNAL FIXATION RIGHT DISTAL RADIUS;  Surgeon: Christa Da Silva MD;  [facility];  Service: Orthopedics   • REVISION COLOSTOMY     • SMALL INTESTINE SURGERY N/A 6/19/2023    Procedure: RESECTION SMALL BOWEL;  Surgeon: Randal Wheeler DO;  [facility];  Service: General   • THYROID LOBECTOMY Left 8/27/2019    Procedure: LOBECTOMY THYROID, left;  Surgeon: Bronwyn Pizano MD;  [facility];  Service: Surgical Oncology   • TUBAL LIGATION     • US GUIDED THYROID BIOPSY  2/27/2019   • US GUIDED THYROID BIOPSY  5/29/2019        Procedures Performed: Procedure(s):  LAPAROTOMY, REMOVAL OF WOUND VAC AND PACKING, SMALL BOWEL RESECTION , BOWEL ANASTOMOSIS X 3 , VICRYL MESH BRIDGING -    Imaging:No results found. Discharge Medications:  See after visit summary for reconciled discharge medications provided to patient and family. Brief HPI (per H/P): Per Dr. Dat Acharya, "Jason Dickens is a 67 y.o. female with pmhx of A-fib on Eliquis hold, gastric bypass, HTN, right RCC status post cryoablation, cholecystectomy, hysterectomy, colostomy, left thyroid lobectomy (8/2019) who presents with less than 1 day of generalized abdominal pain. Patient states that the pain began around 5 PM yesterday and was generalized around her mid abdomen. The pain was unrelenting for about an hour when patient realized that she may need some assistance, but she did not want to bother her children. When her daughter called her several hours later, patient was still in a significant amount of pain.   She reports nausea and dry heaves. She reports diaphoresis. She continues to have bowel function. Her last bowel movement was around 5 PM.  Patient has had a number of abdominal surgeries in the past including Arlene-en-Y bypass 2 years ago, Tobin's procedure, open cholecystectomy. Her last dose of Eliquis was this morning. Has any issues with urination"    Hospital Course: Sergey Lorenzo is a 67 y.o. female who presented 6/18/2023 for above procedure. Intraoperative findings included the following from operative report: Please see operative notes for details on operations. In short, patient presented with perforated viscus she underwent 6/19 ex lap, resection arlene en y anastomosis, SBR, left in discontinuity, abthera vac- Dr. Tania Mccray  6/19 2nd look ex lap, SBR, appendectony, abthera placement - Dr. Debra Mejia   6/20 ex lap, SBR, creation of anastomosis x 3, abdominal closure - Dr. Debra Mejia    Please see daily progress notes for complete details of hospitlization    Patient was discharged on HD16. On the day of discharge, the patient was voiding spontaneously, having bowel movements, and pain was well controlled. Patient was seen by PT/OT and was recommended to go to rehab. Discussed with patient the importance of nutrition and staying hydrated given her diarrhea. Patient will begin taking imodium on discharge. She understood all instructions for discharge. She was also given the names and numbers of the providers as well as instructions for follow up appointments. She will follow up in the office in 1 week for drain removal and clinic visit. Condition at Discharge: good     Provisions for Follow-Up Care:  See after visit summary for information related to follow-up care and any pertinent home health orders. Disposition: See After Visit Summary for discharge disposition information.     Discharge instructions/Information to patient and family:   See after visit summary for information provided to patient and family. Planned Readmission: No    Discharge Statement   I spent 20 minutes discharging the patient. This time was spent on the day of discharge. I had direct contact with the patient on the day of discharge. Additional documentation is required if more than 30 minutes were spent on discharge.

## 2023-07-04 NOTE — DISCHARGE INSTR - AVS FIRST PAGE
Surgery Discharge Instructions    Please follow-up as instructed. Please follow up with appointment on 7/10 @ 10:45    Activity:  - No lifting greater than 20 pounds or strenuous physical activity or exercise for at least 2 weeks. - Walking and normal light activities are encouraged. - Normal daily activities including climbing steps are okay. - No driving until no longer using pain medications. Diet:    - Continue a postgastrectomy diet until seen in follow up. You may need to eat foods that are soft and easy to digest for up to 8 weeks after your surgery. Examples of soft foods are applesauce, bananas, cooked cereal, cottage cheese, eggs, pudding, and yogurt. Eat small meals often. As you heal, you may be able to increase the variety and amount of food you eat. You may need additional iron, folate, and vitamin B12. Ask for more information about food and dietary supplements. Wound Care:  - Your incisions were closed with staples  - Do NOT pick staples. These will be removed in the office  - You may shower and let soapy water run over your incisions, then gently dab dry. Do NOT scrub. No tub baths or swimming until cleared by your surgeon.  - Wash incision gently with soap and water and pat dry. - Do not apply any creams or ointments unless instructed to do so by your surgeon.  - Azucena Blanchard may apply ice as needed (no longer than 20 minutes an hour) for the first 48 hours. - Bruising is not unusual and will go away with a little time. You may apply a warm, moist compress that may help the bruising resolve quicker. - You may remove the dressings the day after surgery (unless otherwise instructed). Leave any skin tapes (steri-strips) on the incision(s) in place until they fall off on their own. Any new dressings are optional.    Medications:    - You should continue your current medication regimen after discharge unless otherwise instructed. Please refer to your discharge medication list for further details.   - Please take the pain medications as directed. - You may become constipated, especially if taking pain medications. You may take any over the counter stool softeners or laxatives as needed. Examples: Milk of Magnesia, Colace, Senna. Additional Instructions:  - If you have any questions or concerns after discharge please call the office.  - Call office or return to ER if fever greater than 101, chills, persistent nausea/vomiting, worsening/uncontrollable pain, and/or increasing redness or purulent/foul smelling drainage from incision(s).

## 2023-07-05 ENCOUNTER — NURSING HOME VISIT (OUTPATIENT)
Dept: GERIATRICS | Facility: OTHER | Age: 73
End: 2023-07-05
Payer: MEDICARE

## 2023-07-05 VITALS
RESPIRATION RATE: 19 BRPM | HEART RATE: 78 BPM | SYSTOLIC BLOOD PRESSURE: 125 MMHG | OXYGEN SATURATION: 93 % | TEMPERATURE: 97.8 F | DIASTOLIC BLOOD PRESSURE: 73 MMHG

## 2023-07-05 DIAGNOSIS — F41.9 ANXIETY: ICD-10-CM

## 2023-07-05 DIAGNOSIS — G93.40 ACUTE ENCEPHALOPATHY: ICD-10-CM

## 2023-07-05 DIAGNOSIS — K85.00 IDIOPATHIC ACUTE PANCREATITIS WITHOUT INFECTION OR NECROSIS: ICD-10-CM

## 2023-07-05 DIAGNOSIS — N18.30 STAGE 3 CHRONIC KIDNEY DISEASE, UNSPECIFIED WHETHER STAGE 3A OR 3B CKD (HCC): ICD-10-CM

## 2023-07-05 DIAGNOSIS — E66.01 MORBID OBESITY WITH BMI OF 50.0-59.9, ADULT (HCC): ICD-10-CM

## 2023-07-05 DIAGNOSIS — K63.1 BOWEL PERFORATION (HCC): Primary | ICD-10-CM

## 2023-07-05 DIAGNOSIS — E55.9 VITAMIN D DEFICIENCY: ICD-10-CM

## 2023-07-05 DIAGNOSIS — I10 PRIMARY HYPERTENSION: ICD-10-CM

## 2023-07-05 DIAGNOSIS — I48.21 PERMANENT ATRIAL FIBRILLATION (HCC): ICD-10-CM

## 2023-07-05 DIAGNOSIS — G47.33 OSA (OBSTRUCTIVE SLEEP APNEA): ICD-10-CM

## 2023-07-05 DIAGNOSIS — R19.7 DIARRHEA, UNSPECIFIED TYPE: ICD-10-CM

## 2023-07-05 PROCEDURE — 99305 1ST NF CARE MODERATE MDM 35: CPT | Performed by: FAMILY MEDICINE

## 2023-07-05 NOTE — ASSESSMENT & PLAN NOTE
Lab Results   Component Value Date    EGFR 86 07/04/2023    EGFR 72 07/03/2023    EGFR 75 07/02/2023    CREATININE 0.70 07/04/2023    CREATININE 0.81 07/03/2023    CREATININE 0.78 07/02/2023   Cr stable   · Continue to monitor CMP

## 2023-07-05 NOTE — ASSESSMENT & PLAN NOTE
Not symptomatic at the time of encounter  · Continue metoprolol 25mg daily   · Continue Diltiazem 160mg daily   · Continue amiodarone  · Continue Eliquis for anticoagulation 5mg BID   · Follow-up with cardiology as needed

## 2023-07-05 NOTE — ASSESSMENT & PLAN NOTE
Patient had an episode of diarrhea yesterday. Consider C. Diff   · Discontinue imodium and start Banana Flakes  As needed for diarrhea  · Encourage PO hydration  · Obtain CBC, CMP, lipase labs

## 2023-07-05 NOTE — PROGRESS NOTES
[facility]    History and Physical  POS: 32    Records Reviewed include: Hospital records      Chief Complaint/ Reason for Admission: bowel perforation     History of Present Illness: Patient is a 67year old  female that has a history of atrial fibrillation, gastric bypass, hypertension, sleep apnea, CKD and presents after a laparotomy on 6/19 due to bowel perforation. She underwent a small bowel resection and bowel anastomosis on 6/20. Patient complained of abdominal pain prior to hospitalization. Denied current abdominal pain. She is currently on tylenol for pain control. History is limited by confusion due to delirium while in the ICU. Additional history obtained from the family member. Appetite has been improving and notes a bowel movement yesterday. Chills noted but at baseline. Denies headache, blurred vision, dizziness, SOB, cough, chest pain, palpitations, dysuria, leg weakness, and sleep disturbances. Patient lives at home in a ranch house with her daughter and are tiffanie to complete her ADLs independently. She uses a walker occasionally and have noted multiple falls in the past year. She is oriented to person, place and situation and disoriented to time. Patient is currently at [facility] in the transitional care unit for short term rehabilitation. PT OT as tolerated and the goal is to return home.              Allergies  No Known Allergies    Past Medical History  Past Medical History:   Diagnosis Date   • A-fib [facility])    • Arthritis    • Atrial fibrillation ([address])    • Cervical spondylosis with myelopathy    • Gastric bypass status for obesity    • History of transfusion     35 years ago   • Hypertension    • mass right kidney    • Renal cell adenocarcinoma ([address])     RIGHT   • Sleep apnea         Past Surgical History:   Procedure Laterality Date   • ABDOMINAL ADHESION SURGERY N/A 6/19/2023    Procedure: LYSIS ADHESIONS;  Surgeon: Shelley Lara DO;  [facility]; Service: General   • APPENDECTOMY N/A 6/19/2023    Procedure: APPENDECTOMY;  Surgeon: Kashif Reyes DO;  [facility];  Service: General   • CHOLECYSTECTOMY     • COLOSTOMY     • CRYOABLATION Right     RT KIDNEY   • CYSTORRHAPHY      Bladder.  Last assessed 4/6/2016    • EXPLORATORY LAPAROTOMY W/ BOWEL RESECTION N/A 6/19/2023    Procedure: LAPAROTOMY EXPLORATORY W/ BOWEL RESECTION;  Surgeon: Marcell Stanley MD;  [facility];  Service: General   • GASTRIC BYPASS     • HERNIA REPAIR      Last assessed 4/6/2016    • HYSTERECTOMY     • LAPAROTOMY N/A 6/19/2023    Procedure: LAPAROTOMY EXPLORATORY, washout, vac change;  Surgeon: Kashif Reyes DO;  [facility];  Service: General   • LAPAROTOMY N/A 6/20/2023    Procedure: LAPAROTOMY, REMOVAL OF WOUND VAC AND PACKING, SMALL BOWEL RESECTION , BOWEL ANASTOMOSIS X 3 , VICRYL MESH BRIDGING -;  Surgeon: Kashif Reyes DO;  [facility];  Service: General   • CO OPTX DSTL RADL X-ARTIC FX/EPIPHYSL SEP Right 4/22/2016    Procedure: OPEN REDUCTION INTERNAL FIXATION RIGHT DISTAL RADIUS;  Surgeon: Crista Spence MD;  [facility];  Service: Orthopedics   • REVISION COLOSTOMY     • SMALL INTESTINE SURGERY N/A 6/19/2023    Procedure: RESECTION SMALL BOWEL;  Surgeon: Kashif Reyes DO;  [facility];  Service: General   • THYROID LOBECTOMY Left 8/27/2019    Procedure: LOBECTOMY THYROID, left;  Surgeon: Christiano Zhou MD;  [facility];  Service: Surgical Oncology   • TUBAL LIGATION     • US GUIDED THYROID BIOPSY  2/27/2019   • US GUIDED THYROID BIOPSY  5/29/2019       Family History  Family History   Problem Relation Age of Onset   • Heart attack Mother    • Lung cancer Father 76   • Heart disease Father    • Stroke Sister    • Lung cancer Sister 64   • Prostate cancer Brother 61       Social History  Social History     Tobacco Use   Smoking Status Never   Smokeless Tobacco Never Social History     Substance and Sexual Activity   Alcohol Use Not Currently   • Alcohol/week: 0.0 standard drinks of alcohol    Comment: 0      Social History     Substance and Sexual Activity   Drug Use No        Lives: Home, with other family member:daughter  Social Support: Yes  Fall in the past 12 months: Yes  Use of assistance Device: Walker    Physical Exam    Vital Signs    Vitals:    07/05/23 1044   BP: 125/73   Pulse: 78   Resp: 19   Temp: 97.8 °F (36.6 °C)   SpO2: 93%           Constitutional: Frail appearing patient       Physical Exam  Constitutional:       Appearance: She is obese. HENT:      Head: Normocephalic and atraumatic. Right Ear: External ear normal.      Left Ear: External ear normal.      Nose: Nose normal.      Mouth/Throat:      Mouth: Mucous membranes are moist.   Eyes:      Pupils: Pupils are equal, round, and reactive to light. Cardiovascular:      Rate and Rhythm: Normal rate and regular rhythm. Pulses: Normal pulses. Heart sounds: Normal heart sounds. Pulmonary:      Effort: Pulmonary effort is normal. No respiratory distress. Breath sounds: Normal breath sounds. No wheezing. Abdominal:      Palpations: Abdomen is soft. Tenderness: There is no abdominal tenderness. Comments: LUCY drain tube on left side    Musculoskeletal:      Cervical back: Normal range of motion. Right lower leg: Edema present. Left lower leg: Edema present. Skin:     General: Skin is warm. Findings: Bruising present. Neurological:      Mental Status: She is alert. She is disoriented. Psychiatric:         Mood and Affect: Mood normal.         Behavior: Behavior normal.         Review of Systems:  Review of Systems   Constitutional: Positive for chills. Negative for appetite change, fatigue and fever. HENT: Negative for ear pain and sinus pain. Eyes: Negative for pain. Respiratory: Negative for cough, chest tightness and shortness of breath. Cardiovascular: Positive for leg swelling. Negative for chest pain and palpitations. Gastrointestinal: Negative for abdominal pain, constipation, nausea and vomiting. Genitourinary: Negative for difficulty urinating, dysuria and hematuria. Musculoskeletal: Positive for gait problem. Neurological: Negative for dizziness, light-headedness and headaches. Hematological: Bruises/bleeds easily. Psychiatric/Behavioral: Positive for confusion. Negative for behavioral problems and sleep disturbance. List of Current Medications:    Medication reviewed. All orders signed. Complete list is in the paper chart. Allergies  No Known Allergies    Labs/Diagnostics (reviewed by this provider): I personally reviewed lab results and imaging studies. Full reports are in the paper chart. Assessment/Plan:    Bowel perforation [facility])  Patient had a exploratory laparotomy on 6/19 and a small bowel resection and anastomosis on 6/20. Patient currently has a LUCY drain tube on the left side with no noted pain. · Continue tylenol scheduled.  Monitor and manage pain as needed   · PT OT as tolerated   · Continue wound local care  · Out of bed as tolerated  · Encourage use of assisted devices with ambulation   · Encourage incentive spirometry   · Continue Eliquis for anticoagulation  · Encourage PO hydration and incentive spirometry  ·  follow up with surgery as outpatient  ·  monitor Cbc CMP    Permanent atrial fibrillation (HCC)  Not symptomatic at the time of encounter  · Continue metoprolol 25mg daily   · Continue Diltiazem 160mg daily   · Continue amiodarone  · Continue Eliquis for anticoagulation 5mg BID   · Follow-up with cardiology as needed    Hypertension  Blood pressure currently at 125/73  · Continue Cardizem, metoprolol and HCTZ  · Monitor and manage as needed     GILDARDO (obstructive sleep apnea)  Patient has a history of GILDARDO   · Continue CPAP device     Stage 3 chronic kidney disease, unspecified whether stage 3a or 3b CKD ([address] )  Lab Results   Component Value Date    EGFR 86 07/04/2023    EGFR 72 07/03/2023    EGFR 75 07/02/2023    CREATININE 0.70 07/04/2023    CREATININE 0.81 07/03/2023    CREATININE 0.78 07/02/2023   Cr stable   · Continue to monitor CMP    Vitamin D deficiency  Continue Vitamine D supplementation    Morbid obesity with BMI of 50.0-59.9, adult ([address] [facility])  BMI currently at 53.58  · Encourage lifestyle and weight loss modifications    Acute encephalopathy  Patient had a history of delirium while in the ICU. She is currently oriented to person, place and situation and disoriented to time at the time of encounter   · She is stable with her confusion and no noted behavioral problems   · Continue to monitor for worsening symptoms   · Redirect and reorient as needed   · Encourage family and friends socialization  · Minimize sleep disturbances and maintain sleep wake cycles   · PT OT as tolerated   · Encourage food intake and PO hydration    Anxiety  Patient has history of anxiety. · Continue Ativan, Prozac and quetiapine   · Follow up with psychiatrist outpatient     Idiopathic acute pancreatitis without infection or necrosis  Patient noted to have acute pancreatitis while in hospital. No current symptoms at the time of encounter  · Hold loperamide due to c diff suspicion     Diarrhea  Patient had an episode of diarrhea yesterday. Consider C. Diff   · Discontinue imodium and start Banana Flakes  As needed for diarrhea  · Encourage PO hydration  · Obtain CBC, CMP, lipase labs        Pain: No  Rehab Potential:Good  Patient Informed of Medical Condition: Yes   Patient is Capable of Understanding Their Right: Yes   Discharge Plan: Home  Vaccination:   Immunization History   Administered Date(s) Administered   • INFLUENZA 10/14/2004, 12/21/2005, 10/04/2006, 10/18/2012, 02/01/2017, 11/13/2017, 10/03/2018   • Influenza Split High Dose Preservative Free IM 02/01/2017   • Pneumococcal Conjugate 13-Valent 02/01/2017 Advanced Directives: yes: no   Code status:Full Code  PCP: Zeferino Bates MD  Geriatric Medicine  0/6/99305:25 PM

## 2023-07-05 NOTE — ASSESSMENT & PLAN NOTE
Blood pressure currently at 125/73  · Continue Cardizem, metoprolol and HCTZ  · Monitor and manage as needed

## 2023-07-05 NOTE — ASSESSMENT & PLAN NOTE
Patient had a exploratory laparotomy on 6/19 and a small bowel resection and anastomosis on 6/20. Patient currently has a LUCY drain tube on the left side with no noted pain. · Continue tylenol scheduled.  Monitor and manage pain as needed   · PT OT as tolerated   · Continue wound local care  · Out of bed as tolerated  · Encourage use of assisted devices with ambulation   · Encourage incentive spirometry   · Continue Eliquis for anticoagulation  · Encourage PO hydration and incentive spirometry  ·  follow up with surgery as outpatient  ·  monitor Cbc CMP

## 2023-07-05 NOTE — ASSESSMENT & PLAN NOTE
Patient noted to have acute pancreatitis while in hospital. No current symptoms at the time of encounter  · Hold loperamide due to c diff suspicion

## 2023-07-05 NOTE — ASSESSMENT & PLAN NOTE
Patient had a history of delirium while in the ICU.  She is currently oriented to person, place and situation and disoriented to time at the time of encounter   · She is stable with her confusion and no noted behavioral problems   · Continue to monitor for worsening symptoms   · Redirect and reorient as needed   · Encourage family and friends socialization  · Minimize sleep disturbances and maintain sleep wake cycles   · PT OT as tolerated   · Encourage food intake and PO hydration

## 2023-07-06 ENCOUNTER — NURSING HOME VISIT (OUTPATIENT)
Dept: GERIATRICS | Facility: OTHER | Age: 73
End: 2023-07-06
Payer: MEDICARE

## 2023-07-06 DIAGNOSIS — I10 PRIMARY HYPERTENSION: ICD-10-CM

## 2023-07-06 DIAGNOSIS — E55.9 VITAMIN D DEFICIENCY: ICD-10-CM

## 2023-07-06 DIAGNOSIS — Z98.84 S/P BARIATRIC SURGERY: ICD-10-CM

## 2023-07-06 DIAGNOSIS — F41.9 ANXIETY: ICD-10-CM

## 2023-07-06 DIAGNOSIS — I50.32 CHRONIC DIASTOLIC HEART FAILURE (HCC): ICD-10-CM

## 2023-07-06 DIAGNOSIS — G47.33 OSA (OBSTRUCTIVE SLEEP APNEA): ICD-10-CM

## 2023-07-06 DIAGNOSIS — M81.8 OTHER OSTEOPOROSIS, UNSPECIFIED PATHOLOGICAL FRACTURE PRESENCE: ICD-10-CM

## 2023-07-06 DIAGNOSIS — M17.0 PRIMARY OSTEOARTHRITIS OF BOTH KNEES: ICD-10-CM

## 2023-07-06 DIAGNOSIS — K63.1 BOWEL PERFORATION (HCC): Primary | ICD-10-CM

## 2023-07-06 DIAGNOSIS — R19.7 DIARRHEA, UNSPECIFIED TYPE: ICD-10-CM

## 2023-07-06 DIAGNOSIS — I48.21 PERMANENT ATRIAL FIBRILLATION (HCC): ICD-10-CM

## 2023-07-06 PROCEDURE — 99310 SBSQ NF CARE HIGH MDM 45: CPT

## 2023-07-07 ENCOUNTER — TELEPHONE (OUTPATIENT)
Dept: CARDIOLOGY CLINIC | Facility: CLINIC | Age: 73
End: 2023-07-07

## 2023-07-07 VITALS
SYSTOLIC BLOOD PRESSURE: 124 MMHG | HEART RATE: 79 BPM | WEIGHT: 293 LBS | BODY MASS INDEX: 53.85 KG/M2 | RESPIRATION RATE: 21 BRPM | OXYGEN SATURATION: 97 % | DIASTOLIC BLOOD PRESSURE: 48 MMHG | TEMPERATURE: 97 F

## 2023-07-07 NOTE — ASSESSMENT & PLAN NOTE
· History of arlene-en-y gastric bypass  · Continue vitamin supplementation   · Fall precautions  · Continue PT/OT  · Encourage load bearing exercises to build bone density

## 2023-07-07 NOTE — ASSESSMENT & PLAN NOTE
· Continue HCTZ 12.5 mg po daily  · Recently changed by her cardiologist on 05/31  · Start metoprolol succinate 25 mg po Q12 hours  · Continue diltiazem 360 mg po daily  · Monitor I/O's  · Daily weights  · BP's currently controlled  · 07//54  · 07//58  · 07//59  · 07//62  · Monitor vitals closely  · Avoid hypotension  · Encourage increased po hydration

## 2023-07-07 NOTE — ASSESSMENT & PLAN NOTE
· Patient with history of arlene-en-y gastric bypass  · Continue vitamin d 42071 units every Saturday

## 2023-07-07 NOTE — ASSESSMENT & PLAN NOTE
· History of arlene-en-y gastric bypass   · Recent hospital admission with resection of arlene-en-y anastomosis   · Dietary nutrition consult  · Encourage small frequent meals throughout the day  · Supplemental drinks and magic cup

## 2023-07-07 NOTE — ASSESSMENT & PLAN NOTE
· While hospitalized patient with atrial fibrillation with RVR  · Medications adjusted  · Was started on amiodarone 200 mg po TID for 10 doses, transition to amiodarone 200 mg po daily  · Continue metoprolol succinate 25 mg po   · Discontinue metoprolol tartrate 12.5 mg po BID  · Start metoprolol succinate 25 mg po Q12 hours  · Continue eliquis 5 mg po BID for anticoagulation  · Continue diltiazem 360 mg po daily  · Follow up with cardiology

## 2023-07-07 NOTE — ASSESSMENT & PLAN NOTE
· Patient's mood is stable at time of assessment  · Explained that while in the hospital some of her medications were adjusted  · Continue lorazepam 1 mg po daily  · Continue fluoxetine 20 mg po BID  · Patient and family requesting seroquel be changed to prn  · Continue 24/7 supportive care  · Monitor mood

## 2023-07-07 NOTE — ASSESSMENT & PLAN NOTE
Wt Readings from Last 3 Encounters:   07/07/23 (!) 138 kg (304 lb)   07/04/23 (!) 137 kg (302 lb 7.5 oz)   06/07/23 (!) 138 kg (305 lb)     · Continue HCTZ 12.5 mg po daily  · Daily weights

## 2023-07-07 NOTE — PROGRESS NOTES
[address]  [phone]    NAME: Rosa Carpenter  AGE: 67 y.o.  SEX: female    Progress Note    Location: [facility]  POS: 32 (SNF)      Chief complaint / Reason for visit:  Follow-up visit    Assessment/Plan:    Vitamin D deficiency  · Patient with history of arlene-en-y gastric bypass  · Continue vitamin d 82934 units every Saturday     S/P bariatric surgery  · History of arlene-en-y gastric bypass   · Recent hospital admission with resection of arlene-en-y anastomosis   · Dietary nutrition consult  · Encourage small frequent meals throughout the day  · Supplemental drinks and magic cup    Diarrhea  · Patient with diarrhea while in hospital was started on immodium 2 mg po daily  · Bowel movements slowly moving back to normal  · Change immodium 2 mg to prn   · Monitor bowel movements closely  · Follow up with general surgery 07/10    Anxiety  · Patient's mood is stable at time of assessment  · Explained that while in the hospital some of her medications were adjusted  · Continue lorazepam 1 mg po daily  · Continue fluoxetine 20 mg po BID  · Patient and family requesting seroquel be changed to prn  · Continue 24/7 supportive care  · Monitor mood     Primary osteoarthritis of both knees  · Patient states she has bone on bone in her bilateral knees  · Continue PT/OT as tolerated  · Continue pain control  · Suggested biofreeze application to bilateral knees, patient stating she is ok for right now, will let nurses know if she wants it  · Currently denies pain to knees    Osteoporosis  · History of arlene-en-y gastric bypass  · Continue vitamin supplementation   · Fall precautions  · Continue PT/OT  · Encourage load bearing exercises to build bone density    Permanent atrial fibrillation ([address])  · While hospitalized patient with atrial fibrillation with RVR  · Medications adjusted  · Was started on amiodarone 200 mg po TID for 10 doses, transition to amiodarone 200 mg po daily  · Continue metoprolol succinate 25 mg po   · Discontinue metoprolol tartrate 12.5 mg po BID  · Start metoprolol succinate 25 mg po Q12 hours  · Continue eliquis 5 mg po BID for anticoagulation  · Continue diltiazem 360 mg po daily  · Follow up with cardiology     Hypertension  · Continue HCTZ 12.5 mg po daily  · Recently changed by her cardiologist on 05/31  · Start metoprolol succinate 25 mg po Q12 hours  · Continue diltiazem 360 mg po daily  · Monitor I/O's  · Daily weights  · BP's currently controlled  · 07//54  · 07//58  · 07//59  · 07//62  · Monitor vitals closely  · Avoid hypotension  · Encourage increased po hydration    Chronic heart failure ([address])  Wt Readings from Last 3 Encounters:   07/07/23 (!) 138 kg (304 lb)   07/04/23 (!) 137 kg (302 lb 7.5 oz)   06/07/23 (!) 138 kg (305 lb)     · Continue HCTZ 12.5 mg po daily  · Daily weights      GILDARDO (obstructive sleep apnea)  · Patient to trial using her CPAP tonight  · Respiratory adjusted mask to fit properly    Bowel perforation ([address])  · Recent hospital admission from 06/18-07/04  · Presented to ED with abdominal pain  · Found to have perforated ileum and necrotic jejunum  · 06/19-underwent ex lap, resection of arlene-en-y anastomosis, small bowel resection, and appendectomy  · LUCY drain tube still to left side  · Follow up with general surgery on 07/10  · Continue pain control  · Monitor I/O's  · Continue PT/OT  · OOB as tolerated  · Encourage incentive spirometer  · Repeat blood work scheduled for 07/10      This is a 67 y.o. female seen today at [address] [place] [address]. Medical history includes, not limited to, osteoporosis, atrial fibrillation, CKD stage 3, sleep apnea, heart failure, essential hypertension, bilateral osteoarthritis of knees, and anxiety. Patient with recent hospital admission from 06/18-07/04. Patient presented to ED with abdominal pain. Found to have a perforated ileum and necrotic jejunum. On 06/19 she underwent an exploratory laparotomy, resection of arlene-en-y anastomosis, small bowel resection, and appendectomy. Patient seen today at [address] [place] [address]. Upon entering patient's room she is resting comfortably in bed. Alert and oriented x 3, able to answer all questions appropriately. Her daughter is present during assessment. She currently denies pain. States she has had a rough time sleeping at night. Is willing to trial her CPAP at night. Family and patient have concerns of her medications, mainly her heart rate and blood pressure medications. Able to provide clarification. Patient states she had one loose bowel movement. Spoke of available medication. Discussed current diet, she states she has been trying to eat small meals. Her family has been bringing her snacks, food and drinks. Encouraged less sugary drinks and to make sure she increases her protein. Explained she should treat this as her gastric bypass all over again, to eat small frequent meals throughout the day. To avoid straws and drinks with carbonation. States she has been able to work with therapy but becomes tied quickly. Explained to her that it will take time to regain some strength due to her lengthy hospital stay. Labs, orders and vitals reviewed in [facility], necessary changes made. Patient to have repeat blood work on Monday. Follow up appointment with general surgery scheduled for Monday 07/10. Nursing and prior provider notes reviewed on this visit. Discussed visit with PCP and nursing staff/ supervisor. Review of Systems   Constitutional: Positive for activity change and appetite change. Negative for fatigue, fever and unexpected weight change. HENT: Negative for congestion. Eyes: Negative for pain, discharge and visual disturbance. Respiratory: Negative for cough and shortness of breath. Cardiovascular: Negative for chest pain and palpitations.    Gastrointestinal: Positive for diarrhea (1 episode today). Negative for abdominal pain, constipation, nausea and vomiting. Genitourinary: Negative for difficulty urinating, dysuria, hematuria and urgency. Musculoskeletal: Positive for arthralgias and gait problem. Skin: Positive for wound. Negative for color change. Neurological: Negative for syncope and weakness. Psychiatric/Behavioral: Negative for confusion and sleep disturbance. All other systems reviewed and are negative. ALLERGY: Reviewed, unchanged  No Known Allergies    HISTORY:  Medical Hx: Reviewed, unchanged  Family Hx: Reviewed, unchanged  Soc Hx: Reviewed,  unchanged      Physical Exam  Vitals reviewed. Constitutional:       General: She is not in acute distress. Appearance: Normal appearance. She is not ill-appearing. HENT:      Head: Normocephalic. Right Ear: Tympanic membrane normal.      Left Ear: Tympanic membrane normal.      Nose: Nose normal. No congestion. Mouth/Throat:      Mouth: Mucous membranes are moist.      Pharynx: Oropharynx is clear. Eyes:      General:         Right eye: No discharge. Left eye: No discharge. Extraocular Movements: Extraocular movements intact. Conjunctiva/sclera: Conjunctivae normal.      Pupils: Pupils are equal, round, and reactive to light. Cardiovascular:      Rate and Rhythm: Normal rate. Rhythm irregular. Pulses: Normal pulses. Pulmonary:      Effort: Pulmonary effort is normal. No respiratory distress. Breath sounds: Normal breath sounds. Chest:      Chest wall: No tenderness. Abdominal:      General: Bowel sounds are normal.      Palpations: Abdomen is soft. Tenderness: There is abdominal tenderness. Musculoskeletal:         General: No swelling. Normal range of motion. Cervical back: Normal range of motion. Right lower leg: No edema. Left lower leg: No edema. Skin:     General: Skin is warm and dry.    Neurological:      Mental Status: She is alert and oriented to person, place, and time. Mental status is at baseline. Motor: No weakness. Psychiatric:         Mood and Affect: Mood normal.         Behavior: Behavior normal.         Thought Content: Thought content normal.         Judgment: Judgment normal.            Laboratory results / Imaging reviewed: Hard copy/ies in medical chart:    Vitals:    07/07/23 0925   BP: (!) 124/48   Pulse: 79   Resp: 21   Temp: (!) 97 °F (36.1 °C)   SpO2: 97%       Current Medications: All medications reviewed and updated in Nursing Home Chart    Please note: This note was completed in part utilizing a voice-recognition software may have been used in the preparation of this document. Grammatical errors, random word insertion, spelling mistakes, and incomplete sentences may be an occasional consequence of the system secondary to software limitations, ambient noise and hardware issues. Occasional wrong word or "sound-alike" substitutions may have occurred due to the inherent limitations of voice recognition software. At the time of dictation, efforts were made to edit, clarify and/or correct errors. Interpretation should be guided by context. Please read the chart carefully and recognize, using context, where substitutions have occurred. If you have any questions or concerns about the context, text or information contained within the body of this dictation, please contact myself, the provider, for further clarification.       MOLLY Cali  7/7/2023

## 2023-07-07 NOTE — TELEPHONE ENCOUNTER
Son in law called is concerned about Norma Curry currently in the [address] facility. He is advising that he concerned. Advised I can reach out and speak to the nurse and see what is going on with her. Called and spoke to [employer] at facility/Rehab, advised that Dr Corey Pedraza is overseeing her care, and has advised stable. Called Son in  back and reassured him they do have np/doctors overseeing her care. Advised if he has any other quesitons to reach out and speak to [place] [facility] at the facility and she can go over medications or care at the facility.      Verbally understood

## 2023-07-07 NOTE — ASSESSMENT & PLAN NOTE
· Patient states she has bone on bone in her bilateral knees  · Continue PT/OT as tolerated  · Continue pain control  · Suggested biofreeze application to bilateral knees, patient stating she is ok for right now, will let nurses know if she wants it  · Currently denies pain to knees

## 2023-07-07 NOTE — ASSESSMENT & PLAN NOTE
· Patient with diarrhea while in hospital was started on immodium 2 mg po daily  · Bowel movements slowly moving back to normal  · Change immodium 2 mg to prn   · Monitor bowel movements closely  · Follow up with general surgery 07/10

## 2023-07-07 NOTE — PLAN OF CARE
Problem: PHYSICAL THERAPY ADULT  Goal: Performs mobility at highest level of function for planned discharge setting  See evaluation for individualized goals  Description: Treatment/Interventions: ADL retraining, Functional transfer training, LE strengthening/ROM, Elevations, Therapeutic exercise, Endurance training, Patient/family training, Equipment eval/education, Bed mobility, Gait training, Spoke to nursing  Equipment Recommended: Cleavon Gone       See flowsheet documentation for full assessment, interventions and recommendations  Outcome: Progressing  Note: Prognosis: Fair  Problem List: Decreased strength, Decreased endurance, Impaired balance, Decreased mobility, Pain  Assessment: Pt seen for session for setup, transfers, gait training, repositioning  Pt cooperative and notes she continues to feel stronger  Needing a bit less assist w/ mobility tasks  states she only anticipates needed to staty at rehab a short time, then hopes to go home  Do note increased stability, independence but strength and endurance remain a bit decreased  continue to recommend rehab at d/c  Barriers to Discharge: Decreased caregiver support     PT Discharge Recommendation: Post acute rehabilitation services    See flowsheet documentation for full assessment  Unable to care for self

## 2023-07-07 NOTE — ASSESSMENT & PLAN NOTE
· Recent hospital admission from 06/18-07/04  · Presented to ED with abdominal pain  · Found to have perforated ileum and necrotic jejunum  · 06/19-underwent ex lap, resection of arlene-en-y anastomosis, small bowel resection, and appendectomy  · LUCY drain tube still to left side  · Follow up with general surgery on 07/10  · Continue pain control  · Monitor I/O's  · Continue PT/OT  · OOB as tolerated  · Encourage incentive spirometer  · Repeat blood work scheduled for 07/10

## 2023-07-10 ENCOUNTER — NURSING HOME VISIT (OUTPATIENT)
Dept: GERIATRICS | Facility: OTHER | Age: 73
End: 2023-07-10
Payer: MEDICARE

## 2023-07-10 ENCOUNTER — OFFICE VISIT (OUTPATIENT)
Dept: SURGERY | Facility: CLINIC | Age: 73
End: 2023-07-10

## 2023-07-10 VITALS — TEMPERATURE: 97.3 F

## 2023-07-10 DIAGNOSIS — R19.7 DIARRHEA, UNSPECIFIED TYPE: ICD-10-CM

## 2023-07-10 DIAGNOSIS — K63.1 BOWEL PERFORATION (HCC): ICD-10-CM

## 2023-07-10 DIAGNOSIS — D64.9 ANEMIA, UNSPECIFIED TYPE: ICD-10-CM

## 2023-07-10 DIAGNOSIS — E87.6 HYPOKALEMIA: ICD-10-CM

## 2023-07-10 DIAGNOSIS — G47.33 OSA (OBSTRUCTIVE SLEEP APNEA): ICD-10-CM

## 2023-07-10 DIAGNOSIS — Z98.84 S/P BARIATRIC SURGERY: ICD-10-CM

## 2023-07-10 DIAGNOSIS — I48.21 PERMANENT ATRIAL FIBRILLATION (HCC): ICD-10-CM

## 2023-07-10 DIAGNOSIS — K63.1 BOWEL PERFORATION (HCC): Primary | ICD-10-CM

## 2023-07-10 DIAGNOSIS — I10 PRIMARY HYPERTENSION: ICD-10-CM

## 2023-07-10 DIAGNOSIS — R19.7 DIARRHEA: Primary | ICD-10-CM

## 2023-07-10 DIAGNOSIS — F41.9 ANXIETY: ICD-10-CM

## 2023-07-10 PROCEDURE — 99024 POSTOP FOLLOW-UP VISIT: CPT | Performed by: SURGERY

## 2023-07-10 PROCEDURE — 99309 SBSQ NF CARE MODERATE MDM 30: CPT | Performed by: FAMILY MEDICINE

## 2023-07-10 RX ORDER — POTASSIUM CHLORIDE 1125 MG/1
15 TABLET, EXTENDED RELEASE ORAL DAILY
Qty: 7 TABLET | Refills: 0 | Status: ON HOLD
Start: 2023-07-10 | End: 2023-07-17

## 2023-07-10 NOTE — ASSESSMENT & PLAN NOTE
• Recent hospital admission from 06/18-07/04  • Found to have perforated ileum and necrotic jejunum  • 06/19-underwent ex lap, resection of arlene-en-y anastomosis, small bowel resection, and appendectomy  • LUCY drain tube still to left side  • AMB GEN/SURG 7/10: sutures removed. Dressing changed.      • Continue pain control  • Monitor I/O's  • Continue PT/OT  • OOB as tolerated  • Encourage incentive spirometer  • Repeat blood work scheduled for 07/13

## 2023-07-10 NOTE — ASSESSMENT & PLAN NOTE
Roxanne Blake is a 67 y.o. female who presented 6/18/2023 for perforated viscus. She underwent 6/19 ex lap, resection arlene en y anastomosis, SBR, left in discontinuity, abthera vac- Dr. Beata Cano  6/19 2nd look ex lap, SBR, appendectony, abthera placement - Dr. Anastasia Luu   6/20 ex lap, SBR, creation of anastomosis x 3, abdominal closure - Dr. Anastasia Luu. On the day of discharge, the patient was voiding spontaneously, having bowel movements, and pain was well controlled. Pt currently in rehab, being seen by PT/OT.   Plan  - Patient's sutures were removed, and dressings were changed  - Patient was prescribed Metamucil, potassium to aid with diarrheal symptoms  - Patient's incision was packed with sterile gauze and redressed  - Patient was educated on the importance of good nutrition and maintaining adequate hydration  - Patient was educated on return precautions and understood to report to the hospital in case of new fevers, chills, evisceration of the incision site  - Patient will currently continue on melatonin for her sleep  - Blood pressure, Ativan management as per primary care

## 2023-07-10 NOTE — ASSESSMENT & PLAN NOTE
Anemia of chronic disease vs iron deficiency anemia   Or mixed type? ?     Check Iron panel  CBC   F/u accordingly

## 2023-07-10 NOTE — ASSESSMENT & PLAN NOTE
Mostly due to recent Surgery  Refer to A and P for bowel perforation    Less likely infectious given no SIRS , or abx use.     -increase fiber intake  -metamucil   -imodium PRN   -monitor I/Os

## 2023-07-10 NOTE — ASSESSMENT & PLAN NOTE
Patient's mood is stable at time of assessment  Patient with concerns regarding anxiety/insomnia despite melatonin use.      Continue 24/7 supportive care  Monitor mood    pending Attending evaluation for further advice

## 2023-07-10 NOTE — PROGRESS NOTES
[address]  [phone]  [address] [place] [address] Service: nursing home place of service: POS [address] A Coverage      NAME: Nat Palmer  AGE: 67 y.o. SEX: female [phone]    DATE OF ENCOUNTER: 7/10/2023    Assessment and Plan     1. Bowel perforation ([address])  Assessment & Plan:  • Recent hospital admission from 06/18-07/04  • Found to have perforated ileum and necrotic jejunum  • 06/19-underwent ex lap, resection of arlene-en-y anastomosis, small bowel resection, and appendectomy  • LUCY drain tube still to left side  • AMB GEN/SURG 7/10: sutures removed. Dressing changed. • Continue pain control  • Monitor I/O's  • Continue PT/OT  • OOB as tolerated  • Encourage incentive spirometer  • Repeat blood work scheduled for 07/13        2. Anemia, unspecified type  Assessment & Plan:  Anemia of chronic disease vs iron deficiency anemia   Or mixed type? ? Check Iron panel  CBC   F/u accordingly     Orders:  -     Iron Panel (Includes Ferritin, Iron Sat%, Iron, and TIBC); Future; Expected date: 07/13/2023  -     CBC and Platelet; Future; Expected date: 07/13/2023  -     Comprehensive metabolic panel; Future; Expected date: 07/13/2023    3. Hypokalemia  Assessment & Plan:  Serum K : 3.3     - Kcl 20 meq x1   -f/u with labs    Orders:  -     Iron Panel (Includes Ferritin, Iron Sat%, Iron, and TIBC); Future; Expected date: 07/13/2023  -     CBC and Platelet; Future; Expected date: 07/13/2023  -     Comprehensive metabolic panel; Future; Expected date: 07/13/2023    4. S/P bariatric surgery  Assessment & Plan:  History of arlene-en-y gastric bypass   Recent hospital admission with resection of arlene-en-y anastomosis   Dietary nutrition consult  Encourage small frequent meals throughout the day  Supplemental drinks and magic cup        5.  Anxiety  Assessment & Plan:  Patient's mood is stable at time of assessment  Patient with concerns regarding anxiety/insomnia despite melatonin use. Continue 24/7 supportive care  Monitor mood    pending Attending evaluation for further advice      6. Permanent atrial fibrillation [facility])  Assessment & Plan:  • While hospitalized patient with atrial fibrillation with RVR  • Medications adjusted  ? Was started on amiodarone 200 mg po TID for 10 doses, transition to amiodarone 200 mg po daily  • Continue metoprolol succinate 25 mg po   ? Discontinue metoprolol tartrate 12.5 mg po BID  ? Start metoprolol succinate 25 mg po Q12 hours  • Continue eliquis 5 mg po BID for anticoagulation  • Continue diltiazem 360 mg po daily  • Follow up with cardiology       7. Primary hypertension  Assessment & Plan:  • Continue HCTZ 12.5 mg po daily  ? Recently changed by her cardiologist on 05/31  • Start metoprolol succinate 25 mg po Q12 hours  • Continue diltiazem 360 mg po daily  • Monitor I/O's  • Daily weights  • Borderline Hypotension noted during SNF stay. Encourage follow up with cardiology office       8. GILDARDO (obstructive sleep apnea)  Assessment & Plan:  • Patient to trial using her CPAP tonight  • Respiratory adjusted mask to fit properly      9. Diarrhea, unspecified type  Assessment & Plan:  Mostly due to recent Surgery  Refer to A and P for bowel perforation    Less likely infectious given no SIRS , or abx use.     -increase fiber intake  -metamucil   -imodium PRN   -monitor I/Os             Chief Complaint     Follow up No chief complaint on file. diarrhea and insomnia    History of Present Illness     Roxanne Blake is a 67 y.o. female who was seen today for follow up. She is complaining of insomnia. Patient reports that for the past 5-6 years  She has been taking lorazepam 3 mg hs for insomnia. The patient describes that at bedtime she becomes anxious/nervous, worries about next day plans, no SI/HI . Remarkable improvement and resolution of symptoms with medication use.    Since hospitalization the patient reports that its taking her 1-2 hrs to sleep and once asleep she doesn't wake up until morning time. ( 4-5hrs). She reports being anxious/nervous near bedtime. Worrying about next day activities and children. Seroquel was trialed during hospitalization with mild improvement. Melatonin was trialed with mild improvement as well. Also, patient reports diarrhea consistent of loose/watery stools, 2-3 BM in the past 24 hrs, non-explosive, no associated abdominal pain, blood in stool or mucous. Diarrhea was first noted few days ago. Same since onset. 3-5 times per day. Patient denies any history of similar problem. No associated fever or chills. No chest pain or tightness, SOB or cough, dizziness or light headedness, N/V  No active urinary symptoms  Tolerating oral diet. The following portions of the patient's history were reviewed and updated as appropriate: allergies, current medications, past family history, past medical history, past social history, past surgical history and problem list.    Review of Systems     Review of Systems   Constitutional: Negative for chills, fatigue and fever. HENT: Negative. Negative for hearing loss. Eyes: Negative. Negative for visual disturbance. Respiratory: Negative for cough, shortness of breath and wheezing. Cardiovascular: Negative for chest pain and palpitations. Gastrointestinal: Positive for diarrhea. Negative for abdominal pain, blood in stool, constipation, nausea and vomiting. Endocrine: Negative. Genitourinary: Negative for difficulty urinating and dysuria. Musculoskeletal: Negative for arthralgias and myalgias. Skin: Negative. Allergic/Immunologic: Negative. Neurological: Negative for dizziness, seizures, syncope, light-headedness and headaches. Hematological: Negative for adenopathy. Psychiatric/Behavioral: Negative.         Active Problem List     Patient Active Problem List   Diagnosis   • Closed fracture of right distal radius and ulna   • Malignant tumor of right kidney parenchyma (HCC)   • Primary osteoarthritis of left knee   • Primary osteoarthritis of right knee   • Chronic pain of left knee   • Myalgia   • Chest wall tenderness   • Chronic heart failure (HCC)   • Permanent atrial fibrillation (HCC)   • Cervical spondylosis without myelopathy   • Arthropathy of cervical facet joint   • Occipital neuralgia of right side   • Status post partial thyroidectomy   • Myofascial pain syndrome   • Hashimoto's thyroiditis   • Early menopause occurring in patient age younger than 39 years   • S/P bariatric surgery   • Osteoporosis   • Hyperparathyroidism ([address])   • Reactive hypoglycemia   • Vitamin D deficiency   • Unsteady gait   • Dyspnea on exertion   • SIRS (systemic inflammatory response syndrome) ([facility])   • Incontinence   • GILDARDO (obstructive sleep apnea)   • Morbid obesity with BMI of 50.0-59.9, adult ([facility])   • Hypertension   • Stage 3 chronic kidney disease, unspecified whether stage 3a or 3b CKD ([facility])   • Primary osteoarthritis of both knees   • Idiopathic acute pancreatitis without infection or necrosis   • Bowel perforation ([facility])   • Acute encephalopathy   • Anxiety   • Diarrhea   • Hypokalemia   • Anemia       Objective     Vital Signs:     Blood pressure 106/53 Heart Rate: 71 Respiratory Rate 17   Temperature 97 Oxygen Saturation 99 Weight 292    Physical Exam  Vitals and nursing note reviewed. Constitutional:       General: She is not in acute distress. Appearance: Normal appearance. She is not ill-appearing or diaphoretic. HENT:      Head: Normocephalic and atraumatic. Eyes:      Conjunctiva/sclera: Conjunctivae normal.   Cardiovascular:      Rate and Rhythm: Normal rate and regular rhythm. Heart sounds: Normal heart sounds. No murmur heard. Pulmonary:      Effort: Pulmonary effort is normal. No respiratory distress. Breath sounds: Normal breath sounds. No wheezing, rhonchi or rales.    Abdominal: General: There is no distension. Palpations: Abdomen is soft. There is no mass. Tenderness: There is no abdominal tenderness. There is no guarding or rebound. Comments: Drain noted at bedside. Null fluid ( care per surgery office visit today)   Dressing noted . Dry/clean. No signs of active drainage from the incision area. Per surgery note. Incision healing as planned. Sutures removed today. Musculoskeletal:      Cervical back: Normal range of motion and neck supple. No rigidity. Right lower leg: No edema. Left lower leg: No edema. Lymphadenopathy:      Cervical: No cervical adenopathy. Neurological:      Mental Status: She is alert and oriented to person, place, and time. Mental status is at baseline. Psychiatric:         Mood and Affect: Mood normal.         Behavior: Behavior normal.         Thought Content: Thought content normal.         Judgment: Judgment normal.         Pertinent Laboratory/Diagnostic Studies:  Laboratory and Imaging studies reviewed. Full report in the paper chart. Current Medications   Medications reviewed and updated in facility chart.     Name: Mike Duvall  : 1950  MRN: [id number]        Cheyenne Velasquez MD  Geriatric Medicine  7/10/2023 2:51 PM

## 2023-07-10 NOTE — ASSESSMENT & PLAN NOTE
History of arlene-en-y gastric bypass   Recent hospital admission with resection of arlene-en-y anastomosis   Dietary nutrition consult  Encourage small frequent meals throughout the day  Supplemental drinks and magic cup

## 2023-07-10 NOTE — PROGRESS NOTES
Office Visit - General Surgery  Cristela Carmichael MRN: [id number]  Encounter: [id number]    Assessment and Plan  Problem List Items Addressed This Visit        Digestive    Bowel perforation [facility])     Cristela Carmichael is a 67 y.o. female who presented 6/18/2023 for perforated viscus. She underwent 6/19 ex lap, resection arlene en y anastomosis, SBR, left in discontinuity, abthera vac- Dr. Grayson Ames  6/19 2nd look ex lap, SBR, appendectony, abthera placement - Dr. Melly Roblero   6/20 ex lap, SBR, creation of anastomosis x 3, abdominal closure - Dr. Melly Roblero. On the day of discharge, the patient was voiding spontaneously, having bowel movements, and pain was well controlled. Pt currently in rehab, being seen by PT/OT. Plan  - Patient's sutures were removed, and dressings were changed  - Patient was prescribed Metamucil, potassium to aid with diarrheal symptoms  - Patient's incision was packed with sterile gauze and redressed  - Patient was educated on the importance of good nutrition and maintaining adequate hydration  - Patient was educated on return precautions and understood to report to the hospital in case of new fevers, chills, evisceration of the incision site  - Patient will currently continue on melatonin for her sleep  - Blood pressure, Ativan management as per primary care             Other    Diarrhea - Primary    Relevant Medications    potassium chloride SA (KLOR-CON M15) 15 MEQ tablet    psyllium (METAMUCIL SMOOTH TEXTURE) 28 % packet       Chief Complaint:  Cristela Carmichael is a 67 y.o. female who presents for Post-op (P/o perforated bowel.)    Subjective  Ms. Khadijah Flores presents approximately 3 weeks postop from a perforated bowel repair. She denies having any pain. She is currently at [facility]. Her main complaint today is that she has not been able to sleep for the last few days and that is causing her weakness/fatigue. She is unable to participate fully in the PT/OT.   She is usually prescribed out of Ativan 4 times a day from her PCP, the rehab is not giving her Ativan and giving her melatonin instead. The melatonin is not working for her to sleep. She has had 2 episodes of diarrhea yesterday and 2 episodes today. She is able to tolerate regular diet, and reports taking sips of water every 15 minutes. She is currently on Imodium, reports it is minimally helpful. No fevers chills or nausea. Past Medical History:   Diagnosis Date   • A-fib [facility])    • Arthritis    • Atrial fibrillation ([address])    • Cervical spondylosis with myelopathy    • Gastric bypass status for obesity    • History of transfusion     35 years ago   • Hypertension    • mass right kidney    • Renal cell adenocarcinoma ([address])     RIGHT   • Sleep apnea        Past Surgical History:   Procedure Laterality Date   • ABDOMINAL ADHESION SURGERY N/A 6/19/2023    Procedure: LYSIS ADHESIONS;  Surgeon: Opal Goldman DO;  [facility];  Service: General   • APPENDECTOMY N/A 6/19/2023    Procedure: APPENDECTOMY;  Surgeon: Opal Goldman DO;  [facility];  Service: General   • CHOLECYSTECTOMY     • COLOSTOMY     • CRYOABLATION Right     RT KIDNEY   • CYSTORRHAPHY      Bladder.  Last assessed 4/6/2016    • EXPLORATORY LAPAROTOMY W/ BOWEL RESECTION N/A 6/19/2023    Procedure: LAPAROTOMY EXPLORATORY W/ BOWEL RESECTION;  Surgeon: Aamir Hsu MD;  [facility];  Service: General   • GASTRIC BYPASS     • HERNIA REPAIR      Last assessed 4/6/2016    • HYSTERECTOMY     • LAPAROTOMY N/A 6/19/2023    Procedure: LAPAROTOMY EXPLORATORY, washout, vac change;  Surgeon: Opal Goldman DO;  [facility];  Service: General   • LAPAROTOMY N/A 6/20/2023    Procedure: LAPAROTOMY, REMOVAL OF WOUND VAC AND PACKING, SMALL BOWEL RESECTION , BOWEL ANASTOMOSIS X 3 , VICRYL MESH BRIDGING -;  Surgeon: Opal Goldman DO;  [facility];  Service: General   • HI OPTX DSTL RADL X-ARTIC FX/EPIPHYSL SEP Right 4/22/2016    Procedure: OPEN REDUCTION INTERNAL FIXATION RIGHT DISTAL RADIUS;  Surgeon: Francine Bowden MD;  [facility];  Service: Orthopedics   • REVISION COLOSTOMY     • SMALL INTESTINE SURGERY N/A 6/19/2023    Procedure: RESECTION SMALL BOWEL;  Surgeon: Alexia Mohamud DO;  [facility];  Service: General   • THYROID LOBECTOMY Left 8/27/2019    Procedure: LOBECTOMY THYROID, left;  Surgeon: Shashank Muller MD;  [facility];  Service: Surgical Oncology   • TUBAL LIGATION     • US GUIDED THYROID BIOPSY  2/27/2019   • US GUIDED THYROID BIOPSY  5/29/2019       Family History   Problem Relation Age of Onset   • Heart attack Mother    • Lung cancer Father 76   • Heart disease Father    • Stroke Sister    • Lung cancer Sister 64   • Prostate cancer Brother 61       Social History     Tobacco Use   • Smoking status: Never   • Smokeless tobacco: Never   Vaping Use   • Vaping Use: Never used   Substance Use Topics   • Alcohol use: Not Currently     Alcohol/week: 0.0 standard drinks of alcohol     Comment: 0   • Drug use: No        Medications  No current facility-administered medications on file prior to visit.      Current Outpatient Medications on File Prior to Visit   Medication Sig Dispense Refill   • acetaminophen (TYLENOL) 325 mg tablet Take 975 mg by mouth every 6 (six) hours as needed for mild pain or moderate pain     • acetaminophen (TYLENOL) 325 mg tablet Take 2 tablets (650 mg total) by mouth every 6 (six) hours as needed for mild pain or fever  0   • amiodarone 200 mg tablet Take 1 tablet (200 mg total) by mouth daily with breakfast for 11 doses Do not start before July 8, 2023. 11 tablet 0   • apixaban (Eliquis) 5 mg Take 1 tablet (5 mg total) by mouth 2 (two) times a day 60 tablet 11   • diltiazem (CARDIZEM CD) 360 MG 24 hr capsule Take 1 capsule (360 mg total) by mouth daily 90 capsule 3   • ergocalciferol (VITAMIN D2) 50,000 units Take 50,000 Units by mouth once a week Takes D3      • FLUoxetine (PROzac) 20 mg capsule Take 20 mg by mouth 2 (two) times a day       • gabapentin (NEURONTIN) 300 mg capsule Take 600 mg by mouth daily at bedtime      • hydrochlorothiazide (HYDRODIURIL) 12.5 mg tablet Take 1 tablet (12.5 mg total) by mouth daily 90 tablet 3   • loperamide (IMODIUM) 2 mg capsule Take 1 capsule (2 mg total) by mouth 2 (two) times a day (Patient taking differently: Take 2 mg by mouth as needed for diarrhea) 30 capsule 0   • LORazepam (ATIVAN) 1 mg tablet Take 1 mg by mouth daily as needed for anxiety     • LORazepam (ATIVAN) 1 mg tablet Take 1 tablet (1 mg total) by mouth daily at bedtime for 7 days 7 tablet 0   • melatonin 3 mg Take 2 tablets (6 mg total) by mouth daily at bedtime  0   • metoprolol succinate (TOPROL-XL) 25 mg 24 hr tablet Take 1 tablet (25 mg total) by mouth daily (Patient taking differently: Take 25 mg by mouth every 12 (twelve) hours) 90 tablet 3   • Probiotic Product (PRO-BIOTIC BLEND PO) Take by mouth     • QUEtiapine (SEROquel) 25 mg tablet Take 0.5 tablets (12.5 mg total) by mouth daily at bedtime (Patient taking differently: Take 12.5 mg by mouth daily at bedtime as needed)  0   • amiodarone 200 mg tablet Take 1 tablet (200 mg total) by mouth 3 (three) times a day with meals for 10 doses 10 tablet 0       Allergies  No Known Allergies    Review of Systems   All other systems reviewed and are negative. Objective  Vitals:    07/10/23 1055   Temp: (!) 97.3 °F (36.3 °C)       Physical Exam  Constitutional:       Appearance: Normal appearance. HENT:      Head: Normocephalic and atraumatic. Cardiovascular:      Rate and Rhythm: Normal rate and regular rhythm. Abdominal:      General: A surgical scar is present. Bowel sounds are normal. There is no distension. Palpations: Abdomen is soft. Tenderness: There is no abdominal tenderness. There is no guarding. Comments: Midline incision is present.   Proximal part of incision is clean dry intact. Distal part of the incision has 2 openings approximately 9 cm in depth. Neurological:      Mental Status: She is alert.

## 2023-07-10 NOTE — ASSESSMENT & PLAN NOTE
• While hospitalized patient with atrial fibrillation with RVR  • Medications adjusted  ? Was started on amiodarone 200 mg po TID for 10 doses, transition to amiodarone 200 mg po daily  • Continue metoprolol succinate 25 mg po   ? Discontinue metoprolol tartrate 12.5 mg po BID  ?  Start metoprolol succinate 25 mg po Q12 hours  • Continue eliquis 5 mg po BID for anticoagulation  • Continue diltiazem 360 mg po daily  • Follow up with cardiology

## 2023-07-10 NOTE — ASSESSMENT & PLAN NOTE
• Continue HCTZ 12.5 mg po daily  ? Recently changed by her cardiologist on 05/31  • Start metoprolol succinate 25 mg po Q12 hours  • Continue diltiazem 360 mg po daily  • Monitor I/O's  • Daily weights  • Borderline Hypotension noted during SNF stay.  Encourage follow up with cardiology office

## 2023-07-13 ENCOUNTER — NURSING HOME VISIT (OUTPATIENT)
Dept: GERIATRICS | Facility: OTHER | Age: 73
End: 2023-07-13
Payer: MEDICARE

## 2023-07-13 DIAGNOSIS — N18.30 STAGE 3 CHRONIC KIDNEY DISEASE, UNSPECIFIED WHETHER STAGE 3A OR 3B CKD (HCC): ICD-10-CM

## 2023-07-13 DIAGNOSIS — D50.8 OTHER IRON DEFICIENCY ANEMIA: ICD-10-CM

## 2023-07-13 DIAGNOSIS — Z98.84 S/P BARIATRIC SURGERY: ICD-10-CM

## 2023-07-13 DIAGNOSIS — I95.9 HYPOTENSION, UNSPECIFIED HYPOTENSION TYPE: ICD-10-CM

## 2023-07-13 DIAGNOSIS — E87.6 HYPOKALEMIA: ICD-10-CM

## 2023-07-13 DIAGNOSIS — I48.21 PERMANENT ATRIAL FIBRILLATION (HCC): ICD-10-CM

## 2023-07-13 DIAGNOSIS — R19.7 DIARRHEA, UNSPECIFIED TYPE: ICD-10-CM

## 2023-07-13 DIAGNOSIS — I10 PRIMARY HYPERTENSION: ICD-10-CM

## 2023-07-13 DIAGNOSIS — S31.109S OPEN WOUND OF ABDOMINAL WALL, SEQUELA: ICD-10-CM

## 2023-07-13 DIAGNOSIS — K63.1 BOWEL PERFORATION (HCC): Primary | ICD-10-CM

## 2023-07-13 PROBLEM — S31.109A OPEN ABDOMINAL WALL WOUND: Status: ACTIVE | Noted: 2023-07-13

## 2023-07-13 PROBLEM — D50.9 IRON DEFICIENCY ANEMIA: Status: ACTIVE | Noted: 2023-07-13

## 2023-07-13 PROCEDURE — 99309 SBSQ NF CARE MODERATE MDM 30: CPT | Performed by: FAMILY MEDICINE

## 2023-07-13 NOTE — ASSESSMENT & PLAN NOTE
In short, patient presented with perforated viscus she underwent 6/19 ex lap, resection arlene en y anastomosis, SBR, left in discontinuity, abthera vac- Dr. Grayson Ames  6/19 2nd look ex lap, SBR, appendectony, abthera placement - Dr. Melly Roblero   6/20 ex lap, SBR, creation of anastomosis x 3, abdominal closure - Dr. Melly Roblero  7/10 s/p suture removal per GEN SURG     -active bowel sounds, passing flatus/stool . Last BM : <24hrs ago. Formed. Diarrhea resolved. -encourage oral hydration  -fiber intake  -metamucil   -wound care per nursing   -general surgery recommendations regarding woundcare and drain care are highly appreciated.  Pending.     -monitor I/Os

## 2023-07-13 NOTE — PROGRESS NOTES
[address]  [phone]  [address] [place] [address] Service: nursing home place of service: POS [address] A Coverage      NAME: Vesta Su  AGE: 67 y.o. SEX: female [phone]    DATE OF ENCOUNTER: 7/13/2023    Assessment and Plan     Problem List Items Addressed This Visit        Digestive    Bowel perforation ([address]) - Primary     In short, patient presented with perforated viscus she underwent 6/19 ex lap, resection arlene en y anastomosis, SBR, left in discontinuity, abthera vac- Dr. Nirav Villarreal  6/19 2nd look ex lap, SBR, appendectony, abthera placement - Dr. Marizol Garcia   6/20 ex lap, SBR, creation of anastomosis x 3, abdominal closure - Dr. Marizol Garcia  7/10 s/p suture removal per GEN SURG     -active bowel sounds, passing flatus/stool . Last BM : <24hrs ago. Formed. Diarrhea resolved. -encourage oral hydration  -fiber intake  -metamucil   -wound care per nursing   -general surgery recommendations regarding woundcare and drain care are highly appreciated. Pending.     -monitor I/Os               Cardiovascular and Mediastinum    Permanent atrial fibrillation (HCC)     Continue cardizem 360 mg Q24  Continue amiodarone 200 mg   Decrease metoprolol XR 25 >>12.5 OD   Continue to hold HCTZ 12.5   Continue Eliquis     Monitor I/Os   Vitals per unit            Hypertension     Continue cardizem 360 mg Q24  Decrease metoprolol XR 25 >>12.5 OD   Continue to hold HCTZ 12.5     Monitor I/Os   Vitals per unit          Hypotension     Borderline hypotension.      Continue cardizem 360 mg Q24  Decrease metoprolol XR 25 >>12.5 OD   Continue to hold HCTZ 12.5     Monitor I/Os   Vitals per unit               Genitourinary    Stage 3 chronic kidney disease, unspecified whether stage 3a or 3b CKD (HCC)     Lab Results   Component Value Date    EGFR 73 07/13/2023    EGFR 71 07/10/2023    EGFR 83 07/07/2023    CREATININE 0.80 07/13/2023    CREATININE 0.82 07/10/2023    CREATININE 0.72 07/07/2023   stable. Not in ASTRID             Other    S/P bariatric surgery     History of arlene-en-y gastric bypass   Recent hospital admission with resection of arlene-en-y anastomosis   Dietary nutrition consult  Encourage small frequent meals throughout the day  Supplemental drinks and magic cup         Diarrhea     Mostly due to recent surgery and hospitalization     Disturbed gut microbiota     · Continue metamucil  · Increase fiber intake   · Monitor I/Os  · Diarrhea resolved 7/14/23          Hypokalemia     Mild. · kcl 20 BID   · Encourage oral hydration   · F/u repeat BMP          Open abdominal wall wound     S/p lap . Per surgery for bowel perforation   Drain in place    · Surgery with recs from 7/10/23 for wound care and dressing change  · No active signs of infection today  · Pending further recs from surgery team regarding drain and wound care          Iron deficiency anemia     TIBC: 155  Iron: 21       -replete iron per protocol   -blood transfusion per protocol                 Chief Complaint     Follow up No chief complaint on file. History of Present Illness     Etelvina Hoang is a 67 y.o. female who was seen today for follow up. She doesn't have any active complaints. Tolerating oral diet. Denies any abdominal pain. Diarrhea resolved. stooling and voiding accordingly  Formed stool . No blood/mucous ID. No N/V . No active urinary symptoms   No fever or chills     No chest pain or tightness, SOB or cough, dizziness or light headedness,   Tolerating oral diet. Nursing; no concerns overnight.      The following portions of the patient's history were reviewed and updated as appropriate: allergies, current medications, past family history, past medical history, past social history, past surgical history and problem list.    Review of Systems     Review of Systems    Active Problem List     Patient Active Problem List   Diagnosis   • Closed fracture of right distal radius and ulna   • Malignant tumor of right kidney parenchyma (HCC)   • Primary osteoarthritis of left knee   • Primary osteoarthritis of right knee   • Chronic pain of left knee   • Myalgia   • Chest wall tenderness   • Chronic heart failure (HCC)   • Permanent atrial fibrillation (HCC)   • Cervical spondylosis without myelopathy   • Arthropathy of cervical facet joint   • Occipital neuralgia of right side   • Status post partial thyroidectomy   • Myofascial pain syndrome   • Hashimoto's thyroiditis   • Early menopause occurring in patient age younger than 39 years   • S/P bariatric surgery   • Osteoporosis   • Hyperparathyroidism ([address])   • Reactive hypoglycemia   • Vitamin D deficiency   • Unsteady gait   • Dyspnea on exertion   • SIRS (systemic inflammatory response syndrome) (HCC)   • Incontinence   • GILDARDO (obstructive sleep apnea)   • Morbid obesity with BMI of 50.0-59.9, adult ([facility])   • Hypertension   • Stage 3 chronic kidney disease, unspecified whether stage 3a or 3b CKD ([facility])   • Primary osteoarthritis of both knees   • Idiopathic acute pancreatitis without infection or necrosis   • Bowel perforation ([facility])   • Acute encephalopathy   • Anxiety   • Diarrhea   • Hypokalemia   • Anemia   • Open abdominal wall wound   • Iron deficiency anemia   • Hypotension       Objective     Vital Signs:     Blood pressure 90/56 Heart Rate: 70 Respiratory Rate 20   Temperature 97 Oxygen Saturation 97 Weight 300    Physical Exam  Vitals and nursing note reviewed. Constitutional:       General: She is not in acute distress. Appearance: Normal appearance. She is not ill-appearing or diaphoretic. HENT:      Head: Normocephalic and atraumatic. Eyes:      Conjunctiva/sclera: Conjunctivae normal.   Cardiovascular:      Rate and Rhythm: Normal rate and regular rhythm. Heart sounds: Normal heart sounds. No murmur heard. Pulmonary:      Effort: Pulmonary effort is normal. No respiratory distress. Breath sounds: Normal breath sounds. No wheezing, rhonchi or rales. Abdominal:      General: There is no distension. Palpations: Abdomen is soft. There is no mass. Tenderness: There is no abdominal tenderness. There is no guarding or rebound. Comments: Open incisional surgical wounds noted on the periumbilical area as marked. Foul odor. Dressing removed. Soaked in serous fluid. No active drainage noted at bedside. No induration or erythema. Wound is open visible drain line noted. Drain at bedside with less than 5 cc ( serous fluid)    Musculoskeletal:      Cervical back: Normal range of motion and neck supple. No rigidity. Right lower leg: No edema. Left lower leg: No edema. Lymphadenopathy:      Cervical: No cervical adenopathy. Skin:     Capillary Refill: Capillary refill takes less than 2 seconds. Neurological:      General: No focal deficit present. Mental Status: She is alert and oriented to person, place, and time. Mental status is at baseline. Psychiatric:         Mood and Affect: Mood normal.         Behavior: Behavior normal.         Thought Content: Thought content normal.         Judgment: Judgment normal.         Pertinent Laboratory/Diagnostic Studies:  Laboratory and Imaging studies reviewed. Full report in the paper chart. Current Medications   Medications reviewed and updated in facility chart.     Name: Valley Sep  : 1950  MRN: [id number]        Danie Dejesus MD  Geriatric Medicine  2023 2:12 PM

## 2023-07-13 NOTE — ASSESSMENT & PLAN NOTE
Borderline hypotension.      Continue cardizem 360 mg Q24  Decrease metoprolol XR 25 >>12.5 OD   Continue to hold HCTZ 12.5     Monitor I/Os   Vitals per unit

## 2023-07-13 NOTE — ASSESSMENT & PLAN NOTE
Lab Results   Component Value Date    EGFR 73 07/13/2023    EGFR 71 07/10/2023    EGFR 83 07/07/2023    CREATININE 0.80 07/13/2023    CREATININE 0.82 07/10/2023    CREATININE 0.72 07/07/2023   stable.    Not in ASTRID

## 2023-07-13 NOTE — ASSESSMENT & PLAN NOTE
Continue cardizem 360 mg Q24  Continue amiodarone 200 mg   Decrease metoprolol XR 25 >>12.5 OD   Continue to hold HCTZ 12.5   Continue Eliquis     Monitor I/Os   Vitals per unit

## 2023-07-13 NOTE — ASSESSMENT & PLAN NOTE
Continue cardizem 360 mg Q24  Decrease metoprolol XR 25 >>12.5 OD   Continue to hold HCTZ 12.5     Monitor I/Os   Vitals per unit

## 2023-07-13 NOTE — ASSESSMENT & PLAN NOTE
S/p lap .  Per surgery for bowel perforation   Drain in place    · Surgery with recs from 7/10/23 for wound care and dressing change  · No active signs of infection today  · Pending further recs from surgery team regarding drain and wound care

## 2023-07-13 NOTE — PROGRESS NOTES
General Surgery    Received TT notification from [facility] regarding Ms. Lemus's dressing change. Noted to have increased in size since visit on Monday (after sutures removed) and drain is now visible within wound. Images reviewed; patient has bridging vicryl mesh with a superficial drain on top, followed by skin. From the office visit on Monday. The skin edges were noted to have begun dehiscing and was not adherent to the mesh / subcutaneous tissue below. As such, larger dehiscence is not unexpected. Would continue local wound care with BID Wet to dry packing with kerlex, covered by ABD pad; change qdaily and PRN. Maintain abdominal binder  Have patient follow-up in 1 week (instead of 2 weeks) for repeat wound check - I.e. Monday - so that the superficial LUCY drain can be removed.     Porsha Aguiar MD

## 2023-07-13 NOTE — ASSESSMENT & PLAN NOTE
Mostly due to recent surgery and hospitalization     Disturbed gut microbiota     · Continue metamucil  · Increase fiber intake   · Monitor I/Os  · Diarrhea resolved 7/14/23

## 2023-07-17 ENCOUNTER — NURSING HOME VISIT (OUTPATIENT)
Dept: GERIATRICS | Facility: OTHER | Age: 73
End: 2023-07-17
Payer: MEDICARE

## 2023-07-17 DIAGNOSIS — N18.30 STAGE 3 CHRONIC KIDNEY DISEASE, UNSPECIFIED WHETHER STAGE 3A OR 3B CKD (HCC): ICD-10-CM

## 2023-07-17 DIAGNOSIS — K63.1 BOWEL PERFORATION (HCC): Primary | ICD-10-CM

## 2023-07-17 DIAGNOSIS — R26.2 AMBULATORY DYSFUNCTION: ICD-10-CM

## 2023-07-17 DIAGNOSIS — I48.21 PERMANENT ATRIAL FIBRILLATION (HCC): ICD-10-CM

## 2023-07-17 PROCEDURE — 99309 SBSQ NF CARE MODERATE MDM 30: CPT | Performed by: NURSE PRACTITIONER

## 2023-07-17 NOTE — ASSESSMENT & PLAN NOTE
HR Goal < 100/min  BP range (7/4-17/2023) = 90/50 to 137/70  HR range (7/4-17/2023) = 61 to 105/min  Continue the following meds:  * Amiodarone 200mg daily  * Diltiazem ER 360mg daily  * Metoprolol tartrat 12.5mg BID  * Eliquis 5mg BID  * KCl 20mEQ BID  D/C Hctz  Followed by Cardiology office as out-patient.

## 2023-07-17 NOTE — ASSESSMENT & PLAN NOTE
S/P Expl. Lap (6/20/2023)  Surgical incision scabbed and no signs of infection  = Anurag Rinaldi drain still in place: empty  Patient reported minimal pain on gently palpation  Patient reported regular bowel movements and baseline appetite.   Per nursing, has chronic looses stools  = uses banatrol plus TID  Continue Probiotic daily  Slightly elevated WBC today (7/17/2023): 10.39   Post hospital GI office isit on 7/18/2023

## 2023-07-17 NOTE — PROGRESS NOTES
[facility]  [address] [facility], [address] [place] [address], [facility] [address]  [phone]    NAME: Joan Mckoy  AGE: 67 y.o. SEX: female    Progress Note    Location: [address] [place] [address]  POS: 31 (SNF)    Assessment/Plan:    Bowel perforation ([facility])  S/P Expl. Lap (6/20/2023)  Surgical incision scabbed and no signs of infection  = Izora Nurse drain still in place: empty  Patient reported minimal pain on gently palpation  Patient reported regular bowel movements and baseline appetite. Per nursing, has chronic looses stools  = uses banatrol plus TID  Continue Probiotic daily  Slightly elevated WBC today (7/17/2023): 10.39   Post hospital GI office isit on 7/18/2023    Permanent atrial fibrillation (HCC)  HR Goal < 100/min  BP range (7/4-17/2023) = 90/50 to 137/70  HR range (7/4-17/2023) = 61 to 105/min  Continue the following meds:  * Amiodarone 200mg daily  * Diltiazem ER 360mg daily  * Metoprolol tartrat 12.5mg BID  * Eliquis 5mg BID  * KCl 20mEQ BID  D/C Hctz  Followed by Cardiology office as out-patient. Stage 3 chronic kidney disease, unspecified whether stage 3a or 3b CKD ([facility])  Lab Results   Component Value Date    EGFR 81 07/17/2023    EGFR 73 07/13/2023    EGFR 71 07/10/2023    CREATININE 0.73 07/17/2023    CREATININE 0.80 07/13/2023    CREATININE 0.82 07/10/2023   Renal function WNL (7/17/2023): Crea: 0.73/ BUN: 14/ eGFR: 81  Continue to avoid hypotensive episodes and use of nephrotoxic medications  BMP 7/21/2023    Ambulatory dysfunction  Continue PT/OT as scheduled      Chief complaint / Reason for visit: Follow- visit/ Acute Visit    History of Present Illness: This is a 74-year-old female patient currently admitted at [address] [place] [address] (7/4/2023 to present) following discharge from acute care hospitalization ([address] [place] [address] Avenue: 6/18/2023 to present) with Dx of Idiopathic acute pancreatitis without infection or necrosis - S/P Expl. Lap Washout/ Small bowel Resection and anastomosis x3 vicryl mesh bridging (6/20/2023).     Patient is seen and examined today to follow-up acute on chronic medical conditions mentioned above and Permanent Atrial Fibrillation, CKD3, Ambulatory dysfunction. Patient is out of bed with this visit sitting in chair in room -alert, cooperative, calm, pleasant and not in distress. Patient is verbally engaged with clear coherent speech -oriented to name/birthday/current date and place. Patient acknowledged feeling well on this visit, " I'm good" -denies any acute medical concerns during ROS assessment including pain. Per nursing, no acute medical concerns with this visit. Nursing and prior provider notes reviewed on this visit. Discussed visit with PCP and nursing staff/ supervisor. Review of Systems:  Per history of present illness, all other systems reviewed and negative. HISTORY:  Medical Hx: Reviewed, unchanged  Family Hx: Reviewed, unchanged  Soc Hx: Reviewed,  unchanged    ALLERGY: Reviewed, unchanged. No Known Allergies     PHYSICAL EXAM:  Vital Signs: T96.7F -P94 -R16 BP: 116/58 ApO2: 93% RA  Weight: 294.6 lbs (7/16/2023) <= 304.0 lbs (7/4/2023)    General: NAD. Well appearing. No acute distress. With Obesity  Head: Atraumatic. Normocephalic. Ear: Slightly [place]  Eye Exam: anicteric sclera, no discharge, PERRLA, No injection  Oral Exam: moist mucous membranes, no buccaloropharyngeal erythema, palatine tonsils WNL. Neck Exam: no anterior cervical lymphadenopathy noted, neck supple. Trachea midline, no carotid bruit, no masses  Cardiovascular: regular rate, regular rhythm, no murmurs, rubs, or gallops. S1 and S2  Pulmonary: no wheeze, no rhonchi, no rales. No chest tenderness. Normal chest wall expansion  Abdominal: soft, non-tender, nondistended, bowel sounds audible x 4 quadrants. No palpable hepatosplenomegaly, no tympany. Well approximated surgical incision - scabbed with fausto santiago still in place: empty "since yesterday"  : Non distended bladder.    Extremities and skin: Trace B/L LE edema, no rashes. Intact skin  Neurological: alert, cooperative and responsive, Oriented x 3. No tics, normal sensation to pressure and light touch.  moving all 4 extremities symmetrically    Laboratory results / Imaging reviewed: Hard copy/ies in medical chart:    * CMP (7/17/2023): Latest Reference Range & Units 07/17/23 04:30   Sodium 135 - 147 mmol/L 138   Potassium 3.5 - 5.3 mmol/L 4.6   Chloride 96 - 108 mmol/L 106   CO2 21 - 32 mmol/L 24   Anion Gap mmol/L 8   BUN 5 - 25 mg/dL 14   Creatinine 0.60 - 1.30 mg/dL 0.73   Glucose, Random 65 - 140 mg/dL 78   Calcium 8.4 - 10.2 mg/dL 7.6 (L)   CORRECTED CALCIUM 8.3 - 10.1 mg/dL 9.0   AST 13 - 39 U/L 14   ALT 7 - 52 U/L 8   Alkaline Phosphatase 34 - 104 U/L 114 (H)   Total Protein 6.4 - 8.4 g/dL 4.3 (L)   Albumin 3.5 - 5.0 g/dL 2.3 (L)   TOTAL BILIRUBIN 0.20 - 1.00 mg/dL 0.38   eGFR ml/min/1.73sq m 81       * CBC wo diff (7/17/2023): Latest Reference Range & Units 07/17/23 04:30   WBC 4.31 - 10.16 Thousand/uL 10.39 (H)   Red Blood Cell Count 3.81 - 5.12 Million/uL 3.05 (L)   Hemoglobin 11.5 - 15.4 g/dL 8.2 (L)   HCT 34.8 - 46.1 % 27.8 (L)   MCV 82 - 98 fL 91   MCH 26.8 - 34.3 pg 26.9   MCHC 31.4 - 37.4 g/dL 29.5 (L)   RDW 11.6 - 15.1 % 18.8 (H)   Platelet Count 293 - 390 Thousands/uL 320   MPV 8.9 - 12.7 fL 9.4       Current Medications: All medications reviewed and updated in Nursing Home Chart    Please note: This note was completed in part utilizing a voice-recognition software may have been used in the preparation of this document. Grammatical errors, random word insertion, spelling mistakes, and incomplete sentences may be an occasional consequence of the system secondary to software limitations, ambient noise and hardware issues. Occasional wrong word or "sound-alike" substitutions may have occurred due to the inherent limitations of voice recognition software. At the time of dictation, efforts were made to edit, clarify and/or correct errors.  Interpretation should be guided by context. Please read the chart carefully and recognize, using context, where substitutions have occurred. If you have any questions or concerns about the context, text or information contained within the body of this dictation, please contact myself, the provider, for further clarification.       MOLLY Quintanilla  7/17/2023

## 2023-07-17 NOTE — ASSESSMENT & PLAN NOTE
Lab Results   Component Value Date    EGFR 81 07/17/2023    EGFR 73 07/13/2023    EGFR 71 07/10/2023    CREATININE 0.73 07/17/2023    CREATININE 0.80 07/13/2023    CREATININE 0.82 07/10/2023   Renal function WNL (7/17/2023): Crea: 0.73/ BUN: 14/ eGFR: 81  Continue to avoid hypotensive episodes and use of nephrotoxic medications  BMP 7/21/2023

## 2023-07-18 ENCOUNTER — ANESTHESIA (INPATIENT)
Dept: PERIOP | Facility: HOSPITAL | Age: 73
DRG: 901 | End: 2023-07-18
Payer: MEDICARE

## 2023-07-18 ENCOUNTER — OFFICE VISIT (OUTPATIENT)
Dept: SURGERY | Facility: CLINIC | Age: 73
End: 2023-07-18

## 2023-07-18 ENCOUNTER — HOSPITAL ENCOUNTER (INPATIENT)
Facility: HOSPITAL | Age: 73
LOS: 6 days | DRG: 901 | End: 2023-07-24
Attending: SURGERY | Admitting: SURGERY
Payer: MEDICARE

## 2023-07-18 ENCOUNTER — ANESTHESIA EVENT (INPATIENT)
Dept: PERIOP | Facility: HOSPITAL | Age: 73
DRG: 901 | End: 2023-07-18
Payer: MEDICARE

## 2023-07-18 VITALS — TEMPERATURE: 97.8 F | HEIGHT: 63 IN | BODY MASS INDEX: 53.85 KG/M2

## 2023-07-18 DIAGNOSIS — T81.30XA WOUND DEHISCENCE: Primary | ICD-10-CM

## 2023-07-18 DIAGNOSIS — S31.109S OPEN WOUND OF ABDOMINAL WALL, SEQUELA: Primary | ICD-10-CM

## 2023-07-18 DIAGNOSIS — S31.109D OPEN WOUND OF ABDOMINAL WALL, SUBSEQUENT ENCOUNTER: ICD-10-CM

## 2023-07-18 LAB
ALBUMIN SERPL BCP-MCNC: 1.8 G/DL (ref 3.5–5)
ALP SERPL-CCNC: 154 U/L (ref 46–116)
ALT SERPL W P-5'-P-CCNC: 15 U/L (ref 12–78)
ANION GAP SERPL CALCULATED.3IONS-SCNC: 5 MMOL/L
AST SERPL W P-5'-P-CCNC: 18 U/L (ref 5–45)
BASOPHILS # BLD MANUAL: 0.12 THOUSAND/UL (ref 0–0.1)
BASOPHILS NFR MAR MANUAL: 1 % (ref 0–1)
BILIRUB SERPL-MCNC: 0.39 MG/DL (ref 0.2–1)
BUN SERPL-MCNC: 13 MG/DL (ref 5–25)
CALCIUM ALBUM COR SERPL-MCNC: 10 MG/DL (ref 8.3–10.1)
CALCIUM SERPL-MCNC: 8.2 MG/DL (ref 8.3–10.1)
CHLORIDE SERPL-SCNC: 109 MMOL/L (ref 96–108)
CO2 SERPL-SCNC: 25 MMOL/L (ref 21–32)
CREAT SERPL-MCNC: 0.83 MG/DL (ref 0.6–1.3)
EOSINOPHIL # BLD MANUAL: 0 THOUSAND/UL (ref 0–0.4)
EOSINOPHIL NFR BLD MANUAL: 0 % (ref 0–6)
ERYTHROCYTE [DISTWIDTH] IN BLOOD BY AUTOMATED COUNT: 18.6 % (ref 11.6–15.1)
GFR SERPL CREATININE-BSD FRML MDRD: 70 ML/MIN/1.73SQ M
GLUCOSE SERPL-MCNC: 104 MG/DL (ref 65–140)
HCT VFR BLD AUTO: 29.6 % (ref 34.8–46.1)
HGB BLD-MCNC: 9 G/DL (ref 11.5–15.4)
INR PPP: 1.39 (ref 0.84–1.19)
IRON SERPL-MCNC: 18 UG/DL (ref 50–170)
LYMPHOCYTES # BLD AUTO: 0.62 THOUSAND/UL (ref 0.6–4.47)
LYMPHOCYTES # BLD AUTO: 4 % (ref 14–44)
MCH RBC QN AUTO: 26.9 PG (ref 26.8–34.3)
MCHC RBC AUTO-ENTMCNC: 30.4 G/DL (ref 31.4–37.4)
MCV RBC AUTO: 88 FL (ref 82–98)
METAMYELOCYTES NFR BLD MANUAL: 2 % (ref 0–1)
MONOCYTES # BLD AUTO: 0.37 THOUSAND/UL (ref 0–1.22)
MONOCYTES NFR BLD: 3 % (ref 4–12)
NEUTROPHILS # BLD MANUAL: 11.1 THOUSAND/UL (ref 1.85–7.62)
NEUTS BAND NFR BLD MANUAL: 3 % (ref 0–8)
NEUTS SEG NFR BLD AUTO: 86 % (ref 43–75)
PLATELET # BLD AUTO: 378 THOUSANDS/UL (ref 149–390)
PLATELET BLD QL SMEAR: ADEQUATE
PMV BLD AUTO: 9.2 FL (ref 8.9–12.7)
POIKILOCYTOSIS BLD QL SMEAR: PRESENT
POLYCHROMASIA BLD QL SMEAR: PRESENT
POTASSIUM SERPL-SCNC: 4.3 MMOL/L (ref 3.5–5.3)
PROT SERPL-MCNC: 5.4 G/DL (ref 6.4–8.4)
PROTHROMBIN TIME: 17.3 SECONDS (ref 11.6–14.5)
RBC # BLD AUTO: 3.35 MILLION/UL (ref 3.81–5.12)
RBC MORPH BLD: PRESENT
SODIUM SERPL-SCNC: 139 MMOL/L (ref 135–147)
VARIANT LYMPHS # BLD AUTO: 1 %
WBC # BLD AUTO: 12.47 THOUSAND/UL (ref 4.31–10.16)

## 2023-07-18 PROCEDURE — 85027 COMPLETE CBC AUTOMATED: CPT

## 2023-07-18 PROCEDURE — 99284 EMERGENCY DEPT VISIT MOD MDM: CPT

## 2023-07-18 PROCEDURE — 85610 PROTHROMBIN TIME: CPT

## 2023-07-18 PROCEDURE — 99024 POSTOP FOLLOW-UP VISIT: CPT | Performed by: SURGERY

## 2023-07-18 PROCEDURE — PBNCHG PB NO CHARGE PLACEHOLDER: Performed by: SURGERY

## 2023-07-18 PROCEDURE — 11005 DBRDMT SKIN ABDOMINAL WALL: CPT | Performed by: SURGERY

## 2023-07-18 PROCEDURE — 36415 COLL VENOUS BLD VENIPUNCTURE: CPT

## 2023-07-18 PROCEDURE — 87040 BLOOD CULTURE FOR BACTERIA: CPT

## 2023-07-18 PROCEDURE — 80053 COMPREHEN METABOLIC PANEL: CPT

## 2023-07-18 PROCEDURE — 0JB80ZZ EXCISION OF ABDOMEN SUBCUTANEOUS TISSUE AND FASCIA, OPEN APPROACH: ICD-10-PCS | Performed by: SURGERY

## 2023-07-18 PROCEDURE — 85007 BL SMEAR W/DIFF WBC COUNT: CPT

## 2023-07-18 PROCEDURE — 87154 CUL TYP ID BLD PTHGN 6+ TRGT: CPT

## 2023-07-18 PROCEDURE — 83540 ASSAY OF IRON: CPT

## 2023-07-18 RX ORDER — ONDANSETRON 2 MG/ML
4 INJECTION INTRAMUSCULAR; INTRAVENOUS ONCE AS NEEDED
Status: DISCONTINUED | OUTPATIENT
Start: 2023-07-18 | End: 2023-07-19

## 2023-07-18 RX ORDER — LANOLIN ALCOHOL/MO/W.PET/CERES
6 CREAM (GRAM) TOPICAL
Status: DISCONTINUED | OUTPATIENT
Start: 2023-07-18 | End: 2023-07-24 | Stop reason: HOSPADM

## 2023-07-18 RX ORDER — FENTANYL CITRATE 50 UG/ML
INJECTION, SOLUTION INTRAMUSCULAR; INTRAVENOUS AS NEEDED
Status: DISCONTINUED | OUTPATIENT
Start: 2023-07-18 | End: 2023-07-18

## 2023-07-18 RX ORDER — DEXTROSE MONOHYDRATE, SODIUM CHLORIDE, AND POTASSIUM CHLORIDE 50; 1.49; 4.5 G/1000ML; G/1000ML; G/1000ML
75 INJECTION, SOLUTION INTRAVENOUS CONTINUOUS
Status: DISCONTINUED | OUTPATIENT
Start: 2023-07-18 | End: 2023-07-19

## 2023-07-18 RX ORDER — CEFAZOLIN SODIUM 1 G/3ML
INJECTION, POWDER, FOR SOLUTION INTRAMUSCULAR; INTRAVENOUS AS NEEDED
Status: DISCONTINUED | OUTPATIENT
Start: 2023-07-18 | End: 2023-07-18

## 2023-07-18 RX ORDER — ONDANSETRON 2 MG/ML
INJECTION INTRAMUSCULAR; INTRAVENOUS AS NEEDED
Status: DISCONTINUED | OUTPATIENT
Start: 2023-07-18 | End: 2023-07-18

## 2023-07-18 RX ORDER — METRONIDAZOLE 500 MG/100ML
500 INJECTION, SOLUTION INTRAVENOUS EVERY 8 HOURS
Status: DISCONTINUED | OUTPATIENT
Start: 2023-07-18 | End: 2023-07-19

## 2023-07-18 RX ORDER — ACETAMINOPHEN 325 MG/1
975 TABLET ORAL EVERY 6 HOURS PRN
Status: DISCONTINUED | OUTPATIENT
Start: 2023-07-18 | End: 2023-07-22

## 2023-07-18 RX ORDER — FENTANYL CITRATE/PF 50 MCG/ML
50 SYRINGE (ML) INJECTION
Status: DISCONTINUED | OUTPATIENT
Start: 2023-07-18 | End: 2023-07-19 | Stop reason: HOSPADM

## 2023-07-18 RX ORDER — ONDANSETRON 2 MG/ML
4 INJECTION INTRAMUSCULAR; INTRAVENOUS EVERY 6 HOURS PRN
Status: DISCONTINUED | OUTPATIENT
Start: 2023-07-18 | End: 2023-07-24 | Stop reason: HOSPADM

## 2023-07-18 RX ORDER — SODIUM CHLORIDE, SODIUM LACTATE, POTASSIUM CHLORIDE, CALCIUM CHLORIDE 600; 310; 30; 20 MG/100ML; MG/100ML; MG/100ML; MG/100ML
INJECTION, SOLUTION INTRAVENOUS CONTINUOUS PRN
Status: DISCONTINUED | OUTPATIENT
Start: 2023-07-18 | End: 2023-07-18

## 2023-07-18 RX ORDER — ALBUMIN, HUMAN INJ 5% 5 %
SOLUTION INTRAVENOUS CONTINUOUS PRN
Status: DISCONTINUED | OUTPATIENT
Start: 2023-07-18 | End: 2023-07-18

## 2023-07-18 RX ORDER — DEXAMETHASONE SODIUM PHOSPHATE 10 MG/ML
INJECTION, SOLUTION INTRAMUSCULAR; INTRAVENOUS AS NEEDED
Status: DISCONTINUED | OUTPATIENT
Start: 2023-07-18 | End: 2023-07-18

## 2023-07-18 RX ORDER — CEFAZOLIN SODIUM 2 G/50ML
2000 SOLUTION INTRAVENOUS EVERY 8 HOURS
Status: DISCONTINUED | OUTPATIENT
Start: 2023-07-18 | End: 2023-07-18

## 2023-07-18 RX ORDER — ERGOCALCIFEROL 1.25 MG/1
50000 CAPSULE ORAL WEEKLY
Status: DISCONTINUED | OUTPATIENT
Start: 2023-07-19 | End: 2023-07-24 | Stop reason: HOSPADM

## 2023-07-18 RX ORDER — PROPOFOL 10 MG/ML
INJECTION, EMULSION INTRAVENOUS AS NEEDED
Status: DISCONTINUED | OUTPATIENT
Start: 2023-07-18 | End: 2023-07-18

## 2023-07-18 RX ORDER — DEXAMETHASONE SODIUM PHOSPHATE 4 MG/ML
INJECTION, SOLUTION INTRA-ARTICULAR; INTRALESIONAL; INTRAMUSCULAR; INTRAVENOUS; SOFT TISSUE AS NEEDED
Status: DISCONTINUED | OUTPATIENT
Start: 2023-07-18 | End: 2023-07-18

## 2023-07-18 RX ORDER — MAGNESIUM HYDROXIDE 1200 MG/15ML
LIQUID ORAL AS NEEDED
Status: DISCONTINUED | OUTPATIENT
Start: 2023-07-18 | End: 2023-07-18 | Stop reason: HOSPADM

## 2023-07-18 RX ORDER — LIDOCAINE HYDROCHLORIDE 10 MG/ML
INJECTION, SOLUTION EPIDURAL; INFILTRATION; INTRACAUDAL; PERINEURAL AS NEEDED
Status: DISCONTINUED | OUTPATIENT
Start: 2023-07-18 | End: 2023-07-18

## 2023-07-18 RX ORDER — HEPARIN SODIUM 5000 [USP'U]/ML
5000 INJECTION, SOLUTION INTRAVENOUS; SUBCUTANEOUS EVERY 8 HOURS SCHEDULED
Status: DISCONTINUED | OUTPATIENT
Start: 2023-07-18 | End: 2023-07-18

## 2023-07-18 RX ORDER — ROCURONIUM BROMIDE 10 MG/ML
INJECTION, SOLUTION INTRAVENOUS AS NEEDED
Status: DISCONTINUED | OUTPATIENT
Start: 2023-07-18 | End: 2023-07-18

## 2023-07-18 RX ORDER — DILTIAZEM HYDROCHLORIDE 180 MG/1
360 CAPSULE, COATED, EXTENDED RELEASE ORAL DAILY
Status: DISCONTINUED | OUTPATIENT
Start: 2023-07-19 | End: 2023-07-24 | Stop reason: HOSPADM

## 2023-07-18 RX ORDER — EPHEDRINE SULFATE 50 MG/ML
INJECTION INTRAVENOUS AS NEEDED
Status: DISCONTINUED | OUTPATIENT
Start: 2023-07-18 | End: 2023-07-18

## 2023-07-18 RX ORDER — FLUOXETINE HYDROCHLORIDE 20 MG/1
20 CAPSULE ORAL 2 TIMES DAILY
Status: DISCONTINUED | OUTPATIENT
Start: 2023-07-18 | End: 2023-07-24 | Stop reason: HOSPADM

## 2023-07-18 RX ORDER — PROMETHAZINE HYDROCHLORIDE 25 MG/ML
25 INJECTION, SOLUTION INTRAMUSCULAR; INTRAVENOUS ONCE AS NEEDED
Status: DISCONTINUED | OUTPATIENT
Start: 2023-07-18 | End: 2023-07-19

## 2023-07-18 RX ORDER — HYDROMORPHONE HCL/PF 1 MG/ML
0.4 SYRINGE (ML) INJECTION
Status: DISCONTINUED | OUTPATIENT
Start: 2023-07-18 | End: 2023-07-19 | Stop reason: HOSPADM

## 2023-07-18 RX ORDER — METOPROLOL SUCCINATE 25 MG/1
25 TABLET, EXTENDED RELEASE ORAL DAILY
Status: DISCONTINUED | OUTPATIENT
Start: 2023-07-19 | End: 2023-07-24 | Stop reason: HOSPADM

## 2023-07-18 RX ORDER — AMIODARONE HYDROCHLORIDE 200 MG/1
200 TABLET ORAL
Status: DISCONTINUED | OUTPATIENT
Start: 2023-07-19 | End: 2023-07-24 | Stop reason: HOSPADM

## 2023-07-18 RX ORDER — HEPARIN SODIUM 5000 [USP'U]/ML
5000 INJECTION, SOLUTION INTRAVENOUS; SUBCUTANEOUS EVERY 8 HOURS SCHEDULED
Status: DISCONTINUED | OUTPATIENT
Start: 2023-07-18 | End: 2023-07-20

## 2023-07-18 RX ADMIN — LIDOCAINE HYDROCHLORIDE 50 MG: 10 INJECTION, SOLUTION EPIDURAL; INFILTRATION; INTRACAUDAL; PERINEURAL at 17:32

## 2023-07-18 RX ADMIN — DEXTROSE 3000 MG: 50 INJECTION, SOLUTION INTRAVENOUS at 22:08

## 2023-07-18 RX ADMIN — PROPOFOL 160 MG: 10 INJECTION, EMULSION INTRAVENOUS at 17:32

## 2023-07-18 RX ADMIN — CEFAZOLIN 1000 MG: 1 INJECTION, POWDER, FOR SOLUTION INTRAMUSCULAR; INTRAVENOUS at 17:53

## 2023-07-18 RX ADMIN — ROCURONIUM BROMIDE 50 MG: 10 INJECTION, SOLUTION INTRAVENOUS at 17:32

## 2023-07-18 RX ADMIN — MELATONIN 6 MG: at 22:07

## 2023-07-18 RX ADMIN — DEXAMETHASONE SODIUM PHOSPHATE 4 MG: 10 INJECTION, SOLUTION INTRAMUSCULAR; INTRAVENOUS at 17:54

## 2023-07-18 RX ADMIN — SODIUM CHLORIDE, SODIUM LACTATE, POTASSIUM CHLORIDE, AND CALCIUM CHLORIDE: .6; .31; .03; .02 INJECTION, SOLUTION INTRAVENOUS at 17:24

## 2023-07-18 RX ADMIN — SUGAMMADEX 200 MG: 100 INJECTION, SOLUTION INTRAVENOUS at 18:39

## 2023-07-18 RX ADMIN — FENTANYL CITRATE 50 MCG: 50 INJECTION, SOLUTION INTRAMUSCULAR; INTRAVENOUS at 18:40

## 2023-07-18 RX ADMIN — EPHEDRINE SULFATE 5 MG: 50 INJECTION INTRAVENOUS at 18:07

## 2023-07-18 RX ADMIN — METRONIDAZOLE 500 MG: 500 INJECTION, SOLUTION INTRAVENOUS at 15:35

## 2023-07-18 RX ADMIN — ONDANSETRON 4 MG: 2 INJECTION INTRAMUSCULAR; INTRAVENOUS at 17:55

## 2023-07-18 RX ADMIN — FENTANYL CITRATE 50 MCG: 50 INJECTION, SOLUTION INTRAMUSCULAR; INTRAVENOUS at 17:32

## 2023-07-18 RX ADMIN — SODIUM CHLORIDE, SODIUM LACTATE, POTASSIUM CHLORIDE, AND CALCIUM CHLORIDE: .6; .31; .03; .02 INJECTION, SOLUTION INTRAVENOUS at 18:48

## 2023-07-18 RX ADMIN — DEXTROSE, SODIUM CHLORIDE, AND POTASSIUM CHLORIDE 125 ML/HR: 5; .45; .15 INJECTION INTRAVENOUS at 16:32

## 2023-07-18 RX ADMIN — METRONIDAZOLE 500 MG: 500 INJECTION, SOLUTION INTRAVENOUS at 23:00

## 2023-07-18 RX ADMIN — CEFAZOLIN SODIUM 2000 MG: 2 SOLUTION INTRAVENOUS at 14:48

## 2023-07-18 RX ADMIN — HEPARIN SODIUM 5000 UNITS: 5000 INJECTION INTRAVENOUS; SUBCUTANEOUS at 15:35

## 2023-07-18 RX ADMIN — EPHEDRINE SULFATE 5 MG: 50 INJECTION INTRAVENOUS at 18:02

## 2023-07-18 RX ADMIN — ALBUMIN (HUMAN): 12.5 INJECTION, SOLUTION INTRAVENOUS at 18:19

## 2023-07-18 RX ADMIN — EPHEDRINE SULFATE 5 MG: 50 INJECTION INTRAVENOUS at 18:20

## 2023-07-18 RX ADMIN — HEPARIN SODIUM 5000 UNITS: 5000 INJECTION INTRAVENOUS; SUBCUTANEOUS at 22:07

## 2023-07-18 NOTE — ANESTHESIA POSTPROCEDURE EVALUATION
Post-Op Assessment Note    CV Status:  Stable  Pain Score: 0    Pain management: adequate     Mental Status:  Alert and awake   Hydration Status:  Euvolemic   PONV Controlled:  Controlled   Airway Patency:  Patent      Post Op Vitals Reviewed: Yes      Staff: CRNA   Comments: Pt awake, alert, able to maintain own airway, VSS, recovered in OR d/t PACU hold and transported back to inpt room 634, report given to receiving RN Bessy        No notable events documented.     /55 (07/18/23 1917)    Temp      Pulse 71 (07/18/23 1917)   Resp 16 (07/18/23 1917)    SpO2 100 % (07/18/23 1917)

## 2023-07-18 NOTE — ED NOTES
Nurse attempted IV but was unsuccessful, resident attempting to put ultrasound IV in     [place] GINO Michel  07/18/23 [id number]

## 2023-07-18 NOTE — ANESTHESIA PREPROCEDURE EVALUATION
Procedure:  DEBRIDEMENT WOUND [facility] OUT) (Abdomen)  APPLICATION VAC DRESSING ABDOMEN/TRUNK (Abdomen)    Relevant Problems   CARDIO   (+) Dyspnea on exertion   (+) Hypertension   (+) Permanent atrial fibrillation (HCC)      ENDO   (+) Hyperparathyroidism (HCC)      GI/HEPATIC   (+) Idiopathic acute pancreatitis without infection or necrosis   (+) Reactive hypoglycemia      /RENAL   (+) Malignant tumor of right kidney parenchyma (HCC)   (+) Stage 3 chronic kidney disease, unspecified whether stage 3a or 3b CKD (HCC)      HEMATOLOGY   (+) Anemia   (+) Iron deficiency anemia      MUSCULOSKELETAL   (+) Cervical spondylosis without myelopathy   (+) Myofascial pain syndrome   (+) Primary osteoarthritis of both knees   (+) Primary osteoarthritis of left knee   (+) Primary osteoarthritis of right knee      NEURO/PSYCH   (+) Anxiety   (+) Myofascial pain syndrome      PULMONARY   (+) Dyspnea on exertion   (+) GILDARDO (obstructive sleep apnea)        Physical Exam    Airway    Mallampati score: I  TM Distance: >3 FB  Neck ROM: full     Dental   upper dentures and lower dentures,     Cardiovascular      Pulmonary      Other Findings        Anesthesia Plan  ASA Score- 3     Anesthesia Type- general with ASA Monitors. Additional Monitors:   Airway Plan: ETT. Plan Factors-Exercise tolerance (METS): >4 METS. Chart reviewed. Existing labs reviewed. Patient summary reviewed. Induction- intravenous. Postoperative Plan- Plan for postoperative opioid use. Informed Consent- Anesthetic plan and risks discussed with patient. I personally reviewed this patient with the CRNA. Discussed and agreed on the Anesthesia Plan with the CRNA. Dougie Knott

## 2023-07-18 NOTE — H&P
General Surgery  History and Physical  Lalito Salazar 67 y.o. female MRN: [id number]  Unit/Bed#: ED 27 Encounter: [id number]    Assessment:  76yo F with midline wound dehiscence. Was recently admitted 6/19-7/4 with bowel perforation and internal hernia (hx arlene-en-y gastric bypass)  S/p ex lap, resection J-J anastomosis, segmental ileum resection, abthera 6/19  S/p ex lap, washout, abthera change 6/19  S/p ex lap, small bowel resection, bowel anastomosis x3, vicryl bridging mesh 6/20    AVSS  Labs pending    Plan:  - admit to general surgery service  - OR today for washout, wound exploration, debridement, possible wound vac placement  - start rocephin/flagyl  - f/u labs  - PT/OT  - further recs to follow pending operative findings      History of Present Illness   HPI:  Lalito Salazar is a 67 y.o. female who presents with a wound dehiscence. She was sent from clinic today because her midline wound has two areas of dehiscence. Her children at bedside have noticed a noticeable change in the size of the dehiscence in the past few days. She other wise has been doing well and progressing with therapy in rehab. This midline wound is from a recent hospitalization where she was admitted for a bowel perforation and internal hernia. She had multiple abdominal surgeries with eventual closure of the wall with vicryl bridging mesh. PMHx afib on eliquis, cervical spondylosis with myelopathy, HTN, right renal cell carcinoma s/p cryoablation, sleep apnea, s/p cholecystectomy, s/p hysterectomy, s/p left thyroid lobectomy 2019     Review of Systems   Constitutional: Negative for appetite change and fever. HENT: Negative. Respiratory: Negative. Cardiovascular: Negative. Gastrointestinal: Negative for abdominal pain, nausea and vomiting. Genitourinary: Negative. Musculoskeletal: Negative. Skin: Positive for wound. Neurological: Negative. Psychiatric/Behavioral: Negative.         Historical Information   Past Medical History:   Diagnosis Date   • A-fib [facility])    • Arthritis    • Atrial fibrillation ([address])    • Cervical spondylosis with myelopathy    • Gastric bypass status for obesity    • History of transfusion     35 years ago   • Hypertension    • mass right kidney    • Renal cell adenocarcinoma ([address])     RIGHT   • Sleep apnea      Past Surgical History:   Procedure Laterality Date   • ABDOMINAL ADHESION SURGERY N/A 6/19/2023    Procedure: LYSIS ADHESIONS;  Surgeon: Mendel Patrick, DO;  [facility];  Service: General   • APPENDECTOMY N/A 6/19/2023    Procedure: APPENDECTOMY;  Surgeon: Mendel Patrick, DO;  [facility];  Service: General   • CHOLECYSTECTOMY     • COLOSTOMY     • CRYOABLATION Right     RT KIDNEY   • CYSTORRHAPHY      Bladder.  Last assessed 4/6/2016    • EXPLORATORY LAPAROTOMY W/ BOWEL RESECTION N/A 6/19/2023    Procedure: LAPAROTOMY EXPLORATORY W/ BOWEL RESECTION;  Surgeon: Hira Gordon MD;  [facility];  Service: General   • GASTRIC BYPASS     • HERNIA REPAIR      Last assessed 4/6/2016    • HYSTERECTOMY     • LAPAROTOMY N/A 6/19/2023    Procedure: LAPAROTOMY EXPLORATORY, washout, vac change;  Surgeon: Mendel Patrick, DO;  [facility];  Service: General   • LAPAROTOMY N/A 6/20/2023    Procedure: LAPAROTOMY, REMOVAL OF WOUND VAC AND PACKING, SMALL BOWEL RESECTION , BOWEL ANASTOMOSIS X 3 , VICRYL MESH BRIDGING -;  Surgeon: Mendel Patrick, DO;  [facility];  Service: General   • PA OPTX DSTL RADL X-ARTIC FX/EPIPHYSL SEP Right 4/22/2016    Procedure: OPEN REDUCTION INTERNAL FIXATION RIGHT DISTAL RADIUS;  Surgeon: Hannah Black MD;  [facility];  Service: Orthopedics   • REVISION COLOSTOMY     • SMALL INTESTINE SURGERY N/A 6/19/2023    Procedure: RESECTION SMALL BOWEL;  Surgeon: Mendel Patrick, DO;  [facility];  Service: General   • THYROID LOBECTOMY Left 8/27/2019    Procedure: LOBECTOMY THYROID, left;  Surgeon: Lindsey Chang MD;  [facility];  Service: Surgical Oncology   • TUBAL LIGATION     • US GUIDED THYROID BIOPSY  2/27/2019   • US GUIDED THYROID BIOPSY  5/29/2019     Social History   Social History     Substance and Sexual Activity   Alcohol Use Not Currently   • Alcohol/week: 0.0 standard drinks of alcohol    Comment: 0     Social History     Substance and Sexual Activity   Drug Use No     Social History     Tobacco Use   Smoking Status Never   Smokeless Tobacco Never     Family History: non-contributory    Meds/Allergies   all medications and allergies reviewed  No Known Allergies    Objective   First Vitals:   Blood Pressure: 145/60 (07/18/23 1235)  Pulse: 71 (07/18/23 1235)  Temperature: 98.7 °F (37.1 °C) (07/18/23 1235)  Temp Source: Oral (07/18/23 1235)  Respirations: 20 (07/18/23 1235)  SpO2: 97 % (07/18/23 1235)    Current Vitals:   Blood Pressure: 145/60 (07/18/23 1235)  Pulse: 71 (07/18/23 1235)  Temperature: 98.7 °F (37.1 °C) (07/18/23 1235)  Temp Source: Oral (07/18/23 1235)  Respirations: 20 (07/18/23 1235)  SpO2: 97 % (07/18/23 1235)    No intake or output data in the 24 hours ending 07/18/23 1325    Invasive Devices     Drain  Duration           Closed/Suction Drain Lateral;Left LUQ 19 Fr. 27 days                Physical Exam:  General: No acute distress  Neuro: alert and oriented  HEENT: moist mucous membranes  CV: Well perfused, regular rate and rhythm  Lungs: Normal work of breathing, no increased respiratory effort  Abdomen: Soft, non-tender, non-distended. Two areas of dehiscence, fascia not intact but with bridging mesh seen underneath. No evidence of evisceration at this time. No active drainage, no purulent or feculent drainage.   Extremities: No edema, clubbing or cyanosis  Skin: Warm, dry    Lab Results: CBC: No results found for: "WBC", "HGB", "HCT", "MCV", "PLT", "ADJUSTEDWBC", "RBC", "MCH", "MCHC", "RDW", "MPV", "NRBC", CMP: No results found for: "SODIUM", "K", "CL", "CO2", "ANIONGAP", "BUN", "CREATININE", "GLUCOSE", "CALCIUM", "AST", "ALT", "ALKPHOS", "PROT", "BILITOT", "EGFR"  Imaging: I have personally reviewed pertinent films in PACS  EKG, Pathology, and Other Studies: I have personally reviewed pertinent films in PACS    Code Status: Prior  Advance Directive and Living Will:      Power of :    POLST:      Counseling / Coordination of Care  Total floor / unit time spent today 20 minutes. This involved direct patient contact where I performed a full history and physical, reviewed previous records, and reviewed laboratory and other diagnostic studies. Greater than 50% of total time was spent with the patient and / or family counseling and / or coordination of care.     Soniya Brar MD  General Surgery PGY2  7/18/2023

## 2023-07-18 NOTE — ED PROCEDURE NOTE
Procedure  US Guided Peripheral IV    Date/Time: 7/18/2023 2:46 PM    Performed by: Faby Albarado MD  Authorized by: Faby Albarado MD    Patient location:  ED  Performed by:  Resident  Other Assisting Provider: Yes (comment) (Dr. Arnold Villeda)    Indications:     Indications: difficulty obtaining IV access      Image availability:  Images available in PACS  Procedure details:     Patient evaluated for contraindications to access (i.e. fistula, thrombosis, etc): Yes      Standard clean technique used for ultrasound access: Yes      Location:  Right arm    Catheter size:  18 gauge    Number of attempts:  1    Successful placement: yes    Post-procedure details:     Post-procedure:  Dressing applied    Assessment: free fluid flow and no signs of infiltration      Post-procedure complications: none      Patient tolerance of procedure:   Tolerated well, no immediate complications                     Faby Albarado MD  07/18/23 1644

## 2023-07-18 NOTE — OP NOTE
OPERATIVE REPORT  PATIENT NAME: Chester Posadas    :  1950  MRN: [id number]  Pt Location: [facility] OR ROOM 08    SURGERY DATE: 2023    Surgeon(s) and Role:     * Randi Harris DO - Primary     * Mary Luther MD - Assisting     * Kelechi Quiros MD - Assisting    Preop Diagnosis:  Open wound of abdominal wall, sequela [S31.109S]    Post-Op Diagnosis Codes:     * Open wound of abdominal wall, sequela [S31.109S]    Procedure(s):  DEBRIDEMENT WOUND ([address] [place] [address] OUT)    Specimen(s):  * No specimens in log *    Estimated Blood Loss:   Minimal    Drains:  Closed/Suction Drain Lateral;Left LUQ 19 Fr. (Active)   Site Description Healing;Scabbed 23   Dressing Status Open to air 23 2100   Drainage Appearance Serous 23   Status To bulb suction 23 2100   Intake (mL) 0 mL 23 0359   Output (mL) 40 mL 23 0359   Number of days: 28       [REMOVED] Closed/Suction Drain Lateral;Right RUQ Bulb 19 Fr. (Removed)   Site Description Unable to view 23   Dressing Status Clean;Dry; Intact 23   Drainage Appearance Serous 23   Status To bulb suction 23   Intake (mL) 0 mL 23 0800   Output (mL) 0 mL 23   Number of days: 10       [REMOVED] Closed/Suction Drain Left LLQ Bulb 10 Fr. (Removed)   Site Description Reddened 23 1800   Dressing Status Clean;Dry; Intact 23 1800   Drainage Appearance Serosanguineous 23 1800   Status To bulb suction 23 1800   Intake (mL) 0 mL 23 0800   Output (mL) 40 mL 23 1201   Number of days: 8       [REMOVED] NG/OG/Enteral Tube Nasogastric 12 Fr Right nare (Removed)   Placement Reverification Aspiration 23 0815   Site Assessment Clean;Dry; Intact 23 0815   Status Suction-low intermittent 23 0815   Drainage Appearance Bile 23 0815   Output (mL) 0 mL 23 0815   Number of days: 3       [REMOVED] Urethral Catheter Latex 14 Fr.  (Removed)   Reasons to continue Urinary Catheter  Accurate I&O assessment in critically ill patients (48 hr. max) 06/24/23 2000   Goal for Removal Voiding trial when ambulation improves 06/24/23 2000   Site Assessment Clean;Skin intact 06/25/23 0815   Bolanos Care Done 06/25/23 0800   Collection Container Standard drainage bag 06/25/23 0815   Securement Method Securing device (Describe) 06/25/23 0815   Output (mL) 130 mL 06/25/23 1800   Number of days: 6       [REMOVED] External Urinary Catheter (Removed)   Collection Container Canister and suction tubing (For Female) 07/04/23 0359   Suction Pressure (mmHg) 80 mmHg 07/03/23 0603   Interventions Removed and skin assessed; Pericare performed;Device changed;Suction canister changed 07/04/23 0359   Output (mL) 50 mL 07/04/23 0501   Number of days: 6       Anesthesia Type:   General    Operative Indications:  Open wound of abdominal wall, sequela [S31.109S]    Operative Findings:  -Two midline necrotic wounds 5x5cm with skin bridge.   -One kerlix and one adaptic removed. -15x6cm of non viable skin including two midline wounds, sharply debrided to healthy bleeding skin edges. 90 cm squared. -15x6x6 cm of necrotic subcutaneous tissue sharply debrided to healthy bleeding tissue. 540 cm squared.   -total of 630 cm squared tissue debridement.   -midline wound now measures 15x6x6.   -exposed healthy bowel at wound base   -packed with 5 radilabelel kerlix tied together. Complications:   None    Procedure and Technique:  Patient with a history of Sury-en-Y gastric bypass complicated by internal hernia and necrotic bowel status post small bowel resection, and eventual reconstruction closure of abdomen with Vicryl mesh. She presents to the office with necrotic discharge from midline abdominal wound. Decision was made to take the patient to the operating room for wound exploration and debridement.     Patient was identified in the preoperative holding area via wristband and verbally taken to the operating room in stable condition. Upon arrival to the operating room she was again identified verbally and via wristband. Laid supine on the operating table, both arms were extended pressure points were padded patient was induced with general anesthesia airway was secured with endotracheal intubation per anesthesia colleagues. Abdomen was prepped and draped in the regular sterile fashion timeout was called and all were in agreement. 1 Kerlix and 1 piece of Adaptic were removed from the wound. There were 2 midline wounds approximately 5 x 5 cm in size with necrotic material at the base of these wounds. Decision was made to excise both of these wounds to healthy bleeding skin edges. Approximately 15 x 6 cm of skin was sharply excised with 15 blade scalpel to healthy bleeding skin edges. 90 cm of skin debrided. Subcutaneous tissues were necrotic along the wound margins, this was also debrided sharply with Metzenbaum scissors measuring 15 x 6 x 6 cm of subcutaneous tissue was debrided. Since 540 cm² of necrotic subcutaneous tissue debrided. Wound now measures 15 x 11 x 6 cm. Wound base with healthy loops of frozen bowel. Wound was then washed with copious amounts of saline solution using the Pulsavac . Hemostasis was achieved with direct pressure, and minimal use of Bovie electrocautery along the skin edges. Next abdomen was packed with 5 radiolabeled Kerlix which were tied together. At the end of the case sponge and instrument counts were performed and all were correct. Wound was then dressed with 4 x 4's and trauma ABD pad. Covered with paper tape. Patient was awoken from anesthesia, extubated, taken back to her hospital room in stable condition. Laine Meyer was present for the entire procedure. I was present for the entire procedure.     Patient Disposition:  PACU         SIGNATURE: Duyen Harkins MD  DATE: July 18, 2023  TIME: 7:14 PM

## 2023-07-19 ENCOUNTER — ANESTHESIA EVENT (INPATIENT)
Dept: PERIOP | Facility: HOSPITAL | Age: 73
DRG: 901 | End: 2023-07-19
Payer: MEDICARE

## 2023-07-19 ENCOUNTER — ANESTHESIA (INPATIENT)
Dept: PERIOP | Facility: HOSPITAL | Age: 73
DRG: 901 | End: 2023-07-19
Payer: MEDICARE

## 2023-07-19 LAB
ALBUMIN SERPL BCP-MCNC: 1.7 G/DL (ref 3.5–5)
ALP SERPL-CCNC: 132 U/L (ref 46–116)
ALT SERPL W P-5'-P-CCNC: 12 U/L (ref 12–78)
ANION GAP SERPL CALCULATED.3IONS-SCNC: 5 MMOL/L
AST SERPL W P-5'-P-CCNC: 13 U/L (ref 5–45)
BASOPHILS # BLD AUTO: 0.02 THOUSANDS/ÂΜL (ref 0–0.1)
BASOPHILS NFR BLD AUTO: 0 % (ref 0–1)
BILIRUB SERPL-MCNC: 0.29 MG/DL (ref 0.2–1)
BUN SERPL-MCNC: 11 MG/DL (ref 5–25)
CALCIUM ALBUM COR SERPL-MCNC: 9.8 MG/DL (ref 8.3–10.1)
CALCIUM SERPL-MCNC: 8 MG/DL (ref 8.3–10.1)
CHLORIDE SERPL-SCNC: 111 MMOL/L (ref 96–108)
CO2 SERPL-SCNC: 23 MMOL/L (ref 21–32)
CREAT SERPL-MCNC: 0.69 MG/DL (ref 0.6–1.3)
EOSINOPHIL # BLD AUTO: 0.01 THOUSAND/ÂΜL (ref 0–0.61)
EOSINOPHIL NFR BLD AUTO: 0 % (ref 0–6)
ERYTHROCYTE [DISTWIDTH] IN BLOOD BY AUTOMATED COUNT: 18.3 % (ref 11.6–15.1)
GFR SERPL CREATININE-BSD FRML MDRD: 86 ML/MIN/1.73SQ M
GLUCOSE SERPL-MCNC: 163 MG/DL (ref 65–140)
HCT VFR BLD AUTO: 27.4 % (ref 34.8–46.1)
HGB BLD-MCNC: 8.3 G/DL (ref 11.5–15.4)
IMM GRANULOCYTES # BLD AUTO: 0.2 THOUSAND/UL (ref 0–0.2)
IMM GRANULOCYTES NFR BLD AUTO: 2 % (ref 0–2)
LYMPHOCYTES # BLD AUTO: 0.53 THOUSANDS/ÂΜL (ref 0.6–4.47)
LYMPHOCYTES NFR BLD AUTO: 6 % (ref 14–44)
MCH RBC QN AUTO: 26.9 PG (ref 26.8–34.3)
MCHC RBC AUTO-ENTMCNC: 30.3 G/DL (ref 31.4–37.4)
MCV RBC AUTO: 89 FL (ref 82–98)
MONOCYTES # BLD AUTO: 0.39 THOUSAND/ÂΜL (ref 0.17–1.22)
MONOCYTES NFR BLD AUTO: 5 % (ref 4–12)
NEUTROPHILS # BLD AUTO: 7.38 THOUSANDS/ÂΜL (ref 1.85–7.62)
NEUTS SEG NFR BLD AUTO: 87 % (ref 43–75)
NRBC BLD AUTO-RTO: 0 /100 WBCS
PLATELET # BLD AUTO: 308 THOUSANDS/UL (ref 149–390)
PMV BLD AUTO: 8.9 FL (ref 8.9–12.7)
POTASSIUM SERPL-SCNC: 4.4 MMOL/L (ref 3.5–5.3)
PROT SERPL-MCNC: 5.1 G/DL (ref 6.4–8.4)
RBC # BLD AUTO: 3.09 MILLION/UL (ref 3.81–5.12)
SODIUM SERPL-SCNC: 139 MMOL/L (ref 135–147)
WBC # BLD AUTO: 8.53 THOUSAND/UL (ref 4.31–10.16)

## 2023-07-19 PROCEDURE — 99024 POSTOP FOLLOW-UP VISIT: CPT | Performed by: STUDENT IN AN ORGANIZED HEALTH CARE EDUCATION/TRAINING PROGRAM

## 2023-07-19 PROCEDURE — 80053 COMPREHEN METABOLIC PANEL: CPT

## 2023-07-19 PROCEDURE — 97605 NEG PRS WND THER DME<=50SQCM: CPT | Performed by: STUDENT IN AN ORGANIZED HEALTH CARE EDUCATION/TRAINING PROGRAM

## 2023-07-19 PROCEDURE — 99024 POSTOP FOLLOW-UP VISIT: CPT | Performed by: NURSE PRACTITIONER

## 2023-07-19 PROCEDURE — 85025 COMPLETE CBC W/AUTO DIFF WBC: CPT

## 2023-07-19 RX ORDER — FENTANYL CITRATE/PF 50 MCG/ML
25 SYRINGE (ML) INJECTION
Status: DISCONTINUED | OUTPATIENT
Start: 2023-07-19 | End: 2023-07-19 | Stop reason: HOSPADM

## 2023-07-19 RX ORDER — SODIUM CHLORIDE, SODIUM LACTATE, POTASSIUM CHLORIDE, CALCIUM CHLORIDE 600; 310; 30; 20 MG/100ML; MG/100ML; MG/100ML; MG/100ML
INJECTION, SOLUTION INTRAVENOUS CONTINUOUS PRN
Status: DISCONTINUED | OUTPATIENT
Start: 2023-07-19 | End: 2023-07-19

## 2023-07-19 RX ORDER — PROPOFOL 10 MG/ML
INJECTION, EMULSION INTRAVENOUS AS NEEDED
Status: DISCONTINUED | OUTPATIENT
Start: 2023-07-19 | End: 2023-07-19

## 2023-07-19 RX ORDER — DIPHENHYDRAMINE HYDROCHLORIDE 50 MG/ML
12.5 INJECTION INTRAMUSCULAR; INTRAVENOUS ONCE AS NEEDED
Status: DISCONTINUED | OUTPATIENT
Start: 2023-07-19 | End: 2023-07-19 | Stop reason: HOSPADM

## 2023-07-19 RX ORDER — DEXAMETHASONE SODIUM PHOSPHATE 10 MG/ML
INJECTION, SOLUTION INTRAMUSCULAR; INTRAVENOUS AS NEEDED
Status: DISCONTINUED | OUTPATIENT
Start: 2023-07-19 | End: 2023-07-19

## 2023-07-19 RX ORDER — ALBUMIN, HUMAN INJ 5% 5 %
25 SOLUTION INTRAVENOUS ONCE
Status: COMPLETED | OUTPATIENT
Start: 2023-07-19 | End: 2023-07-19

## 2023-07-19 RX ORDER — HYDROMORPHONE HCL/PF 1 MG/ML
0.5 SYRINGE (ML) INJECTION
Status: DISCONTINUED | OUTPATIENT
Start: 2023-07-19 | End: 2023-07-19 | Stop reason: HOSPADM

## 2023-07-19 RX ORDER — SODIUM HYPOCHLORITE 2.5 MG/ML
1 SOLUTION TOPICAL DAILY
Status: DISCONTINUED | OUTPATIENT
Start: 2023-07-19 | End: 2023-07-19

## 2023-07-19 RX ORDER — SODIUM CHLORIDE, SODIUM LACTATE, POTASSIUM CHLORIDE, CALCIUM CHLORIDE 600; 310; 30; 20 MG/100ML; MG/100ML; MG/100ML; MG/100ML
125 INJECTION, SOLUTION INTRAVENOUS CONTINUOUS
Status: DISCONTINUED | OUTPATIENT
Start: 2023-07-19 | End: 2023-07-23

## 2023-07-19 RX ORDER — FENTANYL CITRATE 50 UG/ML
INJECTION, SOLUTION INTRAMUSCULAR; INTRAVENOUS AS NEEDED
Status: DISCONTINUED | OUTPATIENT
Start: 2023-07-19 | End: 2023-07-19

## 2023-07-19 RX ORDER — ONDANSETRON 2 MG/ML
4 INJECTION INTRAMUSCULAR; INTRAVENOUS ONCE AS NEEDED
Status: DISCONTINUED | OUTPATIENT
Start: 2023-07-19 | End: 2023-07-19 | Stop reason: HOSPADM

## 2023-07-19 RX ORDER — MAGNESIUM HYDROXIDE 1200 MG/15ML
LIQUID ORAL AS NEEDED
Status: DISCONTINUED | OUTPATIENT
Start: 2023-07-19 | End: 2023-07-19 | Stop reason: HOSPADM

## 2023-07-19 RX ORDER — ROCURONIUM BROMIDE 10 MG/ML
INJECTION, SOLUTION INTRAVENOUS AS NEEDED
Status: DISCONTINUED | OUTPATIENT
Start: 2023-07-19 | End: 2023-07-19

## 2023-07-19 RX ORDER — EPHEDRINE SULFATE 50 MG/ML
INJECTION INTRAVENOUS AS NEEDED
Status: DISCONTINUED | OUTPATIENT
Start: 2023-07-19 | End: 2023-07-19

## 2023-07-19 RX ORDER — FENTANYL CITRATE/PF 50 MCG/ML
50 SYRINGE (ML) INJECTION
Status: DISCONTINUED | OUTPATIENT
Start: 2023-07-19 | End: 2023-07-19 | Stop reason: HOSPADM

## 2023-07-19 RX ORDER — ONDANSETRON 2 MG/ML
INJECTION INTRAMUSCULAR; INTRAVENOUS AS NEEDED
Status: DISCONTINUED | OUTPATIENT
Start: 2023-07-19 | End: 2023-07-19

## 2023-07-19 RX ORDER — LIDOCAINE HYDROCHLORIDE 10 MG/ML
INJECTION, SOLUTION EPIDURAL; INFILTRATION; INTRACAUDAL; PERINEURAL AS NEEDED
Status: DISCONTINUED | OUTPATIENT
Start: 2023-07-19 | End: 2023-07-19

## 2023-07-19 RX ADMIN — DILTIAZEM HYDROCHLORIDE 360 MG: 180 CAPSULE, COATED, EXTENDED RELEASE ORAL at 09:01

## 2023-07-19 RX ADMIN — LIDOCAINE HYDROCHLORIDE 50 MG: 10 INJECTION, SOLUTION EPIDURAL; INFILTRATION; INTRACAUDAL; PERINEURAL at 12:59

## 2023-07-19 RX ADMIN — EPHEDRINE SULFATE 20 MG: 50 INJECTION INTRAVENOUS at 13:11

## 2023-07-19 RX ADMIN — SUGAMMADEX 300 MG: 100 INJECTION, SOLUTION INTRAVENOUS at 13:49

## 2023-07-19 RX ADMIN — ONDANSETRON 4 MG: 2 INJECTION INTRAMUSCULAR; INTRAVENOUS at 13:21

## 2023-07-19 RX ADMIN — FENTANYL CITRATE 100 MCG: 50 INJECTION, SOLUTION INTRAMUSCULAR; INTRAVENOUS at 12:59

## 2023-07-19 RX ADMIN — ALBUMIN (HUMAN) 25 G: 12.5 INJECTION, SOLUTION INTRAVENOUS at 22:15

## 2023-07-19 RX ADMIN — ACETAMINOPHEN 975 MG: 325 TABLET, FILM COATED ORAL at 09:01

## 2023-07-19 RX ADMIN — SODIUM CHLORIDE, SODIUM LACTATE, POTASSIUM CHLORIDE, AND CALCIUM CHLORIDE: .6; .31; .03; .02 INJECTION, SOLUTION INTRAVENOUS at 12:52

## 2023-07-19 RX ADMIN — ROCURONIUM BROMIDE 40 MG: 10 INJECTION, SOLUTION INTRAVENOUS at 13:00

## 2023-07-19 RX ADMIN — HEPARIN SODIUM 5000 UNITS: 5000 INJECTION INTRAVENOUS; SUBCUTANEOUS at 22:10

## 2023-07-19 RX ADMIN — PROPOFOL 150 MG: 10 INJECTION, EMULSION INTRAVENOUS at 13:00

## 2023-07-19 RX ADMIN — METOPROLOL SUCCINATE 25 MG: 25 TABLET, FILM COATED, EXTENDED RELEASE ORAL at 09:01

## 2023-07-19 RX ADMIN — FENTANYL CITRATE 50 MCG: 50 INJECTION, SOLUTION INTRAMUSCULAR; INTRAVENOUS at 13:53

## 2023-07-19 RX ADMIN — EPHEDRINE SULFATE 15 MG: 50 INJECTION INTRAVENOUS at 13:09

## 2023-07-19 RX ADMIN — DEXAMETHASONE SODIUM PHOSPHATE 10 MG: 10 INJECTION, SOLUTION INTRAMUSCULAR; INTRAVENOUS at 13:00

## 2023-07-19 RX ADMIN — DEXTROSE 3000 MG: 50 INJECTION, SOLUTION INTRAVENOUS at 05:49

## 2023-07-19 RX ADMIN — EPHEDRINE SULFATE 10 MG: 50 INJECTION INTRAVENOUS at 13:06

## 2023-07-19 RX ADMIN — SODIUM CHLORIDE, SODIUM LACTATE, POTASSIUM CHLORIDE, AND CALCIUM CHLORIDE: .6; .31; .03; .02 INJECTION, SOLUTION INTRAVENOUS at 13:52

## 2023-07-19 RX ADMIN — ERGOCALCIFEROL 50000 UNITS: 1.25 CAPSULE ORAL at 09:04

## 2023-07-19 RX ADMIN — AMIODARONE HYDROCHLORIDE 200 MG: 200 TABLET ORAL at 08:51

## 2023-07-19 RX ADMIN — FENTANYL CITRATE 50 MCG: 50 INJECTION, SOLUTION INTRAMUSCULAR; INTRAVENOUS at 13:32

## 2023-07-19 RX ADMIN — Medication 1 PACKET: at 09:01

## 2023-07-19 RX ADMIN — FLUOXETINE 20 MG: 20 CAPSULE ORAL at 09:01

## 2023-07-19 RX ADMIN — PHENYLEPHRINE HYDROCHLORIDE 30 MCG/MIN: 10 INJECTION INTRAVENOUS at 13:11

## 2023-07-19 RX ADMIN — Medication 3000 MG: at 13:05

## 2023-07-19 RX ADMIN — MELATONIN 6 MG: at 22:10

## 2023-07-19 RX ADMIN — METRONIDAZOLE 500 MG: 500 INJECTION, SOLUTION INTRAVENOUS at 06:53

## 2023-07-19 RX ADMIN — HEPARIN SODIUM 5000 UNITS: 5000 INJECTION INTRAVENOUS; SUBCUTANEOUS at 05:49

## 2023-07-19 RX ADMIN — FLUOXETINE 20 MG: 20 CAPSULE ORAL at 22:10

## 2023-07-19 NOTE — ANESTHESIA POSTPROCEDURE EVALUATION
Post-Op Assessment Note    CV Status:  Stable  Pain Score: 0    Pain management: adequate     Mental Status:  Awake   Hydration Status:  Stable   PONV Controlled:  None   Airway Patency:  Patent      Post Op Vitals Reviewed: Yes      Staff: Anesthesiologist, CRNA         No notable events documented.     BP   116/58   Temp   98.9   Pulse  88   Resp   12   SpO2   100

## 2023-07-19 NOTE — PROGRESS NOTES
Progress Note -general surgery  : Red surgery Resident on [facility] 67 y.o. female MRN: [id number]  Unit/Bed#: [facility] 634-01 Encounter: [id number]    Assessment:  67 y.o. female recently admitted and underwent small bowel resection, represented on 7/18 with lower abdominal wall incision dehiscence. Taken for exploration and washout on 7/18. Morbid obesity excess calories as evidenced by recent gastric bypass surgery at BMI of 53.85, treated with daily weights, I's/O's and restricted p.o. diet when medically appropriate. Plan:  Continue n.p.o. for now  Will reevaluate wound this morning and consider return to the operating room for further exploration, possible wound VAC placement  Continue Rocephin Flagyl  Follow-up on a.m. labs  PT OT  Pain control as needed  Encourage out of bed      Subjective/Objective     Subjective: No acute events overnight. Patient was seen and examined at bedside. Patient expressed no acute concerns for the team this morning. She reports that her pain is well controlled. She denies any nausea vomiting fevers chills and shortness of breath at this time. Patient is aware of the plan for evaluation of the wound today and possible return to the operating room. Objective:     Physical Exam:  GEN: NAD  HEENT: MMM  CV: Diffuse regular rate  Lung: Normal effort  Ab: Soft, ND/NT, lower abdominal midline dressed without drainage on bandage  Extrem: No lower extremity edema bilaterally  Neuro: A+Ox3    Vitals: Temp:  [97.7 °F (36.5 °C)-98.7 °F (37.1 °C)] 98 °F (36.7 °C)  HR:  [60-71] 63  Resp:  [16-20] 18  BP: (104-145)/(51-60) 104/54  There is no height or weight on file to calculate BMI. I/O         07/17 0701  07/18 0700 07/18 0701  07/19 0700    I.V. (mL/kg)  1000 (7.2)    IV Piggyback  250    Total Intake(mL/kg)  1250 (9.1)    Net  +1250                    Lab, Imaging and other studies: I have personally reviewed pertinent reports.   , CBC with diff:   Lab Results   Component Value Date    WBC 12.47 (H) 07/18/2023    HGB 9.0 (L) 07/18/2023    HCT 29.6 (L) 07/18/2023    MCV 88 07/18/2023     07/18/2023    RBC 3.35 (L) 07/18/2023    MCH 26.9 07/18/2023    MCHC 30.4 (L) 07/18/2023    RDW 18.6 (H) 07/18/2023    MPV 9.2 07/18/2023   , BMP/CMP:   Lab Results   Component Value Date    SODIUM 139 07/18/2023    K 4.3 07/18/2023     (H) 07/18/2023    CO2 25 07/18/2023    BUN 13 07/18/2023    CREATININE 0.83 07/18/2023    CALCIUM 8.2 (L) 07/18/2023    AST 18 07/18/2023    ALT 15 07/18/2023    ALKPHOS 154 (H) 07/18/2023    EGFR 70 07/18/2023     VTE Pharmacologic Prophylaxis: Heparin  VTE Mechanical Prophylaxis: sequential compression device    UpdateTime 0630    Peña Wang MD  7/19/2023 5:22 AM

## 2023-07-19 NOTE — OCCUPATIONAL THERAPY NOTE
Occupational Therapy CX        Patient Name: Lalito Salazar  [id number] Date: 7/19/2023 07/19/23 1423   OT Last Visit   OT Visit Date 07/19/23   Note Type   Note type Cancelled Session   Cancel Reasons Patient to operating room   Additional Comments PT OFF THE FLOOR IN OR FOR DEBRIDEMENT AND CLOSURE OF ABDOMEN. WILL FOLLOW POST-OP AS APPROPRIATE.

## 2023-07-19 NOTE — CASE MANAGEMENT
Case Management Assessment & Discharge Planning Note    Patient name Celine Yu  Location [facility] 634/[facility] 968-68 MRN [id number]  : 1950 Date 2023       Current Admission Date: 2023  Current Admission Diagnosis:Abdominal pain, Open wound of abdominal wall, sequela   Patient Active Problem List    Diagnosis Date Noted   • Ambulatory dysfunction 2023   • Open abdominal wall wound 2023   • Iron deficiency anemia 2023   • Hypotension 2023   • Hypokalemia 07/10/2023   • Anemia 07/10/2023   • Bowel perforation ([address]) 2023   • Acute encephalopathy 2023   • Anxiety 2023   • Diarrhea 2023   • Idiopathic acute pancreatitis without infection or necrosis 2023   • Primary osteoarthritis of both knees 2023   • Stage 3 chronic kidney disease, unspecified whether stage 3a or 3b CKD ([address]) 2023   • Hypertension    • Morbid obesity with BMI of 50.0-59.9, adult ([address]) 2022   • Unsteady gait 10/30/2022   • Dyspnea on exertion 10/30/2022   • SIRS (systemic inflammatory response syndrome) ([address]) 10/30/2022   • Incontinence 10/30/2022   • GILDARDO (obstructive sleep apnea) 10/30/2022   • Vitamin D deficiency 2021   • Reactive hypoglycemia 2020   • Osteoporosis 2020   • Hyperparathyroidism ([address]) 2020   • Hashimoto's thyroiditis 2020   • Early menopause occurring in patient age younger than 39 years 2020   • S/P bariatric surgery 2020   • Myofascial pain syndrome 10/21/2019   • Status post partial thyroidectomy 2019   • Cervical spondylosis without myelopathy 2019   • Arthropathy of cervical facet joint 2019   • Occipital neuralgia of right side 2019   • Chest wall tenderness 2019   • Chronic heart failure ([address]) 2019   • Permanent atrial fibrillation ([address]) 2019   • Myalgia 2019   • Primary osteoarthritis of left knee 10/23/2018   • Primary osteoarthritis of right knee 10/23/2018   • Chronic pain of left knee 10/23/2018   • Malignant tumor of right kidney parenchyma ([address]) 05/11/2016   • Closed fracture of right distal radius and ulna 04/22/2016      LOS (days): 1  Geometric Mean LOS (GMLOS) (days): 2.10  Days to GMLOS:1     OBJECTIVE:    Risk of Unplanned Readmission Score: 36.96         Current admission status: Inpatient       Preferred Pharmacy:   [facility] DR MARIAH REDDY [address], [address]  [facility] [address]  Phone: [phone] Fax: [phone]    [address] [facility], [address] [facility]  [address] [place] [address]  Phone: [phone] Fax: [phone]    [facility], [address] [place] [address]  Phone: [phone] Fax: [phone]    Primary Care Provider: Alexia Balbuena DO    Primary Insurance: MEDICARE  Secondary Insurance: Ronnie Pelayo    ASSESSMENT:  [address]   Primary Phone: [phone] (Mobile)               Advance Directives  Does patient have a [address]?: Yes  Does patient have Advance Directives?: Yes  Advance Directives: Power of  for health care, Living will  Primary Contact: Bobo Nova, daughter [phone]    Readmission Root Cause  30 Day Readmission: Yes  Who directed you to return to the hospital?: Other (comment) ([facility])  Did you understand whom to contact if you had questions or problems?: Yes  Did you get your prescriptions before you left the hospital?: Yes  Were you able to get your prescriptions filled when you left the hospital?: Yes  Did you take your medications as prescribed?: Yes  Were you able to get to your follow-up appointments?: Yes  During previous admission, was a post-acute recommendation made?: Yes  What post-acute resources were offered?: STR  Patient was readmitted due to: wound infection  Action Plan: awaiting medical stability, therapy recs    Patient Information  Admitted from[de-identified] Facility (was at [address] [place] [address] for STR)  Mental Status: Alert  During Assessment patient was accompanied by: Daughter, Son  Assessment information provided by[de-identified] Patient, Son, Daughter  Primary Caregiver: Self  Support Systems: Children, Family members, Daughter, Crissy Benavidez of Residence: [facility] [address] [facility] do you live in?: [address] entry access options.  Select all that apply.: Ramp  Type of Current Residence: Ranch  In the last 12 months, was there a time when you were not able to pay the mortgage or rent on time?: No  In the last 12 months, how many places have you lived?: 1  In the last 12 months, was there a time when you did not have a steady place to sleep or slept in a shelter (including now)?: No  Homeless/housing insecurity resource given?: N/A  Living Arrangements: Lives w/ Daughter  Is patient a ?: No    Activities of Daily Living Prior to Admission  Functional Status: Independent  Completes ADLs independently?: Yes  Ambulates independently?: Yes  Does patient use assisted devices?: Yes  Assisted Devices (DME) used: Lisa Slicker  Does patient currently own DME?: Yes  What DME does the patient currently own?: Lisa Slicker, Other (Comment), Shower Chair (Lift chair, high rise toilet)  Does patient have a history of Outpatient Therapy (PT/OT)?: No  Does the patient have a history of Short-Term Rehab?: Yes ([facility], [facility], [facility])  Does patient have a history of HHC?: No  Does patient currently have [address] [facility]?: No    Patient Information Continued  Income Source: Pension/custodial  Does patient have prescription coverage?: Yes  Within the past 12 months, you worried that your food would run out before you got the money to buy more.: Never true  Within the past 12 months, the food you bought just didn't last and you didn't have money to get more.: Never true  Food insecurity resource given?: N/A  Does patient receive dialysis treatments?: No  Does patient have a history of substance abuse?: No  Does patient have a history of Mental Health Diagnosis?: No    Means of Transportation  Means of Transport to Appts[de-identified] Family transport  In the past 12 months, has lack of transportation kept you from medical appointments or from getting medications?: No  In the past 12 months, has lack of transportation kept you from meetings, work, or from getting things needed for daily living?: No  Was application for public transport provided?: N/A        DISCHARGE DETAILS:    Discharge planning discussed with[de-identified] Patient  Freedom of Choice: Yes  Comments - Freedom of Choice: Discussed FOC  CM contacted family/caregiver?: Yes    Contacts  Patient Contacts: Sebas Lipoma  Relationship to Patient[de-identified] Family  Contact Method: Phone  Phone Number: [phone]  Reason/Outcome: Continuity of Care, Emergency Contact, Discharge Planning    Other Referral/Resources/Interventions Provided:  Referral Comments: Patient likely rec for STR, family and patient would like referrals to [facility], entered in [address] [place] [address] reviewed d/c planning process including the following: identifying help at home, patient preference for d/c planning needs, Discharge Lounge, [facility] Meds to Bed program, availability of treatment team to discuss questions or concerns patient and/or family may have regarding understanding medications and recognizing signs and symptoms once discharged. CM also encouraged patient to follow up with all recommended appointments after discharge. Patient advised of importance for patient and family to participate in managing patient’s medical well being. Information obtained from patient and children at bedside. [facility]. Lives with eldest daughter in a ranch style home, ramp access. Was IADLS prior to previous admission. Has ramp, walker (which she uses outside of home), shower chair and high rise toilet. Vaccinated for [facility].

## 2023-07-19 NOTE — ARC ADMISSION
Referral received for ARC. Pending review with [facility] physician.

## 2023-07-19 NOTE — OP NOTE
OPERATIVE REPORT  PATIENT NAME: Tanya Somers    :  1950  MRN: [id number]  Pt Location: [facility] OR ROOM 04    SURGERY DATE: 2023    Surgeon(s) and Role:      * Babita Garcia DO - Primary     * Apolinar Campbell MD - Assisting    Preop Diagnosis:  Open wound of abdominal wall, sequela [S31.109S]    Post-Op Diagnosis Codes:     * Open wound of abdominal wall, sequela [S31.109S]    Procedure(s):  ABDOMINAL WOUND ([address] OUT); VAC APPLICATION  PARTIAL CLOSURE WOUND ABDOMINAL/TRUNK    Specimen(s):  * No specimens in log *    Estimated Blood Loss:   Minimal    Drains:  External Urinary Catheter (Active)   Output (mL) 300 mL 23 0801   Number of days: 1       [REMOVED] Closed/Suction Drain Lateral;Right RUQ Bulb 19 Fr. (Removed)   Site Description Unable to view 23   Dressing Status Clean;Dry; Intact 23   Drainage Appearance Serous 23   Status To bulb suction 23   Intake (mL) 0 mL 23 0800   Output (mL) 0 mL 23   Number of days: 10       [REMOVED] Closed/Suction Drain Lateral;Left LUQ 19 Fr. (Removed)   Site Description Healing;Scabbed 23 2100   Dressing Status Open to air 23 2100   Drainage Appearance Serous 23 2100   Status To bulb suction 23 2100   Intake (mL) 0 mL 23 0359   Output (mL) 40 mL 23 0359   Number of days: 29       [REMOVED] Closed/Suction Drain Left LLQ Bulb 10 Fr. (Removed)   Site Description Reddened 23 1800   Dressing Status Clean;Dry; Intact 23 1800   Drainage Appearance Serosanguineous 23 1800   Status To bulb suction 23 1800   Intake (mL) 0 mL 23 0800   Output (mL) 40 mL 23 1201   Number of days: 8       [REMOVED] NG/OG/Enteral Tube Nasogastric 12 Fr Right nare (Removed)   Placement Reverification Aspiration 23 0815   Site Assessment Clean;Dry; Intact 23 0815   Status Suction-low intermittent 23 0815   Drainage Appearance Bile 23 0815 Output (mL) 0 mL 06/25/23 0815   Number of days: 3       [REMOVED] Urethral Catheter Latex 14 Fr. (Removed)   Reasons to continue Urinary Catheter  Accurate I&O assessment in critically ill patients (48 hr. max) 06/24/23 2000   Goal for Removal Voiding trial when ambulation improves 06/24/23 2000   Site Assessment Clean;Skin intact 06/25/23 0815   Bolanos Care Done 06/25/23 0800   Collection Container Standard drainage bag 06/25/23 0815   Securement Method Securing device (Describe) 06/25/23 0815   Output (mL) 130 mL 06/25/23 1800   Number of days: 6       [REMOVED] External Urinary Catheter (Removed)   Collection Container Canister and suction tubing (For Female) 07/04/23 0359   Suction Pressure (mmHg) 80 mmHg 07/03/23 0603   Interventions Removed and skin assessed; Pericare performed;Device changed;Suction canister changed 07/04/23 0359   Output (mL) 50 mL 07/04/23 0501   Number of days: 6       Anesthesia Type:   General    Operative Indications:  Open wound of abdominal wall, sequela [S31.109S]    Operative Findings:  -Removed 5 radiolabeled Kerlix tied together  -15 x 6 x 6 cm lower abdominal midline wound with healthy appearing tissue, 540 cm², no further debridement required  -Exposed healthy bowel at wound base  -Wound partially closed with six 2-0 Vicryl sutures  -Final wound dimensions 4 x 5 x 4 cm  -Wound VAC placed with 1 black sponge and 1 white sponge, sent to suction at 75    Complications:   None    Procedure and Technique:  The patient was identified in the preoperative holding area via wristband and verbally and taken to the operating room in stable fashion. Upon arrival to the operating room she was again identified verbally and via wristband. Patient was placed on the operating room table in a supine position, both arms were extended and pressure points were padded. Patient was induced with general anesthesia and the airway was secured with an endotracheal tube.   5 radiolabeled Curlex dressings tied together were removed and the abdomen was prepped. The edges of the wound were examined, and it was determined that no further debridement was required at this time. The decision was made to proceed with a partial closure of the wound. The superior aspect of the wound was closed along the midline utilizing vertical mattress sutures. In total, 6 sutures were placed using 2-0 Vicryl. The sutures were covered by a 4 x 4 dressing on the midline. After partial closure, the remaining wound measured 4 x 5 x 4cm. Next, one white sponge was placed over the bowel at the base of the wound and one black sponge was placed in the wound. Finally the wound VAC dressing was placed and held pressure. Patient was awoken from anesthesia, extubated, and taken to the PACU in stable condition. Dr. Louisa Drew was present for the entire procedure.        Patient Disposition:  PACU         SIGNATURE: Halie Andrade MD  DATE: July 19, 2023  TIME: 3:42 PM

## 2023-07-19 NOTE — ANESTHESIA PREPROCEDURE EVALUATION
Procedure:  DEBRIDEMENT WOUND [facility] OUT) (Abdomen)  CLOSURE WOUND ABDOMINAL/TRUNK (Abdomen)    Relevant Problems   CARDIO   (+) Dyspnea on exertion   (+) Hypertension   (+) Permanent atrial fibrillation (HCC)      ENDO   (+) Hyperparathyroidism (HCC)      GI/HEPATIC   (+) Idiopathic acute pancreatitis without infection or necrosis   (+) Reactive hypoglycemia      /RENAL   (+) Malignant tumor of right kidney parenchyma (HCC)   (+) Stage 3 chronic kidney disease, unspecified whether stage 3a or 3b CKD (HCC)      HEMATOLOGY   (+) Anemia   (+) Iron deficiency anemia      MUSCULOSKELETAL   (+) Cervical spondylosis without myelopathy   (+) Myofascial pain syndrome   (+) Primary osteoarthritis of both knees   (+) Primary osteoarthritis of left knee   (+) Primary osteoarthritis of right knee      NEURO/PSYCH   (+) Anxiety   (+) Myofascial pain syndrome      PULMONARY   (+) Dyspnea on exertion   (+) GILDARDO (obstructive sleep apnea)     Hemoglobin    8.3 Low            •  Left Ventricle: Left ventricular cavity size is normal. Wall thickness    is mildly increased. The left ventricular ejection fraction is 60%. Systolic function is normal. Wall motion is normal.   •  Left Atrium: The atrium is mildly dilated. •  Right Atrium: The atrium is mildly dilated. •  Aortic Valve: There is aortic valve sclerosis. •  Mitral Valve: There is mild annular calcification. •  Tricuspid Valve: There is mild regurgitation. The estimated right   ventricular systolic pressure is 90.26 mmHg. Physical Exam    Airway    Mallampati score: II  TM Distance: >3 FB  Neck ROM: full     Dental       Cardiovascular      Pulmonary      Other Findings        Anesthesia Plan  ASA Score- 3     Anesthesia Type- general with ASA Monitors. Additional Monitors:   Airway Plan: ETT. Plan Factors-    Chart reviewed. Induction- intravenous.     Postoperative Plan-     Informed Consent- Anesthetic plan and risks discussed with patient. I personally reviewed this patient with the CRNA. Discussed and agreed on the Anesthesia Plan with the CRNA. Palak Chang

## 2023-07-19 NOTE — QUICK NOTE
[facility]        Pt Name: Erick Yañez  MRN: [id number]  [place] 1997  Date of evaluation: 3/15/2021  Provider: Yadiel Shah PA-C  PCP: Clementine Reddy MD     I have seen and evaluated this patient with my supervising physician Maribel Hurst, [address] [place] [address]       Chief Complaint   Patient presents with    Abdominal Cramping     pt with lower abdominal cramping and back cramping with increased d/c. pt states she just found out she was pregnant on saturday. HISTORY OF PRESENT ILLNESS   (Location, Timing/Onset, Context/Setting, Quality, Duration, Modifying Factors, Severity, Associated Signs and Symptoms)  Note limiting factors. Erick Yañez is a 24 y.o. female patient presents emergency department for evaluation of lower abdominal cramping and lower back pain that started today. Patient states she found on Saturday she is pregnant. Patient is G5, P2 Ab2. Patient states the one of the miscarriages was spontaneous in the first trimester and the other one was an elective . Patient states that her 2 living children were born at term without complication. Denies any history of preeclampsia or diabetes. Patient's last menstrual period was . Patient has nausea without vomiting. No breast tenderness. Patient states she feels slightly lightheaded but has not had any syncope. She states the pain is crampy in nature. She denies any dysuria. Patient states she has slight increase of her normal vaginal discharge. Denies any pain, burning, rash, lumps bumps or lesions. Denies any change in color or smell of the discharge. Nursing Notes were all reviewed and agreed with or any disagreements were addressed in the HPI. REVIEW OF SYSTEMS    (2-9 systems for level 4, 10 or more for level 5)     Review of Systems   Constitutional: Negative for fatigue and fever.    Eyes: Negative for visual Post Op Check:    Patients pain is well controlled. They denied any nausea, chest pain, or shortness of breath. No new complaints    Temp:  [97.7 °F (36.5 °C)-98.7 °F (37.1 °C)] 97.7 °F (36.5 °C)  HR:  [62-71] 63  Resp:  [16-20] 18  BP: (116-145)/(53-60) 119/54      Physical Exam:  General: No acute distress, alert and oriented  CV: Well perfused, regular rate  Lungs: Normal work of breathing, no increased respiratory effort  Abdomen: Soft, appropriately tender, non-distended.  Clean, dry dressing in place  Extremities: No clubbing or cyanosis  Skin: Warm, dry      Plan:  Diet Clear Liquid  Diet NPO  Continue to monitor  Plan for bedside dressing change tomorrow  Pain and nausea control PRN    Ry Ortiz MD  General Surgery   07/18/23 disturbance. Respiratory: Negative for shortness of breath. Cardiovascular: Negative for chest pain. Gastrointestinal: Positive for abdominal pain. Negative for nausea and vomiting. Musculoskeletal: Positive for back pain. Positives and Pertinent negatives as per HPI. Except as noted above in the ROS, all other systems were reviewed and negative. PAST MEDICAL HISTORY   History reviewed. No pertinent past medical history. SURGICAL HISTORY     Past Surgical History:   Procedure Laterality Date    TONSILLECTOMY           CURRENTMEDICATIONS       Previous Medications    No medications on file         ALLERGIES     Patient has no known allergies. FAMILYHISTORY     History reviewed. No pertinent family history. SOCIAL HISTORY       Social History     Tobacco Use    Smoking status: Never Smoker    Smokeless tobacco: Never Used   Substance Use Topics    Alcohol use: No    Drug use: No       SCREENINGS             PHYSICAL EXAM    (up to 7 for level 4, 8 or more for level 5)     ED Triage Vitals   BP Temp Temp src Pulse Resp SpO2 Height Weight   -- -- -- -- -- -- -- --       Physical Exam  Vitals signs and nursing note reviewed. Constitutional:       General: She is not in acute distress. Appearance: Normal appearance. She is well-developed. She is not ill-appearing, toxic-appearing or diaphoretic. HENT:      Head: Normocephalic and atraumatic. Nose: Nose normal.      Mouth/Throat:      Mouth: Mucous membranes are moist.      Pharynx: Oropharynx is clear. Eyes:      General:         Right eye: No discharge. Left eye: No discharge. Neck:      Musculoskeletal: Normal range of motion and neck supple. Cardiovascular:      Rate and Rhythm: Normal rate and regular rhythm. Heart sounds: Normal heart sounds. No murmur. No gallop. Pulmonary:      Effort: Pulmonary effort is normal. No respiratory distress. Breath sounds: Normal breath sounds.  No wheezing or rales. Abdominal:      General: Abdomen is flat. Bowel sounds are normal.      Palpations: Abdomen is soft. Tenderness: There is abdominal tenderness in the suprapubic area. There is no right CVA tenderness, left CVA tenderness, guarding or rebound. Musculoskeletal: Normal range of motion. Skin:     General: Skin is warm and dry. Capillary Refill: Capillary refill takes less than 2 seconds. Coloration: Skin is not jaundiced or pale. Findings: No rash. Neurological:      General: No focal deficit present. Mental Status: She is alert and oriented to person, place, and time.    Psychiatric:         Behavior: Behavior normal.         DIAGNOSTIC RESULTS   LABS:    Labs Reviewed   CBC WITH AUTO DIFFERENTIAL - Abnormal; Notable for the following components:       Result Value    RBC 3.93 (*)     All other components within normal limits    Narrative:     Performed at:  [facility] [address], [place], [address]   Phone [phone]   [address] [place] [address] TO  FOR LOW K - Abnormal; Notable for the following components:    CREATININE 0.5 (*)     All other components within normal limits    Narrative:     Performed at:  [facility] [address], [place], [address]   Phone [phone]   URINE RT REFLEX TO CULTURE - Abnormal; Notable for the following components:    Clarity, UA CLOUDY (*)     Blood, Urine TRACE (*)     Leukocyte Esterase, Urine LARGE (*)     All other components within normal limits    Narrative:     Performed at:  [facility] [address] [place] [address] [address]   Phone [phone]   MICROSCOPIC URINALYSIS - Abnormal; Notable for the following components:    Bacteria, UA 1+ (*)     WBC, UA 21 (*)     All other components within normal limits    Narrative:     Performed at:  [facility] [address] [facility] leukocytosis on laboratory evaluation. Kidney function is normal.  Urinalysis shows evidence of cystitis. Patient given first dose of oral Keflex here in the emergency department. Patient does not have any bleeding. Patient's beta-hCG is 578. Ultrasound shows no confirmed IUP however patient does have normal flow in both ovaries. Patient will be treated for cystitis with antibiotics and was given a prescription to continue Keflex for the next 7 days. Patient also given an order to have repeat beta-hCG blood draw in 48 hours to sure this is continuing to rise appropriately. Patient encouraged to follow-up with her OB/GYN or our OB/GYN clinic for reevaluation this week. Patient is amenable to this plan will be discharged home. Patient encouraged to return for any worsening symptoms such as bleeding, syncope, fever or worsening abdominal pain. Patient is amenable to this and will be discharged home. I see nothing that would suggest an acute abdomen at this time. Based on history, physical exam, risk factors, and tests my suspicion for bowel obstruction, incarcerated hernia, acute pancreatitis, intra-abdominal abscess, perforated viscus, diverticulitis, cholecystitis, appendicitis, PID, ovarian torsion, ectopic pregnancy and tubo-ovarian abscess is very low. There is no evidence of peritonitis, sepsis or toxicity at this time. I feel the patient can be managed as an outpatient with follow-up with her family doctor in 24-48 hours. Instructions have been given for the patient to return to the ED for worsening of the pain, high fevers, intractable vomiting, or bleeding. FINAL IMPRESSION      1. Pregnancy confirmed by positive blood test    2.  Urinary tract infection in female          DISPOSITION/PLAN   DISPOSITION        PATIENT REFERREDTO:  [address]  [address]  Schedule an appointment as soon as possible for a visit for re-evaluation      DISCHARGE MEDICATIONS:  New Prescriptions    CEPHALEXIN (KEFLEX) 500 MG CAPSULE    Take 1 capsule by mouth 2 times daily for 7 days       DISCONTINUED MEDICATIONS:  Discontinued Medications    NAPROXEN (NAPROSYN) 500 MG TABLET    Take 1 tablet by mouth 2 times daily for 20 doses.               (Please note that portions of this note were completed with a voice recognition program.  Efforts were made to edit the dictations but occasionally words are mis-transcribed.)    Kellen Scott PA-C (electronically signed)            Kellen Scott PA-C  03/15/21 [id number]

## 2023-07-19 NOTE — PROGRESS NOTES
Assessment/Plan   Assessment & Plan    Abdominal wound    Subjective   Subjective   " I am suppose to have lots of protein for wound healing."  Temp:  [97.7 °F (36.5 °C)-98.9 °F (37.2 °C)] 98.3 °F (36.8 °C)  HR:  [60-84] 66  Resp:  [12-20] 17  BP: ()/(51-60) 116/58  I/O last 3 completed shifts: In: 1250 [I.V.:1000; IV Piggyback:250]  Out: -   I/O this shift:  In: 1000 [I.V.:1000]  Out: 300 [Urine:300]    Objective   Objective  Active Problems: There are no active Hospital Problems. Awake and alert, family at bedside  PAtient very hungry and asking for a diet  RRR< no complaints of chest pain  Lungs CTA bilaterally, no shortness of breath  Regular diet was ordered, asked also for nutrition consult for protein ideas as requested by family. Abdominal dressing intact, vac functioning  Pulses palpable bilterally  Moving all extremities  Pain meds reviewed.

## 2023-07-20 LAB
ALBUMIN SERPL BCP-MCNC: 2.2 G/DL (ref 3.5–5)
ALP SERPL-CCNC: 117 U/L (ref 46–116)
ALT SERPL W P-5'-P-CCNC: 7 U/L (ref 12–78)
ANION GAP SERPL CALCULATED.3IONS-SCNC: 3 MMOL/L
AST SERPL W P-5'-P-CCNC: 12 U/L (ref 5–45)
BASOPHILS # BLD AUTO: 0.01 THOUSANDS/ÂΜL (ref 0–0.1)
BASOPHILS NFR BLD AUTO: 0 % (ref 0–1)
BILIRUB SERPL-MCNC: 0.28 MG/DL (ref 0.2–1)
BUN SERPL-MCNC: 12 MG/DL (ref 5–25)
CALCIUM ALBUM COR SERPL-MCNC: 9.6 MG/DL (ref 8.3–10.1)
CALCIUM SERPL-MCNC: 8.2 MG/DL (ref 8.3–10.1)
CHLORIDE SERPL-SCNC: 111 MMOL/L (ref 96–108)
CO2 SERPL-SCNC: 25 MMOL/L (ref 21–32)
CREAT SERPL-MCNC: 0.78 MG/DL (ref 0.6–1.3)
EOSINOPHIL # BLD AUTO: 0.01 THOUSAND/ÂΜL (ref 0–0.61)
EOSINOPHIL NFR BLD AUTO: 0 % (ref 0–6)
ERYTHROCYTE [DISTWIDTH] IN BLOOD BY AUTOMATED COUNT: 18.5 % (ref 11.6–15.1)
GFR SERPL CREATININE-BSD FRML MDRD: 75 ML/MIN/1.73SQ M
GLUCOSE SERPL-MCNC: 158 MG/DL (ref 65–140)
GLUCOSE SERPL-MCNC: 176 MG/DL (ref 65–140)
HCT VFR BLD AUTO: 26 % (ref 34.8–46.1)
HGB BLD-MCNC: 7.8 G/DL (ref 11.5–15.4)
IMM GRANULOCYTES # BLD AUTO: 0.22 THOUSAND/UL (ref 0–0.2)
IMM GRANULOCYTES NFR BLD AUTO: 2 % (ref 0–2)
LYMPHOCYTES # BLD AUTO: 0.6 THOUSANDS/ÂΜL (ref 0.6–4.47)
LYMPHOCYTES NFR BLD AUTO: 5 % (ref 14–44)
MCH RBC QN AUTO: 26.7 PG (ref 26.8–34.3)
MCHC RBC AUTO-ENTMCNC: 30 G/DL (ref 31.4–37.4)
MCV RBC AUTO: 89 FL (ref 82–98)
MONOCYTES # BLD AUTO: 0.48 THOUSAND/ÂΜL (ref 0.17–1.22)
MONOCYTES NFR BLD AUTO: 4 % (ref 4–12)
NEUTROPHILS # BLD AUTO: 9.69 THOUSANDS/ÂΜL (ref 1.85–7.62)
NEUTS SEG NFR BLD AUTO: 89 % (ref 43–75)
NRBC BLD AUTO-RTO: 0 /100 WBCS
PLATELET # BLD AUTO: 310 THOUSANDS/UL (ref 149–390)
PMV BLD AUTO: 9.4 FL (ref 8.9–12.7)
POTASSIUM SERPL-SCNC: 4.1 MMOL/L (ref 3.5–5.3)
PROT SERPL-MCNC: 5.5 G/DL (ref 6.4–8.4)
RBC # BLD AUTO: 2.92 MILLION/UL (ref 3.81–5.12)
SODIUM SERPL-SCNC: 139 MMOL/L (ref 135–147)
WBC # BLD AUTO: 11.01 THOUSAND/UL (ref 4.31–10.16)

## 2023-07-20 PROCEDURE — 82948 REAGENT STRIP/BLOOD GLUCOSE: CPT

## 2023-07-20 PROCEDURE — 80053 COMPREHEN METABOLIC PANEL: CPT | Performed by: SURGERY

## 2023-07-20 PROCEDURE — 99024 POSTOP FOLLOW-UP VISIT: CPT | Performed by: STUDENT IN AN ORGANIZED HEALTH CARE EDUCATION/TRAINING PROGRAM

## 2023-07-20 PROCEDURE — 85025 COMPLETE CBC W/AUTO DIFF WBC: CPT | Performed by: SURGERY

## 2023-07-20 PROCEDURE — 97163 PT EVAL HIGH COMPLEX 45 MIN: CPT

## 2023-07-20 PROCEDURE — 97167 OT EVAL HIGH COMPLEX 60 MIN: CPT

## 2023-07-20 RX ADMIN — ACETAMINOPHEN 975 MG: 325 TABLET, FILM COATED ORAL at 23:46

## 2023-07-20 RX ADMIN — HEPARIN SODIUM 5000 UNITS: 5000 INJECTION INTRAVENOUS; SUBCUTANEOUS at 05:25

## 2023-07-20 RX ADMIN — DILTIAZEM HYDROCHLORIDE 360 MG: 180 CAPSULE, COATED, EXTENDED RELEASE ORAL at 08:44

## 2023-07-20 RX ADMIN — ACETAMINOPHEN 975 MG: 325 TABLET, FILM COATED ORAL at 01:19

## 2023-07-20 RX ADMIN — ACETAMINOPHEN 975 MG: 325 TABLET, FILM COATED ORAL at 11:16

## 2023-07-20 RX ADMIN — APIXABAN 5 MG: 5 TABLET, FILM COATED ORAL at 08:45

## 2023-07-20 RX ADMIN — APIXABAN 5 MG: 5 TABLET, FILM COATED ORAL at 17:23

## 2023-07-20 RX ADMIN — METOPROLOL SUCCINATE 25 MG: 25 TABLET, FILM COATED, EXTENDED RELEASE ORAL at 08:45

## 2023-07-20 RX ADMIN — ACETAMINOPHEN 975 MG: 325 TABLET, FILM COATED ORAL at 17:27

## 2023-07-20 RX ADMIN — AMIODARONE HYDROCHLORIDE 200 MG: 200 TABLET ORAL at 08:47

## 2023-07-20 RX ADMIN — FLUOXETINE 20 MG: 20 CAPSULE ORAL at 17:23

## 2023-07-20 RX ADMIN — FLUOXETINE 20 MG: 20 CAPSULE ORAL at 08:45

## 2023-07-20 RX ADMIN — MELATONIN 6 MG: at 21:39

## 2023-07-20 NOTE — PLAN OF CARE
Problem: PHYSICAL THERAPY ADULT  Goal: Performs mobility at highest level of function for planned discharge setting. See evaluation for individualized goals. Description: Treatment/Interventions: Functional transfer training, LE strengthening/ROM, Therapeutic exercise, Endurance training, Patient/family training, Equipment eval/education, Bed mobility, Gait training, Compensatory technique education, Spoke to nursing, OT, Spoke to case management          See flowsheet documentation for full assessment, interventions and recommendations. Note: Prognosis: Good  Problem List: Decreased strength, Decreased endurance, Impaired balance, Decreased mobility, Pain, Obesity  Assessment: Pt is 67 y.o. female seen for a high complexity PT evaluation s/p admit to [address] [place] [address] on 7/18/2023 from [address] w/ midline wound dehiscence. Admitted 6/19-7/4 with bowel perforation and internal hernia (hx arlene-en-y gastric bypass)  S/p ex lap, resection J-J anastomosis, segmental ileum resection, abthera 6/19  S/p ex lap, washout, abthera change 6/19  S/p ex lap, small bowel resection, bowel anastomosis x3, vicryl bridging mesh 6/20  S/p debridement wound, washout 7/18   S/p abdominal wound, wash out, vac application partial closure wound abdominal trunk 7/19  Please see above for other active problem list / PMH. PT now consulted to assess functional mobility and needs for safe d/c planning. Pt admitted from 2006 [address]; however at baseline pt lives w/ dtr in a [address] w/ 1 NILS + was [place] w/ RW. Currently pt requires S for bed skills; ModAx1 for functional transfers; MinAx1 for ambulation w/ RW. Pt presents functioning below baseline and w/ overall mobility deficits 2* to: decreased LE strength; generalized weakness/deconditioning; decreased endurance; impaired balance; gait deviations; mild SOB/KENDRICK; pain; fatigue; bed/chair alarms; multiple lines. These impairments place pt at risk for falls.  Pt will continue to benefit from skilled PT interventions to address stated impairments; to maximize functional potential; for ongoing pt/ family training; and DME needs. PT is currently recommending rehab. PT Discharge Recommendation: Post acute rehabilitation services    See flowsheet documentation for full assessment.

## 2023-07-20 NOTE — OCCUPATIONAL THERAPY NOTE
Occupational Therapy Evaluation     Patient Name: Rosa GOMES Date: 7/20/2023  Problem List  Active Problems: There are no active Hospital Problems. Past Medical History  Past Medical History:   Diagnosis Date    A-fib [facility])     Arthritis     Atrial fibrillation (HCC)     Cervical spondylosis with myelopathy     Gastric bypass status for obesity     History of transfusion     35 years ago    Hypertension     mass right kidney     Renal cell adenocarcinoma ([address])     RIGHT    Sleep apnea      Past Surgical History  Past Surgical History:   Procedure Laterality Date    ABDOMINAL ADHESION SURGERY N/A 6/19/2023    Procedure: LYSIS ADHESIONS;  Surgeon: Shelley Lara DO;  [facility];  Service: General    ABDOMINAL WALL SURGERY N/A 7/19/2023    Procedure: PARTIAL CLOSURE WOUND ABDOMINAL/TRUNK;  Surgeon: Trinidad Mcmillan DO;  [facility];  Service: General    APPENDECTOMY N/A 6/19/2023    Procedure: APPENDECTOMY;  Surgeon: Shelley Lara DO;  [facility];  Service: General    CHOLECYSTECTOMY      COLOSTOMY      CRYOABLATION Right     RT KIDNEY    CYSTORRHAPHY      Bladder.  Last assessed 4/6/2016     EXPLORATORY LAPAROTOMY W/ BOWEL RESECTION N/A 6/19/2023    Procedure: LAPAROTOMY EXPLORATORY W/ BOWEL RESECTION;  Surgeon: Trell Villaseñor MD;  [facility];  Service: General    GASTRIC BYPASS      HERNIA REPAIR      Last assessed 4/6/2016     HYSTERECTOMY      LAPAROTOMY N/A 6/19/2023    Procedure: LAPAROTOMY EXPLORATORY, washout, vac change;  Surgeon: Shelley Lara DO;  [facility];  Service: General    LAPAROTOMY N/A 6/20/2023    Procedure: LAPAROTOMY, REMOVAL OF WOUND VAC AND PACKING, SMALL BOWEL RESECTION , BOWEL ANASTOMOSIS X 3 , VICRYL MESH BRIDGING -;  Surgeon: Shelley Lara DO;  [facility];  Service: General    IA OPTX DSTL RADL X-ARTIC FX/EPIPHYSL SEP Right 4/22/2016    Procedure: OPEN REDUCTION INTERNAL FIXATION RIGHT DISTAL RADIUS;  Surgeon: Rosario Espino MD;  [facility];  Service: Orthopedics    REVISION COLOSTOMY      SMALL INTESTINE SURGERY N/A 6/19/2023    Procedure: RESECTION SMALL BOWEL;  Surgeon: Keara Simpson DO;  [facility];  Service: General    THYROID LOBECTOMY Left 8/27/2019    Procedure: LOBECTOMY THYROID, left;  Surgeon: Roxanne Gomes MD;  [facility];  Service: Surgical Oncology    TUBAL LIGATION      US GUIDED THYROID BIOPSY  2/27/2019    US GUIDED THYROID BIOPSY  5/29/2019    WOUND DEBRIDEMENT N/A 7/18/2023    Procedure: DEBRIDEMENT WOUND [facility] OUT); Surgeon: Rachele Grady DO;  [facility];  Service: Trauma    WOUND DEBRIDEMENT N/A 7/19/2023    Procedure: ABDOMINAL WOUND ([address] [place] [address] OUT); VAC APPLICATION;  Surgeon: Matias Chambers DO;  [facility];  Service: General           07/20/23 0905   OT Last Visit   OT Visit Date 07/20/23   Note Type   Note type Evaluation   Pain Assessment   Pain Assessment Tool 0-10   Pain Score 6   Pain Location/Orientation Orientation: Lower; Location: Back   Hospital Pain Intervention(s) Repositioned; Ambulation/increased activity   Restrictions/Precautions   Weight Bearing Precautions Per Order No   Other Precautions Chair Alarm; Bed Alarm;Multiple lines; Fall Risk;Pain  (wound vac)   Home Living   Type of [address] [place] [address] One level;Ramped entrance   Bathroom Shower/Tub Tub/shower unit   Bathroom Toilet Raised   Bathroom Equipment Grab bars in shower; Shower chair;Grab bars around toilet   Crissy Luther  (at baseline pt reports primarily just using RW in community. However, pt admitted from [facility] where she has been using the RW at all times)   Additional Comments PTA, pt was at [address] [place] [address] [facility]   Prior Function   Level of [place] Independent with ADLs; Independent with IADLS   Lives With Daughter   Receives Help From Family   IADLs Family/Friend/Other provides transportation; Independent with meal prep;Independent with medication management   Falls in the last 6 months 1 to 4  (1 per pt report)   Vocational Retired   Comments PTA, pt was at [employer]. Above is baseline   Lifestyle   Autonomy PTA, pt was at [employer] where she required A for ADLs/IADLs and RW for mobility. At baseline, pt reports being I with ADLs, IADLs and RW for in the community, (-)    Reciprocal Relationships Family - dtr whom she lives with [facility] [place]   Service to Others Retired   Intr[employer] Gratification Music, [address], watching GMA, reading   Subjective   Subjective "Good, I need to get up!"   ADL   Where Assessed Edge of bed   Eating Assistance [address] 5  Supervision/Setup   LB Bathing Assistance 3  Moderate Assistance   [address] 5  Supervision/Setup   LB [address] 3  Moderate [address]  3  Moderate Assistance   Bed Mobility   Supine to Sit 5  Supervision   Additional items HOB elevated; Bedrails; Increased time required   Sit to Supine Unable to assess   Transfers   Sit to Stand 3  Moderate assistance   Additional items Assist x 1; Increased time required   Stand to Sit 3  Moderate assistance   Additional items Assist x 1; Increased time required   Additional Comments transfers with RW   Functional Mobility   Functional Mobility 4  Minimal assistance   Additional Comments Ax1   Additional items Rolling walker   Balance   Static Sitting Fair +   Dynamic Sitting Fair   Static Standing Fair -   Dynamic Standing Poor +   Ambulatory Poor +   Activity Tolerance   Activity Tolerance Patient limited by fatigue   Medical Staff Made Aware PT Carlos Burks CM   Nurse Made Aware RN clearance for session   RUE Assessment   RUE Assessment WFL  (slightly decreased sh ROM (~90*) but WFL)   LUE Assessment   LUE Assessment WFL   Cognition   Overall Cognitive Status WFL   Arousal/Participation Responsive; Cooperative   Attention Within functional limits Orientation Level Oriented X4   Memory Unable to assess   Following Commands Follows all commands and directions without difficulty   Comments Pt pleasant and cooperative t/o session. Pt very motivated to improve and excited re therapy   Assessment   Limitation Decreased ADL status; Decreased UE strength;Decreased endurance;Decreased self-care trans;Decreased high-level ADLs   Prognosis Good   Assessment Pt is a 67 y.o. female admitted to [facility] on 7/18/2023 w/ open wound of abdomen. Pt now s/p wash out, vac application with partial closure. Pt recently with ex lap, resection, anastomosis, bridging mesh. has a past medical history of A-fib Arthritis, Cervical spondylosis with myelopathy, Gastric bypass status for obesity, Hypertension, mass right kidney, Renal cell adenocarcinoma, and Sleep apnea. Pt with active OT orders and ambulate  orders. Pt seen as a co-evaluation with PT due to the patient's co-morbidities, clinically unstable presentation/clinical complexity, and present impairments. As per pt report, pta, pt was at [facility]. At baseline, pt resides with her dtr in a 1STH, 0STE. Pt was I w/  ADLS and IADLS, (-) drove. Upon evaluation, pt currently requires MOD A with RW for transfers and MIN A for mobility. Pt currently requires I eating, S grooming, S UB ADLs, MOD A LB ADLs, and MOD A toileting. Pt is limited at this time 2*: endurance, activity tolerance, functional mobility, balance, functional standing tolerance, decreased I w/ ADLS/IADLS and strength. The following Occupational Performance Areas to address include: grooming, bathing/shower, toilet hygiene, dressing, health maintenance, functional mobility, community mobility and clothing management. Pt to benefit from immediate acute skilled OT to address above deficits, improve overall functional independence, maximize fnxl mobility and reduce caregiver burden. From OT standpoint, recommendation at time of d/c would be STR.   Pt was left after session with all current needs met. The patient's raw score on the AM-PAC Daily Activity Inpatient Short Form is 16. A raw score of less than 19 suggests the patient may benefit from discharge to post-acute rehabilitation services. Please refer to the recommendation of the Occupational Therapist for safe discharge planning. Goals   Patient Goals to regain her function   LTG Time Frame 10-14   Plan   Treatment Interventions ADL retraining;Functional transfer training;UE strengthening/ROM; Endurance training;Patient/family training;Equipment evaluation/education; Compensatory technique education;Continued evaluation; Activityengagement; Energy conservation   Goal Expiration Date 08/03/23   OT Frequency 3-5x/wk   Recommendation   OT Discharge Recommendation Post acute rehabilitation services   [facility] Daily Activity Inpatient   Lower Body Dressing 2   Bathing 2   Toileting 2   Upper Body Dressing 3   Grooming 3   Eating 4   Daily Activity Raw Score 16   Daily Activity Standardized Score (Calc for Raw Score >=11) 35.96   AM-PAC Applied Cognition Inpatient   Following a Speech/Presentation 4   Understanding Ordinary Conversation 4   Taking Medications 4   Remembering Where Things Are Placed or Put Away 4   Remembering List of 4-5 Errands 3   Taking Care of Complicated Tasks 3   Applied Cognition Raw Score 22   Applied Cognition Standardized Score 47.83     GOALS    - Pt will complete UB dressing/self care/bathing w/ mod I using adaptive device and DME as needed    - Pt will complete LB dressing/self care/bathing w/ mod I using adaptive device and DME as needed    - Pt will complete toileting w/ mod I w/ G hygiene/thoroughness using DME as needed    - Pt will improve functional transfers to Mod I on/off all surfaces using DME as needed w/ G balance/safety     - Pt will improve functional mobility during ADL/IADL/leisure tasks to Mod I using DME as needed w/ G balance/safety     - Pt will demonstrate G carryover of pt/caregiver education and training as appropriate.     - Pt will demonstrate 100% carryover of energy conservation techniques t/o functional I/ADL/leisure tasks w/o cues s/p skilled education    - Pt will engage in ongoing cognitive assessment w/ G participation to assist w/ safe d/c planning/recommendations    - Pt will increase static and dynamic standing/sitting balance to F+  using AD/DME as needed to increase safety and I during fnxl transfers and ADLs    - Pt will maintain upright sitting position for at least 20 min during dynamic fnxl activity with F+  balance and endurance to improve ADL performance and independence, as well as reduce risk of falls     - Pt will participate in simulated IADL management task with DME as needed to increase independence to  w/ G safety and endurance    Saintclair Peters, MS, OTR/L

## 2023-07-20 NOTE — WOUND OSTOMY CARE
Progress Note - Wound   Nat Palmer 67 y.o. female MRN: [id number]  Unit/Bed#: [facility] 634-01 Encounter: [id number]        Assessment:   Patient seen today for wound care follow up assessment. Patient is mod/max assist to stand to  for assessment. Patient is continent of bowel and bladder. Patient is independent with meals. B/L posterior thigh wounds have now resolved. Wound care will sign off at this time. Skin care plans:  1-Hydraguard to bilateral sacrum, buttock and heels BID and PRN  2-Elevate heels to offload pressure. 3-Ehob cushion in chair when out of bed. 4-Moisturize skin daily with skin nourishing cream.  5-Turn/reposition q2h or when medically stable for pressure re-distribution on skin.                Holly MAURICION, RN, [employer]

## 2023-07-20 NOTE — PROGRESS NOTES
General Surgery  Progress Note   Roxanne Blake 67 y.o. female MRN: [id number]  Unit/Bed#: [facility] 634-01 Encounter: [id number]    Assessment:  76yo F with midline wound dehiscence. Admitted - with bowel perforation and internal hernia (hx arlene-en-y gastric bypass)  S/p ex lap, resection J-J anastomosis, segmental ileum resection, abthera   S/p ex lap, washout, abthera change   S/p ex lap, small bowel resection, bowel anastomosis x3, vicryl bridging mesh   S/p debridement wound, washout    S/p abdominal wound, wash out, vac application partial closure wound abdominal trunk     AVSS  UOP: 600  Vac-10      Plan:  -Follow-up on a.m. labs  -PT OT  -Pain/ nausea control PRN  -OOB/ ambulation  -Incentive Spirometry      Subjective/Objective     Subjective:   Patient seen and examined at bedside, in no acute distress. No acute events overnight. Patient reports back pain for laying bed for the last few days but denies any other pain. She reports 1 bowel movement yesterday that was loose but has resolved. She reports that the Ensure she was given has made her pass a lot of gas, she contributes it to the high sugar content and lack of protein in it. She denies nausea or vomiting, fevers or chills. Passing flatus and having bowel movements. Objective:   Vitals:Blood pressure 114/51, pulse 67, temperature 97.5 °F (36.4 °C), resp. rate 17, SpO2 94 %. Temp (24hrs), Av.2 °F (36.8 °C), Min:97.5 °F (36.4 °C), Max:98.9 °F (37.2 °C)      I/O        0701   0700  0701   0700    P. O.  0    I.V. (mL/kg) 1000 (7.2) 1000 (7.2)    IV Piggyback 250     Total Intake(mL/kg) 1250 (9.1) 1000 (7.2)    Urine (mL/kg/hr)  300 (0.1)    Drains  10    Stool  0    Total Output  310    Net +1250 +690          Unmeasured Stool Occurrence  0 x          Invasive Devices     Peripheral Intravenous Line  Duration           Peripheral IV 23 Proximal;Right;Ventral (anterior) Forearm 1 day          Drain Duration           External Urinary Catheter 1 day                Physical Exam:  General: No acute distress  Neuro: Awake, Alert and Oriented x 3  HEENT:  Normocephalic, atraumatic, moist mucous membranes  CV: Regular rate and rhythm  Lungs: Normal work of breathing, no increased respiratory effort  Abdomen: Soft, non-distended, non-tender, lower abdominal midline dressed without drainage on bandage  Extremities: No edema, clubbing or cyanosis  Skin: Warm, dry    Lab Results:   BMP/CMP:   Lab Results   Component Value Date    SODIUM 139 07/19/2023    K 4.4 07/19/2023     (H) 07/19/2023    CO2 23 07/19/2023    BUN 11 07/19/2023    CREATININE 0.69 07/19/2023    CALCIUM 8.0 (L) 07/19/2023    AST 13 07/19/2023    ALT 12 07/19/2023    ALKPHOS 132 (H) 07/19/2023    EGFR 86 07/19/2023    and CBC:   Lab Results   Component Value Date    WBC 8.53 07/19/2023    HGB 8.3 (L) 07/19/2023    HCT 27.4 (L) 07/19/2023    MCV 89 07/19/2023     07/19/2023    RBC 3.09 (L) 07/19/2023    MCH 26.9 07/19/2023    MCHC 30.3 (L) 07/19/2023    RDW 18.3 (H) 07/19/2023    MPV 8.9 07/19/2023    NRBC 0 07/19/2023     VTE Pharmacologic Prophylaxis: Heparin  VTE Mechanical Prophylaxis: sequential compression device    Saba Garcia MD  7/20/2023  5:34 AM

## 2023-07-20 NOTE — PLAN OF CARE
Problem: MOBILITY - ADULT  Goal: Maintains/Returns to pre admission functional level  Description: INTERVENTIONS:  - Perform BMAT or MOVE assessment daily.   - Set and communicate daily mobility goal to care team and patient/family/caregiver.    - Collaborate with rehabilitation services on mobility goals if consulted    - Record patient progress and toleration of activity level   Outcome: Progressing     Problem: Prexisting or High Potential for Compromised Skin Integrity  Goal: Skin integrity is maintained or improved  Description: INTERVENTIONS:  - Identify patients at risk for skin breakdown  - Assess and monitor skin integrity  - Assess and monitor nutrition and hydration status  - Monitor labs   - Assess for incontinence   - Turn and reposition patient  - Assist with mobility/ambulation  - Relieve pressure over bony prominences  - Avoid friction and shearing  - Provide appropriate hygiene as needed including keeping skin clean and dry  - Evaluate need for skin moisturizer/barrier cream  - Collaborate with interdisciplinary team   - Patient/family teaching  - Consider wound care consult   Outcome: Progressing

## 2023-07-20 NOTE — PHYSICAL THERAPY NOTE
Physical Therapy Evaluation    Patient's Name: Tia Whittaker    Admitting Diagnosis  Abdominal pain [R10.9]  Open wound of abdominal wall, sequela [S31.109S]    Problem List  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)    Primary osteoarthritis of left knee    Primary osteoarthritis of right knee    Chronic pain of left knee    Myalgia    Chest wall tenderness    Chronic heart failure (HCC)    Permanent atrial fibrillation (HCC)    Cervical spondylosis without myelopathy    Arthropathy of cervical facet joint    Occipital neuralgia of right side    Status post partial thyroidectomy    Myofascial pain syndrome    Hashimoto's thyroiditis    Early menopause occurring in patient age younger than 39 years    S/P bariatric surgery    Osteoporosis    Hyperparathyroidism ([address])    Reactive hypoglycemia    Vitamin D deficiency    Unsteady gait    Dyspnea on exertion    SIRS (systemic inflammatory response syndrome) (HCC)    Incontinence    GILDARDO (obstructive sleep apnea)    Morbid obesity with BMI of 50.0-59.9, adult ([address])    Hypertension    Stage 3 chronic kidney disease, unspecified whether stage 3a or 3b CKD (HCC)    Primary osteoarthritis of both knees    Idiopathic acute pancreatitis without infection or necrosis    Bowel perforation (HCC)    Acute encephalopathy    Anxiety    Diarrhea    Hypokalemia    Anemia    Open abdominal wall wound    Iron deficiency anemia    Hypotension    Ambulatory dysfunction       Past Medical History  Past Medical History:   Diagnosis Date    A-fib ([address])     Arthritis     Atrial fibrillation (HCC)     Cervical spondylosis with myelopathy     Gastric bypass status for obesity     History of transfusion     35 years ago    Hypertension     mass right kidney     Renal cell adenocarcinoma (HCC)     RIGHT    Sleep apnea        Past Surgical History  Past Surgical History:   Procedure Laterality Date    ABDOMINAL ADHESION SURGERY N/A 6/19/2023    Procedure: LYSIS ADHESIONS;  Surgeon: Ck Rodrigues DO;  [facility];  Service: General    ABDOMINAL WALL SURGERY N/A 7/19/2023    Procedure: PARTIAL CLOSURE WOUND ABDOMINAL/TRUNK;  Surgeon: Román Ching DO;  [facility];  Service: General    APPENDECTOMY N/A 6/19/2023    Procedure: APPENDECTOMY;  Surgeon: Ck Rodrigues DO;  [facility];  Service: General    CHOLECYSTECTOMY      COLOSTOMY      CRYOABLATION Right     RT KIDNEY    CYSTORRHAPHY      Bladder.  Last assessed 4/6/2016     EXPLORATORY LAPAROTOMY W/ BOWEL RESECTION N/A 6/19/2023    Procedure: LAPAROTOMY EXPLORATORY W/ BOWEL RESECTION;  Surgeon: Tang Marquez MD;  [facility];  Service: General    GASTRIC BYPASS      HERNIA REPAIR      Last assessed 4/6/2016     HYSTERECTOMY      LAPAROTOMY N/A 6/19/2023    Procedure: LAPAROTOMY EXPLORATORY, washout, vac change;  Surgeon: Ck Rodrigues DO;  [facility];  Service: General    LAPAROTOMY N/A 6/20/2023    Procedure: LAPAROTOMY, REMOVAL OF WOUND VAC AND PACKING, SMALL BOWEL RESECTION , BOWEL ANASTOMOSIS X 3 , VICRYL MESH BRIDGING -;  Surgeon: Ck Rodrigues DO;  [facility];  Service: General    [place] OPTX DSTL RADL X-ARTIC FX/EPIPHYSL SEP Right 4/22/2016    Procedure: OPEN REDUCTION INTERNAL FIXATION RIGHT DISTAL RADIUS;  Surgeon: Sandy Monroe MD;  [facility];  Service: Orthopedics    REVISION COLOSTOMY      SMALL INTESTINE SURGERY N/A 6/19/2023    Procedure: RESECTION SMALL BOWEL;  Surgeon: Ck Rodrigues DO;  [facility];  Service: General    THYROID LOBECTOMY Left 8/27/2019    Procedure: LOBECTOMY THYROID, left;  Surgeon: Gerry Rico MD;  [facility];  Service: Surgical Oncology    TUBAL LIGATION      US GUIDED THYROID BIOPSY  2/27/2019    US GUIDED THYROID BIOPSY  5/29/2019    WOUND DEBRIDEMENT N/A 7/18/2023    Procedure: DEBRIDEMENT WOUND [facility] OUT); Surgeon: Lucio Rees DO;  [facility];  Service: Trauma    WOUND DEBRIDEMENT N/A 7/19/2023    Procedure: ABDOMINAL WOUND ([address]); VAC APPLICATION;  Surgeon: Kevin Millan DO;  [facility];  Service: General        07/20/23 0906   PT Last Visit   PT Visit Date 07/20/23   Note Type   Note type Evaluation   Pain Assessment   Pain Assessment Tool 0-10   Pain Score 6   Pain Location/Orientation Orientation: Lower; Location: Back   Hospital Pain Intervention(s) Repositioned; Ambulation/increased activity   Restrictions/Precautions   Weight Bearing Precautions Per Order No   Other Precautions Chair Alarm; Bed Alarm;Multiple lines; Fall Risk;Pain  (wound vac abdomen)   Home Living   Type of [address] [place] [address] One level;Ramped entrance   Bathroom Shower/Tub Tub/shower unit   Bathroom Toilet Raised   Bathroom Equipment Grab bars in shower; Shower chair;Grab bars around toilet   [place]  (lift chair)   Prior Function   Level of [place] Independent with ADLs; Independent with functional mobility; Needs assistance with IADLS  (I ambulation w/o AD for household mobility, Ambar w/ RW for community mobility)   Lives With Daughter  (dtr works from home)   [address] Help From Family   IADLs Family/Friend/Other provides transportation; Family/Friend/Other provides meals; Family/Friend/Other provides medication management   Vocational Retired   General   Family/Caregiver Present No   Cognition   Arousal/Participation Alert   Orientation Level Oriented X4   Comments pleasant + cooperative   Subjective   Subjective Pt agreeable to mobilize. RLE Assessment   RLE Assessment   (grossly 3+/5)   LLE Assessment   LLE Assessment   (grossly 3+/5)   Coordination   Movements are Fluid and Coordinated 1   Bed Mobility   Supine to Sit 5  Supervision   Additional items HOB elevated; Increased time required;Verbal cues; Bedrails  (increased time + effort) Sit to Supine Unable to assess   Additional Comments Pt greeted in supine. Transfers   Sit to Stand 3  Moderate assistance   Additional items Assist x 1; Increased time required;Verbal cues  (elevated bed)   Stand to Sit 3  Moderate assistance   Additional items Assist x 1; Increased time required;Verbal cues;Armrests   Additional Comments RW   Ambulation/Elevation   Gait pattern Excessively slow; Short stride;Decreased foot clearance; Improper Weight shift; Foward flexed   Gait Assistance 4  Minimal assist   Additional items Assist x 1;Verbal cues; Tactile cues  (+ 2nd for chair follow)   Assistive Device Rolling walker   Distance 30'   Balance   Static Sitting Fair +   Dynamic Sitting Fair   Static Standing Fair -   Dynamic Standing Poor +   Ambulatory Poor +  (RW)   Endurance Deficit   Endurance Deficit Yes   Endurance Deficit Description weakness, fatigue, decreased ambulation distance   Activity Tolerance   Activity Tolerance Patient limited by fatigue   Medical Staff Made Aware OT Angel Garcia CM   Nurse Made Aware yes - cleared for therapy   Assessment   Prognosis Good   Problem List Decreased strength;Decreased endurance; Impaired balance;Decreased mobility;Pain;Obesity   Assessment Pt is 67 y.o. female seen for a high complexity PT evaluation s/p admit to [facility] on 7/18/2023 from [address] [place] [address],[address] w/ midline wound dehiscence. Admitted 6/19-7/4 with bowel perforation and internal hernia (hx arlene-en-y gastric bypass)    S/p ex lap, resection J-J anastomosis, segmental ileum resection, abthera 6/19    S/p ex lap, washout, abthera change 6/19    S/p ex lap, small bowel resection, bowel anastomosis x3, vicryl bridging mesh 6/20    S/p debridement wound, washout 7/18   S/p abdominal wound, wash out, vac application partial closure wound abdominal trunk 7/19    Please see above for other active problem list / PMH. PT now consulted to assess functional mobility and needs for safe d/c planning.  Pt admitted from [facility]; however at baseline pt lives w/ dtr in a [address] w/ 1 NILS + was Ambar w/ RW. Currently pt requires S for bed skills; ModAx1 for functional transfers; MinAx1 for ambulation w/ RW. Pt presents functioning below baseline and w/ overall mobility deficits 2* to: decreased LE strength; generalized weakness/deconditioning; decreased endurance; impaired balance; gait deviations; mild SOB/KENDRICK; pain; fatigue; bed/chair alarms; multiple lines. These impairments place pt at risk for falls. Pt will continue to benefit from skilled PT interventions to address stated impairments; to maximize functional potential; for ongoing pt/ family training; and DME needs. PT is currently recommending rehab. Goals   Patient Goals to walk, get out of the bed   STG Expiration Date 08/03/23   Short Term Goal #1 In 14 days pt will complete: 1) Bed mobility skills with Ambar to facilitate safe return to previous living environment. 2) Functional transfers with Ambar to facilitate safe return to previous living environment. 3) Ambulation with ' w/ Ambar without LOB for safe ambulation in home/community environment. 4) Improve balance scores by 1 grade to decrease fall risk. 5) Improve LE strength grades by 1 to increase independence w/ all functional mobility, transfers and gait. 6) PT for ongoing pt and family education; DME needs and D/C planning to promote highest level of function in least restrictive environment. PT Treatment Day 0   Plan   Treatment/Interventions Functional transfer training;LE strengthening/ROM; Therapeutic exercise; Endurance training;Patient/family training;Equipment eval/education; Bed mobility;Gait training; Compensatory technique education;Spoke to nursing;OT;Spoke to case management   PT Frequency 3-5x/wk   Recommendation   PT Discharge Recommendation Post acute rehabilitation services   AM-PAC Basic Mobility Inpatient   Turning in Flat Bed Without Bedrails 3   Lying on Back to Sitting on Edge of Flat Bed Without Bedrails 3   Moving Bed to Chair 3   Standing Up From Chair Using Arms 2   Walk in Room 3   Climb 3-5 Stairs With Railing 1   Basic Mobility Inpatient Raw Score 15   Basic Mobility Standardized Score 36.97   Highest Level Of Mobility   -HLM Goal 4: Move to chair/commode   JH-HLM Achieved 7: Walk 25 feet or more   End of Consult   Patient Position at End of Consult Bedside chair;Bed/Chair alarm activated; All needs within reach  (on waffle cushion, all lines in tact, BLE elevated)     [place] Rosa, PT, DPT

## 2023-07-20 NOTE — PLAN OF CARE
Problem: OCCUPATIONAL THERAPY ADULT  Goal: Performs self-care activities at highest level of function for planned discharge setting. See evaluation for individualized goals. Description: Treatment Interventions: ADL retraining, Functional transfer training, UE strengthening/ROM, Endurance training, Patient/family training, Equipment evaluation/education, Compensatory technique education, Continued evaluation, Activityengagement, Energy conservation          See flowsheet documentation for full assessment, interventions and recommendations. Note: Limitation: Decreased ADL status, Decreased UE strength, Decreased endurance, Decreased self-care trans, Decreased high-level ADLs  Prognosis: Good  Assessment: Pt is a 67 y.o. female admitted to [facility] on 7/18/2023 w/ open wound of abdomen. Pt now s/p wash out, vac application with partial closure. Pt recently with ex lap, resection, anastomosis, bridging mesh. has a past medical history of A-fib Arthritis, Cervical spondylosis with myelopathy, Gastric bypass status for obesity, Hypertension, mass right kidney, Renal cell adenocarcinoma, and Sleep apnea. Pt with active OT orders and ambulate  orders. Pt seen as a co-evaluation with PT due to the patient's co-morbidities, clinically unstable presentation/clinical complexity, and present impairments. As per pt report, pta, pt was at [facility]. At baseline, pt resides with her dtr in a 1STH, 0STE. Pt was I w/  ADLS and IADLS, (-) drove. Upon evaluation, pt currently requires MOD A with RW for transfers and MIN A for mobility. Pt currently requires I eating, S grooming, S UB ADLs, MOD A LB ADLs, and MOD A toileting. Pt is limited at this time 2*: endurance, activity tolerance, functional mobility, balance, functional standing tolerance, decreased I w/ ADLS/IADLS and strength. The following Occupational Performance Areas to address include: grooming, bathing/shower, toilet hygiene, dressing, health maintenance, functional mobility, community mobility and clothing management. Pt to benefit from immediate acute skilled OT to address above deficits, improve overall functional independence, maximize fnxl mobility and reduce caregiver burden. From OT standpoint, recommendation at time of d/c would be STR. Pt was left after session with all current needs met. The patient's raw score on the AM-PAC Daily Activity Inpatient Short Form is 16. A raw score of less than 19 suggests the patient may benefit from discharge to post-acute rehabilitation services. Please refer to the recommendation of the Occupational Therapist for safe discharge planning.      OT Discharge Recommendation: Post acute rehabilitation services

## 2023-07-21 LAB
ERYTHROCYTE [DISTWIDTH] IN BLOOD BY AUTOMATED COUNT: 18.9 % (ref 11.6–15.1)
HCT VFR BLD AUTO: 27.9 % (ref 34.8–46.1)
HGB BLD-MCNC: 8.3 G/DL (ref 11.5–15.4)
MCH RBC QN AUTO: 26.7 PG (ref 26.8–34.3)
MCHC RBC AUTO-ENTMCNC: 29.7 G/DL (ref 31.4–37.4)
MCV RBC AUTO: 90 FL (ref 82–98)
PLATELET # BLD AUTO: 361 THOUSANDS/UL (ref 149–390)
PMV BLD AUTO: 9.5 FL (ref 8.9–12.7)
RBC # BLD AUTO: 3.11 MILLION/UL (ref 3.81–5.12)
WBC # BLD AUTO: 9.93 THOUSAND/UL (ref 4.31–10.16)

## 2023-07-21 PROCEDURE — 85027 COMPLETE CBC AUTOMATED: CPT

## 2023-07-21 PROCEDURE — 97605 NEG PRS WND THER DME<=50SQCM: CPT | Performed by: STUDENT IN AN ORGANIZED HEALTH CARE EDUCATION/TRAINING PROGRAM

## 2023-07-21 PROCEDURE — 87040 BLOOD CULTURE FOR BACTERIA: CPT

## 2023-07-21 PROCEDURE — 99024 POSTOP FOLLOW-UP VISIT: CPT | Performed by: STUDENT IN AN ORGANIZED HEALTH CARE EDUCATION/TRAINING PROGRAM

## 2023-07-21 RX ADMIN — APIXABAN 5 MG: 5 TABLET, FILM COATED ORAL at 17:45

## 2023-07-21 RX ADMIN — DILTIAZEM HYDROCHLORIDE 360 MG: 180 CAPSULE, COATED, EXTENDED RELEASE ORAL at 09:21

## 2023-07-21 RX ADMIN — METOPROLOL SUCCINATE 25 MG: 25 TABLET, FILM COATED, EXTENDED RELEASE ORAL at 09:21

## 2023-07-21 RX ADMIN — APIXABAN 5 MG: 5 TABLET, FILM COATED ORAL at 09:21

## 2023-07-21 RX ADMIN — FLUOXETINE 20 MG: 20 CAPSULE ORAL at 09:21

## 2023-07-21 RX ADMIN — MELATONIN 6 MG: at 21:51

## 2023-07-21 RX ADMIN — ACETAMINOPHEN 975 MG: 325 TABLET, FILM COATED ORAL at 16:21

## 2023-07-21 RX ADMIN — FLUOXETINE 20 MG: 20 CAPSULE ORAL at 17:45

## 2023-07-21 RX ADMIN — AMIODARONE HYDROCHLORIDE 200 MG: 200 TABLET ORAL at 09:21

## 2023-07-21 NOTE — PHYSICAL THERAPY NOTE
Physical Therapy Cancellation Note       07/21/23 1234   Note Type   Note Type Cancelled Session   Cancel Reasons Medical status  (pt planned to have bedside wound vac change)     Discussed role of therapy, d/c plan & goal to maintain mobility during present hospital stay with pt & daughter. Pt has been walking in room with staff assist this AM, but wished to defer PT in anticipation of procedure. PT will continue to follow and treat when appropriate.       Rdo Prieto, PTA

## 2023-07-21 NOTE — PROCEDURES
Surgery  Aury Barrera 67 y.o. female MRN: [id number]  Unit/Bed#: [facility] 634-01 Encounter: [id number]    TUSHAR SOTO  Procedure Note    Date: 7/21  Time: 12pm    Location of wound:   Abd        Sponges removed:  1 Black Sponges  1 White Sponges    Dimensions of wound: 7 cm x 5 cm x 5 cm    Description of wound:           Sponges placed:  1 Black Sponges  1 White Sponges    VAC settings:  75 mmHg  Continuous    Pt tolerated procedure well  VAC sticker placed to wound dressing, per protocol      Elham Zarate DO  7/21/2023

## 2023-07-21 NOTE — PROGRESS NOTES
PHYSICAL MEDICINE AND REHABILITATION   PREADMISSION ASSESSMENT     Projected [facility] Diagnoses:  {ARC Rehab Diagnosis:14032}  Etiologic: ***  Date of Onset: ***   Date of surgery: ***    PATIENT INFORMATION  Name: Gillian Day Phone #: [phone] (home)   Address: [place] [address]  YOB: 1950 Age: 67 y.o. SS#   Marital Status:   Ethnicity: ***  Employment Status: {DESC; EMPLOYMENT STATUS:39267}  Extended Emergency Contact Information  Primary Emergency Contact: [facility] of [address] [facility] Phone: [phone]  Relation: Daughter  Secondary Emergency Contact: Gene Hopson[address] [place]i [place]ay of [address] [address] Phone: [phone]  Mobile Phone: [phone]  Relation: Daughter  Advance Directive: {advanced directive:11058}    INSURANCE/COVERAGE:     Primary Payor: MEDICARE / Plan: MEDICARE A AND B / Product Type: Medicare A & B Fee for Service /   Secondary Payer:<NONE>   Payer Contact:  Payer Contact:   Contact Phone:  Contact Phone:     Authorization #:   Coverage Dates:  LCD:   MEDICARE #: ***  Medicare Days: ***  Medical Record #: [id number]    REFERRAL SOURCE:   Referring provider: Randy Banks MD  Referring facility: [address] [place] [address] ***  Room: [address] [place] [address]  PCP: Dipesh Boyce DO PCP phone number: [phone]    MEDICAL INFORMATION  HPI: ***      Past Medical History:   Past Surgical History:    Allergies:     Past Medical History:   Diagnosis Date   • A-fib [facility])    • Arthritis    • Atrial fibrillation (HCC)    • Cervical spondylosis with myelopathy    • Gastric bypass status for obesity    • History of transfusion     35 years ago   • Hypertension    • mass right kidney    • Renal cell adenocarcinoma (HCC)     RIGHT   • Sleep apnea     Past Surgical History:   Procedure Laterality Date   • ABDOMINAL ADHESION SURGERY N/A 6/19/2023    Procedure: LYSIS ADHESIONS;  Surgeon: Alvin Chen DO;  [facility];  Service: General   • ABDOMINAL WALL SURGERY N/A 7/19/2023    Procedure: PARTIAL CLOSURE WOUND ABDOMINAL/TRUNK;  Surgeon: Martina Ulrich DO;  [facility];  Service: General   • APPENDECTOMY N/A 6/19/2023    Procedure: APPENDECTOMY;  Surgeon: Alvin Chen DO;  [facility];  Service: General   • CHOLECYSTECTOMY     • COLOSTOMY     • CRYOABLATION Right     RT KIDNEY   • CYSTORRHAPHY      Bladder. Last assessed 4/6/2016    • EXPLORATORY LAPAROTOMY W/ BOWEL RESECTION N/A 6/19/2023    Procedure: LAPAROTOMY EXPLORATORY W/ BOWEL RESECTION;  Surgeon: Yasir Marinelli MD;  [facility];  Service: General   • GASTRIC BYPASS     • HERNIA REPAIR      Last assessed 4/6/2016    • HYSTERECTOMY     • LAPAROTOMY N/A 6/19/2023    Procedure: LAPAROTOMY EXPLORATORY, washout, vac change;  Surgeon: Alvin Chen DO;  [facility];  Service: General   • LAPAROTOMY N/A 6/20/2023    Procedure: LAPAROTOMY, REMOVAL OF WOUND VAC AND PACKING, SMALL BOWEL RESECTION , BOWEL ANASTOMOSIS X 3 , VICRYL MESH BRIDGING -;  Surgeon: Alvin Chen DO;  [facility];  Service: General   • MD OPTX DSTL RADL X-ARTIC FX/EPIPHYSL SEP Right 4/22/2016    Procedure: OPEN REDUCTION INTERNAL FIXATION RIGHT DISTAL RADIUS;  Surgeon: Jhony White MD;  [facility];  Service: Orthopedics   • REVISION COLOSTOMY     • SMALL INTESTINE SURGERY N/A 6/19/2023    Procedure: RESECTION SMALL BOWEL;  Surgeon: Alvin Chen DO;  [facility];  Service: General   • THYROID LOBECTOMY Left 8/27/2019    Procedure: LOBECTOMY THYROID, left;  Surgeon: Reed Flores MD;  [facility];  Service: Surgical Oncology   • TUBAL LIGATION     • US GUIDED THYROID BIOPSY  2/27/2019   • US GUIDED THYROID BIOPSY  5/29/2019   • WOUND DEBRIDEMENT N/A 7/18/2023    Procedure: DEBRIDEMENT WOUND [facility] OUT);   Surgeon: Ilene Johnson DO;  [facility];  Service: Trauma   • WOUND DEBRIDEMENT N/A 7/19/2023    Procedure: ABDOMINAL WOUND ([address]); VAC APPLICATION;  Surgeon: Nelly Dee DO;  [facility];  Service: General     No Known Allergies      Medical/functional conditions requiring inpatient rehabilitation: ***    Risk for medical/clinical complications: ***    Comorbidities/Surgeries in the last 100 days: {ARC Comorbidities:65094}    CURRENT VITAL SIGNS:   Temp:  [97.8 °F (36.6 °C)-98.3 °F (36.8 °C)] 97.8 °F (36.6 °C)  HR:  [53-65] 58  Resp:  [16-20] 16  BP: (105-123)/(47-62) 113/56   Intake/Output Summary (Last 24 hours) at 7/21/2023 1317  Last data filed at 7/21/2023 0504  Gross per 24 hour   Intake --   Output 70 ml   Net -70 ml        LABORATORY RESULTS:      Lab Results   Component Value Date    HGB 8.3 (L) 07/21/2023    HCT 27.9 (L) 07/21/2023    WBC 9.93 07/21/2023     Lab Results   Component Value Date    BUN 12 07/20/2023    K 4.1 07/20/2023     (H) 07/20/2023    GLUCOSE 94 06/20/2023    CREATININE 0.78 07/20/2023     Lab Results   Component Value Date    PROTIME 17.3 (H) 07/18/2023    INR 1.39 (H) 07/18/2023        DIAGNOSTIC STUDIES:  No results found.     PRECAUTIONS/SPECIAL NEEDS:  {ARC PRE Precautions:88546}    MEDICATIONS:     Current Facility-Administered Medications:   •  acetaminophen (TYLENOL) tablet 975 mg, 975 mg, Oral, Q6H PRN, Meridee Juan Pablo Allsbrook, DO, 975 mg at 07/20/23 2346  •  amiodarone tablet 200 mg, 200 mg, Oral, Daily With Breakfast, Meridee Juan Pablo Allsbrook, DO, 200 mg at 07/21/23 8303  •  apixaban (ELIQUIS) tablet 5 mg, 5 mg, Oral, BID, Chayito Griffith MD, 5 mg at 07/21/23 1416  •  diltiazem (CARDIZEM CD) 24 hr capsule 360 mg, 360 mg, Oral, Daily, Donovane Juan Pablo MerrittsOntario, DO, 360 mg at 07/21/23 2208  •  ergocalciferol (VITAMIN D2) capsule 50,000 Units, 50,000 Units, Oral, Weekly, Montez Toth DO, 50,000 Units at 07/19/23 4411  •  FLUoxetine (PROzac) capsule 20 mg, 20 mg, Oral, BID, Renford Deter Allsbrook, DO, 20 mg at 23 4461  •  lactated ringers infusion, 125 mL/hr, Intravenous, Continuous, Alexus Andrade CRNA, Stopped at 23 1802  •  melatonin tablet 6 mg, 6 mg, Oral, HS, Renford Deter Allsbrook, DO, 6 mg at 23 2139  •  metoprolol succinate (TOPROL-XL) 24 hr tablet 25 mg, 25 mg, Oral, Daily, Renford Deter Allsbrook, DO, 25 mg at 23 2656  •  ondansetron (ZOFRAN) injection 4 mg, 4 mg, Intravenous, Q6H PRN, Melvi Jallohcal, DO  •  psyllium (METAMUCIL) 1 packet, 1 packet, Oral, Daily, [place] Patricia, DO, 1 packet at 23 0901    SKIN INTEGRITY:   {Exam; skin:13543::"no rashes","no erythema","no peripheral edema"}    PRIOR LEVEL OF FUNCTION:  She lives in [facility] {[facility] Home Type:90313}  Nat Palmer is {ARC Marital Status:90360} and {places; lives with:5711::"lives with their family"}. {ARC PRE PRIOR FUNCTION:28284}    FALLS IN THE LAST 6 MONTHS: ***    HOME ENVIRONMENT:  The living area: {Rehab home environment / accessibility:11299}  There are {STEPS:17776} to enter the home. The patient {SUKHDEV WILL_WILL NOT:27297} have 24 hour {[facility] Supervision/physical assistance:94678::"supervision"} available upon discharge.     PREVIOUS DME:  Equipment in home (previous DME): {ARC Home Equipment:49792}    FUNCTIONAL STATUS:***  Physical Therapy Occupational Therapy Speech Therapy          CARE SCORES:  Self Care:  Eating: {[facility] Pre Admission Screenin}  Oral hygiene: {ARC Pre Admission Screenin}  Toilet hygiene: {ARC Pre Admission Screenin}  Shower/bathing self: {ARC Pre Admission Screenin}  Upper body dressing: {ARC Pre Admission Screenin}  Lower body dressing: {ARC Pre Admission Screenin}  Putting on/taking off footwear: {ARC Pre Admission Screenin}  Transfers:  Roll left and right: {ARC Pre Admission Screenin}  Sit to lying: {ARC Pre Admission Screenin}  Lying to sitting on side of bed: {ARC Pre Admission Screenin}  Sit to stand: {ARC Pre Admission Screenin}  Chair/bed to chair transfer: {ARC Pre Admission Screenin}  Toilet transfer: {ARC Pre Admission Screenin}  Mobility:  Walk 10 ft: {ARC Pre Admission Screenin}  Walk 50 ft with two turns: {ARC Pre Admission Screenin}  Walk 150ft: {ARC Pre Admission Screenin}    CURRENT GAP IN FUNCTION  Prior to Admission: {Functional Status:55671:::1}    Expected functional outcomes:  It is expected that with skilled acute rehabilitation services the patient will progress to {Assistance levels:72875} for self care and {Assistance levels:43087} for mobility     Estimated length of stay: {ARC Length Of HLJP:76478}    Anticipated Post-Discharge Disposition/Treatment  Disposition: {ARC Disposition:56865}  Outpatient Services: {[facility] Post-Discharge KIMNK:24468}    BARRIERS TO DISCHARGE  {ARC Barrier:19807}    INTERVENTIONS FOR DISCHARGE  {ARC Intervention:50221}    REQUIRED THERAPY:  {ARC Required Therapy:18180}    REQUIRED FUNCTIONAL AND MEDICAL MANAGEMENT FOR INPATIENT REHABILITATION:  {ARC Current Medical Problems:69462}    RECOMMENDED LEVEL OF CARE: ***

## 2023-07-21 NOTE — ARC ADMISSION
Admissions team received referral on patient for possible ARC placement. Upon review of patient’s case with [address] [place] [address] physician, patient is deemed ARC appropriate at this time pending medical readiness / bed availability. Patient must be medically stable and ready for 3 hrs of therapy daily prior to admission. CM made aware.    Thank you,   [facility]

## 2023-07-21 NOTE — PROGRESS NOTES
General Surgery  Progress Note   Angel Parson 67 y.o. female MRN: [id number]  Unit/Bed#: [facility] 634-01 Encounter: [id number]    Assessment:  78yo F with midline wound dehiscence. Admitted - with bowel perforation and internal hernia (hx arlene-en-y gastric bypass)  S/p ex lap, resection J-J anastomosis, segmental ileum resection, abthera   S/p ex lap, washout, abthera change   S/p ex lap, small bowel resection, bowel anastomosis x3, vicryl bridging mesh   S/p debridement wound, washout    S/p abdominal wound, wash out, vac application partial closure wound abdominal trunk       Plan:  A.m. bedside wound VAC change  Follow-up a.m. labs  If febrile repeat blood cultures  Normal diet as tolerated  Encourage ambulation/out of bed  Pain control as needed  Antiemetic as needed      Subjective/Objective     Subjective:   No acute events overnight. Patient seen and examined at bedside. Patient expressed no acute concerns for the team this morning. Patient is aware of the plan for bedside VAC change today. Patient reports minimal appetite yesterday but is tolerating diet without nausea or vomiting. She also denies fever chills shortness of breath. Objective:   Vitals:Blood pressure 116/52, pulse (!) 53, temperature 97.8 °F (36.6 °C), resp. rate 20, height 5' 3" (1.6 m), SpO2 93 %. Temp (24hrs), Av °F (36.7 °C), Min:97.6 °F (36.4 °C), Max:98.3 °F (36.8 °C)      I/O        0701   0700  0701   0700    P. O.  0    I.V. (mL/kg) 1000 (7.2) 1000 (7.2)    IV Piggyback 250     Total Intake(mL/kg) 1250 (9.1) 1000 (7.2)    Urine (mL/kg/hr)  300 (0.1)    Drains  10    Stool  0    Total Output  310    Net +1250 +690          Unmeasured Stool Occurrence  0 x          Invasive Devices     Peripheral Intravenous Line  Duration           Peripheral IV 23 Proximal;Right;Ventral (anterior) Forearm 2 days          Drain  Duration           External Urinary Catheter 2 days Physical Exam:  General: NAD  Neuro: AAOx3  HEENT: Normocephalic atraumatic  CV: Well-perfused regular rate  Lungs: Normal work of breathing on room air  Abdomen: Abdomen is soft nontender nondistended, with lower midline dressing and inferior midline wound VAC, functioning well, holding suction, with sanguinous output.   Extremities: No edema, clubbing or cyanosis  Skin: Warm and dry    Lab Results:   BMP/CMP:   Lab Results   Component Value Date    SODIUM 139 07/20/2023    K 4.1 07/20/2023     (H) 07/20/2023    CO2 25 07/20/2023    BUN 12 07/20/2023    CREATININE 0.78 07/20/2023    CALCIUM 8.2 (L) 07/20/2023    AST 12 07/20/2023    ALT 7 (L) 07/20/2023    ALKPHOS 117 (H) 07/20/2023    EGFR 75 07/20/2023    and CBC:   Lab Results   Component Value Date    WBC 11.01 (H) 07/20/2023    HGB 7.8 (L) 07/20/2023    HCT 26.0 (L) 07/20/2023    MCV 89 07/20/2023     07/20/2023    RBC 2.92 (L) 07/20/2023    MCH 26.7 (L) 07/20/2023    MCHC 30.0 (L) 07/20/2023    RDW 18.5 (H) 07/20/2023    MPV 9.4 07/20/2023    NRBC 0 07/20/2023     VTE Pharmacologic Prophylaxis: Heparin  VTE Mechanical Prophylaxis: sequential compression device    Nir Tyson MD  7/21/2023  6:18 AM

## 2023-07-21 NOTE — PLAN OF CARE
Problem: Prexisting or High Potential for Compromised Skin Integrity  Goal: Skin integrity is maintained or improved  Description: INTERVENTIONS:  - Identify patients at risk for skin breakdown  - Assess and monitor skin integrity  - Assess and monitor nutrition and hydration status  - Monitor labs   - Assess for incontinence   - Turn and reposition patient  - Assist with mobility/ambulation  - Relieve pressure over bony prominences  - Avoid friction and shearing  - Provide appropriate hygiene as needed including keeping skin clean and dry  - Evaluate need for skin moisturizer/barrier cream  - Collaborate with interdisciplinary team   - Patient/family teaching  - Consider wound care consult   7/21/2023 1638 by Cal Currie RN  Outcome: Progressing  7/21/2023 1638 by Cal Currie RN  Outcome: Progressing     Problem: MOBILITY - ADULT  Goal: Maintains/Returns to pre admission functional level  Description: INTERVENTIONS:  - Perform BMAT or MOVE assessment daily.   - Set and communicate daily mobility goal to care team and patient/family/caregiver.    - Collaborate with rehabilitation services on mobility goals if consulted    - Out of bed for toileting  - Record patient progress and toleration of activity level   Outcome: Progressing

## 2023-07-21 NOTE — PROGRESS NOTES
-- Patient: Lala Kaufman  -- MRN: [id number]  -- Aidin Request ID: [id number]  -- Level of care reserved: Inpatient [address] [place] [address]  -- Partner Reserved: [facility] ([place]), TERRELLASHOK, [address] [facility] [phone]  -- Clinical needs requested:  -- Geography searched: [place] miles around [facility]  -- Start of Service:  -- Request sent: 4:06pm EDT on 7/19/2023 by Meagan Cordoba  -- Partner reserved: 11:57am EDT on 7/21/2023 by Halina Shannon  -- Choice list shared:

## 2023-07-22 LAB
ALBUMIN SERPL BCP-MCNC: 2.2 G/DL (ref 3.5–5)
ALP SERPL-CCNC: 132 U/L (ref 46–116)
ALT SERPL W P-5'-P-CCNC: 15 U/L (ref 12–78)
ANION GAP SERPL CALCULATED.3IONS-SCNC: 4 MMOL/L
ANISOCYTOSIS BLD QL SMEAR: PRESENT
AST SERPL W P-5'-P-CCNC: 58 U/L (ref 5–45)
BACTERIA BLD CULT: ABNORMAL
BASOPHILS # BLD MANUAL: 0 THOUSAND/UL (ref 0–0.1)
BASOPHILS NFR MAR MANUAL: 0 % (ref 0–1)
BILIRUB SERPL-MCNC: 0.39 MG/DL (ref 0.2–1)
BUN SERPL-MCNC: 15 MG/DL (ref 5–25)
CALCIUM ALBUM COR SERPL-MCNC: 9.8 MG/DL (ref 8.3–10.1)
CALCIUM SERPL-MCNC: 8.4 MG/DL (ref 8.3–10.1)
CHLORIDE SERPL-SCNC: 108 MMOL/L (ref 96–108)
CO2 SERPL-SCNC: 26 MMOL/L (ref 21–32)
CREAT SERPL-MCNC: 0.8 MG/DL (ref 0.6–1.3)
EOSINOPHIL # BLD MANUAL: 0 THOUSAND/UL (ref 0–0.4)
EOSINOPHIL NFR BLD MANUAL: 0 % (ref 0–6)
ERYTHROCYTE [DISTWIDTH] IN BLOOD BY AUTOMATED COUNT: 18.6 % (ref 11.6–15.1)
GFR SERPL CREATININE-BSD FRML MDRD: 73 ML/MIN/1.73SQ M
GLUCOSE SERPL-MCNC: 100 MG/DL (ref 65–140)
GRAM STN SPEC: ABNORMAL
HCT VFR BLD AUTO: 29.9 % (ref 34.8–46.1)
HGB BLD-MCNC: 8.9 G/DL (ref 11.5–15.4)
HYPERCHROMIA BLD QL SMEAR: PRESENT
LYMPHOCYTES # BLD AUTO: 0.86 THOUSAND/UL (ref 0.6–4.47)
LYMPHOCYTES # BLD AUTO: 7 % (ref 14–44)
MCH RBC QN AUTO: 26.5 PG (ref 26.8–34.3)
MCHC RBC AUTO-ENTMCNC: 29.8 G/DL (ref 31.4–37.4)
MCV RBC AUTO: 89 FL (ref 82–98)
MECA+MECC ISLT/SPM QL: DETECTED
MONOCYTES # BLD AUTO: 0.97 THOUSAND/UL (ref 0–1.22)
MONOCYTES NFR BLD: 9 % (ref 4–12)
NEUTROPHILS # BLD MANUAL: 8.94 THOUSAND/UL (ref 1.85–7.62)
NEUTS BAND NFR BLD MANUAL: 2 % (ref 0–8)
NEUTS SEG NFR BLD AUTO: 81 % (ref 43–75)
PLATELET # BLD AUTO: 392 THOUSANDS/UL (ref 149–390)
PLATELET BLD QL SMEAR: ADEQUATE
PMV BLD AUTO: 9.3 FL (ref 8.9–12.7)
POLYCHROMASIA BLD QL SMEAR: PRESENT
POTASSIUM SERPL-SCNC: 5 MMOL/L (ref 3.5–5.3)
PROT SERPL-MCNC: 6.1 G/DL (ref 6.4–8.4)
RBC # BLD AUTO: 3.36 MILLION/UL (ref 3.81–5.12)
RBC MORPH BLD: PRESENT
S EPIDERMIDIS DNA BLD POS QL NAA+NON-PRB: DETECTED
SODIUM SERPL-SCNC: 138 MMOL/L (ref 135–147)
VARIANT LYMPHS # BLD AUTO: 1 %
WBC # BLD AUTO: 10.77 THOUSAND/UL (ref 4.31–10.16)

## 2023-07-22 PROCEDURE — 85027 COMPLETE CBC AUTOMATED: CPT

## 2023-07-22 PROCEDURE — 80053 COMPREHEN METABOLIC PANEL: CPT

## 2023-07-22 PROCEDURE — 85007 BL SMEAR W/DIFF WBC COUNT: CPT

## 2023-07-22 PROCEDURE — 99024 POSTOP FOLLOW-UP VISIT: CPT | Performed by: SURGERY

## 2023-07-22 RX ORDER — ACETAMINOPHEN 325 MG/1
975 TABLET ORAL EVERY 8 HOURS PRN
Status: DISCONTINUED | OUTPATIENT
Start: 2023-07-22 | End: 2023-07-24 | Stop reason: HOSPADM

## 2023-07-22 RX ORDER — KETOROLAC TROMETHAMINE 30 MG/ML
15 INJECTION, SOLUTION INTRAMUSCULAR; INTRAVENOUS EVERY 6 HOURS PRN
Status: ACTIVE | OUTPATIENT
Start: 2023-07-22 | End: 2023-07-24

## 2023-07-22 RX ADMIN — ACETAMINOPHEN 975 MG: 325 TABLET, FILM COATED ORAL at 08:40

## 2023-07-22 RX ADMIN — AMIODARONE HYDROCHLORIDE 200 MG: 200 TABLET ORAL at 08:40

## 2023-07-22 RX ADMIN — FLUOXETINE 20 MG: 20 CAPSULE ORAL at 18:25

## 2023-07-22 RX ADMIN — DILTIAZEM HYDROCHLORIDE 360 MG: 180 CAPSULE, COATED, EXTENDED RELEASE ORAL at 08:41

## 2023-07-22 RX ADMIN — APIXABAN 5 MG: 5 TABLET, FILM COATED ORAL at 08:40

## 2023-07-22 RX ADMIN — MELATONIN 6 MG: at 22:50

## 2023-07-22 RX ADMIN — ACETAMINOPHEN 975 MG: 325 TABLET, FILM COATED ORAL at 22:50

## 2023-07-22 RX ADMIN — APIXABAN 5 MG: 5 TABLET, FILM COATED ORAL at 18:25

## 2023-07-22 RX ADMIN — FLUOXETINE 20 MG: 20 CAPSULE ORAL at 08:39

## 2023-07-22 NOTE — PLAN OF CARE
Problem: Prexisting or High Potential for Compromised Skin Integrity  Goal: Skin integrity is maintained or improved  Description: INTERVENTIONS:  - Identify patients at risk for skin breakdown  - Assess and monitor skin integrity  - Assess and monitor nutrition and hydration status  - Monitor labs   - Assess for incontinence   - Turn and reposition patient  - Assist with mobility/ambulation  - Relieve pressure over bony prominences  - Avoid friction and shearing  - Provide appropriate hygiene as needed including keeping skin clean and dry  - Evaluate need for skin moisturizer/barrier cream  - Collaborate with interdisciplinary team   - Patient/family teaching  - Consider wound care consult   Outcome: Progressing     Problem: MOBILITY - ADULT  Goal: Maintain or return to baseline ADL function  Description: INTERVENTIONS:  -  Assess patient's ability to carry out ADLs; assess patient's baseline for ADL function and identify physical deficits which impact ability to perform ADLs (bathing, care of mouth/teeth, toileting, grooming, dressing, etc.)  - Assess/evaluate cause of self-care deficits   - Assess range of motion  - Assess patient's mobility; develop plan if impaired  - Assess patient's need for assistive devices and provide as appropriate  - Encourage maximum independence but intervene and supervise when necessary  - Involve family in performance of ADLs  - Assess for home care needs following discharge   - Consider OT consult to assist with ADL evaluation and planning for discharge  - Provide patient education as appropriate  Outcome: Progressing  Goal: Maintains/Returns to pre admission functional level  Description: INTERVENTIONS:  - Perform BMAT or MOVE assessment daily.   - Set and communicate daily mobility goal to care team and patient/family/caregiver. - Collaborate with rehabilitation services on mobility goals if consulted  - Perform Range of Motion *3 times a day.   - Reposition patient every **2* hours.  - Dangle patient **3* times a day  - Stand patient *3** times a day  - Ambulate patient **3* times a day  - Out of bed to chair *3** times a day   - Out of bed for meals **3* times a day  - Out of bed for toileting  - Record patient progress and toleration of activity level   Outcome: Progressing     Problem: Nutrition/Hydration-ADULT  Goal: Nutrient/Hydration intake appropriate for improving, restoring or maintaining nutritional needs  Description: Monitor and assess patient's nutrition/hydration status for malnutrition. Collaborate with interdisciplinary team and initiate plan and interventions as ordered. Monitor patient's weight and dietary intake as ordered or per policy. Utilize nutrition screening tool and intervene as necessary. Determine patient's food preferences and provide high-protein, high-caloric foods as appropriate.      INTERVENTIONS:  - Monitor oral intake, urinary output, labs, and treatment plans  - Assess nutrition and hydration status and recommend course of action  - Evaluate amount of meals eaten  - Assist patient with eating if necessary   - Allow adequate time for meals  - Recommend/ encourage appropriate diets, oral nutritional supplements, and vitamin/mineral supplements  - Order, calculate, and assess calorie counts as needed  - Recommend, monitor, and adjust tube feedings and TPN/PPN based on assessed needs  - Assess need for intravenous fluids  - Provide specific nutrition/hydration education as appropriate  - Include patient/family/caregiver in decisions related to nutrition  Outcome: Progressing     Problem: INFECTION - ADULT  Goal: Absence or prevention of progression during hospitalization  Description: INTERVENTIONS:  - Assess and monitor for signs and symptoms of infection  - Monitor lab/diagnostic results  - Monitor all insertion sites, i.e. indwelling lines, tubes, and drains  - Monitor endotracheal if appropriate and nasal secretions for changes in amount and color  - [place] appropriate cooling/warming therapies per order  - Administer medications as ordered  - Instruct and encourage patient and family to use good hand hygiene technique  - Identify and instruct in appropriate isolation precautions for identified infection/condition  Outcome: Progressing  Goal: Absence of fever/infection during neutropenic period  Description: INTERVENTIONS:  - Monitor WBC    Outcome: Progressing     Problem: GASTROINTESTINAL - ADULT  Goal: Minimal or absence of nausea and/or vomiting  Description: INTERVENTIONS:  - Administer IV fluids if ordered to ensure adequate hydration  - Maintain NPO status until nausea and vomiting are resolved  - Nasogastric tube if ordered  - Administer ordered antiemetic medications as needed  - Provide nonpharmacologic comfort measures as appropriate  - Advance diet as tolerated, if ordered  - Consider nutrition services referral to assist patient with adequate nutrition and appropriate food choices  Outcome: Progressing  Goal: Maintains or returns to baseline bowel function  Description: INTERVENTIONS:  - Assess bowel function  - Encourage oral fluids to ensure adequate hydration  - Administer IV fluids if ordered to ensure adequate hydration  - Administer ordered medications as needed  - Encourage mobilization and activity  - Consider nutritional services referral to assist patient with adequate nutrition and appropriate food choices  Outcome: Progressing  Goal: Maintains adequate nutritional intake  Description: INTERVENTIONS:  - Monitor percentage of each meal consumed  - Identify factors contributing to decreased intake, treat as appropriate  - Assist with meals as needed  - Monitor I&O, weight, and lab values if indicated  - Obtain nutrition services referral as needed  Outcome: Progressing  Goal: Oral mucous membranes remain intact  Description: INTERVENTIONS  - Assess oral mucosa and hygiene practices  - Implement preventative oral hygiene regimen  - Implement oral medicated treatments as ordered  - Initiate Nutrition services referral as needed  Outcome: Progressing

## 2023-07-22 NOTE — PROGRESS NOTES
General Surgery  Progress Note   Vilma Delgado 67 y.o. female MRN: [id number]  Unit/Bed#: [facility] 634-01 Encounter: [id number]    Assessment:  76yo F with midline wound dehiscence. Admitted - with bowel perforation and internal hernia (hx arlene-en-y gastric bypass)  S/p ex lap, resection J-J anastomosis, segmental ileum resection, abthera   S/p ex lap, washout, abthera change   S/p ex lap, small bowel resection, bowel anastomosis x3, vicryl bridging mesh   S/p debridement wound, washout    S/p abdominal wound, wash out, vac application partial closure wound abdominal trunk          Plan:  -A.m. bedside wound VAC change  -Follow-up a.m. labs  -If febrile repeat blood cultures  -Normal diet as tolerated  -Encourage ambulation/out of bed  -Pain control as needed  -Antiemetic as needed  -CM: pending SLB ARC placement    Subjective/Objective   Patient seen and examined at bedside, in no acute distress. No acute events overnight. Patient's pain is well controlled. She denies nausea or vomiting. Passing flatus and having bowel movements. Objective:   Vitals:Blood pressure 113/70, pulse 55, temperature 97.7 °F (36.5 °C), resp. rate 16, height 5' 3" (1.6 m), SpO2 96 %. Temp (24hrs), Av.8 °F (36.6 °C), Min:97.7 °F (36.5 °C), Max:97.8 °F (36.6 °C)      I/O        0701   0700  0701   0700    P. O. 125     Total Intake(mL/kg) 125 (0.9)     Urine (mL/kg/hr) 350 (0.1) 0 (0)    Drains 70 50    Stool  0    Total Output 420 50    Net -295 -50          Unmeasured Urine Occurrence 2 x 2 x    Unmeasured Stool Occurrence  0 x          Invasive Devices     Peripheral Intravenous Line  Duration           Peripheral IV 23 Proximal;Right;Ventral (anterior) Forearm 3 days          Drain  Duration           External Urinary Catheter 3 days              Physical Exam:   General: No acute distress  Neuro: Awake, Alert and Oriented x 3  HEENT:  Normocephalic, atraumatic, moist mucous membranes  CV: Regular rate and rhythm  Lungs: Normal work of breathing, no increased respiratory effort  Abdomen: Soft nontender nondistended, with lower midline dressing and inferior midline wound VAC, functioning well, holding suction, with sanguinous output.   Extremities: No edema, clubbing or cyanosis  Skin: Warm, dry    Lab Results:     Lab Results   Component Value Date    WBC 9.93 07/21/2023    HGB 8.3 (L) 07/21/2023    HCT 27.9 (L) 07/21/2023    MCV 90 07/21/2023     07/21/2023    RBC 3.11 (L) 07/21/2023    MCH 26.7 (L) 07/21/2023    MCHC 29.7 (L) 07/21/2023    RDW 18.9 (H) 07/21/2023    MPV 9.5 07/21/2023     VTE Pharmacologic Prophylaxis: Heparin  VTE Mechanical Prophylaxis: sequential compression device    Roberto Odonnell MD  7/22/2023  12:28 AM

## 2023-07-23 LAB — BACTERIA BLD CULT: NORMAL

## 2023-07-23 PROCEDURE — 97535 SELF CARE MNGMENT TRAINING: CPT

## 2023-07-23 PROCEDURE — 99024 POSTOP FOLLOW-UP VISIT: CPT | Performed by: SURGERY

## 2023-07-23 RX ADMIN — APIXABAN 5 MG: 5 TABLET, FILM COATED ORAL at 08:17

## 2023-07-23 RX ADMIN — FLUOXETINE 20 MG: 20 CAPSULE ORAL at 17:23

## 2023-07-23 RX ADMIN — METOPROLOL SUCCINATE 25 MG: 25 TABLET, FILM COATED, EXTENDED RELEASE ORAL at 08:17

## 2023-07-23 RX ADMIN — MELATONIN 6 MG: at 21:25

## 2023-07-23 RX ADMIN — FLUOXETINE 20 MG: 20 CAPSULE ORAL at 08:17

## 2023-07-23 RX ADMIN — APIXABAN 5 MG: 5 TABLET, FILM COATED ORAL at 17:23

## 2023-07-23 RX ADMIN — DILTIAZEM HYDROCHLORIDE 360 MG: 180 CAPSULE, COATED, EXTENDED RELEASE ORAL at 08:17

## 2023-07-23 RX ADMIN — AMIODARONE HYDROCHLORIDE 200 MG: 200 TABLET ORAL at 08:17

## 2023-07-23 NOTE — PLAN OF CARE
Problem: OCCUPATIONAL THERAPY ADULT  Goal: Performs self-care activities at highest level of function for planned discharge setting. See evaluation for individualized goals. Description: Treatment Interventions: ADL retraining, Functional transfer training, UE strengthening/ROM, Endurance training, Patient/family training, Equipment evaluation/education, Compensatory technique education, Continued evaluation, Activityengagement, Energy conservation          See flowsheet documentation for full assessment, interventions and recommendations. Outcome: Progressing  Note: Limitation: Decreased ADL status, Decreased UE strength, Decreased endurance, Decreased self-care trans, Decreased high-level ADLs  Prognosis: Good  Assessment: pt participated in am ot session and was seen focusing on adls seated in chair after set up. pts dtr was present this session and observed pt using dressing stick. pt stood with mod asst x 1 c rw for back / buttocks cleaning. she required min asst ub bathing / drying and mod asst lb c tool. pt demosntrated f+ activity tolerence and f balance c rw static. pt is motivated pleasant and cooperative. pts dtr anthony protein shake for pt.      OT Discharge Recommendation: Post acute rehabilitation services        April A Storm

## 2023-07-23 NOTE — PROGRESS NOTES
General Surgery  Progress Note   Karlos Stanton 67 y.o. female MRN: [id number]  Unit/Bed#: [facility] 634-01 Encounter: [id number]    Assessment:  76yo F with midline wound dehiscence. Admitted - with bowel perforation and internal hernia (hx arlene-en-y gastric bypass) s/p multiple operations now presents necrosis of abd wound  -s/p debridement/washout on   -s/p partial closure and wound vac placement on     AVSS     Plan:  -Plan for wound vac change tomorrow  -Diet as tolerated  -Pain/nausea control PRN  -Diet as tolerated   -Continue eliquis  -PT/OT  -Dispo planning, f/u w/ CM    Subjective/Objective   No acute events overnight. Doing well. Tolerating diet. No fevers/chills. No nausea or vomiting    Objective:   Vitals:Blood pressure 114/57, pulse 67, temperature 98.3 °F (36.8 °C), resp. rate 18, height 5' 3" (1.6 m), SpO2 95 %. Temp (24hrs), Av.2 °F (36.8 °C), Min:98 °F (36.7 °C), Max:98.3 °F (36.8 °C)      I/O        0701   0700  0701   0700    P. O. 125     Total Intake(mL/kg) 125 (0.9)     Urine (mL/kg/hr) 350 (0.1) 0 (0)    Drains 70 50    Stool  0    Total Output 420 50    Net -295 -50          Unmeasured Urine Occurrence 2 x 2 x    Unmeasured Stool Occurrence  0 x          Invasive Devices     Peripheral Intravenous Line  Duration           Peripheral IV 23 Left Antecubital 1 day          Drain  Duration           External Urinary Catheter 4 days              Physical Exam:   General: NAD, obese  HENT: NCAT MMM  Neck: supple, no JVD  CV: nl rate  Lungs: nl wob.  No resp distress  ABD: Soft, nontender, nondistended, wound vac in place with minimal output, good seal, no leak  Extrem: No CCE  Neuro: AAOx3      Lab Results:     No results found for: "WBC", "HGB", "HCT", "MCV", "PLT", "ADJUSTEDWBC", "RBC", "MCH", "MCHC", "RDW", "MPV", "NRBC"  VTE Pharmacologic Prophylaxis: Heparin  VTE Mechanical Prophylaxis: sequential compression device    Rossy Medeiros, DO  7/23/2023  7:59 AM

## 2023-07-23 NOTE — PLAN OF CARE
Problem: Prexisting or High Potential for Compromised Skin Integrity  Goal: Skin integrity is maintained or improved  Description: INTERVENTIONS:  - Identify patients at risk for skin breakdown  - Assess and monitor skin integrity  - Assess and monitor nutrition and hydration status  - Monitor labs   - Assess for incontinence   - Turn and reposition patient  - Assist with mobility/ambulation  - Relieve pressure over bony prominences  - Avoid friction and shearing  - Provide appropriate hygiene as needed including keeping skin clean and dry  - Evaluate need for skin moisturizer/barrier cream  - Collaborate with interdisciplinary team   - Patient/family teaching  - Consider wound care consult   Outcome: Progressing     Problem: MOBILITY - ADULT  Goal: Maintain or return to baseline ADL function  Description: INTERVENTIONS:  -  Assess patient's ability to carry out ADLs; assess patient's baseline for ADL function and identify physical deficits which impact ability to perform ADLs (bathing, care of mouth/teeth, toileting, grooming, dressing, etc.)  - Assess/evaluate cause of self-care deficits   - Assess range of motion  - Assess patient's mobility; develop plan if impaired  - Assess patient's need for assistive devices and provide as appropriate  - Encourage maximum independence but intervene and supervise when necessary  - Involve family in performance of ADLs  - Assess for home care needs following discharge   - Consider OT consult to assist with ADL evaluation and planning for discharge  - Provide patient education as appropriate  Outcome: Progressing  Goal: Maintains/Returns to pre admission functional level  Description: INTERVENTIONS:  - Perform BMAT or MOVE assessment daily.   - Set and communicate daily mobility goal to care team and patient/family/caregiver. - Collaborate with rehabilitation services on mobility goals if consulted  - Perform Range of Motion 3 times a day.   - Reposition patient every 2 hours.  - Dangle patient 3 times a day  - Stand patient 3 times a day  - Ambulate patient 3 times a day  - Out of bed to chair 3 times a day   - Out of bed for meals 3 times a day  - Out of bed for toileting  - Record patient progress and toleration of activity level   Outcome: Progressing     Problem: Nutrition/Hydration-ADULT  Goal: Nutrient/Hydration intake appropriate for improving, restoring or maintaining nutritional needs  Description: Monitor and assess patient's nutrition/hydration status for malnutrition. Collaborate with interdisciplinary team and initiate plan and interventions as ordered. Monitor patient's weight and dietary intake as ordered or per policy. Utilize nutrition screening tool and intervene as necessary. Determine patient's food preferences and provide high-protein, high-caloric foods as appropriate.      INTERVENTIONS:  - Monitor oral intake, urinary output, labs, and treatment plans  - Assess nutrition and hydration status and recommend course of action  - Evaluate amount of meals eaten  - Assist patient with eating if necessary   - Allow adequate time for meals  - Recommend/ encourage appropriate diets, oral nutritional supplements, and vitamin/mineral supplements  - Order, calculate, and assess calorie counts as needed  - Recommend, monitor, and adjust tube feedings and TPN/PPN based on assessed needs  - Assess need for intravenous fluids  - Provide specific nutrition/hydration education as appropriate  - Include patient/family/caregiver in decisions related to nutrition  Outcome: Progressing     Problem: INFECTION - ADULT  Goal: Absence or prevention of progression during hospitalization  Description: INTERVENTIONS:  - Assess and monitor for signs and symptoms of infection  - Monitor lab/diagnostic results  - Monitor all insertion sites, i.e. indwelling lines, tubes, and drains  - Monitor endotracheal if appropriate and nasal secretions for changes in amount and color  - [place] appropriate cooling/warming therapies per order  - Administer medications as ordered  - Instruct and encourage patient and family to use good hand hygiene technique  - Identify and instruct in appropriate isolation precautions for identified infection/condition  Outcome: Progressing  Goal: Absence of fever/infection during neutropenic period  Description: INTERVENTIONS:  - Monitor WBC    Outcome: Progressing     Problem: GASTROINTESTINAL - ADULT  Goal: Minimal or absence of nausea and/or vomiting  Description: INTERVENTIONS:  - Administer IV fluids if ordered to ensure adequate hydration  - Maintain NPO status until nausea and vomiting are resolved  - Nasogastric tube if ordered  - Administer ordered antiemetic medications as needed  - Provide nonpharmacologic comfort measures as appropriate  - Advance diet as tolerated, if ordered  - Consider nutrition services referral to assist patient with adequate nutrition and appropriate food choices  Outcome: Progressing  Goal: Maintains or returns to baseline bowel function  Description: INTERVENTIONS:  - Assess bowel function  - Encourage oral fluids to ensure adequate hydration  - Administer IV fluids if ordered to ensure adequate hydration  - Administer ordered medications as needed  - Encourage mobilization and activity  - Consider nutritional services referral to assist patient with adequate nutrition and appropriate food choices  Outcome: Progressing  Goal: Maintains adequate nutritional intake  Description: INTERVENTIONS:  - Monitor percentage of each meal consumed  - Identify factors contributing to decreased intake, treat as appropriate  - Assist with meals as needed  - Monitor I&O, weight, and lab values if indicated  - Obtain nutrition services referral as needed  Outcome: Progressing  Goal: Oral mucous membranes remain intact  Description: INTERVENTIONS  - Assess oral mucosa and hygiene practices  - Implement preventative oral hygiene regimen  - Implement oral medicated treatments as ordered  - Initiate Nutrition services referral as needed  Outcome: Progressing

## 2023-07-23 NOTE — OCCUPATIONAL THERAPY NOTE
Occupational Therapy Treatment Note:      07/23/23 1013   OT Last Visit   OT Visit Date 07/23/23   Note Type   Note Type Treatment   Pain Assessment   Pain Assessment Tool 0-10   Pain Score 3   Restrictions/Precautions   Other Precautions Contact/isolation; Fall Risk   ADL   Eating Assistance 5  Supervision/Setup   Grooming Assistance 5  Supervision/Setup   UB Bathing Assistance 4  Minimal Assistance   UB Bathing Comments asst to wash and dry skin folds in sitting in chair position   LB Bathing Assistance 2  Maximal Assistance   LB Bathing Comments asst for knees to b feet and for buttocks / davonte in stance   UB Dressing Assistance 5  Supervision/Setup   UB Dressing Comments in sitting pt donned long sleeved night gown with increased time to pull down in back. pt did not require asst p set up   LB Dressing Assistance 3  Moderate Assistance   LB Dressing Deficit Use of adaptive equipment   LB Dressing Comments asst for clothing management / socks. pt used dressingstick to doff socks   Toileting Assistance  2  Maximal Assistance   Functional Standing Tolerance   Time f static balance c rw during adls   Transfers   Sit to Stand 3  Moderate assistance   Additional items Assist x 1  (rw)   Stand to Sit 4  Minimal assistance   Additional items Assist x 1   Cognition   Overall Cognitive Status WFL   Activity Tolerance   Activity Tolerance Patient tolerated treatment well   Assessment   Assessment pt participated in am ot session and was seen focusing on adls seated in chair after set up. pts dtr was present this session and observed pt using dressing stick. pt stood with mod asst x 1 c rw for back / buttocks cleaning. she required min asst ub bathing / drying and mod asst lb c tool. pt demosntrated f+ activity tolerence and f balance c rw static. pt is motivated pleasant and cooperative. pts dtr anthony protein shake for pt. Plan   Treatment Interventions ADL retraining;Functional transfer training; Endurance training;Patient/family training;Equipment evaluation/education;UE strengthening/ROM; Energy conservation; Activityengagement   Goal Expiration Date 08/03/23   OT Treatment Day 3   OT Frequency 3-5x/wk   Recommendation   OT Discharge Recommendation Post acute rehabilitation services   AM-PAC Daily Activity Inpatient   Lower Body Dressing 2   Bathing 2   Toileting 2   Upper Body Dressing 3   Grooming 3   Eating 4   Daily Activity Raw Score 16   Daily Activity Standardized Score (Calc for Raw Score >=11) 35.96   AM-PAC Applied Cognition Inpatient   Following a Speech/Presentation 4   Understanding Ordinary Conversation 4   Taking Medications 4   Remembering Where Things Are Placed or Put Away 4   Remembering List of 4-5 Errands 4   Taking Care of Complicated Tasks 4   Applied Cognition Raw Score 24   Applied Cognition Standardized Score 62.21     April A Storm

## 2023-07-23 NOTE — PHYSICAL THERAPY NOTE
Physical Therapy Cancellation Note  The patient was attempted earlier, but fatigued from just working with occupational therapy. Later the patient just returned to bed. Will re-attempt tomorrow.     Kayley Wright, PTA

## 2023-07-24 ENCOUNTER — HOSPITAL ENCOUNTER (INPATIENT)
Facility: HOSPITAL | Age: 73
LOS: 12 days | Discharge: HOME HEALTH | DRG: 939 | End: 2023-08-05
Payer: MEDICARE

## 2023-07-24 VITALS
BODY MASS INDEX: 51.91 KG/M2 | OXYGEN SATURATION: 95 % | WEIGHT: 293 LBS | RESPIRATION RATE: 16 BRPM | TEMPERATURE: 98.5 F | SYSTOLIC BLOOD PRESSURE: 127 MMHG | DIASTOLIC BLOOD PRESSURE: 67 MMHG | HEART RATE: 78 BPM | HEIGHT: 63 IN

## 2023-07-24 DIAGNOSIS — Z98.84 S/P BARIATRIC SURGERY: ICD-10-CM

## 2023-07-24 DIAGNOSIS — U07.1 COVID: ICD-10-CM

## 2023-07-24 DIAGNOSIS — F41.9 ANXIETY: ICD-10-CM

## 2023-07-24 DIAGNOSIS — I10 PRIMARY HYPERTENSION: Primary | ICD-10-CM

## 2023-07-24 DIAGNOSIS — R26.2 AMBULATORY DYSFUNCTION: ICD-10-CM

## 2023-07-24 DIAGNOSIS — K62.5 BLOOD PER RECTUM: ICD-10-CM

## 2023-07-24 DIAGNOSIS — F39 MOOD DISORDER (HCC): ICD-10-CM

## 2023-07-24 DIAGNOSIS — I95.9 HYPOTENSION, UNSPECIFIED HYPOTENSION TYPE: ICD-10-CM

## 2023-07-24 DIAGNOSIS — Z46.89 ENCOUNTER FOR MANAGEMENT OF WOUND VAC: ICD-10-CM

## 2023-07-24 DIAGNOSIS — K62.5 RECTAL BLEEDING: ICD-10-CM

## 2023-07-24 DIAGNOSIS — D50.9 IRON DEFICIENCY ANEMIA, UNSPECIFIED IRON DEFICIENCY ANEMIA TYPE: ICD-10-CM

## 2023-07-24 DIAGNOSIS — S31.109S OPEN WOUND OF ABDOMINAL WALL, SEQUELA: ICD-10-CM

## 2023-07-24 DIAGNOSIS — R19.7 DIARRHEA, UNSPECIFIED TYPE: ICD-10-CM

## 2023-07-24 DIAGNOSIS — E87.6 HYPOKALEMIA: ICD-10-CM

## 2023-07-24 DIAGNOSIS — R26.81 UNSTEADY GAIT: ICD-10-CM

## 2023-07-24 DIAGNOSIS — D50.8 OTHER IRON DEFICIENCY ANEMIA: ICD-10-CM

## 2023-07-24 DIAGNOSIS — D64.9 ANEMIA, UNSPECIFIED TYPE: ICD-10-CM

## 2023-07-24 DIAGNOSIS — E06.3 HASHIMOTO'S THYROIDITIS: ICD-10-CM

## 2023-07-24 DIAGNOSIS — I48.21 PERMANENT ATRIAL FIBRILLATION (HCC): ICD-10-CM

## 2023-07-24 LAB
ALBUMIN SERPL BCP-MCNC: 1.9 G/DL (ref 3.5–5)
ALP SERPL-CCNC: 115 U/L (ref 46–116)
ALT SERPL W P-5'-P-CCNC: 16 U/L (ref 12–78)
ANION GAP SERPL CALCULATED.3IONS-SCNC: 5 MMOL/L
AST SERPL W P-5'-P-CCNC: 21 U/L (ref 5–45)
BILIRUB SERPL-MCNC: 0.27 MG/DL (ref 0.2–1)
BNP SERPL-MCNC: 212 PG/ML (ref 0–100)
BUN SERPL-MCNC: 15 MG/DL (ref 5–25)
CALCIUM ALBUM COR SERPL-MCNC: 9.8 MG/DL (ref 8.3–10.1)
CALCIUM SERPL-MCNC: 8.1 MG/DL (ref 8.3–10.1)
CARDIAC TROPONIN I PNL SERPL HS: 9 NG/L
CHLORIDE SERPL-SCNC: 109 MMOL/L (ref 96–108)
CK SERPL-CCNC: 21 U/L (ref 26–192)
CO2 SERPL-SCNC: 27 MMOL/L (ref 21–32)
CREAT SERPL-MCNC: 0.65 MG/DL (ref 0.6–1.3)
CRP SERPL QL: 29.6 MG/L
ERYTHROCYTE [DISTWIDTH] IN BLOOD BY AUTOMATED COUNT: 18.4 % (ref 11.6–15.1)
GFR SERPL CREATININE-BSD FRML MDRD: 88 ML/MIN/1.73SQ M
GLUCOSE SERPL-MCNC: 106 MG/DL (ref 65–140)
HCT VFR BLD AUTO: 27.2 % (ref 34.8–46.1)
HGB BLD-MCNC: 8.1 G/DL (ref 11.5–15.4)
MCH RBC QN AUTO: 26.2 PG (ref 26.8–34.3)
MCHC RBC AUTO-ENTMCNC: 29.8 G/DL (ref 31.4–37.4)
MCV RBC AUTO: 88 FL (ref 82–98)
PLATELET # BLD AUTO: 340 THOUSANDS/UL (ref 149–390)
PMV BLD AUTO: 9.2 FL (ref 8.9–12.7)
POTASSIUM SERPL-SCNC: 3.4 MMOL/L (ref 3.5–5.3)
PROT SERPL-MCNC: 5 G/DL (ref 6.4–8.4)
RBC # BLD AUTO: 3.09 MILLION/UL (ref 3.81–5.12)
SARS-COV-2 RNA RESP QL NAA+PROBE: POSITIVE
SODIUM SERPL-SCNC: 141 MMOL/L (ref 135–147)
WBC # BLD AUTO: 9.16 THOUSAND/UL (ref 4.31–10.16)

## 2023-07-24 PROCEDURE — 86140 C-REACTIVE PROTEIN: CPT | Performed by: PHYSICIAN ASSISTANT

## 2023-07-24 PROCEDURE — 99024 POSTOP FOLLOW-UP VISIT: CPT | Performed by: PHYSICIAN ASSISTANT

## 2023-07-24 PROCEDURE — NC001 PR NO CHARGE: Performed by: SURGERY

## 2023-07-24 PROCEDURE — 83880 ASSAY OF NATRIURETIC PEPTIDE: CPT | Performed by: PHYSICIAN ASSISTANT

## 2023-07-24 PROCEDURE — NC001 PR NO CHARGE

## 2023-07-24 PROCEDURE — 87635 SARS-COV-2 COVID-19 AMP PRB: CPT | Performed by: PHYSICIAN ASSISTANT

## 2023-07-24 PROCEDURE — 84484 ASSAY OF TROPONIN QUANT: CPT | Performed by: PHYSICIAN ASSISTANT

## 2023-07-24 PROCEDURE — 97605 NEG PRS WND THER DME<=50SQCM: CPT | Performed by: PHYSICIAN ASSISTANT

## 2023-07-24 PROCEDURE — 80053 COMPREHEN METABOLIC PANEL: CPT

## 2023-07-24 PROCEDURE — 99024 POSTOP FOLLOW-UP VISIT: CPT | Performed by: SURGERY

## 2023-07-24 PROCEDURE — 85027 COMPLETE CBC AUTOMATED: CPT

## 2023-07-24 PROCEDURE — 97530 THERAPEUTIC ACTIVITIES: CPT

## 2023-07-24 PROCEDURE — 82550 ASSAY OF CK (CPK): CPT | Performed by: PHYSICIAN ASSISTANT

## 2023-07-24 PROCEDURE — 99223 1ST HOSP IP/OBS HIGH 75: CPT

## 2023-07-24 PROCEDURE — 99222 1ST HOSP IP/OBS MODERATE 55: CPT | Performed by: INTERNAL MEDICINE

## 2023-07-24 PROCEDURE — 97116 GAIT TRAINING THERAPY: CPT

## 2023-07-24 RX ORDER — FLUOXETINE HYDROCHLORIDE 20 MG/1
20 CAPSULE ORAL 2 TIMES DAILY
Refills: 0
Start: 2023-07-24 | End: 2023-08-05

## 2023-07-24 RX ORDER — METOPROLOL SUCCINATE 25 MG/1
25 TABLET, EXTENDED RELEASE ORAL DAILY
Status: DISCONTINUED | OUTPATIENT
Start: 2023-07-25 | End: 2023-08-05 | Stop reason: HOSPADM

## 2023-07-24 RX ORDER — GABAPENTIN 300 MG/1
300 CAPSULE ORAL
Refills: 0
Start: 2023-07-24 | End: 2023-08-05

## 2023-07-24 RX ORDER — FLUOXETINE HYDROCHLORIDE 20 MG/1
20 CAPSULE ORAL 2 TIMES DAILY
Status: DISCONTINUED | OUTPATIENT
Start: 2023-07-24 | End: 2023-08-05 | Stop reason: HOSPADM

## 2023-07-24 RX ORDER — ONDANSETRON 4 MG/1
4 TABLET, ORALLY DISINTEGRATING ORAL EVERY 6 HOURS PRN
Status: DISCONTINUED | OUTPATIENT
Start: 2023-07-24 | End: 2023-08-05 | Stop reason: HOSPADM

## 2023-07-24 RX ORDER — ERGOCALCIFEROL 1.25 MG/1
50000 CAPSULE ORAL WEEKLY
Status: DISCONTINUED | OUTPATIENT
Start: 2023-07-24 | End: 2023-07-24

## 2023-07-24 RX ORDER — AMIODARONE HYDROCHLORIDE 200 MG/1
200 TABLET ORAL
Status: DISCONTINUED | OUTPATIENT
Start: 2023-07-25 | End: 2023-08-05 | Stop reason: HOSPADM

## 2023-07-24 RX ORDER — ACETAMINOPHEN 325 MG/1
975 TABLET ORAL EVERY 8 HOURS PRN
Status: DISCONTINUED | OUTPATIENT
Start: 2023-07-24 | End: 2023-08-05 | Stop reason: HOSPADM

## 2023-07-24 RX ORDER — POTASSIUM CHLORIDE 20 MEQ/1
40 TABLET, EXTENDED RELEASE ORAL ONCE
Status: COMPLETED | OUTPATIENT
Start: 2023-07-24 | End: 2023-07-24

## 2023-07-24 RX ORDER — ERGOCALCIFEROL 1.25 MG/1
50000 CAPSULE ORAL WEEKLY
Status: DISCONTINUED | OUTPATIENT
Start: 2023-07-26 | End: 2023-08-05 | Stop reason: HOSPADM

## 2023-07-24 RX ORDER — LANOLIN ALCOHOL/MO/W.PET/CERES
6 CREAM (GRAM) TOPICAL
Status: DISCONTINUED | OUTPATIENT
Start: 2023-07-24 | End: 2023-08-05 | Stop reason: HOSPADM

## 2023-07-24 RX ORDER — DILTIAZEM HYDROCHLORIDE 180 MG/1
360 CAPSULE, COATED, EXTENDED RELEASE ORAL DAILY
Status: DISCONTINUED | OUTPATIENT
Start: 2023-07-25 | End: 2023-08-05 | Stop reason: HOSPADM

## 2023-07-24 RX ADMIN — POTASSIUM CHLORIDE 40 MEQ: 1500 TABLET, EXTENDED RELEASE ORAL at 18:06

## 2023-07-24 RX ADMIN — APIXABAN 5 MG: 5 TABLET, FILM COATED ORAL at 08:58

## 2023-07-24 RX ADMIN — FLUOXETINE 20 MG: 20 CAPSULE ORAL at 18:06

## 2023-07-24 RX ADMIN — APIXABAN 5 MG: 5 TABLET, FILM COATED ORAL at 18:06

## 2023-07-24 RX ADMIN — MELATONIN TAB 3 MG 6 MG: 3 TAB at 21:19

## 2023-07-24 RX ADMIN — METOPROLOL SUCCINATE 25 MG: 25 TABLET, FILM COATED, EXTENDED RELEASE ORAL at 08:58

## 2023-07-24 RX ADMIN — AMIODARONE HYDROCHLORIDE 200 MG: 200 TABLET ORAL at 08:58

## 2023-07-24 RX ADMIN — FLUOXETINE 20 MG: 20 CAPSULE ORAL at 08:58

## 2023-07-24 RX ADMIN — DILTIAZEM HYDROCHLORIDE 360 MG: 180 CAPSULE, COATED, EXTENDED RELEASE ORAL at 08:57

## 2023-07-24 NOTE — PROGRESS NOTES
General Surgery  Progress Note   Vesta Su 67 y.o. female MRN: [id number]  Unit/Bed#: [facility] 634-01 Encounter: [id number]    Assessment:  76yo F with midline wound dehiscence. Admitted - with bowel perforation and internal hernia (hx arlene-en-y gastric bypass) s/p multiple operations now presents necrosis of abd wound  -s/p debridement/washout on   -s/p partial closure and wound vac placement on     AVSS     Plan:  Bedside VAC change today  Continue diet as tolerated  Pain control and nausea control as needed  Continue Eliquis  Encourage ambulation  PT/OT  Disposition planning, pending need for further wound VAC changes, will follow-up with case management today      Subjective/Objective   No acute events overnight. Patient did well over the weekend. Patient feels well, tolerating a normal diet, ambulating, and having bowel function. Patient reports her pain is well controlled and denies nausea vomiting fevers chills and shortness of breath at this time. Objective:   Vitals:Blood pressure 124/58, pulse 67, temperature 97.9 °F (36.6 °C), resp. rate 18, height 5' 3" (1.6 m), SpO2 95 %. Temp (24hrs), Av.4 °F (36.9 °C), Min:97.9 °F (36.6 °C), Max:99 °F (37.2 °C)      I/O        0701   0700  0701   0700    P. O. 125     Total Intake(mL/kg) 125 (0.9)     Urine (mL/kg/hr) 350 (0.1) 0 (0)    Drains 70 50    Stool  0    Total Output 420 50    Net -295 -50          Unmeasured Urine Occurrence 2 x 2 x    Unmeasured Stool Occurrence  0 x          Invasive Devices     Peripheral Intravenous Line  Duration           Peripheral IV 23 Left Antecubital 1 day          Drain  Duration           External Urinary Catheter 5 days              Physical Exam:   General: NAD, obese  HENT: MMM  Neck: No JVD  CV: Well-perfused regular rate  Lungs: Normal work of breathing on room air  ABD: Soft nontender nondistended with wound VAC functioning well over the lower midline incision with seropurulent output  Extrem: No CCE  Neuro: AAOx3      Lab Results:     No results found for: "WBC", "HGB", "HCT", "MCV", "PLT", "ADJUSTEDWBC", "RBC", "MCH", "MCHC", "RDW", "MPV", "NRBC"  VTE Pharmacologic Prophylaxis: Heparin  VTE Mechanical Prophylaxis: sequential compression device    Morgan Fothergill, MD  7/24/2023  5:53 AM

## 2023-07-24 NOTE — DISCHARGE SUMMARY
Discharge Summary - Jason Dickens 67 y.o. female MRN: [id number]    [facility] Encounter: [id number]    Admission Date:   Admission Orders (From admission, onward)     Ordered        07/18/23 1325  Inpatient Admission  Once                        Admitting Diagnosis: Abdominal pain [R10.9]  Open wound of abdominal wall, sequela [S31.109S]    HPI: Per Roman Miller, "Jason Dickens is a 67 y.o. female who presents with a wound dehiscence. She was sent from clinic today because her midline wound has two areas of dehiscence. Her children at bedside have noticed a noticeable change in the size of the dehiscence in the past few days. She other wise has been doing well and progressing with therapy in rehab. This midline wound is from a recent hospitalization where she was admitted for a bowel perforation and internal hernia. She had multiple abdominal surgeries with eventual closure of the wall with vicryl bridging mesh.     PMHx afib on eliquis, cervical spondylosis with myelopathy, HTN, right renal cell carcinoma s/p cryoablation, sleep apnea, s/p cholecystectomy, s/p hysterectomy, s/p left thyroid lobectomy 2019."    Procedures Performed:   Orders Placed This Encounter   Procedures   • POC Cardiac US   • Wound vacum dressing application             Summary of Hospital Course: Per Vince Roy, "Patient seen and examined. Cindy Mahmood is a 76yo female, with atrial fibrillation, cervical spondylosis with myelopathy, GILDARDO, s/p gastric bypass, HTN and Rt RCC s/p cryoablation and recent admission 6/19/23 - 7/4/23 for ex lap due to bowel perforation and internal hernia, who presented 7/18/23 with wound dehiscence. She went to the OR that day for wound debridement. She returned to the OR 7/19/23 for abdominal wound washout, VAC application and partial closure of the abdominal wound. She is being followed by surgery for VAC changes.  She did test positive for COVID-19 during routine testing for acute rehab admission. She was determined to be stable for transfer to the [facility] 7/24/23."    Significant Findings, Care, Treatment and Services Provided:   No results found. Complications: no complications    Discharge Diagnosis:   Patient Active Problem List   Diagnosis   • Closed fracture of right distal radius and ulna   • Malignant tumor of right kidney parenchyma (HCC)   • Primary osteoarthritis of left knee   • Primary osteoarthritis of right knee   • Chronic pain of left knee   • Myalgia   • Chest wall tenderness   • Chronic heart failure (HCC)   • Permanent atrial fibrillation ([facility])   • Cervical spondylosis without myelopathy   • Arthropathy of cervical facet joint   • Occipital neuralgia of right side   • Status post partial thyroidectomy   • Myofascial pain syndrome   • Hashimoto's thyroiditis   • Early menopause occurring in patient age younger than 39 years   • S/P bariatric surgery   • Osteoporosis   • Hyperparathyroidism ([address])   • Reactive hypoglycemia   • Vitamin D deficiency   • Unsteady gait   • Dyspnea on exertion   • SIRS (systemic inflammatory response syndrome) ([facility])   • Incontinence   • GILDARDO (obstructive sleep apnea)   • Morbid obesity with BMI of 50.0-59.9, adult ([facility])   • Hypertension   • Stage 3 chronic kidney disease, unspecified whether stage 3a or 3b CKD ([facility])   • Primary osteoarthritis of both knees   • Idiopathic acute pancreatitis without infection or necrosis   • Bowel perforation ([facility])   • Acute encephalopathy   • Anxiety   • Diarrhea   • Hypokalemia   • Anemia   • Open abdominal wall wound   • Iron deficiency anemia   • Hypotension   • Ambulatory dysfunction   • COVID         Medical Problems     Resolved Problems  Date Reviewed: 7/7/2023   None         Condition at Discharge: good         Discharge instructions/Information to patient and family:   See after visit summary for information provided to patient and family.       Provisions for Follow-Up Care:  See after visit summary for information related to follow-up care and any pertinent home health orders. PCP: Justyna Crooks DO    Disposition: [facility]C    Planned Readmission: No      Discharge Statement   I spent 23 minutes discharging the patient. This time was spent on the day of discharge. I had direct contact with the patient on the day of discharge. Additional documentation is required if more than 30 minutes were spent on discharge. Discharge Medications:  See after visit summary for reconciled discharge medications provided to patient and family.

## 2023-07-24 NOTE — H&P
PHYSICAL MEDICINE AND REHABILITATION H&P/ADMISSION NOTE  Gillian Day 67 y.o. female MRN: [id number]  Unit/Bed#: [facility] 451-01 Encounter: [id number]     Rehab Diagnosis: Impairment of mobility, safety and Activities of Daily Living (ADLs) due to Debility:  16  Debility (Non-cardiac/Non-pulmonary)  Etiologic: Surgical site infection after wound dehiscence s/p washout vac placement and partial closure  Date of Onset: 7/18/23     Date of surgery: 7/18/23-wound debridement; wound washout, vac application and partial closure of abdominal wound    History of Present Illness:      Ovidio Vital is a 67 y.o. female with a medical history of atrial fibrillation (on Eliquis), sleep apnea, hypertension, renal cell carcinoma, gastric bypass, cervical spondylosis, and osteoarthritis who was recently admitted at [address] [place] [address] 6/19-7/4 with a bowel perforation and internal hernia (hx arlene-en-y gastric bypass). She had an ex lap, resection J-J anastomosis, segmental ileum resection, abthera vac placement, ex lap, washout, abthera change and ex lap, small bowel resection, bowel anastomosis x3, vicryl bridging mesh. Once medically stable she went to TCU for rehabilitation. Patient presented to [address] [place] [address] on 7/18/23 after clinic appointment for suture removal with an abdominal wound dehiscence. On 7/18 she underwent washout, wound exploration, debridement. On 7/19 she underwent washout, partial wound closure, and wound vac placement. Patient currently has wound vac in place at a pressure of 75 mmHg continuously. Wound vac last changed on 7/24 with plans for surgery management of dressing changes. She is tolerating a regular diet with nutrition supplements. Patient tested positive for COVID on 7/24. She is asymptomatic and not being treated at this time. She worked with PT and OT and was recommended for post acute rehab following her hospital stay.   She has demonstrated that she can tolerate three hours of therapy, five days a week and is now medically cleared for discharge to the [facility]. PT/OT were consulted and recommend acute inpatient rehabilitation. The patient was evaluated by the Rehabilitation team and deemed an appropriate candidate for comprehensive inpatient rehabilitation and admitted to the [facility] on 7/24/2023  4:34 PM      • Functional deficits: impaired mobility, self care  • Begin PT/OT/SLP. Rehabilitation goals are to achieve a modified independent level with mobility and self care. Prognosis is good. ELOS is 10-14 days. Estimated discharge is home.          DVT prophylaxis  • Eliquis    Pain  • Tylenol 975 mg every 8 hours prn      Bladder plan  • Continent      Bowel plan  • Continent      Code Status  • Level 1- Full Code      * Open abdominal wall wound  Assessment & Plan  - recent admission [facility] 6/19-7/4 with bowel perforation and internal hernia (hx arlene-en-y gastric bypass) - s/p ex lap, resection J-J anastomosis, segmental ileum resection, abthera 6/19  - s/p ex lap, washout, abthera change 6/19  - s/p ex lap, small bowel resection, bowel anastomosis x3, vicryl bridging mesh 6/20  - s/p debridement/washout on 7/18  - s/p partial closure and wound vac placement on 7/19  - wound vac 75 mmHg continuous  - dressing changes per General Surgery, due 7/26  - midline incision with sutures  - pain management  - PT/OT      COVID  Assessment & Plan  - positive 7/24  - asymptomatic  - no signs of infection: afebrile  - isolation 10 days (thru 8/3)  - monitor closely      Anemia  Assessment & Plan  - Hbg 8.1  - monitor CBC  - consider transfusion if warranted    Hypokalemia  Assessment & Plan  - K+ 3.4  - repleted in [facility]  - monitor BMP  - consult IM for assist w/ management    Anxiety  Assessment & Plan  - home: Prozac 20 mg daily  - continue here  - consult neuropsychology when cleared    Hypertension  Assessment & Plan  - home: Cardizem 360 mg daily, Metoprolol succinate 25 mg BID, HCTZ 12.5 mg BID  - here: Cardizem, Toprol XL daily, (hold HCTZ)    - monitor BP    Morbid obesity with BMI of 50.0-59.9, adult ([facility])  Assessment & Plan  - BMI 53.89  - s/p gastric bypass  - educate on lifestyle modification  - consider nutrition consult    GILDARDO (obstructive sleep apnea)  Assessment & Plan  - compliant with Cpap at home  - here: 2L nc at home  - monitor     Vitamin D deficiency  Assessment & Plan  - Vit D 62.1 (1/16/23)  - Vitamin D 50K weekly     Permanent atrial fibrillation (HCC)  Assessment & Plan  - home: Eliqus 5 mg BID, Amiodarone 200 mg daily, Cardizem 360 mg daily, Toprol XL 25 mg BID  - here: continue except Toprol XL daily  - monitor with therapies      Subjective/Interval Events:    Patient seen face to face, son Ronnell Servin (POA) at bedside. Patient diagnosed with Covid with routine screening, asymptomatic. She was exposed to Covid + family member a little over 2 weeks ago. She is concerned with breathing and development of symptoms. Ensured close monitoring. Patient reports ambulated without assistive device in home and used rollator in community. Lives in a single story home with 1 step to enter. Review of Systems   Constitutional: Negative for appetite change, chills and fatigue. HENT: Negative for congestion and sore throat. Eyes: Negative for photophobia and visual disturbance. Respiratory: Negative for cough and shortness of breath. Cardiovascular: Negative for chest pain and palpitations. Gastrointestinal: Negative for constipation, diarrhea, nausea and vomiting. Endocrine: Negative for cold intolerance and heat intolerance. Genitourinary: Negative for difficulty urinating and dyspareunia. Musculoskeletal: Positive for arthralgias, back pain and gait problem. Skin: Positive for wound. Neurological: Negative for dizziness and light-headedness. Psychiatric/Behavioral: Negative for agitation and behavioral problems.          Function:    Prior level of function and living situation:  PRIOR LEVEL OF FUNCTION:  She lives in a(n) single family home  Etelvina Hoang is  and lives with their daughter. Self Care: Independent, Indoor Mobility: Independent, Stairs (in/outdoor): Independent and Cognition: Independent All levels are prior to 1st hospitalization in June of 2023     FALLS IN THE LAST 6 MONTHS: 1     HOME ENVIRONMENT:  The living area: Patient lives in a ranch home-1 level  There are Ramp to enter the home. The patient will not have 24 hour supervision/physical assistance available upon discharge. PREVIOUS DME:  Equipment in home (previous DME): Shower Chair, Grab Bars and Rolling Walker    Current level of function:  Physical therapy: mobility- min A, transfers- min A, ambulation- mod A RW 60' with wc follow  Occupational therapy: ADL: bathing- UB min A LB max A, dressing-  UB supervisoin LB mod A, toileting- max A  Speech therapy: dysphagia level 2/HTL liquids, aphasia, cognitive impairment      Physical Exam:  /58 (BP Location: Right arm)   Pulse 83   Temp 98.2 °F (36.8 °C) (Oral)   Resp 16   Ht 5' 3" (1.6 m)   SpO2 97%   BMI 53.89 kg/m²      No intake or output data in the 24 hours ending 07/24/23 1710    Body mass index is 53.89 kg/m². Physical Exam  Constitutional:       Appearance: Normal appearance. She is obese. HENT:      Head: Normocephalic and atraumatic. Nose: Nose normal.      Mouth/Throat:      Mouth: Mucous membranes are moist.   Cardiovascular:      Rate and Rhythm: Normal rate. Pulses: Normal pulses. Pulmonary:      Effort: Pulmonary effort is normal.      Breath sounds: Normal breath sounds. Abdominal:      General: Bowel sounds are normal.      Palpations: Abdomen is soft. Comments: + wound vac   Musculoskeletal:         General: Normal range of motion. Cervical back: Normal range of motion. Skin:     General: Skin is warm and dry. Capillary Refill: Capillary refill takes less than 2 seconds.    Neurological: Mental Status: She is alert and oriented to person, place, and time. Psychiatric:         Mood and Affect: Mood normal.         Judgment: Judgment normal.        Labs, medications, and imaging personally reviewed.     Laboratory:    Lab Results   Component Value Date    SODIUM 141 07/24/2023    K 3.4 (L) 07/24/2023     (H) 07/24/2023    CO2 27 07/24/2023    BUN 15 07/24/2023    CREATININE 0.65 07/24/2023    GLUC 106 07/24/2023    CALCIUM 8.1 (L) 07/24/2023     Lab Results   Component Value Date    WBC 9.16 07/24/2023    HGB 8.1 (L) 07/24/2023    HCT 27.2 (L) 07/24/2023    MCV 88 07/24/2023     07/24/2023     Lab Results   Component Value Date    INR 1.39 (H) 07/18/2023    INR 1.23 (H) 06/27/2023    INR 1.19 06/24/2023    PROTIME 17.3 (H) 07/18/2023    PROTIME 15.7 (H) 06/27/2023    PROTIME 15.3 (H) 06/24/2023         Current Facility-Administered Medications:   •  acetaminophen (TYLENOL) tablet 975 mg, 975 mg, Oral, Q8H PRN, MOLLY Jackson  •  [START ON 7/25/2023] amiodarone tablet 200 mg, 200 mg, Oral, Daily With Breakfast, MOLLY Jackson  •  apixaban (ELIQUIS) tablet 5 mg, 5 mg, Oral, BID, MOLLY Jackson  •  [START ON 7/25/2023] diltiazem (CARDIZEM CD) 24 hr capsule 360 mg, 360 mg, Oral, Daily, MOLLY Jackson  •  [START ON 7/26/2023] ergocalciferol (VITAMIN D2) capsule 50,000 Units, 50,000 Units, Oral, Weekly, MOLLY Jackson  •  FLUoxetine (PROzac) capsule 20 mg, 20 mg, Oral, BID, MOLLY Jackson  •  melatonin tablet 6 mg, 6 mg, Oral, HS, MOLLY Jackson  •  [START ON 7/25/2023] metoprolol succinate (TOPROL-XL) 24 hr tablet 25 mg, 25 mg, Oral, Daily, MOLLY Jackson  •  ondansetron (ZOFRAN-ODT) dispersible tablet 4 mg, 4 mg, Oral, Q6H PRN, MOLLY Jackson  •  potassium chloride (K-DUR,KLOR-CON) CR tablet 40 mEq, 40 mEq, Oral, Once, Jennifer Alvarez PA-C  •  [START ON 7/25/2023] psyllium (METAMUCIL) 1 packet, 1 packet, Oral, Daily, MOLLY Jackson  No Known Allergies   Patient Active Problem List    Diagnosis Date Noted   • Open abdominal wall wound 07/13/2023   • S/P bariatric surgery 03/17/2020   • COVID 07/24/2023   • Ambulatory dysfunction 07/17/2023   • Iron deficiency anemia 07/13/2023   • Hypotension 07/13/2023   • Hypokalemia 07/10/2023   • Anemia 07/10/2023   • Bowel perforation ([address]) 07/05/2023   • Acute encephalopathy 07/05/2023   • Anxiety 07/05/2023   • Diarrhea 07/05/2023   • Idiopathic acute pancreatitis without infection or necrosis 06/18/2023   • Primary osteoarthritis of both knees 06/07/2023   • Stage 3 chronic kidney disease, unspecified whether stage 3a or 3b CKD ([address]) 05/31/2023   • Hypertension    • Morbid obesity with BMI of 50.0-59.9, adult ([address]) 11/01/2022   • Unsteady gait 10/30/2022   • Dyspnea on exertion 10/30/2022   • SIRS (systemic inflammatory response syndrome) ([address]) 10/30/2022   • Incontinence 10/30/2022   • GILDARDO (obstructive sleep apnea) 10/30/2022   • Vitamin D deficiency 07/06/2021   • Reactive hypoglycemia 12/30/2020   • Osteoporosis 06/04/2020   • Hyperparathyroidism ([address]) 06/04/2020   • Hashimoto's thyroiditis 03/17/2020   • Early menopause occurring in patient age younger than 39 years 03/17/2020   • Myofascial pain syndrome 10/21/2019   • Status post partial thyroidectomy 09/12/2019   • Cervical spondylosis without myelopathy 06/28/2019   • Arthropathy of cervical facet joint 06/28/2019   • Occipital neuralgia of right side 06/28/2019   • Chest wall tenderness 01/24/2019   • Chronic heart failure ([address]) 01/24/2019   • Permanent atrial fibrillation ([address]) 01/24/2019   • Myalgia 01/08/2019   • Primary osteoarthritis of left knee 10/23/2018   • Primary osteoarthritis of right knee 10/23/2018   • Chronic pain of left knee 10/23/2018   • Malignant tumor of right kidney parenchyma ([address]) 05/11/2016   • Closed fracture of right distal radius and ulna 04/22/2016     Past Medical History:   Diagnosis Date   • A-fib [facility])    • Arthritis    • Atrial fibrillation (HCC)    • Cervical spondylosis with myelopathy    • Gastric bypass status for obesity    • History of transfusion     35 years ago   • Hypertension    • mass right kidney    • Renal cell adenocarcinoma ([address])     RIGHT   • Sleep apnea      Past Surgical History:   Procedure Laterality Date   • ABDOMINAL ADHESION SURGERY N/A 6/19/2023    Procedure: LYSIS ADHESIONS;  Surgeon: Amadeo Isaac DO;  [facility];  Service: General   • ABDOMINAL WALL SURGERY N/A 7/19/2023    Procedure: PARTIAL CLOSURE WOUND ABDOMINAL/TRUNK;  Surgeon: Nelly Dee DO;  [facility];  Service: General   • APPENDECTOMY N/A 6/19/2023    Procedure: APPENDECTOMY;  Surgeon: Amadeo Isaac DO;  [facility];  Service: General   • CHOLECYSTECTOMY     • COLOSTOMY     • CRYOABLATION Right     RT KIDNEY   • CYSTORRHAPHY      Bladder.  Last assessed 4/6/2016    • EXPLORATORY LAPAROTOMY W/ BOWEL RESECTION N/A 6/19/2023    Procedure: LAPAROTOMY EXPLORATORY W/ BOWEL RESECTION;  Surgeon: Stephanie Coto MD;  [facility];  Service: General   • GASTRIC BYPASS     • HERNIA REPAIR      Last assessed 4/6/2016    • HYSTERECTOMY     • LAPAROTOMY N/A 6/19/2023    Procedure: LAPAROTOMY EXPLORATORY, washout, vac change;  Surgeon: Amadeo Isaac DO;  [facility];  Service: General   • LAPAROTOMY N/A 6/20/2023    Procedure: LAPAROTOMY, REMOVAL OF WOUND VAC AND PACKING, SMALL BOWEL RESECTION , BOWEL ANASTOMOSIS X 3 , VICRYL MESH BRIDGING -;  Surgeon: Amadeo Isaac DO;  [facility];  Service: General   • OH OPTX DSTL RADL X-ARTIC FX/EPIPHYSL SEP Right 4/22/2016    Procedure: OPEN REDUCTION INTERNAL FIXATION RIGHT DISTAL RADIUS;  Surgeon: Jaqueline Cockayne, MD;  [facility];  Service: Orthopedics   • REVISION COLOSTOMY     • SMALL INTESTINE SURGERY N/A 6/19/2023    Procedure: RESECTION SMALL BOWEL;  Surgeon: Amadeo Isaac DO; [facility];  Service: General   • THYROID LOBECTOMY Left 8/27/2019    Procedure: LOBECTOMY THYROID, left;  Surgeon: Berry Jc MD;  [facility];  Service: Surgical Oncology   • TUBAL LIGATION     • US GUIDED THYROID BIOPSY  2/27/2019   • US GUIDED THYROID BIOPSY  5/29/2019   • WOUND DEBRIDEMENT N/A 7/18/2023    Procedure: DEBRIDEMENT WOUND [facility] OUT); Surgeon: Danyel Gibson DO;  [facility];  Service: Trauma   • WOUND DEBRIDEMENT N/A 7/19/2023    Procedure: ABDOMINAL WOUND ([address] OUT); VAC APPLICATION;  Surgeon: Rob Xavier DO;  [facility];  Service: General     Social History     Socioeconomic History   • Marital status:      Spouse name: Not on file   • Number of children: Not on file   • Years of education: Not on file   • Highest education level: Not on file   Occupational History   • Not on file   Tobacco Use   • Smoking status: Never   • Smokeless tobacco: Never   Vaping Use   • Vaping Use: Never used   Substance and Sexual Activity   • Alcohol use: Not Currently     Alcohol/week: 0.0 standard drinks of alcohol     Comment: 0   • Drug use: No   • Sexual activity: Not on file   Other Topics Concern   • Not on file   Social History Narrative    ** Merged History Encounter **          Social Determinants of Health     Financial Resource Strain: Not on file   Food Insecurity: No Food Insecurity (7/19/2023)    Hunger Vital Sign    • Worried About Running Out of Food in the Last Year: Never true    • Ran Out of Food in the Last Year: Never true   Transportation Needs: No Transportation Needs (7/19/2023)    PRAPARE - Transportation    • Lack of Transportation (Medical): No    • Lack of Transportation (Non-Medical):  No   Physical Activity: Not on file   Stress: Not on file   Social Connections: Not on file   Intimate Partner Violence: Not on file   Housing Stability: Low Risk  (7/19/2023)    Housing Stability Vital Sign    • Unable to Pay for Housing in the Last Year: No    • Number of Places Lived in the Last Year: 1    • Unstable Housing in the Last Year: No     Social History     Tobacco Use   Smoking Status Never   Smokeless Tobacco Never     Social History     Substance and Sexual Activity   Alcohol Use Not Currently   • Alcohol/week: 0.0 standard drinks of alcohol    Comment: 0     Family History   Problem Relation Age of Onset   • Heart attack Mother    • Lung cancer Father 76   • Heart disease Father    • Stroke Sister    • Lung cancer Sister 64   • Prostate cancer Brother 61         Medical Necessity Criteria for [facility] Admission: Anemia, with the following plan: monitor H/H and consider blood transfusion, Hypertension, Bowel/Bladder Management and Incision/Wound care. In addition, the preadmission screen, post-admission physical evaluation, overall plan of care and admissions order demonstrate a reasonable expectation that the following criteria were met at the time of admission to the [facility]. 1. The patient requires active and ongoing therapeutic intervention of multiple therapy disciplines (physical therapy, occupational therapy, speech-language pathology, or prosthetics/orthotics), one of which is physical or occupational therapy. 2. Patient requires an intensive rehabilitation therapy program, as defined in Chapter 1, section 110.2.2 of the CMS Medicare Policy Manual. This intensive rehabilitation therapy program will consist of at least 3 hours of therapy per day at least 5 days per week or at least 15 hours of intensive rehabilitation therapy within a 7 consecutive day period, beginning with the date of admission to the [facility]. 3. The patient is reasonably expected to actively participate in, and benefit significantly from, the intensive rehabilitation therapy program as defined in Chapter 1, section 110.2.2 of the CMS Medicare Policy Manual at this time of admission to the [facility].  She can reasonably be expected to make measurable improvement (that will be of practical value to improve the patient’s functional capacity or adaptation to impairments) as a result of the rehabilitation treatment, as defined in section 110.3, and such improvement can be expected to be made within the prescribed period of time. As noted in the CMS Medicare Policy Manual, the patient need not be expected to achieve complete independence in the domain of self-care nor be expected to return to his or her prior level of functioning in order to meet this standard. 4. The patient must require physician supervision by a rehabilitation physician. As such, a rehabilitation physician will conduct face-to-face visits with the patient at least 3 days per week throughout the patient’s stay in the [facility] to assess the patient both medically and functionally, as well as to modify the course of treatment as needed to maximize the patient’s capacity to benefit from the rehabilitation process. 5. The patient requires an intensive and coordinated interdisciplinary approach to providing rehabilitation, as defined in Chapter 1, section 110.2.5 of the CMS Medicare Policy Manual. This will be achieved through periodic team conferences, conducted at least once in a 7-day period, and comprising of an interdisciplinary team of medical professionals consisting of: a rehabilitation physician, registered nurse,  and/or , and a licensed/certified therapist from each therapy discipline involved in treating the patient. Changes Since Pre-admission Assessment: None -This patient's participation in rehab continues to be reasonable, necessary and appropriate. CMS Required Post-Admission Physician Evaluation Elements  History and Physical, including medical history, functional history and active comorbidities as in above text. Post-Admission Physician Evaluation:  The patient has the potential to make improvement and is in need of physical, occupational, and/or therapy services.  The patient may also need nutritional services. Given the patient's complex medical condition and risk of further medical complications, rehabilitative services cannot be safely provided at a lower level of care, such as a skilled nursing facility. I have reviewed the patient's functional and medical status at the time of the preadmission screening and they are the same as on the day of this admission. I acknowledge that I have personally performed a full physical examination on this patient within 24 hours of admission. The patient and/or family demonstrated understanding the rehabilitation program and the discharge process after we discussed them.      Agree in entirety: yes  Minor adaptions: none    Major changes: none    MOLLY Mariano  Physical Medicine [facility]

## 2023-07-24 NOTE — ASSESSMENT & PLAN NOTE
- BMI 53.89  - s/p gastric bypass (25 years ago)  - educate on lifestyle modification  - consider nutrition consult

## 2023-07-24 NOTE — ASSESSMENT & PLAN NOTE
- positive 7/24  - asymptomatic  - no signs of infection: afebrile  - isolation 10 days (thru 8/3) unless 2 Ag tests 48 h apart negative  - 7/26: SARS cov2 antigen negative; repeat 7/28 Ag lab negative  - isolation discontinued   - monitor closely

## 2023-07-24 NOTE — CONSULTS
Internal Medicine Consultation Note    Patient: Karlos Stanton  Age/sex: 67 y.o. female  Medical Record #: [id number]      ASSESSMENT PLAN    Wound dehiscence s/p VAC placement  · Surgery to follow for VAC changes. · Monitor incision. · Pain control and therapy per primary service. Atrial fibrillation  · Continue amiodarone, Cardizem CD and Toprol XL. · Continue Eliquis. · Monitor HR with therapy. HTN  · Continue Cardizem CD and Toprol XL. · Monitor BP and adjust medications as needed. COVID-19 positive  · Detected on routine testing for [facility] admission. · Pt is asymptomatic. Hypokalemia  · K 3.4. · Replace with KCl 40meq. · Monitor BMP. Anemia  · Hgb has been trending around 8.  · Will monitor. Subjective/ HPI: Patient seen and examined. Magnolia Le is a 76yo female, with atrial fibrillation, cervical spondylosis with myelopathy, GILDARDO, s/p gastric bypass, HTN and Rt RCC s/p cryoablation and recent admission 6/19/23 - 7/4/23 for ex lap due to bowel perforation and internal hernia, who presented 7/18/23 with wound dehiscence. She went to the OR that day for wound debridement. She returned to the OR 7/19/23 for abdominal wound washout, VAC application and partial closure of the abdominal wound. She is being followed by surgery for VAC changes. She did test positive for COVID-19 during routine testing for acute rehab admission. She was determined to be stable for transfer to the [facility] 7/24/23. IM consulted for medical management of co-morbidities. ROS:   A 10 point ROS was performed; negative except as noted above.      Social History:    Substance Use History:   Social History     Substance and Sexual Activity   Alcohol Use Not Currently   • Alcohol/week: 0.0 standard drinks of alcohol    Comment: 0     Social History     Tobacco Use   Smoking Status Never   Smokeless Tobacco Never     Social History     Substance and Sexual Activity   Drug Use No       Family History:    Family History Problem Relation Age of Onset   • Heart attack Mother    • Lung cancer Father 76   • Heart disease Father    • Stroke Sister    • Lung cancer Sister 64   • Prostate cancer Brother 61         Review of Scheduled Meds:  Current Facility-Administered Medications   Medication Dose Route Frequency Provider Last Rate   • acetaminophen  975 mg Oral Q8H PRN MOLLY Jackson     • [START ON 7/25/2023] amiodarone  200 mg Oral Daily With Breakfast MOLLY Jackson     • apixaban  5 mg Oral BID MOLLY Jackson     • [START ON 7/25/2023] diltiazem  360 mg Oral Daily MOLLY Jackson     • [START ON 7/26/2023] ergocalciferol  50,000 Units Oral Weekly MOLLY Jackson     • FLUoxetine  20 mg Oral BID MOLLY Jackson     • melatonin  6 mg Oral HS MOLLY Jackson     • [START ON 7/25/2023] metoprolol succinate  25 mg Oral Daily MOLLY Jackson     • ondansetron  4 mg Oral Q6H PRN MOLLY Jackson     • [START ON 7/25/2023] psyllium  1 packet Oral Daily MOLLY Jackson         Labs:     Results from last 7 days   Lab Units 07/24/23  0437 07/22/23  0616   WBC Thousand/uL 9.16 10.77*   HEMOGLOBIN g/dL 8.1* 8.9*   HEMATOCRIT % 27.2* 29.9*   PLATELETS Thousands/uL 340 392*     Results from last 7 days   Lab Units 07/24/23  0437 07/22/23  0616   SODIUM mmol/L 141 138   POTASSIUM mmol/L 3.4* 5.0   CHLORIDE mmol/L 109* 108   CO2 mmol/L 27 26   BUN mg/dL 15 15   CREATININE mg/dL 0.65 0.80   CALCIUM mg/dL 8.1* 8.4         Results from last 7 days   Lab Units 07/18/23  1430   INR  1.39*        Results from last 7 days   Lab Units 07/20/23  1121   POC GLUCOSE mg/dl 176*       Lab Results   Component Value Date    BLOODCX No Growth at 72 hrs. 07/21/2023    BLOODCX No Growth at 72 hrs. 07/21/2023    BLOODCX Staphylococcus coagulase negative (A) 07/18/2023    BLOODCX No Growth After 5 Days.  07/18/2023    URINECX No Growth <1000 cfu/mL 06/19/2023    URINECX >100,000 cfu/ml Escherichia coli (A) 12/20/2022 URINECX 80,000-89,000 cfu/ml 2022       Input and Output Summary (last 24 hours):     No intake or output data in the 24 hours ending 23 1658    Imaging:     No orders to display       *Labs /Radiology studiesLabs reviewed  *Medications reviewed and reconciled as needed  *Please refer to order section for additional ordered labs studies  *Case discussed with primary attending during morning huddle case rounds    Vitals:   Temp (24hrs), Av.2 °F (36.8 °C), Min:97.9 °F (36.6 °C), Max:98.5 °F (36.9 °C)    Temp:  [97.9 °F (36.6 °C)-98.5 °F (36.9 °C)] 98.2 °F (36.8 °C)  HR:  [78-83] 83  Resp:  [16-18] 16  BP: (118-133)/(58-71) 118/58  SpO2:  [95 %-97 %] 97 %  Body mass index is 53.89 kg/m². Physical Exam:   HEENT:  Head: Normocephalic, no lesions, without obvious abnormality. CARDIAC:  regular rate and rhythm, S1, S2 normal, no murmur, click, rub or gallop and regular rate and rhythm  LUNGS:  normal air entry, lungs clear to auscultation  ABDOMEN:  Soft. Incisional tenderness. VAC to area of abdominal dehiscence  EXTREMITIES:  extremities normal, warm and well-perfused; no cyanosis, clubbing, or edema and +1 edema bilat LE  NEURO:   mental status, speech normal, alert and oriented x3  PSYCH:  Alert and oriented, appropriate affect. INCISION:  dressings present      Invasive Devices     Drain  Duration           External Urinary Catheter 5 days                 VTE Pharmacologic Prophylaxis: Sequential compression device (Venodyne) Eliquis  Code Status: Level 1 - Full Code  Current Length of Stay: 0 day(s)    Total floor / unit time spent today 1 hour with more than 50% spent counseling/coordinating care. Counseling includes discussion with patient re: progress  and discussion with patient of his/her current medical state/information. Coordination of patient's care was performed in conjunction with primary service.  Time invested included review of patient's labs, vitals, and management of their comorbidities with continued monitoring. In addition, this patient was discussed with medical team including physician and advanced extenders. The care of the patient was extensively discussed and appropriate treatment plan was formulated unique for this patient by supervising physician unless stated otherwise in their attestation statement. ** Please Note: voice to text software may have been used in the creation of this document.  Audio transcription errors may occur**

## 2023-07-24 NOTE — CONSULTS
Consultation - General Surgery   Artem Almaraz 67 y.o. female MRN: [id number]  Unit/Bed#: -01 Encounter: [id number]    Assessment/Plan     Assessment:  78yo F with midline wound dehiscence. Was recently admitted 6/19-7/4 with bowel perforation and internal hernia (hx arlene-en-y gastric bypass)  S/p ex lap, resection J-J anastomosis, segmental ileum resection, abthera 6/19  S/p ex lap, washout, abthera change 6/19  S/p ex lap, small bowel resection, bowel anastomosis x3, vicryl bridging mesh 6/20    Now with wound vac to the wound    -s/p debridement/washout on 7/18  -s/p partial closure and wound vac placement on 7/19    Plan:  - vac changes MWF  - cont to monitor wound  - remainder of care per primary service    History of Present Illness   HPI:  Artem Almaraz is a 67 y.o. female who presented to [facility] with a wound dehiscence. She had an ex lap on 6/19 for perforated bowel and required SBR. She was discharged to rehab, but during clinic follow up she was found to have a superficial wound dehiscence and told to go to the ED for evaluation. She was admitted for management of the wound and had a washout/debridement on 7/18. A wound vac was placed on 7/19 with changes on MWF. Once stable for discharge she transferred to [facility] and general surgery was consulted for management of the wound vac. Inpatient consult to Acute Care Surgery  Consult performed by: Angella Calabrese MD  Consult ordered by: MOLLY Baron          Review of Systems   Constitutional: Negative. HENT: Negative. Respiratory: Negative. Cardiovascular: Negative. Gastrointestinal: Negative. Genitourinary: Negative. Musculoskeletal: Negative. Skin: Positive for wound. Neurological: Negative. Psychiatric/Behavioral: Negative.         Historical Information   Past Medical History:   Diagnosis Date   • A-fib [facility])    • Arthritis    • Atrial fibrillation [facility])    • Cervical spondylosis with myelopathy    • Gastric bypass status for obesity    • History of transfusion     35 years ago   • Hypertension    • mass right kidney    • Renal cell adenocarcinoma ([address])     RIGHT   • Sleep apnea      Past Surgical History:   Procedure Laterality Date   • ABDOMINAL ADHESION SURGERY N/A 6/19/2023    Procedure: LYSIS ADHESIONS;  Surgeon: Shelley Lara DO;  [facility];  Service: General   • ABDOMINAL WALL SURGERY N/A 7/19/2023    Procedure: PARTIAL CLOSURE WOUND ABDOMINAL/TRUNK;  Surgeon: Trinidad Mcmillan DO;  [facility];  Service: General   • APPENDECTOMY N/A 6/19/2023    Procedure: APPENDECTOMY;  Surgeon: Shelley Lara DO;  [facility];  Service: General   • CHOLECYSTECTOMY     • COLOSTOMY     • CRYOABLATION Right     RT KIDNEY   • CYSTORRHAPHY      Bladder.  Last assessed 4/6/2016    • EXPLORATORY LAPAROTOMY W/ BOWEL RESECTION N/A 6/19/2023    Procedure: LAPAROTOMY EXPLORATORY W/ BOWEL RESECTION;  Surgeon: Trell Villaseñor MD;  [facility];  Service: General   • GASTRIC BYPASS     • HERNIA REPAIR      Last assessed 4/6/2016    • HYSTERECTOMY     • LAPAROTOMY N/A 6/19/2023    Procedure: LAPAROTOMY EXPLORATORY, washout, vac change;  Surgeon: Shelley Lara DO;  [facility];  Service: General   • LAPAROTOMY N/A 6/20/2023    Procedure: LAPAROTOMY, REMOVAL OF WOUND VAC AND PACKING, SMALL BOWEL RESECTION , BOWEL ANASTOMOSIS X 3 , VICRYL MESH BRIDGING -;  Surgeon: Shelley Lara DO;  [facility];  Service: General   • DE OPTX DSTL RADL X-ARTIC FX/EPIPHYSL SEP Right 4/22/2016    Procedure: OPEN REDUCTION INTERNAL FIXATION RIGHT DISTAL RADIUS;  Surgeon: Jignesh Potter MD;  [facility];  Service: Orthopedics   • REVISION COLOSTOMY     • SMALL INTESTINE SURGERY N/A 6/19/2023    Procedure: RESECTION SMALL BOWEL;  Surgeon: Shelley Lara DO;  [facility];  Service: General   • THYROID LOBECTOMY Left 8/27/2019    Procedure: LOBECTOMY THYROID, left;  Surgeon: Devonte Corey MD;  [facility];  Service: Surgical Oncology   • TUBAL LIGATION     • US GUIDED THYROID BIOPSY  2/27/2019   • US GUIDED THYROID BIOPSY  5/29/2019   • WOUND DEBRIDEMENT N/A 7/18/2023    Procedure: DEBRIDEMENT WOUND [facility] OUT);   Surgeon: Carol Caal DO;  [facility];  Service: Trauma   • WOUND DEBRIDEMENT N/A 7/19/2023    Procedure: ABDOMINAL WOUND ([address]); VAC APPLICATION;  Surgeon: Carlin Zamudio DO;  [facility];  Service: General     Social History   Social History     Substance and Sexual Activity   Alcohol Use Not Currently   • Alcohol/week: 0.0 standard drinks of alcohol    Comment: 0     Social History     Substance and Sexual Activity   Drug Use No     E-Cigarette/Vaping   • E-Cigarette Use Never User      E-Cigarette/Vaping Substances     Social History     Tobacco Use   Smoking Status Never   Smokeless Tobacco Never     Family History: non-contributory    Meds/Allergies   all current active meds have been reviewed  No Known Allergies    Objective   First Vitals:   Blood Pressure: 118/58 (07/24/23 1646)  Pulse: 83 (07/24/23 1646)  Temperature: 98.2 °F (36.8 °C) (07/24/23 1646)  Temp Source: Oral (07/24/23 1646)  Respirations: 16 (07/24/23 1646)  Height: 5' 3" (160 cm) (07/24/23 1646)  SpO2: 97 % (07/24/23 1646)    Current Vitals:   Blood Pressure: 118/58 (07/24/23 1646)  Pulse: 83 (07/24/23 1646)  Temperature: 98.2 °F (36.8 °C) (07/24/23 1646)  Temp Source: Oral (07/24/23 1646)  Respirations: 16 (07/24/23 1646)  Height: 5' 3" (160 cm) (07/24/23 1646)  SpO2: 97 % (07/24/23 1646)    No intake or output data in the 24 hours ending 07/24/23 1743    Invasive Devices     Drain  Duration           External Urinary Catheter 5 days                Physical Exam:  General: No acute distress  Neuro: alert and oriented  HEENT: moist mucous membranes  CV: Well perfused, regular rate and rhythm  Lungs: Normal work of breathing, no increased respiratory effort  Abdomen: Soft, non-tender, non-distended. Vac in place with good seal  Extremities: No edema, clubbing or cyanosis  Skin: Warm, dry    Lab Results:   CBC:   Lab Results   Component Value Date    WBC 9.16 07/24/2023    HGB 8.1 (L) 07/24/2023    HCT 27.2 (L) 07/24/2023    MCV 88 07/24/2023     07/24/2023    RBC 3.09 (L) 07/24/2023    MCH 26.2 (L) 07/24/2023    MCHC 29.8 (L) 07/24/2023    RDW 18.4 (H) 07/24/2023    MPV 9.2 07/24/2023   , CMP:   Lab Results   Component Value Date    SODIUM 141 07/24/2023    K 3.4 (L) 07/24/2023     (H) 07/24/2023    CO2 27 07/24/2023    BUN 15 07/24/2023    CREATININE 0.65 07/24/2023    CALCIUM 8.1 (L) 07/24/2023    AST 21 07/24/2023    ALT 16 07/24/2023    ALKPHOS 115 07/24/2023    EGFR 88 07/24/2023     Imaging: I have personally reviewed pertinent films in PACS  EKG, Pathology, and Other Studies: I have personally reviewed pertinent films in PACS    Counseling / Coordination of Care  Total floor / unit time spent today 20 minutes. Greater than 50% of total time was spent with the patient and / or family counseling and / or coordination of care.        Connie Kent MD  General Surgery PGY2

## 2023-07-24 NOTE — ASSESSMENT & PLAN NOTE
- home: Cardizem 360 mg daily, Metoprolol succinate 25 mg BID, HCTZ 12.5 mg BID  - here: Cardizem, Toprol XL daily, (hold HCTZ)    - monitor BP

## 2023-07-24 NOTE — TREATMENT PLAN
Individualized Plan of [id number] 67 y.o. female MRN: [id number]  Unit/Bed#: -01 Encounter: [id number]     PATIENT INFORMATION  ADMISSION DATE: 7/24/2023  4:34 PM DAYANARA CATEGORY:Debility:  16  Debility (Non-cardiac/Non-pulmonary)   ADMISSION DIAGNOSIS: Surgical site reaction, initial encounter [T81. 9XXA]  EXPECTED LOS: 10-14 days     MEDICAL/FUNCTIONAL PROGNOSIS  Based on my assessment of the patient's medical conditions and current functional status, the prognosis for attaining medical and functional goals or the IRF stay is:  Good    Medical Goals: Patient will be medically stable for discharge to [facility] upon completion of rehab program and Patient will be able to manage medical conditions and comorbid conditions with medications and follow up upon completion of rehab program    Abad Rocha: Home - independent/modified independent  INSTITUTIONAL SETTING: Intermediate care  Is a 24-hr caregiver available? No  Has discharge plan been discussed with primary caregiver? Yes  Date of Discussion: 07/24/2023    ANTICIPATED FOLLOW-UP SERVICE:   Outpatient Therapy Services: PT and OT          DISCIPLINE SPECIFIC PLANS:  Required Disciplines & Services: Rehabillitation Nursing, Case Management and Dietay/Nutrition    REQUIRED THERAPY:  Therapy Hours per Day Days per Week Total Days   Physical Therapy 1.5 5 10-14   Occupational Therapy 1.5 5 10-14   NOTE: Additional therapy time(s) or changes to allocation of therapies as appropriate to meet patient needs and to achieve functional goals.     Patient will participate in above therapy regimen consisting of PT and OT due to the following medical procedure/condition:Debility:  16  Debility (Non-cardiac/Non-pulmonary)    ANTICIPATED FUNCTIONAL OUTCOMES:  ADL:   modified independent   Bladder/Bowel:  modified independent   Transfers:  modified independent Locomotion:  modified independent   Cognitive:  modified independent     DISCHARGE PLANNING NEEDS  Equipment needs: Discharge needs to be reviewed with team      REHAB ANTICIPATED PARTICIPATION RESTRICTIONS:  Assist with Mobility, Inability to Drive, Inaccessible Bathroom, [place] [employer] Entrance, Rquires Assist with ADLS, Requires Assist with Heavy Homemaking, Requires Assit with Homemaking and Requires Assit with Steps    Medical Necessity Criteria for [facility] Admission:  The preadmission screen, post-admission physical evaluation, overall plan of care and admissions order demonstrate a reasonable expectation that the following criteria were met at the time of admission to the [facility]. (See "Specific areas of management and oversight in ARC setting" for additional details on medical necessity as outlined below). 1. The patient requires active and ongoing therapeutic intervention of multiple therapy disciplines (physical therapy, occupational therapy, speech-language pathology, or prosthetics/orthotics), one of which is physical or occupational therapy. 2. Patient requires an intensive rehabilitation therapy program, as defined in Chapter 1, section 110.2.2 of the CMS Medicare Policy Manual. This intensive rehabilitation therapy program will consist of at least 3 hours of therapy per day at least 5 days per week or at least 15 hours of intensive rehabilitation therapy within a 7 consecutive day period, beginning with the date of admission to the [facility]. 3. The patient is reasonably expected to actively participate in, and benefit significantly from, the intensive rehabilitation therapy program as defined in Chapter 1, section 110.2.2 of the CMS Medicare Policy Manual at this time of admission to the [facility].  She can reasonably be expected to make measurable improvement (that will be of practical value to improve the patient’s functional capacity or adaptation to impairments) as a result of the rehabilitation treatment, as defined in section 110.3, and such improvement can be expected to be made within the prescribed period of time. As noted in the CMS Medicare Policy Manual, the patient need not be expected to achieve complete independence in the domain of self-care nor be expected to return to his or her prior level of functioning in order to meet this standard. 4. The patient must require physician supervision by a rehabilitation physician. As such, a rehabilitation physician will conduct face-to-face visits with the patient at least 3 days per week throughout the patient’s stay in the [facility] to assess the patient both medically and functionally, as well as to modify the course of treatment as needed to maximize the patient’s capacity to benefit from the rehabilitation process. 5. The patient requires an intensive and coordinated interdisciplinary approach to providing rehabilitation, as defined in Chapter 1, section 110.2.5 of the CMS Medicare Policy Manual. This will be achieved through periodic team conferences, conducted at least once in a 7-day period, and comprising of an interdisciplinary team of medical professionals consisting of: a rehabilitation physician, registered nurse,  and/or , and a licensed/certified therapist from each therapy discipline involved in treating the patient. Changes Since Pre-admission Assessment: None -This patient's participation in rehab continues to be reasonable, necessary and appropriate. CMS Required Post-Admission Physician Evaluation Elements  History and Physical, including medical history, functional history and active comorbidities as in above text. Post-Admission Physician Evaluation:  The patient has the potential to make improvement and is in need of physical, occupational, and/or therapy services. The patient may also need nutritional services.  Given the patient's complex medical condition and risk of further medical complications, rehabilitative services cannot be safely provided at a lower level of care, such as a skilled nursing facility. I have reviewed the patient's functional and medical status at the time of the preadmission screening and they are the same as on the day of this admission. I acknowledge that I have personally performed a full physical examination on this patient within 24 hours of admission. The patient demonstrated understanding the rehabilitation program and the discharge process after we discussed them. Agree in entirety: yes  Minor adaptions: none    Major changes: none    Specific areas of management and oversight in ARC setting:  CHF/Cardiopulmonary function management: Ensure cardiopulmonary stability and optimize cardiopulmonary function not only at rest but with activity as patient's activity level significantly increases in acute rehab compared with prior to transfer in preparation for safe discharge from [facility]. Must closely and frequently monitor blood pressure and HR and when indicated monitor urinary output and weights with adjustments in diuretics and fluid management to ensure adequate cardiac output during ADLs and ambulation. Patient is at increased risk for orthostatic hypotension/syncope and potential injury if not monitored for and managed adequately. Cardiopulmonary function/Anemia: Ensure cardiopulmonary stability and optimize cardiopulmonary function not only at rest but with activity as patient's activity level significantly increases in acute rehab compared with prior to transfer in preparation for safe discharge from [facility]. Must closely and frequently monitor blood pressure, HR, anemia to ensure adequate cardiac output during ADLs and ambulation as patient is at increased risk for orthostatic hypotension/syncope and potential injury if not monitored for and managed adequately.     Blood pressure management:    Frequent monitoring of blood pressure with appropriate adjustments in blood pressure medication management to optimize blood pressure control and prevent/limit renal complications. Monitoring impact of blood pressure and side-effects of blood pressure medications at rest and with activity. Hypoxia prevention: Ensure appropriate level of oxygenation at rest and with activity to avoid symptomatic hypoxia, maximize functional performance, and decrease risk of atelectasis/pneumonia through close and frequent monitoring, providing appropriate respiratory treatments (such as incentive spirometry), and when necessary provide/adjust respiratory medications. Pain management:  Pain will improve with frequent evaluation of pain, careful adjustments in medications, frequent re-evaluation of patient's pain and medical/neurologic status to ensure optimal pain control, avoidance of potential serious and even life-threatening side-effects and drug interactions, as well as weaning pain medications as soon as possible to decrease risk of short and long-term use. Inpatient rehabilitation education/teaching: To be provided to patient and typically family/caregiver (if able to be identified) by all skilled therapists, rehab nursing, case management, and rehab specialized physician to ensure optimal recovery and decrease risks of complications in both acute rehabilitation setting as well as after discharge. Anxiety: Patient's anxiety and it's impact on therapy participation and functional recovery will improve during course with supportive counseling, relaxation/breathing techniques and if necessary medication management. Requires frequent re-assessment and close management to ensure anxiety/depression management during acute rehab course with planning for appropriate outpatient management to ensure optimal mental health and functional recovery. Electrolyte abnormality: hypokalemia:  placing patient at risk for additional complications which may impact medical stability and functional recovery. Frequent measurement and monitoring of labs by with appropriate adjustments in medication and/or fluid management by rehabilitation physician and internal medicine consultant. Obesity:  Close monitoring of nutrition status, nutrition specialist with adjustments in diet.  on appropriate short and long term nutrition and activity. Obesity increases complexity of patient's overall condition and causes unique challenges during this part of patient's recovery process. Supervise and if necessary make adjustments in rehab nursing care and skilled therapy care to ensure appropriate toileting, bed mobility, other ADLs, and ambulation to decrease risk of falls/injuries, VTE, skin breakdown/ulceration and optimize functional recovery. Protein-calorie malnutrition:  Close monitoring of nutrition status, caloric intake, nutrition specialist with adjustments in diet, supplements, and at times medications to optimize nutritional status for both short-term and long-term functional recovery and prevention of complications associated with malnutrition. Skin wounds: Appropriate skin checks for wound/skin evaluation including evaluation of healing, worsening of wounds, or signs of infection. Wound care management from rehab nursing, wound care nursing, physicians. Ensure frequent appropriate turning, positioning in bed, in chair, when mobilizing, and when appropriate with use of appropriate devices to optimize healing and decrease risk of worsening or new skin breakdown.       Denis Masters, [address]

## 2023-07-24 NOTE — PLAN OF CARE
Problem: Prexisting or High Potential for Compromised Skin Integrity  Goal: Skin integrity is maintained or improved  Description: INTERVENTIONS:  - Identify patients at risk for skin breakdown  - Assess and monitor skin integrity  - Assess and monitor nutrition and hydration status  - Monitor labs   - Assess for incontinence   - Turn and reposition patient  - Assist with mobility/ambulation  - Relieve pressure over bony prominences  - Avoid friction and shearing  - Provide appropriate hygiene as needed including keeping skin clean and dry  - Evaluate need for skin moisturizer/barrier cream  - Collaborate with interdisciplinary team   - Patient/family teaching  - Consider wound care consult   7/24/2023 1410 by Abby Pettit RN  Outcome: Adequate for Discharge  7/24/2023 1410 by Abby Pettit RN  Outcome: Progressing     Problem: MOBILITY - ADULT  Goal: Maintain or return to baseline ADL function  Description: INTERVENTIONS:  -  Assess patient's ability to carry out ADLs; assess patient's baseline for ADL function and identify physical deficits which impact ability to perform ADLs (bathing, care of mouth/teeth, toileting, grooming, dressing, etc.)  - Assess/evaluate cause of self-care deficits   - Assess range of motion  - Assess patient's mobility; develop plan if impaired  - Assess patient's need for assistive devices and provide as appropriate  - Encourage maximum independence but intervene and supervise when necessary  - Involve family in performance of ADLs  - Assess for home care needs following discharge   - Consider OT consult to assist with ADL evaluation and planning for discharge  - Provide patient education as appropriate  7/24/2023 1410 by Abby Pettit RN  Outcome: Adequate for Discharge  7/24/2023 1410 by Abby Pettit RN  Outcome: Progressing  Goal: Maintains/Returns to pre admission functional level  Description: INTERVENTIONS:  - Perform BMAT or MOVE assessment daily.   - Set and communicate daily mobility goal to care team and patient/family/caregiver. - Collaborate with rehabilitation services on mobility goals if consulted  - Out of bed for toileting  - Record patient progress and toleration of activity level   7/24/2023 1410 by Tara Raya RN  Outcome: Adequate for Discharge  7/24/2023 1410 by Tara Raya RN  Outcome: Progressing             See flowsheet documentation for full assessment, interventions and recommendations. Outcome: Adequate for Discharge     Problem: Nutrition/Hydration-ADULT  Goal: Nutrient/Hydration intake appropriate for improving, restoring or maintaining nutritional needs  Description: Monitor and assess patient's nutrition/hydration status for malnutrition. Collaborate with interdisciplinary team and initiate plan and interventions as ordered. Monitor patient's weight and dietary intake as ordered or per policy. Utilize nutrition screening tool and intervene as necessary. Determine patient's food preferences and provide high-protein, high-caloric foods as appropriate.      INTERVENTIONS:  - Monitor oral intake, urinary output, labs, and treatment plans  - Assess nutrition and hydration status and recommend course of action  - Evaluate amount of meals eaten  - Assist patient with eating if necessary   - Allow adequate time for meals  - Recommend/ encourage appropriate diets, oral nutritional supplements, and vitamin/mineral supplements  - Order, calculate, and assess calorie counts as needed  - Recommend, monitor, and adjust tube feedings and TPN/PPN based on assessed needs  - Assess need for intravenous fluids  - Provide specific nutrition/hydration education as appropriate  - Include patient/family/caregiver in decisions related to nutrition  7/24/2023 1410 by Tara Raya RN  Outcome: Adequate for Discharge  7/24/2023 1410 by Tara Raya RN  Outcome: Progressing     Problem: INFECTION - ADULT  Goal: Absence or prevention of progression during hospitalization  Description: INTERVENTIONS:  - Assess and monitor for signs and symptoms of infection  - Monitor lab/diagnostic results  - Monitor all insertion sites, i.e. indwelling lines, tubes, and drains  - Monitor endotracheal if appropriate and nasal secretions for changes in amount and color  - [place] appropriate cooling/warming therapies per order  - Administer medications as ordered  - Instruct and encourage patient and family to use good hand hygiene technique  - Identify and instruct in appropriate isolation precautions for identified infection/condition  7/24/2023 1410 by Madeline Mejia RN  Outcome: Adequate for Discharge  7/24/2023 1410 by Madeline Mejia RN  Outcome: Progressing  Goal: Absence of fever/infection during neutropenic period  Description: INTERVENTIONS:  - Monitor WBC    7/24/2023 1410 by Madeline Mejia RN  Outcome: Adequate for Discharge  7/24/2023 1410 by Madeline Mejia RN  Outcome: Progressing     Problem: GASTROINTESTINAL - ADULT  Goal: Minimal or absence of nausea and/or vomiting  Description: INTERVENTIONS:  - Administer IV fluids if ordered to ensure adequate hydration  - Maintain NPO status until nausea and vomiting are resolved  - Nasogastric tube if ordered  - Administer ordered antiemetic medications as needed  - Provide nonpharmacologic comfort measures as appropriate  - Advance diet as tolerated, if ordered  - Consider nutrition services referral to assist patient with adequate nutrition and appropriate food choices  7/24/2023 1410 by Madeline Mejia RN  Outcome: Adequate for Discharge  7/24/2023 1410 by Madeline Mejia RN  Outcome: Progressing  Goal: Maintains or returns to baseline bowel function  Description: INTERVENTIONS:  - Assess bowel function  - Encourage oral fluids to ensure adequate hydration  - Administer IV fluids if ordered to ensure adequate hydration  - Administer ordered medications as needed  - Encourage mobilization and activity  - Consider nutritional services referral to assist patient with adequate nutrition and appropriate food choices  7/24/2023 1410 by Jess Castaneda RN  Outcome: Adequate for Discharge  7/24/2023 1410 by Jess Castaneda RN  Outcome: Progressing  Goal: Maintains adequate nutritional intake  Description: INTERVENTIONS:  - Monitor percentage of each meal consumed  - Identify factors contributing to decreased intake, treat as appropriate  - Assist with meals as needed  - Monitor I&O, weight, and lab values if indicated  - Obtain nutrition services referral as needed  7/24/2023 1410 by Jess Castaneda RN  Outcome: Adequate for Discharge  7/24/2023 1410 by Jess Castaneda RN  Outcome: Progressing  Goal: Oral mucous membranes remain intact  Description: INTERVENTIONS  - Assess oral mucosa and hygiene practices  - Implement preventative oral hygiene regimen  - Implement oral medicated treatments as ordered  - Initiate Nutrition services referral as needed  7/24/2023 1410 by Jess Castaneda RN  Outcome: Adequate for Discharge  7/24/2023 1410 by Jess Castaneda RN  Outcome: Progressing

## 2023-07-24 NOTE — ASSESSMENT & PLAN NOTE
- Hbg 8.1, (previously 8.1, 7.9, 8.1)  - Patient apparently was at times in past having chronic anemia as well and occasionally getting Venofer per family - discussed with IM and could consider Venofer if Covid comes back negative; hold oral iron as it may irritate stomach and may not absorb well for now   - monitor CBC Monday unless concerns sooner   -GI saw patient for miguel blood in toilet over the weekend however Eliquis was on hold and none today so far. Colonoscopy- 8/1, n.p.o. after midnight  - colonoscopy: impression:  • The terminal ileum appeared normal.  • Three sessile polyps measuring 5-9 mm in the transverse colon; completely removed en bloc by cold snare and retrieved specimen  • Poor preparation in the right making visualization difficult despite aggressive lavage. There were no large polyps or masses identified, however adequate polyp surveillance was unable to be performed. • Few small, scattered diverticula in the sigmoid colon  • Internal small hemorrhoids. Seen on retroflexion. • History of surgery in the rectum identified. Anastomosis was healthy.   - 8/2 Eliquis restarted    - home: [facility] to obtain CBC with results to PCP on 08/11

## 2023-07-24 NOTE — PLAN OF CARE
Reviewed    Problem: PAIN - ADULT  Goal: Verbalizes/displays adequate comfort level or baseline comfort level  Description: Interventions:  - Encourage patient to monitor pain and request assistance  - Assess pain using appropriate pain scale  - Administer analgesics based on type and severity of pain and evaluate response  - Implement non-pharmacological measures as appropriate and evaluate response  - Consider cultural and social influences on pain and pain management  - Notify physician/advanced practitioner if interventions unsuccessful or patient reports new pain  7/24/2023 1917 by Linda Hurtado RN  Outcome: Progressing  7/24/2023 1917 by Linda Hurtado RN  Outcome: Progressing  7/24/2023 1916 by Linda Hurtado RN  Outcome: Progressing     Problem: INFECTION - ADULT  Goal: Absence or prevention of progression during hospitalization  Description: INTERVENTIONS:  - Assess and monitor for signs and symptoms of infection  - Monitor lab/diagnostic results  - Monitor all insertion sites, i.e. indwelling lines, tubes, and drains  - Monitor endotracheal if appropriate and nasal secretions for changes in amount and color  - [place] appropriate cooling/warming therapies per order  - Administer medications as ordered  - Instruct and encourage patient and family to use good hand hygiene technique  - Identify and instruct in appropriate isolation precautions for identified infection/condition  7/24/2023 1917 by Linda Hurtado RN  Outcome: Progressing  7/24/2023 1917 by Linda Hurtado RN  Outcome: Progressing  7/24/2023 1916 by Linda Hurtado RN  Outcome: Progressing     Problem: SAFETY ADULT  Goal: Patient will remain free of falls  Description: INTERVENTIONS:  - Educate patient/family on patient safety including physical limitations  - Instruct patient to call for assistance with activity   - Consult OT/PT to assist with strengthening/mobility   - Keep Call bell within reach  - Keep bed low and locked with side rails adjusted as appropriate  - Keep care items and personal belongings within reach  - Initiate and maintain comfort rounds  - Make Fall Risk Sign visible to staff  - Offer Toileting every 4 Hours, in advance of need  - Initiate/Maintain bed and chair alarm  - Apply yellow socks and bracelet for high fall risk patients  - Consider moving patient to room near nurses station  7/24/2023 1917 by Sameer Pedraza RN  Outcome: Progressing  7/24/2023 1917 by Sameer Pedraza RN  Outcome: Progressing  7/24/2023 1916 by Sameer Pedraza RN  Outcome: Progressing  Goal: Maintain or return to baseline ADL function  Description: INTERVENTIONS:  -  Assess patient's ability to carry out ADLs; assess patient's baseline for ADL function and identify physical deficits which impact ability to perform ADLs (bathing, care of mouth/teeth, toileting, grooming, dressing, etc.)  - Assess/evaluate cause of self-care deficits   - Assess range of motion  - Assess patient's mobility; develop plan if impaired  - Assess patient's need for assistive devices and provide as appropriate  - Encourage maximum independence but intervene and supervise when necessary  - Involve family in performance of ADLs  - Assess for home care needs following discharge   - Consider OT consult to assist with ADL evaluation and planning for discharge  - Provide patient education as appropriate  7/24/2023 1917 by Sameer Pedraza RN  Outcome: Progressing  7/24/2023 1917 by Sameer Pedraza RN  Outcome: Progressing  7/24/2023 1916 by Sameer Pedraza RN  Outcome: Progressing  Goal: Maintains/Returns to pre admission functional level  Description: INTERVENTIONS:  - Set and communicate daily mobility goal to care team and patient/family/caregiver.    - Collaborate with rehabilitation services on mobility goals if consulted  - Out of bed for toileting  - Record patient progress and toleration of activity level   7/24/2023 1917 by Sameer Pedraza RN  Outcome: Progressing  7/24/2023 1917 by Sameer Pedraza RN  Outcome: Progressing  7/24/2023 1916 by Earlene Kahn RN  Outcome: Progressing     Problem: SAFETY ADULT  Goal: Maintain or return to baseline ADL function  Description: INTERVENTIONS:  -  Assess patient's ability to carry out ADLs; assess patient's baseline for ADL function and identify physical deficits which impact ability to perform ADLs (bathing, care of mouth/teeth, toileting, grooming, dressing, etc.)  - Assess/evaluate cause of self-care deficits   - Assess range of motion  - Assess patient's mobility; develop plan if impaired  - Assess patient's need for assistive devices and provide as appropriate  - Encourage maximum independence but intervene and supervise when necessary  - Involve family in performance of ADLs  - Assess for home care needs following discharge   - Consider OT consult to assist with ADL evaluation and planning for discharge  - Provide patient education as appropriate  7/24/2023 1917 by Earlene Kahn RN  Outcome: Progressing  7/24/2023 1917 by Earlene Kahn RN  Outcome: Progressing  7/24/2023 1916 by Earlene Kahn RN  Outcome: Progressing     Problem: DISCHARGE PLANNING  Goal: Discharge to home or other facility with appropriate resources  Description: INTERVENTIONS:  - Identify barriers to discharge w/patient and caregiver  - Arrange for needed discharge resources and transportation as appropriate  - Identify discharge learning needs (meds, wound care, etc.)  - Arrange for interpretive services to assist at discharge as needed  - Refer to Case Management Department for coordinating discharge planning if the patient needs post-hospital services based on physician/advanced practitioner order or complex needs related to functional status, cognitive ability, or social support system  7/24/2023 1917 by Earlene Kahn RN  Outcome: Progressing  7/24/2023 1917 by Earlene Kahn RN  Outcome: Progressing  7/24/2023 1916 by Earlene Kahn RN  Outcome: Progressing

## 2023-07-24 NOTE — PROGRESS NOTES
PHYSICAL MEDICINE AND REHABILITATION   PREADMISSION ASSESSMENT     Projected [facility] Diagnoses:  Impairment of mobility, safety and Activities of Daily Living (ADLs) due to Debility:  16  Debility (Non-cardiac/Non-pulmonary)  Etiologic: Surgical site infection after wound dehiscence s/p washout vac placement and partial closure  Date of Onset: 7/18/23   Date of surgery: 7/18/23-wound debridement; wound washout, vac application and partial closure of abdominal wound    PATIENT INFORMATION  Name: Rita Barreto Phone #: [phone] (home)   Address: [address] [place] [id number]  YOB: 1950 Age: 67 y.o. #   Marital Status:   Ethnicity: Not  or  or Uzbek  Employment Status: retired  Extended Emergency Contact Information  Primary Emergency Contact: [address], [address] of [place] Phone: [phone]  Relation: Daughter  Secondary Emergency Contact: Bianca Clayton, [address] [place] [address] Dr Rosi Holloway of [address] [facility] Phone: [phone]  Mobile Phone: [phone]  Relation: Daughter  Advance Directive: Level 1 Full Code (no ACP docs)    INSURANCE/COVERAGE:     Primary Payor: MEDICARE / Plan: MEDICARE A AND B / Product Type: Medicare A & B Fee for Service /   Secondary Payer:[employer] Senior Supplement #[id number]   Payer Contact:  Payer Contact:   Contact Phone:  Contact Phone:     Authorization #: n/a  Coverage Dates: n/a  LCD: n/a  MEDICARE #: [id number]  Medicare Days: 60/30/60  Medical Record #: [id number]    REFERRAL SOURCE:   Referring provider: Raheem Lopez MD  Referring facility: [address] [place] [address]  Room: [address] [place] [address]  PCP: Andrew Jenkins DO PCP phone number: [phone]    MEDICAL INFORMATION  HPI: This is a 41-year-old female who was recently admitted at [facility] 6/19-7/4 with a bowel perforation and internal hernia (hx arlene-en-y gastric bypass).   She is s/p ex lap, resection J-J anastomosis, segmental ileum resection, abthera vac placement, ex lap, washout, abthera change and ex lap, small bowel resection, bowel anastomosis x3, vicryl bridging mesh who presented to [facility] on 7/18/23 from [place] with a wound dehiscence. She went to the OR on 7/18 and is s/p washout, wound exploration, debridement, possible wound vac placement. She went back to the OR on 7/19 for washout, vac application and partial closure of wound. Patient currently has wound vac in place at a pressure of 75 continuously. Vac last changed on 7/24. She is tolerating a regular diet with nutrition supplements. Last BM was 7/21. Patient tested positive for COVID on 7/24. She is asymptomatic and not being treated at this time. She worked with PT and OT and was recommended for post acute rehab following her hospital stay. She has demonstrated that she can tolerate three hours of therapy, five days a week and is now medically cleared for discharge to the [facility]. Past Medical History:   Past Surgical History:    Allergies:     Past Medical History:   Diagnosis Date   • A-fib [facility])    • Arthritis    • Atrial fibrillation ([address])    • Cervical spondylosis with myelopathy    • Gastric bypass status for obesity    • History of transfusion     35 years ago   • Hypertension    • mass right kidney    • Renal cell adenocarcinoma ([address])     RIGHT   • Sleep apnea     Past Surgical History:   Procedure Laterality Date   • ABDOMINAL ADHESION SURGERY N/A 6/19/2023    Procedure: LYSIS ADHESIONS;  Surgeon: Opal Goldman DO;  [facility];  Service: General   • ABDOMINAL WALL SURGERY N/A 7/19/2023    Procedure: PARTIAL CLOSURE WOUND ABDOMINAL/TRUNK;  Surgeon: Maddi Arce DO;  [facility];  Service: General   • APPENDECTOMY N/A 6/19/2023    Procedure: APPENDECTOMY;  Surgeon: Opal Goldman DO;  [facility];  Service: General   • CHOLECYSTECTOMY     • COLOSTOMY • CRYOABLATION Right     RT KIDNEY   • CYSTORRHAPHY      Bladder. Last assessed 4/6/2016    • EXPLORATORY LAPAROTOMY W/ BOWEL RESECTION N/A 6/19/2023    Procedure: LAPAROTOMY EXPLORATORY W/ BOWEL RESECTION;  Surgeon: Tiffanie Pa MD;  [facility];  Service: General   • GASTRIC BYPASS     • HERNIA REPAIR      Last assessed 4/6/2016    • HYSTERECTOMY     • LAPAROTOMY N/A 6/19/2023    Procedure: LAPAROTOMY EXPLORATORY, washout, vac change;  Surgeon: Katarzyna San DO;  [facility];  Service: General   • LAPAROTOMY N/A 6/20/2023    Procedure: LAPAROTOMY, REMOVAL OF WOUND VAC AND PACKING, SMALL BOWEL RESECTION , BOWEL ANASTOMOSIS X 3 , VICRYL MESH BRIDGING -;  Surgeon: Katarzyna San DO;  [facility];  Service: General   • HI OPTX DSTL RADL X-ARTIC FX/EPIPHYSL SEP Right 4/22/2016    Procedure: OPEN REDUCTION INTERNAL FIXATION RIGHT DISTAL RADIUS;  Surgeon: Vera Preston MD;  [facility];  Service: Orthopedics   • REVISION COLOSTOMY     • SMALL INTESTINE SURGERY N/A 6/19/2023    Procedure: RESECTION SMALL BOWEL;  Surgeon: Katarzyna San DO;  [facility];  Service: General   • THYROID LOBECTOMY Left 8/27/2019    Procedure: LOBECTOMY THYROID, left;  Surgeon: Lindsey Chang MD;  [facility];  Service: Surgical Oncology   • TUBAL LIGATION     • US GUIDED THYROID BIOPSY  2/27/2019   • US GUIDED THYROID BIOPSY  5/29/2019   • WOUND DEBRIDEMENT N/A 7/18/2023    Procedure: DEBRIDEMENT WOUND [facility] OUT);   Surgeon: Luz Silva DO;  [facility];  Service: Trauma   • WOUND DEBRIDEMENT N/A 7/19/2023    Procedure: ABDOMINAL WOUND ([address] [place] [address] OUT); VAC APPLICATION;  Surgeon: Sabra Thurston DO;  [facility];  Service: General     No Known Allergies      Medical/functional conditions requiring inpatient rehabilitation:   Surgical site infection after wound dehiscence s/p washout vac placement and partial closure  Impaired mobility  Decreased ability to complete ADLs  HTN  A-fib    Risk for medical/clinical complications: risk for infection, risk for further wound dehiscence, risk for falls, risk for uncontrolled pain, risk for skin breakdown, risk for DVT/PE/CVA, risk for complications with wound vac, risk for constipation     Comorbidities/Surgeries in the last 100 days:   S/p ex lap, resection J-J anastomosis, segmental ileum resection, abthera 6/19  S/p ex lap, washout, abthera change 6/19  S/p ex lap, small bowel resection, bowel anastomosis x3, vicryl bridging mesh 6/20  S/p debridement wound, washout 7/18   S/p abdominal wound, wash out, vac application partial closure wound abdominal trunk 7/19  HTN  A-fib  H/o arlene-en-y gastric bypass  Bowel perforation  Internal hernia    CURRENT VITAL SIGNS:   Temp:  [97.9 °F (36.6 °C)-98.5 °F (36.9 °C)] 98.5 °F (36.9 °C)  HR:  [78] 78  Resp:  [16-18] 16  BP: (124-133)/(58-71) 127/67   Intake/Output Summary (Last 24 hours) at 7/24/2023 1536  Last data filed at 7/24/2023 1406  Gross per 24 hour   Intake 565 ml   Output 500 ml   Net 65 ml        LABORATORY RESULTS:      Lab Results   Component Value Date    HGB 8.1 (L) 07/24/2023    HCT 27.2 (L) 07/24/2023    WBC 9.16 07/24/2023     Lab Results   Component Value Date    BUN 15 07/24/2023    K 3.4 (L) 07/24/2023     (H) 07/24/2023    GLUCOSE 94 06/20/2023    CREATININE 0.65 07/24/2023     Lab Results   Component Value Date    PROTIME 17.3 (H) 07/18/2023    INR 1.39 (H) 07/18/2023        DIAGNOSTIC STUDIES:  No results found.     PRECAUTIONS/SPECIAL NEEDS:  Isolation Precautions:  Contact, Contact and Airborne, Anticoagulation:  Eliquis, Edema Management, Pain Management, Bladder Incontinence: # of accidents 1, Dietary Restrictions: Regular diet with nutrition supplements, Visually Impaired, Language Preference: English and fall precautions, wound vac at 75 continuously    MEDICATIONS:     Current Facility-Administered Medications:   • acetaminophen (TYLENOL) tablet 975 mg, 975 mg, Oral, Q8H PRN, Patrick Baeza MD, 975 mg at 07/22/23 2250  •  amiodarone tablet 200 mg, 200 mg, Oral, Daily With Breakfast, Rosales Rummer Allsbrook, DO, 200 mg at 07/24/23 6469  •  apixaban (ELIQUIS) tablet 5 mg, 5 mg, Oral, BID, Alek Gutierrez MD, 5 mg at 07/24/23 7080  •  diltiazem (CARDIZEM CD) 24 hr capsule 360 mg, 360 mg, Oral, Daily, Rosales Rummer Allsbrook, DO, 360 mg at 07/24/23 9199  •  ergocalciferol (VITAMIN D2) capsule 50,000 Units, 50,000 Units, Oral, Weekly, Ivon Duff, DO, 50,000 Units at 07/19/23 4890  •  FLUoxetine (PROzac) capsule 20 mg, 20 mg, Oral, BID, Rosales Rummer Allsbrook, DO, 20 mg at 07/24/23 0784  •  melatonin tablet 6 mg, 6 mg, Oral, HS, Rosales Rummer Allsbrook, DO, 6 mg at 07/23/23 2125  •  metoprolol succinate (TOPROL-XL) 24 hr tablet 25 mg, 25 mg, Oral, Daily, Rosales Rummer Allsbrook, DO, 25 mg at 07/24/23 0957  •  ondansetron (ZOFRAN) injection 4 mg, 4 mg, Intravenous, Q6H PRN, Ivon Duff, DO  •  psyllium (METAMUCIL) 1 packet, 1 packet, Oral, Daily, Ivon Duff, DO, 1 packet at 07/19/23 0901    SKIN INTEGRITY:   Abdominal wound with vac  Wound on right posterior thigh  Wound on left posterior thigh    PRIOR LEVEL OF FUNCTION:  She lives in a(n) single family home  Tanya Lemons is  and lives with their daughter. Self Care: Independent, Indoor Mobility: Independent, Stairs (in/outdoor): Independent and Cognition: Independent All levels are prior to 1st hospitalization in June of 2023    FALLS IN THE LAST 6 MONTHS: 1    HOME ENVIRONMENT:  The living area: Patient lives in a ranch home-1 level  There are Ramp to enter the home. The patient will not have 24 hour supervision/physical assistance available upon discharge. PREVIOUS DME:  Equipment in home (previous DME):  Shower Chair, Grab Bars and Rolling Walker    FUNCTIONAL STATUS:  Physical Therapy Occupational Therapy Speech Therapy   07/24/23 1150    PT Last Visit PT Visit Date 23   Note Type   Note Type Treatment   Pain Assessment   Pain Assessment Tool 0-10   Pain Score No Pain   Restrictions/Precautions   Weight Bearing Precautions Per Order No   Other Precautions Contact/isolation;Multiple lines; Fall Risk  (wound vac)   General   Chart Reviewed Yes   Response to Previous Treatment Patient with no complaints from previous session. Family/Caregiver Present Yes  (son)   Subjective   Subjective Pt agreeable to mobilize. Bed Mobility   Supine to Sit Unable to assess   Sit to Supine 4  Minimal assistance   Additional items Assist x 1; Increased time required;Verbal cues;LE management   Additional Comments Pt greeted in supine. Transfers   Sit to Stand 4  Minimal assistance   Additional items Assist x 1; Increased time required;Verbal cues;Armrests   Stand to Sit 4  Minimal assistance   Additional items Assist x 1; Increased time required;Verbal cues   Additional Comments RW   Ambulation/Elevation   Gait pattern Excessively slow; Short stride; Forward Flexion;Decreased foot clearance   Gait Assistance    (CGA)   Additional items Assist x 1;Verbal cues; Tactile cues  (+ 2nd for chair follow)   Assistive Device Rolling walker   Distance 60'   Balance   Static Sitting Fair +   Dynamic Sitting Fair   Static Standing Fair -   Dynamic Standing Poor +   Ambulatory Poor +  (RW)   Endurance Deficit   Endurance Deficit Yes   Endurance Deficit Description weakness, fatigue   Activity Tolerance   Activity Tolerance Patient limited by fatigue   Assessment   Prognosis Good   Problem List Decreased strength;Decreased endurance; Impaired balance;Decreased mobility;Obesity   Assessment Pt seen for PT treatment session w/ focus on t/f training, gait training, + bed mobility training. Pt motivated to go to [facility]. Pt demonstrated progress w/ decreased assistance required for gait training and t/f.  Pt is still functioning below independent baseline and would benefit from rehab upon d/c to return to [facility].    23 1013    OT Last Visit   OT Visit Date 23   Note Type   Note Type Treatment   Pain Assessment   Pain Assessment Tool 0-10   Pain Score 3   Restrictions/Precautions   Other Precautions Contact/isolation; Fall Risk   ADL   Eating Assistance 5  Supervision/Setup   Grooming Assistance 5  Supervision/Setup   UB Bathing Assistance 4  Minimal Assistance   UB Bathing Comments asst to wash and dry skin folds in sitting in chair position   LB Bathing Assistance 2  Maximal Assistance   LB Bathing Comments asst for knees to b feet and for buttocks / davonte in stance   UB Dressing Assistance 5  Supervision/Setup   UB Dressing Comments in sitting pt donned long sleeved night gown with increased time to pull down in back. pt did not require asst p set up   LB Dressing Assistance 3  Moderate Assistance   LB Dressing Deficit Use of adaptive equipment   LB Dressing Comments asst for clothing management / socks. pt used dressingstick to doff socks   Toileting Assistance  2  Maximal Assistance   Functional Standing Tolerance   Time f static balance c rw during adls   Transfers   Sit to Stand 3  Moderate assistance   Additional items Assist x 1  (rw)   Stand to Sit 4  Minimal assistance   Additional items Assist x 1   Cognition   Overall Cognitive Status WFL   Activity Tolerance   Activity Tolerance Patient tolerated treatment well   Assessment   Assessment pt participated in am ot session and was seen focusing on adls seated in chair after set up. pts dtr was present this session and observed pt using dressing stick. pt stood with mod asst x 1 c rw for back / buttocks cleaning. she required min asst ub bathing / drying and mod asst lb c tool. pt demosntrated f+ activity tolerence and f balance c rw static. pt is motivated pleasant and cooperative. pts dtr anthony protein shake for pt.          CARE SCORES:  Self Care:  Eatin: Supervision or touching  assistance  Oral hygiene: 04: Supervision or touching  assistance  Toilet hygiene: 02: Substantial/maximal assistance  Shower/bathing self: 02: Substantial/maximal assistance  Upper body dressin: Supervision or touching  assistance  Lower body dressin: Partial/moderate assistance  Putting on/taking off footwear: 03: Partial/moderate assistance  Transfers:  Roll left and right: 09: Not applicable  Sit to lyin: Partial/moderate assistance  Lying to sitting on side of bed: 09: Not applicable  Sit to stand: 03: Partial/moderate assistance  Chair/bed to chair transfer: 03: Partial/moderate assistance  Toilet transfer: 09: Not applicable  Mobility:  Walk 10 ft: 03: Partial/moderate assistance  Walk 50 ft with two turns: 03: Partial/moderate assistance  Walk [id number]: 09: Not applicable    CURRENT GAP IN FUNCTION  Prior to Admission: Functional Status: Prior to her admission in , patient ambulated without a device in her home and with a RW in the community. She was independent with ADLs. Expected functional outcomes: It is expected that with skilled acute rehabilitation services the patient will progress to Supervision for self care and Supervision for mobility     Estimated length of stay: 10 to 14 days    Anticipated Post-Discharge Disposition/Treatment  Disposition: Return to previous home/apartment. Outpatient Services: Physical Therapy (PT) and Occupational Therapy (OT)    BARRIERS TO DISCHARGE  Home Accessibility and Caregiver Accessibility; Decreased strength;Decreased endurance; Impaired balance;Decreased mobility;Obesity; Decreased ADL status; Decreased self-care trans;Decreased high-level ADLs    INTERVENTIONS FOR DISCHARGE  ADL retraining;Functional transfer training; Endurance training;Patient/family training;Equipment evaluation/education;UE strengthening/ROM; Energy conservation; Activity engagement;LE strengthening/ROM; Therapeutic exercise; Bed mobility;Gait training; Compensatory technique education    REQUIRED THERAPY:  Patient will require PT and OT 90 minutes each per day, five days per week to achieve rehab goals. REQUIRED FUNCTIONAL AND MEDICAL MANAGEMENT FOR INPATIENT REHABILITATION:  Skin:  There are no pressure sores currently, Patient requires close monitoring of her skin for any further breakdown secondary to decraesed mobility and bladder incontinence. She also requires close monitoring of abdominal wound for any complications, Pain Management: Overall pain is moderately controlled, Deep Vein Thrombosis (DVT) Prophylaxis:  SCD's while in bed, PT and OT interventions, any labs, consults, imaging and medication adjustements patient may need while on ARC    RECOMMENDED LEVEL OF CARE:  This is a 39-year-old female who was recently admitted at [facility] 6/19-7/4 with a bowel perforation and internal hernia (hx arlene-en-y gastric bypass). She is s/p ex lap, resection J-J anastomosis, segmental ileum resection, abthera vac placement, ex lap, washout, abthera change and ex lap, small bowel resection, bowel anastomosis x3, vicryl bridging mesh who presented to [facility] on 7/18/23 from [place] with a wound dehiscence. She went to the OR on 7/18 and is s/p washout, wound exploration, debridement, possible wound vac placement. She went back to the OR on 7/19 for washout, vac application and partial closure of wound. Patient currently has wound vac in place at a pressure of 75 continuously. Vac last changed on 7/24. She is tolerating a regular diet with nutrition supplements. Last BM was 7/21. Patient tested positive for COVID on 7/24. She is asymptomatic and not being treated at this time. She worked with PT and OT and was recommended for post acute rehab following her hospital stay. She has demonstrated that she can tolerate three hours of therapy, five days a week and is now medically cleared for discharge to the [facility].     Prior to her admission in 6/23, patient ambulated without a device in her home and with a RW in the community. She was independent with ADLs. She is now functioning below her baseline, requiring supervision to maximum assistance for her ADLs. With PT, she is requiring min assistance for bed mobility and transfers. She ambulated 60 feet with RW and CGA plus 2nd for chair follow. Gait pattern: excessively slow, short stride, forward flexion and decreased foot clearance. Patient requires close oversight by a physician, PM&R management, 24/7 rehabilitation nursing care and specialized interdisciplinary therapy services in preparation for discharge which can only be provided in the inpatient acute rehabilitation setting. Patient will need close monitoring of her VS, I/Os, abdominal wound, skin, pain level and her overall condition.   Inpatient acute rehabilitation is recommended for this patient maximize her overall strength, endurance, self care and mobility upon discharge home with he support of her family;y

## 2023-07-24 NOTE — PLAN OF CARE
Problem: PHYSICAL THERAPY ADULT  Goal: Performs mobility at highest level of function for planned discharge setting. See evaluation for individualized goals. Description: Treatment/Interventions: Functional transfer training, LE strengthening/ROM, Therapeutic exercise, Endurance training, Patient/family training, Equipment eval/education, Bed mobility, Gait training, Compensatory technique education, Spoke to nursing, OT, Spoke to case management          See flowsheet documentation for full assessment, interventions and recommendations. Outcome: Progressing  Note: Prognosis: Good  Problem List: Decreased strength, Decreased endurance, Impaired balance, Decreased mobility, Obesity  Assessment: Pt seen for PT treatment session w/ focus on t/f training, gait training, + bed mobility training. Pt motivated to go to [facility]. Pt demonstrated progress w/ decreased assistance required for gait training and t/f. Pt is still functioning below independent baseline and would benefit from rehab upon d/c to return to [facility]. PT Discharge Recommendation: Post acute rehabilitation services    See flowsheet documentation for full assessment.

## 2023-07-24 NOTE — ASSESSMENT & PLAN NOTE
- KCl 40 mEq daily  - K+ 3.4 (7/26) and 2.8 as of 7/31 and given additional potassium supplementation with daily repletion as well > 3.1 > 3.9 > 4.1  - IM consulted, monitoring, repletion, and overall management at their discretion during ARC course  - monitor BMP    - BMP 8/11 by home care with results to PCP and Cardiology

## 2023-07-24 NOTE — DISCHARGE INSTR - AVS FIRST PAGE
Acute Care Surgery Discharge Instructions    Please follow-up as instructed. If you do not already have a follow-up appointment, please call the office when you leave to schedule an appointment to be seen in 2-3 weeks for post-operative re-evaluation. Activity:  - No lifting greater than 20 pounds or strenuous physical activity or exercise for 2 weeks. - Walking and normal light activities are encouraged. - Normal daily activities including climbing steps are okay. - No driving until no longer using pain medications. Return to work:    - You may return to work in 2 weeks or sooner if you are feeling well enough. Diet:    - You may resume your normal diet. Wound Care:  - May shower daily. No tub baths or swimming until cleared by your surgeon.  - Wash incision gently with soap and water and pat dry. - Do not apply any creams or ointments unless instructed to do so by your surgeon.  - Leana Velez may apply ice as needed (no longer than 20 minutes at a time) for the first 48 hours. - Bruising is not unusual and will go away with a little time. You may apply a warm, moist compress that may help the bruising resolve quicker. - You may remove the dressings the day after surgery (unless otherwise instructed). Leave any skin tapes (steri-strips) on the incision(s) in place until they fall off on their own. Any new dressings are optional.    Medications:    - You may resume all of your regular medications after discharge unless otherwise instructed. Please refer to your discharge medication list for further details. - Please take the pain medications as directed. - You are encouraged to use non-narcotic pain medications first and whenever possible. Reserve the use of narcotic pain medication for moderate to severe pain not controlled by non-narcotic medications.  - No driving while taking narcotic pain medications. - You may become constipated, especially if taking pain medications.  You may take any over the counter stool softeners or laxatives as needed. Examples: Milk of Magnesia, Colace, Senna. Additional Instructions:  - If you have any questions or concerns after discharge please call the office.  - Call office or return to ER if fever greater than 101, chills, persistent nausea/vomiting, worsening/uncontrollable pain, and/or increasing redness or purulent/foul smelling drainage from incision(s).

## 2023-07-24 NOTE — PLAN OF CARE
Problem: Prexisting or High Potential for Compromised Skin Integrity  Goal: Skin integrity is maintained or improved  Description: INTERVENTIONS:  - Identify patients at risk for skin breakdown  - Assess and monitor skin integrity  - Assess and monitor nutrition and hydration status  - Monitor labs   - Assess for incontinence   - Turn and reposition patient  - Assist with mobility/ambulation  - Relieve pressure over bony prominences  - Avoid friction and shearing  - Provide appropriate hygiene as needed including keeping skin clean and dry  - Evaluate need for skin moisturizer/barrier cream  - Collaborate with interdisciplinary team   - Patient/family teaching  - Consider wound care consult   Outcome: Progressing     Problem: MOBILITY - ADULT  Goal: Maintain or return to baseline ADL function  Description: INTERVENTIONS:  -  Assess patient's ability to carry out ADLs; assess patient's baseline for ADL function and identify physical deficits which impact ability to perform ADLs (bathing, care of mouth/teeth, toileting, grooming, dressing, etc.)  - Assess/evaluate cause of self-care deficits   - Assess range of motion  - Assess patient's mobility; develop plan if impaired  - Assess patient's need for assistive devices and provide as appropriate  - Encourage maximum independence but intervene and supervise when necessary  - Involve family in performance of ADLs  - Assess for home care needs following discharge   - Consider OT consult to assist with ADL evaluation and planning for discharge  - Provide patient education as appropriate  Outcome: Progressing  Goal: Maintains/Returns to pre admission functional level  Description: INTERVENTIONS:  - Perform BMAT or MOVE assessment daily.   - Set and communicate daily mobility goal to care team and patient/family/caregiver. - Collaborate with rehabilitation services on mobility goals if consulted  - Perform Range of Motion 3 times a day.   - Reposition patient every 2 hours.  - Dangle patient 3 times a day  - Stand patient 3 times a day  - Ambulate patient 3 times a day  - Out of bed to chair 3 times a day   - Out of bed for meals 3 times a day  - Out of bed for toileting  - Record patient progress and toleration of activity level   Outcome: Progressing     Problem: Nutrition/Hydration-ADULT  Goal: Nutrient/Hydration intake appropriate for improving, restoring or maintaining nutritional needs  Description: Monitor and assess patient's nutrition/hydration status for malnutrition. Collaborate with interdisciplinary team and initiate plan and interventions as ordered. Monitor patient's weight and dietary intake as ordered or per policy. Utilize nutrition screening tool and intervene as necessary. Determine patient's food preferences and provide high-protein, high-caloric foods as appropriate.      INTERVENTIONS:  - Monitor oral intake, urinary output, labs, and treatment plans  - Assess nutrition and hydration status and recommend course of action  - Evaluate amount of meals eaten  - Assist patient with eating if necessary   - Allow adequate time for meals  - Recommend/ encourage appropriate diets, oral nutritional supplements, and vitamin/mineral supplements  - Order, calculate, and assess calorie counts as needed  - Recommend, monitor, and adjust tube feedings and TPN/PPN based on assessed needs  - Assess need for intravenous fluids  - Provide specific nutrition/hydration education as appropriate  - Include patient/family/caregiver in decisions related to nutrition  Outcome: Progressing     Problem: INFECTION - ADULT  Goal: Absence or prevention of progression during hospitalization  Description: INTERVENTIONS:  - Assess and monitor for signs and symptoms of infection  - Monitor lab/diagnostic results  - Monitor all insertion sites, i.e. indwelling lines, tubes, and drains  - Monitor endotracheal if appropriate and nasal secretions for changes in amount and color  - [place] appropriate cooling/warming therapies per order  - Administer medications as ordered  - Instruct and encourage patient and family to use good hand hygiene technique  - Identify and instruct in appropriate isolation precautions for identified infection/condition  Outcome: Progressing  Goal: Absence of fever/infection during neutropenic period  Description: INTERVENTIONS:  - Monitor WBC    Outcome: Progressing     Problem: GASTROINTESTINAL - ADULT  Goal: Minimal or absence of nausea and/or vomiting  Description: INTERVENTIONS:  - Administer IV fluids if ordered to ensure adequate hydration  - Maintain NPO status until nausea and vomiting are resolved  - Nasogastric tube if ordered  - Administer ordered antiemetic medications as needed  - Provide nonpharmacologic comfort measures as appropriate  - Advance diet as tolerated, if ordered  - Consider nutrition services referral to assist patient with adequate nutrition and appropriate food choices  Outcome: Progressing  Goal: Maintains or returns to baseline bowel function  Description: INTERVENTIONS:  - Assess bowel function  - Encourage oral fluids to ensure adequate hydration  - Administer IV fluids if ordered to ensure adequate hydration  - Administer ordered medications as needed  - Encourage mobilization and activity  - Consider nutritional services referral to assist patient with adequate nutrition and appropriate food choices  Outcome: Progressing  Goal: Maintains adequate nutritional intake  Description: INTERVENTIONS:  - Monitor percentage of each meal consumed  - Identify factors contributing to decreased intake, treat as appropriate  - Assist with meals as needed  - Monitor I&O, weight, and lab values if indicated  - Obtain nutrition services referral as needed  Outcome: Progressing  Goal: Oral mucous membranes remain intact  Description: INTERVENTIONS  - Assess oral mucosa and hygiene practices  - Implement preventative oral hygiene regimen  - Implement oral medicated treatments as ordered  - Initiate Nutrition services referral as needed  Outcome: Progressing

## 2023-07-24 NOTE — ASSESSMENT & PLAN NOTE
- home: Eliqus 5 mg BID, Amiodarone 200 mg daily, Cardizem 360 mg daily, Toprol XL 25 mg BID  - here: Eliquis for A/C; Amio, Dilt, MTP per IM  - Monitor Hb   - monitor with therapies

## 2023-07-24 NOTE — PROGRESS NOTES
Acute Care Surgery  Bedside V.A.C. Procedure Note    A timeout was performed with the patient's nurse, prior to beginning the dressing change. The nurse remained present to confirm the correct dressing counts on removal of the VAC dressing and was debriefed at the completion of the dressing change. Location of wound: Midline Wound    Dressings and Foam removed:  1  XeroformDressings  1 Black Foam  1 White Foam    Dimensions of wound: 7 cm x 4 cm x 6 cm    Description of wound: On inspection of the wound today, wound tracks approximately 6 cm underneath the midline abdominal wound. There was no necrotic or nonviable tissue requiring debridement. The periwound skin remains clean and intact and unremarkable. VAC dressing application:  The periwound skin was cleaned and dried. 1 adaptic dressings, 1 black foam, 1 white foam were cut to size of the wound and placed into the wound. The dressings were then covered with VAC drape. Additional VAC drape and black foam was used to create a bridge to the patient's left lower quadrant and a base for the track pad. The track pad was then placed over the base of black foam. The VAC was then set to 75 mmHg low Continuous suction. The patient tolerated the procedure well and there were no complications. The patient did not require any excisional debridement during today's dressing change. VAC settings:  75 mmHg  Continuous    Wound Images: Total VAC count:  1 adaptic  2 black (1wound/1bridge)  1 white    Additional Notes: The [facility] sticker placed over the dressing per protocol. The next [facility] dressing change will be planned for 7/26/23. The [facility] paperwork was completed and give to the case management staff to arrange home VAC therapy. This dressing change took greater than 22 minutes to complete.     Montserrat Booth PA-C  7/24/2023 1:51 PM

## 2023-07-25 PROCEDURE — 99232 SBSQ HOSP IP/OBS MODERATE 35: CPT

## 2023-07-25 PROCEDURE — 97167 OT EVAL HIGH COMPLEX 60 MIN: CPT

## 2023-07-25 PROCEDURE — 97110 THERAPEUTIC EXERCISES: CPT

## 2023-07-25 PROCEDURE — 99232 SBSQ HOSP IP/OBS MODERATE 35: CPT | Performed by: INTERNAL MEDICINE

## 2023-07-25 PROCEDURE — 97530 THERAPEUTIC ACTIVITIES: CPT

## 2023-07-25 PROCEDURE — 97535 SELF CARE MNGMENT TRAINING: CPT

## 2023-07-25 PROCEDURE — 97163 PT EVAL HIGH COMPLEX 45 MIN: CPT

## 2023-07-25 RX ORDER — NYSTATIN 100000 [USP'U]/G
POWDER TOPICAL 2 TIMES DAILY
Status: DISCONTINUED | OUTPATIENT
Start: 2023-07-25 | End: 2023-08-05 | Stop reason: HOSPADM

## 2023-07-25 RX ADMIN — DILTIAZEM HYDROCHLORIDE 360 MG: 180 CAPSULE, COATED, EXTENDED RELEASE ORAL at 10:22

## 2023-07-25 RX ADMIN — FLUOXETINE 20 MG: 20 CAPSULE ORAL at 10:20

## 2023-07-25 RX ADMIN — APIXABAN 5 MG: 5 TABLET, FILM COATED ORAL at 17:33

## 2023-07-25 RX ADMIN — NYSTATIN: 100000 POWDER TOPICAL at 17:34

## 2023-07-25 RX ADMIN — FLUOXETINE 20 MG: 20 CAPSULE ORAL at 17:34

## 2023-07-25 RX ADMIN — METOPROLOL SUCCINATE 25 MG: 25 TABLET, FILM COATED, EXTENDED RELEASE ORAL at 10:20

## 2023-07-25 RX ADMIN — APIXABAN 5 MG: 5 TABLET, FILM COATED ORAL at 10:20

## 2023-07-25 RX ADMIN — AMIODARONE HYDROCHLORIDE 200 MG: 200 TABLET ORAL at 10:20

## 2023-07-25 RX ADMIN — MELATONIN TAB 3 MG 6 MG: 3 TAB at 22:07

## 2023-07-25 RX ADMIN — Medication 1 PACKET: at 10:20

## 2023-07-25 NOTE — PLAN OF CARE
Problem: PAIN - ADULT  Goal: Verbalizes/displays adequate comfort level or baseline comfort level  Description: Interventions:  - Encourage patient to monitor pain and request assistance  - Assess pain using appropriate pain scale  - Administer analgesics based on type and severity of pain and evaluate response  - Implement non-pharmacological measures as appropriate and evaluate response  - Consider cultural and social influences on pain and pain management  - Notify physician/advanced practitioner if interventions unsuccessful or patient reports new pain  Outcome: Progressing     Problem: INFECTION - ADULT  Goal: Absence or prevention of progression during hospitalization  Description: INTERVENTIONS:  - Assess and monitor for signs and symptoms of infection  - Monitor lab/diagnostic results  - Monitor all insertion sites, i.e. indwelling lines, tubes, and drains  - Monitor endotracheal if appropriate and nasal secretions for changes in amount and color  - [place] appropriate cooling/warming therapies per order  - Administer medications as ordered  - Instruct and encourage patient and family to use good hand hygiene technique  - Identify and instruct in appropriate isolation precautions for identified infection/condition  Outcome: Progressing     Problem: SAFETY ADULT  Goal: Patient will remain free of falls  Description: INTERVENTIONS:  - Educate patient/family on patient safety including physical limitations  - Instruct patient to call for assistance with activity   - Consult OT/PT to assist with strengthening/mobility   - Keep Call bell within reach  - Keep bed low and locked with side rails adjusted as appropriate  - Keep care items and personal belongings within reach  - Initiate and maintain comfort rounds  - Make Fall Risk Sign visible to staff  - Offer Toileting every 2 Hours, in advance of need  - Initiate/Maintain bed/chair alarm  - Obtain necessary fall risk management equipment: nonskid footwear  - Apply yellow socks and bracelet for high fall risk patients  - Consider moving patient to room near nurses station  Outcome: Progressing  Goal: Maintain or return to baseline ADL function  Description: INTERVENTIONS:  -  Assess patient's ability to carry out ADLs; assess patient's baseline for ADL function and identify physical deficits which impact ability to perform ADLs (bathing, care of mouth/teeth, toileting, grooming, dressing, etc.)  - Assess/evaluate cause of self-care deficits   - Assess range of motion  - Assess patient's mobility; develop plan if impaired  - Assess patient's need for assistive devices and provide as appropriate  - Encourage maximum independence but intervene and supervise when necessary  - Involve family in performance of ADLs  - Assess for home care needs following discharge   - Consider OT consult to assist with ADL evaluation and planning for discharge  - Provide patient education as appropriate  Outcome: Progressing  Goal: Maintains/Returns to pre admission functional level  Description: INTERVENTIONS:  - Perform BMAT or MOVE assessment daily.   - Set and communicate daily mobility goal to care team and patient/family/caregiver. - Collaborate with rehabilitation services on mobility goals if consulted  - Perform Range of Motion 3 times a day. - Reposition patient every 2 hours.   - Dangle patient 3 times a day  - Stand patient 3 times a day  - Ambulate patient 3 times a day  - Out of bed to chair 3 times a day   - Out of bed for meals 3 times a day  - Out of bed for toileting  - Record patient progress and toleration of activity level   Outcome: Progressing     Problem: DISCHARGE PLANNING  Goal: Discharge to home or other facility with appropriate resources  Description: INTERVENTIONS:  - Identify barriers to discharge w/patient and caregiver  - Arrange for needed discharge resources and transportation as appropriate  - Identify discharge learning needs (meds, wound care, etc.)  - Arrange for interpretive services to assist at discharge as needed  - Refer to Case Management Department for coordinating discharge planning if the patient needs post-hospital services based on physician/advanced practitioner order or complex needs related to functional status, cognitive ability, or social support system  Outcome: Progressing     Problem: Prexisting or High Potential for Compromised Skin Integrity  Goal: Skin integrity is maintained or improved  Description: INTERVENTIONS:  - Identify patients at risk for skin breakdown  - Assess and monitor skin integrity  - Assess and monitor nutrition and hydration status  - Monitor labs   - Assess for incontinence   - Turn and reposition patient  - Assist with mobility/ambulation  - Relieve pressure over bony prominences  - Avoid friction and shearing  - Provide appropriate hygiene as needed including keeping skin clean and dry  - Evaluate need for skin moisturizer/barrier cream  - Collaborate with interdisciplinary team   - Patient/family teaching  - Consider wound care consult   Outcome: Progressing     Problem: Nutrition/Hydration-ADULT  Goal: Nutrient/Hydration intake appropriate for improving, restoring or maintaining nutritional needs  Description: Monitor and assess patient's nutrition/hydration status for malnutrition. Collaborate with interdisciplinary team and initiate plan and interventions as ordered. Monitor patient's weight and dietary intake as ordered or per policy. Utilize nutrition screening tool and intervene as necessary. Determine patient's food preferences and provide high-protein, high-caloric foods as appropriate.      INTERVENTIONS:  - Monitor oral intake, urinary output, labs, and treatment plans  - Assess nutrition and hydration status and recommend course of action  - Evaluate amount of meals eaten  - Assist patient with eating if necessary   - Allow adequate time for meals  - Recommend/ encourage appropriate diets, oral nutritional supplements, and vitamin/mineral supplements  - Order, calculate, and assess calorie counts as needed  - Recommend, monitor, and adjust tube feedings and TPN/PPN based on assessed needs  - Assess need for intravenous fluids  - Provide specific nutrition/hydration education as appropriate  - Include patient/family/caregiver in decisions related to nutrition  Outcome: Progressing     Problem: MOBILITY - ADULT  Goal: Maintain or return to baseline ADL function  Description: INTERVENTIONS:  -  Assess patient's ability to carry out ADLs; assess patient's baseline for ADL function and identify physical deficits which impact ability to perform ADLs (bathing, care of mouth/teeth, toileting, grooming, dressing, etc.)  - Assess/evaluate cause of self-care deficits   - Assess range of motion  - Assess patient's mobility; develop plan if impaired  - Assess patient's need for assistive devices and provide as appropriate  - Encourage maximum independence but intervene and supervise when necessary  - Involve family in performance of ADLs  - Assess for home care needs following discharge   - Consider OT consult to assist with ADL evaluation and planning for discharge  - Provide patient education as appropriate  Outcome: Progressing  Goal: Maintains/Returns to pre admission functional level  Description: INTERVENTIONS:  - Perform BMAT or MOVE assessment daily.   - Set and communicate daily mobility goal to care team and patient/family/caregiver. - Collaborate with rehabilitation services on mobility goals if consulted  - Perform Range of Motion 3 times a day. - Reposition patient every 2 hours.   - Dangle patient 3 times a day  - Stand patient 3 times a day  - Ambulate patient 3 times a day  - Out of bed to chair 3 times a day   - Out of bed for meals 3 times a day  - Out of bed for toileting  - Record patient progress and toleration of activity level   Outcome: Progressing

## 2023-07-25 NOTE — PCC CARE MANAGEMENT
7/25- Pt new admit, cm to assess. 8/1-    Pt lives in ranch home with daughter Genaro Mccann, ramp to enter. Pt reports prior to admission was independent, family transports. Pt has rollator, bsc, and walker in home. Pt has recently been to [address] [place] [address],[address] for STR. Pt report no hx of HHC or OP therapies. PCP is Kassy Joyner, preferred pharmacy is [facility] in [facility].

## 2023-07-25 NOTE — NUTRITION
07/25/23 1254   Recommendations/Interventions   Recommendations to Provider Continue bariatric vitamins as per prior Bariatric Surgery recommendations. - including Vit B12, Fe, calcium and multivitamin.

## 2023-07-25 NOTE — PROGRESS NOTES
Occupational Therapy Evaluation         07/25/23 0700   Patient Data   Rehab Impairment Impairment of mobility, safety and Activities of Daily Living (ADLs) due to Debility:  16  Debility (Non-cardiac/Non-pulmonary)   Etiologic Diagnosis Surgical site infection after wound dehiscence s/p washout vac placement and partial closure   Date of Onset 07/18/23   Support System   Name pt reports living with her dtr who works from home. Pt has 3 other children who live locally. Home Setup   Type of Home Single Level   Method of Entry Ramp  (+1 NILS doorway)   First Floor Bathroom Shower;Tub;Combo   First Floor Bathroom Accessibility Grab bars in tub/shower; Shower chair   First Floor Setup Available Yes   Available Equipment Rollator; Shower Chair;Bedside Commode  (pt reports she needs a new BSC)   Baseline Information   Vocation Other  (retired)   Transportation Family/friends drive   Prior Device(s) Used Rollator; Shower Chair  (rollator in community only)   Prior IADL Participation   Money Management Identify Money;Estimate Costs;Estimate Change   Meal Preparation Partial Participation;Microwave   Laundry Full Participation   Home Cleaning Full Participation   Prior Level of Function   Self-Care 3. Independent - Patient completed the activities by him/herself, with or without an assistive device, with no assistance from a helper. Indoor-Mobility (Ambulation) 3. Independent - Patient completed the activities by him/herself, with or without an assistive device, with no assistance from a helper. Stairs 3. Independent - Patient completed the activities by him/herself, with or without an assistive device, with no assistance from a helper. Functional Cognition 3. Independent - Patient completed the activities by him/herself, with or without an assistive device, with no assistance from a helper. Prior Assistance Needed for Driving;Household Chores/Cleaning; Shopping   Prior Device Used PAO [place]  (rollator in community) Falls in the Last Year   Number of falls in the past 12 months 3   Type of Injury Associated with Fall No injury   Psychosocial   Psychosocial (WDL) WDL   Restrictions/Precautions   Precautions Bed/chair alarms; Fall Risk; Airborne/isolation;Supervision on toilet/commode  (wound vac)   Pain Assessment   Pain Assessment Tool 0-10   Pain Score No Pain   Eating Assessment   Type of Assistance Needed Independent   Physical Assistance Level No physical assistance   Eating CARE Score 6   Oral Hygiene   Type of Assistance Needed Physical assistance   Physical Assistance Level 26%-50%   Comment if in stance, supervision when seated   Oral Hygiene CARE Score 3   Tub/Shower Transfer   Reason Not Assessed Medical;Sponge Bath   Shower/Bathe Self   Type of Assistance Needed Physical assistance   Physical Assistance Level 51%-75%   Comment while seated pt completes bathing of UB. Requires assist for under pannus, lower legs/feet, in stance with RW pt requires assist for bathing of groin and buttock   Shower/Bathe Self CARE Score 2   Dressing/Undressing Clothing   Type of Assistance Needed Supervision   Physical Assistance Level No physical assistance   Comment seated   Upper Body Dressing CARE Score 4   Comment 2* wound vac placement, no donning of LB clothing and pt reports she was told not to put pressure from clothing on this area   Reason if not Attempted Medical concerns   Lower Body Dressing CARE Score 88   Positioning Supported Sit;Standing   Putting On/Taking Off Footwear   Type of Assistance Needed Physical assistance   Physical Assistance Level Total assistance   Putting On/Taking Off Footwear CARE Score 1   [address], [address]   Type of Assistance Needed Physical assistance; Adaptive equipment   Physical Assistance Level Total assistance   Comment assist for front/rear hygiene   Toileting Hygiene CARE Score 1   Toilet Transfer   Type of Assistance Needed Physical assistance   Physical Assistance Level Total assistance Comment if no use of device. Mod assist x1 with RW   Toilet Transfer CARE Score 1   Transfer Bed/Chair/Wheelchair   Type of Assistance Needed Physical assistance; Adaptive equipment   Physical Assistance Level Total assistance   Comment if no device. Mod assist x1 short  distance mobility in room with RW   Chair/Bed-to-Chair Transfer CARE Score 1   Sit to Stand   Type of Assistance Needed Physical assistance; Adaptive equipment   Physical Assistance Level Total assistance   Comment need assist x2 if no device. Mod assist x1 with RW   Sit to Stand CARE Score 1   Comprehension   QI: Comprehension 4. Undestands: Clear comprehension without cues or repetitions   Expression   QI: Expression 4. Express complex messages without difficulty and with speech that is clear and easy to [place]   RUE Assessment   RUE Assessment WFL  (shoulder flexion AROM not above 90 - baseline)   LUE Assessment   LUE Assessment WFL  (shoulder flexion AROM not above 90 - baseline)   Cognition   Overall Cognitive Status WFL   Arousal/Participation Alert   Attention Within functional limits   Orientation Level Oriented X4   Memory Within functional limits   Following Commands Follows all commands and directions without difficulty   Discharge Information   Vocational Plan Retired/not working   Patient's Discharge Plan home with family support   Patient's Rehab Expectations to get stronger and get wound vac off   Barriers to Discharge Home Limited Family Support; Unsafe Home Setup; Decreased Strength;Decreased Endurance; Safety Considerations   Impressions Pt is a 67 y.o. female who was admitted to [address] [place] [address] on 7/24/2023. Pt's current problem list includes: Surgical site infection after wound dehiscence s/p washout vac placement and partial closure 7/18. Pt has been in hospital since June and undergone multiple abdominal surgeries. Comorbidities include - HTN, AFIB, hx gastric bypass, bowel perforation. Pt now has abdominal wound vac.  At baseline pt was completing ADLS and some household IADLs independently and lives at home with dtr. Currently pt requires mod/max assist for overall ADLS and mod assist for functional mobility/transfers using RW. Pt currently presents with impairments in the following categories - activity tolerance, endurance, standing balance/tolerance and UE strength. These impairments, as well as pt's fatigue, KENDRICK, decreased caregiver support, risk for falls and home environment  limit pt's ability to safely engage in all baseline areas of occupation, including grooming, bathing, dressing, toileting, functional mobility/transfers, community mobility, laundry , meal prep, social participation  and leisure activities  . Pt presents with good rehab potential. Pt is unsafe to D/C home at this time, recommending 10-14 days to achieve independent/setup assist level goals. O2 98% on room air, HR 70s, /59.    OT Therapy Minutes   OT Time In 0700   OT Time Out 0830   OT Total Time (minutes) 90   OT Mode of treatment - Individual (minutes) 90   OT Mode of treatment - Concurrent (minutes) 0   OT Mode of treatment - Group (minutes) 0   OT Mode of treatment - Co-treat (minutes) 0   OT Mode of Treatment - Total time(minutes) 90 minutes   OT Cumulative Minutes 90   Cumulative Minutes   Cumulative therapy minutes 90

## 2023-07-25 NOTE — PROGRESS NOTES
PM&R PROGRESS NOTE:  Altagracia Lamas 67 y.o. female MRN: [id number]  Unit/Bed#: -01 Encounter: [id number]        Rehabilitation Diagnosis: Impairment of mobility, safety and Activities of Daily Living (ADLs) due to Debility:  16  Debility (Non-cardiac/Non-pulmonary)    HPI: Raul Ocampo is a 67 y.o. female with a medical history of atrial fibrillation (on Eliquis), sleep apnea, hypertension, renal cell carcinoma, gastric bypass, cervical spondylosis, and osteoarthritis who was recently admitted at [address] [place] [address] 6/19-7/4 with a bowel perforation and internal hernia (hx arlene-en-y gastric bypass). She had an ex lap, resection J-J anastomosis, segmental ileum resection, abthera vac placement, ex lap, washout, abthera change and ex lap, small bowel resection, bowel anastomosis x3, vicryl bridging mesh. Once medically stable she went to TCU for rehabilitation. Patient presented to [address] [place] [address] on 7/18/23 after clinic appointment for suture removal with an abdominal wound dehiscence. On 7/18 she underwent washout, wound exploration, debridement. On 7/19 she underwent washout, partial wound closure, and wound vac placement. Patient currently has wound vac in place at a pressure of 75 mmHg continuously. Wound vac last changed on 7/24 with plans for surgery management of dressing changes. She is tolerating a regular diet with nutrition supplements. Patient tested positive for COVID on 7/24. She is asymptomatic and not being treated at this time. She worked with PT and OT and was recommended for post acute rehab following her hospital stay. She has demonstrated that she can tolerate three hours of therapy, five days a week and is now medically cleared for discharge to the [facility]. PT/OT were consulted and recommend acute inpatient rehabilitation.  The patient was evaluated by the Rehabilitation team and deemed an appropriate candidate for comprehensive inpatient rehabilitation and admitted to the [facility] on 7/24/2023  4:34 PM    SUBJECTIVE: Patient seen face to face. No acute issues. Patient unable to sleep in bed overnight d/t whole body itchiness in bed, slept in recliner. Once in recliner was able to fall asleep. Discussed Covid diagnosis and testing to r/o false positive. She is anxious with door closure she feels boxed in, discussed new covid diagnosis and precautions necessary to prevent potential spread, patient understands. Appetite at baseline, prioritizing protein during meals for healing. voiding, bowels are loose, refuses Metamucil d/t bloating. LBM 7/25. Progressing as expected in rehabilitation. ASSESSMENT: Stable, progressing    PLAN:  - pruritis- no rash noted, asked nursing to obtain hypoallergenic sheets,   - anemia- Hbg 8.1, monitor with routine labs  - hypokalemia- K+ 3.4, repleted, monitor with routine labs  - skin- maintain wound vac, General Surgery following, performing wound vac dressing changes; nystatin ordered for panus  - loose stool: monitor, consider Imodium  - pain: denies at this time, intermittent abdominal discomfort with movement. Monitor, prn tylenol     Rehabilitation  Team conference: The team discussed patient's functional status, goals, and barriers. • Functional deficits:    • Continue current rehabilitation plan of care to maximize function.     • Functional update:   o PT: Awaiting evaluation  o OT: Awaiting evaluation  o SLP: Awaiting evaluation  • Estimated Discharge: anticipated 10-14 days, reteam      Pain  • Tylenol 975 every 8 hours prn    DVT prophylaxis  • Eliquis    Bladder plan  • Continent    Bowel plan  • Continent      * Open abdominal wall wound  Assessment & Plan  - recent admission [facility] 6/19-7/4 with bowel perforation and internal hernia (hx arlene-en-y gastric bypass) - s/p ex lap, resection J-J anastomosis, segmental ileum resection, abthera 6/19  - s/p ex lap, washout, abthera change 6/19  - s/p ex lap, small bowel resection, bowel anastomosis x3, vicryl bridging mesh 6/20  - s/p debridement/washout on 7/18  - s/p partial closure and wound vac placement on 7/19  - wound vac 75 mmHg continuous  - dressing changes per General Surgery, due 7/26  - midline incision with sutures  - pain management  - PT/OT      COVID  Assessment & Plan  - positive 7/24  - asymptomatic  - no signs of infection: afebrile  - isolation 10 days (thru 8/3)  - monitor closely  - 7/25: SARS cov2 antigen pending      Anemia  Assessment & Plan  - Hbg 8.1  - monitor CBC  - consider transfusion if warranted    Hypokalemia  Assessment & Plan  - K+ 3.4  - repleted in [facility]  - monitor BMP  - consult IM for assist w/ management    Anxiety  Assessment & Plan  - home: Prozac 20 mg daily  - continue here  - consult neuropsychology when cleared    Hypertension  Assessment & Plan  - home: Cardizem 360 mg daily, Metoprolol succinate 25 mg BID, HCTZ 12.5 mg BID  - here: Cardizem, Toprol XL daily, (hold HCTZ)    - monitor BP    Morbid obesity with BMI of 50.0-59.9, adult (HCC)  Assessment & Plan  - BMI 53.89  - s/p gastric bypass (25 years ago)  - educate on lifestyle modification  - consider nutrition consult    GILDARDO (obstructive sleep apnea)  Assessment & Plan  - compliant with Cpap at home  - here: 2L nc at home  - monitor     Vitamin D deficiency  Assessment & Plan  - Vit D 62.1 (1/16/23)  - Vitamin D 50K weekly     Permanent atrial fibrillation (HCC)  Assessment & Plan  - home: Eliqus 5 mg BID, Amiodarone 200 mg daily, Cardizem 360 mg daily, Toprol XL 25 mg BID  - here: continue except Toprol XL daily  - monitor with therapies    Appreciate IM consultants medical co-management. Labs, medications, and imaging personally reviewed. ROS:  A ten point review of systems was completed on 07/25/23 and pertinent positives are listed in subjective section. All other systems reviewed were negative.    Review of Systems     OBJECTIVE:   /64   Pulse 84   Temp 98.3 °F (36.8 °C) (Oral)   Resp 18 Ht 5' 3" (1.6 m)   Wt 134 kg (295 lb 3.1 oz)   SpO2 97%   BMI 52.29 kg/m²     Physical Exam  Constitutional:       Appearance: Normal appearance. HENT:      Head: Normocephalic and atraumatic. Nose: Nose normal.      Mouth/Throat:      Mouth: Mucous membranes are moist.   Cardiovascular:      Rate and Rhythm: Normal rate. Rhythm irregular. Pulses: Normal pulses. Heart sounds: Normal heart sounds. Pulmonary:      Effort: Pulmonary effort is normal.      Breath sounds: Normal breath sounds. Abdominal:      General: Bowel sounds are normal.      Palpations: Abdomen is soft. Comments: + wound vac   Musculoskeletal:         General: Normal range of motion. Cervical back: Normal range of motion. Right lower leg: Edema present. Left lower leg: Edema present. Skin:     General: Skin is warm and dry. Capillary Refill: Capillary refill takes less than 2 seconds. Findings: Bruising present. Neurological:      Mental Status: She is alert and oriented to person, place, and time.    Psychiatric:         Mood and Affect: Mood normal.         Judgment: Judgment normal.          Lab Results   Component Value Date    WBC 9.16 07/24/2023    HGB 8.1 (L) 07/24/2023    HCT 27.2 (L) 07/24/2023    MCV 88 07/24/2023     07/24/2023     Lab Results   Component Value Date    SODIUM 141 07/24/2023    K 3.4 (L) 07/24/2023     (H) 07/24/2023    CO2 27 07/24/2023    BUN 15 07/24/2023    CREATININE 0.65 07/24/2023    GLUC 106 07/24/2023    CALCIUM 8.1 (L) 07/24/2023     Lab Results   Component Value Date    INR 1.39 (H) 07/18/2023    INR 1.23 (H) 06/27/2023    INR 1.19 06/24/2023    PROTIME 17.3 (H) 07/18/2023    PROTIME 15.7 (H) 06/27/2023    PROTIME 15.3 (H) 06/24/2023           Current Facility-Administered Medications:   •  acetaminophen (TYLENOL) tablet 975 mg, 975 mg, Oral, Q8H PRN, MOLLY Jackson  •  amiodarone tablet 200 mg, 200 mg, Oral, Daily With Breakfast, MOLLY Jackson, 200 mg at 07/25/23 1020  •  apixaban (ELIQUIS) tablet 5 mg, 5 mg, Oral, BID, MOLLY Jackson, 5 mg at 07/25/23 1020  •  diltiazem (CARDIZEM CD) 24 hr capsule 360 mg, 360 mg, Oral, Daily, MOLLY Jackson, 360 mg at 07/25/23 1022  •  [START ON 7/26/2023] ergocalciferol (VITAMIN D2) capsule 50,000 Units, 50,000 Units, Oral, Weekly, MOLLY Jackson  •  FLUoxetine (PROzac) capsule 20 mg, 20 mg, Oral, BID, MOLLY Jackson, 20 mg at 07/25/23 1020  •  melatonin tablet 6 mg, 6 mg, Oral, HS, MOLLY Jackson, 6 mg at 07/24/23 2119  •  metoprolol succinate (TOPROL-XL) 24 hr tablet 25 mg, 25 mg, Oral, Daily, MOLLY Jackson, 25 mg at 07/25/23 1020  •  nystatin (MYCOSTATIN) powder, , Topical, BID, MOLLY Jackson  •  ondansetron (ZOFRAN-ODT) dispersible tablet 4 mg, 4 mg, Oral, Q6H PRN, MOLLY Jackson  •  psyllium (METAMUCIL) 1 packet, 1 packet, Oral, Daily, MOLLY Jackson, 1 packet at 07/25/23 1020    Past Medical History:   Diagnosis Date   • A-fib [facility])    • Arthritis    • Atrial fibrillation (HCC)    • Cervical spondylosis with myelopathy    • Gastric bypass status for obesity    • History of transfusion     35 years ago   • Hypertension    • mass right kidney    • Renal cell adenocarcinoma (HCC)     RIGHT   • Sleep apnea        Patient Active Problem List    Diagnosis Date Noted   • Open abdominal wall wound 07/13/2023   • S/P bariatric surgery 03/17/2020   • COVID 07/24/2023   • Ambulatory dysfunction 07/17/2023   • Iron deficiency anemia 07/13/2023   • Hypotension 07/13/2023   • Hypokalemia 07/10/2023   • Anemia 07/10/2023   • Bowel perforation ([address]) 07/05/2023   • Acute encephalopathy 07/05/2023   • Anxiety 07/05/2023   • Diarrhea 07/05/2023   • Idiopathic acute pancreatitis without infection or necrosis 06/18/2023   • Primary osteoarthritis of both knees 06/07/2023   • Stage 3 chronic kidney disease, unspecified whether stage 3a or 3b CKD ([address]) 05/31/2023   • Hypertension    • Morbid obesity with BMI of 50.0-59.9, adult ([address]) 11/01/2022   • Unsteady gait 10/30/2022   • Dyspnea on exertion 10/30/2022   • SIRS (systemic inflammatory response syndrome) ([address]) 10/30/2022   • Incontinence 10/30/2022   • GILDARDO (obstructive sleep apnea) 10/30/2022   • Vitamin D deficiency 07/06/2021   • Reactive hypoglycemia 12/30/2020   • Osteoporosis 06/04/2020   • Hyperparathyroidism ([address]) 06/04/2020   • Hashimoto's thyroiditis 03/17/2020   • Early menopause occurring in patient age younger than 39 years 03/17/2020   • Myofascial pain syndrome 10/21/2019   • Status post partial thyroidectomy 09/12/2019   • Cervical spondylosis without myelopathy 06/28/2019   • Arthropathy of cervical facet joint 06/28/2019   • Occipital neuralgia of right side 06/28/2019   • Chest wall tenderness 01/24/2019   • Chronic heart failure ([address]) 01/24/2019   • Permanent atrial fibrillation ([address]) 01/24/2019   • Myalgia 01/08/2019   • Primary osteoarthritis of left knee 10/23/2018   • Primary osteoarthritis of right knee 10/23/2018   • Chronic pain of left knee 10/23/2018   • Malignant tumor of right kidney parenchyma ([address]) 05/11/2016   • Closed fracture of right distal radius and ulna 04/22/2016      MOLLY Diamond  Physical Medicine and [place]

## 2023-07-25 NOTE — PROGRESS NOTES
Internal Medicine Progress Note  Patient: Celine Yu  Age/sex: 67 y.o. female  Medical Record #: [id number]      ASSESSMENT/PLAN: (Interval History)  Celine Yu is seen and examined and management for following issues:    Wound dehiscence s/p VAC placement  • Had recent admission for ex-lap 2/2 bowel perforation/internal hernia (6/19-7/4)  • Readmitted with wound dehiscence 7/18 and underwent a washout/VAC application and partial closure of the wound on 7/19  • Surgery to follow for VAC changes. • Pain control per primary service.     Atrial fibrillation  • Home:  Amiodarone 200mg qd/Card CD 360mg qd/Toprol XL 25mg q12hrs/Eliquis 5mg BID  • Here:  Amiodarone 200mg qd/Card CD 360mg qd/Toprol XL 25mg qd/Eliquis 5mg BID  • Stable but gets tachy when standing w/o sx dizziness     HTN  · Home:  In addition to above meds, was on HCTZ 12.5mg qd/KCL 15meq qd  · Here:  As above. Hold off on HCTZ    COVID-19 positive  • Detected on routine testing for [facility] admission. • Pt is asymptomatic. • For rapid antigen testing today     Hypokalemia  • Replaced with KCl 40meq on 7/24/23 for K+ of 3.4  • BMP AM     Anemia  • Hgb has been trending around 8.  • CBC AM      The above assessment and plan was reviewed and updated as determined by my evaluation of the patient on 7/25/2023.     Labs:   Results from last 7 days   Lab Units 07/24/23  0437 07/22/23  0616   WBC Thousand/uL 9.16 10.77*   HEMOGLOBIN g/dL 8.1* 8.9*   HEMATOCRIT % 27.2* 29.9*   PLATELETS Thousands/uL 340 392*     Results from last 7 days   Lab Units 07/24/23  0437 07/22/23  0616   SODIUM mmol/L 141 138   POTASSIUM mmol/L 3.4* 5.0   CHLORIDE mmol/L 109* 108   CO2 mmol/L 27 26   BUN mg/dL 15 15   CREATININE mg/dL 0.65 0.80   CALCIUM mg/dL 8.1* 8.4         Results from last 7 days   Lab Units 07/18/23  1430   INR  1.39*     Results from last 7 days   Lab Units 07/20/23  1121   POC GLUCOSE mg/dl 176*       Review of Scheduled Meds:  Current Facility-Administered Medications   Medication Dose Route Frequency Provider Last Rate   • acetaminophen  975 mg Oral Q8H PRN MOLLY Bañuelos     • amiodarone  200 mg Oral Daily With Breakfast MOLLY Jackson     • apixaban  5 mg Oral BID MOLLY Jackson     • diltiazem  360 mg Oral Daily OMLLY Jackson     • [START ON 7/26/2023] ergocalciferol  50,000 Units Oral Weekly MOLLY Jackson     • FLUoxetine  20 mg Oral BID MOLLY Jackson     • melatonin  6 mg Oral HS MOLLY Jackson     • metoprolol succinate  25 mg Oral Daily MOLLY Jackson     • nystatin   Topical BID MOLLY Jackson     • ondansetron  4 mg Oral Q6H PRN MOLLY Jackson     • psyllium  1 packet Oral Daily MOLLY Jackson         Subjective/ HPI: Patient seen and examined. Patients overnight issues or events were reviewed with nursing or staff during rounds or morning huddle session. New or overnight issues include the following:     No new or overnight issues. Offers no complaints    ROS:   A 10 point ROS was performed; negative except as noted above.        Imaging:     No orders to display       *Labs /Radiology studies reviewed  *Medications reviewed and reconciled as needed  *Please refer to order section for additional ordered labs studies  *Case discussed with primary attending during morning huddle case rounds    Physical Examination:  Vitals:   Vitals:    07/24/23 1849 07/24/23 2126 07/25/23 0600 07/25/23 1020   BP:  125/64 141/65 120/64   BP Location:  Right arm Right arm    Pulse:  67 84    Resp:  18 18    Temp:  98.7 °F (37.1 °C) 98.3 °F (36.8 °C)    TempSrc:  Oral Oral    SpO2:  96% 97%    Weight: 134 kg (294 lb 5 oz)  134 kg (295 lb 3.1 oz)    Height:           General Appearance: no distress, conversive  HEENT: PERRLA, conjuctiva normal; oropharynx clear; mucous membranes moist   Neck:  Supple, normal ROM  Lungs: CTA, normal respiratory effort, no retractions, expiratory effort normal  CV: tachy so difficult to tell if irregular; no rubs/murmurs/gallops, PMI normal   ABD: soft; ND/NT; +BS  EXT: +mild pedal edema with tr pretibial edema  Skin: normal turgor, normal texture, no rashes  Psych: affect normal, mood normal  Neuro: AAO      The above physical exam was reviewed and updated as determined by my evaluation of the patient on 7/25/2023. Invasive Devices     Drain  Duration           External Urinary Catheter 6 days                   VTE Pharmacologic Prophylaxis: Eliquis  Code Status: Level 1 - Full Code  Current Length of Stay: 1 day(s)      Total time spent:  30 minutes with more than 50% spent counseling/coordinating care. Counseling includes discussion with patient re: progress  and discussion with patient of his/her current medical state/information. Coordination of patient's care was performed in conjunction with primary service. Time invested included review of patient's labs, vitals, and management of their comorbidities with continued monitoring. In addition, this patient was discussed with medical team including physician and advanced extenders. The care of the patient was extensively discussed and appropriate treatment plan was formulated unique for this patient. Medical decision making for the day was made by supervising physician unless otherwise noted in their attestation statement. ** Please Note:  voice to text software may have been used in the creation of this document.  Although proof errors in transcription or interpretation are a potential of such software**

## 2023-07-25 NOTE — QUICK NOTE
Was informed by the nurse that while repositioning the patient, the vac seemed to come loose. Walked the patient's nurse through how to check for a seal on the vac until I was able to go examine the patient. The patient's nurse checked the vac and confirmed there was appropriate seal. Examined the patient at bedside. The vac was in place, no air leak heard, and the seal check confirmed good seal. Discussed this with the patient and her nurse, they will reach out if anything changes with the vac overnight.

## 2023-07-25 NOTE — TEAM CONFERENCE
Acute RehabilitationTeam Conference Note  Date: 7/25/2023   Time: 10:48 AM       Patient Name:  Crispin Park       Medical Record Number: [id number]   YOB: 1950  Sex: Female          Room/Bed:  [place] [address]/[place] [address]-01  Payor Info:  Payor: MEDICARE / Plan: MEDICARE A AND B / Product Type: Medicare A & B Fee for Service /      Admitting Diagnosis: Surgical site reaction, initial encounter [T81. 9XXA]   Admit Date/Time:  7/24/2023  4:34 PM  Admission Comments: No comment available     Primary Diagnosis:  Open abdominal wall wound  Principal Problem: Open abdominal wall wound    Patient Active Problem List    Diagnosis Date Noted   • COVID 07/24/2023   • Ambulatory dysfunction 07/17/2023   • Open abdominal wall wound 07/13/2023   • Iron deficiency anemia 07/13/2023   • Hypotension 07/13/2023   • Hypokalemia 07/10/2023   • Anemia 07/10/2023   • Bowel perforation ([address]) 07/05/2023   • Acute encephalopathy 07/05/2023   • Anxiety 07/05/2023   • Diarrhea 07/05/2023   • Idiopathic acute pancreatitis without infection or necrosis 06/18/2023   • Primary osteoarthritis of both knees 06/07/2023   • Stage 3 chronic kidney disease, unspecified whether stage 3a or 3b CKD ([address]) 05/31/2023   • Hypertension    • Morbid obesity with BMI of 50.0-59.9, adult ([address]) 11/01/2022   • Unsteady gait 10/30/2022   • Dyspnea on exertion 10/30/2022   • SIRS (systemic inflammatory response syndrome) ([address]) 10/30/2022   • Incontinence 10/30/2022   • GILDARDO (obstructive sleep apnea) 10/30/2022   • Vitamin D deficiency 07/06/2021   • Reactive hypoglycemia 12/30/2020   • Osteoporosis 06/04/2020   • Hyperparathyroidism ([address]) 06/04/2020   • Hashimoto's thyroiditis 03/17/2020   • Early menopause occurring in patient age younger than 39 years 03/17/2020   • S/P bariatric surgery 03/17/2020   • Myofascial pain syndrome 10/21/2019   • Status post partial thyroidectomy 09/12/2019   • Cervical spondylosis without myelopathy 06/28/2019   • Arthropathy of cervical facet joint 06/28/2019   • Occipital neuralgia of right side 06/28/2019   • Chest wall tenderness 01/24/2019   • Chronic heart failure ([address]) 01/24/2019   • Permanent atrial fibrillation ([address]) 01/24/2019   • Myalgia 01/08/2019   • Primary osteoarthritis of left knee 10/23/2018   • Primary osteoarthritis of right knee 10/23/2018   • Chronic pain of left knee 10/23/2018   • Malignant tumor of right kidney parenchyma ([address]) 05/11/2016   • Closed fracture of right distal radius and ulna 04/22/2016       Physical Therapy:         Physical Therapy Initial Evaluation to be completed this afternoon      Occupational Therapy:          OT eval to be completed today      Speech Therapy:           No notes on file    Nursing Notes:  Appetite: Fair  Diet Type: Regular/House                      Diet Patient/Family Education Complete: No    Type of Wound (LDA): Wound VAC (Dressing changes per [facility] MWF)        Negative Pressure Wound Therapy (V.A.C.) Abdomen (Active)   Unit Type ULTA VAC 07/22/23 0900   Black foam- # applied 1 07/19/23 1338   White foam- # applied 1 07/19/23 1338   Nonadherent (Adaptic)- # applied 0 07/19/23 1338   Target Pressure (mmHg) 75 07/24/23 2027   Canister Changed No 07/24/23 2027   Dressing Status Clean; Intact 07/24/23 2027   Output (mL) 100 mL 07/24/23 0430              Type of Wound Patient/Family Education: No  Bladder: Continent     Bladder Patient/Family Education: No  Bowel: Continent     Bowel Patient/Family Education: No     Pain Score: 0                          Pain Patient/Family Education: No       Wound dehiscence s/p VAC placement. Surgery to follow for VAC changes, Monitor incision. Atrial fibrillation. Continue amiodarone, Cardizem CD and Toprol XL, Continue Eliquis, Monitor HR with therapy. HTN Continue Cardizem CD and Toprol XL, Monitor BP and adjust medications as needed. COVID-19 positive Detected on routine testing for [facility] admission, Pt is asymptomatic.  Hypokalemia K 3.4, Replace with KCl 40meq, Monitor BMP. Anemia Hgb has been trending around 8.   7/25- Pt is continent of bowel and bladder, no pain issues, pt is AAOx4 and pt requires alarms since is new to [facility] and is in Covid isolation. This week we will encourage independence with ADLs. We will monitor labs and vital signs. We will educate pt/family about repositioning to prevent skin breakdown. We will assist w repositioning and perform routine skin checks. We will monitor for adequate pain control. We will monitor for constipation and medicate pt as ordered. We will increase safety awareness and keep pt free from falls. Case Management:     Discharge Planning  Living Arrangements: Lives w/ Children  Support Systems: Children, Family members  Assistance Needed: TBD  Type of Current Residence: Private residence  Current Home Care Services: No  7/25- Pt new admit, cm to assess. Is the patient actively participating in therapies? yes  List any modifications to the treatment plan: None    Barriers Interventions   Wound Wound vac - MWF vac changes    COVID + Precautions   Anemia Monitoring   Hypokalemia Monitoring   Sleep apnea Chronic   Anxiety Neuropsych   Loose stools Bowel meds   Skin Monitoring    Morbid obesity/body habitus Compensatory strategies   Generalized weakness and fatigue Strengthening   Decreased standing balance and tolerance Device training with walker   1 NILS Stair training              Is the patient making expected progress toward goals?  yes  List any update or changes to goals: None    Medical Goals: Patient will be medically stable for discharge to [facility] upon completion of rehab program and Patient will be able to manage medical conditions and comorbid conditions with medications and follow up upon completion of rehab program    Weekly Team Goals:   Rehab Team Goals  Bowel/Bladder Team Goal: Patient will require assist with bladder/bowel management with least restrictive device upon completion of rehab program    Discussion: Pt participating in therapies and making progress in rehab. Pt functioning at total assist w/o device, rw mod assist to stand with adls and iadls. Mod to max with adl iadls. PT to eval this afternoon. Team recommending reteam to focus on functional and mobility goals. Anticipated Discharge Date:  reteam  [facility] Team Members Present: The following team members are supervising care for this patient and were present during this Weekly Team Conference.     Physician: Dr. Joce Galo MD  : ELIEL Muñoz  Registered Nurse: Jaimie Sherwood RN  Physical Therapist: AUSTIN Bland  Occupational Therapist: Mai Arreola MS, OTR/L, CBIS  Speech Therapist:

## 2023-07-25 NOTE — PROGRESS NOTES
07/25/23 1230   Patient Data   Date of Onset 07/18/23   Support System   Name Maykel Martinez   Relationship Dtr   Able to provide 24 hour supervision   (works from home)   Able to provide physical help? Yes   Home Setup   Type of Home Single Level   Method of Entry Ramp  (threshold through the door)   Number of Stairs in Home 0   Home Modifications Necessary? No   Available Equipment Rollator; Shower Chair  (ergonomic bed with bedrails, height is difficult for pt to get into bed)   Baseline Information   Transportation Family/friends drive   Prior Device(s) Used Rollator   Prior Level of Function   Indoor-Mobility (Ambulation) 3. Independent - Patient completed the activities by him/herself, with or without an assistive device, with no assistance from a helper. Stairs 3. Independent - Patient completed the activities by him/herself, with or without an assistive device, with no assistance from a helper. Functional Cognition 3. Independent - Patient completed the activities by him/herself, with or without an assistive device, with no assistance from a helper. Prior Assistance Needed for Driving;Household Chores/Cleaning; Shopping   Prior Device Used [employer]  (power lift recliner that she used later in the day as she fatigued to stand)   Falls in the Last Year   Number of falls in the past 12 months 3  (usually when getting out of bed, required fire dept to come get her off the floor)   Type of Injury Associated with Fall No injury   Psychosocial   Psychosocial (WDL) WDL   Ability to Express Feelings Able to express   Ability to Express Needs Able to express   Ability to Understand Others Understands   Restrictions/Precautions   Precautions Airborne/isolation;Contact/isolation; Fall Risk;Supervision on toilet/commode; Other (comment)  (wound vac for Abdominal wound)   Pain Assessment   Pain Score No Pain   Transfer Bed/Chair/Wheelchair   Positioning Concerns Skin Integrity   Limitations Noted In LE Strength   Findings Recommend using RW, pt reports HX of OA B knees with right worse than left. STS good Mod A, once standing she requires RW for balance and to offload for support due to weakness. Her baseline at home was Idn w/o device, now requires RW for safety   Type of Assistance Needed Physical assistance   Physical Assistance Level Total assistance   Comment 2 person A w/o device, please use RW, Mod A of 1 person then for SPT   Chair/Bed-to-Chair Transfer CARE Score 1   Roll Left and Right   Comment using recliner currently, c/o sheets irritating her skin   Sit to Lying   Comment using recliner currently, c/o sheets irritating her skin   Lying to Sitting on Side of Bed   Comment using recliner currently, c/o sheets irritating her skin   Sit to Stand   Type of Assistance Needed Physical assistance   Physical Assistance Level 51%-75%   Comment improved with 2" cushion in chair to MiN A   Sit to Stand CARE Score 2   Picking Up Object   Comment (S)  will practice next session with reacher   Car Transfer   Comment (S)  to be assessed, limite dby isoltaion at this time   Ambulation   Primary Mode of Locomotion Prior to Admission Walk   Distance Walked (feet) 20 ft   Assist Device Roller Walker  (wide walker)   Findings PLOF at home was ambulating up to 40 ft in home w/o device, rollator in community but not long distances   Walk 10 Feet   Type of Assistance Needed Physical assistance; Adaptive equipment   Physical Assistance Level 26%-50%   Comment also has wound vac that requires Assist for management   Walk 10 Feet CARE Score 3   Walk 50 Feet with Two Turns   Reason if not Attempted Safety concerns   Walk 50 Feet with Two Turns CARE Score 88   Walk 150 Feet   Reason if not Attempted Activity not applicable   Walk 436 Feet CARE Score 9   Walking 10 Feet on Uneven Surfaces   Comment confined to room with isolation and also weak at this time   Reason if not Attempted Safety concerns   Walking 10 Feet on Uneven Surfaces CARE Score 88 Curb or Single Stair   Comment will need to improve strength to safely attempt 4" curb step   Reason if not Attempted Safety concerns   1 Step (Curb) CARE Score 88   4 Steps   Reason if not Attempted Activity not applicable   4 Steps CARE Score 9   12 Steps   Reason if not Attempted Activity not applicable   12 Steps CARE Score 9   Comprehension   QI: Comprehension 4. Undestands: Clear comprehension without cues or repetitions   Expression   QI: Expression 4. Express complex messages without difficulty and with speech that is clear and easy to [place]   RLE Assessment   RLE Assessment WFL  (Grossly 3+/5)   LLE Assessment   LLE Assessment WFL  (grossly 3+/5)   RUE Assessment   RUE Assessment WFL   LUE Assessment   LUE Assessment WFL   Sensation   Light Touch No apparent deficits   Cognition   Overall Cognitive Status WFL   Therapeutic Exercise   Therapeutic Exercise/Activity Practiced pursed lip breathing while performing LAQ 4 x 10, STS 2 x 3 with RW for balance from recliner with cushion in place. Provided pt with bariatric recliner chair and roho cushion. Static standing x 4 bouts 1 min each, walked in room x 20 ft twice with MiN A & RW. Had pt wear KN95 mask for activity x 10 min to get used to using when leaving room in future. Discussed home setup in detail, with pt reporting she may need step fro bed transfers or her family may look to change her bed. Discharge Information   Patient's Discharge Plan to return home   Patient's Rehab Expectations To get as strong as she can and be able to walk on her own around house   Barriers to Discharge Home Decreased Endurance;Decreased Strength; Safety Considerations   Impressions Pt is a 67 y.o. female who was admitted to [address] [place] [address] on 7/24/2023. Pt's current problem list includes: Surgical site infection after wound dehiscence s/p washout vac placement and partial closure 7/18. Pt has been in hospital since June and undergone multiple abdominal surgeries.  Comorbidities include - HTN, AFIB, hx gastric bypass, bowel perforation. Pt now has abdominal wound vac. Pt had been Ind within her home w/o device but required Bariatric rollator in community for balance and PRN rest breaks. She reports hse would have to take standing rest break half way up her ramp entry to catch her breathe prior. She had complaints of sheets on bed "making her legs itch", therefore mved to recliner. Team detrerming plan for bed sheets and will plan to work on bed mobility and comfort with supoort of pillows with goal of pt to lie on her side to sleep. Lower extremity weakness and decreased activity tolerance are greatest barriers for safe mobilty at this time, as well as currently having wound vac for her abdomen.  Good rehab potential to achieve Mod I goals for household mobility with RW.   PT Therapy Minutes   PT Time In 1230   PT Time Out 1400   PT Total Time (minutes) 90   PT Mode of treatment - Individual (minutes) 90   PT Mode of treatment - Concurrent (minutes) 0   PT Mode of treatment - Group (minutes) 0   PT Mode of treatment - Co-treat (minutes) 0   PT Mode of Treatment - Total time(minutes) 90 minutes   PT Cumulative Minutes 90   Cumulative Minutes   Cumulative therapy minutes 180

## 2023-07-25 NOTE — PCC NURSING
Wound dehiscence s/p VAC placement. Surgery to follow for VAC changes, Monitor incision. Atrial fibrillation. Continue amiodarone, Cardizem CD and Toprol XL, Continue Eliquis, Monitor HR with therapy. HTN Continue Cardizem CD and Toprol XL, Monitor BP and adjust medications as needed. COVID-19 positive Detected on routine testing for [facility] admission, Pt is asymptomatic. Hypokalemia K 3.4, Replace with KCl 40meq, Monitor BMP. Anemia Hgb has been trending around 8.   7/25- Pt is continent of bowel and bladder, no pain issues, pt is AAOx4 and pt requires alarms since is new to [facility] and is in Covid isolation. 8/1-VAC removed and some sutures added to incision. Currently has dressing changes with packing BID. Serosanguinous drainage. Pt is anemoc- iv venofer given. Hgb in 8s. Had some rectal bleeding. Eliquis placed on hold and pt is for colonoscopy 8/1. Rapid antigen for covid was neg x 2- covid precautions d/c. This week we will encourage independence with ADLs. We will monitor labs and vital signs. We will educate pt/family about repositioning to prevent skin breakdown. We will assist w repositioning and perform routine skin checks. We will monitor for adequate pain control. We will monitor for constipation and medicate pt as ordered. We will increase safety awareness and keep pt free from falls.

## 2023-07-25 NOTE — PCC OCCUPATIONAL THERAPY
Pt continues to present with impairments in activity tolerance, endurance, standing balance/tolerance, and UE strength. Additional functional barriers include fatigue, KENDRICK, and risk for falls. Pt demonstrates improvements in functional transfers/short distance mobility using RW and activity tolerance. Family training ha been initiated but is ongoing. Pt will continue to benefit from skilled OT services to address above mentioned barriers and maximize functional independence in baseline areas of occupation. OT D/C recommendation is for d/c home at end of this week with continued family support and home OT services.

## 2023-07-25 NOTE — PROGRESS NOTES
OT LONG TERM GOALS          07/25/23 0700   Rehab Team Goals   ADL Team Goal Patient will require supervision with ADLs with least restrictive device upon completion of rehab program   Rehab Team Interventions   OT Interventions Self Care;Home Management; Energy Conservation;Patient/Family Education   Eating Goal   Eating Goal 06. Independent - Patient completes the activity by him/herself with no assistance from a helper. Status Ongoing; Target goal - one week; Target goal - two weeks   Interventions Optimal Position   Grooming Goal   Oral Hygiene Goal 06. Independent - Patient completes the activity by him/herself with no assistance from a helper. Environment Stand at [employer]; Target goal - two weeks; Target goal - one week   Intervention Balance Work;Tolerance Work; Therapeutic Exercise   Tub/Shower Transfer Goal   Method   (N/A - sponge bathing)   Bathing Goal   Shower/bathe self Goal 03. Partial/moderate assistance - Dike does less than half the effort. Dike lifts or holds trunk or limbs and provides more than half the effort. Environment Seated;Standing;Sponge Bath   Status Target goal - two weeks; Target goal - one week; Ongoing   Intervention ADL Training; Therapeutic Exercise   Upper Body Dressing Goal   Upper body dressing Goal 06. Independent - Patient completes the activity by him/herself with no assistance from a helper. Task Upper Body   Environment Seated   Status Ongoing; Target goal - two weeks; Target goal - one week   Intervention Therapeutic Exercise; Tolerance Work   Lower Body Dressing Goal   Lower body dressing Goal 06. Independent - Patient completes the activity by him/herself with no assistance from a helper. Putting on/taking off footwear Goal 03. Partial/moderate assistance - Dike does less than half the effort. Dike lifts or holds trunk or limbs and provides more than half the effort. Task Lower Body   Adaptive Equipment Reacher; [place];Dressing Stick   Status Ongoing; Target goal - two weeks; Target goal - one week   Intervention Balance Work;Tolerance Work; Therapeutic Exercise;Assistive Device   Toileting Transfer Goal   Toilet transfer Goal 06. Independent - Patient completes the activity by him/herself with no assistance from a helper. Assistive Device [employer]; Bedside Commode   Status Ongoing; Target goal - two weeks; Target goal - one week   Intervention ADL Training;Balance Work;Assistive Device   Toileting Goal   Toileting hygiene Goal 06. Independent - Patient completes the activity by him/herself with no assistance from a helper. Task Pants Up;Pants Down;Hygiene   Status Ongoing; Target goal - one week; Target goal - two weeks   Intervention Assistive Device;[facility]

## 2023-07-25 NOTE — PCC PHYSICAL THERAPY
Pt barriers cont with SOB with longer distance with RW / pt using Rollator at home, general weakness and deconditioning and blood in urine, see GI notes today . Pt requested Bariatric walker d/t inc support and and safety. Pt at this time Justine to [id number] overall with skilled PT during xfers and overall functional activities and needs to practice ramp down on 1 st floor to mimic home ramp . Cont POC as possible , Today OT with see pt and dgther who she live with for F/T.  Cont POC to meet DC goals

## 2023-07-26 LAB
ANION GAP SERPL CALCULATED.3IONS-SCNC: 6 MMOL/L
BACTERIA BLD CULT: NORMAL
BACTERIA BLD CULT: NORMAL
BASOPHILS # BLD MANUAL: 0 THOUSAND/UL (ref 0–0.1)
BASOPHILS NFR MAR MANUAL: 0 % (ref 0–1)
BUN SERPL-MCNC: 14 MG/DL (ref 5–25)
CALCIUM SERPL-MCNC: 8 MG/DL (ref 8.3–10.1)
CHLORIDE SERPL-SCNC: 109 MMOL/L (ref 96–108)
CO2 SERPL-SCNC: 24 MMOL/L (ref 21–32)
CREAT SERPL-MCNC: 0.6 MG/DL (ref 0.6–1.3)
EOSINOPHIL # BLD MANUAL: 0.09 THOUSAND/UL (ref 0–0.4)
EOSINOPHIL NFR BLD MANUAL: 1 % (ref 0–6)
ERYTHROCYTE [DISTWIDTH] IN BLOOD BY AUTOMATED COUNT: 18.1 % (ref 11.6–15.1)
GFR SERPL CREATININE-BSD FRML MDRD: 90 ML/MIN/1.73SQ M
GIANT PLATELETS BLD QL SMEAR: PRESENT
GLUCOSE SERPL-MCNC: 115 MG/DL (ref 65–140)
HCT VFR BLD AUTO: 26.7 % (ref 34.8–46.1)
HGB BLD-MCNC: 7.9 G/DL (ref 11.5–15.4)
LYMPHOCYTES # BLD AUTO: 0.37 THOUSAND/UL (ref 0.6–4.47)
LYMPHOCYTES # BLD AUTO: 4 % (ref 14–44)
MCH RBC QN AUTO: 26.2 PG (ref 26.8–34.3)
MCHC RBC AUTO-ENTMCNC: 29.6 G/DL (ref 31.4–37.4)
MCV RBC AUTO: 88 FL (ref 82–98)
MONOCYTES # BLD AUTO: 0.83 THOUSAND/UL (ref 0–1.22)
MONOCYTES NFR BLD: 9 % (ref 4–12)
MYELOCYTES NFR BLD MANUAL: 1 % (ref 0–1)
NEUTROPHILS # BLD MANUAL: 7.81 THOUSAND/UL (ref 1.85–7.62)
NEUTS BAND NFR BLD MANUAL: 1 % (ref 0–8)
NEUTS SEG NFR BLD AUTO: 84 % (ref 43–75)
PLATELET # BLD AUTO: 344 THOUSANDS/UL (ref 149–390)
PLATELET BLD QL SMEAR: ADEQUATE
PMV BLD AUTO: 9.1 FL (ref 8.9–12.7)
POLYCHROMASIA BLD QL SMEAR: PRESENT
POTASSIUM SERPL-SCNC: 3.4 MMOL/L (ref 3.5–5.3)
RBC # BLD AUTO: 3.02 MILLION/UL (ref 3.81–5.12)
RBC MORPH BLD: PRESENT
SARS-COV-2 AG UPPER RESP QL IA: NORMAL
SODIUM SERPL-SCNC: 139 MMOL/L (ref 135–147)
WBC # BLD AUTO: 9.19 THOUSAND/UL (ref 4.31–10.16)

## 2023-07-26 PROCEDURE — 97530 THERAPEUTIC ACTIVITIES: CPT

## 2023-07-26 PROCEDURE — 87811 SARS-COV-2 COVID19 W/OPTIC: CPT

## 2023-07-26 PROCEDURE — 99232 SBSQ HOSP IP/OBS MODERATE 35: CPT | Performed by: INTERNAL MEDICINE

## 2023-07-26 PROCEDURE — 85027 COMPLETE CBC AUTOMATED: CPT | Performed by: NURSE PRACTITIONER

## 2023-07-26 PROCEDURE — 85007 BL SMEAR W/DIFF WBC COUNT: CPT | Performed by: NURSE PRACTITIONER

## 2023-07-26 PROCEDURE — 2W03X6Z CHANGE PRESSURE DRESSING ON ABDOMINAL WALL: ICD-10-PCS | Performed by: SURGERY

## 2023-07-26 PROCEDURE — 97535 SELF CARE MNGMENT TRAINING: CPT

## 2023-07-26 PROCEDURE — 97110 THERAPEUTIC EXERCISES: CPT

## 2023-07-26 PROCEDURE — 80048 BASIC METABOLIC PNL TOTAL CA: CPT | Performed by: NURSE PRACTITIONER

## 2023-07-26 PROCEDURE — 99232 SBSQ HOSP IP/OBS MODERATE 35: CPT

## 2023-07-26 PROCEDURE — 97116 GAIT TRAINING THERAPY: CPT

## 2023-07-26 RX ORDER — CALCIUM CARBONATE 500(1250)
1 TABLET ORAL
Status: DISCONTINUED | OUTPATIENT
Start: 2023-07-27 | End: 2023-08-05 | Stop reason: HOSPADM

## 2023-07-26 RX ORDER — POTASSIUM CHLORIDE 20 MEQ/1
40 TABLET, EXTENDED RELEASE ORAL ONCE
Status: COMPLETED | OUTPATIENT
Start: 2023-07-26 | End: 2023-07-26

## 2023-07-26 RX ORDER — FERROUS SULFATE 325(65) MG
325 TABLET ORAL
Status: DISCONTINUED | OUTPATIENT
Start: 2023-07-27 | End: 2023-07-28

## 2023-07-26 RX ADMIN — MELATONIN TAB 3 MG 6 MG: 3 TAB at 21:59

## 2023-07-26 RX ADMIN — METOPROLOL SUCCINATE 25 MG: 25 TABLET, FILM COATED, EXTENDED RELEASE ORAL at 08:42

## 2023-07-26 RX ADMIN — AMIODARONE HYDROCHLORIDE 200 MG: 200 TABLET ORAL at 08:43

## 2023-07-26 RX ADMIN — NYSTATIN: 100000 POWDER TOPICAL at 08:47

## 2023-07-26 RX ADMIN — ERGOCALCIFEROL 50000 UNITS: 1.25 CAPSULE ORAL at 08:46

## 2023-07-26 RX ADMIN — FLUOXETINE 20 MG: 20 CAPSULE ORAL at 08:43

## 2023-07-26 RX ADMIN — Medication 1 TABLET: at 13:45

## 2023-07-26 RX ADMIN — CYANOCOBALAMIN TAB 500 MCG 1000 MCG: 500 TAB at 17:27

## 2023-07-26 RX ADMIN — ACETAMINOPHEN 975 MG: 325 TABLET, FILM COATED ORAL at 01:58

## 2023-07-26 RX ADMIN — APIXABAN 5 MG: 5 TABLET, FILM COATED ORAL at 17:28

## 2023-07-26 RX ADMIN — FLUOXETINE 20 MG: 20 CAPSULE ORAL at 17:28

## 2023-07-26 RX ADMIN — DILTIAZEM HYDROCHLORIDE 360 MG: 180 CAPSULE, COATED, EXTENDED RELEASE ORAL at 10:31

## 2023-07-26 RX ADMIN — APIXABAN 5 MG: 5 TABLET, FILM COATED ORAL at 08:42

## 2023-07-26 RX ADMIN — POTASSIUM CHLORIDE 40 MEQ: 1500 TABLET, EXTENDED RELEASE ORAL at 11:50

## 2023-07-26 NOTE — PROGRESS NOTES
PM&R PROGRESS NOTE:  Valley Sep 67 y.o. female MRN: [id number]  Unit/Bed#: -01 Encounter: [id number]        Rehabilitation Diagnosis: Impairment of mobility, safety and Activities of Daily Living (ADLs) due to Debility:  16  Debility (Non-cardiac/Non-pulmonary)    HPI: Vaughn Coronado is a 67 y.o. female with a medical history of atrial fibrillation (on Eliquis), sleep apnea, hypertension, renal cell carcinoma, gastric bypass, cervical spondylosis, and osteoarthritis who was recently admitted at [address] [place] [address] 6/19-7/4 with a bowel perforation and internal hernia (hx arlene-en-y gastric bypass). She had an ex lap, resection J-J anastomosis, segmental ileum resection, abthera vac placement, ex lap, washout, abthera change and ex lap, small bowel resection, bowel anastomosis x3, vicryl bridging mesh. Once medically stable she went to TCU for rehabilitation. Patient presented to [address] [place] [address] on 7/18/23 after clinic appointment for suture removal with an abdominal wound dehiscence. On 7/18 she underwent washout, wound exploration, debridement. On 7/19 she underwent washout, partial wound closure, and wound vac placement. Patient currently has wound vac in place at a pressure of 75 mmHg continuously. Wound vac last changed on 7/24 with plans for surgery management of dressing changes. She is tolerating a regular diet with nutrition supplements. Patient tested positive for COVID on 7/24. She is asymptomatic and not being treated at this time. She worked with PT and OT and was recommended for post acute rehab following her hospital stay. She has demonstrated that she can tolerate three hours of therapy, five days a week and is now medically cleared for discharge to the [facility]. PT/OT were consulted and recommend acute inpatient rehabilitation.  The patient was evaluated by the Rehabilitation team and deemed an appropriate candidate for comprehensive inpatient rehabilitation and admitted to the [facility] on 7/24/2023  4:34 PM    SUBJECTIVE: Patient seen face to face. No acute issues overnight. Patient slept in recliner again. Denies pain. Discussed repeat Covid antigen test and plan if negative to repeat in 2 days then isolation can be discontinued. If antigen test positive, patient on isolation for 10 days. Patient confirms understanding. Did express concern with the length of time for staff to assist her with her needs. It took an hour on three separate occassions to receive assistance. Patient feels anxious with isolation and door needing to be closed. She utilizes self talking as her coping mechanism. Discussed GI/nutrition recommended vitamins. Appetite at baseline, voiding, bowels continue to be loose. Patient understands this may be her new normal since the bowel resection. LBM 7/26. Denies chest pain, shortness of breath, fever, chills, N?V, abdominal pain. ASSESSMENT: Stable, progressing    PLAN:  - SARS cov2 antigen test repeated today (7/26)  - loose bowels- started Metamucil 7/25, monitor, good skin care with barrier cream; nutrition following, consider Imodium  - hypokalemia (7/26) K+ 3.4, repleted  - wound vac change today by General Surgery  - anemia: Hbg 7.9, previous 8.1- continue to monitor with biweekly labs  - impaired sleep: d/t pruritis vs uncomfortable bed: bed replaced with [facility] bed, working on getting hypoallergenic    - anxiety: on Prozac, consider prn (refused at this time), neuropsychology consulted when off isolation    Rehabilitation  • Functional deficits:  Mobility, self-care  • Continue current rehabilitation plan of care to maximize function.     • Functional update:   o PT: mobility- not assessed (in recliner), transfers- mod A- max A, ambulation- min-mod A RW 20'  o OT: ADL: bathing- mod-max A, dressing- UB supervision LB total A, toileting- total A  • Estimated Discharge: anticipated 10-14 days, reteam    Pain  • Tylenol 975 every 8 hours prn    DVT prophylaxis  • Eliquis    Bladder plan  • Continent    Bowel plan  • Continent      * Open abdominal wall wound  Assessment & Plan  - recent admission [facility] 6/19-7/4 with bowel perforation and internal hernia (hx arlene-en-y gastric bypass) - s/p ex lap, resection J-J anastomosis, segmental ileum resection, abthera 6/19  - s/p ex lap, washout, abthera change 6/19  - s/p ex lap, small bowel resection, bowel anastomosis x3, vicryl bridging mesh 6/20  - s/p debridement/washout on 7/18  - s/p partial closure and wound vac placement on 7/19  - wound vac 75 mmHg continuous  - dressing changes per General Surgery, due 7/28  - midline incision with sutures  - pain management  - PT/OT      S/P bariatric surgery  Assessment & Plan  - 25 years ago  - supplemental vitamin post surgery- MV    COVID  Assessment & Plan  - positive 7/24  - asymptomatic  - no signs of infection: afebrile  - isolation 10 days (thru 8/3)  - monitor closely  - 7/26: SARS cov2 antigen pending      Anemia  Assessment & Plan  - Hbg 7.9 (previously 8.1)  - monitor CBC  - consider transfusion if warranted    Hypokalemia  Assessment & Plan  - K+ 3.4 (7/26)  - repleted in [facility]  - monitor BMP  - consult IM for assist w/ management    Anxiety  Assessment & Plan  - home: Prozac 20 mg daily  - continue here  - consult neuropsychology when cleared    Hypertension  Assessment & Plan  - home: Cardizem 360 mg daily, Metoprolol succinate 25 mg BID, HCTZ 12.5 mg BID  - here: Cardizem, Toprol XL daily, (hold HCTZ)    - monitor BP    Morbid obesity with BMI of 50.0-59.9, adult ([facility])  Assessment & Plan  - BMI 53.89  - s/p gastric bypass (25 years ago)  - educate on lifestyle modification  - consider nutrition consult    GILDARDO (obstructive sleep apnea)  Assessment & Plan  - compliant with Cpap at home  - here: 2L nc at home  - monitor     Vitamin D deficiency  Assessment & Plan  - Vit D 62.1 (1/16/23)  - Vitamin D 50K weekly     Permanent atrial fibrillation ([address])  Assessment & Plan  - home: Eliqus 5 mg BID, Amiodarone 200 mg daily, Cardizem 360 mg daily, Toprol XL 25 mg BID  - here: continue except Toprol XL daily  - monitor with therapies    Appreciate IM consultants medical co-management. Personally reviewed labs, medications, and imaging. ROS:  Review of Systems   A 10 point review of systems was negative except for what is noted in the HPI. OBJECTIVE:   /64 (BP Location: Left arm)   Pulse 86   Temp 98 °F (36.7 °C) (Oral)   Resp 17   Ht 5' 3" (1.6 m)   Wt 135 kg (297 lb 13.5 oz)   SpO2 97%   BMI 52.76 kg/m²     Physical Exam  Constitutional:       Appearance: Normal appearance. HENT:      Head: Normocephalic and atraumatic. Nose: Nose normal.      Mouth/Throat:      Mouth: Mucous membranes are moist.   Cardiovascular:      Rate and Rhythm: Normal rate and regular rhythm. Pulses: Normal pulses. Heart sounds: Normal heart sounds. Pulmonary:      Effort: Pulmonary effort is normal.      Breath sounds: Normal breath sounds. Abdominal:      General: Bowel sounds are normal.      Palpations: Abdomen is soft. Comments: + wound vac   Musculoskeletal:         General: Normal range of motion. Cervical back: Normal range of motion. Right lower leg: Edema present. Left lower leg: Edema present. Skin:     General: Skin is warm and dry. Capillary Refill: Capillary refill takes less than 2 seconds. Findings: Bruising present. Neurological:      Mental Status: She is alert and oriented to person, place, and time.    Psychiatric:         Mood and Affect: Mood normal.         Judgment: Judgment normal.          Lab Results   Component Value Date    WBC 9.19 07/26/2023    HGB 7.9 (L) 07/26/2023    HCT 26.7 (L) 07/26/2023    MCV 88 07/26/2023     07/26/2023     Lab Results   Component Value Date    SODIUM 139 07/26/2023    K 3.4 (L) 07/26/2023     (H) 07/26/2023    CO2 24 07/26/2023    BUN 14 07/26/2023    CREATININE 0.60 07/26/2023    GLUC 115 07/26/2023    CALCIUM 8.0 (L) 07/26/2023     Lab Results   Component Value Date    INR 1.39 (H) 07/18/2023    INR 1.23 (H) 06/27/2023    INR 1.19 06/24/2023    PROTIME 17.3 (H) 07/18/2023    PROTIME 15.7 (H) 06/27/2023    PROTIME 15.3 (H) 06/24/2023           Current Facility-Administered Medications:   •  acetaminophen (TYLENOL) tablet 975 mg, 975 mg, Oral, Q8H PRN, MOLLY Jackson, 975 mg at 07/26/23 0158  •  amiodarone tablet 200 mg, 200 mg, Oral, Daily With Breakfast, MOLLY Jackson, 200 mg at 07/26/23 1893  •  apixaban (ELIQUIS) tablet 5 mg, 5 mg, Oral, BID, MOLLY Jackson, 5 mg at 07/26/23 0842  •  [START ON 7/27/2023] calcium carbonate (OYSTER SHELL,OSCAL) 500 mg tablet 1 tablet, 1 tablet, Oral, Daily With Breakfast, MOLLY Jackson  •  cyanocobalamin (VITAMIN B-12) tablet 1,000 mcg, 1,000 mcg, Oral, Daily, MOLLY Jackson  •  diltiazem (CARDIZEM CD) 24 hr capsule 360 mg, 360 mg, Oral, Daily, MOLLY Jackson, 360 mg at 07/26/23 1031  •  ergocalciferol (VITAMIN D2) capsule 50,000 Units, 50,000 Units, Oral, Weekly, MOLLY Jackson, 50,000 Units at 07/26/23 0846  •  [START ON 7/27/2023] ferrous sulfate tablet 325 mg, 325 mg, Oral, Daily With Breakfast, MOLLY Jackson  •  FLUoxetine (PROzac) capsule 20 mg, 20 mg, Oral, BID, MOLLY Jackson, 20 mg at 07/26/23 0843  •  melatonin tablet 6 mg, 6 mg, Oral, HS, MOLLY Jackson, 6 mg at 07/25/23 2207  •  metoprolol succinate (TOPROL-XL) 24 hr tablet 25 mg, 25 mg, Oral, Daily, MOLLY Jackson, 25 mg at 07/26/23 9750  •  multivitamin-minerals (CENTRUM) tablet 1 tablet, 1 tablet, Oral, Daily, MOLLY Jackson, 1 tablet at 07/26/23 1345  •  nystatin (MYCOSTATIN) powder, , Topical, BID, MOLLY Jackson, Given at 07/26/23 0847  •  ondansetron (ZOFRAN-ODT) dispersible tablet 4 mg, 4 mg, Oral, Q6H PRN, MOLLY Jackson  •  psyllium (METAMUCIL) 1 packet, 1 packet, Oral, Daily, Luke Hallman, CRNP, 1 packet at 07/25/23 1020    Past Medical History:   Diagnosis Date   • [facility])    • Arthritis    • Atrial fibrillation (HCC)    • Cervical spondylosis with myelopathy    • Gastric bypass status for obesity    • History of transfusion     35 years ago   • Hypertension    • mass right kidney    • Renal cell adenocarcinoma (HCC)     RIGHT   • Sleep apnea        Patient Active Problem List    Diagnosis Date Noted   • Open abdominal wall wound 07/13/2023   • S/P bariatric surgery 03/17/2020   • COVID 07/24/2023   • Ambulatory dysfunction 07/17/2023   • Iron deficiency anemia 07/13/2023   • Hypotension 07/13/2023   • Hypokalemia 07/10/2023   • Anemia 07/10/2023   • Bowel perforation ([address]) 07/05/2023   • Acute encephalopathy 07/05/2023   • Anxiety 07/05/2023   • Diarrhea 07/05/2023   • Idiopathic acute pancreatitis without infection or necrosis 06/18/2023   • Primary osteoarthritis of both knees 06/07/2023   • Stage 3 chronic kidney disease, unspecified whether stage 3a or 3b CKD ([address]) 05/31/2023   • Hypertension    • Morbid obesity with BMI of 50.0-59.9, adult ([address]) 11/01/2022   • Unsteady gait 10/30/2022   • Dyspnea on exertion 10/30/2022   • SIRS (systemic inflammatory response syndrome) ([address]) 10/30/2022   • Incontinence 10/30/2022   • GILDARDO (obstructive sleep apnea) 10/30/2022   • Vitamin D deficiency 07/06/2021   • Reactive hypoglycemia 12/30/2020   • Osteoporosis 06/04/2020   • Hyperparathyroidism ([address]) 06/04/2020   • Hashimoto's thyroiditis 03/17/2020   • Early menopause occurring in patient age younger than 39 years 03/17/2020   • Myofascial pain syndrome 10/21/2019   • Status post partial thyroidectomy 09/12/2019   • Cervical spondylosis without myelopathy 06/28/2019   • Arthropathy of cervical facet joint 06/28/2019   • Occipital neuralgia of right side 06/28/2019   • Chest wall tenderness 01/24/2019   • Chronic heart failure ([address]) 01/24/2019   • Permanent atrial fibrillation ([address]) 01/24/2019   • Myalgia 01/08/2019   • Primary osteoarthritis of left knee 10/23/2018   • Primary osteoarthritis of right knee 10/23/2018   • Chronic pain of left knee 10/23/2018   • Malignant tumor of right kidney parenchyma ([address]) 05/11/2016   • Closed fracture of right distal radius and ulna 04/22/2016      MOLLY Diamond  Physical Medicine and [place]

## 2023-07-26 NOTE — PLAN OF CARE
Problem: PAIN - ADULT  Goal: Verbalizes/displays adequate comfort level or baseline comfort level  Description: Interventions:  - Encourage patient to monitor pain and request assistance  - Assess pain using appropriate pain scale  - Administer analgesics based on type and severity of pain and evaluate response  - Implement non-pharmacological measures as appropriate and evaluate response  - Consider cultural and social influences on pain and pain management  - Notify physician/advanced practitioner if interventions unsuccessful or patient reports new pain  Outcome: Progressing     Problem: INFECTION - ADULT  Goal: Absence or prevention of progression during hospitalization  Description: INTERVENTIONS:  - Assess and monitor for signs and symptoms of infection  - Monitor lab/diagnostic results  - Monitor all insertion sites, i.e. indwelling lines, tubes, and drains  - Monitor endotracheal if appropriate and nasal secretions for changes in amount and color  - [place] appropriate cooling/warming therapies per order  - Administer medications as ordered  - Instruct and encourage patient and family to use good hand hygiene technique  - Identify and instruct in appropriate isolation precautions for identified infection/condition  Outcome: Progressing     Problem: SAFETY ADULT  Goal: Patient will remain free of falls  Description: INTERVENTIONS:  - Educate patient/family on patient safety including physical limitations  - Instruct patient to call for assistance with activity   - Consult OT/PT to assist with strengthening/mobility   - Keep Call bell within reach  - Keep bed low and locked with side rails adjusted as appropriate  - Keep care items and personal belongings within reach  - Initiate and maintain comfort rounds  - Make Fall Risk Sign visible to staff  - Offer Toileting every 2 Hours, in advance of need  - Initiate/Maintain bed/chair alarm  - Obtain necessary fall risk management equipment:   - Apply yellow socks and bracelet for high fall risk patients  - Consider moving patient to room near nurses station  Outcome: Progressing  Goal: Maintain or return to baseline ADL function  Description: INTERVENTIONS:  -  Assess patient's ability to carry out ADLs; assess patient's baseline for ADL function and identify physical deficits which impact ability to perform ADLs (bathing, care of mouth/teeth, toileting, grooming, dressing, etc.)  - Assess/evaluate cause of self-care deficits   - Assess range of motion  - Assess patient's mobility; develop plan if impaired  - Assess patient's need for assistive devices and provide as appropriate  - Encourage maximum independence but intervene and supervise when necessary  - Involve family in performance of ADLs  - Assess for home care needs following discharge   - Consider OT consult to assist with ADL evaluation and planning for discharge  - Provide patient education as appropriate  Outcome: Progressing  Goal: Maintains/Returns to pre admission functional level  Description: INTERVENTIONS:  - Perform BMAT or MOVE assessment daily.   - Set and communicate daily mobility goal to care team and patient/family/caregiver. - Collaborate with rehabilitation services on mobility goals if consulted  - Perform Range of Motion 3 times a day. - Reposition patient every 2 hours.   - Dangle patient 3 times a day  - Stand patient 3 times a day  - Ambulate patient 3 times a day  - Out of bed to chair 3 times a day   - Out of bed for meals 3 times a day  - Out of bed for toileting  - Record patient progress and toleration of activity level   Outcome: Progressing     Problem: DISCHARGE PLANNING  Goal: Discharge to home or other facility with appropriate resources  Description: INTERVENTIONS:  - Identify barriers to discharge w/patient and caregiver  - Arrange for needed discharge resources and transportation as appropriate  - Identify discharge learning needs (meds, wound care, etc.)  - Arrange for interpretive services to assist at discharge as needed  - Refer to Case Management Department for coordinating discharge planning if the patient needs post-hospital services based on physician/advanced practitioner order or complex needs related to functional status, cognitive ability, or social support system  Outcome: Progressing     Problem: Prexisting or High Potential for Compromised Skin Integrity  Goal: Skin integrity is maintained or improved  Description: INTERVENTIONS:  - Identify patients at risk for skin breakdown  - Assess and monitor skin integrity  - Assess and monitor nutrition and hydration status  - Monitor labs   - Assess for incontinence   - Turn and reposition patient  - Assist with mobility/ambulation  - Relieve pressure over bony prominences  - Avoid friction and shearing  - Provide appropriate hygiene as needed including keeping skin clean and dry  - Evaluate need for skin moisturizer/barrier cream  - Collaborate with interdisciplinary team   - Patient/family teaching  - Consider wound care consult   Outcome: Progressing     Problem: Nutrition/Hydration-ADULT  Goal: Nutrient/Hydration intake appropriate for improving, restoring or maintaining nutritional needs  Description: Monitor and assess patient's nutrition/hydration status for malnutrition. Collaborate with interdisciplinary team and initiate plan and interventions as ordered. Monitor patient's weight and dietary intake as ordered or per policy. Utilize nutrition screening tool and intervene as necessary. Determine patient's food preferences and provide high-protein, high-caloric foods as appropriate.      INTERVENTIONS:  - Monitor oral intake, urinary output, labs, and treatment plans  - Assess nutrition and hydration status and recommend course of action  - Evaluate amount of meals eaten  - Assist patient with eating if necessary   - Allow adequate time for meals  - Recommend/ encourage appropriate diets, oral nutritional supplements, and vitamin/mineral supplements  - Order, calculate, and assess calorie counts as needed  - Recommend, monitor, and adjust tube feedings and TPN/PPN based on assessed needs  - Assess need for intravenous fluids  - Provide specific nutrition/hydration education as appropriate  - Include patient/family/caregiver in decisions related to nutrition  Outcome: Progressing     Problem: MOBILITY - ADULT  Goal: Maintain or return to baseline ADL function  Description: INTERVENTIONS:  -  Assess patient's ability to carry out ADLs; assess patient's baseline for ADL function and identify physical deficits which impact ability to perform ADLs (bathing, care of mouth/teeth, toileting, grooming, dressing, etc.)  - Assess/evaluate cause of self-care deficits   - Assess range of motion  - Assess patient's mobility; develop plan if impaired  - Assess patient's need for assistive devices and provide as appropriate  - Encourage maximum independence but intervene and supervise when necessary  - Involve family in performance of ADLs  - Assess for home care needs following discharge   - Consider OT consult to assist with ADL evaluation and planning for discharge  - Provide patient education as appropriate  Outcome: Progressing  Goal: Maintains/Returns to pre admission functional level  Description: INTERVENTIONS:  - Perform BMAT or MOVE assessment daily.   - Set and communicate daily mobility goal to care team and patient/family/caregiver. - Collaborate with rehabilitation services on mobility goals if consulted  - Perform Range of Motion 3 times a day. - Reposition patient every 2 hours.   - Dangle patient 3 times a day  - Stand patient 3 times a day  - Ambulate patient 3 times a day  - Out of bed to chair 3 times a day   - Out of bed for meals 3 times a day  - Out of bed for toileting  - Record patient progress and toleration of activity level   Outcome: Progressing

## 2023-07-26 NOTE — PROGRESS NOTES
Occupational Therapy Treatment Note           07/26/23 6845   Pain Assessment   Pain Assessment Tool 0-10   Pain Score No Pain   Restrictions/Precautions   Precautions Bed/chair alarms; Fall Risk; Airborne/isolation;Supervision on toilet/commode  (wound vac)   Lifestyle   Autonomy "I am tired, but I want to work"   Eating   Type of Assistance Needed Independent   Physical Assistance Level No physical assistance   Eating CARE Score 6   Oral Hygiene   Type of Assistance Needed Physical assistance   Physical Assistance Level 26%-50%   Comment if in stance. setup assist while seated   Oral Hygiene CARE Score 3   Shower/Bathe Self   Type of Assistance Needed Physical assistance   Physical Assistance Level 51%-75%   Comment while seated pt completes bathing of UB. Requires assist for lower legs/feet, in stance with RW pt requires assist for bathing of groin and buttock. Under pannus and groin area assessed while supine in bed and nursing present to apply nystatin. Shower/Bathe Self CARE Score 2   Tub/Shower Transfer   Reason Not Assessed Medical;Sponge Bath   Upper Body Dressing   Type of Assistance Needed Supervision   Physical Assistance Level No physical assistance   Comment seated   Upper Body Dressing CARE Score 4   Lower Body Dressing   Comment 2* wound vac placement   Reason if not Attempted Medical concerns   Lower Body Dressing CARE Score 88   Putting On/Taking Off Footwear   Type of Assistance Needed Physical assistance   Physical Assistance Level Total assistance   Putting On/Taking Off Footwear CARE Score 1   Sit to Stand   Type of Assistance Needed Physical assistance; Adaptive equipment   Physical Assistance Level 26%-50%   Comment RW   Sit to Stand CARE Score 3   Bed-Chair Transfer   Type of Assistance Needed Physical assistance; Adaptive equipment   Physical Assistance Level Total assistance   Comment if no device.  min assist x1 with RW stand pivot transfer   Chair/Bed-to-Chair Transfer CARE Score 1 Toileting Hygiene   Type of Assistance Needed Physical assistance   Physical Assistance Level Total assistance   Comment assist for clothing management and hygiene   Toileting Hygiene CARE Score 1   Toilet Transfer   Type of Assistance Needed Physical assistance   Physical Assistance Level Total assistance   Comment if no device. min assist x1 stand pivot to [facility]   Toilet Transfer CARE Score 1   Cognition   Overall Cognitive Status WFL   Arousal/Participation Alert   Attention Within functional limits   Memory Within functional limits   Following Commands Follows all commands and directions without difficulty   Activity Tolerance   Activity Tolerance Patient tolerated treatment well   Assessment   Treatment Assessment Pt participated in skilled OT tx session. See above for further details on functional performance. Of note pt's HR during activity 110s-120s (does have AFIB), but down to 90s while seated. 02 98%, pt does have SOB/KENDRICK with activity, standing tolerance less than 1 minute during ADL routine. Pt will continue to benefit from skilled OT intervention to address UE strengthening, standing balance/tolerance, energy conservation technique education in order to maximize functional independence in ADLS, functional mobility/transfers and IADLS, while decreasing burden of care. Pt left positioned in recliner with call bell in reach and chair alarm on. Verbally per MOLLY Cervantes, ace wrap LEs was applied. Pt not wearing LB clothing at this time 2* wound vac placement on abdomen. Would benefit from education on sock aide but won't work if ace wrapping. Also OT educated on toileting aide, pt reporting her kids may have already purchased her own, but will need to follow up. At baseline pt washes buttock by wiping from front, but OT educated on reaching around with toileting aide to assist in UTI prevention. Prognosis Good   Problem List Decreased strength;Decreased endurance; Impaired balance;Decreased mobility;Orthopedic restrictions;Pain   Plan   Treatment/Interventions ADL retraining;Functional transfer training; Therapeutic exercise; Endurance training;Patient/family training;Equipment eval/education; Bed mobility; Compensatory technique education   Progress Progressing toward goals   OT Therapy Minutes   OT Time In 0830   OT Time Out 1000   OT Total Time (minutes) 90   OT Mode of treatment - Individual (minutes) 90   OT Mode of treatment - Concurrent (minutes) 0   OT Mode of treatment - Group (minutes) 0   OT Mode of treatment - Co-treat (minutes) 0   OT Mode of Treatment - Total time(minutes) 90 minutes   OT Cumulative Minutes 180   Therapy Time missed   Time missed?  No

## 2023-07-26 NOTE — CASE MANAGEMENT
Case Management Update:  Cm met with pt outside room during therapy and introduce role:    Pt lives in ranch home with daughter Genaro Mccann, ramp to enter. Pt reports prior to admission was independent, family transports. Pt has rollator, bsc, and walker in home. Pt has recently been to [address] [place] [address],[address] for STR. Pt report no hx of HHC or OP therapies. PCP is Kassy Joyner, preferred pharmacy is [facility] in [facility]. The patient was educated on the rehabilitation process including therapy program, the interdisciplinary team, and weekly team meetings. Estimated length of stay was reviewed with the patient as well as expectations of discussions of discharge planning. The role of the  was reviewed including providing care coordination, discharge planning and discharge facilitation. IMM was reviewed with the patient and a copy was provided for their reference. The patient verbalized understanding of the information provided and denied any further questions at this time. CM will continue to follow and assist the patient throughout their rehabilitation stay.

## 2023-07-26 NOTE — PROGRESS NOTES
Internal Medicine Progress Note  Patient: Altagracia Lamas  Age/sex: 67 y.o. female  Medical Record #: [id number]      ASSESSMENT/PLAN: (Interval History)  Altagracia Lamas is seen and examined and management for following issues:    Wound dehiscence s/p VAC placement  -S/p ex lap secondary to bowel perforation and internal hernia, readmitted for wound dehiscence 7/18, underwent washout and VAC application with partial closure of the wound 7/19  -Surgery in today for wound VAC change  -Pain management as per primary service    Atrial fibrillation  -Continue amiodarone 200 mg daily/Cardizem  mg daily/Toprol-XL 25 mg every 12 hours  -Anticoagulation with Eliquis 5 mg daily    Hypertension  -BP stable per blood pressure logs  -Holding off on hydrochlorothiazide while inpatient    COVID-positive  -Asymptomatic detected on routine screening prior to [facility] admission  -Currently on room air denies any SOB. -Plan  for possible retesting today  -Continue airborne precautions    Hypokalemia  -Potassium of 3.4, will replete with KCl 40 mEq once    Anemia  -Hb of 7.9, continue to monitor          The above assessment and plan was reviewed and updated as determined by my evaluation of the patient on 7/26/2023.     Labs:   Results from last 7 days   Lab Units 07/26/23  0541 07/24/23  0437   WBC Thousand/uL 9.19 9.16   HEMOGLOBIN g/dL 7.9* 8.1*   HEMATOCRIT % 26.7* 27.2*   PLATELETS Thousands/uL 344 340     Results from last 7 days   Lab Units 07/26/23  0541 07/24/23  0437   SODIUM mmol/L 139 141   POTASSIUM mmol/L 3.4* 3.4*   CHLORIDE mmol/L 109* 109*   CO2 mmol/L 24 27   BUN mg/dL 14 15   CREATININE mg/dL 0.60 0.65   CALCIUM mg/dL 8.0* 8.1*             Results from last 7 days   Lab Units 07/20/23  1121   POC GLUCOSE mg/dl 176*       Review of Scheduled Meds:  Current Facility-Administered Medications   Medication Dose Route Frequency Provider Last Rate   • acetaminophen  975 mg Oral Q8H PRN MOLLY Gallardo     • amiodarone  200 mg Oral Daily With Breakfast MOLLY Jackson     • apixaban  5 mg Oral BID MOLLY Robertson     • [START ON 7/27/2023] calcium carbonate  1 tablet Oral Daily With Breakfast MOLLY Jackson     • vitamin B-12  1,000 mcg Oral Daily MOLLY Jackson     • diltiazem  360 mg Oral Daily MOLLY Robertson     • ergocalciferol  50,000 Units Oral Weekly MOLLY Jackson     • [START ON 7/27/2023] ferrous sulfate  325 mg Oral Daily With Breakfast MOLLY Jackson     • FLUoxetine  20 mg Oral BID MOLLY Jackson     • melatonin  6 mg Oral HS MOLLY Jackson     • metoprolol succinate  25 mg Oral Daily MOLLY Jackson     • multivitamin-minerals  1 tablet Oral Daily MOLLY Jackson     • nystatin   Topical BID MOLLY Jackson     • ondansetron  4 mg Oral Q6H PRN MOLLY Jackson     • psyllium  1 packet Oral Daily MOLLY Jackson         Subjective/ HPI: Patient seen and examined. Patients overnight issues or events were reviewed with nursing or staff during rounds or morning huddle session. New or overnight issues include the following:     Patient denies any fever, chest pain, shortness of breath, nausea or vomiting or diarrhea. ROS:   A 10 point ROS was performed; negative except as noted above.        Imaging:     No orders to display       *Labs /Radiology studies Reviewed  *Medications  reviewed and reconciled as needed  *Please refer to order section for additional ordered labs studies  *Case discussed with primary attending during morning huddle case rounds    Physical Examination:  Vitals:   Vitals:    07/26/23 0600 07/26/23 0842 07/26/23 1031 07/26/23 1328   BP:  135/73 (!) 179/74 138/64   BP Location:    Left arm   Pulse:  81  86   Resp:    17   Temp:    98 °F (36.7 °C)   TempSrc:    Oral   SpO2:    97%   Weight: 135 kg (297 lb 13.5 oz)      Height:         General appearance-sitting on the recliner, not in any acute distress  HEENT--mucous membrane moist breathing on room air  Lungs-clear to auscultation bilaterally, normal respiratory effort  CVS-S1-S2 heard, regular, no murmurs or gallops  Abdomen-soft, no tenderness on palpation  Extremities-trace lower extremity ankle edema  Psych-normal affect, mood normal  Neuro-alert and oriented x3      The above physical exam was reviewed and updated as determined by my evaluation of the patient on 7/26/2023. Invasive Devices     Drain  Duration           External Urinary Catheter 7 days               . VTE Pharmacologic Prophylaxis: Eliquis  Code Status: Level 1 - Full Code  Current Length of Stay: 2 day(s)      Total time spent:  30 minutes with more than 50% spent counseling/coordinating care. Counseling includes discussion with patient re: progress  and discussion with patient of his/her current medical state/information. Coordination of patient's care was performed in conjunction with primary service. Time invested included review of patient's labs, vitals, and management of their comorbidities with continued monitoring. In addition, this patient was discussed with medical team including physician and advanced extenders. The care of the patient was extensively discussed and appropriate treatment plan was formulated unique for this patient. Medical decision making for the day was made by supervising physician unless otherwise noted in their attestation statement. ** Please Note:  voice to text software may have been used in the creation of this document.  Although proof errors in transcription or interpretation are a potential of such software**

## 2023-07-26 NOTE — QUICK NOTE
Acute Care Surgery  Bedside V.A.C. Procedure Note       Location of wound: Midline Wound     Dressings and Foam removed:  1 adaptic  2 black (1wound/1bridge)  1 white       Dimensions of wound: 7 cm x 4 cm x 6 cm     Description of wound: On inspection of the wound today, wound tracks approximately 6 cm underneath the midline abdominal wound. There was no necrotic or nonviable tissue requiring debridement. The periwound skin remains clean and intact and unremarkable.     VAC dressing application:  The periwound skin was cleaned and dried. 1 XeroformDressings, 3 black foam, 1 white foam were cut to size of the wound and placed into the wound. The dressings were then covered with VAC drape. Additional VAC drape and black foam was used to create a bridge to the patient's left lower quadrant and a base for the track pad. The track pad was then placed over the base of black foam. The VAC was then set to 75 mmHg low Continuous suction. The patient tolerated the procedure well and there were no complications. The patient did not require any excisional debridement during today's dressing change.     VAC settings:  75 mmHg  Continuous     Wound Images: Total VAC count:  1 Xeroform Dressings  3 black (1wound/2bridge)  1 white     Additional Notes: The [facility] sticker placed over the dressing per protocol. The next [facility] dressing change will be planned for 7/28/23.     This dressing change took greater than 22 minutes to complete.

## 2023-07-27 PROCEDURE — 97535 SELF CARE MNGMENT TRAINING: CPT

## 2023-07-27 PROCEDURE — 97116 GAIT TRAINING THERAPY: CPT

## 2023-07-27 PROCEDURE — 97530 THERAPEUTIC ACTIVITIES: CPT

## 2023-07-27 PROCEDURE — 99232 SBSQ HOSP IP/OBS MODERATE 35: CPT

## 2023-07-27 PROCEDURE — 97110 THERAPEUTIC EXERCISES: CPT

## 2023-07-27 PROCEDURE — 99232 SBSQ HOSP IP/OBS MODERATE 35: CPT | Performed by: INTERNAL MEDICINE

## 2023-07-27 RX ADMIN — METOPROLOL SUCCINATE 25 MG: 25 TABLET, FILM COATED, EXTENDED RELEASE ORAL at 09:18

## 2023-07-27 RX ADMIN — NYSTATIN: 100000 POWDER TOPICAL at 17:52

## 2023-07-27 RX ADMIN — FERROUS SULFATE TAB 325 MG (65 MG ELEMENTAL FE) 325 MG: 325 (65 FE) TAB at 09:18

## 2023-07-27 RX ADMIN — FLUOXETINE 20 MG: 20 CAPSULE ORAL at 09:18

## 2023-07-27 RX ADMIN — Medication 1 TABLET: at 09:19

## 2023-07-27 RX ADMIN — MELATONIN TAB 3 MG 6 MG: 3 TAB at 22:43

## 2023-07-27 RX ADMIN — NYSTATIN: 100000 POWDER TOPICAL at 09:21

## 2023-07-27 RX ADMIN — FLUOXETINE 20 MG: 20 CAPSULE ORAL at 17:46

## 2023-07-27 RX ADMIN — Medication 1 TABLET: at 09:18

## 2023-07-27 RX ADMIN — DILTIAZEM HYDROCHLORIDE 360 MG: 180 CAPSULE, COATED, EXTENDED RELEASE ORAL at 09:23

## 2023-07-27 RX ADMIN — APIXABAN 5 MG: 5 TABLET, FILM COATED ORAL at 09:19

## 2023-07-27 RX ADMIN — AMIODARONE HYDROCHLORIDE 200 MG: 200 TABLET ORAL at 09:18

## 2023-07-27 RX ADMIN — APIXABAN 5 MG: 5 TABLET, FILM COATED ORAL at 17:46

## 2023-07-27 RX ADMIN — CYANOCOBALAMIN TAB 500 MCG 1000 MCG: 500 TAB at 09:18

## 2023-07-27 NOTE — PROGRESS NOTES
07/27/23 0700   Pain Assessment   Pain Assessment Tool 0-10   Pain Score No Pain   Restrictions/Precautions   Precautions Airborne/isolation; Fall Risk;Bed/chair alarms;Supervision on toilet/commode  (Wound vac on abdominal wound)   Lifestyle   Autonomy I am doing better today than before   Eating   Type of Assistance Needed Independent   Physical Assistance Level No physical assistance   Comment Breakfast tray provided at end of session   Eating CARE Score 6   Oral Hygiene   Type of Assistance Needed Set-up / clean-up   Physical Assistance Level No physical assistance   Comment Pt completed seated in recliner chair today with setup assist   Oral Hygiene CARE Score 5   Grooming   Findings Pt complete total grooming routine with setup assist while seated in recliner chair   Shower/Bathe Self   Type of Assistance Needed Physical assistance   Physical Assistance Level 51%-75%   Comment While seated in recliner chair, pt able to perform sponge bath. Pt able to complete UB, chest, and abdomen, but requires assist for washing BLE, under pannus, and groin/buttocks when in stance. While washing abdomen, Pt noticing that wound vac looked slightly displaced. Area thoroughly dried and RN made aware. Shower/Bathe Self CARE Score 2   Upper Body Dressing   Type of Assistance Needed Supervision   Physical Assistance Level No physical assistance   Comment Pt complete UB dressing with pullover nightgown while seated in recliner chair with supervision for safety. Upper Body Dressing CARE Score 4   Lower Body Dressing   Comment 2* to wound vac placement, Pt not completing LB dressing at this time due to not being able to put pressure on this area.    Reason if not Attempted Medical concerns   Lower Body Dressing CARE Score 88   Putting On/Taking Off Footwear   Type of Assistance Needed Physical assistance   Physical Assistance Level Total assistance   Comment Pt seated in recliner chair for LE wrapping and donning of slipper socks. Pt requiringTA for completion of activity due to would vac restrictions and body habitus   Putting On/Taking Off Footwear CARE Score 1   Lying to Sitting on Side of Bed   Type of Assistance Needed Physical assistance   Physical Assistance Level 25% or less   Comment Min Ax1 for moving from lying to sitting at EOB at beginning of session. Lying to Sitting on Side of Bed CARE Score 3   Sit to Stand   Type of Assistance Needed Physical assistance   Physical Assistance Level 25% or less   Comment Min Ax1 from EOB and recliner with RW   Sit to Stand CARE Score 3   Bed-Chair Transfer   Type of Assistance Needed Physical assistance   Physical Assistance Level 25% or less   Comment SPT with RW   Chair/Bed-to-Chair Transfer CARE Score 3   Toileting Hygiene   Type of Assistance Needed Physical assistance   Physical Assistance Level Total assistance   Comment Ax1 for hygiene while in stance with RW present   [place] Score 1   Toilet Transfer   Type of Assistance Needed Physical assistance   Physical Assistance Level 25% or less   Comment Min Ax1 SPT with RW to [facility]   Toilet Transfer CARE Score 3   Health Management   Health Management Complete LE ACE wrapping utilize 6 and 4 inch bandages. Completed using crossing technique and provided education to Pt regarding purpose of wrapping and expected wear times according to board in room. Therapeutic Excerise-Strength   UE Strength Yes   Right Upper Extremity- Strength   R Position Seated   Equipment Theraband  (Red)   R Weight/Reps/Sets 3x12   RUE Strength Comment Pt complete UE TE while seated in recliner chair with feet suppported on floor. Focus of activity on inc UE strength, endurance, and activity tolerance to inc functional performance of daily activities. implement rest breaks as needed to combat fatigue. Exercises included horizontal abd, scapular retraction, shoulder flexion, and bicep curls.    Left Upper Extremity-Strength   LUE Strength Comment see above   Cognition   Overall Cognitive Status [facility]   Arousal/Participation Alert; Cooperative   Attention Within functional limits   Orientation Level Oriented X4   Memory Within functional limits   Following Commands Follows all commands and directions without difficulty   Additional Activities   Additional Activities Comments Performed spot checks of O2 throughout session, Pt remaining WNL on RA with activity. pt stating no SOB throughout session. Discussed energy conservation and strategies for implementation in daily tasks. Pt receptive. Activity Tolerance   Activity Tolerance Patient tolerated treatment well   Medical Staff Made Aware Advised RN Pt stating wound vac is becoming displaced. Assessment   Treatment Assessment Pt participated in 90 minute OT session with focus on ADL retraining, func transfers, UE strength, and endurance. Pt tolerated treatment well, however, expressing frustrations with [id number] testing and policies. Pt would continue to benefit from skilled OT sessions with focus on ADL retraining, iADL retraining, functional transfers, strength, endurance, and activity tolerance. At end of session, Pt seated in recliner chair with breakfast tray, call bell in reach, and all needs met. Prognosis Good   Problem List Decreased strength;Decreased endurance; Impaired balance;Decreased mobility; Decreased coordination;Obesity; Decreased skin integrity   Plan   Treatment/Interventions ADL retraining;Functional transfer training; Therapeutic exercise; Endurance training;Patient/family training;Equipment eval/education; Compensatory technique education   Progress Progressing toward goals   OT Therapy Minutes   OT Time In 0700   OT Time Out 0830   OT Total Time (minutes) 90   OT Mode of treatment - Individual (minutes) 90   OT Mode of treatment - Concurrent (minutes) 0   OT Mode of treatment - Group (minutes) 0   OT Mode of treatment - Co-treat (minutes) 0   OT Mode of Treatment - Total time(minutes) 90 minutes   OT Cumulative Minutes 270   Therapy Time missed   Time missed?  No

## 2023-07-27 NOTE — PROGRESS NOTES
07/27/23 0930   Pain Assessment   Pain Score No Pain   Restrictions/Precautions   Precautions Fall Risk;Supervision on toilet/commode; Airborne/isolation  (abdominal incision with wound vac.)   Braces or Orthoses   (B LE TEDS)   General   Change In Medical/Functional Status As per nursing, wound vac had been displaced this morning and is awaitng for surgical team to replace/ fix wound vac. Pt. also awaiting for another Covid test , if negative will be able to get off airborn precaution   Subjective   Subjective Pt. reported issues with wound vac as above but is agreeable to  participate in bed level thera ex. Therapeutic Interventions   Strengthening GLuteal squeeze, quads sets, SAQ, hip add squeeze using pillow, hip abd using red TB   Assessment   Treatment Assessment Pt. limited this session to supine thera ex due to wound vac being displaced. Pt. able to participate well in above exercises with no complaints reported. WIll cont with more functional intervention later this PM once wound vac is fixed. Problem List Decreased strength; Impaired balance;Decreased endurance;Decreased mobility; Decreased coordination   Barriers to Discharge None   Barriers to Discharge Comments reported that she has a ramp to enter, will be on 1st floor set up and her daughter works from home   PT Barriers   Functional Limitation Car transfers;Standing;Transfers; Walking;Ramp negotiation   Plan   Treatment/Interventions Functional transfer training;LE strengthening/ROM; Elevations; Therapeutic exercise; Endurance training;Patient/family training;Equipment eval/education; Bed mobility;Gait training   Recommendation   PT Discharge Recommendation Home with home health rehabilitation   Equipment Recommended Walker   PT Therapy Minutes   PT Time In 0930   PT Time Out 1030   PT Total Time (minutes) 60   PT Mode of treatment - Individual (minutes) 60   PT Mode of treatment - Concurrent (minutes) 0   PT Mode of treatment - Group (minutes) 0   PT Mode of treatment - Co-treat (minutes) 0   PT Mode of Treatment - Total time(minutes) 60 minutes   PT Cumulative Minutes 240   Therapy Time missed   Time missed?  No No

## 2023-07-27 NOTE — PROGRESS NOTES
07/26/23 1400   Pain Assessment   Pain Assessment Tool 0-10   Pain Score No Pain   Restrictions/Precautions   Precautions Airborne/isolation; Fall Risk;Bed/chair alarms;Supervision on toilet/commode; Other (comment)  (Wound vac of Abdominal wound)   General   Change In Medical/Functional Status (S)  B lower leg Ace Wraps for Edema management   Cognition   Overall Cognitive Status WFL   Subjective   Subjective Expressing feeling "too Isolated, that nobody is coming in her room, Anxious and exhausted today"   Sit to Stand   Type of Assistance Needed Physical assistance; Adaptive equipment   Physical Assistance Level 26%-50%   Comment from recliner and w/c   Sit to Stand CARE Score 3   Bed-Chair Transfer   Type of Assistance Needed Physical assistance; Adaptive equipment   Physical Assistance Level 26%-50%   Comment SPT with RW   Chair/Bed-to-Chair Transfer CARE Score 3   Car Transfer   Type of Assistance Needed Physical assistance   Physical Assistance Level 51%-75%   Comment Simulated, will need to practice with car once off Isolation precautions   Car Transfer CARE Score 2   Walk 10 Feet   Type of Assistance Needed Physical assistance; Adaptive equipment   Physical Assistance Level 26%-50%   Comment hanging wound vac on front of RW   Walk 10 Feet CARE Score 3   Walk 50 Feet with Two Turns   Reason if not Attempted Safety concerns   Walk 50 Feet with Two Turns CARE Score 88   Walk 150 Feet   Reason if not Attempted Safety concerns   Walk 150 Feet CARE Score 88   Walking 10 Feet on Uneven Surfaces   Reason if not Attempted Safety concerns   Walking 10 Feet on Uneven Surfaces CARE Score 88   Ambulation   Primary Mode of Locomotion Prior to Admission Walk   Distance Walked (feet) 20 ft   Assist Device Roller Walker   Gait Pattern Slow Rebeca; Step to   Limitations Noted In Endurance;Strength   Findings fatigues quickly with poor reserve for multiple attempts today   Does the patient walk? 2.  Yes   Wheel 50 Feet with Two Turns   Reason if not Attempted Activity not applicable   Wheel 50 Feet with Two Turns CARE Score 9   Wheel 150 Feet   Reason if not Attempted Activity not applicable   Wheel 876 Feet CARE Score 9   Wheelchair mobility   Does the patient use a wheelchair? 0. No   Curb or Single Stair   Reason if not Attempted Safety concerns   1 Step (Curb) CARE Score 88   4 Steps   Reason if not Attempted Activity not applicable   4 Steps CARE Score 9   12 Steps   Reason if not Attempted Activity not applicable   12 Steps CARE Score 9   Picking Up Object   Type of Assistance Needed Physical assistance;Verbal cues; Adaptive equipment   Physical Assistance Level 26%-50%   Comment Reacher with RW for balance   Picking Up Object CARE Score 3   Therapeutic Interventions   Strengthening LAQ 4 x 10 reps, Seated hip flex 4 x 5 reps, Static standing 3 bouts 45 sec with RW   Flexibility passive stretch B HS & calves x 5 min   Other Comments   Comments Pt anxious, tired and feels a bot overwhelmed today being isloated in room. Worked in [place] with KN95 mask on pt for change of scenary, but pt reporting " getting too hot with mask". Emotional support and encouragement provided throughtout. Requires extra time for all activities. Assessment   Treatment Assessment Pt participating to best of her ability despite expressing fatigue and "feeling anxious/overwhelmed". Pt reports being in/out of bed overnight and during the day. Decreasd strength and activitiy tolerance are limiting fcators affecting her overall mobility at this time.    Recommendation   PT Discharge Recommendation Home with home health rehabilitation   PT Therapy Minutes   PT Time In 1400   PT Time Out 1530   PT Total Time (minutes) 90   PT Mode of treatment - Individual (minutes) 90   PT Mode of treatment - Concurrent (minutes) 0   PT Mode of treatment - Group (minutes) 0   PT Mode of treatment - Co-treat (minutes) 0   PT Mode of Treatment - Total time(minutes) 90 minutes   PT Cumulative Minutes 180   Therapy Time missed   Time missed?  No

## 2023-07-27 NOTE — PROGRESS NOTES
Internal Medicine Progress Note  Patient: Dwayne Guo  Age/sex: 67 y.o. female  Medical Record #: [id number]      ASSESSMENT/PLAN: (Interval History)  Dwayne Guo is seen and examined and management for following issues:    Wound dehiscence s/p VAC placement  • Had recent admission for ex-lap 2/2 bowel perforation/internal hernia (6/19-7/4)  • Readmitted with wound dehiscence 7/18 and underwent a washout/VAC application and partial closure of the wound on 7/19  • Surgery to see today as VAC dressing was leaking. • Pain control and therapy per primary service.     Atrial fibrillation  • Home:  Amiodarone 200mg qd/Card CD 360mg qd/Toprol XL 25mg q12hrs/Eliquis 5mg BID  • Here:  Amiodarone 200mg qd/Card CD 360mg qd/Toprol XL 25mg qd/Eliquis 5mg BID  • Stable      HTN  · Home:  In addition to above meds, was on HCTZ 12.5mg qd/KCL 15meq qd  · Here:  As above. Hold off on HCTZ    COVID-19 positive  • Detected on routine testing for [facility] admission. • Pt is asymptomatic. • Repeat rapid antigen testing negative.     Hypokalemia  • K 3.4  • Replaced with KCl 40meq on 7/24/23 and 7/26/23 for K+ of 3.4     Anemia  • Hgb has been trending around 8.  • Stable. The above assessment and plan was reviewed and updated as determined by my evaluation of the patient on 7/27/2023.     Labs:   Results from last 7 days   Lab Units 07/26/23  0541 07/24/23  0437   WBC Thousand/uL 9.19 9.16   HEMOGLOBIN g/dL 7.9* 8.1*   HEMATOCRIT % 26.7* 27.2*   PLATELETS Thousands/uL 344 340     Results from last 7 days   Lab Units 07/26/23  0541 07/24/23  0437   SODIUM mmol/L 139 141   POTASSIUM mmol/L 3.4* 3.4*   CHLORIDE mmol/L 109* 109*   CO2 mmol/L 24 27   BUN mg/dL 14 15   CREATININE mg/dL 0.60 0.65   CALCIUM mg/dL 8.0* 8.1*             Results from last 7 days   Lab Units 07/20/23  1121   POC GLUCOSE mg/dl 176*       Review of Scheduled Meds:  Current Facility-Administered Medications   Medication Dose Route Frequency Provider Last Rate   • acetaminophen  975 mg Oral Q8H PRN MOLLY Jackson     • amiodarone  200 mg Oral Daily With Breakfast MOLLY Jackson     • apixaban  5 mg Oral BID MOLLY Jackson     • calcium carbonate  1 tablet Oral Daily With Breakfast MOLLY Jackson     • vitamin B-12  1,000 mcg Oral Daily MOLLY Jackson     • diltiazem  360 mg Oral Daily MOLLY Jackson     • ergocalciferol  50,000 Units Oral Weekly MOLLY Jackson     • ferrous sulfate  325 mg Oral Daily With Breakfast MOLLY Jackson     • FLUoxetine  20 mg Oral BID MOLLY Jackson     • melatonin  6 mg Oral HS MOLLY Jackson     • metoprolol succinate  25 mg Oral Daily MOLLY Jackson     • multivitamin-minerals  1 tablet Oral Daily MOLLY Jackson     • nystatin   Topical BID MOLLY Jackson     • ondansetron  4 mg Oral Q6H PRN MOLLY Jackson     • psyllium  1 packet Oral Daily MOLLY Jackson         Subjective/ HPI: Patient seen and examined. Patients overnight issues or events were reviewed with nursing or staff during rounds or morning huddle session. New or overnight issues include the following:     Pt seen in her room. She states that she is doing well in therapy. She is frustrated by the COVID precautions. She denies any other complaints. ROS:   A 10 point ROS was performed; negative except as noted above.        Imaging:     No orders to display       *Labs /Radiology studies reviewed  *Medications reviewed and reconciled as needed  *Please refer to order section for additional ordered labs studies  *Case discussed with primary attending during morning huddle case rounds    Physical Examination:  Vitals:   Vitals:    07/26/23 1328 07/27/23 0000 07/27/23 0500 07/27/23 0523   BP: 138/64 136/61 129/62    BP Location: Left arm Right arm Right arm    Pulse: 86 85 77    Resp: 17 20 18    Temp: 98 °F (36.7 °C) 98.8 °F (37.1 °C) 98.3 °F (36.8 °C)    TempSrc: Oral Oral Oral    SpO2: 97% 96% 97% Weight:    132 kg (291 lb 10.7 oz)   Height:           General Appearance: no distress, conversive  HEENT: PERRLA, conjuctiva normal; oropharynx clear; mucous membranes moist   Neck:  Supple, normal ROM  Lungs: CTA, normal respiratory effort, no retractions, expiratory effort normal  CV: tachy so difficult to tell if irregular; no rubs/murmurs/gallops, PMI normal   ABD: soft; ND/NT; +BS. VAC dressing intact. Pictures of wound in media section  EXT: +mild pedal edema with tr pretibial edema  Skin: normal turgor, normal texture, no rashes  Psych: affect normal, mood normal  Neuro: AAO      The above physical exam was reviewed and updated as determined by my evaluation of the patient on 7/27/2023. Invasive Devices     Drain  Duration           External Urinary Catheter 8 days                   VTE Pharmacologic Prophylaxis: Eliquis  Code Status: Level 1 - Full Code  Current Length of Stay: 3 day(s)      Total time spent:  30 minutes with more than 50% spent counseling/coordinating care. Counseling includes discussion with patient re: progress  and discussion with patient of his/her current medical state/information. Coordination of patient's care was performed in conjunction with primary service. Time invested included review of patient's labs, vitals, and management of their comorbidities with continued monitoring. In addition, this patient was discussed with medical team including physician and advanced extenders. The care of the patient was extensively discussed and appropriate treatment plan was formulated unique for this patient. Medical decision making for the day was made by supervising physician unless otherwise noted in their attestation statement. ** Please Note:  voice to text software may have been used in the creation of this document.  Although proof errors in transcription or interpretation are a potential of such software**

## 2023-07-27 NOTE — PROGRESS NOTES
Occupational Therapy Treatment Note       07/27/23 1300   Pain Assessment   Pain Assessment Tool 0-10   Pain Score No Pain   Restrictions/Precautions   Precautions Bed/chair alarms; Airborne/isolation;Supervision on toilet/commode  (wound vac abdomen)   Roll Left and Right   Type of Assistance Needed Physical assistance   Physical Assistance Level 26%-50%   Comment +bed rails   Roll Left and Right CARE Score 3   Cognition   Overall Cognitive Status WFL   Arousal/Participation Alert   Attention Within functional limits   Orientation Level Oriented X4   Activity Tolerance   Activity Tolerance Patient tolerated treatment well   Yadi Rolle present to assess pt's buttock, per him applied Hydraguard. Also per Dr. Marcella Rolle, 2nd antigen test will be completed tomorrow. Assessment   Treatment Assessment Pt participated in skilled OT tx session. See above for further details on functional performance. OT educated pt on discharge planning process, including DME orders, ability for virtual home assessment by family providing pictures/measurements of house to therapists, and that case management will setup continued home therapy and nursing upon d/c. This process begins more when a discharge date is determined. Pt is hopeful to d/c home next week. PT to provide list for virtual home assessment today and pt will relay to her family. Pt will call family to see if her dtr will be present this afternoon and could meet at 2 pm tomorrow with OT. From there additional family training would be setup for early next week. OT offered to call pt directly myself, but pt declines stating family wants to meet in person only. Pt interested in hospital bed at home, OT explained medical criteria needed for insurance for coverage, but that hospital beds can be rented per month out of pocket if insurance does not cover.  Will plan for family training tomorrow afternoon with at least one of pt's children, she is calling them to follow up. Plan   Treatment/Interventions ADL retraining;Functional transfer training; Therapeutic exercise; Endurance training;Patient/family training;Equipment eval/education; Compensatory technique education   Progress Progressing toward goals   OT Therapy Minutes   OT Time In 1300   OT Time Out 1340   OT Total Time (minutes) 40   OT Mode of treatment - Individual (minutes) 40   OT Mode of treatment - Concurrent (minutes) 0   OT Mode of treatment - Group (minutes) 0   OT Mode of treatment - Co-treat (minutes) 0   OT Mode of Treatment - Total time(minutes) 40 minutes   OT Cumulative Minutes 310   Therapy Time missed   Time missed?  No

## 2023-07-27 NOTE — PROGRESS NOTES
07/27/23 1430   Pain Assessment   Pain Score No Pain   Restrictions/Precautions   Precautions Fall Risk;Supervision on toilet/commode; Airborne/isolation  (abdominal incision with wound vac.)   Braces or Orthoses   ([facility])   General   Change In Medical/Functional Status Patient cleared by surgery for patient to participate in mobility/ ambulation   Cognition   Overall Cognitive Status WFL   Subjective   Subjective Pt. has no new complaints. Ready and determined to participate in therapy   Sit to Stand   Type of Assistance Needed Physical assistance   Physical Assistance Level 26%-50%   Comment mod A from bed, otherwise min/CGA from w/c and BSC   Sit to Stand CARE Score 3   Bed-Chair Transfer   Type of Assistance Needed Physical assistance   Physical Assistance Level 26%-50%   Chair/Bed-to-Chair Transfer CARE Score 3   Transfer Bed/Chair/Wheelchair   Adaptive Equipment Roller Walker   Walk 10 Feet   Type of Assistance Needed Physical assistance   Physical Assistance Level Total assistance   Comment min A/ CGA CF for safety due to dec endurance   Walk 10 Feet CARE Score 1   Walk 50 Feet with Two Turns   Type of Assistance Needed Physical assistance   Physical Assistance Level Total assistance   Comment min A/ CGA CF for safety due to dec endurance   Walk 50 Feet with Two Turns CARE Score 1   Walk 150 Feet   Reason if not Attempted Safety concerns   Walk 150 Feet CARE Score 88   Walking 10 Feet on Uneven Surfaces   Comment trial tomorrow if safe on foam mat   Ambulation   Primary Mode of Locomotion Prior to Admission Walk   Distance Walked (feet) 40 ft  (65)   Assist Device Roller Walker   Gait Pattern Inconsistant Rebeca; Improper weight shift   Limitations Noted In Endurance;Strength   Provided Assistance with: Balance   Walk Assist Level Contact Guard;Minimum Assist;Chair Follow   Does the patient walk? 2. Yes   Wheelchair mobility   Does the patient use a wheelchair? 0.  No   Toilet Transfer   Type of Assistance Needed Physical assistance   Physical Assistance Level 25% or less   Comment BSC using RW   Toilet Transfer CARE Score 3   Toilet Transfer   Surface Assessed Bedside Commode   Adaptive Equipment   (RW)   Therapeutic Interventions   Strengthening seated LAQ AROm with 1# wts on L LE  and hip flex AAROM, add squeeze with ball, hamstring curl with red TB   Other Comments   Comments HR during activities bet  bpm , SPO2 92-95%   Assessment   Treatment Assessment Pt. participated in 60 mins session and was able to tolerate above activities . Pt. very motivated to ambulate and was able to ambulate up to 65 feet. Pt. also dmeonstrates improved transfers to min A/ CGA unless getting up from EOB which she still needs mod A. Pt. c/o fatigue after middle of thera ex but otherwise no other complaints. Gave patient form for measurements in home environment that her family can measure. Pt. can be progressed to walking with staff tomorrow if safe. Pt. at recliner at end of session with needs in place. Problem List Decreased strength; Impaired balance;Decreased endurance;Decreased mobility; Decreased coordination   Barriers to Discharge None   PT Barriers   Functional Limitation Car transfers;Standing;Transfers; Walking;Ramp negotiation   Recommendation   PT Discharge Recommendation Home with home health rehabilitation   Equipment Recommended Walker   PT Therapy Minutes   PT Time In 1430   PT Time Out 1530   PT Total Time (minutes) 60   PT Mode of treatment - Individual (minutes) 60   PT Mode of treatment - Concurrent (minutes) 0   PT Mode of treatment - Group (minutes) 0   PT Mode of treatment - Co-treat (minutes) 0   PT Mode of Treatment - Total time(minutes) 60 minutes   PT Cumulative Minutes 300   Therapy Time missed   Time missed?  No

## 2023-07-28 PROBLEM — Z76.89 ENCOUNTER FOR SKIN CARE: Status: ACTIVE | Noted: 2023-07-28

## 2023-07-28 PROBLEM — F39 MOOD DISORDER (HCC): Status: ACTIVE | Noted: 2023-07-28

## 2023-07-28 PROCEDURE — 99233 SBSQ HOSP IP/OBS HIGH 50: CPT

## 2023-07-28 PROCEDURE — 0WQFXZZ REPAIR ABDOMINAL WALL, EXTERNAL APPROACH: ICD-10-PCS | Performed by: SURGERY

## 2023-07-28 PROCEDURE — 97535 SELF CARE MNGMENT TRAINING: CPT

## 2023-07-28 PROCEDURE — 97530 THERAPEUTIC ACTIVITIES: CPT

## 2023-07-28 PROCEDURE — 97110 THERAPEUTIC EXERCISES: CPT

## 2023-07-28 PROCEDURE — 87811 SARS-COV-2 COVID19 W/OPTIC: CPT

## 2023-07-28 PROCEDURE — 97116 GAIT TRAINING THERAPY: CPT

## 2023-07-28 PROCEDURE — 99232 SBSQ HOSP IP/OBS MODERATE 35: CPT | Performed by: INTERNAL MEDICINE

## 2023-07-28 RX ORDER — LIDOCAINE HYDROCHLORIDE 10 MG/ML
20 INJECTION, SOLUTION EPIDURAL; INFILTRATION; INTRACAUDAL; PERINEURAL ONCE
Status: COMPLETED | OUTPATIENT
Start: 2023-07-28 | End: 2023-07-28

## 2023-07-28 RX ADMIN — DILTIAZEM HYDROCHLORIDE 360 MG: 180 CAPSULE, COATED, EXTENDED RELEASE ORAL at 09:27

## 2023-07-28 RX ADMIN — FERROUS SULFATE TAB 325 MG (65 MG ELEMENTAL FE) 325 MG: 325 (65 FE) TAB at 09:24

## 2023-07-28 RX ADMIN — Medication 1 TABLET: at 09:24

## 2023-07-28 RX ADMIN — APIXABAN 5 MG: 5 TABLET, FILM COATED ORAL at 09:25

## 2023-07-28 RX ADMIN — AMIODARONE HYDROCHLORIDE 200 MG: 200 TABLET ORAL at 09:25

## 2023-07-28 RX ADMIN — LIDOCAINE HYDROCHLORIDE 20 ML: 10 INJECTION, SOLUTION EPIDURAL; INFILTRATION; INTRACAUDAL; PERINEURAL at 18:01

## 2023-07-28 RX ADMIN — NYSTATIN 1 APPLICATION.: 100000 POWDER TOPICAL at 09:30

## 2023-07-28 RX ADMIN — FLUOXETINE 20 MG: 20 CAPSULE ORAL at 17:16

## 2023-07-28 RX ADMIN — METOPROLOL SUCCINATE 25 MG: 25 TABLET, FILM COATED, EXTENDED RELEASE ORAL at 09:26

## 2023-07-28 RX ADMIN — FLUOXETINE 20 MG: 20 CAPSULE ORAL at 09:25

## 2023-07-28 RX ADMIN — CYANOCOBALAMIN TAB 500 MCG 1000 MCG: 500 TAB at 09:25

## 2023-07-28 RX ADMIN — APIXABAN 5 MG: 5 TABLET, FILM COATED ORAL at 17:16

## 2023-07-28 RX ADMIN — NYSTATIN: 100000 POWDER TOPICAL at 21:28

## 2023-07-28 RX ADMIN — MELATONIN TAB 3 MG 6 MG: 3 TAB at 22:41

## 2023-07-28 NOTE — PROGRESS NOTES
Internal Medicine Progress Note  Patient: Alex Guo  Age/sex: 67 y.o. female  Medical Record #: [id number]      ASSESSMENT/PLAN: (Interval History)  Alex Guo is seen and examined and management for following issues:    Wound dehiscence s/p VAC placement  • Had recent admission for ex-lap 2/2 bowel perforation/internal hernia (6/19-7/4)  • Readmitted with wound dehiscence 7/18 and underwent a washout/VAC application and partial closure of the wound on 7/19  • Surgery to see today as VAC dressing was leaking. • Pain control and therapy per primary service.     Atrial fibrillation  • Home:  Amiodarone 200mg qd/Card CD 360mg qd/Toprol XL 25mg q12hrs/Eliquis 5mg BID  • Here:  Amiodarone 200mg qd/Card CD 360mg qd/Toprol XL 25mg qd/Eliquis 5mg BID  • Stable      HTN  · Home:  In addition to above meds, was on HCTZ 12.5mg qd/KCL 15meq qd  · Here:  As above. Hold off on HCTZ    COVID-19 positive  • Detected on routine testing for [facility] admission. • Pt is asymptomatic. • Repeat rapid antigen testing negative 7/26/23. Repeat testing today. If both negative COVID precautions can be discontinued.     Hypokalemia  • K 3.4  • Replaced with KCl 40meq on 7/24/23 and 7/26/23 for K+ of 3.4     Anemia  • Hgb has been trending around 8.  • Stable. • Pt had Venofer infusions in the past as her Fe was low due to gastric bypass. She is agreeable to infusions while here as well. The above assessment and plan was reviewed and updated as determined by my evaluation of the patient on 7/28/2023.     Labs:   Results from last 7 days   Lab Units 07/26/23  0541 07/24/23  0437   WBC Thousand/uL 9.19 9.16   HEMOGLOBIN g/dL 7.9* 8.1*   HEMATOCRIT % 26.7* 27.2*   PLATELETS Thousands/uL 344 340     Results from last 7 days   Lab Units 07/26/23  0541 07/24/23  0437   SODIUM mmol/L 139 141   POTASSIUM mmol/L 3.4* 3.4*   CHLORIDE mmol/L 109* 109*   CO2 mmol/L 24 27   BUN mg/dL 14 15   CREATININE mg/dL 0.60 0.65   CALCIUM mg/dL 8.0* 8.1*                   Review of Scheduled Meds:  Current Facility-Administered Medications   Medication Dose Route Frequency Provider Last Rate   • acetaminophen  975 mg Oral Q8H PRN MOLLY Jackson     • amiodarone  200 mg Oral Daily With Breakfast MOLLY Jackson     • apixaban  5 mg Oral BID MOLLY Jackson     • calcium carbonate  1 tablet Oral Daily With Breakfast MOLLY Jackson     • vitamin B-12  1,000 mcg Oral Daily MOLLY Jackson     • diltiazem  360 mg Oral Daily MOLLY Jackson     • ergocalciferol  50,000 Units Oral Weekly MOLLY Jackson     • ferrous sulfate  325 mg Oral Daily With Breakfast MOLLY Jackson     • FLUoxetine  20 mg Oral BID MOLLY Jackson     • melatonin  6 mg Oral HS MOLLY Jackson     • metoprolol succinate  25 mg Oral Daily MOLLY Jackson     • multivitamin-minerals  1 tablet Oral Daily MOLLY Jackson     • nystatin   Topical BID MOLLY Jackson     • ondansetron  4 mg Oral Q6H PRN MOLLY Jackson     • psyllium  1 packet Oral Daily MOLLY Jackson         Subjective/ HPI: Patient seen and examined. Patients overnight issues or events were reviewed with nursing or staff during rounds or morning huddle session. New or overnight issues include the following:     Pt seen in her room with her daughter present at the bedside. She states that she is doing well. She is anxiously awaiting the repeat COVID test. She denies any other complaints. ROS:   A 10 point ROS was performed; negative except as noted above.        Imaging:     No orders to display       *Labs /Radiology studies reviewed  *Medications reviewed and reconciled as needed  *Please refer to order section for additional ordered labs studies  *Case discussed with primary attending during morning huddle case rounds    Physical Examination:  Vitals:   Vitals:    07/27/23 2302 07/28/23 0500 07/28/23 0600 07/28/23 0926   BP: 126/58 143/67  119/82   BP Location: Right arm Right arm     Pulse: 78 96  92   Resp: 18 20     Temp: 98.5 °F (36.9 °C) 98.1 °F (36.7 °C)     TempSrc: Oral Oral     SpO2: 97% 97%     Weight:  132 kg (291 lb 0.1 oz) 132 kg (291 lb 0.1 oz)    Height:           General Appearance: no distress, conversive  HEENT: PERRLA, conjuctiva normal; oropharynx clear; mucous membranes moist   Neck:  Supple, normal ROM  Lungs: CTA, normal respiratory effort, no retractions, expiratory effort normal  CV: tachy so difficult to tell if irregular; no rubs/murmurs/gallops, PMI normal   ABD: soft; ND/NT; +BS. VAC dressing intact. Pictures of wound in media section  EXT: mild pedal edema. Legs ACE wrapped. Skin: normal turgor, normal texture, no rashes  Psych: affect normal, mood normal  Neuro: AAO      The above physical exam was reviewed and updated as determined by my evaluation of the patient on 7/28/2023. Invasive Devices     Drain  Duration           External Urinary Catheter 9 days                   VTE Pharmacologic Prophylaxis: Eliquis  Code Status: Level 1 - Full Code  Current Length of Stay: 4 day(s)      Total time spent:  30 minutes with more than 50% spent counseling/coordinating care. Counseling includes discussion with patient re: progress  and discussion with patient of his/her current medical state/information. Coordination of patient's care was performed in conjunction with primary service. Time invested included review of patient's labs, vitals, and management of their comorbidities with continued monitoring. In addition, this patient was discussed with medical team including physician and advanced extenders. The care of the patient was extensively discussed and appropriate treatment plan was formulated unique for this patient. Medical decision making for the day was made by supervising physician unless otherwise noted in their attestation statement. ** Please Note:  voice to text software may have been used in the creation of this document. Although proof errors in transcription or interpretation are a potential of such software**

## 2023-07-28 NOTE — PROGRESS NOTES
Physical Medicine and Rehabilitation Progress Note  Jason Dickens 67 y.o. female MRN: [id number]  Unit/Bed#: [facility] 451-01 Encounter: [id number]      Assessment & Plan:     Decline in ADLs and mobility: Functional assessment - improving        FIM  Care Score  Admit Score Recent Score    Total assist  1-100% or 2p    Tot To hygiene, LBD    Max assist 2-51-75%    Sub Bathing     Mod assist 3- 26-50%  Par   bathing   Min assist 3- 25% or < Par   toileting hygiene   CG assist 4  TA     Sup/Setup 4-5 Sup UBD UBD   Mod-I/Indep 6 MI      Transfers  Total assist  Min assist     Ambulation   10 ft mod assist  Total assist     Stairs   Total assist/NT      Goal: Supervision for most ADLs and for mobility except some assist with dressing, bathing  Major barriers:  Wound vac/wound, deconditioning, risk of complications  Dispo: Home with [facility] 14 days from admission      * Open abdominal wall wound  Assessment & Plan  - recent admission [facility] 6/19-7/4 with bowel perforation and internal hernia (hx arlene-en-y gastric bypass) - s/p ex lap, resection J-J anastomosis, segmental ileum resection, abthera 6/19  - s/p ex lap, washout, abthera change 6/19  - s/p ex lap, small bowel resection, bowel anastomosis x3, vicryl bridging mesh 6/20  - s/p debridement/washout on 7/18  - s/p partial closure and wound vac placement on 7/19  - wound vac 75 mmHg continuous  - dressing changes per General Surgery   - Spoke with [facility] on-call attending 7/28 and Gen Sx AP who will come today for wound vac change  - some loose stools to be expected post GI sx - metamucil but patient refusing it to try to bulk up - stools more formed recently nevertheless - monitor for changes or other etiologies   - Recent vitals/labs stable - CBC, BMP Monday   - midline incision with sutures  - pain management - well controlled - PRN APAP   - Function continues to improve well  - Continue acute comprehensive interdisciplinary inpatient rehabilitation to include intensive skilled therapies (PT, OT) as outlined with oversight and management by rehabilitation physician as well as inpatient rehab level nursing, case management and weekly interdisciplinary team meetings. Encounter for skin care  Assessment & Plan  7/28 buttock skin intact   High risk for skin wounds/breakdown with patient wanting to lay in recliner and not bed - counseled patient on being in bed to decrease risk of wounds;   - Increased risk of skin wounds/breakdown/rashes due to recent immobility and co-morbidities - loose stools at times  - Turn patient Q2H; encourage frequent wt shifts when in chair/WC every 10-15 minutes  - Hydragaurd to buttocks and sacrum BID & PRN  - Float heels and ankles/bony protuberances with pillow(s)(or wedges) in all positions while in bed or recliner chair. Be sure feet are not up against footboard (pull patient up/remove footboard/or request longer bed as indicated)  - EHOB waffle cushion to chair/WC when OOB  - Maximum time in chair 2 hours at a time; OOB minimum 3 times per day - Notify MD if unable to get patient OOB 3 times per day  - Skin mobilization protocol   - Optimal bowel/bladder hygiene - avoid bedpan unless absolutely necessary and use for least time possible   - Optimal nutrition   - Nursing to document in chart and Notify MD if buttock, sacrum, heel, or other skin site develops erythema or skin breakdown as soon as possible. If patient is soiling themselves with urine or stool notify MD.  If you are unable to maintain skin integrity and prevent erythema due to frequency of soiling notify MD as soon as possible. - Notify MD of new or increasing drainage, development of purulent drainage, increased size/depth of wound, lack of healing, inability to maintain wound integrity due to degree of drainage, wound product, dressing, or currently ordered frequency of wound management. In general dressings should be changed PRN if soiled unless specifically noted otherwise. Do not allow soiled dressing on patient for extended period of time.         S/P bariatric surgery  Assessment & Plan  - 25 years ago  - supplemental vitamins post surgery- MV, B12,    COVID  Assessment & Plan  - positive 7/24  - asymptomatic  - no signs of infection: afebrile  - isolation 10 days (thru 8/3) unless 2 Ag tests 48 h apart negative  - 7/26: SARS cov2 antigen negative; repeat 7/28 Ag lab pending  - monitor closely      Anemia  Assessment & Plan  - Hbg 7.9 (previously 8.1)  - Patient apparently was at times in past having chronic anemia as well and occasionally getting Venofer per family - discussed with IM and could consider Venofer if Covid comes back negative; hold oral iron as it may irritate stomach and may not absorb well for now   - monitor CBC next Monday   - consider transfusion if warranted    Permanent atrial fibrillation ([address])  Assessment & Plan  - home: Eliqus 5 mg BID, Amiodarone 200 mg daily, Cardizem 360 mg daily, Toprol XL 25 mg BID  - here: Eliquis for A/C; Amio, Dilt, MTP per IM  - Monitor Hb   - monitor with therapies    Mood disorder (HCC)  Assessment & Plan  Prozac  Melatonin for sleep  Supportive counseling     Hypokalemia  Assessment & Plan  - K+ 3.4 (7/26)  - IM consulted, monitoring, repletion, and overall management at their discretion during ARC course  - monitor BMP      Anxiety  Assessment & Plan  - home: Prozac 20 mg daily  - continue here  - consult neuropsychology when cleared    Hypertension  Assessment & Plan  - home: Cardizem 360 mg daily, Metoprolol succinate 25 mg BID, HCTZ 12.5 mg BID  - here: Cardizem, Toprol XL daily, (hold HCTZ)    - monitor BP    Morbid obesity with BMI of 50.0-59.9, adult ([facility])  Assessment & Plan  - BMI 53.89  - s/p gastric bypass (25 years ago)  - educate on lifestyle modification  - consider nutrition consult    GILDARDO (obstructive sleep apnea)  Assessment & Plan  - patient states she has not been using CPAP much at home but has one  - here: 2L nc at home  - monitor     Vitamin D deficiency  Assessment & Plan  - Vit D 62.1 (1/16/23)  - Vitamin D 50K weekly       Other Medical Issues:  • Monitor for     Follow-up providers and other issues to be followed up after discharge  PCP  Gen Mcfarlane  Cards  Bariatrics     CODE: Level 1: Full Code    Restrictions include: Fall precautions    Objective:     Allergies per EMR  Diagnostic Studies: Reviewed, no new imaging  No orders to display     See above as well    Laboratory: Labs reviewed  Results from last 7 days   Lab Units 07/26/23  0541 07/24/23  0437 07/22/23  0616   HEMOGLOBIN g/dL 7.9* 8.1* 8.9*   HEMATOCRIT % 26.7* 27.2* 29.9*   WBC Thousand/uL 9.19 9.16 10.77*     Results from last 7 days   Lab Units 07/26/23  0541 07/24/23  0437 07/22/23  0616   BUN mg/dL 14 15 15   SODIUM mmol/L 139 141 138   POTASSIUM mmol/L 3.4* 3.4* 5.0   CHLORIDE mmol/L 109* 109* 108   CREATININE mg/dL 0.60 0.65 0.80   AST U/L  --  21 58*   ALT U/L  --  16 15            Drug regimen reviewed, all potential adverse effects identified and addressed:    Scheduled Meds:  Current Facility-Administered Medications   Medication Dose Route Frequency Provider Last Rate   • acetaminophen  975 mg Oral Q8H PRN MOLLY Jackson     • amiodarone  200 mg Oral Daily With Breakfast MOLLY Jackson     • apixaban  5 mg Oral BID MOLLY Jackson     • calcium carbonate  1 tablet Oral Daily With Breakfast MOLLY Jackson     • vitamin B-12  1,000 mcg Oral Daily MOLLY Jackson     • diltiazem  360 mg Oral Daily MOLLY Jackson     • ergocalciferol  50,000 Units Oral Weekly MOLLY Jackson     • FLUoxetine  20 mg Oral BID MOLLY Jackson     • lidocaine (PF)  20 mL Infiltration Once Renata Camcaho MD     • melatonin  6 mg Oral HS MOLLY Jackson     • metoprolol succinate  25 mg Oral Daily MOLLY Jackson     • multivitamin-minerals  1 tablet Oral Daily MOLLY Jackson     • nystatin   Topical BID MOLLY Richards • ondansetron  4 mg Oral Q6H PRN MOLLY Yost     • psyllium  1 packet Oral Daily MOLLY Yost         Chief Complaints: Rehab follow-up    Subjective: On eval, patient reports no significant abdominal pain. She denies lightheadedness, fever, chills, sweats, increased leg swelling, calf pain, lightheadedness. She reports bowel movements are improving some. ROS: A 10 point ROS was performed; negative except as noted above. Physical Exam:  Vitals:    07/28/23 1500   BP: 126/59   Pulse: 69   Resp: 18   Temp: 98.5 °F (36.9 °C)   SpO2: 94%       GEN:  Lying in bed in NAD   HEENT/NECK: Normocephalic, atraumatic, moist mucous membranes   CARDIAC: Regular rate rhythm, no murmers, no rubs, no gallops  LUNGS:  clear to auscultation, no wheezes, rales, or rhonchi  ABDOMEN: Soft, non-tender, non-distended, normal active bowel sounds and Wound vac in place  EXTREMITIES/SKIN:  Slight BLE edema without calf TTP  NEURO:   MENTAL STATUS: awake, oriented to person, place, time, and situation, MENTAL STATUS:  Appropriate wakefulness and interaction  and Strength/MMT:  Stable  PSYCH:  Affect:  Euthymic     HPI:  67 F with PMH of Afib on Eliquis, HTN, RCC, morbid obesity, RCC s/p cryoablation, cervical spondylosis c/ myelopathy, OA, gastric bypass who had recent bowel perforation and internal hernia s/p ex-lap with extended hospital course who was dc'd to [facility] where she developed wound dehiscence and was readmitted for washout on 7/18 followed by vac placement and partial wound closure on 7/19. Course notable for pain, anemia, diarrhea and testing +Covid-19 on screening prior to transfer deemed to be asymptomatic at this point. Patient was evaluated by skilled therapies and was found to have significant decline in ADLs and ambulation and appears appropriate for admission to [facility].     "Suma Vela is [age] y.o. female with a medical history of atrial fibrillation (on Eliquis), sleep apnea, hypertension, renal cell carcinoma, gastric bypass, cervical spondylosis, and osteoarthritis who was recently admitted at [address] [place] [address] with a bowel perforation and internal hernia (hx arlene-en-y gastric bypass).  She had an ex lap, resection J-J anastomosis, segmental ileum resection, abthera vac placement, ex lap, washout, abthera change and ex lap, small bowel resection, bowel anastomosis x3, vicryl bridging mesh. Once medically stable she went to TCU for rehabilitation. Patient presented to [address] [place] [address] on 7/18/23 after clinic appointment for suture removal with an abdominal wound dehiscence.  On 7/18 she underwent washout, wound exploration, debridement. On 7/19 she underwent washout, partial wound closure, and wound vac placement. Patient currently has wound vac in place at a pressure of 75 mmHg continuously.  Wound vac last changed on 7/24 with plans for surgery management of dressing changes.  She is tolerating a regular diet with nutrition supplements. Patient tested positive for COVID on 7/24.  She is asymptomatic and not being treated at this time.  She worked with PT and OT and was recommended for post acute rehab following her hospital stay. Mellisa Baptiste has demonstrated that she can tolerate three hours of therapy, five days a week and is now medically cleared [facility]. PT/OT were consulted and recommend acute inpatient rehabilitation. The patient was evaluated by the Rehabilitation team and deemed an appropriate candidate for comprehensive inpatient rehabilitation and admitted to the [facility] on 7/24/2023  4:34 PM"    ** Please Note: Fluency Direct voice to text software may have been used in the creation of this document.  **    50 minutes or greater spent for this encounter which included a combination of face-to-face time with Tanya Lias and non-face-to-face time which in part specifically includes management of Abdominal wound, functional decline. Face-to-face time included extended discussion with patient and family regarding current condition, medical history, mood, medical/rehabilitation management, and disposition. Non face-to-face time included coordination of care with patient's co-managing AP and/or physician(s) thru communication and review of their recent documentation as well as reviewing vitals, bowel/bladder function, recent labs, and notes from therapy, CM, and nursing.

## 2023-07-28 NOTE — PROGRESS NOTES
Occupational Therapy Treatment Note         07/28/23 1400   Pain Assessment   Pain Assessment Tool 0-10   Pain Score No Pain   Restrictions/Precautions   Precautions Bed/chair alarms;Supervision on toilet/commode; Airborne/isolation;Contact/isolation  (wound vac)   Lifestyle   Autonomy "I want to go home next week"   Cognition   Overall Cognitive Status [facility]   Arousal/Participation Alert   Attention Within functional limits   Memory Within functional limits   Following Commands Follows all commands and directions without difficulty   Activity Tolerance   Activity Tolerance Patient tolerated treatment well   Assessment   Treatment Assessment Pt participated in skilled OT tx session. See above for further details on functional performance. Family training/meeting completed with pt and pt's 3 dtrs. OT reviewed the following information - anticipate sponge bathing upon d/c 2* wound, assistance with footwear and bathing of LEs overall setup assist for sponge bathing; goal for independent with RW transfers; recommend [facility] for over toilet if possible. Pt has pictures of house on phone, RW may not fit into bathroom doorway but possibly side ways. Will not fit by toilet but has access to shower grab bars. Dtr to obtain measurement of width around toilet to assess if [facility] will fit or if other toileting rail options are needed. Pt's goal to d/c home mid next week, will follow up with team on this Monday. Per general sx in room, pt to get wound vac off. Pt's dtr who she lives with will be present Monday at 12:30 for family training and will plan for nursing training as well if needed. Pt will continue to benefit from skilled OT intervention to address UE strengthening, standing balance/tolerance, endurance for short distance mobility, toileting adaptive equipment options (including BSC versus toilet aide) in order to maximize functional independence in ADLS, functional mobility/transfers, while decreasing burden of care.     Home measurements - 3" threshold of door from ramp to enter house; 28" wide door to enter house; 21" wide bathroom door; 6" step into shower; 12" from front of toilet to shower. Dtr will follow up on width around toilet and height of bed. Prognosis Good   Problem List Decreased strength;Decreased endurance; Impaired balance;Decreased mobility;Pain;Orthopedic restrictions   Barriers to Discharge None   Plan   Treatment/Interventions ADL retraining;Functional transfer training; Therapeutic exercise; Endurance training;Patient/family training;Equipment eval/education; Bed mobility; Compensatory technique education   Progress Progressing toward goals   Recommendation   OT Discharge Recommendation Home with home health rehabilitation   OT Therapy Minutes   OT Time In 1400   OT Time Out 1430   OT Total Time (minutes) 30   OT Mode of treatment - Individual (minutes) 30   OT Mode of treatment - Concurrent (minutes) 0   OT Mode of treatment - Group (minutes) 0   OT Mode of treatment - Co-treat (minutes) 0   OT Mode of Treatment - Total time(minutes) 30 minutes   OT Cumulative Minutes 430   Therapy Time missed   Time missed?  No

## 2023-07-28 NOTE — PROGRESS NOTES
07/28/23 0710   Pain Assessment   Pain Assessment Tool 0-10   Pain Score No Pain   Restrictions/Precautions   Precautions Fall Risk; Airborne/isolation;Supervision on toilet/commode  (wound vac)   Weight Bearing Restrictions No   ROM Restrictions No   Braces or Orthoses   (b/l ace wraps)   Lifestyle   Autonomy "My family is coming in today, they have a lot of questions."   Eating   Type of Assistance Needed Independent   Physical Assistance Level No physical assistance   Eating CARE Score 6   Oral Hygiene   Type of Assistance Needed Set-up / clean-up   Physical Assistance Level No physical assistance   Comment pt requested to complete while seated in recliner despite encouragement to complete in stance. Oral Hygiene CARE Score 5   Shower/Bathe Self   Type of Assistance Needed Physical assistance; Adaptive equipment   Physical Assistance Level 26%-50%   Comment pt bathed UB and upper legs while seated. CGA in stance w/ unilateral suppport of RW to bathe davonte and partially bathe buttocks, A to thoroughly bathe buttocks. A to bathe lower legs. Applied nystatin powder under pannus. Shower/Bathe Self CARE Score 3   Tub/Shower Transfer   Reason Not Assessed Medical;Sponge Bath  (wound vac)   Upper Body Dressing   Type of Assistance Needed Set-up / clean-up   Physical Assistance Level No physical assistance   Comment seated   Upper Body Dressing CARE Score 5   Lower Body Dressing   Comment wound vac   Reason if not Attempted Medical concerns   Lower Body Dressing CARE Score 88   Putting On/Taking Off Footwear   Type of Assistance Needed Physical assistance   Physical Assistance Level Total assistance   Comment A to don ace wraps and socks. Putting On/Taking Off Footwear CARE Score 1   Sit to Stand   Type of Assistance Needed Physical assistance   Physical Assistance Level 25% or less   Comment CGA-Min A w/ inc time.    Sit to Stand CARE Score 3   Bed-Chair Transfer   Type of Assistance Needed Physical assistance; Adaptive equipment   Physical Assistance Level 25% or less   Comment CGA-Min A stand pivot w/ RW. Chair/Bed-to-Chair Transfer CARE Score 3   Toileting Hygiene   Type of Assistance Needed Physical assistance   Physical Assistance Level 25% or less   Comment CGA-Min A to steady in stance at RW, req encouragement to manage davonte hygiene req set-up. Pt able to manage dress, does not wear pants 2* wound vac. Toileting Hygiene CARE Score 3   Toilet Transfer   Type of Assistance Needed Physical assistance; Adaptive equipment   Physical Assistance Level 25% or less   Comment CGA-Min A stand pivot w/ RW to platform BSC. Toilet Transfer CARE Score 3   Functional Standing Tolerance   Time 45-60 seconds x3 trials   Activity static standing at RW to focus on endurance work and standing tolerance, req encouragement to maximize stand tolerance. Exercise Tools   Theraband BUE ther ex to maximize strength and endurance for ADLs and fxnl mobility. Completed w/ red theraband, 3x10 EF/EE, horizontal ABD, and S ABD/ADD. Pt tolerated well w/ rest breaks between sets to manage fatigue. Cognition   Overall Cognitive Status WFL   Assessment   Treatment Assessment Pt seen for skilled OT session focusing on self-care management, BUE strengthening, and fxnl standing tolerance. Time spent rapport building. Details on sponge bathe ADL noted above, cont to req A for bathing buttocks and BLE and would recommending LHS to inc indep. Pt req frequent rest breaks to manage fatigue, at times req encouragement to maximize participation. Cont OT POC: endurance work, fxnl standing tolerance, BUE strengthening, and fxnl mobility w/ RW. FT scheduled this afternoon to review OT d/c recommendations. Pt was left resting in recliner w/ all needs within reach and meal tray set-up. Prognosis Good   Problem List Decreased strength; Impaired balance;Decreased endurance;Decreased mobility; Decreased coordination   Plan   Treatment/Interventions ADL retraining;Functional transfer training; Therapeutic exercise;Patient/family training   Progress Progressing toward goals   Recommendation   OT Discharge Recommendation Home with home health rehabilitation   OT Therapy Minutes   OT Time In 0710   OT Time Out 0840   OT Total Time (minutes) 90   OT Mode of treatment - Individual (minutes) 90   OT Mode of treatment - Concurrent (minutes) 0   OT Mode of treatment - Group (minutes) 0   OT Mode of treatment - Co-treat (minutes) 0   OT Mode of Treatment - Total time(minutes) 90 minutes   OT Cumulative Minutes 400   Therapy Time missed   Time missed?  No

## 2023-07-28 NOTE — PROGRESS NOTES
07/28/23 1030   Pain Assessment   Pain Assessment Tool 0-10   Pain Score No Pain   Subjective   Subjective reports having a "rough night and did not sleep well"   Sit to Stand   Type of Assistance Needed Incidental touching;Supervision; Adaptive equipment   Comment practiced repeated stands x 7, initial stand with CGA, supervision therafter, stood with RW   Sit to Stand CARE Score 4   Therapeutic Interventions   Strengthening STS x 7, standing 45-90 sec each time, working on pursed lip breathing and managing anxiety, using RW for support. Standing hip flex 2 x 3, seated LAQ 3 x 10   Flexibility seated passive B HS & calf stretches x 3 min each   Assessment   Treatment Assessment Pt seen for 30 min in room this morning to work on contineud B LE strength training to improve transfers, standing tolerance and gait. Will see this afternoon to work on walking with RW. Plan   Progress Progressing toward goals   Recommendation   PT Discharge Recommendation Home with home health rehabilitation   Equipment Recommended Walker   PT Therapy Minutes   PT Time In 1030   PT Time Out 1100   PT Total Time (minutes) 30   PT Mode of treatment - Individual (minutes) 30   PT Mode of treatment - Concurrent (minutes) 0   PT Mode of treatment - Group (minutes) 0   PT Mode of treatment - Co-treat (minutes) 0   PT Mode of Treatment - Total time(minutes) 30 minutes   PT Cumulative Minutes 330   Therapy Time missed   Time missed?  No

## 2023-07-28 NOTE — PLAN OF CARE
Problem: PAIN - ADULT  Goal: Verbalizes/displays adequate comfort level or baseline comfort level  Description: Interventions:  - Encourage patient to monitor pain and request assistance  - Assess pain using appropriate pain scale  - Administer analgesics based on type and severity of pain and evaluate response  - Implement non-pharmacological measures as appropriate and evaluate response  - Consider cultural and social influences on pain and pain management  - Notify physician/advanced practitioner if interventions unsuccessful or patient reports new pain  Outcome: Progressing     Problem: INFECTION - ADULT  Goal: Absence or prevention of progression during hospitalization  Description: INTERVENTIONS:  - Assess and monitor for signs and symptoms of infection  - Monitor lab/diagnostic results  - Monitor all insertion sites, i.e. indwelling lines, tubes, and drains  - Monitor endotracheal if appropriate and nasal secretions for changes in amount and color  - [place] appropriate cooling/warming therapies per order  - Administer medications as ordered  - Instruct and encourage patient and family to use good hand hygiene technique  - Identify and instruct in appropriate isolation precautions for identified infection/condition  Outcome: Progressing     Problem: SAFETY ADULT  Goal: Patient will remain free of falls  Description: INTERVENTIONS:  - Educate patient/family on patient safety including physical limitations  - Instruct patient to call for assistance with activity   - Consult OT/PT to assist with strengthening/mobility   - Keep Call bell within reach  - Keep bed low and locked with side rails adjusted as appropriate  - Keep care items and personal belongings within reach  - Initiate and maintain comfort rounds  - Make Fall Risk Sign visible to staff  - Offer Toileting every 2 Hours, in advance of need  - Initiate/Maintain bed alarm  - Obtain necessary fall risk management equipment: bed alarm  - Apply yellow socks and bracelet for high fall risk patients  - Consider moving patient to room near nurses station  Outcome: Progressing  Goal: Maintain or return to baseline ADL function  Description: INTERVENTIONS:  -  Assess patient's ability to carry out ADLs; assess patient's baseline for ADL function and identify physical deficits which impact ability to perform ADLs (bathing, care of mouth/teeth, toileting, grooming, dressing, etc.)  - Assess/evaluate cause of self-care deficits   - Assess range of motion  - Assess patient's mobility; develop plan if impaired  - Assess patient's need for assistive devices and provide as appropriate  - Encourage maximum independence but intervene and supervise when necessary  - Involve family in performance of ADLs  - Assess for home care needs following discharge   - Consider OT consult to assist with ADL evaluation and planning for discharge  - Provide patient education as appropriate  Outcome: Progressing  Goal: Maintains/Returns to pre admission functional level  Description: INTERVENTIONS:  - Perform BMAT or MOVE assessment daily.   - Set and communicate daily mobility goal to care team and patient/family/caregiver. - Collaborate with rehabilitation services on mobility goals if consulted  - Perform Range of Motion 3 times a day. - Reposition patient every 2 hours.   - Dangle patient 3 times a day  - Stand patient 3 times a day  - Ambulate patient 3 times a day  - Out of bed to chair 3 times a day   - Out of bed for meals 3 times a day  - Out of bed for toileting  - Record patient progress and toleration of activity level   Outcome: Progressing     Problem: DISCHARGE PLANNING  Goal: Discharge to home or other facility with appropriate resources  Description: INTERVENTIONS:  - Identify barriers to discharge w/patient and caregiver  - Arrange for needed discharge resources and transportation as appropriate  - Identify discharge learning needs (meds, wound care, etc.)  - Arrange for interpretive services to assist at discharge as needed  - Refer to Case Management Department for coordinating discharge planning if the patient needs post-hospital services based on physician/advanced practitioner order or complex needs related to functional status, cognitive ability, or social support system  Outcome: Progressing     Problem: Prexisting or High Potential for Compromised Skin Integrity  Goal: Skin integrity is maintained or improved  Description: INTERVENTIONS:  - Identify patients at risk for skin breakdown  - Assess and monitor skin integrity  - Assess and monitor nutrition and hydration status  - Monitor labs   - Assess for incontinence   - Turn and reposition patient  - Assist with mobility/ambulation  - Relieve pressure over bony prominences  - Avoid friction and shearing  - Provide appropriate hygiene as needed including keeping skin clean and dry  - Evaluate need for skin moisturizer/barrier cream  - Collaborate with interdisciplinary team   - Patient/family teaching  - Consider wound care consult   Outcome: Progressing     Problem: Nutrition/Hydration-ADULT  Goal: Nutrient/Hydration intake appropriate for improving, restoring or maintaining nutritional needs  Description: Monitor and assess patient's nutrition/hydration status for malnutrition. Collaborate with interdisciplinary team and initiate plan and interventions as ordered. Monitor patient's weight and dietary intake as ordered or per policy. Utilize nutrition screening tool and intervene as necessary. Determine patient's food preferences and provide high-protein, high-caloric foods as appropriate.      INTERVENTIONS:  - Monitor oral intake, urinary output, labs, and treatment plans  - Assess nutrition and hydration status and recommend course of action  - Evaluate amount of meals eaten  - Assist patient with eating if necessary   - Allow adequate time for meals  - Recommend/ encourage appropriate diets, oral nutritional supplements, and vitamin/mineral supplements  - Order, calculate, and assess calorie counts as needed  - Recommend, monitor, and adjust tube feedings and TPN/PPN based on assessed needs  - Assess need for intravenous fluids  - Provide specific nutrition/hydration education as appropriate  - Include patient/family/caregiver in decisions related to nutrition  Outcome: Progressing     Problem: MOBILITY - ADULT  Goal: Maintain or return to baseline ADL function  Description: INTERVENTIONS:  -  Assess patient's ability to carry out ADLs; assess patient's baseline for ADL function and identify physical deficits which impact ability to perform ADLs (bathing, care of mouth/teeth, toileting, grooming, dressing, etc.)  - Assess/evaluate cause of self-care deficits   - Assess range of motion  - Assess patient's mobility; develop plan if impaired  - Assess patient's need for assistive devices and provide as appropriate  - Encourage maximum independence but intervene and supervise when necessary  - Involve family in performance of ADLs  - Assess for home care needs following discharge   - Consider OT consult to assist with ADL evaluation and planning for discharge  - Provide patient education as appropriate  Outcome: Progressing  Goal: Maintains/Returns to pre admission functional level  Description: INTERVENTIONS:  - Perform BMAT or MOVE assessment daily.   - Set and communicate daily mobility goal to care team and patient/family/caregiver. - Collaborate with rehabilitation services on mobility goals if consulted  - Perform Range of Motion 3 times a day. - Reposition patient every 2 hours.   - Dangle patient 3 times a day  - Stand patient 3 times a day  - Ambulate patient 3 times a day  - Out of bed to chair 3 times a day   - Out of bed for meals 3 times a day  - Out of bed for toileting  - Record patient progress and toleration of activity level   Outcome: Progressing

## 2023-07-28 NOTE — PROGRESS NOTES
07/28/23 1430   Pain Assessment   Pain Assessment Tool 0-10   Pain Score No Pain   Restrictions/Precautions   Precautions Fall Risk;Supervision on toilet/commode; Airborne/isolation;Contact/isolation;Bed/chair alarms   Cognition   Overall Cognitive Status WFL   Sit to Lying   Type of Assistance Needed Physical assistance   Physical Assistance Level 51%-75%   Comment A B LE and to straighten up in bed, will need to work with leg  in future. family to measure height of bed. Sit to Lying CARE Score 2   Sit to Stand   Type of Assistance Needed Incidental touching; Adaptive equipment   Sit to Stand CARE Score 4   Bed-Chair Transfer   Type of Assistance Needed Incidental touching; Adaptive equipment   Comment SPT witH RW   Chair/Bed-to-Chair Transfer CARE Score 4   Walk 10 Feet   Type of Assistance Needed Incidental touching; Adaptive equipment   Comment RW   Walk 10 Feet CARE Score 4   Walk 50 Feet with Two Turns   Comment completed 30 ft x 2   Reason if not Attempted Safety concerns   Walk 50 Feet with Two Turns CARE Score 88   Walk 150 Feet   Reason if not Attempted Activity not applicable   Walk 275 Feet CARE Score 9   Walking 10 Feet on Uneven Surfaces   Comment (S)  will need to work on long ramp in [facility] with RW   Ambulation   Primary Mode of Locomotion Prior to Admission Walk   Distance Walked (feet) 30 ft  (x2)   Assist Device [employer]   Does the patient walk? 2. Yes   Assessment   Treatment Assessment Discussion withPT/OTR and patient/family on home setup while reviewing pictures and measurements. Family still to get measurements of space around toilet and height of her bed> Pt has difficulty at baseline getting into bed lifting her legs in. Will need to work on [address] with RW for areas in home that she can not access wlaking fwd. She had PTA not used device in the home, but plan is for RW until further notice. Will need to practice walking ramp for entry, smal 3" threshold as well.  Pt transfers from chair and gait now CGA as long as hips are above knees when seated to stand. Relys heaviliy on B UE to push. Goal is for IND in room mobility walking up to 40-50ft with RW. Family/Caregiver Present yes 3 dtrs   PT Therapy Minutes   PT Time In 1430   PT Time Out 1500   PT Total Time (minutes) 30   PT Mode of treatment - Individual (minutes) 30   PT Mode of treatment - Concurrent (minutes) 0   PT Mode of treatment - Group (minutes) 0   PT Mode of treatment - Co-treat (minutes) 0   PT Mode of Treatment - Total time(minutes) 30 minutes   PT Cumulative Minutes 360   Therapy Time missed   Time missed?  No

## 2023-07-28 NOTE — PROGRESS NOTES
Physical Medicine and Rehabilitation Progress Note  Altagracia Lamas 67 y.o. female MRN: [id number]  Unit/Bed#: [facility] 451-01 Encounter: [id number]      Assessment & Plan:     Decline in ADLs and mobility: Functional assessment - improving        FIM  Care Score  Admit Score Recent Score    Total assist  1-100% or 2p    Tot To hygiene, LBD To hygiene    Max assist 2-51-75%    Sub Bathing  bathing   Mod assist 3- 26-50%  Par      Min assist 3- 25% or < Par     CG assist 4  TA     Sup/Setup 4-5 Sup UBD UBD   Mod-I/Indep 6 MI      Transfers  Total assist  Min assist     Ambulation   10 ft mod assist  Total assist     Stairs   Total assist/NT      Goal: Supervision for most ADLs and for mobility except some assist with dressing, bathing  Major barriers:  Wound vac/wound, deconditioning, risk of complications  Dispo: Home with [facility] 14 days from admission      * Open abdominal wall wound  Assessment & Plan  - recent admission [facility] 6/19-7/4 with bowel perforation and internal hernia (hx arlene-en-y gastric bypass) - s/p ex lap, resection J-J anastomosis, segmental ileum resection, abthera 6/19  - s/p ex lap, washout, abthera change 6/19  - s/p ex lap, small bowel resection, bowel anastomosis x3, vicryl bridging mesh 6/20  - s/p debridement/washout on 7/18  - s/p partial closure and wound vac placement on 7/19  - wound vac 75 mmHg continuous  - dressing changes per General Surgery - last change 7/26 without reported  Concerns by gen Sx  - midline incision with sutures  - pain management - well controlled - PRN APAP   - Function continues to improve well  - Continue acute comprehensive interdisciplinary inpatient rehabilitation to include intensive skilled therapies (PT, OT) as outlined with oversight and management by rehabilitation physician as well as inpatient rehab level nursing, case management and weekly interdisciplinary team meetings.          Encounter for skin care  Assessment & Plan  7/27 buttock skin intact   High risk for skin wounds/breakdown with patient wanting to lay in recliner and not bed - counseled patient on being in bed to decrease risk of wounds;   - Increased risk of skin wounds/breakdown/rashes due to recent immobility and co-morbidities - loose stools at times  - Turn patient Q2H; encourage frequent wt shifts when in chair/WC every 10-15 minutes  - Hydragaurd to buttocks and sacrum BID & PRN  - Float heels and ankles/bony protuberances with pillow(s)(or wedges) in all positions while in bed or recliner chair. Be sure feet are not up against footboard (pull patient up/remove footboard/or request longer bed as indicated)  - EHOB waffle cushion to chair/WC when OOB  - Maximum time in chair 2 hours at a time; OOB minimum 3 times per day - Notify MD if unable to get patient OOB 3 times per day  - Skin mobilization protocol   - Optimal bowel/bladder hygiene - avoid bedpan unless absolutely necessary and use for least time possible   - Optimal nutrition   - Nursing to document in chart and Notify MD if buttock, sacrum, heel, or other skin site develops erythema or skin breakdown as soon as possible. If patient is soiling themselves with urine or stool notify MD.  If you are unable to maintain skin integrity and prevent erythema due to frequency of soiling notify MD as soon as possible. - Notify MD of new or increasing drainage, development of purulent drainage, increased size/depth of wound, lack of healing, inability to maintain wound integrity due to degree of drainage, wound product, dressing, or currently ordered frequency of wound management. In general dressings should be changed PRN if soiled unless specifically noted otherwise. Do not allow soiled dressing on patient for extended period of time.         S/P bariatric surgery  Assessment & Plan  - 25 years ago  - supplemental vitamins post surgery- MV, B12,    COVID  Assessment & Plan  - positive 7/24  - asymptomatic  - no signs of infection: afebrile  - isolation 10 days (thru 8/3) unless 2 Ag tests 48 h apart negative  - 7/26: SARS cov2 antigen negative; repeat 7/28 Ag   - monitor closely      Anemia  Assessment & Plan  - Hbg 7.9 (previously 8.1)  - Patient apparently was at times in past having chronic anemia as well and occasionally getting Venofer per family - discussed with IM and could consider Venofer if Covid comes back negative;  - monitor CBC next Monday   - consider transfusion if warranted    Permanent atrial fibrillation ([address])  Assessment & Plan  - home: Eliqus 5 mg BID, Amiodarone 200 mg daily, Cardizem 360 mg daily, Toprol XL 25 mg BID  - here: Eliquis for A/C; Amio, Dilt, MTP per IM  - Monitor Hb   - monitor with therapies    Mood disorder ([address])  Assessment & Plan  Prozac  Melatonin for sleep  Supportive counseling     Hypokalemia  Assessment & Plan  - K+ 3.4 (7/26)  - IM consulted, monitoring, repletion, and overall management at their discretion during ARC course  - monitor BMP      Anxiety  Assessment & Plan  - home: Prozac 20 mg daily  - continue here  - consult neuropsychology when cleared    Hypertension  Assessment & Plan  - home: Cardizem 360 mg daily, Metoprolol succinate 25 mg BID, HCTZ 12.5 mg BID  - here: Cardizem, Toprol XL daily, (hold HCTZ)    - monitor BP    Morbid obesity with BMI of 50.0-59.9, adult ([facility])  Assessment & Plan  - BMI 53.89  - s/p gastric bypass (25 years ago)  - educate on lifestyle modification  - consider nutrition consult    GILDARDO (obstructive sleep apnea)  Assessment & Plan  - patient states she has not been using CPAP much at home but has one  - here: 2L nc at home  - monitor     Vitamin D deficiency  Assessment & Plan  - Vit D 62.1 (1/16/23)  - Vitamin D 50K weekly       Other Medical Issues:  • Monitor for     Follow-up providers and other issues to be followed up after discharge  PCP  Gen Maeve Rodgers  Bariatrics     CODE: Level 1: Full Code    Restrictions include: Fall precautions    Objective:     Allergies per EMR  Diagnostic Studies: Reviewed, no new imaging  No orders to display     See above as well    Laboratory: Labs reviewed  Results from last 7 days   Lab Units 07/26/23  0541 07/24/23  0437 07/22/23  0616   HEMOGLOBIN g/dL 7.9* 8.1* 8.9*   HEMATOCRIT % 26.7* 27.2* 29.9*   WBC Thousand/uL 9.19 9.16 10.77*     Results from last 7 days   Lab Units 07/26/23  0541 07/24/23  0437 07/22/23  0616   BUN mg/dL 14 15 15   SODIUM mmol/L 139 141 138   POTASSIUM mmol/L 3.4* 3.4* 5.0   CHLORIDE mmol/L 109* 109* 108   CREATININE mg/dL 0.60 0.65 0.80   AST U/L  --  21 58*   ALT U/L  --  16 15            Drug regimen reviewed, all potential adverse effects identified and addressed:    Scheduled Meds:  Current Facility-Administered Medications   Medication Dose Route Frequency Provider Last Rate   • acetaminophen  975 mg Oral Q8H PRN MOLLY Jackson     • amiodarone  200 mg Oral Daily With Breakfast MOLLY Jackson     • apixaban  5 mg Oral BID MOLLY Jackson     • calcium carbonate  1 tablet Oral Daily With Breakfast MOLLY Jackson     • vitamin B-12  1,000 mcg Oral Daily MOLLY Jackson     • diltiazem  360 mg Oral Daily MOLLY Jackson     • ergocalciferol  50,000 Units Oral Weekly MOLLY Jackson     • FLUoxetine  20 mg Oral BID MOLLY Jackson     • lidocaine (PF)  20 mL Infiltration Once Alek Gutierrez MD     • melatonin  6 mg Oral HS MOLLY Jackson     • metoprolol succinate  25 mg Oral Daily MOLLY Jackson     • multivitamin-minerals  1 tablet Oral Daily MOLLY Jackson     • nystatin   Topical BID MOLLY Jackson     • ondansetron  4 mg Oral Q6H PRN MOLLY Jackson     • psyllium  1 packet Oral Daily MOLLY Robertson         Chief Complaints: Rehab follow-up    Subjective: On eval, patient denies significant abdominal pain, shortness of breath, lightheadedness, fever, chills, calf pain, nausea, increased loose stools or other new complaints.     ROS: A 10 point ROS was performed; negative except as noted above. Physical Exam:  07/27/23 0500 98.3 °F (36.8 °C) 77 18 129/62 89 97 % None (Room air) Lying     Vitals above reviewed on date of encounter    GEN:  Lying in bed in NAD   HEENT/NECK: MMM  CARDIAC: Regular rate rhythm, no murmers, no rubs, no gallops  LUNGS:  clear to auscultation, no wheezes, rales, or rhonchi  ABDOMEN: Soft, non-tender, non-distended, normal active bowel sounds and Wound vac in place  EXTREMITIES/SKIN:  Slight BLE edema without calf TTP  NEURO:   MENTAL STATUS:  Appropriate wakefulness and interaction  and Strength/MMT:  Stable  PSYCH:  Affect:  Euthymic     HPI:  67 F with PMH of Afib on Eliquis, HTN, RCC, morbid obesity, RCC s/p cryoablation, cervical spondylosis c/ myelopathy, OA, gastric bypass who had recent bowel perforation and internal hernia s/p ex-lap with extended hospital course who was dc'd to [facility] where she developed wound dehiscence and was readmitted for washout on 7/18 followed by vac placement and partial wound closure on 7/19. Course notable for pain, anemia, diarrhea and testing +Covid-19 on screening prior to transfer deemed to be asymptomatic at this point. Patient was evaluated by skilled therapies and was found to have significant decline in ADLs and ambulation and appears appropriate for admission to [facility]. "Israel Saldaña is a 77 y.o. female with a medical history of atrial fibrillation (on Eliquis), sleep apnea, hypertension, renal cell carcinoma, gastric bypass, cervical spondylosis, and osteoarthritis who was recently admitted at [address] [place] [address] with a bowel perforation and internal hernia (hx arlene-en-y gastric bypass).  She had an ex lap, resection J-J anastomosis, segmental ileum resection, abthera vac placement, ex lap, washout, abthera change and ex lap, small bowel resection, bowel anastomosis x3, vicryl bridging mesh.  Once medically stable she went to [facility] for rehabilitation. Patient presented to [facility] on 7/18/23 after clinic appointment for suture removal with an abdominal wound dehiscence.  On 7/18 she underwent washout, wound exploration, debridement. On 7/19 she underwent washout, partial wound closure, and wound vac placement. Patient currently has wound vac in place at a pressure of 75 mmHg continuously.  Wound vac last changed on 7/24 with plans for surgery management of dressing changes.  She is tolerating a regular diet with nutrition supplements. Patient tested positive for COVID on 7/24.  She is asymptomatic and not being treated at this time.  She worked with PT and OT and was recommended for post acute rehab following her hospital stay. George Delgado has demonstrated that she can tolerate three hours of therapy, five days a week and is now medically cleared for discharge to the [facility]. PT/OT were consulted and recommend acute inpatient rehabilitation. The patient was evaluated by the Rehabilitation team and deemed an appropriate candidate for comprehensive inpatient rehabilitation and admitted to the [facility] on 7/24/2023  4:34 PM"    ** Please Note: [employer] voice to text software may have been used in the creation of this document. **    I personally performed the required components and examined the patient myself in person on 7/27/23.

## 2023-07-28 NOTE — ASSESSMENT & PLAN NOTE
7/28 buttock skin intact   High risk for skin wounds/breakdown with patient wanting to lay in recliner and not bed - counseled patient on being in bed to decrease risk of wounds;   - Increased risk of skin wounds/breakdown/rashes due to recent immobility and co-morbidities - loose stools at times  - Turn patient Q2H; encourage frequent wt shifts when in chair/WC every 10-15 minutes  - Hydragaurd to buttocks and sacrum BID & PRN  - Float heels and ankles/bony protuberances with pillow(s)(or wedges) in all positions while in bed or recliner chair. Be sure feet are not up against footboard (pull patient up/remove footboard/or request longer bed as indicated)  - EHOB waffle cushion to chair/WC when OOB  - Maximum time in chair 2 hours at a time; OOB minimum 3 times per day - Notify MD if unable to get patient OOB 3 times per day  - Skin mobilization protocol   - Optimal bowel/bladder hygiene - avoid bedpan unless absolutely necessary and use for least time possible   - Optimal nutrition   - Nursing to document in chart and Notify MD if buttock, sacrum, heel, or other skin site develops erythema or skin breakdown as soon as possible. If patient is soiling themselves with urine or stool notify MD.  If you are unable to maintain skin integrity and prevent erythema due to frequency of soiling notify MD as soon as possible. - Notify MD of new or increasing drainage, development of purulent drainage, increased size/depth of wound, lack of healing, inability to maintain wound integrity due to degree of drainage, wound product, dressing, or currently ordered frequency of wound management. In general dressings should be changed PRN if soiled unless specifically noted otherwise. Do not allow soiled dressing on patient for extended period of time.

## 2023-07-28 NOTE — PLAN OF CARE
Problem: INFECTION - ADULT  Goal: Absence or prevention of progression during hospitalization  Description: INTERVENTIONS:  - Assess and monitor for signs and symptoms of infection  - Monitor lab/diagnostic results  - Monitor all insertion sites, i.e. indwelling lines, tubes, and drains  - Monitor endotracheal if appropriate and nasal secretions for changes in amount and color  - [place] appropriate cooling/warming therapies per order  - Administer medications as ordered  - Instruct and encourage patient and family to use good hand hygiene technique  - Identify and instruct in appropriate isolation precautions for identified infection/condition  Outcome: Progressing     Problem: INFECTION - ADULT  Goal: Absence or prevention of progression during hospitalization  Description: INTERVENTIONS:  - Assess and monitor for signs and symptoms of infection  - Monitor lab/diagnostic results  - Monitor all insertion sites, i.e. indwelling lines, tubes, and drains  - Monitor endotracheal if appropriate and nasal secretions for changes in amount and color  - [place] appropriate cooling/warming therapies per order  - Administer medications as ordered  - Instruct and encourage patient and family to use good hand hygiene technique  - Identify and instruct in appropriate isolation precautions for identified infection/condition  Outcome: Progressing

## 2023-07-29 LAB — SARS-COV-2 AG UPPER RESP QL IA: NORMAL

## 2023-07-29 PROCEDURE — 97110 THERAPEUTIC EXERCISES: CPT

## 2023-07-29 PROCEDURE — 97530 THERAPEUTIC ACTIVITIES: CPT

## 2023-07-29 PROCEDURE — 97535 SELF CARE MNGMENT TRAINING: CPT

## 2023-07-29 PROCEDURE — 99232 SBSQ HOSP IP/OBS MODERATE 35: CPT | Performed by: PHYSICAL MEDICINE & REHABILITATION

## 2023-07-29 PROCEDURE — 97116 GAIT TRAINING THERAPY: CPT

## 2023-07-29 PROCEDURE — 99232 SBSQ HOSP IP/OBS MODERATE 35: CPT | Performed by: INTERNAL MEDICINE

## 2023-07-29 RX ADMIN — METOPROLOL SUCCINATE 25 MG: 25 TABLET, FILM COATED, EXTENDED RELEASE ORAL at 09:11

## 2023-07-29 RX ADMIN — NYSTATIN 1 APPLICATION: 100000 POWDER TOPICAL at 18:06

## 2023-07-29 RX ADMIN — APIXABAN 5 MG: 5 TABLET, FILM COATED ORAL at 09:05

## 2023-07-29 RX ADMIN — NYSTATIN 1 APPLICATION: 100000 POWDER TOPICAL at 09:13

## 2023-07-29 RX ADMIN — APIXABAN 5 MG: 5 TABLET, FILM COATED ORAL at 17:13

## 2023-07-29 RX ADMIN — DILTIAZEM HYDROCHLORIDE 360 MG: 180 CAPSULE, COATED, EXTENDED RELEASE ORAL at 09:12

## 2023-07-29 RX ADMIN — AMIODARONE HYDROCHLORIDE 200 MG: 200 TABLET ORAL at 09:12

## 2023-07-29 RX ADMIN — Medication 1 TABLET: at 09:04

## 2023-07-29 RX ADMIN — FLUOXETINE 20 MG: 20 CAPSULE ORAL at 09:04

## 2023-07-29 RX ADMIN — IRON SUCROSE 200 MG: 20 INJECTION, SOLUTION INTRAVENOUS at 12:36

## 2023-07-29 RX ADMIN — FLUOXETINE 20 MG: 20 CAPSULE ORAL at 17:13

## 2023-07-29 RX ADMIN — MELATONIN TAB 3 MG 6 MG: 3 TAB at 22:28

## 2023-07-29 RX ADMIN — CYANOCOBALAMIN TAB 500 MCG 1000 MCG: 500 TAB at 09:05

## 2023-07-29 NOTE — PLAN OF CARE
Problem: INFECTION - ADULT  Goal: Absence or prevention of progression during hospitalization  Description: INTERVENTIONS:  - Assess and monitor for signs and symptoms of infection  - Monitor lab/diagnostic results  - Monitor all insertion sites, i.e. indwelling lines, tubes, and drains  - Monitor endotracheal if appropriate and nasal secretions for changes in amount and color  - [place] appropriate cooling/warming therapies per order  - Administer medications as ordered  - Instruct and encourage patient and family to use good hand hygiene technique  - Identify and instruct in appropriate isolation precautions for identified infection/condition  Outcome: Progressing

## 2023-07-29 NOTE — PROGRESS NOTES
07/29/23 0900   Pain Assessment   Pain Assessment Tool 0-10   Pain Score No Pain   Restrictions/Precautions   Precautions Fall Risk;Contact/isolation;Supervision on toilet/commode   Weight Bearing Restrictions No   ROM Restrictions No   Braces or Orthoses   (b/l ace wraps)   Lifestyle   Autonomy "My family was happy with how I am doing."   Oral Hygiene   Type of Assistance Needed Set-up / clean-up   Physical Assistance Level No physical assistance   Comment pt requested to complete seated 2* fatigue. Oral Hygiene CARE Score 5   Shower/Bathe Self   Type of Assistance Needed Physical assistance; Adaptive equipment   Physical Assistance Level 26%-50%   Comment pt bathed UB and upper legs while seated. CGA in stance w/ unilateral suppport of RW to bathe davonte and partially bathe buttocks, A to thoroughly bathe buttocks. A to bathe lower legs. Applied nystatin powder under pannus. RN present for abdominal wound care. Shower/Bathe Self CARE Score 3   Tub/Shower Transfer   Reason Not Assessed Sponge Bath;Medical  (abdominal wound)   Upper Body Dressing   Type of Assistance Needed Set-up / clean-up   Physical Assistance Level No physical assistance   Comment seated   Upper Body Dressing CARE Score 5   Lower Body Dressing   Comment pt plans to wear dresses at d/c 2* abdominal wound. Putting On/Taking Off Footwear   Type of Assistance Needed Physical assistance   Physical Assistance Level Total assistance   Comment A to don b/l ace wraps and shoes. Putting On/Taking Off Footwear CARE Score 1   Sit to Stand   Type of Assistance Needed Incidental touching   Physical Assistance Level No physical assistance   Sit to Stand CARE Score 4   Bed-Chair Transfer   Type of Assistance Needed Adaptive equipment; Incidental touching   Physical Assistance Level No physical assistance   Comment CGA w/ RW, ambulated<>bathroom.    Chair/Bed-to-Chair Transfer CARE Score 4   Toilet Transfer   Type of Assistance Needed Incidental touching; Adaptive equipment   Physical Assistance Level No physical assistance   Comment CGA w/ RW to platform BSC. Toilet Transfer CARE Score 4   Exercise Tools   Theraband BUE ther ex to maximize strength and endurance for ADLs and fxnl mobility. Completed w/ red theraband, 3x10 EF/EE, horizontal ABD, and S ABD/ADD. Pt tolerated well w/ rest breaks between sets to manage fatigue. Cognition   Overall Cognitive Status WFL   Assessment   Treatment Assessment Pt seen for skilled OT session focusing on self-care management, BUE strengthening, and short distance fxnl mobility w/ RW. Details on sponge bathe ADL noted above, cont to req A for bathing buttocks and BLE and would recommending LHS to inc indep. Pt req frequent rest breaks to manage fatigue, at times req encouragement to maximize participation. Cont OT POC: endurance work, fxnl standing tolerance, BUE strengthening, and fxnl mobility w/ RW. Pt was left resting in recliner w/ all needs within reach and meal tray set-up. Prognosis Good   Problem List Decreased strength;Decreased endurance; Impaired balance;Decreased mobility;Pain;Orthopedic restrictions   Plan   Treatment/Interventions ADL retraining;Functional transfer training; Therapeutic exercise; Endurance training;Patient/family training   Progress Progressing toward goals   Recommendation   OT Discharge Recommendation Home with home health rehabilitation   OT Therapy Minutes   OT Time In 0900   OT Time Out 1030   OT Total Time (minutes) 90   OT Mode of treatment - Individual (minutes) 90   OT Mode of treatment - Concurrent (minutes) 0   OT Mode of treatment - Group (minutes) 0   OT Mode of treatment - Co-treat (minutes) 0   OT Mode of Treatment - Total time(minutes) 90 minutes   OT Cumulative Minutes 520   Therapy Time missed   Time missed?  No

## 2023-07-29 NOTE — PROGRESS NOTES
Internal Medicine Progress Note  Patient: Sergey Lorenzo  Age/sex: 67 y.o. female  Medical Record #: [id number]      ASSESSMENT/PLAN: (Interval History)  Sergey Lorenzo is seen and examined and management for following issues:    Wound dehiscence s/p VAC placement  • Had recent admission for ex-lap 2/2 bowel perforation/internal hernia (6/19-7/4)  • Readmitted with wound dehiscence 7/18 and underwent a washout/VAC application and partial closure of the wound on 7/19  • Surgery to saw 7/28 and closed wound, d/c'd VAC. • Pain control and therapy per primary service.     Atrial fibrillation  • Home:  Amiodarone 200mg qd/Card CD 360mg qd/Toprol XL 25mg q12hrs/Eliquis 5mg BID  • Here:  Amiodarone 200mg qd/Card CD 360mg qd/Toprol XL 25mg qd/Eliquis 5mg BID  • Stable      HTN  • Home:  In addition to above meds, was on HCTZ 12.5mg qd/KCL 15meq qd  • Here:  Holding off on HCTZ     COVID-19 positive  • Detected on routine testing for [facility] admission. • Pt is asymptomatic. • Repeat rapid antigen testing negative 7/26/23. Repeat testing on 7/28 was negative as well. COVID precautions can be discontinued.     Hypokalemia  • Replaced with KCl 40meq on 7/24/23 and 7/26/23 for K+ of 3.4  • Will do BMP 7/31/23     Anemia  • Hgb has been trending around 8.  • Stable. Iron deficeincy  • Has hx gastric bypass  • Iron level on 7/18/23 was 18  • Pt has had Venofer infusions in the past as her iron was low due to gastric bypass. • She is agreeable to infusions while here as well   • since COVID antigen has been negative x 2 will initiate Venofer today with 200mg qd x 3     Hx RCC right kidney  · S/p cryotherapy @ 7 yrs ago  · Follows with Urology      Discharge Date:  Team      The above assessment and plan was reviewed and updated as determined by my evaluation of the patient on 7/29/2023.     Labs:   Results from last 7 days   Lab Units 07/26/23  0541 07/24/23  0437   WBC Thousand/uL 9.19 9.16   HEMOGLOBIN g/dL 7.9* 8.1* HEMATOCRIT % 26.7* 27.2*   PLATELETS Thousands/uL 344 340     Results from last 7 days   Lab Units 07/26/23  0541 07/24/23  0437   SODIUM mmol/L 139 141   POTASSIUM mmol/L 3.4* 3.4*   CHLORIDE mmol/L 109* 109*   CO2 mmol/L 24 27   BUN mg/dL 14 15   CREATININE mg/dL 0.60 0.65   CALCIUM mg/dL 8.0* 8.1*                   Review of Scheduled Meds:  Current Facility-Administered Medications   Medication Dose Route Frequency Provider Last Rate   • acetaminophen  975 mg Oral Q8H PRN MOLLY Jackson     • amiodarone  200 mg Oral Daily With Breakfast MOLLY Jackson     • apixaban  5 mg Oral BID MOLLY Jackson     • calcium carbonate  1 tablet Oral Daily With Breakfast MOLLY Jackson     • vitamin B-12  1,000 mcg Oral Daily MOLLY Jackson     • diltiazem  360 mg Oral Daily MOLLY Jackson     • ergocalciferol  50,000 Units Oral Weekly MOLLY Jackson     • FLUoxetine  20 mg Oral BID MOLLY Jackson     • melatonin  6 mg Oral HS MOLLY Jackson     • metoprolol succinate  25 mg Oral Daily MOLLY Jackson     • multivitamin-minerals  1 tablet Oral Daily MOLLY Jackson     • nystatin   Topical BID MOLLY Jackson     • ondansetron  4 mg Oral Q6H PRN MOLLY Jackson     • psyllium  1 packet Oral Daily MOLLY Jackson         Subjective/ HPI: Patient seen and examined. Patients overnight issues or events were reviewed with nursing or staff during rounds or morning huddle session. New or overnight issues include the following:     No new or overnight issues. Offers no complaints    ROS:   A 10 point ROS was performed; negative except as noted above.        Imaging:     No orders to display       *Labs /Radiology studies reviewed  *Medications reviewed and reconciled as needed  *Please refer to order section for additional ordered labs studies  *Case discussed with primary attending during morning huddle case rounds    Physical Examination:  Vitals:   Vitals:    07/28/23 1500 07/28/23 2115 07/29/23 0600 07/29/23 0911   BP: 126/59 148/67 142/75 131/57   BP Location:  Right arm Right arm    Pulse: 69 84 81 86   Resp: 18 18 20    Temp: 98.5 °F (36.9 °C) 98.1 °F (36.7 °C) (!) 97.4 °F (36.3 °C)    TempSrc: Oral Oral Oral    SpO2: 94% 95% 95%    Weight:   133 kg (292 lb 12.3 oz)    Height:           General Appearance: no distress, conversive  HEENT: PERRLA, conjuctiva normal; oropharynx clear; mucous membranes moist   Neck:   Supple, normal ROM  Lungs: CTA, normal respiratory effort, no retractions, expiratory effort normal  CV: irregular rate and rhythm; no rubs/murmurs/gallops, PMI normal   ABD: soft; ND/NT; +BS  EXT: +LE edema  Skin: normal turgor, normal texture, no rashes  Psych: affect normal, mood normal  Neuro: AAO     The above physical exam was reviewed and updated as determined by my evaluation of the patient on 7/29/2023. Invasive Devices     Drain  Duration           External Urinary Catheter 10 days                   VTE Pharmacologic Prophylaxis: Eliquis  Code Status: Level 1 - Full Code  Current Length of Stay: 5 day(s)      Total time spent:  30 minutes with more than 50% spent counseling/coordinating care. Counseling includes discussion with patient re: progress  and discussion with patient of his/her current medical state/information. Coordination of patient's care was performed in conjunction with primary service. Time invested included review of patient's labs, vitals, and management of their comorbidities with continued monitoring. In addition, this patient was discussed with medical team including physician and advanced extenders. The care of the patient was extensively discussed and appropriate treatment plan was formulated unique for this patient. Medical decision making for the day was made by supervising physician unless otherwise noted in their attestation statement.     ** Please Note:  voice to text software may have been used in the creation of this document.  Although proof errors in transcription or interpretation are a potential of such software**

## 2023-07-29 NOTE — PROGRESS NOTES
07/29/23 1500   Pain Assessment   Pain Assessment Tool 0-10   Pain Score No Pain   Restrictions/Precautions   Precautions Fall Risk;Supervision on toilet/commode;Contact/isolation  (wound in right abdminal with dressing)   Subjective   Subjective Pt ready for skilled PT feeling a little better and rested . Roll Left and Right   Type of Assistance Needed Physical assistance   Physical Assistance Level 26%-50%   Comment plus bed rails   Roll Left and Right CARE Score 3   Sit to Lying   Type of Assistance Needed Physical assistance   Physical Assistance Level 51%-75%   Comment A B LE and to straighten up in bed, will need to work with leg  in future. family to measure height of bed   Sit to Lying CARE Score 2   Sit to Stand   Type of Assistance Needed Incidental touching   Comment with fatigue ModA   Sit to Stand CARE Score 4   Bed-Chair Transfer   Type of Assistance Needed Incidental touching; Adaptive equipment   Comment RW   Chair/Bed-to-Chair Transfer CARE Score 4   Transfer Bed/Chair/Wheelchair   Adaptive Equipment Roller Walker   Walk 10 Feet   Type of Assistance Needed Incidental touching   Physical Assistance Level 51%-75%   Comment RW WC follow 30 -40 feet x2 SOB noted and fatigue quickly   Walk 10 Feet CARE Score 2   Walk 50 Feet with Two Turns   Reason if not Attempted Safety concerns   Walk 50 Feet with Two Turns CARE Score 88   Walk 150 Feet   Reason if not Attempted Activity not applicable   Walk 596 Feet CARE Score 9   Walking 10 Feet on Uneven Surfaces   Comment (S)  will need [facility]W   Ambulation   Primary Mode of Locomotion Prior to Admission Walk   Distance Walked (feet) 30 ft  (40 feet x1)   Assist Device Roller Walker   Findings fatigues quickly with poor reserve for multiple attempts needs WC follow   Does the patient walk? 2.  Yes   Wheel 50 Feet with Two Turns   Reason if not Attempted Activity not applicable   Wheel 50 Feet with Two Turns CARE Score 9 Wheel 150 Feet   Reason if not Attempted Activity not applicable   Wheel 060 Feet CARE Score 9   Wheelchair mobility   Does the patient use a wheelchair? 0. No   Curb or Single Stair   Comment will need to improve strength to safely attempt 4" curb step d.t NILS home small step o enter with RW , pt has home rollator in her room but will need RW for home DC   Reason if not Attempted Safety concerns   1 Step (Curb) CARE Score 88   4 Steps   Reason if not Attempted Activity not applicable   4 Steps CARE Score 9   12 Steps   Reason if not Attempted Activity not applicable   12 Steps CARE Score 9   Therapeutic Interventions   Strengthening LAQ seated marching hip flexion 10-20 reps AP gluts 20 reps STS x3 and WC push ups x5 . Flexibility HS manual stretch   Balance standing balance   Assessment   Treatment Assessment pt focus on ambulation with RW to inc distance with her tolerance and WC follow needs for safety precaution d/t SOB and poor recovery . Pt perform TE LE to inc strengthening and conditioninig to meet DC goals . pt need to minic walking up and down ramp on 1 st floor for home , Pt wound linking abdominal area with nsging ware and rewrapping dressing post PT session , pt in supine with all needs in reach an dgther in room . Cont POC and working on DC goals   Barriers to Discharge None   Plan   Progress Progressing toward goals   Recommendation   PT Discharge Recommendation Home with home health rehabilitation   Equipment Recommended Walker   PT Therapy Minutes   PT Time In 1500   PT Time Out 1600   PT Total Time (minutes) 60   PT Mode of treatment - Individual (minutes) 60   PT Mode of treatment - Concurrent (minutes) 0   PT Mode of treatment - Group (minutes) 0   PT Mode of treatment - Co-treat (minutes) 0   PT Mode of Treatment - Total time(minutes) 60 minutes   PT Cumulative Minutes 450   Therapy Time missed   Time missed?  No

## 2023-07-29 NOTE — QUICK NOTE
Was seen and examined at bedside. Wound VAC was in place and functioning well. Wound VAC with the 0 cc documented output over the past 24 hours. Wound VAC taken down, 1 black sponge, 1 white sponge were removed. The wound was inspected and appeared clean with healthy granulation tissue. The wound measured 8 cm x 4 cm x 4 cm. The area was cleaned with Betadine. Local anesthetic, 20 cc 1% lidocaine was used. 8 sutures were placed closing the wound down to 1 cm x 1 cm. The wound was packed with 1 piece of gauze.     Prior to closure      After closure      Naomy Gill MD  [id number]

## 2023-07-29 NOTE — PROGRESS NOTES
07/29/23 1230   Pain Assessment   Pain Assessment Tool 0-10   Pain Score No Pain   Restrictions/Precautions   Precautions Cognitive; Fall Risk;Supervision on toilet/commode   General   Change In Medical/Functional Status pt cleared by MD nev-covid test pt of coid percaution but not form ESBL,MDRO   Subjective   Subjective pt agreeable to perform skilled PT   Sit to Stand   Type of Assistance Needed Incidental touching   Comment with RW TE in standing and in sitting   Sit to Stand CARE Score 4   Transfer Bed/Chair/Wheelchair   Adaptive Equipment Roller Walker   Ambulation   Does the patient walk? 2. Yes   Assessment   Treatment Assessment pt seen for 30 min with claudia and MD in room , pt getting iron IV and MD . Pt also perform TE for LE and see her BP in vitals , Pt cont seated in here recliner with all needs in reach and alarm on . Pt cont to get a little SOB and limited endurance . Cont POC   Barriers to Discharge None   Plan   Progress Progressing toward goals   Recommendation   PT Discharge Recommendation Home with home health rehabilitation   Equipment Recommended Walker   PT Therapy Minutes   PT Time In 1230   PT Time Out 1300   PT Total Time (minutes) 30   PT Mode of treatment - Individual (minutes) 30   PT Mode of treatment - Concurrent (minutes) 0   PT Mode of treatment - Group (minutes) 0   PT Mode of treatment - Co-treat (minutes) 0   PT Mode of Treatment - Total time(minutes) 30 minutes   PT Cumulative Minutes 390   Therapy Time missed   Time missed?  No

## 2023-07-29 NOTE — NURSING NOTE
Pt dressing on incision with moderate serosanguinous drainage. Dressing reinforced. Will continue to monitor. @ 4:15 Top dressing change was done as previous reinforced dressing got soiled. Discussed above with Dr Laila Woodard who then updated general surgery. Gen. Surgery came in to see pt. Wound packing changed and incision covered with ABD pad. Will continue to monitor.

## 2023-07-29 NOTE — PLAN OF CARE
Problem: PAIN - ADULT  Goal: Verbalizes/displays adequate comfort level or baseline comfort level  Description: Interventions:  - Encourage patient to monitor pain and request assistance  - Assess pain using appropriate pain scale  - Administer analgesics based on type and severity of pain and evaluate response  - Implement non-pharmacological measures as appropriate and evaluate response  - Consider cultural and social influences on pain and pain management  - Notify physician/advanced practitioner if interventions unsuccessful or patient reports new pain  Outcome: Progressing     Problem: INFECTION - ADULT  Goal: Absence or prevention of progression during hospitalization  Description: INTERVENTIONS:  - Assess and monitor for signs and symptoms of infection  - Monitor lab/diagnostic results  - Monitor all insertion sites, i.e. indwelling lines, tubes, and drains  - Monitor endotracheal if appropriate and nasal secretions for changes in amount and color  - [place] appropriate cooling/warming therapies per order  - Administer medications as ordered  - Instruct and encourage patient and family to use good hand hygiene technique  - Identify and instruct in appropriate isolation precautions for identified infection/condition  Outcome: Progressing     Problem: SAFETY ADULT  Goal: Patient will remain free of falls  Description: INTERVENTIONS:  - Educate patient/family on patient safety including physical limitations  - Instruct patient to call for assistance with activity   - Consult OT/PT to assist with strengthening/mobility   - Keep Call bell within reach  - Keep bed low and locked with side rails adjusted as appropriate  - Keep care items and personal belongings within reach  - Initiate and maintain comfort rounds  - Make Fall Risk Sign visible to staff  - Offer Toileting every 2 Hours, in advance of need  - Initiate/Maintain bed alarm  - Obtain necessary fall risk management equipment: bed alarm  - Apply yellow socks and bracelet for high fall risk patients  - Consider moving patient to room near nurses station  Outcome: Progressing  Goal: Maintain or return to baseline ADL function  Description: INTERVENTIONS:  -  Assess patient's ability to carry out ADLs; assess patient's baseline for ADL function and identify physical deficits which impact ability to perform ADLs (bathing, care of mouth/teeth, toileting, grooming, dressing, etc.)  - Assess/evaluate cause of self-care deficits   - Assess range of motion  - Assess patient's mobility; develop plan if impaired  - Assess patient's need for assistive devices and provide as appropriate  - Encourage maximum independence but intervene and supervise when necessary  - Involve family in performance of ADLs  - Assess for home care needs following discharge   - Consider OT consult to assist with ADL evaluation and planning for discharge  - Provide patient education as appropriate  Outcome: Progressing  Goal: Maintains/Returns to pre admission functional level  Description: INTERVENTIONS:  - Perform BMAT or MOVE assessment daily.   - Set and communicate daily mobility goal to care team and patient/family/caregiver. - Collaborate with rehabilitation services on mobility goals if consulted  - Perform Range of Motion 3 times a day. - Reposition patient every 2 hours.   - Dangle patient 3 times a day  - Stand patient 3 times a day  - Ambulate patient 3 times a day  - Out of bed to chair 3 times a day   - Out of bed for meals 3 times a day  - Out of bed for toileting  - Record patient progress and toleration of activity level   Outcome: Progressing     Problem: DISCHARGE PLANNING  Goal: Discharge to home or other facility with appropriate resources  Description: INTERVENTIONS:  - Identify barriers to discharge w/patient and caregiver  - Arrange for needed discharge resources and transportation as appropriate  - Identify discharge learning needs (meds, wound care, etc.)  - Arrange for interpretive services to assist at discharge as needed  - Refer to Case Management Department for coordinating discharge planning if the patient needs post-hospital services based on physician/advanced practitioner order or complex needs related to functional status, cognitive ability, or social support system  Outcome: Progressing     Problem: Prexisting or High Potential for Compromised Skin Integrity  Goal: Skin integrity is maintained or improved  Description: INTERVENTIONS:  - Identify patients at risk for skin breakdown  - Assess and monitor skin integrity  - Assess and monitor nutrition and hydration status  - Monitor labs   - Assess for incontinence   - Turn and reposition patient  - Assist with mobility/ambulation  - Relieve pressure over bony prominences  - Avoid friction and shearing  - Provide appropriate hygiene as needed including keeping skin clean and dry  - Evaluate need for skin moisturizer/barrier cream  - Collaborate with interdisciplinary team   - Patient/family teaching  - Consider wound care consult   Outcome: Progressing     Problem: Nutrition/Hydration-ADULT  Goal: Nutrient/Hydration intake appropriate for improving, restoring or maintaining nutritional needs  Description: Monitor and assess patient's nutrition/hydration status for malnutrition. Collaborate with interdisciplinary team and initiate plan and interventions as ordered. Monitor patient's weight and dietary intake as ordered or per policy. Utilize nutrition screening tool and intervene as necessary. Determine patient's food preferences and provide high-protein, high-caloric foods as appropriate.      INTERVENTIONS:  - Monitor oral intake, urinary output, labs, and treatment plans  - Assess nutrition and hydration status and recommend course of action  - Evaluate amount of meals eaten  - Assist patient with eating if necessary   - Allow adequate time for meals  - Recommend/ encourage appropriate diets, oral nutritional supplements, and vitamin/mineral supplements  - Order, calculate, and assess calorie counts as needed  - Recommend, monitor, and adjust tube feedings and TPN/PPN based on assessed needs  - Assess need for intravenous fluids  - Provide specific nutrition/hydration education as appropriate  - Include patient/family/caregiver in decisions related to nutrition  Outcome: Progressing     Problem: MOBILITY - ADULT  Goal: Maintain or return to baseline ADL function  Description: INTERVENTIONS:  -  Assess patient's ability to carry out ADLs; assess patient's baseline for ADL function and identify physical deficits which impact ability to perform ADLs (bathing, care of mouth/teeth, toileting, grooming, dressing, etc.)  - Assess/evaluate cause of self-care deficits   - Assess range of motion  - Assess patient's mobility; develop plan if impaired  - Assess patient's need for assistive devices and provide as appropriate  - Encourage maximum independence but intervene and supervise when necessary  - Involve family in performance of ADLs  - Assess for home care needs following discharge   - Consider OT consult to assist with ADL evaluation and planning for discharge  - Provide patient education as appropriate  Outcome: Progressing  Goal: Maintains/Returns to pre admission functional level  Description: INTERVENTIONS:  - Perform BMAT or MOVE assessment daily.   - Set and communicate daily mobility goal to care team and patient/family/caregiver. - Collaborate with rehabilitation services on mobility goals if consulted  - Perform Range of Motion 3 times a day. - Reposition patient every 2 hours.   - Dangle patient 3 times a day  - Stand patient 3 times a day  - Ambulate patient 3 times a day  - Out of bed to chair 3 times a day   - Out of bed for meals 3 times a day  - Out of bed for toileting  - Record patient progress and toleration of activity level   Outcome: Progressing

## 2023-07-29 NOTE — QUICK NOTE
Contacted by Dr. Christina Olmstead regarding drainage from the lower abdominal midline wound. Saw the patient at bedside and took down the external dressing. There was some drainage on his dressing. The internal packing from the wound closure yesterday was removed. The wound looked clean, see image below. About 1 x 1 cm wound was then packed using one 4 x 4.  2 ABD dressings were placed on top of the packing. We will place wound care orders for nursing.         Kevyn Gomes MD  PGY1

## 2023-07-30 LAB
BACTERIA UR QL AUTO: ABNORMAL /HPF
BILIRUB UR QL STRIP: NEGATIVE
CAOX CRY URNS QL MICRO: ABNORMAL /HPF
CLARITY UR: ABNORMAL
COLOR UR: YELLOW
GLUCOSE UR STRIP-MCNC: NEGATIVE MG/DL
HEMOCCULT STL QL: POSITIVE
HGB UR QL STRIP.AUTO: NEGATIVE
KETONES UR STRIP-MCNC: ABNORMAL MG/DL
LEUKOCYTE ESTERASE UR QL STRIP: NEGATIVE
MUCOUS THREADS UR QL AUTO: ABNORMAL
NITRITE UR QL STRIP: POSITIVE
NON-SQ EPI CELLS URNS QL MICRO: ABNORMAL /HPF
PH UR STRIP.AUTO: 5.5 [PH]
PROT UR STRIP-MCNC: ABNORMAL MG/DL
RBC #/AREA URNS AUTO: ABNORMAL /HPF
SP GR UR STRIP.AUTO: 1.02 (ref 1–1.03)
UROBILINOGEN UR STRIP-ACNC: <2 MG/DL
WBC #/AREA URNS AUTO: ABNORMAL /HPF

## 2023-07-30 PROCEDURE — 99232 SBSQ HOSP IP/OBS MODERATE 35: CPT | Performed by: PHYSICAL MEDICINE & REHABILITATION

## 2023-07-30 PROCEDURE — 81001 URINALYSIS AUTO W/SCOPE: CPT | Performed by: PHYSICAL MEDICINE & REHABILITATION

## 2023-07-30 PROCEDURE — 99232 SBSQ HOSP IP/OBS MODERATE 35: CPT | Performed by: INTERNAL MEDICINE

## 2023-07-30 PROCEDURE — 82272 OCCULT BLD FECES 1-3 TESTS: CPT | Performed by: PHYSICAL MEDICINE & REHABILITATION

## 2023-07-30 RX ORDER — PANTOPRAZOLE SODIUM 40 MG/1
40 TABLET, DELAYED RELEASE ORAL
Status: DISCONTINUED | OUTPATIENT
Start: 2023-07-30 | End: 2023-08-02

## 2023-07-30 RX ADMIN — CYANOCOBALAMIN TAB 500 MCG 1000 MCG: 500 TAB at 09:20

## 2023-07-30 RX ADMIN — IRON SUCROSE 200 MG: 20 INJECTION, SOLUTION INTRAVENOUS at 10:15

## 2023-07-30 RX ADMIN — AMIODARONE HYDROCHLORIDE 200 MG: 200 TABLET ORAL at 09:20

## 2023-07-30 RX ADMIN — MELATONIN TAB 3 MG 6 MG: 3 TAB at 22:24

## 2023-07-30 RX ADMIN — METOPROLOL SUCCINATE 25 MG: 25 TABLET, FILM COATED, EXTENDED RELEASE ORAL at 09:20

## 2023-07-30 RX ADMIN — FLUOXETINE 20 MG: 20 CAPSULE ORAL at 17:18

## 2023-07-30 RX ADMIN — APIXABAN 5 MG: 5 TABLET, FILM COATED ORAL at 17:18

## 2023-07-30 RX ADMIN — APIXABAN 5 MG: 5 TABLET, FILM COATED ORAL at 09:20

## 2023-07-30 RX ADMIN — NYSTATIN: 100000 POWDER TOPICAL at 09:25

## 2023-07-30 RX ADMIN — Medication 1 TABLET: at 09:20

## 2023-07-30 RX ADMIN — DILTIAZEM HYDROCHLORIDE 360 MG: 180 CAPSULE, COATED, EXTENDED RELEASE ORAL at 09:21

## 2023-07-30 RX ADMIN — FLUOXETINE 20 MG: 20 CAPSULE ORAL at 09:20

## 2023-07-30 RX ADMIN — PANTOPRAZOLE SODIUM 40 MG: 40 TABLET, DELAYED RELEASE ORAL at 17:18

## 2023-07-30 NOTE — PROGRESS NOTES
Internal Medicine Progress Note  Patient: Tanya Lemons  Age/sex: 67 y.o. female  Medical Record #: [id number]      ASSESSMENT/PLAN: (Interval History)  Tanya Lemons is seen and examined and management for following issues:    Wound dehiscence s/p VAC placement  • Had recent admission for ex-lap 2/2 bowel perforation/internal hernia (6/19-7/4)  • Readmitted with wound dehiscence 7/18 and underwent a washout/VAC application and partial closure of the wound on 7/19  • Surgery to saw 7/28 and closed wound, d/c'd VAC. • Pain control and therapy per primary service.     Atrial fibrillation  • Home:  Amiodarone 200mg qd/Card CD 360mg qd/Toprol XL 25mg q12hrs/Eliquis 5mg BID  • Here:  Amiodarone 200mg qd/Card CD 360mg qd/Toprol XL 25mg qd/Eliquis 5mg BID  • Stable      HTN  • Home:  In addition to above meds, was on HCTZ 12.5mg qd/KCL 15meq qd  • Here:  Holding off on HCTZ     COVID-19 positive  • Detected on routine testing for [facility] admission. • Pt is asymptomatic. • Repeat rapid antigen testing negative 7/26/23. Repeat testing on 7/28 was negative as well. COVID precautions can be discontinued.     Hypokalemia  • Replaced with KCl 40meq on 7/24/23 and 7/26/23 for K+ of 3.4  • BMP 7/31/23     Anemia  • Hgb has been trending around 8.  • Stable.     Iron deficeincy  • Has hx gastric bypass  • Iron level on 7/18/23 was 18  • Pt has had Venofer infusions in the past as her iron was low due to gastric bypass. • She is agreeable to infusions while here as well   • Since COVID antigen negative x 2, started Venofer on 7/29 with 200mg qd x 3     Hx RCC right kidney  • S/p cryotherapy @ 7 yrs ago  • Follows with Urology     Hematuria vs vaginal bleeding  · Had blood in the toilet today after voiding  · Dr Farrukh Pedroza seeing now       Discharge Date:  Team       The above assessment and plan was reviewed and updated as determined by my evaluation of the patient on 7/30/2023.     Labs:   Results from last 7 days   Lab Units 07/26/23  0541 07/24/23  0437   WBC Thousand/uL 9.19 9.16   HEMOGLOBIN g/dL 7.9* 8.1*   HEMATOCRIT % 26.7* 27.2*   PLATELETS Thousands/uL 344 340     Results from last 7 days   Lab Units 07/26/23  0541 07/24/23  0437   SODIUM mmol/L 139 141   POTASSIUM mmol/L 3.4* 3.4*   CHLORIDE mmol/L 109* 109*   CO2 mmol/L 24 27   BUN mg/dL 14 15   CREATININE mg/dL 0.60 0.65   CALCIUM mg/dL 8.0* 8.1*                   Review of Scheduled Meds:  Current Facility-Administered Medications   Medication Dose Route Frequency Provider Last Rate   • acetaminophen  975 mg Oral Q8H PRN MOLLY Jackson     • amiodarone  200 mg Oral Daily With Breakfast MOLLY Jackson     • apixaban  5 mg Oral BID MOLLY Jackson     • calcium carbonate  1 tablet Oral Daily With Breakfast MOLLY Jackson     • vitamin B-12  1,000 mcg Oral Daily MOLLY Jackson     • diltiazem  360 mg Oral Daily MOLLY Jackson     • ergocalciferol  50,000 Units Oral Weekly MOLLY Jackson     • FLUoxetine  20 mg Oral BID MOLLY Jackson     • iron sucrose  200 mg Intravenous Daily MOLLY Reddy Stopped (07/29/23 1331)   • melatonin  6 mg Oral HS MOLLY Jackson     • metoprolol succinate  25 mg Oral Daily MOLLY Jackson     • multivitamin-minerals  1 tablet Oral Daily MOLLY Jackson     • nystatin   Topical BID MOLLY Jackson     • ondansetron  4 mg Oral Q6H PRN MOLLY Jackson     • psyllium  1 packet Oral Daily MOLLY Jackson         Subjective/ HPI: Patient seen and examined. Patients overnight issues or events were reviewed with nursing or staff during rounds or morning huddle session. New or overnight issues include the following:     No new or overnight issues. Offers no complaints    ROS:   A 10 point ROS was performed; negative except as noted above.        Imaging:     No orders to display       *Labs /Radiology studies reviewed  *Medications reviewed and reconciled as needed  *Please refer to order section for additional ordered labs studies  *Case discussed with primary attending during morning huddle case rounds    Physical Examination:  Vitals:   Vitals:    07/29/23 1428 07/29/23 1900 07/30/23 0545 07/30/23 0920   BP: 103/51 106/55 129/60 110/63   BP Location: Left arm Left arm Right arm    Pulse: 65 70 82 77   Resp: 17 15 16    Temp: 98.3 °F (36.8 °C) 98.2 °F (36.8 °C) 98.3 °F (36.8 °C)    TempSrc: Oral Oral Oral    SpO2: 96% 96% 96%    Weight:   133 kg (293 lb 14 oz)    Height:           General Appearance: no distress, conversive  HEENT:  External ear normal.  Nose normal w/o drainage. Mucous membranes are moist. Oropharynx is clear. Conjunctiva clear w/o icterus or redness. Neck:  Supple, normal ROM  Lungs: BBS without crackles/wheeze/rhonchi; respirations unlabored with normal inspiratory/expiratory effort. No retractions noted. On RA  CV: regular rate and rhythm; no rubs/murmurs/gallops, PMI normal   ABD: Abdomen is soft. Bowel sounds all quadrants. Nontender with no distention. EXT: + LE edema  Skin: normal turgor, normal texture, no rashes  Psych: affect normal, mood normal  Neuro: AAO     The above physical exam was reviewed and updated as determined by my evaluation of the patient on 7/30/2023. Invasive Devices     Peripheral Intravenous Line  Duration           Peripheral IV 07/29/23 Dorsal (posterior); Left Hand <1 day          Drain  Duration           External Urinary Catheter 11 days                   VTE Pharmacologic Prophylaxis: Eliquis  Code Status: Level 1 - Full Code  Current Length of Stay: 6 day(s)      Total time spent:  30 minutes with more than 50% spent counseling/coordinating care. Counseling includes discussion with patient re: progress  and discussion with patient of his/her current medical state/information. Coordination of patient's care was performed in conjunction with primary service.  Time invested included review of patient's labs, vitals, and management of their comorbidities with continued monitoring. In addition, this patient was discussed with medical team including physician and advanced extenders. The care of the patient was extensively discussed and appropriate treatment plan was formulated unique for this patient. Medical decision making for the day was made by supervising physician unless otherwise noted in their attestation statement. ** Please Note:  voice to text software may have been used in the creation of this document.  Although proof errors in transcription or interpretation are a potential of such software**

## 2023-07-30 NOTE — PLAN OF CARE
Problem: PAIN - ADULT  Goal: Verbalizes/displays adequate comfort level or baseline comfort level  Description: Interventions:  - Encourage patient to monitor pain and request assistance  - Assess pain using appropriate pain scale  - Administer analgesics based on type and severity of pain and evaluate response  - Implement non-pharmacological measures as appropriate and evaluate response  - Consider cultural and social influences on pain and pain management  - Notify physician/advanced practitioner if interventions unsuccessful or patient reports new pain  Outcome: Progressing     Problem: INFECTION - ADULT  Goal: Absence or prevention of progression during hospitalization  Description: INTERVENTIONS:  - Assess and monitor for signs and symptoms of infection  - Monitor lab/diagnostic results  - Monitor all insertion sites, i.e. indwelling lines, tubes, and drains  - Monitor endotracheal if appropriate and nasal secretions for changes in amount and color  - [place] appropriate cooling/warming therapies per order  - Administer medications as ordered  - Instruct and encourage patient and family to use good hand hygiene technique  - Identify and instruct in appropriate isolation precautions for identified infection/condition  Outcome: Progressing     Problem: SAFETY ADULT  Goal: Patient will remain free of falls  Description: INTERVENTIONS:  - Educate patient/family on patient safety including physical limitations  - Instruct patient to call for assistance with activity   - Consult OT/PT to assist with strengthening/mobility   - Keep Call bell within reach  - Keep bed low and locked with side rails adjusted as appropriate  - Keep care items and personal belongings within reach  - Initiate and maintain comfort rounds  - Make Fall Risk Sign visible to staff  - Offer Toileting every 2 Hours, in advance of need  - Initiate/Maintain bed alarm  - Obtain necessary fall risk management equipment: bed alarm  - Apply yellow socks and bracelet for high fall risk patients  - Consider moving patient to room near nurses station  Outcome: Progressing  Goal: Maintain or return to baseline ADL function  Description: INTERVENTIONS:  -  Assess patient's ability to carry out ADLs; assess patient's baseline for ADL function and identify physical deficits which impact ability to perform ADLs (bathing, care of mouth/teeth, toileting, grooming, dressing, etc.)  - Assess/evaluate cause of self-care deficits   - Assess range of motion  - Assess patient's mobility; develop plan if impaired  - Assess patient's need for assistive devices and provide as appropriate  - Encourage maximum independence but intervene and supervise when necessary  - Involve family in performance of ADLs  - Assess for home care needs following discharge   - Consider OT consult to assist with ADL evaluation and planning for discharge  - Provide patient education as appropriate  Outcome: Progressing  Goal: Maintains/Returns to pre admission functional level  Description: INTERVENTIONS:  - Perform BMAT or MOVE assessment daily.   - Set and communicate daily mobility goal to care team and patient/family/caregiver. - Collaborate with rehabilitation services on mobility goals if consulted  - Perform Range of Motion 3 times a day. - Reposition patient every 2 hours.   - Dangle patient 3 times a day  - Stand patient 3 times a day  - Ambulate patient 3 times a day  - Out of bed to chair 3 times a day   - Out of bed for meals 3 times a day  - Out of bed for toileting  - Record patient progress and toleration of activity level   Outcome: Progressing     Problem: DISCHARGE PLANNING  Goal: Discharge to home or other facility with appropriate resources  Description: INTERVENTIONS:  - Identify barriers to discharge w/patient and caregiver  - Arrange for needed discharge resources and transportation as appropriate  - Identify discharge learning needs (meds, wound care, etc.)  - Arrange for interpretive services to assist at discharge as needed  - Refer to Case Management Department for coordinating discharge planning if the patient needs post-hospital services based on physician/advanced practitioner order or complex needs related to functional status, cognitive ability, or social support system  Outcome: Progressing     Problem: Prexisting or High Potential for Compromised Skin Integrity  Goal: Skin integrity is maintained or improved  Description: INTERVENTIONS:  - Identify patients at risk for skin breakdown  - Assess and monitor skin integrity  - Assess and monitor nutrition and hydration status  - Monitor labs   - Assess for incontinence   - Turn and reposition patient  - Assist with mobility/ambulation  - Relieve pressure over bony prominences  - Avoid friction and shearing  - Provide appropriate hygiene as needed including keeping skin clean and dry  - Evaluate need for skin moisturizer/barrier cream  - Collaborate with interdisciplinary team   - Patient/family teaching  - Consider wound care consult   Outcome: Progressing     Problem: Nutrition/Hydration-ADULT  Goal: Nutrient/Hydration intake appropriate for improving, restoring or maintaining nutritional needs  Description: Monitor and assess patient's nutrition/hydration status for malnutrition. Collaborate with interdisciplinary team and initiate plan and interventions as ordered. Monitor patient's weight and dietary intake as ordered or per policy. Utilize nutrition screening tool and intervene as necessary. Determine patient's food preferences and provide high-protein, high-caloric foods as appropriate.      INTERVENTIONS:  - Monitor oral intake, urinary output, labs, and treatment plans  - Assess nutrition and hydration status and recommend course of action  - Evaluate amount of meals eaten  - Assist patient with eating if necessary   - Allow adequate time for meals  - Recommend/ encourage appropriate diets, oral nutritional supplements, and vitamin/mineral supplements  - Order, calculate, and assess calorie counts as needed  - Recommend, monitor, and adjust tube feedings and TPN/PPN based on assessed needs  - Assess need for intravenous fluids  - Provide specific nutrition/hydration education as appropriate  - Include patient/family/caregiver in decisions related to nutrition  Outcome: Progressing     Problem: MOBILITY - ADULT  Goal: Maintain or return to baseline ADL function  Description: INTERVENTIONS:  -  Assess patient's ability to carry out ADLs; assess patient's baseline for ADL function and identify physical deficits which impact ability to perform ADLs (bathing, care of mouth/teeth, toileting, grooming, dressing, etc.)  - Assess/evaluate cause of self-care deficits   - Assess range of motion  - Assess patient's mobility; develop plan if impaired  - Assess patient's need for assistive devices and provide as appropriate  - Encourage maximum independence but intervene and supervise when necessary  - Involve family in performance of ADLs  - Assess for home care needs following discharge   - Consider OT consult to assist with ADL evaluation and planning for discharge  - Provide patient education as appropriate  Outcome: Progressing  Goal: Maintains/Returns to pre admission functional level  Description: INTERVENTIONS:  - Perform BMAT or MOVE assessment daily.   - Set and communicate daily mobility goal to care team and patient/family/caregiver. - Collaborate with rehabilitation services on mobility goals if consulted  - Perform Range of Motion 3 times a day. - Reposition patient every 2 hours.   - Dangle patient 3 times a day  - Stand patient 3 times a day  - Ambulate patient 3 times a day  - Out of bed to chair 3 times a day   - Out of bed for meals 3 times a day  - Out of bed for toileting  - Record patient progress and toleration of activity level   Outcome: Progressing

## 2023-07-30 NOTE — PROGRESS NOTES
PM&R Coverage Progress Note:    Rehabilitation Diagnosis: Debility post abdominal     ASSESSMENT: Stable      PLAN:    Rehabilitation  • Continue current rehabilitation plan of care to maximize function. • No funcitonal barriers identified    Medical issues  • Blood in toilet (red-brown in color) during BM with clots on toilet paper with wiping. Unclear etiology  · Personally examined and no source identified overtly at urethra. Vaginal exam completed. No blood on exam.  No bleeding fissure or hemorroid seen on rectal exam.   Patient does have a history of hemorrhoids. Last colonoscopy >5 years ago. Patient without history of ulcers or acid reflux, but patient agreeable to Protonix addition for now and GI consultation. · Follow H&H in AM  · Occult stools  · Checking UA - patient has history of renal cell carcinoma post cryotherapy. Did NOT have nephrectomy. · May need to hold Eliquis if H&H drops  · GI consultation placed  · Primary service to follow-up in AM.   • Continue current medical plan of care. Appreciate IM consultants co-management. SUBJECTIVE: Patient seen face to face. Having blood tinged water seen in toilet when urinating and then more blood in toilet with clots on toilet paper when BM today and wiping. Probable blood per rectum. Personally examined and no source identified overtly. Patient does have a history of hemorrhoids. Last colonoscopy >5 years ago. Patient without history of ulcers or acid reflux, but patient agreeable to Protonix addition for now and GI consultation. Daughter updated with plan         ROS:  A ten point review of systems was completed on 07/30/23 and pertinent positives are listed in subjective section. All other systems reviewed were negative.      OBJECTIVE:   /63   Pulse 77   Temp 98.3 °F (36.8 °C) (Oral)   Resp 16   Ht 5' 3" (1.6 m)   Wt 133 kg (293 lb 14 oz)   SpO2 96%   BMI 52.06 kg/m²       Physical Exam  Vitals and nursing note reviewed. Constitutional:       General: She is not in acute distress. Appearance: She is obese. HENT:      Head: Normocephalic and atraumatic. Nose: Nose normal.      Mouth/Throat:      Mouth: Mucous membranes are moist.   Eyes:      Conjunctiva/sclera: Conjunctivae normal.   Cardiovascular:      Rate and Rhythm: Normal rate and regular rhythm. Pulses: Normal pulses. Pulmonary:      Effort: Pulmonary effort is normal.      Breath sounds: Normal breath sounds. No wheezing or rales. Abdominal:      General: Bowel sounds are normal. There is no distension. Palpations: Abdomen is soft. Tenderness: There is no abdominal tenderness. Genitourinary:     Comments: Vaginal rectal and uretral exam without evidence of bleeding identified  Musculoskeletal:         General: No swelling. Cervical back: Neck supple. Skin:     General: Skin is warm. Comments: Abdominal exam benign - incision without drainage - new dressing in place     Neurological:      Mental Status: She is alert and oriented to person, place, and time. Motor: Weakness present.       Comments: Sit to stand transfer supervision with RW   Psychiatric:         Mood and Affect: Mood normal.            Personally reviewed on 07/30/23:   Lab Results   Component Value Date    WBC 9.19 07/26/2023    HGB 7.9 (L) 07/26/2023    HCT 26.7 (L) 07/26/2023    MCV 88 07/26/2023     07/26/2023     Lab Results   Component Value Date    SODIUM 139 07/26/2023    K 3.4 (L) 07/26/2023     (H) 07/26/2023    CO2 24 07/26/2023    BUN 14 07/26/2023    CREATININE 0.60 07/26/2023    GLUC 115 07/26/2023    CALCIUM 8.0 (L) 07/26/2023     Lab Results   Component Value Date    INR 1.39 (H) 07/18/2023    INR 1.23 (H) 06/27/2023    INR 1.19 06/24/2023    PROTIME 17.3 (H) 07/18/2023    PROTIME 15.7 (H) 06/27/2023    PROTIME 15.3 (H) 06/24/2023         Current Facility-Administered Medications:   •  acetaminophen (TYLENOL) tablet 975 mg, 975 mg, Oral, Q8H PRN, MOLLY Jackson, 975 mg at 07/26/23 0158  •  amiodarone tablet 200 mg, 200 mg, Oral, Daily With Breakfast, MOLLY Jackson, 200 mg at 07/30/23 0920  •  apixaban (ELIQUIS) tablet 5 mg, 5 mg, Oral, BID, MOLLY Jackson, 5 mg at 07/30/23 0920  •  calcium carbonate (OYSTER SHELL,OSCAL) 500 mg tablet 1 tablet, 1 tablet, Oral, Daily With Breakfast, MOLLY Jackson, 1 tablet at 07/30/23 0920  •  cyanocobalamin (VITAMIN B-12) tablet 1,000 mcg, 1,000 mcg, Oral, Daily, MOLLY Jackson, 1,000 mcg at 07/30/23 0920  •  diltiazem (CARDIZEM CD) 24 hr capsule 360 mg, 360 mg, Oral, Daily, MOLLY Jackson, 360 mg at 07/30/23 0672  •  ergocalciferol (VITAMIN D2) capsule 50,000 Units, 50,000 Units, Oral, Weekly, MOLLY Jackson, 50,000 Units at 07/26/23 0846  •  FLUoxetine (PROzac) capsule 20 mg, 20 mg, Oral, BID, MOLLY Jackson, 20 mg at 07/30/23 0920  •  iron sucrose (VENOFER) 200 mg in sodium chloride 0.9 % 100 mL IVPB, 200 mg, Intravenous, Daily, MOLLY Toure, Stopped at 07/30/23 1115  •  melatonin tablet 6 mg, 6 mg, Oral, HS, MOLLY Jackson, 6 mg at 07/29/23 2228  •  metoprolol succinate (TOPROL-XL) 24 hr tablet 25 mg, 25 mg, Oral, Daily, MOLLY Jackson, 25 mg at 07/30/23 0920  •  multivitamin-minerals (CENTRUM) tablet 1 tablet, 1 tablet, Oral, Daily, MOLLY Jackson, 1 tablet at 07/30/23 0920  •  nystatin (MYCOSTATIN) powder, , Topical, BID, MOLLY Jackson, Given at 07/30/23 9745  •  ondansetron (ZOFRAN-ODT) dispersible tablet 4 mg, 4 mg, Oral, Q6H PRN, MOLLY Jackson  •  pantoprazole (PROTONIX) EC tablet 40 mg, 40 mg, Oral, BID AC, Yasemin Douglas MD  •  psyllium (METAMUCIL) 1 packet, 1 packet, Oral, Daily, MOLLY Jackson, 1 packet at 07/25/23 1020      Past Medical History:   Diagnosis Date   • A-fib [facility])    • Arthritis    • Atrial fibrillation (HCC)    • Cervical spondylosis with myelopathy    • Gastric bypass status for obesity    • History of transfusion     35 years ago   • Hypertension    • mass right kidney    • Renal cell adenocarcinoma [facility])     RIGHT   • Sleep apnea        Patient Active Problem List    Diagnosis Date Noted   • Mood disorder ([address]) 07/28/2023   • Encounter for skin care 07/28/2023   • COVID 07/24/2023   • Ambulatory dysfunction 07/17/2023   • Open abdominal wall wound 07/13/2023   • Iron deficiency anemia 07/13/2023   • Hypotension 07/13/2023   • Hypokalemia 07/10/2023   • Anemia 07/10/2023   • Bowel perforation ([address]) 07/05/2023   • Acute encephalopathy 07/05/2023   • Anxiety 07/05/2023   • Diarrhea 07/05/2023   • Idiopathic acute pancreatitis without infection or necrosis 06/18/2023   • Primary osteoarthritis of both knees 06/07/2023   • Stage 3 chronic kidney disease, unspecified whether stage 3a or 3b CKD ([address]) 05/31/2023   • Hypertension    • Morbid obesity with BMI of 50.0-59.9, adult ([address]) 11/01/2022   • Unsteady gait 10/30/2022   • Dyspnea on exertion 10/30/2022   • SIRS (systemic inflammatory response syndrome) ([address]) 10/30/2022   • Incontinence 10/30/2022   • GILDARDO (obstructive sleep apnea) 10/30/2022   • Vitamin D deficiency 07/06/2021   • Reactive hypoglycemia 12/30/2020   • Osteoporosis 06/04/2020   • Hyperparathyroidism ([address]) 06/04/2020   • Hashimoto's thyroiditis 03/17/2020   • Early menopause occurring in patient age younger than 39 years 03/17/2020   • S/P bariatric surgery 03/17/2020   • Myofascial pain syndrome 10/21/2019   • Status post partial thyroidectomy 09/12/2019   • Cervical spondylosis without myelopathy 06/28/2019   • Arthropathy of cervical facet joint 06/28/2019   • Occipital neuralgia of right side 06/28/2019   • Chest wall tenderness 01/24/2019   • Chronic heart failure ([address]) 01/24/2019   • Permanent atrial fibrillation ([address]) 01/24/2019   • Myalgia 01/08/2019   • Primary osteoarthritis of left knee 10/23/2018   • Primary osteoarthritis of right knee 10/23/2018   • Chronic pain of left knee 10/23/2018   • Malignant tumor of right kidney parenchyma ([address]) 05/11/2016   • Closed fracture of right distal radius and ulna 04/22/2016        Cachorro Deng MD  PM&R      I have spent a total time of 60 minutes on 07/30/23 in caring for this patient including Instructions for management, Patient and family education, Impressions, Counseling / Coordination of care and Documenting in the medical record. ** Please Note:  voice to text software may have been used in the creation of this document.  Although proof errors in transcription or interpretation are a potential of such software**

## 2023-07-30 NOTE — PROGRESS NOTES
PM&R Coverage Progress Note:    Rehabilitation Diagnosis: Debility    ASSESSMENT: Stable      PLAN:    Rehabilitation  • Continue current rehabilitation plan of care to maximize function. • No funcitonal barriers identified    Medical issues  • No acute concerns  • Appreciate Surgery evaluation of incision this AM - recommended BID dressing changes  • OFF DROPLET ISOLATION as Covid Ag negative x 2  • IV Venofer ordered by IM consultants for anemia. • Showering: Patient prefers to sponge bathe for home goals. • Continue current medical plan of care. Appreciate IM consultants co-management. SUBJECTIVE: Patient seen face to face. No acute issues. Progressing as expected in rehabilitation program.  Happy about being off droplet isolation. Getting IV Venofer today. ROS:  A ten point review of systems was completed on 07/29/23 and pertinent positives are listed in subjective section. All other systems reviewed were negative. OBJECTIVE:   /55 (BP Location: Left arm)   Pulse 70   Temp 98.2 °F (36.8 °C) (Oral)   Resp 15   Ht 5' 3" (1.6 m)   Wt 133 kg (292 lb 12.3 oz)   SpO2 96%   BMI 51.86 kg/m²     Physical Exam  Vitals and nursing note reviewed. Constitutional:       General: She is not in acute distress. Appearance: She is well-developed. HENT:      Head: Normocephalic. Nose: Nose normal.   Eyes:      Conjunctiva/sclera: Conjunctivae normal.   Cardiovascular:      Rate and Rhythm: Normal rate. Pulmonary:      Effort: Pulmonary effort is normal.      Breath sounds: No wheezing. Abdominal:      General: There is no distension. Palpations: Abdomen is soft. Musculoskeletal:      Cervical back: Neck supple. Skin:     General: Skin is warm. Comments: Sutures over incision - clean dressing   Neurological:      Mental Status: She is alert and oriented to person, place, and time.             Personally reviewed on 07/29/23:   Lab Results   Component Value Date WBC 9.19 07/26/2023    HGB 7.9 (L) 07/26/2023    HCT 26.7 (L) 07/26/2023    MCV 88 07/26/2023     07/26/2023     Lab Results   Component Value Date    SODIUM 139 07/26/2023    K 3.4 (L) 07/26/2023     (H) 07/26/2023    CO2 24 07/26/2023    BUN 14 07/26/2023    CREATININE 0.60 07/26/2023    GLUC 115 07/26/2023    CALCIUM 8.0 (L) 07/26/2023     Lab Results   Component Value Date    INR 1.39 (H) 07/18/2023    INR 1.23 (H) 06/27/2023    INR 1.19 06/24/2023    PROTIME 17.3 (H) 07/18/2023    PROTIME 15.7 (H) 06/27/2023    PROTIME 15.3 (H) 06/24/2023           Current Facility-Administered Medications:   •  acetaminophen (TYLENOL) tablet 975 mg, 975 mg, Oral, Q8H PRN, MOLLY Jackson, 975 mg at 07/26/23 0158  •  amiodarone tablet 200 mg, 200 mg, Oral, Daily With Breakfast, MOLLY Jackson, 200 mg at 07/29/23 0912  •  apixaban (ELIQUIS) tablet 5 mg, 5 mg, Oral, BID, MOLLY Jackson, 5 mg at 07/29/23 1713  •  calcium carbonate (OYSTER SHELL,OSCAL) 500 mg tablet 1 tablet, 1 tablet, Oral, Daily With Breakfast, MOLLY Jackson, 1 tablet at 07/29/23 2288  •  cyanocobalamin (VITAMIN B-12) tablet 1,000 mcg, 1,000 mcg, Oral, Daily, MOLLY Jackson, 1,000 mcg at 07/29/23 0905  •  diltiazem (CARDIZEM CD) 24 hr capsule 360 mg, 360 mg, Oral, Daily, MOLLY Jackson, 360 mg at 07/29/23 3782  •  ergocalciferol (VITAMIN D2) capsule 50,000 Units, 50,000 Units, Oral, Weekly, MOLLY Jackson, 50,000 Units at 07/26/23 0846  •  FLUoxetine (PROzac) capsule 20 mg, 20 mg, Oral, BID, MOLLY Jackson, 20 mg at 07/29/23 1713  •  iron sucrose (VENOFER) 200 mg in sodium chloride 0.9 % 100 mL IVPB, 200 mg, Intravenous, Daily, MOLLY Singh, Stopped at 07/29/23 1331  •  melatonin tablet 6 mg, 6 mg, Oral, HS, MOLLY Jackson, 6 mg at 07/28/23 2241  •  metoprolol succinate (TOPROL-XL) 24 hr tablet 25 mg, 25 mg, Oral, Daily, MOLLY Jackson, 25 mg at 07/29/23 4481  • multivitamin-minerals (CENTRUM) tablet 1 tablet, 1 tablet, Oral, Daily, MOLLY Jackson, 1 tablet at 07/29/23 7323  •  nystatin (MYCOSTATIN) powder, , Topical, BID, MOLLY Jackson, 1 Application at 92/17/48 1806  •  ondansetron (ZOFRAN-ODT) dispersible tablet 4 mg, 4 mg, Oral, Q6H PRN, MOLLY Jackson  •  psyllium (METAMUCIL) 1 packet, 1 packet, Oral, Daily, MOLLY Jackson, 1 packet at 07/25/23 1020    Past Medical History:   Diagnosis Date   • A-fib [facility])    • Arthritis    • Atrial fibrillation (HCC)    • Cervical spondylosis with myelopathy    • Gastric bypass status for obesity    • History of transfusion     35 years ago   • Hypertension    • mass right kidney    • Renal cell adenocarcinoma (HCC)     RIGHT   • Sleep apnea        Patient Active Problem List    Diagnosis Date Noted   • Mood disorder ([address]) 07/28/2023   • Encounter for skin care 07/28/2023   • COVID 07/24/2023   • Ambulatory dysfunction 07/17/2023   • Open abdominal wall wound 07/13/2023   • Iron deficiency anemia 07/13/2023   • Hypotension 07/13/2023   • Hypokalemia 07/10/2023   • Anemia 07/10/2023   • Bowel perforation ([address]) 07/05/2023   • Acute encephalopathy 07/05/2023   • Anxiety 07/05/2023   • Diarrhea 07/05/2023   • Idiopathic acute pancreatitis without infection or necrosis 06/18/2023   • Primary osteoarthritis of both knees 06/07/2023   • Stage 3 chronic kidney disease, unspecified whether stage 3a or 3b CKD ([address]) 05/31/2023   • Hypertension    • Morbid obesity with BMI of 50.0-59.9, adult ([address]) 11/01/2022   • Unsteady gait 10/30/2022   • Dyspnea on exertion 10/30/2022   • SIRS (systemic inflammatory response syndrome) ([address]) 10/30/2022   • Incontinence 10/30/2022   • GILDARDO (obstructive sleep apnea) 10/30/2022   • Vitamin D deficiency 07/06/2021   • Reactive hypoglycemia 12/30/2020   • Osteoporosis 06/04/2020   • Hyperparathyroidism ([address]) 06/04/2020   • Hashimoto's thyroiditis 03/17/2020   • Early menopause occurring in patient age younger than 39 years 03/17/2020   • S/P bariatric surgery 03/17/2020   • Myofascial pain syndrome 10/21/2019   • Status post partial thyroidectomy 09/12/2019   • Cervical spondylosis without myelopathy 06/28/2019   • Arthropathy of cervical facet joint 06/28/2019   • Occipital neuralgia of right side 06/28/2019   • Chest wall tenderness 01/24/2019   • Chronic heart failure ([address]) 01/24/2019   • Permanent atrial fibrillation ([address]) 01/24/2019   • Myalgia 01/08/2019   • Primary osteoarthritis of left knee 10/23/2018   • Primary osteoarthritis of right knee 10/23/2018   • Chronic pain of left knee 10/23/2018   • Malignant tumor of right kidney parenchyma ([address]) 05/11/2016   • Closed fracture of right distal radius and ulna 04/22/2016          Francis Queen MD  PM&R      I have spent a total time of 30 minutes on 07/29/23 in caring for this patient including Impressions, Documenting in the medical record and exam.      ** Please Note:  voice to text software may have been used in the creation of this document.  Although proof errors in transcription or interpretation are a potential of such software**

## 2023-07-31 LAB
ANION GAP SERPL CALCULATED.3IONS-SCNC: 8 MMOL/L
ANISOCYTOSIS BLD QL SMEAR: PRESENT
BASOPHILS # BLD MANUAL: 0 THOUSAND/UL (ref 0–0.1)
BASOPHILS NFR MAR MANUAL: 0 % (ref 0–1)
BUN SERPL-MCNC: 14 MG/DL (ref 5–25)
CALCIUM SERPL-MCNC: 8.2 MG/DL (ref 8.3–10.1)
CHLORIDE SERPL-SCNC: 111 MMOL/L (ref 96–108)
CO2 SERPL-SCNC: 25 MMOL/L (ref 21–32)
CREAT SERPL-MCNC: 0.75 MG/DL (ref 0.6–1.3)
EOSINOPHIL # BLD MANUAL: 0 THOUSAND/UL (ref 0–0.4)
EOSINOPHIL NFR BLD MANUAL: 0 % (ref 0–6)
ERYTHROCYTE [DISTWIDTH] IN BLOOD BY AUTOMATED COUNT: 18.9 % (ref 11.6–15.1)
FERRITIN SERPL-MCNC: 188 NG/ML (ref 11–307)
GFR SERPL CREATININE-BSD FRML MDRD: 79 ML/MIN/1.73SQ M
GLUCOSE P FAST SERPL-MCNC: 87 MG/DL (ref 65–99)
GLUCOSE SERPL-MCNC: 87 MG/DL (ref 65–140)
HCT VFR BLD AUTO: 27.2 % (ref 34.8–46.1)
HEMOCCULT STL QL: ABNORMAL
HEMOCCULT STL QL: POSITIVE
HEMOCCULT STL QL: POSITIVE
HGB BLD-MCNC: 8.3 G/DL (ref 11.5–15.4)
IRON SATN MFR SERPL: 38 % (ref 15–50)
IRON SERPL-MCNC: 70 UG/DL (ref 50–170)
LYMPHOCYTES # BLD AUTO: 0.97 THOUSAND/UL (ref 0.6–4.47)
LYMPHOCYTES # BLD AUTO: 10 % (ref 14–44)
MCH RBC QN AUTO: 26.9 PG (ref 26.8–34.3)
MCHC RBC AUTO-ENTMCNC: 30.5 G/DL (ref 31.4–37.4)
MCV RBC AUTO: 88 FL (ref 82–98)
METAMYELOCYTES NFR BLD MANUAL: 2 % (ref 0–1)
MONOCYTES # BLD AUTO: 0.44 THOUSAND/UL (ref 0–1.22)
MONOCYTES NFR BLD: 5 % (ref 4–12)
MYELOCYTES NFR BLD MANUAL: 1 % (ref 0–1)
NEUTROPHILS # BLD MANUAL: 7.11 THOUSAND/UL (ref 1.85–7.62)
NEUTS BAND NFR BLD MANUAL: 1 % (ref 0–8)
NEUTS SEG NFR BLD AUTO: 80 % (ref 43–75)
OVALOCYTES BLD QL SMEAR: PRESENT
PLATELET # BLD AUTO: 444 THOUSANDS/UL (ref 149–390)
PLATELET BLD QL SMEAR: ABNORMAL
PMV BLD AUTO: 9.1 FL (ref 8.9–12.7)
POIKILOCYTOSIS BLD QL SMEAR: PRESENT
POLYCHROMASIA BLD QL SMEAR: PRESENT
POTASSIUM SERPL-SCNC: 2.8 MMOL/L (ref 3.5–5.3)
RBC # BLD AUTO: 3.09 MILLION/UL (ref 3.81–5.12)
RBC MORPH BLD: PRESENT
SODIUM SERPL-SCNC: 144 MMOL/L (ref 135–147)
TIBC SERPL-MCNC: 186 UG/DL (ref 250–450)
VARIANT LYMPHS # BLD AUTO: 1 %
WBC # BLD AUTO: 8.78 THOUSAND/UL (ref 4.31–10.16)

## 2023-07-31 PROCEDURE — 82728 ASSAY OF FERRITIN: CPT | Performed by: STUDENT IN AN ORGANIZED HEALTH CARE EDUCATION/TRAINING PROGRAM

## 2023-07-31 PROCEDURE — 99232 SBSQ HOSP IP/OBS MODERATE 35: CPT | Performed by: INTERNAL MEDICINE

## 2023-07-31 PROCEDURE — 83540 ASSAY OF IRON: CPT | Performed by: STUDENT IN AN ORGANIZED HEALTH CARE EDUCATION/TRAINING PROGRAM

## 2023-07-31 PROCEDURE — 97530 THERAPEUTIC ACTIVITIES: CPT

## 2023-07-31 PROCEDURE — 82272 OCCULT BLD FECES 1-3 TESTS: CPT | Performed by: PHYSICAL MEDICINE & REHABILITATION

## 2023-07-31 PROCEDURE — 80048 BASIC METABOLIC PNL TOTAL CA: CPT | Performed by: PHYSICIAN ASSISTANT

## 2023-07-31 PROCEDURE — 83550 IRON BINDING TEST: CPT | Performed by: STUDENT IN AN ORGANIZED HEALTH CARE EDUCATION/TRAINING PROGRAM

## 2023-07-31 PROCEDURE — 85027 COMPLETE CBC AUTOMATED: CPT | Performed by: PHYSICIAN ASSISTANT

## 2023-07-31 PROCEDURE — 99232 SBSQ HOSP IP/OBS MODERATE 35: CPT | Performed by: STUDENT IN AN ORGANIZED HEALTH CARE EDUCATION/TRAINING PROGRAM

## 2023-07-31 PROCEDURE — 85007 BL SMEAR W/DIFF WBC COUNT: CPT | Performed by: PHYSICIAN ASSISTANT

## 2023-07-31 PROCEDURE — 99222 1ST HOSP IP/OBS MODERATE 55: CPT | Performed by: INTERNAL MEDICINE

## 2023-07-31 PROCEDURE — 97110 THERAPEUTIC EXERCISES: CPT

## 2023-07-31 RX ORDER — POTASSIUM CHLORIDE 20 MEQ/1
40 TABLET, EXTENDED RELEASE ORAL ONCE
Status: COMPLETED | OUTPATIENT
Start: 2023-07-31 | End: 2023-07-31

## 2023-07-31 RX ORDER — POTASSIUM CHLORIDE 20 MEQ/1
40 TABLET, EXTENDED RELEASE ORAL DAILY
Status: DISCONTINUED | OUTPATIENT
Start: 2023-07-31 | End: 2023-08-01

## 2023-07-31 RX ADMIN — METOPROLOL SUCCINATE 25 MG: 25 TABLET, FILM COATED, EXTENDED RELEASE ORAL at 08:32

## 2023-07-31 RX ADMIN — MELATONIN TAB 3 MG 6 MG: 3 TAB at 23:45

## 2023-07-31 RX ADMIN — FLUOXETINE 20 MG: 20 CAPSULE ORAL at 17:57

## 2023-07-31 RX ADMIN — POLYETHYLENE GLYCOL 3350, SODIUM SULFATE ANHYDROUS, SODIUM BICARBONATE, SODIUM CHLORIDE, POTASSIUM CHLORIDE 4000 ML: 236; 22.74; 6.74; 5.86; 2.97 POWDER, FOR SOLUTION ORAL at 16:14

## 2023-07-31 RX ADMIN — Medication 1 TABLET: at 08:32

## 2023-07-31 RX ADMIN — FLUOXETINE 20 MG: 20 CAPSULE ORAL at 08:32

## 2023-07-31 RX ADMIN — NYSTATIN: 100000 POWDER TOPICAL at 17:57

## 2023-07-31 RX ADMIN — DILTIAZEM HYDROCHLORIDE 360 MG: 180 CAPSULE, COATED, EXTENDED RELEASE ORAL at 08:33

## 2023-07-31 RX ADMIN — CYANOCOBALAMIN TAB 500 MCG 1000 MCG: 500 TAB at 08:32

## 2023-07-31 RX ADMIN — IRON SUCROSE 200 MG: 20 INJECTION, SOLUTION INTRAVENOUS at 10:01

## 2023-07-31 RX ADMIN — AMIODARONE HYDROCHLORIDE 200 MG: 200 TABLET ORAL at 07:36

## 2023-07-31 RX ADMIN — Medication 1 TABLET: at 07:36

## 2023-07-31 RX ADMIN — POTASSIUM CHLORIDE 40 MEQ: 1500 TABLET, EXTENDED RELEASE ORAL at 10:01

## 2023-07-31 RX ADMIN — NYSTATIN: 100000 POWDER TOPICAL at 10:07

## 2023-07-31 RX ADMIN — PANTOPRAZOLE SODIUM 40 MG: 40 TABLET, DELAYED RELEASE ORAL at 06:07

## 2023-07-31 RX ADMIN — PANTOPRAZOLE SODIUM 40 MG: 40 TABLET, DELAYED RELEASE ORAL at 15:56

## 2023-07-31 RX ADMIN — POTASSIUM CHLORIDE 40 MEQ: 1500 TABLET, EXTENDED RELEASE ORAL at 14:28

## 2023-07-31 NOTE — PROGRESS NOTES
07/31/23 0830   Pain Assessment   Pain Assessment Tool 0-10   Pain Score No Pain   Restrictions/Precautions   Precautions Fall Risk;Supervision on toilet/commode;Contact/isolation  (wound in right abdminal with dressing)   Braces or Orthoses   (b/l ace wraps)   Subjective   Subjective pt agreeable to perform skilled PT   Roll Left and Right   Type of Assistance Needed Physical assistance   Physical Assistance Level 26%-50%   Comment plus bed rails   Roll Left and Right CARE Score 3   Sit to Lying   Type of Assistance Needed Physical assistance   Physical Assistance Level 51%-75%   Comment BLE assist   Sit to Lying CARE Score 2   Lying to Sitting on Side of Bed   Type of Assistance Needed Physical assistance   Physical Assistance Level 25% or less   Comment CgA to Justine for LE ,pt using bed rails for upper body   Lying to Sitting on Side of Bed CARE Score 3   Sit to Stand   Type of Assistance Needed Incidental touching   Comment c/o right knee painful and weakness   Sit to Stand CARE Score 4   Bed-Chair Transfer   Type of Assistance Needed Incidental touching   Comment RW   Chair/Bed-to-Chair Transfer CARE Score 4   Transfer Bed/Chair/Wheelchair   Adaptive Equipment Roller Walker   Walk 10 Feet   Type of Assistance Needed Incidental touching; Adaptive equipment   Comment RW 40 FEEET   Walk 10 Feet CARE Score 4   Walk 50 Feet with Two Turns   Reason if not Attempted Safety concerns   Walk 50 Feet with Two Turns CARE Score 88   Walk 150 Feet   Reason if not Attempted Activity not applicable   Walk 662 Feet CARE Score 9   Walking 10 Feet on Uneven Surfaces   Comment will need to work on long ramp in lobby with RW   Ambulation   Primary Mode of Locomotion Prior to Admission Walk   Distance Walked (feet) 40 ft  (x2)   Assist Device Roller Walker   Gait Pattern Inconsistant Rebeca; Improper weight shift   Walk Assist Level Minimum Assist;Contact Guard   Findings fatigues quickly with poor reserve for multiple attempts needs WC follow   Does the patient walk? 2. Yes   Wheel 50 Feet with Two Turns   Reason if not Attempted Activity not applicable   Wheel 50 Feet with Two Turns CARE Score 9   Wheel 150 Feet   Reason if not Attempted Activity not applicable   Wheel 432 Feet CARE Score 9   Wheelchair mobility   Does the patient use a wheelchair? 0. No   Curb or Single Stair   Style negotiated Curb   Type of Assistance Needed Physical assistance   Physical Assistance Level 51%-75%   Comment 4 inch RW vc for sequence and pattern   1 Step (Curb) CARE Score 2   4 Steps   Reason if not Attempted Activity not applicable   4 Steps CARE Score 9   12 Steps   Reason if not Attempted Activity not applicable   12 Steps CARE Score 9   Stairs   Type Curb   # of Steps 1   Weight Bearing Precautions Fall Risk   Assist Devices Roller Harrison Blackburn   Findings up with her left and down with her right leg   Picking Up Object   Type of Assistance Needed Physical assistance   Physical Assistance Level 26%-50%   Comment reacher with RW for support   Picking Up Object CARE Score 3   Therapeutic Interventions   Strengthening 3 set ea. LAQ AP marching seated and standing AD for supporrt 10-20 reps , gluts 10-20 reps heel and toe 10-20 reps   Flexibility HS and calf mnual stretch   Balance standing balance   Equipment Use   NuStep pt refused bike d/t inc knee pain   Other Comments   Comments GI Doctors seeling pt post skilled PT, awating report. Assessment   Treatment Assessment pt focus on bed mobility with RW ambulation up ro 40 feet , but barriers and limiits with SOB and general deconditioning and weakness . Pt limited with defferent and functional activities task d.t wekaness SOB nad poor conditioning . Pt needs to mimic ramp on 1 floor for home DC using RW not Rollator .  Pt will cont with skilled PT to meet DC goals   Barriers to Discharge None   Plan   Progress Progressing toward goals   Recommendation   PT Discharge Recommendation Home with home health rehabilitation   Equipment Recommended Walker   PT Therapy Minutes   PT Time In 0830   PT Time Out 1000   PT Total Time (minutes) 90   PT Mode of treatment - Individual (minutes) 90   PT Mode of treatment - Concurrent (minutes) 0   PT Mode of treatment - Group (minutes) 0   PT Mode of treatment - Co-treat (minutes) 0   PT Mode of Treatment - Total time(minutes) 90 minutes   PT Cumulative Minutes 540   Therapy Time missed   Time missed?  No

## 2023-07-31 NOTE — PROGRESS NOTES
Internal Medicine Progress Note  Patient: Rita Barreto  Age/sex: 67 y.o. female  Medical Record #: [id number]      ASSESSMENT/PLAN: (Interval History)  Rita Barreto is seen and examined and management for following issues:    Wound dehiscence s/p VAC placement  • Had recent admission for ex-lap 2/2 bowel perforation/internal hernia (6/19-7/4)  • Readmitted with wound dehiscence 7/18 and underwent a washout/VAC application and partial closure of the wound on 7/19  • Surgery saw 7/28 and closed wound, d/c'd VAC. Wound is being packed with dressing changes 2x daily. • Pain control and therapy per primary service.     Atrial fibrillation  • Home:  Amiodarone 200mg qd/Card CD 360mg qd/Toprol XL 25mg q12hrs/Eliquis 5mg BID  • Here:  Amiodarone 200mg qd/Card CD 360mg qd/Toprol XL 25mg qd/Eliquis 5mg BID - on hold due to bleeding. • Stable      HTN  • Home:  In addition to above meds, was on HCTZ 12.5mg qd/KCL 15meq qd  • Here:  Holding off on HCTZ     COVID-19 positive  • Detected on routine testing for [facility] admission. • Pt is asymptomatic. • Pt had 2 rapid antigen tests and is off precautions.     Hypokalemia  • Replaced with KCl 40meq on 7/24/23 and 7/26/23 for K+ of 3.4  • K 2.8 today. • Will give 80meq today and place on 40meq daily.     Anemia   • Hgb has been trending around 8, currently 8.3.  • Stable.     Iron deficeincy  • Has hx gastric bypass  • Iron level on 7/18/23 was 18  • Pt has had Venofer infusions in the past as her iron was low due to gastric bypass. • She is agreeable to infusions while here as well   • Since COVID antigen negative x 2, started Venofer on 7/29 with 200mg qd x 3      Hx RCC right kidney  • S/p cryotherapy @ 7 yrs ago  • Follows with Urology     Hematuria vs vaginal bleeding vs rectal bleeding  · Had blood in the toilet after voiding and after a BM  · Hgb stable at 8.3.  · Continue Protonix 2x daily. · GI consult pending. · Eliquis on hold.   · Occult blood positive x 2.    Discharge Date:  Team       The above assessment and plan was reviewed and updated as determined by my evaluation of the patient on 7/31/2023. Labs:   Results from last 7 days   Lab Units 07/31/23  0457 07/26/23  0541   WBC Thousand/uL 8.78 9.19   HEMOGLOBIN g/dL 8.3* 7.9*   HEMATOCRIT % 27.2* 26.7*   PLATELETS Thousands/uL 444* 344     Results from last 7 days   Lab Units 07/31/23  0457 07/26/23  0541   SODIUM mmol/L 144 139   POTASSIUM mmol/L 2.8* 3.4*   CHLORIDE mmol/L 111* 109*   CO2 mmol/L 25 24   BUN mg/dL 14 14   CREATININE mg/dL 0.75 0.60   CALCIUM mg/dL 8.2* 8.0*                   Review of Scheduled Meds:  Current Facility-Administered Medications   Medication Dose Route Frequency Provider Last Rate   • acetaminophen  975 mg Oral Q8H PRN MOLLY Jackson     • amiodarone  200 mg Oral Daily With Breakfast MOLLY Jackson     • calcium carbonate  1 tablet Oral Daily With Breakfast MOLLY Jackson     • vitamin B-12  1,000 mcg Oral Daily MOLLY Jackson     • diltiazem  360 mg Oral Daily MOLLY Jackson     • ergocalciferol  50,000 Units Oral Weekly MOLLY Jackson     • FLUoxetine  20 mg Oral BID MOLLY Jackson     • iron sucrose  200 mg Intravenous Daily MOLLY Lew Stopped (07/30/23 1115)   • melatonin  6 mg Oral HS MOLLY Jackson     • metoprolol succinate  25 mg Oral Daily MOLLY Jackson     • multivitamin-minerals  1 tablet Oral Daily MOLLY Jackson     • nystatin   Topical BID MOLLY Jackson     • ondansetron  4 mg Oral Q6H PRN MOLLY Jackson     • pantoprazole  40 mg Oral BID AC Yasemin Sheridan MD     • psyllium  1 packet Oral Daily MOLLY Jackson         Subjective/ HPI: Patient seen and examined. Patients overnight issues or events were reviewed with nursing or staff during rounds or morning huddle session. New or overnight issues include the following:     Pt seen in her room. She states that she is doing well. She is hoping for DC this week but understands that the bleeding over the weekend may prolong her stay. She denies any other complaints. ROS:   A 10 point ROS was performed; negative except as noted above. Imaging:     No orders to display       *Labs /Radiology studies reviewed  *Medications reviewed and reconciled as needed  *Please refer to order section for additional ordered labs studies  *Case discussed with primary attending during morning huddle case rounds    Physical Examination:  Vitals:   Vitals:    07/30/23 1500 07/30/23 2000 07/31/23 0600 07/31/23 0832   BP: 116/55 108/58 101/56 116/55   BP Location: Right arm Left arm Left arm    Pulse: 91 77 85 98   Resp: 17 18 20    Temp: 97.6 °F (36.4 °C) 98.1 °F (36.7 °C) 98.1 °F (36.7 °C)    TempSrc: Oral Oral Oral    SpO2: 96% 96% 98%    Weight:   133 kg (294 lb 1.5 oz)    Height:           General Appearance: no distress, conversive, interactive  HEENT:  External ear normal.  Nose normal w/o drainage. Mucous membranes are moist. Oropharynx is clear. Conjunctiva clear w/o icterus or redness. Neck:  Supple, normal ROM  Lungs: BBS without crackles/wheeze/rhonchi; respirations unlabored with normal inspiratory/expiratory effort. No retractions noted. On RA  CV: regular rate and rhythm; no rubs/murmurs/gallops, PMI normal   ABD: Abdomen is soft. Bowel sounds all quadrants. Nontender with no distention. EXT: + LE edema  Skin: normal turgor, normal texture, no rashes  Psych: affect normal, mood normal  Neuro: AAO     The above physical exam was reviewed and updated as determined by my evaluation of the patient on 7/31/2023. Invasive Devices     Peripheral Intravenous Line  Duration           Peripheral IV 07/29/23 Dorsal (posterior); Left Hand 1 day                   VTE Pharmacologic Prophylaxis: Eliquis - on hold  Code Status: Level 1 - Full Code  Current Length of Stay: 7 day(s)      Total time spent:  30 minutes with more than 50% spent counseling/coordinating care. Counseling includes discussion with patient re: progress  and discussion with patient of his/her current medical state/information. Coordination of patient's care was performed in conjunction with primary service. Time invested included review of patient's labs, vitals, and management of their comorbidities with continued monitoring. In addition, this patient was discussed with medical team including physician and advanced extenders. The care of the patient was extensively discussed and appropriate treatment plan was formulated unique for this patient. Medical decision making for the day was made by supervising physician unless otherwise noted in their attestation statement. ** Please Note:  voice to text software may have been used in the creation of this document.  Although proof errors in transcription or interpretation are a potential of such software**

## 2023-07-31 NOTE — PROGRESS NOTES
Occupational Therapy Treatment Note         07/31/23 1245   Pain Assessment   Pain Assessment Tool 0-10   Pain Score No Pain   Restrictions/Precautions   Precautions Bed/chair alarms; Fall Risk;Contact/isolation;Supervision on toilet/commode   Lifestyle   Autonomy "I want to go home Thursday, Friday if it has to be"   Sit to Stand   Type of Assistance Needed Incidental touching; Adaptive equipment   Physical Assistance Level No physical assistance   Comment From 24" height of bed, which is pt's measurement at home, pt can complete sit to stand with contact guard. If 22" regular height, requires min assist   Sit to Stand CARE Score 4   Bed-Chair Transfer   Type of Assistance Needed Incidental touching; Adaptive equipment   Physical Assistance Level No physical assistance   Comment incidential touching for sit to stand, close supervision for short distance mobility in room. Pt able to use standard, not bariatric, RW. Chair/Bed-to-Chair Transfer CARE Score 4   Cognition   Overall Cognitive Status WFL   Attention Within functional limits   Orientation Level Oriented X4   Memory Within functional limits   Following Commands Follows all commands and directions without difficulty   Assessment   Treatment Assessment see below   Prognosis Good   Problem List Decreased strength;Decreased endurance; Impaired balance;Decreased mobility;Orthopedic restrictions;Decreased skin integrity;Obesity   Plan   Treatment/Interventions ADL retraining;Functional transfer training; Therapeutic exercise; Endurance training;Patient/family training;Equipment eval/education; Bed mobility; Compensatory technique education   Progress Progressing toward goals   Recommendation   OT Discharge Recommendation Home with home health rehabilitation   OT Therapy Minutes   OT Time In 1245   OT Time Out 1430   OT Total Time (minutes) 105   OT Mode of treatment - Individual (minutes) 105   OT Mode of treatment - Concurrent (minutes) 0   OT Mode of treatment - Group (minutes) 0   OT Mode of treatment - Co-treat (minutes) 0   OT Mode of Treatment - Total time(minutes) 105 minutes   OT Cumulative Minutes 625   Therapy Time missed   Time missed? No     Pt participated in skilled OT tx session. See above for further details on functional performance. Family training with pt's dtr [facility] and son Severa Craft (son present for first part of session). Of note during session general sx resident present and wanted to trial abdominal binder to decrease tension at lower aspect of incision/wound, RN Tiffanie and OTR attempted with 1 binder and then 1 large and 1 small binder together, however when pt is sitting/standing the lower aspect of wound is below pannus and an abdominal binder does not end up covering that area or staying covered and pt feels no support. Therefore nursing utilized tape hold up areas of pannus in order to help with tension - nursing trialing this for now. Per resident, it is important to limit tension from this area. Therefore, OT spoke with pt and dtr regarding pt sleeping in electric recliner chair at home initially, as lifting of LEs (even if someone else is helping) into bed does place pressure/tension on core/abdomen and main goal is healing incision. Pt in agreement to this for short term to allow wound to heal and give her time to get stronger. However even PTA pt had difficulty getting in/out of bed. Will speak with PT tomorrow to practice pt using 2-3" step stool (as family would be open to purchasing one) to assist her in getting into bed and scooting back further to middle of bed). Pt inquired about hospital bed but her bed is 24" high and hospital bed is only 1.5-2" smaller than that and pt does not find hospital bed mattress comfortable, so if there is an easier way for pt to access her own bed it would be best to attempt this.  Discussed sponge bathing at home outside of shower (as 6" lip to get into shower which pt could not do at this time and no showering with abdominal incision anyway). Sponge bathing could occur from recliner chair or sitting on toilet at home. Le to purchase a toileting style rail off [place] that has 4 points of support on ground (appears like a BSC without the seat), but OT will still order a wide BSC that can be used next to where pt sleeps. Pt requires wide BSC 2* body habitus and for safety. Educated on kevin litter/trashbag method that can be used. This is pt's back up plan as she prefers to utilize toilet, but she is receptive to having one for safety. In PT tomorrow pt also to practice ramp to enter house. Family training scheduled again with dtr [facility] Wednesday 230-330 for PT as questions revolve around bed mobility/stool and ramp. Of note once pt is up using RW, she is incidental touching/close supervision for in room mobility. While she continues to experience decreased endurance, from OT standpoint d/c home at end of the week could be possible. But d/c also depends on medical stability, which Dr. Yessi Montero discussed with pt. Tomorrow, OT to educate on types of toileting aides and then dtr Le to purchase; set BSC height to 18" at toilet with arm rests to practice transfers. Pt seated in recliner with call bell in reach and chair alarm on at end of session.

## 2023-07-31 NOTE — PROGRESS NOTES
PM&R PROGRESS NOTE:  Sushma Mcnamara 67 y.o. female MRN: [id number]  Unit/Bed#: -01 Encounter: [id number]      Rehab Diagnosis: Impairment of mobility, safety and Activities of Daily Living (ADLs) due to Debility:  16  Debility (Non-cardiac/Non-pulmonary)    Etiologic: Surgical site infection after wound dehiscence s/p washout vac placement and partial closure  Date of Onset: 7/18/23     Date of surgery: 7/18/23-wound debridement; wound washout, vac application and partial closure of abdominal wound    HPI: Heidy Burger is Vannesa.Deiters y.o. female with a medical history of atrial fibrillation (on Eliquis), sleep apnea, hypertension, renal cell carcinoma, gastric bypass, cervical spondylosis, and osteoarthritis who was recently admitted at [address] [place] [address] with a bowel perforation and internal hernia (hx arlene-en-y gastric bypass).  She had an ex lap, resection J-J anastomosis, segmental ileum resection, abthera vac placement, ex lap, washout, abthera change and ex lap, small bowel resection, bowel anastomosis x3, vicryl bridging mesh. Once medically stable she went to TCU for rehabilitation. Patient presented to [address] [place] [address] on 7/18/23 after clinic appointment for suture removal with an abdominal wound dehiscence.  On 7/18 she underwent washout, wound exploration, debridement. On 7/19 she underwent washout, partial wound closure, and wound vac placement. Patient currently has wound vac in place at a pressure of 75 mmHg continuously.  Wound vac last changed on 7/24 with plans for surgery management of dressing changes.  She is tolerating a regular diet with nutrition supplements.  Patient tested positive for COVID on 7/24.  She is asymptomatic and not being treated at this time.  She worked with PT and OT and was recommended for post acute rehab following her hospital stay. [place] has demonstrated that she can tolerate three hours of therapy, five days a week and is now medically cleared for discharge to the [facility]. PT/OT were consulted and recommend acute inpatient rehabilitation. The patient was evaluated by the Rehabilitation team and deemed an appropriate candidate for comprehensive inpatient rehabilitation and admitted to the [facility] on 7/24/2023  4:34 PM    SUBJECTIVE: Patient seen face to face. Eliquis was placed on hold for some bleeding in the toilet over the weekend. As of today no further bleeding however Eliquis remains on hold at this time. Evaluated by gastroenterology and pending attestation and potential for colonoscopy tomorrow. Eliquis remains on hold at this time and will restart pending recommendations and testing. Overall she denies any fever, chills, nausea vomiting, cough, shortness of breath,. Hemoglobin is stable. Seen in conjunction with the surgical team who is managing the open abdominal incision site with significant mount of tension from the pannus which is causing some dehiscence. Extra sutures were placed today at bedside by surgical team.  Family also present and discussing discharge planning and equipment with family. ASSESSMENT: Stable, progressing, open abdominal wound, potential GI bleed      PLAN:    Rehabilitation  • Functional deficits: Self-care and mobility  • Continue current rehabilitation plan of care to maximize function. • Functional update:   Physical Therapy Occupational Therapy Speech Therapy   Weight Bearing Status: Full Weight Bearing  Transfers: Minimal Assistance  Bed Mobility: Moderate Assistance  Amulation Distance (ft): 40 feet  Ambulation: Minimal Assistance  Assistive Device for Ambulation: Roller Walker  Wheelchair Mobility Distance: 0 ft  Number of Stairs: 1 (curb step 4inch with RW Elia WRAY)  Discharge Recommendations: Home with:  [facility] with[de-identified] Family Support   Eating: Independent  Grooming: Incidental Touching  Bathing: Moderate Assistance  Bathing: Moderate Assistance  Upper Body Dressing: Supervision  Lower Body Dressing:  Total Assistance  Toileting: Moderate Assistance  Toilet Transfer: Minimal Assistance  Cognition: Within Defined Limits  Orientation: Person, Place, Time, Situation                 • Estimated Discharge: Plan for discharge likely end of this week      Pain  • Acetaminophen as needed    DVT prophylaxis  • Eliquis on hold at this time    Bladder plan  • Continent    Bowel plan  • Continent  • Last BM on 7/31      * Open abdominal wall wound  Assessment & Plan  - recent admission [facility] 6/19-7/4 with bowel perforation and internal hernia (hx arlene-en-y gastric bypass) - s/p ex lap, resection J-J anastomosis, segmental ileum resection, abthera 6/19  - s/p ex lap, washout, abthera change 6/19  - s/p ex lap, small bowel resection, bowel anastomosis x3, vicryl bridging mesh 6/20  - s/p debridement/washout on 7/18  - s/p partial closure and wound vac placement on 7/19  - wound vac 75 mmHg continuous  - dressing changes per General Surgery   -Surgery saw the patient today on 7/31 and added additional sutures to reinforce however there is a significant amount of tension on the incision sites that is limiting the overall healing process. Attempted double binder with without success however working with therapy and nursing to try and reduce overall tension as the pannus itself on the left and right side is pulling at the incision site.   Wound base looks good  - some loose stools to be expected post GI sx - metamucil but patient refusing it to try to bulk up - stools more formed recently nevertheless - monitor for changes or other etiologies   - Recent vitals/labs stable - CBC, BMP Monday   - midline incision with sutures  - pain management - well controlled - PRN APAP   - Function continues to improve well  - Continue acute comprehensive interdisciplinary inpatient rehabilitation to include intensive skilled therapies (PT, OT) as outlined with oversight and management by rehabilitation physician as well as inpatient rehab level nursing, case management and weekly interdisciplinary team meetings. Encounter for skin care  Assessment & Plan  7/28 buttock skin intact   High risk for skin wounds/breakdown with patient wanting to lay in recliner and not bed - counseled patient on being in bed to decrease risk of wounds;   - Increased risk of skin wounds/breakdown/rashes due to recent immobility and co-morbidities - loose stools at times  - Turn patient Q2H; encourage frequent wt shifts when in chair/WC every 10-15 minutes  - Hydragaurd to buttocks and sacrum BID & PRN  - Float heels and ankles/bony protuberances with pillow(s)(or wedges) in all positions while in bed or recliner chair. Be sure feet are not up against footboard (pull patient up/remove footboard/or request longer bed as indicated)  - EHOB waffle cushion to chair/WC when OOB  - Maximum time in chair 2 hours at a time; OOB minimum 3 times per day - Notify MD if unable to get patient OOB 3 times per day  - Skin mobilization protocol   - Optimal bowel/bladder hygiene - avoid bedpan unless absolutely necessary and use for least time possible   - Optimal nutrition   - Nursing to document in chart and Notify MD if buttock, sacrum, heel, or other skin site develops erythema or skin breakdown as soon as possible. If patient is soiling themselves with urine or stool notify MD.  If you are unable to maintain skin integrity and prevent erythema due to frequency of soiling notify MD as soon as possible. - Notify MD of new or increasing drainage, development of purulent drainage, increased size/depth of wound, lack of healing, inability to maintain wound integrity due to degree of drainage, wound product, dressing, or currently ordered frequency of wound management. In general dressings should be changed PRN if soiled unless specifically noted otherwise. Do not allow soiled dressing on patient for extended period of time.         Mood disorder ([address])  Assessment & Plan  Prozac  Melatonin for sleep  Supportive counseling     COVID  Assessment & Plan  - positive 7/24  - asymptomatic  - no signs of infection: afebrile  - isolation 10 days (thru 8/3) unless 2 Ag tests 48 h apart negative  - 7/26: SARS cov2 antigen negative; repeat 7/28 Ag lab pending - apparently a send out and may not know today   - monitor closely      Anemia  Assessment & Plan  - Hbg 7.9 (previously 8.1)  - Patient apparently was at times in past having chronic anemia as well and occasionally getting Venofer per family - discussed with IM and could consider Venofer if Covid comes back negative; hold oral iron as it may irritate stomach and may not absorb well for now   - monitor CBC Monday unless concerns sooner   -GI saw patient for miguel blood in toilet over the weekend however Eliquis was on hold and none today so far. May be pending a colonoscopy however on clear pending GI attending attestation.   If is the case will need to be made n.p.o. after midnight  - consider transfusion if warranted    Hypokalemia  Assessment & Plan  - K+ 3.4 (7/26) and 2.8 as of 7/31 and given additional potassium supplementation with daily repletion as well  - IM consulted, monitoring, repletion, and overall management at their discretion during ARC course  - monitor BMP      Anxiety  Assessment & Plan  - home: Prozac 20 mg daily  - continue here  - consult neuropsychology when cleared    Hypertension  Assessment & Plan  - home: Cardizem 360 mg daily, Metoprolol succinate 25 mg BID, HCTZ 12.5 mg BID  - here: Cardizem, Toprol XL daily, (hold HCTZ)    - monitor BP    Morbid obesity with BMI of 50.0-59.9, adult ([facility])  Assessment & Plan  - BMI 53.89  - s/p gastric bypass (25 years ago)  - educate on lifestyle modification  - consider nutrition consult    GILDARDO (obstructive sleep apnea)  Assessment & Plan  - patient states she has not been using CPAP much at home but has one  - here: 2L nc at home  - monitor     Vitamin D deficiency  Assessment & Plan  - Vit D 62.1 (1/16/23)  - Vitamin D 50K weekly     S/P bariatric surgery  Assessment & Plan  - 25 years ago  - supplemental vitamins post surgery- MV, B12,    Permanent atrial fibrillation (HCC)  Assessment & Plan  - home: Eliqus 5 mg BID, Amiodarone 200 mg daily, Cardizem 360 mg daily, Toprol XL 25 mg BID  - here: Eliquis for A/C; Amio, Dilt, MTP per IM  - Monitor Hb   - monitor with therapies        Appreciate IM consultants medical co-management. Labs, medications, and imaging personally reviewed. ROS:  A ten point review of systems was completed on 07/31/23 and pertinent positives are listed in subjective section. All other systems reviewed were negative. OBJECTIVE:   /59 (BP Location: Right arm)   Pulse 72   Temp 97.7 °F (36.5 °C) (Oral)   Resp 16   Ht 5' 3" (1.6 m)   Wt 133 kg (294 lb 1.5 oz)   SpO2 97%   BMI 52.10 kg/m²     Physical Exam  Constitutional:       General: She is not in acute distress. Appearance: She is obese. HENT:      Head: Normocephalic and atraumatic. Right Ear: External ear normal.      Left Ear: External ear normal.      Nose: Nose normal. No rhinorrhea. Mouth/Throat:      Mouth: Mucous membranes are moist.      Pharynx: Oropharynx is clear. Eyes:      General: No scleral icterus. Cardiovascular:      Rate and Rhythm: Normal rate. Pulses: Normal pulses. Pulmonary:      Effort: Pulmonary effort is normal. No respiratory distress. Abdominal:      General: There is no distension. Palpations: Abdomen is soft. Comments: Protuberant with mid gastric incision site with sutures open area with healthy appearing wound base. Extra sutures added but significant tension from pannus   Musculoskeletal:      Cervical back: Normal range of motion and neck supple. Skin:     General: Skin is warm and dry. Neurological:      Mental Status: She is alert and oriented to person, place, and time.    Psychiatric:         Mood and Affect: Mood normal. Behavior: Behavior normal.          Lab Results   Component Value Date    WBC 8.78 07/31/2023    HGB 8.3 (L) 07/31/2023    HCT 27.2 (L) 07/31/2023    MCV 88 07/31/2023     (H) 07/31/2023     Lab Results   Component Value Date    SODIUM 144 07/31/2023    K 2.8 (L) 07/31/2023     (H) 07/31/2023    CO2 25 07/31/2023    BUN 14 07/31/2023    CREATININE 0.75 07/31/2023    GLUC 87 07/31/2023    CALCIUM 8.2 (L) 07/31/2023     Lab Results   Component Value Date    INR 1.39 (H) 07/18/2023    INR 1.23 (H) 06/27/2023    INR 1.19 06/24/2023    PROTIME 17.3 (H) 07/18/2023    PROTIME 15.7 (H) 06/27/2023    PROTIME 15.3 (H) 06/24/2023           Current Facility-Administered Medications:   •  acetaminophen (TYLENOL) tablet 975 mg, 975 mg, Oral, Q8H PRN, MOLLY Jackson, 975 mg at 07/26/23 0158  •  amiodarone tablet 200 mg, 200 mg, Oral, Daily With Breakfast, MOLLY Jackson, 200 mg at 07/31/23 0682  •  apixaban (ELIQUIS) tablet 5 mg, 5 mg, Oral, BID, Jennifer Alvarez PA-C  •  calcium carbonate (OYSTER SHELL,OSCAL) 500 mg tablet 1 tablet, 1 tablet, Oral, Daily With Breakfast, MOLLY Jackson, 1 tablet at 07/31/23 0736  •  cyanocobalamin (VITAMIN B-12) tablet 1,000 mcg, 1,000 mcg, Oral, Daily, MOLLY Jackson, 1,000 mcg at 07/31/23 5380  •  diltiazem (CARDIZEM CD) 24 hr capsule 360 mg, 360 mg, Oral, Daily, MOLLY Jackson, 360 mg at 07/31/23 5398  •  ergocalciferol (VITAMIN D2) capsule 50,000 Units, 50,000 Units, Oral, Weekly, MOLLY Jackson, 50,000 Units at 07/26/23 0846  •  FLUoxetine (PROzac) capsule 20 mg, 20 mg, Oral, BID, MOLLY Jackson, 20 mg at 07/31/23 1689  •  melatonin tablet 6 mg, 6 mg, Oral, HS, MOLLY Jackson, 6 mg at 07/30/23 2224  •  metoprolol succinate (TOPROL-XL) 24 hr tablet 25 mg, 25 mg, Oral, Daily, MOLLY Jackson, 25 mg at 07/31/23 1749  •  multivitamin-minerals (CENTRUM) tablet 1 tablet, 1 tablet, Oral, Daily, MOLLY Jackson, 1 tablet at 07/31/23 0832  •  nystatin (MYCOSTATIN) powder, , Topical, BID, MOLLY Jackson, Given at 07/31/23 1007  •  ondansetron (ZOFRAN-ODT) dispersible tablet 4 mg, 4 mg, Oral, Q6H PRN, MOLLY Jackson  •  pantoprazole (PROTONIX) EC tablet 40 mg, 40 mg, Oral, BID AC, Quita Back MD, 40 mg at 07/31/23 8983  •  potassium chloride (K-DUR,KLOR-CON) CR tablet 40 mEq, 40 mEq, Oral, Daily, Jennifer Alvarez PA-C, 40 mEq at 07/31/23 1001  •  psyllium (METAMUCIL) 1 packet, 1 packet, Oral, Daily, MOLLY Jackson, 1 packet at 07/25/23 1020    Past Medical History:   Diagnosis Date   • A-fib [facility])    • Arthritis    • Atrial fibrillation (HCC)    • Cervical spondylosis with myelopathy    • Gastric bypass status for obesity    • History of transfusion     35 years ago   • Hypertension    • mass right kidney    • Renal cell adenocarcinoma (HCC)     RIGHT   • Sleep apnea        Patient Active Problem List    Diagnosis Date Noted   • Open abdominal wall wound 07/13/2023   • Mood disorder ([address]) 07/28/2023   • Encounter for skin care 07/28/2023   • COVID 07/24/2023   • Ambulatory dysfunction 07/17/2023   • Iron deficiency anemia 07/13/2023   • Hypotension 07/13/2023   • Hypokalemia 07/10/2023   • Anemia 07/10/2023   • Bowel perforation ([address]) 07/05/2023   • Acute encephalopathy 07/05/2023   • Anxiety 07/05/2023   • Diarrhea 07/05/2023   • Idiopathic acute pancreatitis without infection or necrosis 06/18/2023   • Primary osteoarthritis of both knees 06/07/2023   • Stage 3 chronic kidney disease, unspecified whether stage 3a or 3b CKD ([address]) 05/31/2023   • Hypertension    • Morbid obesity with BMI of 50.0-59.9, adult ([address]) 11/01/2022   • Unsteady gait 10/30/2022   • Dyspnea on exertion 10/30/2022   • SIRS (systemic inflammatory response syndrome) ([address]) 10/30/2022   • Incontinence 10/30/2022   • GILDARDO (obstructive sleep apnea) 10/30/2022   • Vitamin D deficiency 07/06/2021   • Reactive hypoglycemia 12/30/2020   • Osteoporosis 06/04/2020   • Hyperparathyroidism ([address]) 06/04/2020   • Hashimoto's thyroiditis 03/17/2020   • Early menopause occurring in patient age younger than 39 years 03/17/2020   • S/P bariatric surgery 03/17/2020   • Myofascial pain syndrome 10/21/2019   • Status post partial thyroidectomy 09/12/2019   • Cervical spondylosis without myelopathy 06/28/2019   • Arthropathy of cervical facet joint 06/28/2019   • Occipital neuralgia of right side 06/28/2019   • Chest wall tenderness 01/24/2019   • Chronic heart failure ([address]) 01/24/2019   • Permanent atrial fibrillation ([address]) 01/24/2019   • Myalgia 01/08/2019   • Primary osteoarthritis of left knee 10/23/2018   • Primary osteoarthritis of right knee 10/23/2018   • Chronic pain of left knee 10/23/2018   • Malignant tumor of right kidney parenchyma ([address]) 05/11/2016   • Closed fracture of right distal radius and ulna 04/22/2016          Tiffanie Santizo DO  Physical Medicine and [place]    I have spent a total time of 35 minutes on 07/31/23 in caring for this patient including Patient and family education, Counseling / Coordination of care, Documenting in the medical record, Reviewing / ordering tests, medicine, procedures   and Communicating with other healthcare professionals .

## 2023-07-31 NOTE — CONSULTS
Consultation - [phone]  [facility]  Chester Posadas 67 y.o. female MRN: [id number]  Unit/Bed#: [facility]-01 Encounter: [id number]      Consults    Reason for Consult / Principal Problem: Bright red blood per rectum    ASSESSMENT AND PLAN:      67year old currently being managed following wound dehiscence. Concern for rectal bleeding after episode of bloody bowel movement. 1. Bright red blood per rectum  · Rectal exam negative for blood  · Patient denies appetite change, nausea/vomiting, and is having regular daily bowel movements  Plan  · Will plan for colonoscopy tomorrow 8/1  · Continue to hold eliquis    Rest of care per primary team.  Thank you for this consultation. ______________________________________________________________________    HPI:  67year old patient PMH afib on eliquis, arlene-en-y, and renal cell carcinoma currently being managed following wound closure after wound dehiscence. The patient required bowel resection following bowel perforation at the end of June, and returned a few weeks later due to wound dehiscence. The wound was closed surgically on 7/28. Yesterday, patient went to urinate and the toilet bowl was overtly bloody. Afterwards, she had a bowel movement which was similarly bloody. UA was negative for RBCs, FOBT positive. Hg this morning is 8.3, which has been stable over the past few days. Patient has had no change in appetite and is having regular daily bowel movements. She denies abdominal pain, constipation, and nausea/vomiting. Rectal exam negative for blood, negative for mass. REVIEW OF SYSTEMS:  10 point ROS reviewed and negative except otherwise noted in the HPI above.      Historical Information   Past Medical History:   Diagnosis Date   • A-fib [facility])    • Arthritis    • Atrial fibrillation ([address])    • Cervical spondylosis with myelopathy    • Gastric bypass status for obesity    • History of transfusion     35 years ago   • Hypertension    • mass right kidney • Renal cell adenocarcinoma [facility])     RIGHT   • Sleep apnea      Past Surgical History:   Procedure Laterality Date   • ABDOMINAL ADHESION SURGERY N/A 6/19/2023    Procedure: LYSIS ADHESIONS;  Surgeon: Cathy Woods DO;  [facility];  Service: General   • ABDOMINAL WALL SURGERY N/A 7/19/2023    Procedure: PARTIAL CLOSURE WOUND ABDOMINAL/TRUNK;  Surgeon: Lidia Baldwin DO;  [facility];  Service: General   • APPENDECTOMY N/A 6/19/2023    Procedure: APPENDECTOMY;  Surgeon: Cathy Woods DO;  [facility];  Service: General   • CHOLECYSTECTOMY     • COLOSTOMY     • CRYOABLATION Right     RT KIDNEY   • CYSTORRHAPHY      Bladder.  Last assessed 4/6/2016    • EXPLORATORY LAPAROTOMY W/ BOWEL RESECTION N/A 6/19/2023    Procedure: LAPAROTOMY EXPLORATORY W/ BOWEL RESECTION;  Surgeon: Joya Cosby MD;  [facility];  Service: General   • GASTRIC BYPASS     • HERNIA REPAIR      Last assessed 4/6/2016    • HYSTERECTOMY     • LAPAROTOMY N/A 6/19/2023    Procedure: LAPAROTOMY EXPLORATORY, washout, vac change;  Surgeon: Cathy Woods DO;  [facility];  Service: General   • LAPAROTOMY N/A 6/20/2023    Procedure: LAPAROTOMY, REMOVAL OF WOUND VAC AND PACKING, SMALL BOWEL RESECTION , BOWEL ANASTOMOSIS X 3 , VICRYL MESH BRIDGING -;  Surgeon: Cathy Woods DO;  [facility];  Service: General   • MT OPTX DSTL RADL X-ARTIC FX/EPIPHYSL SEP Right 4/22/2016    Procedure: OPEN REDUCTION INTERNAL FIXATION RIGHT DISTAL RADIUS;  Surgeon: Anita Regalado MD;  [facility];  Service: Orthopedics   • REVISION COLOSTOMY     • SMALL INTESTINE SURGERY N/A 6/19/2023    Procedure: RESECTION SMALL BOWEL;  Surgeon: Cathy Woods DO;  [facility];  Service: General   • THYROID LOBECTOMY Left 8/27/2019    Procedure: LOBECTOMY THYROID, left;  Surgeon: Sandra Gonzalez MD;  [facility];  Service: Surgical Oncology   • TUBAL LIGATION     • US GUIDED THYROID BIOPSY  2/27/2019   • US GUIDED THYROID BIOPSY  5/29/2019   • WOUND DEBRIDEMENT N/A 7/18/2023    Procedure: DEBRIDEMENT WOUND [facility] OUT);   Surgeon: Corrie German DO;  [facility];  Service: Trauma   • WOUND DEBRIDEMENT N/A 7/19/2023    Procedure: ABDOMINAL WOUND ([address] OUT); VAC APPLICATION;  Surgeon: Vinay Owen DO;  [facility];  Service: General     Social History   Social History     Substance and Sexual Activity   Alcohol Use Not Currently   • Alcohol/week: 0.0 standard drinks of alcohol    Comment: 0     Social History     Substance and Sexual Activity   Drug Use No     Social History     Tobacco Use   Smoking Status Never   Smokeless Tobacco Never     Family History   Problem Relation Age of Onset   • Heart attack Mother    • Lung cancer Father 76   • Heart disease Father    • Stroke Sister    • Lung cancer Sister 64   • Prostate cancer Brother 61       Meds/Allergies     Facility-Administered Medications Prior to Admission   Medication   • denosumab (PROLIA) subcutaneous injection 60 mg     Medications Prior to Admission   Medication   • acetaminophen (TYLENOL) 325 mg tablet   • amiodarone 200 mg tablet   • apixaban (Eliquis) 5 mg   • diltiazem (CARDIZEM CD) 360 MG 24 hr capsule   • ergocalciferol (VITAMIN D2) 50,000 units   • FLUoxetine (PROzac) 20 mg capsule   • gabapentin (NEURONTIN) 300 mg capsule   • hydrochlorothiazide (HYDRODIURIL) 12.5 mg tablet   • melatonin 3 mg   • metoprolol succinate (TOPROL-XL) 25 mg 24 hr tablet   • Probiotic Product (PRO-BIOTIC BLEND PO)   • psyllium (METAMUCIL SMOOTH TEXTURE) 28 % packet     Current Facility-Administered Medications   Medication Dose Route Frequency   • acetaminophen (TYLENOL) tablet 975 mg  975 mg Oral Q8H PRN   • amiodarone tablet 200 mg  200 mg Oral Daily With Breakfast   • apixaban (ELIQUIS) tablet 5 mg  5 mg Oral BID   • calcium carbonate (OYSTER SHELL,OSCAL) 500 mg tablet 1 tablet  1 tablet Oral Daily With Breakfast   • cyanocobalamin (VITAMIN B-12) tablet 1,000 mcg  1,000 mcg Oral Daily   • diltiazem (CARDIZEM CD) 24 hr capsule 360 mg  360 mg Oral Daily   • ergocalciferol (VITAMIN D2) capsule 50,000 Units  50,000 Units Oral Weekly   • FLUoxetine (PROzac) capsule 20 mg  20 mg Oral BID   • melatonin tablet 6 mg  6 mg Oral HS   • metoprolol succinate (TOPROL-XL) 24 hr tablet 25 mg  25 mg Oral Daily   • multivitamin-minerals (CENTRUM) tablet 1 tablet  1 tablet Oral Daily   • nystatin (MYCOSTATIN) powder   Topical BID   • ondansetron (ZOFRAN-ODT) dispersible tablet 4 mg  4 mg Oral Q6H PRN   • pantoprazole (PROTONIX) EC tablet 40 mg  40 mg Oral BID AC   • potassium chloride (K-DUR,KLOR-CON) CR tablet 40 mEq  40 mEq Oral Daily   • potassium chloride (K-DUR,KLOR-CON) CR tablet 40 mEq  40 mEq Oral Once   • psyllium (METAMUCIL) 1 packet  1 packet Oral Daily       No Known Allergies      Objective     Blood pressure 116/55, pulse 98, temperature 98.1 °F (36.7 °C), temperature source Oral, resp. rate 20, height 5' 3" (1.6 m), weight 133 kg (294 lb 1.5 oz), SpO2 98 %. Body mass index is 52.1 kg/m².     PHYSICAL EXAM:    General: Well-appearing, NAD  Eyes: no conjunctival icterus or pallor  Abdominal: Soft, non-tender, non-distended  Extremities: Warm, no deformities, no edema  Neuro: alert and oriented  Psych: Normal affect    Lab Results:   Admission on 07/24/2023   Component Date Value   • WBC 07/26/2023 9.19    • RBC 07/26/2023 3.02 (L)    • Hemoglobin 07/26/2023 7.9 (L)    • Hematocrit 07/26/2023 26.7 (L)    • MCV 07/26/2023 88    • MCH 07/26/2023 26.2 (L)    • MCHC 07/26/2023 29.6 (L)    • RDW 07/26/2023 18.1 (H)    • MPV 07/26/2023 9.1    • Platelets 31/90/9178 344    • Sodium 07/26/2023 139    • Potassium 07/26/2023 3.4 (L)    • Chloride 07/26/2023 109 (H)    • CO2 07/26/2023 24    • ANION GAP 07/26/2023 6    • BUN 07/26/2023 14    • Creatinine 07/26/2023 0.60    • Glucose 07/26/2023 115    • Calcium 07/26/2023 8.0 (L)    • eGFR 07/26/2023 90    • Segmented % 07/26/2023 84 (H)    • Bands % 07/26/2023 1    • Lymphocytes % 07/26/2023 4 (L)    • Monocytes % 07/26/2023 9    • Eosinophils, % 07/26/2023 1    • Basophils % 07/26/2023 0    • Myelocytes % 07/26/2023 1    • Absolute Neutrophils 07/26/2023 7.81 (H)    • Lymphocytes Absolute 07/26/2023 0.37 (L)    • Monocytes Absolute 07/26/2023 0.83    • Eosinophils Absolute 07/26/2023 0.09    • Basophils Absolute 07/26/2023 0.00    • RBC Morphology 07/26/2023 Present    • Platelet Estimate 14/88/4736 Adequate    • Giant PLTs 07/26/2023 Present    • Polychromasia 07/26/2023 Present    • SARS-CoV-2 Ag 07/26/2023 Negative Presumptive    • SARS-CoV-2 Ag 07/28/2023 Negative Presumptive    • Color, UA 07/30/2023 Yellow    • Clarity, UA 07/30/2023 Turbid    • Specific Gravity, UA 07/30/2023 1.023    • pH, UA 07/30/2023 5.5    • Leukocytes, UA 07/30/2023 Negative    • Nitrite, UA 07/30/2023 Positive (A)    • Protein, UA 07/30/2023 Trace (A)    • Glucose, UA 07/30/2023 Negative    • Ketones, UA 07/30/2023 10 (1+) (A)    • Urobilinogen, UA 07/30/2023 <2.0    • Bilirubin, UA 07/30/2023 Negative    • Occult Blood, UA 07/30/2023 Negative    • RBC, UA 07/30/2023 None Seen    • WBC, UA 07/30/2023 4-10 (A)    • Epithelial Cells 07/30/2023 Moderate (A)    • Bacteria, UA 07/30/2023 Occasional    • MUCUS THREADS 07/30/2023 Moderate (A)    • Ca Oxalate Izabel, UA 07/30/2023 Moderate (A)    • Fecal Occult Blood Diagn* 07/30/2023 Positive (A)    • Fecal Occult Blood Diagn* 07/31/2023 Positive (A)    • Fecal Occult Blood Diagn* 07/31/2023 Positive (A)    • Fecal Occult Blood Diagn* 07/31/2023 Test not performed    • WBC 07/31/2023 8.78    • RBC 07/31/2023 3.09 (L)    • Hemoglobin 07/31/2023 8.3 (L)    • Hematocrit 07/31/2023 27.2 (L)    • MCV 07/31/2023 88    • MCH 07/31/2023 26.9    • MCHC 07/31/2023 30.5 (L)    • RDW 07/31/2023 18.9 (H)    • MPV 07/31/2023 9.1    • Platelets 63/62/1608 444 (H) • Sodium 07/31/2023 144    • Potassium 07/31/2023 2.8 (L)    • Chloride 07/31/2023 111 (H)    • CO2 07/31/2023 25    • ANION GAP 07/31/2023 8    • BUN 07/31/2023 14    • Creatinine 07/31/2023 0.75    • Glucose 07/31/2023 87    • Glucose, Fasting 07/31/2023 87    • Calcium 07/31/2023 8.2 (L)    • eGFR 07/31/2023 79    • Segmented % 07/31/2023 80 (H)    • Bands % 07/31/2023 1    • Lymphocytes % 07/31/2023 10 (L)    • Monocytes % 07/31/2023 5    • Eosinophils, % 07/31/2023 0    • Basophils % 07/31/2023 0    • Metamyelocytes% 07/31/2023 2 (H)    • Myelocytes % 07/31/2023 1    • Atypical Lymphocytes % 07/31/2023 1 (H)    • Absolute Neutrophils 07/31/2023 7.11    • Lymphocytes Absolute 07/31/2023 0.97    • Monocytes Absolute 07/31/2023 0.44    • Eosinophils Absolute 07/31/2023 0.00    • Basophils Absolute 07/31/2023 0.00    • RBC Morphology 07/31/2023 Present    • Platelet Estimate 77/88/0402 Increased (A)    • Anisocytosis 07/31/2023 Present    • Ovalocytes 07/31/2023 Present    • Poikilocytes 07/31/2023 Present    • Polychromasia 07/31/2023 Present    • Iron Saturation 07/31/2023 38    • TIBC 07/31/2023 186 (L)    • Iron 07/31/2023 70    • Ferritin 07/31/2023 188        Imaging Studies: I have personally reviewed pertinent imaging studies.       Feli Vidales [facility]  7/31/2023 2:19 PM

## 2023-08-01 ENCOUNTER — HOSPITAL ENCOUNTER (OUTPATIENT)
Dept: GASTROENTEROLOGY | Facility: HOSPITAL | Age: 73
Setting detail: OUTPATIENT SURGERY
Discharge: HOME | End: 2023-08-01
Attending: INTERNAL MEDICINE
Payer: MEDICARE

## 2023-08-01 ENCOUNTER — ANESTHESIA EVENT (OUTPATIENT)
Dept: GASTROENTEROLOGY | Facility: HOSPITAL | Age: 73
End: 2023-08-01

## 2023-08-01 ENCOUNTER — ANESTHESIA (OUTPATIENT)
Dept: GASTROENTEROLOGY | Facility: HOSPITAL | Age: 73
End: 2023-08-01

## 2023-08-01 VITALS
OXYGEN SATURATION: 98 % | TEMPERATURE: 98.1 F | DIASTOLIC BLOOD PRESSURE: 60 MMHG | SYSTOLIC BLOOD PRESSURE: 135 MMHG | HEART RATE: 73 BPM | RESPIRATION RATE: 18 BRPM

## 2023-08-01 DIAGNOSIS — K62.5 BLOOD PER RECTUM: ICD-10-CM

## 2023-08-01 DIAGNOSIS — D50.9 IRON DEFICIENCY ANEMIA, UNSPECIFIED IRON DEFICIENCY ANEMIA TYPE: ICD-10-CM

## 2023-08-01 LAB
ANION GAP SERPL CALCULATED.3IONS-SCNC: 3 MMOL/L
BUN SERPL-MCNC: 15 MG/DL (ref 5–25)
CALCIUM SERPL-MCNC: 8.2 MG/DL (ref 8.3–10.1)
CHLORIDE SERPL-SCNC: 115 MMOL/L (ref 96–108)
CO2 SERPL-SCNC: 26 MMOL/L (ref 21–32)
CREAT SERPL-MCNC: 0.61 MG/DL (ref 0.6–1.3)
GFR SERPL CREATININE-BSD FRML MDRD: 90 ML/MIN/1.73SQ M
GLUCOSE P FAST SERPL-MCNC: 105 MG/DL (ref 65–99)
GLUCOSE SERPL-MCNC: 105 MG/DL (ref 65–140)
POTASSIUM SERPL-SCNC: 3.1 MMOL/L (ref 3.5–5.3)
SODIUM SERPL-SCNC: 144 MMOL/L (ref 135–147)

## 2023-08-01 PROCEDURE — 0DBL8ZX EXCISION OF TRANSVERSE COLON, VIA NATURAL OR ARTIFICIAL OPENING ENDOSCOPIC, DIAGNOSTIC: ICD-10-PCS | Performed by: INTERNAL MEDICINE

## 2023-08-01 PROCEDURE — 45385 COLONOSCOPY W/LESION REMOVAL: CPT | Performed by: INTERNAL MEDICINE

## 2023-08-01 PROCEDURE — 99232 SBSQ HOSP IP/OBS MODERATE 35: CPT | Performed by: INTERNAL MEDICINE

## 2023-08-01 PROCEDURE — 97530 THERAPEUTIC ACTIVITIES: CPT

## 2023-08-01 PROCEDURE — 97535 SELF CARE MNGMENT TRAINING: CPT

## 2023-08-01 PROCEDURE — 97110 THERAPEUTIC EXERCISES: CPT

## 2023-08-01 PROCEDURE — 97116 GAIT TRAINING THERAPY: CPT

## 2023-08-01 PROCEDURE — 80048 BASIC METABOLIC PNL TOTAL CA: CPT | Performed by: PHYSICIAN ASSISTANT

## 2023-08-01 PROCEDURE — 99232 SBSQ HOSP IP/OBS MODERATE 35: CPT | Performed by: STUDENT IN AN ORGANIZED HEALTH CARE EDUCATION/TRAINING PROGRAM

## 2023-08-01 PROCEDURE — 88305 TISSUE EXAM BY PATHOLOGIST: CPT | Performed by: PATHOLOGY

## 2023-08-01 RX ORDER — SODIUM CHLORIDE 9 MG/ML
INJECTION, SOLUTION INTRAVENOUS CONTINUOUS PRN
Status: DISCONTINUED | OUTPATIENT
Start: 2023-08-01 | End: 2023-08-01

## 2023-08-01 RX ORDER — LIDOCAINE HYDROCHLORIDE 10 MG/ML
INJECTION, SOLUTION EPIDURAL; INFILTRATION; INTRACAUDAL; PERINEURAL AS NEEDED
Status: DISCONTINUED | OUTPATIENT
Start: 2023-08-01 | End: 2023-08-01

## 2023-08-01 RX ORDER — PROPOFOL 10 MG/ML
INJECTION, EMULSION INTRAVENOUS CONTINUOUS PRN
Status: DISCONTINUED | OUTPATIENT
Start: 2023-08-01 | End: 2023-08-01

## 2023-08-01 RX ORDER — POTASSIUM CHLORIDE 20 MEQ/1
40 TABLET, EXTENDED RELEASE ORAL 2 TIMES DAILY
Status: DISCONTINUED | OUTPATIENT
Start: 2023-08-01 | End: 2023-08-03

## 2023-08-01 RX ORDER — PROPOFOL 10 MG/ML
INJECTION, EMULSION INTRAVENOUS AS NEEDED
Status: DISCONTINUED | OUTPATIENT
Start: 2023-08-01 | End: 2023-08-01

## 2023-08-01 RX ADMIN — POTASSIUM CHLORIDE 40 MEQ: 1500 TABLET, EXTENDED RELEASE ORAL at 09:56

## 2023-08-01 RX ADMIN — LIDOCAINE HYDROCHLORIDE 50 MG: 10 INJECTION, SOLUTION EPIDURAL; INFILTRATION; INTRACAUDAL; PERINEURAL at 16:22

## 2023-08-01 RX ADMIN — NYSTATIN: 100000 POWDER TOPICAL at 09:54

## 2023-08-01 RX ADMIN — PANTOPRAZOLE SODIUM 40 MG: 40 TABLET, DELAYED RELEASE ORAL at 17:38

## 2023-08-01 RX ADMIN — PROPOFOL 50 MG: 10 INJECTION, EMULSION INTRAVENOUS at 16:22

## 2023-08-01 RX ADMIN — POTASSIUM CHLORIDE 40 MEQ: 1500 TABLET, EXTENDED RELEASE ORAL at 17:38

## 2023-08-01 RX ADMIN — MELATONIN TAB 3 MG 6 MG: 3 TAB at 22:01

## 2023-08-01 RX ADMIN — FLUOXETINE 20 MG: 20 CAPSULE ORAL at 09:55

## 2023-08-01 RX ADMIN — PROPOFOL 50 MCG/KG/MIN: 10 INJECTION, EMULSION INTRAVENOUS at 16:22

## 2023-08-01 RX ADMIN — AMIODARONE HYDROCHLORIDE 200 MG: 200 TABLET ORAL at 07:40

## 2023-08-01 RX ADMIN — PANTOPRAZOLE SODIUM 40 MG: 40 TABLET, DELAYED RELEASE ORAL at 06:13

## 2023-08-01 RX ADMIN — POTASSIUM CHLORIDE 40 MEQ: 1500 TABLET, EXTENDED RELEASE ORAL at 09:55

## 2023-08-01 RX ADMIN — SODIUM CHLORIDE: 9 INJECTION, SOLUTION INTRAVENOUS at 16:15

## 2023-08-01 RX ADMIN — CYANOCOBALAMIN TAB 500 MCG 1000 MCG: 500 TAB at 09:54

## 2023-08-01 RX ADMIN — METOPROLOL SUCCINATE 25 MG: 25 TABLET, FILM COATED, EXTENDED RELEASE ORAL at 09:54

## 2023-08-01 RX ADMIN — FLUOXETINE 20 MG: 20 CAPSULE ORAL at 17:38

## 2023-08-01 RX ADMIN — NYSTATIN: 100000 POWDER TOPICAL at 21:35

## 2023-08-01 RX ADMIN — Medication 1 TABLET: at 09:55

## 2023-08-01 RX ADMIN — DILTIAZEM HYDROCHLORIDE 360 MG: 180 CAPSULE, COATED, EXTENDED RELEASE ORAL at 09:59

## 2023-08-01 RX ADMIN — Medication 1 TABLET: at 07:39

## 2023-08-01 NOTE — CASE MANAGEMENT
Team Update:  Cm spoke with pt, pt's son and DIL at bedside to review. Pt agreeable to dc Saturday 8/5, team still awaiting colonscopy/wound recommendations and will solidify dc in stand up meeting tomorrow AM. Cm also received DME order for bariatric commode/walker, sent via [facility] to [facility]. [facility] referral sent via [facility].

## 2023-08-01 NOTE — ANESTHESIA POSTPROCEDURE EVALUATION
Post-Op Assessment Note    CV Status:  Stable  Pain Score: 0    Pain management: adequate     Mental Status:  Alert and awake   Hydration Status:  Euvolemic   PONV Controlled:  Controlled   Airway Patency:  Patent      Post Op Vitals Reviewed: Yes      Staff: CRNA, Anesthesiologist         There were no known notable events for this encounter.     BP   135/60   Temp   98.1   Pulse   78   Resp   12   SpO2 98% RA

## 2023-08-01 NOTE — QUICK NOTE
Examined patient at bedside. Patient is overall doing well in rehab, is ambulating, tolerating diet, plan for discharge per rehab on Saturday, 8/5/2023.

## 2023-08-01 NOTE — PROGRESS NOTES
Internal Medicine Progress Note  Patient: Mike Duvall  Age/sex: 67 y.o. female  Medical Record #: [id number]      ASSESSMENT/PLAN: (Interval History)  Mike Duvall is seen and examined and management for following issues:    Wound dehiscence s/p VAC placement  • Had recent admission for ex-lap 2/2 bowel perforation/internal hernia (6/19-7/4)  • Readmitted with wound dehiscence 7/18 and underwent a washout/VAC application and partial closure of the wound on 7/19  • Surgery saw 7/28 and closed wound, d/c'd VAC. • Wound is being packed with dressing changes 2x daily  • Pain control and therapy per primary service.     Atrial fibrillation  • Home:  Amiodarone 200mg qd/Card CD 360mg qd/Toprol XL 25mg q12hrs/Eliquis 5mg BID  • Here:  Amiodarone 200mg qd/Card CD 360mg qd/Toprol XL 25mg qd/Eliquis 5mg BID - on hold due to bleeding. • Stable      HTN  • Home:  In addition to above meds, was on HCTZ 12.5mg qd/KCL 15meq qd  • Here:  Holding off on HCTZ  • stable     COVID-19 positive  • Detected on routine testing for [facility] admission. • Pt is asymptomatic. • Pt had 2 rapid antigen tests and is off precautions.     Hypokalemia  • K 3.1 increase kcl to 40meq bid  • Repeat bmp in am     Anemia   • Hgb has been trending around 8, currently 8.3.  • Stable.     Iron deficeincy  • Has hx gastric bypass  • Iron level on 7/18/23 was 18  • Pt has had Venofer infusions in the past as her iron was low due to gastric bypass iron level now 70 on 7/31/23  • She is agreeable to infusions while here as well   • S/p IV venofer x 3 completed 8/1/23      Hx RCC right kidney  • S/p cryotherapy @ 7 yrs ago  • Follows with Urology     Hematuria vs vaginal bleeding vs rectal bleeding  · Had blood in the toilet after voiding and after a BM  · Hgb stable at 8.3.  · Continue Protonix 2x daily. · GI input appreciated for colonoscopy today  · Eliquis on hold.   · Cbc in am    Discharge Date:  Team       The above assessment and plan was reviewed and updated as determined by my evaluation of the patient on 8/1/2023. Labs:   Results from last 7 days   Lab Units 07/31/23  0457 07/26/23  0541   WBC Thousand/uL 8.78 9.19   HEMOGLOBIN g/dL 8.3* 7.9*   HEMATOCRIT % 27.2* 26.7*   PLATELETS Thousands/uL 444* 344     Results from last 7 days   Lab Units 08/01/23  0611 07/31/23  0457   SODIUM mmol/L 144 144   POTASSIUM mmol/L 3.1* 2.8*   CHLORIDE mmol/L 115* 111*   CO2 mmol/L 26 25   BUN mg/dL 15 14   CREATININE mg/dL 0.61 0.75   CALCIUM mg/dL 8.2* 8.2*                   Review of Scheduled Meds:  Current Facility-Administered Medications   Medication Dose Route Frequency Provider Last Rate   • acetaminophen  975 mg Oral Q8H PRN MOLLY Jackson     • amiodarone  200 mg Oral Daily With Breakfast MOLLY Jackson     • calcium carbonate  1 tablet Oral Daily With Breakfast MOLLY Jackson     • vitamin B-12  1,000 mcg Oral Daily MOLLY Jackson     • diltiazem  360 mg Oral Daily MOLLY Jackson     • ergocalciferol  50,000 Units Oral Weekly MOLLY Jackson     • FLUoxetine  20 mg Oral BID MOLLY Jackson     • melatonin  6 mg Oral HS MOLLY Jackson     • metoprolol succinate  25 mg Oral Daily MOLLY Jackson     • multivitamin-minerals  1 tablet Oral Daily MOLLY Jackson     • nystatin   Topical BID MOLLY Jackson     • ondansetron  4 mg Oral Q6H PRN MOLLY Jackson     • pantoprazole  40 mg Oral BID AC Geeta Casper Hodgkin, MD     • polyethylene glycol  4,000 mL Oral See Admin Instructions Luis Murphy MD     • potassium chloride  40 mEq Oral BID MOLLY Burns     • psyllium  1 packet Oral Daily MOLLY Jackson         Subjective/ HPI: Patient seen and examined. Patients overnight issues or events were reviewed with nursing or staff during rounds or morning huddle session. New or overnight issues include the following:     Pt seen in her room. Didn't sleep well last evening d/t prep.  No further blood noted for past 2 days      ROS:   A 10 point ROS was performed; negative except as noted above. Imaging:     No orders to display       *Labs /Radiology studies reviewed  *Medications reviewed and reconciled as needed  *Please refer to order section for additional ordered labs studies  *Case discussed with primary attending during morning huddle case rounds    Physical Examination:  Vitals:   Vitals:    07/31/23 0832 07/31/23 1443 07/31/23 2030 08/01/23 0557   BP: 116/55 155/59 126/57 122/67   BP Location:  Right arm Right arm Right arm   Pulse: 98 72 79 90   Resp:  16 16 18   Temp:  97.7 °F (36.5 °C) 97.8 °F (36.6 °C) 97.6 °F (36.4 °C)   TempSrc:  Oral Oral Oral   SpO2:  97% 97% 97%   Weight:       Height:           GEN: NAD; pleasant  NEURO: Alert and oriented x4; appropriate  HEENT: Pupils are equal/reactive; normocephalic, face is symmetrical, hearing is normal  CV: S1 S2 irregular, no murmur/rub/gallops, 2/4 pedal pulses, no LE edema present  RESP: Lungs are clear bilaterally, no wheezes rales or rhonchi, on room air, no distress, respirations are easy and non labored  GI: Abdomen is obese, soft, non tender, +BS x4; non distended; dressing in place; refer to media images  : Voiding without issues  MUSC: Moves all extremities except generalized deconditioning  SKIN: pink, warm, normal turgor, no rashes or lesions noted      The above physical exam was reviewed and updated as determined by my evaluation of the patient on 8/1/2023. Invasive Devices     Peripheral Intravenous Line  Duration           Peripheral IV 07/31/23 Left Antecubital <1 day                   VTE Pharmacologic Prophylaxis: Eliquis - on hold  Code Status: Level 1 - Full Code  Current Length of Stay: 8 day(s)      Total time spent:  30 minutes with more than 50% spent counseling/coordinating care. Counseling includes discussion with patient re: progress  and discussion with patient of his/her current medical state/information.  Coordination of patient's care was performed in conjunction with primary service. Time invested included review of patient's labs, vitals, and management of their comorbidities with continued monitoring. In addition, this patient was discussed with medical team including physician and advanced extenders. The care of the patient was extensively discussed and appropriate treatment plan was formulated unique for this patient. Medical decision making for the day was made by supervising physician unless otherwise noted in their attestation statement. ** Please Note:  voice to text software may have been used in the creation of this document.  Although proof errors in transcription or interpretation are a potential of such software**

## 2023-08-01 NOTE — PROGRESS NOTES
08/01/23 0830   Pain Assessment   Pain Assessment Tool 0-10   Pain Score No Pain   Restrictions/Precautions   Precautions Bed/chair alarms; Fall Risk;Contact/isolation;Spinal precautions;Pain   Weight Bearing Restrictions No   ROM Restrictions No   Braces or Orthoses Other (Comment)  (BLE ace wraps)   Cognition   Overall Cognitive Status WFL   Arousal/Participation Alert   Attention Within functional limits   Orientation Level Oriented X4   Memory Within functional limits   Following Commands Follows all commands and directions without difficulty   Sit to Lying   Type of Assistance Needed Physical assistance;Verbal cues; Adaptive equipment   Physical Assistance Level 51%-75%   Comment A to BLE, utilized L side of head board. Sit to Lying CARE Score 2   Sit to Stand   Type of Assistance Needed Supervision; Incidental touching;Physical assistance   Physical Assistance Level 25% or less   Comment CS/CGA with RW overall, FARRUKH at end of session 2* fatigue   Sit to Stand CARE Score 3   Bed-Chair Transfer   Type of Assistance Needed Supervision; Adaptive equipment; Incidental touching   Comment CS/CGA with RW   Chair/Bed-to-Chair Transfer CARE Score 4   Transfer Bed/Chair/Wheelchair   Adaptive Equipment Roller Walker   Walk 10 Feet   Type of Assistance Needed Incidental touching;Supervision; Adaptive equipment   Physical Assistance Level No physical assistance   Comment CS/CGA with RW   Walk 10 Feet CARE Score 4   Walk 50 Feet with Two Turns   Type of Assistance Needed Incidental touching;Supervision; Adaptive equipment   Physical Assistance Level No physical assistance   Comment CS/CGA with RW   Walk 50 Feet with Two Turns CARE Score 4   Walk 150 Feet   Reason if not Attempted Activity not applicable   Walk 475 Feet CARE Score 9   Walking 10 Feet on Uneven Surfaces   Comment refused 2* to fatigue but will trial tomorrow on ground floor.    Ambulation   Primary Mode of Locomotion Prior to Admission [facility] (feet) 50 ft  (x4)   Assist Device Roller Walker   Gait Pattern Inconsistant Rebeca; Slow Rebeca; Forward Flexion; Wide TACO; Improper weight shift   Limitations Noted In Endurance;Posture;Speed   Provided Assistance with: Direction   Walk Assist Level Contact Guard;Close Supervision   Does the patient walk? 2. Yes   Wheel 50 Feet with Two Turns   Reason if not Attempted Activity not applicable   Wheel 50 Feet with Two Turns CARE Score 9   Wheel 150 Feet   Reason if not Attempted Activity not applicable   Wheel 658 Feet CARE Score 9   Wheelchair mobility   Does the patient use a wheelchair? 0. No   Curb or Single Stair   Style negotiated Curb   Type of Assistance Needed Physical assistance;Verbal cues; Adaptive equipment   Physical Assistance Level 25% or less   Comment 4" curb with RW   1 Step (Curb) CARE Score 3   4 Steps   Reason if not Attempted Activity not applicable   4 Steps CARE Score 9   12 Steps   Reason if not Attempted Activity not applicable   12 Steps CARE Score 9   Stairs   Type Curb   # of Steps 1   Weight Bearing Precautions Fall Risk   Assist Devices Roller Walker   Findings up with LLE and down with RLE   Toilet Transfer   Type of Assistance Needed Supervision; Adaptive equipment   Comment CS with RW to [facility]   Toilet Transfer CARE Score 4   Toilet Transfer   Surface Assessed Bedside Commode   Findings A for hygiene   Therapeutic Interventions   Strengthening seated LAQ, hip flex, glute sets, ankle DF/PF and hip ADD with ball 2 x15 reps BLE. attempted x5 STS but pt limited by R knee. Equipment Use   NuStep declines   Assessment   Treatment Assessment Pt participated in skilled PT session with increased focus on HH dist amb, increased act tolerance, LE strengthening and functional transfers. Pt cont to be limited to [facility] dist amb and does not anticipate further dist amb, Pt will need to perform lauren on ground floor prior to discharge but remained limited 2* fatigue this session.  Discussed use of BSC for nighttime at home but pt declines. Per Jaron Chavez in [facility] will be for backup plan pending pt's ability at home. Pt will cont POC a tolerated with cont focus on functional transfers, increased act tolerance, gait and bed mobility. Problem List Decreased strength;Decreased endurance; Impaired balance;Decreased mobility;Orthopedic restrictions;Decreased skin integrity;Obesity   Barriers to Discharge Inaccessible home environment   PT Barriers   Functional Limitation Car transfers;Standing;Transfers; Walking;Ramp negotiation   Plan   Treatment/Interventions Functional transfer training;LE strengthening/ROM; Endurance training; Therapeutic exercise;Patient/family training;Gait training;Bed mobility   Progress Progressing toward goals   Recommendation   PT Discharge Recommendation Home with home health rehabilitation   Equipment Recommended Walker   PT Therapy Minutes   PT Time In 0830   PT Time Out 1000   PT Total Time (minutes) 90   PT Mode of treatment - Individual (minutes) 90   PT Mode of treatment - Concurrent (minutes) 0   PT Mode of treatment - Group (minutes) 0   PT Mode of treatment - Co-treat (minutes) 0   PT Mode of Treatment - Total time(minutes) 90 minutes   PT Cumulative Minutes 630   Therapy Time missed   Time missed?  No

## 2023-08-01 NOTE — PROGRESS NOTES
Occupational Therapy Treatment Note         08/01/23 5510   Pain Assessment   Pain Assessment Tool 0-10   Pain Score No Pain   Restrictions/Precautions   Precautions Bed/chair alarms;Supervision on toilet/commode; Fall Risk;Pain  (abdominal wound)   Lifestyle   Autonomy "my dtr will be here tomorrow"   Sit to Stand   Type of Assistance Needed Supervision; Adaptive equipment   Physical Assistance Level No physical assistance   Comment RW   Sit to Stand CARE Score 4   Bed-Chair Transfer   Type of Assistance Needed Supervision; Adaptive equipment   Physical Assistance Level No physical assistance   Comment RW to/from bathroom   Chair/Bed-to-Chair Transfer CARE Score 4   Toileting Hygiene   Type of Assistance Needed Physical assistance   Physical Assistance Level 76% or more   Comment Pt requires assist for toileting hygiene in front and back 2* abdominal wound/incision and body habitus. OT educated on 2 styles toileting aides, pt prefers toileting tongs. Provided printed handout and pt will ask her dtr Herminia Gregory to order tonight when she comes to visit pt. Plan to simulate toileting tomorrow with toileting tong style aide for front and rear hygiene. Pt stands with RW and manages clothing of dress with supervision   Toileting Hygiene CARE Score 2   Toilet Transfer   Type of Assistance Needed Supervision; Adaptive equipment   Physical Assistance Level No physical assistance   Comment BSC over toilet. Tomorrow will plan to raise BSC to height of toilet at home with rails and practice sit to stand transfers from this height (pt has measurements on her phone).    Toilet Transfer CARE Score 4   Cognition   Overall Cognitive Status WFL   Arousal/Participation Alert   Attention Within functional limits   Orientation Level Oriented X4   Memory Within functional limits   Following Commands Follows one step commands without difficulty   Activity Tolerance   Activity Tolerance Patient tolerated treatment well   Assessment   Treatment Assessment Pt participated in skilled OT tx session. See above for further details on functional performance. Pt's son Jeanie Lambert and dtr in law Margarita Lyon present, OT answered their questions regarding bed mobility, DME/adaptive equipment. Most concerns from therapy are from PT standpoint. After discussion with pt/family plan for tomorrow will be for PT to complete: mobility up ramp, simulate 3" lip with UE support on doorframe, and car transfer with dtr's car in PM. Pt in agreement to trialing all of these things tomorrow with PT. OT will inform PT in the morning. From OT standpoint, plan for sponge bathing while seated on BSC and use of LHAE, also simulate toileting with tong style aide for front/rear hygiene. Working towards goal of d/c end of this week pending medical status. Plan will be for pt to sleep in electric recliner at home, therefore staff can assist her here with LEs for bed mobility as this pulls on her core/abdominal incisions and focus in healing of wound/incisions. Pt left positioned in bed with call bell in reach and bed alarm on. Pt going for colonoscopy today. Of note 2* body habitus pt requires bariatric wide BSC for safety and skin integrity. Prognosis Good   Problem List Decreased strength;Decreased endurance; Impaired balance;Decreased mobility;Orthopedic restrictions;Pain;Obesity   Barriers to Discharge Inaccessible home environment   Plan   Treatment/Interventions ADL retraining;Functional transfer training; Therapeutic exercise; Endurance training;Patient/family training;Equipment eval/education; Bed mobility; Compensatory technique education   Progress Progressing toward goals   Recommendation   OT Discharge Recommendation Home with home health rehabilitation   OT Therapy Minutes   OT Time In 1230   OT Time Out 1400   OT Total Time (minutes) 90   OT Mode of treatment - Individual (minutes) 90   OT Mode of treatment - Concurrent (minutes) 0   OT Mode of treatment - Group (minutes) 0   OT Mode of treatment - Co-treat (minutes) 0   OT Mode of Treatment - Total time(minutes) 90 minutes   OT Cumulative Minutes 715   Therapy Time missed   Time missed?  No

## 2023-08-01 NOTE — ANESTHESIA PREPROCEDURE EVALUATION
Procedure:  COLONOSCOPY    EF 60%, RVSP 25    PAF   - diltiazem, metoprolol    GILDARDO on CPAP 10 cm H2O    Relevant Problems   CARDIO   (+) Dyspnea on exertion   (+) Hypertension   (+) Permanent atrial fibrillation (HCC)      ENDO   (+) Hyperparathyroidism (HCC)      GI/HEPATIC   (+) Idiopathic acute pancreatitis without infection or necrosis   (+) Reactive hypoglycemia      /RENAL   (+) Malignant tumor of right kidney parenchyma (HCC)   (+) Stage 3 chronic kidney disease, unspecified whether stage 3a or 3b CKD (HCC)      HEMATOLOGY   (+) Anemia   (+) Iron deficiency anemia      MUSCULOSKELETAL   (+) Cervical spondylosis without myelopathy   (+) Myofascial pain syndrome   (+) Primary osteoarthritis of both knees   (+) Primary osteoarthritis of left knee   (+) Primary osteoarthritis of right knee      NEURO/PSYCH   (+) Anxiety   (+) Myofascial pain syndrome      PULMONARY   (+) Dyspnea on exertion   (+) GILDARDO (obstructive sleep apnea)        Physical Exam    Airway    Mallampati score: III  TM Distance: >3 FB  Neck ROM: full     Dental   upper dentures and lower dentures,     Cardiovascular  Rhythm: regular, Rate: normal, Cardiovascular exam normal    Pulmonary  Pulmonary exam normal Breath sounds clear to auscultation,     Other Findings        Anesthesia Plan  ASA Score- 3     Anesthesia Type- IV sedation with anesthesia with ASA Monitors. Additional Monitors:   Airway Plan:     Comment: Discussed risks/benefits, including medication reactions, awareness, aspiration, and serious/life threatening complications. Plan to maintain native airway with IVGA, monitored with EtCO2. Plan Factors-Exercise tolerance (METS): >4 METS. Patient summary reviewed. Patient instructed to abstain from smoking on day of procedure. Patient did not smoke on day of surgery. Induction- intravenous. Postoperative Plan-     Informed Consent- Anesthetic plan and risks discussed with patient.   I personally reviewed this patient with the CRNA. Discussed and agreed on the Anesthesia Plan with the CRNA. Vickie Menjivar

## 2023-08-01 NOTE — PROGRESS NOTES
PM&R PROGRESS NOTE:  Etelvina Hoang 67 y.o. female MRN: [id number]  Unit/Bed#: -01 Encounter: [id number]        Rehabilitation Diagnosis: Impairment of mobility, safety and Activities of Daily Living (ADLs) due to Debility:  16  Debility (Non-cardiac/Non-pulmonary)    HPI: Israel Saldaña is a 67 y.o. female with a medical history of atrial fibrillation (on Eliquis), sleep apnea, hypertension, renal cell carcinoma, gastric bypass, cervical spondylosis, and osteoarthritis who was recently admitted at [facility] 6/19-7/4 with a bowel perforation and internal hernia (hx arlene-en-y gastric bypass). She had an ex lap, resection J-J anastomosis, segmental ileum resection, abthera vac placement, ex lap, washout, abthera change and ex lap, small bowel resection, bowel anastomosis x3, vicryl bridging mesh. Once medically stable she went to TCU for rehabilitation. Patient presented to [facility] on 7/18/23 after clinic appointment for suture removal with an abdominal wound dehiscence. On 7/18 she underwent washout, wound exploration, debridement. On 7/19 she underwent washout, partial wound closure, and wound vac placement. Patient currently has wound vac in place at a pressure of 75 mmHg continuously. Wound vac last changed on 7/24 with plans for surgery management of dressing changes. She is tolerating a regular diet with nutrition supplements. Patient tested positive for COVID on 7/24. She is asymptomatic and not being treated at this time. She worked with PT and OT and was recommended for post acute rehab following her hospital stay. She has demonstrated that she can tolerate three hours of therapy, five days a week and is now medically cleared for discharge to the [facility]. PT/OT were consulted and recommend acute inpatient rehabilitation.  The patient was evaluated by the Rehabilitation team and deemed an appropriate candidate for comprehensive inpatient rehabilitation and admitted to the [facility] on 7/24/2023  4:34 PM    SUBJECTIVE: Patient seen face to face. No acute issues overnight. Discussed plan for wound care with patient, plan for daughter to participate in dressing changes daily until discharge. Discussed with nursing. Currently NPO for colonoscopy, previous appetite at baseline. Slept well, denies pain. Voiding, LBM 8/1. Denies chest pain, shortness of breath, fever, chills, N/V, abdominal pain. ASSESSMENT: Stable, progressing    PLAN:  - anemia- Hbg 8.3, stable, continue to monitor  - hypokalemia- K+ 3.1, IM managing, repleted  - skin- continue abdomen dressing changes per general surgery; pack with 1 4x4, cover with ABD, change BID; per nursing 4x4 saturated with foul odor. Awaiting GS recommendation  - family training for wound care dressing changes started, complete daily  - mood/anxiety: consulted neuropsychology, continue Prozac, counseling  - colonoscopy scheduled for today (8/1), follow-up on [facility]    Rehabilitation  Team conference: The team discussed patient's functional status, goals, and barriers. • Functional deficits:  Mobility, self-care  • Continue current rehabilitation plan of care to maximize function. • Functional update:   Physical Therapy Occupational Therapy Speech Therapy   Weight Bearing Status: Full Weight Bearing  Transfers: Minimal Assistance  Bed Mobility: Moderate Assistance  Amulation Distance (ft): 40 feet  Ambulation: Minimal Assistance  Assistive Device for Ambulation: Roller Walker  Wheelchair Mobility Distance: 0 ft  Number of Stairs: 1 (curb step 4inch with RW ModA NILS)  Discharge Recommendations: Home with:  [facility] with[de-identified] Family Support   Eating: Independent  Grooming: Incidental Touching  Bathing: Moderate Assistance  Bathing: Moderate Assistance  Upper Body Dressing: Supervision  Lower Body Dressing: Total Assistance  Toileting:  Moderate Assistance  Toilet Transfer: Minimal Assistance  Cognition: Within Defined Limits  Orientation: Person, Place, Time, Situation               • Estimated Discharge: anticipated 10-14 days, reteam    Pain  • Tylenol 975 every 8 hours prn    DVT prophylaxis  • Eliquis    Bladder plan  • Continent    Bowel plan  • Continent      * Open abdominal wall wound  Assessment & Plan  - recent admission [facility] 6/19-7/4 with bowel perforation and internal hernia (hx arlene-en-y gastric bypass) - s/p ex lap, resection J-J anastomosis, segmental ileum resection, abthera 6/19  - s/p ex lap, washout, abthera change 6/19  - s/p ex lap, small bowel resection, bowel anastomosis x3, vicryl bridging mesh 6/20  - s/p debridement/washout on 7/18  - s/p partial closure and wound vac placement on 7/19  - wound vac 75 mmHg continuous  - dressing changes per General Surgery   -Surgery saw the patient today on 7/31 and added additional sutures to reinforce however there is a significant amount of tension on the incision sites that is limiting the overall healing process. Attempted double binder with without success however working with therapy and nursing to try and reduce overall tension as the pannus itself on the left and right side is pulling at the incision site. Wound base looks good  - some loose stools to be expected post GI sx - metamucil but patient refusing it to try to bulk up - stools more formed recently nevertheless - monitor for changes or other etiologies   - Recent vitals/labs stable - CBC, BMP Monday   - midline incision with sutures  - pain management - well controlled - PRN APAP   - Function continues to improve well  - Continue acute comprehensive interdisciplinary inpatient rehabilitation to include intensive skilled therapies (PT, OT) as outlined with oversight and management by rehabilitation physician as well as inpatient rehab level nursing, case management and weekly interdisciplinary team meetings.          S/P bariatric surgery  Assessment & Plan  - 25 years ago  - supplemental vitamins post surgery- MV, B12,    COVID  Assessment & Plan  - positive 7/24  - asymptomatic  - no signs of infection: afebrile  - isolation 10 days (thru 8/3) unless 2 Ag tests 48 h apart negative  - 7/26: SARS cov2 antigen negative; repeat 7/28 Ag lab negative  - isolation discontinued   - monitor closely      Encounter for skin care  Assessment & Plan  7/28 buttock skin intact   High risk for skin wounds/breakdown with patient wanting to lay in recliner and not bed - counseled patient on being in bed to decrease risk of wounds;   - Increased risk of skin wounds/breakdown/rashes due to recent immobility and co-morbidities - loose stools at times  - Turn patient Q2H; encourage frequent wt shifts when in chair/WC every 10-15 minutes  - Hydragaurd to buttocks and sacrum BID & PRN  - Float heels and ankles/bony protuberances with pillow(s)(or wedges) in all positions while in bed or recliner chair. Be sure feet are not up against footboard (pull patient up/remove footboard/or request longer bed as indicated)  - EHOB waffle cushion to chair/WC when OOB  - Maximum time in chair 2 hours at a time; OOB minimum 3 times per day - Notify MD if unable to get patient OOB 3 times per day  - Skin mobilization protocol   - Optimal bowel/bladder hygiene - avoid bedpan unless absolutely necessary and use for least time possible   - Optimal nutrition   - Nursing to document in chart and Notify MD if buttock, sacrum, heel, or other skin site develops erythema or skin breakdown as soon as possible. If patient is soiling themselves with urine or stool notify MD.  If you are unable to maintain skin integrity and prevent erythema due to frequency of soiling notify MD as soon as possible. - Notify MD of new or increasing drainage, development of purulent drainage, increased size/depth of wound, lack of healing, inability to maintain wound integrity due to degree of drainage, wound product, dressing, or currently ordered frequency of wound management.   In general dressings should be changed PRN if soiled unless specifically noted otherwise. Do not allow soiled dressing on patient for extended period of time. Mood disorder (HCC)  Assessment & Plan  Prozac  Melatonin for sleep  Supportive counseling     Anemia  Assessment & Plan  - Hbg 7.9 (previously 8.1)  - Patient apparently was at times in past having chronic anemia as well and occasionally getting Venofer per family - discussed with IM and could consider Venofer if Covid comes back negative; hold oral iron as it may irritate stomach and may not absorb well for now   - monitor CBC Monday unless concerns sooner   -GI saw patient for miguel blood in toilet over the weekend however Eliquis was on hold and none today so far.   Colonoscopy- 8/1, n.p.o. after midnight  - consider transfusion if warranted    Hypokalemia  Assessment & Plan  - K+ 3.4 (7/26) and 2.8 as of 7/31 and given additional potassium supplementation with daily repletion as well > 3.1    - IM consulted, monitoring, repletion, and overall management at their discretion during ARC course  - monitor BMP      Anxiety  Assessment & Plan  - home: Prozac 20 mg daily  - continue here  - consult neuropsychology when cleared    Hypertension  Assessment & Plan  - home: Cardizem 360 mg daily, Metoprolol succinate 25 mg BID, HCTZ 12.5 mg BID  - here: Cardizem, Toprol XL daily, (hold HCTZ)    - monitor BP    Morbid obesity with BMI of 50.0-59.9, adult (formerly [facility])  Assessment & Plan  - BMI 53.89  - s/p gastric bypass (25 years ago)  - educate on lifestyle modification  - consider nutrition consult    GILDARDO (obstructive sleep apnea)  Assessment & Plan  - patient states she has not been using CPAP much at home but has one  - here: 2L nc hs  - monitor     Vitamin D deficiency  Assessment & Plan  - Vit D 62.1 (1/16/23)  - Vitamin D 50K weekly     Permanent atrial fibrillation (HCC)  Assessment & Plan  - home: Eliqus 5 mg BID, Amiodarone 200 mg daily, Cardizem 360 mg daily, Toprol XL 25 mg BID  - here: Eliquis for A/C; Amio, Dilt, MTP per IM  - Monitor Hb   - monitor with therapies    Appreciate IM consultants medical co-management. Labs, medications, and imaging personally reviewed. ROS:  Review of Systems   A 10 point review of systems was negative except for what is noted in the HPI. OBJECTIVE:   /67 (BP Location: Right arm)   Pulse 90   Temp 97.6 °F (36.4 °C) (Oral)   Resp 18   Ht 5' 3" (1.6 m)   Wt 133 kg (294 lb 1.5 oz)   SpO2 97%   BMI 52.10 kg/m²     Physical Exam  Constitutional:       Appearance: Normal appearance. She is obese. HENT:      Head: Normocephalic and atraumatic. Nose: Nose normal.      Mouth/Throat:      Mouth: Mucous membranes are moist.   Cardiovascular:      Rate and Rhythm: Normal rate. Rhythm irregular. Pulses: Normal pulses. Heart sounds: Normal heart sounds. Pulmonary:      Effort: Pulmonary effort is normal.      Breath sounds: Normal breath sounds. Abdominal:      General: Bowel sounds are normal.      Palpations: Abdomen is soft. Tenderness: There is abdominal tenderness. Comments: protuberant   Musculoskeletal:         General: Normal range of motion. Cervical back: Normal range of motion. Right lower leg: Edema present. Left lower leg: Edema present. Skin:     General: Skin is warm and dry. Capillary Refill: Capillary refill takes less than 2 seconds. Comments: -abdominal incision with sutures, midline, distal open area   Neurological:      Mental Status: She is alert and oriented to person, place, and time. Motor: Weakness present.    Psychiatric:         Mood and Affect: Mood normal.         Judgment: Judgment normal.          Lab Results   Component Value Date    WBC 8.78 07/31/2023    HGB 8.3 (L) 07/31/2023    HCT 27.2 (L) 07/31/2023    MCV 88 07/31/2023     (H) 07/31/2023     Lab Results   Component Value Date    SODIUM 144 08/01/2023    K 3.1 (L) 08/01/2023    CL 115 (H) 08/01/2023    CO2 26 08/01/2023    BUN 15 08/01/2023    CREATININE 0.61 08/01/2023    GLUC 105 08/01/2023    CALCIUM 8.2 (L) 08/01/2023     Lab Results   Component Value Date    INR 1.39 (H) 07/18/2023    INR 1.23 (H) 06/27/2023    INR 1.19 06/24/2023    PROTIME 17.3 (H) 07/18/2023    PROTIME 15.7 (H) 06/27/2023    PROTIME 15.3 (H) 06/24/2023           Current Facility-Administered Medications:   •  acetaminophen (TYLENOL) tablet 975 mg, 975 mg, Oral, Q8H PRN, MOLLY Jackson, 975 mg at 07/26/23 0158  •  amiodarone tablet 200 mg, 200 mg, Oral, Daily With Breakfast, MOLLY Jackson, 200 mg at 08/01/23 0740  •  calcium carbonate (OYSTER SHELL,OSCAL) 500 mg tablet 1 tablet, 1 tablet, Oral, Daily With Breakfast, MOLLY Jackson, 1 tablet at 08/01/23 0739  •  cyanocobalamin (VITAMIN B-12) tablet 1,000 mcg, 1,000 mcg, Oral, Daily, MOLLY Jackson, 1,000 mcg at 08/01/23 0954  •  diltiazem (CARDIZEM CD) 24 hr capsule 360 mg, 360 mg, Oral, Daily, MOLLY Jackson, 360 mg at 08/01/23 3588  •  ergocalciferol (VITAMIN D2) capsule 50,000 Units, 50,000 Units, Oral, Weekly, MOLLY Jackson, 50,000 Units at 07/26/23 0846  •  FLUoxetine (PROzac) capsule 20 mg, 20 mg, Oral, BID, MOLLY Jackson, 20 mg at 08/01/23 0955  •  melatonin tablet 6 mg, 6 mg, Oral, HS, MOLLY Jackson, 6 mg at 07/31/23 2345  •  metoprolol succinate (TOPROL-XL) 24 hr tablet 25 mg, 25 mg, Oral, Daily, MOLLY Jackson, 25 mg at 08/01/23 0954  •  multivitamin-minerals (CENTRUM) tablet 1 tablet, 1 tablet, Oral, Daily, MOLLY Jackson, 1 tablet at 08/01/23 0955  •  nystatin (MYCOSTATIN) powder, , Topical, BID, MOLLY Jackson, Given at 08/01/23 0954  •  ondansetron (ZOFRAN-ODT) dispersible tablet 4 mg, 4 mg, Oral, Q6H PRN, MOLLY Jackson  •  pantoprazole (PROTONIX) EC tablet 40 mg, 40 mg, Oral, BID AC, Cindy Rust MD, 40 mg at 08/01/23 7429  •  polyethylene glycol (GOLYTELY) bowel prep 4,000 mL, 4,000 mL, Oral, See Admin Instructions, Kiley Jones MD, 4,000 mL at 07/31/23 1614  •  potassium chloride (K-DUR,KLOR-CON) CR tablet 40 mEq, 40 mEq, Oral, BID, Cherelledemetrius Cliftoncolo, CRNP, 40 mEq at 08/01/23 0955  •  psyllium (METAMUCIL) 1 packet, 1 packet, Oral, Daily, Helen Lima, CRNP, 1 packet at 07/25/23 1020    Past Medical History:   Diagnosis Date   • A-fib [facility])    • Arthritis    • Atrial fibrillation (HCC)    • Cervical spondylosis with myelopathy    • Gastric bypass status for obesity    • History of transfusion     35 years ago   • Hypertension    • mass right kidney    • Renal cell adenocarcinoma (HCC)     RIGHT   • Sleep apnea    • Super obese        Patient Active Problem List    Diagnosis Date Noted   • Open abdominal wall wound 07/13/2023   • S/P bariatric surgery 03/17/2020   • COVID 07/24/2023   • Mood disorder ([address]) 07/28/2023   • Encounter for skin care 07/28/2023   • Ambulatory dysfunction 07/17/2023   • Iron deficiency anemia 07/13/2023   • Hypotension 07/13/2023   • Hypokalemia 07/10/2023   • Anemia 07/10/2023   • Bowel perforation ([address]) 07/05/2023   • Acute encephalopathy 07/05/2023   • Anxiety 07/05/2023   • Diarrhea 07/05/2023   • Idiopathic acute pancreatitis without infection or necrosis 06/18/2023   • Primary osteoarthritis of both knees 06/07/2023   • Stage 3 chronic kidney disease, unspecified whether stage 3a or 3b CKD ([address]) 05/31/2023   • Hypertension    • Morbid obesity with BMI of 50.0-59.9, adult ([address]) 11/01/2022   • Unsteady gait 10/30/2022   • Dyspnea on exertion 10/30/2022   • SIRS (systemic inflammatory response syndrome) ([address]) 10/30/2022   • Incontinence 10/30/2022   • GILDARDO (obstructive sleep apnea) 10/30/2022   • Vitamin D deficiency 07/06/2021   • Reactive hypoglycemia 12/30/2020   • Osteoporosis 06/04/2020   • Hyperparathyroidism ([address]) 06/04/2020   • Hashimoto's thyroiditis 03/17/2020   • Early menopause occurring in patient age younger than 39 years 03/17/2020   • Myofascial pain syndrome 10/21/2019   • Status post partial thyroidectomy 09/12/2019   • Cervical spondylosis without myelopathy 06/28/2019   • Arthropathy of cervical facet joint 06/28/2019   • Occipital neuralgia of right side 06/28/2019   • Chest wall tenderness 01/24/2019   • Chronic heart failure ([address]) 01/24/2019   • Permanent atrial fibrillation ([address]) 01/24/2019   • Myalgia 01/08/2019   • Primary osteoarthritis of left knee 10/23/2018   • Primary osteoarthritis of right knee 10/23/2018   • Chronic pain of left knee 10/23/2018   • Malignant tumor of right kidney parenchyma ([address]) 05/11/2016   • Closed fracture of right distal radius and ulna 04/22/2016      MOLLY Ferguson  Physical Medicine and [place]

## 2023-08-02 ENCOUNTER — HOME HEALTH ADMISSION (OUTPATIENT)
Dept: HOME HEALTH SERVICES | Facility: HOME HEALTHCARE | Age: 73
End: 2023-08-02
Payer: MEDICARE

## 2023-08-02 LAB
ANION GAP SERPL CALCULATED.3IONS-SCNC: 6 MMOL/L
BUN SERPL-MCNC: 14 MG/DL (ref 5–25)
CALCIUM SERPL-MCNC: 8.2 MG/DL (ref 8.3–10.1)
CHLORIDE SERPL-SCNC: 113 MMOL/L (ref 96–108)
CO2 SERPL-SCNC: 23 MMOL/L (ref 21–32)
CREAT SERPL-MCNC: 0.67 MG/DL (ref 0.6–1.3)
DME PARACHUTE DELIVERY DATE ACTUAL: NORMAL
DME PARACHUTE DELIVERY DATE REQUESTED: NORMAL
DME PARACHUTE DELIVERY NOTE: NORMAL
DME PARACHUTE ITEM DESCRIPTION: NORMAL
DME PARACHUTE ITEM DESCRIPTION: NORMAL
DME PARACHUTE ORDER STATUS: NORMAL
DME PARACHUTE SUPPLIER NAME: NORMAL
DME PARACHUTE SUPPLIER PHONE: NORMAL
ERYTHROCYTE [DISTWIDTH] IN BLOOD BY AUTOMATED COUNT: 20.3 % (ref 11.6–15.1)
GFR SERPL CREATININE-BSD FRML MDRD: 87 ML/MIN/1.73SQ M
GLUCOSE SERPL-MCNC: 95 MG/DL (ref 65–140)
HCT VFR BLD AUTO: 29.9 % (ref 34.8–46.1)
HGB BLD-MCNC: 8.8 G/DL (ref 11.5–15.4)
MCH RBC QN AUTO: 27.2 PG (ref 26.8–34.3)
MCHC RBC AUTO-ENTMCNC: 29.4 G/DL (ref 31.4–37.4)
MCV RBC AUTO: 92 FL (ref 82–98)
PLATELET # BLD AUTO: 417 THOUSANDS/UL (ref 149–390)
PMV BLD AUTO: 9.6 FL (ref 8.9–12.7)
POTASSIUM SERPL-SCNC: 3.9 MMOL/L (ref 3.5–5.3)
RBC # BLD AUTO: 3.24 MILLION/UL (ref 3.81–5.12)
SODIUM SERPL-SCNC: 142 MMOL/L (ref 135–147)
WBC # BLD AUTO: 8.75 THOUSAND/UL (ref 4.31–10.16)

## 2023-08-02 PROCEDURE — 97530 THERAPEUTIC ACTIVITIES: CPT

## 2023-08-02 PROCEDURE — 80048 BASIC METABOLIC PNL TOTAL CA: CPT | Performed by: NURSE PRACTITIONER

## 2023-08-02 PROCEDURE — 97535 SELF CARE MNGMENT TRAINING: CPT

## 2023-08-02 PROCEDURE — 99232 SBSQ HOSP IP/OBS MODERATE 35: CPT | Performed by: INTERNAL MEDICINE

## 2023-08-02 PROCEDURE — 99232 SBSQ HOSP IP/OBS MODERATE 35: CPT | Performed by: STUDENT IN AN ORGANIZED HEALTH CARE EDUCATION/TRAINING PROGRAM

## 2023-08-02 PROCEDURE — 97110 THERAPEUTIC EXERCISES: CPT

## 2023-08-02 PROCEDURE — 85027 COMPLETE CBC AUTOMATED: CPT | Performed by: NURSE PRACTITIONER

## 2023-08-02 RX ADMIN — FLUOXETINE 20 MG: 20 CAPSULE ORAL at 09:22

## 2023-08-02 RX ADMIN — ERGOCALCIFEROL 50000 UNITS: 1.25 CAPSULE ORAL at 09:23

## 2023-08-02 RX ADMIN — Medication 1 TABLET: at 09:22

## 2023-08-02 RX ADMIN — APIXABAN 5 MG: 5 TABLET, FILM COATED ORAL at 09:22

## 2023-08-02 RX ADMIN — METOPROLOL SUCCINATE 25 MG: 25 TABLET, FILM COATED, EXTENDED RELEASE ORAL at 09:22

## 2023-08-02 RX ADMIN — DILTIAZEM HYDROCHLORIDE 360 MG: 180 CAPSULE, COATED, EXTENDED RELEASE ORAL at 09:23

## 2023-08-02 RX ADMIN — MELATONIN TAB 3 MG 6 MG: 3 TAB at 22:20

## 2023-08-02 RX ADMIN — APIXABAN 5 MG: 5 TABLET, FILM COATED ORAL at 17:30

## 2023-08-02 RX ADMIN — POTASSIUM CHLORIDE 40 MEQ: 1500 TABLET, EXTENDED RELEASE ORAL at 17:30

## 2023-08-02 RX ADMIN — AMIODARONE HYDROCHLORIDE 200 MG: 200 TABLET ORAL at 09:22

## 2023-08-02 RX ADMIN — CYANOCOBALAMIN TAB 500 MCG 1000 MCG: 500 TAB at 09:22

## 2023-08-02 RX ADMIN — POTASSIUM CHLORIDE 40 MEQ: 1500 TABLET, EXTENDED RELEASE ORAL at 09:22

## 2023-08-02 RX ADMIN — PANTOPRAZOLE SODIUM 40 MG: 40 TABLET, DELAYED RELEASE ORAL at 06:43

## 2023-08-02 RX ADMIN — FLUOXETINE 20 MG: 20 CAPSULE ORAL at 17:32

## 2023-08-02 RX ADMIN — NYSTATIN: 100000 POWDER TOPICAL at 20:00

## 2023-08-02 NOTE — PLAN OF CARE
Problem: PAIN - ADULT  Goal: Verbalizes/displays adequate comfort level or baseline comfort level  Description: Interventions:  - Encourage patient to monitor pain and request assistance  - Assess pain using appropriate pain scale  - Administer analgesics based on type and severity of pain and evaluate response  - Implement non-pharmacological measures as appropriate and evaluate response  - Consider cultural and social influences on pain and pain management  - Notify physician/advanced practitioner if interventions unsuccessful or patient reports new pain  Outcome: Progressing     Problem: INFECTION - ADULT  Goal: Absence or prevention of progression during hospitalization  Description: INTERVENTIONS:  - Assess and monitor for signs and symptoms of infection  - Monitor lab/diagnostic results  - Monitor all insertion sites, i.e. indwelling lines, tubes, and drains  - Monitor endotracheal if appropriate and nasal secretions for changes in amount and color  - Dania appropriate cooling/warming therapies per order  - Administer medications as ordered  - Instruct and encourage patient and family to use good hand hygiene technique  - Identify and instruct in appropriate isolation precautions for identified infection/condition  Outcome: Progressing     Problem: SAFETY ADULT  Goal: Patient will remain free of falls  Description: INTERVENTIONS:  - Educate patient/family on patient safety including physical limitations  - Instruct patient to call for assistance with activity   - Consult OT/PT to assist with strengthening/mobility   - Keep Call bell within reach  - Keep bed low and locked with side rails adjusted as appropriate  - Keep care items and personal belongings within reach  - Initiate and maintain comfort rounds  - Make Fall Risk Sign visible to staff  - Offer Toileting every Hours, in advance of need  - Initiate/Maintain alarm  - Obtain necessary fall risk management equipment:   - Apply yellow socks and bracelet for high fall risk patients  - Consider moving patient to room near nurses station  Outcome: Progressing  Goal: Maintain or return to baseline ADL function  Description: INTERVENTIONS:  -  Assess patient's ability to carry out ADLs; assess patient's baseline for ADL function and identify physical deficits which impact ability to perform ADLs (bathing, care of mouth/teeth, toileting, grooming, dressing, etc.)  - Assess/evaluate cause of self-care deficits   - Assess range of motion  - Assess patient's mobility; develop plan if impaired  - Assess patient's need for assistive devices and provide as appropriate  - Encourage maximum independence but intervene and supervise when necessary  - Involve family in performance of ADLs  - Assess for home care needs following discharge   - Consider OT consult to assist with ADL evaluation and planning for discharge  - Provide patient education as appropriate  Outcome: Progressing  Goal: Maintains/Returns to pre admission functional level  Description: INTERVENTIONS:  - Perform BMAT or MOVE assessment daily.   - Set and communicate daily mobility goal to care team and patient/family/caregiver. - Collaborate with rehabilitation services on mobility goals if consulted  - Perform Range of Motion  times a day. - Reposition patient every  hours.   - Dangle patient  times a day  - Stand patient times a day  - Ambulate patient  times a day  - Out of bed to chair  times a day   - Out of bed for meals  times a day  - Out of bed for toileting  - Record patient progress and toleration of activity level   Outcome: Progressing     Problem: DISCHARGE PLANNING  Goal: Discharge to home or other facility with appropriate resources  Description: INTERVENTIONS:  - Identify barriers to discharge w/patient and caregiver  - Arrange for needed discharge resources and transportation as appropriate  - Identify discharge learning needs (meds, wound care, etc.)  - Arrange for interpretive services to assist at discharge as needed  - Refer to Case Management Department for coordinating discharge planning if the patient needs post-hospital services based on physician/advanced practitioner order or complex needs related to functional status, cognitive ability, or social support system  Outcome: Progressing     Problem: Prexisting or High Potential for Compromised Skin Integrity  Goal: Skin integrity is maintained or improved  Description: INTERVENTIONS:  - Identify patients at risk for skin breakdown  - Assess and monitor skin integrity  - Assess and monitor nutrition and hydration status  - Monitor labs   - Assess for incontinence   - Turn and reposition patient  - Assist with mobility/ambulation  - Relieve pressure over bony prominences  - Avoid friction and shearing  - Provide appropriate hygiene as needed including keeping skin clean and dry  - Evaluate need for skin moisturizer/barrier cream  - Collaborate with interdisciplinary team   - Patient/family teaching  - Consider wound care consult   Outcome: Progressing     Problem: Nutrition/Hydration-ADULT  Goal: Nutrient/Hydration intake appropriate for improving, restoring or maintaining nutritional needs  Description: Monitor and assess patient's nutrition/hydration status for malnutrition. Collaborate with interdisciplinary team and initiate plan and interventions as ordered. Monitor patient's weight and dietary intake as ordered or per policy. Utilize nutrition screening tool and intervene as necessary. Determine patient's food preferences and provide high-protein, high-caloric foods as appropriate.      INTERVENTIONS:  - Monitor oral intake, urinary output, labs, and treatment plans  - Assess nutrition and hydration status and recommend course of action  - Evaluate amount of meals eaten  - Assist patient with eating if necessary   - Allow adequate time for meals  - Recommend/ encourage appropriate diets, oral nutritional supplements, and vitamin/mineral supplements  - Order, calculate, and assess calorie counts as needed  - Recommend, monitor, and adjust tube feedings and TPN/PPN based on assessed needs  - Assess need for intravenous fluids  - Provide specific nutrition/hydration education as appropriate  - Include patient/family/caregiver in decisions related to nutrition  Outcome: Progressing     Problem: MOBILITY - ADULT  Goal: Maintain or return to baseline ADL function  Description: INTERVENTIONS:  -  Assess patient's ability to carry out ADLs; assess patient's baseline for ADL function and identify physical deficits which impact ability to perform ADLs (bathing, care of mouth/teeth, toileting, grooming, dressing, etc.)  - Assess/evaluate cause of self-care deficits   - Assess range of motion  - Assess patient's mobility; develop plan if impaired  - Assess patient's need for assistive devices and provide as appropriate  - Encourage maximum independence but intervene and supervise when necessary  - Involve family in performance of ADLs  - Assess for home care needs following discharge   - Consider OT consult to assist with ADL evaluation and planning for discharge  - Provide patient education as appropriate  Outcome: Progressing  Goal: Maintains/Returns to pre admission functional level  Description: INTERVENTIONS:  - Perform BMAT or MOVE assessment daily.   - Set and communicate daily mobility goal to care team and patient/family/caregiver. - Collaborate with rehabilitation services on mobility goals if consulted  - Perform Range of Motion  times a day. - Reposition patient every  hours.   - Dangle patient times a day  - Stand patient  times a day  - Ambulate patient times a day  - Out of bed to chair  times a day   - Out of bed for meals  times a day  - Out of bed for toileting  - Record patient progress and toleration of activity level   Outcome: Progressing

## 2023-08-02 NOTE — PROGRESS NOTES
[facility] INITIAL CONSULT  NOTE  Aury Barrera 67 y.o. :1950 female MRN: [id number]  DOS:23  Unit/Bed#: [facility] 451-01 Encounter: [id number]      Requested by (Physician/Service): Rory García DO  Reason for Consultation: Evaluate and treat impact of mood and coping on progress in rehabilitation. HPI: Fernando Duarte is a 77 y.o. female with a medical history of atrial fibrillation (on Eliquis), sleep apnea, hypertension, renal cell carcinoma, gastric bypass, cervical spondylosis, and osteoarthritis who was recently admitted at [address] [place] [address] with a bowel perforation and internal hernia (hx arlene-en-y gastric bypass).  She had an ex lap, resection J-J anastomosis, segmental ileum resection, abthera vac placement, ex lap, washout, abthera change and ex lap, small bowel resection, bowel anastomosis x3, vicryl bridging mesh. Once medically stable she went to TCU for rehabilitation. Patient presented to [address] [place] [address] on 23 after clinic appointment for suture removal with an abdominal wound dehiscence.  On  she underwent washout, wound exploration, debridement. On  she underwent washout, partial wound closure, and wound vac placement. Patient currently has wound vac in place at a pressure of 75 mmHg continuously.  Wound vac last changed on  with plans for surgery management of dressing changes.  She is tolerating a regular diet with nutrition supplements. Patient tested positive for COVID on .  She is asymptomatic and not being treated at this time.  She worked with PT and OT and was recommended for post acute rehab following her hospital stay. Arzella Boast has demonstrated that she can tolerate three hours of therapy, five days a week and is now medically cleared for discharge to the [facility]. PT/OT were consulted and recommend acute inpatient rehabilitation.  The patient was evaluated by the Rehabilitation team and deemed an appropriate candidate for comprehensive inpatient rehabilitation and admitted to the [facility] on 7/24/2023 4:34 PM.    HISTORY:     Patient Active Problem List    Diagnosis Date Noted   • Mood disorder ([address]) 07/28/2023   • Encounter for skin care 07/28/2023   • COVID 07/24/2023   • Ambulatory dysfunction 07/17/2023   • Open abdominal wall wound 07/13/2023   • Iron deficiency anemia 07/13/2023   • Hypotension 07/13/2023   • Hypokalemia 07/10/2023   • Anemia 07/10/2023   • Bowel perforation ([address]) 07/05/2023   • Acute encephalopathy 07/05/2023   • Anxiety 07/05/2023   • Diarrhea 07/05/2023   • Idiopathic acute pancreatitis without infection or necrosis 06/18/2023   • Primary osteoarthritis of both knees 06/07/2023   • Stage 3 chronic kidney disease, unspecified whether stage 3a or 3b CKD ([address]) 05/31/2023   • Hypertension    • Morbid obesity with BMI of 50.0-59.9, adult ([address]) 11/01/2022   • Unsteady gait 10/30/2022   • Dyspnea on exertion 10/30/2022   • SIRS (systemic inflammatory response syndrome) ([address]) 10/30/2022   • Incontinence 10/30/2022   • GILDARDO (obstructive sleep apnea) 10/30/2022   • Vitamin D deficiency 07/06/2021   • Reactive hypoglycemia 12/30/2020   • Osteoporosis 06/04/2020   • Hyperparathyroidism ([address]) 06/04/2020   • Hashimoto's thyroiditis 03/17/2020   • Early menopause occurring in patient age younger than 39 years 03/17/2020   • S/P bariatric surgery 03/17/2020   • Myofascial pain syndrome 10/21/2019   • Status post partial thyroidectomy 09/12/2019   • Cervical spondylosis without myelopathy 06/28/2019   • Arthropathy of cervical facet joint 06/28/2019   • Occipital neuralgia of right side 06/28/2019   • Chest wall tenderness 01/24/2019   • Chronic heart failure ([address]) 01/24/2019   • Permanent atrial fibrillation ([address]) 01/24/2019   • Myalgia 01/08/2019   • Primary osteoarthritis of left knee 10/23/2018   • Primary osteoarthritis of right knee 10/23/2018   • Chronic pain of left knee 10/23/2018   • Malignant tumor of right kidney parenchyma ([address]) 05/11/2016   • Closed fracture of right distal radius and ulna 04/22/2016       Body mass index is 52.41 kg/m². Past Medical History:     Past Medical History:   Diagnosis Date   • A-fib [facility])    • Arthritis    • Atrial fibrillation ([address])    • Cervical spondylosis with myelopathy    • Gastric bypass status for obesity    • History of transfusion     35 years ago   • Hypertension    • mass right kidney    • Renal cell adenocarcinoma (HCC)     RIGHT   • Sleep apnea    • Super obese         Past Surgical History:     Past Surgical History:   Procedure Laterality Date   • ABDOMINAL ADHESION SURGERY N/A 6/19/2023    Procedure: LYSIS ADHESIONS;  Surgeon: Alexia Mohamud DO;  [facility];  Service: General   • ABDOMINAL WALL SURGERY N/A 7/19/2023    Procedure: PARTIAL CLOSURE WOUND ABDOMINAL/TRUNK;  Surgeon: Juve Chan DO;  [facility];  Service: General   • APPENDECTOMY N/A 6/19/2023    Procedure: APPENDECTOMY;  Surgeon: Alexia Mohamud DO;  [facility];  Service: General   • CHOLECYSTECTOMY     • COLOSTOMY     • CRYOABLATION Right     RT KIDNEY   • CYSTORRHAPHY      Bladder.  Last assessed 4/6/2016    • EXPLORATORY LAPAROTOMY W/ BOWEL RESECTION N/A 6/19/2023    Procedure: LAPAROTOMY EXPLORATORY W/ BOWEL RESECTION;  Surgeon: Hemal Clinton MD;  [facility];  Service: General   • GASTRIC BYPASS     • HERNIA REPAIR      Last assessed 4/6/2016    • HYSTERECTOMY     • LAPAROTOMY N/A 6/19/2023    Procedure: LAPAROTOMY EXPLORATORY, washout, vac change;  Surgeon: Alexia Mohamud DO;  [facility];  Service: General   • LAPAROTOMY N/A 6/20/2023    Procedure: LAPAROTOMY, REMOVAL OF WOUND VAC AND PACKING, SMALL BOWEL RESECTION , BOWEL ANASTOMOSIS X 3 , VICRYL MESH BRIDGING -;  Surgeon: Alexia Mohamud DO;  [facility];  Service: General   • MO OPTX DSTL RADL X-ARTIC FX/EPIPHYSL SEP Right 4/22/2016    Procedure: OPEN REDUCTION INTERNAL FIXATION RIGHT DISTAL RADIUS;  Surgeon: Darrel Resendiz MD;  [facility];  Service: Orthopedics   • REVISION COLOSTOMY     • SMALL INTESTINE SURGERY N/A 6/19/2023    Procedure: RESECTION SMALL BOWEL;  Surgeon: Mami Crowley DO;  [facility];  Service: General   • THYROID LOBECTOMY Left 8/27/2019    Procedure: LOBECTOMY THYROID, left;  Surgeon: Mendy Cooney MD;  [facility];  Service: Surgical Oncology   • TUBAL LIGATION     • US GUIDED THYROID BIOPSY  2/27/2019   • US GUIDED THYROID BIOPSY  5/29/2019   • WOUND DEBRIDEMENT N/A 7/18/2023    Procedure: DEBRIDEMENT WOUND [facility] OUT); Surgeon: Jeimy Cuadra DO;  [facility];  Service: Trauma   • WOUND DEBRIDEMENT N/A 7/19/2023    Procedure: ABDOMINAL WOUND ([address] [place] [address] Street OUT); VAC APPLICATION;  Surgeon: Babita Garcia DO;  [facility];  Service: General         Allergies:     No Known Allergies      Social History:    Social History     Socioeconomic History   • Marital status:      Spouse name: None   • Number of children: None   • Years of education: None   • Highest education level: None   Occupational History   • None   Tobacco Use   • Smoking status: Never   • Smokeless tobacco: Never   Vaping Use   • Vaping Use: Never used   Substance and Sexual Activity   • Alcohol use: Not Currently     Alcohol/week: 0.0 standard drinks of alcohol     Comment: 0   • Drug use: No   • Sexual activity: None   Other Topics Concern   • None   Social History Narrative    ** Merged History Encounter **          Social Determinants of Health     Financial Resource Strain: Not on file   Food Insecurity: No Food Insecurity (7/19/2023)    Hunger Vital Sign    • Worried About Running Out of Food in the Last Year: Never true    • Ran Out of Food in the Last Year: Never true   Transportation Needs: No Transportation Needs (7/19/2023)    PRAPARE - Transportation    • Lack of Transportation (Medical):  No    • Lack of Transportation (Non-Medical):  No   Physical Activity: Not on file   Stress: Not on file   Social Connections: Not on file   Intimate Partner Violence: Not on file   Housing Stability: Low Risk  (7/19/2023)    Housing Stability Vital Sign    • Unable to Pay for Housing in the Last Year: No    • Number of Places Lived in the Last Year: 1    • Unstable Housing in the Last Year: No        Family History:    Family History   Problem Relation Age of Onset   • Heart attack Mother    • Lung cancer Father 76   • Heart disease Father    • Stroke Sister    • Lung cancer Sister 64   • Prostate cancer Brother 61       Medications:     Current Facility-Administered Medications:   •  acetaminophen (TYLENOL) tablet 975 mg, 975 mg, Oral, Q8H PRN, MOLLY Jackson, 975 mg at 07/26/23 0158  •  amiodarone tablet 200 mg, 200 mg, Oral, Daily With Breakfast, MOLLY Jackson, 200 mg at 08/02/23 1750  •  apixaban (ELIQUIS) tablet 5 mg, 5 mg, Oral, BID, MOLLY Jackson, 5 mg at 08/02/23 2288  •  calcium carbonate (OYSTER SHELL,OSCAL) 500 mg tablet 1 tablet, 1 tablet, Oral, Daily With Breakfast, MOLLY Jackson, 1 tablet at 08/02/23 9586  •  cyanocobalamin (VITAMIN B-12) tablet 1,000 mcg, 1,000 mcg, Oral, Daily, MOLLY Jackson, 1,000 mcg at 08/02/23 0275  •  diltiazem (CARDIZEM CD) 24 hr capsule 360 mg, 360 mg, Oral, Daily, MOLLY Jackson, 360 mg at 08/02/23 5187  •  ergocalciferol (VITAMIN D2) capsule 50,000 Units, 50,000 Units, Oral, Weekly, MOLLY Jackson, 50,000 Units at 08/02/23 5718  •  FLUoxetine (PROzac) capsule 20 mg, 20 mg, Oral, BID, MOLLY Jackson, 20 mg at 08/02/23 1128  •  melatonin tablet 6 mg, 6 mg, Oral, HS, MOLLY Jackson, 6 mg at 08/01/23 2201  •  metoprolol succinate (TOPROL-XL) 24 hr tablet 25 mg, 25 mg, Oral, Daily, MOLLY Jackson, 25 mg at 08/02/23 7093  •  multivitamin-minerals (CENTRUM) tablet 1 tablet, 1 tablet, Oral, Daily, MOLLY Jackson, 1 tablet at 08/02/23 2536  •  nystatin (MYCOSTATIN) powder, , Topical, BID, MOLLY Jackson, Given at 08/01/23 2135  •  ondansetron (ZOFRAN-ODT) dispersible tablet 4 mg, 4 mg, Oral, Q6H PRN, MOLLY Jackson  •  potassium chloride (K-DUR,KLOR-CON) CR tablet 40 mEq, 40 mEq, Oral, BID, MOLLY Carbajal, 40 mEq at 08/02/23 4986  •  psyllium (METAMUCIL) 1 packet, 1 packet, Oral, Daily, MOLLY Jackson, 1 packet at 07/25/23 1020    ASSESSMENT:   Onel Batista. Mai Riggs is a very pleasant and engaging 67year old  female who was admitted to [address] [place] [address] on 7/24/23 to regain strength and functioning s/p complications from gastric surgery and rehospitalization and subsequent COVID on 7/29. She lives with her daughter and looks forward to returning home this Saturday. Pt reports feeling very happy with the course of her rehab. She reflects that she had a lot of difficulty sleeping and feeling rested which made her daytime mood feel worse as well. However, her sleep and mood have improved over the past week and she now feels more like herself. She denies any current depression, suicidal/homicidal ideation, intent or plan. She reports she recently lost a friend who had a similar medical problem and she counts herself as toy that she is healing. Pt  maintained good eye contact and engaged appropriately throughout the interview. She was seen in her room at [facility], presented as pleasant,  cooperative and established rapport without difficulty. Mood was positive with no evidence of overt depression, euphoria or emotional lability. Her current psych medication of Prosac appears to be working effectively as she does not currently exhibit breakthrough signs of depression. She denies disturbances in appetite. No evidence of poor boundaries was present. Pt. denies incidents of losing time or flash backs. No pressured speech, repetitive or perseverative behavior is present.   No obsessive-compulsive or associated rituals. Pt's conversational speech was fluent and articulate with no evidence of word finding difficulty. In summary, Ms. Delgado Peñaloza presents with improved sleep, mood and affect. She looks forward to her pending discharge and continued outpatient rehab. Pt may benefit by supportive psychotherapy during the remainder of her rehab to help her consolidate gains and prevent relapse with  CBT and DBT techniques. DIAGNOSIS:  Adjustment Disorder with Depressed Mood (resolving)      RECOMMENDATIONS:   I will follow Ms. Delgado Peñaloza during her stay to provide the following interventions:    · Supportive psychotherapy, utilizing CBT and mindfulness strategies  · DBT distress tolerance techniques  to improve coping and mood  · Meditation and relaxation training          Thank you for the opportunity to participate in Ms. Lemus's care. Diane Minor, Ph.D.  Licensed Psychologist

## 2023-08-02 NOTE — PROGRESS NOTES
08/02/23 1035   Pain Assessment   Pain Score No Pain   Restrictions/Precautions   Precautions Fall Risk;Supervision on toilet/commode  (abdominal wound)   Cognition   Overall Cognitive Status WFL   Subjective   Subjective "I am not comfortable going up and down on that black curb"   Sit to Lying   Type of Assistance Needed Physical assistance   Physical Assistance Level 51%-75%   Comment assist with B LE to avoid abdominal strain   Sit to Lying CARE Score 2   Sit to Stand   Type of Assistance Needed Incidental touching; Adaptive equipment   Comment from recliner. Unable to get up from w/c with a thin cushion needed mod A , added roho and able to get up better with CGA   Sit to Stand CARE Score 4   Car Transfer   Comment will assess later using personal car   Walk 10 Feet   Type of Assistance Needed Supervision; Incidental touching; Adaptive equipment   Comment CS/ CGA with RW   Walk 10 Feet CARE Score 4   Walk 50 Feet with Two Turns   Type of Assistance Needed Supervision; Adaptive equipment; Incidental touching   Comment CS/CGA with RW   Walk 50 Feet with Two Turns CARE Score 4   Walk 150 Feet   Comment limited by fatigue   Reason if not Attempted Safety concerns   Walk 150 Feet CARE Score 88   Walking 10 Feet on Uneven Surfaces   Type of Assistance Needed Incidental touching; Adaptive equipment   Comment using RW at ramp on the ground floor lobby B. Walked up the ramp, needed seated rest break at the top prior to walking down. Pt. stated that her ramp is not as long as it is here   Walking 10 Feet on Uneven Surfaces CARE Score 4   Ambulation   Primary Mode of Locomotion Prior to Admission Walk   Distance Walked (feet) 50 ft  (X 2)   Assist Device Roller Walker   Gait Pattern Slow Rebeca; Improper weight shift   Limitations Noted In Endurance   Walk Assist Level Contact Guard;Close Supervision   Does the patient walk? 2.  Yes   Wheel 50 Feet with Two Turns   Reason if not Attempted Activity not applicable   Wheel 50 Feet with Two Turns CARE Score 9   Wheel 150 Feet   Reason if not Attempted Activity not applicable   Wheel 324 Feet CARE Score 9   Wheelchair mobility   Does the patient use a wheelchair? 0. No   Curb or Single Stair   Style negotiated Curb   Type of Assistance Needed Incidental touching; Adaptive equipment   Comment standing scale (about 3 inches  placed in between door frame of her bathroom) Pt. stepped up on scale holding on to door frame, turned around and stepped down using door frame   1 Step (Curb) CARE Score 4   4 Steps   Reason if not Attempted Activity not applicable   4 Steps CARE Score 9   12 Steps   Reason if not Attempted Activity not applicable   12 Steps CARE Score 9   Stairs   Type Curb   # of Steps 1  (3" standing scale)   Weight Bearing Precautions Fall Risk   Assist Devices   (door frame)   Therapeutic Interventions   Strengthening supine gluteal squeeze, ankle pumps, add squeeze using pillow , SAQ   Assessment   Treatment Assessment Pt. fatigued but was able to participate in above activities. Session focused on practicing ramp and 1 small NILS at home. Pt. already expressed initially that she is nor comfortable on the 4" curb and also using RW with it. Tried to problem solve thinking of using a step box but 4" is not available . Eventually used standing scale , placing it by her bathroom door frame and pt. did well with CGA. Pt. stated that more or less her steps are like that at home and once she gets in she will probably not use RW because she can hold on to the furniture around the living room which she was nubia doing it before. Pt. also stated that she will "wing it" once she gets home if she would need the RW or not. Educated pt. that cominf down their NILS, she can use the RW for support as she will use it anyway when she goes down the ramp. Pt. did well on the ramp with CGA and no LOB. But noted to have min SOB needing to sit at the top of ramp prior to walking down.  FT scheduled for later to simulate again NILS, ramp and getting in and out of personal car. Problem List Decreased strength;Decreased endurance; Impaired balance;Decreased mobility; Decreased coordination  (abdominal wound)   Barriers to Discharge Comments 1 small NILS   PT Barriers   Functional Limitation Car transfers; Ramp negotiation;Stair negotiation;Standing;Transfers; Walking   Plan   Treatment/Interventions Functional transfer training;LE strengthening/ROM; Elevations; Therapeutic exercise; Endurance training;Patient/family training;Equipment eval/education; Bed mobility;Gait training   Recommendation   PT Discharge Recommendation Home with home health rehabilitation   Equipment Recommended Kenyon Marta  (already been ordered)   PT Therapy Minutes   PT Time In 1035   PT Time Out 1135   PT Total Time (minutes) 60   PT Mode of treatment - Individual (minutes) 60   PT Mode of treatment - Concurrent (minutes) 0   PT Mode of treatment - Group (minutes) 0   PT Mode of treatment - Co-treat (minutes) 0   PT Mode of Treatment - Total time(minutes) 60 minutes   PT Cumulative Minutes 690   Therapy Time missed   Time missed?  No

## 2023-08-02 NOTE — QUICK NOTE
Patient seen and examined at bedside. Patient reports she is overall doing well with rehab and the team is planning for discharge on Saturday. Further enlargement of the lower abdominal wound can be seen in the picture below. Plan for dressing is currently twice daily packing changes with 1 fluffed 4 x 4 and and 2 ABDs over the wound. Continue with twice daily wound dressings for now. Will arrange with case management wound VAC that will be placed prior to discharge and will go home with the patient. We will arrange outpatient wound VAC management.          Maile Cutler MD  PGY1

## 2023-08-02 NOTE — PROGRESS NOTES
08/02/23 1430   Pain Assessment   Pain Score No Pain   Restrictions/Precautions   Precautions Fall Risk;Supervision on toilet/commode;Contact/isolation  (abdominal wound)   Cognition   Overall Cognitive Status WFL   Subjective   Subjective Pt. ready for therapy. Pt's daughter Marilyn Casarez is present for FT but got upset that OT is not present for training. OT Yusra made aware and was able to sepak with daUGHTER   Lying to Sitting on Side of Bed   Type of Assistance Needed Physical assistance   Physical Assistance Level 25% or less   Comment HOB elevated. Educated daughter that pt. might benefit from sleeping in recliner for now as pt's abdominal wound is healing. They can work on more bed mobility at home if the wound is better   Lying to Sitting on Side of Bed CARE Score 3   Sit to Stand   Type of Assistance Needed Incidental touching   Sit to Stand CARE Score 4   Bed-Chair Transfer   Type of Assistance Needed Incidental touching; Adaptive equipment   Chair/Bed-to-Chair Transfer CARE Score 4   Transfer Bed/Chair/Wheelchair   Adaptive Equipment Roller Walker   Car Transfer   Type of Assistance Needed Physical assistance   Physical Assistance Level 25% or less   Comment (S)  Practiced getting in and out of [email or web address] car ([email or web address]) , Pt. only needed min A assist to get LE in to avoid straining on abdomen but did not any physical assist to get out of the car. Pt. utlized a car cane which she uses to push up from to get up from seat. Car Transfer CARE Score 3   Walk 10 Feet   Type of Assistance Needed Supervision; Adaptive equipment   Comment CS with RW   Walk 10 Feet CARE Score 4   Walk 50 Feet with Two Turns   Comment not focused this session   Walking 10 Feet on Uneven Surfaces   Type of Assistance Needed Incidental touching; Adaptive equipment   Comment outside on sidewalk (small incline  walking towards the car ) and up and down ramp on lobby B   Walking 10 Feet on Uneven Surfaces CARE Score 4   Ambulation   Primary Mode of Locomotion Prior to Admission Walk   Distance Walked (feet)   (30 feet at ramp, walked distance in room)   Assist Device Roller Walker   Gait Pattern Inconsistant Rebeca; Improper weight shift   Limitations Noted In Endurance   Does the patient walk? 2. Yes   Wheelchair mobility   Does the patient use a wheelchair? 0. No   Curb or Single Stair   Style negotiated Curb   Type of Assistance Needed Incidental touching; Adaptive equipment   Comment FT with pt's daughter on curb step using standing scale (about 3 inches  placed in between door frame of her bathroom) Pt. stepped up on scale holding on to door frame, turned around and stepped down using RW this time   1 Step (Curb) CARE Score 4   4 Steps   Reason if not Attempted Activity not applicable   4 Steps CARE Score 9   12 Steps   Reason if not Attempted Activity not applicable   12 Steps CARE Score 9   Stairs   Type Curb   # of Steps 1  (standing scale)   Weight Bearing Precautions Fall Risk   Assist Devices Roller Walker  (door frame)   Therapeutic Interventions   Strengthening LAQ, hip flex, toes in and out   Other Comments   Comments weighed patient on standing scale and was 130.9 kg   Assessment   Treatment Assessment Tx fcoused on FT with pt's daughter Claude Steve with education on safe curb step management, nam management and car transfers. Pt. did well with all 3 activities and only needed physical assist to get her legs in . As per daughter, pt. already had problems getting her legs in before due to pt's knee problems previously needing extra time to complete task Otheriwse pt. did well and emphasized on having the need to assist LE to get in to avoid Abdominal strain and as well as during bed mobility if she decides to sleep in her bed one day. Also recommended to use RW as much as possible to dec risk for falls and energy conservation. Problem List Decreased strength;Decreased endurance; Impaired balance;Decreased mobility; Decreased coordination  (abdominal wound)   Barriers to Discharge Comments 1 small NILS ( can practice again 4" curb if patient is willing)   PT Barriers   Functional Limitation Car transfers; Ramp negotiation;Stair negotiation;Standing;Transfers; Walking   Plan   Treatment/Interventions Functional transfer training;LE strengthening/ROM; Elevations; Therapeutic exercise; Endurance training;Patient/family training;Equipment eval/education; Bed mobility;Gait training   Recommendation   PT Discharge Recommendation Home with home health rehabilitation   Equipment Recommended (S)  Kait Palacio  (Pt. requested if a cushion can be ordered for her recliner at home as it is low)   PT Therapy Minutes   PT Time In 1430   PT Time Out 1530   PT Total Time (minutes) 60   PT Mode of treatment - Individual (minutes) 60   PT Mode of treatment - Concurrent (minutes) 0   PT Mode of treatment - Group (minutes) 0   PT Mode of treatment - Co-treat (minutes) 0   PT Mode of Treatment - Total time(minutes) 60 minutes   PT Cumulative Minutes 750   Therapy Time missed   Time missed?  No

## 2023-08-02 NOTE — PROGRESS NOTES
Internal Medicine Progress Note  Patient: Rita Barreto  Age/sex: 67 y.o. female  Medical Record #: [id number]      ASSESSMENT/PLAN: (Interval History)  Rita Barreto is seen and examined and management for following issues:    Wound dehiscence s/p VAC placement  • Had recent admission for ex-lap 2/2 bowel perforation/internal hernia (6/19-7/4)  • Readmitted with wound dehiscence 7/18 and underwent a washout/VAC application and partial closure of the wound on 7/19  • Surgery saw 7/28 and closed wound, d/c'd VAC. • Wound is being packed with dressing changes 2x daily  • Refer to media images  • Pain control and therapy per primary service     Atrial fibrillation  • Home:  Amiodarone 200mg qd/Card CD 360mg qd/Toprol XL 25mg q12hrs/Eliquis 5mg BID  • Here:  Amiodarone 200mg qd/Card CD 360mg qd/Toprol XL 25mg qd/Eliquis 5mg BID  • Stable      HTN  • Home:  In addition to above meds, was on HCTZ 12.5mg qd/KCL 15meq qd  • Here:  Holding off on HCTZ  • stable     COVID-19 positive  • Detected on routine testing for [facility] admission. • Pt is asymptomatic. • Pt had 2 rapid antigen tests and is off precautions.     Hypokalemia  • K 3.9 kcl 40meq bid  • Repeat bmp in am  • May be able to decrease to daily     Anemia   • Hgb has been trending around 8, currently 8.8  • Stable.     Iron deficeincy  • Has hx gastric bypass  • Iron level on 7/18/23 was 18  • Pt has had Venofer infusions in the past as her iron was low due to gastric bypass iron level now 70 on 7/31/23  • She is agreeable to infusions while here as well   • S/p IV venofer x 3 completed 8/1/23      Hx RCC right kidney  • S/p cryotherapy @ 7 yrs ago  • Follows with Urology     Hematuria vs vaginal bleeding vs rectal bleeding  · Had blood in the toilet after voiding and after a BM  · Hgb stable at 8.3.  · Continue Protonix 2x daily.   · GI performed colonoscopy 8/1 +3 polyps removed no evidence of active bleeding  · eliquis resumed in absence of bleeding  · Cbc in am    Discharge Date:  8/5       The above assessment and plan was reviewed and updated as determined by my evaluation of the patient on 8/2/2023. Labs:   Results from last 7 days   Lab Units 08/02/23  0626 07/31/23  0457   WBC Thousand/uL 8.75 8.78   HEMOGLOBIN g/dL 8.8* 8.3*   HEMATOCRIT % 29.9* 27.2*   PLATELETS Thousands/uL 417* 444*     Results from last 7 days   Lab Units 08/02/23  0626 08/01/23  0611   SODIUM mmol/L 142 144   POTASSIUM mmol/L 3.9 3.1*   CHLORIDE mmol/L 113* 115*   CO2 mmol/L 23 26   BUN mg/dL 14 15   CREATININE mg/dL 0.67 0.61   CALCIUM mg/dL 8.2* 8.2*                   Review of Scheduled Meds:  Current Facility-Administered Medications   Medication Dose Route Frequency Provider Last Rate   • acetaminophen  975 mg Oral Q8H PRN MOLLY Jackson     • amiodarone  200 mg Oral Daily With Breakfast MOLLY Jackson     • apixaban  5 mg Oral BID MOLLY Jackson     • calcium carbonate  1 tablet Oral Daily With Breakfast MOLLY Jackson     • vitamin B-12  1,000 mcg Oral Daily MOLLY Jackson     • diltiazem  360 mg Oral Daily MOLLY Jackson     • ergocalciferol  50,000 Units Oral Weekly MOLLY Jackson     • FLUoxetine  20 mg Oral BID MOLLY Jackson     • melatonin  6 mg Oral HS MOLLY Jackson     • metoprolol succinate  25 mg Oral Daily MOLLY Jackson     • multivitamin-minerals  1 tablet Oral Daily MOLLY Jackson     • nystatin   Topical BID MOLLY Jackson     • ondansetron  4 mg Oral Q6H PRN MOLLY Jackson     • potassium chloride  40 mEq Oral BID MOLLY Michael     • psyllium  1 packet Oral Daily MOLLY Jackson         Subjective/ HPI: Patient seen and examined. Patients overnight issues or events were reviewed with nursing or staff during rounds or morning huddle session. New or overnight issues include the following:     Pt seen in her room. She is relieved regarding the results of her colonoscopy.  She is very anxious about going home Saturday. Otherwise no complaints      ROS:   A 10 point ROS was performed; negative except as noted above. Imaging:     No orders to display       *Labs /Radiology studies reviewed  *Medications reviewed and reconciled as needed  *Please refer to order section for additional ordered labs studies  *Case discussed with primary attending during morning huddle case rounds    Physical Examination:  Vitals:   Vitals:    08/01/23 1139 08/01/23 1413 08/01/23 2005 08/02/23 0611   BP:  116/56 134/62 116/59   BP Location:  Right arm Right arm Right arm   Pulse:  96 84 92   Resp:  17 18 20   Temp:  98.4 °F (36.9 °C) 98.1 °F (36.7 °C) (!) 97.4 °F (36.3 °C)   TempSrc:  Oral Oral Oral   SpO2:  97% 99% 98%   Weight: 134 kg (295 lb 3.1 oz)   134 kg (295 lb 13.7 oz)   Height:           GEN: NAD; pleasant  NEURO: Alert and oriented x4; appropriate  HEENT: Pupils are equal/reactive; normocephalic, face is symmetrical, hearing is normal  CV: S1 S2 irregular, no murmur/rub/gallops, 2/4 pedal pulses, no LE edema present  RESP: Lungs are clear bilaterally, no wheezes rales or rhonchi, on room air, no distress, respirations are easy and non labored  GI: Abdomen is obese, soft, non tender, +BS x4; non distended; dressing in place; refer to media images  : Voiding without issues  MUSC: Moves all extremities except generalized deconditioning  SKIN: pink, warm, normal turgor, no rashes or lesions noted      The above physical exam was reviewed and updated as determined by my evaluation of the patient on 8/2/2023. Invasive Devices     None                    VTE Pharmacologic Prophylaxis: Eliquis - on hold  Code Status: Level 1 - Full Code  Current Length of Stay: 9 day(s)      Total time spent:  30 minutes with more than 50% spent counseling/coordinating care. Counseling includes discussion with patient re: progress  and discussion with patient of his/her current medical state/information.  Coordination of patient's care was performed in conjunction with primary service. Time invested included review of patient's labs, vitals, and management of their comorbidities with continued monitoring. In addition, this patient was discussed with medical team including physician and advanced extenders. The care of the patient was extensively discussed and appropriate treatment plan was formulated unique for this patient. Medical decision making for the day was made by supervising physician unless otherwise noted in their attestation statement. ** Please Note:  voice to text software may have been used in the creation of this document.  Although proof errors in transcription or interpretation are a potential of such software**

## 2023-08-02 NOTE — PROGRESS NOTES
Progress Note -  [facility]  Lala Kaufman 67 y.o. female MRN: [id number]  Unit/Bed#: -01 Encounter: [id number]      ASSESSMENT AND PLAN:      1. Post-colonoscopy  Assessment  · Colonoscopy 8/1: 3 subcentimeter polyps removed, internal small hemorrhoids. Difficult visualization. See report for full details. · Patient tolerated procedure well, has no complaints this morning  · Able to eat, has had 2 non bloody bowel movements since procedure  Plan  · Resume eliquis  · Will monitor hemoglobin through tomorrow  · 6 month follow up colonoscopy due to difficult visualization in the setting of inadequate prep     Rest of care per primary team.  ___________________________________________________________________    Subjective:  Nancy Fraction was sitting comfortably in her chair this morning. She states that she tolerated her colonoscopy well yesterday, and has no complaints this morning. Denies abdominal discomfort. She has been able to eat without issues since the procedure and has had 2 non bloody bowel movements since then. Discussed results of colonoscopy to her satisfaction. REVIEW OF SYSTEMS:  10 point ROS reviewed and negative except otherwise noted in the HPI above.      Historical Information   Past Medical History:   Diagnosis Date   • A-fib [facility])    • Arthritis    • Atrial fibrillation ([address])    • Cervical spondylosis with myelopathy    • Gastric bypass status for obesity    • History of transfusion     35 years ago   • Hypertension    • mass right kidney    • Renal cell adenocarcinoma (HCC)     RIGHT   • Sleep apnea    • Super obese      Past Surgical History:   Procedure Laterality Date   • ABDOMINAL ADHESION SURGERY N/A 6/19/2023    Procedure: LYSIS ADHESIONS;  Surgeon: Constance Jones DO;  [facility];  Service: General   • ABDOMINAL WALL SURGERY N/A 7/19/2023    Procedure: PARTIAL CLOSURE WOUND ABDOMINAL/TRUNK;  Surgeon: Kathya Grant DO;  [facility]; Service: General   • APPENDECTOMY N/A 6/19/2023    Procedure: APPENDECTOMY;  Surgeon: Jairo Georges DO;  [facility];  Service: General   • CHOLECYSTECTOMY     • COLOSTOMY     • CRYOABLATION Right     RT KIDNEY   • CYSTORRHAPHY      Bladder. Last assessed 4/6/2016    • EXPLORATORY LAPAROTOMY W/ BOWEL RESECTION N/A 6/19/2023    Procedure: LAPAROTOMY EXPLORATORY W/ BOWEL RESECTION;  Surgeon: Dat Edgar MD;  [facility];  Service: General   • GASTRIC BYPASS     • HERNIA REPAIR      Last assessed 4/6/2016    • HYSTERECTOMY     • LAPAROTOMY N/A 6/19/2023    Procedure: LAPAROTOMY EXPLORATORY, washout, vac change;  Surgeon: Jairo Georges DO;  [facility];  Service: General   • LAPAROTOMY N/A 6/20/2023    Procedure: LAPAROTOMY, REMOVAL OF WOUND VAC AND PACKING, SMALL BOWEL RESECTION , BOWEL ANASTOMOSIS X 3 , VICRYL MESH BRIDGING -;  Surgeon: Jairo Georges DO;  [facility];  Service: General   • WI OPTX DSTL RADL X-ARTIC FX/EPIPHYSL SEP Right 4/22/2016    Procedure: OPEN REDUCTION INTERNAL FIXATION RIGHT DISTAL RADIUS;  Surgeon: Jesika Jc MD;  [facility];  Service: Orthopedics   • REVISION COLOSTOMY     • SMALL INTESTINE SURGERY N/A 6/19/2023    Procedure: RESECTION SMALL BOWEL;  Surgeon: Jairo Georges DO;  [facility];  Service: General   • THYROID LOBECTOMY Left 8/27/2019    Procedure: LOBECTOMY THYROID, left;  Surgeon: Jeanna Lugo MD;  [facility];  Service: Surgical Oncology   • TUBAL LIGATION     • US GUIDED THYROID BIOPSY  2/27/2019   • US GUIDED THYROID BIOPSY  5/29/2019   • WOUND DEBRIDEMENT N/A 7/18/2023    Pro[facility]: [facility]   Surgeon: Gema Gonsales DO;  [facility];  Service: Trauma   • WOUND DEBRIDEMENT N/A 7/19/2023    Procedure: ABDOMINAL WOUND ([address] [place] [address] OUT); VAC APPLICATION;  Surgeon: Dianelys Dominguez DO;  [facility];  Service: General     Social History   Social History     Substance and Sexual Activity   Alcohol Use Not Currently   • Alcohol/week: 0.0 standard drinks of alcohol    Comment: 0     Social History     Substance and Sexual Activity   Drug Use No     Social History     Tobacco Use   Smoking Status Never   Smokeless Tobacco Never     Family History   Problem Relation Age of Onset   • Heart attack Mother    • Lung cancer Father 76   • Heart disease Father    • Stroke Sister    • Lung cancer Sister 64   • Prostate cancer Brother 61       Meds/Allergies     Facility-Administered Medications Prior to Admission   Medication   • denosumab (PROLIA) subcutaneous injection 60 mg     Medications Prior to Admission   Medication   • acetaminophen (TYLENOL) 325 mg tablet   • amiodarone 200 mg tablet   • apixaban (Eliquis) 5 mg   • diltiazem (CARDIZEM CD) 360 MG 24 hr capsule   • ergocalciferol (VITAMIN D2) 50,000 units   • FLUoxetine (PROzac) 20 mg capsule   • gabapentin (NEURONTIN) 300 mg capsule   • hydrochlorothiazide (HYDRODIURIL) 12.5 mg tablet   • melatonin 3 mg   • metoprolol succinate (TOPROL-XL) 25 mg 24 hr tablet   • Probiotic Product (PRO-BIOTIC BLEND PO)   • psyllium (METAMUCIL SMOOTH TEXTURE) 28 % packet     Current Facility-Administered Medications   Medication Dose Route Frequency   • acetaminophen (TYLENOL) tablet 975 mg  975 mg Oral Q8H PRN   • amiodarone tablet 200 mg  200 mg Oral Daily With Breakfast   • apixaban (ELIQUIS) tablet 5 mg  5 mg Oral BID   • calcium carbonate (OYSTER SHELL,OSCAL) 500 mg tablet 1 tablet  1 tablet Oral Daily With Breakfast   • cyanocobalamin (VITAMIN B-12) tablet 1,000 mcg  1,000 mcg Oral Daily   • diltiazem (CARDIZEM CD) 24 hr capsule 360 mg  360 mg Oral Daily   • ergocalciferol (VITAMIN D2) capsule 50,000 Units  50,000 Units Oral Weekly   • FLUoxetine (PROzac) capsule 20 mg  20 mg Oral BID   • melatonin tablet 6 mg  6 mg Oral HS   • metoprolol succinate (TOPROL-XL) 24 hr tablet 25 mg  25 mg Oral Daily   • multivitamin-minerals (CENTRUM) tablet 1 tablet  1 tablet Oral Daily   • nystatin (MYCOSTATIN) powder   Topical BID   • ondansetron (ZOFRAN-ODT) dispersible tablet 4 mg  4 mg Oral Q6H PRN   • potassium chloride (K-DUR,KLOR-CON) CR tablet 40 mEq  40 mEq Oral BID   • psyllium (METAMUCIL) 1 packet  1 packet Oral Daily       No Known Allergies      Objective     Blood pressure 116/59, pulse 92, temperature (!) 97.4 °F (36.3 °C), temperature source Oral, resp. rate 20, height 5' 3" (1.6 m), weight 134 kg (295 lb 13.7 oz), SpO2 98 %. Body mass index is 52.41 kg/m².     PHYSICAL EXAM:    General: Well-appearing, NAD  Eyes: no conjunctival icterus or pallor  Abdominal: Soft, non-tender, non-distended  Extremities: Warm, no deformities, no edema  Neuro: alert and oriented  Psych: Normal affect      Lab Results:   Admission on 07/24/2023   Component Date Value   • WBC 07/26/2023 9.19    • RBC 07/26/2023 3.02 (L)    • Hemoglobin 07/26/2023 7.9 (L)    • Hematocrit 07/26/2023 26.7 (L)    • MCV 07/26/2023 88    • MCH 07/26/2023 26.2 (L)    • MCHC 07/26/2023 29.6 (L)    • RDW 07/26/2023 18.1 (H)    • MPV 07/26/2023 9.1    • Platelets 86/84/1637 344    • Sodium 07/26/2023 139    • Potassium 07/26/2023 3.4 (L)    • Chloride 07/26/2023 109 (H)    • CO2 07/26/2023 24    • ANION GAP 07/26/2023 6    • BUN 07/26/2023 14    • Creatinine 07/26/2023 0.60    • Glucose 07/26/2023 115    • Calcium 07/26/2023 8.0 (L)    • eGFR 07/26/2023 90    • Segmented % 07/26/2023 84 (H)    • Bands % 07/26/2023 1    • Lymphocytes % 07/26/2023 4 (L)    • Monocytes % 07/26/2023 9    • Eosinophils, % 07/26/2023 1    • Basophils % 07/26/2023 0    • Myelocytes % 07/26/2023 1    • Absolute Neutrophils 07/26/2023 7.81 (H)    • Lymphocytes Absolute 07/26/2023 0.37 (L)    • Monocytes Absolute 07/26/2023 0.83    • Eosinophils Absolute 07/26/2023 0.09    • Basophils Absolute 07/26/2023 0.00    • RBC Morphology 07/26/2023 Present    • Platelet Estimate 20/94/2267 Adequate    • Giant PLTs 07/26/2023 Present    • Polychromasia 07/26/2023 Present    • SARS-CoV-2 Ag 07/26/2023 Negative Presumptive    • SARS-CoV-2 Ag 07/28/2023 Negative Presumptive    • Color, UA 07/30/2023 Yellow    • Clarity, UA 07/30/2023 Turbid    • Specific Gravity, UA 07/30/2023 1.023    • pH, UA 07/30/2023 5.5    • Leukocytes, UA 07/30/2023 Negative    • Nitrite, UA 07/30/2023 Positive (A)    • Protein, UA 07/30/2023 Trace (A)    • Glucose, UA 07/30/2023 Negative    • Ketones, UA 07/30/2023 10 (1+) (A)    • Urobilinogen, UA 07/30/2023 <2.0    • Bilirubin, UA 07/30/2023 Negative    • Occult Blood, UA 07/30/2023 Negative    • RBC, UA 07/30/2023 None Seen    • WBC, UA 07/30/2023 4-10 (A)    • Epithelial Cells 07/30/2023 Moderate (A)    • Bacteria, UA 07/30/2023 Occasional    • MUCUS THREADS 07/30/2023 Moderate (A)    • Ca Oxalate Izabel, UA 07/30/2023 Moderate (A)    • Fecal Occult Blood Diagn* 07/30/2023 Positive (A)    • Fecal Occult Blood Diagn* 07/31/2023 Positive (A)    • Fecal Occult Blood Diagn* 07/31/2023 Positive (A)    • Fecal Occult Blood Diagn* 07/31/2023 Test not performed    • WBC 07/31/2023 8.78    • RBC 07/31/2023 3.09 (L)    • Hemoglobin 07/31/2023 8.3 (L)    • Hematocrit 07/31/2023 27.2 (L)    • MCV 07/31/2023 88    • MCH 07/31/2023 26.9    • MCHC 07/31/2023 30.5 (L)    • RDW 07/31/2023 18.9 (H)    • MPV 07/31/2023 9.1    • Platelets 34/92/2438 444 (H)    • Sodium 07/31/2023 144    • Potassium 07/31/2023 2.8 (L)    • Chloride 07/31/2023 111 (H)    • CO2 07/31/2023 25    • ANION GAP 07/31/2023 8    • BUN 07/31/2023 14    • Creatinine 07/31/2023 0.75    • Glucose 07/31/2023 87    • Glucose, Fasting 07/31/2023 87    • Calcium 07/31/2023 8.2 (L)    • eGFR 07/31/2023 79    • Segmented % 07/31/2023 80 (H)    • Bands % 07/31/2023 1    • Lymphocytes % 07/31/2023 10 (L)    • Monocytes % 07/31/2023 5    • Eosinophils, % 07/31/2023 0    • Basophils % 07/31/2023 0    • Metamyelocytes% 07/31/2023 2 (H) • Myelocytes % 07/31/2023 1    • Atypical Lymphocytes % 07/31/2023 1 (H)    • Absolute Neutrophils 07/31/2023 7.11    • Lymphocytes Absolute 07/31/2023 0.97    • Monocytes Absolute 07/31/2023 0.44    • Eosinophils Absolute 07/31/2023 0.00    • Basophils Absolute 07/31/2023 0.00    • RBC Morphology 07/31/2023 Present    • Platelet Estimate 66/47/8580 Increased (A)    • Anisocytosis 07/31/2023 Present    • Ovalocytes 07/31/2023 Present    • Poikilocytes 07/31/2023 Present    • Polychromasia 07/31/2023 Present    • Iron Saturation 07/31/2023 38    • TIBC 07/31/2023 186 (L)    • Iron 07/31/2023 70    • Ferritin 07/31/2023 188    • Sodium 08/01/2023 144    • Potassium 08/01/2023 3.1 (L)    • Chloride 08/01/2023 115 (H)    • CO2 08/01/2023 26    • ANION GAP 08/01/2023 3    • BUN 08/01/2023 15    • Creatinine 08/01/2023 0.61    • Glucose 08/01/2023 105    • Glucose, Fasting 08/01/2023 105 (H)    • Calcium 08/01/2023 8.2 (L)    • eGFR 08/01/2023 90    • WBC 08/02/2023 8.75    • RBC 08/02/2023 3.24 (L)    • Hemoglobin 08/02/2023 8.8 (L)    • Hematocrit 08/02/2023 29.9 (L)    • MCV 08/02/2023 92    • MCH 08/02/2023 27.2    • MCHC 08/02/2023 29.4 (L)    • RDW 08/02/2023 20.3 (H)    • MPV 08/02/2023 9.6    • Platelets 84/77/8837 417 (H)    • Sodium 08/02/2023 142    • Potassium 08/02/2023 3.9    • Chloride 08/02/2023 113 (H)    • CO2 08/02/2023 23    • ANION GAP 08/02/2023 6    • BUN 08/02/2023 14    • Creatinine 08/02/2023 0.67    • Glucose 08/02/2023 95    • Calcium 08/02/2023 8.2 (L)    • eGFR 08/02/2023 87        Imaging Studies: I have personally reviewed pertinent imaging studies.     Colonoscopy    Result Date: 8/1/2023    IMPRESSION: The terminal ileum appeared normal. 3 subcentimeter polyps in the transverse colon were removed with cold snare Scattered diverticulosis in the sigmoid colon Small hemorrhoids     RECOMMENDATION: Repeat colonoscopy in 6 months (there were polyps that were not removed in the rectum due to concern for rectal bleeding and likely other polyps not removed in setting of inadequate preparation) No rectal bleeding identified but could be secondary to diverticulosis / hemorrhoids.     Melisa Dye MS4  8/2/2023 11:17 AM

## 2023-08-02 NOTE — CASE MANAGEMENT
Case Management Update:  Pt now being recommended wound vac at [place]. General surgery completed [employer] wound vac form, cm faxed to [facility] for delivery Friday prior to [place] Saturday 8/5.

## 2023-08-02 NOTE — PROGRESS NOTES
Occupational Therapy Treatment Note         08/02/23 0700   Pain Assessment   Pain Assessment Tool 0-10   Pain Score No Pain   Restrictions/Precautions   Precautions Fall Risk;Supervision on toilet/commode  (abdominal wound)   Lifestyle   Autonomy "my procedure went well yesterday"   Eating   Type of Assistance Needed Independent   Physical Assistance Level No physical assistance   Eating CARE Score 6   Oral Hygiene   Type of Assistance Needed Set-up / clean-up   Physical Assistance Level No physical assistance   Comment seated. pt reports plan to sit in recliner at home. will provide mouth basin   Oral Hygiene CARE Score 5   Shower/Bathe Self   Type of Assistance Needed Adaptive equipment;Supervision   Physical Assistance Level No physical assistance   Comment pt plans to sit in recline and utilize basin for sponge bathing at home. completed this way. pt able to complete bathing of UB and LEs using reacher for feet with supervision. pt stands with supervision and can simulate bathing groin/buttock with supervision in stance using L UE support on RW. Pt requires assist here as toileting tongs can only be used to simulate, but her dtr did order a pair for home   Shower/Bathe Self CARE Score 4   Tub/Shower Transfer   Reason Not Assessed Sponge Bath;Medical   Upper Body Dressing   Type of Assistance Needed Set-up / clean-up   Physical Assistance Level No physical assistance   Comment seated   Upper Body Dressing CARE Score 5   Lower Body Dressing   Reason if not Attempted Medical concerns   Lower Body Dressing CARE Score 88   Putting On/Taking Off Footwear   Type of Assistance Needed Physical assistance   Physical Assistance Level 25% or less   Comment pt wears slippers with backs at home. using reacher pt able to don with supervision, kicks off to doff. Pt usually wore bare feet but OT educated on need to wear shoes, hospital socks or slip on sneakers.  Pt would benefit from long handled shoe horn for sneakers at home when going out to appts, required assist to adjust heel in back. Pt usually does not wear LE ace wrapping or compression socks at home. will follow up with MD regarding recommendations while on ARC   Putting On/Taking Off Footwear CARE Score 3   Sit to Lying   Type of Assistance Needed Supervision   Physical Assistance Level No physical assistance   Comment from recliner at home which is how pt will complete. assist for in bed here   Sit to Lying CARE Score 4   Lying to Sitting on Side of Bed   Type of Assistance Needed Supervision   Physical Assistance Level No physical assistance   Comment from recliner at home which is how pt will complete. assist for in bed here   Lying to Sitting on Side of Bed CARE Score 4   Sit to Stand   Type of Assistance Needed Supervision; Adaptive equipment   Physical Assistance Level No physical assistance   Comment RW   Sit to Stand CARE Score 4   Bed-Chair Transfer   Type of Assistance Needed Supervision; Adaptive equipment   Physical Assistance Level No physical assistance   Comment RW within room/bathroom   Chair/Bed-to-Chair Transfer CARE Score 4   Toileting Hygiene   Type of Assistance Needed Supervision; Adaptive equipment   Physical Assistance Level No physical assistance   Comment Pt able to manage dress in stance with supervision. Simulated use of toileting tongs while pt in stance with and without RW (when without RW simulated L UE support on grab bar or vanity in bathroom). Pt demonstrates ability to reach in front as well as around back for hygiene. Will require assist while in hospital. Pt's dtr did order toileting tongs for home. Toileting Hygiene CARE Score 4   Toilet Transfer   Type of Assistance Needed Supervision; Adaptive equipment   Physical Assistance Level No physical assistance   Comment height of pt's toilet at home 19". set BSC to this height.  dtr purchased heavy duty style toileting rails for at home   Toilet Transfer CARE Score 4   Cognition   Overall Cognitive Status WFL   Arousal/Participation Alert   Attention Within functional limits   Orientation Level Oriented X4   Memory Within functional limits   Following Commands Follows one step commands without difficulty   Activity Tolerance   Activity Tolerance Patient tolerated treatment well   Assessment   Treatment Assessment Pt participated in skilled OT tx session. See above for further details on functional performance. Pt performing ADL at overall supervision level when simulating with use of toileting tongs which her dtr did purchase from [facility] yesterday. OT also ordered wide BSC that can be used next to where pt will sleep (in recliner) and family purchased style of toileting rails that appear more heavy duty than what pt has at home now. Order for standard RW placed as well. Family training this PM with pt's dtr for PT and nursing. OT to write d/c instructions for sponge bathing plan at home for pt's reference, but pt is able to direct caregiver in what she needs help with setting up. Plan for ADLs to complete in recliner here with basin as this is what pt will complete at home. Continue to use reacher for LB ADLs, PT will give handout to dtr this afternoon during family training. Pt left positioned in recliner with call bell in reach. Pt taken off alarms. Of note, anticipate pt will achieve independent level for transfers and short distance mobility/toileting however will not be able to navigate items within her room to have true in room privileges while on ARC, and also will requires assist for ADL/hygiene as will not have access to toileting tongs while in rehab. Prognosis Good   Problem List Decreased strength;Decreased endurance; Impaired balance;Decreased mobility;Obesity;Orthopedic restrictions;Decreased skin integrity   Barriers to Discharge None   Plan   Treatment/Interventions ADL retraining;Functional transfer training; Therapeutic exercise; Endurance training;Patient/family training;Equipment eval/education; Compensatory technique education   Progress Progressing toward goals   Recommendation   OT Discharge Recommendation Home with home health rehabilitation   Equipment Recommended Bedside commode   Commode Type Wide   OT Therapy Minutes   OT Time In 0700   OT Time Out 0820   OT Total Time (minutes) 80   OT Mode of treatment - Individual (minutes) 80   OT Mode of treatment - Concurrent (minutes) 0   OT Mode of treatment - Group (minutes) 0   OT Mode of treatment - Co-treat (minutes) 0   OT Mode of Treatment - Total time(minutes) 80 minutes   OT Cumulative Minutes 795   Therapy Time missed   Time missed?  No

## 2023-08-02 NOTE — PROGRESS NOTES
PM&R PROGRESS NOTE:  Celine Yu 67 y.o. female MRN: [id number]  Unit/Bed#: -01 Encounter: [id number]        Rehabilitation Diagnosis: Impairment of mobility, safety and Activities of Daily Living (ADLs) due to Debility:  16  Debility (Non-cardiac/Non-pulmonary)    HPI: Mami Truong is a 67 y.o. female with a medical history of atrial fibrillation (on Eliquis), sleep apnea, hypertension, renal cell carcinoma, gastric bypass, cervical spondylosis, and osteoarthritis who was recently admitted at [address] [place] [address] 6/19-7/4 with a bowel perforation and internal hernia (hx arlene-en-y gastric bypass). She had an ex lap, resection J-J anastomosis, segmental ileum resection, abthera vac placement, ex lap, washout, abthera change and ex lap, small bowel resection, bowel anastomosis x3, vicryl bridging mesh. Once medically stable she went to TCU for rehabilitation. Patient presented to [address] [place] [address] on 7/18/23 after clinic appointment for suture removal with an abdominal wound dehiscence. On 7/18 she underwent washout, wound exploration, debridement. On 7/19 she underwent washout, partial wound closure, and wound vac placement. Patient currently has wound vac in place at a pressure of 75 mmHg continuously. Wound vac last changed on 7/24 with plans for surgery management of dressing changes. She is tolerating a regular diet with nutrition supplements. Patient tested positive for COVID on 7/24. She is asymptomatic and not being treated at this time. She worked with PT and OT and was recommended for post acute rehab following her hospital stay. She has demonstrated that she can tolerate three hours of therapy, five days a week and is now medically cleared for discharge to the [facility]. PT/OT were consulted and recommend acute inpatient rehabilitation.  The patient was evaluated by the Rehabilitation team and deemed an appropriate candidate for comprehensive inpatient rehabilitation and admitted to the [facility] on 7/24/2023  4:34 PM    SUBJECTIVE: Patient seen face to face. No acute issues overnight. Patient in good spirits. Looking forward to discharge on Saturday, discussed plan for wound care. General surgery recommendations. Patient understands and discussed colonoscopy results and plan for follow-up as outpatient for colonoscopy in 6 months. Denies pain. Good appetite, voiding, LBM 8/2. Denies chest pain, shortness of breath, fever, chills, N/V, abdominal pain. ASSESSMENT: Stable, progressing    PLAN:  - anemia- Hbg 8.8, stable, continue to monitor  - hypokalemia: K+ 3.9, stable, continue to monitor  - abdominal wound: GS following, continue pack with gauze, cover with ABD, change BID; continue family training  - colonoscopy 8/1, 3 polyps transverse colon, scatter diverticula in sigmoid colon, small internal hemorrhoids, GI recommending follow-up colonoscopy in 6 months    Rehabilitation  • Functional deficits:  Mobility, self-care  • Continue current rehabilitation plan of care to maximize function.     • Functional update:   • PT: mobility- mod A, transfers- min A, ambulation- min A RW 40'  • OT: ADL: bathing- mod A, dressing- UB supervision LB total A, toileting- mod A  • Estimated Discharge: 8/5 with JUAN RN, PT, OT    Pain  • Tylenol 975 every 8 hours prn    DVT prophylaxis  • Eliquis    Bladder plan  • Continent    Bowel plan  • Continent      * Open abdominal wall wound  Assessment & Plan  - recent admission [facility] 6/19-7/4 with bowel perforation and internal hernia (hx arlene-en-y gastric bypass) - s/p ex lap, resection J-J anastomosis, segmental ileum resection, abthera 6/19  - s/p ex lap, washout, abthera change 6/19  - s/p ex lap, small bowel resection, bowel anastomosis x3, vicryl bridging mesh 6/20  - s/p debridement/washout on 7/18  - s/p partial closure and wound vac placement on 7/19  - wound vac 75 mmHg continuous  - dressing changes per General Surgery   -Surgery saw the patient today on 7/31 and added additional sutures to reinforce however there is a significant amount of tension on the incision sites that is limiting the overall healing process. Attempted double binder with without success however working with therapy and nursing to try and reduce overall tension as the pannus itself on the left and right side is pulling at the incision site. Wound base looks good  - some loose stools to be expected post GI sx - metamucil but patient refusing it to try to bulk up - stools more formed recently nevertheless - monitor for changes or other etiologies   - Recent vitals/labs stable - CBC, BMP Monday   - midline incision with sutures  - pain management - well controlled - PRN APAP   - Function continues to improve well  - Continue acute comprehensive interdisciplinary inpatient rehabilitation to include intensive skilled therapies (PT, OT) as outlined with oversight and management by rehabilitation physician as well as inpatient rehab level nursing, case management and weekly interdisciplinary team meetings.    - 8/2 General surgery following, continue to pack wound, cover with ABD and change daily and prn with soilage or dislodgement      S/P bariatric surgery  Assessment & Plan  - 25 years ago  - supplemental vitamins post surgery- MV, B12    COVID  Assessment & Plan  - positive 7/24  - asymptomatic  - no signs of infection: afebrile  - isolation 10 days (thru 8/3) unless 2 Ag tests 48 h apart negative  - 7/26: SARS cov2 antigen negative; repeat 7/28 Ag lab negative  - isolation discontinued   - monitor closely      Encounter for skin care  Assessment & Plan  7/28 buttock skin intact   High risk for skin wounds/breakdown with patient wanting to lay in recliner and not bed - counseled patient on being in bed to decrease risk of wounds;   - Increased risk of skin wounds/breakdown/rashes due to recent immobility and co-morbidities - loose stools at times  - Turn patient Q2H; encourage frequent wt shifts when in chair/WC every 10-15 minutes  - Hydragaurd to buttocks and sacrum BID & PRN  - Float heels and ankles/bony protuberances with pillow(s)(or wedges) in all positions while in bed or recliner chair. Be sure feet are not up against footboard (pull patient up/remove footboard/or request longer bed as indicated)  - EHOB waffle cushion to chair/WC when OOB  - Maximum time in chair 2 hours at a time; OOB minimum 3 times per day - Notify MD if unable to get patient OOB 3 times per day  - Skin mobilization protocol   - Optimal bowel/bladder hygiene - avoid bedpan unless absolutely necessary and use for least time possible   - Optimal nutrition   - Nursing to document in chart and Notify MD if buttock, sacrum, heel, or other skin site develops erythema or skin breakdown as soon as possible. If patient is soiling themselves with urine or stool notify MD.  If you are unable to maintain skin integrity and prevent erythema due to frequency of soiling notify MD as soon as possible. - Notify MD of new or increasing drainage, development of purulent drainage, increased size/depth of wound, lack of healing, inability to maintain wound integrity due to degree of drainage, wound product, dressing, or currently ordered frequency of wound management. In general dressings should be changed PRN if soiled unless specifically noted otherwise. Do not allow soiled dressing on patient for extended period of time.         Mood disorder (HCC)  Assessment & Plan  Prozac  Melatonin for sleep  Supportive counseling     Anemia  Assessment & Plan  - Hbg 8.8, (previously 7.9, 8.1)  - Patient apparently was at times in past having chronic anemia as well and occasionally getting Venofer per family - discussed with IM and could consider Venofer if Covid comes back negative; hold oral iron as it may irritate stomach and may not absorb well for now   - monitor CBC Monday unless concerns sooner   -GI saw patient for miguel blood in toilet over the weekend however Eliquis was on hold and none today so far. Colonoscopy- 8/1, n.p.o. after midnight  - colonoscopy: impression:  • The terminal ileum appeared normal.  • Three sessile polyps measuring 5-9 mm in the transverse colon; completely removed en bloc by cold snare and retrieved specimen  • Poor preparation in the right making visualization difficult despite aggressive lavage. There were no large polyps or masses identified, however adequate polyp surveillance was unable to be performed. • Few small, scattered diverticula in the sigmoid colon  • Internal small hemorrhoids. Seen on retroflexion. • History of surgery in the rectum identified. Anastomosis was healthy.   - 8/2 Eliquis restarted    Hypokalemia  Assessment & Plan  - K+ 3.4 (7/26) and 2.8 as of 7/31 and given additional potassium supplementation with daily repletion as well > 3.1 > 3.9    - IM consulted, monitoring, repletion, and overall management at their discretion during [facility] course  - monitor BMP      Anxiety  Assessment & Plan  - home: Prozac 20 mg daily  - continue here  - consult neuropsychology when cleared    Hypertension  Assessment & Plan  - home: Cardizem 360 mg daily, Metoprolol succinate 25 mg BID, HCTZ 12.5 mg BID  - here: Cardizem, Toprol XL daily, (hold HCTZ)    - monitor BP    Morbid obesity with BMI of 50.0-59.9, adult (HCC)  Assessment & Plan  - BMI 53.89  - s/p gastric bypass (25 years ago)  - educate on lifestyle modification  - consider nutrition consult    GILDARDO (obstructive sleep apnea)  Assessment & Plan  - patient states she has not been using CPAP much at home but has one  - here: 2L nc hs  - monitor     Vitamin D deficiency  Assessment & Plan  - Vit D 62.1 (1/16/23)  - Vitamin D 50K weekly     Permanent atrial fibrillation (HCC)  Assessment & Plan  - home: Eliqus 5 mg BID, Amiodarone 200 mg daily, Cardizem 360 mg daily, Toprol XL 25 mg BID  - here: Eliquis for A/C; Amio, Dilt, MTP per IM  - Monitor Hb   - monitor with therapies    Appreciate IM consultants medical co-management. Personally reviewed labs, medications, and imaging. ROS:  Review of Systems   A 10 point review of systems was negative except for what is noted in the HPI. OBJECTIVE:   /59 (BP Location: Right arm)   Pulse 92   Temp (!) 97.4 °F (36.3 °C) (Oral)   Resp 20   Ht 5' 3" (1.6 m)   Wt 134 kg (295 lb 13.7 oz)   SpO2 98%   BMI 52.41 kg/m²     Physical Exam  Constitutional:       Appearance: Normal appearance. She is obese. HENT:      Head: Normocephalic and atraumatic. Nose: Nose normal.      Mouth/Throat:      Mouth: Mucous membranes are moist.   Cardiovascular:      Rate and Rhythm: Normal rate. Rhythm irregular. Pulses: Normal pulses. Heart sounds: Normal heart sounds. Pulmonary:      Effort: Pulmonary effort is normal.      Breath sounds: Normal breath sounds. Abdominal:      General: Bowel sounds are normal.      Palpations: Abdomen is soft. Tenderness: There is abdominal tenderness. Musculoskeletal:         General: Normal range of motion. Cervical back: Normal range of motion. Right lower leg: Edema present. Left lower leg: Edema present. Skin:     General: Skin is warm and dry. Capillary Refill: Capillary refill takes less than 2 seconds. Comments: Abdomen panus with sutures, open area pink, granulation tissue, large amount of drainage   Neurological:      Mental Status: She is alert and oriented to person, place, and time.    Psychiatric:         Mood and Affect: Mood normal.         Judgment: Judgment normal.               Lab Results   Component Value Date    WBC 8.75 08/02/2023    HGB 8.8 (L) 08/02/2023    HCT 29.9 (L) 08/02/2023    MCV 92 08/02/2023     (H) 08/02/2023     Lab Results   Component Value Date    SODIUM 142 08/02/2023    K 3.9 08/02/2023     (H) 08/02/2023    CO2 23 08/02/2023    BUN 14 08/02/2023    CREATININE 0.67 08/02/2023    GLUC 95 08/02/2023 CALCIUM 8.2 (L) 08/02/2023     Lab Results   Component Value Date    INR 1.39 (H) 07/18/2023    INR 1.23 (H) 06/27/2023    INR 1.19 06/24/2023    PROTIME 17.3 (H) 07/18/2023    PROTIME 15.7 (H) 06/27/2023    PROTIME 15.3 (H) 06/24/2023           Current Facility-Administered Medications:   •  acetaminophen (TYLENOL) tablet 975 mg, 975 mg, Oral, Q8H PRN, MOLLY Jackson, 975 mg at 07/26/23 0158  •  amiodarone tablet 200 mg, 200 mg, Oral, Daily With Breakfast, MOLLY Jackson, 200 mg at 08/02/23 3177  •  apixaban (ELIQUIS) tablet 5 mg, 5 mg, Oral, BID, MOLLY Jackson, 5 mg at 08/02/23 4364  •  calcium carbonate (OYSTER SHELL,OSCAL) 500 mg tablet 1 tablet, 1 tablet, Oral, Daily With Breakfast, MOLLY Jackson, 1 tablet at 08/02/23 6988  •  cyanocobalamin (VITAMIN B-12) tablet 1,000 mcg, 1,000 mcg, Oral, Daily, MOLLY Jackson, 1,000 mcg at 08/02/23 2775  •  diltiazem (CARDIZEM CD) 24 hr capsule 360 mg, 360 mg, Oral, Daily, MOLLY Jackson, 360 mg at 08/02/23 9748  •  ergocalciferol (VITAMIN D2) capsule 50,000 Units, 50,000 Units, Oral, Weekly, MOLLY Jackson, 50,000 Units at 08/02/23 7933  •  FLUoxetine (PROzac) capsule 20 mg, 20 mg, Oral, BID, MOLYL Jackson, 20 mg at 08/02/23 0492  •  melatonin tablet 6 mg, 6 mg, Oral, HS, MOLLY Jackson, 6 mg at 08/01/23 2201  •  metoprolol succinate (TOPROL-XL) 24 hr tablet 25 mg, 25 mg, Oral, Daily, MOLLY Jackson, 25 mg at 08/02/23 3279  •  multivitamin-minerals (CENTRUM) tablet 1 tablet, 1 tablet, Oral, Daily, MOLLY Jackson, 1 tablet at 08/02/23 3781  •  nystatin (MYCOSTATIN) powder, , Topical, BID, MOLLY Jackson, Given at 08/01/23 2135  •  ondansetron (ZOFRAN-ODT) dispersible tablet 4 mg, 4 mg, Oral, Q6H PRN, MOLLY Jackson  •  potassium chloride (K-DUR,KLOR-CON) CR tablet 40 mEq, 40 mEq, Oral, BID, MOLLY Puckett, 40 mEq at 08/02/23 6607  •  psyllium (METAMUCIL) 1 packet, 1 packet, Oral, Daily, Genesis Capone, MOLLY, 1 packet at 07/25/23 1020    Past Medical History:   Diagnosis Date   • A-fib [facility])    • Arthritis    • Atrial fibrillation (HCC)    • Cervical spondylosis with myelopathy    • Gastric bypass status for obesity    • History of transfusion     35 years ago   • Hypertension    • mass right kidney    • Renal cell adenocarcinoma (HCC)     RIGHT   • Sleep apnea    • Super obese        Patient Active Problem List    Diagnosis Date Noted   • Open abdominal wall wound 07/13/2023   • S/P bariatric surgery 03/17/2020   • COVID 07/24/2023   • Mood disorder ([address]) 07/28/2023   • Encounter for skin care 07/28/2023   • Ambulatory dysfunction 07/17/2023   • Iron deficiency anemia 07/13/2023   • Hypotension 07/13/2023   • Hypokalemia 07/10/2023   • Anemia 07/10/2023   • Bowel perforation ([address]) 07/05/2023   • Acute encephalopathy 07/05/2023   • Anxiety 07/05/2023   • Diarrhea 07/05/2023   • Idiopathic acute pancreatitis without infection or necrosis 06/18/2023   • Primary osteoarthritis of both knees 06/07/2023   • Stage 3 chronic kidney disease, unspecified whether stage 3a or 3b CKD ([address]) 05/31/2023   • Hypertension    • Morbid obesity with BMI of 50.0-59.9, adult ([address]) 11/01/2022   • Unsteady gait 10/30/2022   • Dyspnea on exertion 10/30/2022   • SIRS (systemic inflammatory response syndrome) ([address]) 10/30/2022   • Incontinence 10/30/2022   • GILDARDO (obstructive sleep apnea) 10/30/2022   • Vitamin D deficiency 07/06/2021   • Reactive hypoglycemia 12/30/2020   • Osteoporosis 06/04/2020   • Hyperparathyroidism ([address]) 06/04/2020   • Hashimoto's thyroiditis 03/17/2020   • Early menopause occurring in patient age younger than 39 years 03/17/2020   • Myofascial pain syndrome 10/21/2019   • Status post partial thyroidectomy 09/12/2019   • Cervical spondylosis without myelopathy 06/28/2019   • Arthropathy of cervical facet joint 06/28/2019   • Occipital neuralgia of right side 06/28/2019   • Chest wall tenderness 01/24/2019   • Chronic heart failure ([address]) 01/24/2019   • Permanent atrial fibrillation ([address]) 01/24/2019   • Myalgia 01/08/2019   • Primary osteoarthritis of left knee 10/23/2018   • Primary osteoarthritis of right knee 10/23/2018   • Chronic pain of left knee 10/23/2018   • Malignant tumor of right kidney parenchyma ([address]) 05/11/2016   • Closed fracture of right distal radius and ulna 04/22/2016      MOLLY Sinha  Physical Medicine [facility]

## 2023-08-03 ENCOUNTER — TELEPHONE (OUTPATIENT)
Dept: ENDOCRINOLOGY | Facility: CLINIC | Age: 73
End: 2023-08-03

## 2023-08-03 ENCOUNTER — PREP FOR PROCEDURE (OUTPATIENT)
Dept: OTHER | Facility: HOSPITAL | Age: 73
End: 2023-08-03

## 2023-08-03 DIAGNOSIS — Z12.11 SCREENING FOR COLON CANCER: Primary | ICD-10-CM

## 2023-08-03 LAB
ANION GAP SERPL CALCULATED.3IONS-SCNC: 6 MMOL/L
ANISOCYTOSIS BLD QL SMEAR: PRESENT
BASOPHILS # BLD MANUAL: 0 THOUSAND/UL (ref 0–0.1)
BASOPHILS NFR MAR MANUAL: 0 % (ref 0–1)
BUN SERPL-MCNC: 14 MG/DL (ref 5–25)
CALCIUM SERPL-MCNC: 8.1 MG/DL (ref 8.3–10.1)
CHLORIDE SERPL-SCNC: 112 MMOL/L (ref 96–108)
CO2 SERPL-SCNC: 25 MMOL/L (ref 21–32)
CREAT SERPL-MCNC: 0.68 MG/DL (ref 0.6–1.3)
DACRYOCYTES BLD QL SMEAR: PRESENT
EOSINOPHIL # BLD MANUAL: 0.23 THOUSAND/UL (ref 0–0.4)
EOSINOPHIL NFR BLD MANUAL: 3 % (ref 0–6)
ERYTHROCYTE [DISTWIDTH] IN BLOOD BY AUTOMATED COUNT: 20.1 % (ref 11.6–15.1)
GFR SERPL CREATININE-BSD FRML MDRD: 87 ML/MIN/1.73SQ M
GLUCOSE SERPL-MCNC: 91 MG/DL (ref 65–140)
HCT VFR BLD AUTO: 27.5 % (ref 34.8–46.1)
HGB BLD-MCNC: 8.1 G/DL (ref 11.5–15.4)
LYMPHOCYTES # BLD AUTO: 1.08 THOUSAND/UL (ref 0.6–4.47)
LYMPHOCYTES # BLD AUTO: 13 % (ref 14–44)
MCH RBC QN AUTO: 26.6 PG (ref 26.8–34.3)
MCHC RBC AUTO-ENTMCNC: 29.5 G/DL (ref 31.4–37.4)
MCV RBC AUTO: 91 FL (ref 82–98)
MONOCYTES # BLD AUTO: 0.46 THOUSAND/UL (ref 0–1.22)
MONOCYTES NFR BLD: 6 % (ref 4–12)
NEUTROPHILS # BLD MANUAL: 5.96 THOUSAND/UL (ref 1.85–7.62)
NEUTS SEG NFR BLD AUTO: 77 % (ref 43–75)
OVALOCYTES BLD QL SMEAR: PRESENT
PLATELET # BLD AUTO: 406 THOUSANDS/UL (ref 149–390)
PLATELET BLD QL SMEAR: ADEQUATE
PMV BLD AUTO: 8.8 FL (ref 8.9–12.7)
POIKILOCYTOSIS BLD QL SMEAR: PRESENT
POLYCHROMASIA BLD QL SMEAR: PRESENT
POTASSIUM SERPL-SCNC: 4.1 MMOL/L (ref 3.5–5.3)
RBC # BLD AUTO: 3.04 MILLION/UL (ref 3.81–5.12)
RBC MORPH BLD: PRESENT
SCHISTOCYTES BLD QL SMEAR: PRESENT
SODIUM SERPL-SCNC: 143 MMOL/L (ref 135–147)
VARIANT LYMPHS # BLD AUTO: 1 %
WBC # BLD AUTO: 7.74 THOUSAND/UL (ref 4.31–10.16)

## 2023-08-03 PROCEDURE — 80048 BASIC METABOLIC PNL TOTAL CA: CPT | Performed by: PHYSICIAN ASSISTANT

## 2023-08-03 PROCEDURE — 85027 COMPLETE CBC AUTOMATED: CPT | Performed by: PHYSICIAN ASSISTANT

## 2023-08-03 PROCEDURE — 97116 GAIT TRAINING THERAPY: CPT

## 2023-08-03 PROCEDURE — 2W13X6Z COMPRESSION OF ABDOMINAL WALL USING PRESSURE DRESSING: ICD-10-PCS | Performed by: SURGERY

## 2023-08-03 PROCEDURE — 99232 SBSQ HOSP IP/OBS MODERATE 35: CPT | Performed by: INTERNAL MEDICINE

## 2023-08-03 PROCEDURE — 97530 THERAPEUTIC ACTIVITIES: CPT

## 2023-08-03 PROCEDURE — 97110 THERAPEUTIC EXERCISES: CPT

## 2023-08-03 PROCEDURE — 97535 SELF CARE MNGMENT TRAINING: CPT

## 2023-08-03 PROCEDURE — 85007 BL SMEAR W/DIFF WBC COUNT: CPT | Performed by: PHYSICIAN ASSISTANT

## 2023-08-03 PROCEDURE — 99232 SBSQ HOSP IP/OBS MODERATE 35: CPT | Performed by: STUDENT IN AN ORGANIZED HEALTH CARE EDUCATION/TRAINING PROGRAM

## 2023-08-03 RX ORDER — FERROUS SULFATE 325(65) MG
325 TABLET ORAL
Status: DISCONTINUED | OUTPATIENT
Start: 2023-08-03 | End: 2023-08-05 | Stop reason: HOSPADM

## 2023-08-03 RX ORDER — POTASSIUM CHLORIDE 20 MEQ/1
40 TABLET, EXTENDED RELEASE ORAL DAILY
Status: DISCONTINUED | OUTPATIENT
Start: 2023-08-04 | End: 2023-08-05 | Stop reason: HOSPADM

## 2023-08-03 RX ORDER — POLYETHYLENE GLYCOL 3350, SODIUM SULFATE ANHYDROUS, SODIUM BICARBONATE, SODIUM CHLORIDE, POTASSIUM CHLORIDE 236; 22.74; 6.74; 5.86; 2.97 G/4L; G/4L; G/4L; G/4L; G/4L
4000 POWDER, FOR SOLUTION ORAL ONCE
Qty: 4000 ML | Refills: 0 | Status: SHIPPED | OUTPATIENT
Start: 2023-08-03 | End: 2023-08-05

## 2023-08-03 RX ADMIN — METOPROLOL SUCCINATE 25 MG: 25 TABLET, FILM COATED, EXTENDED RELEASE ORAL at 09:56

## 2023-08-03 RX ADMIN — APIXABAN 5 MG: 5 TABLET, FILM COATED ORAL at 18:00

## 2023-08-03 RX ADMIN — NYSTATIN: 100000 POWDER TOPICAL at 09:55

## 2023-08-03 RX ADMIN — FLUOXETINE 20 MG: 20 CAPSULE ORAL at 09:56

## 2023-08-03 RX ADMIN — Medication 1 TABLET: at 09:56

## 2023-08-03 RX ADMIN — FERROUS SULFATE TAB 325 MG (65 MG ELEMENTAL FE) 325 MG: 325 (65 FE) TAB at 07:46

## 2023-08-03 RX ADMIN — Medication 1 TABLET: at 07:46

## 2023-08-03 RX ADMIN — APIXABAN 5 MG: 5 TABLET, FILM COATED ORAL at 09:56

## 2023-08-03 RX ADMIN — FLUOXETINE 20 MG: 20 CAPSULE ORAL at 18:00

## 2023-08-03 RX ADMIN — CYANOCOBALAMIN TAB 500 MCG 1000 MCG: 500 TAB at 09:56

## 2023-08-03 RX ADMIN — DILTIAZEM HYDROCHLORIDE 360 MG: 180 CAPSULE, COATED, EXTENDED RELEASE ORAL at 09:56

## 2023-08-03 RX ADMIN — AMIODARONE HYDROCHLORIDE 200 MG: 200 TABLET ORAL at 07:46

## 2023-08-03 RX ADMIN — MELATONIN TAB 3 MG 6 MG: 3 TAB at 22:32

## 2023-08-03 NOTE — QUICK NOTE
Patient remains clinically stable with improving hemoglobin and no further overt signs of GI blood loss. We will plan for repeat outpatient colonoscopy in 6 months. GI will sign off. Please call with questions or concerns.

## 2023-08-03 NOTE — TELEPHONE ENCOUNTER
That is ok. She can call us once she is out of the hospital to schedule nurse visit Prolia injection because an OV might not be available for longer. She may need to check a BMP prior to getting the Prolia to make sure her calcium is normal. But that can be determined once she is discharged.

## 2023-08-03 NOTE — TELEPHONE ENCOUNTER
Pt called  She is in the hospital.  She was scheduled for 8/7 r/s her follow up in October 1st opening. She is questioning when to do her prolia? Like how long does she have to get it done that she wont lose benefit of it.  She said it wont be feasible for her to come Monday?????

## 2023-08-03 NOTE — PROGRESS NOTES
Internal Medicine Progress Note  Patient: Aury Barrera  Age/sex: 67 y.o. female  Medical Record #: [id number]      ASSESSMENT/PLAN: (Interval History)  Aury Barrera is seen and examined and management for following issues:    Wound dehiscence s/p VAC placement  • Had recent admission for ex-lap 2/2 bowel perforation/internal hernia (6/19-7/4)  • Readmitted with wound dehiscence 7/18 and underwent a washout/VAC application and partial closure of the wound on 7/19  • Surgery saw 7/28 and closed wound, d/c'd VAC. • Wound is being packed with dressing changes 2x daily but likely a VAC will be placed at [place]. • Refer to media images  • Pain control and therapy per primary service     Atrial fibrillation  • Home:  Amiodarone 200mg qd/Card CD 360mg qd/Toprol XL 25mg q12hrs/Eliquis 5mg BID  • Here:  Amiodarone 200mg qd/Card CD 360mg qd/Toprol XL 25mg qd/Eliquis 5mg BID  • Stable      HTN  • Home:  In addition to above meds, was on HCTZ 12.5mg qd/KCL 15meq qd  • Here:  Holding off on HCTZ  • stable     COVID-19 positive  • Detected on routine testing for [facility] admission. • Pt is asymptomatic. • Pt had 2 rapid antigen tests and is off precautions.     Hypokalemia  • K 4.  • Reduce KCl to 40meq daily.     Anemia   • Hgb has been trending around 8, currently 8.1  • Stable.     Iron deficeincy  • Has hx gastric bypass  • Iron level on 7/18/23 was 18  • Pt has had Venofer infusions in the past as her iron was low due to gastric bypass iron level now 70 on 7/31/23  • She is agreeable to infusions while here as well   • S/p IV venofer x 3 completed 8/1/23      Hx RCC right kidney  • S/p cryotherapy @ 7 yrs ago  • Follows with Urology     Hematuria vs vaginal bleeding vs rectal bleeding  · Had blood in the toilet after voiding and after a BM  · Hgb stable at 8.3.  · Continue Protonix 2x daily. · GI performed colonoscopy 8/1 +3 polyps removed no evidence of active bleeding. Colonoscopy to be repeated in 6 months.   · eliquis resumed in absence of bleeding    Discharge Date:  8/5       The above assessment and plan was reviewed and updated as determined by my evaluation of the patient on 8/3/2023. Labs:   Results from last 7 days   Lab Units 08/03/23  0632 08/02/23  0626   WBC Thousand/uL 7.74 8.75   HEMOGLOBIN g/dL 8.1* 8.8*   HEMATOCRIT % 27.5* 29.9*   PLATELETS Thousands/uL 406* 417*     Results from last 7 days   Lab Units 08/03/23  0632 08/02/23  0626   SODIUM mmol/L 143 142   POTASSIUM mmol/L 4.1 3.9   CHLORIDE mmol/L 112* 113*   CO2 mmol/L 25 23   BUN mg/dL 14 14   CREATININE mg/dL 0.68 0.67   CALCIUM mg/dL 8.1* 8.2*                   Review of Scheduled Meds:  Current Facility-Administered Medications   Medication Dose Route Frequency Provider Last Rate   • acetaminophen  975 mg Oral Q8H PRN MOLLY Jackson     • amiodarone  200 mg Oral Daily With Breakfast MOLLY Jackson     • apixaban  5 mg Oral BID MOLLY Jackson     • calcium carbonate  1 tablet Oral Daily With Breakfast MOLLY Jackson     • vitamin B-12  1,000 mcg Oral Daily MOLLY Jackson     • diltiazem  360 mg Oral Daily MOLLY Jackson     • ergocalciferol  50,000 Units Oral Weekly MOLLY Jackson     • ferrous sulfate  325 mg Oral Daily With Breakfast Cuca Garcia, DO     • FLUoxetine  20 mg Oral BID MOLLY Jackson     • melatonin  6 mg Oral HS MOLLY Jackson     • metoprolol succinate  25 mg Oral Daily MOLLY Jackson     • multivitamin-minerals  1 tablet Oral Daily MOLLY Jackson     • nystatin   Topical BID MOLLY Jackson     • ondansetron  4 mg Oral Q6H PRN MOLLY Jackson     • potassium chloride  40 mEq Oral BID MOLLY Walker     • psyllium  1 packet Oral Daily MOLLY Jackson         Subjective/ HPI: Patient seen and examined. Patients overnight issues or events were reviewed with nursing or staff during rounds or morning huddle session.  New or overnight issues include the following:     Pt seen in her room. She states that she is doing well. She denies any current complaints. ROS:   A 10 point ROS was performed; negative except as noted above. Imaging:     No orders to display       *Labs /Radiology studies reviewed  *Medications reviewed and reconciled as needed  *Please refer to order section for additional ordered labs studies  *Case discussed with primary attending during morning huddle case rounds    Physical Examination:  Vitals:   Vitals:    08/02/23 1530 08/02/23 2000 08/03/23 0601 08/03/23 0900   BP: 109/57 103/58 109/56 115/60   BP Location:  Left arm Left arm Left arm   Pulse: 78 66 90 95   Resp:  20 20    Temp: 98.3 °F (36.8 °C) 97.8 °F (36.6 °C) 98.1 °F (36.7 °C)    TempSrc: Oral Oral Oral    SpO2: 97% 96% 98%    Weight:   132 kg (291 lb 3.6 oz)    Height:           GEN: NAD; pleasant  NEURO: Alert and oriented x4; appropriate  HEENT: Pupils are equal/reactive; normocephalic, face is symmetrical, hearing is normal  CV: S1 S2 irregular, no murmur/rub/gallops, 2/4 pedal pulses, no LE edema. RESP: Lungs are clear bilaterally, no wheezes rales or rhonchi, on room air, no distress, respirations are easy and non labored  GI: Abdomen is obese, soft, non tender, +BS x4; non distended; dressing in place; refer to media images  : Voiding without issues  MUSC: Moves all extremities except generalized deconditioning  SKIN: pink, warm, normal turgor, no rashes or lesions noted      The above physical exam was reviewed and updated as determined by my evaluation of the patient on 8/3/2023. Invasive Devices     None                    VTE Pharmacologic Prophylaxis: Eliquis  Code Status: Level 1 - Full Code  Current Length of Stay: 10 day(s)      Total time spent:  30 minutes with more than 50% spent counseling/coordinating care. Counseling includes discussion with patient re: progress  and discussion with patient of his/her current medical state/information.  Coordination of patient's care was performed in conjunction with primary service. Time invested included review of patient's labs, vitals, and management of their comorbidities with continued monitoring. In addition, this patient was discussed with medical team including physician and advanced extenders. The care of the patient was extensively discussed and appropriate treatment plan was formulated unique for this patient. Medical decision making for the day was made by supervising physician unless otherwise noted in their attestation statement. ** Please Note:  voice to text software may have been used in the creation of this document.  Although proof errors in transcription or interpretation are a potential of such software**

## 2023-08-03 NOTE — PROGRESS NOTES
progr  PM&R PROGRESS NOTE:  Lalito Salazar 67 y.o. female MRN: [id number]  Unit/Bed#: -01 Encounter: [id number]        Rehabilitation Diagnosis: Impairment of mobility, safety and Activities of Daily Living (ADLs) due to Debility:  16  Debility (Non-cardiac/Non-pulmonary)    HPI: Kary Garza is a 67 y.o. female with a medical history of atrial fibrillation (on Eliquis), sleep apnea, hypertension, renal cell carcinoma, gastric bypass, cervical spondylosis, and osteoarthritis who was recently admitted at [address] [place] [address] 6/19-7/4 with a bowel perforation and internal hernia (hx arlene-en-y gastric bypass). She had an ex lap, resection J-J anastomosis, segmental ileum resection, abthera vac placement, ex lap, washout, abthera change and ex lap, small bowel resection, bowel anastomosis x3, vicryl bridging mesh. Once medically stable she went to TCU for rehabilitation. Patient presented to [address] [place] [address] on 7/18/23 after clinic appointment for suture removal with an abdominal wound dehiscence. On 7/18 she underwent washout, wound exploration, debridement. On 7/19 she underwent washout, partial wound closure, and wound vac placement. Patient currently has wound vac in place at a pressure of 75 mmHg continuously. Wound vac last changed on 7/24 with plans for surgery management of dressing changes. She is tolerating a regular diet with nutrition supplements. Patient tested positive for COVID on 7/24. She is asymptomatic and not being treated at this time. She worked with PT and OT and was recommended for post acute rehab following her hospital stay. She has demonstrated that she can tolerate three hours of therapy, five days a week and is now medically cleared for discharge to the [facility]. PT/OT were consulted and recommend acute inpatient rehabilitation.  The patient was evaluated by the Rehabilitation team and deemed an appropriate candidate for comprehensive inpatient rehabilitation and admitted to the [facility] on 7/24/2023  4:34 PM    SUBJECTIVE: Patient seen face to face. No acute issues overnight. Patient in good spirits, looking forward to discharge on Saturday. Discussed wound vac placement by General Surgery today or tomorrow. Slept well, denies pain. Appetite at baseline, voiding, LBM 8/3. Denies chest pain, shortness of breath, fever, chills, N/V.    ASSESSMENT: Stable, progressing    PLAN:  - hypokalemia- stable, 4.1  - anemia- stable, 8.1, CBC in 1 week with results to PCP  - abdominal wound: continue localized wound care with plan for GS to apply wound vac today or tomorrow, outpatient plan for [facility] to perform dressing changes with follow-up with General Surgery in 2 weeks in the clinic. Rehabilitation  • Functional deficits:  Mobility, self-care  • Continue current rehabilitation plan of care to maximize function. • Functional update:   • PT: mobility- mod A, transfers- min A, ambulation- min A RW 40'  • OT: ADL: bathing- mod A, dressing- UB supervision LB total A, toileting- mod A  • Estimated Discharge: 8/5 with [facility] RN, PT, OT    Pain  • Tylenol 975 every 8 hours prn    DVT prophylaxis  • Eliquis    Bladder plan  • Continent    Bowel plan  • Continent      * Open abdominal wall wound  Assessment & Plan  - recent admission [facility] 6/19-7/4 with bowel perforation and internal hernia (hx arlene-en-y gastric bypass) - s/p ex lap, resection J-J anastomosis, segmental ileum resection, abthera 6/19  - s/p ex lap, washout, abthera change 6/19  - s/p ex lap, small bowel resection, bowel anastomosis x3, vicryl bridging mesh 6/20  - s/p debridement/washout on 7/18  - s/p partial closure and wound vac placement on 7/19  - wound vac 75 mmHg continuous  - dressing changes per General Surgery   -Surgery saw the patient today on 7/31 and added additional sutures to reinforce however there is a significant amount of tension on the incision sites that is limiting the overall healing process.   Attempted double binder with without success however working with therapy and nursing to try and reduce overall tension as the pannus itself on the left and right side is pulling at the incision site. Wound base looks good  - some loose stools to be expected post GI sx - metamucil but patient refusing it to try to bulk up - stools more formed recently nevertheless - monitor for changes or other etiologies   - Recent vitals/labs stable - CBC, BMP Monday   - midline incision with sutures  - pain management - well controlled - PRN APAP   - Function continues to improve well  - Continue acute comprehensive interdisciplinary inpatient rehabilitation to include intensive skilled therapies (PT, OT) as outlined with oversight and management by rehabilitation physician as well as inpatient rehab level nursing, case management and weekly interdisciplinary team meetings.    - 8/2 General surgery following, continue to pack wound, cover with ABD and change daily and prn with soilage or dislodgement  - GS to apply wound vac prior to discharge    - follow-up with GS clinic in 2 weeks      S/P bariatric surgery  Assessment & Plan  - 25 years ago  - supplemental vitamins post surgery- MV, B12    COVID  Assessment & Plan  - positive 7/24  - asymptomatic  - no signs of infection: afebrile  - isolation 10 days (thru 8/3) unless 2 Ag tests 48 h apart negative  - 7/26: SARS cov2 antigen negative; repeat 7/28 Ag lab negative  - isolation discontinued   - monitor closely      Encounter for skin care  Assessment & Plan  7/28 buttock skin intact   High risk for skin wounds/breakdown with patient wanting to lay in recliner and not bed - counseled patient on being in bed to decrease risk of wounds;   - Increased risk of skin wounds/breakdown/rashes due to recent immobility and co-morbidities - loose stools at times  - Turn patient Q2H; encourage frequent wt shifts when in chair/WC every 10-15 minutes  - Hydragaurd to buttocks and sacrum BID & PRN  - Float heels and ankles/bony protuberances with pillow(s)(or wedges) in all positions while in bed or recliner chair. Be sure feet are not up against footboard (pull patient up/remove footboard/or request longer bed as indicated)  - EHOB waffle cushion to chair/WC when OOB  - Maximum time in chair 2 hours at a time; OOB minimum 3 times per day - Notify MD if unable to get patient OOB 3 times per day  - Skin mobilization protocol   - Optimal bowel/bladder hygiene - avoid bedpan unless absolutely necessary and use for least time possible   - Optimal nutrition   - Nursing to document in chart and Notify MD if buttock, sacrum, heel, or other skin site develops erythema or skin breakdown as soon as possible. If patient is soiling themselves with urine or stool notify MD.  If you are unable to maintain skin integrity and prevent erythema due to frequency of soiling notify MD as soon as possible. - Notify MD of new or increasing drainage, development of purulent drainage, increased size/depth of wound, lack of healing, inability to maintain wound integrity due to degree of drainage, wound product, dressing, or currently ordered frequency of wound management. In general dressings should be changed PRN if soiled unless specifically noted otherwise. Do not allow soiled dressing on patient for extended period of time. Mood disorder (HCC)  Assessment & Plan  Prozac  Melatonin for sleep  Supportive counseling     Anemia  Assessment & Plan  - Hbg 8.1, (previously 8.1, 7.9, 8.1)  - Patient apparently was at times in past having chronic anemia as well and occasionally getting Venofer per family - discussed with IM and could consider Venofer if Covid comes back negative; hold oral iron as it may irritate stomach and may not absorb well for now   - monitor CBC Monday unless concerns sooner   -GI saw patient for miguel blood in toilet over the weekend however Eliquis was on hold and none today so far.   Colonoscopy- 8/1, n.p.o. after midnight  - colonoscopy: impression:  • The terminal ileum appeared normal.  • Three sessile polyps measuring 5-9 mm in the transverse colon; completely removed en bloc by cold snare and retrieved specimen  • Poor preparation in the right making visualization difficult despite aggressive lavage. There were no large polyps or masses identified, however adequate polyp surveillance was unable to be performed. • Few small, scattered diverticula in the sigmoid colon  • Internal small hemorrhoids. Seen on retroflexion. • History of surgery in the rectum identified. Anastomosis was healthy. - 8/2 Eliquis restarted    Hypokalemia  Assessment & Plan  - K+ 3.4 (7/26) and 2.8 as of 7/31 and given additional potassium supplementation with daily repletion as well > 3.1 > 3.9 > 4.1  - IM consulted, monitoring, repletion, and overall management at their discretion during [facility] course  - monitor BMP      Anxiety  Assessment & Plan  - home: Prozac 20 mg daily  - continue here  - consult neuropsychology when cleared    Hypertension  Assessment & Plan  - home: Cardizem 360 mg daily, Metoprolol succinate 25 mg BID, HCTZ 12.5 mg BID  - here: Cardizem, Toprol XL daily, (hold HCTZ)    - monitor BP    Morbid obesity with BMI of 50.0-59.9, adult (HCC)  Assessment & Plan  - BMI 53.89  - s/p gastric bypass (25 years ago)  - educate on lifestyle modification  - consider nutrition consult    GILDARDO (obstructive sleep apnea)  Assessment & Plan  - patient states she has not been using CPAP much at home but has one  - here: 2L nc hs  - monitor     Vitamin D deficiency  Assessment & Plan  - Vit D 62.1 (1/16/23)  - Vitamin D 50K weekly     Permanent atrial fibrillation (HCC)  Assessment & Plan  - home: Eliqus 5 mg BID, Amiodarone 200 mg daily, Cardizem 360 mg daily, Toprol XL 25 mg BID  - here: Eliquis for A/C; Amio, Dilt, MTP per IM  - Monitor Hb   - monitor with therapies    Appreciate IM consultants medical co-management.   Labs, medications, and imaging personally reviewed. ROS:  Review of Systems   A 10 point review of systems was negative except for what is noted in the HPI. OBJECTIVE:   /60 (BP Location: Left arm)   Pulse 95   Temp 98.1 °F (36.7 °C) (Oral)   Resp 20   Ht 5' 3" (1.6 m)   Wt 132 kg (291 lb 3.6 oz)   SpO2 98%   BMI 51.59 kg/m²     Physical Exam  Constitutional:       Appearance: Normal appearance. She is obese. HENT:      Head: Normocephalic and atraumatic. Nose: Nose normal.      Mouth/Throat:      Mouth: Mucous membranes are moist.   Cardiovascular:      Rate and Rhythm: Normal rate and regular rhythm. Pulses: Normal pulses. Heart sounds: Normal heart sounds. Pulmonary:      Effort: Pulmonary effort is normal.   Abdominal:      General: Bowel sounds are normal.      Palpations: Abdomen is soft. Tenderness: There is abdominal tenderness. Musculoskeletal:         General: Normal range of motion. Cervical back: Normal range of motion. Right lower leg: Edema present. Left lower leg: Edema present. Skin:     General: Skin is warm and dry. Capillary Refill: Capillary refill takes less than 2 seconds. Neurological:      Mental Status: She is alert and oriented to person, place, and time.    Psychiatric:         Mood and Affect: Mood normal.         Judgment: Judgment normal.               Lab Results   Component Value Date    WBC 7.74 08/03/2023    HGB 8.1 (L) 08/03/2023    HCT 27.5 (L) 08/03/2023    MCV 91 08/03/2023     (H) 08/03/2023     Lab Results   Component Value Date    SODIUM 143 08/03/2023    K 4.1 08/03/2023     (H) 08/03/2023    CO2 25 08/03/2023    BUN 14 08/03/2023    CREATININE 0.68 08/03/2023    GLUC 91 08/03/2023    CALCIUM 8.1 (L) 08/03/2023     Lab Results   Component Value Date    INR 1.39 (H) 07/18/2023    INR 1.23 (H) 06/27/2023    INR 1.19 06/24/2023    PROTIME 17.3 (H) 07/18/2023    PROTIME 15.7 (H) 06/27/2023    PROTIME 15.3 (H) 06/24/2023           Current Facility-Administered Medications:   •  acetaminophen (TYLENOL) tablet 975 mg, 975 mg, Oral, Q8H PRN, MOLLY Jackson, 975 mg at 07/26/23 0158  •  amiodarone tablet 200 mg, 200 mg, Oral, Daily With Breakfast, MOLLY Jackson, 200 mg at 08/03/23 0746  •  apixaban (ELIQUIS) tablet 5 mg, 5 mg, Oral, BID, MOLLY Jackson, 5 mg at 08/03/23 0956  •  calcium carbonate (OYSTER SHELL,OSCAL) 500 mg tablet 1 tablet, 1 tablet, Oral, Daily With Breakfast, MOLLY Jackson, 1 tablet at 08/03/23 0746  •  cyanocobalamin (VITAMIN B-12) tablet 1,000 mcg, 1,000 mcg, Oral, Daily, MOLLY Jackson, 1,000 mcg at 08/03/23 0956  •  diltiazem (CARDIZEM CD) 24 hr capsule 360 mg, 360 mg, Oral, Daily, MOLLY Jackson, 360 mg at 08/03/23 2763  •  ergocalciferol (VITAMIN D2) capsule 50,000 Units, 50,000 Units, Oral, Weekly, MOLLY Jackson, 50,000 Units at 08/02/23 0548  •  ferrous sulfate tablet 325 mg, 325 mg, Oral, Daily With Breakfast, Cuca Garcia DO, 325 mg at 08/03/23 0746  •  FLUoxetine (PROzac) capsule 20 mg, 20 mg, Oral, BID, MOLLY Jackson, 20 mg at 08/03/23 0956  •  melatonin tablet 6 mg, 6 mg, Oral, HS, MOLLY Jackson, 6 mg at 08/02/23 2220  •  metoprolol succinate (TOPROL-XL) 24 hr tablet 25 mg, 25 mg, Oral, Daily, MOLLY Jackson, 25 mg at 08/03/23 0956  •  multivitamin-minerals (CENTRUM) tablet 1 tablet, 1 tablet, Oral, Daily, MOLLY Jackson, 1 tablet at 08/03/23 0956  •  nystatin (MYCOSTATIN) powder, , Topical, BID, MOLLY Jackson, Given at 08/03/23 0955  •  ondansetron (ZOFRAN-ODT) dispersible tablet 4 mg, 4 mg, Oral, Q6H PRN, MOLLY Jackson  •  potassium chloride (K-DUR,KLOR-CON) CR tablet 40 mEq, 40 mEq, Oral, BID, MOLLY Friedman, 40 mEq at 08/02/23 1730  •  psyllium (METAMUCIL) 1 packet, 1 packet, Oral, Daily, MOLLY Jackson, 1 packet at 07/25/23 1020    Past Medical History:   Diagnosis Date   • A-fib [facility])    • Arthritis    • Atrial fibrillation ([address])    • Cervical spondylosis with myelopathy    • Gastric bypass status for obesity    • History of transfusion     35 years ago   • Hypertension    • mass right kidney    • Renal cell adenocarcinoma (HCC)     RIGHT   • Sleep apnea    • Super obese        Patient Active Problem List    Diagnosis Date Noted   • Open abdominal wall wound 07/13/2023   • S/P bariatric surgery 03/17/2020   • COVID 07/24/2023   • Mood disorder ([address]) 07/28/2023   • Encounter for skin care 07/28/2023   • Ambulatory dysfunction 07/17/2023   • Iron deficiency anemia 07/13/2023   • Hypotension 07/13/2023   • Hypokalemia 07/10/2023   • Anemia 07/10/2023   • Bowel perforation ([address]) 07/05/2023   • Acute encephalopathy 07/05/2023   • Anxiety 07/05/2023   • Diarrhea 07/05/2023   • Idiopathic acute pancreatitis without infection or necrosis 06/18/2023   • Primary osteoarthritis of both knees 06/07/2023   • Stage 3 chronic kidney disease, unspecified whether stage 3a or 3b CKD ([address]) 05/31/2023   • Hypertension    • Morbid obesity with BMI of 50.0-59.9, adult ([address]) 11/01/2022   • Unsteady gait 10/30/2022   • Dyspnea on exertion 10/30/2022   • SIRS (systemic inflammatory response syndrome) ([address]) 10/30/2022   • Incontinence 10/30/2022   • GILDARDO (obstructive sleep apnea) 10/30/2022   • Vitamin D deficiency 07/06/2021   • Reactive hypoglycemia 12/30/2020   • Osteoporosis 06/04/2020   • Hyperparathyroidism ([address]) 06/04/2020   • Hashimoto's thyroiditis 03/17/2020   • Early menopause occurring in patient age younger than 39 years 03/17/2020   • Myofascial pain syndrome 10/21/2019   • Status post partial thyroidectomy 09/12/2019   • Cervical spondylosis without myelopathy 06/28/2019   • Arthropathy of cervical facet joint 06/28/2019   • Occipital neuralgia of right side 06/28/2019   • Chest wall tenderness 01/24/2019   • Chronic heart failure ([address]) 01/24/2019   • Permanent atrial fibrillation ([address]) 01/24/2019   • Myalgia 01/08/2019   • Primary osteoarthritis of left knee 10/23/2018   • Primary osteoarthritis of right knee 10/23/2018   • Chronic pain of left knee 10/23/2018   • Malignant tumor of right kidney parenchyma ([address]) 05/11/2016   • Closed fracture of right distal radius and ulna 04/22/2016      MOLLY Pagan  Physical Medicine [facility]

## 2023-08-03 NOTE — PROGRESS NOTES
08/03/23 0700   Pain Assessment   Pain Assessment Tool 0-10   Pain Score No Pain   Restrictions/Precautions   Precautions Fall Risk;Contact/isolation   Weight Bearing Restrictions No   ROM Restrictions No   Shower/Bathe Self   Type of Assistance Needed Set-up / clean-up   Physical Assistance Level No physical assistance   Comment seated to bathe BUE and upper legs. in stance w/ L unilateral support of RW to bathe davonte and able to simulate bathing buttocks w/ toilet tongs. LHAE to bathe lower legs. Shower/Bathe Self CARE Score 5   Tub/Shower Transfer   Reason Not Assessed Sponge Bath;Medical   Upper Body Dressing   Type of Assistance Needed Set-up / clean-up   Physical Assistance Level No physical assistance   Comment seated   Upper Body Dressing CARE Score 5   Lower Body Dressing   Comment pt does not plan to wear underwear/pants at d/c 2* abdominal incision. Reason if not Attempted Medical concerns   Lower Body Dressing CARE Score 88   Putting On/Taking Off Footwear   Type of Assistance Needed Independent; Adaptive equipment   Physical Assistance Level No physical assistance   Comment w/ reacher to don slip on shoes. Putting On/Taking Off Footwear CARE Score 6   Lying to Sitting on Side of Bed   Type of Assistance Needed Physical assistance   Physical Assistance Level 25% or less   Lying to Sitting on Side of Bed CARE Score 3   Sit to Stand   Type of Assistance Needed Supervision   Physical Assistance Level No physical assistance   Sit to Stand CARE Score 4   Bed-Chair Transfer   Type of Assistance Needed Supervision; Adaptive equipment   Physical Assistance Level No physical assistance   Comment S w/ RW, ambulated<>bathroom. Chair/Bed-to-Chair Transfer CARE Score 4   Toileting Hygiene   Type of Assistance Needed Supervision   Physical Assistance Level No physical assistance   Comment able to manage davonte hygiene in stance.  Will cont to req A for bowel hygiene management, howevever will have toilet tongs at d/c to inc indep. Toileting Hygiene CARE Score 4   Toilet Transfer   Type of Assistance Needed Set-up / Argueta Ehrich; Adaptive equipment   Physical Assistance Level No physical assistance   Comment w/ RW to platform BSC over toilet. Toilet Transfer CARE Score 5   Exercise Tools   Other Exercise Tool 1 BUE ther ex to maximize strength and endurance for ADLs and fxnl mobility. Completed w/ 2# DB, 3x10 bicep curls, chest press, and OH shoulder press. Pt tolerated well w/ rest breaks between sets to manage fatigue. Cognition   Overall Cognitive Status WFL   Additional Activities   Additional Activities Comments short distance fxnl mobility w/ RW at DS level, ambulated<>door x2. Assessment   Treatment Assessment Pt seen for skilled OT session focusing on self-care management, BUE strengthening, and short distance fxnl mobility w/ RW. Details on sponge bathe ADL noted above, req inc time for all fxnl tasks and frequent seated rest breaks to manage fatigue. Practiced setting up room to assess possibility to progress to IRPs w/ RW when seated in recliner only, however will not formally progress as pt is limited by environmental barriers as pt is unable to independently manage recliner leg rests. Cont to req A for bowel hygiene management as pt will be using toilet tongs at d/c. Pt is able to manage tray table to pt's L and RW kept to pt's R. Cont OT POC: endurance work, fxnl mobility w/ RW, and ADL scheduled for tomorrow. From OT standpoint, pt on track to d/c home w/ family support on Saturday w/ recommendation for [address]   Problem List Decreased strength;Decreased endurance; Impaired balance;Decreased mobility; Decreased coordination   Plan   Treatment/Interventions ADL retraining;Functional transfer training; Therapeutic exercise; Endurance training;Patient/family training   Progress Progressing toward goals   Recommendation   OT Discharge Recommendation Home with home health rehabilitation   OT Therapy Minutes   OT Time In 0700   OT Time Out 0830   OT Total Time (minutes) 90   OT Mode of treatment - Individual (minutes) 90   OT Mode of treatment - Concurrent (minutes) 0   OT Mode of treatment - Group (minutes) 0   OT Mode of treatment - Co-treat (minutes) 0   OT Mode of Treatment - Total time(minutes) 90 minutes   OT Cumulative Minutes 885   Therapy Time missed   Time missed?  No

## 2023-08-03 NOTE — PROGRESS NOTES
08/03/23 1030   Pain Assessment   Pain Assessment Tool 0-10   Pain Score No Pain   Restrictions/Precautions   Precautions Contact/isolation   Cognition   Overall Cognitive Status WFL   Subjective   Subjective Reporst she plans to sleep in recliner chair when she returns home to not strain her abdomen [facility] bed mobility. Roll Left and Right   Type of Assistance Needed Physical assistance   Physical Assistance Level 26%-50%   Roll Left and Right CARE Score 3   Sit to Lying   Type of Assistance Needed Independent   Physical Assistance Level No physical assistance   Comment in recliner chair, has power adjustments at home   Sit to Lying CARE Score 6   Lying to Sitting on Side of Bed   Type of Assistance Needed Independent   Physical Assistance Level No physical assistance   Comment in recliner chair, has power adjustments at home   Lying to Sitting on Side of Bed CARE Score 6   Sit to Stand   Type of Assistance Needed Independent   Physical Assistance Level No physical assistance   Comment with RW   Sit to Stand CARE Score 6   Bed-Chair Transfer   Type of Assistance Needed Independent; Adaptive equipment   Physical Assistance Level No physical assistance   Comment with RW   Chair/Bed-to-Chair Transfer CARE Score 6   Car Transfer   Type of Assistance Needed Physical assistance   Physical Assistance Level 25% or less   Car Transfer CARE Score 3   Walk 10 Feet   Type of Assistance Needed Independent; Adaptive equipment   Comment RW   Walk 10 Feet CARE Score 6   Walk 50 Feet with Two Turns   Type of Assistance Needed Independent; Adaptive equipment   Comment RW   Walk 50 Feet with Two Turns CARE Score 6   Walk 150 Feet   Reason if not Attempted Activity not applicable   Walk 876 Feet CARE Score 9   Walking 10 Feet on Uneven Surfaces   Type of Assistance Needed Incidental touching; Adaptive equipment   Walking 10 Feet on Uneven Surfaces CARE Score 4   Ambulation   Primary Mode of Locomotion Prior to Admission Walk   Distance Walked (feet) 50 ft  (x2)   Assist Device Roller Walker   Gait Pattern Slow Rebeca   Limitations Noted In Endurance   Walk Assist Level Supervision   Findings pt get fatigued with slight SOB   Does the patient walk? 2. Yes   Curb or Single Stair   Style negotiated Curb   Type of Assistance Needed Incidental touching   1 Step (Curb) CARE Score 4   4 Steps   Reason if not Attempted Activity not applicable   4 Steps CARE Score 9   12 Steps   Reason if not Attempted Activity not applicable   12 Steps CARE Score 9   Picking Up Object   Type of Assistance Needed Supervision; Adaptive equipment   Comment reacher with RW   Picking Up Object CARE Score 4   Therapeutic Interventions   Strengthening seated LAQ 3 x 10   Other worked on walking in room, sidestepping in tight spaces with RW as she would have to do for entry to her bathroom   Assessment   Treatment Assessment Session focused on repeated walks of household distances with RW. Pt able to Demo Ind transfers, but environmental limitations with mving bedside table and recliner function still require her to call for assistance while here. Plan for d/c home Saturday with continued home health. Plan   Progress Progressing toward goals   Recommendation   PT Discharge Recommendation Home with home health rehabilitation   Equipment Recommended Walker   PT Therapy Minutes   PT Time In 1030   PT Time Out 1130   PT Total Time (minutes) 60   PT Mode of treatment - Individual (minutes) 60   PT Mode of treatment - Concurrent (minutes) 0   PT Mode of treatment - Group (minutes) 0   PT Mode of treatment - Co-treat (minutes) 0   PT Mode of Treatment - Total time(minutes) 60 minutes   PT Cumulative Minutes 810   Therapy Time missed   Time missed?  No

## 2023-08-03 NOTE — PROGRESS NOTES
08/03/23 1500   Pain Assessment   Pain Assessment Tool 0-10   Pain Score No Pain   Restrictions/Precautions   Precautions Contact/isolation   Braces or Orthoses   (BLE ace wrap and (wound vac added ))   Subjective   Subjective pt agreeable to perform skilled PT   Therapeutic Interventions   Strengthening AP SAQ gluts QS 20 reps x3 sets SLR AAROM 10 reps , hip abd/ 10 -20 reps   Flexibility HS and calf stretch manual   Assessment   Treatment Assessment Pt focus on supine lvl TE  and bed mobility as above , pt very tired and fatigue during skilled PT , pt will cont toward DC goals  this weekend , pt hav wound vac in abdomial area . Cont POC   Barriers to Discharge None   Plan   Progress Progressing toward goals   Recommendation   PT Discharge Recommendation Home with home health rehabilitation   Equipment Recommended Walker   PT Therapy Minutes   PT Time In 1500   PT Time Out 1530   PT Total Time (minutes) 30   PT Mode of treatment - Individual (minutes) 30   PT Mode of treatment - Concurrent (minutes) 0   PT Mode of treatment - Group (minutes) 0   PT Mode of treatment - Co-treat (minutes) 0   PT Mode of Treatment - Total time(minutes) 30 minutes   PT Cumulative Minutes 840   Therapy Time missed   Time missed?  No

## 2023-08-03 NOTE — QUICK NOTE
Acute Care Surgery  Bedside V.A.C. Procedure Note    Location of wound: Lower midline abdominal wall    Dressings and Foam removed:  1 Black Foam  1 White Foam    Dimensions of wound: 8 cm x 4 cm x 4 cm    Description of wound: On inspection of the wound today, the wound was clean with no need for debridement. Approximately 100% of the wound base is now pink and healthy and there is granulation tissue. The periwound skin remains clean and intact and unremarkable. VAC dressing application:  The periwound skin was cleaned and dried. 1 black foam, 1 white foam were cut to size of the wound and placed into the wound. The dressings were then covered with VAC drape. The track pad was then placed over the base of black foam. The VAC was then set to 100 mmHg low Continuous suction. The patient tolerated the procedure well and there were no complications. The patient did not require any excisional debridement during today's dressing change. VAC settings:  100 mmHg  Continuous    Wound Images: Additional Notes: The [facility] sticker placed over the dressing per protocol. The next [facility] dressing change will be planned for Saturday, 8/5/2023. The [facility] paperwork was completed and give to the case management staff to arrange home VAC therapy on 8/2/2023    This dressing change took greater than 15 minutes to complete.     Reina Lee MD  8/3/2023 2:01 PM

## 2023-08-03 NOTE — PLAN OF CARE
Problem: PAIN - ADULT  Goal: Verbalizes/displays adequate comfort level or baseline comfort level  Description: Interventions:  - Encourage patient to monitor pain and request assistance  - Assess pain using appropriate pain scale  - Administer analgesics based on type and severity of pain and evaluate response  - Implement non-pharmacological measures as appropriate and evaluate response  - Consider cultural and social influences on pain and pain management  - Notify physician/advanced practitioner if interventions unsuccessful or patient reports new pain  Outcome: Progressing     Problem: INFECTION - ADULT  Goal: Absence or prevention of progression during hospitalization  Description: INTERVENTIONS:  - Assess and monitor for signs and symptoms of infection  - Monitor lab/diagnostic results  - Monitor all insertion sites, i.e. indwelling lines, tubes, and drains  - Monitor endotracheal if appropriate and nasal secretions for changes in amount and color  - [place] appropriate cooling/warming therapies per order  - Administer medications as ordered  - Instruct and encourage patient and family to use good hand hygiene technique  - Identify and instruct in appropriate isolation precautions for identified infection/condition  Outcome: Progressing     Problem: SAFETY ADULT  Goal: Patient will remain free of falls  Description: INTERVENTIONS:  - Educate patient/family on patient safety including physical limitations  - Instruct patient to call for assistance with activity   - Consult OT/PT to assist with strengthening/mobility   - Keep Call bell within reach  - Keep bed low and locked with side rails adjusted as appropriate  - Keep care items and personal belongings within reach  - Initiate and maintain comfort rounds  - Make Fall Risk Sign visible to staff  - Offer Toileting every 2 Hours, in advance of need  - Initiate/Maintain alarm  - Obtain necessary fall risk management equipment:   - Apply yellow socks and bracelet for high fall risk patients  - Consider moving patient to room near nurses station  Outcome: Progressing  Goal: Maintain or return to baseline ADL function  Description: INTERVENTIONS:  -  Assess patient's ability to carry out ADLs; assess patient's baseline for ADL function and identify physical deficits which impact ability to perform ADLs (bathing, care of mouth/teeth, toileting, grooming, dressing, etc.)  - Assess/evaluate cause of self-care deficits   - Assess range of motion  - Assess patient's mobility; develop plan if impaired  - Assess patient's need for assistive devices and provide as appropriate  - Encourage maximum independence but intervene and supervise when necessary  - Involve family in performance of ADLs  - Assess for home care needs following discharge   - Consider OT consult to assist with ADL evaluation and planning for discharge  - Provide patient education as appropriate  Outcome: Progressing  Goal: Maintains/Returns to pre admission functional level  Description: INTERVENTIONS:  - Perform BMAT or MOVE assessment daily.   - Set and communicate daily mobility goal to care team and patient/family/caregiver. - Collaborate with rehabilitation services on mobility goals if consulted  - Perform Range of Motion 3 times a day. - Reposition patient every 2 hours.   - Dangle patient 3 times a day  - Stand patient 3 times a day  - Ambulate patient 3 times a day  - Out of bed to chair 3 times a day   - Out of bed for meals 3 times a day  - Out of bed for toileting  - Record patient progress and toleration of activity level   Outcome: Progressing     Problem: DISCHARGE PLANNING  Goal: Discharge to home or other facility with appropriate resources  Description: INTERVENTIONS:  - Identify barriers to discharge w/patient and caregiver  - Arrange for needed discharge resources and transportation as appropriate  - Identify discharge learning needs (meds, wound care, etc.)  - Arrange for interpretive services to assist at discharge as needed  - Refer to Case Management Department for coordinating discharge planning if the patient needs post-hospital services based on physician/advanced practitioner order or complex needs related to functional status, cognitive ability, or social support system  Outcome: Progressing     Problem: Prexisting or High Potential for Compromised Skin Integrity  Goal: Skin integrity is maintained or improved  Description: INTERVENTIONS:  - Identify patients at risk for skin breakdown  - Assess and monitor skin integrity  - Assess and monitor nutrition and hydration status  - Monitor labs   - Assess for incontinence   - Turn and reposition patient  - Assist with mobility/ambulation  - Relieve pressure over bony prominences  - Avoid friction and shearing  - Provide appropriate hygiene as needed including keeping skin clean and dry  - Evaluate need for skin moisturizer/barrier cream  - Collaborate with interdisciplinary team   - Patient/family teaching  - Consider wound care consult   Outcome: Progressing     Problem: Nutrition/Hydration-ADULT  Goal: Nutrient/Hydration intake appropriate for improving, restoring or maintaining nutritional needs  Description: Monitor and assess patient's nutrition/hydration status for malnutrition. Collaborate with interdisciplinary team and initiate plan and interventions as ordered. Monitor patient's weight and dietary intake as ordered or per policy. Utilize nutrition screening tool and intervene as necessary. Determine patient's food preferences and provide high-protein, high-caloric foods as appropriate.      INTERVENTIONS:  - Monitor oral intake, urinary output, labs, and treatment plans  - Assess nutrition and hydration status and recommend course of action  - Evaluate amount of meals eaten  - Assist patient with eating if necessary   - Allow adequate time for meals  - Recommend/ encourage appropriate diets, oral nutritional supplements, and vitamin/mineral supplements  - Order, calculate, and assess calorie counts as needed  - Recommend, monitor, and adjust tube feedings and TPN/PPN based on assessed needs  - Assess need for intravenous fluids  - Provide specific nutrition/hydration education as appropriate  - Include patient/family/caregiver in decisions related to nutrition  Outcome: Progressing     Problem: MOBILITY - ADULT  Goal: Maintain or return to baseline ADL function  Description: INTERVENTIONS:  -  Assess patient's ability to carry out ADLs; assess patient's baseline for ADL function and identify physical deficits which impact ability to perform ADLs (bathing, care of mouth/teeth, toileting, grooming, dressing, etc.)  - Assess/evaluate cause of self-care deficits   - Assess range of motion  - Assess patient's mobility; develop plan if impaired  - Assess patient's need for assistive devices and provide as appropriate  - Encourage maximum independence but intervene and supervise when necessary  - Involve family in performance of ADLs  - Assess for home care needs following discharge   - Consider OT consult to assist with ADL evaluation and planning for discharge  - Provide patient education as appropriate  Outcome: Progressing  Goal: Maintains/Returns to pre admission functional level  Description: INTERVENTIONS:  - Perform BMAT or MOVE assessment daily.   - Set and communicate daily mobility goal to care team and patient/family/caregiver. - Collaborate with rehabilitation services on mobility goals if consulted  - Perform Range of Motion 3 times a day. - Reposition patient every 2 hours.   - Dangle patient 3 times a day  - Stand patient 3 times a day  - Ambulate patient 3 times a day  - Out of bed to chair 3 times a day   - Out of bed for meals 3 times a day  - Out of bed for toileting  - Record patient progress and toleration of activity level   Outcome: Progressing

## 2023-08-04 PROCEDURE — 97110 THERAPEUTIC EXERCISES: CPT

## 2023-08-04 PROCEDURE — 97530 THERAPEUTIC ACTIVITIES: CPT

## 2023-08-04 PROCEDURE — 97535 SELF CARE MNGMENT TRAINING: CPT

## 2023-08-04 PROCEDURE — 99232 SBSQ HOSP IP/OBS MODERATE 35: CPT | Performed by: INTERNAL MEDICINE

## 2023-08-04 PROCEDURE — 99239 HOSP IP/OBS DSCHRG MGMT >30: CPT | Performed by: STUDENT IN AN ORGANIZED HEALTH CARE EDUCATION/TRAINING PROGRAM

## 2023-08-04 RX ADMIN — Medication 1 TABLET: at 08:12

## 2023-08-04 RX ADMIN — Medication 1 PACKET: at 08:13

## 2023-08-04 RX ADMIN — FERROUS SULFATE TAB 325 MG (65 MG ELEMENTAL FE) 325 MG: 325 (65 FE) TAB at 08:12

## 2023-08-04 RX ADMIN — FLUOXETINE 20 MG: 20 CAPSULE ORAL at 17:08

## 2023-08-04 RX ADMIN — POTASSIUM CHLORIDE 40 MEQ: 1500 TABLET, EXTENDED RELEASE ORAL at 08:12

## 2023-08-04 RX ADMIN — FLUOXETINE 20 MG: 20 CAPSULE ORAL at 08:12

## 2023-08-04 RX ADMIN — MELATONIN TAB 3 MG 6 MG: 3 TAB at 22:28

## 2023-08-04 RX ADMIN — DILTIAZEM HYDROCHLORIDE 360 MG: 180 CAPSULE, COATED, EXTENDED RELEASE ORAL at 08:13

## 2023-08-04 RX ADMIN — APIXABAN 5 MG: 5 TABLET, FILM COATED ORAL at 08:13

## 2023-08-04 RX ADMIN — APIXABAN 5 MG: 5 TABLET, FILM COATED ORAL at 17:08

## 2023-08-04 RX ADMIN — NYSTATIN: 100000 POWDER TOPICAL at 08:13

## 2023-08-04 RX ADMIN — AMIODARONE HYDROCHLORIDE 200 MG: 200 TABLET ORAL at 08:13

## 2023-08-04 RX ADMIN — METOPROLOL SUCCINATE 25 MG: 25 TABLET, FILM COATED, EXTENDED RELEASE ORAL at 08:12

## 2023-08-04 RX ADMIN — Medication 1 TABLET: at 08:13

## 2023-08-04 RX ADMIN — NYSTATIN: 100000 POWDER TOPICAL at 17:09

## 2023-08-04 RX ADMIN — CYANOCOBALAMIN TAB 500 MCG 1000 MCG: 500 TAB at 08:13

## 2023-08-04 NOTE — PROGRESS NOTES
08/04/23 1100   Pain Assessment   Pain Assessment Tool 0-10   Pain Score No Pain   Restrictions/Precautions   Precautions Contact/isolation  (wound vac)   Subjective   Subjective pt agreeable to perform skilled PT   Sit to Stand   Type of Assistance Needed Independent   Sit to Stand CARE Score 6   Bed-Chair Transfer   Type of Assistance Needed Independent; Adaptive equipment   Comment RW   Chair/Bed-to-Chair Transfer CARE Score 6   Transfer Bed/Chair/Wheelchair   Adaptive Equipment Roller Walker   Car Transfer   Type of Assistance Needed Set-up / clean-up   Comment minic car xfers in her bed swing LE over objects , xfer with RW   Car Transfer CARE Score 5   Walk 10 Feet   Type of Assistance Needed Independent; Adaptive equipment   Walk 10 Feet CARE Score 6   Walk 50 Feet with Two Turns   Type of Assistance Needed Independent; Adaptive equipment   Walk 50 Feet with Two Turns CARE Score 6   Ambulation   Primary Mode of Locomotion Prior to Admission Walk   Does the patient walk? 2. Yes   Wheel 50 Feet with Two Turns   Reason if not Attempted Activity not applicable   Wheel 50 Feet with Two Turns CARE Score 9   Wheel 150 Feet   Reason if not Attempted Activity not applicable   Wheel 780 Feet CARE Score 9   4 Steps   Reason if not Attempted Activity not applicable   4 Steps CARE Score 9   12 Steps   Reason if not Attempted Activity not applicable   12 Steps CARE Score 9   Therapeutic Interventions   Strengthening LAQ AP gluts [id number] STS 5   Balance standing balance   Assessment   Treatment Assessment pt agreeable to perform skilled PT focus short walks and sitting in WC and then perform TE LE , Pt will cont toward DC goals about 50 feet x2 . with RW . Pt return to recliner for lunch with all needs in reach .  Cont POC   Plan   Progress Progressing toward goals   Recommendation   PT Discharge Recommendation Home with home health rehabilitation   PT Therapy Minutes   PT Time In 1100   PT Time Out 1130   PT Total Time (minutes) 30   PT Mode of treatment - Individual (minutes) 30   PT Mode of treatment - Concurrent (minutes) 0   PT Mode of treatment - Group (minutes) 0   PT Mode of treatment - Co-treat (minutes) 0   PT Mode of Treatment - Total time(minutes) 30 minutes   PT Cumulative Minutes 870   Therapy Time missed   Time missed?  No

## 2023-08-04 NOTE — PLAN OF CARE
Problem: PAIN - ADULT  Goal: Verbalizes/displays adequate comfort level or baseline comfort level  Description: Interventions:  - Encourage patient to monitor pain and request assistance  - Assess pain using appropriate pain scale  - Administer analgesics based on type and severity of pain and evaluate response  - Implement non-pharmacological measures as appropriate and evaluate response  - Consider cultural and social influences on pain and pain management  - Notify physician/advanced practitioner if interventions unsuccessful or patient reports new pain  Outcome: Progressing     Problem: INFECTION - ADULT  Goal: Absence or prevention of progression during hospitalization  Description: INTERVENTIONS:  - Assess and monitor for signs and symptoms of infection  - Monitor lab/diagnostic results  - Monitor all insertion sites, i.e. indwelling lines, tubes, and drains  - Monitor endotracheal if appropriate and nasal secretions for changes in amount and color  - [place] appropriate cooling/warming therapies per order  - Administer medications as ordered  - Instruct and encourage patient and family to use good hand hygiene technique  - Identify and instruct in appropriate isolation precautions for identified infection/condition  Outcome: Progressing     Problem: SAFETY ADULT  Goal: Patient will remain free of falls  Description: INTERVENTIONS:  - Educate patient/family on patient safety including physical limitations  - Instruct patient to call for assistance with activity   - Consult OT/PT to assist with strengthening/mobility   - Keep Call bell within reach  - Keep bed low and locked with side rails adjusted as appropriate  - Keep care items and personal belongings within reach  - Initiate and maintain comfort rounds  - Make Fall Risk Sign visible to staff  - Offer Toileting every 2 Hours, in advance of need  - Initiate/Maintain bed alarm  - Obtain necessary fall risk management equipment: bed alarm  - Apply yellow socks and bracelet for high fall risk patients  - Consider moving patient to room near nurses station  Outcome: Progressing  Goal: Maintain or return to baseline ADL function  Description: INTERVENTIONS:  -  Assess patient's ability to carry out ADLs; assess patient's baseline for ADL function and identify physical deficits which impact ability to perform ADLs (bathing, care of mouth/teeth, toileting, grooming, dressing, etc.)  - Assess/evaluate cause of self-care deficits   - Assess range of motion  - Assess patient's mobility; develop plan if impaired  - Assess patient's need for assistive devices and provide as appropriate  - Encourage maximum independence but intervene and supervise when necessary  - Involve family in performance of ADLs  - Assess for home care needs following discharge   - Consider OT consult to assist with ADL evaluation and planning for discharge  - Provide patient education as appropriate  Outcome: Progressing  Goal: Maintains/Returns to pre admission functional level  Description: INTERVENTIONS:  - Perform BMAT or MOVE assessment daily.   - Set and communicate daily mobility goal to care team and patient/family/caregiver. - Collaborate with rehabilitation services on mobility goals if consulted  - Perform Range of Motion 3 times a day. - Reposition patient every 2 hours.   - Dangle patient 3 times a day  - Stand patient 3 times a day  - Ambulate patient 3 times a day  - Out of bed to chair 3 times a day   - Out of bed for meals 3 times a day  - Out of bed for toileting  - Record patient progress and toleration of activity level   Outcome: Progressing     Problem: DISCHARGE PLANNING  Goal: Discharge to home or other facility with appropriate resources  Description: INTERVENTIONS:  - Identify barriers to discharge w/patient and caregiver  - Arrange for needed discharge resources and transportation as appropriate  - Identify discharge learning needs (meds, wound care, etc.)  - Arrange for interpretive services to assist at discharge as needed  - Refer to Case Management Department for coordinating discharge planning if the patient needs post-hospital services based on physician/advanced practitioner order or complex needs related to functional status, cognitive ability, or social support system  Outcome: Progressing     Problem: Prexisting or High Potential for Compromised Skin Integrity  Goal: Skin integrity is maintained or improved  Description: INTERVENTIONS:  - Identify patients at risk for skin breakdown  - Assess and monitor skin integrity  - Assess and monitor nutrition and hydration status  - Monitor labs   - Assess for incontinence   - Turn and reposition patient  - Assist with mobility/ambulation  - Relieve pressure over bony prominences  - Avoid friction and shearing  - Provide appropriate hygiene as needed including keeping skin clean and dry  - Evaluate need for skin moisturizer/barrier cream  - Collaborate with interdisciplinary team   - Patient/family teaching  - Consider wound care consult   Outcome: Progressing     Problem: Nutrition/Hydration-ADULT  Goal: Nutrient/Hydration intake appropriate for improving, restoring or maintaining nutritional needs  Description: Monitor and assess patient's nutrition/hydration status for malnutrition. Collaborate with interdisciplinary team and initiate plan and interventions as ordered. Monitor patient's weight and dietary intake as ordered or per policy. Utilize nutrition screening tool and intervene as necessary. Determine patient's food preferences and provide high-protein, high-caloric foods as appropriate.      INTERVENTIONS:  - Monitor oral intake, urinary output, labs, and treatment plans  - Assess nutrition and hydration status and recommend course of action  - Evaluate amount of meals eaten  - Assist patient with eating if necessary   - Allow adequate time for meals  - Recommend/ encourage appropriate diets, oral nutritional supplements, and vitamin/mineral supplements  - Order, calculate, and assess calorie counts as needed  - Recommend, monitor, and adjust tube feedings and TPN/PPN based on assessed needs  - Assess need for intravenous fluids  - Provide specific nutrition/hydration education as appropriate  - Include patient/family/caregiver in decisions related to nutrition  Outcome: Progressing     Problem: MOBILITY - ADULT  Goal: Maintain or return to baseline ADL function  Description: INTERVENTIONS:  -  Assess patient's ability to carry out ADLs; assess patient's baseline for ADL function and identify physical deficits which impact ability to perform ADLs (bathing, care of mouth/teeth, toileting, grooming, dressing, etc.)  - Assess/evaluate cause of self-care deficits   - Assess range of motion  - Assess patient's mobility; develop plan if impaired  - Assess patient's need for assistive devices and provide as appropriate  - Encourage maximum independence but intervene and supervise when necessary  - Involve family in performance of ADLs  - Assess for home care needs following discharge   - Consider OT consult to assist with ADL evaluation and planning for discharge  - Provide patient education as appropriate  Outcome: Progressing  Goal: Maintains/Returns to pre admission functional level  Description: INTERVENTIONS:  - Perform BMAT or MOVE assessment daily.   - Set and communicate daily mobility goal to care team and patient/family/caregiver. - Collaborate with rehabilitation services on mobility goals if consulted  - Perform Range of Motion 3 times a day. - Reposition patient every 2 hours.   - Dangle patient 3 times a day  - Stand patient 3 times a day  - Ambulate patient 3 times a day  - Out of bed to chair 3 times a day   - Out of bed for meals 3 times a day  - Out of bed for toileting  - Record patient progress and toleration of activity level   Outcome: Progressing

## 2023-08-04 NOTE — PROGRESS NOTES
Internal Medicine Progress Note  Patient: Lalito Salazar  Age/sex: 67 y.o. female  Medical Record #: [id number]      ASSESSMENT/PLAN: (Interval History)  Lalito Salazar is seen and examined and management for following issues:    Wound dehiscence s/p VAC placement  • Had recent admission for ex-lap 2/2 bowel perforation/internal hernia (6/19-7/4)  • Readmitted with wound dehiscence 7/18 and underwent a washout/VAC application and partial closure of the wound on 7/19  • Surgery saw 7/28 and closed wound, d/c'd VAC. • Wound VAC placed yesterday. • Refer to media images  • Pain control and therapy per primary service     Atrial fibrillation  • Home:  Amiodarone 200mg qd/Card CD 360mg qd/Toprol XL 25mg q12hrs/Eliquis 5mg BID  • Here:  Amiodarone 200mg qd/Card CD 360mg qd/Toprol XL 25mg qd/Eliquis 5mg BID  • Stable      HTN  • Home:  In addition to above meds, was on HCTZ 12.5mg qd/KCL 15meq qd  • Here:  Holding off on HCTZ  • stable     COVID-19 positive  • Detected on routine testing for [facility] admission. • Pt is asymptomatic. • Pt had 2 rapid antigen tests and is off precautions.     Hypokalemia  • K 4.  • Continue KCl 40meq daily.     Anemia   • Hgb has been trending around 8, currently 8.1  • Stable.     Iron deficeincy  • Has hx gastric bypass  • Iron level on 7/18/23 was 18  • Pt has had Venofer infusions in the past as her iron was low due to gastric bypass iron level now 70 on 7/31/23  • She is agreeable to infusions while here as well   • S/p IV venofer x 3 completed 8/1/23      Hx RCC right kidney  • S/p cryotherapy @ 7 yrs ago  • Follows with Urology     Hematuria vs vaginal bleeding vs rectal bleeding  · Had blood in the toilet after voiding and after a BM  · Hgb stable at 8.3.  · Continue Protonix 2x daily. · GI performed colonoscopy 8/1 +3 polyps removed no evidence of active bleeding. Colonoscopy to be repeated in 6 months.   · eliquis resumed in absence of bleeding    Discharge Date:  8/5       The above assessment and plan was reviewed and updated as determined by my evaluation of the patient on 8/4/2023. Labs:   Results from last 7 days   Lab Units 08/03/23  0632 08/02/23  0626   WBC Thousand/uL 7.74 8.75   HEMOGLOBIN g/dL 8.1* 8.8*   HEMATOCRIT % 27.5* 29.9*   PLATELETS Thousands/uL 406* 417*     Results from last 7 days   Lab Units 08/03/23  0632 08/02/23  0626   SODIUM mmol/L 143 142   POTASSIUM mmol/L 4.1 3.9   CHLORIDE mmol/L 112* 113*   CO2 mmol/L 25 23   BUN mg/dL 14 14   CREATININE mg/dL 0.68 0.67   CALCIUM mg/dL 8.1* 8.2*                   Review of Scheduled Meds:  Current Facility-Administered Medications   Medication Dose Route Frequency Provider Last Rate   • acetaminophen  975 mg Oral Q8H PRN MOLLY Jackson     • amiodarone  200 mg Oral Daily With Breakfast MOLLY Jackson     • apixaban  5 mg Oral BID MOLLY Jackson     • calcium carbonate  1 tablet Oral Daily With Breakfast MOLLY Jackson     • vitamin B-12  1,000 mcg Oral Daily MOLLY Jackson     • diltiazem  360 mg Oral Daily MOLLY Jackson     • ergocalciferol  50,000 Units Oral Weekly MOLLY Jackson     • ferrous sulfate  325 mg Oral Daily With Breakfast Cuca Garcia, DO     • FLUoxetine  20 mg Oral BID MOLLY Jackson     • melatonin  6 mg Oral HS MOLLY Jackson     • metoprolol succinate  25 mg Oral Daily MOLLY Jackson     • multivitamin-minerals  1 tablet Oral Daily MOLLY Jackson     • nystatin   Topical BID MOLLY Jackson     • ondansetron  4 mg Oral Q6H PRN MOLLY Jackson     • potassium chloride  40 mEq Oral Daily Jennifer Alvarez PA-C     • psyllium  1 packet Oral Daily MOLLY Jackson         Subjective/ HPI: Patient seen and examined. Patients overnight issues or events were reviewed with nursing or staff during rounds or morning huddle session. New or overnight issues include the following:     Pt seen in therapy.  She states that she is doing well and looking forward to [place] tomorrow. She denies any other complaints. ROS:   A 10 point ROS was performed; negative except as noted above. Imaging:     No orders to display       *Labs /Radiology studies reviewed  *Medications reviewed and reconciled as needed  *Please refer to order section for additional ordered labs studies  *Case discussed with primary attending during morning huddle case rounds    Physical Examination:  Vitals:   Vitals:    08/03/23 0900 08/03/23 1615 08/03/23 2044 08/04/23 0528   BP: 115/60 122/60 125/67 134/63   BP Location: Left arm Left arm Left arm Left arm   Pulse: 95 67 62 89   Resp:  16 16 20   Temp:  98.3 °F (36.8 °C) 98.2 °F (36.8 °C) 98.1 °F (36.7 °C)   TempSrc:  Oral Oral Oral   SpO2:  97% 97% 98%   Weight:    132 kg (291 lb 7.2 oz)   Height:           GEN: NAD; pleasant, conversive  NEURO: Alert and oriented x4; appropriate  HEENT: Pupils are equal/reactive; normocephalic, face is symmetrical, hearing is normal  CV: S1 S2 irregular, no murmur/rub/gallops, 2/4 pedal pulses, no LE edema. RESP: Lungs are clear bilaterally, no wheezes rales or rhonchi, on room air, no distress, respirations are easy and non labored  GI: Abdomen is obese, soft, non tender, +BS x4; non distended; VAC dressing in place; refer to media images  : Voiding without issues  MUSC: Moves all extremities except generalized deconditioning  SKIN: pink, warm, normal turgor, no rashes or lesions noted      The above physical exam was reviewed and updated as determined by my evaluation of the patient on 8/4/2023. Invasive Devices     None                    VTE Pharmacologic Prophylaxis: Eliquis  Code Status: Level 1 - Full Code  Current Length of Stay: 11 day(s)      Total time spent:  30 minutes with more than 50% spent counseling/coordinating care. Counseling includes discussion with patient re: progress  and discussion with patient of his/her current medical state/information.  Coordination of patient's care was performed in conjunction with primary service. Time invested included review of patient's labs, vitals, and management of their comorbidities with continued monitoring. In addition, this patient was discussed with medical team including physician and advanced extenders. The care of the patient was extensively discussed and appropriate treatment plan was formulated unique for this patient. Medical decision making for the day was made by supervising physician unless otherwise noted in their attestation statement. ** Please Note:  voice to text software may have been used in the creation of this document.  Although proof errors in transcription or interpretation are a potential of such software**

## 2023-08-04 NOTE — PLAN OF CARE
Problem: PAIN - ADULT  Goal: Verbalizes/displays adequate comfort level or baseline comfort level  Description: Interventions:  - Encourage patient to monitor pain and request assistance  - Assess pain using appropriate pain scale  - Administer analgesics based on type and severity of pain and evaluate response  - Implement non-pharmacological measures as appropriate and evaluate response  - Consider cultural and social influences on pain and pain management  - Notify physician/advanced practitioner if interventions unsuccessful or patient reports new pain  Outcome: Progressing     Problem: INFECTION - ADULT  Goal: Absence or prevention of progression during hospitalization  Description: INTERVENTIONS:  - Assess and monitor for signs and symptoms of infection  - Monitor lab/diagnostic results  - Monitor all insertion sites, i.e. indwelling lines, tubes, and drains  - Monitor endotracheal if appropriate and nasal secretions for changes in amount and color  - [place] appropriate cooling/warming therapies per order  - Administer medications as ordered  - Instruct and encourage patient and family to use good hand hygiene technique  - Identify and instruct in appropriate isolation precautions for identified infection/condition  Outcome: Progressing     Problem: SAFETY ADULT  Goal: Patient will remain free of falls  Description: INTERVENTIONS:  - Educate patient/family on patient safety including physical limitations  - Instruct patient to call for assistance with activity   - Consult OT/PT to assist with strengthening/mobility   - Keep Call bell within reach  - Keep bed low and locked with side rails adjusted as appropriate  - Keep care items and personal belongings within reach  - Initiate and maintain comfort rounds  - Make Fall Risk Sign visible to staff  - Offer Toileting every Hours, in advance of need  - Initiate/Maintain alarm  - Obtain necessary fall risk management equipment:   - Apply yellow socks and bracelet for high fall risk patients  - Consider moving patient to room near nurses station  Outcome: Progressing  Goal: Maintain or return to baseline ADL function  Description: INTERVENTIONS:  -  Assess patient's ability to carry out ADLs; assess patient's baseline for ADL function and identify physical deficits which impact ability to perform ADLs (bathing, care of mouth/teeth, toileting, grooming, dressing, etc.)  - Assess/evaluate cause of self-care deficits   - Assess range of motion  - Assess patient's mobility; develop plan if impaired  - Assess patient's need for assistive devices and provide as appropriate  - Encourage maximum independence but intervene and supervise when necessary  - Involve family in performance of ADLs  - Assess for home care needs following discharge   - Consider OT consult to assist with ADL evaluation and planning for discharge  - Provide patient education as appropriate  Outcome: Progressing  Goal: Maintains/Returns to pre admission functional level  Description: INTERVENTIONS:  - Perform BMAT or MOVE assessment daily.   - Set and communicate daily mobility goal to care team and patient/family/caregiver. - Collaborate with rehabilitation services on mobility goals if consulted  - Perform Range of Motion times a day. - Reposition patient every  hours.   - Dangle patient  times a day  - Stand patient  times a day  - Ambulate patient times a day  - Out of bed to chair  times a day   - Out of bed for meals  times a day  - Out of bed for toileting  - Record patient progress and toleration of activity level   Outcome: Progressing     Problem: DISCHARGE PLANNING  Goal: Discharge to home or other facility with appropriate resources  Description: INTERVENTIONS:  - Identify barriers to discharge w/patient and caregiver  - Arrange for needed discharge resources and transportation as appropriate  - Identify discharge learning needs (meds, wound care, etc.)  - Arrange for interpretive services to assist at discharge as needed  - Refer to Case Management Department for coordinating discharge planning if the patient needs post-hospital services based on physician/advanced practitioner order or complex needs related to functional status, cognitive ability, or social support system  Outcome: Progressing     Problem: Prexisting or High Potential for Compromised Skin Integrity  Goal: Skin integrity is maintained or improved  Description: INTERVENTIONS:  - Identify patients at risk for skin breakdown  - Assess and monitor skin integrity  - Assess and monitor nutrition and hydration status  - Monitor labs   - Assess for incontinence   - Turn and reposition patient  - Assist with mobility/ambulation  - Relieve pressure over bony prominences  - Avoid friction and shearing  - Provide appropriate hygiene as needed including keeping skin clean and dry  - Evaluate need for skin moisturizer/barrier cream  - Collaborate with interdisciplinary team   - Patient/family teaching  - Consider wound care consult   Outcome: Progressing     Problem: Nutrition/Hydration-ADULT  Goal: Nutrient/Hydration intake appropriate for improving, restoring or maintaining nutritional needs  Description: Monitor and assess patient's nutrition/hydration status for malnutrition. Collaborate with interdisciplinary team and initiate plan and interventions as ordered. Monitor patient's weight and dietary intake as ordered or per policy. Utilize nutrition screening tool and intervene as necessary. Determine patient's food preferences and provide high-protein, high-caloric foods as appropriate.      INTERVENTIONS:  - Monitor oral intake, urinary output, labs, and treatment plans  - Assess nutrition and hydration status and recommend course of action  - Evaluate amount of meals eaten  - Assist patient with eating if necessary   - Allow adequate time for meals  - Recommend/ encourage appropriate diets, oral nutritional supplements, and vitamin/mineral supplements  - Order, calculate, and assess calorie counts as needed  - Recommend, monitor, and adjust tube feedings and TPN/PPN based on assessed needs  - Assess need for intravenous fluids  - Provide specific nutrition/hydration education as appropriate  - Include patient/family/caregiver in decisions related to nutrition  Outcome: Progressing     Problem: MOBILITY - ADULT  Goal: Maintain or return to baseline ADL function  Description: INTERVENTIONS:  -  Assess patient's ability to carry out ADLs; assess patient's baseline for ADL function and identify physical deficits which impact ability to perform ADLs (bathing, care of mouth/teeth, toileting, grooming, dressing, etc.)  - Assess/evaluate cause of self-care deficits   - Assess range of motion  - Assess patient's mobility; develop plan if impaired  - Assess patient's need for assistive devices and provide as appropriate  - Encourage maximum independence but intervene and supervise when necessary  - Involve family in performance of ADLs  - Assess for home care needs following discharge   - Consider OT consult to assist with ADL evaluation and planning for discharge  - Provide patient education as appropriate  Outcome: Progressing  Goal: Maintains/Returns to pre admission functional level  Description: INTERVENTIONS:  - Perform BMAT or MOVE assessment daily.   - Set and communicate daily mobility goal to care team and patient/family/caregiver. - Collaborate with rehabilitation services on mobility goals if consulted  - Perform Range of Motion 3times a day. - Reposition patient every 2 hours.   - Dangle patient 3 times a day  - Stand patient 3 times a day  - Ambulate patient 3 times a day  - Out of bed to chair 3 times a day   - Out of bed for meals 3 times a day  - Out of bed for toileting  - Record patient progress and toleration of activity level   Outcome: Progressing

## 2023-08-04 NOTE — PROGRESS NOTES
progr  PM&R PROGRESS NOTE:  Michell Gandhi 67 y.o. female MRN: [id number]  Unit/Bed#: -01 Encounter: [id number]        Rehabilitation Diagnosis: Impairment of mobility, safety and Activities of Daily Living (ADLs) due to Debility:  16  Debility (Non-cardiac/Non-pulmonary)    HPI: Moses Ledezma is a 67 y.o. female with a medical history of atrial fibrillation (on Eliquis), sleep apnea, hypertension, renal cell carcinoma, gastric bypass, cervical spondylosis, and osteoarthritis who was recently admitted at [address] [place] [address] 6/19-7/4 with a bowel perforation and internal hernia (hx arlene-en-y gastric bypass). She had an ex lap, resection J-J anastomosis, segmental ileum resection, abthera vac placement, ex lap, washout, abthera change and ex lap, small bowel resection, bowel anastomosis x3, vicryl bridging mesh. Once medically stable she went to TCU for rehabilitation. Patient presented to [address] [place] [address] on 7/18/23 after clinic appointment for suture removal with an abdominal wound dehiscence. On 7/18 she underwent washout, wound exploration, debridement. On 7/19 she underwent washout, partial wound closure, and wound vac placement. Patient currently has wound vac in place at a pressure of 75 mmHg continuously. Wound vac last changed on 7/24 with plans for surgery management of dressing changes. She is tolerating a regular diet with nutrition supplements. Patient tested positive for COVID on 7/24. She is asymptomatic and not being treated at this time. She worked with PT and OT and was recommended for post acute rehab following her hospital stay. She has demonstrated that she can tolerate three hours of therapy, five days a week and is now medically cleared for discharge to the [facility]. PT/OT were consulted and recommend acute inpatient rehabilitation.  The patient was evaluated by the Rehabilitation team and deemed an appropriate candidate for comprehensive inpatient rehabilitation and admitted to the [facility] on 7/24/2023  4:34 PM    SUBJECTIVE: Patient seen face to face. No acute issues overnight. Slept well, denies pain. Discussed abdominal wound vac, plan for dressing change 8/5 prior to discharge then home care will perform dressing changes. Reviewed discharge medications and sent prescriptions to patient's home pharmacy. Good appetite, voiding, LBM 8/4. Denies chest pain, shortness of breath, fever, chills, N/V, abdominal binder. ASSESSMENT: Stable, progressing    PLAN:  - anemia stable, follow up outpatient with PCP, CBC in 1 week  - abdominal wound: wound vac applied by general surgery last evening with plans for dressing change on Saturday prior to discharge and home care follow-up with continued dressing changes. Follow-up in 2 weeks in [address] clinic  - reviewed discharge medications and follow-up plan of care    Rehabilitation  • Functional deficits:  Mobility, self-care  • Continue current rehabilitation plan of care to maximize function.     • Functional update:   • PT: mobility- min-mod A, transfers- independent, ambulation- independent RW 50'   • OT: ADL: bathing- set-up, dressing- set-up, footwear- independent, toileting- set-up  • Estimated Discharge: 8/5 with JUAN RN, PT, OT    Pain  • Tylenol 975 every 8 hours prn    DVT prophylaxis  • Eliquis    Bladder plan  • Continent    Bowel plan  • Continent      * Open abdominal wall wound  Assessment & Plan  - recent admission [facility] 6/19-7/4 with bowel perforation and internal hernia (hx arlene-en-y gastric bypass) - s/p ex lap, resection J-J anastomosis, segmental ileum resection, abthera 6/19  - s/p ex lap, washout, abthera change 6/19  - s/p ex lap, small bowel resection, bowel anastomosis x3, vicryl bridging mesh 6/20  - s/p debridement/washout on 7/18  - s/p partial closure and wound vac placement on 7/19  - wound vac 75 mmHg continuous  - dressing changes per General Surgery   -Surgery saw the patient today on 7/31 and added additional sutures to reinforce however there is a significant amount of tension on the incision sites that is limiting the overall healing process. Attempted double binder with without success however working with therapy and nursing to try and reduce overall tension as the pannus itself on the left and right side is pulling at the incision site. Wound base looks good  - some loose stools to be expected post GI sx - metamucil but patient refusing it to try to bulk up - stools more formed recently nevertheless - monitor for changes or other etiologies   - Recent vitals/labs stable - CBC, BMP Monday   - midline incision with sutures  - pain management - well controlled - PRN APAP   - Function continues to improve well  - Continue acute comprehensive interdisciplinary inpatient rehabilitation to include intensive skilled therapies (PT, OT) as outlined with oversight and management by rehabilitation physician as well as inpatient rehab level nursing, case management and weekly interdisciplinary team meetings.    - 8/2 General surgery following, continue to pack wound, cover with ABD and change daily and prn with soilage or dislodgement  - GS to apply wound vac prior to discharge    - follow-up with [facility] clinic in 2 weeks      S/P bariatric surgery  Assessment & Plan  - 25 years ago  - supplemental vitamins post surgery- MV, B12    COVID  Assessment & Plan  - positive 7/24  - asymptomatic  - no signs of infection: afebrile  - isolation 10 days (thru 8/3) unless 2 Ag tests 48 h apart negative  - 7/26: SARS cov2 antigen negative; repeat 7/28 Ag lab negative  - isolation discontinued   - monitor closely      Encounter for skin care  Assessment & Plan  7/28 buttock skin intact   High risk for skin wounds/breakdown with patient wanting to lay in recliner and not bed - counseled patient on being in bed to decrease risk of wounds;   - Increased risk of skin wounds/breakdown/rashes due to recent immobility and co-morbidities - loose stools at times  - Turn patient Q2H; encourage frequent wt shifts when in chair/WC every 10-15 minutes  - Hydragaurd to buttocks and sacrum BID & PRN  - Float heels and ankles/bony protuberances with pillow(s)(or wedges) in all positions while in bed or recliner chair. Be sure feet are not up against footboard (pull patient up/remove footboard/or request longer bed as indicated)  - EHOB waffle cushion to chair/WC when OOB  - Maximum time in chair 2 hours at a time; OOB minimum 3 times per day - Notify MD if unable to get patient OOB 3 times per day  - Skin mobilization protocol   - Optimal bowel/bladder hygiene - avoid bedpan unless absolutely necessary and use for least time possible   - Optimal nutrition   - Nursing to document in chart and Notify MD if buttock, sacrum, heel, or other skin site develops erythema or skin breakdown as soon as possible. If patient is soiling themselves with urine or stool notify MD.  If you are unable to maintain skin integrity and prevent erythema due to frequency of soiling notify MD as soon as possible. - Notify MD of new or increasing drainage, development of purulent drainage, increased size/depth of wound, lack of healing, inability to maintain wound integrity due to degree of drainage, wound product, dressing, or currently ordered frequency of wound management. In general dressings should be changed PRN if soiled unless specifically noted otherwise. Do not allow soiled dressing on patient for extended period of time.         Mood disorder (HCC)  Assessment & Plan  Prozac  Melatonin for sleep  Supportive counseling     Anemia  Assessment & Plan  - Hbg 8.1, (previously 8.1, 7.9, 8.1)  - Patient apparently was at times in past having chronic anemia as well and occasionally getting Venofer per family - discussed with IM and could consider Venofer if Covid comes back negative; hold oral iron as it may irritate stomach and may not absorb well for now   - monitor CBC Monday unless concerns sooner   -GI saw patient for miguel blood in toilet over the weekend however Eliquis was on hold and none today so far. Colonoscopy- 8/1, n.p.o. after midnight  - colonoscopy: impression:  • The terminal ileum appeared normal.  • Three sessile polyps measuring 5-9 mm in the transverse colon; completely removed en bloc by cold snare and retrieved specimen  • Poor preparation in the right making visualization difficult despite aggressive lavage. There were no large polyps or masses identified, however adequate polyp surveillance was unable to be performed. • Few small, scattered diverticula in the sigmoid colon  • Internal small hemorrhoids. Seen on retroflexion. • History of surgery in the rectum identified. Anastomosis was healthy.   - 8/2 Eliquis restarted    Hypokalemia  Assessment & Plan  - KCl 40 mEq daily  - K+ 3.4 (7/26) and 2.8 as of 7/31 and given additional potassium supplementation with daily repletion as well > 3.1 > 3.9 > 4.1  - IM consulted, monitoring, repletion, and overall management at their discretion during [facility] course  - monitor BMP      Anxiety  Assessment & Plan  - home: Prozac 20 mg daily  - continue here  - consult neuropsychology when cleared    Hypertension  Assessment & Plan  - home: Cardizem 360 mg daily, Metoprolol succinate 25 mg BID, HCTZ 12.5 mg BID  - here: Cardizem, Toprol XL daily, (hold HCTZ)    - monitor BP    Morbid obesity with BMI of 50.0-59.9, adult (HCC)  Assessment & Plan  - BMI 53.89  - s/p gastric bypass (25 years ago)  - educate on lifestyle modification  - consider nutrition consult    GILDARDO (obstructive sleep apnea)  Assessment & Plan  - patient states she has not been using CPAP much at home but has one  - here: 2L nc hs  - monitor     Vitamin D deficiency  Assessment & Plan  - Vit D 62.1 (1/16/23)  - Vitamin D 50K weekly     Permanent atrial fibrillation (HCC)  Assessment & Plan  - home: Eliqus 5 mg BID, Amiodarone 200 mg daily, Cardizem 360 mg daily, Toprol XL 25 mg BID  - here: Eliquis for A/C; Amio, Dilt, MTP per IM  - Monitor Hb   - monitor with therapies    Appreciate IM consultants medical co-management. Personally reviewed labs, medications, and imaging. ROS:  Review of Systems   A 10 point review of systems was negative except for what is noted in the HPI. OBJECTIVE:   /63 (BP Location: Left arm)   Pulse 89   Temp 98.1 °F (36.7 °C) (Oral)   Resp 20   Ht 5' 3" (1.6 m)   Wt 132 kg (291 lb 7.2 oz)   SpO2 98%   BMI 51.63 kg/m²     Physical Exam  Constitutional:       Appearance: Normal appearance. She is obese. HENT:      Head: Normocephalic and atraumatic. Nose: Nose normal.      Mouth/Throat:      Mouth: Mucous membranes are moist.   Cardiovascular:      Rate and Rhythm: Normal rate and regular rhythm. Pulses: Normal pulses. Pulmonary:      Effort: Pulmonary effort is normal.   Abdominal:      General: Bowel sounds are normal.      Palpations: Abdomen is soft. Comments: + wound vac, incision w/sutures   Musculoskeletal:         General: Normal range of motion. Cervical back: Normal range of motion. Right lower leg: Edema present. Left lower leg: Edema present. Skin:     General: Skin is warm and dry. Capillary Refill: Capillary refill takes less than 2 seconds. Findings: Bruising present. Neurological:      Mental Status: She is alert and oriented to person, place, and time.    Psychiatric:         Mood and Affect: Mood normal.         Judgment: Judgment normal.               Lab Results   Component Value Date    WBC 7.74 08/03/2023    HGB 8.1 (L) 08/03/2023    HCT 27.5 (L) 08/03/2023    MCV 91 08/03/2023     (H) 08/03/2023     Lab Results   Component Value Date    SODIUM 143 08/03/2023    K 4.1 08/03/2023     (H) 08/03/2023    CO2 25 08/03/2023    BUN 14 08/03/2023    CREATININE 0.68 08/03/2023    GLUC 91 08/03/2023    CALCIUM 8.1 (L) 08/03/2023     Lab Results   Component Value Date    INR 1.39 (H) 07/18/2023    INR 1.23 (H) 06/27/2023    INR 1.19 06/24/2023    PROTIME 17.3 (H) 07/18/2023    PROTIME 15.7 (H) 06/27/2023    PROTIME 15.3 (H) 06/24/2023           Current Facility-Administered Medications:   •  acetaminophen (TYLENOL) tablet 975 mg, 975 mg, Oral, Q8H PRN, MOLLY Jackson, 975 mg at 07/26/23 0158  •  amiodarone tablet 200 mg, 200 mg, Oral, Daily With Breakfast, MOLLY Jackson, 200 mg at 08/04/23 0813  •  apixaban (ELIQUIS) tablet 5 mg, 5 mg, Oral, BID, MOLLY Jackson, 5 mg at 08/04/23 0813  •  calcium carbonate (OYSTER SHELL,OSCAL) 500 mg tablet 1 tablet, 1 tablet, Oral, Daily With Breakfast, MOLLY Jackson, 1 tablet at 08/04/23 0813  •  cyanocobalamin (VITAMIN B-12) tablet 1,000 mcg, 1,000 mcg, Oral, Daily, MOLLY Jackson, 1,000 mcg at 08/04/23 0813  •  diltiazem (CARDIZEM CD) 24 hr capsule 360 mg, 360 mg, Oral, Daily, MOLLY Jackson, 360 mg at 08/04/23 0813  •  ergocalciferol (VITAMIN D2) capsule 50,000 Units, 50,000 Units, Oral, Weekly, MOLLY Jackson, 50,000 Units at 08/02/23 6227  •  ferrous sulfate tablet 325 mg, 325 mg, Oral, Daily With Breakfast, Cuca Garcia DO, 325 mg at 08/04/23 1718  •  FLUoxetine (PROzac) capsule 20 mg, 20 mg, Oral, BID, MOLLY Jackson, 20 mg at 08/04/23 0694  •  melatonin tablet 6 mg, 6 mg, Oral, HS, MOLLY Jackson, 6 mg at 08/03/23 2232  •  metoprolol succinate (TOPROL-XL) 24 hr tablet 25 mg, 25 mg, Oral, Daily, MOLLY Jackson, 25 mg at 08/04/23 5001  •  multivitamin-minerals (CENTRUM) tablet 1 tablet, 1 tablet, Oral, Daily, MOLLY Jackson, 1 tablet at 08/04/23 0518  •  nystatin (MYCOSTATIN) powder, , Topical, BID, MOLLY Jackson, Given at 08/04/23 0813  •  ondansetron (ZOFRAN-ODT) dispersible tablet 4 mg, 4 mg, Oral, Q6H PRN, MOLLY Jackson  •  potassium chloride (K-DUR,KLOR-CON) CR tablet 40 mEq, 40 mEq, Oral, Daily, Jennifer Alvarez PA-C, 40 mEq at 08/04/23 0812  •  psyllium (METAMUCIL) 1 packet, 1 packet, Oral, Daily, MOLLY Jackson, 1 packet at 08/04/23 0813    Past Medical History:   Diagnosis Date   • [facility])    • Arthritis    • Atrial fibrillation (HCC)    • Cervical spondylosis with myelopathy    • Gastric bypass status for obesity    • History of transfusion     35 years ago   • Hypertension    • mass right kidney    • Renal cell adenocarcinoma (HCC)     RIGHT   • Sleep apnea    • Super obese        Patient Active Problem List    Diagnosis Date Noted   • Open abdominal wall wound 07/13/2023   • S/P bariatric surgery 03/17/2020   • COVID 07/24/2023   • Mood disorder ([address]) 07/28/2023   • Encounter for skin care 07/28/2023   • Ambulatory dysfunction 07/17/2023   • Iron deficiency anemia 07/13/2023   • Hypotension 07/13/2023   • Hypokalemia 07/10/2023   • Anemia 07/10/2023   • Bowel perforation ([address]) 07/05/2023   • Acute encephalopathy 07/05/2023   • Anxiety 07/05/2023   • Diarrhea 07/05/2023   • Idiopathic acute pancreatitis without infection or necrosis 06/18/2023   • Primary osteoarthritis of both knees 06/07/2023   • Stage 3 chronic kidney disease, unspecified whether stage 3a or 3b CKD ([address]) 05/31/2023   • Hypertension    • Morbid obesity with BMI of 50.0-59.9, adult ([address]) 11/01/2022   • Unsteady gait 10/30/2022   • Dyspnea on exertion 10/30/2022   • SIRS (systemic inflammatory response syndrome) ([address]) 10/30/2022   • Incontinence 10/30/2022   • GILDARDO (obstructive sleep apnea) 10/30/2022   • Vitamin D deficiency 07/06/2021   • Reactive hypoglycemia 12/30/2020   • Osteoporosis 06/04/2020   • Hyperparathyroidism ([address]) 06/04/2020   • Hashimoto's thyroiditis 03/17/2020   • Early menopause occurring in patient age younger than 39 years 03/17/2020   • Myofascial pain syndrome 10/21/2019   • Status post partial thyroidectomy 09/12/2019   • Cervical spondylosis without myelopathy 06/28/2019   • Arthropathy of cervical facet joint 06/28/2019   • Occipital neuralgia of right side 06/28/2019   • Chest wall tenderness 01/24/2019   • Chronic heart failure ([address]) 01/24/2019   • Permanent atrial fibrillation ([address]) 01/24/2019   • Myalgia 01/08/2019   • Primary osteoarthritis of left knee 10/23/2018   • Primary osteoarthritis of right knee 10/23/2018   • Chronic pain of left knee 10/23/2018   • Malignant tumor of right kidney parenchyma ([address]) 05/11/2016   • Closed fracture of right distal radius and ulna 04/22/2016      MOLLY Brown  Physical Medicine [facility]

## 2023-08-04 NOTE — PROGRESS NOTES
Occupational Therapy Treatment Note           08/04/23 0700   Pain Assessment   Pain Assessment Tool 0-10   Pain Score No Pain   Restrictions/Precautions   Precautions Contact/isolation  (wound vac)   Lifestyle   Autonomy "I feel good I am leaving tomorrow"   Eating   Type of Assistance Needed Independent   Physical Assistance Level No physical assistance   Eating CARE Score 6   Oral Hygiene   Type of Assistance Needed Set-up / clean-up   Physical Assistance Level No physical assistance   Comment seated with mouth basin   Oral Hygiene CARE Score 5   Shower/Bathe Self   Type of Assistance Needed Set-up / clean-up   Physical Assistance Level No physical assistance   Comment seated at EOB with basin, use of reacher for lower legs/feet. plan to stand with RW at home and use of toileting tongs   Shower/Bathe Self CARE Score 5   Tub/Shower Transfer   Reason Not Assessed Medical;Sponge Bath   Upper Body Dressing   Type of Assistance Needed Set-up / clean-up   Physical Assistance Level No physical assistance   Comment seated   Upper Body Dressing CARE Score 5   Lower Body Dressing   Reason if not Attempted Medical concerns   Lower Body Dressing CARE Score 88   Putting On/Taking Off Footwear   Type of Assistance Needed Independent; Adaptive equipment   Physical Assistance Level No physical assistance   Comment reacher   Putting On/Taking Off Footwear CARE Score 6   Sit to Lying   Type of Assistance Needed Independent   Physical Assistance Level No physical assistance   Comment from recliner which pt has at home   Sit to Lying CARE Score 6   Lying to Sitting on Side of Bed   Type of Assistance Needed Independent   Physical Assistance Level No physical assistance   Comment in recliner chair   Lying to Sitting on Side of Bed CARE Score 6   Sit to Stand   Type of Assistance Needed Independent; Adaptive equipment   Physical Assistance Level No physical assistance   Comment RW   Sit to Stand CARE Score 6   Bed-Chair Transfer   Type of Assistance Needed Independent; Adaptive equipment   Physical Assistance Level No physical assistance   Comment RW   Chair/Bed-to-Chair Transfer CARE Score 6   Toileting Hygiene   Type of Assistance Needed Adaptive equipment;Set-up / clean-up   Physical Assistance Level No physical assistance   Comment demonstrates ability to complete independently with use of toileting tongs   Toileting Hygiene CARE Score 5   Toilet Transfer   Type of Assistance Needed Adaptive equipment;Set-up / clean-up   Physical Assistance Level No physical assistance   Comment platform BSC over toilet   Toilet Transfer CARE Score 5   Cognition   Overall Cognitive Status WFL   Arousal/Participation Alert   Attention Within functional limits   Orientation Level Oriented X4   Following Commands Follows one step commands without difficulty   Activity Tolerance   Activity Tolerance Patient tolerated treatment well   Assessment   Treatment Assessment Pt participated in skilled OT tx session. See above for further details on functional performance. D/C ADL completed. Pt reports family has purchased toileting rail/frame for over toilet, toileting tongs, reacher (and may be part of hip kit). Has been ordered for wide BSC and standard RW. With these tools pt has ability to complete ADL routine at overall setup assist level. Family training has been completed with all pt's children. Pt reports no questions/concerns with d/c home tomorrow with continued home OT/PT/RN. OT reviewed the below stated d/c recommendations with pt.    Recommendation   OT Discharge Recommendation Home with home health rehabilitation   OT Therapy Minutes   OT Time In 0700   OT Time Out 0830   OT Total Time (minutes) 90   OT Mode of treatment - Individual (minutes) 90   OT Mode of treatment - Concurrent (minutes) 0   OT Mode of treatment - Group (minutes) 0   OT Mode of treatment - Co-treat (minutes) 0   OT Mode of Treatment - Total time(minutes) 90 minutes   OT Cumulative Minutes 975   Therapy Time missed   Time missed? No       OCCUPATIONAL THERAPY DISCHARGE RECOMMENDATIONS:    -- Below are descriptions of how to assist Nancy Perea at home       -- Grooming: Perform grooming while sitting in her recliner chair. She can use a mouth basin to brush her teeth. -- Bathing: Perform bathing by sponge bathing only - no showering. Place a towel under her in recliner chair to absorb any water. [place] brings her a basin with warm water, a towel or 2, and 6-8 washcloths. To avoid using dirty water, get one wash cloth wet and then apply soap directly to that wash cloth and avoid putting that washcloth back in the basin once it is used on her body. She can use this to wash her top half and helper can wash her back. Then use a 2nd washcloth just dipped in water to rinse off the soap. If you choose to dip used washcloths back into soap, then change the water before or after washing your groin/buttock. While seated in recliner she can use this method to wash her upper body and legs. She can use the long handled sponge she has at home for her feet. At the beginning, or the end of sponge bathing (while she has slippers or hospital socks on her feet) she can stand with her walker and use the toileting tongs to bathe her buttock and groin area (use a different wash cloth for each area to help prevent urinary tract infections). She can use a towel to dry off all area. She may require some assistance in ensuring that under her stomach is fully washed/ and dried. -- Shower transfers: Do not complete at this time. Will require clearance from general surgery before showering. Home therapy can practice dry shower transfers at home, as she gets stronger. -- Dressing: Complete dressing while sitting in her recliner chair. At this time she is wearing dresses, not underwear or pants. She can take on/off her own dresses when someone brings them to her.  She may require assistance to get on her slip on slippers/sneakers/shoes. A hip kit could be bought for her to help with footwear. --Toileting: She can complete toileting using a wide bedside commode or the toileting frame/rails that are placed over her toilet. Have the toileting tongs available near her to reach to help her wash her groin and buttock. She can utilize baby wipes for hygiene. -- IADLS: At this time, recommend family assists Gustavo with laundry, cooking, cleaning, driving, and other household activities.

## 2023-08-04 NOTE — DISCHARGE SUMMARY
Discharge Summary - Katherine Tony 67 y.o. female MRN: [id number]  Unit/Bed#: -01 Encounter: [id number]    Admission Date: 7/24/2023     Discharge Date: 8/5/23    Etiologic/Rehabilitation Diagnosis: Impairment of mobility, safety and Activities of Daily Living (ADLs) due to Debility:  16  Debility (Non-cardiac/Non-pulmonary)    HPI: Guadelupe Bamberger is a 77 y.o. female with a medical history of atrial fibrillation (on Eliquis), sleep apnea, hypertension, renal cell carcinoma, gastric bypass, cervical spondylosis, and osteoarthritis who was recently admitted at [address] [place] [address] with a bowel perforation and internal hernia (hx arlene-en-y gastric bypass).  She had an ex lap, resection J-J anastomosis, segmental ileum resection, abthera vac placement, ex lap, washout, abthera change and ex lap, small bowel resection, bowel anastomosis x3, vicryl bridging mesh. Once medically stable she went to TCU for rehabilitation. Patient presented to [facility] on 7/18/23 after clinic appointment for suture removal with an abdominal wound dehiscence.  On 7/18 she underwent washout, wound exploration, debridement. On 7/19 she underwent washout, partial wound closure, and wound vac placement. Patient currently has wound vac in place at a pressure of 75 mmHg continuously.  Wound vac last changed on 7/24 with plans for surgery management of dressing changes.  She is tolerating a regular diet with nutrition supplements. Patient tested positive for COVID on 7/24.  She is asymptomatic and not being treated at this time.  She worked with PT and OT and was recommended for post acute rehab following her hospital stay. Liliam Santoro has demonstrated that she can tolerate three hours of therapy, five days a week and is now medically cleared for discharge to the [facility]. PT/OT were consulted and recommend acute inpatient rehabilitation.  The patient was evaluated by the Rehabilitation team and deemed an appropriate candidate for comprehensive inpatient rehabilitation and admitted to the [facility] on 7/24/2023  4:34 PM    Incidental findings: The following findings require follow up:  Radiographic finding              Finding: small hypodensity in the spleen of unclear etiology              Follow up required: PCP              Follow up should be done within 1 month(s)     Please notify the following clinician to assist with the follow up:              Dr. Yrn Cortes      Procedures Performed During [facility] Admission: None    Acute Rehabilitation Center Course: Patient participated in a comprehensive interdisciplinary inpatient rehabilitation program which included involvment of MD, therapies (PT, OT, and/or SLP), RN, CM, SW, dietary, and psychology services. She was able to be advanced to a modified independent level of assist and considered safe for discharge home. Please see below for patient's day to day management of rehabilitation needs. Please refer to Internal Medicine notes during [facility] stay for day to day management of patient's medical co-morbidities. * Open abdominal wall wound  Assessment & Plan  - recent admission [facility] 6/19-7/4 with bowel perforation and internal hernia (hx arlene-en-y gastric bypass) - s/p ex lap, resection J-J anastomosis, segmental ileum resection, abthera 6/19  - s/p ex lap, washout, abthera change 6/19  - s/p ex lap, small bowel resection, bowel anastomosis x3, vicryl bridging mesh 6/20  - s/p debridement/washout on 7/18  - s/p partial closure and wound vac placement on 7/19  - wound vac 75 mmHg continuous  - dressing changes per General Surgery   -Surgery saw the patient today on 7/31 and added additional sutures to reinforce however there is a significant amount of tension on the incision sites that is limiting the overall healing process.   Attempted double binder with without success however working with therapy and nursing to try and reduce overall tension as the pannus itself on the left and right side is pulling at the incision site. Wound base looks good  - some loose stools to be expected post GI sx - metamucil but patient refusing it to try to bulk up - stools more formed recently nevertheless - monitor for changes or other etiologies   - Recent vitals/labs stable - CBC, BMP Monday   - midline incision with sutures  - pain management - well controlled - PRN APAP   - Function continues to improve well  - Continue acute comprehensive interdisciplinary inpatient rehabilitation to include intensive skilled therapies (PT, OT) as outlined with oversight and management by rehabilitation physician as well as inpatient rehab level nursing, case management and weekly interdisciplinary team meetings. - 8/2 General surgery following, continue to pack wound, cover with ABD and change daily and prn with soilage or dislodgement  - GS to apply wound vac prior to discharge    - follow-up with GS clinic in 2 weeks      S/P bariatric surgery  Assessment & Plan  - 25 years ago  - supplemental vitamins post surgery- MV, B12    COVID  Assessment & Plan  - positive 7/24  - asymptomatic  - no signs of infection: afebrile  - isolation 10 days (thru 8/3) unless 2 Ag tests 48 h apart negative  - 7/26: SARS cov2 antigen negative; repeat 7/28 Ag lab negative  - isolation discontinued   - monitor closely      Encounter for skin care  Assessment & Plan  7/28 buttock skin intact   High risk for skin wounds/breakdown with patient wanting to lay in recliner and not bed - counseled patient on being in bed to decrease risk of wounds;   - Increased risk of skin wounds/breakdown/rashes due to recent immobility and co-morbidities - loose stools at times  - Turn patient Q2H; encourage frequent wt shifts when in chair/WC every 10-15 minutes  - Hydragaurd to buttocks and sacrum BID & PRN  - Float heels and ankles/bony protuberances with pillow(s)(or wedges) in all positions while in bed or recliner chair.   Be sure feet are not up against footboard (pull patient up/remove footboard/or request longer bed as indicated)  - EHOB waffle cushion to chair/WC when OOB  - Maximum time in chair 2 hours at a time; OOB minimum 3 times per day - Notify MD if unable to get patient OOB 3 times per day  - Skin mobilization protocol   - Optimal bowel/bladder hygiene - avoid bedpan unless absolutely necessary and use for least time possible   - Optimal nutrition   - Nursing to document in chart and Notify MD if buttock, sacrum, heel, or other skin site develops erythema or skin breakdown as soon as possible. If patient is soiling themselves with urine or stool notify MD.  If you are unable to maintain skin integrity and prevent erythema due to frequency of soiling notify MD as soon as possible. - Notify MD of new or increasing drainage, development of purulent drainage, increased size/depth of wound, lack of healing, inability to maintain wound integrity due to degree of drainage, wound product, dressing, or currently ordered frequency of wound management. In general dressings should be changed PRN if soiled unless specifically noted otherwise. Do not allow soiled dressing on patient for extended period of time. Mood disorder (HCC)  Assessment & Plan  Prozac  Melatonin for sleep  Supportive counseling     Anemia  Assessment & Plan  - Hbg 8.1, (previously 8.1, 7.9, 8.1)  - Patient apparently was at times in past having chronic anemia as well and occasionally getting Venofer per family - discussed with IM and could consider Venofer if Covid comes back negative; hold oral iron as it may irritate stomach and may not absorb well for now   - monitor CBC Monday unless concerns sooner   -GI saw patient for miguel blood in toilet over the weekend however Eliquis was on hold and none today so far.   Colonoscopy- 8/1, n.p.o. after midnight  - colonoscopy: impression:  • The terminal ileum appeared normal.  • Three sessile polyps measuring 5-9 mm in the transverse colon; completely removed en bloc by cold snare and retrieved specimen  • Poor preparation in the right making visualization difficult despite aggressive lavage. There were no large polyps or masses identified, however adequate polyp surveillance was unable to be performed. • Few small, scattered diverticula in the sigmoid colon  • Internal small hemorrhoids. Seen on retroflexion. • History of surgery in the rectum identified. Anastomosis was healthy.   - 8/2 Eliquis restarted    - home: [facility] to obtain CBC with results to PCP on 08/11    Hypokalemia  Assessment & Plan  - KCl 40 mEq daily  - K+ 3.4 (7/26) and 2.8 as of 7/31 and given additional potassium supplementation with daily repletion as well > 3.1 > 3.9 > 4.1  - IM consulted, monitoring, repletion, and overall management at their discretion during [facility] course  - monitor BMP    - BMP 8/11 by home care with results to PCP and Cardiology    Anxiety  Assessment & Plan  - home: Prozac 20 mg daily  - continue here  - consult neuropsychology when cleared    Hypertension  Assessment & Plan  - home: Cardizem 360 mg daily, Metoprolol succinate 25 mg BID, HCTZ 12.5 mg BID  - here: Cardizem, Toprol XL daily, (hold HCTZ)    - monitor BP    Morbid obesity with BMI of 50.0-59.9, adult (HCC)  Assessment & Plan  - BMI 53.89  - s/p gastric bypass (25 years ago)  - educate on lifestyle modification  - consider nutrition consult    GILDARDO (obstructive sleep apnea)  Assessment & Plan  - patient states she has not been using CPAP much at home but has one  - here: 2L nc hs  - monitor     Vitamin D deficiency  Assessment & Plan  - Vit D 62.1 (1/16/23)  - Vitamin D 50K weekly     Permanent atrial fibrillation (HCC)  Assessment & Plan  - home: Eliqus 5 mg BID, Amiodarone 200 mg daily, Cardizem 360 mg daily, Toprol XL 25 mg BID  - here: Eliquis for A/C; Amio, Dilt, MTP per IM  - Monitor Hb   - monitor with therapies      Discharge Physical Examination:    /68 (BP Location: Left arm) Pulse 97   Temp 98.2 °F (36.8 °C) (Oral)   Resp 20   Ht 5' 3" (1.6 m)   Wt 133 kg (293 lb 10.4 oz)   SpO2 97%   BMI 52.02 kg/m²     Gen: No acute distress, Well-nourished, well-appearing. HEENT: Moist mucus membranes, Normocephalic/Atraumatic  Cardiovascular: Regular rate, rhythm, S1/S2. Distal pulses palpable  Heme/Extr: Bilateral lower extremity edema, No clubbing/cyanosis  Pulmonary: Non-labored breathing. Lungs CTAB  : No sweeney, voiding, continent  GI: Soft, non-tender, non-distended. BS+, + wound vac- changed 8/5 by General Surgery  MSK: ROM is WFL in all extremities. No effusions or deformities. Bulk is symmetric. Integumentary: Skin is warm, dry. No rashes or ulcers. Neuro: AAOx3, CN 2-12 intact. Sensation intact to light touch throughout. Speech is intact. Appropriate to questioning. Tone is normal.   Psych: Normal mood and affect. Significant Findings, Care, Treatment and Services Provided: Acute comprehensive interdisciplinary inpatient rehabilitation including PT, OT, SLP, RN, CM, SW, dietary, psychology, etc.    Functional Status Upon Admission to [facility]:  Physical therapy: mobility- min A, transfers- min A, ambulation- mod A RW 61' with wc follow  Occupational therapy: ADL: bathing- UB min A LB max A, dressing-  UB supervisoin LB mod A, toileting- max A  Speech therapy: dysphagia level 2/HTL liquids, aphasia, cognitive impairment    Functional Status Upon Discharge from [facility]:   Physical therapy: mobility- independent, transfers- independent,  ambulation- independent RW 76'  Occupational therapy: ADL: bathing- set-up, dressing- UB set-up LB not attempted d/t medical concerns, footwear- independent, toileting- set-up    Discharge Diagnosis: Impairment of mobility, safety and Activities of Daily Living (ADLs) due to Debility:  16  Debility (Non-cardiac/Non-pulmonary)    Discharge Medications:   See after visit summary for reconciled discharge medications provided to patient and family. Condition at Discharge: good     Discharge instructions/Information to patient and family:   See after visit summary for information provided to patient and family. Provisions for Follow-Up Care:  See after visit summary for information related to follow-up care and any pertinent home health orders. Future Appointments   Date Time Provider [address]   8/8/2023 To Be Determined Silvina Hoffman OT VN [facility]   9/7/2023  3:30 PM Norrine Spatz, PA-C [facility]   10/6/2023  9:00 AM Tye Charlton MD [facility]       Disposition: Home    Planned Readmission: No    Discharge Statement   I spent 45 minutes discharging the patient. This time was spent on the day of discharge. I had direct contact with the patient on the day of discharge. Greater than 50% of the total time was spent examining patient, answering all patient questions, arranging and discussing plan of care with patient as well as directly providing post-discharge instructions. Additional time then spent on discharge activities. Discharge Medications:  See after visit summary for reconciled discharge medications provided to patient and family.       Facility Administered Medications Prior to Discharge:    Current Facility-Administered Medications   Medication Dose Route Frequency Provider Last Rate   • acetaminophen  975 mg Oral Q8H PRN MOLLY Johnson     • amiodarone  200 mg Oral Daily With Breakfast MOLLY Jackson     • apixaban  5 mg Oral BID MOLLY Jackson     • calcium carbonate  1 tablet Oral Daily With Breakfast MOLLY Jackson     • vitamin B-12  1,000 mcg Oral Daily MOLLY Jackson     • diltiazem  360 mg Oral Daily MOLLY Jackson     • ergocalciferol  50,000 Units Oral Weekly MOLLY Jackson     • ferrous sulfate  325 mg Oral Daily With Breakfast Cuca Garcia DO     • FLUoxetine  20 mg Oral BID MOLLY Jackson     • melatonin  6 mg Oral HS Helen MOLLY Edwards     • metoprolol succinate  25 mg Oral Daily MOLLY Jackson     • multivitamin-minerals  1 tablet Oral Daily MOLLY Jackson     • nystatin   Topical BID MOLLY Jackson     • ondansetron  4 mg Oral Q6H PRN MOLLY Jackson     • potassium chloride  40 mEq Oral Daily Jennifer Alvarez PA-C     • psyllium  1 packet Oral Daily MOLLY Jackson       [facility], [address] [place] [address]

## 2023-08-04 NOTE — DISCHARGE INSTR - AVS FIRST PAGE
DISCHARGE INSTRUCTIONS: [address] [employer]    Bring these instructions with you to your Outpatient Physician appointments so they can order and follow-up any additional lab work or imaging recommended at time of discharge. Resume follow-up with all your prior providers that you have established care prior to this hospitalization. Discuss with primary care physician (PCP) if you have additional questions. It  is you or your caregivers responsibility to obtain follow-up MEDICATION REFILLS  As indicated through your Primary Care Physician (PCP) and other outpatient specialty provider(s) after discharge. Please follow-up with your PCP as soon as possible after discharge to set-up follow-up management and when appropriate refills. You remain a fall and injury risk which could be severe. - Your risk of fall has decreased however since admission to acute rehab. Caregiver training has been completed with our staff. - Appropriate supervision +/- assistance as instructed during your rehab course is recommended to decrease risk of fall and injury. - Continue skilled therapy as discussed after discharge to further decrease this risk    If you (or your health care proxy) have any questions or concerns regarding your acute rehabilitation stay including issues with medications, rehabilitation, and follow-up plan, please call:          [address] [place], [place] [address]e Unit in [place] at [phone] or [phone]. Should you develop fevers, chills, new weakness, changes in sensation, difficulty speaking, facial weakness, confusion, shortness of breath, chest pain, or other concerning symptoms please call 911 and/or obtain transportation to nearest ER immediately. Should you develop worsening pain, swelling, or drainage notify your surgeon right away or obtain transportation to nearest ER for evaluation.     PHYSICIANS to see:  Please see your doctors listed in the follow up providers section of your discharge paperwork, and take the discharge paperwork with you to your appointments. Home health has been ordered for you: Your home health agency should reach out to you or your family soon to arrange follow-up. (If [facility] home health is your provider their phone number is [phone])    If you are unable to get in touch with home health you may contact your  at [phone]. [place]      LAB WORK recommended after discharge: Follow-up lab work at discretion of your outpatient physicians to be determined at time of your future appointments. Recommend the following labs be drawn by home health nursing (orders has been placed prior to discharge):    CBC and BMP to monitor hemoglobin, white blood cell count, electrolytes and kidney function in 1 week with results to Dr. Bridger Hudson and Dr. Bita Albright (Cardiology)    These labs must be interpreted by your outpatient primary care physician (PCP) and/or other outpatient specialists as discussed. It is your (or your caregiver's) responsibility to ensure these labs are drawn and followed up by the appropriate outpatient providers. Contact these providers after discharge to review labs and adjust management as indicated and to order any additional labs. Your acute rehab physicians will not be able to follow-up labs once discharged from Acute Rehab setting      Excessive delay in labs and appropriate follow-up could potentially increase your risk of complications which could be severe and even life-threatening. IMAGING to follow-up:  Follow-up imaging as discussed with your recent physicians or at discretion of your outpatient physicians to be determined at time of your appointments. IMAGING, ADDITIONAL FINDINGS and ISSUES to follow-up:  Check under the "DISCHARGE PROVIDER" section of these DISCHARGE INSTRUCTIONS for provider contact information and specific issues as well.       Incidental findings: The following findings require follow up:  Radiographic finding              Finding: small hypodensity in the spleen of unclear etiology              Follow up required: PCP              Follow up should be done within 1 month(s)     Please notify the following clinician to assist with the follow up:              Dr. Carla Garza to follow:  Monitor incision(s) for increased redness, swelling, pain/tenderness, discharge/pus and promptly notify your surgeon should these develop. - Should you develop significant pain, swelling, or drainage obtain transportation to nearest emergency room for immediate evaluation if unable to reach your surgeon promptly     Monitor skin for increased redness or breadown and promptly notify your physician should these develop    If instructed while in [facility] - be sure you stand +/- walk every 1-2 hours and if advised use appropriate supervision/assistance to optimally offload your buttock/sacral region. While seated or lying in bed shift positions and from side to side often. WOUND CARE INSTRUCTIONS to follow:  Home health nursing will assist you with wound management. You may contact them with issues as well once this service is set-up. If [facility] is your provider their phone number is [phone]. If you are unable to get in touch with [facility] you may contact your  at [phone]. Due to the following you are at increased risk of skin breakdown/wounds/worsening wounds:  - Impaired mobility  - Impaired nutrition/intake/low albumin  - Medical co-morbidities    Buttocks/Sacrum  Turn as full as possible off sacrum/buttocks every 2 hours  Use Cushion while in chair or wheelchair  Weight shift every 10-15 minutes while in chair  Keep skin clean and dry as possible.   Remove wet or soiled clothing/linens promptly  Monitor skin for increased breakdown which you are at risk of and notify nursing, PCP, or other physician providers should this occur right away    Heels/bony edges of feet  Float heels off edge of pillow for added pressure relief. Monitor heels and bony protuberances and notify physician or nursing for any increased redness, bogginess, or breakdown    Wound care of your open abdominal wound at this time is: a wound vac; dressing changes will be performed by a home care wound nurse typically on Monday, Wednesday, Friday. They will discuss the schedule with you. If the dressing were to become dislodged or fall off for more than 2 hours apply a wet to dry dressing and notify home care immediately. Primary management of your wound(s) is General Surgery. You are to follow-up with them in 2 weeks. They will determine when to discontinue the wound vac. Ensure appropriate wound care to optimize chances for healing and decrease risks of complications. Your wound(s) remains at risk for worsening and infection.    - Should you develop significant pain, swelling, or drainage obtain transportation to nearest emergency room for immediate evaluation if unable to reach your surgeon promptly   - Should you develop uncontrolled pain, fever, chills, sweats, changes in strength, sensation, or color of this area obtain transportation to nearest emergency room for immediate evaluation. MEDICATIONS:  Please see a full list of your medications outlined in the After Visit Summary that is attached to these Discharge Instructions. Please note changes may have been made to your medications please refer to your discharge paperwork for your current medications and take this list with you to all your doctors appointments for your doctors to review.   Please do not resume a home medication unless the medication reconciliation sheet indicates to do so, please do not assume that a medication that you were given a prescription for is the same as a medication you have at home based on both medications having the same name as dosages and frequency may have changed. Unless specifically noted in your medication list provided to you in your discharge paper work do not resume prior vitamins, minerals, or supplements you may have been taking prior to your hospitalization unless instructed by an outpatient physician in the future. Discuss with your primary care at next visit if applicable. NSAID Warning:  Please avoid NSAID (including but not limited to advil, aleve, motrin, naproxen, ibuprofen, mobic, meloxicam, diclofenac etc) medications as NSAID medications may increase your risk of bleeding (which can be life-threatening), stroke, worsening kidney disease, heart attack, developing/worsening GI ulcers, worsening heart failure, worsening liver (cirrhosis) disease, potentially delay bone healing. Acetaminophen (Tylenol) Dosing Warning: You may have up to 3000 mg of acetaminophen (Tylenol) from all sources spread out over a 24 hours period. Do not have more than that, as this can increase your risk of liver injury which can be serious. Blood thinners prescribed to optimize long and short term health and decrease risk of complications but with risks related to bleeding and other complications. Eliquis (Apixiban) instructions: You have been prescribed apixiban(Eliquis). This medication is used to prevent clots which can cause severe disability and even death. This medication will need to be managed by your outpatient physician(s) after discharge. Follow-up with the appropriate provider as soon as possible to ensure appropriate use. This is a strong anticoagulant (blood thinner). It is being recommended for use by you (or your family) to decrease the risk of clotting which can be severely disabling and even life-threatening. Even when provided as recommended it can cause severe disabling and even life-threatening bleeding.   Too much or too little of this blood thinner further increases your risk of this medication causing serious and even life-threatening complications such as severe bleeding, clots, strokes, and death. With that said, at this time based on available evidence and consensus agreement, your physicians recommend you take Eliquis (Adriana Little) medication based on your overall risks and benefits in your specific medical situation. If you (or your family/caregiver) notice black stools, bloody stools, vomit blood, develop new weakness, slurred speech, confusion or have any other concerning symptoms call 911 or obtain transportation to nearest emergency room immediately. MEDICAL MANAGEMENT AT HOME specific to you:    Hypertension/Hypotension Management:  Only take the medications prescribed for you at time of discharge - overly high or low blood pressure increases your risk for health complications    Follow-up with PCP/family doctor regularly to ensure blood pressure remains adequately controlled. Please check your blood pressure prior to taking your blood pressure medications and keep a log that you will bring with you to your follow-up doctors' appointments. >Please contact your family doctor or cardiologist immediately for a blood pressure below 100/50 and do not take your blood pressure medications until speaking with them. >Please contact your family doctor or cardiologist as soon as possible for blood pressure greater than 160/100. >If your lightheadedness is more than usual or you have different symptoms such as increased shortness of breath, chest pain, confusion, difficulty staying awake, or other concerning signs or symptoms obtain transport to nearest emergency room as soon as possible. Heart Failure Management:  Follow-up management by your cardiologist.  Please set-up follow-up appointment as soon as possible after your discharge. Take your medications as prescribed at time of discharge unless changed by another physician in future.   You will need to obtain follow-up lab work as an outpatient to monitor effectiveness and safety of these medications. Obtain follow-up lab orders from your outpatient providers at their discretion. You should maintain a low sodium and low fat diet. You are recommended to maintain a **** ml fluid restriction per day. 1 cup (8 oz) is roughly equal to 235 ml.   5 (8 oz) cups is equal to close to 1200 ml of fluid. You should weigh yourself every day or every other day and keep a log that you bring to your physicians office. Notify your physician if you start to gain more than 2-4 lbs in a few days. These recommendations are meant to prevent build up of excessive fluid inside you which can make it harder to breath. Follow-up with your outpatient physicians who may change these recommendations in the future. Notify your physicians should you develop increased swelling, weight gain, or shortness of breath. If you develop significant shortness of breath, chest pain, confusion, or other concerning signs or symptoms call 911 or obtain transportation to nearest emergency room right away. Anemia management: Most recent Hbg 8.1  Follow-up with primary care physician at next visit. Continue to monitor blood counts intermittently. Follow-up management at discretion of PCP.  >If you develop increased lightheadedness, shortness of breath, chest pain, confusion, difficulty staying awake, or other concerning signs or symptoms obtain transport to nearest emergency room as soon as possible. Hypokalemia management (Low blood potassium level):  During your recent hospital course your potassium levels have been 2.8 - 5.0. Most recent potassium level was 4.1. You are recommended to have your potassium level check in 1 week. Too high or too low potassium levels can lead to significant complications including but not limited to irregular heart rhythm that can cause injury or death.     A BMP with follow-up management through PCP and Cardiology for 8/11. For pain - try to use over the counter topicals (creams/gels) as discussed or acetaminophen 1-2 regular or extra-strength Tylenol up to 2-3 times per day. Could consider heat or ice as well maximum 20 minutes at a time. Do not use heat or ice if using topicals at the same time. Notify primary care and/or orthopedics for poorly controlled pain for additional recommendations. Please note a summary of your hospital stay with relevant information for your doctors will try to be sent to them. Please confirm with your doctors at your follow up visits that they have received this summary and have them contact [facility] [address] if they have not received them along with any other medical records they may require.      Jm Phone Number:  [phone]

## 2023-08-04 NOTE — OCCUPATIONAL THERAPY NOTE
OCCUPATIONAL THERAPY DISCHARGE RECOMMENDATIONS:    -- Below are descriptions of how to assist Rajendra Calderon at home       -- Grooming: Perform grooming while sitting in her recliner chair. She can use a mouth basin to brush her teeth. -- Bathing: Perform bathing by sponge bathing only - no showering. Place a towel under her in recliner chair to absorb any water. Bell Buckle brings her a basin with warm water, a towel or 2, and 6-8 washcloths. To avoid using dirty water, get one wash cloth wet and then apply soap directly to that wash cloth and avoid putting that washcloth back in the basin once it is used on her body. She can use this to wash her top half and helper can wash her back. Then use a 2nd washcloth just dipped in water to rinse off the soap. If you choose to dip used washcloths back into soap, then change the water before or after washing your groin/buttock. While seated in recliner she can use this method to wash her upper body and legs. She can use the long handled sponge she has at home for her feet. At the beginning, or the end of sponge bathing (while she has slippers or hospital socks on her feet) she can stand with her walker and use the toileting tongs to bathe her buttock and groin area (use a different wash cloth for each area to help prevent urinary tract infections). She can use a towel to dry off all area. She may require some assistance in ensuring that under her stomach is fully washed/ and dried. -- Shower transfers: Do not complete at this time. Will require clearance from general surgery before showering. Home therapy can practice dry shower transfers at home, as she gets stronger. -- Dressing: Complete dressing while sitting in her recliner chair. At this time she is wearing dresses, not underwear or pants. She can take on/off her own dresses when someone brings them to her. She may require assistance to get on her slip on slippers/sneakers/shoes.  A hip kit could be bought for her to help with footwear. --Toileting: She can complete toileting using a wide bedside commode or the toileting frame/rails that are placed over her toilet. Have the toileting tongs available near her to reach to help her wash her groin and buttock. She can utilize baby wipes for hygiene. -- IADLS: At this time, recommend family assists Gustavo with laundry, cooking, cleaning, driving, and other household activities.

## 2023-08-04 NOTE — PROGRESS NOTES
08/04/23 1400   Pain Assessment   Pain Assessment Tool 0-10   Restrictions/Precautions   Precautions Contact/isolation   Braces or Orthoses   (BLE ace wrap and (wound vac added)   General   Change In Medical/Functional Status pt has all DMEs and going home with smaller wound vac   Subjective   Subjective pt agreeable to perform skilled PT   Roll Left and Right   Type of Assistance Needed Independent   Roll Left and Right CARE Score 6   Sit to Lying   Type of Assistance Needed Independent   Sit to Lying CARE Score 6   Lying to Sitting on Side of Bed   Type of Assistance Needed Independent   Lying to Sitting on Side of Bed CARE Score 6   Sit to Stand   Type of Assistance Needed Independent; Adaptive equipment   Comment RW   Sit to Stand CARE Score 6   Bed-Chair Transfer   Type of Assistance Needed Adaptive equipment   Comment RW   Chair/Bed-to-Chair Transfer CARE Score 6   Transfer Bed/Chair/Wheelchair   Adaptive Equipment Roller Walker   Walk 10 Feet   Type of Assistance Needed Independent; Adaptive equipment   Comment RW   Walk 10 Feet CARE Score 6   Walk 50 Feet with Two Turns   Type of Assistance Needed Independent; Adaptive equipment   Comment RW   Walk 50 Feet with Two Turns CARE Score 6   Walk 150 Feet   Reason if not Attempted Activity not applicable   Walk 478 Feet CARE Score 9   Walking 10 Feet on Uneven Surfaces   Type of Assistance Needed Supervision; Adaptive equipment   Comment RW   Walking 10 Feet on Uneven Surfaces CARE Score 4   Ambulation   Primary Mode of Locomotion Prior to Admission Walk   Distance Walked (feet) 75 ft   Assist Device Roller Walker   Does the patient walk? 2. Yes   Wheel 50 Feet with Two Turns   Reason if not Attempted Activity not applicable   Wheel 50 Feet with Two Turns CARE Score 9   Wheel 150 Feet   Reason if not Attempted Activity not applicable   Wheel 009 Feet CARE Score 9   Wheelchair mobility   Does the patient use a wheelchair? 0.  No   Curb or Single Stair   Style negotiated Curb   Type of Assistance Needed Supervision   1 Step (Curb) CARE Score 4   4 Steps   Reason if not Attempted Activity not applicable   4 Steps CARE Score 9   12 Steps   Reason if not Attempted Activity not applicable   12 Steps CARE Score 9   Stairs   Type Curb   # of Steps 1   Weight Bearing Precautions Fall Risk   Findings hold on door frame   Picking Up Object   Type of Assistance Needed Independent; Adaptive equipment   Comment reacher w RW   Picking Up Object CARE Score 6   Therapeutic Interventions   Strengthening LAQ AP gluts x20 marching 20 reps STS x3   Assessment   Treatment Assessment pt focus on all functional mobility with RW and wound vac , pt going home tomorrow with small wound vac and RW /  pt indep lv with RW and progressing for DC tomorrow . Pt will  cont PT outpt services and nsging coming to her home. Barriers to Discharge None   Plan   Progress Progressing toward goals   Recommendation   PT Discharge Recommendation Home with home health rehabilitation   Equipment Recommended Walker   PT Therapy Minutes   PT Time In 1400   PT Time Out 1500   PT Total Time (minutes) 60   PT Mode of treatment - Individual (minutes) 60   PT Mode of treatment - Concurrent (minutes) 0   PT Mode of treatment - Group (minutes) 0   PT Mode of treatment - Co-treat (minutes) 0   PT Mode of Treatment - Total time(minutes) 60 minutes   PT Cumulative Minutes 930   Therapy Time missed   Time missed?  No

## 2023-08-05 VITALS
DIASTOLIC BLOOD PRESSURE: 68 MMHG | SYSTOLIC BLOOD PRESSURE: 114 MMHG | TEMPERATURE: 98.2 F | BODY MASS INDEX: 51.91 KG/M2 | HEIGHT: 63 IN | RESPIRATION RATE: 20 BRPM | OXYGEN SATURATION: 97 % | WEIGHT: 293 LBS | HEART RATE: 97 BPM

## 2023-08-05 PROCEDURE — 99232 SBSQ HOSP IP/OBS MODERATE 35: CPT | Performed by: INTERNAL MEDICINE

## 2023-08-05 PROCEDURE — 2W13X6Z COMPRESSION OF ABDOMINAL WALL USING PRESSURE DRESSING: ICD-10-PCS | Performed by: STUDENT IN AN ORGANIZED HEALTH CARE EDUCATION/TRAINING PROGRAM

## 2023-08-05 PROCEDURE — 2W03X6Z CHANGE PRESSURE DRESSING ON ABDOMINAL WALL: ICD-10-PCS | Performed by: SURGERY

## 2023-08-05 RX ORDER — POTASSIUM CHLORIDE 20 MEQ/1
40 TABLET, EXTENDED RELEASE ORAL DAILY
Qty: 60 TABLET | Refills: 0 | Status: SHIPPED | OUTPATIENT
Start: 2023-08-05 | End: 2023-08-08

## 2023-08-05 RX ORDER — CALCIUM CARBONATE 500(1250)
1 TABLET ORAL
Qty: 30 TABLET | Refills: 0 | Status: SHIPPED | OUTPATIENT
Start: 2023-08-05

## 2023-08-05 RX ORDER — FLUOXETINE HYDROCHLORIDE 20 MG/1
20 CAPSULE ORAL 2 TIMES DAILY
Qty: 60 CAPSULE | Refills: 0 | Status: SHIPPED | OUTPATIENT
Start: 2023-08-05

## 2023-08-05 RX ORDER — AMIODARONE HYDROCHLORIDE 200 MG/1
200 TABLET ORAL
Qty: 30 TABLET | Refills: 0 | Status: SHIPPED | OUTPATIENT
Start: 2023-08-05

## 2023-08-05 RX ORDER — FERROUS SULFATE 325(65) MG
325 TABLET ORAL
Qty: 30 TABLET | Refills: 0 | Status: SHIPPED | OUTPATIENT
Start: 2023-08-05 | End: 2023-08-17 | Stop reason: SDUPTHER

## 2023-08-05 RX ADMIN — METOPROLOL SUCCINATE 25 MG: 25 TABLET, FILM COATED, EXTENDED RELEASE ORAL at 08:38

## 2023-08-05 RX ADMIN — POTASSIUM CHLORIDE 40 MEQ: 1500 TABLET, EXTENDED RELEASE ORAL at 08:39

## 2023-08-05 RX ADMIN — FLUOXETINE 20 MG: 20 CAPSULE ORAL at 08:39

## 2023-08-05 RX ADMIN — CYANOCOBALAMIN TAB 500 MCG 1000 MCG: 500 TAB at 08:39

## 2023-08-05 RX ADMIN — DILTIAZEM HYDROCHLORIDE 360 MG: 180 CAPSULE, COATED, EXTENDED RELEASE ORAL at 08:40

## 2023-08-05 RX ADMIN — APIXABAN 5 MG: 5 TABLET, FILM COATED ORAL at 08:39

## 2023-08-05 RX ADMIN — Medication 1 TABLET: at 08:39

## 2023-08-05 RX ADMIN — AMIODARONE HYDROCHLORIDE 200 MG: 200 TABLET ORAL at 08:37

## 2023-08-05 RX ADMIN — NYSTATIN: 100000 POWDER TOPICAL at 08:42

## 2023-08-05 RX ADMIN — Medication 1 TABLET: at 08:38

## 2023-08-05 RX ADMIN — FERROUS SULFATE TAB 325 MG (65 MG ELEMENTAL FE) 325 MG: 325 (65 FE) TAB at 08:37

## 2023-08-05 NOTE — PROCEDURES
Acute Care Surgery  Bedside V.A.C. Procedure Note      Location of wound: Lower abdomen    Dressings and Foam removed:  0  Dressings  1 Black Foam  1 White Foam    Dimensions of wound: 8 cm x 4 cm x 4 cm    Description of wound: On inspection of the wound today, the wound is healthy with no evidence of infected or necrotic tissue. Alec Pandy The wound extends down to fascia. Approximately 100% of the wound base is now pink and healthy and there is granulation tissue. The periwound skin remains clean and intact and unremarkable. VAC dressing application:  The periwound skin was cleaned and dried. 0 dressings, 2 black foam, 1 white foam were cut to size of the wound and placed into the wound. The dressings were then covered with VAC drape. The track pad was then placed over the base of black foam. The VAC was then set to 125 mmHg low Continuous suction. The patient tolerated the procedure well and there were no complications. The patient did not require any excisional debridement during today's dressing change. VAC settings:  125 mmHg  Continuous    Wound Images: Additional Notes: The [facility] sticker placed over the dressing per protocol. The next [facility] dressing change will be planned for 8/7/2023. The wound sponge was connected to the patients home vac. This dressing change took greater than 10 minutes to complete.     Jian Santana MD  8/5/2023 10:57 AM
46

## 2023-08-05 NOTE — PROGRESS NOTES
Internal Medicine Progress Note  Patient: Celine Yu  Age/sex: 67 y.o. female  Medical Record #: [id number]      ASSESSMENT/PLAN: (Interval History)  Celine Yu is seen and examined and management for following issues:    Wound dehiscence s/p VAC placement  • Had recent admission for ex-lap 2/2 bowel perforation/internal hernia (6/19-7/4)  • Readmitted with wound dehiscence 7/18 and underwent a washout/VAC application and partial closure of the wound on 7/19  • Surgery saw 7/28 and closed wound  • VAC was dc'd but replaced8/4/23   • Dressing change to be completed prior to dc today  • Refer to media images  • Pain control and therapy per primary service     Atrial fibrillation  • Home:  Amiodarone 200mg qd/Card CD 360mg qd/Toprol XL 25mg q12hrs/Eliquis 5mg BID  • Here:  Amiodarone 200mg qd/Card CD 360mg qd/Toprol XL 25mg qd/Eliquis 5mg BID  • Stable      HTN  • Home:  In addition to above meds, was on HCTZ 12.5mg qd/KCL 15meq qd  • Here:  Holding off on HCTZ  • stable     COVID-19 positive  • Detected on routine testing for [facility] admission. • Pt is asymptomatic. • Pt had 2 rapid antigen tests and is off precautions.     Hypokalemia  • stable  • Continue KCl 40meq daily.     Anemia   • Hgb has been trending around 8, currently 8.1  • Stable.     Iron deficeincy  • Has hx gastric bypass  • Iron level on 7/18/23 was 18  • Pt has had Venofer infusions in the past as her iron was low due to gastric bypass iron level now 70 on 7/31/23  • She is agreeable to infusions while here as well   • S/p IV venofer x 3 completed 8/1/23      Hx RCC right kidney  • S/p cryotherapy @ 7 yrs ago  • Follows with Urology     Hematuria vs vaginal bleeding vs rectal bleeding  · Had blood in the toilet after voiding and after a BM  · Hgb stable at 8.3.  · Continue Protonix 2x daily. · GI performed colonoscopy 8/1 +3 polyps removed no evidence of active bleeding. Colonoscopy to be repeated in 6 months.   · eliquis resumed in absence of bleeding    Discharge Date:  8/5=OK from medicine standpoint       The above assessment and plan was reviewed and updated as determined by my evaluation of the patient on 8/5/2023. Labs:   Results from last 7 days   Lab Units 08/03/23  0632 08/02/23  0626   WBC Thousand/uL 7.74 8.75   HEMOGLOBIN g/dL 8.1* 8.8*   HEMATOCRIT % 27.5* 29.9*   PLATELETS Thousands/uL 406* 417*     Results from last 7 days   Lab Units 08/03/23  0632 08/02/23  0626   SODIUM mmol/L 143 142   POTASSIUM mmol/L 4.1 3.9   CHLORIDE mmol/L 112* 113*   CO2 mmol/L 25 23   BUN mg/dL 14 14   CREATININE mg/dL 0.68 0.67   CALCIUM mg/dL 8.1* 8.2*                   Review of Scheduled Meds:  Current Facility-Administered Medications   Medication Dose Route Frequency Provider Last Rate   • acetaminophen  975 mg Oral Q8H PRN MOLLY Jackson     • amiodarone  200 mg Oral Daily With Breakfast MOLLY Jackson     • apixaban  5 mg Oral BID MOLLY Jackson     • calcium carbonate  1 tablet Oral Daily With Breakfast MOLLY Jackson     • vitamin B-12  1,000 mcg Oral Daily MOLLY Jackson     • diltiazem  360 mg Oral Daily MOLLY Jackson     • ergocalciferol  50,000 Units Oral Weekly MOLLY Jackson     • ferrous sulfate  325 mg Oral Daily With Breakfast Cuca Garcia DO     • FLUoxetine  20 mg Oral BID MOLLY Jackson     • melatonin  6 mg Oral HS MOLLY Jackson     • metoprolol succinate  25 mg Oral Daily MOLLY Jackson     • multivitamin-minerals  1 tablet Oral Daily MOLLY Jackson     • nystatin   Topical BID MOLLY Jackson     • ondansetron  4 mg Oral Q6H PRN MOLLY Jackson     • potassium chloride  40 mEq Oral Daily Jennifer Alvarez PA-C     • psyllium  1 packet Oral Daily MOLLY Jackson         Subjective/ HPI: Patient seen and examined. Patients overnight issues or events were reviewed with nursing or staff during rounds or morning huddle session.  New or overnight issues include the following:     Pt seen in her room. No overnight issues, she's waiting for surgery to replace her VAC. She's ready for dc    ROS:   A 10 point ROS was performed; negative except as noted above. Imaging:     No orders to display       *Labs /Radiology studies reviewed  *Medications reviewed and reconciled as needed  *Please refer to order section for additional ordered labs studies  *Case discussed with primary attending during morning huddle case rounds    Physical Examination:  Vitals:   Vitals:    08/04/23 2115 08/05/23 0454 08/05/23 0557 08/05/23 0838   BP: 133/62 124/58  114/68   BP Location: Left arm Left arm  Left arm   Pulse: 89 82  97   Resp: 18 20     Temp: 98.1 °F (36.7 °C) 98.2 °F (36.8 °C)     TempSrc: Oral Oral     SpO2: 95% 97%     Weight:   133 kg (293 lb 10.4 oz)    Height:           GEN: NAD; pleasant, conversive  NEURO: Alert and oriented x4; appropriate  HEENT: Pupils are equal/reactive; normocephalic, face is symmetrical, hearing is normal  CV: S1 S2 irregular, no murmur/rub/gallops, 2/4 pedal pulses, no LE edema. RESP: Lungs are clear bilaterally, no wheezes rales or rhonchi, on room air, no distress, respirations are easy and non labored  GI: Abdomen is obese, soft, non tender, +BS x4; non distended; VAC dressing currently being placed, surgery is replacing VAC; refer to media images  : Voiding without issues  MUSC: Moves all extremities except generalized deconditioning  SKIN: pink, warm, normal turgor, no rashes or lesions noted      The above physical exam was reviewed and updated as determined by my evaluation of the patient on 8/5/2023. Invasive Devices     None                    VTE Pharmacologic Prophylaxis: Eliquis  Code Status: Level 1 - Full Code  Current Length of Stay: 12 day(s)      Total time spent:  30 minutes with more than 50% spent counseling/coordinating care.  Counseling includes discussion with patient re: progress  and discussion with patient of his/her current medical state/information. Coordination of patient's care was performed in conjunction with primary service. Time invested included review of patient's labs, vitals, and management of their comorbidities with continued monitoring. In addition, this patient was discussed with medical team including physician and advanced extenders. The care of the patient was extensively discussed and appropriate treatment plan was formulated unique for this patient. Medical decision making for the day was made by supervising physician unless otherwise noted in their attestation statement. ** Please Note:  voice to text software may have been used in the creation of this document.  Although proof errors in transcription or interpretation are a potential of such software**

## 2023-08-05 NOTE — NURSING NOTE
Discharge instructions reviewed with patient and patient's daughter, no questions or concerns at this time. Patient's vitals signs stable, is CGA w/ RW. Patient is discharging home with home health nursing, PT, and OT services; pt also discharging with wound vac in place (switched to portable wound vac this morning by general surgery). Patient escorted out via wheelchair accompanied by daughter and PCA.

## 2023-08-05 NOTE — PLAN OF CARE
Problem: PAIN - ADULT  Goal: Verbalizes/displays adequate comfort level or baseline comfort level  Description: Interventions:  - Encourage patient to monitor pain and request assistance  - Assess pain using appropriate pain scale  - Administer analgesics based on type and severity of pain and evaluate response  - Implement non-pharmacological measures as appropriate and evaluate response  - Consider cultural and social influences on pain and pain management  - Notify physician/advanced practitioner if interventions unsuccessful or patient reports new pain  Outcome: Progressing     Problem: INFECTION - ADULT  Goal: Absence or prevention of progression during hospitalization  Description: INTERVENTIONS:  - Assess and monitor for signs and symptoms of infection  - Monitor lab/diagnostic results  - Monitor all insertion sites, i.e. indwelling lines, tubes, and drains  - Monitor endotracheal if appropriate and nasal secretions for changes in amount and color  - [place] appropriate cooling/warming therapies per order  - Administer medications as ordered  - Instruct and encourage patient and family to use good hand hygiene technique  - Identify and instruct in appropriate isolation precautions for identified infection/condition  Outcome: Progressing     Problem: SAFETY ADULT  Goal: Patient will remain free of falls  Description: INTERVENTIONS:  - Educate patient/family on patient safety including physical limitations  - Instruct patient to call for assistance with activity   - Consult OT/PT to assist with strengthening/mobility   - Keep Call bell within reach  - Keep bed low and locked with side rails adjusted as appropriate  - Keep care items and personal belongings within reach  - Initiate and maintain comfort rounds  - Make Fall Risk Sign visible to staff  - Offer Toileting every 2 Hours, in advance of need  - Apply yellow socks and bracelet for high fall risk patients  - Consider moving patient to room near nurses station  Outcome: Progressing  Goal: Maintain or return to baseline ADL function  Description: INTERVENTIONS:  -  Assess patient's ability to carry out ADLs; assess patient's baseline for ADL function and identify physical deficits which impact ability to perform ADLs (bathing, care of mouth/teeth, toileting, grooming, dressing, etc.)  - Assess/evaluate cause of self-care deficits   - Assess range of motion  - Assess patient's mobility; develop plan if impaired  - Assess patient's need for assistive devices and provide as appropriate  - Encourage maximum independence but intervene and supervise when necessary  - Involve family in performance of ADLs  - Assess for home care needs following discharge   - Consider OT consult to assist with ADL evaluation and planning for discharge  - Provide patient education as appropriate  Outcome: Progressing  Goal: Maintains/Returns to pre admission functional level  Description: INTERVENTIONS:  - Perform BMAT or MOVE assessment daily.   - Set and communicate daily mobility goal to care team and patient/family/caregiver. - Collaborate with rehabilitation services on mobility goals if consulted  - Perform Range of Motion 3 times a day. - Reposition patient every 2 hours.   - Dangle patient 3 times a day  - Stand patient 3 times a day  - Ambulate patient 3 times a day  - Out of bed to chair 3 times a day   - Out of bed for meals 3 times a day  - Out of bed for toileting  - Record patient progress and toleration of activity level   Outcome: Progressing     Problem: DISCHARGE PLANNING  Goal: Discharge to home or other facility with appropriate resources  Description: INTERVENTIONS:  - Identify barriers to discharge w/patient and caregiver  - Arrange for needed discharge resources and transportation as appropriate  - Identify discharge learning needs (meds, wound care, etc.)  - Arrange for interpretive services to assist at discharge as needed  - Refer to Case Management Department for coordinating discharge planning if the patient needs post-hospital services based on physician/advanced practitioner order or complex needs related to functional status, cognitive ability, or social support system  Outcome: Progressing     Problem: Prexisting or High Potential for Compromised Skin Integrity  Goal: Skin integrity is maintained or improved  Description: INTERVENTIONS:  - Identify patients at risk for skin breakdown  - Assess and monitor skin integrity  - Assess and monitor nutrition and hydration status  - Monitor labs   - Assess for incontinence   - Turn and reposition patient  - Assist with mobility/ambulation  - Relieve pressure over bony prominences  - Avoid friction and shearing  - Provide appropriate hygiene as needed including keeping skin clean and dry  - Evaluate need for skin moisturizer/barrier cream  - Collaborate with interdisciplinary team   - Patient/family teaching  - Consider wound care consult   Outcome: Progressing     Problem: Nutrition/Hydration-ADULT  Goal: Nutrient/Hydration intake appropriate for improving, restoring or maintaining nutritional needs  Description: Monitor and assess patient's nutrition/hydration status for malnutrition. Collaborate with interdisciplinary team and initiate plan and interventions as ordered. Monitor patient's weight and dietary intake as ordered or per policy. Utilize nutrition screening tool and intervene as necessary. Determine patient's food preferences and provide high-protein, high-caloric foods as appropriate.      INTERVENTIONS:  - Monitor oral intake, urinary output, labs, and treatment plans  - Assess nutrition and hydration status and recommend course of action  - Evaluate amount of meals eaten  - Assist patient with eating if necessary   - Allow adequate time for meals  - Recommend/ encourage appropriate diets, oral nutritional supplements, and vitamin/mineral supplements  - Order, calculate, and assess calorie counts as needed  - Recommend, monitor, and adjust tube feedings and TPN/PPN based on assessed needs  - Assess need for intravenous fluids  - Provide specific nutrition/hydration education as appropriate  - Include patient/family/caregiver in decisions related to nutrition  Outcome: Progressing     Problem: MOBILITY - ADULT  Goal: Maintain or return to baseline ADL function  Description: INTERVENTIONS:  -  Assess patient's ability to carry out ADLs; assess patient's baseline for ADL function and identify physical deficits which impact ability to perform ADLs (bathing, care of mouth/teeth, toileting, grooming, dressing, etc.)  - Assess/evaluate cause of self-care deficits   - Assess range of motion  - Assess patient's mobility; develop plan if impaired  - Assess patient's need for assistive devices and provide as appropriate  - Encourage maximum independence but intervene and supervise when necessary  - Involve family in performance of ADLs  - Assess for home care needs following discharge   - Consider OT consult to assist with ADL evaluation and planning for discharge  - Provide patient education as appropriate  Outcome: Progressing

## 2023-08-05 NOTE — NURSING NOTE
Clinically Significant Medication Issue  Time of Stay: Discharge    Did a complete drug regimen review identify potential clinically significant medication issues?     no

## 2023-08-05 NOTE — DISCHARGE INSTR - OTHER ORDERS
OCCUPATIONAL THERAPY DISCHARGE RECOMMENDATIONS:     -- Below are descriptions of how to assist Clay Dixon at home         -- Grooming: Perform grooming while sitting in her recliner chair. She can use a mouth basin to brush her teeth. -- Bathing: Perform bathing by sponge bathing only - no showering. Place a towel under her in recliner chair to absorb any water. [place] brings her a basin with warm water, a towel or 2, and 6-8 washcloths. To avoid using dirty water, get one wash cloth wet and then apply soap directly to that wash cloth and avoid putting that washcloth back in the basin once it is used on her body. She can use this to wash her top half and helper can wash her back. Then use a 2nd washcloth just dipped in water to rinse off the soap. If you choose to dip used washcloths back into soap, then change the water before or after washing your groin/buttock. While seated in recliner she can use this method to wash her upper body and legs. She can use the long handled sponge she has at home for her feet. At the beginning, or the end of sponge bathing (while she has slippers or hospital socks on her feet) she can stand with her walker and use the toileting tongs to bathe her buttock and groin area (use a different wash cloth for each area to help prevent urinary tract infections). She can use a towel to dry off all area. She may require some assistance in ensuring that under her stomach is fully washed/ and dried. -- Shower transfers: Do not complete at this time. Will require clearance from general surgery before showering. Home therapy can practice dry shower transfers at home, as she gets stronger. -- Dressing: Complete dressing while sitting in her recliner chair. At this time she is wearing dresses, not underwear or pants. She can take on/off her own dresses when someone brings them to her. She may require assistance to get on her slip on slippers/sneakers/shoes.  A hip kit could be bought for her to help with footwear. --Toileting: She can complete toileting using a wide bedside commode or the toileting frame/rails that are placed over her toilet. Have the toileting tongs available near her to reach to help her wash her groin and buttock. She can utilize baby wipes for hygiene. -- IADLS: At this time, recommend family assists Gustavo with laundry, cooking, cleaning, driving, and other household activities.

## 2023-08-06 ENCOUNTER — HOME CARE VISIT (OUTPATIENT)
Dept: HOME HEALTH SERVICES | Facility: HOME HEALTHCARE | Age: 73
End: 2023-08-06

## 2023-08-07 ENCOUNTER — HOME CARE VISIT (OUTPATIENT)
Dept: HOME HEALTH SERVICES | Facility: HOME HEALTHCARE | Age: 73
End: 2023-08-07
Payer: MEDICARE

## 2023-08-07 ENCOUNTER — TELEPHONE (OUTPATIENT)
Dept: CARDIOLOGY CLINIC | Facility: CLINIC | Age: 73
End: 2023-08-07

## 2023-08-07 VITALS
SYSTOLIC BLOOD PRESSURE: 112 MMHG | RESPIRATION RATE: 20 BRPM | TEMPERATURE: 97.1 F | OXYGEN SATURATION: 95 % | HEART RATE: 76 BPM | DIASTOLIC BLOOD PRESSURE: 70 MMHG

## 2023-08-07 DIAGNOSIS — I50.32 CHRONIC DIASTOLIC HEART FAILURE (HCC): ICD-10-CM

## 2023-08-07 DIAGNOSIS — I10 PRIMARY HYPERTENSION: Primary | ICD-10-CM

## 2023-08-07 DIAGNOSIS — N18.30 STAGE 3 CHRONIC KIDNEY DISEASE, UNSPECIFIED WHETHER STAGE 3A OR 3B CKD (HCC): ICD-10-CM

## 2023-08-07 LAB
DME PARACHUTE DELIVERY DATE ACTUAL: NORMAL
DME PARACHUTE DELIVERY DATE REQUESTED: NORMAL
DME PARACHUTE DELIVERY NOTE: NORMAL
DME PARACHUTE ITEM DESCRIPTION: NORMAL
DME PARACHUTE ITEM DESCRIPTION: NORMAL
DME PARACHUTE ORDER STATUS: NORMAL
DME PARACHUTE SUPPLIER NAME: NORMAL
DME PARACHUTE SUPPLIER PHONE: NORMAL

## 2023-08-07 PROCEDURE — 10330081 VN NO-PAY CLAIM PROCEDURE

## 2023-08-07 PROCEDURE — G0299 HHS/HOSPICE OF RN EA 15 MIN: HCPCS

## 2023-08-07 PROCEDURE — 88305 TISSUE EXAM BY PATHOLOGIST: CPT | Performed by: PATHOLOGY

## 2023-08-07 PROCEDURE — 400013 VN SOC

## 2023-08-07 NOTE — TELEPHONE ENCOUNTER
Patient called stating she was in hospital 7/24-8/5 & they D/C' d HCTZ. Patient states her ankles & feet are edematous & asks if you want her to start back on HCTZ &/or diuretic ? States medication she is taking currently;  Metoprolol Succinate 25 mg daily. Amiodarone 200 mg daily. Eliquis 5 mg BID. Diltiazem 360 mg daily. Patient being scheduled for post hospitalization OV. Please advise.

## 2023-08-07 NOTE — CASE MANAGEMENT
Team dc summary: Pt made good progress in rehab program. Pt dc with JUAN RN PT OT, wound vac, r/w and hd commode. Pt's family present for DCI and transportation home.

## 2023-08-07 NOTE — PROGRESS NOTES
08/07/23 1107   Hello, [Guardian’s Name / Patient’s Мария Licea, this is [Caller Мария Licea from [facility], and our clinical care team wanted to check on you / your child after your recent visit to the hospital. It will only take 3-5 minutes. Is this a good time? Discharge Call Type/ Specific Diagnosis: General Call   General Discharge Phone Call   Were your/your child's discharge instructions clear and understandable? Please tell me in your own words how to care for yourself/your child now that you're home Yes;Patient understood instructions   Have you filled your/your child's new prescriptions yet? Yes   What questions do you have about those medications? No questions   Are you/your child having any unusual symptoms or problems? (Specific to problem- i.e., dressing, pain, bruising or swelling, procedure, etc.) No reported symptoms/problems   Do you have follow up appointment with your/your child's physician? Yes   Is there anything preventing you from keeping that appointment? No;Patient able to keep appointment   Are there any physicians, nurses, or hospital staff you would like us to recognize for doing a very good job? Nurse;PCA/Tech;PT/OT/RT/SLP   Thank you for taking the time to share with me about your care and recovery. Do you have any suggestions for us? No   This call resulted in: No interventions needed   Call Complete   Attempted Number of Calls 1   Discharge phone call complete?  Complete

## 2023-08-08 ENCOUNTER — HOME CARE VISIT (OUTPATIENT)
Dept: HOME HEALTH SERVICES | Facility: HOME HEALTHCARE | Age: 73
End: 2023-08-08
Payer: MEDICARE

## 2023-08-08 VITALS — SYSTOLIC BLOOD PRESSURE: 132 MMHG | OXYGEN SATURATION: 97 % | HEART RATE: 73 BPM | DIASTOLIC BLOOD PRESSURE: 78 MMHG

## 2023-08-08 PROCEDURE — G0152 HHCP-SERV OF OT,EA 15 MIN: HCPCS

## 2023-08-08 RX ORDER — SPIRONOLACTONE AND HYDROCHLOROTHIAZIDE 25; 25 MG/1; MG/1
1 TABLET ORAL DAILY
Qty: 30 TABLET | Refills: 2 | Status: SHIPPED | OUTPATIENT
Start: 2023-08-08 | End: 2023-08-15

## 2023-08-09 ENCOUNTER — HOME CARE VISIT (OUTPATIENT)
Dept: HOME HEALTH SERVICES | Facility: HOME HEALTHCARE | Age: 73
End: 2023-08-09
Payer: MEDICARE

## 2023-08-09 ENCOUNTER — TELEPHONE (OUTPATIENT)
Dept: CARDIOLOGY CLINIC | Facility: CLINIC | Age: 73
End: 2023-08-09

## 2023-08-09 PROCEDURE — G0299 HHS/HOSPICE OF RN EA 15 MIN: HCPCS

## 2023-08-09 NOTE — CASE COMMUNICATION
Continue Occupational Therapy 2w2 1w2 starting 8.8.23. OT to include ADL Toilet and Bed transfer training and for ADL Bathing training and UB ther ex to increase safety and independence during self care tasks. Instructed patient and caregiver on OT plan of care and frequency with good understanding demonstrated by patient and caregiver.

## 2023-08-09 NOTE — TELEPHONE ENCOUNTER
Returned call to patient who called to ask which pharmacy medication order was sent. Instructed pat it was sent to [facility] in [address]. Patient verbalized understanding.

## 2023-08-09 NOTE — PHYSICAL THERAPY NOTE
Physical Therapy Discharge Summary    Pt made steady progressive gains in strength and functional mobility during her rehab stay. Pt will return home at Mod I level using RW for transfers and ambulation at this time. Pictures, measurements of home setup reviewed with pt/family. Practiced transfers in/out of high bed, recliner chair, sidestepping through doorway for access to bathroom. Pt has met all functional goals to return home safely and will continue with home therapy services to maximize her [place] with goal to walk around home w/o device.

## 2023-08-09 NOTE — TELEPHONE ENCOUNTER
Returned call to patient communicating Dr. Keisha Clayton message for new medication order, Aldactazide (spinonolactone-hctz) 25-25 mg daily & sent to pharmacy. Also order for BMP in one week. Patient verbalized understanding.

## 2023-08-10 ENCOUNTER — HOME CARE VISIT (OUTPATIENT)
Dept: HOME HEALTH SERVICES | Facility: HOME HEALTHCARE | Age: 73
End: 2023-08-10
Payer: MEDICARE

## 2023-08-10 VITALS
SYSTOLIC BLOOD PRESSURE: 108 MMHG | OXYGEN SATURATION: 96 % | HEART RATE: 74 BPM | TEMPERATURE: 97.2 F | DIASTOLIC BLOOD PRESSURE: 64 MMHG | RESPIRATION RATE: 16 BRPM

## 2023-08-10 VITALS — HEART RATE: 108 BPM | DIASTOLIC BLOOD PRESSURE: 68 MMHG | SYSTOLIC BLOOD PRESSURE: 134 MMHG | OXYGEN SATURATION: 97 %

## 2023-08-10 PROCEDURE — G0152 HHCP-SERV OF OT,EA 15 MIN: HCPCS

## 2023-08-10 PROCEDURE — 10330064 WIPE, SKIN GEL PROT DRSNG (50/BX)

## 2023-08-10 PROCEDURE — 10330064 SALINE, IRR SOL STR 100ML (48/CS) MGM37

## 2023-08-10 PROCEDURE — 10330064 SPONGE, GAUZE 8PLY N/S 4"X4" (200/PK 20P

## 2023-08-10 PROCEDURE — 10330064 SYRINGE, CATH TIP FLAT STR 60CC (50/CS)

## 2023-08-10 NOTE — CASE COMMUNICATION
Ship to  Pt.  Home     Insurance __Medicare    Wound 1 x    Full x           Syringe 60cc [id number] __1  (for irrigating wound)    Saline 100ml [id number]___6 (for irrigating wound)    Gauze 4x4 N/S 200 4x4s per unit  [id number] ___2    Adapt skin barrier no sting wipes 50 per box  [id number] __1

## 2023-08-11 ENCOUNTER — HOME CARE VISIT (OUTPATIENT)
Dept: HOME HEALTH SERVICES | Facility: HOME HEALTHCARE | Age: 73
End: 2023-08-11
Payer: MEDICARE

## 2023-08-11 VITALS
OXYGEN SATURATION: 97 % | RESPIRATION RATE: 20 BRPM | SYSTOLIC BLOOD PRESSURE: 132 MMHG | HEART RATE: 76 BPM | TEMPERATURE: 97.5 F | DIASTOLIC BLOOD PRESSURE: 76 MMHG

## 2023-08-11 PROCEDURE — G0151 HHCP-SERV OF PT,EA 15 MIN: HCPCS

## 2023-08-11 PROCEDURE — G0299 HHS/HOSPICE OF RN EA 15 MIN: HCPCS

## 2023-08-11 NOTE — CASE COMMUNICATION
PT home evaluation visit completed on Friday 08/11/23    Pts home PT POC is 1wk1 and starting week of 08/13/23 2wk3 and 1wk2    Pts STG are to be able to get in and out of the home easier for appointments, to be able to get into bed without the A from family, to be able to get on and off of regular toilet in BR, to be able to stand longer in order to make a meal and do laundry. Pt lives with daugther in ranch style home with single S TE and ramp to enter. Pt currently uses RW for mobility. PLOF pt was able to use no DME in the home and use of rollator for community distances and longer distances. Pt reports no recent falls. Pt was (I) with mobility, ADLs and IADLS except for driving which family A with transportation. Pt is able to perform bed mobility sit->supine needing min to modAx1 for BLE to get on surface of mattress and min Ax1 for supine to sit. pt currentl y uses craftmatic bed and bedrail to A for OOB and to get to EOB. Pt able to perform sit<->stand transfers to and from raised surfaces needing min Ax1. pt able to ambulate short household distances with use of RW needing CGA. pt fatigues easily and has dec strength IN BLE hip flexion in order to clear BLE during swing phase of gait. (+)Shuffling gait pattern and ataxic/unsteady gait and movement pattern throughout upright mobility. P t would cont to benefit from skilled home PT services to maximize functional (I) and to dec caregiver burden. pt demonstrates moderate to minimal deficits during functional mobility and gait including dec endurance, high fall risk, dec balance, dec BLE strength, slow mobility,current use of RW for mobility and needs mod/min Ax1 for sit->supine, min Ax1 for supine->sit and min Ax1 for TT to and from raised surfaces. CGA for GT with use o f RW.     Thank you,  Drew Andersonelor  DPT

## 2023-08-13 ENCOUNTER — HOME CARE VISIT (OUTPATIENT)
Dept: HOME HEALTH SERVICES | Facility: HOME HEALTHCARE | Age: 73
End: 2023-08-13
Payer: MEDICARE

## 2023-08-13 PROCEDURE — G0180 MD CERTIFICATION HHA PATIENT: HCPCS | Performed by: STUDENT IN AN ORGANIZED HEALTH CARE EDUCATION/TRAINING PROGRAM

## 2023-08-13 NOTE — CASE COMMUNICATION
9243. Mariea Show Mariea Show Patient called into office regarding wound vac. States that her cannisters have been filling up and then leaking. She was able to change the cannister independently. Patient concerned that something was potentially wrong with her wound vac as this is the 3rd cannister that leaked. Discussed that if something was wrong with the vac machine, it would beep, alarm, or have pressures that fluctate randomly. Reports that vac at 125mmgh  without beeps or alarms. Patient to call back into office with any further issues with vac.

## 2023-08-14 ENCOUNTER — HOME CARE VISIT (OUTPATIENT)
Dept: HOME HEALTH SERVICES | Facility: HOME HEALTHCARE | Age: 73
End: 2023-08-14
Payer: MEDICARE

## 2023-08-14 ENCOUNTER — TELEPHONE (OUTPATIENT)
Dept: CARDIOLOGY CLINIC | Facility: CLINIC | Age: 73
End: 2023-08-14

## 2023-08-14 VITALS
SYSTOLIC BLOOD PRESSURE: 126 MMHG | TEMPERATURE: 98 F | HEART RATE: 88 BPM | OXYGEN SATURATION: 97 % | RESPIRATION RATE: 20 BRPM | DIASTOLIC BLOOD PRESSURE: 70 MMHG

## 2023-08-14 DIAGNOSIS — I50.32 CHRONIC DIASTOLIC HEART FAILURE (HCC): Primary | ICD-10-CM

## 2023-08-14 PROCEDURE — G0151 HHCP-SERV OF PT,EA 15 MIN: HCPCS

## 2023-08-14 PROCEDURE — G0300 HHS/HOSPICE OF LPN EA 15 MIN: HCPCS

## 2023-08-14 NOTE — TELEPHONE ENCOUNTER
Patient called stating she continues to have feet & ankle edema, making it difficult for her to walk. Stated that the Aldactazide (spironolactone-hctz) 25-25 mg daily did not help her feet/ankle edema. States feeling fatigued when ambulating to bathroom with edema. Denies abdominal bloating, SOB, or chest pain. Lays flat to sleep. Does not weigh daily. Visiting nurse this AM, BP-126/72. HR-88. Stated daughter cooks for her & does not add salt to food. Drinks approximately 48 oz of fluid/day. Daily Cardiac Medications:  Spironolactone-hctz 25-25 mg daily. Amiodarone 200 mg daily. Diltiazem 360 mg daily. Metoprolol Succinate 25 mg daily. Apixaban (Eliquis) 5 mg  BID. Has a hospital follow up OV 8/17 with Sveta Seo, [address] [place] [address]. Please advise.

## 2023-08-15 ENCOUNTER — HOME CARE VISIT (OUTPATIENT)
Dept: HOME HEALTH SERVICES | Facility: HOME HEALTHCARE | Age: 73
End: 2023-08-15
Payer: MEDICARE

## 2023-08-15 VITALS — OXYGEN SATURATION: 97 % | HEART RATE: 70 BPM | DIASTOLIC BLOOD PRESSURE: 62 MMHG | SYSTOLIC BLOOD PRESSURE: 128 MMHG

## 2023-08-15 PROCEDURE — G0152 HHCP-SERV OF OT,EA 15 MIN: HCPCS

## 2023-08-15 RX ORDER — FUROSEMIDE 20 MG/1
20 TABLET ORAL 2 TIMES DAILY
Qty: 60 TABLET | Refills: 3 | Status: SHIPPED | OUTPATIENT
Start: 2023-08-15 | End: 2023-08-17 | Stop reason: SDUPTHER

## 2023-08-15 NOTE — TELEPHONE ENCOUNTER
Stop Aldactazide  Start lasix 20 mg PO BID  Sent to pharmacy - [facility]  BMP 1 week  Dose adjustment if needed when she sees Lore.

## 2023-08-16 ENCOUNTER — APPOINTMENT (OUTPATIENT)
Dept: LAB | Age: 73
End: 2023-08-16
Payer: MEDICARE

## 2023-08-16 ENCOUNTER — HOME CARE VISIT (OUTPATIENT)
Dept: HOME HEALTH SERVICES | Facility: HOME HEALTHCARE | Age: 73
End: 2023-08-16
Payer: MEDICARE

## 2023-08-16 VITALS
SYSTOLIC BLOOD PRESSURE: 134 MMHG | HEART RATE: 100 BPM | OXYGEN SATURATION: 99 % | RESPIRATION RATE: 20 BRPM | DIASTOLIC BLOOD PRESSURE: 62 MMHG | TEMPERATURE: 98 F

## 2023-08-16 DIAGNOSIS — D64.9 ANEMIA, UNSPECIFIED TYPE: ICD-10-CM

## 2023-08-16 DIAGNOSIS — E87.6 HYPOPOTASSEMIA: ICD-10-CM

## 2023-08-16 LAB
ANION GAP SERPL CALCULATED.3IONS-SCNC: 9 MMOL/L
BUN SERPL-MCNC: 16 MG/DL (ref 5–25)
CALCIUM SERPL-MCNC: 8.4 MG/DL (ref 8.3–10.1)
CHLORIDE SERPL-SCNC: 107 MMOL/L (ref 96–108)
CO2 SERPL-SCNC: 27 MMOL/L (ref 21–32)
CREAT SERPL-MCNC: 0.72 MG/DL (ref 0.6–1.3)
ERYTHROCYTE [DISTWIDTH] IN BLOOD BY AUTOMATED COUNT: 20.8 % (ref 11.6–15.1)
GFR SERPL CREATININE-BSD FRML MDRD: 83 ML/MIN/1.73SQ M
GLUCOSE P FAST SERPL-MCNC: 99 MG/DL (ref 65–99)
HCT VFR BLD AUTO: 34.3 % (ref 34.8–46.1)
HGB BLD-MCNC: 10.2 G/DL (ref 11.5–15.4)
MCH RBC QN AUTO: 26.9 PG (ref 26.8–34.3)
MCHC RBC AUTO-ENTMCNC: 29.7 G/DL (ref 31.4–37.4)
MCV RBC AUTO: 91 FL (ref 82–98)
PLATELET # BLD AUTO: 396 THOUSANDS/UL (ref 149–390)
PMV BLD AUTO: 9.7 FL (ref 8.9–12.7)
POTASSIUM SERPL-SCNC: 3.4 MMOL/L (ref 3.5–5.3)
RBC # BLD AUTO: 3.79 MILLION/UL (ref 3.81–5.12)
SODIUM SERPL-SCNC: 143 MMOL/L (ref 135–147)
WBC # BLD AUTO: 5.88 THOUSAND/UL (ref 4.31–10.16)

## 2023-08-16 PROCEDURE — 80048 BASIC METABOLIC PNL TOTAL CA: CPT

## 2023-08-16 PROCEDURE — G0299 HHS/HOSPICE OF RN EA 15 MIN: HCPCS

## 2023-08-16 PROCEDURE — 85027 COMPLETE CBC AUTOMATED: CPT

## 2023-08-16 PROCEDURE — 36415 COLL VENOUS BLD VENIPUNCTURE: CPT

## 2023-08-16 NOTE — PROGRESS NOTES
Cardiology Follow Up    Tanya Chavarria  1950  [id number]  [facility]  [address]  [address] [id number] [phone]    1. Chronic diastolic heart failure (HCC)  furosemide (LASIX) 20 mg tablet    potassium chloride (K-DUR,KLOR-CON) 20 mEq tablet    Basic metabolic panel      2. Permanent atrial fibrillation ([address])        3. Primary hypertension        4. Other iron deficiency anemia  ferrous sulfate 325 (65 Fe) mg tablet          Interval History:   Ms Lauren Arboleda was admitted to [address] [place] [address] on 6/18 - 7/04 with bowel perforation and internal hernia hx of arlene en y gastric bypass. SP exp lap, resection of J-J anastomosis, segmental ilium resection bowel anastomosis x 3, vicryl bridging mesh. Started on Amiodarone load during this hospitalization. 7/18 - 7/24/23 with open abdominal wound. She presented to the emergency room with midline wound to areas of dehiscence. Eploratory lap due to bowel perforation and ventral hernia. On 7/18 taken to the OR for wound debridement, 7/19 return to OR for abdominal washout and VAC application partial closure of the abdominal wound. Kira Reis was discharged to [facility] on 7/24- 8/05/23.  + for COVID on 7/24. then negative 4 days later. She was discharged home. 8/07/23 Kira Reis called our office with complaints of  increased swelling of her ankles and feet. Spironolactone HCT 25-25 daily started BMP in a week, follow up in our office     8/14/23 Kira Reis called our office for continued feet and ankle edema, Aldactazide was stopped Lasix 20 mg p.o. twice daily BMP in 1 week follow-up in our office    8/16/23 sodium 143, Potassium 3.4 BUN 16, creat 0.72.       Ms Lauren Arboleda presents to our office for a follow up visit and evaluation after starting Lasix. She is accompanied by her son. Kira Reis is sitting in a wheelchair and continues with abdominal wound vac.   She took Lasix 20mg today, the first dose and has noticed an improvement in her LE edema. Nadia Mohan denies chest pain palpitations lightheadedness dizziness dyspnea with minimal exertion. She has VNA and is waiting for help such as an aid to come into her home to assist her with bathing.       Medical History   Primary Cardiologist Dr Maxx Phillips fibrillation previously on Warfarin now on Eliquis for stroke prevention. Morbid Obesity   GILDARDO on  CPAP   OA maria dolores Knees   Denies Hx of tobacco use   Sedentary lifestyle  Anemia Hgb 8.1 up to 10     10/31/22 TTE:  Left ventricle cavity size normal wall thickness mildly increased mild concentric hypertrophy. LVEF 55%. RV mildly dilated systolic function normal.  Left atrium mildly dilated. Right atrium mildly dilated, trace MR, Trace TR.     Patient Active Problem List   Diagnosis   • Closed fracture of right distal radius and ulna   • Malignant tumor of right kidney parenchyma ([facility])   • Primary osteoarthritis of left knee   • Primary osteoarthritis of right knee   • Chronic pain of left knee   • Myalgia   • Chest wall tenderness   • Chronic heart failure ([facility])   • Permanent atrial fibrillation ([facility])   • Cervical spondylosis without myelopathy   • Arthropathy of cervical facet joint   • Occipital neuralgia of right side   • Status post partial thyroidectomy   • Myofascial pain syndrome   • Hashimoto's thyroiditis   • Early menopause occurring in patient age younger than 39 years   • S/P bariatric surgery   • Osteoporosis   • Hyperparathyroidism ([address])   • Reactive hypoglycemia   • Vitamin D deficiency   • Unsteady gait   • Dyspnea on exertion   • SIRS (systemic inflammatory response syndrome) ([facility])   • Incontinence   • GILDARDO (obstructive sleep apnea)   • Morbid obesity with BMI of 50.0-59.9, adult ([facility])   • Hypertension   • Stage 3 chronic kidney disease, unspecified whether stage 3a or 3b CKD ([facility])   • Primary osteoarthritis of both knees   • Idiopathic acute pancreatitis without infection or necrosis   • Bowel perforation (HCC)   • Acute encephalopathy   • Anxiety   • Diarrhea   • Hypokalemia   • Anemia   • Open abdominal wall wound   • Iron deficiency anemia   • Hypotension   • Ambulatory dysfunction   • COVID   • Mood disorder (HCC)   • Encounter for skin care     Past Medical History:   Diagnosis Date   • A-fib [facility])    • Arthritis    • Atrial fibrillation (HCC)    • Cervical spondylosis with myelopathy    • Gastric bypass status for obesity    • History of transfusion     35 years ago   • Hypertension    • mass right kidney    • Renal cell adenocarcinoma (HCC)     RIGHT   • Sleep apnea    • Super obese      Social History     Socioeconomic History   • Marital status:      Spouse name: Not on file   • Number of children: Not on file   • Years of education: Not on file   • Highest education level: Not on file   Occupational History   • Not on file   Tobacco Use   • Smoking status: Never   • Smokeless tobacco: Never   Vaping Use   • Vaping Use: Never used   Substance and Sexual Activity   • Alcohol use: Not Currently     Alcohol/week: 0.0 standard drinks of alcohol     Comment: 0   • Drug use: No   • Sexual activity: Not on file   Other Topics Concern   • Not on file   Social History Narrative    ** Merged History Encounter **          Social Determinants of Health     Financial Resource Strain: Not on file   Food Insecurity: No Food Insecurity (7/19/2023)    Hunger Vital Sign    • Worried About Running Out of Food in the Last Year: Never true    • Ran Out of Food in the Last Year: Never true   Transportation Needs: No Transportation Needs (7/19/2023)    PRAPARE - Transportation    • Lack of Transportation (Medical): No    • Lack of Transportation (Non-Medical):  No   Physical Activity: Not on file   Stress: Not on file   Social Connections: Not on file   Intimate Partner Violence: Not on file   Housing Stability: Low Risk  (7/19/2023)    Housing Stability Vital Sign    • Unable to Pay for Housing in the Last Year: No    • Number of Places Lived in the Last Year: 1    • Unstable Housing in the Last Year: No      Family History   Problem Relation Age of Onset   • Heart attack Mother    • Lung cancer Father 76   • Heart disease Father    • Stroke Sister    • Lung cancer Sister 64   • Prostate cancer Brother 61     Past Surgical History:   Procedure Laterality Date   • ABDOMINAL ADHESION SURGERY N/A 6/19/2023    Procedure: LYSIS ADHESIONS;  Surgeon: Laura Valencia DO;  [facility];  Service: General   • ABDOMINAL WALL SURGERY N/A 7/19/2023    Procedure: PARTIAL CLOSURE WOUND ABDOMINAL/TRUNK;  Surgeon: Kori Hernández DO;  [facility];  Service: General   • APPENDECTOMY N/A 6/19/2023    Procedure: APPENDECTOMY;  Surgeon: Laura Valencia DO;  [facility];  Service: General   • CHOLECYSTECTOMY     • COLOSTOMY     • CRYOABLATION Right     RT KIDNEY   • CYSTORRHAPHY      Bladder.  Last assessed 4/6/2016    • EXPLORATORY LAPAROTOMY W/ BOWEL RESECTION N/A 6/19/2023    Procedure: LAPAROTOMY EXPLORATORY W/ BOWEL RESECTION;  Surgeon: Sarina Martin MD;  [facility];  Service: General   • GASTRIC BYPASS     • HERNIA REPAIR      Last assessed 4/6/2016    • HYSTERECTOMY     • LAPAROTOMY N/A 6/19/2023    Procedure: LAPAROTOMY EXPLORATORY, washout, vac change;  Surgeon: Laura Valencia DO;  [facility];  Service: General   • LAPAROTOMY N/A 6/20/2023    Procedure: LAPAROTOMY, REMOVAL OF WOUND VAC AND PACKING, SMALL BOWEL RESECTION , BOWEL ANASTOMOSIS X 3 , VICRYL MESH BRIDGING -;  Surgeon: Laura Valencia DO;  [facility];  Service: General   • OK OPTX DSTL RADL X-ARTIC FX/EPIPHYSL SEP Right 4/22/2016    Procedure: OPEN REDUCTION INTERNAL FIXATION RIGHT DISTAL RADIUS;  Surgeon: Tony Cartagena MD;  [facility];  Service: Orthopedics   • REVISION COLOSTOMY     • SMALL INTESTINE SURGERY N/A 6/19/2023 Procedure: RESECTION SMALL BOWEL;  Surgeon: Ck Rodrigues DO;  [facility];  Service: General   • THYROID LOBECTOMY Left 8/27/2019    Procedure: LOBECTOMY THYROID, left;  Surgeon: Gerry Rico MD;  [facility];  Service: Surgical Oncology   • TUBAL LIGATION     • US GUIDED THYROID BIOPSY  2/27/2019   • US GUIDED THYROID BIOPSY  5/29/2019   • WOUND DEBRIDEMENT N/A 7/18/2023    Procedure: DEBRIDEMENT WOUND [facility] OUT);   Surgeon: Radha Carlos DO;  [facility];  Service: Trauma   • WOUND DEBRIDEMENT N/A 7/19/2023    Procedure: ABDOMINAL WOUND ([address]); VAC APPLICATION;  Surgeon: Román Ching DO;  [facility];  Service: General       Current Outpatient Medications:   •  acetaminophen (TYLENOL) 325 mg tablet, Take 975 mg by mouth every 6 (six) hours as needed for mild pain or moderate pain, Disp: , Rfl:   •  amiodarone 200 mg tablet, Take 1 tablet (200 mg total) by mouth daily with breakfast, Disp: 30 tablet, Rfl: 0  •  apixaban (Eliquis) 5 mg, Take 1 tablet (5 mg total) by mouth 2 (two) times a day, Disp: 60 tablet, Rfl: 11  •  calcium carbonate (OYSTER SHELL,OSCAL) 500 mg, Take 1 tablet by mouth daily with breakfast, Disp: 30 tablet, Rfl: 0  •  cyanocobalamin (VITAMIN B-12) 1000 MCG tablet, Take 1 tablet (1,000 mcg total) by mouth daily, Disp: 30 tablet, Rfl: 0  •  diltiazem (CARDIZEM CD) 360 MG 24 hr capsule, Take 1 capsule (360 mg total) by mouth daily, Disp: 90 capsule, Rfl: 3  •  ergocalciferol (VITAMIN D2) 50,000 units, Take 50,000 Units by mouth once a week Takes D3 , Disp: , Rfl:   •  ferrous sulfate 325 (65 Fe) mg tablet, Take 1 tablet (325 mg total) by mouth daily with breakfast, Disp: 30 tablet, Rfl: 0  •  FLUoxetine (PROzac) 20 mg capsule, Take 1 capsule (20 mg total) by mouth 2 (two) times a day, Disp: 60 capsule, Rfl: 0  •  furosemide (LASIX) 20 mg tablet, Take 1 tablet (20 mg total) by mouth 2 (two) times a day, Disp: 60 tablet, Rfl: 3  •  melatonin 3 mg, Take 2 tablets (6 mg total) by mouth daily at bedtime, Disp: , Rfl: 0  •  metoprolol succinate (TOPROL-XL) 25 mg 24 hr tablet, Take 1 tablet (25 mg total) by mouth daily, Disp: 90 tablet, Rfl: 3  •  Probiotic Product (PRO-BIOTIC BLEND PO), Take by mouth, Disp: , Rfl:   •  psyllium (METAMUCIL) packet, Take 1 packet by mouth daily, Disp: 30 packet, Rfl: 0    Current Facility-Administered Medications:   •  denosumab (PROLIA) subcutaneous injection 60 mg, 60 mg, Subcutaneous, Q6 Months, Ross Bridges MD, 60 mg at 01/04/21 1157  No Known Allergies    Labs:  Appointment on 08/16/2023   Component Date Value   • Sodium 08/16/2023 143    • Potassium 08/16/2023 3.4 (L)    • Chloride 08/16/2023 107    • CO2 08/16/2023 27    • ANION GAP 08/16/2023 9    • BUN 08/16/2023 16    • Creatinine 08/16/2023 0.72    • Glucose, Fasting 08/16/2023 99    • Calcium 08/16/2023 8.4    • eGFR 08/16/2023 83    • WBC 08/16/2023 5.88    • RBC 08/16/2023 3.79 (L)    • Hemoglobin 08/16/2023 10.2 (L)    • Hematocrit 08/16/2023 34.3 (L)    • MCV 08/16/2023 91    • MCH 08/16/2023 26.9    • MCHC 08/16/2023 29.7 (L)    • RDW 08/16/2023 20.8 (H)    • Platelets 48/08/6229 396 (H)    • MPV 08/16/2023 9.7    Admission on 07/24/2023, Discharged on 08/05/2023   Component Date Value   • WBC 07/26/2023 9.19    • RBC 07/26/2023 3.02 (L)    • Hemoglobin 07/26/2023 7.9 (L)    • Hematocrit 07/26/2023 26.7 (L)    • MCV 07/26/2023 88    • MCH 07/26/2023 26.2 (L)    • MCHC 07/26/2023 29.6 (L)    • RDW 07/26/2023 18.1 (H)    • MPV 07/26/2023 9.1    • Platelets 09/27/8277 344    • Sodium 07/26/2023 139    • Potassium 07/26/2023 3.4 (L)    • Chloride 07/26/2023 109 (H)    • CO2 07/26/2023 24    • ANION GAP 07/26/2023 6    • BUN 07/26/2023 14    • Creatinine 07/26/2023 0.60    • Glucose 07/26/2023 115    • Calcium 07/26/2023 8.0 (L)    • eGFR 07/26/2023 90    • Segmented % 07/26/2023 84 (H)    • Bands % 07/26/2023 1    • Lymphocytes % 07/26/2023 4 (L)    • Monocytes % 07/26/2023 9    • Eosinophils, % 07/26/2023 1    • Basophils % 07/26/2023 0    • Myelocytes % 07/26/2023 1    • Absolute Neutrophils 07/26/2023 7.81 (H)    • Lymphocytes Absolute 07/26/2023 0.37 (L)    • Monocytes Absolute 07/26/2023 0.83    • Eosinophils Absolute 07/26/2023 0.09    • Basophils Absolute 07/26/2023 0.00    • RBC Morphology 07/26/2023 Present    • Platelet Estimate 37/42/9763 Adequate    • Giant PLTs 07/26/2023 Present    • Polychromasia 07/26/2023 Present    • SARS-CoV-2 Ag 07/26/2023 Negative Presumptive    • SARS-CoV-2 Ag 07/28/2023 Negative Presumptive    • Color, UA 07/30/2023 Yellow    • Clarity, UA 07/30/2023 Turbid    • Specific Gravity, UA 07/30/2023 1.023    • pH, UA 07/30/2023 5.5    • Leukocytes, UA 07/30/2023 Negative    • Nitrite, UA 07/30/2023 Positive (A)    • Protein, UA 07/30/2023 Trace (A)    • Glucose, UA 07/30/2023 Negative    • Ketones, UA 07/30/2023 10 (1+) (A)    • Urobilinogen, UA 07/30/2023 <2.0    • Bilirubin, UA 07/30/2023 Negative    • Occult Blood, UA 07/30/2023 Negative    • RBC, UA 07/30/2023 None Seen    • WBC, UA 07/30/2023 4-10 (A)    • Epithelial Cells 07/30/2023 Moderate (A)    • Bacteria, UA 07/30/2023 Occasional    • MUCUS THREADS 07/30/2023 Moderate (A)    • Ca Oxalate Ziabel, UA 07/30/2023 Moderate (A)    • Fecal Occult Blood Diagn* 07/30/2023 Positive (A)    • Fecal Occult Blood Diagn* 07/31/2023 Positive (A)    • Fecal Occult Blood Diagn* 07/31/2023 Positive (A)    • Fecal Occult Blood Diagn* 07/31/2023 Test not performed    • WBC 07/31/2023 8.78    • RBC 07/31/2023 3.09 (L)    • Hemoglobin 07/31/2023 8.3 (L)    • Hematocrit 07/31/2023 27.2 (L)    • MCV 07/31/2023 88    • MCH 07/31/2023 26.9    • MCHC 07/31/2023 30.5 (L)    • RDW 07/31/2023 18.9 (H)    • MPV 07/31/2023 9.1    • Platelets 17/20/2075 444 (H)    • Sodium 07/31/2023 144    • Potassium 07/31/2023 2.8 (L)    • Chloride 07/31/2023 111 (H)    • CO2 07/31/2023 25    • ANION GAP 07/31/2023 8    • BUN 07/31/2023 14    • Creatinine 07/31/2023 0.75    • Glucose 07/31/2023 87    • Glucose, Fasting 07/31/2023 87    • Calcium 07/31/2023 8.2 (L)    • eGFR 07/31/2023 79    • Segmented % 07/31/2023 80 (H)    • Bands % 07/31/2023 1    • Lymphocytes % 07/31/2023 10 (L)    • Monocytes % 07/31/2023 5    • Eosinophils, % 07/31/2023 0    • Basophils % 07/31/2023 0    • Metamyelocytes% 07/31/2023 2 (H)    • Myelocytes % 07/31/2023 1    • Atypical Lymphocytes % 07/31/2023 1 (H)    • Absolute Neutrophils 07/31/2023 7.11    • Lymphocytes Absolute 07/31/2023 0.97    • Monocytes Absolute 07/31/2023 0.44    • Eosinophils Absolute 07/31/2023 0.00    • Basophils Absolute 07/31/2023 0.00    • RBC Morphology 07/31/2023 Present    • Platelet Estimate 41/44/4185 Increased (A)    • Anisocytosis 07/31/2023 Present    • Ovalocytes 07/31/2023 Present    • Poikilocytes 07/31/2023 Present    • Polychromasia 07/31/2023 Present    • Iron Saturation 07/31/2023 38    • TIBC 07/31/2023 186 (L)    • Iron 07/31/2023 70    • Ferritin 07/31/2023 188    • Sodium 08/01/2023 144    • Potassium 08/01/2023 3.1 (L)    • Chloride 08/01/2023 115 (H)    • CO2 08/01/2023 26    • ANION GAP 08/01/2023 3    • BUN 08/01/2023 15    • Creatinine 08/01/2023 0.61    • Glucose 08/01/2023 105    • Glucose, Fasting 08/01/2023 105 (H)    • Calcium 08/01/2023 8.2 (L)    • eGFR 08/01/2023 90    • WBC 08/02/2023 8.75    • RBC 08/02/2023 3.24 (L)    • Hemoglobin 08/02/2023 8.8 (L)    • Hematocrit 08/02/2023 29.9 (L)    • MCV 08/02/2023 92    • MCH 08/02/2023 27.2    • MCHC 08/02/2023 29.4 (L)    • RDW 08/02/2023 20.3 (H)    • MPV 08/02/2023 9.6    • Platelets 59/45/1158 417 (H)    • Sodium 08/02/2023 142    • Potassium 08/02/2023 3.9    • Chloride 08/02/2023 113 (H)    • CO2 08/02/2023 23    • ANION GAP 08/02/2023 6    • BUN 08/02/2023 14    • Creatinine 08/02/2023 0.67    • Glucose 08/02/2023 95    • Calcium 08/02/2023 8.2 (L)    • eGFR 08/02/2023 87    • Supplier Name 08/01/2023 [employer]    • Supplier Phone Number 08/01/2023 [phone]    • Order Status 08/01/2023 Delivery Successful    • Delivery Note 08/01/2023  LBS, NEEDS BARIATRIC COMMODE AGREEABLE TO UPCHARGE OF BARIATRIC COMMODE    • Delivery Request Date 08/01/2023 08/01/2023    • Date Delivered  08/01/2023 08/04/2023    • Item Description 08/01/2023 Therese Samayoa, Adult    • Item Description 08/01/2023 3 in 1 Commode    • WBC 08/03/2023 7.74    • RBC 08/03/2023 3.04 (L)    • Hemoglobin 08/03/2023 8.1 (L)    • Hematocrit 08/03/2023 27.5 (L)    • MCV 08/03/2023 91    • MCH 08/03/2023 26.6 (L)    • MCHC 08/03/2023 29.5 (L)    • RDW 08/03/2023 20.1 (H)    • MPV 08/03/2023 8.8 (L)    • Platelets 05/67/9347 406 (H)    • Sodium 08/03/2023 143    • Potassium 08/03/2023 4.1    • Chloride 08/03/2023 112 (H)    • CO2 08/03/2023 25    • ANION GAP 08/03/2023 6    • BUN 08/03/2023 14    • Creatinine 08/03/2023 0.68    • Glucose 08/03/2023 91    • Calcium 08/03/2023 8.1 (L)    • eGFR 08/03/2023 87    • Segmented % 08/03/2023 77 (H)    • Lymphocytes % 08/03/2023 13 (L)    • Monocytes % 08/03/2023 6    • Eosinophils, % 08/03/2023 3    • Basophils % 08/03/2023 0    • Atypical Lymphocytes % 08/03/2023 1 (H)    • Absolute Neutrophils 08/03/2023 5.96    • Lymphocytes Absolute 08/03/2023 1.08    • Monocytes Absolute 08/03/2023 0.46    • Eosinophils Absolute 08/03/2023 0.23    • Basophils Absolute 08/03/2023 0.00    • RBC Morphology 08/03/2023 Present    • Platelet Estimate 94/11/1391 Adequate    • Anisocytosis 08/03/2023 Present    • Ovalocytes 08/03/2023 Present    • Poikilocytes 08/03/2023 Present    • Polychromasia 08/03/2023 Present    • Schistocytes 08/03/2023 Present    • Tear Drop Cells 08/03/2023 Present    Hospital Outpatient Visit on 08/01/2023   Component Date Value   • Case Report 08/01/2023                      Value:Surgical Pathology Report                         Case: [id number] Authorizing Provider:  Kenny Hedrick MD         Collected:           08/01/2023 1629              Ordering Location:     [facility]      Received:            08/02/2023 [address] [facility]                                                           Pathologist:           Bria Tobias MD                                                           Specimen:    Polyp, Colorectal, transverse polyp x3, cold snare                                        • Final Diagnosis 08/01/2023                      Value: This result contains rich text formatting which cannot be displayed here. • Additional Information 08/01/2023                      Value: This result contains rich text formatting which cannot be displayed here. • Synoptic Checklist 08/01/2023                      Value:                            COLON/RECTUM POLYP FORM - GI - A                                                                                     :    Adenoma(s)     • Gross Description 08/01/2023                      Value: This result contains rich text formatting which cannot be displayed here. No results displayed because visit has over 200 results.       Lab Requisition on 07/17/2023   Component Date Value   • WBC 07/17/2023 10.39 (H)    • RBC 07/17/2023 3.05 (L)    • Hemoglobin 07/17/2023 8.2 (L)    • Hematocrit 07/17/2023 27.8 (L)    • MCV 07/17/2023 91    • MCH 07/17/2023 26.9    • MCHC 07/17/2023 29.5 (L)    • RDW 07/17/2023 18.8 (H)    • Platelets 72/61/6711 320    • MPV 07/17/2023 9.4    • Sodium 07/17/2023 138    • Potassium 07/17/2023 4.6    • Chloride 07/17/2023 106    • CO2 07/17/2023 24    • ANION GAP 07/17/2023 8    • BUN 07/17/2023 14    • Creatinine 07/17/2023 0.73    • Glucose 07/17/2023 78    • Calcium 07/17/2023 7.6 (L)    • Corrected Calcium 07/17/2023 9.0    • AST 07/17/2023 14    • ALT 07/17/2023 8    • Alkaline Phosphatase 07/17/2023 114 (H)    • Total Protein 07/17/2023 4.3 (L)    • Albumin 07/17/2023 2.3 (L)    • Total Bilirubin 07/17/2023 0.38    • eGFR 07/17/2023 81    Lab Requisition on 07/13/2023   Component Date Value   • WBC 07/13/2023 10.32 (H)    • RBC 07/13/2023 3.26 (L)    • Hemoglobin 07/13/2023 8.7 (L)    • Hematocrit 07/13/2023 29.1 (L)    • MCV 07/13/2023 89    • MCH 07/13/2023 26.7 (L)    • MCHC 07/13/2023 29.9 (L)    • RDW 07/13/2023 17.4 (H)    • Platelets 49/16/6955 470 (H)    • MPV 07/13/2023 9.3    • Sodium 07/13/2023 139    • Potassium 07/13/2023 3.4 (L)    • Chloride 07/13/2023 102    • CO2 07/13/2023 26    • ANION GAP 07/13/2023 11    • BUN 07/13/2023 22    • Creatinine 07/13/2023 0.80    • Glucose 07/13/2023 98    • Calcium 07/13/2023 7.6 (L)    • Corrected Calcium 07/13/2023 9.0    • AST 07/13/2023 19    • ALT 07/13/2023 13    • Alkaline Phosphatase 07/13/2023 124 (H)    • Total Protein 07/13/2023 4.5 (L)    • Albumin 07/13/2023 2.3 (L)    • Total Bilirubin 07/13/2023 0.28    • eGFR 07/13/2023 73    • TIBC 07/13/2023 155 (L)    • Iron 07/13/2023 21 (L)    • Ferritin 07/13/2023 36    Lab Requisition on 07/10/2023   Component Date Value   • WBC 07/10/2023 8.49    • RBC 07/10/2023 3.36 (L)    • Hemoglobin 07/10/2023 8.9 (L)    • Hematocrit 07/10/2023 29.9 (L)    • MCV 07/10/2023 89    • MCH 07/10/2023 26.5 (L)    • MCHC 07/10/2023 29.8 (L)    • RDW 07/10/2023 17.2 (H)    • Platelets 37/53/3843 540 (H)    • MPV 07/10/2023 9.3    • Sodium 07/10/2023 138    • Potassium 07/10/2023 3.3 (L)    • Chloride 07/10/2023 103    • CO2 07/10/2023 23    • ANION GAP 07/10/2023 12    • BUN 07/10/2023 25    • Creatinine 07/10/2023 0.82    • Glucose 07/10/2023 98    • Calcium 07/10/2023 7.3 (L)    • Corrected Calcium 07/10/2023 8.6    • AST 07/10/2023 19    • ALT 07/10/2023 10    • Alkaline Phosphatase 07/10/2023 107 (H)    • Total Protein 07/10/2023 4.8 (L)    • Albumin 07/10/2023 2.4 (L)    • Total Bilirubin 07/10/2023 0.40    • eGFR 07/10/2023 71    Lab Requisition on 07/08/2023   Component Date Value   • SARS-CoV-2 07/08/2023 Negative    • INFLUENZA A PCR 07/08/2023 Negative    • INFLUENZA B PCR 07/08/2023 Negative    • RSV PCR 07/08/2023 Negative    Lab Requisition on 07/07/2023   Component Date Value   • WBC 07/07/2023 7.03    • RBC 07/07/2023 3.11 (L)    • Hemoglobin 07/07/2023 8.4 (L)    • Hematocrit 07/07/2023 27.3 (L)    • MCV 07/07/2023 88    • MCH 07/07/2023 27.0    • MCHC 07/07/2023 30.8 (L)    • RDW 07/07/2023 16.9 (H)    • Platelets 47/55/5567 578 (H)    • MPV 07/07/2023 9.3    • Sodium 07/07/2023 138    • Potassium 07/07/2023 3.5    • Chloride 07/07/2023 103    • CO2 07/07/2023 25    • ANION GAP 07/07/2023 10    • BUN 07/07/2023 19    • Creatinine 07/07/2023 0.72    • Glucose 07/07/2023 89    • Calcium 07/07/2023 7.5 (L)    • Corrected Calcium 07/07/2023 8.7    • AST 07/07/2023 15    • ALT 07/07/2023 10    • Alkaline Phosphatase 07/07/2023 100    • Total Protein 07/07/2023 4.8 (L)    • Albumin 07/07/2023 2.5 (L)    • Total Bilirubin 07/07/2023 0.37    • eGFR 07/07/2023 83    Lab Requisition on 07/06/2023   Component Date Value   • SARS-CoV-2 07/06/2023 Negative    • INFLUENZA A PCR 07/06/2023 Negative    • INFLUENZA B PCR 07/06/2023 Negative    • RSV PCR 07/06/2023 Negative    • WBC 07/06/2023 8.10    • RBC 07/06/2023 3.07 (L)    • Hemoglobin 07/06/2023 8.3 (L)    • Hematocrit 07/06/2023 27.3 (L)    • MCV 07/06/2023 89    • MCH 07/06/2023 27.0    • MCHC 07/06/2023 30.4 (L)    • RDW 07/06/2023 17.2 (H)    • Platelets 54/78/6349 597 (H)    • MPV 07/06/2023 9.1    • Sodium 07/06/2023 137    • Potassium 07/06/2023 3.4 (L)    • Chloride 07/06/2023 103    • CO2 07/06/2023 25    • ANION GAP 07/06/2023 9    • BUN 07/06/2023 19    • Creatinine 07/06/2023 0.80    • Glucose 07/06/2023 94    • Calcium 07/06/2023 7.5 (L)    • Corrected Calcium 07/06/2023 8.6    • AST 07/06/2023 15    • ALT 07/06/2023 9    • Alkaline Phosphatase 07/06/2023 102    • Total Protein 07/06/2023 4.9 (L)    • Albumin 07/06/2023 2.6 (L)    • Total Bilirubin 07/06/2023 0.42    • eGFR 07/06/2023 73    • Lipase 07/06/2023 25    There may be more visits with results that are not included. Imaging: Colonoscopy    Result Date: 8/1/2023  Narrative: Table formatting from the original result was not included. [address] [place] [address] Endoscopy [address] [facility] [address] [phone] DATE OF SERVICE: 8/01/23 PHYSICIAN(S): Attending: Melvi Guo MD Fellow: No Staff Documented INDICATION: Blood per rectum, Iron deficiency anemia, unspecified iron deficiency anemia type POST-OP DIAGNOSIS: See the impression below. HISTORY: Prior colonoscopy: No prior colonoscopy. BOWEL PREPARATION: Golytely/Colyte/Trilyte PREPROCEDURE: Informed consent was obtained for the procedure, including sedation. Risks including but not limited to bleeding, infection, perforation, adverse drug reaction and aspiration were explained in detail. Also explained about less than 100% sensitivity with the exam and other alternatives. The patient was placed in the left lateral decubitus position. Procedure: Colonoscopy DETAILS OF PROCEDURE: Patient was taken to the procedure room where a time out was performed to confirm correct patient and correct procedure. The patient underwent monitored anesthesia care, which was administered by an anesthesia professional. The patient's blood pressure, heart rate, level of consciousness, oxygen, respirations and ECG were monitored throughout the procedure. A digital rectal exam was performed. The scope was introduced through the anus and advanced to the cecum. Retroflexion was performed in the rectum. The quality of bowel preparation was evaluated using the [facility] Bowel Preparation Scale with scores of: right colon = 1, transverse colon = 2, left colon = 2. The total BBPS score was 5. Bowel prep was not adequate. The patient experienced no blood loss. The procedure was not difficult.  The patient tolerated the procedure well. There were no apparent adverse events. ANESTHESIA INFORMATION: ASA: III Anesthesia Type: IV Sedation with Anesthesia MEDICATIONS: No administrations occurring from 1616 to 1636 on 08/01/23 FINDINGS: The terminal ileum appeared normal. Three sessile polyps measuring 5-9 mm in the transverse colon; completely removed en bloc by cold snare and retrieved specimen Poor preparation in the right making visualization difficult despite aggressive lavage. There were no large polyps or masses identified, however adequate polyp surveillance was unable to be performed. Few small, scattered diverticula in the sigmoid colon Internal small hemorrhoids. Seen on retroflexion. History of surgery in the rectum identified. Anastomosis was healthy. EVENTS: Procedure Events Event Event Time ENDO CECUM REACHED 8/1/2023  4:27 PM ENDO SCOPE OUT TIME 8/1/2023  4:34 PM SPECIMENS: ID Type Source Tests Collected by Time Destination 1 : transverse polyp x3, cold snare Tissue Polyp, Colorectal TISSUE EXAM Elveria Merlin, MD 8/1/2023  4:29 PM  EQUIPMENT: Colonoscope -[id number]     Impression: The terminal ileum appeared normal. 3 subcentimeter polyps in the transverse colon were removed with cold snare Scattered diverticulosis in the sigmoid colon Small hemorrhoids RECOMMENDATION: Repeat colonoscopy in 6 months (there were polyps that were not removed in the rectum due to concern for rectal bleeding and likely other polyps not removed in setting of inadequate preparation) No rectal bleeding identified but could be secondary to diverticulosis / hemorrhoids. Elveria Merlin, MD     US bedside procedure    Result Date: 7/19/2023  Narrative: [id number]. [id number]      Review of Systems:  Review of Systems   Constitutional: Positive for fatigue. Musculoskeletal: Positive for arthralgias, gait problem and myalgias. Skin: Positive for wound.         Abdominal wound with wound VAV Physical Exam:  Physical Exam  Vitals reviewed. Constitutional:       Appearance: She is obese. Cardiovascular:      Rate and Rhythm: Normal rate. Rhythm irregular. Pulses: Normal pulses. Heart sounds: Normal heart sounds. Pulmonary:      Effort: Pulmonary effort is normal.      Breath sounds: Normal breath sounds. Musculoskeletal:         General: Normal range of motion. Cervical back: Normal range of motion and neck supple. Right lower leg: Edema present. Left lower leg: Edema present. Comments: 1+ - 2+ Zac LE Edema    Skin:     General: Skin is warm and dry. Capillary Refill: Capillary refill takes less than 2 seconds. Neurological:      General: No focal deficit present. Mental Status: She is alert and oriented to person, place, and time. Psychiatric:         Mood and Affect: Mood normal.         Behavior: Behavior normal.         Discussion/Summary:  # Chronic HFpEF LVEF 60% NYHA Class III stage C weight 274 pounds. HR and BP controlled. LE edema improved on Lasix 20mg today. Instructed on Lasix 20mg this pm and tomorrow start daily until LE edema resolved. Instructed to start K dur 20meq daily. Once LE edema resolved take Lasix 20mg daily PRN LE edema and K dur PRN taking Lasix. 2gm sodium diet, daily weights. Continue on Metoprolol succinate 25mg daily and Cardizem CD 320mg daily. BMP in one week  # Permanent Atrial Fibrillation [id number] Continue on Eliquis 5mg BID for stroke prevention, Amiodarone 200mg daily discuss with Dr Evette Samson with next office visit for continuation vs stopping. .   Continue on Metoprolol succinate 25mg daily and Cardizem CD 320mg daily  # Hypertension controlled on Metoprolol succinate 25mg daily and Cardizem CD 320mg daily  # Anemia Hgb10.2 on 8/16/23 resume Ferrous Sulfate 325mg daily

## 2023-08-16 NOTE — TELEPHONE ENCOUNTER
Returned call to patient communicating Dr [employer]. Patient stated she already did stop the Aldactazide & vitamins she was taking, except for a multi vitamin. Advise starting the prescribed Lasix 20 mg today. Stated she will have daughter  from pharmacy & start. Stated she is looking forward to office visit tomorrow, 8/17, with Seward Frankel, [address] [place] [address]. Patient verbalized understanding.

## 2023-08-17 ENCOUNTER — HOME CARE VISIT (OUTPATIENT)
Dept: HOME HEALTH SERVICES | Facility: HOME HEALTHCARE | Age: 73
End: 2023-08-17
Payer: MEDICARE

## 2023-08-17 ENCOUNTER — OFFICE VISIT (OUTPATIENT)
Dept: CARDIOLOGY CLINIC | Facility: CLINIC | Age: 73
End: 2023-08-17
Payer: MEDICARE

## 2023-08-17 VITALS
WEIGHT: 274.1 LBS | BODY MASS INDEX: 48.57 KG/M2 | SYSTOLIC BLOOD PRESSURE: 116 MMHG | DIASTOLIC BLOOD PRESSURE: 58 MMHG | HEIGHT: 63 IN | OXYGEN SATURATION: 98 % | HEART RATE: 88 BPM

## 2023-08-17 VITALS — DIASTOLIC BLOOD PRESSURE: 78 MMHG | SYSTOLIC BLOOD PRESSURE: 158 MMHG | HEART RATE: 88 BPM | OXYGEN SATURATION: 97 %

## 2023-08-17 DIAGNOSIS — I50.32 CHRONIC DIASTOLIC HEART FAILURE (HCC): ICD-10-CM

## 2023-08-17 DIAGNOSIS — I48.21 PERMANENT ATRIAL FIBRILLATION (HCC): Primary | ICD-10-CM

## 2023-08-17 DIAGNOSIS — I10 PRIMARY HYPERTENSION: ICD-10-CM

## 2023-08-17 DIAGNOSIS — D50.8 OTHER IRON DEFICIENCY ANEMIA: ICD-10-CM

## 2023-08-17 PROCEDURE — 99215 OFFICE O/P EST HI 40 MIN: CPT | Performed by: NURSE PRACTITIONER

## 2023-08-17 PROCEDURE — G0152 HHCP-SERV OF OT,EA 15 MIN: HCPCS

## 2023-08-17 PROCEDURE — 93000 ELECTROCARDIOGRAM COMPLETE: CPT | Performed by: NURSE PRACTITIONER

## 2023-08-17 RX ORDER — FUROSEMIDE 20 MG/1
20 TABLET ORAL DAILY
Qty: 60 TABLET | Refills: 3 | Status: SHIPPED | OUTPATIENT
Start: 2023-08-17

## 2023-08-17 RX ORDER — FERROUS SULFATE 325(65) MG
325 TABLET ORAL
Qty: 30 TABLET | Refills: 0 | Status: SHIPPED | OUTPATIENT
Start: 2023-08-17

## 2023-08-17 RX ORDER — POTASSIUM CHLORIDE 20 MEQ/1
20 TABLET, EXTENDED RELEASE ORAL DAILY
Qty: 90 TABLET | Refills: 3
Start: 2023-08-17

## 2023-08-17 NOTE — PATIENT INSTRUCTIONS
2gm sodium diet, daily weight  Take lasix 20mg daily starting tomorrow and K dur 20meq daily until LE resolves then when needed.

## 2023-08-18 ENCOUNTER — HOME CARE VISIT (OUTPATIENT)
Dept: HOME HEALTH SERVICES | Facility: HOME HEALTHCARE | Age: 73
End: 2023-08-18
Payer: MEDICARE

## 2023-08-18 VITALS
TEMPERATURE: 98.4 F | SYSTOLIC BLOOD PRESSURE: 110 MMHG | HEART RATE: 70 BPM | DIASTOLIC BLOOD PRESSURE: 60 MMHG | OXYGEN SATURATION: 99 % | RESPIRATION RATE: 18 BRPM

## 2023-08-18 PROCEDURE — G0300 HHS/HOSPICE OF LPN EA 15 MIN: HCPCS

## 2023-08-18 PROCEDURE — G0151 HHCP-SERV OF PT,EA 15 MIN: HCPCS

## 2023-08-18 NOTE — PROGRESS NOTES
EP Progress Note    EP Attending: Dr. Drew UMANZOR NP: Teri Craig   EP midlevel phone: [phone]    CHIEF COMPLAINT: Chest pain status post PPM implant      Subjective:  No acute events overnight.  Reports a continued headache this morning, received Tylenol during the night.  Stomach is upset has nausea, felt like “throwing up this morning”.  Has low back pain today and some spasms.  Left-sided chest pain with heart beating, occurring for about 20 seconds this morning and last evening.  No improvement since starting colchicine.    Medications:  Current Facility-Administered Medications   Medication Dose Route Frequency Provider Last Rate Last Admin   • colchicine (COLCRYS) tablet 0.6 mg  0.6 mg Oral 2 times per day Teri Craig APNP   0.6 mg at 08/17/23 1711   • sodium chloride (PF) 0.9 % injection 2 mL  2 mL Intracatheter 2 times per day Tova Edouard MD   2 mL at 08/17/23 2016   • pantoprazole (PROTONIX INJECT) injection 40 mg  40 mg Intravenous 2 times per day Tova Edouard MD   40 mg at 08/17/23 2016   • albuterol (VENTOLIN) nebulizer 2.5 mg  2.5 mg Nebulization 4x Daily Resp Tova Edouard MD   2.5 mg at 08/18/23 0713   • amLODIPine (NORVASC) tablet 10 mg  10 mg Oral Daily Tova Edouard MD   10 mg at 08/17/23 0952   • aspirin (ECOTRIN) enteric coated tablet 81 mg  81 mg Oral Daily Tova Edouard MD   81 mg at 08/17/23 0952   • atorvastatin (LIPITOR) tablet 80 mg  80 mg Oral Nightly Tova Edouard MD   80 mg at 08/17/23 2017   • eplerenone (INSPRA) tablet 25 mg  25 mg Oral Daily Tova Edouard MD   25 mg at 08/17/23 0952   • escitalopram (LEXAPRO) tablet 20 mg  20 mg Oral Daily Tova Edouard MD   20 mg at 08/17/23 0952   • fluticasone-umeclidin-vilanterol (TRELEGY ELLIPTA) 100-62.5-25 MCG/ACT inhaler 1 puff  1 puff Inhalation Daily Resp Tova Edouard MD   1 puff at 08/18/23 0719   • insulin lispro (ADMELOG,HumaLOG) - Correction Dose   Subcutaneous TID WC  Daily Progress Note - Critical Care   Vilma Delgado 67 y.o. female MRN: [id number]  Unit/Bed#: ICCU 205-01 Encounter: [id number]        ----------------------------------------------------------------------------------------  HPI/24hr events: Pt is a 68 yo female with relevant PMH s/p multiple abdominal surgeries with [employer] anatomy who presented with an internal small bowel hernia. Pt taken for ex-lap with small bowel resection, blood loss requiring transfusion. S/p 2 second second-look surgeries with small SBRs and closure. Extubated 6/22. No acute events overnight. Nursing reports pt responded well to Seroquel and slept the entire night without awakening.  ---------------------------------------------------------------------------------------  SUBJECTIVE  Pt reports she feels well. Reported "a very little bit" of pain at 6/10. Pt denies all symptoms currently. She is somnolent and required reawakening throughout the interview. Review of systems was reviewed and negative unless stated above in HPI/24-hour events   ---------------------------------------------------------------------------------------  Assessment and Plan:    Neuro:   • Pain /Sedation  ? Oxycodone PRN  ? Multimodal analgesia  ? Delirium precautions  ? Ativan 1 mg PO home dose PM  ? Quetiapine 12.5 mg PM  • Anxiety/Depression  ? Fluoxetine 20 mg BID home dose     CV:   • Afib with RVR  ? Eliquis 5 mg BID  ? Amiodarone  TID loading dose x11 days (thru 7/6) 200 daily after  ? Metoprolol 12.5 mg q 12 PO home dose  ? Cardizem 60 mg q 8 home dose  • HTN  ? Holding HCTZ     Pulm:  • No active issues  ? Extubated 6/22  ? Off NC O2  ? Maintain O2 sat >92%     GI:   • Internal Hernia with Closed Loop Obstruction c/b J-J anastomosis site necrosis, hemoperitoneum and portal venous gas  ? S/p ex lap w/ SBR Samy Hough Rough ileum resection) abthera 6/19   ? S/p ex lap w/ SBR (distal jejunem/ileum resection), appendectomy abthera 6/19  ?  S/p ex lap w/ SBR , closure  ? Abdominal exam benign  ? Drain x3 430 ml serosang in 24 hrs  • GERD   ? PPI     :   • Trend I/Os  • ASTRID resolved  • Encourage adequate oral fluid intake     F/E/N:   • Replete electrolytes as needed   • Bariatric Diet  • 1/2 TPN     Heme/Onc:   • ABLA  ? S/p 5 u prbc 3 FFP on   ? Monitor hgb     Endo:   • Basal Lantus 15u PM  • ISS Algorithm 5  ? Goal BG < 140-180  ? 157 on AM BMP     ID:   • No active issues  ? Antibiotics completed   ? BC x2 neg  ? Urine culture negative     MSK/Skin:   ? PT/OT  ?  UOB as tolerated    Patient appropriate for transfer out of the ICU today?: YES  Disposition: Transfer to Med Surg with Telemetry   Code Status: Level 1 - Full Code  ---------------------------------------------------------------------------------------  ICU CORE MEASURES    Prophylaxis   VTE Pharmacologic Prophylaxis: Apixaban (Eliquis)  VTE Mechanical Prophylaxis: sequential compression device  Stress Ulcer Prophylaxis: Pantoprazole IV     Invasive Devices Review  Invasive Devices     Peripherally Inserted Central Catheter Line  Duration           PICC Line  Left Basilic 4 days          Drain  Duration           Closed/Suction Drain Lateral;Left LUQ 19 Fr. 7 days    Closed/Suction Drain Lateral;Right RUQ Bulb 19 Fr. 7 days    Closed/Suction Drain Left LLQ Bulb 10 Fr. 7 days              ---------------------------------------------------------------------------------------  OBJECTIVE    Vitals   Vitals:    23 0220 23 0300 23 0320 23 0420   BP: 132/59 132/75 128/59 150/71   Pulse: 82 86 82 84   Resp:       Temp:  97.6 °F (36.4 °C)     TempSrc:  Axillary     SpO2: 97% 96% 96% 97%   Weight:       Height:         Temp (24hrs), Av.9 °F (36.6 °C), Min:97.6 °F (36.4 °C), Max:98.2 °F (36.8 °C)  Current: Temperature: 97.6 °F (36.4 °C)  HR: 84  BP: 150/71  RR: 16  SpO2: 97%    Invasive/non-invasive ventilation settings   Respiratory    Lab Data (Last 4 hours) Tova Edouard MD   1 Units at 08/17/23 1711     ALLERGIES:   Allergen Reactions   • Furosemide RASH     PHYSICAL EXAMINATION  VITAL SIGNS:   Visit Vitals  BP (!) 156/82   Pulse 78   Temp 97.7 °F (36.5 °C) (Oral)   Resp 18   Ht 5' 7\" (1.702 m)   Wt 129.3 kg (285 lb)   SpO2 97%   BMI 44.64 kg/m²      GENERAL:  Cooperative, sitting up in chair, in no acute distress.  HEAD: Normocephalic, Non-traumatic  NECK: trachea midline.  RESPIRATORY:  No use of accessory muscles with breathing, on room air.  NEURO: No obvious motor or sensory deficits  PSYCHIATRIC:   Alert and oriented to person, place, and time.  INTEGUMENTARY:  Warm and dry.     Telemetry: Sinus rhythm with first-degree AV block, ventricular pacing and atrial pacing on demand    I have personally reviewed and interpreted Telemetry  I have reviewed and summarized old records as per below and in the impression section.    LAB  Lab Results   Component Value Date    PT 10.7 08/03/2020    TSH 3.320 07/21/2023    POTASSIUM 4.3 08/18/2023    CHLORIDE 109 08/18/2023    GLUCOSE 116 (H) 08/18/2023    CALCIUM 8.5 08/18/2023    CO2 26 08/18/2023    BUN 19 08/18/2023    CREATININE 0.75 08/18/2023    WBC 6.5 08/18/2023    RBC 4.10 (L) 08/18/2023    HCT 35.9 (L) 08/18/2023    HGB 11.9 (L) 08/18/2023     (L) 08/18/2023    INR 1.0 08/03/2020    AST 22 08/17/2023     Impression:   • AV block with syncope s/p [employer]ott dual chamber PPM implanted 7/21/23  ? Normal device function, well healed left incision site.  ? Left sided chest discomfort correlating with ventricular pacing during interrogation 8/18/23  ? Scheduled for lead revision 8/31/23  • CAD with prior stenting of left circumflex in the setting of NSTEMI in 08/2012 and angioplasty in 2018, on aspirin and Brilinta  • Obstructive sleep apnea - does not wear CPAP  • Chest pain and shortness of breath (reason for admission)  ? Chest CT, CXR, and cardiac CT all negative.  Stress test and echo unremarkable.   ESR and CRP within normal range.  • Cardiac Imaging/testing  ? NM STT 8/17/23: No new ischemia  ? Echo 8/16/23: EF 58%, unremarkable  ? Echo 7/20/23: EF 62%, grade I/IV DD, no RWMA, mild AS,   ? Echo 10/23/20: EF 55%, no RWMA, trace MR and TR, IVS 1.2   ? Stress test 2/11/19: Probably minimally abnormal study with a very small area of mild mid lateral wall ischemia. There is no associated wall motion abnormality.     Recommendation:  · GI upset may be partly due to medications, hospital team will be discharging on PPI.  · Continue colchicine, patient is aware to call our office for diarrhea or loose stools.  · Plan for lead revision as scheduled on 08/31, given Hibiclens and preop letter prior to discharge.    Lead revision discussed in detail. The procedure, benefits, alternatives, and risks including but not limited to infection, bleeding, pneumothorax, hemothorax, tamponade, neurovascular injury, pericarditis, embolic events, kidney injury, emergent need for surgery,  myocardial infarction, stroke and death were explained to the patient. The patient's questions were answered. The patient voiced understanding and wishes to proceed.    For EP questions between 7am-5pm please contact the NP/MD listed above. After 5pm and before 7am contact the answering service.               None         O2/Vent Data (Last 4 hours)    None                Physical Exam  Constitutional:       Appearance: She is obese. Comments: Somnolent, required repeat reawakening throughout exam   HENT:      Head: Normocephalic and atraumatic. Right Ear: External ear normal.      Left Ear: External ear normal.   Eyes:      General: No scleral icterus. Right eye: No discharge. Left eye: No discharge. Cardiovascular:      Rate and Rhythm: Normal rate. Heart sounds: Normal heart sounds. Pulmonary:      Effort: Pulmonary effort is normal.      Breath sounds: Normal breath sounds. Abdominal:      General: Abdomen is flat. There is no distension. Palpations: Abdomen is soft. Tenderness: There is no abdominal tenderness. There is no guarding or rebound. Comments: Incision covered under clean gauze without strikethrough. C/D/I, no surrounding erythema or induration. Drain x3 producing serosanguinous output. Skin:     General: Skin is warm and dry. Coloration: Skin is not jaundiced. Neurological:      Mental Status: She is oriented to person, place, and time.    Psychiatric:         Mood and Affect: Mood normal.         Behavior: Behavior normal.           Laboratory and Diagnostics:  Results from last 7 days   Lab Units 06/27/23  0550 06/26/23  0624 06/25/23  0550 06/24/23  0515 06/23/23  0422 06/22/23  0435   WBC Thousand/uL 10.30* 10.64* 10.49* 9.18 9.37 8.29   HEMOGLOBIN g/dL 8.1* 8.6* 8.6* 8.7* 8.1* 7.6*   HEMATOCRIT % 26.0* 27.2* 29.0* 26.7* 25.5* 23.9*   PLATELETS Thousands/uL 264 254 213 180 148* 116*     Results from last 7 days   Lab Units 06/27/23  0550 06/26/23  0624 06/25/23  0550 06/24/23  1413 06/24/23  0515 06/23/23  2035 06/23/23  0422 06/22/23  1616 06/22/23  1324   SODIUM mmol/L 137 139 144 144 144 145 145   < >  --    POTASSIUM mmol/L 4.1 3.8 3.5 3.4* 3.2* 3.5 3.5   < >  --    CHLORIDE mmol/L 107 108 111* 109* 112* 113* 116*   < >  --    CO2 mmol/L 28 30 31 32 30 30 27   < >  --    ANION GAP mmol/L 2 1 2 3 2 2 2   < >  --    BUN mg/dL 24 23 22 15 16 15 19   < >  --    CREATININE mg/dL 0.57* 0.66 0.63 0.72 0.70 0.72 0.74   < >  --    CALCIUM mg/dL 7.7* 7.7* 7.7* 8.0* 7.7* 7.6* 7.3*   < >  --    GLUCOSE RANDOM mg/dL 194* 193* 193* 167* 209* 162* 110   < >  --    ALT U/L  --   --   --   --   --   --   --   --  16   AST U/L  --   --   --   --   --   --   --   --  22   ALK PHOS U/L  --   --   --   --   --   --   --   --  55   ALBUMIN g/dL  --   --   --   --   --   --   --   --  2.3*   TOTAL BILIRUBIN mg/dL  --   --   --   --   --   --   --   --  0.51    < > = values in this interval not displayed. Results from last 7 days   Lab Units 06/27/23  0550 06/26/23  0624 06/25/23  0550 06/24/23  0515 06/23/23  2035 06/23/23  0422 06/22/23  1616 06/22/23  0435   MAGNESIUM mg/dL 2.1 2.0 2.1 2.0 2.1 2.6 2.6 3.0*   PHOSPHORUS mg/dL 2.5 2.6 2.0* 1.7* 2.5 1.6*  --  1.5*      Results from last 7 days   Lab Units 06/27/23  0550 06/26/23  0624 06/25/23  1950 06/25/23  1152 06/25/23  0354 06/24/23  1740 06/24/23  1102   INR  1.23*  --   --   --   --   --  1.19   PTT seconds  --  45* 46* 48* 46* 44* 31              ABG:    VBG:          Micro        EKG: Afib, low voltage QRS, cannot rule out anterior infarct, unchanged from previous EKGs 6/27  Imaging: I have personally reviewed pertinent reports. Intake and Output  I/O       06/26 0701  06/27 0700 06/27 0701  06/28 0700    P. O. 120 300    I.V. (mL/kg) 173 (1.2) 100 (0.7)    IV Piggyback 150 100    TPN 1444.2 1239.2    Total Intake(mL/kg) 1887.1 (13.1) 1739.2 (12.1)    Urine (mL/kg/hr) 703 (0.2) 400 (0.1)    Drains 760 330    Stool  0    Total Output 1463 730    Net +424.1 +1009.2          Unmeasured Urine Occurrence  1 x    Unmeasured Stool Occurrence  2 x        UOP: Multiple untracked occurrences. 400 mL recorded.    Drain Output:   Lateral Left - 165  Lateral Right RUQ - 35  Left LLQ - 130  Total - 330    Height and Weights   Height: 5' 3" (160 cm)  IBW (Ideal Body Weight): 52.4 kg  Body mass index is 56.05 kg/m². Weight (last 2 days)     Date/Time Weight    06/27/23 0600 144 (316.4)            Nutrition       Diet Orders   (From admission, onward)             Start     Ordered    06/27/23 1044  Dietary nutrition supplements  Once        Question Answer Comment   Select Supplement: Ensure Max - Vanilla    Select Supplement: Ensure Clear Apple    Select Supplement: Ensure HP Pudding - Vanilla    Frequency Breakfast, Lunch, Dinner        06/27/23 1046    06/26/23 1626  Dietary nutrition supplements  Once        Question Answer Comment   Select Supplement: Blanca Number    Frequency Breakfast        06/26/23 1626    06/26/23 1626  Dietary nutrition supplements  Once        Question Answer Comment   Select Supplement: Magic Cup [employer]    Frequency Dinner        06/26/23 1626    06/26/23 1223  Diet Bariatric; Bariatric Maintenance; No Carbonation  (Order Panel)  Diet effective now        References:    Adult Nutrition Support Algorithm    RD Therapeutic Diet Order Protocol   Question Answer Comment   Diet Type Bariatric    Bariatric Bariatric Maintenance    Other Restriction(s): No Carbonation    Special Instructions Small portions    RD to adjust diet per protocol?  Yes        06/26/23 1222                Active Medications  Scheduled Meds:  Current Facility-Administered Medications   Medication Dose Route Frequency Provider Last Rate   • acetaminophen  650 mg Oral Q6H PRN Susana Blunt PA-C     • Adult 3-in-1 TPN (custom base / custom electrolytes)   Intravenous Continuous TPN Bryanna Aguilar PA-C 36.9 mL/hr at 06/27/23 2120   • amiodarone  200 mg Oral TID With Meals Bryanna Aguilar PA-C      Followed by   • [START ON 7/8/2023] amiodarone  200 mg Oral Daily With Breakfast Bryanna Aguilar PA-C     • apixaban  5 mg Oral BID Bryanna Aguilar PA-C     • chlorhexidine  15 mL Mouth/Throat Q12H 8000 Angela Turpin PA-C     • diltiazem  60 mg Oral [facility] Arminda Espino     • FLUoxetine  20 mg Oral BID Billy Cordova MD     • insulin glargine  15 Units Subcutaneous HS Genita Rash, CRNP     • insulin lispro  4-20 Units Subcutaneous Q6H [address] Isamar Delgado PA-C     • LORazepam  1 mg Oral HS Billy Cordova MD     • melatonin  6 mg Oral HS Genita Rash, CRNP     • metoprolol tartrate  12.5 mg Oral Q12H [address] Billy Cordova MD     • ondansetron  4 mg Intravenous Q6H PRN Isamar Delgado PA-C     • oxyCODONE  2.5 mg Oral Q4H PRN Isamar Delgado PA-C     • pantoprazole  40 mg Intravenous Q12H [address] Isamar Delgado PA-C     • QUEtiapine  12.5 mg Oral HS Wiliam Dykes PA-C       Continuous Infusions:  Adult 3-in-1 TPN (custom base / custom electrolytes), , Last Rate: 36.9 mL/hr at 06/27/23 2120      PRN Meds:   acetaminophen, 650 mg, Q6H PRN  ondansetron, 4 mg, Q6H PRN  oxyCODONE, 2.5 mg, Q4H PRN        Allergies   No Known Allergies  ---------------------------------------------------------------------------------------  Care Time Delivered:   Upon my evaluation, this patient had a high probability of imminent or life-threatening deterioration due to CV instability, low oral intake, delirium, which required my direct attention, intervention, and personal management. I have personally provided 30 minutes of critical care time, exclusive of procedures, teaching, family meetings, and any prior time recorded by providers other than myself. Jose De Jesus Draft      Portions of the record may have been created with voice recognition software. Occasional wrong word or "sound a like" substitutions may have occurred due to the inherent limitations of voice recognition software.   Read the chart carefully and recognize, using context, where substitutions have occurred

## 2023-08-21 ENCOUNTER — HOME CARE VISIT (OUTPATIENT)
Dept: HOME HEALTH SERVICES | Facility: HOME HEALTHCARE | Age: 73
End: 2023-08-21
Payer: MEDICARE

## 2023-08-21 ENCOUNTER — OFFICE VISIT (OUTPATIENT)
Dept: SURGERY | Facility: CLINIC | Age: 73
End: 2023-08-21

## 2023-08-21 VITALS — TEMPERATURE: 98.3 F | BODY MASS INDEX: 48.55 KG/M2 | HEIGHT: 63 IN

## 2023-08-21 DIAGNOSIS — T81.30XS ABDOMINAL WOUND DEHISCENCE, SEQUELA: Primary | ICD-10-CM

## 2023-08-21 DIAGNOSIS — E06.3 HASHIMOTO'S THYROIDITIS: ICD-10-CM

## 2023-08-21 PROBLEM — T81.30XA ABDOMINAL WOUND DEHISCENCE: Status: ACTIVE | Noted: 2023-08-21

## 2023-08-21 PROBLEM — T81.321A ABDOMINAL WOUND DEHISCENCE: Status: ACTIVE | Noted: 2023-08-21

## 2023-08-21 PROCEDURE — 10330064 PAD, ABD 5X9" STR LF (1/PK 20PK/BX) MGM1

## 2023-08-21 PROCEDURE — 99024 POSTOP FOLLOW-UP VISIT: CPT | Performed by: SURGERY

## 2023-08-21 PROCEDURE — 10330064 SALINE, IRR SOL STR 100ML (48/CS) MGM37

## 2023-08-21 PROCEDURE — 10330064 SYRINGE, CATH TIP FLAT STR 60CC (50/CS)

## 2023-08-21 PROCEDURE — 10330064 SPONGE, GAUZE 8PLY N/S 4"X4" (200/PK 20P

## 2023-08-21 PROCEDURE — 10330064 WIPE, SKIN GEL PROT DRSNG (50/BX)

## 2023-08-21 NOTE — PROGRESS NOTES
Office Visit - General Surgery  Mily Birmingham MRN: [id number]  Encounter: [id number]    Assessment and Plan  Problem List Items Addressed This Visit        Endocrine    Hashimoto's thyroiditis       Other    COVID    Abdominal wound dehiscence - Primary       Chief Complaint:  Mily Birmingham is a 67 y.o. female who presents for Post-op and Wound Check (Wound check. Wound vac.)    Subjective      Past Medical History:   Diagnosis Date   • A-fib [facility])    • Arthritis    • Atrial fibrillation ([address])    • Cervical spondylosis with myelopathy    • Gastric bypass status for obesity    • History of transfusion     35 years ago   • Hypertension    • mass right kidney    • Renal cell adenocarcinoma (HCC)     RIGHT   • Sleep apnea    • Super obese        Past Surgical History:   Procedure Laterality Date   • ABDOMINAL ADHESION SURGERY N/A 6/19/2023    Procedure: LYSIS ADHESIONS;  Surgeon: Germán Cabrales DO;  [facility];  Service: General   • ABDOMINAL WALL SURGERY N/A 7/19/2023    Procedure: PARTIAL CLOSURE WOUND ABDOMINAL/TRUNK;  Surgeon: Magy Ricardo DO;  [facility];  Service: General   • APPENDECTOMY N/A 6/19/2023    Procedure: APPENDECTOMY;  Surgeon: Germán Cabrales DO;  [facility];  Service: General   • CHOLECYSTECTOMY     • COLOSTOMY     • CRYOABLATION Right     RT KIDNEY   • CYSTORRHAPHY      Bladder.  Last assessed 4/6/2016    • EXPLORATORY LAPAROTOMY W/ BOWEL RESECTION N/A 6/19/2023    Procedure: LAPAROTOMY EXPLORATORY W/ BOWEL RESECTION;  Surgeon: Anthony Cassidy MD;  [facility];  Service: General   • GASTRIC BYPASS     • HERNIA REPAIR      Last assessed 4/6/2016    • HYSTERECTOMY     • LAPAROTOMY N/A 6/19/2023    Procedure: LAPAROTOMY EXPLORATORY, washout, vac change;  Surgeon: Germán Cabrales DO;  [facility];  Service: General   • LAPAROTOMY N/A 6/20/2023    Procedure: LAPAROTOMY, REMOVAL OF WOUND VAC AND PACKING, SMALL BOWEL RESECTION , BOWEL ANASTOMOSIS X 3 , VICRYL MESH BRIDGING -;  Surgeon: Stone Cheney DO;  [facility];  Service: General   • CA OPTX DSTL RADL X-ARTIC FX/EPIPHYSL SEP Right 4/22/2016    Procedure: OPEN REDUCTION INTERNAL FIXATION RIGHT DISTAL RADIUS;  Surgeon: Damien Hewitt MD;  [facility];  Service: Orthopedics   • REVISION COLOSTOMY     • SMALL INTESTINE SURGERY N/A 6/19/2023    Procedure: RESECTION SMALL BOWEL;  Surgeon: Stone Cheney DO;  [facility];  Service: General   • THYROID LOBECTOMY Left 8/27/2019    Procedure: LOBECTOMY THYROID, left;  Surgeon: Jodie Amezquita MD;  [facility];  Service: Surgical Oncology   • TUBAL LIGATION     • US GUIDED THYROID BIOPSY  2/27/2019   • US GUIDED THYROID BIOPSY  5/29/2019   • WOUND DEBRIDEMENT N/A 7/18/2023    Procedure: DEBRIDEMENT WOUND [facility] OUT);   Surgeon: Cheyanne Acosta DO;  [facility];  Service: Trauma   • WOUND DEBRIDEMENT N/A 7/19/2023    Procedure: ABDOMINAL WOUND ([address]); VAC APPLICATION;  Surgeon: Lam Mauricio DO;  [facility];  Service: General       Family History   Problem Relation Age of Onset   • Heart attack Mother    • Lung cancer Father 76   • Heart disease Father    • Stroke Sister    • Lung cancer Sister 64   • Prostate cancer Brother 61       Social History     Tobacco Use   • Smoking status: Never   • Smokeless tobacco: Never   Vaping Use   • Vaping Use: Never used   Substance Use Topics   • Alcohol use: Not Currently     Alcohol/week: 0.0 standard drinks of alcohol     Comment: 0   • Drug use: No        Medications  Current Outpatient Medications on File Prior to Visit   Medication Sig Dispense Refill   • acetaminophen (TYLENOL) 325 mg tablet Take 975 mg by mouth every 6 (six) hours as needed for mild pain or moderate pain     • amiodarone 200 mg tablet Take 1 tablet (200 mg total) by mouth daily with breakfast 30 tablet 0   • apixaban (Eliquis) 5 mg Take 1 tablet (5 mg total) by mouth 2 (two) times a day 60 tablet 11   • diltiazem (CARDIZEM CD) 360 MG 24 hr capsule Take 1 capsule (360 mg total) by mouth daily 90 capsule 3   • ergocalciferol (VITAMIN D2) 50,000 units Take 50,000 Units by mouth once a week Takes D3      • ferrous sulfate 325 (65 Fe) mg tablet Take 1 tablet (325 mg total) by mouth daily with breakfast 30 tablet 0   • FLUoxetine (PROzac) 20 mg capsule Take 1 capsule (20 mg total) by mouth 2 (two) times a day 60 capsule 0   • furosemide (LASIX) 20 mg tablet Take 1 tablet (20 mg total) by mouth daily 60 tablet 3   • metoprolol succinate (TOPROL-XL) 25 mg 24 hr tablet Take 1 tablet (25 mg total) by mouth daily 90 tablet 3   • potassium chloride (K-DUR,KLOR-CON) 20 mEq tablet Take 1 tablet (20 mEq total) by mouth daily 90 tablet 3   • Probiotic Product (PRO-BIOTIC BLEND PO) Take by mouth     • calcium carbonate (OYSTER SHELL,OSCAL) 500 mg Take 1 tablet by mouth daily with breakfast (Patient not taking: Reported on 8/17/2023) 30 tablet 0   • cyanocobalamin (VITAMIN B-12) 1000 MCG tablet Take 1 tablet (1,000 mcg total) by mouth daily (Patient not taking: Reported on 8/17/2023) 30 tablet 0   • melatonin 3 mg Take 2 tablets (6 mg total) by mouth daily at bedtime (Patient not taking: Reported on 8/17/2023)  0   • psyllium (METAMUCIL) packet Take 1 packet by mouth daily (Patient not taking: Reported on 8/17/2023) 30 packet 0     Current Facility-Administered Medications on File Prior to Visit   Medication Dose Route Frequency Provider Last Rate Last Admin   • denosumab (PROLIA) subcutaneous injection 60 mg  60 mg Subcutaneous Q6 Months Jolly Gates MD   60 mg at 01/04/21 1157       Allergies  No Known Allergies    Review of Systems    Objective  Vitals:    08/21/23 1044   Temp: 98.3 °F (36.8 °C)       Physical Exam

## 2023-08-21 NOTE — PROGRESS NOTES
Assessment/Plan:    Abdominal wound dehiscence  69-year-old female presenting in the office today for wound check. Wound care is changing her wound VAC every other day with 1 black and 1 white sponge. Wound progressing well with good granulation tissue    Plan  Wound VAC change in the office today  3 sutures removed in the office today from 7/19 partial closure  Continue every other day wound VAC changes  Follow-up in the clinic in 2 weeks for wound check      Subjective:      Patient ID: Weston Willams is a 67 y.o. female. 69-year-old female who presented to [facility] with abdominal wall incisional dehiscence on 7/18, was taken for exploratory laparotomy on 7/18 and partial closure with wound VAC placement on 7/19. She was discharged to [facility] for wound care and rehabilitation. She was discharged from the hospital on 8/5/2023 and is following up in the clinic today for wound check. Since discharge from the hospital, patient reports she is doing well. She is tolerating a diet with good appetite and without nausea and vomiting. She is ambulating and working with physical therapy at home. Wound care is managing her wound VAC changes every other day. She has no acute concerns for the team today in the office. The following portions of the patient's history were reviewed and updated as appropriate: allergies, current medications, past family history, past medical history, past social history, past surgical history and problem list.    Review of Systems   Constitutional: Negative for chills and fever. HENT: Negative for ear pain and sore throat. Eyes: Negative for pain and visual disturbance. Respiratory: Negative for cough and shortness of breath. Cardiovascular: Negative for chest pain and palpitations. Gastrointestinal: Negative for abdominal distention, abdominal pain, blood in stool, constipation, diarrhea, nausea and vomiting.    Genitourinary: Negative for dysuria and hematuria. Musculoskeletal: Negative for arthralgias and back pain. Skin: Negative for color change and rash. Neurological: Negative for seizures and syncope. All other systems reviewed and are negative. Objective:      Temp 98.3 °F (36.8 °C) (Temporal)   Ht 5' 3" (1.6 m)   BMI 48.55 kg/m²          Physical Exam  Constitutional:       General: She is not in acute distress. Appearance: Normal appearance. She is obese. She is not ill-appearing. HENT:      Head: Normocephalic and atraumatic. Mouth/Throat:      Mouth: Mucous membranes are moist.      Pharynx: Oropharynx is clear. Eyes:      General: No scleral icterus. Extraocular Movements: Extraocular movements intact. Cardiovascular:      Rate and Rhythm: Normal rate. Pulmonary:      Effort: Pulmonary effort is normal.   Abdominal:      General: Abdomen is flat. There is no distension. Palpations: Abdomen is soft. Tenderness: There is no abdominal tenderness. There is no guarding. Comments: Lower midline wound, partially closed, sutures still in place, wound VAC over inferior aspect with good seal.  Wound measuring 4 x 6 x 4 cm. Musculoskeletal:         General: No tenderness. Normal range of motion. Cervical back: Normal range of motion and neck supple. Right lower leg: Edema present. Left lower leg: Edema present. Skin:     General: Skin is warm and dry. Neurological:      Mental Status: She is alert and oriented to person, place, and time.

## 2023-08-21 NOTE — ASSESSMENT & PLAN NOTE
65-year-old female presenting in the office today for wound check. Wound care is changing her wound VAC every other day with 1 black and 1 white sponge.   Wound progressing well with good granulation tissue    Plan  Wound VAC change in the office today  3 sutures removed in the office today from 7/19 partial closure  Continue every other day wound VAC changes  Follow-up in the clinic in 2 weeks for wound check

## 2023-08-22 ENCOUNTER — HOME CARE VISIT (OUTPATIENT)
Dept: HOME HEALTH SERVICES | Facility: HOME HEALTHCARE | Age: 73
End: 2023-08-22
Payer: MEDICARE

## 2023-08-22 VITALS
SYSTOLIC BLOOD PRESSURE: 140 MMHG | HEART RATE: 88 BPM | RESPIRATION RATE: 16 BRPM | DIASTOLIC BLOOD PRESSURE: 78 MMHG | TEMPERATURE: 98.7 F

## 2023-08-22 PROCEDURE — G0299 HHS/HOSPICE OF RN EA 15 MIN: HCPCS

## 2023-08-22 PROCEDURE — G0156 HHCP-SVS OF AIDE,EA 15 MIN: HCPCS

## 2023-08-23 ENCOUNTER — HOME CARE VISIT (OUTPATIENT)
Dept: HOME HEALTH SERVICES | Facility: HOME HEALTHCARE | Age: 73
End: 2023-08-23
Payer: MEDICARE

## 2023-08-23 PROCEDURE — G0151 HHCP-SERV OF PT,EA 15 MIN: HCPCS

## 2023-08-24 ENCOUNTER — HOME CARE VISIT (OUTPATIENT)
Dept: HOME HEALTH SERVICES | Facility: HOME HEALTHCARE | Age: 73
End: 2023-08-24
Payer: MEDICARE

## 2023-08-24 ENCOUNTER — APPOINTMENT (OUTPATIENT)
Dept: LAB | Age: 73
End: 2023-08-24
Payer: MEDICARE

## 2023-08-24 VITALS
DIASTOLIC BLOOD PRESSURE: 70 MMHG | HEART RATE: 70 BPM | TEMPERATURE: 98 F | SYSTOLIC BLOOD PRESSURE: 130 MMHG | RESPIRATION RATE: 20 BRPM | OXYGEN SATURATION: 99 %

## 2023-08-24 DIAGNOSIS — E87.6 HYPOKALEMIA: Primary | ICD-10-CM

## 2023-08-24 DIAGNOSIS — I50.32 CHRONIC DIASTOLIC HEART FAILURE (HCC): ICD-10-CM

## 2023-08-24 LAB
ANION GAP SERPL CALCULATED.3IONS-SCNC: 14 MMOL/L
BUN SERPL-MCNC: 17 MG/DL (ref 5–25)
CALCIUM SERPL-MCNC: 8.3 MG/DL (ref 8.4–10.2)
CHLORIDE SERPL-SCNC: 99 MMOL/L (ref 96–108)
CO2 SERPL-SCNC: 28 MMOL/L (ref 21–32)
CREAT SERPL-MCNC: 0.82 MG/DL (ref 0.6–1.3)
GFR SERPL CREATININE-BSD FRML MDRD: 71 ML/MIN/1.73SQ M
GLUCOSE SERPL-MCNC: 89 MG/DL (ref 65–140)
POTASSIUM SERPL-SCNC: 2.9 MMOL/L (ref 3.5–5.3)
SODIUM SERPL-SCNC: 141 MMOL/L (ref 135–147)

## 2023-08-24 PROCEDURE — G0156 HHCP-SVS OF AIDE,EA 15 MIN: HCPCS

## 2023-08-24 PROCEDURE — G0152 HHCP-SERV OF OT,EA 15 MIN: HCPCS

## 2023-08-24 PROCEDURE — 36415 COLL VENOUS BLD VENIPUNCTURE: CPT

## 2023-08-24 PROCEDURE — 80048 BASIC METABOLIC PNL TOTAL CA: CPT

## 2023-08-24 PROCEDURE — G0300 HHS/HOSPICE OF LPN EA 15 MIN: HCPCS

## 2023-08-25 ENCOUNTER — HOME CARE VISIT (OUTPATIENT)
Dept: HOME HEALTH SERVICES | Facility: HOME HEALTHCARE | Age: 73
End: 2023-08-25
Payer: MEDICARE

## 2023-08-25 PROCEDURE — G0151 HHCP-SERV OF PT,EA 15 MIN: HCPCS

## 2023-08-26 ENCOUNTER — HOME CARE VISIT (OUTPATIENT)
Dept: HOME HEALTH SERVICES | Facility: HOME HEALTHCARE | Age: 73
End: 2023-08-26
Payer: MEDICARE

## 2023-08-26 VITALS
HEART RATE: 80 BPM | OXYGEN SATURATION: 98 % | RESPIRATION RATE: 20 BRPM | SYSTOLIC BLOOD PRESSURE: 112 MMHG | DIASTOLIC BLOOD PRESSURE: 62 MMHG | TEMPERATURE: 98.2 F

## 2023-08-26 PROCEDURE — G0299 HHS/HOSPICE OF RN EA 15 MIN: HCPCS

## 2023-08-27 VITALS — SYSTOLIC BLOOD PRESSURE: 126 MMHG | DIASTOLIC BLOOD PRESSURE: 82 MMHG

## 2023-08-28 ENCOUNTER — HOME CARE VISIT (OUTPATIENT)
Dept: HOME HEALTH SERVICES | Facility: HOME HEALTHCARE | Age: 73
End: 2023-08-28
Payer: MEDICARE

## 2023-08-28 ENCOUNTER — TELEPHONE (OUTPATIENT)
Dept: CARDIOLOGY CLINIC | Facility: CLINIC | Age: 73
End: 2023-08-28

## 2023-08-28 DIAGNOSIS — I48.21 PERMANENT ATRIAL FIBRILLATION (HCC): ICD-10-CM

## 2023-08-28 PROCEDURE — G0299 HHS/HOSPICE OF RN EA 15 MIN: HCPCS

## 2023-08-28 PROCEDURE — G0151 HHCP-SERV OF PT,EA 15 MIN: HCPCS

## 2023-08-28 RX ORDER — AMIODARONE HYDROCHLORIDE 200 MG/1
200 TABLET ORAL
Qty: 30 TABLET | Refills: 0 | Status: SHIPPED | OUTPATIENT
Start: 2023-08-28

## 2023-08-28 NOTE — TELEPHONE ENCOUNTER
Patient called asking if she needs to continue taking Amiodarone 200 mg daily? Stated she is out of medication & wants to know if she can stop Amiodarone? States not taking Lasix currently. Taking Potassium & the rest of her cardiac medication. FYI: Patient stated she is doing much better. No edema of ankles & lower legs. States the top of her feet get "puffy" but elevates her legs in bed at night & edema is gone in the AM.     Not as fatigued as was 8/14. Becomes SOB on exertion. States VS taken by visiting nursing & are all normal. (Did not have numbers or weight)    Please advise about Amiodarone.

## 2023-08-28 NOTE — TELEPHONE ENCOUNTER
Returned call to patient communicating Aster Chong message to continue Amiodarone. Patient verbalized understanding.

## 2023-08-29 ENCOUNTER — HOME CARE VISIT (OUTPATIENT)
Dept: HOME HEALTH SERVICES | Facility: HOME HEALTHCARE | Age: 73
End: 2023-08-29
Payer: MEDICARE

## 2023-08-29 VITALS — DIASTOLIC BLOOD PRESSURE: 70 MMHG | SYSTOLIC BLOOD PRESSURE: 126 MMHG

## 2023-08-29 VITALS — OXYGEN SATURATION: 98 % | DIASTOLIC BLOOD PRESSURE: 70 MMHG | HEART RATE: 88 BPM | SYSTOLIC BLOOD PRESSURE: 140 MMHG

## 2023-08-29 VITALS
HEART RATE: 90 BPM | DIASTOLIC BLOOD PRESSURE: 62 MMHG | OXYGEN SATURATION: 98 % | SYSTOLIC BLOOD PRESSURE: 128 MMHG | TEMPERATURE: 95.9 F | RESPIRATION RATE: 24 BRPM

## 2023-08-29 PROCEDURE — G0152 HHCP-SERV OF OT,EA 15 MIN: HCPCS

## 2023-08-29 PROCEDURE — G0156 HHCP-SVS OF AIDE,EA 15 MIN: HCPCS

## 2023-08-29 NOTE — TELEPHONE ENCOUNTER
Returned call to patient communicating Pecola Sicard, CRNP orders to continue on Lasix 20 mg daily & Potassium 20 mEq daily until + 2 edema of lower extremities decreases, then may take both medications together as needed for edema. Instructed patient to take Lasix & Potassium today. Also instructed patient she will have ordered BMP drawn Friday, 9/1. Patient verbalized understanding. Returned call to Mirza Haq, visiting nurse, to communicate above. David Rueda  will continue to care for & monitor patient & assess her edema plus have BMP drawn 9/1.

## 2023-08-29 NOTE — TELEPHONE ENCOUNTER
[facility] Nurse Madalyn Jamil: [phone], called stating patient stop taking Lasix 20 mg daily 8/24. [place] Board stated patient has + 2 pitting edema mid way up lower extremities, patient stating is much better than it had been. As per your 8/17 OV Summary, do you want her to continue Lasix 20 mg daily until edema further decreases? Patient continues to take Potassium 20 mEq daily. 8/24 Potassium -2.9. Do you want BMP drawn this week or next? Please advise.

## 2023-08-30 ENCOUNTER — HOME CARE VISIT (OUTPATIENT)
Dept: HOME HEALTH SERVICES | Facility: HOME HEALTHCARE | Age: 73
End: 2023-08-30
Payer: MEDICARE

## 2023-08-30 PROCEDURE — G0151 HHCP-SERV OF PT,EA 15 MIN: HCPCS

## 2023-08-30 PROCEDURE — G0299 HHS/HOSPICE OF RN EA 15 MIN: HCPCS

## 2023-08-31 VITALS
SYSTOLIC BLOOD PRESSURE: 120 MMHG | OXYGEN SATURATION: 97 % | DIASTOLIC BLOOD PRESSURE: 62 MMHG | HEART RATE: 74 BPM | RESPIRATION RATE: 18 BRPM | TEMPERATURE: 96 F

## 2023-09-01 ENCOUNTER — HOME CARE VISIT (OUTPATIENT)
Dept: HOME HEALTH SERVICES | Facility: HOME HEALTHCARE | Age: 73
End: 2023-09-01
Payer: MEDICARE

## 2023-09-01 ENCOUNTER — APPOINTMENT (OUTPATIENT)
Dept: LAB | Age: 73
End: 2023-09-01
Payer: MEDICARE

## 2023-09-01 DIAGNOSIS — I50.32 CHRONIC DIASTOLIC HEART FAILURE (HCC): ICD-10-CM

## 2023-09-01 LAB
ANION GAP SERPL CALCULATED.3IONS-SCNC: 14 MMOL/L
BUN SERPL-MCNC: 13 MG/DL (ref 5–25)
CALCIUM SERPL-MCNC: 8.4 MG/DL (ref 8.4–10.2)
CHLORIDE SERPL-SCNC: 104 MMOL/L (ref 96–108)
CO2 SERPL-SCNC: 24 MMOL/L (ref 21–32)
CREAT SERPL-MCNC: 0.8 MG/DL (ref 0.6–1.3)
GFR SERPL CREATININE-BSD FRML MDRD: 73 ML/MIN/1.73SQ M
GLUCOSE SERPL-MCNC: 92 MG/DL (ref 65–140)
POTASSIUM SERPL-SCNC: 3.5 MMOL/L (ref 3.5–5.3)
SODIUM SERPL-SCNC: 142 MMOL/L (ref 135–147)

## 2023-09-01 PROCEDURE — G0299 HHS/HOSPICE OF RN EA 15 MIN: HCPCS

## 2023-09-01 PROCEDURE — G0156 HHCP-SVS OF AIDE,EA 15 MIN: HCPCS

## 2023-09-01 PROCEDURE — 80048 BASIC METABOLIC PNL TOTAL CA: CPT

## 2023-09-01 PROCEDURE — 36415 COLL VENOUS BLD VENIPUNCTURE: CPT

## 2023-09-04 ENCOUNTER — HOME CARE VISIT (OUTPATIENT)
Dept: HOME HEALTH SERVICES | Facility: HOME HEALTHCARE | Age: 73
End: 2023-09-04
Payer: MEDICARE

## 2023-09-04 VITALS
DIASTOLIC BLOOD PRESSURE: 60 MMHG | RESPIRATION RATE: 18 BRPM | HEART RATE: 72 BPM | TEMPERATURE: 97.9 F | SYSTOLIC BLOOD PRESSURE: 114 MMHG | OXYGEN SATURATION: 98 %

## 2023-09-04 VITALS
DIASTOLIC BLOOD PRESSURE: 72 MMHG | TEMPERATURE: 96.5 F | HEART RATE: 82 BPM | SYSTOLIC BLOOD PRESSURE: 162 MMHG | OXYGEN SATURATION: 100 % | RESPIRATION RATE: 24 BRPM

## 2023-09-04 PROCEDURE — G0300 HHS/HOSPICE OF LPN EA 15 MIN: HCPCS

## 2023-09-05 ENCOUNTER — HOME CARE VISIT (OUTPATIENT)
Dept: HOME HEALTH SERVICES | Facility: HOME HEALTHCARE | Age: 73
End: 2023-09-05
Payer: MEDICARE

## 2023-09-05 PROCEDURE — G0156 HHCP-SVS OF AIDE,EA 15 MIN: HCPCS

## 2023-09-06 ENCOUNTER — HOME CARE VISIT (OUTPATIENT)
Dept: HOME HEALTH SERVICES | Facility: HOME HEALTHCARE | Age: 73
End: 2023-09-06
Payer: MEDICARE

## 2023-09-06 VITALS
OXYGEN SATURATION: 97 % | HEART RATE: 74 BPM | TEMPERATURE: 96.1 F | SYSTOLIC BLOOD PRESSURE: 116 MMHG | RESPIRATION RATE: 24 BRPM | DIASTOLIC BLOOD PRESSURE: 56 MMHG

## 2023-09-06 PROCEDURE — G0299 HHS/HOSPICE OF RN EA 15 MIN: HCPCS

## 2023-09-06 PROCEDURE — G0151 HHCP-SERV OF PT,EA 15 MIN: HCPCS

## 2023-09-07 ENCOUNTER — OFFICE VISIT (OUTPATIENT)
Dept: OBGYN CLINIC | Facility: CLINIC | Age: 73
End: 2023-09-07

## 2023-09-07 VITALS
BODY MASS INDEX: 48.55 KG/M2 | DIASTOLIC BLOOD PRESSURE: 60 MMHG | HEIGHT: 63 IN | WEIGHT: 274 LBS | SYSTOLIC BLOOD PRESSURE: 110 MMHG

## 2023-09-07 DIAGNOSIS — M17.0 PRIMARY OSTEOARTHRITIS OF BOTH KNEES: Primary | ICD-10-CM

## 2023-09-07 RX ORDER — BUPIVACAINE HYDROCHLORIDE 2.5 MG/ML
2 INJECTION, SOLUTION INFILTRATION; PERINEURAL
Status: COMPLETED | OUTPATIENT
Start: 2023-09-07 | End: 2023-09-07

## 2023-09-07 RX ORDER — LIDOCAINE HYDROCHLORIDE 10 MG/ML
2 INJECTION, SOLUTION INFILTRATION; PERINEURAL
Status: COMPLETED | OUTPATIENT
Start: 2023-09-07 | End: 2023-09-07

## 2023-09-07 RX ORDER — TRIAMCINOLONE ACETONIDE 40 MG/ML
40 INJECTION, SUSPENSION INTRA-ARTICULAR; INTRAMUSCULAR
Status: COMPLETED | OUTPATIENT
Start: 2023-09-07 | End: 2023-09-07

## 2023-09-07 RX ADMIN — TRIAMCINOLONE ACETONIDE 40 MG: 40 INJECTION, SUSPENSION INTRA-ARTICULAR; INTRAMUSCULAR at 15:30

## 2023-09-07 RX ADMIN — LIDOCAINE HYDROCHLORIDE 2 ML: 10 INJECTION, SOLUTION INFILTRATION; PERINEURAL at 15:30

## 2023-09-07 RX ADMIN — BUPIVACAINE HYDROCHLORIDE 2 ML: 2.5 INJECTION, SOLUTION INFILTRATION; PERINEURAL at 15:30

## 2023-09-07 NOTE — PROGRESS NOTES
Patient Name:  Sinan Mcgill  MRN:  [id number]    Assessment & Plan     Bilateral knee DJD. 1. Corticosteroid injection performed today into the bilateral knees. 2. Activities as tolerated with modification avoid pain. 3. Cane provided today at patient's request.  4. Follow-up in 3 months for repeat evaluation and repeat injections at that time as indicated. Chief Complaint     Bilateral knee pain    History of the Present Illness     Sinan Mcgill is a 67 y.o. female who returns to the office today for follow-up regarding her bilateral knees. She has a known history of bilateral knee DJD. She underwent bilateral knee intra-articular corticosteroid injections on 6/7/2023. She noted significant improvement up until approximately 1 week ago when she notes a return of her pain. She attributes her pain to performing home physical therapy after a recent hospital stay due to bowel perforation and multiple surgeries. She notes generalized bilateral knee pain worse with prolonged standing and walking. She denies any weakness or instability. No significant swelling. She does note mild stiffness. No numbness or tingling. No fevers or chills. Physical Exam     /60   Ht 5' 3" (1.6 m)   Wt 124 kg (274 lb)   BMI 48.54 kg/m²     Bilateral knees:  No gross deformity.  Skin intact.  No erythema ecchymosis or swelling.  No effusion.  No significant joint line tenderness.  Range of motion 0-110 bilaterally with pain.  Stable to varus and valgus stress bilaterally.  Stable Lachman test.  Negative posterior drawer test.  Negative Toni's test.  Sensation is intact distally. Eyes: Anicteric sclerae. ENT: Trachea midline. Lungs: Normal respiratory effort. CV: Capillary refill is less than 2 seconds. Skin: Intact without erythema. Lymph: No palpable lymphadenopathy. Neuro: Sensation is grossly intact to light touch. Psych: Mood and affect are appropriate.       Past Medical History:   Diagnosis Date   • A-fib [facility])    • Arthritis    • Atrial fibrillation ([address])    • Cervical spondylosis with myelopathy    • Gastric bypass status for obesity    • History of transfusion     35 years ago   • Hypertension    • mass right kidney    • Renal cell adenocarcinoma ([address])     RIGHT   • Sleep apnea    • Super obese        Past Surgical History:   Procedure Laterality Date   • ABDOMINAL ADHESION SURGERY N/A 6/19/2023    Procedure: LYSIS ADHESIONS;  Surgeon: Jerome Crowley DO;  [facility];  Service: General   • ABDOMINAL WALL SURGERY N/A 7/19/2023    Procedure: PARTIAL CLOSURE WOUND ABDOMINAL/TRUNK;  Surgeon: Eri Ruffin DO;  [facility];  Service: General   • APPENDECTOMY N/A 6/19/2023    Procedure: APPENDECTOMY;  Surgeon: Jerome Crowley DO;  [facility];  Service: General   • CHOLECYSTECTOMY     • COLOSTOMY     • CRYOABLATION Right     RT KIDNEY   • CYSTORRHAPHY      Bladder.  Last assessed 4/6/2016    • EXPLORATORY LAPAROTOMY W/ BOWEL RESECTION N/A 6/19/2023    Procedure: LAPAROTOMY EXPLORATORY W/ BOWEL RESECTION;  Surgeon: Zach Bautista MD;  [facility];  Service: General   • GASTRIC BYPASS     • HERNIA REPAIR      Last assessed 4/6/2016    • HYSTERECTOMY     • LAPAROTOMY N/A 6/19/2023    Procedure: LAPAROTOMY EXPLORATORY, washout, vac change;  Surgeon: Jerome Crowley DO;  [facility];  Service: General   • LAPAROTOMY N/A 6/20/2023    Procedure: LAPAROTOMY, REMOVAL OF WOUND VAC AND PACKING, SMALL BOWEL RESECTION , BOWEL ANASTOMOSIS X 3 , VICRYL MESH BRIDGING -;  Surgeon: Jerome Crowley DO;  [facility];  Service: General   • CO OPTX DSTL RADL X-ARTIC FX/EPIPHYSL SEP Right 4/22/2016    Procedure: OPEN REDUCTION INTERNAL FIXATION RIGHT DISTAL RADIUS;  Surgeon: Tila Reyes MD;  [facility];  Service: Orthopedics   • REVISION COLOSTOMY     • SMALL INTESTINE SURGERY N/A 6/19/2023    Procedure: RESECTION SMALL BOWEL; Surgeon: Kinjal Cowart DO;  [facility];  Service: General   • THYROID LOBECTOMY Left 8/27/2019    Procedure: LOBECTOMY THYROID, left;  Surgeon: Favio Mixon MD;  [facility];  Service: Surgical Oncology   • TUBAL LIGATION     • US GUIDED THYROID BIOPSY  2/27/2019   • US GUIDED THYROID BIOPSY  5/29/2019   • WOUND DEBRIDEMENT N/A 7/18/2023    Procedure: DEBRIDEMENT WOUND [facility] OUT);   Surgeon: Jonathan Forbes DO;  [facility];  Service: Trauma   • WOUND DEBRIDEMENT N/A 7/19/2023    Procedure: ABDOMINAL WOUND ([address] [place] [address] Street OUT); VAC APPLICATION;  Surgeon: Warren Medina DO;  [facility];  Service: General       No Known Allergies    Current Outpatient Medications on File Prior to Visit   Medication Sig Dispense Refill   • acetaminophen (TYLENOL) 325 mg tablet Take 975 mg by mouth every 6 (six) hours as needed for mild pain or moderate pain     • amiodarone 200 mg tablet Take 1 tablet (200 mg total) by mouth daily with breakfast 30 tablet 0   • apixaban (Eliquis) 5 mg Take 1 tablet (5 mg total) by mouth 2 (two) times a day 60 tablet 11   • diltiazem (CARDIZEM CD) 360 MG 24 hr capsule Take 1 capsule (360 mg total) by mouth daily 90 capsule 3   • ergocalciferol (VITAMIN D2) 50,000 units Take 50,000 Units by mouth once a week Takes D3      • ferrous sulfate 325 (65 Fe) mg tablet Take 1 tablet (325 mg total) by mouth daily with breakfast 30 tablet 0   • FLUoxetine (PROzac) 20 mg capsule Take 1 capsule (20 mg total) by mouth 2 (two) times a day 60 capsule 0   • furosemide (LASIX) 20 mg tablet Take 1 tablet (20 mg total) by mouth daily 60 tablet 3   • metoprolol succinate (TOPROL-XL) 25 mg 24 hr tablet Take 1 tablet (25 mg total) by mouth daily 90 tablet 3   • potassium chloride (K-DUR,KLOR-CON) 20 mEq tablet Take 1 tablet (20 mEq total) by mouth daily 90 tablet 3   • Probiotic Product (PRO-BIOTIC BLEND PO) Take by mouth     • calcium carbonate (OYSTER SHELL,OSCAL) 500 mg Take 1 tablet by mouth daily with breakfast (Patient not taking: Reported on 8/17/2023) 30 tablet 0   • cyanocobalamin (VITAMIN B-12) 1000 MCG tablet Take 1 tablet (1,000 mcg total) by mouth daily 30 tablet 0   • melatonin 3 mg Take 2 tablets (6 mg total) by mouth daily at bedtime (Patient not taking: Reported on 8/17/2023)  0   • psyllium (METAMUCIL) packet Take 1 packet by mouth daily (Patient not taking: Reported on 8/17/2023) 30 packet 0     Current Facility-Administered Medications on File Prior to Visit   Medication Dose Route Frequency Provider Last Rate Last Admin   • denosumab (PROLIA) subcutaneous injection 60 mg  60 mg Subcutaneous Q6 Months Jolly Gates MD   60 mg at 01/04/21 1157       Social History     Tobacco Use   • Smoking status: Never   • Smokeless tobacco: Never   Vaping Use   • Vaping Use: Never used   Substance Use Topics   • Alcohol use: Not Currently     Alcohol/week: 0.0 standard drinks of alcohol     Comment: 0   • Drug use: No       Family History   Problem Relation Age of Onset   • Heart attack Mother    • Lung cancer Father 76   • Heart disease Father    • Stroke Sister    • Lung cancer Sister 64   • Prostate cancer Brother 61       Review of Systems     As stated in the HPI. All other systems reviewed and are negative.       Large joint arthrocentesis: bilateral knee  Procedure Details  Location: knee - bilateral knee  Needle size: 22 G  Ultrasound guidance: no  Approach: anterolateral    Medications (Right): 2 mL bupivacaine 0.25 %; 2 mL lidocaine 1 %; 40 mg triamcinolone acetonide 40 mg/mLMedications (Left): 2 mL bupivacaine 0.25 %; 2 mL lidocaine 1 %; 40 mg triamcinolone acetonide 40 mg/mL   Patient tolerance: patient tolerated the procedure well with no immediate complications  Dressing:  Sterile dressing applied

## 2023-09-08 ENCOUNTER — NURSE TRIAGE (OUTPATIENT)
Dept: OTHER | Facility: OTHER | Age: 73
End: 2023-09-08

## 2023-09-08 ENCOUNTER — HOME CARE VISIT (OUTPATIENT)
Dept: HOME HEALTH SERVICES | Facility: HOME HEALTHCARE | Age: 73
End: 2023-09-08
Payer: MEDICARE

## 2023-09-08 ENCOUNTER — OFFICE VISIT (OUTPATIENT)
Dept: URGENT CARE | Age: 73
End: 2023-09-08
Payer: MEDICARE

## 2023-09-08 ENCOUNTER — TELEPHONE (OUTPATIENT)
Dept: CARDIOLOGY CLINIC | Facility: CLINIC | Age: 73
End: 2023-09-08

## 2023-09-08 VITALS
SYSTOLIC BLOOD PRESSURE: 122 MMHG | TEMPERATURE: 97.8 F | HEART RATE: 76 BPM | RESPIRATION RATE: 20 BRPM | OXYGEN SATURATION: 97 % | DIASTOLIC BLOOD PRESSURE: 70 MMHG

## 2023-09-08 VITALS — HEART RATE: 110 BPM | OXYGEN SATURATION: 98 % | TEMPERATURE: 98 F | RESPIRATION RATE: 20 BRPM

## 2023-09-08 VITALS — DIASTOLIC BLOOD PRESSURE: 78 MMHG | SYSTOLIC BLOOD PRESSURE: 126 MMHG

## 2023-09-08 DIAGNOSIS — K64.9 HEMORRHOIDS, UNSPECIFIED HEMORRHOID TYPE: Primary | ICD-10-CM

## 2023-09-08 PROCEDURE — G0463 HOSPITAL OUTPT CLINIC VISIT: HCPCS

## 2023-09-08 PROCEDURE — 99203 OFFICE O/P NEW LOW 30 MIN: CPT

## 2023-09-08 PROCEDURE — G0156 HHCP-SVS OF AIDE,EA 15 MIN: HCPCS

## 2023-09-08 PROCEDURE — G0299 HHS/HOSPICE OF RN EA 15 MIN: HCPCS

## 2023-09-08 NOTE — TELEPHONE ENCOUNTER
Pt will be going to a urgent care walk in to have her wound checked. She will call back if further assistance is needed . She is concerned due to upcoming Surgery.

## 2023-09-08 NOTE — TELEPHONE ENCOUNTER
Regarding: blood in stool issue  ----- Message from Ha Ferris sent at 9/8/2023  5:22 PM EDT -----  "I am calling about  gas pockets and I have issue with gas, when I am on the   toilet. The gas comes out freely, but bright red blood appears when I wipe. I didn't want to wait until my appointment on Monday.  I wanted to speak to a nurse today for some advice"

## 2023-09-08 NOTE — TELEPHONE ENCOUNTER
Possibly not from rectal bleeding. Reason for Disposition  • Rectal bleeding is minimal (e.g., blood just on toilet paper, a few drops in toilet bowl)    Answer Assessment - Initial Assessment Questions  1. APPEARANCE of BLOOD: "What color is it?" "Is it passed separately, on the surface of the stool, or mixed in with the stool?"       Bright red bleeding  2. AMOUNT: "How much blood was passed?"       A small amount passes with gas. 3. FREQUENCY: "How many times has blood been passed with the stools?"       Whenever she passes gas. 2 times today. 4. ONSET: "When was the blood first seen in the stools?" (Days or weeks)       This morning  5. DIARRHEA: "Is there also some diarrhea?" If Yes, ask: "How many diarrhea stools were passed in past 24 hours?"       none  6. CONSTIPATION: "Do you have constipation?" If Yes, ask: "How bad is it?"      none  7. RECURRENT SYMPTOMS: "Have you had blood in your stools before?" If Yes, ask: "When was the last time?" and "What happened that time?"       no  8. BLOOD THINNERS: "Do you take any blood thinners?" (e.g., Coumadin/warfarin, Pradaxa/dabigatran, aspirin)      She is on Eliquis  9. OTHER SYMPTOMS: "Do you have any other symptoms?"  (e.g., abdominal pain, vomiting, dizziness, fever)      She is treating a wound in the rectal area for one week the size of a dime. Unsure if the wound is bleeding or having true rectal bleeding.     Protocols used: RECTAL BLEEDING-ADULT-

## 2023-09-08 NOTE — TELEPHONE ENCOUNTER
Patient c/o gas pockets & when she experiences this gas, she sits on the toilet. The gas comes out freely, but bright red blood appears when she wipes. Advised patient to call her PCP & surgeon. Patient verbalized understanding.

## 2023-09-08 NOTE — PROGRESS NOTES
[facility]        NAME: Mamadou Sr is a 67 y.o. female  : 1950    MRN: [id number]  DATE: 2023  TIME: 7:36 PM    Assessment and Plan   Hemorrhoids, unspecified hemorrhoid type [K64.9]  1. Hemorrhoids, unspecified hemorrhoid type          Pt presents with daughter for eval of an episode of bleeding this am when she wiped. Has been having a lot of gas and when she wiped, noted some bright red blood. Denies current bleeding. Wanted to make sure it wasn't the small pressure ulcer that is inside her right buttock. Assessment notes non-bleeding, WNL healing pressure ulcer. External hemorrhoids visualized. Advised unable to perform internal exam. Monitor for continued bleeding and concerning s/s    Patient Instructions       Follow up with PCP and surgeon as needed    Chief Complaint   No chief complaint on file. History of Present Illness       Pt presents with daughter for eval of an episode of bleeding this am when she wiped. Has been having a lot of gas and when she wiped, noted some bright red blood. Denies current bleeding. Wanted to make sure it wasn't the small pressure ulcer that is inside her right buttock. Assessment notes non-bleeding, WNL healing pressure ulcer. External hemorrhoids visualized. Advised unable to perform internal exam. Monitor for continued bleeding and concerning s/s      Review of Systems   Review of Systems   Reason unable to perform ROS: chronic illnesses and current medical probs at baseline. Gastrointestinal: Positive for blood in stool. Negative for abdominal distention and abdominal pain. Musculoskeletal: Positive for gait problem. Skin: Positive for wound. All other systems reviewed and are negative.         Current Medications       Current Outpatient Medications:   •  acetaminophen (TYLENOL) 325 mg tablet, Take 975 mg by mouth every 6 (six) hours as needed for mild pain or moderate pain, Disp: , Rfl:   •  amiodarone 200 mg tablet, Take 1 tablet (200 mg total) by mouth daily with breakfast, Disp: 30 tablet, Rfl: 0  •  apixaban (Eliquis) 5 mg, Take 1 tablet (5 mg total) by mouth 2 (two) times a day, Disp: 60 tablet, Rfl: 11  •  calcium carbonate (OYSTER SHELL,OSCAL) 500 mg, Take 1 tablet by mouth daily with breakfast (Patient not taking: Reported on 8/17/2023), Disp: 30 tablet, Rfl: 0  •  cyanocobalamin (VITAMIN B-12) 1000 MCG tablet, Take 1 tablet (1,000 mcg total) by mouth daily, Disp: 30 tablet, Rfl: 0  •  diltiazem (CARDIZEM CD) 360 MG 24 hr capsule, Take 1 capsule (360 mg total) by mouth daily, Disp: 90 capsule, Rfl: 3  •  ergocalciferol (VITAMIN D2) 50,000 units, Take 50,000 Units by mouth once a week Takes D3 , Disp: , Rfl:   •  ferrous sulfate 325 (65 Fe) mg tablet, Take 1 tablet (325 mg total) by mouth daily with breakfast, Disp: 30 tablet, Rfl: 0  •  FLUoxetine (PROzac) 20 mg capsule, Take 1 capsule (20 mg total) by mouth 2 (two) times a day, Disp: 60 capsule, Rfl: 0  •  furosemide (LASIX) 20 mg tablet, Take 1 tablet (20 mg total) by mouth daily, Disp: 60 tablet, Rfl: 3  •  melatonin 3 mg, Take 2 tablets (6 mg total) by mouth daily at bedtime (Patient not taking: Reported on 8/17/2023), Disp: , Rfl: 0  •  metoprolol succinate (TOPROL-XL) 25 mg 24 hr tablet, Take 1 tablet (25 mg total) by mouth daily, Disp: 90 tablet, Rfl: 3  •  potassium chloride (K-DUR,KLOR-CON) 20 mEq tablet, Take 1 tablet (20 mEq total) by mouth daily, Disp: 90 tablet, Rfl: 3  •  Probiotic Product (PRO-BIOTIC BLEND PO), Take by mouth, Disp: , Rfl:   •  psyllium (METAMUCIL) packet, Take 1 packet by mouth daily (Patient not taking: Reported on 8/17/2023), Disp: 30 packet, Rfl: 0    Current Facility-Administered Medications:   •  denosumab (PROLIA) subcutaneous injection 60 mg, 60 mg, Subcutaneous, Q6 Months, Fabio Pérez MD, 60 mg at 01/04/21 1157    Current Allergies     Allergies as of 09/08/2023   • (No Known Allergies)            The following portions of the patient's history were reviewed and updated as appropriate: allergies, current medications, past family history, past medical history, past social history, past surgical history and problem list.     Past Medical History:   Diagnosis Date   • A-fib ([address])    • Arthritis    • Atrial fibrillation ([address])    • Cervical spondylosis with myelopathy    • Gastric bypass status for obesity    • History of transfusion     35 years ago   • Hypertension    • mass right kidney    • Renal cell adenocarcinoma ([address])     RIGHT   • Sleep apnea    • Super obese        Past Surgical History:   Procedure Laterality Date   • ABDOMINAL ADHESION SURGERY N/A 6/19/2023    Procedure: LYSIS ADHESIONS;  Surgeon: Giovanna Weiner DO;  [facility];  Service: General   • ABDOMINAL WALL SURGERY N/A 7/19/2023    Procedure: PARTIAL CLOSURE WOUND ABDOMINAL/TRUNK;  Surgeon: Nicho Singletary DO;  [facility];  Service: General   • APPENDECTOMY N/A 6/19/2023    Procedure: APPENDECTOMY;  Surgeon: Giovanna Weiner DO;  [facility];  Service: General   • CHOLECYSTECTOMY     • COLOSTOMY     • CRYOABLATION Right     RT KIDNEY   • CYSTORRHAPHY      Bladder.  Last assessed 4/6/2016    • EXPLORATORY LAPAROTOMY W/ BOWEL RESECTION N/A 6/19/2023    Procedure: LAPAROTOMY EXPLORATORY W/ BOWEL RESECTION;  Surgeon: Lizette Muñiz MD;  [facility];  Service: General   • GASTRIC BYPASS     • HERNIA REPAIR      Last assessed 4/6/2016    • HYSTERECTOMY     • LAPAROTOMY N/A 6/19/2023    Procedure: LAPAROTOMY EXPLORATORY, washout, vac change;  Surgeon: Giovanna Weiner DO;  [facility];  Service: General   • LAPAROTOMY N/A 6/20/2023    Procedure: LAPAROTOMY, REMOVAL OF WOUND VAC AND PACKING, SMALL BOWEL RESECTION , BOWEL ANASTOMOSIS X 3 , VICRYL MESH BRIDGING -;  Surgeon: Giovanna Weiner DO;  [facility];  Service: General   • DE OPTX DSTL RADL X-ARTIC FX/EPIPHYSL SEP Right 4/22/2016 Procedure: OPEN REDUCTION INTERNAL FIXATION RIGHT DISTAL RADIUS;  Surgeon: Tila Reyes MD;  [facility];  Service: Orthopedics   • REVISION COLOSTOMY     • SMALL INTESTINE SURGERY N/A 6/19/2023    Procedure: RESECTION SMALL BOWEL;  Surgeon: Jerome Crowley DO;  [facility];  Service: General   • THYROID LOBECTOMY Left 8/27/2019    Procedure: LOBECTOMY THYROID, left;  Surgeon: Bladimir Mirza MD;  [facility];  Service: Surgical Oncology   • TUBAL LIGATION     • US GUIDED THYROID BIOPSY  2/27/2019   • US GUIDED THYROID BIOPSY  5/29/2019   • WOUND DEBRIDEMENT N/A 7/18/2023    Procedure: DEBRIDEMENT WOUND [facility] OUT); Surgeon: Flor Domingo DO;  [facility];  Service: Trauma   • WOUND DEBRIDEMENT N/A 7/19/2023    Procedure: ABDOMINAL WOUND ([address] [place] [address] OUT); VAC APPLICATION;  Surgeon: Eri Ruffin DO;  [facility];  Service: General       Family History   Problem Relation Age of Onset   • Heart attack Mother    • Lung cancer Father 76   • Heart disease Father    • Stroke Sister    • Lung cancer Sister 64   • Prostate cancer Brother 61         Medications have been verified. Objective   Pulse (!) 110 Comment: post ambulation  Temp 98 °F (36.7 °C)   Resp 20   SpO2 98%   No LMP recorded. Patient is postmenopausal.       Physical Exam     Physical Exam  Vitals reviewed. Constitutional:       Appearance: She is obese. Neurological:      Mental Status: She is alert.

## 2023-09-11 ENCOUNTER — OFFICE VISIT (OUTPATIENT)
Dept: SURGERY | Facility: CLINIC | Age: 73
End: 2023-09-11
Payer: MEDICARE

## 2023-09-11 ENCOUNTER — HOME CARE VISIT (OUTPATIENT)
Dept: HOME HEALTH SERVICES | Facility: HOME HEALTHCARE | Age: 73
End: 2023-09-11
Payer: MEDICARE

## 2023-09-11 VITALS — WEIGHT: 268.8 LBS | HEIGHT: 64 IN | TEMPERATURE: 97.9 F | BODY MASS INDEX: 45.89 KG/M2

## 2023-09-11 DIAGNOSIS — T81.30XS ABDOMINAL WOUND DEHISCENCE, SEQUELA: Primary | ICD-10-CM

## 2023-09-11 PROCEDURE — 99212 OFFICE O/P EST SF 10 MIN: CPT | Performed by: SURGERY

## 2023-09-11 NOTE — PROGRESS NOTES
Office Visit - General Surgery  Jaz Bennett MRN: [id number]  Encounter: [id number]    Assessment and Plan  Problem List Items Addressed This Visit        Other    Abdominal wound dehiscence - Primary     -64yoF w open surgical wound of midline abdomen inferior portion of the previous incision  -getting VAC changes at home by [facility] on MWF, currently using 1 white sponge and 2 black including 1 of the black to bridge    hx: admitted 6/19-7/4 w bowel perforation and internal hernia (hx arlene-en-y gastric bypass)  -S/p ex lap, resection J-J anastomosis, segmental ileum resection, abthera 6/19  -S/p ex lap, washout, abthera change 6/19  -S/p ex lap, small bowel resection, bowel anastomosis x3, vicryl bridging mesh 6/20   -s/p debridement/washout on 7/18  -s/p partial closure and wound vac placement on 7/19    -presenting for wound check, previously seen in clinic on 8/21 for VAC change and wound appearing well at that time  -wound today appears healthy and viable, granulation tissue at the base diffusely, surrounding skin without erythema or induration, no purulent discharge of signs of infection  -irrigated wound w sterile saline, also removed remaining nylon sutures x2 as these were futile  -VAC change done with 2 black sponges total, including 1 outside of the wound used for a bridge to the track pad, white sponge was NOT used, there is not exposed mesh or extensive tracking/tunneling to warrant white sponge use  -final wound measurements 4.5 x 2.5 x 2.5 cm w some minimal 2cm tunneling superiorly along the surgical scar but this was mostly closed down, also replaced VAC cannister as it was a week old, set to 125 mmHg    -she will follow up again in 2 weeks for a wound check w us, otherwise continue changes MWF as scheduled            Chief Complaint:  Jaz Bennett is a 67 y.o. female who presents for Wound Check (Wound check.)    Subjective  Doing well, getting VAC changed MWF, tolerating diet without n/v, having bowel function, maintaining normal activity, denies fevers/chills, in good spirits. Past Medical History:   Diagnosis Date   • A-fib [facility])    • Arthritis    • Atrial fibrillation ([address])    • Cervical spondylosis with myelopathy    • Gastric bypass status for obesity    • History of transfusion     35 years ago   • Hypertension    • mass right kidney    • Renal cell adenocarcinoma (HCC)     RIGHT   • Sleep apnea    • Super obese        Past Surgical History:   Procedure Laterality Date   • ABDOMINAL ADHESION SURGERY N/A 6/19/2023    Procedure: LYSIS ADHESIONS;  Surgeon: Silver Campbell DO;  [facility];  Service: General   • ABDOMINAL WALL SURGERY N/A 7/19/2023    Procedure: PARTIAL CLOSURE WOUND ABDOMINAL/TRUNK;  Surgeon: Issac Titus DO;  [facility];  Service: General   • APPENDECTOMY N/A 6/19/2023    Procedure: APPENDECTOMY;  Surgeon: Silver Campbell DO;  [facility];  Service: General   • CHOLECYSTECTOMY     • COLOSTOMY     • CRYOABLATION Right     RT KIDNEY   • CYSTORRHAPHY      Bladder.  Last assessed 4/6/2016    • EXPLORATORY LAPAROTOMY W/ BOWEL RESECTION N/A 6/19/2023    Procedure: LAPAROTOMY EXPLORATORY W/ BOWEL RESECTION;  Surgeon: Tarik العلي MD;  [facility];  Service: General   • GASTRIC BYPASS     • HERNIA REPAIR      Last assessed 4/6/2016    • HYSTERECTOMY     • LAPAROTOMY N/A 6/19/2023    Procedure: LAPAROTOMY EXPLORATORY, washout, vac change;  Surgeon: Silver Campbell DO;  [facility];  Service: General   • LAPAROTOMY N/A 6/20/2023    Procedure: LAPAROTOMY, REMOVAL OF WOUND VAC AND PACKING, SMALL BOWEL RESECTION , BOWEL ANASTOMOSIS X 3 , VICRYL MESH BRIDGING -;  Surgeon: Silver Campbell DO;  [facility];  Service: General   • MT OPTX DSTL RADL X-ARTIC FX/EPIPHYSL SEP Right 4/22/2016    Procedure: OPEN REDUCTION INTERNAL FIXATION RIGHT DISTAL RADIUS;  Surgeon: Robbie Chen MD;  [facility];  Service: Orthopedics   • REVISION COLOSTOMY     • SMALL INTESTINE SURGERY N/A 6/19/2023    Procedure: RESECTION SMALL BOWEL;  Surgeon: Germán Cabrales DO;  [facility];  Service: General   • THYROID LOBECTOMY Left 8/27/2019    Procedure: LOBECTOMY THYROID, left;  Surgeon: Leonie Richardson MD;  [facility];  Service: Surgical Oncology   • TUBAL LIGATION     • US GUIDED THYROID BIOPSY  2/27/2019   • US GUIDED THYROID BIOPSY  5/29/2019   • WOUND DEBRIDEMENT N/A 7/18/2023    Procedure: DEBRIDEMENT WOUND [facility] OUT);   Surgeon: Waleska Lira DO;  [facility];  Service: Trauma   • WOUND DEBRIDEMENT N/A 7/19/2023    Procedure: ABDOMINAL WOUND ([address] [place] [address] Street OUT); VAC APPLICATION;  Surgeon: Magy Ricardo DO;  [facility];  Service: General       Family History   Problem Relation Age of Onset   • Heart attack Mother    • Lung cancer Father 76   • Heart disease Father    • Stroke Sister    • Lung cancer Sister 64   • Prostate cancer Brother 61       Social History     Tobacco Use   • Smoking status: Never   • Smokeless tobacco: Never   Vaping Use   • Vaping Use: Never used   Substance Use Topics   • Alcohol use: Not Currently     Alcohol/week: 0.0 standard drinks of alcohol     Comment: 0   • Drug use: No        Medications  Current Outpatient Medications on File Prior to Visit   Medication Sig Dispense Refill   • acetaminophen (TYLENOL) 325 mg tablet Take 975 mg by mouth every 6 (six) hours as needed for mild pain or moderate pain     • amiodarone 200 mg tablet Take 1 tablet (200 mg total) by mouth daily with breakfast 30 tablet 0   • apixaban (Eliquis) 5 mg Take 1 tablet (5 mg total) by mouth 2 (two) times a day 60 tablet 11   • calcium carbonate (OYSTER SHELL,OSCAL) 500 mg Take 1 tablet by mouth daily with breakfast 30 tablet 0   • cyanocobalamin (VITAMIN B-12) 1000 MCG tablet Take 1 tablet (1,000 mcg total) by mouth daily 30 tablet 0   • diltiazem (CARDIZEM CD) 360 MG 24 hr capsule Take 1 capsule (360 mg total) by mouth daily 90 capsule 3   • ergocalciferol (VITAMIN D2) 50,000 units Take 50,000 Units by mouth once a week Takes D3      • ferrous sulfate 325 (65 Fe) mg tablet Take 1 tablet (325 mg total) by mouth daily with breakfast 30 tablet 0   • FLUoxetine (PROzac) 20 mg capsule Take 1 capsule (20 mg total) by mouth 2 (two) times a day 60 capsule 0   • furosemide (LASIX) 20 mg tablet Take 1 tablet (20 mg total) by mouth daily 60 tablet 3   • melatonin 3 mg Take 2 tablets (6 mg total) by mouth daily at bedtime  0   • metoprolol succinate (TOPROL-XL) 25 mg 24 hr tablet Take 1 tablet (25 mg total) by mouth daily 90 tablet 3   • potassium chloride (K-DUR,KLOR-CON) 20 mEq tablet Take 1 tablet (20 mEq total) by mouth daily 90 tablet 3   • Probiotic Product (PRO-BIOTIC BLEND PO) Take by mouth     • psyllium (METAMUCIL) packet Take 1 packet by mouth daily 30 packet 0     Current Facility-Administered Medications on File Prior to Visit   Medication Dose Route Frequency Provider Last Rate Last Admin   • denosumab (PROLIA) subcutaneous injection 60 mg  60 mg Subcutaneous Q6 Months Bonnie Cadet MD   60 mg at 01/04/21 1157       Allergies  No Known Allergies    Review of Systems   Constitutional: Negative for activity change, appetite change, chills, fatigue and fever. Respiratory: Negative for shortness of breath. Cardiovascular: Negative for chest pain. Gastrointestinal: Negative for abdominal distention, abdominal pain, constipation, diarrhea, nausea and vomiting. Genitourinary: Negative for difficulty urinating. Musculoskeletal: Negative for back pain and neck pain. Skin: Positive for wound. Negative for color change. Neurological: Negative for dizziness, light-headedness and headaches. Psychiatric/Behavioral: Negative for agitation and confusion. The patient is not nervous/anxious. All other systems reviewed and are negative.       Objective  Vitals:    09/11/23 6704 Temp: 97.9 °F (36.6 °C)       Physical Exam  Vitals reviewed. Constitutional:       General: She is not in acute distress. Appearance: Normal appearance. She is obese. She is not ill-appearing or toxic-appearing. HENT:      Head: Normocephalic and atraumatic. Nose: Nose normal.      Mouth/Throat:      Mouth: Mucous membranes are moist.   Eyes:      Extraocular Movements: Extraocular movements intact. Cardiovascular:      Rate and Rhythm: Normal rate and regular rhythm. Pulses: Normal pulses. Pulmonary:      Effort: Pulmonary effort is normal.   Abdominal:      General: There is no distension. Palpations: Abdomen is soft. Tenderness: There is no abdominal tenderness. Musculoskeletal:         General: Normal range of motion. Cervical back: Normal range of motion. Skin:     General: Skin is warm. Capillary Refill: Capillary refill takes less than 2 seconds. Coloration: Skin is not jaundiced or pale. Findings: No erythema. Neurological:      Mental Status: She is alert and oriented to person, place, and time.    Psychiatric:         Mood and Affect: Mood normal.

## 2023-09-11 NOTE — ASSESSMENT & PLAN NOTE
-64yoF w open surgical wound of midline abdomen inferior portion of the previous incision  -getting VAC changes at home by [facility] on MW, currently using 1 white sponge and 2 black including 1 of the black to bridge    hx: admitted 6/19-7/4 w bowel perforation and internal hernia (hx arlene-en-y gastric bypass)  -S/p ex lap, resection J-J anastomosis, segmental ileum resection, abthera 6/19  -S/p ex lap, washout, abthera change 6/19  -S/p ex lap, small bowel resection, bowel anastomosis x3, vicryl bridging mesh 6/20   -s/p debridement/washout on 7/18  -s/p partial closure and wound vac placement on 7/19    -presenting for wound check, previously seen in clinic on 8/21 for VAC change and wound appearing well at that time  -wound today appears healthy and viable, granulation tissue at the base diffusely, surrounding skin without erythema or induration, no purulent discharge of signs of infection  -irrigated wound w sterile saline, also removed remaining nylon sutures x2 as these were futile  -VAC change done with 2 black sponges total, including 1 outside of the wound used for a bridge to the track pad, white sponge was NOT used, there is not exposed mesh or extensive tracking/tunneling to warrant white sponge use  -final wound measurements 4.5 x 2.5 x 2.5 cm w some minimal 2cm tunneling superiorly along the surgical scar but this was mostly closed down, also replaced VAC cannister as it was a week old, set to 125 mmHg    -she will follow up again in 2 weeks for a wound check w us, otherwise continue changes [facility] as scheduled

## 2023-09-12 ENCOUNTER — HOME CARE VISIT (OUTPATIENT)
Dept: HOME HEALTH SERVICES | Facility: HOME HEALTHCARE | Age: 73
End: 2023-09-12
Payer: MEDICARE

## 2023-09-12 PROCEDURE — G0156 HHCP-SVS OF AIDE,EA 15 MIN: HCPCS

## 2023-09-13 ENCOUNTER — HOME CARE VISIT (OUTPATIENT)
Dept: HOME HEALTH SERVICES | Facility: HOME HEALTHCARE | Age: 73
End: 2023-09-13
Payer: MEDICARE

## 2023-09-13 PROCEDURE — G0299 HHS/HOSPICE OF RN EA 15 MIN: HCPCS

## 2023-09-14 VITALS
TEMPERATURE: 98.6 F | DIASTOLIC BLOOD PRESSURE: 68 MMHG | SYSTOLIC BLOOD PRESSURE: 138 MMHG | HEART RATE: 104 BPM | RESPIRATION RATE: 24 BRPM | OXYGEN SATURATION: 97 %

## 2023-09-15 ENCOUNTER — HOME CARE VISIT (OUTPATIENT)
Dept: HOME HEALTH SERVICES | Facility: HOME HEALTHCARE | Age: 73
End: 2023-09-15
Payer: MEDICARE

## 2023-09-15 ENCOUNTER — HOSPITAL ENCOUNTER (INPATIENT)
Facility: HOSPITAL | Age: 73
LOS: 1 days | Discharge: HOME HEALTH | End: 2023-09-18
Attending: EMERGENCY MEDICINE | Admitting: SURGERY
Payer: MEDICARE

## 2023-09-15 VITALS
TEMPERATURE: 101.8 F | HEART RATE: 96 BPM | DIASTOLIC BLOOD PRESSURE: 62 MMHG | RESPIRATION RATE: 20 BRPM | SYSTOLIC BLOOD PRESSURE: 128 MMHG | OXYGEN SATURATION: 97 %

## 2023-09-15 DIAGNOSIS — R21 RASH: Primary | ICD-10-CM

## 2023-09-15 DIAGNOSIS — K63.1 BOWEL PERFORATION (HCC): ICD-10-CM

## 2023-09-15 DIAGNOSIS — S31.109A OPEN WOUND OF ABDOMINAL WALL, INITIAL ENCOUNTER: ICD-10-CM

## 2023-09-15 DIAGNOSIS — M81.0 OSTEOPOROSIS, UNSPECIFIED OSTEOPOROSIS TYPE, UNSPECIFIED PATHOLOGICAL FRACTURE PRESENCE: ICD-10-CM

## 2023-09-15 DIAGNOSIS — T81.30XS ABDOMINAL WOUND DEHISCENCE, SEQUELA: ICD-10-CM

## 2023-09-15 PROCEDURE — G0156 HHCP-SVS OF AIDE,EA 15 MIN: HCPCS

## 2023-09-15 PROCEDURE — 99283 EMERGENCY DEPT VISIT LOW MDM: CPT

## 2023-09-15 PROCEDURE — G0299 HHS/HOSPICE OF RN EA 15 MIN: HCPCS

## 2023-09-15 NOTE — CASE COMMUNICATION
I saw patient late today and she was noted to have a temp of 101.8. Denies chills, however, she has redness across abdomen worse in L side and also along where healed incision is located, Having some soreness in L side of abdomen and states area is tender. SN instructed patient and dgt that patient should go to ED for an evaluation but patient refusing at this time. I tried calling the office but no answer was received. Patient instruct ed to continue to check her temp and advised to go to ED if remains elevated. Please call patient with any further instructions.  Thank you

## 2023-09-16 ENCOUNTER — APPOINTMENT (OUTPATIENT)
Dept: RADIOLOGY | Facility: HOSPITAL | Age: 73
End: 2023-09-16
Attending: RADIOLOGY
Payer: MEDICARE

## 2023-09-16 ENCOUNTER — APPOINTMENT (OUTPATIENT)
Dept: RADIOLOGY | Facility: HOSPITAL | Age: 73
End: 2023-09-16
Payer: MEDICARE

## 2023-09-16 LAB
ALBUMIN SERPL BCP-MCNC: 2.7 G/DL (ref 3.5–5)
ALP SERPL-CCNC: 84 U/L (ref 34–104)
ALT SERPL W P-5'-P-CCNC: 25 U/L (ref 7–52)
ANION GAP SERPL CALCULATED.3IONS-SCNC: 10 MMOL/L
ANION GAP SERPL CALCULATED.3IONS-SCNC: 7 MMOL/L
AST SERPL W P-5'-P-CCNC: 43 U/L (ref 13–39)
BASOPHILS # BLD AUTO: 0.02 THOUSANDS/ÂΜL (ref 0–0.1)
BASOPHILS # BLD AUTO: 0.02 THOUSANDS/ÂΜL (ref 0–0.1)
BASOPHILS NFR BLD AUTO: 0 % (ref 0–1)
BASOPHILS NFR BLD AUTO: 0 % (ref 0–1)
BILIRUB SERPL-MCNC: 0.63 MG/DL (ref 0.2–1)
BUN SERPL-MCNC: 12 MG/DL (ref 5–25)
BUN SERPL-MCNC: 15 MG/DL (ref 5–25)
CALCIUM ALBUM COR SERPL-MCNC: 8.4 MG/DL (ref 8.3–10.1)
CALCIUM SERPL-MCNC: 7.4 MG/DL (ref 8.4–10.2)
CALCIUM SERPL-MCNC: 7.4 MG/DL (ref 8.4–10.2)
CHLORIDE SERPL-SCNC: 100 MMOL/L (ref 96–108)
CHLORIDE SERPL-SCNC: 97 MMOL/L (ref 96–108)
CO2 SERPL-SCNC: 21 MMOL/L (ref 21–32)
CO2 SERPL-SCNC: 25 MMOL/L (ref 21–32)
CREAT SERPL-MCNC: 0.71 MG/DL (ref 0.6–1.3)
CREAT SERPL-MCNC: 0.76 MG/DL (ref 0.6–1.3)
EOSINOPHIL # BLD AUTO: 0.01 THOUSAND/ÂΜL (ref 0–0.61)
EOSINOPHIL # BLD AUTO: 0.02 THOUSAND/ÂΜL (ref 0–0.61)
EOSINOPHIL NFR BLD AUTO: 0 % (ref 0–6)
EOSINOPHIL NFR BLD AUTO: 0 % (ref 0–6)
ERYTHROCYTE [DISTWIDTH] IN BLOOD BY AUTOMATED COUNT: 18.2 % (ref 11.6–15.1)
ERYTHROCYTE [DISTWIDTH] IN BLOOD BY AUTOMATED COUNT: 18.8 % (ref 11.6–15.1)
GFR SERPL CREATININE-BSD FRML MDRD: 78 ML/MIN/1.73SQ M
GFR SERPL CREATININE-BSD FRML MDRD: 84 ML/MIN/1.73SQ M
GLUCOSE SERPL-MCNC: 102 MG/DL (ref 65–140)
GLUCOSE SERPL-MCNC: 104 MG/DL (ref 65–140)
HCT VFR BLD AUTO: 32.2 % (ref 34.8–46.1)
HCT VFR BLD AUTO: 33.4 % (ref 34.8–46.1)
HGB BLD-MCNC: 10.1 G/DL (ref 11.5–15.4)
HGB BLD-MCNC: 10.3 G/DL (ref 11.5–15.4)
IMM GRANULOCYTES # BLD AUTO: 0.1 THOUSAND/UL (ref 0–0.2)
IMM GRANULOCYTES # BLD AUTO: 0.11 THOUSAND/UL (ref 0–0.2)
IMM GRANULOCYTES NFR BLD AUTO: 1 % (ref 0–2)
IMM GRANULOCYTES NFR BLD AUTO: 1 % (ref 0–2)
LACTATE SERPL-SCNC: 0.9 MMOL/L (ref 0.5–2)
LYMPHOCYTES # BLD AUTO: 0.57 THOUSANDS/ÂΜL (ref 0.6–4.47)
LYMPHOCYTES # BLD AUTO: 0.61 THOUSANDS/ÂΜL (ref 0.6–4.47)
LYMPHOCYTES NFR BLD AUTO: 5 % (ref 14–44)
LYMPHOCYTES NFR BLD AUTO: 6 % (ref 14–44)
MAGNESIUM SERPL-MCNC: 1.6 MG/DL (ref 1.9–2.7)
MCH RBC QN AUTO: 27.1 PG (ref 26.8–34.3)
MCH RBC QN AUTO: 27.2 PG (ref 26.8–34.3)
MCHC RBC AUTO-ENTMCNC: 30.8 G/DL (ref 31.4–37.4)
MCHC RBC AUTO-ENTMCNC: 31.4 G/DL (ref 31.4–37.4)
MCV RBC AUTO: 87 FL (ref 82–98)
MCV RBC AUTO: 88 FL (ref 82–98)
MONOCYTES # BLD AUTO: 0.84 THOUSAND/ÂΜL (ref 0.17–1.22)
MONOCYTES # BLD AUTO: 0.86 THOUSAND/ÂΜL (ref 0.17–1.22)
MONOCYTES NFR BLD AUTO: 8 % (ref 4–12)
MONOCYTES NFR BLD AUTO: 8 % (ref 4–12)
NEUTROPHILS # BLD AUTO: 9.2 THOUSANDS/ÂΜL (ref 1.85–7.62)
NEUTROPHILS # BLD AUTO: 9.64 THOUSANDS/ÂΜL (ref 1.85–7.62)
NEUTS SEG NFR BLD AUTO: 85 % (ref 43–75)
NEUTS SEG NFR BLD AUTO: 86 % (ref 43–75)
NRBC BLD AUTO-RTO: 0 /100 WBCS
NRBC BLD AUTO-RTO: 0 /100 WBCS
PHOSPHATE SERPL-MCNC: 3 MG/DL (ref 2.3–4.1)
PLATELET # BLD AUTO: 234 THOUSANDS/UL (ref 149–390)
PLATELET # BLD AUTO: 261 THOUSANDS/UL (ref 149–390)
PMV BLD AUTO: 10.4 FL (ref 8.9–12.7)
PMV BLD AUTO: 11.4 FL (ref 8.9–12.7)
POTASSIUM SERPL-SCNC: 2.9 MMOL/L (ref 3.5–5.3)
POTASSIUM SERPL-SCNC: 4.1 MMOL/L (ref 3.5–5.3)
PROT SERPL-MCNC: 5.5 G/DL (ref 6.4–8.4)
RBC # BLD AUTO: 3.72 MILLION/UL (ref 3.81–5.12)
RBC # BLD AUTO: 3.8 MILLION/UL (ref 3.81–5.12)
SODIUM SERPL-SCNC: 128 MMOL/L (ref 135–147)
SODIUM SERPL-SCNC: 132 MMOL/L (ref 135–147)
WBC # BLD AUTO: 10.78 THOUSAND/UL (ref 4.31–10.16)
WBC # BLD AUTO: 11.22 THOUSAND/UL (ref 4.31–10.16)

## 2023-09-16 PROCEDURE — 0W9F30Z DRAINAGE OF ABDOMINAL WALL WITH DRAINAGE DEVICE, PERCUTANEOUS APPROACH: ICD-10-PCS | Performed by: RADIOLOGY

## 2023-09-16 PROCEDURE — NC001 PR NO CHARGE: Performed by: SURGERY

## 2023-09-16 PROCEDURE — 87102 FUNGUS ISOLATION CULTURE: CPT | Performed by: SURGERY

## 2023-09-16 PROCEDURE — 76937 US GUIDE VASCULAR ACCESS: CPT

## 2023-09-16 PROCEDURE — 87206 SMEAR FLUORESCENT/ACID STAI: CPT | Performed by: SURGERY

## 2023-09-16 PROCEDURE — 87075 CULTR BACTERIA EXCEPT BLOOD: CPT | Performed by: SURGERY

## 2023-09-16 PROCEDURE — 87205 SMEAR GRAM STAIN: CPT | Performed by: SURGERY

## 2023-09-16 PROCEDURE — 99285 EMERGENCY DEPT VISIT HI MDM: CPT | Performed by: EMERGENCY MEDICINE

## 2023-09-16 PROCEDURE — 83605 ASSAY OF LACTIC ACID: CPT

## 2023-09-16 PROCEDURE — 49423 EXCHANGE DRAINAGE CATHETER: CPT

## 2023-09-16 PROCEDURE — NC001 PR NO CHARGE: Performed by: RADIOLOGY

## 2023-09-16 PROCEDURE — 49406 IMAGE CATH FLUID PERI/RETRO: CPT | Performed by: RADIOLOGY

## 2023-09-16 PROCEDURE — C1769 GUIDE WIRE: HCPCS

## 2023-09-16 PROCEDURE — 87181 SC STD AGAR DILUTION PER AGT: CPT | Performed by: SURGERY

## 2023-09-16 PROCEDURE — 80048 BASIC METABOLIC PNL TOTAL CA: CPT

## 2023-09-16 PROCEDURE — 80053 COMPREHEN METABOLIC PANEL: CPT

## 2023-09-16 PROCEDURE — 36415 COLL VENOUS BLD VENIPUNCTURE: CPT

## 2023-09-16 PROCEDURE — 85025 COMPLETE CBC W/AUTO DIFF WBC: CPT

## 2023-09-16 PROCEDURE — G1004 CDSM NDSC: HCPCS

## 2023-09-16 PROCEDURE — 87147 CULTURE TYPE IMMUNOLOGIC: CPT | Performed by: SURGERY

## 2023-09-16 PROCEDURE — 74177 CT ABD & PELVIS W/CONTRAST: CPT

## 2023-09-16 PROCEDURE — 84100 ASSAY OF PHOSPHORUS: CPT

## 2023-09-16 PROCEDURE — 83735 ASSAY OF MAGNESIUM: CPT

## 2023-09-16 PROCEDURE — C1729 CATH, DRAINAGE: HCPCS

## 2023-09-16 PROCEDURE — 87040 BLOOD CULTURE FOR BACTERIA: CPT

## 2023-09-16 PROCEDURE — 87116 MYCOBACTERIA CULTURE: CPT | Performed by: SURGERY

## 2023-09-16 PROCEDURE — 87070 CULTURE OTHR SPECIMN AEROBIC: CPT | Performed by: SURGERY

## 2023-09-16 PROCEDURE — 99024 POSTOP FOLLOW-UP VISIT: CPT | Performed by: SURGERY

## 2023-09-16 RX ORDER — LIDOCAINE HYDROCHLORIDE 10 MG/ML
INJECTION, SOLUTION EPIDURAL; INFILTRATION; INTRACAUDAL; PERINEURAL AS NEEDED
Status: COMPLETED | OUTPATIENT
Start: 2023-09-16 | End: 2023-09-16

## 2023-09-16 RX ORDER — HEPARIN SODIUM 5000 [USP'U]/ML
5000 INJECTION, SOLUTION INTRAVENOUS; SUBCUTANEOUS EVERY 8 HOURS SCHEDULED
Status: DISCONTINUED | OUTPATIENT
Start: 2023-09-16 | End: 2023-09-18 | Stop reason: HOSPADM

## 2023-09-16 RX ORDER — SODIUM CHLORIDE, SODIUM LACTATE, POTASSIUM CHLORIDE, CALCIUM CHLORIDE 600; 310; 30; 20 MG/100ML; MG/100ML; MG/100ML; MG/100ML
125 INJECTION, SOLUTION INTRAVENOUS CONTINUOUS
Status: DISCONTINUED | OUTPATIENT
Start: 2023-09-16 | End: 2023-09-17

## 2023-09-16 RX ORDER — CEFAZOLIN SODIUM 2 G/50ML
2000 SOLUTION INTRAVENOUS EVERY 8 HOURS
Status: DISCONTINUED | OUTPATIENT
Start: 2023-09-16 | End: 2023-09-18 | Stop reason: HOSPADM

## 2023-09-16 RX ORDER — METRONIDAZOLE 500 MG/100ML
500 INJECTION, SOLUTION INTRAVENOUS EVERY 8 HOURS
Status: DISCONTINUED | OUTPATIENT
Start: 2023-09-16 | End: 2023-09-18 | Stop reason: HOSPADM

## 2023-09-16 RX ADMIN — HEPARIN SODIUM 5000 UNITS: 5000 INJECTION INTRAVENOUS; SUBCUTANEOUS at 21:49

## 2023-09-16 RX ADMIN — SODIUM CHLORIDE, SODIUM LACTATE, POTASSIUM CHLORIDE, AND CALCIUM CHLORIDE 125 ML/HR: .6; .31; .03; .02 INJECTION, SOLUTION INTRAVENOUS at 13:28

## 2023-09-16 RX ADMIN — IOHEXOL 100 ML: 350 INJECTION, SOLUTION INTRAVENOUS at 04:56

## 2023-09-16 RX ADMIN — CEFAZOLIN SODIUM 2000 MG: 2 SOLUTION INTRAVENOUS at 21:50

## 2023-09-16 RX ADMIN — LIDOCAINE HYDROCHLORIDE 10 ML: 10 INJECTION, SOLUTION EPIDURAL; INFILTRATION; INTRACAUDAL; PERINEURAL at 18:21

## 2023-09-16 RX ADMIN — METRONIDAZOLE 500 MG: 500 INJECTION, SOLUTION INTRAVENOUS at 08:13

## 2023-09-16 RX ADMIN — CEFAZOLIN SODIUM 2000 MG: 2 SOLUTION INTRAVENOUS at 05:34

## 2023-09-16 RX ADMIN — HEPARIN SODIUM 5000 UNITS: 5000 INJECTION INTRAVENOUS; SUBCUTANEOUS at 05:34

## 2023-09-16 RX ADMIN — CEFAZOLIN SODIUM 2000 MG: 2 SOLUTION INTRAVENOUS at 11:56

## 2023-09-16 RX ADMIN — SODIUM CHLORIDE, SODIUM LACTATE, POTASSIUM CHLORIDE, AND CALCIUM CHLORIDE 125 ML/HR: .6; .31; .03; .02 INJECTION, SOLUTION INTRAVENOUS at 05:34

## 2023-09-16 RX ADMIN — METRONIDAZOLE 500 MG: 500 INJECTION, SOLUTION INTRAVENOUS at 15:42

## 2023-09-16 NOTE — QUICK NOTE
Spoke with family who were present at the bedside. Answer pt and family questions regarding next steps. Made all aware plan for IR to aspirate fluid collection and/or pigtail should it be necessitated, and would reevaluate further management.      Garth Iverson MD  PGY-1

## 2023-09-16 NOTE — CONSULTS
e-Consult (IPC)  - Interventional Radiology  Marilee Smith 67 y.o. female MRN: [id number]  Unit/Bed#: ED 18 Encounter: [id number]          Interventional Radiology has been consulted to evaluate [facility]  09/16/23    Assessment/Recommendation:   67year old female with history perforated colon s/p ex lap, SBR, appendectomy, YAMIL, found to have right abdominal wall fluid collection. Patient is referred for drainage of the fluid collection.    - Plan for aspiration vs drain placement of the right abdominal wall fluid collection.  - Please keep patient NPO. 5-10 minutes, >50% of the total time devoted to medical consultative verbal/EMR discussion between providers. Written report will be generated in the EMR. Thank you for allowing Interventional Radiology to participate in the care of Marilee Smith. Please don't hesitate to call or TigerText us with any questions.      Robina Bosworth, MD

## 2023-09-16 NOTE — ED NOTES
Family asked for an approx time for when the pt has her procedure. IR was called twice and TT once. IR responded that they were currently in an emergent case and will give a better estimate when completed. Family and pt notified.       Saskia Olivarez RN  09/16/23 5048

## 2023-09-16 NOTE — BRIEF OP NOTE (RAD/CATH)
INTERVENTIONAL RADIOLOGY PROCEDURE NOTE    Date: 9/16/2023    Procedure: Abdominal wall abscess drain placement  Procedure Summary     Date:  Room / Location:     Anesthesia Start:  Anesthesia Stop:     Procedure:  Diagnosis:     Scheduled Providers:  Responsible Provider:     Anesthesia Type: Not recorded ASA Status: Not recorded          Preoperative diagnosis:   1. Rash    2. Bowel perforation (HCC)    3. Open wound of abdominal wall, initial encounter         Postoperative diagnosis: Same. Surgeon: Shaila Stewart MD     Assistant: None. No qualified resident was available. Blood loss: None    Specimens: 20 mL purulent fluid aspirate sent to lab. Findings: Successful right abdominal wall abscess drain placement. Complications: None immediate.     Anesthesia: local

## 2023-09-16 NOTE — SEDATION DOCUMENTATION
Pt in IR for drainage tube placement to R lower abdomen performed by Dr. Margie Michaels. Pt tolerated procedure well. 25 ml purulent tan fluid removed and sent to the lab. LUCY bulb in place to abdomen.  Report given to primary RN

## 2023-09-16 NOTE — DISCHARGE INSTRUCTIONS
TUBE CARE INSTRUCTIONS    Care after your procedure:    Resume your normal diet. Small sips of flat soda will help with nausea. 1. The properly functioning catheter should be forward flushed once (1x) daily with 10ml of normal saline using clean technique. You will be given a prescription for flushes. To flush the tube, clean both connections with alcohol swab. Twist off the drainage bag/ bulb  tubing and twist the saline syringe into the drainage tube and flush. Remove the syringe and twist the drainage bag / bulb tubing tubing back on.    2. The drainage bag/bulb may be emptied as necessary. Keep a record of the amount of fluid you drain from your tube. This should be done with clean technique as well. 3. A fresh dressing should be applied daily over the tube insertion site. 4. As the tube is secured to the skin with only a suture,try not to pull on your tube. Tub baths are not permitted. Showers are permitted if the patient's skin entry site is prevented from getting wet. Similarly, washcloth "baths" are acceptable. Contact Interventional Radiology at [phone] [place] PATIENTS: Contact Interventional Radiology at [phone]) Marnie Bang PATIENTS: Contact Interventional Radiology at [phone]) if:    1. Leakage or large amounts of liquid around the catheter. 2. Fever of 101 degrees lasting several hours without other obvious cause (such as sore throat, flu, etc). 3. Persistent nausea or vomiting. 4. Diminished drainage, which may be associated with pressure or pain. Or when the     drainage from your tube is less than 10mls for 48 hours. 5. Catheter pulled back or falls out. The following pharmacies carry the flush syringes.        [facility]                         [address] [place] [address] [place]  Phone [phone]            Phone [phone] [address] [facility]                                [phone]  [address] [facility] ALESSANDRA [place]  Phone [phone]            Phone [phone]                      [facility]                                                                                                          [phone]  [facility]  [address] [place] [address].   [address] [facility] [address]                                                                               [address] [facility]  Phone [phone]

## 2023-09-16 NOTE — ED ATTENDING ATTESTATION
9/15/2023  IRonak MD, saw and evaluated the patient. I have discussed the patient with the resident/non-physician practitioner and agree with the resident's/non-physician practitioner's findings, Plan of Care, and MDM as documented in the resident's/non-physician practitioner's note, except where noted. All available labs and Radiology studies were reviewed. I was present for key portions of any procedure(s) performed by the resident/non-physician practitioner and I was immediately available to provide assistance. At this point I agree with the current assessment done in the Emergency Department. I have conducted an independent evaluation of this patient a history and physical is as follows:    39-year-old female with a history of small bowel resection secondary to perforated colon presents to the emergency department for evaluation of rash. Patient had a recent VAC change. Since then noticed some erythema on her abdomen surrounding her prior surgical scars as well as around the wound VAC. Also endorses a fever at home. No nausea or vomiting. No chest pain or shortness of breath. On exam, patient resting comfortably in bed in no acute distress, head is normocephalic atraumatic, pupils equal round and reactive to light, no scleral icterus, heart is regular rate and rhythm with intact distal pulses, no increased work of breathing, respiratory distress, or stridor. Abdomen is soft and nondistended. Patient has erythema throughout the abdomen including around the surgical scars and wound VAC, no fluctuance or induration. No crepitus. No pain out of proportion. MDM:  Abdominal wall rash: Suspect rash secondary to cellulitis, will check labs and consult surgery.     ED Course         Critical Care Time  Procedures

## 2023-09-16 NOTE — CONSULTS
Consultation - General Surgery  Clive Singh 67 y.o. female MRN: [id number]  Unit/Bed#: ED 18 Encounter: [id number]      Assessment:  76yo F presents with cellulitis of abdomen. Plan:  - admit to general surgery  - IV ancef  - CT a/p  - monitor for improvement    HPI:  Clive Singh is a 67 y.o. female with PMHx of A-fib, R renal cell adenocarcinoma, GILDARDO, HTN, 2019 L thyroid lobectomy, 6/19 exlap/SBR/appy/YAMIL for perforated colon, 7/19 partial closure of abdominal wall and wound VAC placement, cholecystectomy, colostomy, gastric bypass, hernia repair, hysterectomy who presents with rash. Was in general surgery clinic on 9/11 for wound check and VAC change. Wound was healthy and viable with diffuse granulation tissue. On 9/12 noticed some tightness of her abdominal skin. 9/13 felt some stinging but VNA did not notice anything out of the ordinary on her abdomen. Between 9/13 - 9/15 she noticed erythema and tenderness under her left pannus abdomen and erythema extending superiorly along her midline incision scar. Reports a home temperature today of 101.8F. Currently VSS on RA. No subjective fever/chills, N/V.    Meds (PTA): Eliquis, amiodarone, denosumab, diltiazem, fluoxetine, furosemide, metoprolol  Results:  Blood cx pending  LA 0.9  Recent Labs     09/16/23  0030   WBC 10.78*   HGB 10.3*      SODIUM 128*   K 4.1   CL 97   CO2 21   BUN 15   CREATININE 0.76   GLUC 104   CALCIUM 7.4*   AST 43*   ALT 25   ALKPHOS 84   TBILI 0.63                 Physical Exam  Constitutional:       General: She is not in acute distress. HENT:      Head: Normocephalic and atraumatic. Eyes:      Extraocular Movements: Extraocular movements intact. Conjunctiva/sclera: Conjunctivae normal.   Cardiovascular:      Rate and Rhythm: Normal rate. Pulses: Normal pulses. Pulmonary:      Effort: Pulmonary effort is normal. No respiratory distress. Abdominal:      General: There is no distension. Palpations: Abdomen is soft. Tenderness: There is abdominal tenderness (under L pannus abdomen). Comments: Nontender along midline incision scar. Musculoskeletal:         General: Normal range of motion. Cervical back: Normal range of motion. Skin:     General: Skin is warm and dry. Neurological:      General: No focal deficit present. Mental Status: She is alert and oriented to person, place, and time. Psychiatric:         Mood and Affect: Mood normal.         Review of Systems   Constitutional: Negative for chills and fever. HENT: Negative for ear pain and sore throat. Eyes: Negative for pain and visual disturbance. Respiratory: Negative for cough and shortness of breath. Cardiovascular: Negative for chest pain and palpitations. Gastrointestinal: Positive for abdominal pain. Negative for nausea and vomiting. Genitourinary: Negative for dysuria and hematuria. Musculoskeletal: Negative for arthralgias and back pain. Skin: Positive for color change and rash. Neurological: Negative for seizures and syncope. All other systems reviewed and are negative.       Historical Information   Past Medical History:   Diagnosis Date   • A-fib [facility])    • Arthritis    • Atrial fibrillation ([address])    • Cervical spondylosis with myelopathy    • Gastric bypass status for obesity    • History of transfusion     35 years ago   • Hypertension    • mass right kidney    • Renal cell adenocarcinoma (HCC)     RIGHT   • Sleep apnea    • Super obese      Past Surgical History:   Procedure Laterality Date   • ABDOMINAL ADHESION SURGERY N/A 6/19/2023    Procedure: LYSIS ADHESIONS;  Surgeon: Nadja Luz DO;  [facility];  Service: General   • ABDOMINAL WALL SURGERY N/A 7/19/2023    Procedure: PARTIAL CLOSURE WOUND ABDOMINAL/TRUNK;  Surgeon: Francia Atkinson DO;  [facility];  Service: General   • APPENDECTOMY N/A 6/19/2023    Procedure: APPENDECTOMY;  Surgeon: Matias Vinson Danielle Mayes DO;  [facility];  Service: General   • CHOLECYSTECTOMY     • COLOSTOMY     • CRYOABLATION Right     RT KIDNEY   • CYSTORRHAPHY      Bladder. Last assessed 4/6/2016    • EXPLORATORY LAPAROTOMY W/ BOWEL RESECTION N/A 6/19/2023    Procedure: LAPAROTOMY EXPLORATORY W/ BOWEL RESECTION;  Surgeon: Delgado Chavarria MD;  [facility];  Service: General   • GASTRIC BYPASS     • HERNIA REPAIR      Last assessed 4/6/2016    • HYSTERECTOMY     • LAPAROTOMY N/A 6/19/2023    Procedure: LAPAROTOMY EXPLORATORY, washout, vac change;  Surgeon: Elizabeth Ferris DO;  [facility];  Service: General   • LAPAROTOMY N/A 6/20/2023    Procedure: LAPAROTOMY, REMOVAL OF WOUND VAC AND PACKING, SMALL BOWEL RESECTION , BOWEL ANASTOMOSIS X 3 , VICRYL MESH BRIDGING -;  Surgeon: Elizabeth Ferris DO;  [facility];  Service: General   • KS OPTX DSTL RADL X-ARTIC FX/EPIPHYSL SEP Right 4/22/2016    Procedure: OPEN REDUCTION INTERNAL FIXATION RIGHT DISTAL RADIUS;  Surgeon: Heather Mcneil MD;  [facility];  Service: Orthopedics   • REVISION COLOSTOMY     • SMALL INTESTINE SURGERY N/A 6/19/2023    Procedure: RESECTION SMALL BOWEL;  Surgeon: Elizabeth Ferris DO;  [facility];  Service: General   • THYROID LOBECTOMY Left 8/27/2019    Procedure: LOBECTOMY THYROID, left;  Surgeon: Leo Webber MD;  [facility];  Service: Surgical Oncology   • TUBAL LIGATION     • US GUIDED THYROID BIOPSY  2/27/2019   • US GUIDED THYROID BIOPSY  5/29/2019   • WOUND DEBRIDEMENT N/A 7/18/2023    Procedure: DEBRIDEMENT WOUND [facility] OUT);   Surgeon: Kevyn Dewey DO;  [facility];  Service: Trauma   • WOUND DEBRIDEMENT N/A 7/19/2023    Procedure: ABDOMINAL WOUND ([address] [place] [address] OUT); VAC APPLICATION;  Surgeon: Hiram Shaw DO;  [facility];  Service: General     Social History   Social History     Substance and Sexual Activity   Alcohol Use Not Currently   • Alcohol/week: 0.0 standard drinks of alcohol    Comment: 0     Social History     Substance and Sexual Activity   Drug Use No     Social History     Tobacco Use   Smoking Status Never   Smokeless Tobacco Never       Meds/Allergies   all current active meds have been reviewed  No Known Allergies    Objective   First Vitals:   Blood Pressure: 118/60 (09/16/23 0007)  Pulse: 84 (09/16/23 0007)  Temperature: 99.4 °F (37.4 °C) (09/16/23 0007)  Temp Source: Oral (09/16/23 0007)  Respirations: 20 (09/16/23 0007)  SpO2: 96 % (09/16/23 0007)    Current Vitals:   Blood Pressure: 123/67 (09/16/23 0230)  Pulse: 76 (09/16/23 0230)  Temperature: 99.4 °F (37.4 °C) (09/16/23 0007)  Temp Source: Oral (09/16/23 0007)  Respirations: 20 (09/16/23 0230)  SpO2: 97 % (09/16/23 0230)    No intake or output data in the 24 hours ending 09/16/23 0255    Invasive Devices     Peripheral Intravenous Line  Duration           Peripheral IV 09/16/23 Left;Ventral (anterior) Forearm <1 day                Lab Results: I have personally reviewed pertinent lab results. Imaging: I have personally reviewed pertinent reports. EKG, Pathology, and Other Studies: I have personally reviewed pertinent reports. Counseling / Coordination of Care  Total floor / unit time spent today 30 minutes. Greater than 50% of total time was spent with the patient and / or family counseling and / or coordination of care.

## 2023-09-16 NOTE — ED PROVIDER NOTES
History  Chief Complaint   Patient presents with   • Rash     Pt had surgery for perforated colon in June with woundvac placed in August, started with widespread rash that is worsening and spreading across abdomen/chest/flanks since Monday. Denies pain but states it burns. No CP/SOB     67 y.o F w/ h/o colonic perforation s/p open surgery 06/2023, then requiring washout 07/2023, and has had a wound vac in place since that time. Presents today due to expanding rash from site of wound vac. Also had intermittent fevers and night sweats over past 4-5 days. Wound nurse today evaluated and was concerned that there was an infection and recommended coming to the ED for evaluation. She denies pain, n/v, cp/sob, or other complaints at this time. Prior to Admission Medications   Prescriptions Last Dose Informant Patient Reported? Taking?    FLUoxetine (PROzac) 20 mg capsule  Self No No   Sig: Take 1 capsule (20 mg total) by mouth 2 (two) times a day   Probiotic Product (PRO-BIOTIC BLEND PO)  Self Yes No   Sig: Take by mouth   acetaminophen (TYLENOL) 325 mg tablet  Self Yes No   Sig: Take 975 mg by mouth every 6 (six) hours as needed for mild pain or moderate pain   amiodarone 200 mg tablet   No No   Sig: Take 1 tablet (200 mg total) by mouth daily with breakfast   apixaban (Eliquis) 5 mg  Self No No   Sig: Take 1 tablet (5 mg total) by mouth 2 (two) times a day   calcium carbonate (OYSTER SHELL,OSCAL) 500 mg  Self No No   Sig: Take 1 tablet by mouth daily with breakfast   cyanocobalamin (VITAMIN B-12) 1000 MCG tablet  Self No No   Sig: Take 1 tablet (1,000 mcg total) by mouth daily   diltiazem (CARDIZEM CD) 360 MG 24 hr capsule  Self No No   Sig: Take 1 capsule (360 mg total) by mouth daily   ergocalciferol (VITAMIN D2) 50,000 units  Self Yes No   Sig: Take 50,000 Units by mouth once a week Takes D3    ferrous sulfate 325 (65 Fe) mg tablet   No No   Sig: Take 1 tablet (325 mg total) by mouth daily with breakfast furosemide (LASIX) 20 mg tablet   No No   Sig: Take 1 tablet (20 mg total) by mouth daily   melatonin 3 mg  Self No No   Sig: Take 2 tablets (6 mg total) by mouth daily at bedtime   metoprolol succinate (TOPROL-XL) 25 mg 24 hr tablet  Self No No   Sig: Take 1 tablet (25 mg total) by mouth daily   potassium chloride (K-DUR,KLOR-CON) 20 mEq tablet   No No   Sig: Take 1 tablet (20 mEq total) by mouth daily   psyllium (METAMUCIL) packet  Self No No   Sig: Take 1 packet by mouth daily      Facility-Administered Medications Last Administration Doses Remaining   denosumab (PROLIA) subcutaneous injection 60 mg 1/4/2021 11:57 AM           Past Medical History:   Diagnosis Date   • A-fib ([address])    • Arthritis    • Atrial fibrillation (HCC)    • Cervical spondylosis with myelopathy    • Gastric bypass status for obesity    • History of transfusion     35 years ago   • Hypertension    • mass right kidney    • Renal cell adenocarcinoma (HCC)     RIGHT   • Sleep apnea    • Super obese        Past Surgical History:   Procedure Laterality Date   • ABDOMINAL ADHESION SURGERY N/A 6/19/2023    Procedure: LYSIS ADHESIONS;  Surgeon: Kalpana Lopez DO;  [facility];  Service: General   • ABDOMINAL WALL SURGERY N/A 7/19/2023    Procedure: PARTIAL CLOSURE WOUND ABDOMINAL/TRUNK;  Surgeon: Steffen Stearns DO;  [facility];  Service: General   • APPENDECTOMY N/A 6/19/2023    Procedure: APPENDECTOMY;  Surgeon: Kalpana Lopez DO;  [facility];  Service: General   • CHOLECYSTECTOMY     • COLOSTOMY     • CRYOABLATION Right     RT KIDNEY   • CYSTORRHAPHY      Bladder.  Last assessed 4/6/2016    • EXPLORATORY LAPAROTOMY W/ BOWEL RESECTION N/A 6/19/2023    Procedure: LAPAROTOMY EXPLORATORY W/ BOWEL RESECTION;  Surgeon: Maria Luisa Pacheco MD;  [facility];  Service: General   • GASTRIC BYPASS     • HERNIA REPAIR      Last assessed 4/6/2016    • HYSTERECTOMY     • LAPAROTOMY N/A 6/19/2023 Procedure: LAPAROTOMY EXPLORATORY, washout, vac change;  Surgeon: Krysten Hutton DO;  [facility];  Service: General   • LAPAROTOMY N/A 6/20/2023    Procedure: LAPAROTOMY, REMOVAL OF WOUND VAC AND PACKING, SMALL BOWEL RESECTION , BOWEL ANASTOMOSIS X 3 , VICRYL MESH BRIDGING -;  Surgeon: Krysten Hutton DO;  [facility];  Service: General   • GA OPTX DSTL RADL X-ARTIC FX/EPIPHYSL SEP Right 4/22/2016    Procedure: OPEN REDUCTION INTERNAL FIXATION RIGHT DISTAL RADIUS;  Surgeon: Elmira Villegas MD;  [facility];  Service: Orthopedics   • REVISION COLOSTOMY     • SMALL INTESTINE SURGERY N/A 6/19/2023    Procedure: RESECTION SMALL BOWEL;  Surgeon: Krysten Hutton DO;  [facility];  Service: General   • THYROID LOBECTOMY Left 8/27/2019    Procedure: LOBECTOMY THYROID, left;  Surgeon: Kelsy Manzano MD;  [facility];  Service: Surgical Oncology   • TUBAL LIGATION     • US GUIDED THYROID BIOPSY  2/27/2019   • US GUIDED THYROID BIOPSY  5/29/2019   • WOUND DEBRIDEMENT N/A 7/18/2023    Procedure: DEBRIDEMENT WOUND [facility] OUT); Surgeon: Laquita Esparza DO;  [facility];  Service: Trauma   • WOUND DEBRIDEMENT N/A 7/19/2023    Procedure: ABDOMINAL WOUND ([address]); VAC APPLICATION;  Surgeon: Isabella Ford DO;  [facility];  Service: General       Family History   Problem Relation Age of Onset   • Heart attack Mother    • Lung cancer Father 76   • Heart disease Father    • Stroke Sister    • Lung cancer Sister 64   • Prostate cancer Brother 61     I have reviewed and agree with the history as documented.     E-Cigarette/Vaping   • E-Cigarette Use Never User      E-Cigarette/Vaping Substances     Social History     Tobacco Use   • Smoking status: Never   • Smokeless tobacco: Never   Vaping Use   • Vaping Use: Never used   Substance Use Topics   • Alcohol use: Not Currently     Alcohol/week: 0.0 standard drinks of alcohol     Comment: 0   • Drug use: No        Review of Systems   All other systems reviewed and are negative. Physical Exam  ED Triage Vitals [09/16/23 0007]   Temperature Pulse Respirations Blood Pressure SpO2   99.4 °F (37.4 °C) 84 20 118/60 96 %      Temp Source Heart Rate Source Patient Position - Orthostatic VS BP Location FiO2 (%)   Oral Monitor Sitting Right arm --      Pain Score       No Pain             Orthostatic Vital Signs  Vitals:    09/16/23 1100 09/16/23 1200 09/16/23 1300 09/16/23 1500   BP: 131/63 136/71 130/61 113/60   Pulse: 82 86 80 83   Patient Position - Orthostatic VS: Sitting Sitting Sitting        Physical Exam  Vitals and nursing note reviewed. Constitutional:       General: She is not in acute distress. Appearance: She is well-developed. HENT:      Head: Normocephalic and atraumatic. Eyes:      Conjunctiva/sclera: Conjunctivae normal.   Cardiovascular:      Rate and Rhythm: Normal rate and regular rhythm. Heart sounds: No murmur heard. Pulmonary:      Effort: Pulmonary effort is normal. No respiratory distress. Breath sounds: Normal breath sounds. Abdominal:      Palpations: Abdomen is soft. Tenderness: There is no abdominal tenderness. Musculoskeletal:         General: No swelling. Cervical back: Neck supple. Skin:     General: Skin is warm and dry. Capillary Refill: Capillary refill takes less than 2 seconds. Comments: Abdominal vac site with minimal serosanguinous output. Erythema/hot skin spreading towards left flank. Also with some blistering around prior healed abdominal surgical site. No involvement of skin folds. Neurological:      Mental Status: She is alert.    Psychiatric:         Mood and Affect: Mood normal.         ED Medications  Medications   heparin (porcine) subcutaneous injection 5,000 Units (0 Units Subcutaneous Hold 9/16/23 1052)   ceFAZolin (ANCEF) IVPB (premix in dextrose) 2,000 mg 50 mL (2,000 mg Intravenous New Bag 9/16/23 1156) lactated ringers infusion (125 mL/hr Intravenous New Bag 9/16/23 1328)   metroNIDAZOLE (FLAGYL) IVPB (premix) 500 mg 100 mL (0 mg Intravenous Stopped 9/16/23 1712)   iohexol (OMNIPAQUE) 350 MG/ML injection (MULTI-DOSE) 100 mL (100 mL Intravenous Given 9/16/23 0456)       Diagnostic Studies  Results Reviewed     Procedure Component Value Units Date/Time    Blood culture #1 [[id number]] Collected: 09/16/23 0114    Lab Status: Preliminary result Specimen: Blood from Hand, Left Updated: 09/16/23 0801     Blood Culture Received in Microbiology Lab. Culture in Progress. Blood culture #2 [[id number]] Collected: 09/16/23 0030    Lab Status: Preliminary result Specimen: Blood from Arm, Right Updated: 09/16/23 0801     Blood Culture Received in Microbiology Lab. Culture in Progress.     Basic metabolic panel [[id number]]  (Abnormal) Collected: 09/16/23 0534    Lab Status: Final result Specimen: Blood from Arm, Right Updated: 09/16/23 7015     Sodium 132 mmol/L      Potassium 2.9 mmol/L      Chloride 100 mmol/L      CO2 25 mmol/L      ANION GAP 7 mmol/L      BUN 12 mg/dL      Creatinine 0.71 mg/dL      Glucose 102 mg/dL      Calcium 7.4 mg/dL      eGFR 84 ml/min/1.73sq m     Narrative:      [place] guidelines for Chronic Kidney Disease (CKD):   •  Stage 1 with normal or high GFR (GFR > 90 mL/min/1.73 square meters)  •  Stage 2 Mild CKD (GFR = 60-89 mL/min/1.73 square meters)  •  Stage 3A Moderate CKD (GFR = 45-59 mL/min/1.73 square meters)  •  Stage 3B Moderate CKD (GFR = 30-44 mL/min/1.73 square meters)  •  Stage 4 Severe CKD (GFR = 15-29 mL/min/1.73 square meters)  •  Stage 5 End Stage CKD (GFR <15 mL/min/1.73 square meters)  Note: GFR calculation is accurate only with a steady state creatinine    Magnesium [[id number]]  (Abnormal) Collected: 09/16/23 0534    Lab Status: Final result Specimen: Blood from Arm, Right Updated: 09/16/23 0613     Magnesium 1.6 mg/dL     Phosphorus [[id number]]  (Normal) Collected: 09/16/23 0534    Lab Status: Final result Specimen: Blood from Arm, Right Updated: 09/16/23 0613     Phosphorus 3.0 mg/dL     CBC and differential [[id number]]  (Abnormal) Collected: 09/16/23 0534    Lab Status: Final result Specimen: Blood from Arm, Right Updated: 09/16/23 0551     WBC 11.22 Thousand/uL      RBC 3.72 Million/uL      Hemoglobin 10.1 g/dL      Hematocrit 32.2 %      MCV 87 fL      MCH 27.2 pg      MCHC 31.4 g/dL      RDW 18.2 %      MPV 10.4 fL      Platelets 863 Thousands/uL      nRBC 0 /100 WBCs      Neutrophils Relative 86 %      Immat GRANS % 1 %      Lymphocytes Relative 5 %      Monocytes Relative 8 %      Eosinophils Relative 0 %      Basophils Relative 0 %      Neutrophils Absolute 9.64 Thousands/µL      Immature Grans Absolute 0.11 Thousand/uL      Lymphocytes Absolute 0.57 Thousands/µL      Monocytes Absolute 0.86 Thousand/µL      Eosinophils Absolute 0.02 Thousand/µL      Basophils Absolute 0.02 Thousands/µL     Lactic acid, plasma (w/reflex if result > 2.0) [[id number]]  (Normal) Collected: 09/16/23 0030    Lab Status: Final result Specimen: Blood from Arm, Right Updated: 09/16/23 0221     LACTIC ACID 0.9 mmol/L     Narrative:      Result may be elevated if tourniquet was used during collection.     Comprehensive metabolic panel [[id number]]  (Abnormal) Collected: 09/16/23 0030    Lab Status: Final result Specimen: Blood from Arm, Right Updated: 09/16/23 0221     Sodium 128 mmol/L      Potassium 4.1 mmol/L      Chloride 97 mmol/L      CO2 21 mmol/L      ANION GAP 10 mmol/L      BUN 15 mg/dL      Creatinine 0.76 mg/dL      Glucose 104 mg/dL      Calcium 7.4 mg/dL      Corrected Calcium 8.4 mg/dL      AST 43 U/L      ALT 25 U/L      Alkaline Phosphatase 84 U/L      Total Protein 5.5 g/dL      Albumin 2.7 g/dL      Total Bilirubin 0.63 mg/dL      eGFR 78 ml/min/1.73sq m     Narrative:      [facility] guidelines for [facility] Kid[facility] Disease (CKD):   •  Stage 1 with normal or high GFR (GFR > 90 mL/min/1.73 square meters)  •  Stage 2 Mild CKD (GFR = 60-89 mL/min/1.73 square meters)  •  Stage 3A Moderate CKD (GFR = 45-59 mL/min/1.73 square meters)  •  Stage 3B Moderate CKD (GFR = 30-44 mL/min/1.73 square meters)  •  Stage 4 Severe CKD (GFR = 15-29 mL/min/1.73 square meters)  •  Stage 5 End Stage CKD (GFR <15 mL/min/1.73 square meters)  Note: GFR calculation is accurate only with a steady state creatinine    CBC and differential [[id number]]  (Abnormal) Collected: 09/16/23 0030    Lab Status: Final result Specimen: Blood from Arm, Right Updated: 09/16/23 0133     WBC 10.78 Thousand/uL      RBC 3.80 Million/uL      Hemoglobin 10.3 g/dL      Hematocrit 33.4 %      MCV 88 fL      MCH 27.1 pg      MCHC 30.8 g/dL      RDW 18.8 %      MPV 11.4 fL      Platelets 268 Thousands/uL      nRBC 0 /100 WBCs      Neutrophils Relative 85 %      Immat GRANS % 1 %      Lymphocytes Relative 6 %      Monocytes Relative 8 %      Eosinophils Relative 0 %      Basophils Relative 0 %      Neutrophils Absolute 9.20 Thousands/µL      Immature Grans Absolute 0.10 Thousand/uL      Lymphocytes Absolute 0.61 Thousands/µL      Monocytes Absolute 0.84 Thousand/µL      Eosinophils Absolute 0.01 Thousand/µL      Basophils Absolute 0.02 Thousands/µL                  CT abdomen pelvis w contrast   Final Result by Yonatan Carroll MD (09/16 8998)         1. Postoperative change as noted. 2. Probable chronic dehiscence of the lower ventral abdominal wall wound. 3. Subcutaneous edema and overlying skin thickening of the lower ventral midline abdominal wall is nonspecific in appearance and may reflect a cellulitis. There is also a superficial right paramedian lower ventral abdominal wall 2.4 x 6.4 cm fluid    collection present. This could reflect a seroma or abscess. 4. Additional findings as noted.                   Workstation performed: [id number]         IR drainage tube placement    (Results Pending) Procedures  Procedures      ED Course                                       Medical Decision Making  67 y.o F presenting to the ED due to rash spreading from vac site. Concerning for possible reactive inflammation vs infection. Due to lack of skin fold involvement, it is likely not fungal. Will obtain labs to evaluate for infection. Discussed case with Surgery who, after their evaluation, recommended admission for IV abx and monitoring. Patient admitted to surgical service. Amount and/or Complexity of Data Reviewed  Labs: ordered. Risk  Prescription drug management. Decision regarding hospitalization. Disposition  Final diagnoses:   Rash   Bowel perforation (HCC)   Open wound of abdominal wall, initial encounter     Time reflects when diagnosis was documented in both MDM as applicable and the Disposition within this note     Time User Action Codes Description Comment    9/16/2023  2:25 AM Holli Solomon Add [R21] Rash     9/16/2023  2:25 AM Ira Seip, Almon Robes Add [K63.1] Bowel perforation ([address])     9/16/2023  2:25 AM Holli Solomon Add [S31.109A] Open wound of abdominal wall, initial encounter       ED Disposition     ED Disposition   Admit    Condition   Stable    Date/Time   Sat Sep 16, 2023  4:01 AM    Comment   Case was discussed with Surgery and the patient's admission status was agreed to be Admission Status: inpatient status to the service of Gen Surg .            Follow-up Information    None         Current Discharge Medication List      CONTINUE these medications which have NOT CHANGED    Details   acetaminophen (TYLENOL) 325 mg tablet Take 975 mg by mouth every 6 (six) hours as needed for mild pain or moderate pain      amiodarone 200 mg tablet Take 1 tablet (200 mg total) by mouth daily with breakfast  Qty: 30 tablet, Refills: 0    Associated Diagnoses: Permanent atrial fibrillation (HCC)      apixaban (Eliquis) 5 mg Take 1 tablet (5 mg total) by mouth 2 (two) times a day  Qty: 60 tablet, Refills: 11    Associated Diagnoses: Permanent atrial fibrillation (HCC)      calcium carbonate (OYSTER SHELL,OSCAL) 500 mg Take 1 tablet by mouth daily with breakfast  Qty: 30 tablet, Refills: 0    Associated Diagnoses: S/P bariatric surgery      cyanocobalamin (VITAMIN B-12) 1000 MCG tablet Take 1 tablet (1,000 mcg total) by mouth daily  Qty: 30 tablet, Refills: 0    Associated Diagnoses: Other iron deficiency anemia      diltiazem (CARDIZEM CD) 360 MG 24 hr capsule Take 1 capsule (360 mg total) by mouth daily  Qty: 90 capsule, Refills: 3    Associated Diagnoses: Permanent atrial fibrillation (HCC)      ergocalciferol (VITAMIN D2) 50,000 units Take 50,000 Units by mouth once a week Takes D3       ferrous sulfate 325 (65 Fe) mg tablet Take 1 tablet (325 mg total) by mouth daily with breakfast  Qty: 30 tablet, Refills: 0    Associated Diagnoses: Other iron deficiency anemia      FLUoxetine (PROzac) 20 mg capsule Take 1 capsule (20 mg total) by mouth 2 (two) times a day  Qty: 60 capsule, Refills: 0    Associated Diagnoses: Mood disorder (HCC)      furosemide (LASIX) 20 mg tablet Take 1 tablet (20 mg total) by mouth daily  Qty: 60 tablet, Refills: 3    Associated Diagnoses: Chronic diastolic heart failure (HCC)      melatonin 3 mg Take 2 tablets (6 mg total) by mouth daily at bedtime  Refills: 0    Associated Diagnoses: Perforated viscus      metoprolol succinate (TOPROL-XL) 25 mg 24 hr tablet Take 1 tablet (25 mg total) by mouth daily  Qty: 90 tablet, Refills: 3    Associated Diagnoses: Primary hypertension; Permanent atrial fibrillation (HCC)      potassium chloride (K-DUR,KLOR-CON) 20 mEq tablet Take 1 tablet (20 mEq total) by mouth daily  Qty: 90 tablet, Refills: 3    Associated Diagnoses: Chronic diastolic heart failure (HCC)      Probiotic Product (PRO-BIOTIC BLEND PO) Take by mouth      psyllium (METAMUCIL) packet Take 1 packet by mouth daily  Qty: 30 packet, Refills: 0    Associated Diagnoses: Diarrhea, unspecified type           No discharge procedures on file. PDMP Review       Value Time User    PDMP Reviewed  Yes 7/24/2023  1:58 PM Graciela Smith PA-C           ED Provider  Attending physically available and evaluated Donata Rater. I managed the patient along with the ED Attending.     Electronically Signed by         Gopal Candelaria MD  09/16/23 1441

## 2023-09-17 ENCOUNTER — HOME CARE VISIT (OUTPATIENT)
Dept: HOME HEALTH SERVICES | Facility: HOME HEALTHCARE | Age: 73
End: 2023-09-17
Payer: MEDICARE

## 2023-09-17 LAB
ANION GAP SERPL CALCULATED.3IONS-SCNC: 11 MMOL/L
BASOPHILS # BLD AUTO: 0.04 THOUSANDS/ÂΜL (ref 0–0.1)
BASOPHILS NFR BLD AUTO: 0 % (ref 0–1)
BUN SERPL-MCNC: 9 MG/DL (ref 5–25)
CALCIUM SERPL-MCNC: 7.7 MG/DL (ref 8.4–10.2)
CHLORIDE SERPL-SCNC: 104 MMOL/L (ref 96–108)
CO2 SERPL-SCNC: 22 MMOL/L (ref 21–32)
CREAT SERPL-MCNC: 0.7 MG/DL (ref 0.6–1.3)
EOSINOPHIL # BLD AUTO: 0.02 THOUSAND/ÂΜL (ref 0–0.61)
EOSINOPHIL NFR BLD AUTO: 0 % (ref 0–6)
ERYTHROCYTE [DISTWIDTH] IN BLOOD BY AUTOMATED COUNT: 18 % (ref 11.6–15.1)
GFR SERPL CREATININE-BSD FRML MDRD: 86 ML/MIN/1.73SQ M
GLUCOSE SERPL-MCNC: 133 MG/DL (ref 65–140)
HCT VFR BLD AUTO: 36.9 % (ref 34.8–46.1)
HGB BLD-MCNC: 11.5 G/DL (ref 11.5–15.4)
IMM GRANULOCYTES # BLD AUTO: 0.2 THOUSAND/UL (ref 0–0.2)
IMM GRANULOCYTES NFR BLD AUTO: 1 % (ref 0–2)
LYMPHOCYTES # BLD AUTO: 0.59 THOUSANDS/ÂΜL (ref 0.6–4.47)
LYMPHOCYTES NFR BLD AUTO: 4 % (ref 14–44)
MAGNESIUM SERPL-MCNC: 1.6 MG/DL (ref 1.9–2.7)
MCH RBC QN AUTO: 26.9 PG (ref 26.8–34.3)
MCHC RBC AUTO-ENTMCNC: 31.2 G/DL (ref 31.4–37.4)
MCV RBC AUTO: 86 FL (ref 82–98)
MONOCYTES # BLD AUTO: 0.69 THOUSAND/ÂΜL (ref 0.17–1.22)
MONOCYTES NFR BLD AUTO: 5 % (ref 4–12)
NEUTROPHILS # BLD AUTO: 12.54 THOUSANDS/ÂΜL (ref 1.85–7.62)
NEUTS SEG NFR BLD AUTO: 90 % (ref 43–75)
NRBC BLD AUTO-RTO: 0 /100 WBCS
PLATELET # BLD AUTO: 327 THOUSANDS/UL (ref 149–390)
PMV BLD AUTO: 10.2 FL (ref 8.9–12.7)
POTASSIUM SERPL-SCNC: 3 MMOL/L (ref 3.5–5.3)
RBC # BLD AUTO: 4.28 MILLION/UL (ref 3.81–5.12)
SODIUM SERPL-SCNC: 137 MMOL/L (ref 135–147)
WBC # BLD AUTO: 14.08 THOUSAND/UL (ref 4.31–10.16)

## 2023-09-17 PROCEDURE — 99024 POSTOP FOLLOW-UP VISIT: CPT | Performed by: SURGERY

## 2023-09-17 PROCEDURE — 85025 COMPLETE CBC W/AUTO DIFF WBC: CPT

## 2023-09-17 PROCEDURE — 80048 BASIC METABOLIC PNL TOTAL CA: CPT

## 2023-09-17 PROCEDURE — 83735 ASSAY OF MAGNESIUM: CPT

## 2023-09-17 RX ORDER — MAGNESIUM SULFATE HEPTAHYDRATE 40 MG/ML
2 INJECTION, SOLUTION INTRAVENOUS ONCE
Status: COMPLETED | OUTPATIENT
Start: 2023-09-17 | End: 2023-09-18

## 2023-09-17 RX ORDER — ACETAMINOPHEN 325 MG/1
975 TABLET ORAL EVERY 6 HOURS PRN
Status: DISCONTINUED | OUTPATIENT
Start: 2023-09-17 | End: 2023-09-18 | Stop reason: HOSPADM

## 2023-09-17 RX ORDER — LANOLIN ALCOHOL/MO/W.PET/CERES
6 CREAM (GRAM) TOPICAL
Status: DISCONTINUED | OUTPATIENT
Start: 2023-09-17 | End: 2023-09-18 | Stop reason: HOSPADM

## 2023-09-17 RX ORDER — POTASSIUM CHLORIDE 20 MEQ/1
20 TABLET, EXTENDED RELEASE ORAL DAILY
Status: DISCONTINUED | OUTPATIENT
Start: 2023-09-18 | End: 2023-09-18 | Stop reason: HOSPADM

## 2023-09-17 RX ORDER — FLUOXETINE HYDROCHLORIDE 20 MG/1
20 CAPSULE ORAL 2 TIMES DAILY
Status: DISCONTINUED | OUTPATIENT
Start: 2023-09-17 | End: 2023-09-18 | Stop reason: HOSPADM

## 2023-09-17 RX ORDER — FUROSEMIDE 20 MG/1
20 TABLET ORAL DAILY
Status: DISCONTINUED | OUTPATIENT
Start: 2023-09-17 | End: 2023-09-18 | Stop reason: HOSPADM

## 2023-09-17 RX ORDER — POTASSIUM CHLORIDE 20 MEQ/1
40 TABLET, EXTENDED RELEASE ORAL ONCE
Status: COMPLETED | OUTPATIENT
Start: 2023-09-17 | End: 2023-09-17

## 2023-09-17 RX ORDER — AMIODARONE HYDROCHLORIDE 200 MG/1
200 TABLET ORAL
Status: DISCONTINUED | OUTPATIENT
Start: 2023-09-18 | End: 2023-09-18 | Stop reason: HOSPADM

## 2023-09-17 RX ORDER — FERROUS SULFATE 325(65) MG
325 TABLET ORAL
Status: DISCONTINUED | OUTPATIENT
Start: 2023-09-18 | End: 2023-09-18 | Stop reason: HOSPADM

## 2023-09-17 RX ORDER — METOPROLOL SUCCINATE 25 MG/1
25 TABLET, EXTENDED RELEASE ORAL DAILY
Status: DISCONTINUED | OUTPATIENT
Start: 2023-09-17 | End: 2023-09-18 | Stop reason: HOSPADM

## 2023-09-17 RX ORDER — CALCIUM CARBONATE 500(1250)
1 TABLET ORAL
Status: DISCONTINUED | OUTPATIENT
Start: 2023-09-18 | End: 2023-09-18 | Stop reason: HOSPADM

## 2023-09-17 RX ORDER — DILTIAZEM HYDROCHLORIDE 180 MG/1
360 CAPSULE, COATED, EXTENDED RELEASE ORAL DAILY
Status: DISCONTINUED | OUTPATIENT
Start: 2023-09-17 | End: 2023-09-18 | Stop reason: HOSPADM

## 2023-09-17 RX ADMIN — POTASSIUM CHLORIDE 40 MEQ: 1500 TABLET, EXTENDED RELEASE ORAL at 17:36

## 2023-09-17 RX ADMIN — CEFAZOLIN SODIUM 2000 MG: 2 SOLUTION INTRAVENOUS at 13:04

## 2023-09-17 RX ADMIN — CEFAZOLIN SODIUM 2000 MG: 2 SOLUTION INTRAVENOUS at 21:29

## 2023-09-17 RX ADMIN — CEFAZOLIN SODIUM 2000 MG: 2 SOLUTION INTRAVENOUS at 06:44

## 2023-09-17 RX ADMIN — POTASSIUM CHLORIDE 40 MEQ: 1500 TABLET, EXTENDED RELEASE ORAL at 14:54

## 2023-09-17 RX ADMIN — METOPROLOL SUCCINATE 25 MG: 25 TABLET, EXTENDED RELEASE ORAL at 14:54

## 2023-09-17 RX ADMIN — METRONIDAZOLE 500 MG: 500 INJECTION, SOLUTION INTRAVENOUS at 00:46

## 2023-09-17 RX ADMIN — FLUOXETINE 20 MG: 20 CAPSULE ORAL at 17:36

## 2023-09-17 RX ADMIN — MAGNESIUM SULFATE HEPTAHYDRATE 2 G: 40 INJECTION, SOLUTION INTRAVENOUS at 17:36

## 2023-09-17 RX ADMIN — HEPARIN SODIUM 5000 UNITS: 5000 INJECTION INTRAVENOUS; SUBCUTANEOUS at 13:04

## 2023-09-17 RX ADMIN — DILTIAZEM HYDROCHLORIDE 360 MG: 180 CAPSULE, COATED, EXTENDED RELEASE ORAL at 14:56

## 2023-09-17 RX ADMIN — CYANOCOBALAMIN TAB 500 MCG 1000 MCG: 500 TAB at 14:54

## 2023-09-17 RX ADMIN — HEPARIN SODIUM 5000 UNITS: 5000 INJECTION INTRAVENOUS; SUBCUTANEOUS at 06:44

## 2023-09-17 RX ADMIN — METRONIDAZOLE 500 MG: 500 INJECTION, SOLUTION INTRAVENOUS at 15:00

## 2023-09-17 RX ADMIN — METRONIDAZOLE 500 MG: 500 INJECTION, SOLUTION INTRAVENOUS at 09:00

## 2023-09-17 NOTE — PROGRESS NOTES
Bed: 05  Expected date: 1/16/23  Expected time: 12:43 AM  Means of arrival: [facility]  Comments:  79 F fall, leg pain, weakness   100/60 80 16 96%   LUCY drain flushed. Abdominal dressing changed.

## 2023-09-17 NOTE — CASE MANAGEMENT
Case Management Assessment & Discharge Planning Note    Patient name Jaz Bennett  Location 2 215/2 418-60 MRN [id number]  : 1950 Date 2023       Current Admission Date: 9/15/2023  Current Admission Diagnosis:Rash, Bowel perforation ([address]), Open wound of abdominal wall, initial encounter   Patient Active Problem List    Diagnosis Date Noted   • Abdominal wound dehiscence 2023   • Mood disorder ([address]) 2023   • Encounter for skin care 2023   • COVID 2023   • Ambulatory dysfunction 2023   • Open abdominal wall wound 2023   • Iron deficiency anemia 2023   • Hypotension 2023   • Hypokalemia 07/10/2023   • Anemia 07/10/2023   • Bowel perforation ([address]) 2023   • Acute encephalopathy 2023   • Anxiety 2023   • Diarrhea 2023   • Idiopathic acute pancreatitis without infection or necrosis 2023   • Primary osteoarthritis of both knees 2023   • Stage 3 chronic kidney disease, unspecified whether stage 3a or 3b CKD ([address]) 2023   • Hypertension    • Morbid obesity with BMI of 50.0-59.9, adult ([address]) 2022   • Unsteady gait 10/30/2022   • Dyspnea on exertion 10/30/2022   • SIRS (systemic inflammatory response syndrome) ([address]) 10/30/2022   • Incontinence 10/30/2022   • GILDARDO (obstructive sleep apnea) 10/30/2022   • Vitamin D deficiency 2021   • Reactive hypoglycemia 2020   • Osteoporosis 2020   • Hyperparathyroidism ([address]) 2020   • Hashimoto's thyroiditis 2020   • Early menopause occurring in patient age younger than 39 years 2020   • S/P bariatric surgery 2020   • Myofascial pain syndrome 10/21/2019   • Status post partial thyroidectomy 2019   • Cervical spondylosis without myelopathy 2019   • Arthropathy of cervical facet joint 2019   • Occipital neuralgia of right side 2019   • Chest wall tenderness 2019   • Chronic heart failure ([address]) 2019   • Permanent atrial fibrillation ([address] [facility]) 01/24/2019   • Myalgia 01/08/2019   • Primary osteoarthritis of left knee 10/23/2018   • Primary osteoarthritis of right knee 10/23/2018   • Chronic pain of left knee 10/23/2018   • Malignant tumor of right kidney parenchyma ([address]) 05/11/2016   • Closed fracture of right distal radius and ulna 04/22/2016      LOS (days): 0  Geometric Mean LOS (GMLOS) (days):   Days to GMLOS:     OBJECTIVE:              Current admission status: Observation       Preferred Pharmacy:   [facility] DR MARIAH REDDY [address] [facility] [address]  Phone: [phone] Fax: [phone]    [address] [facility], [address] [facility]  [address] [facility] [address]  Phone: [phone] Fax: [phone]    [address] [place] [address], [address] [place] [address]  [address] [place] [address]  Phone: [phone] Fax: [phone]    [address], [facility] - [address] [place] [address] [facility] [address] [facility] [address]  Phone: [phone] Fax: [phone]    Primary Care Provider: Robbie Monk DO    Primary Insurance: MEDICARE  Secondary Insurance: Lia [employer]    ASSESSMENT:  [address] [facility]   Primary Phone: [phone] (Mobile)               Advance Directives  Does patient have a [address]?: Yes  Does patient have Advance Directives?: Yes  Advance Directives: Living will  Primary Contact: any of her children, states POA is son Jayden              Patient Information  Admitted from[de-identified] Home  Mental Status: Alert  During Assessment patient was accompanied by: Daughter  Assessment information provided by[de-identified] Patient  Primary Caregiver: Self  Support Systems: Children, Family members  Home entry access options.  Select all that apply.: Ramp  Type of Current Residence: [place]  In the last 12 months, was there a time when you were not able to pay the mortgage or rent on time?: No  In the last 12 months, how many places have you lived?: 1  In the last 12 months, was there a time when you did not have a steady place to sleep or slept in a shelter (including now)?: No  Living Arrangements: Lives w/ Daughter    Activities of Daily Living Prior to Admission  Functional Status: Independent  Completes ADLs independently?: Yes  Ambulates independently?: Yes  Does patient use assisted devices?: Yes  Assisted Devices (DME) used: Winda Lands  Does patient currently own DME?: Yes  What DME does the patient currently own?: Winda Lands, Bedside Commode, Shower Chair  Does patient have a history of Outpatient Therapy (PT/OT)?: No  Does the patient have a history of Short-Term Rehab?: Yes ([facility], [facility])  Does patient have a history of HHC?: Yes ([facility])  Does patient currently have [facility]?: Yes    Current Home Health Care  Type of Current Home Care Services: Nurse visit  Current Home Health Agency[de-identified] [address] [place] [address] Provider[de-identified] PCP    Patient Information Continued  Income Source: Pension/half-way  Does patient have prescription coverage?: Yes  Within the past 12 months, you worried that your food would run out before you got the money to buy more.: Never true  Within the past 12 months, the food you bought just didn't last and you didn't have money to get more.: Never true  Does patient receive dialysis treatments?: No  Does patient have a history of substance abuse?: No  Does patient have a history of Mental Health Diagnosis?: No         Means of Transportation  Means of Transport to Appts[de-identified] Family transport  In the past 12 months, has lack of transportation kept you from medical appointments or from getting medications?: No  In the past 12 months, has lack of transportation kept you from meetings, work, or from getting things needed for daily living?: No        DISCHARGE DETAILS:    Discharge planning discussed with[de-identified] pt and dghter  Freedom of Choice: Yes  Comments - Freedom of Choice: [facility]                Contacts  Patient Contacts: Alexsandra Osborne  Relationship to Patient[de-identified] Family  Contact Method: Phone  Phone Number: [phone]  Reason/Outcome: Continuity of Care, Emergency Contact, Discharge [address] [facility]         Is the patient interested in [address] [address] [address] at discharge?: Yes  [facility] requested[de-identified] Nursing, Physical [address] [facility] Name[de-identified] Georgette Provider[de-identified] PCP  Home Health Services Needed[de-identified] Post-Op Care and Assessment, Wound/Ostomy Care, Evaluate Functional Status and Safety  Homebound Criteria Met[de-identified] Requires the Assistance of Another Person for Safe Ambulation or to Leave the Home, Uses an Assist Device (i.e. cane, walker, etc)  Supporting Clincal Findings[de-identified] Limited Endurance              Would you like to participate in our [address] [facility] service program?  : No - Declined    Treatment Team Recommendation: Home with [address] [facility]                                            Additional Comments: wound vac in closet in room, lives with dghter, 24/7     A post acute care recommendation was made by your care team for [address]. Discussed Freedom of Choice with patient. Choice is to return/continue services.   Referral [facility] via [facility]

## 2023-09-17 NOTE — QUICK NOTE
Packed wound w/ saline soaked kerlix, covered with guaze and abd, secured w/ tape.      Delia Delacruz MD  PGY-1

## 2023-09-17 NOTE — PROGRESS NOTES
General Surgery  Progress Note   Caprice Bolivar 67 y.o. female MRN: [id number]  Unit/Bed#: CW2 215-01 Encounter: [id number]    Assessment:  Caprice Bolivar is a 67 y.o. female with PMHx of A-fib, R renal cell adenocarcinoma, GILDARDO, HTN, 2019 L thyroid lobectomy,  exlap/SBR/appy/YAMIL for perforated colon,  partial closure of abdominal wall and wound VAC placement, cholecystectomy, colostomy, gastric bypass, hernia repair, hysterectomy p/w abdominal cellulitis with superficial abdominal abscess. CT (): superficial right paramedian lower ventral abdominal wall 2.4 x 6.4 cm fluid    Vital signs stable, afebrile. 96% on RA.  RLQ IR drain 25cc    Plan:    • Diet: Regular diet  • Continue Multimodal pain control  • Hold IVF as long as tolerating regular diet  • Nausea control PRN  • DVT ppx: Heparin  • Encourage OOB/IS and ambulation  • R abdominal IR drain: Flush qd 10mL saline, monitor output. • Continue Abx: Ancef and Flagyl  • Abdominal incision clean to dry: change q6hrs  • Monitor cellulitis border: border marked       Subjective/Objective     Subjective:   Patient seen and examined at bedside, in no acute distress. Perlita Zamudio. Pt reports to be sleeping well without home sleep medications. Pain is well controlled, denies N/V, CP, SOB, fevers and cold chills. Pt requesting anterior abdominal incisions be covered in wound vac when discharged. Objective:   Vitals:Blood pressure 114/60, pulse 82, temperature 98.1 °F (36.7 °C), temperature source Oral, resp. rate 18, SpO2 96 %.   Temp (24hrs), Av.1 °F (36.7 °C), Min:98.1 °F (36.7 °C), Max:98.1 °F (36.7 °C)        Intake/Output Summary (Last 24 hours) at 2023 0058  Last data filed at 2023 2308  Gross per 24 hour   Intake --   Output 1125 ml   Net -1125 ml       Invasive Devices     Peripheral Intravenous Line  Duration           Peripheral IV 23 Left;Ventral (anterior) Forearm 1 day          Drain  Duration           Abscess Drain RLQ <1 day                Physical Exam:  Physical Exam  Vitals reviewed. General: No acute distress, alert and oriented  CV: Well perfused, regular rate and rhythm  Lungs: Normal work of breathing, no increased respiratory effort on RA  Abdomen: Soft, non-distended, No TTP. Incisions C/D/I with wet to dry. IR drain with purulent output. Area of poorly demarcated erythema over pannus; mild tenderness over site of erythema, outline drawn from (9/16).    Extremities: No edema, clubbing or cyanosis  Skin: Warm, dry    Lab Results: Results: I have personally reviewed all pertinent laboratory/tests results  VTE Prophylaxis: Sequential compression device (Venodyne)  and Heparin    Aleck Pulse  9/17/2023

## 2023-09-18 ENCOUNTER — TELEPHONE (OUTPATIENT)
Dept: HOME HEALTH SERVICES | Facility: HOME HEALTHCARE | Age: 73
End: 2023-09-18

## 2023-09-18 ENCOUNTER — HOME CARE VISIT (OUTPATIENT)
Dept: HOME HEALTH SERVICES | Facility: HOME HEALTHCARE | Age: 73
End: 2023-09-18
Payer: MEDICARE

## 2023-09-18 VITALS
HEART RATE: 93 BPM | DIASTOLIC BLOOD PRESSURE: 88 MMHG | RESPIRATION RATE: 18 BRPM | SYSTOLIC BLOOD PRESSURE: 123 MMHG | TEMPERATURE: 97.7 F | OXYGEN SATURATION: 97 %

## 2023-09-18 LAB
ANION GAP SERPL CALCULATED.3IONS-SCNC: 12 MMOL/L
ANISOCYTOSIS BLD QL SMEAR: PRESENT
BASOPHILS # BLD MANUAL: 0 THOUSAND/UL (ref 0–0.1)
BASOPHILS NFR MAR MANUAL: 0 % (ref 0–1)
BUN SERPL-MCNC: 12 MG/DL (ref 5–25)
BURR CELLS BLD QL SMEAR: PRESENT
CALCIUM SERPL-MCNC: 7.9 MG/DL (ref 8.4–10.2)
CHLORIDE SERPL-SCNC: 104 MMOL/L (ref 96–108)
CO2 SERPL-SCNC: 22 MMOL/L (ref 21–32)
CREAT SERPL-MCNC: 0.74 MG/DL (ref 0.6–1.3)
EOSINOPHIL # BLD MANUAL: 0 THOUSAND/UL (ref 0–0.4)
EOSINOPHIL NFR BLD MANUAL: 0 % (ref 0–6)
ERYTHROCYTE [DISTWIDTH] IN BLOOD BY AUTOMATED COUNT: 18.2 % (ref 11.6–15.1)
FUNGUS SPEC CULT: NORMAL
GFR SERPL CREATININE-BSD FRML MDRD: 80 ML/MIN/1.73SQ M
GLUCOSE SERPL-MCNC: 116 MG/DL (ref 65–140)
HCT VFR BLD AUTO: 36.2 % (ref 34.8–46.1)
HGB BLD-MCNC: 10.9 G/DL (ref 11.5–15.4)
LYMPHOCYTES # BLD AUTO: 0.86 THOUSAND/UL (ref 0.6–4.47)
LYMPHOCYTES # BLD AUTO: 7 % (ref 14–44)
MAGNESIUM SERPL-MCNC: 2.2 MG/DL (ref 1.9–2.7)
MCH RBC QN AUTO: 26.4 PG (ref 26.8–34.3)
MCHC RBC AUTO-ENTMCNC: 30.1 G/DL (ref 31.4–37.4)
MCV RBC AUTO: 88 FL (ref 82–98)
MONOCYTES # BLD AUTO: 0.24 THOUSAND/UL (ref 0–1.22)
MONOCYTES NFR BLD: 2 % (ref 4–12)
NEUTROPHILS # BLD MANUAL: 11.12 THOUSAND/UL (ref 1.85–7.62)
NEUTS SEG NFR BLD AUTO: 91 % (ref 43–75)
OVALOCYTES BLD QL SMEAR: PRESENT
PLATELET # BLD AUTO: 386 THOUSANDS/UL (ref 149–390)
PLATELET BLD QL SMEAR: ADEQUATE
PMV BLD AUTO: 10.5 FL (ref 8.9–12.7)
POIKILOCYTOSIS BLD QL SMEAR: PRESENT
POLYCHROMASIA BLD QL SMEAR: PRESENT
POTASSIUM SERPL-SCNC: 3.4 MMOL/L (ref 3.5–5.3)
RBC # BLD AUTO: 4.13 MILLION/UL (ref 3.81–5.12)
RBC MORPH BLD: PRESENT
SODIUM SERPL-SCNC: 138 MMOL/L (ref 135–147)
WBC # BLD AUTO: 12.22 THOUSAND/UL (ref 4.31–10.16)

## 2023-09-18 PROCEDURE — 2W03X6Z CHANGE PRESSURE DRESSING ON ABDOMINAL WALL: ICD-10-PCS | Performed by: SURGERY

## 2023-09-18 PROCEDURE — NC001 PR NO CHARGE: Performed by: SURGERY

## 2023-09-18 PROCEDURE — 85027 COMPLETE CBC AUTOMATED: CPT

## 2023-09-18 PROCEDURE — 99232 SBSQ HOSP IP/OBS MODERATE 35: CPT | Performed by: PHYSICIAN ASSISTANT

## 2023-09-18 PROCEDURE — 99238 HOSP IP/OBS DSCHRG MGMT 30/<: CPT | Performed by: SURGERY

## 2023-09-18 PROCEDURE — 83735 ASSAY OF MAGNESIUM: CPT

## 2023-09-18 PROCEDURE — 97605 NEG PRS WND THER DME<=50SQCM: CPT | Performed by: SURGERY

## 2023-09-18 PROCEDURE — 80048 BASIC METABOLIC PNL TOTAL CA: CPT

## 2023-09-18 PROCEDURE — 85007 BL SMEAR W/DIFF WBC COUNT: CPT

## 2023-09-18 RX ORDER — CEPHALEXIN 500 MG/1
500 CAPSULE ORAL EVERY 6 HOURS SCHEDULED
Qty: 24 CAPSULE | Refills: 0 | Status: SHIPPED | OUTPATIENT
Start: 2023-09-18 | End: 2023-09-24

## 2023-09-18 RX ORDER — SODIUM CHLORIDE 9 MG/ML
10 INJECTION INTRAVENOUS DAILY
Qty: 300 ML | Refills: 0 | Status: SHIPPED | OUTPATIENT
Start: 2023-09-18 | End: 2023-10-18

## 2023-09-18 RX ORDER — SODIUM CHLORIDE 9 MG/ML
10 INJECTION INTRAVENOUS DAILY
Qty: 300 ML | Refills: 2 | Status: SHIPPED | OUTPATIENT
Start: 2023-09-18

## 2023-09-18 RX ORDER — METRONIDAZOLE 500 MG/1
500 TABLET ORAL EVERY 8 HOURS SCHEDULED
Qty: 18 TABLET | Refills: 0 | Status: SHIPPED | OUTPATIENT
Start: 2023-09-18 | End: 2023-09-24

## 2023-09-18 RX ADMIN — METRONIDAZOLE 500 MG: 500 INJECTION, SOLUTION INTRAVENOUS at 00:02

## 2023-09-18 RX ADMIN — CYANOCOBALAMIN TAB 500 MCG 1000 MCG: 500 TAB at 10:40

## 2023-09-18 RX ADMIN — CALCIUM 1 TABLET: 500 TABLET ORAL at 10:40

## 2023-09-18 RX ADMIN — DILTIAZEM HYDROCHLORIDE 360 MG: 180 CAPSULE, COATED, EXTENDED RELEASE ORAL at 10:39

## 2023-09-18 RX ADMIN — POTASSIUM CHLORIDE 20 MEQ: 1500 TABLET, EXTENDED RELEASE ORAL at 10:39

## 2023-09-18 RX ADMIN — CEFAZOLIN SODIUM 2000 MG: 2 SOLUTION INTRAVENOUS at 04:06

## 2023-09-18 RX ADMIN — AMIODARONE HYDROCHLORIDE 200 MG: 200 TABLET ORAL at 10:40

## 2023-09-18 RX ADMIN — METRONIDAZOLE 500 MG: 500 INJECTION, SOLUTION INTRAVENOUS at 10:41

## 2023-09-18 RX ADMIN — METOPROLOL SUCCINATE 25 MG: 25 TABLET, EXTENDED RELEASE ORAL at 10:39

## 2023-09-18 RX ADMIN — FERROUS SULFATE TAB 325 MG (65 MG ELEMENTAL FE) 325 MG: 325 (65 FE) TAB at 10:39

## 2023-09-18 RX ADMIN — CEFAZOLIN SODIUM 2000 MG: 2 SOLUTION INTRAVENOUS at 12:57

## 2023-09-18 RX ADMIN — FLUOXETINE 20 MG: 20 CAPSULE ORAL at 10:39

## 2023-09-18 NOTE — DISCHARGE INSTR - AVS FIRST PAGE
Acute Care Surgery Discharge Instructions    Please follow-up as instructed. Activity:  - You may resume activity as tolerated. Diet:    - You may resume your normal diet. Medications:  - You should continue your current medication regimenafter discharge unless otherwise instructed. Please refer to your discharge medication list for further details. - Please complete the entire course of antibiotics, Keflex and Flagyl, through the last dose on 2023.  - Please take the pain medications as directed. - You may become constipated, especially if taking pain medications. You may take any over the counter stool softeners or laxatives as needed. Examples: Milk of Magnesia, Colace, Senna. Additional Instructions:  - May shower daily and/or bathe normally. - If you have any questions or concerns after discharge please call the office.  - Call office or return to ER if fever greater than 101, chills, persistent nausea/vomiting, and/or worsening/uncontrollable pain. Drainage Catheter Care:  1. The properly functioning catheter should be forward flushed once (1x) daily with 10mL of normal saline using clean technique. 2.  The draining bag may be emptied as necessary. This should be done with clean technique as well. 3.  A fresh dressing should be applied over the tube insertion site. 4.  As the tube is secured to the skin with only as suture, tub baths are not permitted. Shower are permitted if the patient's skin entry site is prevented from getting wet. Similarly, washcloth "baths" are acceptable. Notify the Interventional Radiologist if you have any of the followin. Leakage or large amounts of liquid around the catheter. 2.  Fever of 101 degrees lasting several hours without other obvious cause (such as sore throat, flu, etc.)  3. Diminished drainage, which may be associated with pressure or pain. 4.  Catheter pulled back or falls out.   5.  Call Interventional Radiology at [facility]. [facility] in [place], [place] to schedule an appointment if drain outputs are less than 10 mL for 48 hours. If you have any questions, call The Department of Interventional Radiology at [facility] at [phone].

## 2023-09-18 NOTE — PROGRESS NOTES
Progress Note -Interventional Radiology PA  Felecia Bosworth 67 y.o. female MRN: [id number]  Unit/Bed#: [facility] 928-01 Encounter: [id number]    Assessment/Plan:  Patient is a 24-year-old female with a PMH of A-fib, right renal cell adenocarcinoma, GILDARDO, HTN, 2018 left thyroid lobectomy, perforated colon s/p ex lap, SBR, appendectomy, YAMIL on 6/19, and has wound VAC in place since washout, partial closure of abdominal wall on 7/19 who presented with a rash. CT abdomen pelvis demonstrated subcutaneous edema and overlying skin thickening of the lower ventral midline abdominal wall with a ventral abdominal wall 2.4 x 6.4 cm fluid collection. Patient now s/p IR drain placement on 9/16 with 20 cc of purulent fluid drained at the time of drain placement.    -Patient reports feeling much better and denies any abdominal pain, fevers, chills  -Abdominal drain with dressing in place, CDI. No tenderness to palpation. No surrounding swelling, drainage noted. Flushes easily without pain or leaking. Patient does have erythema on abdomen near drain, but decreased compared to markings on abdomen.  -Drain output 20 mL last 24 hours. Patient has remained afebrile, vital signs stable. WBC improved from 14.08-12.22, hemoglobin remained stable.   Fluid studies showing gram-positive cocci in pairs and chains, cultures still pending.  -Continue pain management as ordered, including daily flushing  -Continue to monitor drain output  -Patient planned for discharge today  -Outpatient orders for drain check in 2 weeks and prescription for flushes provided  -Provided education to patient regarding drain flushing and management at home  -Remainder of care per primary team  -Please reach out to IR with any questions/concerns    Patient Active Problem List   Diagnosis   • Closed fracture of right distal radius and ulna   • Malignant tumor of right kidney parenchyma (HCC)   • Primary osteoarthritis of left knee   • Primary osteoarthritis of right knee   • Chronic pain of left knee   • Myalgia   • Chest wall tenderness   • Chronic heart failure (HCC)   • Permanent atrial fibrillation (HCC)   • Cervical spondylosis without myelopathy   • Arthropathy of cervical facet joint   • Occipital neuralgia of right side   • Status post partial thyroidectomy   • Myofascial pain syndrome   • Hashimoto's thyroiditis   • Early menopause occurring in patient age younger than 39 years   • S/P bariatric surgery   • Osteoporosis   • Hyperparathyroidism ([address])   • Reactive hypoglycemia   • Vitamin D deficiency   • Unsteady gait   • Dyspnea on exertion   • SIRS (systemic inflammatory response syndrome) ([facility])   • Incontinence   • GILDARDO (obstructive sleep apnea)   • Morbid obesity with BMI of 50.0-59.9, adult ([facility])   • Hypertension   • Stage 3 chronic kidney disease, unspecified whether stage 3a or 3b CKD ([facility])   • Primary osteoarthritis of both knees   • Idiopathic acute pancreatitis without infection or necrosis   • Bowel perforation (HCC)   • Acute encephalopathy   • Anxiety   • Diarrhea   • Hypokalemia   • Anemia   • Open abdominal wall wound   • Iron deficiency anemia   • Hypotension   • Ambulatory dysfunction   • COVID   • Mood disorder ([address])   • Encounter for skin care   • Abdominal wound dehiscence          Subjective: Alyson Whaley is a 67 y.o. female with PMH of A-fib, right renal cell adenocarcinoma, GILDARDO, HTN, 2018 left thyroid lobectomy, perforated colon s/p ex lap, SBR, appendectomy, YAMIL on 6/19, and has wound VAC in place since washout, partial closure of abdominal wall on 7/19 who presented with a rash. CT abdomen pelvis demonstrated subcutaneous edema and overlying skin thickening of the lower ventral midline abdominal wall with a ventral abdominal wall 2.4 x 6.4 cm fluid collection. Patient now s/p IR drain placement on 9/16 with 20 cc of purulent fluid drained at the time of drain placement.  Patient reports feeling much better and denies any abdominal pain, fevers, chills, chest pain, SOB, nausea, vomiting, diarrhea, pain at the site of drain placement. Objective:    Vitals:  /88 (BP Location: Left arm)   Pulse 93   Temp 97.7 °F (36.5 °C) (Oral)   Resp 18   SpO2 97%   There is no height or weight on file to calculate BMI. Weight (last 2 days)     None          I/Os:    Intake/Output Summary (Last 24 hours) at 9/18/2023 1347  Last data filed at 9/18/2023 1304  Gross per 24 hour   Intake 240 ml   Output 20 ml   Net 220 ml       Invasive Devices     Peripheral Intravenous Line  Duration           Peripheral IV 09/17/23 Right;Ventral (anterior) Hand <1 day          Drain  Duration           Abscess Drain RLQ 1 day                Physical Exam:  /88 (BP Location: Left arm)   Pulse 93   Temp 97.7 °F (36.5 °C) (Oral)   Resp 18   SpO2 97%   General appearance: alert and oriented, in no acute distress  Head: Normocephalic, without obvious abnormality  Lungs: Equal chest rise bilaterally, no work of breathing  Heart: regular rate and rhythm  Abdomen: Protuberant abdomen, soft. Abdominal drain with dressing in place, CDI. No tenderness to palpation. No surrounding swelling, drainage noted. Flushes easily without pain or leaking. Patient does have erythema on abdomen near drain, but decreased compared to markings on abdomen.               Lab Results and Cultures:   CBC with diff:   Lab Results   Component Value Date    WBC 12.22 (H) 09/18/2023    HGB 10.9 (L) 09/18/2023    HCT 36.2 09/18/2023    MCV 88 09/18/2023     09/18/2023    RBC 4.13 09/18/2023    MCH 26.4 (L) 09/18/2023    MCHC 30.1 (L) 09/18/2023    RDW 18.2 (H) 09/18/2023    MPV 10.5 09/18/2023    NRBC 0 09/17/2023      BMP/CMP:  Lab Results   Component Value Date    K 3.4 (L) 09/18/2023     09/18/2023    CO2 22 09/18/2023    CO2 21 06/20/2023    BUN 12 09/18/2023    CREATININE 0.74 09/18/2023    GLUCOSE 94 06/20/2023    CALCIUM 7.9 (L) 09/18/2023    AST 43 (H) 09/16/2023    ALT 25 09/16/2023    ALKPHOS 84 09/16/2023    EGFR 80 09/18/2023   ,     Coags:   Lab Results   Component Value Date    PTT 45 (H) 06/26/2023    INR 1.39 (H) 07/18/2023   ,          Lipid Panel: No results found for: "CHOL"  Lab Results   Component Value Date    HDL 67 06/29/2021     Lab Results   Component Value Date    HDL 67 06/29/2021     Lab Results   Component Value Date    LDLCALC 100 06/29/2021     Lab Results   Component Value Date    TRIG 92 06/22/2023       HgbA1c:   Lab Results   Component Value Date    HGBA1C 6.1 (H) 01/16/2023    HGBA1C 6.6 (H) 10/30/2022    HGBA1C 6.2 (H) 01/06/2022       Blood Culture:   Lab Results   Component Value Date    BLOODCX No Growth at 48 hrs. 09/16/2023   ,   Urinalysis:   Lab Results   Component Value Date    COLORU Yellow 07/30/2023    COLORU Yellow 04/06/2016    CLARITYU Turbid 07/30/2023    CLARITYU Transparent 04/06/2016    SPECGRAV 1.023 07/30/2023    SPECGRAV 1.020 04/06/2016    PHUR 5.5 07/30/2023    PHUR 6.0 04/06/2016    LEUKOCYTESUR Negative 07/30/2023    LEUKOCYTESUR +2 04/06/2016    NITRITE Positive (A) 07/30/2023    NITRITE + 04/06/2016    PROTEINUA trace 04/06/2016    GLUCOSEU Negative 07/30/2023    KETONESU 10 (1+) (A) 07/30/2023    KETONESU - 04/06/2016    BILIRUBINUR Negative 07/30/2023    BILIRUBINUR +1 04/06/2016    BLOODU Negative 07/30/2023    BLOODU trace 04/06/2016   ,   Urine Culture:   Lab Results   Component Value Date    URINECX No Growth <1000 cfu/mL 06/19/2023   ,   Wound Culure:  No results found for: "WOUNDCULT"      Thank you for allowing me to participate in the care of Hellen Montemayor. Please don't hesitate to call, text, email, or TigerText with any questions. This text is generated with voice recognition software. There may be translation, syntax,  or grammatical errors. If you have any questions, please contact the dictating provider.

## 2023-09-18 NOTE — PROGRESS NOTES
General Surgery  Progress Note   Lopez Mendes 67 y.o. female MRN: [id number]  Unit/Bed#: [facility] 928-01 Encounter: [id number]    Assessment:  Mariann whitley 67 y. o. female with PMHx of A-fib, R renal cell adenocarcinoma, GILDARDO, HTN, 2019 L thyroid lobectomy,  exlap/SBR/appy/YAMIL for perforated colon,  partial closure of abdominal wall and wound VAC placement, cholecystectomy, colostomy, gastric bypass, hernia repair, hysterectomy p/w abdominal cellulitis with superficial abdominal abscess.      CT (): superficial right paramedian lower ventral abdominal wall 2.4 x 6.4 cm fluid    Tolerating regular diet, no changes to abdominal cellulitis     Vital signs stable, afebrile. 97% on RA.  RLQ IR drain 20cc purulent output     Plan:     • Diet: Regular diet  • Continue Multimodal pain control  • Nausea control PRN  • DVT ppx: Heparin  • Encourage OOB/IS and ambulation  • R abdominal IR drain: Flush qd 10mL saline, monitor output. • Continue Abx: Ancef and Flagyl  • Abdominal incision wound vac: managed at outpatient f/u  • Monitor cellulitis border: border marked   • Dispo: Discharge home with current abx and outpatient f/u. Continue to monitor serum cultures for sensitivities and update abx as needed. Subjective/Objective     Subjective:   Patient seen and examined at bedside, in no acute distress. Anny Mtz. Pt reports to be sleeping well without home sleep medications. Ambulating with walker. Reports BM and voiding normal. Pain is well controlled, denies N/V, CP, SOB, fevers and cold chills. Desires to go home today. Reports she has enough help at home to manage wound vac. Objective:   Vitals:Blood pressure 123/88, pulse 93, temperature 97.7 °F (36.5 °C), temperature source Oral, resp. rate 18, SpO2 97 %.   Temp (24hrs), Av.7 °F (36.5 °C), Min:97.7 °F (36.5 °C), Max:97.7 °F (36.5 °C)        Intake/Output Summary (Last 24 hours) at 2023 1058  Last data filed at 2023 0002  Gross per 24 hour   Intake 260 ml   Output 220 ml   Net 40 ml       Invasive Devices     Peripheral Intravenous Line  Duration           Peripheral IV 09/17/23 Right;Ventral (anterior) Hand <1 day          Drain  Duration           Abscess Drain RLQ 1 day                Physical Exam:  Physical Exam    General: No acute distress, alert and oriented  CV: Well perfused, regular rate and rhythm  Lungs: Normal work of breathing, no increased respiratory effort on RA  Abdomen: Soft, non-distended, No TTP. Incisions C/D/I with wound vac. Erythematous border unchanged from marked border from 9/16. RLQ drain with purulent output. Wound vac on midline incision site with serosang output.   Extremities: No edema, clubbing or cyanosis  Skin: Warm, dry    Lab Results: Results: I have personally reviewed all pertinent laboratory/tests results  VTE Prophylaxis: Sequential compression device (Venodyne)  and Heparin    Kiet Dyers  9/18/2023

## 2023-09-18 NOTE — DISCHARGE SUMMARY
Discharge Summary - General Surgery   Christine Gallo 67 y.o. female MRN: [id number]  [facility] Encounter: [id number]    Admission Date: 9/15/2023     Discharge Date: 9/18/2023    Admitting Diagnosis: Rash [R21]  Bowel perforation ([address]) [K63.1]  Open wound of abdominal wall, initial encounter [S31.109A]    Discharge Diagnosis:     1. Open midline abdominal wound. 2.  Abdominal wall fluid collection. Attending and Service: Dr. Marjorie Doyle, Acute Care Surgical Services. Consulting Physician(s): Interventional radiology. Imaging and Procedures Performed:     IR drainage tube placement    Result Date: 9/16/2023  Impression: Percutaneous placement of a 10 Guyanese Rudolph-Pace drainage catheter into right abdominal wall abscess, yielding 20 mL of purulent fluid. Plan: - Catheter to bulb suction drainage. - Flush catheter with 10 mL normal saline daily. - Return in 2 weeks for drain check. Workstation performed: [id number]     CT abdomen pelvis w contrast    Result Date: 9/16/2023  Impression: 1. Postoperative change as noted. 2. Probable chronic dehiscence of the lower ventral abdominal wall wound. 3. Subcutaneous edema and overlying skin thickening of the lower ventral midline abdominal wall is nonspecific in appearance and may reflect a cellulitis. There is also a superficial right paramedian lower ventral abdominal wall 2.4 x 6.4 cm fluid collection present. This could reflect a seroma or abscess. 4. Additional findings as noted. Workstation performed: [id number]     Hospital Course: Christine Gallo is a 65-year-old female who presented with redness and pain on the left lower abdominal wall as well as temperature as high as 101.8 at home. On her initial evaluation by the surgery service, she did have lower abdominal wall tenderness under her pannus with erythema; remainder of her exam is unremarkable. Initial work-up included labs and the above and imaging studies.     She was admitted to the acute care surgery service with cellulitis of the abdominal wall as well as remaining open abdominal wound. She was placed on IV antibiotics, pain control regimen and further work-up was pursued with CT imaging demonstrating a ventral abdominal wall fluid collection concerning for possible abscess. IR was consulted and performed percutaneous drain placement for evacuation of this fluid collection. Fluid cultures were sent. The patient clinically improved following drainage and did also undergo wound VAC dressing change for her open abdominal wound with no evidence of wound infection requiring further debridement at this time. By 9/18/2023, she is deemed appropriate for discharge home with resumption of her VNA services; case management assisting with disposition planning. For further details of her hospital encounter, please see her complete medical records. On discharge, the patient is instructed to follow-up with the patient's primary care provider within the next 2 weeks to review the events of the recent hospitalization. The patient is instructed to follow-up with the Acute Care Surgical Services as scheduled on 9/25/2023 at 10:45 AM. The patient is instructed to follow the provided discharge instructions. Condition at Discharge: stable     Discharge instructions/Information to patient and family:   See after visit summary for information provided to patient and family. Provisions for Follow-Up Care:  See after visit summary for information related to follow-up care and any pertinent home health orders. Disposition: See After Visit Summary for discharge disposition information. Planned Readmission: No    Discharge Statement   I spent 25 minutes discharging the patient. This time was spent on the day of discharge. I had direct contact with the patient on the day of discharge. Additional documentation is required if more than 30 minutes were spent on discharge.      Discharge Medications:  See after visit summary for reconciled discharge medications provided to patient and family.     Robb Conklin PA-C  9/18/2023  09:43 AM

## 2023-09-18 NOTE — RESTORATIVE TECHNICIAN NOTE
Restorative Technician Note      Patient Name: Maisha Dobson     Restorative Tech Visit Date: 09/18/23  Note Type: Mobility  Patient Position Upon Consult: Supine  Activity Performed: Ambulated (To BR and back to bed)  Assistive Device: Roller walker  Patient Position at End of Consult: Supine;  All needs within reach    Sylvia Tom, Restorative Technician

## 2023-09-18 NOTE — PROCEDURES
Acute Care Surgery  Bedside V.A.C. Procedure Note    Location of wound: midline subumbilical abdomen    Dressings and Foam removed:  1 Dressings (wet to dry kerlex, gauze, ABD)  0 Black Foam  0 White Foam    Dimensions of wound: 7 cm x 4 cm x 4 cm    Description of wound: On inspection of the wound today, the wound appears pink and healthy and there is granulation tissue. The periwound skin remains clean and intact and unremarkable. VAC dressing application:  The periwound skin was cleaned and dried. 2 black foam, 0 white foam were cut to size of the wound and placed into the wound. The dressings were then covered with VAC drape. The track pad was then placed over the base of black foam. The VAC was then set to 125 mmHg low Continuous suction. The patient tolerated the procedure well and there were no complications. The patient did not require any excisional debridement during today's dressing change. VAC settings:  125 mmHg Continuous    Additional Notes: The [facility] sticker placed over the dressing per protocol. This dressing change took greater than 15 minutes to complete.

## 2023-09-18 NOTE — PLAN OF CARE
Problem: Potential for Falls  Goal: Patient will remain free of falls  Description: INTERVENTIONS:  - Educate patient/family on patient safety including physical limitations  - Instruct patient to call for assistance with activity   - Consult OT/PT to assist with strengthening/mobility   - Keep Call bell within reach  - Keep bed low and locked with side rails adjusted as appropriate  - Keep care items and personal belongings within reach  - Initiate and maintain comfort rounds  - Make Fall Risk Sign visible to staff  - Offer Toileting every 2 Hours, in advance of need  - Initiate/Maintain bed/chair alarm  - Apply yellow socks and bracelet for high fall risk patients  - Consider moving patient to room near nurses station  Outcome: Progressing     Problem: MOBILITY - ADULT  Goal: Maintain or return to baseline ADL function  Description: INTERVENTIONS:  -  Assess patient's ability to carry out ADLs; assess patient's baseline for ADL function and identify physical deficits which impact ability to perform ADLs (bathing, care of mouth/teeth, toileting, grooming, dressing, etc.)  - Assess/evaluate cause of self-care deficits   - Assess range of motion  - Assess patient's mobility; develop plan if impaired  - Assess patient's need for assistive devices and provide as appropriate  - Encourage maximum independence but intervene and supervise when necessary  - Involve family in performance of ADLs  - Assess for home care needs following discharge   - Consider OT consult to assist with ADL evaluation and planning for discharge  - Provide patient education as appropriate  Outcome: Progressing  Goal: Maintains/Returns to pre admission functional level  Description: INTERVENTIONS:  - Perform BMAT or MOVE assessment daily.   - Set and communicate daily mobility goal to care team and patient/family/caregiver. - Collaborate with rehabilitation services on mobility goals if consulted  - Perform Range of Motion 3 times a day.   - Reposition patient every 2 hours.   - Dangle patient 3 times a day  - Stand patient 3 times a day  - Ambulate patient 3 times a day  - Out of bed to chair 3 times a day   - Out of bed for meals 3 times a day  - Out of bed for toileting  - Record patient progress and toleration of activity level   Outcome: Progressing     Problem: Prexisting or High Potential for Compromised Skin Integrity  Goal: Skin integrity is maintained or improved  Description: INTERVENTIONS:  - Identify patients at risk for skin breakdown  - Assess and monitor skin integrity  - Assess and monitor nutrition and hydration status  - Monitor labs   - Assess for incontinence   - Turn and reposition patient  - Assist with mobility/ambulation  - Relieve pressure over bony prominences  - Avoid friction and shearing  - Provide appropriate hygiene as needed including keeping skin clean and dry  - Evaluate need for skin moisturizer/barrier cream  - Collaborate with interdisciplinary team   - Patient/family teaching  - Consider wound care consult   Outcome: Progressing

## 2023-09-18 NOTE — H&P
History & Physical:  Please see Consultation Note by Dr. Sam Hahn MD (Resident / Surgery-General) for associated H&P documentation, assessment, and plan.   Date of Service:  9/16/2023  2:55 AM    Joanna Reed  2023-Sep-16

## 2023-09-19 ENCOUNTER — HOME CARE VISIT (OUTPATIENT)
Dept: HOME HEALTH SERVICES | Facility: HOME HEALTHCARE | Age: 73
End: 2023-09-19
Payer: MEDICARE

## 2023-09-19 LAB
MYCOBACTERIUM SPEC CULT: NORMAL
RHODAMINE-AURAMINE STN SPEC: NORMAL

## 2023-09-20 ENCOUNTER — TELEPHONE (OUTPATIENT)
Dept: CARDIOLOGY CLINIC | Facility: CLINIC | Age: 73
End: 2023-09-20

## 2023-09-20 ENCOUNTER — HOME CARE VISIT (OUTPATIENT)
Dept: HOME HEALTH SERVICES | Facility: HOME HEALTHCARE | Age: 73
End: 2023-09-20
Payer: MEDICARE

## 2023-09-20 VITALS
TEMPERATURE: 97.9 F | OXYGEN SATURATION: 97 % | HEART RATE: 70 BPM | SYSTOLIC BLOOD PRESSURE: 124 MMHG | RESPIRATION RATE: 18 BRPM | DIASTOLIC BLOOD PRESSURE: 66 MMHG

## 2023-09-20 DIAGNOSIS — E87.6 HYPOKALEMIA: Primary | ICD-10-CM

## 2023-09-20 LAB — BACTERIA SPEC ANAEROBE CULT: NORMAL

## 2023-09-20 PROCEDURE — G0299 HHS/HOSPICE OF RN EA 15 MIN: HCPCS

## 2023-09-20 NOTE — TELEPHONE ENCOUNTER
Patient called asking if she needs to take Potassium if she is not taking Lasix? Hospitalized 9/15 - 9/18 for cellulitis of the abdomen. Stated she did not take Lasix or Potassium since 9/15. (States she did not take either in the hospital)     9/18 Potasium - 3.4  9/17 Potasium - 3.0    Discharge medication list include: Furosemide 20 mg daily. Potassium Chloride Izabel ER 20 mEq daily. Last OV with you 8/17. Next OV with Dr Geoff Lomax 10/27. Please advise.

## 2023-09-20 NOTE — TELEPHONE ENCOUNTER
Returned call to patient reading MOLLY Zuniga message. Patient has Shonda Ricardo visiting nurse come to home Friday, 9/22 & requested they draw BMP in the AM.    Is this okay with you?

## 2023-09-21 ENCOUNTER — HOME CARE VISIT (OUTPATIENT)
Dept: HOME HEALTH SERVICES | Facility: HOME HEALTHCARE | Age: 73
End: 2023-09-21
Payer: MEDICARE

## 2023-09-21 LAB
BACTERIA BLD CULT: NORMAL
BACTERIA BLD CULT: NORMAL
BACTERIA SPEC BFLD CULT: ABNORMAL
GRAM STN SPEC: ABNORMAL
GRAM STN SPEC: ABNORMAL

## 2023-09-21 PROCEDURE — G0156 HHCP-SVS OF AIDE,EA 15 MIN: HCPCS

## 2023-09-22 ENCOUNTER — HOME CARE VISIT (OUTPATIENT)
Dept: HOME HEALTH SERVICES | Facility: HOME HEALTHCARE | Age: 73
End: 2023-09-22
Payer: MEDICARE

## 2023-09-22 ENCOUNTER — TELEPHONE (OUTPATIENT)
Dept: CARDIOLOGY CLINIC | Facility: CLINIC | Age: 73
End: 2023-09-22

## 2023-09-22 ENCOUNTER — APPOINTMENT (OUTPATIENT)
Dept: LAB | Facility: IMAGING CENTER | Age: 73
End: 2023-09-22
Payer: MEDICARE

## 2023-09-22 VITALS
RESPIRATION RATE: 24 BRPM | TEMPERATURE: 97.9 F | SYSTOLIC BLOOD PRESSURE: 106 MMHG | HEART RATE: 72 BPM | OXYGEN SATURATION: 96 % | DIASTOLIC BLOOD PRESSURE: 64 MMHG

## 2023-09-22 LAB
ANION GAP SERPL CALCULATED.3IONS-SCNC: 10 MMOL/L
BUN SERPL-MCNC: 18 MG/DL (ref 5–25)
CALCIUM SERPL-MCNC: 8 MG/DL (ref 8.4–10.2)
CHLORIDE SERPL-SCNC: 107 MMOL/L (ref 96–108)
CO2 SERPL-SCNC: 24 MMOL/L (ref 21–32)
CREAT SERPL-MCNC: 0.78 MG/DL (ref 0.6–1.3)
GFR SERPL CREATININE-BSD FRML MDRD: 75 ML/MIN/1.73SQ M
GLUCOSE SERPL-MCNC: 100 MG/DL (ref 65–140)
POTASSIUM SERPL-SCNC: 4.1 MMOL/L (ref 3.5–5.3)
SODIUM SERPL-SCNC: 141 MMOL/L (ref 135–147)

## 2023-09-22 PROCEDURE — G0299 HHS/HOSPICE OF RN EA 15 MIN: HCPCS

## 2023-09-22 PROCEDURE — 80048 BASIC METABOLIC PNL TOTAL CA: CPT

## 2023-09-22 PROCEDURE — 36415 COLL VENOUS BLD VENIPUNCTURE: CPT

## 2023-09-22 NOTE — TELEPHONE ENCOUNTER
Lawrence Brandt from [facility] stated patient had lab work drawn today. Wanted to make cardiology aware that patient is not taking daily Lasix but is taking daily Potassium. Returned call to Rod walton & left a message to return call to office. Will F/U Monday 9/25 with lab work results. Send no show letter?  No show fee?

## 2023-09-25 ENCOUNTER — OFFICE VISIT (OUTPATIENT)
Dept: SURGERY | Facility: CLINIC | Age: 73
End: 2023-09-25
Payer: MEDICARE

## 2023-09-25 ENCOUNTER — HOME CARE VISIT (OUTPATIENT)
Dept: HOME HEALTH SERVICES | Facility: HOME HEALTHCARE | Age: 73
End: 2023-09-25
Payer: MEDICARE

## 2023-09-25 VITALS — BODY MASS INDEX: 45.18 KG/M2 | WEIGHT: 263.2 LBS | TEMPERATURE: 97.7 F

## 2023-09-25 DIAGNOSIS — T81.30XD ABDOMINAL WOUND DEHISCENCE, SUBSEQUENT ENCOUNTER: Primary | ICD-10-CM

## 2023-09-25 PROCEDURE — 99214 OFFICE O/P EST MOD 30 MIN: CPT | Performed by: SURGERY

## 2023-09-25 NOTE — CASE COMMUNICATION
Ship to   Pt. Home  XX    Insurance [facility]     Wound 1  LUCY drain - upper abdomen     Partial  xx      Gauze Kerlix (fluff roll) 4inch sterile [id number] . ..4    Drain sponges 4x4 split [id number] . Biju Spine Biju Spine 8

## 2023-09-25 NOTE — CASE COMMUNICATION
Ship to Clinician   xx    Insurance [facility]     Wound 1  LUCY drain - upper abdomen     Partial  xx      Gauze Kerlix (fluff roll) 4inch sterile [id number] . ..1    Drain sponges 4x4 split [id number] . Eulogio Patel 5

## 2023-09-25 NOTE — ASSESSMENT & PLAN NOTE
68 yo female s/p ex lap 6/19-6/20 with midline wound dehiscence. VAC in place with MWF changes. Wound with granulation tissue on exam today. Apparent cellulitis has resolved. VAC replaced with 3 black sponges (one in wound, two bridge). Plan for wound check in 2 weeks. Will additionally follow up with IR for drain check.

## 2023-09-25 NOTE — PROGRESS NOTES
Office Visit - General Surgery  Sinan Mcgill MRN: [id number]  Encounter: [id number]    Assessment and Plan  Problem List Items Addressed This Visit        Other    Abdominal wound dehiscence - Primary     66 yo female s/p ex lap 6/19-6/20 with midline wound dehiscence. VAC in place with MWF changes. Wound with granulation tissue on exam today. Apparent cellulitis has resolved. VAC replaced with 3 black sponges (one in wound, two bridge). Plan for wound check in 2 weeks. Will additionally follow up with IR for drain check. Chief Complaint:  Sinan Mcgill is a 67 y.o. female who presents for Wound Check. Subjective  Patient denies pain nausea, vomiting, fever, chills, shortness of breath beyond baseline, chest pain. Feels that condition has improved. Reports eating without issue, having bowel movements, voiding. VAC is changed MWF. LUCY has been relatively low output. Past Medical History:   Diagnosis Date   • A-fib [facility])    • Arthritis    • Atrial fibrillation ([address])    • Cervical spondylosis with myelopathy    • Gastric bypass status for obesity    • History of transfusion     35 years ago   • Hypertension    • mass right kidney    • Renal cell adenocarcinoma (HCC)     RIGHT   • Sleep apnea    • Super obese        Past Surgical History:   Procedure Laterality Date   • ABDOMINAL ADHESION SURGERY N/A 6/19/2023    Procedure: LYSIS ADHESIONS;  Surgeon: Melonie Caraballo DO;  [facility];  Service: General   • ABDOMINAL WALL SURGERY N/A 7/19/2023    Procedure: PARTIAL CLOSURE WOUND ABDOMINAL/TRUNK;  Surgeon: Billy Hsu DO;  [facility];  Service: General   • APPENDECTOMY N/A 6/19/2023    Procedure: APPENDECTOMY;  Surgeon: Melonie Caraballo DO;  [facility];  Service: General   • CHOLECYSTECTOMY     • COLOSTOMY     • CRYOABLATION Right     RT KIDNEY   • CYSTORRHAPHY      Bladder.  Last assessed 4/6/2016    • EXPLORATORY LAPAROTOMY W/ BOWEL RESECTION N/A 6/19/2023    Procedure: LAPAROTOMY EXPLORATORY W/ BOWEL RESECTION;  Surgeon: Gabriel Valdez MD;  [facility];  Service: General   • GASTRIC BYPASS     • HERNIA REPAIR      Last assessed 4/6/2016    • HYSTERECTOMY     • IR DRAINAGE TUBE PLACEMENT  9/16/2023   • LAPAROTOMY N/A 6/19/2023    Procedure: LAPAROTOMY EXPLORATORY, washout, vac change;  Surgeon: Tanya Liu DO;  [facility];  Service: General   • LAPAROTOMY N/A 6/20/2023    Procedure: LAPAROTOMY, REMOVAL OF WOUND VAC AND PACKING, SMALL BOWEL RESECTION , BOWEL ANASTOMOSIS X 3 , VICRYL MESH BRIDGING -;  Surgeon: Tanya Liu DO;  [facility];  Service: General   • CT OPTX DSTL RADL X-ARTIC FX/EPIPHYSL SEP Right 4/22/2016    Procedure: OPEN REDUCTION INTERNAL FIXATION RIGHT DISTAL RADIUS;  Surgeon: Danielle Dorsey MD;  [facility];  Service: Orthopedics   • REVISION COLOSTOMY     • SMALL INTESTINE SURGERY N/A 6/19/2023    Procedure: RESECTION SMALL BOWEL;  Surgeon: Tanya Liu DO;  [facility];  Service: General   • THYROID LOBECTOMY Left 8/27/2019    Procedure: LOBECTOMY THYROID, left;  Surgeon: Katherine Ring MD;  [facility];  Service: Surgical Oncology   • TUBAL LIGATION     • US GUIDED THYROID BIOPSY  2/27/2019   • US GUIDED THYROID BIOPSY  5/29/2019   • WOUND DEBRIDEMENT N/A 7/18/2023    Procedure: [facility]   Surgeon: Gricelda Rawls DO;  [facility];  Service: Trauma   • WOUND DEBRIDEMENT N/A 7/19/2023    Procedure: ABDOMINAL WOUND ([address] [place] [address] OUT); VAC APPLICATION;  Surgeon: Jaclyn Gary DO;  [facility];  Service: General       Family History   Problem Relation Age of Onset   • Heart attack Mother    • Lung cancer Father 76   • Heart disease Father    • Stroke Sister    • Lung cancer Sister 64   • Prostate cancer Brother 61       Social History     Tobacco Use   • Smoking status: Never   • Smokeless tobacco: Never   Vaping Use   • Vaping Use: Never used   Substance Use Topics   • Alcohol use: Not Currently     Alcohol/week: 0.0 standard drinks of alcohol     Comment: 0   • Drug use: No        Medications  Current Outpatient Medications on File Prior to Visit   Medication Sig Dispense Refill   • acetaminophen (TYLENOL) 325 mg tablet Take 975 mg by mouth every 6 (six) hours as needed for mild pain or moderate pain     • amiodarone 200 mg tablet Take 1 tablet (200 mg total) by mouth daily with breakfast 30 tablet 0   • apixaban (Eliquis) 5 mg Take 1 tablet (5 mg total) by mouth 2 (two) times a day 60 tablet 11   • calcium carbonate (OYSTER SHELL,OSCAL) 500 mg Take 1 tablet by mouth daily with breakfast 30 tablet 0   • [] cephalexin (KEFLEX) 500 mg capsule Take 1 capsule (500 mg total) by mouth every 6 (six) hours for 6 days 24 capsule 0   • cyanocobalamin (VITAMIN B-12) 1000 MCG tablet Take 1 tablet (1,000 mcg total) by mouth daily 30 tablet 0   • diltiazem (CARDIZEM CD) 360 MG 24 hr capsule Take 1 capsule (360 mg total) by mouth daily 90 capsule 3   • ergocalciferol (VITAMIN D2) 50,000 units Take 50,000 Units by mouth once a week Takes D3      • ferrous sulfate 325 (65 Fe) mg tablet Take 1 tablet (325 mg total) by mouth daily with breakfast 30 tablet 0   • FLUoxetine (PROzac) 20 mg capsule Take 1 capsule (20 mg total) by mouth 2 (two) times a day 60 capsule 0   • furosemide (LASIX) 20 mg tablet Take 1 tablet (20 mg total) by mouth daily 60 tablet 3   • melatonin 3 mg Take 2 tablets (6 mg total) by mouth daily at bedtime  0   • metoprolol succinate (TOPROL-XL) 25 mg 24 hr tablet Take 1 tablet (25 mg total) by mouth daily 90 tablet 3   • [] metroNIDAZOLE (FLAGYL) 500 mg tablet Take 1 tablet (500 mg total) by mouth every 8 (eight) hours for 6 days 18 tablet 0   • potassium chloride (K-DUR,KLOR-CON) 20 mEq tablet Take 1 tablet (20 mEq total) by mouth daily 90 tablet 3   • Probiotic Product (PRO-BIOTIC BLEND PO) Take by mouth • psyllium (METAMUCIL) packet Take 1 packet by mouth daily 30 packet 0   • sodium chloride, PF, 0.9 % 10 mL by Intracatheter route daily 300 mL 2   • sodium chloride, PF, 0.9 % 10 mL by Intracatheter route daily 300 mL 0     Current Facility-Administered Medications on File Prior to Visit   Medication Dose Route Frequency Provider Last Rate Last Admin   • denosumab (PROLIA) subcutaneous injection 60 mg  60 mg Subcutaneous Q6 Months Lieutenant Heri MD   60 mg at 01/04/21 1157       Allergies  No Known Allergies    Review of Systems   Constitutional: Negative for appetite change, chills and fever. Eyes: Negative. Respiratory: Negative for cough and shortness of breath. Cardiovascular: Positive for leg swelling. Negative for chest pain. Gastrointestinal: Negative for abdominal pain, constipation, diarrhea, nausea and vomiting. Genitourinary: Negative for difficulty urinating. Skin:        Redness resolved; VAC in place   Denies additional complaints    Objective  Vitals:    09/25/23 1028   Temp: 97.7 °F (36.5 °C)     Physical Exam  Constitutional:       General: She is not in acute distress. Appearance: She is obese. She is not toxic-appearing. HENT:      Head: Normocephalic and atraumatic. Eyes:      Conjunctiva/sclera: Conjunctivae normal.   Pulmonary:      Effort: Pulmonary effort is normal. No respiratory distress (on RA). Abdominal:      General: There is no distension. Palpations: Abdomen is soft. Tenderness: There is no abdominal tenderness. Comments: Midline wound with good granulation tissue: 5 x 3 x 2.5 cm. Abdomen without erythema, warmth, induration    Musculoskeletal:      Right lower leg: Edema present. Left lower leg: Edema present. Skin:     General: Skin is warm and dry. Neurological:      Mental Status: She is alert and oriented to person, place, and time.        ---  Gaudencio Cummins MD  General Surgery PGY-I

## 2023-09-26 ENCOUNTER — HOME CARE VISIT (OUTPATIENT)
Dept: HOME HEALTH SERVICES | Facility: HOME HEALTHCARE | Age: 73
End: 2023-09-26
Payer: MEDICARE

## 2023-09-26 LAB
MYCOBACTERIUM SPEC CULT: NORMAL
RHODAMINE-AURAMINE STN SPEC: NORMAL

## 2023-09-26 PROCEDURE — G0156 HHCP-SVS OF AIDE,EA 15 MIN: HCPCS

## 2023-09-27 ENCOUNTER — TELEPHONE (OUTPATIENT)
Dept: CARDIOLOGY CLINIC | Facility: CLINIC | Age: 73
End: 2023-09-27

## 2023-09-27 ENCOUNTER — HOME CARE VISIT (OUTPATIENT)
Dept: HOME HEALTH SERVICES | Facility: HOME HEALTHCARE | Age: 73
End: 2023-09-27
Payer: MEDICARE

## 2023-09-27 PROCEDURE — G0299 HHS/HOSPICE OF RN EA 15 MIN: HCPCS

## 2023-09-27 NOTE — TELEPHONE ENCOUNTER
Jono Corona, [facility] Nurse called stating patient has + 3 edema from her feet to her knees. Lungs are clear. No SOB. Watson chest pain or discomfort. Called patient who is not taking Lasix or Potassium. Stated her ankles are swollen. Reviewed BMP from 9/22. Potassium - 4.1    Reviewed hospital discharge orders, take daily Lasix & Potassium. Patient wants to ask you if she should take daily Lasix 20 mg & daily Potassium 20 mEq, even when she is not experiencing edema. Also, patient asking if she should continue taking Amiodarone 200 mg daily. Stated she has enough medicine until next week but has no refills. Also, asking if she needs to be on fluid restriction 2 L/day. Stated hospital told her to drink fluids. Please advise.

## 2023-09-28 VITALS
TEMPERATURE: 98.1 F | OXYGEN SATURATION: 97 % | SYSTOLIC BLOOD PRESSURE: 120 MMHG | RESPIRATION RATE: 22 BRPM | HEART RATE: 76 BPM | DIASTOLIC BLOOD PRESSURE: 66 MMHG

## 2023-09-28 DIAGNOSIS — I48.21 PERMANENT ATRIAL FIBRILLATION (HCC): ICD-10-CM

## 2023-09-28 RX ORDER — AMIODARONE HYDROCHLORIDE 200 MG/1
200 TABLET ORAL
Qty: 30 TABLET | Refills: 0 | Status: SHIPPED | OUTPATIENT
Start: 2023-09-28

## 2023-09-28 NOTE — TELEPHONE ENCOUNTER
Called patient & read MOLLY Person message to patient. Patient verbalized understanding & stated she will take both Lasix 20 mg & Potassium 20 mEq daily until the edema in her legs decrease. Do you want a follow up BMP in one or two weeks? Please advise.

## 2023-09-28 NOTE — TELEPHONE ENCOUNTER
She should take Lasix 20mg and K dur 20meq when needed for lower foot, ankle or leg swelling or weight gain of 3 pounds in one day. bContiue on Amiodarone 200mg daily.  Thank you

## 2023-09-29 ENCOUNTER — HOME CARE VISIT (OUTPATIENT)
Dept: HOME HEALTH SERVICES | Facility: HOME HEALTHCARE | Age: 73
End: 2023-09-29
Payer: MEDICARE

## 2023-09-29 ENCOUNTER — TELEPHONE (OUTPATIENT)
Dept: SURGERY | Facility: CLINIC | Age: 73
End: 2023-09-29

## 2023-09-29 ENCOUNTER — HOSPITAL ENCOUNTER (OUTPATIENT)
Dept: RADIOLOGY | Facility: HOSPITAL | Age: 73
Discharge: HOME | End: 2023-09-29
Attending: RADIOLOGY
Payer: MEDICARE

## 2023-09-29 DIAGNOSIS — E87.6 HYPOKALEMIA: Primary | ICD-10-CM

## 2023-09-29 DIAGNOSIS — S31.109A OPEN WOUND OF ABDOMINAL WALL, INITIAL ENCOUNTER: ICD-10-CM

## 2023-09-29 PROCEDURE — G0299 HHS/HOSPICE OF RN EA 15 MIN: HCPCS

## 2023-09-29 PROCEDURE — 76080 X-RAY EXAM OF FISTULA: CPT | Performed by: STUDENT IN AN ORGANIZED HEALTH CARE EDUCATION/TRAINING PROGRAM

## 2023-09-29 PROCEDURE — 76080 X-RAY EXAM OF FISTULA: CPT

## 2023-09-29 PROCEDURE — 49424 ASSESS CYST CONTRAST INJECT: CPT | Performed by: STUDENT IN AN ORGANIZED HEALTH CARE EDUCATION/TRAINING PROGRAM

## 2023-09-29 PROCEDURE — 49424 ASSESS CYST CONTRAST INJECT: CPT

## 2023-09-29 RX ADMIN — IOHEXOL 5 ML: 350 INJECTION, SOLUTION INTRAVENOUS at 11:10

## 2023-09-29 NOTE — DISCHARGE INSTRUCTIONS
Drainage Tube Removal    Your drainage tube was removed today. What you need know at home:   Keep a clean dry dressing at the tube site until the small opening closes. It will take twenty four to forty eight hours. Keep the site dry until it heals. A small amount of drainage on your dressing is normal. Resume your normal diet. Small sips of flat soda will help with any nausea. Contact Interventional Radiology for any of the following: You have pain, fever greater than 101, shaking chills. If you have increased redness or swelling at the site. I the drainage from your site does not stop. If the site drains pus or has a bad odor.      Contact Interventional Radiology at [phone]   Mikala PATIENTS: Contact Interventional Radiology at [phone]) Aaliyah Gonzalez PATIENTS: Contact Interventional Radiology at [phone]) if:

## 2023-09-29 NOTE — TELEPHONE ENCOUNTER
Returned call to patient & read MOLLY Barrios orders. Patient verbalized understanding. Also called & left message for Chayito Valdovinos RN with [facility] to inform her of ordered 10/6 BMP.

## 2023-09-29 NOTE — TELEPHONE ENCOUNTER
Pt is s/p abdominal wound dehiscence - cellulitis. Pt saw Dr. Chely Yoder in office on 9/25/23. Pt wentt to IR for drain check, which was fine. When pt came home, she noticed redness on the other side from where hte drain is. Pt is calling to see if another antiobiotic should be ordered; she finished the last one on Sun., 9/24/23. No fever, chills, feels fine. Sending this to Dr. Amaury Kern for advisement. Pharmacy is correct in [id number] [address]; [address].     mb

## 2023-09-29 NOTE — BRIEF OP NOTE (RAD/CATH)
INTERVENTIONAL RADIOLOGY PROCEDURE NOTE    Date: 9/29/2023    Procedure:   Procedure Summary     Date: 09/29/23 Room / Location: [address] [place] [address] Interventional Radiology    Anesthesia Start:  Anesthesia Stop:     Procedure: IR DRAINAGE TUBE CHECK/CHANGE/REPOSITION/REINSERTION/UPSIZE Diagnosis:       Open wound of abdominal wall, initial encounter      (abdominal wall abscess drain check)    Scheduled Providers:  Responsible Provider:     Anesthesia Type: Not recorded ASA Status: Not recorded          Preoperative diagnosis:   1. Open wound of abdominal wall, initial encounter         Postoperative diagnosis: Same. Surgeon: Luis Griggs MD     Assistant: None. No qualified resident was available. Blood loss: 0 ml    Specimens: none. Findings:   Abscess catheter removal.    Complications: None immediate.     Anesthesia: none

## 2023-10-01 ENCOUNTER — HOME CARE VISIT (OUTPATIENT)
Dept: HOME HEALTH SERVICES | Facility: HOME HEALTHCARE | Age: 73
End: 2023-10-01
Payer: MEDICARE

## 2023-10-01 VITALS
SYSTOLIC BLOOD PRESSURE: 124 MMHG | RESPIRATION RATE: 24 BRPM | TEMPERATURE: 101.3 F | OXYGEN SATURATION: 95 % | HEART RATE: 110 BPM | DIASTOLIC BLOOD PRESSURE: 64 MMHG

## 2023-10-01 NOTE — CASE COMMUNICATION
Patient now has an appointment on Monday 10/2/23 due to redness in abdomen and fever. Nursing visit should be cancelled.  [facility] may need to contact patient to see if visit should be made

## 2023-10-02 ENCOUNTER — HOME CARE VISIT (OUTPATIENT)
Dept: HOME HEALTH SERVICES | Facility: HOME HEALTHCARE | Age: 73
End: 2023-10-02
Payer: MEDICARE

## 2023-10-02 ENCOUNTER — OFFICE VISIT (OUTPATIENT)
Dept: SURGERY | Facility: CLINIC | Age: 73
End: 2023-10-02
Payer: MEDICARE

## 2023-10-02 VITALS — TEMPERATURE: 96.4 F | HEIGHT: 64 IN | BODY MASS INDEX: 45.18 KG/M2

## 2023-10-02 DIAGNOSIS — T81.30XD ABDOMINAL WOUND DEHISCENCE, SUBSEQUENT ENCOUNTER: ICD-10-CM

## 2023-10-02 DIAGNOSIS — L03.90 CELLULITIS: Primary | ICD-10-CM

## 2023-10-02 LAB — FUNGUS SPEC CULT: NORMAL

## 2023-10-02 PROCEDURE — 99213 OFFICE O/P EST LOW 20 MIN: CPT | Performed by: SURGERY

## 2023-10-02 RX ORDER — AMOXICILLIN AND CLAVULANATE POTASSIUM 875; 125 MG/1; MG/1
1 TABLET, FILM COATED ORAL EVERY 12 HOURS SCHEDULED
Qty: 20 TABLET | Refills: 0 | Status: SHIPPED | OUTPATIENT
Start: 2023-10-02 | End: 2023-10-12

## 2023-10-02 NOTE — ASSESSMENT & PLAN NOTE
66 yo female s/p ex lap 6/19-6/20 with midline wound dehiscence. Presented to clinic with three days of abdominal redness. No systemic symptoms. VAC discontinued today. Wound with granulation tissue on exam. Packed with 1 inch packing, covered with 4x4 gauze. Inferior abdomen with erythema and warmth. Nontender. 10 day course augmentin prescribed for cellulitis. BID packing changes for midline wound. Plan for follow up in one week.

## 2023-10-02 NOTE — PROGRESS NOTES
Office Visit - General Surgery  Caprice Bolivar MRN: [id number]  Encounter: [id number]    Assessment and Plan  Problem List Items Addressed This Visit        Other    Abdominal wound dehiscence    Cellulitis - Primary     68 yo female s/p ex lap 6/19-6/20 with midline wound dehiscence. Presented to clinic with three days of abdominal redness. No systemic symptoms. VAC discontinued today. Wound with granulation tissue on exam. Packed with 1 inch packing, covered with 4x4 gauze. Inferior abdomen with erythema and warmth. Nontender. 10 day course augmentin prescribed for cellulitis. BID packing changes for midline wound. Plan for follow up in one week. Chief Complaint:  Caprice Bolivar is a 67 y.o. female who presents for three days of abdominal redness. Subjective  Patient seen for inferior abdominal redness of three days duration. Denies pain, fevers, nausea, vomiting, fever, shortness of breath, chest pain, changes in bowel habits or urination. Denies issues with wound VAC at home.      Past Medical History:   Diagnosis Date   • A-fib [facility])    • Arthritis    • Atrial fibrillation ([address])    • Cervical spondylosis with myelopathy    • Gastric bypass status for obesity    • History of transfusion     35 years ago   • Hypertension    • mass right kidney    • Renal cell adenocarcinoma (HCC)     RIGHT   • Sleep apnea    • Super obese        Past Surgical History:   Procedure Laterality Date   • ABDOMINAL ADHESION SURGERY N/A 6/19/2023    Procedure: LYSIS ADHESIONS;  Surgeon: Tanya Liu DO;  [facility];  Service: General   • ABDOMINAL WALL SURGERY N/A 7/19/2023    Procedure: PARTIAL CLOSURE WOUND ABDOMINAL/TRUNK;  Surgeon: Jaclyn Gary DO;  [facility];  Service: General   • APPENDECTOMY N/A 6/19/2023    Procedure: APPENDECTOMY;  Surgeon: Tanya Liu DO;  [facility];  Service: General   • CHOLECYSTECTOMY     • COLOSTOMY     • CRYOABLATION Right RT KIDNEY   • CYSTORRHAPHY      Bladder. Last assessed 4/6/2016    • EXPLORATORY LAPAROTOMY W/ BOWEL RESECTION N/A 6/19/2023    Procedure: LAPAROTOMY EXPLORATORY W/ BOWEL RESECTION;  Surgeon: Jany Xiao MD;  [facility];  Service: General   • GASTRIC BYPASS     • HERNIA REPAIR      Last assessed 4/6/2016    • HYSTERECTOMY     • IR DRAINAGE TUBE PLACEMENT  9/16/2023   • LAPAROTOMY N/A 6/19/2023    Procedure: LAPAROTOMY EXPLORATORY, washout, vac change;  Surgeon: Lon Rivera DO;  [facility];  Service: General   • LAPAROTOMY N/A 6/20/2023    Procedure: LAPAROTOMY, REMOVAL OF WOUND VAC AND PACKING, SMALL BOWEL RESECTION , BOWEL ANASTOMOSIS X 3 , VICRYL MESH BRIDGING -;  Surgeon: Lon Rivera DO;  [facility];  Service: General   • ND OPTX DSTL RADL X-ARTIC FX/EPIPHYSL SEP Right 4/22/2016    Procedure: OPEN REDUCTION INTERNAL FIXATION RIGHT DISTAL RADIUS;  Surgeon: Corrie Stallworth MD;  [facility];  Service: Orthopedics   • REVISION COLOSTOMY     • SMALL INTESTINE SURGERY N/A 6/19/2023    Procedure: RESECTION SMALL BOWEL;  Surgeon: Lon Rivera DO;  [facility];  Service: General   • THYROID LOBECTOMY Left 8/27/2019    Procedure: LOBECTOMY THYROID, left;  Surgeon: Richi Baig MD;  [facility];  Service: Surgical Oncology   • TUBAL LIGATION     • US GUIDED THYROID BIOPSY  2/27/2019   • US GUIDED THYROID BIOPSY  5/29/2019   • WOUND DEBRIDEMENT N/A 7/18/2023    Procedure: DEBRIDEMENT WOUND [facility] OUT);   Surgeon: Mel Moya DO;  [facility];  Service: Trauma   • WOUND DEBRIDEMENT N/A 7/19/2023    Procedure: ABDOMINAL WOUND ([address]); VAC APPLICATION;  Surgeon: Sally Otto DO;  [facility];  Service: General       Family History   Problem Relation Age of Onset   • Heart attack Mother    • Lung cancer Father 76   • Heart disease Father    • Stroke Sister    • Lung cancer Sister 64   • Prostate cancer Brother 61       Social History     Tobacco Use   • Smoking status: Never   • Smokeless tobacco: Never   Vaping Use   • Vaping Use: Never used   Substance Use Topics   • Alcohol use: Not Currently     Alcohol/week: 0.0 standard drinks of alcohol     Comment: 0   • Drug use: No        Medications  Current Outpatient Medications on File Prior to Visit   Medication Sig Dispense Refill   • acetaminophen (TYLENOL) 325 mg tablet Take 975 mg by mouth every 6 (six) hours as needed for mild pain or moderate pain     • amiodarone 200 mg tablet Take 1 tablet (200 mg total) by mouth daily with breakfast 30 tablet 0   • apixaban (Eliquis) 5 mg Take 1 tablet (5 mg total) by mouth 2 (two) times a day 60 tablet 11   • calcium carbonate (OYSTER SHELL,OSCAL) 500 mg Take 1 tablet by mouth daily with breakfast 30 tablet 0   • cyanocobalamin (VITAMIN B-12) 1000 MCG tablet Take 1 tablet (1,000 mcg total) by mouth daily 30 tablet 0   • diltiazem (CARDIZEM CD) 360 MG 24 hr capsule Take 1 capsule (360 mg total) by mouth daily 90 capsule 3   • ergocalciferol (VITAMIN D2) 50,000 units Take 50,000 Units by mouth once a week Takes D3      • ferrous sulfate 325 (65 Fe) mg tablet Take 1 tablet (325 mg total) by mouth daily with breakfast 30 tablet 0   • FLUoxetine (PROzac) 20 mg capsule Take 1 capsule (20 mg total) by mouth 2 (two) times a day 60 capsule 0   • furosemide (LASIX) 20 mg tablet Take 1 tablet (20 mg total) by mouth daily 60 tablet 3   • metoprolol succinate (TOPROL-XL) 25 mg 24 hr tablet Take 1 tablet (25 mg total) by mouth daily 90 tablet 3   • potassium chloride (K-DUR,KLOR-CON) 20 mEq tablet Take 1 tablet (20 mEq total) by mouth daily 90 tablet 3   • Probiotic Product (PRO-BIOTIC BLEND PO) Take by mouth     • sodium chloride, PF, 0.9 % 10 mL by Intracatheter route daily 300 mL 2   • sodium chloride, PF, 0.9 % 10 mL by Intracatheter route daily 300 mL 0   • melatonin 3 mg Take 2 tablets (6 mg total) by mouth daily at bedtime 0   • psyllium (METAMUCIL) packet Take 1 packet by mouth daily 30 packet 0     Current Facility-Administered Medications on File Prior to Visit   Medication Dose Route Frequency Provider Last Rate Last Admin   • denosumab (PROLIA) subcutaneous injection 60 mg  60 mg Subcutaneous Q6 Months Marcia Leung MD   60 mg at 01/04/21 1157       Allergies  No Known Allergies    Review of Systems   Constitutional: Negative for activity change, appetite change, chills and fever. Respiratory: Negative for cough and shortness of breath. Cardiovascular: Positive for leg swelling. Negative for chest pain. Gastrointestinal: Negative for abdominal pain, constipation, diarrhea, nausea and vomiting. Genitourinary: Negative for difficulty urinating. Urinary incontinence at times   Skin: Positive for color change. Redness noted on inferior abdomen   Neurological: Negative for dizziness and headaches. All other systems reviewed and are negative. Objective  Vitals:    10/02/23 1147   Temp: (!) 96.4 °F (35.8 °C)       Physical Exam  Constitutional:       General: She is not in acute distress. Appearance: She is obese. She is not toxic-appearing. HENT:      Head: Normocephalic and atraumatic. Mouth/Throat:      Mouth: Mucous membranes are moist.   Eyes:      Conjunctiva/sclera: Conjunctivae normal.   Pulmonary:      Effort: Pulmonary effort is normal. No respiratory distress (on room air). Abdominal:      General: There is no distension. Palpations: Abdomen is soft. Tenderness: There is no abdominal tenderness (No tenderness at areas of erythema). Musculoskeletal:      Right lower leg: Edema present. Left lower leg: Edema present. Comments: Reports to be chronic   Skin:     General: Skin is warm and dry. Comments: Erythema and warmth present at level of the wound on inferior aspect of pannus. Neurological:      Mental Status: She is alert.  Mental status is at baseline.        ---  Martha Morales MD  General Surgery PGY-I

## 2023-10-03 ENCOUNTER — HOME CARE VISIT (OUTPATIENT)
Dept: HOME HEALTH SERVICES | Facility: HOME HEALTHCARE | Age: 73
End: 2023-10-03
Payer: MEDICARE

## 2023-10-03 ENCOUNTER — TELEPHONE (OUTPATIENT)
Dept: ENDOCRINOLOGY | Facility: CLINIC | Age: 73
End: 2023-10-03

## 2023-10-03 LAB
MYCOBACTERIUM SPEC CULT: NORMAL
RHODAMINE-AURAMINE STN SPEC: NORMAL

## 2023-10-03 PROCEDURE — G0156 HHCP-SVS OF AIDE,EA 15 MIN: HCPCS

## 2023-10-03 NOTE — TELEPHONE ENCOUNTER
Spoke to to patient she verbalozed understanding of doctors notes and confirmed she will be at Fridays appt

## 2023-10-03 NOTE — TELEPHONE ENCOUNTER
Pt has appt and prolia Friday/  Does she need labs for this appt. Pt was in the hospital and the last appt had to be rescheduled. Can we let her know if she needs labs done. no

## 2023-10-03 NOTE — TELEPHONE ENCOUNTER
Returned call & left message for patient & read Sheyla's message stating patient to get BMP in one week, 10/5. Also stated to call office with any questions or concerns.

## 2023-10-03 NOTE — TELEPHONE ENCOUNTER
Patient called asking if she should wait to have BMP done one week before 10/27 OV with Dr Elmer Swift or have it done as you had ordered in one week on 10/5? Please advise.

## 2023-10-03 NOTE — TELEPHONE ENCOUNTER
Corrected calcium is 8.8 mg/dl, ( 9/22/2013)   No other blood work is needed, she can come on Friday for Prolia and appt     Please inform pt to take  calcium supplement  ( tums ) calcium carbonate, to 1000 mg , twice a day until her appt   Thanks     Tisha Wei

## 2023-10-04 ENCOUNTER — HOME CARE VISIT (OUTPATIENT)
Dept: HOME HEALTH SERVICES | Facility: HOME HEALTHCARE | Age: 73
End: 2023-10-04
Payer: MEDICARE

## 2023-10-04 PROCEDURE — G0299 HHS/HOSPICE OF RN EA 15 MIN: HCPCS

## 2023-10-04 NOTE — CASE COMMUNICATION
Ship to:  [employer]: [address] 1abdomen    ABD 5x9 [id number]  Pkg Pl ½ in [id number]   Large [id number]

## 2023-10-05 ENCOUNTER — HOME CARE VISIT (OUTPATIENT)
Dept: HOME HEALTH SERVICES | Facility: HOME HEALTHCARE | Age: 73
End: 2023-10-05
Payer: MEDICARE

## 2023-10-05 ENCOUNTER — TELEPHONE (OUTPATIENT)
Dept: ENDOCRINOLOGY | Facility: HOSPITAL | Age: 73
End: 2023-10-05

## 2023-10-05 VITALS
DIASTOLIC BLOOD PRESSURE: 78 MMHG | RESPIRATION RATE: 18 BRPM | OXYGEN SATURATION: 98 % | HEART RATE: 91 BPM | SYSTOLIC BLOOD PRESSURE: 138 MMHG | TEMPERATURE: 97.3 F

## 2023-10-05 PROCEDURE — G0156 HHCP-SVS OF AIDE,EA 15 MIN: HCPCS

## 2023-10-05 NOTE — CASE COMMUNICATION
Home Health need continues for: wound assess/wound care  Current level of functional ability: requires use of walker and human assistance  Homebound status and living arrangements: pt has limited endurance and decreased balance and requires assistance to ambulate outside the home.    Goals accomplished and/or measurable progress toward unmet goals in past 60 days: pt would like wound to remain infection free and wound to heal  Focus of c are for next 60 days for each discipline ordered: 3x week visits for wound care  Skin integrity/wound status: surgical wound on abdomen present  Code status:   Most recent fall risk: high

## 2023-10-06 ENCOUNTER — APPOINTMENT (OUTPATIENT)
Dept: LAB | Age: 73
End: 2023-10-06
Payer: MEDICARE

## 2023-10-06 ENCOUNTER — HOME CARE VISIT (OUTPATIENT)
Dept: HOME HEALTH SERVICES | Facility: HOME HEALTHCARE | Age: 73
End: 2023-10-06
Payer: MEDICARE

## 2023-10-06 ENCOUNTER — OFFICE VISIT (OUTPATIENT)
Dept: ENDOCRINOLOGY | Facility: CLINIC | Age: 73
End: 2023-10-06
Payer: MEDICARE

## 2023-10-06 VITALS
HEART RATE: 76 BPM | OXYGEN SATURATION: 97 % | DIASTOLIC BLOOD PRESSURE: 82 MMHG | RESPIRATION RATE: 18 BRPM | SYSTOLIC BLOOD PRESSURE: 138 MMHG | TEMPERATURE: 97.3 F

## 2023-10-06 VITALS
DIASTOLIC BLOOD PRESSURE: 66 MMHG | BODY MASS INDEX: 45.14 KG/M2 | WEIGHT: 263 LBS | HEART RATE: 113 BPM | SYSTOLIC BLOOD PRESSURE: 124 MMHG | OXYGEN SATURATION: 96 %

## 2023-10-06 DIAGNOSIS — M81.8 OTHER OSTEOPOROSIS, UNSPECIFIED PATHOLOGICAL FRACTURE PRESENCE: Primary | ICD-10-CM

## 2023-10-06 DIAGNOSIS — M81.0 OSTEOPOROSIS, UNSPECIFIED OSTEOPOROSIS TYPE, UNSPECIFIED PATHOLOGICAL FRACTURE PRESENCE: ICD-10-CM

## 2023-10-06 DIAGNOSIS — E55.9 VITAMIN D DEFICIENCY: ICD-10-CM

## 2023-10-06 DIAGNOSIS — E89.0 STATUS POST PARTIAL THYROIDECTOMY: ICD-10-CM

## 2023-10-06 DIAGNOSIS — E21.3 HYPERPARATHYROIDISM (HCC): ICD-10-CM

## 2023-10-06 DIAGNOSIS — R73.01 IMPAIRED FASTING GLUCOSE: ICD-10-CM

## 2023-10-06 LAB
ANION GAP SERPL CALCULATED.3IONS-SCNC: 9 MMOL/L
BUN SERPL-MCNC: 15 MG/DL (ref 5–25)
CALCIUM SERPL-MCNC: 8.5 MG/DL (ref 8.4–10.2)
CHLORIDE SERPL-SCNC: 111 MMOL/L (ref 96–108)
CO2 SERPL-SCNC: 23 MMOL/L (ref 21–32)
CREAT SERPL-MCNC: 0.73 MG/DL (ref 0.6–1.3)
GFR SERPL CREATININE-BSD FRML MDRD: 81 ML/MIN/1.73SQ M
GLUCOSE SERPL-MCNC: 112 MG/DL (ref 65–140)
POTASSIUM SERPL-SCNC: 3.1 MMOL/L (ref 3.5–5.3)
SODIUM SERPL-SCNC: 143 MMOL/L (ref 135–147)

## 2023-10-06 PROCEDURE — G0299 HHS/HOSPICE OF RN EA 15 MIN: HCPCS

## 2023-10-06 PROCEDURE — 96372 THER/PROPH/DIAG INJ SC/IM: CPT | Performed by: INTERNAL MEDICINE

## 2023-10-06 PROCEDURE — 99214 OFFICE O/P EST MOD 30 MIN: CPT | Performed by: INTERNAL MEDICINE

## 2023-10-06 PROCEDURE — 400014 VN F/U

## 2023-10-06 NOTE — PROGRESS NOTES
Chief complaint: Osteoporosis follow up. Referring Provider  Olinda Jain Md  [phone],  [address]     History of Present Illness:   Weston Willams is a 67 y.o. female with history of thyroid nodule s/p hemithyroidectomy, obesity s/p bariatric surgery and  osteoporosis seen at follow up. Last visit was in 01/2023. Patient currently on Prolia injections for her osteoporosis - started in July 2020. Denies any falls or fractures since last visit. Increased her daily calcium to 1000 mg twice a day as advised recently. Reports recent hospital admission in 09/2023  for abdominal wall cellulitis requiring IR drainage. Since discharge, she feels well and has no new complaints. Prior hx of secondary hyperparathyroidism which improved. Remote history of wrist and ankle fractures.      DXA: 2020, 2022, due for repeat DEXA    Patient Active Problem List   Diagnosis   • Closed fracture of right distal radius and ulna   • Malignant tumor of right kidney parenchyma ([facility])   • Primary osteoarthritis of left knee   • Primary osteoarthritis of right knee   • Chronic pain of left knee   • Myalgia   • Chest wall tenderness   • Chronic heart failure ([facility])   • Permanent atrial fibrillation ([facility])   • Cervical spondylosis without myelopathy   • Arthropathy of cervical facet joint   • Occipital neuralgia of right side   • Status post partial thyroidectomy   • Myofascial pain syndrome   • Hashimoto's thyroiditis   • Early menopause occurring in patient age younger than 39 years   • S/P bariatric surgery   • Osteoporosis   • Hyperparathyroidism ([address])   • Reactive hypoglycemia   • Vitamin D deficiency   • Unsteady gait   • Dyspnea on exertion   • SIRS (systemic inflammatory response syndrome) ([facility])   • Incontinence   • GILDARDO (obstructive sleep apnea)   • Morbid obesity with BMI of 50.0-59.9, adult ([facility])   • Hypertension   • Stage 3 chronic kidney disease, unspecified whether stage 3a or 3b CKD ([address])   • Primary osteoarthritis of both knees   • Idiopathic acute pancreatitis without infection or necrosis   • Bowel perforation (HCC)   • Acute encephalopathy   • Anxiety   • Diarrhea   • Hypokalemia   • Anemia   • Open abdominal wall wound   • Iron deficiency anemia   • Hypotension   • Ambulatory dysfunction   • COVID   • Mood disorder ([address])   • Encounter for skin care   • Abdominal wound dehiscence   • Cellulitis      Past Medical History:   Diagnosis Date   • A-fib [facility])    • Arthritis    • Atrial fibrillation (HCC)    • Cervical spondylosis with myelopathy    • Gastric bypass status for obesity    • History of transfusion     35 years ago   • Hypertension    • mass right kidney    • Renal cell adenocarcinoma (HCC)     RIGHT   • Sleep apnea    • Super obese       Past Surgical History:   Procedure Laterality Date   • ABDOMINAL ADHESION SURGERY N/A 6/19/2023    Procedure: LYSIS ADHESIONS;  Surgeon: Krysten Hutton DO;  [facility];  Service: General   • ABDOMINAL WALL SURGERY N/A 7/19/2023    Procedure: PARTIAL CLOSURE WOUND ABDOMINAL/TRUNK;  Surgeon: Isabella Ford DO;  [facility];  Service: General   • APPENDECTOMY N/A 6/19/2023    Procedure: APPENDECTOMY;  Surgeon: Krysten Hutton DO;  [facility];  Service: General   • CHOLECYSTECTOMY     • COLOSTOMY     • CRYOABLATION Right     RT KIDNEY   • CYSTORRHAPHY      Bladder.  Last assessed 4/6/2016    • EXPLORATORY LAPAROTOMY W/ BOWEL RESECTION N/A 6/19/2023    Procedure: LAPAROTOMY EXPLORATORY W/ BOWEL RESECTION;  Surgeon: Ni Anand MD;  [facility];  Service: General   • GASTRIC BYPASS     • HERNIA REPAIR      Last assessed 4/6/2016    • HYSTERECTOMY     • IR DRAINAGE TUBE CHECK/CHANGE/REPOSITION/REINSERTION/UPSIZE  9/29/2023   • IR DRAINAGE TUBE PLACEMENT  9/16/2023   • LAPAROTOMY N/A 6/19/2023    Procedure: LAPAROTOMY EXPLORATORY, washout, vac change;  Surgeon: Krysten Hutton DO; [facility];  Service: General   • LAPAROTOMY N/A 6/20/2023    Procedure: LAPAROTOMY, REMOVAL OF WOUND VAC AND PACKING, SMALL BOWEL RESECTION , BOWEL ANASTOMOSIS X 3 , VICRYL MESH BRIDGING -;  Surgeon: Michelle Rollins DO;  [facility];  Service: General   • VT OPTX DSTL RADL X-ARTIC FX/EPIPHYSL SEP Right 4/22/2016    Procedure: OPEN REDUCTION INTERNAL FIXATION RIGHT DISTAL RADIUS;  Surgeon: Lucy Argueta MD;  [facility];  Service: Orthopedics   • REVISION COLOSTOMY     • SMALL INTESTINE SURGERY N/A 6/19/2023    Procedure: RESECTION SMALL BOWEL;  Surgeon: Michelle Rollins DO;  [facility];  Service: General   • THYROID LOBECTOMY Left 8/27/2019    Procedure: LOBECTOMY THYROID, left;  Surgeon: Gloria Canela MD;  [facility];  Service: Surgical Oncology   • TUBAL LIGATION     • US GUIDED THYROID BIOPSY  2/27/2019   • US GUIDED THYROID BIOPSY  5/29/2019   • WOUND DEBRIDEMENT N/A 7/18/2023    Procedure: DEBRIDEMENT WOUND [facility] OUT);   Surgeon: Clive Boateng DO;  [facility];  Service: Trauma   • WOUND DEBRIDEMENT N/A 7/19/2023    Procedure: ABDOMINAL WOUND ([address] OUT); VAC APPLICATION;  Surgeon: Zuleyma Muhammad DO;  [facility];  Service: General      Family History   Problem Relation Age of Onset   • Heart attack Mother    • Lung cancer Father 76   • Heart disease Father    • Stroke Sister    • Lung cancer Sister 64   • Prostate cancer Brother 61     Social History     Tobacco Use   • Smoking status: Never   • Smokeless tobacco: Never   Substance Use Topics   • Alcohol use: Never     Comment: 0     No Known Allergies      Current Outpatient Medications:   •  acetaminophen (TYLENOL) 325 mg tablet, Take 975 mg by mouth every 6 (six) hours as needed for mild pain or moderate pain, Disp: , Rfl:   •  amiodarone 200 mg tablet, Take 1 tablet (200 mg total) by mouth daily with breakfast, Disp: 30 tablet, Rfl: 0  •  amoxicillin-clavulanate (AUGMENTIN) 875-125 mg per tablet, Take 1 tablet by mouth every 12 (twelve) hours for 10 days, Disp: 20 tablet, Rfl: 0  •  apixaban (Eliquis) 5 mg, Take 1 tablet (5 mg total) by mouth 2 (two) times a day, Disp: 60 tablet, Rfl: 11  •  calcium carbonate (OYSTER SHELL,OSCAL) 500 mg, Take 1 tablet by mouth daily with breakfast, Disp: 30 tablet, Rfl: 0  •  cyanocobalamin (VITAMIN B-12) 1000 MCG tablet, Take 1 tablet (1,000 mcg total) by mouth daily, Disp: 30 tablet, Rfl: 0  •  diltiazem (CARDIZEM CD) 360 MG 24 hr capsule, Take 1 capsule (360 mg total) by mouth daily, Disp: 90 capsule, Rfl: 3  •  ergocalciferol (VITAMIN D2) 50,000 units, Take 50,000 Units by mouth once a week Takes D3 , Disp: , Rfl:   •  ferrous sulfate 325 (65 Fe) mg tablet, Take 1 tablet (325 mg total) by mouth daily with breakfast, Disp: 30 tablet, Rfl: 0  •  FLUoxetine (PROzac) 20 mg capsule, Take 1 capsule (20 mg total) by mouth 2 (two) times a day, Disp: 60 capsule, Rfl: 0  •  furosemide (LASIX) 20 mg tablet, Take 1 tablet (20 mg total) by mouth daily, Disp: 60 tablet, Rfl: 3  •  metoprolol succinate (TOPROL-XL) 25 mg 24 hr tablet, Take 1 tablet (25 mg total) by mouth daily, Disp: 90 tablet, Rfl: 3  •  potassium chloride (K-DUR,KLOR-CON) 20 mEq tablet, Take 1 tablet (20 mEq total) by mouth daily, Disp: 90 tablet, Rfl: 3  •  Probiotic Product (PRO-BIOTIC BLEND PO), Take by mouth, Disp: , Rfl:   •  sodium chloride, PF, 0.9 %, 10 mL by Intracatheter route daily, Disp: 300 mL, Rfl: 2  •  sodium chloride, PF, 0.9 %, 10 mL by Intracatheter route daily, Disp: 300 mL, Rfl: 0  •  melatonin 3 mg, Take 2 tablets (6 mg total) by mouth daily at bedtime, Disp: , Rfl: 0  •  psyllium (METAMUCIL) packet, Take 1 packet by mouth daily, Disp: 30 packet, Rfl: 0    Current Facility-Administered Medications:   •  denosumab (PROLIA) subcutaneous injection 60 mg, 60 mg, Subcutaneous, Q6 Months, Solo Arshad MD, 60 mg at 01/04/21 5165  Review of Systems     Constitutional: Negative for appetite change. Respiratory: Negative for shortness of breath, wheezing, cough. Cardiovascular: Negative for chest pain and palpitations. Gastrointestinal: Negative for abdominal pain, nausea and vomiting. Musculoskeletal: Negative for arthralgias. Neurological: Negative for dizziness, light-headedness and headaches. All other ROS reviewed and negative. Physical Exam:  Body mass index is 45.14 kg/m². /66 (BP Location: Right arm, Patient Position: Sitting, Cuff Size: Large)   Pulse (!) 113 Comment: Rechecked after 30 mins it was 80  Wt 119 kg (263 lb)   SpO2 96%   BMI 45.14 kg/m²    Wt Readings from Last 3 Encounters:   10/06/23 119 kg (263 lb)   09/25/23 119 kg (263 lb 3.2 oz)   09/11/23 122 kg (268 lb 12.8 oz)       GEN: Well appearing, not in acute distress, obese  Eyes: no stare or proptosis, nl lids and conjunctiva, EOMI  Neck: trachea midline  CV; heart reg rate s1s2 nl  Resp: CTAB, good effort  Ab+BS  MS:  moves all 4 ext,  Skin: warm and dry, no palmar erythema  Ext:  no edema bilaterally  Psych: nl mood and affect, no gross lapses in memory    DATA:  Labs:       Lab Results   Component Value Date    FREET4 1.04 01/16/2023     Lab Results   Component Value Date    SODIUM 141 09/22/2023    K 4.1 09/22/2023     09/22/2023    CO2 24 09/22/2023    BUN 18 09/22/2023    CREATININE 0.78 09/22/2023    GLUC 100 09/22/2023    CALCIUM 8.0 (L) 09/22/2023      Lab Results   Component Value Date    PTH 64.3 12/29/2020    CALCIUM 8.0 (L) 09/22/2023    PHOS 3.0 09/16/2023        Radiology    07/05/2022       Impression:  1. Other osteoporosis, unspecified pathological fracture presence    2. Vitamin D deficiency    3. Osteoporosis, unspecified osteoporosis type, unspecified pathological fracture presence    4. Status post partial thyroidectomy    5. Impaired fasting glucose    6.  Hyperparathyroidism ([address])           Plan:    Diagnoses and all orders for this visit:    Other osteoporosis, unspecified pathological fracture presence    Vitamin D deficiency  -     Vitamin D 25 hydroxy Lab Collect; Future  -     Calcium, ionized; Future  -     PTH, intact Lab Collect Lab Collect; Future    Osteoporosis, unspecified osteoporosis type, unspecified pathological fracture presence  -     Comprehensive metabolic panel Lab Collect; Future  -     Vitamin D 25 hydroxy Lab Collect; Future  -     DXA bone density spine hip and pelvis; Future  -     Calcium, ionized; Future  -     PTH, intact Lab Collect Lab Collect; Future  -     denosumab (PROLIA) subcutaneous injection 60 mg    Status post partial thyroidectomy    Impaired fasting glucose    Hyperparathyroidism (HCC)  -     Calcium, ionized; Future  -     PTH, intact Lab Collect Lab Collect; Future        1. Osteoporosis, unspecified osteoporosis type, unspecified pathological fracture presence  2. Status post partial thyroidectomy  3. Impaired fasting glucose  4. H/o Hyperparathyroidism ([address])    - Comprehensive metabolic panel Lab Collect; Future  - Vitamin D 25 hydroxy Lab Collect; Future  - DXA bone density spine hip and pelvis; Future  - Calcium, ionized; Future  - PTH, intact Lab Collect Lab Collect; Future  - denosumab (PROLIA) subcutaneous injection 60 mg    Continue on Prolia injections. Advised to continue taking calcium 100mg twice daily. Check ionized calcium level in a month. Check CMP, PTH and Vit D prior to next office visit in 6 months.

## 2023-10-06 NOTE — TELEPHONE ENCOUNTER
Received Summary of Benefits for Prolia injection. No prior [facility] is required. She has met her Medicare Deductible. No OOP cost to her and no admin fee.

## 2023-10-06 NOTE — PROGRESS NOTES
Assessment/Plan:    Marilee Smith came into the [address] [place] [address]. [place] today 10/06/23 to receive Prolia injection. Verbal consent obtained. Consent given by: patient    patient states patient has been medically healthy with no underlining concerns/complications. Marilee Smith presents with no symptoms today. All insturctions were reviewed with the patient. If the patient should have any questions/concerns, advised patient to contacted [facility]. Subjective:     History provided by: patient    Patient ID: Marilee Smith is a 67 y.o. female      Objective:    Vitals:    10/06/23 0924   BP: 124/66   BP Location: Right arm   Patient Position: Sitting   Cuff Size: Large   Pulse: (!) 113   SpO2: 96%   Weight: 119 kg (263 lb)       Patient tolerated the injection well without any complications. Injection site/s Left Arm. Medication was provided by Office. Patient signed consent form yes   Patient signed Zilphia Carwin form yes (If no patient is not a medicare patient). Patient waited 15 minutes after injection no (This only applies to patient's receiving first time injection).        Last Visit: 10/3/2023  Next visit:Visit date not found

## 2023-10-09 ENCOUNTER — OFFICE VISIT (OUTPATIENT)
Dept: SURGERY | Facility: CLINIC | Age: 73
End: 2023-10-09
Payer: MEDICARE

## 2023-10-09 ENCOUNTER — HOME CARE VISIT (OUTPATIENT)
Dept: HOME HEALTH SERVICES | Facility: HOME HEALTHCARE | Age: 73
End: 2023-10-09
Payer: MEDICARE

## 2023-10-09 VITALS — TEMPERATURE: 97.7 F

## 2023-10-09 DIAGNOSIS — T81.30XD ABDOMINAL WOUND DEHISCENCE, SUBSEQUENT ENCOUNTER: Primary | ICD-10-CM

## 2023-10-09 LAB
FUNGUS SPEC CULT: NORMAL
MYCOBACTERIUM SPEC CULT: NORMAL
RHODAMINE-AURAMINE STN SPEC: NORMAL

## 2023-10-09 PROCEDURE — 99213 OFFICE O/P EST LOW 20 MIN: CPT | Performed by: STUDENT IN AN ORGANIZED HEALTH CARE EDUCATION/TRAINING PROGRAM

## 2023-10-09 NOTE — PROGRESS NOTES
Office Visit - General Surgery  Felecia Bosworth MRN: [id number]  Encounter: [id number]    Assessment and Plan  Problem List Items Addressed This Visit        Other    Abdominal wound dehiscence - Primary     68 yo w/ abdominal wound dehiscence and cellulitis currently on day 7/10 of abx tx who presents for wound check    -daily 1 in plain packing changes  -plan for 3 wk f/u  -may shower and resume daily activity as tolerated  -continue abx care            Chief Complaint:  Felecia Bosworth is a 67 y.o. female who presents for Wound Check (Wound check)    Subjective  Pt seen and examined. Denies fever, chills. No purulence or notably serous output from the wound. Currently undergoing BID packing changes. Pt on day 7 of 10 of abx regimen for cellulitis and reports she is less tender to the pannus. Past Medical History:   Diagnosis Date   • A-fib [facility])    • Arthritis    • Atrial fibrillation ([address])    • Cervical spondylosis with myelopathy    • Gastric bypass status for obesity    • History of transfusion     35 years ago   • Hypertension    • mass right kidney    • Renal cell adenocarcinoma (HCC)     RIGHT   • Sleep apnea    • Super obese        Past Surgical History:   Procedure Laterality Date   • ABDOMINAL ADHESION SURGERY N/A 6/19/2023    Procedure: LYSIS ADHESIONS;  Surgeon: Maria Eugenia Galeas DO;  [facility];  Service: General   • ABDOMINAL WALL SURGERY N/A 7/19/2023    Procedure: PARTIAL CLOSURE WOUND ABDOMINAL/TRUNK;  Surgeon: Maria D Mcgovern DO;  [facility];  Service: General   • APPENDECTOMY N/A 6/19/2023    Procedure: APPENDECTOMY;  Surgeon: Maria Eugenia Galeas DO;  [facility];  Service: General   • CHOLECYSTECTOMY     • COLOSTOMY     • CRYOABLATION Right     RT KIDNEY   • CYSTORRHAPHY      Bladder.  Last assessed 4/6/2016    • EXPLORATORY LAPAROTOMY W/ BOWEL RESECTION N/A 6/19/2023    Procedure: LAPAROTOMY EXPLORATORY W/ BOWEL RESECTION;  Surgeon: Gary Cole Ashely Smith MD;  [facility];  Service: General   • GASTRIC BYPASS     • HERNIA REPAIR      Last assessed 4/6/2016    • HYSTERECTOMY     • IR DRAINAGE TUBE CHECK/CHANGE/REPOSITION/REINSERTION/UPSIZE  9/29/2023   • IR DRAINAGE TUBE PLACEMENT  9/16/2023   • LAPAROTOMY N/A 6/19/2023    Procedure: LAPAROTOMY EXPLORATORY, washout, vac change;  Surgeon: Ayaz Corado DO;  [facility];  Service: General   • LAPAROTOMY N/A 6/20/2023    Procedure: LAPAROTOMY, REMOVAL OF WOUND VAC AND PACKING, SMALL BOWEL RESECTION , BOWEL ANASTOMOSIS X 3 , VICRYL MESH BRIDGING -;  Surgeon: Ayaz Corado DO;  [facility];  Service: General   • SD OPTX DSTL RADL X-ARTIC FX/EPIPHYSL SEP Right 4/22/2016    Procedure: OPEN REDUCTION INTERNAL FIXATION RIGHT DISTAL RADIUS;  Surgeon: Michoacano Abarca MD;  [facility];  Service: Orthopedics   • REVISION COLOSTOMY     • SMALL INTESTINE SURGERY N/A 6/19/2023    Procedure: RESECTION SMALL BOWEL;  Surgeon: Ayaz Corado DO;  [facility];  Service: General   • THYROID LOBECTOMY Left 8/27/2019    Procedure: LOBECTOMY THYROID, left;  Surgeon: Julio Renee MD;  [facility];  Service: Surgical Oncology   • TUBAL LIGATION     • US GUIDED THYROID BIOPSY  2/27/2019   • US GUIDED THYROID BIOPSY  5/29/2019   • WOUND DEBRIDEMENT N/A 7/18/2023    Procedure: DEBRIDEMENT WOUND [facility] OUT);   Surgeon: Supriya Loya DO;  [facility];  Service: Trauma   • WOUND DEBRIDEMENT N/A 7/19/2023    Procedure: ABDOMINAL WOUND ([address]); VAC APPLICATION;  Surgeon: Evelia Richter DO;  [facility];  Service: General       Family History   Problem Relation Age of Onset   • Heart attack Mother    • Lung cancer Father 76   • Heart disease Father    • Stroke Sister    • Lung cancer Sister 64   • Prostate cancer Brother 61       Social History     Tobacco Use   • Smoking status: Never   • Smokeless tobacco: Never   Vaping Use   • Vaping Use: Never used   Substance Use Topics   • Alcohol use: Never     Comment: 0   • Drug use: No        Medications  Current Outpatient Medications on File Prior to Visit   Medication Sig Dispense Refill   • acetaminophen (TYLENOL) 325 mg tablet Take 975 mg by mouth every 6 (six) hours as needed for mild pain or moderate pain     • amiodarone 200 mg tablet Take 1 tablet (200 mg total) by mouth daily with breakfast 30 tablet 0   • amoxicillin-clavulanate (AUGMENTIN) 875-125 mg per tablet Take 1 tablet by mouth every 12 (twelve) hours for 10 days 20 tablet 0   • apixaban (Eliquis) 5 mg Take 1 tablet (5 mg total) by mouth 2 (two) times a day 60 tablet 11   • calcium carbonate (OYSTER SHELL,OSCAL) 500 mg Take 1 tablet by mouth daily with breakfast 30 tablet 0   • cyanocobalamin (VITAMIN B-12) 1000 MCG tablet Take 1 tablet (1,000 mcg total) by mouth daily 30 tablet 0   • diltiazem (CARDIZEM CD) 360 MG 24 hr capsule Take 1 capsule (360 mg total) by mouth daily 90 capsule 3   • ergocalciferol (VITAMIN D2) 50,000 units Take 50,000 Units by mouth once a week Takes D3      • ferrous sulfate 325 (65 Fe) mg tablet Take 1 tablet (325 mg total) by mouth daily with breakfast 30 tablet 0   • FLUoxetine (PROzac) 20 mg capsule Take 1 capsule (20 mg total) by mouth 2 (two) times a day 60 capsule 0   • furosemide (LASIX) 20 mg tablet Take 1 tablet (20 mg total) by mouth daily 60 tablet 3   • metoprolol succinate (TOPROL-XL) 25 mg 24 hr tablet Take 1 tablet (25 mg total) by mouth daily 90 tablet 3   • potassium chloride (K-DUR,KLOR-CON) 20 mEq tablet Take 1 tablet (20 mEq total) by mouth daily 90 tablet 3   • Probiotic Product (PRO-BIOTIC BLEND PO) Take by mouth     • sodium chloride, PF, 0.9 % 10 mL by Intracatheter route daily 300 mL 2   • sodium chloride, PF, 0.9 % 10 mL by Intracatheter route daily 300 mL 0   • melatonin 3 mg Take 2 tablets (6 mg total) by mouth daily at bedtime  0   • psyllium (METAMUCIL) packet Take 1 packet by mouth daily 30 packet 0     Current Facility-Administered Medications on File Prior to Visit   Medication Dose Route Frequency Provider Last Rate Last Admin   • denosumab (PROLIA) subcutaneous injection 60 mg  60 mg Subcutaneous Q6 Months Mireille Steele MD   60 mg at 01/04/21 1157       Allergies  No Known Allergies    Review of Systems   Constitutional: Negative for chills, fatigue and fever. HENT: Negative. Eyes: Negative. Respiratory: Negative. Cardiovascular: Negative for chest pain. Gastrointestinal: Negative for abdominal pain, nausea and vomiting. Endocrine: Negative. Genitourinary: Negative. Musculoskeletal: Negative. Skin: Negative. Allergic/Immunologic: Negative. Hematological: Negative. Psychiatric/Behavioral: Negative. Objective  Vitals:    10/09/23 1115   Temp: 97.7 °F (36.5 °C)       Physical Exam  Constitutional:       Appearance: She is obese. HENT:      Head: Atraumatic. Mouth/Throat:      Mouth: Mucous membranes are moist.   Eyes:      Extraocular Movements: Extraocular movements intact. Cardiovascular:      Rate and Rhythm: Normal rate. Pulmonary:      Effort: Pulmonary effort is normal.   Abdominal:      Palpations: Abdomen is soft. Comments: Lower midline wound healing well, healthy granulation tissue present without ss infection, no skin necrosis, active bleeding or fluctuance see below   Neurological:      Mental Status: She is alert.

## 2023-10-09 NOTE — ASSESSMENT & PLAN NOTE
68 yo w/ abdominal wound dehiscence and cellulitis currently on day 7/10 of abx tx who presents for wound check    -daily 1 in plain packing changes  -plan for 3 wk f/u  -may shower and resume daily activity as tolerated  -continue abx care

## 2023-10-10 ENCOUNTER — TELEPHONE (OUTPATIENT)
Dept: CARDIOLOGY CLINIC | Facility: CLINIC | Age: 73
End: 2023-10-10

## 2023-10-10 DIAGNOSIS — I50.32 CHRONIC DIASTOLIC HEART FAILURE (HCC): ICD-10-CM

## 2023-10-10 RX ORDER — POTASSIUM CHLORIDE 20 MEQ/1
20 TABLET, EXTENDED RELEASE ORAL DAILY
Qty: 90 TABLET | Refills: 3 | Status: SHIPPED | OUTPATIENT
Start: 2023-10-10

## 2023-10-10 NOTE — TELEPHONE ENCOUNTER
----- Message from Amy Abad, [address] [facility] sent at 10/9/2023  8:54 AM EDT -----  Please call Karey Luis and ask her if she is taking her potassium? Her level is low 3.1. IF she is not taking potassium take K dur 40meq today and then 20meq daily. If she is taking her potassium increase K dur to 20meq BID. Thank you      Spoke with pt re: low potassium. Pt states she was told not to take Margarie Master. Instructed pt to take 40meq today,then 20 meq daily. Pt will comply. Script sent to pharm.

## 2023-10-11 ENCOUNTER — HOME CARE VISIT (OUTPATIENT)
Dept: HOME HEALTH SERVICES | Facility: HOME HEALTHCARE | Age: 73
End: 2023-10-11
Payer: MEDICARE

## 2023-10-11 VITALS
SYSTOLIC BLOOD PRESSURE: 116 MMHG | RESPIRATION RATE: 18 BRPM | HEART RATE: 89 BPM | DIASTOLIC BLOOD PRESSURE: 70 MMHG | OXYGEN SATURATION: 98 % | TEMPERATURE: 96.7 F

## 2023-10-11 PROCEDURE — G0299 HHS/HOSPICE OF RN EA 15 MIN: HCPCS

## 2023-10-11 PROCEDURE — G0156 HHCP-SVS OF AIDE,EA 15 MIN: HCPCS

## 2023-10-12 ENCOUNTER — HOME CARE VISIT (OUTPATIENT)
Dept: HOME HEALTH SERVICES | Facility: HOME HEALTHCARE | Age: 73
End: 2023-10-12
Payer: MEDICARE

## 2023-10-12 NOTE — CASE COMMUNICATION
Returned call  to [employer] at [facility] to provide necessary wound measurements from 8/28 SN visit. Confirmed with Kimber that VAC was discontinued at 10/2 Surgery appt.

## 2023-10-13 ENCOUNTER — HOME CARE VISIT (OUTPATIENT)
Dept: HOME HEALTH SERVICES | Facility: HOME HEALTHCARE | Age: 73
End: 2023-10-13
Payer: MEDICARE

## 2023-10-13 VITALS
RESPIRATION RATE: 18 BRPM | DIASTOLIC BLOOD PRESSURE: 70 MMHG | SYSTOLIC BLOOD PRESSURE: 110 MMHG | OXYGEN SATURATION: 97 % | TEMPERATURE: 97.2 F | HEART RATE: 72 BPM

## 2023-10-13 PROCEDURE — G0299 HHS/HOSPICE OF RN EA 15 MIN: HCPCS

## 2023-10-16 ENCOUNTER — HOME CARE VISIT (OUTPATIENT)
Dept: HOME HEALTH SERVICES | Facility: HOME HEALTHCARE | Age: 73
End: 2023-10-16
Payer: MEDICARE

## 2023-10-16 VITALS
SYSTOLIC BLOOD PRESSURE: 122 MMHG | DIASTOLIC BLOOD PRESSURE: 72 MMHG | OXYGEN SATURATION: 97 % | TEMPERATURE: 97 F | RESPIRATION RATE: 16 BRPM | HEART RATE: 68 BPM

## 2023-10-16 LAB — FUNGUS SPEC CULT: NORMAL

## 2023-10-16 PROCEDURE — G0299 HHS/HOSPICE OF RN EA 15 MIN: HCPCS

## 2023-10-16 PROCEDURE — G0156 HHCP-SVS OF AIDE,EA 15 MIN: HCPCS

## 2023-10-17 LAB
MYCOBACTERIUM SPEC CULT: NORMAL
RHODAMINE-AURAMINE STN SPEC: NORMAL

## 2023-10-18 ENCOUNTER — TELEPHONE (OUTPATIENT)
Dept: CARDIOLOGY CLINIC | Facility: CLINIC | Age: 73
End: 2023-10-18

## 2023-10-18 ENCOUNTER — HOME CARE VISIT (OUTPATIENT)
Dept: HOME HEALTH SERVICES | Facility: HOME HEALTHCARE | Age: 73
End: 2023-10-18
Payer: MEDICARE

## 2023-10-18 VITALS
DIASTOLIC BLOOD PRESSURE: 62 MMHG | RESPIRATION RATE: 20 BRPM | SYSTOLIC BLOOD PRESSURE: 124 MMHG | HEART RATE: 96 BPM | TEMPERATURE: 97.1 F | OXYGEN SATURATION: 98 %

## 2023-10-18 PROCEDURE — G0299 HHS/HOSPICE OF RN EA 15 MIN: HCPCS

## 2023-10-18 PROCEDURE — G0156 HHCP-SVS OF AIDE,EA 15 MIN: HCPCS

## 2023-10-18 NOTE — TELEPHONE ENCOUNTER
Patient called asking if you want a BMP before her next OV with you 10/27? If so, she wants visiting nurses to draw the lab this Friday, 10/20, during their last visit with her. Please advise.

## 2023-10-19 DIAGNOSIS — E87.6 HYPOKALEMIA: Primary | ICD-10-CM

## 2023-10-19 NOTE — TELEPHONE ENCOUNTER
Called patient to communicate Dr Danilo Ye ordered a BMP to be drawn by visiting nurses tomorrow. Patient verbalized understanding.

## 2023-10-19 NOTE — TELEPHONE ENCOUNTER
Patient experienced more edema last week so on Saturday, 10/14, she  started taking Lasix 20 mg daily. She had been taking Potassium 20 mEq since after last hospitalization. Please advise.

## 2023-10-20 ENCOUNTER — HOME CARE VISIT (OUTPATIENT)
Dept: HOME HEALTH SERVICES | Facility: HOME HEALTHCARE | Age: 73
End: 2023-10-20
Payer: MEDICARE

## 2023-10-20 ENCOUNTER — APPOINTMENT (OUTPATIENT)
Dept: LAB | Age: 73
End: 2023-10-20
Payer: MEDICARE

## 2023-10-20 VITALS
OXYGEN SATURATION: 97 % | DIASTOLIC BLOOD PRESSURE: 70 MMHG | RESPIRATION RATE: 18 BRPM | SYSTOLIC BLOOD PRESSURE: 110 MMHG | TEMPERATURE: 98 F | HEART RATE: 89 BPM

## 2023-10-20 PROCEDURE — G0156 HHCP-SVS OF AIDE,EA 15 MIN: HCPCS

## 2023-10-20 PROCEDURE — G0299 HHS/HOSPICE OF RN EA 15 MIN: HCPCS

## 2023-10-27 ENCOUNTER — OFFICE VISIT (OUTPATIENT)
Dept: CARDIOLOGY CLINIC | Facility: CLINIC | Age: 73
End: 2023-10-27
Payer: MEDICARE

## 2023-10-27 VITALS
DIASTOLIC BLOOD PRESSURE: 76 MMHG | SYSTOLIC BLOOD PRESSURE: 116 MMHG | HEART RATE: 64 BPM | OXYGEN SATURATION: 97 % | WEIGHT: 259 LBS | HEIGHT: 64 IN | BODY MASS INDEX: 44.22 KG/M2

## 2023-10-27 DIAGNOSIS — N18.30 STAGE 3 CHRONIC KIDNEY DISEASE, UNSPECIFIED WHETHER STAGE 3A OR 3B CKD (HCC): ICD-10-CM

## 2023-10-27 DIAGNOSIS — I50.32 CHRONIC DIASTOLIC HEART FAILURE (HCC): Primary | ICD-10-CM

## 2023-10-27 DIAGNOSIS — I10 PRIMARY HYPERTENSION: ICD-10-CM

## 2023-10-27 DIAGNOSIS — I48.21 PERMANENT ATRIAL FIBRILLATION (HCC): ICD-10-CM

## 2023-10-27 PROCEDURE — 99214 OFFICE O/P EST MOD 30 MIN: CPT | Performed by: INTERNAL MEDICINE

## 2023-10-27 RX ORDER — FUROSEMIDE 20 MG/1
20 TABLET ORAL DAILY
Qty: 90 TABLET | Refills: 3 | Status: SHIPPED | OUTPATIENT
Start: 2023-10-27

## 2023-10-27 NOTE — PROGRESS NOTES
Cardiology Outpatient Follow-Up Note - Maisha Dobson 67 y.o. female MRN: [id number]      Assessment/Plan:  1. Permanent atrial fibrillation (HCC)  Stop amiodarone -- she is in permanent AF  Continue diltiazem 360 mg daily and metoprolol XL 25 mg daily for rate control  Continue apixaban 5 mg BID for thromboembolic ppx  - Ambulatory referral to Cardiology    2. Chronic diastolic heart failure ([address])  By the time she left the hospital she was on lasix 20 mg daily for volume management  This has been working well for her, we will continue lasix 20 mg daily with potassium chloride 20 meq daily. A BMP on 10/20/23 showed stable renal function and electrolytes  - furosemide (LASIX) 20 mg tablet; Take 1 tablet (20 mg total) by mouth daily  Dispense: 90 tablet; Refill: 3  - Lipid Panel with Direct LDL reflex; Future    3. Primary hypertension  Controlled on current therapy. 4. Stage 3 chronic kidney disease, unspecified whether stage 3a or 3b CKD (HCC)  Last BMP showed creatinine of 0.68 and GFR of 87, within normal limits. We will see Maisha Dobson back in 6 months for routine follow-up. Subjective:     HPI: Maisha Dobson is a 67y.o. year old female with permanent AF, chronic diastolic CHF, HTN,     She spent much of June - August admitted to [facility] with a perforated bowel. During this hospitalization amiodarone was started for her AF despite it being permanent AF. She also developed volume overload and was eventually transitioned to standing lasix 20 mg daily. Still has some LE edema, but no orthopnea. Cardiac Testing:  echocardiogram on 02/21/2019 showed normal LV size and function, LVEF 55%, mild concentric LV hypertrophy, normal RV size and function, mild left atrial dilation , mild mitral regurgitation. Echo 6/19/23   Interpretation Summary     Left Ventricle: Left ventricular cavity size is normal. Wall thickness is mildly increased.  The left ventricular ejection fraction is 29%. Systolic function is normal. Wall motion is normal.    Left Atrium: The atrium is mildly dilated. Right Atrium: The atrium is mildly dilated. Aortic Valve: There is aortic valve sclerosis. Mitral Valve: There is mild annular calcification. Tricuspid Valve: There is mild regurgitation. The estimated right ventricular systolic pressure is 27.59 mmHg. EKGs, personally reviewed:  EKG in the office 5/31/23 shows atrial fibrillation, 103 bpm, low voltage, normal axis, otherwise normal intervals. Cannot rule out inferior MI. Poor anterior R wave progression. Abnormal study. Relevant Labs & Results: BMP 1/16/23 reviewed -- K 3.4, Cr 1.02  CBC 11/2/22 reviewed -- Hgb 11.2  HbA1c 10/30/22 -- 6.4%, 1/16/23 6.1%       BMP 10/20/23 -- K 4.1, Cr 0.68        ROS:  Review of Systems:  Review of Systems    Objective:     Vitals:   Vitals:    10/27/23 1612   BP: 116/76   BP Location: Right arm   Patient Position: Sitting   Cuff Size: Large   Pulse: 64   SpO2: 97%   Weight: 117 kg (259 lb)   Height: 5' 4" (1.626 m)    Body surface area is 2.18 meters squared. Wt Readings from Last 3 Encounters:   10/27/23 117 kg (259 lb)   10/06/23 119 kg (263 lb)   09/25/23 119 kg (263 lb 3.2 oz)       Physical Exam:  General: Rojas Denise is a chronically ill appearing and obese elderly female, sitting in front of a walker  HEENT: moist mucous membranes, EOMI  Neck:  No JVD, supple, trachea midline  Cardiovascular: unremarkable S1/S2, regular rate and rhythm, no murmurs, rubs or gallops  Pulmonary: normal respiratory effort, faint basilar rales  Abdomen: soft and nondistended  Extremities: +1 lower extremity edema. Warm and well perfused extremities. Neuro: AAOx3 (person, place, time)  Psych: Normal mood and affect, cooperative      Medications (at the START of this encounter):   Outpatient Medications Prior to Visit   Medication Sig Dispense Refill    acetaminophen (TYLENOL) 325 mg tablet Take 975 mg by mouth every 6 (six) hours as needed for mild pain or moderate pain      amiodarone 200 mg tablet Take 1 tablet (200 mg total) by mouth daily with breakfast 30 tablet 0    apixaban (Eliquis) 5 mg Take 1 tablet (5 mg total) by mouth 2 (two) times a day 60 tablet 11    calcium carbonate (OYSTER SHELL,OSCAL) 500 mg Take 1 tablet by mouth daily with breakfast 30 tablet 0    cyanocobalamin (VITAMIN B-12) 1000 MCG tablet Take 1 tablet (1,000 mcg total) by mouth daily 30 tablet 0    diltiazem (CARDIZEM CD) 360 MG 24 hr capsule Take 1 capsule (360 mg total) by mouth daily 90 capsule 3    ergocalciferol (VITAMIN D2) 50,000 units Take 50,000 Units by mouth once a week Takes D3       ferrous sulfate 325 (65 Fe) mg tablet Take 1 tablet (325 mg total) by mouth daily with breakfast 30 tablet 0    furosemide (LASIX) 20 mg tablet Take 1 tablet (20 mg total) by mouth daily 60 tablet 3    metoprolol succinate (TOPROL-XL) 25 mg 24 hr tablet Take 1 tablet (25 mg total) by mouth daily 90 tablet 3    potassium chloride (K-DUR,KLOR-CON) 20 mEq tablet Take 1 tablet (20 mEq total) by mouth daily 90 tablet 3    Probiotic Product (PRO-BIOTIC BLEND PO) Take by mouth      sodium chloride, PF, 0.9 % 10 mL by Intracatheter route daily 300 mL 2    FLUoxetine (PROzac) 20 mg capsule Take 1 capsule (20 mg total) by mouth 2 (two) times a day (Patient not taking: Reported on 10/27/2023) 60 capsule 0    melatonin 3 mg Take 2 tablets (6 mg total) by mouth daily at bedtime  0    psyllium (METAMUCIL) packet Take 1 packet by mouth daily 30 packet 0    sodium chloride, PF, 0.9 % 10 mL by Intracatheter route daily 300 mL 0     Facility-Administered Medications Prior to Visit   Medication Dose Route Frequency Provider Last Rate Last Admin    denosumab (PROLIA) subcutaneous injection 60 mg  60 mg Subcutaneous Q6 Months Amalia Vickers MD   60 mg at 01/04/21 4756                  Time Spent:  Total time (face-to-face and non-face-to-face) spent on today's visit was 26 minutes. This includes preparation for the visits (i.e. reviewing test results), performance of a medically appropriate history and examination, and orders for medications, tests or other procedures. This time is exclusive of procedures performed and time spent teaching. This note was completed in part utilizing [employer] voice recognition software. Grammatical errors, random word insertion, spelling mistakes, occasional wrong word or "sound-alike" substitutions and incomplete sentences may be an occasional consequence of the system secondary to software limitations, ambient noise and hardware issues. At the time of dictation, efforts were made to edit, clarify and /or correct errors. Please read the chart carefully and recognize, using context, where substitutions have occurred. If you have any questions or concerns about the context, text or information contained within the body of this dictation, please contact myself, the provider, for further clarification.

## 2023-10-30 ENCOUNTER — OFFICE VISIT (OUTPATIENT)
Dept: SURGERY | Facility: CLINIC | Age: 73
End: 2023-10-30

## 2023-10-30 DIAGNOSIS — S31.109S OPEN WOUND OF ABDOMINAL WALL, SEQUELA: Primary | ICD-10-CM

## 2023-10-30 PROCEDURE — 99024 POSTOP FOLLOW-UP VISIT: CPT | Performed by: SURGERY

## 2023-10-30 NOTE — PROGRESS NOTES
Office Visit - General Surgery  Alyson Whaley MRN: [id number]  Encounter: [id number]    Assessment and Plan  Problem List Items Addressed This Visit          Other    Open abdominal wall wound - Primary     -Wound well-healed, no erythema/drainage  -f/u PRN            Chief Complaint:  Alyson Whaley is a 67 y.o. female who presents for No chief complaint on file. Subjective  66 yo female with chronic abdominal wound who presents for wound check. Denies f/c/n/v. (+)BM/flatus, tolerating diet. Past Medical History:   Diagnosis Date    A-fib [facility])     Arthritis     Atrial fibrillation (HCC)     Cervical spondylosis with myelopathy     Gastric bypass status for obesity     History of transfusion     35 years ago    Hypertension     mass right kidney     Renal cell adenocarcinoma ([address])     RIGHT    Sleep apnea     Super obese        Past Surgical History:   Procedure Laterality Date    ABDOMINAL ADHESION SURGERY N/A 6/19/2023    Procedure: LYSIS ADHESIONS;  Surgeon: Monika Cano DO;  [facility];  Service: General    ABDOMINAL WALL SURGERY N/A 7/19/2023    Procedure: PARTIAL CLOSURE WOUND ABDOMINAL/TRUNK;  Surgeon: Germán Nelson DO;  [facility];  Service: General    APPENDECTOMY N/A 6/19/2023    Procedure: APPENDECTOMY;  Surgeon: Monika Cano DO;  [facility];  Service: General    CHOLECYSTECTOMY      COLOSTOMY      CRYOABLATION Right     RT KIDNEY    CYSTORRHAPHY      Bladder.  Last assessed 4/6/2016     EXPLORATORY LAPAROTOMY W/ BOWEL RESECTION N/A 6/19/2023    Procedure: LAPAROTOMY EXPLORATORY W/ BOWEL RESECTION;  Surgeon: Trev Lucas MD;  [facility];  Service: General    GASTRIC BYPASS      HERNIA REPAIR      Last assessed 4/6/2016     HYSTERECTOMY      IR DRAINAGE TUBE CHECK/CHANGE/REPOSITION/REINSERTION/UPSIZE  9/29/2023    IR DRAINAGE TUBE PLACEMENT  9/16/2023    LAPAROTOMY N/A 6/19/2023    Procedure: LAPAROTOMY EXPLORATORY, washout, vac change;  Surgeon: Shelly Palma DO;  [facility];  Service: General    LAPAROTOMY N/A 6/20/2023    Procedure: LAPAROTOMY, REMOVAL OF WOUND VAC AND PACKING, SMALL BOWEL RESECTION , BOWEL ANASTOMOSIS X 3 , VICRYL MESH BRIDGING -;  Surgeon: Shelly Palma DO;  [facility];  Service: General    UT OPTX DSTL RADL X-ARTIC FX/EPIPHYSL SEP Right 4/22/2016    Procedure: OPEN REDUCTION INTERNAL FIXATION RIGHT DISTAL RADIUS;  Surgeon: Marisel Martel MD;  [facility];  Service: Orthopedics    REVISION COLOSTOMY      SMALL INTESTINE SURGERY N/A 6/19/2023    Procedure: RESECTION SMALL BOWEL;  Surgeon: Shelly Palma DO;  [facility];  Service: General    THYROID LOBECTOMY Left 8/27/2019    Procedure: LOBECTOMY THYROID, left;  Surgeon: Margaret Banda MD;  [facility];  Service: Surgical Oncology    TUBAL LIGATION      US GUIDED THYROID BIOPSY  2/27/2019    US GUIDED THYROID BIOPSY  5/29/2019    WOUND DEBRIDEMENT N/A 7/18/2023    Procedure: DEBRIDEMENT WOUND [facility] OUT);   Surgeon: Kathya Marquez DO;  [facility];  Service: Trauma    WOUND DEBRIDEMENT N/A 7/19/2023    Procedure: ABDOMINAL WOUND ([address] [facility] [address] OUT); VAC APPLICATION;  Surgeon: Sandie Rubalcava DO;  [facility];  Service: General       Family History   Problem Relation Age of Onset    Heart attack Mother     Lung cancer Father 76    Heart disease Father     Stroke Sister     Lung cancer Sister 64    Prostate cancer Brother 61       Social History     Tobacco Use    Smoking status: Never    Smokeless tobacco: Never   Vaping Use    Vaping Use: Never used   Substance Use Topics    Alcohol use: Never     Comment: 0    Drug use: No        Medications  Current Outpatient Medications on File Prior to Visit   Medication Sig Dispense Refill    acetaminophen (TYLENOL) 325 mg tablet Take 975 mg by mouth every 6 (six) hours as needed for mild pain or moderate pain      apixaban (Eliquis) 5 mg Take 1 tablet (5 mg total) by mouth 2 (two) times a day 60 tablet 11    calcium carbonate (OYSTER SHELL,OSCAL) 500 mg Take 1 tablet by mouth daily with breakfast 30 tablet 0    cyanocobalamin (VITAMIN B-12) 1000 MCG tablet Take 1 tablet (1,000 mcg total) by mouth daily 30 tablet 0    diltiazem (CARDIZEM CD) 360 MG 24 hr capsule Take 1 capsule (360 mg total) by mouth daily 90 capsule 3    ergocalciferol (VITAMIN D2) 50,000 units Take 50,000 Units by mouth once a week Takes D3       ferrous sulfate 325 (65 Fe) mg tablet Take 1 tablet (325 mg total) by mouth daily with breakfast 30 tablet 0    furosemide (LASIX) 20 mg tablet Take 1 tablet (20 mg total) by mouth daily 90 tablet 3    metoprolol succinate (TOPROL-XL) 25 mg 24 hr tablet Take 1 tablet (25 mg total) by mouth daily 90 tablet 3    potassium chloride (K-DUR,KLOR-CON) 20 mEq tablet Take 1 tablet (20 mEq total) by mouth daily 90 tablet 3    Probiotic Product (PRO-BIOTIC BLEND PO) Take by mouth      sodium chloride, PF, 0.9 % 10 mL by Intracatheter route daily 300 mL 2     Current Facility-Administered Medications on File Prior to Visit   Medication Dose Route Frequency Provider Last Rate Last Admin    denosumab (PROLIA) subcutaneous injection 60 mg  60 mg Subcutaneous Q6 Months Sawyer Jacobo MD   60 mg at 01/04/21 1157       Allergies  No Known Allergies    Review of Systems   Constitutional: Negative. Respiratory: Negative. Cardiovascular: Negative. Gastrointestinal: Negative. Musculoskeletal: Negative. Skin: Negative. Objective  There were no vitals filed for this visit. Physical Exam  Constitutional:       Appearance: She is obese. Cardiovascular:      Rate and Rhythm: Normal rate. Pulses: Normal pulses. Pulmonary:      Effort: Pulmonary effort is normal.   Abdominal:      General: There is no distension. Palpations: Abdomen is soft. Tenderness: There is no abdominal tenderness. There is no guarding or rebound. Comments: Abdominal wound well-healed, no erythema/drainage/fluctuance   Neurological:      Mental Status: She is alert.

## 2023-10-31 LAB
MYCOBACTERIUM SPEC CULT: NORMAL
RHODAMINE-AURAMINE STN SPEC: NORMAL

## 2023-11-20 ENCOUNTER — TELEPHONE (OUTPATIENT)
Dept: OBGYN CLINIC | Facility: CLINIC | Age: 73
End: 2023-11-20

## 2023-11-28 NOTE — TELEPHONE ENCOUNTER
Called a 2nd time to reschedule appt on 12/7, message stating "call cannot be completed at this time". Cancelled appt, patient will need to reschedule.

## 2023-12-11 ENCOUNTER — OFFICE VISIT (OUTPATIENT)
Dept: OBGYN CLINIC | Facility: CLINIC | Age: 73
End: 2023-12-11

## 2023-12-11 VITALS
SYSTOLIC BLOOD PRESSURE: 130 MMHG | WEIGHT: 259 LBS | DIASTOLIC BLOOD PRESSURE: 88 MMHG | BODY MASS INDEX: 44.22 KG/M2 | HEIGHT: 64 IN

## 2023-12-11 DIAGNOSIS — M17.0 PRIMARY OSTEOARTHRITIS OF BOTH KNEES: Primary | ICD-10-CM

## 2023-12-11 RX ORDER — BUPIVACAINE HYDROCHLORIDE 2.5 MG/ML
2 INJECTION, SOLUTION INFILTRATION; PERINEURAL
Status: COMPLETED | OUTPATIENT
Start: 2023-12-11 | End: 2023-12-11

## 2023-12-11 RX ORDER — BETAMETHASONE SODIUM PHOSPHATE AND BETAMETHASONE ACETATE 3; 3 MG/ML; MG/ML
6 INJECTION, SUSPENSION INTRA-ARTICULAR; INTRALESIONAL; INTRAMUSCULAR; SOFT TISSUE
Status: COMPLETED | OUTPATIENT
Start: 2023-12-11 | End: 2023-12-11

## 2023-12-11 RX ORDER — LIDOCAINE HYDROCHLORIDE 10 MG/ML
2 INJECTION, SOLUTION INFILTRATION; PERINEURAL
Status: COMPLETED | OUTPATIENT
Start: 2023-12-11 | End: 2023-12-11

## 2023-12-11 RX ADMIN — BUPIVACAINE HYDROCHLORIDE 2 ML: 2.5 INJECTION, SOLUTION INFILTRATION; PERINEURAL at 15:00

## 2023-12-11 RX ADMIN — LIDOCAINE HYDROCHLORIDE 2 ML: 10 INJECTION, SOLUTION INFILTRATION; PERINEURAL at 15:00

## 2023-12-11 RX ADMIN — BETAMETHASONE SODIUM PHOSPHATE AND BETAMETHASONE ACETATE 6 MG: 3; 3 INJECTION, SUSPENSION INTRA-ARTICULAR; INTRALESIONAL; INTRAMUSCULAR; SOFT TISSUE at 15:00

## 2023-12-11 NOTE — PROGRESS NOTES
Patient Name:  Erika Cardenas  MRN:  [id number]    Assessment & Plan     Bilateral knee DJD. Corticosteroid injection performed today into the bilateral knees. Activities as tolerated with modification to avoid pain. Tylenol as needed for pain. Follow-up in 3 months for repeat evaluation and repeat injections at that time as indicated. Chief Complaint     Bilateral knee DJD. History of the Present Illness     Erika Cardenas is a 67 y.o. female who returns to the office today for follow-up regarding her bilateral knee DJD. She was last seen on 9/7/2023. At that time she received a corticosteroid injection into the bilateral knees. She returns to the office today noting significant improvement after undergoing the injections. She notes a return of her pain a few days ago. She denies any recent injury or trauma. Pain is worse with increased activity as well as prolonged standing or walking. She denies any weakness, instability, or swelling. She does note some stiffness. No numbness or tingling. No fevers or chills. She is interested in repeat corticosteroid injections today. Physical Exam     /88   Ht 5' 4" (1.626 m)   Wt 117 kg (259 lb)   BMI 44.46 kg/m²     Bilateral knees: No gross deformity. Skin intact. No erythema ecchymosis or swelling. No effusion. No significant joint line tenderness. Range of motion 0-110 bilaterally with pain. Stable to varus and valgus stress bilaterally. Stable Lachman test.  Negative posterior drawer test.  Negative Toni's test.  Sensation is intact distally. Eyes: Anicteric sclerae. ENT: Trachea midline. Lungs: Normal respiratory effort. CV: Capillary refill is less than 2 seconds. Skin: Intact without erythema. Lymph: No palpable lymphadenopathy. Neuro: Sensation is grossly intact to light touch. Psych: Mood and affect are appropriate.       Past Medical History:   Diagnosis Date    A-fib [facility])     Arthritis     Atrial fibrillation ([address])     Cervical spondylosis with myelopathy     Gastric bypass status for obesity     History of transfusion     35 years ago    Hypertension     mass right kidney     Renal cell adenocarcinoma ([address])     RIGHT    Sleep apnea     Super obese        Past Surgical History:   Procedure Laterality Date    ABDOMINAL ADHESION SURGERY N/A 6/19/2023    Procedure: LYSIS ADHESIONS;  Surgeon: Nadja Luz DO;  [facility];  Service: General    ABDOMINAL WALL SURGERY N/A 7/19/2023    Procedure: PARTIAL CLOSURE WOUND ABDOMINAL/TRUNK;  Surgeon: Francia Atkinson DO;  [facility];  Service: General    APPENDECTOMY N/A 6/19/2023    Procedure: APPENDECTOMY;  Surgeon: Nadja Luz DO;  [facility];  Service: General    CHOLECYSTECTOMY      COLOSTOMY      CRYOABLATION Right     RT KIDNEY    CYSTORRHAPHY      Bladder.  Last assessed 4/6/2016     EXPLORATORY LAPAROTOMY W/ BOWEL RESECTION N/A 6/19/2023    Procedure: LAPAROTOMY EXPLORATORY W/ BOWEL RESECTION;  Surgeon: Rita Verma MD;  [facility];  Service: General    GASTRIC BYPASS      HERNIA REPAIR      Last assessed 4/6/2016     HYSTERECTOMY      IR DRAINAGE TUBE CHECK/CHANGE/REPOSITION/REINSERTION/UPSIZE  9/29/2023    IR DRAINAGE TUBE PLACEMENT  9/16/2023    LAPAROTOMY N/A 6/19/2023    Procedure: LAPAROTOMY EXPLORATORY, washout, vac change;  Surgeon: Nadja Luz DO;  [facility];  Service: General    LAPAROTOMY N/A 6/20/2023    Procedure: LAPAROTOMY, REMOVAL OF WOUND VAC AND PACKING, SMALL BOWEL RESECTION , BOWEL ANASTOMOSIS X 3 , VICRYL MESH BRIDGING -;  Surgeon: Nadja Luz DO;  [facility];  Service: General    AL OPTX DSTL RADL X-ARTIC FX/EPIPHYSL SEP Right 4/22/2016    Procedure: OPEN REDUCTION INTERNAL FIXATION RIGHT DISTAL RADIUS;  Surgeon: Yoli Stephenson MD;  [facility];  Service: Orthopedics    REVISION COLOSTOMY      SMALL INTESTINE SURGERY N/A 6/19/2023 Procedure: RESECTION SMALL BOWEL;  Surgeon: Ayaz Corado DO;  [facility];  Service: General    THYROID LOBECTOMY Left 8/27/2019    Procedure: LOBECTOMY THYROID, left;  Surgeon: Julio Renee MD;  [facility];  Service: Surgical Oncology    TUBAL LIGATION      US GUIDED THYROID BIOPSY  2/27/2019    US GUIDED THYROID BIOPSY  5/29/2019    WOUND DEBRIDEMENT N/A 7/18/2023    Procedure: DEBRIDEMENT WOUND [facility] OUT);   Surgeon: Supriya Loya DO;  [facility];  Service: Trauma    WOUND DEBRIDEMENT N/A 7/19/2023    Procedure: ABDOMINAL WOUND ([address] [place] [address] Street OUT); VAC APPLICATION;  Surgeon: Evelia Richter DO;  [facility];  Service: General       No Known Allergies    Current Outpatient Medications on File Prior to Visit   Medication Sig Dispense Refill    acetaminophen (TYLENOL) 325 mg tablet Take 975 mg by mouth every 6 (six) hours as needed for mild pain or moderate pain      apixaban (Eliquis) 5 mg Take 1 tablet (5 mg total) by mouth 2 (two) times a day 60 tablet 11    BD PosiFlush 0.9 % SOLN       calcium carbonate (OYSTER SHELL,OSCAL) 500 mg Take 1 tablet by mouth daily with breakfast 30 tablet 0    cyanocobalamin (VITAMIN B-12) 1000 MCG tablet Take 1 tablet (1,000 mcg total) by mouth daily 30 tablet 0    diltiazem (CARDIZEM CD) 360 MG 24 hr capsule Take 1 capsule (360 mg total) by mouth daily 90 capsule 3    ergocalciferol (VITAMIN D2) 50,000 units Take 50,000 Units by mouth once a week Takes D3       ferrous sulfate 325 (65 Fe) mg tablet Take 1 tablet (325 mg total) by mouth daily with breakfast 30 tablet 0    furosemide (LASIX) 20 mg tablet Take 1 tablet (20 mg total) by mouth daily 90 tablet 3    metoprolol succinate (TOPROL-XL) 25 mg 24 hr tablet Take 1 tablet (25 mg total) by mouth daily 90 tablet 3    potassium chloride (K-DUR,KLOR-CON) 20 mEq tablet Take 1 tablet (20 mEq total) by mouth daily 90 tablet 3    Probiotic Product (PRO-BIOTIC BLEND PO) Take by mouth sodium chloride, PF, 0.9 % 10 mL by Intracatheter route daily 300 mL 2     Current Facility-Administered Medications on File Prior to Visit   Medication Dose Route Frequency Provider Last Rate Last Admin    denosumab (PROLIA) subcutaneous injection 60 mg  60 mg Subcutaneous Q6 Months Saray Dorado MD   60 mg at 01/04/21 1157       Social History     Tobacco Use    Smoking status: Never    Smokeless tobacco: Never   Vaping Use    Vaping Use: Never used   Substance Use Topics    Alcohol use: Never     Comment: 0    Drug use: No       Family History   Problem Relation Age of Onset    Heart attack Mother     Lung cancer Father 76    Heart disease Father     Stroke Sister     Lung cancer Sister 64    Prostate cancer Brother 61       Review of Systems     As stated in the HPI. All other systems reviewed and are negative.       Large joint arthrocentesis: bilateral knee  Procedure Details  Location: knee - bilateral knee  Needle size: 22 G  Ultrasound guidance: no  Approach: anterolateral    Medications (Right): 2 mL bupivacaine 0.25 %; 2 mL lidocaine 1 %; 6 mg betamethasone acetate-betamethasone sodium phosphate 6 (3-3) mg/mLMedications (Left): 2 mL bupivacaine 0.25 %; 2 mL lidocaine 1 %; 6 mg betamethasone acetate-betamethasone sodium phosphate 6 (3-3) mg/mL   Patient tolerance: patient tolerated the procedure well with no immediate complications  Dressing:  Sterile dressing applied

## 2023-12-14 DIAGNOSIS — D50.8 OTHER IRON DEFICIENCY ANEMIA: ICD-10-CM

## 2023-12-14 RX ORDER — FERROUS SULFATE 325(65) MG
325 TABLET ORAL
Qty: 30 TABLET | Refills: 0 | Status: SHIPPED | OUTPATIENT
Start: 2023-12-14 | End: 2023-12-15

## 2023-12-15 DIAGNOSIS — D50.8 OTHER IRON DEFICIENCY ANEMIA: ICD-10-CM

## 2023-12-15 RX ORDER — FERROUS SULFATE 325(65) MG
325 TABLET ORAL
Qty: 90 TABLET | Refills: 2 | Status: SHIPPED | OUTPATIENT
Start: 2023-12-15

## 2024-01-03 DIAGNOSIS — I48.21 PERMANENT ATRIAL FIBRILLATION (HCC): ICD-10-CM

## 2024-01-03 DIAGNOSIS — I10 PRIMARY HYPERTENSION: ICD-10-CM

## 2024-01-05 RX ORDER — METOPROLOL SUCCINATE 25 MG/1
25 TABLET, EXTENDED RELEASE ORAL DAILY
Qty: 90 TABLET | Refills: 3 | Status: SHIPPED | OUTPATIENT
Start: 2024-01-05

## 2024-01-08 DIAGNOSIS — I48.21 PERMANENT ATRIAL FIBRILLATION (HCC): ICD-10-CM

## 2024-01-08 DIAGNOSIS — I10 PRIMARY HYPERTENSION: ICD-10-CM

## 2024-01-08 DIAGNOSIS — I50.32 CHRONIC DIASTOLIC HEART FAILURE (HCC): ICD-10-CM

## 2024-01-08 RX ORDER — METOPROLOL SUCCINATE 25 MG/1
25 TABLET, EXTENDED RELEASE ORAL DAILY
Qty: 90 TABLET | Refills: 3 | Status: SHIPPED | OUTPATIENT
Start: 2024-01-08

## 2024-01-08 RX ORDER — DILTIAZEM HYDROCHLORIDE 360 MG/1
360 CAPSULE, EXTENDED RELEASE ORAL DAILY
Qty: 90 CAPSULE | Refills: 3 | Status: SHIPPED | OUTPATIENT
Start: 2024-01-08

## 2024-01-08 RX ORDER — POTASSIUM CHLORIDE 20 MEQ/1
20 TABLET, EXTENDED RELEASE ORAL DAILY
Qty: 90 TABLET | Refills: 3 | Status: SHIPPED | OUTPATIENT
Start: 2024-01-08

## 2024-02-08 ENCOUNTER — TELEPHONE (OUTPATIENT)
Dept: GASTROENTEROLOGY | Facility: CLINIC | Age: 74
End: 2024-02-08

## 2024-02-08 NOTE — TELEPHONE ENCOUNTER
I spoke with the patient and scheduled for 6 month f/u colonoscopy.     Scheduled date of colonoscopy (as of today): 3/7/24  Physician performing colonoscopy: Dr. Matthew   Location of colonoscopy: [facility]   Bowel prep reviewed with patient: Miralax (instructions mailed and sent via [facility])   Instructions reviewed with patient by: Yaw   Clearances: Eliquis (2 day hold)

## 2024-02-08 NOTE — TELEPHONE ENCOUNTER
Our mutual patient is scheduled for procedure: colonoscopy      On: _3/7/24_____    With: _Dr. Matthew ________    He/She is taking the following blood thinner:  __Elinithya____     Can this be stopped __2_ days prior to the procedure?       Physician Approving clearance: ________________________

## 2024-02-12 ENCOUNTER — TELEPHONE (OUTPATIENT)
Dept: GASTROENTEROLOGY | Facility: CLINIC | Age: 74
End: 2024-02-12

## 2024-02-12 NOTE — TELEPHONE ENCOUNTER
I called the patient and spoke with them about 2 day Eliquis hold prior to colonoscopy. The patient acknowledged understanding.

## 2024-03-11 ENCOUNTER — OFFICE VISIT (OUTPATIENT)
Dept: OBGYN CLINIC | Facility: CLINIC | Age: 74
End: 2024-03-11
Payer: MEDICARE

## 2024-03-11 VITALS
HEIGHT: 64 IN | DIASTOLIC BLOOD PRESSURE: 60 MMHG | SYSTOLIC BLOOD PRESSURE: 110 MMHG | WEIGHT: 259 LBS | BODY MASS INDEX: 44.22 KG/M2

## 2024-03-11 DIAGNOSIS — M17.0 PRIMARY OSTEOARTHRITIS OF BOTH KNEES: Primary | ICD-10-CM

## 2024-03-11 PROCEDURE — 20610 DRAIN/INJ JOINT/BURSA W/O US: CPT | Performed by: PHYSICIAN ASSISTANT

## 2024-03-11 PROCEDURE — 99213 OFFICE O/P EST LOW 20 MIN: CPT | Performed by: PHYSICIAN ASSISTANT

## 2024-03-11 RX ORDER — LIDOCAINE HYDROCHLORIDE 10 MG/ML
2 INJECTION, SOLUTION INFILTRATION; PERINEURAL
Status: COMPLETED | OUTPATIENT
Start: 2024-03-11 | End: 2024-03-11

## 2024-03-11 RX ORDER — BUPIVACAINE HYDROCHLORIDE 2.5 MG/ML
2 INJECTION, SOLUTION INFILTRATION; PERINEURAL
Status: COMPLETED | OUTPATIENT
Start: 2024-03-11 | End: 2024-03-11

## 2024-03-11 RX ORDER — TRIAMCINOLONE ACETONIDE 40 MG/ML
40 INJECTION, SUSPENSION INTRA-ARTICULAR; INTRAMUSCULAR
Status: COMPLETED | OUTPATIENT
Start: 2024-03-11 | End: 2024-03-11

## 2024-03-11 RX ADMIN — LIDOCAINE HYDROCHLORIDE 2 ML: 10 INJECTION, SOLUTION INFILTRATION; PERINEURAL at 14:30

## 2024-03-11 RX ADMIN — BUPIVACAINE HYDROCHLORIDE 2 ML: 2.5 INJECTION, SOLUTION INFILTRATION; PERINEURAL at 14:30

## 2024-03-11 RX ADMIN — TRIAMCINOLONE ACETONIDE 40 MG: 40 INJECTION, SUSPENSION INTRA-ARTICULAR; INTRAMUSCULAR at 14:30

## 2024-03-11 NOTE — PROGRESS NOTES
"Patient Name:  Arpita Lemus  MRN:  [id number]    Assessment & Plan     Bilateral knee DJD.  Patient was offered and accepted bilateral knee intra-articular corticosteroid injections today.  Activities as tolerated with modification avoid pain.  Continue Tylenol as needed.  Continue to ambulate with rollator walker.  Follow-up in 3 months.  Plan for repeat intra-articular corticosteroid injections at that time as indicated.    Chief Complaint     Follow-up bilateral knee pain    History of the Present Illness     Arpita Lemus is a 73 y.o. female who returns to the office today for follow-up regarding her bilateral knees.  She was last seen on 12/11/2023.  At that time she received a corticosteroid injection into the bilateral knees.  She noted significant improvement up until 2 weeks ago.  She notes a gradual return of her pain.  She notes generalized bilateral knee pain with stiffness.  She denies any significant swelling.  Pain is worse at night when with increased activity.  She currently takes Tylenol at night with improved pain control.  Currently pain is 4-6 out of 10 in intensity.  No numbness or tingling.  No fevers or chills.  She denies any significant weakness or instability.  She is interested in repeat injections today.    Physical Exam     /60   Ht 5' 4\" (1.626 m)   Wt 117 kg (259 lb)   BMI 44.46 kg/m²     Bilateral knees: No gross deformity.  Skin intact.  No erythema ecchymosis or swelling.  No effusion.  No significant joint line tenderness.  Range of motion 0-110 bilaterally with pain.  Stable to varus and valgus stress bilaterally.  Stable Lachman test.  Negative posterior drawer test.  Negative Toni's test.  Extensor mechanism intact bilaterally.  Sensation is intact distally.      Eyes: Anicteric sclerae.  ENT: Trachea midline.  Lungs: Normal respiratory effort.  CV: Capillary refill is less than 2 seconds.  Skin: Intact without erythema.  Lymph: No palpable " lymphadenopathy.  Neuro: Sensation is grossly intact to light touch.  Psych: Mood and affect are appropriate.    Past Medical History:   Diagnosis Date    A-fib ([facility])     Arthritis     Atrial fibrillation ([facility])     Cervical spondylosis with myelopathy     Gastric bypass status for obesity     History of transfusion     35 years ago    Hypertension     mass right kidney     Renal cell adenocarcinoma ([facility])     RIGHT    Sleep apnea     Super obese        Past Surgical History:   Procedure Laterality Date    ABDOMINAL ADHESION SURGERY N/A 6/19/2023    Procedure: LYSIS ADHESIONS;  Surgeon: Curtis Conley DO;  [facility];  Service: General    ABDOMINAL WALL SURGERY N/A 7/19/2023    Procedure: PARTIAL CLOSURE WOUND ABDOMINAL/TRUNK;  Surgeon: Lauryn Ullrich, DO;  [facility];  Service: General    APPENDECTOMY N/A 6/19/2023    Procedure: APPENDECTOMY;  Surgeon: Curtis Conley DO;  [facility];  Service: General    CHOLECYSTECTOMY      COLOSTOMY      CRYOABLATION Right     RT KIDNEY    CYSTORRHAPHY      Bladder. Last assessed 4/6/2016     EXPLORATORY LAPAROTOMY W/ BOWEL RESECTION N/A 6/19/2023    Procedure: LAPAROTOMY EXPLORATORY W/ BOWEL RESECTION;  Surgeon: Gildardo Jackson MD;  [facility];  Service: General    GASTRIC BYPASS      HERNIA REPAIR      Last assessed 4/6/2016     HYSTERECTOMY      IR DRAINAGE TUBE CHECK/CHANGE/REPOSITION/REINSERTION/UPSIZE  9/29/2023    IR DRAINAGE TUBE PLACEMENT  9/16/2023    LAPAROTOMY N/A 6/19/2023    Procedure: LAPAROTOMY EXPLORATORY, washout, vac change;  Surgeon: Curtis Conley DO;  [facility];  Service: General    LAPAROTOMY N/A 6/20/2023    Procedure: LAPAROTOMY, REMOVAL OF WOUND VAC AND PACKING, SMALL BOWEL RESECTION , BOWEL ANASTOMOSIS X 3 , VICRYL MESH BRIDGING -;  Surgeon: Curtis Conley DO;  [facility];  Service: General    TX OPTX DSTL RADL X-ARTIC FX/EPIPHYSL SEP Right 4/22/2016     Procedure: OPEN REDUCTION INTERNAL FIXATION RIGHT DISTAL RADIUS;  Surgeon: Erich Ledezma MD;  [facility];  Service: Orthopedics    REVISION COLOSTOMY      SMALL INTESTINE SURGERY N/A 6/19/2023    Procedure: RESECTION SMALL BOWEL;  Surgeon: Curtis Conley DO;  [facility];  Service: General    THYROID LOBECTOMY Left 8/27/2019    Procedure: LOBECTOMY THYROID, left;  Surgeon: Morgan Caba MD;  [facility];  Service: Surgical Oncology    TUBAL LIGATION      US GUIDED THYROID BIOPSY  2/27/2019    US GUIDED THYROID BIOPSY  5/29/2019    WOUND DEBRIDEMENT N/A 7/18/2023    Procedure: DEBRIDEMENT WOUND (WASH OUT);  Surgeon: Juanjo Carey DO;  [facility];  Service: Trauma    WOUND DEBRIDEMENT N/A 7/19/2023    Procedure: ABDOMINAL WOUND (WASH OUT); VAC APPLICATION;  Surgeon: Lauryn Ullrich, DO;  [facility];  Service: General       No Known Allergies    Current Outpatient Medications on File Prior to Visit   Medication Sig Dispense Refill    acetaminophen (TYLENOL) 325 mg tablet Take 975 mg by mouth every 6 (six) hours as needed for mild pain or moderate pain      apixaban (Eliquis) 5 mg Take 1 tablet (5 mg total) by mouth 2 (two) times a day 60 tablet 11    BD PosiFlush 0.9 % SOLN       calcium carbonate (OYSTER SHELL,OSCAL) 500 mg Take 1 tablet by mouth daily with breakfast 30 tablet 0    cyanocobalamin (VITAMIN B-12) 1000 MCG tablet Take 1 tablet (1,000 mcg total) by mouth daily 30 tablet 0    diltiazem (CARDIZEM CD) 360 MG 24 hr capsule Take 1 capsule (360 mg total) by mouth daily 90 capsule 3    ergocalciferol (VITAMIN D2) 50,000 units Take 50,000 Units by mouth once a week Takes D3       ferrous sulfate 325 (65 Fe) mg tablet Take 1 tablet (325 mg total) by mouth daily with breakfast 90 tablet 2    metoprolol succinate (TOPROL-XL) 25 mg 24 hr tablet Take 1 tablet (25 mg total) by mouth daily 90 tablet 3    potassium chloride (K-DUR,KLOR-CON) 20 mEq tablet  Take 1 tablet (20 mEq total) by mouth daily 90 tablet 3    Probiotic Product (PRO-BIOTIC BLEND PO) Take by mouth      sodium chloride, PF, 0.9 % 10 mL by Intracatheter route daily 300 mL 2    furosemide (LASIX) 20 mg tablet Take 1 tablet (20 mg total) by mouth daily 90 tablet 3     Current Facility-Administered Medications on File Prior to Visit   Medication Dose Route Frequency Provider Last Rate Last Admin    denosumab (PROLIA) subcutaneous injection 60 mg  60 mg Subcutaneous Q6 Months Polina Connolly MD   60 mg at 01/04/21 1157       Social History     Tobacco Use    Smoking status: Never    Smokeless tobacco: Never   Vaping Use    Vaping status: Never Used   Substance Use Topics    Alcohol use: Never     Comment: 0    Drug use: No       Family History   Problem Relation Age of Onset    Heart attack Mother     Lung cancer Father 68    Heart disease Father     Stroke Sister     Lung cancer Sister 56    Prostate cancer Brother 63       Review of Systems     As stated in the HPI. All other systems reviewed and are negative.      Large joint arthrocentesis: bilateral knee  Procedure Details  Location: knee - bilateral knee  Needle size: 22 G  Ultrasound guidance: no  Approach: anterolateral    Medications (Right): 2 mL bupivacaine 0.25 %; 2 mL lidocaine 1 %; 40 mg triamcinolone acetonide 40 mg/mLMedications (Left): 2 mL bupivacaine 0.25 %; 2 mL lidocaine 1 %; 40 mg triamcinolone acetonide 40 mg/mL   Patient tolerance: patient tolerated the procedure well with no immediate complications  Dressing:  Sterile dressing applied

## 2024-03-14 ENCOUNTER — TELEPHONE (OUTPATIENT)
Age: 74
End: 2024-03-14

## 2024-03-14 NOTE — TELEPHONE ENCOUNTER
Pt wants to make sure she is within the correct time frame for her prolia injection which is every 6 mth   She took an appt in 6/25/24 that would be past the six months  Is that ok if not please call her to reschd for a sooner appt  [phone]

## 2024-03-15 NOTE — TELEPHONE ENCOUNTER
Patient is rescheduled to 4/19/24 at [facility] office for her Prolia and her follow-up appointment.    (Appointment at the [facility] for 6/25/24 has been cancelled.)

## 2024-04-11 ENCOUNTER — APPOINTMENT (OUTPATIENT)
Dept: LAB | Facility: IMAGING CENTER | Age: 74
End: 2024-04-11
Payer: MEDICARE

## 2024-04-11 DIAGNOSIS — E55.9 VITAMIN D DEFICIENCY: ICD-10-CM

## 2024-04-11 DIAGNOSIS — I50.32 CHRONIC DIASTOLIC HEART FAILURE (HCC): ICD-10-CM

## 2024-04-11 DIAGNOSIS — E21.3 HYPERPARATHYROIDISM (HCC): ICD-10-CM

## 2024-04-11 DIAGNOSIS — M81.0 OSTEOPOROSIS, UNSPECIFIED OSTEOPOROSIS TYPE, UNSPECIFIED PATHOLOGICAL FRACTURE PRESENCE: ICD-10-CM

## 2024-04-11 LAB
25(OH)D3 SERPL-MCNC: 55.3 NG/ML (ref 30–100)
ALBUMIN SERPL BCP-MCNC: 3.7 G/DL (ref 3.5–5)
ALP SERPL-CCNC: 131 U/L (ref 34–104)
ALT SERPL W P-5'-P-CCNC: 20 U/L (ref 7–52)
ANION GAP SERPL CALCULATED.3IONS-SCNC: 11 MMOL/L (ref 4–13)
AST SERPL W P-5'-P-CCNC: 23 U/L (ref 13–39)
BILIRUB SERPL-MCNC: 0.64 MG/DL (ref 0.2–1)
BUN SERPL-MCNC: 23 MG/DL (ref 5–25)
CA-I BLD-SCNC: 1.15 MMOL/L (ref 1.12–1.32)
CALCIUM SERPL-MCNC: 9 MG/DL (ref 8.4–10.2)
CHLORIDE SERPL-SCNC: 106 MMOL/L (ref 96–108)
CHOLEST SERPL-MCNC: 162 MG/DL
CO2 SERPL-SCNC: 28 MMOL/L (ref 21–32)
CREAT SERPL-MCNC: 0.87 MG/DL (ref 0.6–1.3)
GFR SERPL CREATININE-BSD FRML MDRD: 65 ML/MIN/1.73SQ M
GLUCOSE P FAST SERPL-MCNC: 97 MG/DL (ref 65–99)
HDLC SERPL-MCNC: 53 MG/DL
LDLC SERPL CALC-MCNC: 88 MG/DL (ref 0–100)
POTASSIUM SERPL-SCNC: 4 MMOL/L (ref 3.5–5.3)
PROT SERPL-MCNC: 6.2 G/DL (ref 6.4–8.4)
PTH-INTACT SERPL-MCNC: 32.3 PG/ML (ref 12–88)
SODIUM SERPL-SCNC: 145 MMOL/L (ref 135–147)
TRIGL SERPL-MCNC: 104 MG/DL

## 2024-04-11 PROCEDURE — 36415 COLL VENOUS BLD VENIPUNCTURE: CPT

## 2024-04-11 PROCEDURE — 82330 ASSAY OF CALCIUM: CPT

## 2024-04-11 PROCEDURE — 83970 ASSAY OF PARATHORMONE: CPT

## 2024-04-11 PROCEDURE — 80053 COMPREHEN METABOLIC PANEL: CPT

## 2024-04-11 PROCEDURE — 80061 LIPID PANEL: CPT

## 2024-04-11 PROCEDURE — 82306 VITAMIN D 25 HYDROXY: CPT

## 2024-04-19 ENCOUNTER — OFFICE VISIT (OUTPATIENT)
Dept: ENDOCRINOLOGY | Facility: CLINIC | Age: 74
End: 2024-04-19
Payer: MEDICARE

## 2024-04-19 VITALS
HEIGHT: 64 IN | SYSTOLIC BLOOD PRESSURE: 116 MMHG | DIASTOLIC BLOOD PRESSURE: 70 MMHG | HEART RATE: 84 BPM | BODY MASS INDEX: 39.57 KG/M2 | WEIGHT: 231.8 LBS

## 2024-04-19 DIAGNOSIS — M81.0 OSTEOPOROSIS, UNSPECIFIED OSTEOPOROSIS TYPE, UNSPECIFIED PATHOLOGICAL FRACTURE PRESENCE: ICD-10-CM

## 2024-04-19 DIAGNOSIS — E89.0 STATUS POST PARTIAL THYROIDECTOMY: ICD-10-CM

## 2024-04-19 DIAGNOSIS — E55.9 VITAMIN D DEFICIENCY: Primary | ICD-10-CM

## 2024-04-19 DIAGNOSIS — E21.3 HYPERPARATHYROIDISM (HCC): ICD-10-CM

## 2024-04-19 PROCEDURE — 96372 THER/PROPH/DIAG INJ SC/IM: CPT | Performed by: INTERNAL MEDICINE

## 2024-04-19 PROCEDURE — 99214 OFFICE O/P EST MOD 30 MIN: CPT | Performed by: INTERNAL MEDICINE

## 2024-04-19 RX ORDER — CALCIUM CARBONATE 500 MG/1
2 TABLET, CHEWABLE ORAL 2 TIMES DAILY
COMMUNITY

## 2024-04-19 RX ORDER — CHOLECALCIFEROL (VITAMIN D3) 125 MCG
10000 CAPSULE ORAL DAILY
COMMUNITY

## 2024-04-19 NOTE — ASSESSMENT & PLAN NOTE
Lab Results   Component Value Date    [id number] 3.090 01/16/2023    FREET4 1.04 01/16/2023     Las thyroid function results reassuring.  Continue with routine blood work

## 2024-04-19 NOTE — PROGRESS NOTES
Assessment and Plan:       1. Vitamin D deficiency  Assessment & Plan:  Last lab value show normal Vit D level  Continue same regiment 50,000 U weekly. Managed by bariatric surgery.    Orders:  -     Vitamin D 25 hydroxy; Future; Expected date: 09/02/2024    2. Osteoporosis, unspecified osteoporosis type, unspecified pathological fracture presence  Assessment & Plan:  Hx of osteoporosis since 2020 according to Dexa scan.  Last Dexa in 2022 showed improvement in bone density  by 20% in hip bone.  No hx of recent fall, or fractures.  Takes Prolia injections every 6 months. Patient due for injection today.  Denies side effects with injection.  Patient due for new Dexa scan in July of this year.  Continue with same calcium and vitamin D supplements.        Orders:  -     DXA bone density spine hip and pelvis; Future; Expected date: 09/02/2024  -     denosumab (PROLIA) subcutaneous injection 60 mg    3. Status post partial thyroidectomy  Assessment & Plan:  Lab Results   Component Value Date    UCI8FTVLFJWI 3.090 01/16/2023    FREET4 1.04 01/16/2023     Las thyroid function results reassuring.  Continue with routine blood work    Orders:  -     TSH, 3rd generation; Future; Expected date: 09/02/2024  -     T4, free; Future; Expected date: 09/02/2024    4. Hyperparathyroidism (HCC)  Assessment & Plan:  Lats PTH results with in normal range.  Pending    Orders:  -     Comprehensive metabolic panel; Future; Expected date: 09/02/2024  -     PTH, intact; Future; Expected date: 09/02/2024         Subjective:     Patient ID: Arpita Lemus is a 73 y.o. female. She is presenting to [facility] for Prolia injection and also to establish care. She was previously seen by endocrinologist dr Bell at  [facility] however due to convenience decided to switch the office to [place]. last prolia injection was on 10/2023  PMH of thyroid nodule status post hemithyroidectomy, not on thyroid supplement, obesity, status post  bariatric surgery, osteoporosis, hx of hyperPTH.  Patient reports that since the prolonged hospitalization and rehabilitation that happened last summer she is still in process of recovering, however her strength and ambulation is better. Denies any falls or bone pain. She is very mindful about  the life style that she is approaching now and is towards a well balanced and healthy diet with the goal to lose weight to maintain other chronic conditions such as pre diabetes under control. She denies any symptoms today and she typically does not have side effects after prolia. She is due for a new dexa scan in July of this year.   She is agreeable to see the clinic in 6 months to discuss the dexa results and also receive next prolia shot.       Patient has no other complaints at this time.    Review of Systems   Constitutional:  Negative for chills and fever.   HENT:  Negative for ear pain and sore throat.    Eyes:  Negative for pain and visual disturbance.   Respiratory:  Negative for cough and shortness of breath.    Cardiovascular:  Negative for chest pain and palpitations.   Gastrointestinal:  Negative for abdominal pain and vomiting.   Genitourinary:  Negative for dysuria and hematuria.   Musculoskeletal:  Negative for arthralgias and back pain.   Skin:  Negative for color change and rash.   Neurological:  Negative for seizures and syncope.   All other systems reviewed and are negative.       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)    Primary osteoarthritis of left knee    Primary osteoarthritis of right knee    Chronic pain of left knee    Myalgia    Chest wall tenderness    Chronic heart failure (HCC)    Permanent atrial fibrillation (HCC)    Cervical spondylosis without myelopathy    Arthropathy of cervical facet joint    Occipital neuralgia of right side    Status post partial thyroidectomy    Myofascial pain  syndrome    Hashimoto's thyroiditis    Early menopause occurring in patient age younger than 45 years    S/P bariatric surgery    Osteoporosis    Hyperparathyroidism (HCC)    Reactive hypoglycemia    Vitamin D deficiency    Unsteady gait    Dyspnea on exertion    SIRS (systemic inflammatory response syndrome) ([facility])    Incontinence    GILDARDO (obstructive sleep apnea)    Morbid obesity with BMI of 50.0-59.9, adult ([facility])    Hypertension    Stage 3 chronic kidney disease, unspecified whether stage 3a or 3b CKD ([facility])    Primary osteoarthritis of both knees    Idiopathic acute pancreatitis without infection or necrosis    Bowel perforation ([facility])    Acute encephalopathy    Anxiety    Diarrhea    Hypokalemia    Anemia    Open abdominal wall wound    Iron deficiency anemia    Hypotension    Ambulatory dysfunction    COVID    Mood disorder ([facility])    Encounter for skin care    Abdominal wound dehiscence    Cellulitis        Current Outpatient Medications   Medication Sig Dispense Refill    acetaminophen (TYLENOL) 325 mg tablet Take 975 mg by mouth every 6 (six) hours as needed for mild pain or moderate pain      apixaban (Eliquis) 5 mg Take 1 tablet (5 mg total) by mouth 2 (two) times a day 60 tablet 11    BD PosiFlush 0.9 % SOLN       calcium carbonate (TUMS) 500 mg chewable tablet Chew 2 tablets 2 (two) times a day      Cholecalciferol (Vitamin D) 125 MCG (5000 UT) CAPS Take 10,000 Units by mouth daily      cyanocobalamin (VITAMIN B-12) 1000 MCG tablet Take 1 tablet (1,000 mcg total) by mouth daily 30 tablet 0    diltiazem (CARDIZEM CD) 360 MG 24 hr capsule Take 1 capsule (360 mg total) by mouth daily 90 capsule 3    ferrous sulfate 325 (65 Fe) mg tablet Take 1 tablet (325 mg total) by mouth daily with breakfast 90 tablet 2    metoprolol succinate (TOPROL-XL) 25 mg 24 hr tablet Take 1 tablet (25 mg total) by mouth daily 90 tablet 3    potassium chloride (K-DUR,KLOR-CON) 20 mEq tablet Take 1 tablet (20 mEq total) by mouth daily  "90 tablet 3    Probiotic Product (PRO-BIOTIC BLEND PO) Take by mouth      calcium carbonate (OYSTER SHELL,OSCAL) 500 mg Take 1 tablet by mouth daily with breakfast (Patient not taking: Reported on 4/19/2024) 30 tablet 0    ergocalciferol (VITAMIN D2) 50,000 units Take 50,000 Units by mouth once a week Takes D3  (Patient not taking: Reported on 4/19/2024)      furosemide (LASIX) 20 mg tablet Take 1 tablet (20 mg total) by mouth daily 90 tablet 3    sodium chloride, PF, 0.9 % 10 mL by Intracatheter route daily (Patient not taking: Reported on 4/19/2024) 300 mL 2     Current Facility-Administered Medications   Medication Dose Route Frequency Provider Last Rate Last Admin    denosumab (PROLIA) subcutaneous injection 60 mg  60 mg Subcutaneous Q6 Months Polina Connolly MD   60 mg at 04/19/24 1149    denosumab (PROLIA) subcutaneous injection 60 mg  60 mg Subcutaneous Once             No Known Allergies     The following portions of the patient's history were reviewed and updated as appropriate: allergies, current medications, past family history, past medical history, past social history, past surgical history and problem list.          Objective:    /70   Pulse 84   Ht 5' 4\" (1.626 m)   Wt 105 kg (231 lb 12.8 oz)   BMI 39.79 kg/m²      Body mass index is 39.79 kg/m².     Physical Exam  Vitals and nursing note reviewed.   Constitutional:       General: She is not in acute distress.     Appearance: She is well-developed. She is obese.   HENT:      Head: Normocephalic and atraumatic.   Eyes:      Conjunctiva/sclera: Conjunctivae normal.   Cardiovascular:      Rate and Rhythm: Normal rate. Rhythm irregular.      Heart sounds: No murmur heard.  Pulmonary:      Effort: Pulmonary effort is normal. No respiratory distress.      Breath sounds: Normal breath sounds. No wheezing or rales.   Abdominal:      General: Bowel sounds are normal.      Palpations: Abdomen is soft.      Tenderness: There is no abdominal tenderness. "   Musculoskeletal:         General: No swelling.      Cervical back: Neck supple.      Right lower leg: No edema.      Left lower leg: No edema.   Skin:     General: Skin is warm and dry.      Capillary Refill: Capillary refill takes less than 2 seconds.   Neurological:      General: No focal deficit present.      Mental Status: She is alert. Mental status is at baseline.   Psychiatric:         Mood and Affect: Mood normal.

## 2024-04-19 NOTE — ASSESSMENT & PLAN NOTE
Hx of osteoporosis since 2020 according to Dexa scan.  Last Dexa in 2022 showed improvement in bone density  by 20% in hip bone.  No hx of recent fall, or fractures.  Takes Prolia injections every 6 months. Patient due for injection today.  Denies side effects with injection.  Patient due for new Dexa scan in July of this year.  Continue with same calcium and vitamin D supplements.

## 2024-04-19 NOTE — PROGRESS NOTES
"Assessment/Plan:    Arpita Lemus came into the [facility] Endocrinology Office today 04/19/24 to receive Prolia injection.      Verbal consent obtained.  Consent given by: patient    patient states patient has been medically healthy with no underlining concerns/complications.      Arpita Lemus presents with No symptoms today.       All insturctions were reviewed with the patient.    If the patient should have any questions/concerns, advised patient to contacted [facility].       Subjective:     History provided by: patient    Patient ID: Arpita Lemus is a 73 y.o. female      Objective:    Vitals:    04/19/24 1100   BP: 116/70   Pulse: 84   Weight: 105 kg (231 lb 12.8 oz)   Height: 5' 4\" (1.626 m)       Patient tolerated the injection well without any complications.  Injection site/s Left Arm.  Medication was provided by Office.    Patient signed consent form yes   Patient signed ABN form yes (If no patient is not a medicare patient).   Patient waited 15 minutes after injection no (This only applies to patient's receiving first time injection).       Last Visit: Visit date not found  Next visit:Visit date not found     "

## 2024-04-19 NOTE — ASSESSMENT & PLAN NOTE
Last lab value show normal Vit D level  Continue same regiment 50,000 U weekly. Managed by bariatric surgery.

## 2024-04-22 ENCOUNTER — TELEPHONE (OUTPATIENT)
Dept: GASTROENTEROLOGY | Facility: AMBULARY SURGERY CENTER | Age: 74
End: 2024-04-22

## 2024-04-30 ENCOUNTER — OFFICE VISIT (OUTPATIENT)
Dept: CARDIOLOGY CLINIC | Facility: CLINIC | Age: 74
End: 2024-04-30
Payer: MEDICARE

## 2024-04-30 VITALS
BODY MASS INDEX: 39.49 KG/M2 | WEIGHT: 231.3 LBS | SYSTOLIC BLOOD PRESSURE: 118 MMHG | HEIGHT: 64 IN | OXYGEN SATURATION: 97 % | HEART RATE: 74 BPM | DIASTOLIC BLOOD PRESSURE: 60 MMHG

## 2024-04-30 DIAGNOSIS — I50.32 CHRONIC DIASTOLIC HEART FAILURE (HCC): Primary | ICD-10-CM

## 2024-04-30 DIAGNOSIS — N18.30 STAGE 3 CHRONIC KIDNEY DISEASE, UNSPECIFIED WHETHER STAGE 3A OR 3B CKD (HCC): ICD-10-CM

## 2024-04-30 DIAGNOSIS — I10 PRIMARY HYPERTENSION: ICD-10-CM

## 2024-04-30 DIAGNOSIS — I48.21 PERMANENT ATRIAL FIBRILLATION (HCC): ICD-10-CM

## 2024-04-30 PROCEDURE — 99214 OFFICE O/P EST MOD 30 MIN: CPT | Performed by: INTERNAL MEDICINE

## 2024-04-30 PROCEDURE — G2211 COMPLEX E/M VISIT ADD ON: HCPCS | Performed by: INTERNAL MEDICINE

## 2024-04-30 NOTE — PROGRESS NOTES
Cardiology Outpatient Follow-Up Note - Arpita Lemus 73 y.o. female MRN: [id number]      Assessment/Plan:    1. Chronic diastolic heart failure (HCC)  She is euvolemic, compensated    2. Permanent atrial fibrillation (HCC)  Rate controlled  Continue diltiazem  mg daily metoprolol XL 25 mg daily  Continue thromboembolic ppx with apixaban 5 mg BID    3. Primary hypertension  Controlled on current therapy.    4. Body mass index (BMI) 50.0-59.9, adult (HCC)  Much improved now with dietary changes and gastric bypass revision  BMI now 40     5. Stage 3 chronic kidney disease, unspecified whether stage 3a or 3b CKD (HCC)  Renal function has improved with GFR 65, now CKD stage 2.         We will see Arpita Lemus back in 6 months for routine follow-up.    Subjective:     HPI: Arpita Lemus is a 73 y.o. year old female with permanent AF, chronic diastolic CHF, HTN, presenting for follow-up.    She spent much of June - August 2023 admitted to [facility] with a perforated bowel. During this hospitalization amiodarone was started for her AF despite it being permanent AF.     She also developed volume overload and was eventually transitioned to standing lasix 20 mg daily.     She feels great lately. She has been losing weight through dietary changes. She is down 30 lbs since prior visit 10/27/23. She is overall down over 60 lbs since hospital discharge last summer.     No cardiac complaints. No LE edema. No orthopnea, PND, KENDRICK.   She had stopped her lasix months ago.       Cardiac Testing:  echocardiogram on 02/21/2019 showed normal LV size and function, LVEF 55%, mild concentric LV hypertrophy, normal RV size and function, mild left atrial dilation , mild mitral regurgitation.      Echo 6/19/23   Interpretation Summary     Left Ventricle: Left ventricular cavity size is normal. Wall thickness is mildly increased. The left ventricular ejection fraction is 60%. Systolic function is normal. Wall motion is normal.     "Left Atrium: The atrium is mildly dilated.    Right Atrium: The atrium is mildly dilated.    Aortic Valve: There is aortic valve sclerosis.    Mitral Valve: There is mild annular calcification.    Tricuspid Valve: There is mild regurgitation. The estimated right ventricular systolic pressure is 25.00 mmHg.        EKGs, personally reviewed:  EKG in the office 5/31/23 shows atrial fibrillation, 103 bpm, low voltage, normal axis, otherwise normal intervals. Cannot rule out inferior MI. Poor anterior R wave progression. Abnormal study.      Relevant Labs & Results:  BMP 1/16/23 reviewed -- K 3.4, Cr 1.02  CBC 11/2/22 reviewed -- Hgb 11.2  HbA1c 10/30/22 -- 6.4%, 1/16/23 6.1%       BMP 10/20/23 -- K 4.1, Cr 0.68  CMP 4/11/24 - K 4.0, Cr 0.87, GFR 65, Alk phos 131      ROS:  Review of Systems:  Review of Systems    Objective:     Vitals:   Vitals:    04/30/24 1634   BP: 118/60   BP Location: Right arm   Patient Position: Sitting   Cuff Size: Standard   Pulse: 74   SpO2: 97%   Weight: 105 kg (231 lb 4.8 oz)   Height: 5' 4\" (1.626 m)    Body surface area is 2.08 meters squared.  Wt Readings from Last 3 Encounters:   04/30/24 105 kg (231 lb 4.8 oz)   04/19/24 105 kg (231 lb 12.8 oz)   03/11/24 117 kg (259 lb)       Physical Exam:    General: Arpita Lemus is a well appearing female, in no acute distress, sitting comfortably  HEENT: moist mucous membranes, EOMI  Neck:  No JVD, supple, trachea midline  Cardiovascular: unremarkable s1/2, irregularly irregular rhythm, normal rate, 1/6 SM  Pulmonary: normal respiratory effort, CTAB  Abdomen: soft and nondistended  Extremities: No lower extremity edema. Warm and well perfused extremities.  Neuro: no focal motor deficits, AAOx3 (person, place, time)  Psych: Normal mood and affect, cooperative        Medications (at the START of this encounter):  Outpatient Medications Prior to Visit   Medication Sig Dispense Refill    acetaminophen (TYLENOL) 325 mg tablet Take 975 mg by mouth " every 6 (six) hours as needed for mild pain or moderate pain      apixaban (Eliquis) 5 mg Take 1 tablet (5 mg total) by mouth 2 (two) times a day 60 tablet 11    calcium carbonate (TUMS) 500 mg chewable tablet Chew 2 tablets 2 (two) times a day      Cholecalciferol (Vitamin D) 125 MCG (5000 UT) CAPS Take 10,000 Units by mouth daily      cyanocobalamin (VITAMIN B-12) 1000 MCG tablet Take 1 tablet (1,000 mcg total) by mouth daily 30 tablet 0    diltiazem (CARDIZEM CD) 360 MG 24 hr capsule Take 1 capsule (360 mg total) by mouth daily 90 capsule 3    ferrous sulfate 325 (65 Fe) mg tablet Take 1 tablet (325 mg total) by mouth daily with breakfast 90 tablet 2    metoprolol succinate (TOPROL-XL) 25 mg 24 hr tablet Take 1 tablet (25 mg total) by mouth daily 90 tablet 3    potassium chloride (K-DUR,KLOR-CON) 20 mEq tablet Take 1 tablet (20 mEq total) by mouth daily 90 tablet 3    Probiotic Product (PRO-BIOTIC BLEND PO) Take by mouth      furosemide (LASIX) 20 mg tablet Take 1 tablet (20 mg total) by mouth daily 90 tablet 3    sodium chloride, PF, 0.9 % 10 mL by Intracatheter route daily (Patient not taking: Reported on 4/19/2024) 300 mL 2    BD PosiFlush 0.9 % SOLN  (Patient not taking: Reported on 4/30/2024)      calcium carbonate (OYSTER SHELL,OSCAL) 500 mg Take 1 tablet by mouth daily with breakfast (Patient not taking: Reported on 4/19/2024) 30 tablet 0    ergocalciferol (VITAMIN D2) 50,000 units Take 50,000 Units by mouth once a week Takes D3  (Patient not taking: Reported on 4/19/2024)       No facility-administered medications prior to visit.                 Time Spent:  Total time (face-to-face and non-face-to-face) spent on today's visit was 23 minutes. This includes preparation for the visits (i.e. reviewing test results), performance of a medically appropriate history and examination, and orders for medications, tests or other procedures. This time is exclusive of procedures performed and time spent  "teaching.      This note was completed in part utilizing Dragon [employer] One voice recognition software. Grammatical errors, random word insertion, spelling mistakes, occasional wrong word or \"sound-alike\" substitutions and incomplete sentences may be an occasional consequence of the system secondary to software limitations, ambient noise and hardware issues. At the time of dictation, efforts were made to edit, clarify and /or correct errors.  Please read the chart carefully and recognize, using context, where substitutions have occurred.  If you have any questions or concerns about the context, text or information contained within the body of this dictation, please contact myself, the provider, for further clarification.    "

## 2024-05-01 ENCOUNTER — TELEPHONE (OUTPATIENT)
Dept: SURGERY | Facility: HOSPITAL | Age: 74
End: 2024-05-01

## 2024-05-02 ENCOUNTER — ANESTHESIA EVENT (OUTPATIENT)
Dept: GASTROENTEROLOGY | Facility: HOSPITAL | Age: 74
End: 2024-05-02

## 2024-05-02 ENCOUNTER — ANESTHESIA (OUTPATIENT)
Dept: GASTROENTEROLOGY | Facility: HOSPITAL | Age: 74
End: 2024-05-02

## 2024-05-02 ENCOUNTER — HOSPITAL ENCOUNTER (OUTPATIENT)
Dept: GASTROENTEROLOGY | Facility: HOSPITAL | Age: 74
Setting detail: OUTPATIENT SURGERY
Discharge: HOME | End: 2024-05-02
Payer: MEDICARE

## 2024-05-02 VITALS
TEMPERATURE: 98.1 F | HEIGHT: 63 IN | OXYGEN SATURATION: 98 % | WEIGHT: 233 LBS | SYSTOLIC BLOOD PRESSURE: 108 MMHG | RESPIRATION RATE: 18 BRPM | HEART RATE: 61 BPM | DIASTOLIC BLOOD PRESSURE: 53 MMHG | BODY MASS INDEX: 41.29 KG/M2

## 2024-05-02 DIAGNOSIS — Z12.11 SCREENING FOR COLON CANCER: ICD-10-CM

## 2024-05-02 PROCEDURE — 45385 COLONOSCOPY W/LESION REMOVAL: CPT | Performed by: INTERNAL MEDICINE

## 2024-05-02 PROCEDURE — 45380 COLONOSCOPY AND BIOPSY: CPT | Performed by: INTERNAL MEDICINE

## 2024-05-02 PROCEDURE — 88305 TISSUE EXAM BY PATHOLOGIST: CPT | Performed by: PATHOLOGY

## 2024-05-02 RX ORDER — LIDOCAINE HYDROCHLORIDE 10 MG/ML
INJECTION, SOLUTION EPIDURAL; INFILTRATION; INTRACAUDAL; PERINEURAL AS NEEDED
Status: DISCONTINUED | OUTPATIENT
Start: 2024-05-02 | End: 2024-05-02

## 2024-05-02 RX ORDER — PHENYLEPHRINE HCL IN 0.9% NACL 1 MG/10 ML
SYRINGE (ML) INTRAVENOUS AS NEEDED
Status: DISCONTINUED | OUTPATIENT
Start: 2024-05-02 | End: 2024-05-02

## 2024-05-02 RX ORDER — PROPOFOL 10 MG/ML
INJECTION, EMULSION INTRAVENOUS AS NEEDED
Status: DISCONTINUED | OUTPATIENT
Start: 2024-05-02 | End: 2024-05-02

## 2024-05-02 RX ORDER — SODIUM CHLORIDE, SODIUM LACTATE, POTASSIUM CHLORIDE, CALCIUM CHLORIDE 600; 310; 30; 20 MG/100ML; MG/100ML; MG/100ML; MG/100ML
INJECTION, SOLUTION INTRAVENOUS CONTINUOUS PRN
Status: DISCONTINUED | OUTPATIENT
Start: 2024-05-02 | End: 2024-05-02

## 2024-05-02 RX ORDER — EPHEDRINE SULFATE 50 MG/ML
INJECTION INTRAVENOUS AS NEEDED
Status: DISCONTINUED | OUTPATIENT
Start: 2024-05-02 | End: 2024-05-02

## 2024-05-02 RX ADMIN — PROPOFOL 130 MCG/KG/MIN: 10 INJECTION, EMULSION INTRAVENOUS at 11:13

## 2024-05-02 RX ADMIN — Medication 100 MCG: at 11:27

## 2024-05-02 RX ADMIN — Medication 100 MCG: at 11:45

## 2024-05-02 RX ADMIN — EPHEDRINE SULFATE 5 MG: 50 INJECTION INTRAVENOUS at 11:29

## 2024-05-02 RX ADMIN — Medication 100 MCG: at 11:32

## 2024-05-02 RX ADMIN — LIDOCAINE HYDROCHLORIDE 50 MG: 10 INJECTION, SOLUTION EPIDURAL; INFILTRATION; INTRACAUDAL; PERINEURAL at 11:12

## 2024-05-02 RX ADMIN — PROPOFOL 100 MG: 10 INJECTION, EMULSION INTRAVENOUS at 11:12

## 2024-05-02 RX ADMIN — PHENYLEPHRINE HYDROCHLORIDE 40 MCG/MIN: 50 INJECTION INTRAVENOUS at 11:29

## 2024-05-02 RX ADMIN — SODIUM CHLORIDE, SODIUM LACTATE, POTASSIUM CHLORIDE, AND CALCIUM CHLORIDE: .6; .31; .03; .02 INJECTION, SOLUTION INTRAVENOUS at 11:08

## 2024-05-02 RX ADMIN — EPHEDRINE SULFATE 5 MG: 50 INJECTION INTRAVENOUS at 11:18

## 2024-05-02 RX ADMIN — Medication 100 MCG: at 11:22

## 2024-05-02 RX ADMIN — Medication 100 MCG: at 11:18

## 2024-05-02 NOTE — H&P
History and Physical - [facility]  Arpita Lemus 73 y.o. female MRN: [id number]                  HPI: Arpita Lemus is a 73 y.o. year old female who presents for history of colon polyps.      REVIEW OF SYSTEMS: Per the HPI, and otherwise unremarkable.    Historical Information   Past Medical History:   Diagnosis Date    A-fib (HCC)     Arthritis     Atrial fibrillation (HCC)     Cervical spondylosis with myelopathy     CHF (congestive heart failure) (HCC)     Colon polyp     CPAP (continuous positive airway pressure) dependence     Gastric bypass status for obesity     History of transfusion     35 years ago    Hypertension     mass right kidney     Renal cell adenocarcinoma (HCC)     RIGHT    Sleep apnea     Super obese      Past Surgical History:   Procedure Laterality Date    ABDOMINAL ADHESION SURGERY N/A 06/19/2023    Procedure: LYSIS ADHESIONS;  Surgeon: Curtis Conley DO;  [facility]: [facility];  Service: General    ABDOMINAL WALL SURGERY N/A 07/19/2023    Procedure: PARTIAL CLOSURE WOUND ABDOMINAL/TRUNK;  Surgeon: Lauryn Ullrich, DO;  Location: [facility];  Service: General    ANKLE FRACTURE SURGERY Left     APPENDECTOMY N/A 06/19/2023    Procedure: APPENDECTOMY;  Surgeon: Curtis Conley DO;  [facility]: [facility];  Service: General    CHOLECYSTECTOMY      COLOSTOMY      CRYOABLATION Right     RT KIDNEY    CYSTORRHAPHY      Bladder. Last assessed 4/6/2016     EXPLORATORY LAPAROTOMY W/ BOWEL RESECTION N/A 06/19/2023    Procedure: LAPAROTOMY EXPLORATORY W/ BOWEL RESECTION;  Surgeon: Gildardo Jackson MD;  [facility]: [facility];  Service: General    GASTRIC BYPASS      HERNIA REPAIR      Last assessed 4/6/2016  umlbilical    HYSTERECTOMY      IR DRAINAGE TUBE CHECK/CHANGE/REPOSITION/REINSERTION/UPSIZE  09/29/2023    IR DRAINAGE TUBE PLACEMENT  09/16/2023    LAPAROTOMY N/A 06/19/2023    Procedure: LAPAROTOMY EXPLORATORY, washout, vac change;  Surgeon: Curtis Jenkins  DO Jarvis;  [facility];  Service: General    LAPAROTOMY N/A 06/20/2023    Procedure: LAPAROTOMY, REMOVAL OF WOUND VAC AND PACKING, SMALL BOWEL RESECTION , BOWEL ANASTOMOSIS X 3 , VICRYL MESH BRIDGING -;  Surgeon: Curtis Conley DO;  [facility];  Service: General    NV OPTX DSTL RADL X-ARTIC FX/EPIPHYSL SEP Right 04/22/2016    Procedure: OPEN REDUCTION INTERNAL FIXATION RIGHT DISTAL RADIUS;  Surgeon: Erich Ledezma MD;  [facility];  Service: Orthopedics    REVISION COLOSTOMY      SMALL INTESTINE SURGERY N/A 06/19/2023    Procedure: RESECTION SMALL BOWEL;  Surgeon: Curtis Conley DO;  [facility];  Service: General    THYROID LOBECTOMY Left 08/27/2019    Procedure: LOBECTOMY THYROID, left;  Surgeon: Morgan Cbaa MD;  [facility];  Service: Surgical Oncology    TUBAL LIGATION      US GUIDED THYROID BIOPSY  02/27/2019    US GUIDED THYROID BIOPSY  05/29/2019    WOUND DEBRIDEMENT N/A 07/18/2023    Procedure: DEBRIDEMENT WOUND (WASH OUT);  Surgeon: Juanjo Carey DO;  [facility];  Service: Trauma    WOUND DEBRIDEMENT N/A 07/19/2023    Procedure: ABDOMINAL WOUND (WASH OUT); VAC APPLICATION;  Surgeon: Lauryn Ullrich, DO;  [facility];  Service: General    WRIST FRACTURE SURGERY Bilateral      Social History   Social History     Substance and Sexual Activity   Alcohol Use Never    Comment: 0     Social History     Substance and Sexual Activity   Drug Use No     Social History     Tobacco Use   Smoking Status Never   Smokeless Tobacco Never     Family History   Problem Relation Age of Onset    Heart attack Mother     Lung cancer Father 68    Heart disease Father     Stroke Sister     Lung cancer Sister 56    Prostate cancer Brother 63       Meds/Allergies       Current Outpatient Medications:     acetaminophen (TYLENOL) 325 mg tablet    calcium carbonate (TUMS) 500 mg chewable tablet    Cholecalciferol (Vitamin D) 125 MCG (5000 UT)  "CAPS    cyanocobalamin (VITAMIN B-12) 1000 MCG tablet    diltiazem (CARDIZEM CD) 360 MG 24 hr capsule    ferrous sulfate 325 (65 Fe) mg tablet    metoprolol succinate (TOPROL-XL) 25 mg 24 hr tablet    potassium chloride (K-DUR,KLOR-CON) 20 mEq tablet    Probiotic Product (PRO-BIOTIC BLEND PO)    apixaban (Eliquis) 5 mg    No Known Allergies    Objective     /63   Pulse 76   Temp (!) 96.8 °F (36 °C) (Temporal)   Resp 20   Ht 5' 3\" (1.6 m)   Wt 106 kg (233 lb)   SpO2 98%   BMI 41.27 kg/m²       PHYSICAL EXAM    Gen: NAD  Head: NCAT  CV: RRR  CHEST: Clear  ABD: soft, NT/ND  EXT: no edema      ASSESSMENT/PLAN:  This is a 73 y.o. year old female here for colonoscopy, and she is stable and optimized for her procedure.    ]  "

## 2024-05-02 NOTE — ANESTHESIA POSTPROCEDURE EVALUATION
Post-Op Assessment Note    CV Status:  Stable    Pain management: adequate       Mental Status:  Alert and awake   Hydration Status:  Euvolemic   PONV Controlled:  Controlled   Airway Patency:  Patent     Post Op Vitals Reviewed: Yes      Staff: CRNA             /50 (Simultaneous filing. User may not have seen previous data.) (05/02/24 1150)    Temp 98.1 °F (36.7 °C) (Simultaneous filing. User may not have seen previous data.) (05/02/24 1150)    Pulse 58 (Simultaneous filing. User may not have seen previous data.) (05/02/24 1150)   Resp 16 (Simultaneous filing. User may not have seen previous data.) (05/02/24 1150)    SpO2 98 % (Simultaneous filing. User may not have seen previous data.) (05/02/24 1150)

## 2024-05-02 NOTE — ANESTHESIA PREPROCEDURE EVALUATION
Procedure:  COLONOSCOPY    Relevant Problems   CARDIO   (+) Dyspnea on exertion   (+) Hypertension   (+) Permanent atrial fibrillation (HCC)      ENDO   (+) Hyperparathyroidism (HCC)      GI/HEPATIC   (+) Idiopathic acute pancreatitis without infection or necrosis   (+) Reactive hypoglycemia      /RENAL   (+) Malignant tumor of right kidney parenchyma (HCC)   (+) Stage 3 chronic kidney disease, unspecified whether stage 3a or 3b CKD (HCC)      HEMATOLOGY   (+) Anemia   (+) Iron deficiency anemia      MUSCULOSKELETAL   (+) Cervical spondylosis without myelopathy   (+) Myofascial pain syndrome   (+) Primary osteoarthritis of both knees   (+) Primary osteoarthritis of left knee   (+) Primary osteoarthritis of right knee      NEURO/PSYCH   (+) Anxiety   (+) Myofascial pain syndrome   (+) Occipital neuralgia of right side      PULMONARY   (+) Dyspnea on exertion   (+) GILDARDO (obstructive sleep apnea)      ECHO 6/19/2023:   Left Ventricle: Left ventricular cavity size is normal. Wall thickness is mildly increased. The left ventricular ejection fraction is 60%. Systolic function is normal. Wall motion is normal.    Left Atrium: The atrium is mildly dilated.    Right Atrium: The atrium is mildly dilated.    Aortic Valve: There is aortic valve sclerosis.    Mitral Valve: There is mild annular calcification.    Tricuspid Valve: There is mild regurgitation. The estimated right ventricular systolic pressure is 25.00 mmHg.     Physical Exam    Airway    Mallampati score: III  TM Distance: >3 FB  Neck ROM: full     Dental        Cardiovascular      Pulmonary      Other Findings  post-pubertal.      Anesthesia Plan  ASA Score- 3     Anesthesia Type- IV sedation with anesthesia with ASA Monitors.         Additional Monitors:     Airway Plan:            Plan Factors-    Chart reviewed.    Patient summary reviewed.                  Induction- intravenous.    Postoperative Plan-     Informed Consent- Anesthetic plan and risks discussed  with patient.  I personally reviewed this patient with the CRNA. Discussed and agreed on the Anesthesia Plan with the CRNA..

## 2024-05-06 PROCEDURE — 88305 TISSUE EXAM BY PATHOLOGIST: CPT | Performed by: PATHOLOGY

## 2024-06-10 ENCOUNTER — OFFICE VISIT (OUTPATIENT)
Dept: OBGYN CLINIC | Facility: CLINIC | Age: 74
End: 2024-06-10
Payer: MEDICARE

## 2024-06-10 VITALS
HEIGHT: 63 IN | SYSTOLIC BLOOD PRESSURE: 126 MMHG | BODY MASS INDEX: 39.41 KG/M2 | DIASTOLIC BLOOD PRESSURE: 60 MMHG | WEIGHT: 222.4 LBS

## 2024-06-10 DIAGNOSIS — M17.0 PRIMARY OSTEOARTHRITIS OF BOTH KNEES: Primary | ICD-10-CM

## 2024-06-10 PROCEDURE — 99213 OFFICE O/P EST LOW 20 MIN: CPT | Performed by: PHYSICIAN ASSISTANT

## 2024-06-10 PROCEDURE — 20610 DRAIN/INJ JOINT/BURSA W/O US: CPT | Performed by: PHYSICIAN ASSISTANT

## 2024-06-10 RX ORDER — BUPIVACAINE HYDROCHLORIDE 2.5 MG/ML
2 INJECTION, SOLUTION INFILTRATION; PERINEURAL
Status: COMPLETED | OUTPATIENT
Start: 2024-06-10 | End: 2024-06-10

## 2024-06-10 RX ORDER — LIDOCAINE HYDROCHLORIDE 10 MG/ML
2 INJECTION, SOLUTION INFILTRATION; PERINEURAL
Status: COMPLETED | OUTPATIENT
Start: 2024-06-10 | End: 2024-06-10

## 2024-06-10 RX ORDER — BETAMETHASONE SODIUM PHOSPHATE AND BETAMETHASONE ACETATE 3; 3 MG/ML; MG/ML
6 INJECTION, SUSPENSION INTRA-ARTICULAR; INTRALESIONAL; INTRAMUSCULAR; SOFT TISSUE
Status: COMPLETED | OUTPATIENT
Start: 2024-06-10 | End: 2024-06-10

## 2024-06-10 RX ADMIN — BETAMETHASONE SODIUM PHOSPHATE AND BETAMETHASONE ACETATE 6 MG: 3; 3 INJECTION, SUSPENSION INTRA-ARTICULAR; INTRALESIONAL; INTRAMUSCULAR; SOFT TISSUE at 15:00

## 2024-06-10 RX ADMIN — BUPIVACAINE HYDROCHLORIDE 2 ML: 2.5 INJECTION, SOLUTION INFILTRATION; PERINEURAL at 15:00

## 2024-06-10 RX ADMIN — LIDOCAINE HYDROCHLORIDE 2 ML: 10 INJECTION, SOLUTION INFILTRATION; PERINEURAL at 15:00

## 2024-06-10 NOTE — PROGRESS NOTES
"Patient Name:  Arpita Lemus  MRN:  [id number]    Assessment & Plan     Bilateral knee DJD.  Patient was offered and accepted bilateral knee intra-articular corticosteroid ejections today.  Continue Tylenol as needed.  Activities as tolerated modification avoid pain.  Follow-up in 3 months.  Consider repeat corticosteroid injections at that time as indicated.    Chief Complaint     Follow-up bilateral knee DJD.    History of the Present Illness     Arpita Lemus is a 73 y.o. female who returns to the office today for follow-up regarding her bilateral knee DJD.  She was last seen on 3/11/2024.  At that time she received bilateral knee intra-articular corticosteroid injections.  She returns to the office today noting significant improvement after ending the injections but notes a gradual return of her pain recently.  She states overall her knees feel much better compared to her initial presentation.  She only notes intermittent pain rated 4 out of 10 in intensity.  She still notes persistent stiffness.  She denies any significant swelling, weakness, or instability.  She has recently been ambulating with a cane instead of a walker.  She does take Tylenol at night.  No numbness or tingling.  No fevers or chills.    Physical Exam     /60   Ht 5' 3\" (1.6 m)   Wt 101 kg (222 lb 6.4 oz)   BMI 39.40 kg/m²     Bilateral knees: No gross deformity.  Skin intact.  No erythema ecchymosis or swelling.  No effusion.  No significant joint line tenderness.  Range of motion 0-110 bilaterally with mild pain.  Stable to varus and valgus stress bilaterally.  Stable Lachman test.  Negative posterior drawer test.  Negative Toni's test.  Extensor mechanism intact bilaterally.  Sensation is intact distally.       Eyes: Anicteric sclerae.  ENT: Trachea midline.  Lungs: Normal respiratory effort.  CV: Capillary refill is less than 2 seconds.  Skin: Intact without erythema.  Lymph: No palpable lymphadenopathy.  Neuro: Sensation " is grossly intact to light touch.  Psych: Mood and affect are appropriate.    Past Medical History:   Diagnosis Date    A-fib (HCC)     Arthritis     Atrial fibrillation (HCC)     Cervical spondylosis with myelopathy     CHF (congestive heart failure) (HCC)     Colon polyp     CPAP (continuous positive airway pressure) dependence     Gastric bypass status for obesity     History of transfusion     35 years ago    Hypertension     mass right kidney     Renal cell adenocarcinoma (HCC)     RIGHT    Sleep apnea     Super obese        Past Surgical History:   Procedure Laterality Date    ABDOMINAL ADHESION SURGERY N/A 06/19/2023    Procedure: LYSIS ADHESIONS;  Surgeon: Curtis Conley DO;  [facility];  Service: General    ABDOMINAL WALL SURGERY N/A 07/19/2023    Procedure: PARTIAL CLOSURE WOUND ABDOMINAL/TRUNK;  Surgeon: Lauryn Ullrich, DO;  [facility];  Service: General    ANKLE FRACTURE SURGERY Left     APPENDECTOMY N/A 06/19/2023    Procedure: APPENDECTOMY;  Surgeon: Curtis Conley DO;  [facility];  Service: General    CHOLECYSTECTOMY      COLOSTOMY      CRYOABLATION Right     RT KIDNEY    CYSTORRHAPHY      Bladder. Last assessed 4/6/2016     EXPLORATORY LAPAROTOMY W/ BOWEL RESECTION N/A 06/19/2023    Procedure: LAPAROTOMY EXPLORATORY W/ BOWEL RESECTION;  Surgeon: Gildardo Jackson MD;  [facility];  Service: General    GASTRIC BYPASS      HERNIA REPAIR      Last assessed 4/6/2016  umlbilical    HYSTERECTOMY      IR DRAINAGE TUBE CHECK/CHANGE/REPOSITION/REINSERTION/UPSIZE  09/29/2023    IR DRAINAGE TUBE PLACEMENT  09/16/2023    LAPAROTOMY N/A 06/19/2023    Procedure: LAPAROTOMY EXPLORATORY, washout, vac change;  Surgeon: Curtis Conley DO;  [facility];  Service: General    LAPAROTOMY N/A 06/20/2023    Procedure: LAPAROTOMY, REMOVAL OF WOUND VAC AND PACKING, SMALL BOWEL RESECTION , BOWEL ANASTOMOSIS X 3 , VICRYL MESH BRIDGING -;  Surgeon:  Curtis Conley DO;  [facility];  Service: General    CO OPTX DSTL RADL X-ARTIC FX/EPIPHYSL SEP Right 04/22/2016    Procedure: OPEN REDUCTION INTERNAL FIXATION RIGHT DISTAL RADIUS;  Surgeon: Erich Ledezma MD;  [facility];  Service: Orthopedics    REVISION COLOSTOMY      SMALL INTESTINE SURGERY N/A 06/19/2023    Procedure: RESECTION SMALL BOWEL;  Surgeon: Curtis Conley DO;  [facility];  Service: General    THYROID LOBECTOMY Left 08/27/2019    Procedure: LOBECTOMY THYROID, left;  Surgeon: Morgan Caba MD;  [facility];  Service: Surgical Oncology    TUBAL LIGATION      US GUIDED THYROID BIOPSY  02/27/2019    US GUIDED THYROID BIOPSY  05/29/2019    WOUND DEBRIDEMENT N/A 07/18/2023    Procedure: DEBRIDEMENT WOUND (WASH OUT);  Surgeon: Juanjo Carey DO;  [facility];  Service: Trauma    WOUND DEBRIDEMENT N/A 07/19/2023    Procedure: ABDOMINAL WOUND (WASH OUT); VAC APPLICATION;  Surgeon: Lauryn Ullrich, DO;  [facility];  Service: General    WRIST FRACTURE SURGERY Bilateral        No Known Allergies    Current Outpatient Medications on File Prior to Visit   Medication Sig Dispense Refill    acetaminophen (TYLENOL) 325 mg tablet Take 975 mg by mouth every 6 (six) hours as needed for mild pain or moderate pain      apixaban (Eliquis) 5 mg Take 1 tablet (5 mg total) by mouth 2 (two) times a day 60 tablet 11    calcium carbonate (TUMS) 500 mg chewable tablet Chew 2 tablets 2 (two) times a day      Cholecalciferol (Vitamin D) 125 MCG (5000 UT) CAPS Take 10,000 Units by mouth daily      cyanocobalamin (VITAMIN B-12) 1000 MCG tablet Take 1 tablet (1,000 mcg total) by mouth daily 30 tablet 0    diltiazem (CARDIZEM CD) 360 MG 24 hr capsule Take 1 capsule (360 mg total) by mouth daily 90 capsule 3    ferrous sulfate 325 (65 Fe) mg tablet Take 1 tablet (325 mg total) by mouth daily with breakfast 90 tablet 2    metoprolol succinate (TOPROL-XL) 25 mg 24  hr tablet Take 1 tablet (25 mg total) by mouth daily 90 tablet 3    potassium chloride (K-DUR,KLOR-CON) 20 mEq tablet Take 1 tablet (20 mEq total) by mouth daily 90 tablet 3    Probiotic Product (PRO-BIOTIC BLEND PO) Take by mouth       No current facility-administered medications on file prior to visit.       Social History     Tobacco Use    Smoking status: Never    Smokeless tobacco: Never   Vaping Use    Vaping status: Never Used   Substance Use Topics    Alcohol use: Never     Comment: 0    Drug use: No       Family History   Problem Relation Age of Onset    Heart attack Mother     Lung cancer Father 68    Heart disease Father     Stroke Sister     Lung cancer Sister 56    Prostate cancer Brother 63       Review of Systems     As stated in the HPI. All other systems reviewed and are negative.      Large joint arthrocentesis: bilateral knee  Procedure Details  Location: knee - bilateral knee  Needle size: 22 G  Ultrasound guidance: no  Approach: anterolateral    Medications (Right): 2 mL bupivacaine 0.25 %; 2 mL lidocaine 1 %; 6 mg betamethasone acetate-betamethasone sodium phosphate 6 (3-3) mg/mLMedications (Left): 2 mL bupivacaine 0.25 %; 2 mL lidocaine 1 %; 6 mg betamethasone acetate-betamethasone sodium phosphate 6 (3-3) mg/mL   Patient tolerance: patient tolerated the procedure well with no immediate complications  Dressing:  Sterile dressing applied

## 2024-06-11 NOTE — PHYSICAL THERAPY NOTE
Physical Therapy Treatment Note    Patient's Name: Angel Laboy  : [id number]   PT Last Visit   PT Visit Date 23   Note Type   Note Type Treatment   Pain Assessment   Pain Assessment Tool 0-10   Pain Score No Pain   Restrictions/Precautions   Weight Bearing Precautions Per Order No   Other Precautions Contact/isolation;Multiple lines; Fall Risk  (wound vac)   General   Chart Reviewed Yes   Response to Previous Treatment Patient with no complaints from previous session. Family/Caregiver Present Yes  (son)   Subjective   Subjective Pt agreeable to mobilize. Bed Mobility   Supine to Sit Unable to assess   Sit to Supine 4  Minimal assistance   Additional items Assist x 1; Increased time required;Verbal cues;LE management   Additional Comments Pt greeted in supine. Transfers   Sit to Stand 4  Minimal assistance   Additional items Assist x 1; Increased time required;Verbal cues;Armrests   Stand to Sit 4  Minimal assistance   Additional items Assist x 1; Increased time required;Verbal cues   Additional Comments RW   Ambulation/Elevation   Gait pattern Excessively slow; Short stride; Forward Flexion;Decreased foot clearance   Gait Assistance   (CGA)   Additional items Assist x 1;Verbal cues; Tactile cues  (+ 2nd for chair follow)   Assistive Device Rolling walker   Distance 60'   Balance   Static Sitting Fair +   Dynamic Sitting Fair   Static Standing Fair -   Dynamic Standing Poor +   Ambulatory Poor +  (RW)   Endurance Deficit   Endurance Deficit Yes   Endurance Deficit Description weakness, fatigue   Activity Tolerance   Activity Tolerance Patient limited by fatigue   Assessment   Prognosis Good   Problem List Decreased strength;Decreased endurance; Impaired balance;Decreased mobility;Obesity   Assessment Pt seen for PT treatment session w/ focus on t/f training, gait training, + bed mobility training. Pt motivated to go to [facility].  Pt demonstrated progress w/ decreased assistance required for gait training and t/f. Pt is still functioning below independent baseline and would benefit from rehab upon d/c to return to [facility]. Goals   Patient Goals go to rehab   PT Treatment Day 1   Plan   Treatment/Interventions Functional transfer training;LE strengthening/ROM; Therapeutic exercise; Endurance training;Patient/family training;Equipment eval/education; Bed mobility;Gait training; Compensatory technique education; Family   Progress Progressing toward goals   PT Frequency 3-5x/wk   Recommendation   PT Discharge Recommendation Post acute rehabilitation services   AM-PAC Basic Mobility Inpatient   Turning in Flat Bed Without Bedrails 3   Lying on Back to Sitting on Edge of Flat Bed Without Bedrails 3   Moving Bed to Chair 3   Standing Up From Chair Using Arms 3   Walk in Room 3   Climb 3-5 Stairs With Railing 1   Basic Mobility Inpatient Raw Score 16   Basic Mobility Standardized Score 38.32   Highest Level Of Mobility   JH-HLM Goal 5: Stand one or more mins   JH-HLM Achieved 7: Walk 25 feet or more   Education   Education Provided Mobility training;Assistive device   Patient Demonstrates acceptance/verbal understanding;Reinforcement needed   End of Consult   Patient Position at End of Consult Supine; All needs within reach  (all lines in tact, son at bedside)       Carey Calvillo, PT, DPT You can access the FollowMyHealth Patient Portal offered by [facility] by registering at the following website: [email or web address]. By joining [facility]’s FollowMyHealth portal, you will also be able to view your health information using other applications (apps) compatible with our system.

## 2024-09-10 ENCOUNTER — OFFICE VISIT (OUTPATIENT)
Age: 74
End: 2024-09-10
Payer: MEDICARE

## 2024-09-10 VITALS
WEIGHT: 220.6 LBS | HEIGHT: 63 IN | SYSTOLIC BLOOD PRESSURE: 110 MMHG | BODY MASS INDEX: 39.09 KG/M2 | DIASTOLIC BLOOD PRESSURE: 68 MMHG

## 2024-09-10 DIAGNOSIS — M17.0 PRIMARY OSTEOARTHRITIS OF BOTH KNEES: Primary | ICD-10-CM

## 2024-09-10 PROCEDURE — 99213 OFFICE O/P EST LOW 20 MIN: CPT | Performed by: PHYSICIAN ASSISTANT

## 2024-09-10 PROCEDURE — 20610 DRAIN/INJ JOINT/BURSA W/O US: CPT | Performed by: PHYSICIAN ASSISTANT

## 2024-09-10 RX ORDER — ROPIVACAINE HYDROCHLORIDE 2 MG/ML
2 INJECTION, SOLUTION EPIDURAL; INFILTRATION; PERINEURAL
Status: COMPLETED | OUTPATIENT
Start: 2024-09-10 | End: 2024-09-10

## 2024-09-10 RX ORDER — TRAZODONE HYDROCHLORIDE 100 MG/1
TABLET ORAL
COMMUNITY
Start: 2024-09-02

## 2024-09-10 RX ORDER — BETAMETHASONE SODIUM PHOSPHATE AND BETAMETHASONE ACETATE 3; 3 MG/ML; MG/ML
6 INJECTION, SUSPENSION INTRA-ARTICULAR; INTRALESIONAL; INTRAMUSCULAR; SOFT TISSUE
Status: COMPLETED | OUTPATIENT
Start: 2024-09-10 | End: 2024-09-10

## 2024-09-10 RX ADMIN — ROPIVACAINE HYDROCHLORIDE 2 ML: 2 INJECTION, SOLUTION EPIDURAL; INFILTRATION; PERINEURAL at 15:00

## 2024-09-10 RX ADMIN — BETAMETHASONE SODIUM PHOSPHATE AND BETAMETHASONE ACETATE 6 MG: 3; 3 INJECTION, SUSPENSION INTRA-ARTICULAR; INTRALESIONAL; INTRAMUSCULAR; SOFT TISSUE at 15:00

## 2024-09-10 NOTE — PROGRESS NOTES
"Patient Name:  Arpita Lemus  MRN:  [id number]    Assessment & Plan     Bilateral knee DJD.  Corticosteroid injection performed today into the bilateral knees.  Tylenol as needed for pain.  Activities as tolerated with modification avoid pain.  Follow-up in 3 months for repeat injections at that time as indicated.    Chief Complaint     Follow-up bilateral knee DJD.    History of the Present Illness     Arpita Lemus is a 73 y.o. female who returns to the office today for follow-up regarding her bilateral knee DJD.  She was last seen on 6/10/2024.  At that time she received bilateral knee intra-articular corticosteroid injections.  She noted significant improvement of until recently.  She notes a gradual return of her pain over the past week or so.  She states her pain is not as severe as her initial presentation.  She notes intermittent pain rated 4-6 out of 10 in intensity.  She has stiffness first thing in the morning.  She denies any weakness or instability.  She does exercise regularly without any significant increase in her knee pain.  She currently takes Tylenol intermittently as well.  No numbness or tingling.  No fevers or chills.  She is interested in repeat intra-articular corticosteroid injections today.    Physical Exam     /68 (BP Location: Right arm, Patient Position: Sitting, Cuff Size: Large)   Ht 5' 3\" (1.6 m)   Wt 100 kg (220 lb 9.6 oz)   BMI 39.08 kg/m²     Bilateral knees: No gross deformity.  Skin intact.  No erythema ecchymosis or swelling.  No effusion.  No significant joint line tenderness.  Range of motion 0-110 bilaterally with mild pain.  Stable to varus and valgus stress bilaterally.  Stable Lachman test.  Negative posterior drawer test.  Negative Toni's test.  Extensor mechanism intact bilaterally.  Sensation is intact distally.     Eyes: Anicteric sclerae.  ENT: Trachea midline.  Lungs: Normal respiratory effort.  CV: Capillary refill is less than 2 seconds.  Skin: " Intact without erythema.  Lymph: No palpable lymphadenopathy.  Neuro: Sensation is grossly intact to light touch.  Psych: Mood and affect are appropriate.    Past Medical History:   Diagnosis Date    A-fib (HCC)     Arthritis     Atrial fibrillation (HCC)     Cancer (HCC)     Cervical spondylosis with myelopathy     CHF (congestive heart failure) (HCC)     Colon polyp     CPAP (continuous positive airway pressure) dependence     Gastric bypass status for obesity     History of transfusion     35 years ago    Hypertension     mass right kidney     Renal cell adenocarcinoma (HCC)     RIGHT    Sleep apnea     Super obese        Past Surgical History:   Procedure Laterality Date    ABDOMINAL ADHESION SURGERY N/A 06/19/2023    Procedure: LYSIS ADHESIONS;  Surgeon: Curtis Conley DO;  [facility];  Service: General    ABDOMINAL WALL SURGERY N/A 07/19/2023    Procedure: PARTIAL CLOSURE WOUND ABDOMINAL/TRUNK;  Surgeon: Lauryn Ullrich, DO;  [facility];  Service: General    ANKLE FRACTURE SURGERY Left     ANKLE SURGERY      APPENDECTOMY N/A 06/19/2023    Procedure: APPENDECTOMY;  Surgeon: Curtis Conley DO;  [facility];  Service: General    BARIATRIC SURGERY      CHOLECYSTECTOMY      COLOSTOMY      CRYOABLATION Right     RT KIDNEY    CYSTORRHAPHY      Bladder. Last assessed 4/6/2016     EXPLORATORY LAPAROTOMY W/ BOWEL RESECTION N/A 06/19/2023    Procedure: LAPAROTOMY EXPLORATORY W/ BOWEL RESECTION;  Surgeon: Gildardo Jackson MD;  [facility];  Service: General    GASTRIC BYPASS      HAND SURGERY      HERNIA REPAIR      Last assessed 4/6/2016  umlbilical    HYSTERECTOMY      IR DRAINAGE TUBE CHECK/CHANGE/REPOSITION/REINSERTION/UPSIZE  09/29/2023    IR DRAINAGE TUBE PLACEMENT  09/16/2023    KIDNEY SURGERY      LAPAROTOMY N/A 06/19/2023    Procedure: LAPAROTOMY EXPLORATORY, washout, vac change;  Surgeon: Curtis Conley DO;  [facility];  Service:  General    LAPAROTOMY N/A 06/20/2023    Procedure: LAPAROTOMY, REMOVAL OF WOUND VAC AND PACKING, SMALL BOWEL RESECTION , BOWEL ANASTOMOSIS X 3 , VICRYL MESH BRIDGING -;  Surgeon: Curtis Conley DO;  [facility];  Service: General    LA OPTX DSTL RADL X-ARTIC FX/EPIPHYSL SEP Right 04/22/2016    Procedure: OPEN REDUCTION INTERNAL FIXATION RIGHT DISTAL RADIUS;  Surgeon: Erich Ledezma MD;  [facility];  Service: Orthopedics    REVISION COLOSTOMY      SMALL INTESTINE SURGERY N/A 06/19/2023    Procedure: RESECTION SMALL BOWEL;  Surgeon: Curtis Conley DO;  [facility];  Service: General    THYROID LOBECTOMY Left 08/27/2019    Procedure: LOBECTOMY THYROID, left;  Surgeon: Morgan Caba MD;  [facility];  Service: Surgical Oncology    TUBAL LIGATION      US GUIDED THYROID BIOPSY  02/27/2019    US GUIDED THYROID BIOPSY  05/29/2019    WOUND DEBRIDEMENT N/A 07/18/2023    Procedure: DEBRIDEMENT WOUND (WASH OUT);  Surgeon: Juanjo Carey DO;  [facility];  Service: Trauma    WOUND DEBRIDEMENT N/A 07/19/2023    Procedure: ABDOMINAL WOUND (WASH OUT); VAC APPLICATION;  Surgeon: Lauryn Ullrich, DO;  [facility];  Service: General    WRIST FRACTURE SURGERY Bilateral        No Known Allergies    Current Outpatient Medications on File Prior to Visit   Medication Sig Dispense Refill    acetaminophen (TYLENOL) 325 mg tablet Take 975 mg by mouth every 6 (six) hours as needed for mild pain or moderate pain      apixaban (Eliquis) 5 mg Take 1 tablet (5 mg total) by mouth 2 (two) times a day 60 tablet 11    calcium carbonate (TUMS) 500 mg chewable tablet Chew 2 tablets 2 (two) times a day      Cholecalciferol (Vitamin D) 125 MCG (5000 UT) CAPS Take 10,000 Units by mouth daily      cyanocobalamin (VITAMIN B-12) 1000 MCG tablet Take 1 tablet (1,000 mcg total) by mouth daily 30 tablet 0    diltiazem (CARDIZEM CD) 360 MG 24 hr capsule Take 1 capsule (360 mg total) by  mouth daily 90 capsule 3    ferrous sulfate 325 (65 Fe) mg tablet Take 1 tablet (325 mg total) by mouth daily with breakfast 90 tablet 2    metoprolol succinate (TOPROL-XL) 25 mg 24 hr tablet Take 1 tablet (25 mg total) by mouth daily 90 tablet 3    potassium chloride (K-DUR,KLOR-CON) 20 mEq tablet Take 1 tablet (20 mEq total) by mouth daily 90 tablet 3    Probiotic Product (PRO-BIOTIC BLEND PO) Take by mouth      traZODone (DESYREL) 100 mg tablet TAKE 2 TABLETS BY MOUTH EVERYDAY AT BEDTIME       No current facility-administered medications on file prior to visit.       Social History     Tobacco Use    Smoking status: Never    Smokeless tobacco: Never   Vaping Use    Vaping status: Never Used   Substance Use Topics    Alcohol use: Never     Comment: 0    Drug use: No       Family History   Problem Relation Age of Onset    Heart attack Mother     Lung cancer Father 68    Heart disease Father     Cancer Father     Stroke Sister     Lung cancer Sister 56    Prostate cancer Brother 63       Review of Systems     As stated in the HPI. All other systems reviewed and are negative.      Large joint arthrocentesis: bilateral knee  Procedure Details  Location: knee - bilateral knee  Needle size: 22 G  Ultrasound guidance: no  Approach: anterolateral    Medications (Right): 6 mg betamethasone acetate-betamethasone sodium phosphate 6 (3-3) mg/mL; 2 mL ropivacaine 0.2 %Medications (Left): 6 mg betamethasone acetate-betamethasone sodium phosphate 6 (3-3) mg/mL; 2 mL ropivacaine 0.2 %   Patient tolerance: patient tolerated the procedure well with no immediate complications  Dressing:  Sterile dressing applied

## 2024-09-11 NOTE — ASSESSMENT & PLAN NOTE
Detail Level: Detailed Patient has history of anxiety.    · Continue Ativan, Prozac and quetiapine   · Follow up with psychiatrist outpatient Was A Bandage Applied: Yes Punch Size In Mm: 4 Size Of Lesion In Cm (Optional): 0 Depth Of Punch Biopsy: dermis Biopsy Type: H and E Anesthesia Type: 2% lidocaine with epinephrine Anesthesia Volume In Cc: 1 Hemostasis: Iesha's Epidermal Sutures: 4-0 Ethilon Wound Care: Petrolatum Dressing: bandage Suture Removal: 14 days Patient Will Remove Sutures At Home?: No Consent: Written consent was obtained and risks were reviewed including but not limited to scarring, infection, bleeding, scabbing, incomplete removal, nerve damage and allergy to anesthesia. Post-Care Instructions: I reviewed with the patient in detail post-care instructions. Patient is to keep the biopsy site dry overnight, and then apply bacitracin twice daily until healed. Patient may apply hydrogen peroxide soaks to remove any crusting. Home Suture Removal Text: Patient was provided a home suture removal kit and will remove their sutures at home.  If they have any questions or difficulties they will call the office. Remove sutures in 1 wk Notification Instructions: Patient will be notified of biopsy results. However, patient instructed to call the office if not contacted within 2 weeks. Billing Type: Third-Party Bill Information: Selecting Yes will display possible errors in your note based on the variables you have selected. This validation is only offered as a suggestion for you. PLEASE NOTE THAT THE VALIDATION TEXT WILL BE REMOVED WHEN YOU FINALIZE YOUR NOTE. IF YOU WANT TO FAX A PRELIMINARY NOTE YOU WILL NEED TO TOGGLE THIS TO 'NO' IF YOU DO NOT WANT IT IN YOUR FAXED NOTE.

## 2024-09-19 ENCOUNTER — TELEPHONE (OUTPATIENT)
Age: 74
End: 2024-09-19

## 2024-09-19 NOTE — TELEPHONE ENCOUNTER
Caller: Arpita    Doctor: Emy    Reason for call: Patient has a 6 moth follow up with Dr. Quevedo. Patient states she believes she needs blood work done prior to the appointment which she was hoping to get done today but states she does not see them in her chart. Advised patient there are no notes in last office visit to request testing prior to this visit and I would send a message to clinical team for clarification.    Call back#: [phone]

## 2024-09-24 ENCOUNTER — OFFICE VISIT (OUTPATIENT)
Dept: CARDIOLOGY CLINIC | Facility: CLINIC | Age: 74
End: 2024-09-24
Payer: MEDICARE

## 2024-09-24 VITALS
SYSTOLIC BLOOD PRESSURE: 116 MMHG | OXYGEN SATURATION: 98 % | WEIGHT: 219 LBS | BODY MASS INDEX: 38.8 KG/M2 | DIASTOLIC BLOOD PRESSURE: 66 MMHG | HEIGHT: 63 IN | HEART RATE: 72 BPM

## 2024-09-24 DIAGNOSIS — I10 PRIMARY HYPERTENSION: ICD-10-CM

## 2024-09-24 DIAGNOSIS — I48.21 PERMANENT ATRIAL FIBRILLATION (HCC): ICD-10-CM

## 2024-09-24 DIAGNOSIS — I50.32 CHRONIC DIASTOLIC HEART FAILURE (HCC): Primary | ICD-10-CM

## 2024-09-24 DIAGNOSIS — K55.8 OTHER VASCULAR DISORDERS OF INTESTINE (HCC): ICD-10-CM

## 2024-09-24 PROCEDURE — G2211 COMPLEX E/M VISIT ADD ON: HCPCS | Performed by: INTERNAL MEDICINE

## 2024-09-24 PROCEDURE — 99214 OFFICE O/P EST MOD 30 MIN: CPT | Performed by: INTERNAL MEDICINE

## 2024-09-24 NOTE — PROGRESS NOTES
Cardiology Outpatient Follow-Up Note - Arpita Lemus 73 y.o. female MRN: [id number]      Assessment/Plan:    1. Chronic diastolic heart failure (HCC)  She is euvolemic, compensated    2. Permanent atrial fibrillation (HCC)  Rate controlled  Continue diltiazem  mg daily metoprolol XL 25 mg daily  Split AVN blockers AM/PM to reduce morning palpitations. Will do diltiazem qAM and metoprolol XL qPM.   If this is ineffective will split diltiazem into 180 mg BID.   Continue thromboembolic ppx with apixaban 5 mg BID    3. Primary hypertension  Controlled on current therapy.    4. Body mass index (BMI) 50.0-59.9, adult (HCC)  Much improved now with dietary changes and gastric bypass revision  BMI now 39       We will see Arpita Lemus back in 6 months for routine follow-up.    Subjective:     HPI: Arpita Lemus is a 73 y.o. year old female with permanent AF, chronic diastolic CHF, HTN, presenting for follow-up.    She spent much of June - August 2023 admitted to [facility] with a perforated bowel. During this hospitalization amiodarone was started for her AF despite it being permanent AF.     She also developed volume overload and was eventually transitioned to standing lasix 20 mg daily.     She feels great lately. She has been losing weight through dietary changes. She is down 30 lbs since prior visit 10/27/23. She is overall down over 60 lbs since hospital discharge last summer.     She has occasional morning heart racing.         Cardiac Testing:  echocardiogram on 02/21/2019 showed normal LV size and function, LVEF 55%, mild concentric LV hypertrophy, normal RV size and function, mild left atrial dilation , mild mitral regurgitation.      Echo 6/19/23   Interpretation Summary     Left Ventricle: Left ventricular cavity size is normal. Wall thickness is mildly increased. The left ventricular ejection fraction is 60%. Systolic function is normal. Wall motion is normal.    Left Atrium: The atrium is mildly  "dilated.    Right Atrium: The atrium is mildly dilated.    Aortic Valve: There is aortic valve sclerosis.    Mitral Valve: There is mild annular calcification.    Tricuspid Valve: There is mild regurgitation. The estimated right ventricular systolic pressure is 25.00 mmHg.        EKGs, personally reviewed:  EKG in the office 5/31/23 shows atrial fibrillation, 103 bpm, low voltage, normal axis, otherwise normal intervals. Cannot rule out inferior MI. Poor anterior R wave progression. Abnormal study.      Relevant Labs & Results:  BMP 1/16/23 reviewed -- K 3.4, Cr 1.02  CBC 11/2/22 reviewed -- Hgb 11.2  HbA1c 10/30/22 -- 6.4%, 1/16/23 6.1%       BMP 10/20/23 -- K 4.1, Cr 0.68  CMP 4/11/24 - K 4.0, Cr 0.87, GFR 65, Alk phos 131      ROS:  Review of Systems:  Review of Systems    Objective:     Vitals:   Vitals:    09/24/24 1604   BP: 116/66   BP Location: Left arm   Patient Position: Sitting   Cuff Size: Large   Pulse: 72   SpO2: 98%   Weight: 99.3 kg (219 lb)   Height: 5' 3\" (1.6 m)    Body surface area is 2.01 meters squared.  Wt Readings from Last 3 Encounters:   09/24/24 99.3 kg (219 lb)   09/10/24 100 kg (220 lb 9.6 oz)   06/10/24 101 kg (222 lb 6.4 oz)       Physical Exam:    General: Arpita Lemus is a well appearing female, in no acute distress, sitting comfortably  HEENT: moist mucous membranes, EOMI  Neck:  No JVD, supple, trachea midline  Cardiovascular: unremarkable s1/2, irregular rhythm, normal rate, 1/6 SM  Pulmonary: normal respiratory effort, CTAB  Abdomen: soft and nondistended  Extremities: No lower extremity edema. Warm and well perfused extremities.  Neuro: no focal motor deficits, AAOx3 (person, place, time)  Psych: Normal mood and affect, cooperative        Medications (at the START of this encounter):  Outpatient Medications Prior to Visit   Medication Sig Dispense Refill    acetaminophen (TYLENOL) 325 mg tablet Take 975 mg by mouth every 6 (six) hours as needed for mild pain or moderate pain " "     apixaban (Eliquis) 5 mg Take 1 tablet (5 mg total) by mouth 2 (two) times a day 60 tablet 11    calcium carbonate (TUMS) 500 mg chewable tablet Chew 2 tablets 2 (two) times a day      Cholecalciferol (Vitamin D) 125 MCG (5000 UT) CAPS Take 10,000 Units by mouth daily      cyanocobalamin (VITAMIN B-12) 1000 MCG tablet Take 1 tablet (1,000 mcg total) by mouth daily 30 tablet 0    diltiazem (CARDIZEM CD) 360 MG 24 hr capsule Take 1 capsule (360 mg total) by mouth daily 90 capsule 3    ferrous sulfate 325 (65 Fe) mg tablet Take 1 tablet (325 mg total) by mouth daily with breakfast 90 tablet 2    metoprolol succinate (TOPROL-XL) 25 mg 24 hr tablet Take 1 tablet (25 mg total) by mouth daily 90 tablet 3    potassium chloride (K-DUR,KLOR-CON) 20 mEq tablet Take 1 tablet (20 mEq total) by mouth daily 90 tablet 3    Probiotic Product (PRO-BIOTIC BLEND PO) Take by mouth      traZODone (DESYREL) 100 mg tablet TAKE 2 TABLETS BY MOUTH EVERYDAY AT BEDTIME       No facility-administered medications prior to visit.                 Time Spent:  Total time (face-to-face and non-face-to-face) spent on today's visit was 23 minutes. This includes preparation for the visits (i.e. reviewing test results), performance of a medically appropriate history and examination, and orders for medications, tests or other procedures. This time is exclusive of procedures performed and time spent teaching.      This note was completed in part utilizing [employer] voice recognition software. Grammatical errors, random word insertion, spelling mistakes, occasional wrong word or \"sound-alike\" substitutions and incomplete sentences may be an occasional consequence of the system secondary to software limitations, ambient noise and hardware issues. At the time of dictation, efforts were made to edit, clarify and /or correct errors.  Please read the chart carefully and recognize, using context, where substitutions have occurred.  If you have any " questions or concerns about the context, text or information contained within the body of this dictation, please contact myself, the provider, for further clarification.     Discharged

## 2024-10-02 ENCOUNTER — HOSPITAL ENCOUNTER (OUTPATIENT)
Dept: RADIOLOGY | Age: 74
Discharge: HOME | End: 2024-10-02
Payer: MEDICARE

## 2024-10-02 DIAGNOSIS — M81.0 OSTEOPOROSIS, UNSPECIFIED OSTEOPOROSIS TYPE, UNSPECIFIED PATHOLOGICAL FRACTURE PRESENCE: ICD-10-CM

## 2024-10-02 PROCEDURE — 77080 DXA BONE DENSITY AXIAL: CPT

## 2024-10-10 ENCOUNTER — APPOINTMENT (OUTPATIENT)
Dept: LAB | Facility: IMAGING CENTER | Age: 74
End: 2024-10-10
Payer: MEDICARE

## 2024-10-10 DIAGNOSIS — Z98.84 BARIATRIC SURGERY STATUS: ICD-10-CM

## 2024-10-10 DIAGNOSIS — E21.3 HYPERPARATHYROIDISM (HCC): ICD-10-CM

## 2024-10-10 DIAGNOSIS — I48.21 PERMANENT ATRIAL FIBRILLATION (HCC): ICD-10-CM

## 2024-10-10 DIAGNOSIS — E55.9 VITAMIN D DEFICIENCY: ICD-10-CM

## 2024-10-10 DIAGNOSIS — I50.32 CHRONIC DIASTOLIC HEART FAILURE (HCC): ICD-10-CM

## 2024-10-10 DIAGNOSIS — E89.0 STATUS POST PARTIAL THYROIDECTOMY: ICD-10-CM

## 2024-10-10 LAB
25(OH)D3 SERPL-MCNC: 56.4 NG/ML (ref 30–100)
ALBUMIN SERPL BCG-MCNC: 3.7 G/DL (ref 3.5–5)
ALP SERPL-CCNC: 108 U/L (ref 34–104)
ALT SERPL W P-5'-P-CCNC: 17 U/L (ref 7–52)
ANION GAP SERPL CALCULATED.3IONS-SCNC: 10 MMOL/L (ref 4–13)
AST SERPL W P-5'-P-CCNC: 21 U/L (ref 13–39)
BASOPHILS # BLD AUTO: 0.04 THOUSANDS/ΜL (ref 0–0.1)
BASOPHILS NFR BLD AUTO: 1 % (ref 0–1)
BILIRUB SERPL-MCNC: 0.56 MG/DL (ref 0.2–1)
BUN SERPL-MCNC: 15 MG/DL (ref 5–25)
CALCIUM SERPL-MCNC: 8.8 MG/DL (ref 8.4–10.2)
CHLORIDE SERPL-SCNC: 104 MMOL/L (ref 96–108)
CO2 SERPL-SCNC: 28 MMOL/L (ref 21–32)
CREAT SERPL-MCNC: 0.79 MG/DL (ref 0.6–1.3)
EOSINOPHIL # BLD AUTO: 0.02 THOUSAND/ΜL (ref 0–0.61)
EOSINOPHIL NFR BLD AUTO: 0 % (ref 0–6)
ERYTHROCYTE [DISTWIDTH] IN BLOOD BY AUTOMATED COUNT: 12.9 % (ref 11.6–15.1)
FERRITIN SERPL-MCNC: 79 NG/ML (ref 11–307)
FOLATE SERPL-MCNC: >22.3 NG/ML
GFR SERPL CREATININE-BSD FRML MDRD: 73 ML/MIN/1.73SQ M
GLUCOSE P FAST SERPL-MCNC: 95 MG/DL (ref 65–99)
HCT VFR BLD AUTO: 44.1 % (ref 34.8–46.1)
HGB BLD-MCNC: 13.9 G/DL (ref 11.5–15.4)
IMM GRANULOCYTES # BLD AUTO: 0.03 THOUSAND/UL (ref 0–0.2)
IMM GRANULOCYTES NFR BLD AUTO: 1 % (ref 0–2)
IRON SATN MFR SERPL: 38 % (ref 15–50)
IRON SERPL-MCNC: 110 UG/DL (ref 50–212)
LYMPHOCYTES # BLD AUTO: 1.18 THOUSANDS/ΜL (ref 0.6–4.47)
LYMPHOCYTES NFR BLD AUTO: 25 % (ref 14–44)
MCH RBC QN AUTO: 32.2 PG (ref 26.8–34.3)
MCHC RBC AUTO-ENTMCNC: 31.5 G/DL (ref 31.4–37.4)
MCV RBC AUTO: 102 FL (ref 82–98)
MONOCYTES # BLD AUTO: 0.29 THOUSAND/ΜL (ref 0.17–1.22)
MONOCYTES NFR BLD AUTO: 6 % (ref 4–12)
NEUTROPHILS # BLD AUTO: 3.23 THOUSANDS/ΜL (ref 1.85–7.62)
NEUTS SEG NFR BLD AUTO: 67 % (ref 43–75)
NRBC BLD AUTO-RTO: 0 /100 WBCS
PLATELET # BLD AUTO: 228 THOUSANDS/UL (ref 149–390)
PMV BLD AUTO: 10.2 FL (ref 8.9–12.7)
POTASSIUM SERPL-SCNC: 4.3 MMOL/L (ref 3.5–5.3)
PROT SERPL-MCNC: 6 G/DL (ref 6.4–8.4)
PTH-INTACT SERPL-MCNC: 39 PG/ML (ref 12–88)
RBC # BLD AUTO: 4.32 MILLION/UL (ref 3.81–5.12)
SODIUM SERPL-SCNC: 142 MMOL/L (ref 135–147)
T4 FREE SERPL-MCNC: 0.79 NG/DL (ref 0.61–1.12)
TIBC SERPL-MCNC: 286 UG/DL (ref 250–450)
TSH SERPL DL<=0.05 MIU/L-ACNC: 5.47 UIU/ML (ref 0.45–4.5)
UIBC SERPL-MCNC: 176 UG/DL (ref 155–355)
VIT B12 SERPL-MCNC: 530 PG/ML (ref 180–914)
WBC # BLD AUTO: 4.79 THOUSAND/UL (ref 4.31–10.16)

## 2024-10-10 PROCEDURE — 82306 VITAMIN D 25 HYDROXY: CPT

## 2024-10-10 PROCEDURE — 36415 COLL VENOUS BLD VENIPUNCTURE: CPT

## 2024-10-10 PROCEDURE — 83970 ASSAY OF PARATHORMONE: CPT

## 2024-10-10 PROCEDURE — 82607 VITAMIN B-12: CPT

## 2024-10-10 PROCEDURE — 80053 COMPREHEN METABOLIC PANEL: CPT

## 2024-10-10 PROCEDURE — 84443 ASSAY THYROID STIM HORMONE: CPT

## 2024-10-10 PROCEDURE — 84439 ASSAY OF FREE THYROXINE: CPT

## 2024-10-10 PROCEDURE — 82728 ASSAY OF FERRITIN: CPT

## 2024-10-10 PROCEDURE — 82746 ASSAY OF FOLIC ACID SERUM: CPT

## 2024-10-10 PROCEDURE — 83540 ASSAY OF IRON: CPT

## 2024-10-10 PROCEDURE — 85025 COMPLETE CBC W/AUTO DIFF WBC: CPT

## 2024-10-10 PROCEDURE — 83550 IRON BINDING TEST: CPT

## 2024-10-17 ENCOUNTER — TELEPHONE (OUTPATIENT)
Age: 74
End: 2024-10-17

## 2024-10-17 DIAGNOSIS — I48.21 PERMANENT ATRIAL FIBRILLATION (HCC): ICD-10-CM

## 2024-10-17 NOTE — TELEPHONE ENCOUNTER
"Pt called stating since med changes at the office visit 9/24/24 the heart racing in the morning has improved, but continues.     Meds that were changed:   Diltiazem qAM and Toprol qPM.     Last OV note states \"If this is ineffective will split diltiazem into 180 mg BID.\"    Pt asked to have any scripts sent to [facility]    She is also asking if she should resume Toprol in the am, rather than in the evening?  "

## 2024-10-18 RX ORDER — DILTIAZEM HYDROCHLORIDE 180 MG/1
180 CAPSULE, COATED, EXTENDED RELEASE ORAL 2 TIMES DAILY
Qty: 180 CAPSULE | Refills: 3 | Status: SHIPPED | OUTPATIENT
Start: 2024-10-18

## 2024-10-25 ENCOUNTER — OFFICE VISIT (OUTPATIENT)
Dept: ENDOCRINOLOGY | Facility: CLINIC | Age: 74
End: 2024-10-25
Payer: MEDICARE

## 2024-10-25 VITALS
HEIGHT: 63 IN | DIASTOLIC BLOOD PRESSURE: 68 MMHG | WEIGHT: 217.2 LBS | SYSTOLIC BLOOD PRESSURE: 118 MMHG | BODY MASS INDEX: 38.48 KG/M2

## 2024-10-25 DIAGNOSIS — M81.0 OSTEOPOROSIS, UNSPECIFIED OSTEOPOROSIS TYPE, UNSPECIFIED PATHOLOGICAL FRACTURE PRESENCE: Primary | ICD-10-CM

## 2024-10-25 DIAGNOSIS — E89.0 STATUS POST PARTIAL THYROIDECTOMY: ICD-10-CM

## 2024-10-25 DIAGNOSIS — E55.9 VITAMIN D DEFICIENCY: ICD-10-CM

## 2024-10-25 DIAGNOSIS — E21.3 HYPERPARATHYROIDISM (HCC): ICD-10-CM

## 2024-10-25 PROCEDURE — 96372 THER/PROPH/DIAG INJ SC/IM: CPT | Performed by: INTERNAL MEDICINE

## 2024-10-25 PROCEDURE — 99214 OFFICE O/P EST MOD 30 MIN: CPT | Performed by: INTERNAL MEDICINE

## 2024-10-25 NOTE — PROGRESS NOTES
"Assessment/Plan:    Arpita Lemus came into the [facility] Office today 10/25/24 to receive Prolia injection.      Verbal consent obtained.  Consent given by: patient    patient states patient has been medically healthy with no underlining concerns/complications.      Arpita Lemus presents with No symptoms today.       All insturctions were reviewed with the patient.    If the patient should have any questions/concerns, advised patient to contacted [facility].       Subjective:     History provided by: patient    Patient ID: Arpita Lemus is a 73 y.o. female      Objective:    Vitals:    10/25/24 1103   BP: 118/68   BP Location: Left arm   Patient Position: Sitting   Cuff Size: Adult   Weight: 98.5 kg (217 lb 3.2 oz)   Height: 5' 3.4\" (1.61 m)       Patient tolerated the injection well without any complications.  Injection site/s Left Arm.  Medication was provided by Office.    Patient signed consent form yes   Patient signed ABN form yes (If no patient is not a medicare patient).   Patient waited 15 minutes after injection no (This only applies to patient's receiving first time injection).       Last Visit: Visit date not found  Next visit:Visit date not found      "

## 2024-10-25 NOTE — PROGRESS NOTES
10/25/2024    Assessment & Plan      Diagnoses and all orders for this visit:    Osteoporosis, unspecified osteoporosis type, unspecified pathological fracture presence  -     denosumab (PROLIA) subcutaneous injection 60 mg  -     Comprehensive metabolic panel; Future  -     PTH, intact; Future  -     Vitamin D 25 hydroxy; Future  -     TSH, 3rd generation; Future  -     T4, free; Future    Vitamin D deficiency  -     Comprehensive metabolic panel; Future  -     PTH, intact; Future  -     Vitamin D 25 hydroxy; Future  -     TSH, 3rd generation; Future  -     T4, free; Future    Status post partial thyroidectomy  -     Comprehensive metabolic panel; Future  -     PTH, intact; Future  -     Vitamin D 25 hydroxy; Future  -     TSH, 3rd generation; Future  -     T4, free; Future    Hyperparathyroidism (HCC)  -     Comprehensive metabolic panel; Future  -     PTH, intact; Future  -     Vitamin D 25 hydroxy; Future  -     TSH, 3rd generation; Future  -     T4, free; Future        Assessment/Plan:  1.  Osteoporosis: Continue Prolia every 6 months.  Reviewed recent DEXA scan.  Lumbar spine had a significant increase.  Some decrease in density was seen in hip.  Vitamin D and calcium levels are stable.  PTH level is normalized.  Next DEXA scan will be due in 2026.    2.  History of hyperparathyroidism: This is normalized on supplementation.    3.  History of partial thyroidectomy and slightly elevated TSH: Clinically she is feeling well.  We will continue  to monitor over time.    4.  Vitamin D deficiency: Continue supplementation.      CC: Follow-up    History of Present Illness     HPI: Arpita Lemus is a 73 y.o. year old female with history of osteoporosis who presents for a follow-up appointment.  She has been on Prolia since about 2020 for treatment of osteoporosis and tolerating this well.  She continues on calcium and vitamin D supplementation.  No recent fall or fracture.  Other endocrine related history includes  history of hyperparathyroidism as well as partial thyroidectomy.  She presents today overall feeling well.  No new health issues or symptoms of concern.    Review of Systems   Constitutional:  Negative for fatigue.   HENT:  Negative for trouble swallowing and voice change.    Eyes:  Negative for visual disturbance.   Respiratory:  Negative for shortness of breath.    Cardiovascular:  Negative for palpitations and leg swelling.   Gastrointestinal:  Negative for abdominal pain, nausea and vomiting.   Endocrine: Negative for polydipsia and polyuria.   Musculoskeletal:  Negative for arthralgias and myalgias.   Skin:  Negative for rash.   Neurological:  Negative for dizziness, tremors and weakness.   Hematological:  Negative for adenopathy.   Psychiatric/Behavioral:  Negative for agitation and confusion.        Historical Information   Past Medical History:   Diagnosis Date    A-fib (HCC)     Arthritis     Atrial fibrillation (HCC)     Cancer (HCC)     Cervical spondylosis with myelopathy     CHF (congestive heart failure) (HCC)     Colon polyp     CPAP (continuous positive airway pressure) dependence     Gastric bypass status for obesity     History of transfusion     35 years ago    Hypertension     mass right kidney     Renal cell adenocarcinoma (HCC)     RIGHT    Sleep apnea     Super obese      Past Surgical History:   Procedure Laterality Date    ABDOMINAL ADHESION SURGERY N/A 06/19/2023    Procedure: LYSIS ADHESIONS;  Surgeon: Curtis Conley DO;  [facility]: [facility];  Service: General    ABDOMINAL WALL SURGERY N/A 07/19/2023    Procedure: PARTIAL CLOSURE WOUND ABDOMINAL/TRUNK;  Surgeon: Lauryn Ullrich, DO;  [facility];  Service: General    ANKLE FRACTURE SURGERY Left     ANKLE SURGERY      APPENDECTOMY N/A 06/19/2023    Procedure: APPENDECTOMY;  Surgeon: Curtis Conley DO;  [facility];  Service: General    BARIATRIC SURGERY      CHOLECYSTECTOMY      COLOSTOMY       CRYOABLATION Right     RT KIDNEY    CYSTORRHAPHY      Bladder. Last assessed 4/6/2016     EXPLORATORY LAPAROTOMY W/ BOWEL RESECTION N/A 06/19/2023    Procedure: LAPAROTOMY EXPLORATORY W/ BOWEL RESECTION;  Surgeon: Gildardo Jackson MD;  [facility];  Service: General    GASTRIC BYPASS      HAND SURGERY      HERNIA REPAIR      Last assessed 4/6/2016  umlbilical    HYSTERECTOMY      IR DRAINAGE TUBE CHECK/CHANGE/REPOSITION/REINSERTION/UPSIZE  09/29/2023    IR DRAINAGE TUBE PLACEMENT  09/16/2023    KIDNEY SURGERY      LAPAROTOMY N/A 06/19/2023    Procedure: LAPAROTOMY EXPLORATORY, washout, vac change;  Surgeon: Curtis Conley DO;  [facility];  Service: General    LAPAROTOMY N/A 06/20/2023    Procedure: LAPAROTOMY, REMOVAL OF WOUND VAC AND PACKING, SMALL BOWEL RESECTION , BOWEL ANASTOMOSIS X 3 , VICRYL MESH BRIDGING -;  Surgeon: Curtis Conley DO;  [facility];  Service: General    MI OPTX DSTL RADL X-ARTIC FX/EPIPHYSL SEP Right 04/22/2016    Procedure: OPEN REDUCTION INTERNAL FIXATION RIGHT DISTAL RADIUS;  Surgeon: Erich Ledezma MD;  [facility];  Service: Orthopedics    REVISION COLOSTOMY      SMALL INTESTINE SURGERY N/A 06/19/2023    Procedure: RESECTION SMALL BOWEL;  Surgeon: Curtis Conley DO;  [facility];  Service: General    THYROID LOBECTOMY Left 08/27/2019    Procedure: LOBECTOMY THYROID, left;  Surgeon: Morgan Caba MD;  [facility];  Service: Surgical Oncology    TUBAL LIGATION      US GUIDED THYROID BIOPSY  02/27/2019    US GUIDED THYROID BIOPSY  05/29/2019    WOUND DEBRIDEMENT N/A 07/18/2023    Procedure: DEBRIDEMENT WOUND (WASH OUT);  Surgeon: Juanjo Carey DO;  [facility];  Service: Trauma    WOUND DEBRIDEMENT N/A 07/19/2023    Procedure: ABDOMINAL WOUND (WASH OUT); VAC APPLICATION;  Surgeon: Lauryn Ullrich, DO;  [facility];  Service: General    WRIST FRACTURE SURGERY Bilateral      Social  "History   Social History     Substance and Sexual Activity   Alcohol Use Never    Comment: 0     Social History     Substance and Sexual Activity   Drug Use No     Social History     Tobacco Use   Smoking Status Never   Smokeless Tobacco Never     Family History:   Family History   Problem Relation Age of Onset    Heart attack Mother     Lung cancer Father 68    Heart disease Father     Cancer Father     Stroke Sister     Lung cancer Sister 56    Prostate cancer Brother 63       Meds/Allergies   Current Outpatient Medications   Medication Sig Dispense Refill    acetaminophen (TYLENOL) 325 mg tablet Take 975 mg by mouth every 6 (six) hours as needed for mild pain or moderate pain      apixaban (Eliquis) 5 mg Take 1 tablet (5 mg total) by mouth 2 (two) times a day 60 tablet 11    calcium carbonate (TUMS) 500 mg chewable tablet Chew 2 tablets 2 (two) times a day      Cholecalciferol (Vitamin D) 125 MCG (5000 UT) CAPS Take 10,000 Units by mouth daily      cyanocobalamin (VITAMIN B-12) 1000 MCG tablet Take 1 tablet (1,000 mcg total) by mouth daily 30 tablet 0    diltiazem (CARDIZEM CD) 180 mg 24 hr capsule Take 1 capsule (180 mg total) by mouth 2 (two) times a day 180 capsule 3    ferrous sulfate 325 (65 Fe) mg tablet Take 1 tablet (325 mg total) by mouth daily with breakfast 90 tablet 2    metoprolol succinate (TOPROL-XL) 25 mg 24 hr tablet Take 1 tablet (25 mg total) by mouth daily 90 tablet 3    potassium chloride (K-DUR,KLOR-CON) 20 mEq tablet Take 1 tablet (20 mEq total) by mouth daily 90 tablet 3    Probiotic Product (PRO-BIOTIC BLEND PO) Take by mouth      traZODone (DESYREL) 100 mg tablet TAKE 2 TABLETS BY MOUTH EVERYDAY AT BEDTIME       Current Facility-Administered Medications   Medication Dose Route Frequency Provider Last Rate Last Admin    denosumab (PROLIA) subcutaneous injection 60 mg  60 mg Subcutaneous Once          No Known Allergies    Objective   Vitals: Blood pressure 118/68, height 5' 3.4\" (1.61 m), " weight 98.5 kg (217 lb 3.2 oz).  Invasive Devices       None                   Physical Exam  Vitals reviewed.   Constitutional:       General: She is not in acute distress.     Appearance: She is well-developed. She is not diaphoretic.   HENT:      Head: Normocephalic and atraumatic.   Eyes:      Conjunctiva/sclera: Conjunctivae normal.      Pupils: Pupils are equal, round, and reactive to light.   Neck:      Thyroid: No thyromegaly.   Cardiovascular:      Rate and Rhythm: Normal rate and regular rhythm.   Pulmonary:      Effort: Pulmonary effort is normal. No respiratory distress.      Breath sounds: Normal breath sounds.   Abdominal:      General: Bowel sounds are normal.      Palpations: Abdomen is soft.   Musculoskeletal:         General: Normal range of motion.      Cervical back: Normal range of motion and neck supple.   Skin:     General: Skin is warm and dry.      Findings: No rash.   Neurological:      Mental Status: She is alert and oriented to person, place, and time.      Motor: No abnormal muscle tone.   Psychiatric:         Behavior: Behavior normal.         The history was obtained from the review of the chart and from the patient.    Lab Results:          10/2/24:  DXA SCAN     CLINICAL HISTORY: 73 years postmenopausal female.  OTHER RISK FACTORS: Gastric bypass surgery.     PHARMACOLOGIC THERAPY FOR OSTEOPOROSIS: Prolia.     TECHNIQUE: Bone densitometry was performed using a HoloUI Robot Horizon A bone densitometer.  Regions of interest appear properly placed.     COMPARISON: 7/5/2022.     RESULTS:     LUMBAR SPINE  Level: L1, L3, L4  (L2 vertebra excluded from analysis due to local structural abnormalities or artifact):  BMD: 0.848 gm/cm2  T-score: -1.9        LEFT  TOTAL HIP:  BMD: 0.674 gm/cm2  T-score: -2.2     LEFT  FEMORAL NECK:  BMD: 0.544 gm/cm2  T score: -2.7        IMPRESSION:     1. Osteoporosis. Based on the left femoral neck     2.  Since a DXA study from 7/5/2022, there has been:  A   STATISTICALLY SIGNIFICANT INCREASE in bone mineral density of 0.062 g/cm2 (7.9%) in the lumbar spine.  A  STATISTICALLY SIGNIFICANT DECREASE in bone mineral density of 0.063 g/cm2 (8.5%) in the left total hip.        3.  The 10 year risk of hip fracture is 4.9% with the 10 year risk of major osteoporotic fracture being 16% as calculated by the [employer] fracture risk assessment tool (FRAX, which is based on data generated by the WHO Collaborating [employer]   for Metabolic Bone Diseases).     4.  The current NOF guidelines recommend treating patients with a T-score of -2.5 or less in the lumbar spine or hips, or in post-menopausal women and men over the age of 50 with low bone mass (osteopenia) and a FRAX 10 year risk score of >3% for hip   fracture and/or >20% for major osteoporotic fracture.     5.  The NOF recommends follow-up DXA in 1-2 years after initiating therapy for osteoporosis and every 2 years thereafter. More frequent evaluation is appropriate for patients with conditions associated with rapid bone loss, such as glucocorticoid   therapy. The interval between DXA screenings may be longer for individuals without major risk factors and initial T-score in the normal or upper low bone mass range.     The FRAX algorithm has certain limitations:  -FRAX has not been validated in patients currently or previously treated with pharmacotherapy for osteoporosis.  In such patients, clinical judgment must be exercised in interpreting FRAX scores.  -Prior hip, vertebral and humeral fragility fractures appear to confer greater risk of subsequent fracture than fractures at other sites (this is especially true for individuals with severe vertebral fractures), but quantification of this incremental   risk is not possible with FRAX.  -FRAX underestimates fracture risk in patients with history of multiple fragility fractures.  -FRAX may underestimate fracture risk in patients with history of frequent falls.  -It is  "not appropriate to use FRAX to monitor treatment response.        WHO CLASSIFICATION:  Normal (a T-score of -1.0 or higher)  Low bone mineral density (a T-score of less than -1.0 but higher than -2.5)  Osteoporosis (a T-score of -2.5 or less)  Severe osteoporosis (a T-score of -2.5 or less with a fragility fracture)        LEAST SIGNIFICANT CHANGE (AT 95% C.I):     Lumbar spine 0.036 g/cm2; 2.2%  Total hip: 0.022 g/cm2; 2.6%  Forearm: 0.017 g/cm2; 3.0%.     Workstation performed: [id number]       Component      Latest Ref Rng 10/10/2024   Sodium      135 - 147 mmol/L 142    Potassium      3.5 - 5.3 mmol/L 4.3    Chloride      96 - 108 mmol/L 104    Carbon Dioxide      21 - 32 mmol/L 28    ANION GAP      4 - 13 mmol/L 10    BUN      5 - 25 mg/dL 15    Creatinine      0.60 - 1.30 mg/dL 0.79    GLUCOSE, FASTING      65 - 99 mg/dL 95    Calcium      8.4 - 10.2 mg/dL 8.8    AST      13 - 39 U/L 21    ALT      7 - 52 U/L 17    ALK PHOS      34 - 104 U/L 108 (H)    Total Protein      6.4 - 8.4 g/dL 6.0 (L)    Albumin      3.5 - 5.0 g/dL 3.7    Total Bilirubin      0.20 - 1.00 mg/dL 0.56    GFR, Calculated      ml/min/1.73sq m 73    PTH      12.0 - 88.0 pg/mL 39.0    VITAMIN D, 25-HYDROXY      30.0 - 100.0 ng/mL 56.4    TSH 3RD GENERATON      0.450 - 4.500 uIU/mL 5.467 (H)    FREE T4      0.61 - 1.12 ng/dL 0.79       Legend:  (H) High  (L) Low    Future Appointments   Date Time Provider Department Center   12/10/2024  3:30 PM Hira Hodges PA-C [facility]   4/1/2025  4:00 PM Ramos Dailey MD [facility]       Portions of the record may have been created with voice recognition software. Occasional wrong word or \"sound a like\" substitutions may have occurred due to the inherent limitations of voice recognition software. Read the chart carefully and recognize, using context, where substitutions have occurred.    "

## 2024-11-03 NOTE — TEAM CONFERENCE
[facility] Conference Note  Date: 8/1/2023   Time: 8:19 AM       Patient Name:  Dwayne Guo       Medical Record Number: [id number]   YOB: 1950  Sex: Female          Room/Bed:  [place] [address]/[place] [address]-01  Payor Info:  Payor: MEDICARE / Plan: MEDICARE A AND B / Product Type: Medicare A & B Fee for Service /      Admitting Diagnosis: Surgical site reaction, initial encounter [T81. 9XXA]   Admit Date/Time:  7/24/2023  4:34 PM  Admission Comments: No comment available     Primary Diagnosis:  Open abdominal wall wound  Principal Problem: Open abdominal wall wound    Patient Active Problem List    Diagnosis Date Noted   • Mood disorder ([address]) 07/28/2023   • Encounter for skin care 07/28/2023   • COVID 07/24/2023   • Ambulatory dysfunction 07/17/2023   • Open abdominal wall wound 07/13/2023   • Iron deficiency anemia 07/13/2023   • Hypotension 07/13/2023   • Hypokalemia 07/10/2023   • Anemia 07/10/2023   • Bowel perforation ([address]) 07/05/2023   • Acute encephalopathy 07/05/2023   • Anxiety 07/05/2023   • Diarrhea 07/05/2023   • Idiopathic acute pancreatitis without infection or necrosis 06/18/2023   • Primary osteoarthritis of both knees 06/07/2023   • Stage 3 chronic kidney disease, unspecified whether stage 3a or 3b CKD ([address]) 05/31/2023   • Hypertension    • Morbid obesity with BMI of 50.0-59.9, adult ([address]) 11/01/2022   • Unsteady gait 10/30/2022   • Dyspnea on exertion 10/30/2022   • SIRS (systemic inflammatory response syndrome) ([address]) 10/30/2022   • Incontinence 10/30/2022   • GILDARDO (obstructive sleep apnea) 10/30/2022   • Vitamin D deficiency 07/06/2021   • Reactive hypoglycemia 12/30/2020   • Osteoporosis 06/04/2020   • Hyperparathyroidism ([address]) 06/04/2020   • Hashimoto's thyroiditis 03/17/2020   • Early menopause occurring in patient age younger than 39 years 03/17/2020   • S/P bariatric surgery 03/17/2020   • Myofascial pain syndrome 10/21/2019   • Status post partial thyroidectomy 09/12/2019 • Cervical spondylosis without myelopathy 06/28/2019   • Arthropathy of cervical facet joint 06/28/2019   • Occipital neuralgia of right side 06/28/2019   • Chest wall tenderness 01/24/2019   • Chronic heart failure ([address]) 01/24/2019   • Permanent atrial fibrillation ([address]) 01/24/2019   • Myalgia 01/08/2019   • Primary osteoarthritis of left knee 10/23/2018   • Primary osteoarthritis of right knee 10/23/2018   • Chronic pain of left knee 10/23/2018   • Malignant tumor of right kidney parenchyma (HCC) 05/11/2016   • Closed fracture of right distal radius and ulna 04/22/2016       Physical Therapy:    Weight Bearing Status: Full Weight Bearing  Transfers: Minimal Assistance  Bed Mobility: Moderate Assistance  Amulation Distance (ft): 40 feet  Ambulation: Minimal Assistance  Assistive Device for Ambulation: Roller Walker  Wheelchair Mobility Distance: 0 ft  Number of Stairs: 1 (curb step 4inch with RW ModA NILS)  Discharge Recommendations: Home with:  [address] [place] [address] with[de-identified] Family Support    Pt barriers cont with SOB with longer distance with RW / pt using Rollator at home, general weakness and deconditioning and blood in urine, see GI notes today . Pt requested Bariatric walker d/t inc support and and safety. Pt at this time Justine to [address] overall with skilled PT during xfers and overall functional activities and needs to practice ramp down on 1 st floor to mimic home ramp . Cont POC as possible , Today OT with see pt and dgther who she live with for F/T. Cont POC to meet DC goals       Occupational Therapy:  Eating: Independent  Grooming: Incidental Touching  Bathing: Moderate Assistance  Bathing: Moderate Assistance  Upper Body Dressing: Supervision  Lower Body Dressing: Total Assistance  Toileting:  Moderate Assistance  Toilet Transfer: Minimal Assistance  Cognition: Within Defined Limits  Orientation: Person, Place, Time, Situation  Discharge Recommendations: Home with:  [facility] with[de-identified] Family Support, Home Occupational Therapy       Pt continues to present with impairments in activity tolerance, endurance, standing balance/tolerance, and UE strength. Additional functional barriers include fatigue, KENDRICK, and risk for falls. Pt demonstrates improvements in functional transfers/short distance mobility using RW and activity tolerance. Family training ha been initiated but is ongoing. Pt will continue to benefit from skilled OT services to address above mentioned barriers and maximize functional independence in baseline areas of occupation. OT D/C recommendation is for d/c home at end of this week with continued family support and home OT services. Speech Therapy:           No notes on file    Nursing Notes:  Appetite: Fair  Diet Type: Regular/House                      Diet Patient/Family Education Complete: Yes    Type of Wound (LDA): Wound                    Type of Wound Patient/Family Education: No  Bladder: Continent     Bladder Patient/Family Education: Yes  Bowel: Continent     Bowel Patient/Family Education: Yes     Pain Score: 0                          Pain Patient/Family Education: Yes  Medication Management/Safety  Safe Administration: Yes  Medication Patient/Family Education Complete: Yes (ongoing)    Wound dehiscence s/p VAC placement. Surgery to follow for VAC changes, Monitor incision. Atrial fibrillation. Continue amiodarone, Cardizem CD and Toprol XL, Continue Eliquis, Monitor HR with therapy. HTN Continue Cardizem CD and Toprol XL, Monitor BP and adjust medications as needed. COVID-19 positive Detected on routine testing for [facility] admission, Pt is asymptomatic. Hypokalemia K 3.4, Replace with KCl 40meq, Monitor BMP. Anemia Hgb has been trending around 8.   7/25- Pt is continent of bowel and bladder, no pain issues, pt is AAOx4 and pt requires alarms since is new to [facility] and is in Covid isolation. 8/1-VAC removed and some sutures added to incision. Currently has dressing changes with packing BID. Serosanguinous drainage. Pt is anemoc- iv venofer given. Hgb in 8s. Had some rectal bleeding. Eliquis placed on hold and pt is for colonoscopy 8/1. Rapid antigen for covid was neg x 2- covid precautions d/c. This week we will encourage independence with ADLs. We will monitor labs and vital signs. We will educate pt/family about repositioning to prevent skin breakdown. We will assist w repositioning and perform routine skin checks. We will monitor for adequate pain control. We will monitor for constipation and medicate pt as ordered. We will increase safety awareness and keep pt free from falls. Case Management:     Discharge Planning  Living Arrangements: Lives w/ Children  Support Systems: Children, Family members  Assistance Needed: TBD  Type of Current Residence: Private residence  Current Home Care Services: No  7/25- Pt new admit, cm to assess. 8/1-    Pt lives in ranch home with daughter Berry Ro, ramp to enter. Pt reports prior to admission was independent, family transports. Pt has rollator, bsc, and walker in home. Pt has recently been to [address] [place] [address],[address] for STR. Pt report no hx of HHC or OP therapies. PCP is Virgilio Gan, preferred pharmacy is [facility]. Is the patient actively participating in therapies? yes  List any modifications to the treatment plan: None    Barriers Interventions   Wound D/c, sutures    COVID Resolved   Bowel Colonoscopy   Anemia Monitoring   Hypokalemia Monitoring   Anxiety Neuropsych   Skin Monitoring   Morbid obesity/body habitus Comp strategies   Generalized weakness Strengthening   Decreased standing balance Strengthening    Bed mobility There ex, sleep in recliner    1 NILS There ex for sup level      Is the patient making expected progress toward goals?  yes  List any update or changes to goals: None    Medical Goals: Patient will be medically stable for discharge to [facility] upon completion of rehab program and Patient will be able to manage medical conditions and comorbid conditions with medications and follow up upon completion of rehab program    Weekly Team Goals:   Rehab Team Goals  ADL Team Goal: Patient will require supervision with ADLs with least restrictive device upon completion of rehab program  Bowel/Bladder Team Goal: Patient will require assist with bladder/bowel management with least restrictive device upon completion of rehab program  Transfer Team Goal: Patient will be independent with transfers with least restrictive device upon completion of rehab program  Locomotion Team Goal: Patient will be independent with locomotion with least restrictive device upon completion of rehab program (Household distances with RW)    Discussion: Pt participating in therapies and making progress in rehab. Pt functioning at 50 ft walking close sup, bed mobility mod, curb step min/mod rw. Min to mod for ADLs, trailing long handled equipment. Family training occurring. Wound care barrier. Team recommending reteam to discuss dispo planning. Anticipated Discharge Date:  reteam - pending wound stabilyu   [facility] Team Members Present: The following team members are supervising care for this patient and were present during this Weekly Team Conference.     Physician: Dr. Ton Rosas DO  : Alayna Foster MSW  Registered Nurse: Nikolas Candelaria, RN, BSN, CRRN  Physical Therapist: Ginger Russell DPT  Occupational Therapist: Warren Mcbride MS, OTR/L, CBIS  Speech Therapist: Patient unable to complete

## 2024-11-11 ENCOUNTER — TELEPHONE (OUTPATIENT)
Age: 74
End: 2024-11-11

## 2024-11-11 NOTE — TELEPHONE ENCOUNTER
LVM to inform pt that appt on 12/10 @ 3:30pm w/H[facility] is in [place]. Left callback # to make changes: [phone].

## 2024-11-19 ENCOUNTER — TELEPHONE (OUTPATIENT)
Dept: OBGYN CLINIC | Facility: CLINIC | Age: 74
End: 2024-11-19

## 2024-11-19 NOTE — TELEPHONE ENCOUNTER
2nd call attempt. LVM to inform pt that appt on 12/10 @ 3:30pm w/Hira is in [place]. Left callback # to make changes: [phone].

## 2024-12-10 ENCOUNTER — OFFICE VISIT (OUTPATIENT)
Dept: OBGYN CLINIC | Facility: CLINIC | Age: 74
End: 2024-12-10
Payer: MEDICARE

## 2024-12-10 VITALS — HEIGHT: 63 IN | WEIGHT: 220.2 LBS | BODY MASS INDEX: 39.02 KG/M2

## 2024-12-10 DIAGNOSIS — M17.0 PRIMARY OSTEOARTHRITIS OF BOTH KNEES: Primary | ICD-10-CM

## 2024-12-10 PROCEDURE — 20610 DRAIN/INJ JOINT/BURSA W/O US: CPT | Performed by: PHYSICIAN ASSISTANT

## 2024-12-10 PROCEDURE — 99213 OFFICE O/P EST LOW 20 MIN: CPT | Performed by: PHYSICIAN ASSISTANT

## 2024-12-10 RX ORDER — BETAMETHASONE SODIUM PHOSPHATE AND BETAMETHASONE ACETATE 3; 3 MG/ML; MG/ML
6 INJECTION, SUSPENSION INTRA-ARTICULAR; INTRALESIONAL; INTRAMUSCULAR; SOFT TISSUE
Status: COMPLETED | OUTPATIENT
Start: 2024-12-10 | End: 2024-12-10

## 2024-12-10 RX ORDER — AZITHROMYCIN 250 MG/1
TABLET, FILM COATED ORAL
COMMUNITY
Start: 2024-12-09

## 2024-12-10 RX ORDER — LIDOCAINE HYDROCHLORIDE 10 MG/ML
4 INJECTION, SOLUTION INFILTRATION; PERINEURAL
Status: COMPLETED | OUTPATIENT
Start: 2024-12-10 | End: 2024-12-10

## 2024-12-10 RX ADMIN — LIDOCAINE HYDROCHLORIDE 4 ML: 10 INJECTION, SOLUTION INFILTRATION; PERINEURAL at 15:30

## 2024-12-10 RX ADMIN — BETAMETHASONE SODIUM PHOSPHATE AND BETAMETHASONE ACETATE 6 MG: 3; 3 INJECTION, SUSPENSION INTRA-ARTICULAR; INTRALESIONAL; INTRAMUSCULAR; SOFT TISSUE at 15:30

## 2024-12-10 NOTE — PROGRESS NOTES
"Patient Name:  Arpita Lemus  MRN:  [id number]    Assessment & Plan     Bilateral knee DJD  Corticosteroid injection performed today into the bilateral knee joints.  Tylenol as needed for pain.  Activities as tolerated with modification avoid pain.  Follow-up in 3 months for repeat evaluation with repeat injections at that time as indicated.    Chief Complaint     Bilateral knee pain    History of the Present Illness     Arpita Lemus is a 73 y.o. female who returns to the office today for follow-up regarding her bilateral knees.  She was last seen on 9/10/2024.  At that time she received bilateral knee intra-articular corticosteroid injections.  She noted significant improvement but notes a gradual return of her pain recently over the past few days.  She denies any recent injury or trauma.  She states her pain can reach 5-6 out of 10 in intensity and is worse with increased activity and prolonged standing and walking.  She notes mild swelling and stiffness.  No weakness or instability.  She takes Tylenol for pain.  No numbness or tingling.  No fevers or chills.  She is interested in repeat intra-articular corticosteroid injections today.    Physical Exam     Ht 5' 3.25\" (1.607 m)   Wt 99.9 kg (220 lb 3.2 oz)   BMI 38.70 kg/m²     Bilateral knees: No gross deformity.  Skin intact.  No erythema ecchymosis or swelling.  No effusion.  No significant joint line tenderness.  Range of motion 0-110 bilaterally with mild pain.  Stable to varus and valgus stress bilaterally.  Stable Lachman test.  Negative posterior drawer test.  Negative Toni's test.  Extensor mechanism intact bilaterally.  Sensation is intact distally.     Eyes: Anicteric sclerae.  ENT: Trachea midline.  Lungs: Normal respiratory effort.  CV: Capillary refill is less than 2 seconds.  Skin: Intact without erythema.  Lymph: No palpable lymphadenopathy.  Neuro: Sensation is grossly intact to light touch.  Psych: Mood and affect are " appropriate.    Past Medical History:   Diagnosis Date    A-fib (HCC)     Arthritis     Atrial fibrillation (HCC)     Cancer (HCC)     Cervical spondylosis with myelopathy     CHF (congestive heart failure) (HCC)     Colon polyp     CPAP (continuous positive airway pressure) dependence     Gastric bypass status for obesity     History of transfusion     35 years ago    Hypertension     mass right kidney     Renal cell adenocarcinoma (HCC)     RIGHT    Sleep apnea     Super obese        Past Surgical History:   Procedure Laterality Date    ABDOMINAL ADHESION SURGERY N/A 06/19/2023    Procedure: LYSIS ADHESIONS;  Surgeon: Curtis Conley DO;  [facility];  Service: General    ABDOMINAL WALL SURGERY N/A 07/19/2023    Procedure: PARTIAL CLOSURE WOUND ABDOMINAL/TRUNK;  Surgeon: Lauryn Ullrich, DO;  [facility];  Service: General    ANKLE FRACTURE SURGERY Left     ANKLE SURGERY      APPENDECTOMY N/A 06/19/2023    Procedure: APPENDECTOMY;  Surgeon: Curtis Conley DO;  [facility];  Service: General    BARIATRIC SURGERY      CHOLECYSTECTOMY      COLOSTOMY      CRYOABLATION Right     RT KIDNEY    CYSTORRHAPHY      Bladder. Last assessed 4/6/2016     EXPLORATORY LAPAROTOMY W/ BOWEL RESECTION N/A 06/19/2023    Procedure: LAPAROTOMY EXPLORATORY W/ BOWEL RESECTION;  Surgeon: Gildardo Jackson MD;  [facility];  Service: General    GASTRIC BYPASS      HAND SURGERY      HERNIA REPAIR      Last assessed 4/6/2016  umlbilical    HYSTERECTOMY      IR DRAINAGE TUBE CHECK/CHANGE/REPOSITION/REINSERTION/UPSIZE  09/29/2023    IR DRAINAGE TUBE PLACEMENT  09/16/2023    KIDNEY SURGERY      LAPAROTOMY N/A 06/19/2023    Procedure: LAPAROTOMY EXPLORATORY, washout, vac change;  Surgeon: Curtis Conley DO;  [facility];  Service: General    LAPAROTOMY N/A 06/20/2023    Procedure: LAPAROTOMY, REMOVAL OF WOUND VAC AND PACKING, SMALL BOWEL RESECTION , BOWEL ANASTOMOSIS X 3 ,  VICRYL MESH BRIDGING -;  Surgeon: Curtis Conley DO;  [facility];  Service: General    TN OPTX DSTL RADL X-ARTIC FX/EPIPHYSL SEP Right 04/22/2016    Procedure: OPEN REDUCTION INTERNAL FIXATION RIGHT DISTAL RADIUS;  Surgeon: Erich Ledezma MD;  [facility];  Service: Orthopedics    REVISION COLOSTOMY      SMALL INTESTINE SURGERY N/A 06/19/2023    Procedure: RESECTION SMALL BOWEL;  Surgeon: Curtis Conley DO;  [facility];  Service: General    THYROID LOBECTOMY Left 08/27/2019    Procedure: LOBECTOMY THYROID, left;  Surgeon: Morgan Caba MD;  [facility];  Service: Surgical Oncology    TUBAL LIGATION      US GUIDED THYROID BIOPSY  02/27/2019    US GUIDED THYROID BIOPSY  05/29/2019    WOUND DEBRIDEMENT N/A 07/18/2023    Procedure: DEBRIDEMENT WOUND (WASH OUT);  Surgeon: Juanjo Carey DO;  [facility];  Service: Trauma    WOUND DEBRIDEMENT N/A 07/19/2023    Procedure: ABDOMINAL WOUND (WASH OUT); VAC APPLICATION;  Surgeon: Lauryn Ullrich, DO;  [facility];  Service: General    WRIST FRACTURE SURGERY Bilateral        No Known Allergies    Current Outpatient Medications on File Prior to Visit   Medication Sig Dispense Refill    acetaminophen (TYLENOL) 325 mg tablet Take 975 mg by mouth every 6 (six) hours as needed for mild pain or moderate pain      apixaban (Eliquis) 5 mg Take 1 tablet (5 mg total) by mouth 2 (two) times a day 60 tablet 11    azithromycin (ZITHROMAX) 250 mg tablet       calcium carbonate (TUMS) 500 mg chewable tablet Chew 2 tablets 2 (two) times a day      Cholecalciferol (Vitamin D) 125 MCG (5000 UT) CAPS Take 10,000 Units by mouth daily      cyanocobalamin (VITAMIN B-12) 1000 MCG tablet Take 1 tablet (1,000 mcg total) by mouth daily 30 tablet 0    diltiazem (CARDIZEM CD) 180 mg 24 hr capsule Take 1 capsule (180 mg total) by mouth 2 (two) times a day 180 capsule 3    ferrous sulfate 325 (65 Fe) mg tablet Take 1 tablet (325 mg  total) by mouth daily with breakfast 90 tablet 2    metoprolol succinate (TOPROL-XL) 25 mg 24 hr tablet Take 1 tablet (25 mg total) by mouth daily 90 tablet 3    potassium chloride (K-DUR,KLOR-CON) 20 mEq tablet Take 1 tablet (20 mEq total) by mouth daily 90 tablet 3    Probiotic Product (PRO-BIOTIC BLEND PO) Take by mouth      traZODone (DESYREL) 100 mg tablet TAKE 2 TABLETS BY MOUTH EVERYDAY AT BEDTIME       No current facility-administered medications on file prior to visit.       Social History     Tobacco Use    Smoking status: Never    Smokeless tobacco: Never   Vaping Use    Vaping status: Never Used   Substance Use Topics    Alcohol use: Never     Comment: 0    Drug use: No       Family History   Problem Relation Age of Onset    Heart attack Mother     Lung cancer Father 68    Heart disease Father     Cancer Father     Stroke Sister     Lung cancer Sister 56    Prostate cancer Brother 63       Review of Systems     As stated in the HPI. All other systems reviewed and are negative.      Large joint arthrocentesis: bilateral knee  Procedure Details  Location: knee - bilateral knee  Needle size: 22 G  Ultrasound guidance: no  Approach: anterolateral    Medications (Right): 4 mL lidocaine 1 %; 6 mg betamethasone acetate-betamethasone sodium phosphate 6 (3-3) mg/mLMedications (Left): 4 mL lidocaine 1 %; 6 mg betamethasone acetate-betamethasone sodium phosphate 6 (3-3) mg/mL   Patient tolerance: patient tolerated the procedure well with no immediate complications  Dressing:  Sterile dressing applied

## 2024-12-13 DIAGNOSIS — I50.32 CHRONIC DIASTOLIC HEART FAILURE (HCC): ICD-10-CM

## 2024-12-13 DIAGNOSIS — I48.21 PERMANENT ATRIAL FIBRILLATION (HCC): ICD-10-CM

## 2024-12-13 RX ORDER — DILTIAZEM HYDROCHLORIDE 360 MG/1
360 CAPSULE, EXTENDED RELEASE ORAL DAILY
Qty: 90 CAPSULE | Refills: 3 | OUTPATIENT
Start: 2024-12-13

## 2024-12-13 RX ORDER — POTASSIUM CHLORIDE 1500 MG/1
20 TABLET, EXTENDED RELEASE ORAL DAILY
Qty: 90 TABLET | Refills: 1 | Status: SHIPPED | OUTPATIENT
Start: 2024-12-13

## 2025-01-15 DIAGNOSIS — I48.21 PERMANENT ATRIAL FIBRILLATION (HCC): ICD-10-CM

## 2025-01-15 NOTE — TELEPHONE ENCOUNTER
Reason for call:   [x] Refill   [] Prior Auth  [] Other:     Office:   [] PCP/Provider -   [x] Specialty/Provider - Cardiology    Medication:  apixaban (Eliquis) 5 mg    Dose/Frequency: Take 1 tablet (5 mg total) by mouth 2 (two) times a day     Quantity: 60    Pharmacy: [facility]/pharmacy #[id number] [place], [place] - [address] [place] [address]      Does the patient have enough for 3 days?   [] Yes   [x] No - Send as HP to POD

## 2025-03-05 DIAGNOSIS — I48.21 PERMANENT ATRIAL FIBRILLATION (HCC): ICD-10-CM

## 2025-03-05 DIAGNOSIS — I10 PRIMARY HYPERTENSION: ICD-10-CM

## 2025-03-06 RX ORDER — METOPROLOL SUCCINATE 25 MG/1
25 TABLET, EXTENDED RELEASE ORAL DAILY
Qty: 90 TABLET | Refills: 1 | Status: SHIPPED | OUTPATIENT
Start: 2025-03-06

## 2025-03-10 ENCOUNTER — OFFICE VISIT (OUTPATIENT)
Age: 75
End: 2025-03-10
Payer: MEDICARE

## 2025-03-10 VITALS — HEIGHT: 63 IN | BODY MASS INDEX: 39.41 KG/M2 | WEIGHT: 222.4 LBS

## 2025-03-10 DIAGNOSIS — M17.0 PRIMARY OSTEOARTHRITIS OF BOTH KNEES: Primary | ICD-10-CM

## 2025-03-10 PROCEDURE — 20610 DRAIN/INJ JOINT/BURSA W/O US: CPT | Performed by: PHYSICIAN ASSISTANT

## 2025-03-10 PROCEDURE — 99213 OFFICE O/P EST LOW 20 MIN: CPT | Performed by: PHYSICIAN ASSISTANT

## 2025-03-10 RX ORDER — BETAMETHASONE SODIUM PHOSPHATE AND BETAMETHASONE ACETATE 3; 3 MG/ML; MG/ML
6 INJECTION, SUSPENSION INTRA-ARTICULAR; INTRALESIONAL; INTRAMUSCULAR; SOFT TISSUE
Status: COMPLETED | OUTPATIENT
Start: 2025-03-10 | End: 2025-03-10

## 2025-03-10 RX ORDER — LIDOCAINE HYDROCHLORIDE 10 MG/ML
4 INJECTION, SOLUTION INFILTRATION; PERINEURAL
Status: COMPLETED | OUTPATIENT
Start: 2025-03-10 | End: 2025-03-10

## 2025-03-10 RX ADMIN — LIDOCAINE HYDROCHLORIDE 4 ML: 10 INJECTION, SOLUTION INFILTRATION; PERINEURAL at 15:30

## 2025-03-10 RX ADMIN — BETAMETHASONE SODIUM PHOSPHATE AND BETAMETHASONE ACETATE 6 MG: 3; 3 INJECTION, SUSPENSION INTRA-ARTICULAR; INTRALESIONAL; INTRAMUSCULAR; SOFT TISSUE at 15:30

## 2025-03-10 NOTE — PROGRESS NOTES
"Patient Name:  Arpita Lemus  MRN:  [id number]    Assessment & Plan     1. Primary osteoarthritis of both knees    Patient was offered and accepted bilateral knee intra-articular corticosteroid ejections today.  Continue Tylenol.  Activities as tolerated modification avoid pain.  Follow-up in 3 months for repeat evaluation with repeat injection at that time as indicated.       Chief Complaint     Follow up bilateral knees.    History of the Present Illness     Arpita Lemus is a 74 y.o. female who reports to the office today for follow up regarding her bilateral knee DJD. She was last seen on 12/10/24. At that time she received bilateral knee intra-articular corticosteroid injections.  She noted significant improvement up until 2 weeks ago when she noted a gradual return of her pain.  She notes generalized discomfort in the bilateral knees with associated stiffness.  She denies any significant swelling.  She denies any weakness or instability.  She does take Tylenol at night which helps control her pain distally.  She denies any numbness and tingling.  No fevers or chills.  She is interested in repeat injections today.    Physical Exam     Ht 5' 3\" (1.6 m)   Wt 101 kg (222 lb 6.4 oz)   BMI 39.40 kg/m²     Bilateral knees: No gross deformity.  Skin intact.  No erythema ecchymosis or swelling.  No effusion.  No significant joint line tenderness.  Range of motion 0-110 bilaterally with mild pain.  Stable to varus and valgus stress bilaterally.  Stable Lachman test.  Negative posterior drawer test.  Negative Toni's test.  Extensor mechanism intact bilaterally.  Sensation is intact distally.     Eyes: Anicteric sclerae.  ENT: Trachea midline.  Lungs: Normal respiratory effort.  CV: Capillary refill is less than 2 seconds.  Skin: Intact without erythema.  Lymph: No palpable lymphadenopathy.  Neuro: Sensation is grossly intact to light touch.  Psych: Mood and affect are appropriate.    Past Medical History: "   Diagnosis Date    A-fib (HCC)     Arthritis     Atrial fibrillation (HCC)     Cancer (HCC)     Cervical spondylosis with myelopathy     CHF (congestive heart failure) (HCC)     Colon polyp     CPAP (continuous positive airway pressure) dependence     Gastric bypass status for obesity     History of transfusion     35 years ago    Hypertension     mass right kidney     Renal cell adenocarcinoma (HCC)     RIGHT    Sleep apnea     Super obese        Past Surgical History:   Procedure Laterality Date    ABDOMINAL ADHESION SURGERY N/A 06/19/2023    Procedure: LYSIS ADHESIONS;  Surgeon: Curtis Conley DO;  [facility];  Service: General    ABDOMINAL WALL SURGERY N/A 07/19/2023    Procedure: PARTIAL CLOSURE WOUND ABDOMINAL/TRUNK;  Surgeon: Lauryn Ullrich, DO;  [facility];  Service: General    ANKLE FRACTURE SURGERY Left     ANKLE SURGERY      APPENDECTOMY N/A 06/19/2023    Procedure: APPENDECTOMY;  Surgeon: Curtis Conley DO;  [facility];  Service: General    BARIATRIC SURGERY      CHOLECYSTECTOMY      COLOSTOMY      CRYOABLATION Right     RT KIDNEY    CYSTORRHAPHY      Bladder. Last assessed 4/6/2016     EXPLORATORY LAPAROTOMY W/ BOWEL RESECTION N/A 06/19/2023    Procedure: LAPAROTOMY EXPLORATORY W/ BOWEL RESECTION;  Surgeon: Gildardo Jackson MD;  [facility];  Service: General    GASTRIC BYPASS      HAND SURGERY      HERNIA REPAIR      Last assessed 4/6/2016  umlbilical    HYSTERECTOMY      IR DRAINAGE TUBE CHECK/CHANGE/REPOSITION/REINSERTION/UPSIZE  09/29/2023    IR DRAINAGE TUBE PLACEMENT  09/16/2023    KIDNEY SURGERY      LAPAROTOMY N/A 06/19/2023    Procedure: LAPAROTOMY EXPLORATORY, washout, vac change;  Surgeon: Curtis Conley DO;  [facility];  Service: General    LAPAROTOMY N/A 06/20/2023    Procedure: LAPAROTOMY, REMOVAL OF WOUND VAC AND PACKING, SMALL BOWEL RESECTION , BOWEL ANASTOMOSIS X 3 , VICRYL MESH BRIDGING -;  Surgeon:  Curtis Conley DO;  [facility];  Service: General    MT OPTX DSTL RADL X-ARTIC FX/EPIPHYSL SEP Right 04/22/2016    Procedure: OPEN REDUCTION INTERNAL FIXATION RIGHT DISTAL RADIUS;  Surgeon: Erich Ledezma MD;  [facility];  Service: Orthopedics    REVISION COLOSTOMY      SMALL INTESTINE SURGERY N/A 06/19/2023    Procedure: RESECTION SMALL BOWEL;  Surgeon: Curtis Conley DO;  [facility];  Service: General    THYROID LOBECTOMY Left 08/27/2019    Procedure: LOBECTOMY THYROID, left;  Surgeon: Morgan Caba MD;  [facility];  Service: Surgical Oncology    TUBAL LIGATION      US GUIDED THYROID BIOPSY  02/27/2019    US GUIDED THYROID BIOPSY  05/29/2019    WOUND DEBRIDEMENT N/A 07/18/2023    Procedure: DEBRIDEMENT WOUND (WASH OUT);  Surgeon: Juanjo Carey DO;  [facility];  Service: Trauma    WOUND DEBRIDEMENT N/A 07/19/2023    Procedure: ABDOMINAL WOUND (WASH OUT); VAC APPLICATION;  Surgeon: Lauryn Ullrich, DO;  [facility];  Service: General    WRIST FRACTURE SURGERY Bilateral        No Known Allergies    Current Outpatient Medications on File Prior to Visit   Medication Sig Dispense Refill    acetaminophen (TYLENOL) 325 mg tablet Take 975 mg by mouth every 6 (six) hours as needed for mild pain or moderate pain      apixaban (Eliquis) 5 mg Take 1 tablet (5 mg total) by mouth 2 (two) times a day 60 tablet 5    azithromycin (ZITHROMAX) 250 mg tablet       calcium carbonate (TUMS) 500 mg chewable tablet Chew 2 tablets 2 (two) times a day      Cholecalciferol (Vitamin D) 125 MCG (5000 UT) CAPS Take 10,000 Units by mouth daily      cyanocobalamin (VITAMIN B-12) 1000 MCG tablet Take 1 tablet (1,000 mcg total) by mouth daily 30 tablet 0    diltiazem (CARDIZEM CD) 180 mg 24 hr capsule Take 1 capsule (180 mg total) by mouth 2 (two) times a day 180 capsule 3    ferrous sulfate 325 (65 Fe) mg tablet Take 1 tablet (325 mg total) by mouth daily with  breakfast 90 tablet 2    metoprolol succinate (TOPROL-XL) 25 mg 24 hr tablet TAKE 1 TABLET BY MOUTH EVERY DAY 90 tablet 1    potassium chloride (Klor-Con M20) 20 mEq tablet TAKE 1 TABLET DAILY 90 tablet 1    Probiotic Product (PRO-BIOTIC BLEND PO) Take by mouth      traZODone (DESYREL) 100 mg tablet TAKE 2 TABLETS BY MOUTH EVERYDAY AT BEDTIME       No current facility-administered medications on file prior to visit.       Social History     Tobacco Use    Smoking status: Never    Smokeless tobacco: Never   Vaping Use    Vaping status: Never Used   Substance Use Topics    Alcohol use: Never     Comment: 0    Drug use: No       Family History   Problem Relation Age of Onset    Heart attack Mother     Lung cancer Father 68    Heart disease Father     Cancer Father     Stroke Sister     Lung cancer Sister 56    Prostate cancer Brother 63       Review of Systems     As stated in the HPI. All other systems reviewed and are negative.      Large joint arthrocentesis: bilateral knee  Procedure Details  Location: knee - bilateral knee  Needle size: 22 G  Ultrasound guidance: no  Approach: anterolateral    Medications (Right): 4 mL lidocaine 1 %; 6 mg betamethasone acetate-betamethasone sodium phosphate 6 (3-3) mg/mLMedications (Left): 4 mL lidocaine 1 %; 6 mg betamethasone acetate-betamethasone sodium phosphate 6 (3-3) mg/mL   Patient tolerance: patient tolerated the procedure well with no immediate complications  Dressing:  Sterile dressing applied

## 2025-03-21 ENCOUNTER — TELEPHONE (OUTPATIENT)
Age: 75
End: 2025-03-21

## 2025-03-21 NOTE — TELEPHONE ENCOUNTER
SPOKE WITH PT, HX BOWEL PERFORATION. PT C/O MIXED BM'S HARD STOOLS VS DIARRHEA, ABDOMINAL BLOATING. REQUESTING SOONER APPOINTMENT WITH ANY PROVIDER. OFFICE VISIT SCHEDULED 3/24/25.

## 2025-03-21 NOTE — TELEPHONE ENCOUNTER
Pt calling because she is having hard stool, diarrhea in her sleep, bloating and belching. Nothing available before September. Transferred call to [place]

## 2025-03-24 ENCOUNTER — OFFICE VISIT (OUTPATIENT)
Age: 75
End: 2025-03-24
Payer: MEDICARE

## 2025-03-24 VITALS
HEART RATE: 74 BPM | DIASTOLIC BLOOD PRESSURE: 78 MMHG | HEIGHT: 63 IN | SYSTOLIC BLOOD PRESSURE: 152 MMHG | BODY MASS INDEX: 38.41 KG/M2 | WEIGHT: 216.8 LBS

## 2025-03-24 DIAGNOSIS — R14.0 ABDOMINAL BLOATING: ICD-10-CM

## 2025-03-24 DIAGNOSIS — C64.1 CANCER OF RIGHT KIDNEY (HCC): ICD-10-CM

## 2025-03-24 DIAGNOSIS — R19.7 DIARRHEA, UNSPECIFIED TYPE: Primary | ICD-10-CM

## 2025-03-24 PROCEDURE — 99214 OFFICE O/P EST MOD 30 MIN: CPT | Performed by: INTERNAL MEDICINE

## 2025-03-24 NOTE — LETTER
2025     Rufus Bello DO  [address] [facility] [address]    Patient: Arpita Lemus   YOB: 1950   Date of Visit: 3/24/2025       Dear Dr. Bello:    Thank you for referring Arpita Lemus to me for evaluation. Below are my notes for this consultation.    If you have questions, please do not hesitate to call me. I look forward to following your patient along with you.         Sincerely,        Timothy Gilman MD        CC: No Recipients    Timothy Gilman MD  3/26/2025  8:20 AM  Sign when Signing Visit  Name: Arpita Lemus      : 1950      MRN: [id number]  Encounter Provider: Timothy Gilman MD  Encounter Date: 3/24/2025   Encounter department: [facility]  :  74-year-old female with extensive surgical history, remote history of colonic perforation from unclear etiology, 2 years ago developed small bowel obstruction due to internal herniation with recurrence of small bowel obstruction during hospital stay, she had small bowel resection, lysis of adhesions.  Wound dehiscence also complicated the hospital stay.  Now presenting with intermittent bouts of abdominal distention, loose stools abdominal discomfort which she reports is similar to prior episodes of obstruction.  Assessment & Plan  Abdominal bloating  -Some of her symptoms may be consistent with intermittent partial small bowel obstruction  -She has a extensive history of small bowel adhesions, last CAT scan also demonstrated some areas of adhesive disease to her mesh  -Will check a CT scan of her abdomen pelvis  -Check laboratory studies  -She was advised to go to the hospital for of severe abdominal pain or obstructive symptoms  -She may have a component of IBS or more likely component of SIBO in setting of multiple abdominal surgeries  Orders:  •  Basic metabolic panel; Future  •  CBC and differential; Future  •  Hepatic function panel; Future  •  CT abdomen pelvis w contrast;  Future    Diarrhea, unspecified type  -Symptoms clinically more consistent with partial small bowel obstruction given her prior surgical history  -SIBO is on the differential  -Recent colonoscopy is unremarkable  -Awaiting results of CAT scan       Patient's prior imaging studies, complicated hospital course, laboratory studies, consultative reports and operative reports were reviewed.  History was independently obtained.    History of Present Illness  HPI  Arpita Lemus is a 74 y.o. female who presents for intermittent abdominal bloating.  This is a 74-year-old female with a history of extensive surgical repairs.  Approximate 2025 years ago she had a perforated colon from unclear etiology.  About 2 years ago she developed a bowel obstruction which presented with intermittent abdominal bloating and distention ultimately had obstructive small bowel with adhesive disease and internal herniations requiring surgical resection, wound dehiscence and complicated postoperative course.  There was additional internal herniation during hospitalization which also required additional small bowel resection.  Patient had been doing relatively well since then, however notes that over the past 1 month or so she has had intermittent bouts of increasing abdominal distention.  She notes that she wakes up in the morning with a soft flat abdomen however after eating certain foods particular heavy carbohydrates such as breads she has abdominal bloating, distention.  This then leads to what she describes as explosive gas and bowel movement and unformed stool later in the day.  She well with just occasional soft stools.    She had a relatively unremarkable colonoscopy in May 2024.    She denies any recent sick contacts, travel, antibiotic exposure.        Review of Systems  Review of systems was negative except for pertinent positive mentioned in HPI         Objective  /78 (BP Location: Left arm, Patient Position: Sitting, Cuff  "Size: Standard)   Pulse 74   Ht 5' 3\" (1.6 m)   Wt 98.3 kg (216 lb 12.8 oz)   BMI 38.40 kg/m²      Physical Exam  GEN: WN/WD, no acute distress  HEENT: anicteric, MMM, no cervical lymphadenopathy  CV: RRR, no m/r/g  CHEST: CTA b/l, no WRR  ABD: +BS, soft NT/ND, no hepatosplenomegaly, well-healed multiple surgical scars, could not appreciate external herniation  EXT: no C/C/E  NEURO: AAOx3  SKIN: no rashes        "

## 2025-03-26 NOTE — ASSESSMENT & PLAN NOTE
-Some of her symptoms may be consistent with intermittent partial small bowel obstruction  -She has a extensive history of small bowel adhesions, last CAT scan also demonstrated some areas of adhesive disease to her mesh  -Will check a CT scan of her abdomen pelvis  -Check laboratory studies  -She was advised to go to the hospital for of severe abdominal pain or obstructive symptoms  -She may have a component of IBS or more likely component of SIBO in setting of multiple abdominal surgeries  Orders:    Basic metabolic panel; Future    CBC and differential; Future    Hepatic function panel; Future    CT abdomen pelvis w contrast; Future     Bcc Macronodular Histology Text: There were aggregates of basaloid cells.

## 2025-03-26 NOTE — PROGRESS NOTES
Name: Arpita Lemus      : 1950      MRN: [id number]  Encounter Provider: Timothy Gilman MD  Encounter Date: 3/24/2025   Encounter department: [facility]  :  74-year-old female with extensive surgical history, remote history of colonic perforation from unclear etiology, 2 years ago developed small bowel obstruction due to internal herniation with recurrence of small bowel obstruction during hospital stay, she had small bowel resection, lysis of adhesions.  Wound dehiscence also complicated the hospital stay.  Now presenting with intermittent bouts of abdominal distention, loose stools abdominal discomfort which she reports is similar to prior episodes of obstruction.  Assessment & Plan  Abdominal bloating  -Some of her symptoms may be consistent with intermittent partial small bowel obstruction  -She has a extensive history of small bowel adhesions, last CAT scan also demonstrated some areas of adhesive disease to her mesh  -Will check a CT scan of her abdomen pelvis  -Check laboratory studies  -She was advised to go to the hospital for of severe abdominal pain or obstructive symptoms  -She may have a component of IBS or more likely component of SIBO in setting of multiple abdominal surgeries  Orders:    Basic metabolic panel; Future    CBC and differential; Future    Hepatic function panel; Future    CT abdomen pelvis w contrast; Future    Diarrhea, unspecified type  -Symptoms clinically more consistent with partial small bowel obstruction given her prior surgical history  -SIBO is on the differential  -Recent colonoscopy is unremarkable  -Awaiting results of CAT scan       Patient's prior imaging studies, complicated hospital course, laboratory studies, consultative reports and operative reports were reviewed.  History was independently obtained.    History of Present Illness   HPI  Arpita Lemus is a 74 y.o. female who presents for intermittent abdominal bloating.   "This is a 74-year-old female with a history of extensive surgical repairs.  Approximate 2025 years ago she had a perforated colon from unclear etiology.  About 2 years ago she developed a bowel obstruction which presented with intermittent abdominal bloating and distention ultimately had obstructive small bowel with adhesive disease and internal herniations requiring surgical resection, wound dehiscence and complicated postoperative course.  There was additional internal herniation during hospitalization which also required additional small bowel resection.  Patient had been doing relatively well since then, however notes that over the past 1 month or so she has had intermittent bouts of increasing abdominal distention.  She notes that she wakes up in the morning with a soft flat abdomen however after eating certain foods particular heavy carbohydrates such as breads she has abdominal bloating, distention.  This then leads to what she describes as explosive gas and bowel movement and unformed stool later in the day.  She well with just occasional soft stools.    She had a relatively unremarkable colonoscopy in May 2024.    She denies any recent sick contacts, travel, antibiotic exposure.        Review of Systems  Review of systems was negative except for pertinent positive mentioned in HPI         Objective   /78 (BP Location: Left arm, Patient Position: Sitting, Cuff Size: Standard)   Pulse 74   Ht 5' 3\" (1.6 m)   Wt 98.3 kg (216 lb 12.8 oz)   BMI 38.40 kg/m²      Physical Exam  GEN: WN/WD, no acute distress  HEENT: anicteric, MMM, no cervical lymphadenopathy  CV: RRR, no m/r/g  CHEST: CTA b/l, no WRR  ABD: +BS, soft NT/ND, no hepatosplenomegaly, well-healed multiple surgical scars, could not appreciate external herniation  EXT: no C/C/E  NEURO: AAOx3  SKIN: no rashes      "

## 2025-03-26 NOTE — ASSESSMENT & PLAN NOTE
-Symptoms clinically more consistent with partial small bowel obstruction given her prior surgical history  -SIBO is on the differential  -Recent colonoscopy is unremarkable  -Awaiting results of CAT scan

## 2025-03-27 ENCOUNTER — TELEPHONE (OUTPATIENT)
Age: 75
End: 2025-03-27

## 2025-03-31 ENCOUNTER — APPOINTMENT (OUTPATIENT)
Dept: LAB | Facility: IMAGING CENTER | Age: 75
End: 2025-03-31
Payer: MEDICARE

## 2025-03-31 ENCOUNTER — TELEPHONE (OUTPATIENT)
Age: 75
End: 2025-03-31

## 2025-03-31 ENCOUNTER — TELEPHONE (OUTPATIENT)
Dept: ENDOCRINOLOGY | Facility: CLINIC | Age: 75
End: 2025-03-31

## 2025-03-31 ENCOUNTER — RESULTS FOLLOW-UP (OUTPATIENT)
Dept: ENDOCRINOLOGY | Facility: CLINIC | Age: 75
End: 2025-03-31

## 2025-03-31 DIAGNOSIS — E89.0 STATUS POST PARTIAL THYROIDECTOMY: ICD-10-CM

## 2025-03-31 DIAGNOSIS — R14.0 ABDOMINAL BLOATING: ICD-10-CM

## 2025-03-31 DIAGNOSIS — E21.3 HYPERPARATHYROIDISM (HCC): ICD-10-CM

## 2025-03-31 DIAGNOSIS — M81.0 OSTEOPOROSIS, UNSPECIFIED OSTEOPOROSIS TYPE, UNSPECIFIED PATHOLOGICAL FRACTURE PRESENCE: ICD-10-CM

## 2025-03-31 DIAGNOSIS — E55.9 VITAMIN D DEFICIENCY: ICD-10-CM

## 2025-03-31 LAB
25(OH)D3 SERPL-MCNC: 59.7 NG/ML (ref 30–100)
ALBUMIN SERPL BCG-MCNC: 3.5 G/DL (ref 3.5–5)
ALP SERPL-CCNC: 88 U/L (ref 34–104)
ALT SERPL W P-5'-P-CCNC: 15 U/L (ref 7–52)
ANION GAP SERPL CALCULATED.3IONS-SCNC: 8 MMOL/L (ref 4–13)
AST SERPL W P-5'-P-CCNC: 16 U/L (ref 13–39)
BASOPHILS # BLD AUTO: 0.03 THOUSANDS/ÂΜL (ref 0–0.1)
BASOPHILS NFR BLD AUTO: 1 % (ref 0–1)
BILIRUB DIRECT SERPL-MCNC: 0.21 MG/DL (ref 0–0.2)
BILIRUB SERPL-MCNC: 0.58 MG/DL (ref 0.2–1)
BUN SERPL-MCNC: 20 MG/DL (ref 5–25)
CALCIUM SERPL-MCNC: 8.2 MG/DL (ref 8.4–10.2)
CHLORIDE SERPL-SCNC: 106 MMOL/L (ref 96–108)
CO2 SERPL-SCNC: 26 MMOL/L (ref 21–32)
CREAT SERPL-MCNC: 0.75 MG/DL (ref 0.6–1.3)
EOSINOPHIL # BLD AUTO: 0.04 THOUSAND/ÂΜL (ref 0–0.61)
EOSINOPHIL NFR BLD AUTO: 1 % (ref 0–6)
ERYTHROCYTE [DISTWIDTH] IN BLOOD BY AUTOMATED COUNT: 13.2 % (ref 11.6–15.1)
GFR SERPL CREATININE-BSD FRML MDRD: 78 ML/MIN/1.73SQ M
GLUCOSE P FAST SERPL-MCNC: 106 MG/DL (ref 65–99)
HCT VFR BLD AUTO: 40.3 % (ref 34.8–46.1)
HGB BLD-MCNC: 12.7 G/DL (ref 11.5–15.4)
IMM GRANULOCYTES # BLD AUTO: 0.03 THOUSAND/UL (ref 0–0.2)
IMM GRANULOCYTES NFR BLD AUTO: 1 % (ref 0–2)
LYMPHOCYTES # BLD AUTO: 1.29 THOUSANDS/ÂΜL (ref 0.6–4.47)
LYMPHOCYTES NFR BLD AUTO: 20 % (ref 14–44)
MCH RBC QN AUTO: 31.7 PG (ref 26.8–34.3)
MCHC RBC AUTO-ENTMCNC: 31.5 G/DL (ref 31.4–37.4)
MCV RBC AUTO: 101 FL (ref 82–98)
MONOCYTES # BLD AUTO: 0.44 THOUSAND/ÂΜL (ref 0.17–1.22)
MONOCYTES NFR BLD AUTO: 7 % (ref 4–12)
NEUTROPHILS # BLD AUTO: 4.71 THOUSANDS/ÂΜL (ref 1.85–7.62)
NEUTS SEG NFR BLD AUTO: 70 % (ref 43–75)
NRBC BLD AUTO-RTO: 0 /100 WBCS
PLATELET # BLD AUTO: 197 THOUSANDS/UL (ref 149–390)
PMV BLD AUTO: 10.6 FL (ref 8.9–12.7)
POTASSIUM SERPL-SCNC: 3.8 MMOL/L (ref 3.5–5.3)
PROT SERPL-MCNC: 6.2 G/DL (ref 6.4–8.4)
PTH-INTACT SERPL-MCNC: 55.4 PG/ML (ref 12–88)
RBC # BLD AUTO: 4.01 MILLION/UL (ref 3.81–5.12)
SODIUM SERPL-SCNC: 140 MMOL/L (ref 135–147)
T4 FREE SERPL-MCNC: 0.88 NG/DL (ref 0.61–1.12)
TSH SERPL DL<=0.05 MIU/L-ACNC: 4.61 UIU/ML (ref 0.45–4.5)
WBC # BLD AUTO: 6.54 THOUSAND/UL (ref 4.31–10.16)

## 2025-03-31 PROCEDURE — 82306 VITAMIN D 25 HYDROXY: CPT

## 2025-03-31 PROCEDURE — 80053 COMPREHEN METABOLIC PANEL: CPT

## 2025-03-31 PROCEDURE — 85025 COMPLETE CBC W/AUTO DIFF WBC: CPT

## 2025-03-31 PROCEDURE — 82248 BILIRUBIN DIRECT: CPT

## 2025-03-31 PROCEDURE — 84443 ASSAY THYROID STIM HORMONE: CPT

## 2025-03-31 PROCEDURE — 36415 COLL VENOUS BLD VENIPUNCTURE: CPT

## 2025-03-31 PROCEDURE — 84439 ASSAY OF FREE THYROXINE: CPT

## 2025-03-31 PROCEDURE — 83970 ASSAY OF PARATHORMONE: CPT

## 2025-03-31 NOTE — TELEPHONE ENCOUNTER
Pt calling stating she was called by  Radiology stating she is allergic to barium and pt wants to verify. Per pt she is not allergic to anything that she is aware of. I called gi nurse Bessy and reviewed chart and there are no known allergies pt ok to proceed. I informed pt and she understood.

## 2025-04-01 ENCOUNTER — OFFICE VISIT (OUTPATIENT)
Dept: CARDIOLOGY CLINIC | Facility: CLINIC | Age: 75
End: 2025-04-01
Payer: MEDICARE

## 2025-04-01 VITALS
HEART RATE: 71 BPM | BODY MASS INDEX: 38.26 KG/M2 | SYSTOLIC BLOOD PRESSURE: 132 MMHG | WEIGHT: 216 LBS | DIASTOLIC BLOOD PRESSURE: 78 MMHG

## 2025-04-01 DIAGNOSIS — N18.30 STAGE 3 CHRONIC KIDNEY DISEASE, UNSPECIFIED WHETHER STAGE 3A OR 3B CKD (HCC): ICD-10-CM

## 2025-04-01 DIAGNOSIS — I48.21 PERMANENT ATRIAL FIBRILLATION (HCC): ICD-10-CM

## 2025-04-01 DIAGNOSIS — I50.32 CHRONIC DIASTOLIC HEART FAILURE (HCC): ICD-10-CM

## 2025-04-01 PROCEDURE — 99214 OFFICE O/P EST MOD 30 MIN: CPT | Performed by: INTERNAL MEDICINE

## 2025-04-01 PROCEDURE — G2211 COMPLEX E/M VISIT ADD ON: HCPCS | Performed by: INTERNAL MEDICINE

## 2025-04-01 PROCEDURE — 93000 ELECTROCARDIOGRAM COMPLETE: CPT | Performed by: INTERNAL MEDICINE

## 2025-04-01 NOTE — PROGRESS NOTES
Cardiology Outpatient Follow-Up Note - Arpita Lemus 74 y.o. female MRN: [id number]      Assessment/Plan:    1. Chronic diastolic heart failure (HCC)  She is euvolemic, compensated, no longer on diuretics    2. Permanent atrial fibrillation (HCC)  Rate controlled  Continue diltiazem  mg BID  Continue metoprolol XL 25 mg -- switch from qPM to qAM  Continue apixaban 5 mg BID    3. Primary hypertension  Controlled on current therapy.      We will see Arpita Lemus back in 6 months for routine follow-up.    Subjective:     HPI: Arpita Lemus is a 74 y.o. year old female with permanent AF, chronic diastolic CHF, HTN, presenting for follow-up.    She takes diltiazem  mg BID and the metoprolol XL 25 mg in the evening  Feels a little fatigue with making coffee in the morning but improves later in the day  When stressed feels her AF out of control but otherwise OK    No chest pain or pressure. No LE edema. No orthopnea or PND.     Cardiac Testing:  echocardiogram on 02/21/2019 showed normal LV size and function, LVEF 55%, mild concentric LV hypertrophy, normal RV size and function, mild left atrial dilation , mild mitral regurgitation.      Echo 6/19/23   Interpretation Summary     Left Ventricle: Left ventricular cavity size is normal. Wall thickness is mildly increased. The left ventricular ejection fraction is 60%. Systolic function is normal. Wall motion is normal.    Left Atrium: The atrium is mildly dilated.    Right Atrium: The atrium is mildly dilated.    Aortic Valve: There is aortic valve sclerosis.    Mitral Valve: There is mild annular calcification.    Tricuspid Valve: There is mild regurgitation. The estimated right ventricular systolic pressure is 25.00 mmHg.        EKGs, personally reviewed:  EKG in the office 5/31/23 shows atrial fibrillation, 103 bpm, low voltage, normal axis, otherwise normal intervals. Cannot rule out inferior MI. Poor anterior R wave progression. Abnormal study.    4/1/25 - Afib, 71 bpm, abnormal study     Relevant Labs & Results:  BMP 1/16/23 reviewed -- K 3.4, Cr 1.02  CBC 11/2/22 reviewed -- Hgb 11.2  HbA1c 10/30/22 -- 6.4%, 1/16/23 6.1%       BMP 10/20/23 -- K 4.1, Cr 0.68  CMP 4/11/24 - K 4.0, Cr 0.87, GFR 65, Alk phos 131    CMP 3/31/2025--potassium 3.8, creatinine 0.75  CBC 3/31/2025--hemoglobin 12.7 g/dL    ROS:  Review of Systems:  Review of Systems    Objective:     Vitals:   Vitals:    04/01/25 1546   BP: 132/78   BP Location: Right arm   Patient Position: Sitting   Cuff Size: Large   Pulse: 71   Weight: 98 kg (216 lb)    Body surface area is 2 meters squared.  Wt Readings from Last 3 Encounters:   04/01/25 98 kg (216 lb)   03/24/25 98.3 kg (216 lb 12.8 oz)   03/10/25 101 kg (222 lb 6.4 oz)       Physical Exam:    General: Arpita Lemus is a well appearing female, in no acute distress, sitting comfortably  HEENT: moist mucous membranes, EOMI  Neck:  No JVD, supple, trachea midline  Cardiovascular: unremarkable S1/S2, irregularly irregular rhythm, normal rate, no murmur  Pulmonary: normal respiratory effort, CTAB  Abdomen: soft and nondistended  Extremities: No lower extremity edema. Warm and well perfused extremities.  Neuro: no focal motor deficits, AAOx3 (person, place, time)  Psych: Normal mood and affect, cooperative          Medications (at the START of this encounter):  Outpatient Medications Prior to Visit   Medication Sig Dispense Refill    acetaminophen (TYLENOL) 325 mg tablet Take 975 mg by mouth every 6 (six) hours as needed for mild pain or moderate pain      apixaban (Eliquis) 5 mg Take 1 tablet (5 mg total) by mouth 2 (two) times a day 60 tablet 5    calcium carbonate (TUMS) 500 mg chewable tablet Chew 2 tablets 2 (two) times a day      Cholecalciferol (Vitamin D) 125 MCG (5000 UT) CAPS Take 10,000 Units by mouth daily      cyanocobalamin (VITAMIN B-12) 1000 MCG tablet Take 1 tablet (1,000 mcg total) by mouth daily 30 tablet 0    diltiazem  "(CARDIZEM CD) 180 mg 24 hr capsule Take 1 capsule (180 mg total) by mouth 2 (two) times a day 180 capsule 3    ferrous sulfate 325 (65 Fe) mg tablet Take 1 tablet (325 mg total) by mouth daily with breakfast 90 tablet 2    metoprolol succinate (TOPROL-XL) 25 mg 24 hr tablet TAKE 1 TABLET BY MOUTH EVERY DAY 90 tablet 1    PAPAYA ENZYMES PO Take by mouth      potassium chloride (Klor-Con M20) 20 mEq tablet TAKE 1 TABLET DAILY 90 tablet 1    Probiotic Product (PRO-BIOTIC BLEND PO) Take by mouth      traZODone (DESYREL) 100 mg tablet TAKE 2 TABLETS BY MOUTH EVERYDAY AT BEDTIME      azithromycin (ZITHROMAX) 250 mg tablet  (Patient not taking: Reported on 3/24/2025)       No facility-administered medications prior to visit.             This note was completed in part utilizing Dragon [employer] voice recognition software. Grammatical errors, random word insertion, spelling mistakes, occasional wrong word or \"sound-alike\" substitutions and incomplete sentences may be an occasional consequence of the system secondary to software limitations, ambient noise and hardware issues. At the time of dictation, efforts were made to edit, clarify and /or correct errors.  Please read the chart carefully and recognize, using context, where substitutions have occurred.  If you have any questions or concerns about the context, text or information contained within the body of this dictation, please contact myself, the provider, for further clarification.    "

## 2025-04-03 ENCOUNTER — HOSPITAL ENCOUNTER (OUTPATIENT)
Dept: CT IMAGING | Facility: HOSPITAL | Age: 75
Discharge: HOME | End: 2025-04-03
Attending: INTERNAL MEDICINE

## 2025-04-03 ENCOUNTER — PREP FOR PROCEDURE (OUTPATIENT)
Dept: GASTROENTEROLOGY | Facility: AMBULARY SURGERY CENTER | Age: 75
End: 2025-04-03

## 2025-04-03 ENCOUNTER — OFFICE VISIT (OUTPATIENT)
Dept: GASTROENTEROLOGY | Facility: AMBULARY SURGERY CENTER | Age: 75
End: 2025-04-03
Payer: MEDICARE

## 2025-04-03 ENCOUNTER — TELEPHONE (OUTPATIENT)
Dept: GASTROENTEROLOGY | Facility: AMBULARY SURGERY CENTER | Age: 75
End: 2025-04-03

## 2025-04-03 VITALS
SYSTOLIC BLOOD PRESSURE: 134 MMHG | HEART RATE: 86 BPM | DIASTOLIC BLOOD PRESSURE: 72 MMHG | BODY MASS INDEX: 38.27 KG/M2 | OXYGEN SATURATION: 94 % | WEIGHT: 216 LBS | HEIGHT: 63 IN

## 2025-04-03 DIAGNOSIS — R19.7 DIARRHEA, UNSPECIFIED TYPE: ICD-10-CM

## 2025-04-03 DIAGNOSIS — Z86.0101 PERSONAL HISTORY OF ADENOMATOUS AND SERRATED COLON POLYPS: ICD-10-CM

## 2025-04-03 DIAGNOSIS — R14.0 ABDOMINAL BLOATING: Primary | ICD-10-CM

## 2025-04-03 PROBLEM — K74.69 OTHER CIRRHOSIS OF LIVER (HCC): Status: ACTIVE | Noted: 2025-04-03

## 2025-04-03 PROBLEM — K74.69 OTHER CIRRHOSIS OF LIVER (HCC): Status: RESOLVED | Noted: 2025-04-03 | Resolved: 2025-04-03

## 2025-04-03 PROCEDURE — G2211 COMPLEX E/M VISIT ADD ON: HCPCS | Performed by: INTERNAL MEDICINE

## 2025-04-03 PROCEDURE — 99214 OFFICE O/P EST MOD 30 MIN: CPT | Performed by: INTERNAL MEDICINE

## 2025-04-03 RX ORDER — SODIUM CHLORIDE, SODIUM LACTATE, POTASSIUM CHLORIDE, CALCIUM CHLORIDE 600; 310; 30; 20 MG/100ML; MG/100ML; MG/100ML; MG/100ML
125 INJECTION, SOLUTION INTRAVENOUS CONTINUOUS
OUTPATIENT
Start: 2025-04-03

## 2025-04-03 NOTE — ASSESSMENT & PLAN NOTE
Chronic symptoms with acute worsening x 1 month,   Extensive surgical history-remote history of colonic perforation and subsequent complications of small bowel obstruction due to internal herniation, bowel resection, lysis of adhesions  Discussed pain just to intestinal motility secondary to small bowel adhesions, and surgical history  Concern for component of dumping syndrome given her intermittent diarrhea after carbohydrate/sugar intake  Recommend low FODMAP diet to help guide her dietary choices, with limiting of dairy, caffeine, processed sugars and carbohydrates  She is scheduled for CT abdomen/pelvis  Reviewed recent lab results which were unremarkable  Continue papaya enzyme given initial improvement  Alternatively, discussed use of psyllium fiber and/or peppermint oil  Could also consider SIBO testing which she is at risk for given extensive surgical history  Orders:    EGD; Future

## 2025-04-03 NOTE — PROGRESS NOTES
Name: Arpita Lemus      : 1950      MRN: [id number]  Encounter Provider: Ashley Higgins MD  Encounter Date: 4/3/2025   Encounter department: [facility] [place]  :  Assessment & Plan  Abdominal bloating  Chronic symptoms with acute worsening x 1 month,   Extensive surgical history-remote history of colonic perforation and subsequent complications of small bowel obstruction due to internal herniation, bowel resection, lysis of adhesions  Discussed pain just to intestinal motility secondary to small bowel adhesions, and surgical history  Concern for component of dumping syndrome given her intermittent diarrhea after carbohydrate/sugar intake  Recommend low FODMAP diet to help guide her dietary choices, with limiting of dairy, caffeine, processed sugars and carbohydrates  She is scheduled for CT abdomen/pelvis  Reviewed recent lab results which were unremarkable  Continue papaya enzyme given initial improvement  Alternatively, discussed use of psyllium fiber and/or peppermint oil  Could also consider SIBO testing which she is at risk for given extensive surgical history  Orders:    EGD; Future    Diarrhea, unspecified type  See above       Personal history of adenomatous and serrated colon polyps  Last colonoscopy with serrated/adenomatous colon polyps  Repeat in 1 year- to be scheduled  Orders:    Colonoscopy; Future        History of Present Illness   Arpita Lemus is a 74 y.o. female who presents in office visit follow-up for colon polyps, with complaints of abdominal bloating, belching.  She is present with her daughter at today's visit, who contributes to history.  She was seen last week by Dr. Gilman, she has extensive surgical history with remote history of colonic perforation, previous small bowel obstructions requiring lysis of adhesions.    She reports chronic symptoms, that have worsened over the last 1 month and increased abdominal gurgling, abdominal bloating and  fecal incontinence.  She relates intake of coffee, dairy, carbohydrates, starches and sugar that worsen her symptoms.  Symptoms have been unpredictable and intermittent.  She experiences intermittent hardening of her abdomen when it fills with gas and air, worrying her for recurrent bowel obstructions. She is eventually able to pass the air/bowel movement and then symptoms resolve.  History of Present Illness      Review of Systems A complete review of systems is negative other than that noted above in the HPI.      Current Outpatient Medications   Medication Sig Dispense Refill    acetaminophen (TYLENOL) 325 mg tablet Take 975 mg by mouth every 6 (six) hours as needed for mild pain or moderate pain      apixaban (Eliquis) 5 mg Take 1 tablet (5 mg total) by mouth 2 (two) times a day 60 tablet 5    calcium carbonate (TUMS) 500 mg chewable tablet Chew 2 tablets 2 (two) times a day      Cholecalciferol (Vitamin D) 125 MCG (5000 UT) CAPS Take 10,000 Units by mouth daily      cyanocobalamin (VITAMIN B-12) 1000 MCG tablet Take 1 tablet (1,000 mcg total) by mouth daily 30 tablet 0    diltiazem (CARDIZEM CD) 180 mg 24 hr capsule Take 1 capsule (180 mg total) by mouth 2 (two) times a day 180 capsule 3    ferrous sulfate 325 (65 Fe) mg tablet Take 1 tablet (325 mg total) by mouth daily with breakfast 90 tablet 2    metoprolol succinate (TOPROL-XL) 25 mg 24 hr tablet TAKE 1 TABLET BY MOUTH EVERY DAY 90 tablet 1    PAPAYA ENZYMES PO Take by mouth      potassium chloride (Klor-Con M20) 20 mEq tablet TAKE 1 TABLET DAILY 90 tablet 1    Probiotic Product (PRO-BIOTIC BLEND PO) Take by mouth      traZODone (DESYREL) 100 mg tablet TAKE 2 TABLETS BY MOUTH EVERYDAY AT BEDTIME      azithromycin (ZITHROMAX) 250 mg tablet  (Patient not taking: Reported on 3/24/2025)       No current facility-administered medications for this visit.     Objective   /72 (BP Location: Left arm, Patient Position: Sitting, Cuff Size: Standard)   Pulse 86  "  Ht 5' 3\" (1.6 m)   Wt 98 kg (216 lb)   SpO2 94%   BMI 38.26 kg/m²     Physical Exam  Vitals and nursing note reviewed.   Constitutional:       General: She is not in acute distress.     Appearance: Normal appearance.   HENT:      Head: Normocephalic and atraumatic.   Eyes:      Conjunctiva/sclera: Conjunctivae normal.   Pulmonary:      Effort: Pulmonary effort is normal. No respiratory distress.   Abdominal:      Palpations: Abdomen is soft.      Tenderness: There is no abdominal tenderness.      Comments: Well healed multiple surgical scars with palpated scar tissue   Musculoskeletal:         General: Normal range of motion.      Cervical back: Neck supple.   Skin:     General: Skin is warm and dry.   Neurological:      Mental Status: She is alert.   Psychiatric:         Mood and Affect: Mood normal.        Physical Exam      Results    Lab Results: I personally reviewed relevant lab results.     Lab Results   Component Value Date    WBC 6.54 03/31/2025    HGB 12.7 03/31/2025    HCT 40.3 03/31/2025     (H) 03/31/2025     03/31/2025       Lab Results   Component Value Date    SODIUM 140 03/31/2025    K 3.8 03/31/2025     03/31/2025    CO2 26 03/31/2025    AGAP 8 03/31/2025    BUN 20 03/31/2025    CREATININE 0.75 03/31/2025    GLUC 98 10/20/2023    GLUF 106 (H) 03/31/2025    CALCIUM 8.2 (L) 03/31/2025    AST 16 03/31/2025    ALT 15 03/31/2025    ALKPHOS 88 03/31/2025    TP 6.2 (L) 03/31/2025    TBILI 0.58 03/31/2025    EGFR 78 03/31/2025             Results for orders placed during the hospital encounter of 05/02/24    Colonoscopy    Impression  9 polyps were removed  Healthy anastomosis in the rectum        RECOMMENDATION:  Await pathology results  Repeat colonoscopy in 1 year  Personal history of colon polyps      Resume regular diet  Resume eliquis in 48 hours  Discharge home    If you have abdominal pain, bleeding, or fevers you are encouraged to contact our office at [phone]. If " you do not hear from our office or your symptoms become severe, please go to your nearest ER for evaluation.          Ashley Higgins MD

## 2025-04-03 NOTE — TELEPHONE ENCOUNTER
Our mutual patient is scheduled for procedure: colonoscopy and EGD    On: May 21, 2025     With: Dr. Ashley Higgins MD    He/She is taking the following blood thinner: Eliquis (Apixaban)    Can this be stopped  2 days prior to the procedure    Physician Approving clearance: Dr. Ramos Dailey Md

## 2025-04-03 NOTE — ASSESSMENT & PLAN NOTE
Last colonoscopy with serrated/adenomatous colon polyps  Repeat in 1 year- to be scheduled  Orders:    Colonoscopy; Future

## 2025-04-03 NOTE — PATIENT INSTRUCTIONS
Scheduled date of EGD/colonoscopy (as of today): May 21, 2025  Physician performing EGD/colonoscopy: Dr. Higgins   Location of EGD/colonoscopy: [facility]   Desired bowel prep reviewed with patient: teena/vince  Instructions reviewed with patient by: EZEKIEL   Clearances:  eliquis   For your bowel habits, as we discussed during today's visit,  - I would recommend starting psyllium, fiber supplementation.  This can be done with use of Konsyl, Citrucil, Metamucil or Benefiber fiber, which can be purchased over-the-counter.  I would recommend starting slow with 1 tsp mixed into a large glass of water, once a day, and increasing to goal of 1 tbsp mixed into a large glass of water per day.  - this helps with both diarrhea and constipation, helping to bulk the stool, and should not cause constipation or diarrhea, but treat both  - the only side effect of this can be bloating, if this occurs, cut down on the dose, and increase your water intake or alternatively, consider switching to an alternative as listed above    Start ibgard (peppermint oil) or pepogest before meals

## 2025-04-09 ENCOUNTER — HOSPITAL ENCOUNTER (OUTPATIENT)
Dept: RADIOLOGY | Facility: IMAGING CENTER | Age: 75
Discharge: HOME | End: 2025-04-09
Attending: INTERNAL MEDICINE
Payer: MEDICARE

## 2025-04-09 ENCOUNTER — RESULTS FOLLOW-UP (OUTPATIENT)
Dept: GASTROENTEROLOGY | Facility: AMBULARY SURGERY CENTER | Age: 75
End: 2025-04-09

## 2025-04-09 DIAGNOSIS — R14.0 ABDOMINAL BLOATING: ICD-10-CM

## 2025-04-09 PROCEDURE — 74177 CT ABD & PELVIS W/CONTRAST: CPT

## 2025-04-09 RX ADMIN — IOHEXOL 100 ML: 350 INJECTION, SOLUTION INTRAVENOUS at 11:17

## 2025-04-29 ENCOUNTER — TELEPHONE (OUTPATIENT)
Age: 75
End: 2025-04-29

## 2025-04-29 ENCOUNTER — OFFICE VISIT (OUTPATIENT)
Dept: ENDOCRINOLOGY | Facility: CLINIC | Age: 75
End: 2025-04-29
Payer: MEDICARE

## 2025-04-29 VITALS
BODY MASS INDEX: 36.39 KG/M2 | WEIGHT: 205.4 LBS | DIASTOLIC BLOOD PRESSURE: 78 MMHG | HEIGHT: 63 IN | SYSTOLIC BLOOD PRESSURE: 128 MMHG

## 2025-04-29 DIAGNOSIS — E21.3 HYPERPARATHYROIDISM (HCC): Primary | ICD-10-CM

## 2025-04-29 DIAGNOSIS — E89.0 STATUS POST PARTIAL THYROIDECTOMY: ICD-10-CM

## 2025-04-29 DIAGNOSIS — M81.8 OTHER OSTEOPOROSIS, UNSPECIFIED PATHOLOGICAL FRACTURE PRESENCE: ICD-10-CM

## 2025-04-29 DIAGNOSIS — E27.9 ADRENAL NODULE (HCC): ICD-10-CM

## 2025-04-29 PROCEDURE — 96372 THER/PROPH/DIAG INJ SC/IM: CPT | Performed by: INTERNAL MEDICINE

## 2025-04-29 PROCEDURE — 99214 OFFICE O/P EST MOD 30 MIN: CPT | Performed by: INTERNAL MEDICINE

## 2025-04-29 RX ORDER — DEXAMETHASONE 1 MG
TABLET ORAL
Qty: 1 TABLET | Refills: 0 | Status: SHIPPED | OUTPATIENT
Start: 2025-04-29 | End: 2025-04-30

## 2025-04-29 NOTE — PROGRESS NOTES
Name: Arpita Lemus      : 1950      MRN: [id number]  Encounter Provider: Marc Reyna DO  Encounter Date: 2025   Encounter department: [facility] [place]    No chief complaint on file.  :  Assessment & Plan  Hyperparathyroidism (HCC)  PTH is normalized with consistent calcium and vitamin D supplementation.  Will monitor over time.  Orders:    Comprehensive metabolic panel; Future    PTH, intact; Future    Vitamin D 25 hydroxy; Future    Phosphorus; Future    Other osteoporosis, unspecified pathological fracture presence  Continue Prolia every 6 months.  Next DEXA scan will be due in .  Orders:    denosumab (PROLIA) subcutaneous injection 60 mg    Adrenal nodule (HCC)  This appears stable on imaging and consistent with benign adenoma.  Check 1 mg overnight dexamethasone suppression test.  Will contact patient as results are available.  Orders:    Dexamethasone    dexamethasone (DECADRON) 1 mg tablet; Take 1 tab at 10:00 p.m. The night before cortisol blood work.    Cortisol Level,7-9 AM Specimen    Status post partial thyroidectomy  Patient has slightly elevated TSH.  Clinically doing well.  Will monitor over time.  Orders:    TSH, 3rd generation; Future    T4, free; Future      Assessment & Plan        History of Present Illness   History of Present Illness    Arpita Lemus is a 74 y.o. female who presents for a follow-up appointment with endocrinology.  Denies any new health issues or symptoms of concern.    Pertinent Medical History   Arpita has a history of osteoporosis, hyperparathyroidism and history of partial thyroidectomy and mildly elevated TSH in the past and follows with our office in this regard.  She has been on Prolia every 6 months since about  for treatment of osteoporosis and has tolerated this well.  She also has continued on calcium and vitamin D.  No history of fall or fracture.        Review of Systems   All other  "systems reviewed and are negative.   as per HPI       Medical History Reviewed by provider this encounter:     .    Objective   Ht 5' 3\" (1.6 m)   Wt 93.2 kg (205 lb 6.4 oz)   BMI 36.38 kg/m²      Body mass index is 36.38 kg/m².  Wt Readings from Last 3 Encounters:   04/29/25 93.2 kg (205 lb 6.4 oz)   04/03/25 98 kg (216 lb)   04/01/25 98 kg (216 lb)     Physical Exam  Vitals reviewed.   Constitutional:       General: She is not in acute distress.     Appearance: She is well-developed. She is not diaphoretic.   HENT:      Head: Normocephalic and atraumatic.   Eyes:      Conjunctiva/sclera: Conjunctivae normal.      Pupils: Pupils are equal, round, and reactive to light.   Neck:      Thyroid: No thyromegaly.   Cardiovascular:      Rate and Rhythm: Normal rate and regular rhythm.   Pulmonary:      Effort: Pulmonary effort is normal. No respiratory distress.      Breath sounds: Normal breath sounds.   Abdominal:      General: Bowel sounds are normal.      Palpations: Abdomen is soft.   Musculoskeletal:         General: Normal range of motion.      Cervical back: Normal range of motion and neck supple.   Skin:     General: Skin is warm and dry.      Findings: No rash.   Neurological:      Mental Status: She is alert and oriented to person, place, and time.      Motor: No abnormal muscle tone.   Psychiatric:         Behavior: Behavior normal.       Physical Exam      Results    Labs:   Lab Results   Component Value Date    HGBA1C 6.1 (H) 01/16/2023    HGBA1C 6.6 (H) 10/30/2022    HGBA1C 6.2 (H) 01/06/2022     Lab Results   Component Value Date    CREATININE 0.75 03/31/2025    CREATININE 0.79 10/10/2024    CREATININE 0.87 04/11/2024    BUN 20 03/31/2025    K 3.8 03/31/2025     03/31/2025    CO2 26 03/31/2025     GFR, Calculated   Date Value Ref Range Status   07/09/2020 65 >60 mL/min/1.73m2 Final     Comment:     mL/min per 1.73 square meters                                            Normal Function or Mild " Renal    Disease (if clinically at risk):  >or=60  Moderately Decreased:                30-59  Severely Decreased:                  15-29  Renal Failure:                         <15                                            -American GFR: multiply reported GFR by 1.16    Please note that the eGFR is based on the CKD-EPI calculation, and is not intended to be used for drug dosing.                                            Note: Calculated GFR may not be an accurate indicator of renal function if the patient's renal function is not in a steady state.    Ordering Provider: DWAINE HUYNH  Report Copied to : NOHEMY PETERSON     eGFR   Date Value Ref Range Status   03/31/2025 78 ml/min/1.73sq m Final     Lab Results   Component Value Date    HDL 53 04/11/2024    TRIG 104 04/11/2024     Lab Results   Component Value Date    ALT 15 03/31/2025    AST 16 03/31/2025    GGT 17 05/29/2020    ALKPHOS 88 03/31/2025     Lab Results   Component Value Date    YSH4CIJNJUDB 4.612 (H) 03/31/2025    UDA5LTEMIXOA 5.467 (H) 10/10/2024    OGP9FEXVDRZO 3.090 01/16/2023     Lab Results   Component Value Date    FREET4 0.88 03/31/2025       There are no Patient Instructions on file for this visit.    Discussed with the patient and all questioned fully answered. She will call me if any problems arise.

## 2025-04-29 NOTE — PROGRESS NOTES
"Assessment/Plan:    Arpita Lemus came into the [facility] Office today 04/29/25 to receive Prolia injection.      Verbal consent obtained.  Consent given by: patient    patient states patient has been medically healthy with no underlining concerns/complications.      Arpita Lemus presents with No symptoms today.       All insturctions were reviewed with the patient.    If the patient should have any questions/concerns, advised patient to contacted [facility].       Subjective:     History provided by: patient    Patient ID: Arpita Lemus is a 74 y.o. female      Objective:    Vitals:    04/29/25 1330   BP: 128/78   BP Location: Left arm   Patient Position: Sitting   Cuff Size: Adult   Weight: 93.2 kg (205 lb 6.4 oz)   Height: 5' 3\" (1.6 m)       Patient tolerated the injection well without any complications.  Injection site/s Left Arm.  Medication was provided by Office.    Patient signed consent form yes   Patient signed ABN form yes (If no patient is not a medicare patient).   Patient waited 15 minutes after injection no (This only applies to patient's receiving first time injection).       Last Visit: 3/31/2025  Next visit:Visit date not found      "

## 2025-04-29 NOTE — ASSESSMENT & PLAN NOTE
Patient has slightly elevated TSH.  Clinically doing well.  Will monitor over time.  Orders:    TSH, 3rd generation; Future    T4, free; Future

## 2025-04-29 NOTE — ASSESSMENT & PLAN NOTE
Continue Prolia every 6 months.  Next DEXA scan will be due in 2026.  Orders:    denosumab (PROLIA) subcutaneous injection 60 mg

## 2025-04-29 NOTE — ASSESSMENT & PLAN NOTE
PTH is normalized with consistent calcium and vitamin D supplementation.  Will monitor over time.  Orders:    Comprehensive metabolic panel; Future    PTH, intact; Future    Vitamin D 25 hydroxy; Future    Phosphorus; Future

## 2025-04-29 NOTE — TELEPHONE ENCOUNTER
Patient called in asks why does she have another TSH ordered when she just did one in March?  Upon review of chart, appears that all lab orders except for the cortisol test are to be done in Oct.  Informed patient.  No additional questions or concerns.

## 2025-04-30 ENCOUNTER — TELEPHONE (OUTPATIENT)
Age: 75
End: 2025-04-30

## 2025-04-30 DIAGNOSIS — E27.9 ADRENAL NODULE (HCC): Primary | ICD-10-CM

## 2025-04-30 RX ORDER — DEXAMETHASONE 1 MG
TABLET ORAL
Qty: 1 TABLET | Refills: 0 | Status: SHIPPED | OUTPATIENT
Start: 2025-04-30

## 2025-04-30 NOTE — TELEPHONE ENCOUNTER
Patient called stating she was unable to do her dexamethasone test and the cortisol urine sample at the lab today. She is asking if another pill of the dexamethasone can be sent to the [facility] pharmacy in [place]?

## 2025-04-30 NOTE — TELEPHONE ENCOUNTER
Patient calling to ask if she needed an appointment for the cortisol level tomorrow. Informed her that she can just go to the lab but to make sure its between 7-9 AM.  No further action needed

## 2025-05-05 ENCOUNTER — APPOINTMENT (OUTPATIENT)
Dept: LAB | Facility: IMAGING CENTER | Age: 75
End: 2025-05-05
Payer: MEDICARE

## 2025-05-05 LAB — CORTIS AM PEAK SERPL-MCNC: 1.1 UG/DL (ref 6.7–22.6)

## 2025-05-05 PROCEDURE — 82533 TOTAL CORTISOL: CPT

## 2025-05-05 PROCEDURE — 80299 QUANTITATIVE ASSAY DRUG: CPT

## 2025-05-05 PROCEDURE — 36415 COLL VENOUS BLD VENIPUNCTURE: CPT

## 2025-05-06 ENCOUNTER — RESULTS FOLLOW-UP (OUTPATIENT)
Dept: ENDOCRINOLOGY | Facility: CLINIC | Age: 75
End: 2025-05-06

## 2025-05-13 ENCOUNTER — TELEPHONE (OUTPATIENT)
Age: 75
End: 2025-05-13

## 2025-05-13 LAB — DEXAMETHASONE SERPL-MCNC: 443 NG/DL

## 2025-05-19 ENCOUNTER — OFFICE VISIT (OUTPATIENT)
Dept: URGENT CARE | Age: 75
End: 2025-05-19
Payer: MEDICARE

## 2025-05-19 VITALS
OXYGEN SATURATION: 97 % | SYSTOLIC BLOOD PRESSURE: 121 MMHG | DIASTOLIC BLOOD PRESSURE: 65 MMHG | RESPIRATION RATE: 18 BRPM | BODY MASS INDEX: 36.31 KG/M2 | HEART RATE: 65 BPM | WEIGHT: 205 LBS | TEMPERATURE: 97.3 F

## 2025-05-19 DIAGNOSIS — R05.1 ACUTE COUGH: ICD-10-CM

## 2025-05-19 DIAGNOSIS — J04.0 LARYNGITIS: Primary | ICD-10-CM

## 2025-05-19 PROCEDURE — 99213 OFFICE O/P EST LOW 20 MIN: CPT | Performed by: PHYSICIAN ASSISTANT

## 2025-05-19 PROCEDURE — G0463 HOSPITAL OUTPT CLINIC VISIT: HCPCS | Performed by: PHYSICIAN ASSISTANT

## 2025-05-19 RX ORDER — METHYLPREDNISOLONE 4 MG/1
TABLET ORAL
Qty: 1 EACH | Refills: 0 | Status: SHIPPED | OUTPATIENT
Start: 2025-05-19

## 2025-05-19 RX ORDER — AZITHROMYCIN 250 MG/1
TABLET, FILM COATED ORAL
Qty: 6 TABLET | Refills: 0 | Status: SHIPPED | OUTPATIENT
Start: 2025-05-19 | End: 2025-05-23

## 2025-05-19 RX ORDER — BENZONATATE 100 MG/1
100 CAPSULE ORAL 3 TIMES DAILY PRN
Qty: 20 CAPSULE | Refills: 0 | Status: SHIPPED | OUTPATIENT
Start: 2025-05-19

## 2025-05-19 NOTE — PATIENT INSTRUCTIONS
Patient was educated on antibiotics and steroids. Do not take OTC anti-inflammatories while on steroids. Before starting steroid contact physician who is doing colonoscopy.    Any chest pain or shortness of breath go to ED.    Follow up with PCP.

## 2025-05-20 ENCOUNTER — TELEPHONE (OUTPATIENT)
Age: 75
End: 2025-05-20

## 2025-05-20 NOTE — TELEPHONE ENCOUNTER
Rescheduled    Scheduled date of colonoscopy (as of today): 6-  Physician performing colonoscopy: Dr. Higgins   Location of colonoscopy: [facility]   Bowel prep reviewed with patient: miralax dulcolax   Instructions reviewed with patient by:4-3-2025 TONI  Clearances: Eliquis

## 2025-05-20 NOTE — TELEPHONE ENCOUNTER
Pt called to reschedule the procedure a week before 6- but there was no availability. Pt will keep 6-

## 2025-05-20 NOTE — TELEPHONE ENCOUNTER
Pt calling. She has laryngitis and would like to know if colon/EGD for tomorrow should be rescheduled. Recommend she reschedule and warm transferred to [facility] for assistance.

## 2025-06-11 DIAGNOSIS — I50.32 CHRONIC DIASTOLIC HEART FAILURE (HCC): ICD-10-CM

## 2025-06-11 RX ORDER — POTASSIUM CHLORIDE 1500 MG/1
20 TABLET, EXTENDED RELEASE ORAL DAILY
Qty: 90 TABLET | Refills: 1 | Status: SHIPPED | OUTPATIENT
Start: 2025-06-11

## 2025-06-24 ENCOUNTER — OFFICE VISIT (OUTPATIENT)
Dept: OBGYN CLINIC | Facility: CLINIC | Age: 75
End: 2025-06-24
Payer: MEDICARE

## 2025-06-24 VITALS
HEIGHT: 63 IN | SYSTOLIC BLOOD PRESSURE: 138 MMHG | DIASTOLIC BLOOD PRESSURE: 82 MMHG | WEIGHT: 207 LBS | BODY MASS INDEX: 36.68 KG/M2

## 2025-06-24 DIAGNOSIS — R93.5 ABNORMAL CT SCAN, PELVIS: Primary | ICD-10-CM

## 2025-06-24 PROCEDURE — 99203 OFFICE O/P NEW LOW 30 MIN: CPT | Performed by: OBSTETRICS & GYNECOLOGY

## 2025-06-24 NOTE — PROGRESS NOTES
"Name: Arpita Lemus      : 1950      MRN: [id number]  Encounter Provider: Helen Kaplan MD  Encounter Date: 2025   Encounter department: [facility] [place]  :  Assessment & Plan  Abnormal CT scan, pelvis    Orders:    US pelvis complete w transvaginal; Future  Reassured patient that if this is normal, no further work-up needed.        History of Present Illness   HPI  Arpita Lemus is a 74 y.o. female who presents for discussion of ovarian asymmetry found on CT scan.  She has a history of a bowel perforation 2 years ago which caused her to be hospitalized for 2 months and undergo multiple surgeries during that time.  She stated that during a routine CT scan for follow up of these conditions, she was told that there was an abnormality of her ovaries.     The CT scan was read as nonspecific asymmetry of the ovaries with the right being more prominent than the left.  She and her daughter were not necessarily worried, but her PCP recommended she come for evaluation/discussion.  I reviewed that ovaries are not always symmetric.  Her history of infection/surgery in her pelvis may have made one of her ovaries appear more prominent than the other secondary to surrounding scar tissue or similar.  However, discussed that CT scan is truly not the best way to evaluate the ovaries.  Recommended pelvic US for better characterization.  If ovaries appear normal or are not able to be visualized on US, I would defer any further work-up.        Review of Systems       Objective   /82   Ht 5' 2.99\" (1.6 m)   Wt 93.9 kg (207 lb)   BMI 36.68 kg/m²      Physical Exam  Genitourinary:     General: Normal vulva.      Labia:         Right: No rash, tenderness or lesion.         Left: No rash, tenderness or lesion.       Urethra: No prolapse, urethral pain, urethral swelling or urethral lesion.      Vagina: Normal.      Uterus: Absent.       Adnexa: Right adnexa normal and " left adnexa normal.        Right: No mass, tenderness or fullness.          Left: No mass, tenderness or fullness.        Comments: Normal vaginal cuff  Cervix surgically absent

## 2025-06-26 ENCOUNTER — ANESTHESIA EVENT (OUTPATIENT)
Dept: GASTROENTEROLOGY | Facility: HOSPITAL | Age: 75
End: 2025-06-26
Payer: MEDICARE

## 2025-06-26 ENCOUNTER — HOSPITAL ENCOUNTER (OUTPATIENT)
Dept: GASTROENTEROLOGY | Facility: HOSPITAL | Age: 75
Setting detail: OUTPATIENT SURGERY
End: 2025-06-26
Attending: INTERNAL MEDICINE
Payer: MEDICARE

## 2025-06-26 ENCOUNTER — ANESTHESIA (OUTPATIENT)
Dept: GASTROENTEROLOGY | Facility: HOSPITAL | Age: 75
End: 2025-06-26
Payer: MEDICARE

## 2025-06-26 VITALS
OXYGEN SATURATION: 96 % | DIASTOLIC BLOOD PRESSURE: 70 MMHG | BODY MASS INDEX: 36.62 KG/M2 | HEIGHT: 62 IN | HEART RATE: 74 BPM | WEIGHT: 199 LBS | SYSTOLIC BLOOD PRESSURE: 148 MMHG | RESPIRATION RATE: 19 BRPM | TEMPERATURE: 97.4 F

## 2025-06-26 DIAGNOSIS — R14.0 ABDOMINAL BLOATING: ICD-10-CM

## 2025-06-26 DIAGNOSIS — Z86.0101 PERSONAL HISTORY OF ADENOMATOUS AND SERRATED COLON POLYPS: ICD-10-CM

## 2025-06-26 PROCEDURE — 45385 COLONOSCOPY W/LESION REMOVAL: CPT | Performed by: INTERNAL MEDICINE

## 2025-06-26 PROCEDURE — 88305 TISSUE EXAM BY PATHOLOGIST: CPT | Performed by: PATHOLOGY

## 2025-06-26 PROCEDURE — 43239 EGD BIOPSY SINGLE/MULTIPLE: CPT | Performed by: INTERNAL MEDICINE

## 2025-06-26 PROCEDURE — 45380 COLONOSCOPY AND BIOPSY: CPT | Performed by: INTERNAL MEDICINE

## 2025-06-26 PROCEDURE — 88342 IMHCHEM/IMCYTCHM 1ST ANTB: CPT | Performed by: PATHOLOGY

## 2025-06-26 PROCEDURE — 88341 IMHCHEM/IMCYTCHM EA ADD ANTB: CPT | Performed by: PATHOLOGY

## 2025-06-26 RX ORDER — PROPOFOL 10 MG/ML
INJECTION, EMULSION INTRAVENOUS AS NEEDED
Status: DISCONTINUED | OUTPATIENT
Start: 2025-06-26 | End: 2025-06-26

## 2025-06-26 RX ORDER — SODIUM CHLORIDE, SODIUM LACTATE, POTASSIUM CHLORIDE, CALCIUM CHLORIDE 600; 310; 30; 20 MG/100ML; MG/100ML; MG/100ML; MG/100ML
125 INJECTION, SOLUTION INTRAVENOUS CONTINUOUS
Status: DISPENSED | OUTPATIENT
Start: 2025-06-26

## 2025-06-26 RX ORDER — LIDOCAINE HYDROCHLORIDE 10 MG/ML
INJECTION, SOLUTION EPIDURAL; INFILTRATION; INTRACAUDAL; PERINEURAL AS NEEDED
Status: DISCONTINUED | OUTPATIENT
Start: 2025-06-26 | End: 2025-06-26

## 2025-06-26 RX ORDER — OMEPRAZOLE 40 MG/1
40 CAPSULE, DELAYED RELEASE ORAL DAILY
Qty: 90 CAPSULE | Refills: 3 | Status: SHIPPED | OUTPATIENT
Start: 2025-06-26

## 2025-06-26 RX ORDER — PROPOFOL 10 MG/ML
INJECTION, EMULSION INTRAVENOUS CONTINUOUS PRN
Status: DISCONTINUED | OUTPATIENT
Start: 2025-06-26 | End: 2025-06-26

## 2025-06-26 RX ORDER — EPHEDRINE SULFATE 50 MG/ML
INJECTION INTRAVENOUS AS NEEDED
Status: DISCONTINUED | OUTPATIENT
Start: 2025-06-26 | End: 2025-06-26

## 2025-06-26 RX ORDER — SODIUM CHLORIDE, SODIUM LACTATE, POTASSIUM CHLORIDE, CALCIUM CHLORIDE 600; 310; 30; 20 MG/100ML; MG/100ML; MG/100ML; MG/100ML
INJECTION, SOLUTION INTRAVENOUS CONTINUOUS PRN
Status: DISCONTINUED | OUTPATIENT
Start: 2025-06-26 | End: 2025-06-26

## 2025-06-26 RX ADMIN — LIDOCAINE HYDROCHLORIDE 40 MG: 10 INJECTION, SOLUTION EPIDURAL; INFILTRATION; INTRACAUDAL; PERINEURAL at 08:02

## 2025-06-26 RX ADMIN — PROPOFOL 120 MCG/KG/MIN: 10 INJECTION, EMULSION INTRAVENOUS at 08:02

## 2025-06-26 RX ADMIN — EPHEDRINE SULFATE 10 MG: 50 INJECTION INTRAVENOUS at 08:24

## 2025-06-26 RX ADMIN — SODIUM CHLORIDE, SODIUM LACTATE, POTASSIUM CHLORIDE, AND CALCIUM CHLORIDE: .6; .31; .03; .02 INJECTION, SOLUTION INTRAVENOUS at 07:24

## 2025-06-26 RX ADMIN — EPHEDRINE SULFATE 10 MG: 50 INJECTION INTRAVENOUS at 08:33

## 2025-06-26 RX ADMIN — PROPOFOL 100 MG: 10 INJECTION, EMULSION INTRAVENOUS at 08:02

## 2025-06-26 NOTE — H&P
"History and Physical -  [facility]  Arpita Lemus 74 y.o. female MRN: [id number]                  HPI: Arpita Lemus is a 74 y.o. year old female who presents for abdominal bloating, polyps, diarrhea.      REVIEW OF SYSTEMS: Per the HPI, and otherwise unremarkable.    Historical Information   Past Medical History[1]  Past Surgical History[2]  Social History   Social History     Substance and Sexual Activity   Alcohol Use Never    Comment: 0     Social History     Substance and Sexual Activity   Drug Use No     Tobacco Use History[3]  Family History[4]    Meds/Allergies     Current Medications[5]    Allergies[6]    Objective     /67   Pulse 81   Temp (!) 96.9 °F (36.1 °C) (Temporal)   Resp 16   Ht 5' 2\" (1.575 m)   Wt 90.3 kg (199 lb)   SpO2 97%   BMI 36.40 kg/m²       PHYSICAL EXAM    Gen: NAD  Head: NCAT  CV: RRR  CHEST: Clear  ABD: soft, NT/ND  EXT: no edema      ASSESSMENT/PLAN:  This is a 74 y.o. year old female here for EGD & colonoscopy, and she is stable and optimized for her procedure.             [1]   Past Medical History:  Diagnosis Date    A-fib (HCC)     Arthritis     Atrial fibrillation (HCC)     Cancer (HCC)     Cervical spondylosis with myelopathy     CHF (congestive heart failure) (HCC)     Colon polyp     CPAP (continuous positive airway pressure) dependence     Gastric bypass status for obesity     History of transfusion     35 years ago    Hypertension     mass right kidney     Renal cell adenocarcinoma (HCC)     RIGHT    Sleep apnea     Super obese    [2]   Past Surgical History:  Procedure Laterality Date    ABDOMINAL ADHESION SURGERY N/A 06/19/2023    Procedure: LYSIS ADHESIONS;  Surgeon: Curtis Conley DO;  [facility];  Service: General    ABDOMINAL WALL SURGERY N/A 07/19/2023    Procedure: PARTIAL CLOSURE WOUND ABDOMINAL/TRUNK;  Surgeon: Lauryn Ullrich, DO;  [facility];  Service: General    ANKLE FRACTURE SURGERY Left     " ANKLE SURGERY      APPENDECTOMY N/A 06/19/2023    Procedure: APPENDECTOMY;  Surgeon: Curtis Conley DO;  [facility];  Service: General    BARIATRIC SURGERY      CHOLECYSTECTOMY      COLOSTOMY      CRYOABLATION Right     RT KIDNEY    CYSTORRHAPHY      Bladder. Last assessed 4/6/2016     EXPLORATORY LAPAROTOMY W/ BOWEL RESECTION N/A 06/19/2023    Procedure: LAPAROTOMY EXPLORATORY W/ BOWEL RESECTION;  Surgeon: Gildardo Jackson MD;  [facility];  Service: General    GASTRIC BYPASS      HAND SURGERY      HERNIA REPAIR      Last assessed 4/6/2016  umlbilical    HYSTERECTOMY      IR DRAINAGE TUBE CHECK/CHANGE/REPOSITION/REINSERTION/UPSIZE  09/29/2023    IR DRAINAGE TUBE PLACEMENT  09/16/2023    KIDNEY SURGERY      LAPAROTOMY N/A 06/19/2023    Procedure: LAPAROTOMY EXPLORATORY, washout, vac change;  Surgeon: Curtis Conley DO;  [facility];  Service: General    LAPAROTOMY N/A 06/20/2023    Procedure: LAPAROTOMY, REMOVAL OF WOUND VAC AND PACKING, SMALL BOWEL RESECTION , BOWEL ANASTOMOSIS X 3 , VICRYL MESH BRIDGING -;  Surgeon: Curtis Conley DO;  [facility];  Service: General    IN OPTX DSTL RDL X-ARTIC FX/EPIPHYSL SEPARATION Right 04/22/2016    Procedure: OPEN REDUCTION INTERNAL FIXATION RIGHT DISTAL RADIUS;  Surgeon: Erich Ledezma MD;  [facility];  Service: Orthopedics    REVISION COLOSTOMY      SMALL INTESTINE SURGERY N/A 06/19/2023    Procedure: RESECTION SMALL BOWEL;  Surgeon: Curtis Conley DO;  [facility];  Service: General    THYROID LOBECTOMY Left 08/27/2019    Procedure: LOBECTOMY THYROID, left;  Surgeon: Morgan Caba MD;  [facility];  Service: Surgical Oncology    TUBAL LIGATION      US GUIDED THYROID BIOPSY  02/27/2019    US GUIDED THYROID BIOPSY  05/29/2019    WOUND DEBRIDEMENT N/A 07/18/2023    Procedure: DEBRIDEMENT WOUND (WASH OUT);  Surgeon: Juanjo Carey DO;  [facility];  Service:  Trauma    WOUND DEBRIDEMENT N/A 07/19/2023    Procedure: ABDOMINAL WOUND (WASH OUT); VAC APPLICATION;  Surgeon: Lauryn Ullrich, DO;  [facility];  Service: General    WRIST FRACTURE SURGERY Bilateral    [3]   Social History  Tobacco Use   Smoking Status Never   Smokeless Tobacco Never   [4]   Family History  Problem Relation Name Age of Onset    Heart attack Mother jessica peters     Lung cancer Father damir peters 68    Heart disease Father damir peters     Cancer Father damir peters     Stroke Sister Jennifer     Lung cancer Sister Jennifer 56    Prostate cancer Brother  63   [5]   Current Outpatient Medications:     acetaminophen (TYLENOL) 325 mg tablet    apixaban (Eliquis) 5 mg    calcium carbonate (TUMS) 500 mg chewable tablet    Cholecalciferol (Vitamin D) 125 MCG (5000 UT) CAPS    cyanocobalamin (VITAMIN B-12) 1000 MCG tablet    diltiazem (CARDIZEM CD) 180 mg 24 hr capsule    ferrous sulfate 325 (65 Fe) mg tablet    metoprolol succinate (TOPROL-XL) 25 mg 24 hr tablet    PAPAYA ENZYMES PO    potassium chloride (Klor-Con M20) 20 mEq tablet    Current Facility-Administered Medications:     lactated ringers infusion, 125 mL/hr, Intravenous, Continuous  [6] No Known Allergies

## 2025-06-26 NOTE — ANESTHESIA POSTPROCEDURE EVALUATION
Post-Op Assessment Note    CV Status:  Stable  Pain Score: 0    Pain management: adequate       Mental Status:  Alert and awake   Hydration Status:  Stable   PONV Controlled:  None   Airway Patency:  Patent     Post Op Vitals Reviewed: Yes    No anethesia notable event occurred.    Staff: CRNA   Comments: AAOx3, SV Nonobstructed, VSS          Last Filed PACU Vitals:  Vitals Value Taken Time   Temp     Pulse 90    /58    Resp 18    SpO2 98

## 2025-06-26 NOTE — ANESTHESIA PREPROCEDURE EVALUATION
Procedure:  COLONOSCOPY  EGD    Relevant Problems   CARDIO   (+) Dyspnea on exertion   (+) Hypertension   (+) Other vascular disorders of intestine (HCC)   (+) Permanent atrial fibrillation (HCC)      ENDO   (+) Hyperparathyroidism (HCC)      GI/HEPATIC   (+) Idiopathic acute pancreatitis without infection or necrosis   (+) Reactive hypoglycemia      /RENAL   (+) Malignant tumor of right kidney parenchyma (HCC)   (+) Stage 3 chronic kidney disease, unspecified whether stage 3a or 3b CKD (HCC)      HEMATOLOGY   (+) Anemia   (+) Iron deficiency anemia      MUSCULOSKELETAL   (+) Primary osteoarthritis of both knees   (+) Primary osteoarthritis of left knee   (+) Primary osteoarthritis of right knee      NEURO/PSYCH   (+) Anxiety   (+) Occipital neuralgia of right side      PULMONARY   (+) Dyspnea on exertion   (+) GILDARDO (obstructive sleep apnea)        Physical Exam    Airway     Mallampati score: II  TM Distance: >3 FB  Neck ROM: full      Cardiovascular      Dental       Pulmonary      Neurological      Other Findings  post-pubertal.      Anesthesia Plan  ASA Score- 3     Anesthesia Type- IV sedation with anesthesia with ASA Monitors.         Additional Monitors:     Airway Plan:     Comment: AFib.       Plan Factors-Exercise tolerance (METS): <4 METS.    Chart reviewed.                      Induction- intravenous.    Postoperative Plan- .   Monitoring Plan - Monitoring plan - standard ASA monitoring  Post Operative Pain Plan - multimodal analgesia    Perioperative Resuscitation Plan - Level 1 - Full Code.       Informed Consent- Anesthetic plan and risks discussed with patient.  I personally reviewed this patient with the CRNA. Discussed and agreed on the Anesthesia Plan with the CRNA..      NPO Status:  Vitals Value Taken Time   Date of last liquid 06/26/25 06/26/25 07:08   Time of last liquid 0600 06/26/25 07:08   Date of last solid 06/24/25 06/26/25 07:08   Time of last solid 1830 06/26/25 07:08     NPO  appropriate. Discussed benefits/risks of monitored anesthetic care and discussed providing a dynamic level of mild to deep sedation. Risks include awareness, airway obstruction, aspiration which may necessitate conversion to general anesthesia. All questions answered. Patient understands and wishes to proceed.    Anesthesia plan and consent discussed with Arpita who expressed understanding and agreement. Risks/benefits and alternatives discussed with patient including possible PONV, sore throat, damage to teeth/lips/gums and possibility of rare anesthetic and surgical emergencies.

## 2025-07-01 PROCEDURE — 88305 TISSUE EXAM BY PATHOLOGIST: CPT | Performed by: PATHOLOGY

## 2025-07-01 PROCEDURE — 88342 IMHCHEM/IMCYTCHM 1ST ANTB: CPT | Performed by: PATHOLOGY

## 2025-07-01 PROCEDURE — 88341 IMHCHEM/IMCYTCHM EA ADD ANTB: CPT | Performed by: PATHOLOGY

## 2025-07-10 DIAGNOSIS — I48.21 PERMANENT ATRIAL FIBRILLATION (HCC): ICD-10-CM

## 2025-07-10 RX ORDER — APIXABAN 5 MG/1
5 TABLET, FILM COATED ORAL 2 TIMES DAILY
Qty: 60 TABLET | Refills: 5 | Status: SHIPPED | OUTPATIENT
Start: 2025-07-10

## 2025-07-14 ENCOUNTER — HOSPITAL ENCOUNTER (OUTPATIENT)
Dept: RADIOLOGY | Facility: IMAGING CENTER | Age: 75
Discharge: HOME | End: 2025-07-14
Attending: OBSTETRICS & GYNECOLOGY
Payer: MEDICARE

## 2025-07-14 DIAGNOSIS — R93.5 ABNORMAL CT SCAN, PELVIS: ICD-10-CM

## 2025-07-14 PROCEDURE — 76830 TRANSVAGINAL US NON-OB: CPT

## 2025-07-14 PROCEDURE — 76856 US EXAM PELVIC COMPLETE: CPT

## (undated) DEVICE — SUT MONOCRYL 4-0 PS-2 18 IN Y496G

## (undated) DEVICE — GLOVE SRG BIOGEL 7

## (undated) DEVICE — JACKSON-PRATT 100CC BULB RESERVOIR: Brand: CARDINAL HEALTH

## (undated) DEVICE — 3M™ IOBAN™ 2 ANTIMICROBIAL INCISE DRAPE 6650EZ: Brand: IOBAN™ 2

## (undated) DEVICE — PREMIUM DRY TRAY LF: Brand: MEDLINE INDUSTRIES, INC.

## (undated) DEVICE — SUT VICRYL 3-0 SH 27 IN J416H

## (undated) DEVICE — SUT SILK 3-0 SH 30 IN K832H

## (undated) DEVICE — PLUMEPEN PRO 10FT

## (undated) DEVICE — ELECTRODE BLADE MOD E-Z CLEAN 4IN -0014AM

## (undated) DEVICE — GLOVE INDICATOR UNDERGLOVE SZ 6 BLUE

## (undated) DEVICE — PROXIMATE LINEAR CUTTER RELOAD, BLUE, 75MM: Brand: PROXIMATE

## (undated) DEVICE — MINOR PROCEDURE DRAPE: Brand: CONVERTORS

## (undated) DEVICE — INTENDED FOR TISSUE SEPARATION, AND OTHER PROCEDURES THAT REQUIRE A SHARP SURGICAL BLADE TO PUNCTURE OR CUT.: Brand: BARD-PARKER SAFETY BLADES SIZE 10, STERILE

## (undated) DEVICE — SURGICEL 4 X 8

## (undated) DEVICE — 3000CC GUARDIAN II: Brand: GUARDIAN

## (undated) DEVICE — PAD GROUNDING ADULT

## (undated) DEVICE — CHLORAPREP HI-LITE 26ML ORANGE

## (undated) DEVICE — ADHESIVE SKN CLSR HISTOACRYL FLEX 0.5ML LF

## (undated) DEVICE — BETHLEHEM MAJOR GENERAL PACK: Brand: CARDINAL HEALTH

## (undated) DEVICE — HARMONIC 1100 SHEARS, 20CM SHAFT LENGTH: Brand: HARMONIC

## (undated) DEVICE — SURGICEL SNOW 1 X 2 IN

## (undated) DEVICE — SUT PDS PLUS 3-0 SH 27IN PDP316H

## (undated) DEVICE — SUT SILK 2-0 TIES 144 IN LA55G

## (undated) DEVICE — LIGACLIP MCA MULTIPLE CLIP APPLIERS, 20 LARGE CLIPS: Brand: LIGACLIP

## (undated) DEVICE — MEDI-VAC YANK SUCT HNDL W/TPRD BULBOUS TIP: Brand: CARDINAL HEALTH

## (undated) DEVICE — PROXIMATE RELOADABLE LINEAR CUTTER WITH SAFETY LOCK-OUT, 75MM: Brand: PROXIMATE

## (undated) DEVICE — GLOVE SRG BIOGEL ECLIPSE 7

## (undated) DEVICE — ENSEAL 20 CM SHAFT, LARGE JAW: Brand: ENSEAL X1

## (undated) DEVICE — ABDOMINAL PAD: Brand: DERMACEA

## (undated) DEVICE — TUBING SUCTION 5MM X 12 FT

## (undated) DEVICE — BULB SYRINGE,IRRIGATION WITH PROTECTIVE CAP: Brand: DOVER

## (undated) DEVICE — POOLE SUCTION HANDLE: Brand: CARDINAL HEALTH

## (undated) DEVICE — SUT VICRYL PLUS 0 CTB-1 27 IN VCPB260H

## (undated) DEVICE — TIBURON SPLIT SHEET: Brand: CONVERTORS

## (undated) DEVICE — SPONGE PACKING 4.5 X 22 IN STRL X-RAY DETECT

## (undated) DEVICE — PROXIMATE PLUS MD MULTI-DIRECTIONAL RELEASE SKIN STAPLERS CONTAINS 35 STAINLESS STEEL STAPLES APPROXIMATE CLOSED DIMENSIONS: 6.9MM X 3.9MM WIDE: Brand: PROXIMATE

## (undated) DEVICE — LIGACLIP MCA MULTIPLE CLIP APPLIERS, 20 MEDIUM CLIPS: Brand: LIGACLIP

## (undated) DEVICE — INTENDED FOR TISSUE SEPARATION, AND OTHER PROCEDURES THAT REQUIRE A SHARP SURGICAL BLADE TO PUNCTURE OR CUT.: Brand: BARD-PARKER ® CARBON RIB-BACK BLADES

## (undated) DEVICE — SUT VICRYL 2-0 REEL 54 IN J286G

## (undated) DEVICE — FISH VISCERAL RETAINER ABD LG

## (undated) DEVICE — SUT SILK 3-0 SH CR/8 18 IN C013D

## (undated) DEVICE — SCD SEQUENTIAL COMPRESSION COMFORT SLEEVE MEDIUM KNEE LENGTH: Brand: KENDALL SCD

## (undated) DEVICE — GLOVE SRG BIOGEL ORTHOPEDIC 7.5

## (undated) DEVICE — SUT VICRYL 4-0 PS-2 27 IN J426H

## (undated) DEVICE — BETHLEHEM UNIVERSAL MINOR GEN: Brand: CARDINAL HEALTH

## (undated) DEVICE — GAUZE SPONGES,16 PLY: Brand: CURITY

## (undated) DEVICE — CATH FOLEY 14FR 5ML 2 WAY SILICONE ELASTIMER

## (undated) DEVICE — SUT MONOCRYL 5-0 P-3 18 IN Y493G

## (undated) DEVICE — NEEDLE 25G X 1 1/2

## (undated) DEVICE — INTENDED FOR TISSUE SEPARATION, AND OTHER PROCEDURES THAT REQUIRE A SHARP SURGICAL BLADE TO PUNCTURE OR CUT.: Brand: BARD-PARKER SAFETY BLADES SIZE 15, STERILE

## (undated) DEVICE — GLOVE INDICATOR PI UNDERGLOVE SZ 7.5 BLUE

## (undated) DEVICE — BIPOLAR CORD DISP

## (undated) DEVICE — LIGACLIP MCA MULTIPLE CLIP APPLIERS, 20 SMALL CLIPS: Brand: LIGACLIP

## (undated) DEVICE — SUT SILK 2-0 SH CR/8 18 IN C012D

## (undated) DEVICE — SUT SILK 2-0 18 IN A185H

## (undated) DEVICE — SUT VICRYL 3-0 18 IN J110T

## (undated) DEVICE — V.A.C. DRAPE: Brand: V.A.C.®

## (undated) DEVICE — PACK UNIVERSAL NECK

## (undated) DEVICE — SUT SILK 2-0 SH 30 IN K833H

## (undated) DEVICE — BETHLEHEM UNIVERSAL OUTPATIENT: Brand: CARDINAL HEALTH

## (undated) DEVICE — HEMOCLIP CARTRIDGE MED

## (undated) DEVICE — GLOVE SRG BIOGEL 6

## (undated) DEVICE — TRAY FOLEY 16FR URIMETER SURESTEP

## (undated) DEVICE — ELECTRODE BLADE MOD E-Z CLEAN 2.5IN 6.4CM -0012M

## (undated) DEVICE — ABTHERA OPEN ABDOMEN DRESSING WITH SENSATRAC PAD: Brand: ABTHERA™ SENSAT.R.A.C.™

## (undated) DEVICE — SEPRA FILM 6 X 5

## (undated) DEVICE — 3M™ STERI-STRIP™ COMPOUND BENZOIN TINCTURE 40 BAGS/CARTON 4 CARTONS/CASE C1544: Brand: 3M™ STERI-STRIP™

## (undated) DEVICE — SPONGE LAP 18 X 18 IN STRL RFD

## (undated) DEVICE — ANTIBACTERIAL UNDYED BRAIDED (POLYGLACTIN 910), SYNTHETIC ABSORBABLE SUTURE: Brand: COATED VICRYL

## (undated) DEVICE — DRAPE SURGIKIT SADDLE BAG

## (undated) DEVICE — SUT SILK 3-0 18 IN A184H

## (undated) DEVICE — SUT PDS PLUS 1 CTB 36 IN PDPB359T

## (undated) DEVICE — GLOVE INDICATOR PI UNDERGLOVE SZ 7 BLUE

## (undated) DEVICE — DRAIN CHANNEL RND FULL FLUTED 19FR W/TROCAR